# Patient Record
Sex: FEMALE | Race: WHITE | NOT HISPANIC OR LATINO | Employment: FULL TIME | URBAN - METROPOLITAN AREA
[De-identification: names, ages, dates, MRNs, and addresses within clinical notes are randomized per-mention and may not be internally consistent; named-entity substitution may affect disease eponyms.]

---

## 2017-10-20 ENCOUNTER — TRANSCRIBE ORDERS (OUTPATIENT)
Dept: ADMINISTRATIVE | Facility: HOSPITAL | Age: 37
End: 2017-10-20

## 2017-10-20 DIAGNOSIS — R20.2 NUMBNESS AND TINGLING: Primary | ICD-10-CM

## 2017-10-20 DIAGNOSIS — R20.0 NUMBNESS AND TINGLING: Primary | ICD-10-CM

## 2017-11-01 ENCOUNTER — OFFICE VISIT (OUTPATIENT)
Dept: RADIOLOGY | Facility: CLINIC | Age: 37
End: 2017-11-01
Payer: OTHER MISCELLANEOUS

## 2017-11-01 DIAGNOSIS — R20.0 NUMBNESS AND TINGLING: ICD-10-CM

## 2017-11-01 DIAGNOSIS — R20.2 NUMBNESS AND TINGLING: ICD-10-CM

## 2017-11-01 PROCEDURE — 95910 NRV CNDJ TEST 7-8 STUDIES: CPT

## 2017-11-01 PROCEDURE — 95886 MUSC TEST DONE W/N TEST COMP: CPT

## 2018-03-20 LAB — C-REACTIVE PROTEIN (HISTORICAL): 17.6 MG/L

## 2018-05-01 LAB
ALBUMIN SERPL BCP-MCNC: 3.6 G/DL (ref 3.5–5.7)
ALP SERPL-CCNC: 68 IU/L (ref 40–150)
ALT SERPL W P-5'-P-CCNC: 17 IU/L (ref 0–50)
ANION GAP SERPL CALCULATED.3IONS-SCNC: 10.1 MM/L
AST SERPL W P-5'-P-CCNC: 17 U/L (ref 7–26)
BASOPHILS # BLD AUTO: 0 X3/UL (ref 0–0.3)
BASOPHILS # BLD AUTO: 0.6 % (ref 0–2)
BILIRUB SERPL-MCNC: 0.5 MG/DL (ref 0.3–1)
BILIRUB UR QL STRIP: NEGATIVE
BUN SERPL-MCNC: 12 MG/DL (ref 7–25)
CALCIUM SERPL-MCNC: 8.3 MG/DL (ref 8.6–10.5)
CHLORIDE SERPL-SCNC: 103 MM/L (ref 98–107)
CLARITY UR: CLEAR
CO2 SERPL-SCNC: 29 MM/L (ref 21–31)
COLOR UR: YELLOW
CREAT SERPL-MCNC: 0.67 MG/DL (ref 0.6–1.2)
DEPRECATED RDW RBC AUTO: 13 %
EGFR (HISTORICAL): > 60 GFR
EGFR AFRICAN AMERICAN (HISTORICAL): > 60 GFR
EOSINOPHIL # BLD AUTO: 0.1 X3/UL (ref 0–0.5)
EOSINOPHIL NFR BLD AUTO: 2.3 % (ref 0–5)
GLUCOSE (HISTORICAL): 172 MG/DL (ref 65–99)
GLUCOSE UR STRIP-MCNC: ABNORMAL MG/DL
HCT VFR BLD AUTO: 38.1 % (ref 37–47)
HGB BLD-MCNC: 12.8 G/DL (ref 12–16)
HGB UR QL STRIP.AUTO: NEGATIVE
KETONES UR STRIP-MCNC: NEGATIVE MG/DL
LEUKOCYTE ESTERASE UR QL STRIP: NEGATIVE
LYMPHOCYTES # BLD AUTO: 0.9 X3/UL (ref 1.2–4.2)
LYMPHOCYTES NFR BLD AUTO: 25 % (ref 20.5–51.1)
MCH RBC QN AUTO: 28.3 PG (ref 26–34)
MCHC RBC AUTO-ENTMCNC: 33.6 G/DL (ref 31–37)
MCV RBC AUTO: 84.3 FL (ref 81–99)
MONOCYTES # BLD AUTO: 0.3 X3/UL (ref 0–1)
MONOCYTES NFR BLD AUTO: 7.4 % (ref 1.7–12)
NEUTROPHILS # BLD AUTO: 2.2 X3/UL (ref 1.4–6.5)
NEUTS SEG NFR BLD AUTO: 64.7 % (ref 42.2–75.2)
NITRITE UR QL STRIP: NEGATIVE
OSMOLALITY, SERUM (HISTORICAL): 280 MOSM (ref 262–291)
PH UR STRIP.AUTO: 5.5 [PH] (ref 4.5–8)
PLATELET # BLD AUTO: 175 X3/UL (ref 130–400)
PMV BLD AUTO: 8.5 FL
POTASSIUM SERPL-SCNC: 4.1 MM/L (ref 3.5–5.5)
PREGNANCY TEST URINE (HISTORICAL): NEGATIVE
PROT UR STRIP-MCNC: NEGATIVE MG/DL
RBC # BLD AUTO: 4.52 X6/UL (ref 3.9–5.2)
SODIUM SERPL-SCNC: 138 MM/L (ref 134–143)
SP GR UR STRIP.AUTO: 1.02 (ref 1–1.03)
SP GR UR STRIP.AUTO: 1.02 (ref 1–1.03)
TOTAL PROTEIN (HISTORICAL): 6 G/DL (ref 6.4–8.9)
UROBILINOGEN UR QL STRIP.AUTO: 0.2 EU/DL (ref 0.2–8)
WBC # BLD AUTO: 3.4 X3/UL (ref 4.8–10.8)

## 2018-05-07 ENCOUNTER — LAB REQUISITION (OUTPATIENT)
Dept: LAB | Facility: HOSPITAL | Age: 38
End: 2018-05-07
Payer: COMMERCIAL

## 2018-05-07 DIAGNOSIS — K56.3 GALLSTONE ILEUS (HCC): ICD-10-CM

## 2018-05-07 LAB
PREGNANCY TEST URINE (HISTORICAL): NEGATIVE
SP GR UR STRIP.AUTO: 1.02 (ref 1–1.03)
SURGICAL PATHOLOGY (HISTORICAL): NORMAL

## 2018-05-07 PROCEDURE — 88304 TISSUE EXAM BY PATHOLOGIST: CPT | Performed by: PATHOLOGY

## 2018-05-16 LAB
ALBUMIN SERPL BCP-MCNC: 4 G/DL (ref 3.5–5.7)
ALP SERPL-CCNC: 138 IU/L (ref 40–150)
ALT SERPL W P-5'-P-CCNC: 22 IU/L (ref 0–50)
ANION GAP SERPL CALCULATED.3IONS-SCNC: 11.3 MM/L
AST SERPL W P-5'-P-CCNC: 28 U/L (ref 7–26)
BASOPHILS # BLD AUTO: 0 X3/UL (ref 0–0.3)
BASOPHILS # BLD AUTO: 0.7 % (ref 0–2)
BILIRUB SERPL-MCNC: 0.4 MG/DL (ref 0.3–1)
BUN SERPL-MCNC: 6 MG/DL (ref 7–25)
CALCIUM SERPL-MCNC: 9.4 MG/DL (ref 8.6–10.5)
CHLORIDE SERPL-SCNC: 101 MM/L (ref 98–107)
CO2 SERPL-SCNC: 29 MM/L (ref 21–31)
CREAT SERPL-MCNC: 0.6 MG/DL (ref 0.6–1.2)
D-DIMER QUANTITATIVE (HISTORICAL): 338 NG/ML
DEPRECATED RDW RBC AUTO: 13 %
EGFR (HISTORICAL): > 60 GFR
EGFR AFRICAN AMERICAN (HISTORICAL): > 60 GFR
EOSINOPHIL # BLD AUTO: 0.2 X3/UL (ref 0–0.5)
EOSINOPHIL NFR BLD AUTO: 4.1 % (ref 0–5)
GLUCOSE (HISTORICAL): 275 MG/DL (ref 65–99)
HCT VFR BLD AUTO: 40.3 % (ref 37–47)
HGB BLD-MCNC: 13 G/DL (ref 12–16)
LIPASE SERPL-CCNC: 9 U/L (ref 11–82)
LYMPHOCYTES # BLD AUTO: 1 X3/UL (ref 1.2–4.2)
LYMPHOCYTES NFR BLD AUTO: 22.6 % (ref 20.5–51.1)
MCH RBC QN AUTO: 27.6 PG (ref 26–34)
MCHC RBC AUTO-ENTMCNC: 32.3 G/DL (ref 31–37)
MCV RBC AUTO: 85.3 FL (ref 81–99)
MONOCYTES # BLD AUTO: 0.4 X3/UL (ref 0–1)
MONOCYTES NFR BLD AUTO: 8.4 % (ref 1.7–12)
NEUTROPHILS # BLD AUTO: 2.8 X3/UL (ref 1.4–6.5)
NEUTS SEG NFR BLD AUTO: 64.2 % (ref 42.2–75.2)
OSMOLALITY, SERUM (HISTORICAL): 281 MOSM (ref 262–291)
PLATELET # BLD AUTO: 256 X3/UL (ref 130–400)
PMV BLD AUTO: 8.4 FL
POTASSIUM SERPL-SCNC: 4.3 MM/L (ref 3.5–5.5)
PREGNANCY TEST URINE (HISTORICAL): NEGATIVE
RBC # BLD AUTO: 4.72 X6/UL (ref 3.9–5.2)
SODIUM SERPL-SCNC: 137 MM/L (ref 134–143)
SP GR UR STRIP.AUTO: <= 1.005 (ref 1–1.03)
TOTAL PROTEIN (HISTORICAL): 6.8 G/DL (ref 6.4–8.9)
WBC # BLD AUTO: 4.4 X3/UL (ref 4.8–10.8)

## 2018-05-18 LAB
AMORPHOUS MATERIAL (HISTORICAL): ABNORMAL /HPF
BILIRUB UR QL STRIP: NEGATIVE
CLARITY UR: CLEAR
COLOR UR: YELLOW
GLUCOSE UR STRIP-MCNC: ABNORMAL MG/DL
HGB UR QL STRIP.AUTO: ABNORMAL
KETONES UR STRIP-MCNC: NEGATIVE MG/DL
LEUKOCYTE ESTERASE UR QL STRIP: NEGATIVE
NITRITE UR QL STRIP: NEGATIVE
NON-SQ EPI CELLS URNS QL MICRO: ABNORMAL /LPF
PH UR STRIP.AUTO: 6 [PH] (ref 4.5–8)
PROT UR STRIP-MCNC: NEGATIVE MG/DL
RBC #/AREA URNS AUTO: ABNORMAL /HPF
SP GR UR STRIP.AUTO: <= 1.005 (ref 1–1.03)
UROBILINOGEN UR QL STRIP.AUTO: 1 EU/DL (ref 0.2–8)
WBC #/AREA URNS AUTO: ABNORMAL /HPF

## 2018-05-25 LAB
BUN SERPL-MCNC: 12 MG/DL (ref 7–25)
CREAT SERPL-MCNC: 0.76 MG/DL (ref 0.6–1.2)

## 2018-12-27 ENCOUNTER — HOSPITAL ENCOUNTER (INPATIENT)
Facility: HOSPITAL | Age: 38
LOS: 2 days | Discharge: HOME | DRG: 639 | End: 2018-12-29
Attending: FAMILY MEDICINE | Admitting: INTERNAL MEDICINE
Payer: COMMERCIAL

## 2018-12-27 ENCOUNTER — APPOINTMENT (EMERGENCY)
Dept: RADIOLOGY | Facility: HOSPITAL | Age: 38
DRG: 639 | End: 2018-12-27
Payer: COMMERCIAL

## 2018-12-27 DIAGNOSIS — E10.10 DIABETIC KETOACIDOSIS WITHOUT COMA ASSOCIATED WITH TYPE 1 DIABETES MELLITUS (HCC): Primary | ICD-10-CM

## 2018-12-27 DIAGNOSIS — R11.0 NAUSEA: ICD-10-CM

## 2018-12-27 DIAGNOSIS — D50.9 IRON DEFICIENCY ANEMIA, UNSPECIFIED IRON DEFICIENCY ANEMIA TYPE: Chronic | ICD-10-CM

## 2018-12-27 DIAGNOSIS — E55.9 VITAMIN D DEFICIENCY: Chronic | ICD-10-CM

## 2018-12-27 PROBLEM — E87.8 ELECTROLYTE ABNORMALITY: Status: ACTIVE | Noted: 2018-12-27

## 2018-12-27 LAB
ANION GAP SERPL CALCULATED.3IONS-SCNC: 11 MMOL/L (ref 4–13)
ANION GAP SERPL CALCULATED.3IONS-SCNC: 14 MMOL/L (ref 4–13)
BETA-HYDROXYBUTYRATE: 2.69 MMOL/L (ref 0.02–0.27)
BETA-HYDROXYBUTYRATE: 3.81 MMOL/L (ref 0.02–0.27)
BILIRUB UR QL STRIP: ABNORMAL
BUN SERPL-MCNC: 7 MG/DL (ref 7–25)
BUN SERPL-MCNC: 8 MG/DL (ref 7–25)
CALCIUM SERPL-MCNC: 7.8 MG/DL (ref 8.6–10.5)
CALCIUM SERPL-MCNC: 8.7 MG/DL (ref 8.6–10.5)
CHLORIDE SERPL-SCNC: 102 MMOL/L (ref 98–107)
CHLORIDE SERPL-SCNC: 95 MMOL/L (ref 98–107)
CLARITY UR: CLEAR
CO2 SERPL-SCNC: 19 MMOL/L (ref 21–31)
CO2 SERPL-SCNC: 22 MMOL/L (ref 21–31)
COLOR UR: YELLOW
CREAT SERPL-MCNC: 0.6 MG/DL (ref 0.6–1.2)
CREAT SERPL-MCNC: 0.62 MG/DL (ref 0.6–1.2)
ERYTHROCYTE [DISTWIDTH] IN BLOOD BY AUTOMATED COUNT: 14.1 % (ref 11.5–14.5)
GFR SERPL CREATININE-BSD FRML MDRD: 116 ML/MIN/1.73SQ M
GFR SERPL CREATININE-BSD FRML MDRD: 118 ML/MIN/1.73SQ M
GLUCOSE SERPL-MCNC: 103 MG/DL (ref 65–140)
GLUCOSE SERPL-MCNC: 116 MG/DL (ref 65–99)
GLUCOSE SERPL-MCNC: 117 MG/DL (ref 65–140)
GLUCOSE SERPL-MCNC: 154 MG/DL (ref 65–140)
GLUCOSE SERPL-MCNC: 196 MG/DL (ref 65–140)
GLUCOSE SERPL-MCNC: 204 MG/DL (ref 65–99)
GLUCOSE UR STRIP-MCNC: ABNORMAL MG/DL
HCT VFR BLD AUTO: 39 % (ref 34.8–46.1)
HGB BLD-MCNC: 12.8 G/DL (ref 12–16)
HGB UR QL STRIP.AUTO: NEGATIVE
KETONES UR STRIP-MCNC: ABNORMAL MG/DL
LEUKOCYTE ESTERASE UR QL STRIP: NEGATIVE
LYMPHOCYTES # BLD AUTO: 1.41 THOUSAND/UL (ref 0.6–4.47)
LYMPHOCYTES # BLD AUTO: 44 % (ref 20–51)
MAGNESIUM SERPL-MCNC: 1.6 MG/DL (ref 1.9–2.7)
MAGNESIUM SERPL-MCNC: 1.7 MG/DL (ref 1.9–2.7)
MCH RBC QN AUTO: 28.5 PG (ref 26–34)
MCHC RBC AUTO-ENTMCNC: 32.8 G/DL (ref 31–37)
MCV RBC AUTO: 87 FL (ref 81–99)
MONOCYTES # BLD AUTO: 0.45 THOUSAND/UL (ref 0–1.22)
MONOCYTES NFR BLD AUTO: 14 % (ref 4–12)
NEUTS BAND NFR BLD MANUAL: 2 % (ref 0–8)
NEUTS SEG # BLD: 1.02 THOUSAND/UL (ref 1.81–6.82)
NEUTS SEG NFR BLD AUTO: 30 % (ref 42–75)
NITRITE UR QL STRIP: NEGATIVE
NRBC BLD AUTO-RTO: 1 /100 WBCS
PH UR STRIP.AUTO: 5.5 [PH] (ref 5–8)
PHOSPHATE SERPL-MCNC: 2 MG/DL (ref 3–5.5)
PHOSPHATE SERPL-MCNC: 2 MG/DL (ref 3–5.5)
PLATELET # BLD AUTO: 136 THOUSANDS/UL (ref 149–390)
PLATELET BLD QL SMEAR: ADEQUATE
PMV BLD AUTO: 8.1 FL (ref 8.6–11.7)
POTASSIUM SERPL-SCNC: 3.4 MMOL/L (ref 3.5–5.5)
POTASSIUM SERPL-SCNC: 3.7 MMOL/L (ref 3.5–5.5)
PROT UR STRIP-MCNC: NEGATIVE MG/DL
RBC # BLD AUTO: 4.5 MILLION/UL (ref 3.9–5.2)
RBC MORPH BLD: NORMAL
SODIUM SERPL-SCNC: 128 MMOL/L (ref 134–143)
SODIUM SERPL-SCNC: 135 MMOL/L (ref 134–143)
SP GR UR STRIP.AUTO: 1.02 (ref 1–1.03)
TOTAL CELLS COUNTED SPEC: 100
UROBILINOGEN UR QL STRIP.AUTO: 0.2 E.U./DL
VARIANT LYMPHS # BLD AUTO: 10 % (ref 0–0)
WBC # BLD AUTO: 3.2 THOUSAND/UL (ref 4.8–10.8)

## 2018-12-27 PROCEDURE — 82948 REAGENT STRIP/BLOOD GLUCOSE: CPT

## 2018-12-27 PROCEDURE — 80048 BASIC METABOLIC PNL TOTAL CA: CPT | Performed by: PHYSICIAN ASSISTANT

## 2018-12-27 PROCEDURE — 82010 KETONE BODYS QUAN: CPT | Performed by: FAMILY MEDICINE

## 2018-12-27 PROCEDURE — 84100 ASSAY OF PHOSPHORUS: CPT | Performed by: PHYSICIAN ASSISTANT

## 2018-12-27 PROCEDURE — 82010 KETONE BODYS QUAN: CPT | Performed by: PHYSICIAN ASSISTANT

## 2018-12-27 PROCEDURE — 96374 THER/PROPH/DIAG INJ IV PUSH: CPT

## 2018-12-27 PROCEDURE — 99285 EMERGENCY DEPT VISIT HI MDM: CPT

## 2018-12-27 PROCEDURE — 96361 HYDRATE IV INFUSION ADD-ON: CPT

## 2018-12-27 PROCEDURE — 80048 BASIC METABOLIC PNL TOTAL CA: CPT | Performed by: FAMILY MEDICINE

## 2018-12-27 PROCEDURE — 85007 BL SMEAR W/DIFF WBC COUNT: CPT | Performed by: FAMILY MEDICINE

## 2018-12-27 PROCEDURE — 83735 ASSAY OF MAGNESIUM: CPT | Performed by: FAMILY MEDICINE

## 2018-12-27 PROCEDURE — 99223 1ST HOSP IP/OBS HIGH 75: CPT | Performed by: PHYSICIAN ASSISTANT

## 2018-12-27 PROCEDURE — 83735 ASSAY OF MAGNESIUM: CPT | Performed by: PHYSICIAN ASSISTANT

## 2018-12-27 PROCEDURE — 84100 ASSAY OF PHOSPHORUS: CPT | Performed by: FAMILY MEDICINE

## 2018-12-27 PROCEDURE — 71045 X-RAY EXAM CHEST 1 VIEW: CPT

## 2018-12-27 PROCEDURE — 85027 COMPLETE CBC AUTOMATED: CPT | Performed by: FAMILY MEDICINE

## 2018-12-27 PROCEDURE — 81003 URINALYSIS AUTO W/O SCOPE: CPT | Performed by: FAMILY MEDICINE

## 2018-12-27 PROCEDURE — 36415 COLL VENOUS BLD VENIPUNCTURE: CPT | Performed by: FAMILY MEDICINE

## 2018-12-27 RX ORDER — LEVOTHYROXINE SODIUM 0.15 MG/1
TABLET ORAL
COMMUNITY
Start: 2018-04-09 | End: 2018-12-27

## 2018-12-27 RX ORDER — INSULIN GLARGINE 100 [IU]/ML
INJECTION, SOLUTION SUBCUTANEOUS
COMMUNITY
End: 2019-09-23

## 2018-12-27 RX ORDER — GABAPENTIN 300 MG/1
300 CAPSULE ORAL
COMMUNITY
Start: 2018-02-09 | End: 2019-09-23

## 2018-12-27 RX ORDER — SODIUM CHLORIDE 9 MG/ML
250 INJECTION, SOLUTION INTRAVENOUS CONTINUOUS
Status: DISCONTINUED | OUTPATIENT
Start: 2018-12-28 | End: 2018-12-27

## 2018-12-27 RX ORDER — DEXTROSE AND SODIUM CHLORIDE 5; .9 G/100ML; G/100ML
250 INJECTION, SOLUTION INTRAVENOUS CONTINUOUS
Status: DISCONTINUED | OUTPATIENT
Start: 2018-12-27 | End: 2018-12-28

## 2018-12-27 RX ORDER — LEVOTHYROXINE SODIUM 0.15 MG/1
150 TABLET ORAL DAILY
COMMUNITY
End: 2019-09-23

## 2018-12-27 RX ORDER — DULOXETIN HYDROCHLORIDE 60 MG/1
60 CAPSULE, DELAYED RELEASE ORAL DAILY
Status: DISCONTINUED | OUTPATIENT
Start: 2018-12-28 | End: 2018-12-29 | Stop reason: HOSPADM

## 2018-12-27 RX ORDER — SODIUM CHLORIDE 9 MG/ML
500 INJECTION, SOLUTION INTRAVENOUS CONTINUOUS
Status: DISCONTINUED | OUTPATIENT
Start: 2018-12-27 | End: 2018-12-27

## 2018-12-27 RX ORDER — CHLORHEXIDINE GLUCONATE 0.12 MG/ML
15 RINSE ORAL EVERY 12 HOURS SCHEDULED
Status: DISCONTINUED | OUTPATIENT
Start: 2018-12-27 | End: 2018-12-29

## 2018-12-27 RX ORDER — SODIUM CHLORIDE 9 MG/ML
1000 INJECTION, SOLUTION INTRAVENOUS CONTINUOUS
Status: DISCONTINUED | OUTPATIENT
Start: 2018-12-27 | End: 2018-12-27

## 2018-12-27 RX ORDER — DULOXETIN HYDROCHLORIDE 60 MG/1
60 CAPSULE, DELAYED RELEASE ORAL
COMMUNITY
Start: 2018-08-07 | End: 2019-09-23 | Stop reason: SDUPTHER

## 2018-12-27 RX ORDER — MAGNESIUM SULFATE HEPTAHYDRATE 40 MG/ML
4 INJECTION, SOLUTION INTRAVENOUS ONCE
Status: COMPLETED | OUTPATIENT
Start: 2018-12-27 | End: 2018-12-28

## 2018-12-27 RX ORDER — POTASSIUM CHLORIDE 14.9 MG/ML
20 INJECTION INTRAVENOUS
Status: COMPLETED | OUTPATIENT
Start: 2018-12-27 | End: 2018-12-28

## 2018-12-27 RX ORDER — LEVOTHYROXINE SODIUM 0.07 MG/1
150 TABLET ORAL
Status: DISCONTINUED | OUTPATIENT
Start: 2018-12-28 | End: 2018-12-29 | Stop reason: HOSPADM

## 2018-12-27 RX ORDER — GABAPENTIN 300 MG/1
300 CAPSULE ORAL
Status: DISCONTINUED | OUTPATIENT
Start: 2018-12-27 | End: 2018-12-29 | Stop reason: HOSPADM

## 2018-12-27 RX ORDER — ONDANSETRON 2 MG/ML
4 INJECTION INTRAMUSCULAR; INTRAVENOUS EVERY 6 HOURS PRN
Status: DISCONTINUED | OUTPATIENT
Start: 2018-12-27 | End: 2018-12-29 | Stop reason: HOSPADM

## 2018-12-27 RX ORDER — ERGOCALCIFEROL 1.25 MG/1
50000 CAPSULE ORAL WEEKLY
Status: DISCONTINUED | OUTPATIENT
Start: 2018-12-27 | End: 2018-12-27

## 2018-12-27 RX ORDER — POTASSIUM CHLORIDE 20 MEQ/1
40 TABLET, EXTENDED RELEASE ORAL ONCE
Status: COMPLETED | OUTPATIENT
Start: 2018-12-27 | End: 2018-12-27

## 2018-12-27 RX ORDER — ONDANSETRON 2 MG/ML
4 INJECTION INTRAMUSCULAR; INTRAVENOUS ONCE
Status: COMPLETED | OUTPATIENT
Start: 2018-12-27 | End: 2018-12-27

## 2018-12-27 RX ORDER — ERGOCALCIFEROL 1.25 MG/1
50000 CAPSULE ORAL WEEKLY
Status: DISCONTINUED | OUTPATIENT
Start: 2018-12-28 | End: 2018-12-29 | Stop reason: HOSPADM

## 2018-12-27 RX ADMIN — GABAPENTIN 300 MG: 300 CAPSULE ORAL at 22:28

## 2018-12-27 RX ADMIN — POTASSIUM CHLORIDE 40 MEQ: 1500 TABLET, EXTENDED RELEASE ORAL at 23:35

## 2018-12-27 RX ADMIN — INSULIN HUMAN 10 UNITS: 100 INJECTION, SOLUTION PARENTERAL at 20:39

## 2018-12-27 RX ADMIN — DEXTROSE AND SODIUM CHLORIDE 250 ML/HR: 5; 900 INJECTION, SOLUTION INTRAVENOUS at 22:15

## 2018-12-27 RX ADMIN — DEXTROSE AND SODIUM CHLORIDE 250 ML/HR: 5; 900 INJECTION, SOLUTION INTRAVENOUS at 23:09

## 2018-12-27 RX ADMIN — SODIUM CHLORIDE 1000 ML: 0.9 INJECTION, SOLUTION INTRAVENOUS at 21:32

## 2018-12-27 RX ADMIN — POTASSIUM CHLORIDE 20 MEQ: 200 INJECTION, SOLUTION INTRAVENOUS at 22:11

## 2018-12-27 RX ADMIN — CHLORHEXIDINE GLUCONATE 0.12% ORAL RINSE 15 ML: 1.2 LIQUID ORAL at 22:28

## 2018-12-27 RX ADMIN — SODIUM CHLORIDE 1000 ML: 0.9 INJECTION, SOLUTION INTRAVENOUS at 20:38

## 2018-12-27 RX ADMIN — POTASSIUM & SODIUM PHOSPHATES POWDER PACK 280-160-250 MG 2 PACKET: 280-160-250 PACK at 23:35

## 2018-12-27 RX ADMIN — ONDANSETRON 4 MG: 2 INJECTION INTRAMUSCULAR; INTRAVENOUS at 21:31

## 2018-12-28 LAB
ANION GAP SERPL CALCULATED.3IONS-SCNC: 5 MMOL/L (ref 4–13)
ANION GAP SERPL CALCULATED.3IONS-SCNC: 8 MMOL/L (ref 4–13)
BETA-HYDROXYBUTYRATE: 1.09 MMOL/L (ref 0.02–0.27)
BUN SERPL-MCNC: 4 MG/DL (ref 7–25)
BUN SERPL-MCNC: 5 MG/DL (ref 7–25)
CALCIUM SERPL-MCNC: 7.5 MG/DL (ref 8.6–10.5)
CALCIUM SERPL-MCNC: 7.8 MG/DL (ref 8.6–10.5)
CHLORIDE SERPL-SCNC: 105 MMOL/L (ref 98–107)
CHLORIDE SERPL-SCNC: 105 MMOL/L (ref 98–107)
CO2 SERPL-SCNC: 23 MMOL/L (ref 21–31)
CO2 SERPL-SCNC: 23 MMOL/L (ref 21–31)
CREAT SERPL-MCNC: 0.53 MG/DL (ref 0.6–1.2)
CREAT SERPL-MCNC: 0.56 MG/DL (ref 0.6–1.2)
EST. AVERAGE GLUCOSE BLD GHB EST-MCNC: 280 MG/DL
FERRITIN SERPL-MCNC: 140 NG/ML (ref 8–388)
GFR SERPL CREATININE-BSD FRML MDRD: 120 ML/MIN/1.73SQ M
GFR SERPL CREATININE-BSD FRML MDRD: 123 ML/MIN/1.73SQ M
GLUCOSE SERPL-MCNC: 108 MG/DL (ref 65–140)
GLUCOSE SERPL-MCNC: 116 MG/DL (ref 65–140)
GLUCOSE SERPL-MCNC: 134 MG/DL (ref 65–140)
GLUCOSE SERPL-MCNC: 135 MG/DL (ref 65–99)
GLUCOSE SERPL-MCNC: 145 MG/DL (ref 65–140)
GLUCOSE SERPL-MCNC: 165 MG/DL (ref 65–140)
GLUCOSE SERPL-MCNC: 184 MG/DL (ref 65–99)
GLUCOSE SERPL-MCNC: 190 MG/DL (ref 65–140)
GLUCOSE SERPL-MCNC: 222 MG/DL (ref 65–140)
GLUCOSE SERPL-MCNC: 226 MG/DL (ref 65–140)
HBA1C MFR BLD: 11.4 % (ref 4.2–6.3)
IRON SATN MFR SERPL: 19 %
IRON SERPL-MCNC: 55 UG/DL (ref 50–170)
MAGNESIUM SERPL-MCNC: 2.3 MG/DL (ref 1.9–2.7)
MAGNESIUM SERPL-MCNC: 2.4 MG/DL (ref 1.9–2.7)
PHOSPHATE SERPL-MCNC: 3 MG/DL (ref 3–5.5)
PHOSPHATE SERPL-MCNC: 3.2 MG/DL (ref 3–5.5)
POTASSIUM SERPL-SCNC: 4.2 MMOL/L (ref 3.5–5.5)
POTASSIUM SERPL-SCNC: 4.4 MMOL/L (ref 3.5–5.5)
SODIUM SERPL-SCNC: 133 MMOL/L (ref 134–143)
SODIUM SERPL-SCNC: 136 MMOL/L (ref 134–143)
TIBC SERPL-MCNC: 286 UG/DL (ref 250–450)

## 2018-12-28 PROCEDURE — 80048 BASIC METABOLIC PNL TOTAL CA: CPT | Performed by: PHYSICIAN ASSISTANT

## 2018-12-28 PROCEDURE — 82948 REAGENT STRIP/BLOOD GLUCOSE: CPT

## 2018-12-28 PROCEDURE — 99232 SBSQ HOSP IP/OBS MODERATE 35: CPT | Performed by: INTERNAL MEDICINE

## 2018-12-28 PROCEDURE — 83540 ASSAY OF IRON: CPT | Performed by: INTERNAL MEDICINE

## 2018-12-28 PROCEDURE — 83550 IRON BINDING TEST: CPT | Performed by: INTERNAL MEDICINE

## 2018-12-28 PROCEDURE — 82010 KETONE BODYS QUAN: CPT | Performed by: PHYSICIAN ASSISTANT

## 2018-12-28 PROCEDURE — 83036 HEMOGLOBIN GLYCOSYLATED A1C: CPT | Performed by: PHYSICIAN ASSISTANT

## 2018-12-28 PROCEDURE — 84100 ASSAY OF PHOSPHORUS: CPT | Performed by: PHYSICIAN ASSISTANT

## 2018-12-28 PROCEDURE — 82728 ASSAY OF FERRITIN: CPT | Performed by: INTERNAL MEDICINE

## 2018-12-28 PROCEDURE — 83735 ASSAY OF MAGNESIUM: CPT | Performed by: PHYSICIAN ASSISTANT

## 2018-12-28 RX ORDER — ACETAMINOPHEN 325 MG/1
650 TABLET ORAL EVERY 6 HOURS PRN
Status: DISCONTINUED | OUTPATIENT
Start: 2018-12-28 | End: 2018-12-29 | Stop reason: HOSPADM

## 2018-12-28 RX ORDER — IBUPROFEN 600 MG/1
600 TABLET ORAL EVERY 8 HOURS PRN
Status: DISCONTINUED | OUTPATIENT
Start: 2018-12-28 | End: 2018-12-29 | Stop reason: HOSPADM

## 2018-12-28 RX ORDER — SODIUM CHLORIDE 9 MG/ML
200 INJECTION, SOLUTION INTRAVENOUS ONCE
Status: DISCONTINUED | OUTPATIENT
Start: 2018-12-28 | End: 2018-12-29

## 2018-12-28 RX ORDER — SODIUM CHLORIDE 9 MG/ML
200 INJECTION, SOLUTION INTRAVENOUS CONTINUOUS
Status: DISCONTINUED | OUTPATIENT
Start: 2018-12-28 | End: 2018-12-28

## 2018-12-28 RX ORDER — INSULIN GLARGINE 100 [IU]/ML
25 INJECTION, SOLUTION SUBCUTANEOUS
Status: DISCONTINUED | OUTPATIENT
Start: 2018-12-28 | End: 2018-12-29 | Stop reason: HOSPADM

## 2018-12-28 RX ADMIN — INSULIN LISPRO 8 UNITS: 100 INJECTION, SOLUTION INTRAVENOUS; SUBCUTANEOUS at 17:46

## 2018-12-28 RX ADMIN — POTASSIUM CHLORIDE 20 MEQ: 200 INJECTION, SOLUTION INTRAVENOUS at 00:42

## 2018-12-28 RX ADMIN — GABAPENTIN 300 MG: 300 CAPSULE ORAL at 21:38

## 2018-12-28 RX ADMIN — SODIUM CHLORIDE 200 ML/HR: 9 INJECTION, SOLUTION INTRAVENOUS at 06:28

## 2018-12-28 RX ADMIN — POTASSIUM & SODIUM PHOSPHATES POWDER PACK 280-160-250 MG 2 PACKET: 280-160-250 PACK at 03:33

## 2018-12-28 RX ADMIN — ENOXAPARIN SODIUM 40 MG: 40 INJECTION SUBCUTANEOUS at 09:18

## 2018-12-28 RX ADMIN — DULOXETINE HYDROCHLORIDE 60 MG: 60 CAPSULE, DELAYED RELEASE ORAL at 09:18

## 2018-12-28 RX ADMIN — ERGOCALCIFEROL 50000 UNITS: 1.25 CAPSULE, LIQUID FILLED ORAL at 09:18

## 2018-12-28 RX ADMIN — IBUPROFEN 600 MG: 600 TABLET ORAL at 15:33

## 2018-12-28 RX ADMIN — CHLORHEXIDINE GLUCONATE 0.12% ORAL RINSE 15 ML: 1.2 LIQUID ORAL at 09:18

## 2018-12-28 RX ADMIN — IRON SUCROSE 100 MG: 20 INJECTION, SOLUTION INTRAVENOUS at 11:19

## 2018-12-28 RX ADMIN — MAGNESIUM SULFATE IN WATER 4 G: 40 INJECTION, SOLUTION INTRAVENOUS at 00:36

## 2018-12-28 RX ADMIN — ONDANSETRON 4 MG: 2 INJECTION INTRAMUSCULAR; INTRAVENOUS at 08:19

## 2018-12-28 RX ADMIN — INSULIN GLARGINE 25 UNITS: 100 INJECTION, SOLUTION SUBCUTANEOUS at 21:38

## 2018-12-28 RX ADMIN — LEVOTHYROXINE SODIUM 150 MCG: 100 TABLET ORAL at 06:08

## 2018-12-28 RX ADMIN — INSULIN LISPRO 2 UNITS: 100 INJECTION, SOLUTION INTRAVENOUS; SUBCUTANEOUS at 17:03

## 2018-12-28 RX ADMIN — ACETAMINOPHEN 650 MG: 325 TABLET ORAL at 13:54

## 2018-12-28 RX ADMIN — CHLORHEXIDINE GLUCONATE 0.12% ORAL RINSE 15 ML: 1.2 LIQUID ORAL at 21:37

## 2018-12-28 RX ADMIN — INSULIN GLARGINE 25 UNITS: 100 INJECTION, SOLUTION SUBCUTANEOUS at 00:36

## 2018-12-28 RX ADMIN — SODIUM CHLORIDE 200 ML/HR: 9 INJECTION, SOLUTION INTRAVENOUS at 00:49

## 2018-12-28 RX ADMIN — IBUPROFEN 600 MG: 600 TABLET ORAL at 23:43

## 2018-12-28 NOTE — ED PROVIDER NOTES
History  Chief Complaint   Patient presents with    Nausea     vomiting since yesterday     Lethargy     body aches , feels like "im in DKA"     HPI  This is a 53-year-old female type 1 diabetic on insulin presented to ED with complain of nausea vomiting and feeling lousy for past 2-3 days  Patient states that she has been using her insulin but her blood sugar was elevated for past few days  Patient states that she is going to of bed to worse and is under a lot of stress which has been causing a lot of distress in her life  Patient states her blood sugar this morning was 400 she took 25 unit of insulin which helped her sugar  She is complaining of nausea and a couple episodes of vomiting at home  She denies any abdominal pain at this time  She denies any fever chills at this time  Denies any chest pain palpitation  Patient states she does feel like that she is in DKA  Prior to Admission Medications   Prescriptions Last Dose Informant Patient Reported? Taking?    DULoxetine (CYMBALTA) 60 mg delayed release capsule   Yes Yes   Sig: Take 60 mg by mouth   gabapentin (NEURONTIN) 300 mg capsule   Yes Yes   Sig: Take 300 mg by mouth   insulin aspart (NovoLOG) 100 units/mL injection   Yes Yes   Sig: Inject under the skin 3 (three) times a day before meals   insulin glargine (LANTUS) 100 units/mL subcutaneous injection   Yes Yes   Sig: Inject under the skin daily at bedtime   levothyroxine 150 mcg tablet   Yes Yes   Sig: Take 150 mcg by mouth daily      Facility-Administered Medications: None       Past Medical History:   Diagnosis Date    Anemia     B12 deficiency     Diabetes mellitus ([address] )     Disease of thyroid gland     hypothyroid    MILLICENT (iron deficiency anemia)     Vitamin D deficiency        Past Surgical History:   Procedure Laterality Date    ABDOMINAL SURGERY      gastric bypass     SECTION      x2    CHOLECYSTECTOMY      GASTRIC BYPASS  2007    INTRAUTERINE DEVICE INSERTION 01/06/2015    OTHER SURGICAL HISTORY      surgery for imperforate hymen/endometriosis/hydrometrocolpos    TUBAL LIGATION      WISDOM TOOTH EXTRACTION         Family History   Problem Relation Age of Onset    Thyroid disease Mother     URIEL disease Mother     Diabetes Father     Schizophrenia Father     Thyroid disease Sister     Heart disease Maternal Grandmother     Hypertension Maternal Grandmother      I have reviewed and agree with the history as documented  Social History   Substance Use Topics    Smoking status: Never Smoker    Smokeless tobacco: Never Used    Alcohol use Yes      Comment: socially        Review of Systems   Constitutional: Positive for chills  HENT: Negative  Eyes: Negative  Respiratory: Negative  Cardiovascular: Negative  Gastrointestinal: Positive for nausea and vomiting  Negative for abdominal distention, constipation and rectal pain  Genitourinary: Negative  Musculoskeletal: Negative  Skin: Negative  Neurological: Negative  Psychiatric/Behavioral: Negative  Physical Exam  Physical Exam   Constitutional: She is oriented to person, place, and time  She appears well-developed  She has a sickly appearance  No distress  HENT:   Head: Normocephalic and atraumatic  Eyes: Pupils are equal, round, and reactive to light  EOM are normal    Neck: Normal range of motion  Neck supple  Cardiovascular: Normal rate, regular rhythm and normal heart sounds  Pulmonary/Chest: Effort normal and breath sounds normal    Abdominal: Soft  Bowel sounds are normal  She exhibits no distension  Neurological: She is alert and oriented to person, place, and time  Skin: Skin is warm  Psychiatric: She has a normal mood and affect  Nursing note and vitals reviewed        Vital Signs  ED Triage Vitals [12/27/18 1949]   Temperature Pulse Respirations Blood Pressure SpO2   98 9 °F (37 2 °C) 105 22 144/94 96 %      Temp Source Heart Rate Source Patient Position - Orthostatic VS BP Location FiO2 (%)   Temporal Monitor Sitting Left arm --      Pain Score       5           Vitals:    12/27/18 1949   BP: 144/94   Pulse: 105   Patient Position - Orthostatic VS: Sitting       Visual Acuity      ED Medications  Medications   sodium chloride 0 9 % bolus 1,000 mL (1,000 mL Intravenous New Bag 12/27/18 2038)   ondansetron (ZOFRAN) injection 4 mg (not administered)   sodium chloride 0 9 % bolus 1,000 mL (not administered)   potassium chloride 40 mEq IVPB (premix) (not administered)   insulin regular (HumuLIN R,NovoLIN R) injection 10 Units (10 Units Intravenous Given 12/27/18 2039)       Diagnostic Studies  Results Reviewed     Procedure Component Value Units Date/Time    Beta Hydroxybutyrate [[id number]]  (Abnormal) Collected:  12/27/18 2026    Lab Status:  Final result Specimen:  Blood from Arm, Left Updated:  12/27/18 2059     BETA-HYDROXYBUTYRATE 3 81 (H) mmol/L     Phosphorus [[id number]]  (Abnormal) Collected:  12/27/18 2026    Lab Status:  Final result Specimen:  Blood from Arm, Left Updated:  12/27/18 2059     Phosphorus 2 0 (L) mg/dL     Magnesium [[id number]]  (Abnormal) Collected:  12/27/18 2026    Lab Status:  Final result Specimen:  Blood from Arm, Left Updated:  12/27/18 2058     Magnesium 1 7 (L) mg/dL     Basic metabolic panel [[id number]]  (Abnormal) Collected:  12/27/18 2026    Lab Status:  Final result Specimen:  Blood from Arm, Left Updated:  12/27/18 2058     Sodium 128 (L) mmol/L      Potassium 3 7 mmol/L      Chloride 95 (L) mmol/L      CO2 19 (L) mmol/L      ANION GAP 14 (H) mmol/L      BUN 8 mg/dL      Creatinine 0 62 mg/dL      Glucose 204 (H) mg/dL      Calcium 8 7 mg/dL      eGFR 116 ml/min/1 73sq m     Narrative:         National Kidney Disease Education Program recommendations are as follows:  GFR calculation is accurate only with a steady state creatinine  Chronic Kidney disease less than 60 ml/min/1 73 sq  meters  Kidney failure less than 15 ml/min/1 73 sq  meters  UA w Reflex to Microscopic [[id number]]  (Abnormal) Collected:  12/27/18 2033    Lab Status:  Final result Specimen:  Urine from Urine, Clean Catch Updated:  12/27/18 2043     Color, UA Yellow     Clarity, UA Clear     Specific [place], UA 1 020     pH, UA 5 5     Leukocytes, UA Negative     Nitrite, UA Negative     Protein, UA Negative mg/dl      Glucose, UA 3+ (A) mg/dl      Ketones, UA 80 (3+) (A) mg/dl      Urobilinogen, UA 0 2 E U /dl      Bilirubin, UA 1+ (A)     Blood, UA Negative    CBC and differential [[id number]]  (Abnormal) Collected:  12/27/18 2026    Lab Status:  Final result Specimen:  Blood from Arm, Left Updated:  12/27/18 2042     WBC 3 20 (L) Thousand/uL      RBC 4 50 Million/uL      Hemoglobin 12 8 g/dL      Hematocrit 39 0 %      MCV 87 fL      MCH 28 5 pg      MCHC 32 8 g/dL      RDW 14 1 %      MPV 8 1 (L) fL      Platelets 452 (L) Thousands/uL      nRBC 1 /100 WBCs                  XR chest 1 view portable   Final Result by Margret Gregory (12/27 2115)      No findings of an acute cardiopulmonary process  Signed by Margret Gregory MD                 Procedures  Procedures       Phone Contacts  ED Phone Contact    ED Course      result are discussed with the patient will admitted for DKA  Patient states she has to feeling nauseous will give her Zofran and potassium                            [facility]  CritCare Time    Disposition  Final diagnoses:   Diabetic ketoacidosis without coma associated with type 1 diabetes mellitus ([facility] )   Nausea     Time reflects when diagnosis was documented in both MDM as applicable and the Disposition within this note     Time User Action Codes Description Comment    12/27/2018  9:23 PM Jenny Galan Add [E10 10] Diabetic ketoacidosis without coma associated with type 1 diabetes mellitus ([facility] )     12/27/2018  9:23 PM Jenny Galan Add [R11 0] Nausea       ED Disposition     ED Disposition Condition Comment    Admit  Case was discussed with Kael Peña and the patient's admission status was agreed to be Admission Status: inpatient status to the service of Dr Arleen Potter   Follow-up Information    None         Patient's Medications   Discharge Prescriptions    No medications on file     No discharge procedures on file      ED Provider  Electronically Signed by           Barbie Vizcaino MD  12/27/18 3735

## 2018-12-28 NOTE — ASSESSMENT & PLAN NOTE
· Patient received IV insulin in the ER with improvement in her electrolytes and glucose level  · Insulin drip was not needed  · Anion gap has closed and beta hydroxybutyrate improving  · Will advance to regular diabetic diet  · Will continue insulin sliding scale  · Fingersticks a c   And HS  · Continue Lantus

## 2018-12-28 NOTE — NURSING NOTE
Received into ICU as ICU pt from the ER via litter accompanied by 1 RN and pt's sister  Ambulated from the litter to the scale to ICU bed #6 - gait steady  Denies pain or sob  Does admit to mild nausea  Connected to the ICU monitors and made comf

## 2018-12-28 NOTE — ASSESSMENT & PLAN NOTE
No results found for: HGBA1C    · Last was 11 2 3/2018 per [facility] records    No results for input(s): POCGLU in the last 72 hours      Blood Sugar Average: Last 72 hrs:    · Admit to ICU DKA protocol  · Place on insulin drip, was only given 10 units in ER, drip will be started in ICU  · Serial labs

## 2018-12-28 NOTE — PROGRESS NOTES
Progress Note - Jared Kanner 1980, 41 y o  female MRN: [id number]    Unit/Bed#: ICU 06 Encounter: [id number]    Primary Care Provider: Simi Bain DO   Date and time admitted to hospital: 2018  7:48 PM        * Diabetic ketoacidosis without coma associated with type 1 diabetes mellitus ([address] )   Assessment & Plan    · Patient received IV insulin in the ER with improvement in her electrolytes and glucose level  · Insulin drip was not needed  · Anion gap has closed and beta hydroxybutyrate improving  · Will advance to regular diabetic diet  · Will continue insulin sliding scale  · Fingersticks a c  And HS  · Continue Lantus     Primary hypothyroidism   Assessment & Plan    · Continue levothyroxine  · Follow up as OP     Iron deficiency anemia   Assessment & Plan    · History of gastric bypass  · Will check iron panel  · IV iron       VTE Pharmacologic Prophylaxis: Pharmacologic: Enoxaparin (Lovenox)    Patient Centered Rounds: I have performed bedside rounds with nursing staff today  Discussions with Specialists or Other Care Team Provider: yes  Education and Discussions with Family / Patient: yes    Time Spent for Care: 45 minutes  More than 50% of total time spent on counseling and coordination of care as described above  Current Length of Stay: 1 day(s)    Current Patient Status: Inpatient   Certification Statement: The patient will continue to require additional inpatient hospital stay due to DKA    Discharge Plan:  Likely discharge home tomorrow    Code Status: Level 1 - Full Code    Subjective:   Patient states that she is feeling slightly better today  Still with intermittent nausea  Denies any fever or chills  No chest pain or palpitations  No abdominal pain      Objective:     Vitals:   Temp (24hrs), Av 2 °F (36 8 °C), Min:97 4 °F (36 3 °C), Max:98 9 °F (37 2 °C)    Temp:  [97 4 °F (36 3 °C)-98 9 °F (37 2 °C)] 98 4 °F (36 9 °C)  HR:  [] 83  Resp:  [12-22] 12  BP: ()/(51-94) 115/72  SpO2:  [93 %-96 %] 95 %  Body mass index is 25 97 kg/m²  Input and Output Summary (last 24 hours): Intake/Output Summary (Last 24 hours) at 12/28/18 1009  Last data filed at 12/28/18 0601   Gross per 24 hour   Intake           3107 5 ml   Output             2000 ml   Net           1107 5 ml       Physical Exam:     Physical Exam   Constitutional: She appears well-developed  No distress  HENT:   Head: Normocephalic and atraumatic  Eyes: Conjunctivae and EOM are normal    Neck: Normal range of motion  Neck supple  Cardiovascular: Normal rate and regular rhythm  Pulmonary/Chest: Effort normal  No respiratory distress  Abdominal: Soft  She exhibits no distension  There is no tenderness  Musculoskeletal: Normal range of motion  She exhibits no edema  Neurological: She is alert  No cranial nerve deficit  Skin: Skin is warm and dry  Psychiatric: She has a normal mood and affect  Additional Data:     Labs:      Results from last 7 days  Lab Units 12/27/18 2026   WBC Thousand/uL 3 20*   HEMOGLOBIN g/dL 12 8   HEMATOCRIT % 39 0   PLATELETS Thousands/uL 136*   LYMPHO PCT % 44   MONO PCT % 14*       Results from last 7 days  Lab Units 12/28/18  0633   POTASSIUM mmol/L 4 2   CHLORIDE mmol/L 105   CO2 mmol/L 23   BUN mg/dL 4*   CREATININE mg/dL 0 53*   CALCIUM mg/dL 7 5*           Results from last 7 days  Lab Units 12/28/18  0813 12/28/18  7887 12/28/18  0243 12/28/18  0153 12/28/18  0044 12/27/18  2348 12/27/18  2257 12/27/18  2151 12/27/18  2010   POC GLUCOSE mg/dl 108 134 165* 190* 222* 154* 103 117 196*           * I Have Reviewed All Lab Data Listed Above  * Additional Pertinent Lab Tests Reviewed:  [place] [address] Admission  Reviewed    Imaging:  Imaging Reports Reviewed Today Include:  No new imaging    Recent Cultures (last 7 days):           Last 24 Hours Medication List:     Current Facility-Administered Medications:  chlorhexidine 15 mL Swish & Spit Q12H [address] Lindberg Bishop, PA-C   DULoxetine 60 mg Oral Daily [place] Bishop, PA-C   enoxaparin 40 mg Subcutaneous Daily [facility] Bishop, PA-C   ergocalciferol 50,000 Units Oral Weekly [facility] Bishop, PA-C   gabapentin 300 mg Oral HS [facility] Bishop, PA-C   insulin glargine 25 Units Subcutaneous HS [facility] Bishop, PA-C   insulin lispro 1-5 Units Subcutaneous TID EMANI Begum, JANETH   iron sucrose 100 mg Intravenous Once Delia Orellana MD   levothyroxine 150 mcg Oral Early Morning [facility] Bishop, PA-ERNIE   ondansetron 4 mg Intravenous Q6H PRN [facility] Bisohp, PA-C   sodium chloride 200 mL/hr Intravenous Once Delia Orellana MD        Today, Patient Was Seen By: Delia Orellana MD    ** Please Note: Dictation voice to text software may have been used in the creation of this document   **

## 2018-12-28 NOTE — PLAN OF CARE
Pt with nausea relieved  Supplements given - for repeat chemistry  Verbalizes understanding regarding pain scale of 1 - 10    Verbalizes understanding of falls prevention - will call for assist to get oob to [place]

## 2018-12-28 NOTE — PLAN OF CARE
Problem: DISCHARGE PLANNING - CARE MANAGEMENT  Goal: Discharge to post-acute care or home with appropriate resources  INTERVENTIONS:  - Conduct assessment to determine patient/family and health care team treatment goals, and need for post-acute services based on payer coverage, community resources, and patient preferences, and barriers to discharge  - Address psychosocial, clinical, and financial barriers to discharge as identified in assessment in conjunction with the patient/family and health care team  - Arrange appropriate level of post-acute services according to patient's   needs and preference and payer coverage in collaboration with the physician and health care team  - Communicate with and update the patient/family, physician, and health care team regarding progress on the discharge plan  - Arrange appropriate transportation to post-acute venues  - Patients goal is to return home with family upon discharge   Outcome: Completed Date Met: 12/28/18

## 2018-12-28 NOTE — SOCIAL WORK
CM met with pt at bedside in ICU and explained role  Pt lives with family in 2 story house; 1 ROMEL, 12 steps inside  Pt reports she is completely independent with ADLs  Pt works full time and droves  She herrera snot use any DME  Pt PCP is Dr Wilson Bolds  She sues [employer] ' pharmacy for medications  Pt expressed interest in Diabetes education  Pt was given [employer] card to call to arrange for diabetes education classes  Pt voiced understanding and agreement with same  Pt denies the need for any discharge services at this time  Her family will transport her home upon discharge  CM to follow as needed CM reviewed d/c planning process including the following: identifying help at home, patient preference for d/c planning needs, availability of treatment team to discuss questions or concerns patient and/or family may have regarding understanding medications and recognizing signs and symptoms once discharged  CM also encouraged patient to follow up with all recommended appointments after discharge  Patient advised of importance for patient and family to participate in managing patients medical well being

## 2018-12-28 NOTE — NURSING NOTE
Kavon BEAVERS notified of accucheck of 117 and order for insulin drip  Will review chart and come to talk with pt

## 2018-12-28 NOTE — UTILIZATION REVIEW
Initial Clinical Review    Admission: Date/Time/Statement: 12/27/18 @ 2118 INPATIENT    Orders Placed This Encounter   Procedures    Inpatient Admission (expected length of stay for this patient is greater than two midnights)     Standing Status:   Standing     Number of Occurrences:   1     Order Specific Question:   Admitting Physician     Answer:   Teresa Schmitt     Order Specific Question:   Level of Care     Answer:   Critical Care [15]     Order Specific Question:   Estimated length of stay     Answer:   More than 2 Midnights     Order Specific Question:   Certification     Answer:   I certify that inpatient services are medically necessary for this patient for a duration of greater than two midnights  See H&P and MD Progress Notes for additional information about the patient's course of treatment  ED: Date/Time/Mode of Arrival:   ED Arrival Information     Expected Arrival Acuity Means of Arrival Escorted By Service Admission Type    - 12/27/2018 18:48 Urgent Walk-In Self General Medicine Urgent    Arrival Complaint    Feeling nausea, lethargy-history of DKA        Chief Complaint:   Chief Complaint   Patient presents with    Nausea     vomiting since yesterday     Lethargy     body aches , feels like "im in DKA"       History of Illness: This is a 82-year-old female type 1 diabetic on insulin presented to ED with complaint of nausea vomiting and feeling lousy for past 2-3 days  Patient states that she has been using her insulin but her blood sugar was elevated for past few days  Patient states her blood sugar this morning was 400 she took 25 units of insulin  She is complaining of nausea and a couple episodes of vomiting at home        ED Vital Signs:   ED Triage Vitals [12/27/18 1949]   Temperature Pulse Respirations Blood Pressure SpO2   98 9 °F (37 2 °C) 105 22 144/94 96 %   Pain Score       5        Wt Readings from Last 1 Encounters:   12/27/18 73 kg (160 lb 14 4 oz)     Vital Signs (abnormal): WNL    Abnormal Labs/Diagnostic Test Results:      12/27/18 2026    Sodium 134 - 143 mmol/L 128     Potassium 3 5 - 5 5 mmol/L 3 7    Chloride 98 - 107 mmol/L 95     CO2 21 - 31 mmol/L 19     ANION GAP 4 - 13 mmol/L 14     BUN 7 - 25 mg/dL 8    Creatinine 0 60 - 1 20 mg/dL 0 62    Glucose 65 - 99 mg/dL 204     Calcium 8 6 - 10 5 mg/dL 8 7    eGFR ml/min/1 73sq m 116        Urinalysis 12/27/18 2033    Glucose, UA  3+     Ketones, UA  80 (3+)     Bilirubin, UA  1+             12/27/18 2026    BETA-HYDROXYBUTYRATE 0 02 - 0 27 mmol/L 3 81         12/27/18 2026     WBC 4 80 - 10 80 Thousand/uL 3 20       12/27/18 2026    Neutrophils Absolute 1 81 - 6 82 Thousand/uL 1 02         ED Treatment:   Medication Administration from 12/27/2018 1848 to 12/27/2018 2146       Date/Time Order Dose Route Action     12/27/2018 2038 sodium chloride 0 9 % bolus 1,000 mL 1,000 mL Intravenous New Bag     12/27/2018 2039 insulin regular (HumuLIN R,NovoLIN R) injection 10 Units 10 Units Intravenous Given     12/27/2018 2131 ondansetron (ZOFRAN) injection 4 mg 4 mg Intravenous Given     12/27/2018 2132 sodium chloride 0 9 % bolus 1,000 mL 1,000 mL Intravenous New Bag        Past Medical/Surgical History:     Past Medical History:   Diagnosis Date    Anemia     Arthritis     B12 deficiency     Diabetes mellitus [address]     Disease of thyroid gland     MILLICENT (iron deficiency anemia)     Vitamin D deficiency        Admitting Diagnosis: Nausea [R11 0]  Lethargy [R53 83]  Diabetic ketoacidosis without coma associated with type 1 diabetes mellitus ([address] ) [E10 10]    Age/Sex: 41 y o  female    Assessment/Plan:     Pedrito Brito is a 41 y o  female who presented to the ED with vomiting  Patient has PMHx of DM type 1 with prior DKA  ER treatment included IVF x 2L,10 units regular insulin IV, Zofran 4 mg  Plan: Admit to ICU DKA protocol, place on insulin drip, serial labs, replete electrolytes, monitor labs      Admission Orders: Cardio-Pulmonary monitoring, NPO, initiate DKA Electrolyte Replacement for mag, phos, and K+, up with assistance, HgbA1c, Beta hydroxybutyrate, sequential compression device  Scheduled Meds:   Current Facility-Administered Medications:  chlorhexidine 15 mL Swish & Spit Q12H [address]   DULoxetine 60 mg Oral Daily   enoxaparin 40 mg Subcutaneous Daily   ergocalciferol 50,000 Units Oral Weekly   gabapentin 300 mg Oral HS   insulin glargine 25 Units Subcutaneous HS   insulin lispro 1-5 Units Subcutaneous TID AC   iron sucrose 100 mg Intravenous Once   levothyroxine 150 mcg Oral Early Morning   ondansetron 4 mg Intravenous Q6H PRN       Continuous infusion:     sodium chloride 0 9 % infusion  Rate: 200 mL/hr Dose: 200 mL/hr  Freq: Continuous Route: IV  Indications of Use: IV Hydration            [address] Utilization Review Department  Phone: [phone]; Fax [phone]  [email or web address]  ATTENTION: Please call with any questions or concerns to [phone]  and carefully listen to the prompts so that you are directed to the right person  Send all requests for admission clinical reviews, approved or denied determinations and any other requests to fax [phone]   All voicemails are confidential

## 2018-12-28 NOTE — H&P
H&P- Kyle Hester 1980, 41 y o  female MRN: [id number]    Unit/Bed#: ICU 06 Encounter: [id number]    Primary Care Provider: Nini Ogden DO   Date and time admitted to hospital: 12/27/2018  7:48 PM        * Diabetic ketoacidosis without coma associated with type 1 diabetes mellitus ([facility] [address] )   Assessment & Plan    No results found for: HGBA1C    · Last was 11 2 3/2018 per [facility] records    No results for input(s): POCGLU in the last 72 hours  Blood Sugar Average: Last 72 hrs:    · Admit to ICU DKA protocol  · Place on insulin drip, was only given 10 units in ER, drip will be started in ICU  · Serial labs     Electrolyte abnormality   Assessment & Plan    · Replete per protcol     Anxiety   Assessment & Plan    · Continue cymbalta     B12 deficiency   Assessment & Plan    · Hx of gastric bypass  · Monitor labs with pcp     Vitamin D deficiency   Assessment & Plan    · Start ergocalciferol  · Follow up with PCP for routine lab testing     Primary hypothyroidism   Assessment & Plan    · Continue levothyroxine  · Follow up as OP     Iron deficiency anemia   Assessment & Plan    · History of gastric bypass  · Monitor labs         VTE Prophylaxis: Enoxaparin (Lovenox)  Code Status: full code  POLST: POLST is not applicable to this patient    Anticipated Length of Stay:  Patient will be admitted on an Inpatient basis with an anticipated length of stay of  > 2 midnights  Justification for Hospital Stay: insulin drip, monitor labs    Chief Complaint:   vomiting    History of Present Illness:    Kyle Hester is a 41 y o  female who presents with vomiting  Patient has PMHx of DM type 1 with prior DKA, hypothyroidism, b12 and vitamin d deficiency, MILLICENT with hx of gastric bypass surgery reports that she has had nausea and vomiting and not feeling well last 2 - 3 days  Her blood sugars have been elevated despite using insulin  This morning BS was 400 and took 25 units    Woke up feeling achy, nausea, dizzy  Slept day away, no improvement  Not able to get food in her  No sick contacts  Last vomited yesterday  She has no abdominal pain, fever, chills, cp, sob  She feels similar to prior DKA episode  ER treatment included IVF x 2L, 10 units regular insulin IV, zofran 4mg    Review of Systems:  Review of Systems   Constitutional: Negative for chills and fever  HENT: Negative for sore throat  Eyes: Negative for visual disturbance  Respiratory: Positive for shortness of breath  Negative for cough  Cardiovascular: Negative for chest pain  Gastrointestinal: Positive for abdominal distention, diarrhea, nausea and vomiting  Genitourinary: Negative for dysuria  Musculoskeletal: Positive for myalgias  Skin: Negative for rash and wound  Neurological: Positive for dizziness and headaches  Psychiatric/Behavioral: Negative for confusion  Past Medical and Surgical History:   Past Medical History:   Diagnosis Date    Anemia     B12 deficiency     Diabetes mellitus ([facility] [address] )     Disease of thyroid gland     hypothyroid    MILLICENT (iron deficiency anemia)     Vitamin D deficiency        Past Surgical History:   Procedure Laterality Date    ABDOMINAL SURGERY      gastric bypass     SECTION      x2    CHOLECYSTECTOMY      GASTRIC BYPASS  2007    INTRAUTERINE DEVICE INSERTION  2015    OTHER SURGICAL HISTORY      surgery for imperforate hymen/endometriosis/hydrometrocolpos    TUBAL LIGATION      WISDOM TOOTH EXTRACTION         Meds/Allergies:  Prior to Admission medications    Medication Sig Start Date End Date Taking?  Authorizing Provider   DULoxetine (CYMBALTA) 60 mg delayed release capsule Take 60 mg by mouth 18  Yes Historical Provider, MD   gabapentin (NEURONTIN) 300 mg capsule Take 300 mg by mouth 18 Yes Historical Provider, MD   insulin aspart (NovoLOG) 100 units/mL injection Inject under the skin 3 (three) times a day before meals   Yes Historical Provider, MD   insulin glargine (LANTUS) 100 units/mL subcutaneous injection Inject under the skin daily at bedtime   Yes Historical Provider, MD   levothyroxine 150 mcg tablet Take 150 mcg by mouth daily   Yes Historical Provider, MD   levothyroxine 150 mcg tablet TAKE 1 TABLET (150 MCG TOTAL) BY MOUTH DAILY  4/9/18 12/27/18 Yes Historical Provider, MD     I have reviewed home medications with patient personally  Allergies: No Known Allergies    Social History:  Marital Status: Legally    Patient Pre-hospital Living Situation: home  Patient Pre-hospital Level of Mobility: mobile  Patient Pre-hospital Diet Restrictions: diabetic  Substance Use History:     History   Alcohol Use    Yes     Comment: socially     History   Smoking Status    Never Smoker   Smokeless Tobacco    Never Used     History   Drug Use No       Family History:  I have reviewed the patients family history    Physical Exam:   Vitals:   Blood Pressure: 144/94 (12/27/18 1949)  Pulse: 105 (12/27/18 1949)  Temperature: 98 9 °F (37 2 °C) (12/27/18 1949)  Temp Source: Temporal (12/27/18 1949)  Respirations: 22 (12/27/18 1949)  Height: 5' 6" (167 6 cm) (12/27/18 1949)  Weight - Scale: 72 6 kg (160 lb) (12/27/18 1949)  SpO2: 96 % (12/27/18 1949)    Physical Exam   Constitutional: She is oriented to person, place, and time  She appears well-developed and well-nourished  Fatigued and ill appearing   HENT:   Head: Normocephalic and atraumatic  Eyes: Conjunctivae and EOM are normal  Right eye exhibits no discharge  Left eye exhibits no discharge  Neck: Normal range of motion  No tracheal deviation present  Cardiovascular: Normal rate, regular rhythm and normal heart sounds  Exam reveals no gallop and no friction rub  No murmur heard  Pulmonary/Chest: Effort normal and breath sounds normal  No respiratory distress  She has no wheezes  She has no rales  Abdominal: Soft  Bowel sounds are normal  She exhibits no distension   There is no tenderness  There is no rebound  Musculoskeletal: Normal range of motion  She exhibits no edema, tenderness or deformity  Neurological: She is alert and oriented to person, place, and time  Skin: Skin is warm and dry  No rash noted  No erythema  No pallor  Psychiatric: She has a normal mood and affect  Her behavior is normal  Judgment and thought content normal    Nursing note and vitals reviewed  Additional Data:   Lab Results: I have personally reviewed pertinent reports  Results from last 7 days  Lab Units 12/27/18  2026   WBC Thousand/uL 3 20*   HEMOGLOBIN g/dL 12 8   HEMATOCRIT % 39 0   PLATELETS Thousands/uL 136*   LYMPHO PCT % 44   MONO PCT % 14*       Results from last 7 days  Lab Units 12/27/18  2225   POTASSIUM mmol/L 3 4*   CHLORIDE mmol/L 102   CO2 mmol/L 22   BUN mg/dL 7   CREATININE mg/dL 0 60   CALCIUM mg/dL 7 8*       Imaging: I have personally reviewed pertinent reports  XR chest 1 view portable   Final Result by Radha Bhakta (12/27 2115)      No findings of an acute cardiopulmonary process  Signed by Radha Bhakta MD          [facility] Reviewed: Yes     ** Please Note: This note has been constructed using a voice recognition system   **

## 2018-12-29 VITALS
SYSTOLIC BLOOD PRESSURE: 112 MMHG | BODY MASS INDEX: 25.73 KG/M2 | HEART RATE: 82 BPM | OXYGEN SATURATION: 95 % | DIASTOLIC BLOOD PRESSURE: 62 MMHG | TEMPERATURE: 96.8 F | HEIGHT: 66 IN | WEIGHT: 160.1 LBS | RESPIRATION RATE: 18 BRPM

## 2018-12-29 LAB
GLUCOSE SERPL-MCNC: 197 MG/DL (ref 65–140)
GLUCOSE SERPL-MCNC: 61 MG/DL (ref 65–140)
GLUCOSE SERPL-MCNC: 74 MG/DL (ref 65–140)

## 2018-12-29 PROCEDURE — 82948 REAGENT STRIP/BLOOD GLUCOSE: CPT

## 2018-12-29 PROCEDURE — 99239 HOSP IP/OBS DSCHRG MGMT >30: CPT | Performed by: INTERNAL MEDICINE

## 2018-12-29 RX ORDER — FERROUS SULFATE 325(65) MG
325 TABLET ORAL 2 TIMES DAILY WITH MEALS
Refills: 0
Start: 2018-12-29 | End: 2019-11-18

## 2018-12-29 RX ORDER — ERGOCALCIFEROL 1.25 MG/1
50000 CAPSULE ORAL WEEKLY
Qty: 4 CAPSULE | Refills: 2 | Status: SHIPPED | OUTPATIENT
Start: 2019-01-04 | End: 2020-04-03 | Stop reason: SDUPTHER

## 2018-12-29 RX ADMIN — ENOXAPARIN SODIUM 40 MG: 40 INJECTION SUBCUTANEOUS at 08:44

## 2018-12-29 RX ADMIN — LEVOTHYROXINE SODIUM 150 MCG: 100 TABLET ORAL at 06:35

## 2018-12-29 RX ADMIN — INSULIN LISPRO 5 UNITS: 100 INJECTION, SOLUTION INTRAVENOUS; SUBCUTANEOUS at 11:38

## 2018-12-29 RX ADMIN — DULOXETINE HYDROCHLORIDE 60 MG: 60 CAPSULE, DELAYED RELEASE ORAL at 08:44

## 2018-12-29 NOTE — DISCHARGE SUMMARY
Discharge- Gabi Almaguer 1980, 41 y o  female MRN: [id number]    Unit/Bed#: -01 Encounter: [id number]    Primary Care Provider: Nico Calixto DO   Date and time admitted to hospital: 12/27/2018  7:48 PM        * Diabetic ketoacidosis without coma associated with type 1 diabetes mellitus ([facility] [address] )   Assessment & Plan    · Improved  · Resume home insulin regimen  · Patient has an insulin pump that she recently received and does not know how to use  Case management gave patient numbers to outpatient services to help with her insulin pump  · Patient will follow up with her endocrinologist as an outpatient and PCP as well     Vitamin D deficiency   Assessment & Plan    · Continue home meds     Primary hypothyroidism   Assessment & Plan    · Continue levothyroxine  · Follow up as OP     Iron deficiency anemia   Assessment & Plan    · History of gastric bypass  · Continue iron supplementation       Discharging Physician / Practitioner: Anne-Marie Romeo MD  PCP: Nico Calixto DO  Admission Date:   Admission Orders     Ordered        12/27/18 2118  Inpatient Admission (expected length of stay for this patient is greater than two midnights)  Once             Discharge Date: 12/29/18    Resolved Problems  Date Reviewed: 12/27/2018    None          Consultations During Hospital Stay:  · none    Procedures Performed:   · none    Significant Findings / Test Results:   · DKA    Incidental Findings:   · None     Test Results Pending at Discharge (will require follow up): · None     Outpatient Tests Requested:  · None    Complications:  None    Reason for Admission:  DKA    Hospital Course:     Gabi Almaguer is a 41 y o  female patient who originally presented to the hospital on 12/27/2018 due to nausea vomiting and abdominal pain  Patient was admitted to the ICU due to DKA  Patient received an IV insulin bolus in the ER    Patient's anion gap and electrolytes did improve without the need for an insulin drip  Patient was resumed on sliding scale and Lantus  Patient's nausea and vomiting did improve  Patient was tolerating diet  Patient did have an insulin pump which was new and has not been set up yet  Patient has information for outpatient services to help her set her insulin pump up  Patient has an appointment with her PCP next week  Patient also advised to follow up with her endocrinologist   Patient was given iron supplementation due to her history of iron deficiency  Patient was stable for discharge home with outpatient follow-up  Please see above list of diagnoses and related plan for additional information  Condition at Discharge: stable     Discharge Day Visit / Exam:     Subjective:  No complaints today  Vitals: Blood Pressure: 112/62 (12/29/18 0724)  Pulse: 82 (12/29/18 0724)  Temperature: (!) 96 8 °F (36 °C) (12/29/18 0724)  Temp Source: Tympanic (12/29/18 0724)  Respirations: 18 (12/29/18 0724)  Height: 5' 6" (167 6 cm) (12/27/18 2145)  Weight - Scale: 72 6 kg (160 lb 1 6 oz) (12/29/18 0600)  SpO2: 95 % (12/29/18 0724)     Exam:   Physical Exam   Constitutional: She is oriented to person, place, and time  She appears well-developed  No distress  HENT:   Head: Normocephalic and atraumatic  Eyes: Conjunctivae and EOM are normal    Neck: Normal range of motion  Neck supple  Cardiovascular: Normal rate and regular rhythm  Pulmonary/Chest: Effort normal  No respiratory distress  Abdominal: Soft  She exhibits no distension  There is no tenderness  Musculoskeletal: Normal range of motion  She exhibits no edema  Neurological: She is alert and oriented to person, place, and time  No cranial nerve deficit  Skin: Skin is warm and dry  Psychiatric: She has a normal mood and affect  Discharge instructions/Information to patient and family:   See after visit summary for information provided to patient and family        Provisions for Follow-Up Care:  See after visit summary for information related to follow-up care and any pertinent home health orders  Disposition:     Home    For Discharges to [address] [facility]:   · Not Applicable to this Patient - Not Applicable to this Patient    Planned Readmission: no     Discharge Statement:  I spent 35 minutes discharging the patient  This time was spent on the day of discharge  I had direct contact with the patient on the day of discharge  Greater than 50% of the total time was spent examining patient, answering all patient questions, arranging and discussing plan of care with patient as well as directly providing post-discharge instructions  Additional time then spent on discharge activities  Discharge Medications:  See after visit summary for reconciled discharge medications provided to patient and family        ** Please Note: This note has been constructed using a voice recognition system **

## 2018-12-29 NOTE — PROGRESS NOTES
Patient AAOx4,up adlib,VSS,In no acute respiratory distress  Last HS FS was 145 mg/dl  Report given to Iman RN  Transferred to Room 115 Bed 2 via Wheelchair accompanied by receiving floor RN

## 2018-12-29 NOTE — PLAN OF CARE
DISCHARGE PLANNING     Discharge to home or other facility with appropriate resources Adequate for Discharge        Knowledge Deficit     Patient/family/caregiver demonstrates understanding of disease process, treatment plan, medications, and discharge instructions Adequate for Discharge        Nutrition/Hydration-ADULT     Nutrient/Hydration intake appropriate for improving, restoring or maintaining nutritional needs Adequate for Discharge        PAIN - ADULT     Verbalizes/displays adequate comfort level or baseline comfort level Adequate for Discharge        SAFETY ADULT     Patient will remain free of falls Adequate for Discharge     Maintain or return to baseline ADL function Adequate for Discharge     Maintain or return mobility status to optimal level Adequate for Discharge

## 2018-12-29 NOTE — ASSESSMENT & PLAN NOTE
· Improved  · Resume home insulin regimen  · Patient has an insulin pump that she recently received and does not know how to use    Case management gave patient numbers to outpatient services to help with her insulin pump  · Patient will follow up with her endocrinologist as an outpatient and PCP as well

## 2018-12-29 NOTE — NURSING NOTE
Patient DC home today, IV removed  DC instructions reviewed and given to patient  Patient instructed to continue to take all med's as prescribed and to follow up with all MD appointments, pt verbalized understanding

## 2018-12-29 NOTE — DISCHARGE INSTRUCTIONS
Diabetic Ketoacidosis   WHAT YOU SHOULD KNOW:   Diabetic ketoacidosis (DKA) is a life-threatening condition that happens when diabetes is not controlled  Your blood sugar level becomes dangerously high because your body does not have enough insulin  Insulin is a hormone that helps your body take sugar out of your blood and use it for energy  The lack of insulin forces your body to use fat instead of sugar for energy  As fats are broken down, they leave chemicals called ketones that build up in your blood  Ketones are dangerous at high levels  INSTRUCTIONS:   Medicines:   · Insulin:  Insulin decreases the amount of sugar in your blood  It helps your body move the sugar into your cells, where it is needed for energy  · Take your medicine as directed  Call your healthcare provider if you think your medicine is not helping or if you have side effects  Tell him if you are allergic to any medicine  Keep a list of the medicines, vitamins, and herbs you take  Include the amounts, and when and why you take them  Bring the list or the pill bottles to follow-up visits  Carry your medicine list with you in case of an emergency  Follow up with your diabetes specialist or healthcare provider as directed: You may need blood tests many times a year to check the function of your pancreas  Write down your questions so you remember to ask them during your visits  Prevent diabetic ketoacidosis: The best way to prevent DKA is to control your diabetes  Ask your healthcare provider or diabetes specialist for more information on managing your diabetes  The following may help decrease your risk for DKA:  · Check your blood sugar levels: You may need to check your blood sugar level at least 3 times each day  Follow instructions about when and how often to check during the day  If your blood sugar level is too high, give yourself insulin as directed by your healthcare provider   He can show you how to use a blood glucose monitor to check your levels  · Check for ketones: Follow instructions about when you should check your blood or urine for ketones  Your healthcare provider may give you a machine to check your blood ketones  Urine ketones can be checked with sticks you dip in your urine  Do not exercise if you have ketones in your urine or blood  · Know how to manage sick days:  When you are sick, you may not eat as much as you normally would  You may need to change the amount of insulin you give yourself  You will need to check your blood sugar level more often  You may also need to check for ketones  Make a plan with your healthcare provider about how to manage your diabetes when you are sick  · Know how to treat DKA: If you have signs of DKA, drink more liquids that do not contain sugar, such as water and diet soda  Take your insulin as directed by your healthcare provider  · Call your diabetes team when needed:  Ask your healthcare provider about a diabetes team that you can call for help  Call the team if your blood sugar level is high, or you have ketones in your blood or urine  The team is available for any questions or concerns you have about your diabetes  Contact your healthcare provider if:   · Your blood sugar level is lower or higher than your healthcare provider says it should be  · You have ketones in your blood or urine  · You have a fever or chills  · You are more thirsty than usual      · You are urinating more often than usual      · You have questions or concerns about your condition or care  Return to the emergency department if:   · You have fruity, sweet breath  · You have severe, new stomach pain and are vomiting  · You are more drowsy than usual      · You begin to breathe fast, or are short of breath  · You become weak and confused  · You have a seizure    © 2014 [address] is for End User's use only and may not be sold, redistributed or otherwise used for commercial purposes  All illustrations and images included in CareNotes® are the copyrighted property of [employer]  or [employer]  The above information is an  only  It is not intended as medical advice for individual conditions or treatments  Talk to your doctor, nurse or pharmacist before following any medical regimen to see if it is safe and effective for you

## 2018-12-31 NOTE — UTILIZATION REVIEW
Notification of Discharge  This is a Notification of Discharge from our facility [address]  Please be advised that this patient has been discharge from our facility  Below you will find the admission and discharge date and time including the patients disposition  PRESENTATION DATE: 12/27/2018  7:48 PM  IP ADMISSION DATE: 12/27/18 2118  DISCHARGE DATE: 12/29/2018 12:22 PM  DISPOSITION: [id number] Utilization Review Department  Phone: [phone]; Fax [phone]  ATTENTION: Please call with any questions or concerns to [phone]  and carefully listen to the prompts so that you are directed to the right person  Send all requests for admission clinical reviews, approved or denied determinations and any other requests to fax [phone]   All voicemails are confidential

## 2019-09-23 ENCOUNTER — OFFICE VISIT (OUTPATIENT)
Dept: FAMILY MEDICINE CLINIC | Facility: CLINIC | Age: 39
End: 2019-09-23
Payer: COMMERCIAL

## 2019-09-23 VITALS
OXYGEN SATURATION: 98 % | SYSTOLIC BLOOD PRESSURE: 124 MMHG | DIASTOLIC BLOOD PRESSURE: 76 MMHG | BODY MASS INDEX: 28.22 KG/M2 | WEIGHT: 169.4 LBS | TEMPERATURE: 97.9 F | HEIGHT: 65 IN | HEART RATE: 104 BPM | RESPIRATION RATE: 18 BRPM

## 2019-09-23 DIAGNOSIS — E03.9 HYPOTHYROIDISM, UNSPECIFIED TYPE: ICD-10-CM

## 2019-09-23 DIAGNOSIS — Z13.220 SCREENING CHOLESTEROL LEVEL: ICD-10-CM

## 2019-09-23 DIAGNOSIS — F41.9 ANXIETY: ICD-10-CM

## 2019-09-23 DIAGNOSIS — E55.9 VITAMIN D DEFICIENCY: ICD-10-CM

## 2019-09-23 DIAGNOSIS — E11.9 DIABETIC EYE EXAM (HCC): ICD-10-CM

## 2019-09-23 DIAGNOSIS — E10.65 TYPE 1 DIABETES MELLITUS WITH HYPERGLYCEMIA (HCC): ICD-10-CM

## 2019-09-23 DIAGNOSIS — E10.10 DIABETIC KETOACIDOSIS WITHOUT COMA ASSOCIATED WITH TYPE 1 DIABETES MELLITUS (HCC): Primary | ICD-10-CM

## 2019-09-23 DIAGNOSIS — E66.3 OVERWEIGHT (BMI 25.0-29.9): ICD-10-CM

## 2019-09-23 DIAGNOSIS — Z01.00 DIABETIC EYE EXAM (HCC): ICD-10-CM

## 2019-09-23 LAB — SL AMB POCT HEMOGLOBIN AIC: 10 (ref ?–6.5)

## 2019-09-23 PROCEDURE — 3008F BODY MASS INDEX DOCD: CPT | Performed by: NURSE PRACTITIONER

## 2019-09-23 PROCEDURE — 99203 OFFICE O/P NEW LOW 30 MIN: CPT | Performed by: NURSE PRACTITIONER

## 2019-09-23 PROCEDURE — 83036 HEMOGLOBIN GLYCOSYLATED A1C: CPT | Performed by: NURSE PRACTITIONER

## 2019-09-23 RX ORDER — DULOXETIN HYDROCHLORIDE 60 MG/1
60 CAPSULE, DELAYED RELEASE ORAL DAILY
Qty: 90 CAPSULE | Refills: 1 | Status: SHIPPED | OUTPATIENT
Start: 2019-09-23 | End: 2020-04-20

## 2019-09-23 RX ORDER — METFORMIN HYDROCHLORIDE 500 MG/1
500 TABLET, EXTENDED RELEASE ORAL
Qty: 90 TABLET | Refills: 1 | Status: SHIPPED | OUTPATIENT
Start: 2019-09-23 | End: 2019-11-18

## 2019-09-23 RX ORDER — ALPRAZOLAM 0.5 MG/1
0.5 TABLET ORAL 2 TIMES DAILY PRN
Qty: 30 TABLET | Refills: 1 | Status: SHIPPED | OUTPATIENT
Start: 2019-09-23 | End: 2019-11-18

## 2019-09-23 RX ORDER — LEVOTHYROXINE SODIUM 0.2 MG/1
200 TABLET ORAL DAILY
COMMUNITY
Start: 2019-03-20 | End: 2020-04-22

## 2019-09-23 RX ORDER — SYRINGE-NEEDLE,INSULIN,0.5 ML 27GX1/2"
SYRINGE, EMPTY DISPOSABLE MISCELLANEOUS
COMMUNITY
Start: 2019-04-03

## 2019-09-23 NOTE — PROGRESS NOTES
[facility]        NAME: Masha Castillo is a 42 y o  female  : 1980    MRN: [id number]  DATE: 2019  TIME: 9:17 AM    Assessment and Plan   Diabetic ketoacidosis without coma associated with type 1 diabetes mellitus ([facility] [address] ) [E10 10]  1  Diabetic ketoacidosis without coma associated with type 1 diabetes mellitus ([facility] [address] )  Microalbumin / creatinine urine ratio    metFORMIN (GLUCOPHAGE-XR) 500 mg 24 hr tablet   2  Diabetic eye exam [facility])  Ambulatory Referral to Ophthalmology   3  Type 1 diabetes mellitus with hyperglycemia (HCC)  Comprehensive metabolic panel    POCT hemoglobin A1c   4  Hypothyroidism, unspecified type  TSH, 3rd generation with Free T4 reflex    Thyroid Antibodies Panel    T3, free   5  Vitamin D deficiency  Vitamin D 25 hydroxy   6  Screening cholesterol level  Lipid panel   7  Anxiety  DULoxetine (CYMBALTA) 60 mg delayed release capsule    ALPRAZolam (XANAX) 0 5 mg tablet   8  Overweight (BMI 25 0-29  9)           Patient Instructions     Patient Instructions   1  Start Metformin XR 500mg daily, keep very strict control of blood sugars- recommend HgA1c anders to keep closer track  2  Get labs- continue Levothyroxine 200mcg daily until new results  3  Restart Cymbalta 60mg daily for anxiety, add Xanax PRN as discussed- try not to take more than 3-5times/week  4  Return in 3 months for medcheck/lab review/repeat HgA1c  5  Call sooner for problems/concerns          Chief Complaint     Chief Complaint   Patient presents with    Establish Care    Diabetes         History of Present Illness       Here to establish care- previously seen by Dr Neeta Joiner- last Endo appt 3/19 due to location and pt  Work schedule  HgA1c today is 10  Pt   Is going through a divorce and custody issues- ran out of her Cymbalta 4 days ago- "I feel off, I want to get back on it"  Last TSH 47, increased to 200mcg at that time- no recent result  Has tried to take Metformin in the past with severe diarrhea- has never tried extended release- is willing to retry      Review of Systems   Review of Systems   Constitutional: Negative for activity change, diaphoresis, fatigue and fever  HENT: Negative for congestion, facial swelling, hearing loss, rhinorrhea, sinus pressure, sinus pain, sneezing, sore throat and voice change  Eyes: Negative for discharge and visual disturbance  Respiratory: Negative for cough, choking, chest tightness, shortness of breath, wheezing and stridor  Cardiovascular: Negative for chest pain, palpitations and leg swelling  Gastrointestinal: Negative for abdominal distention, abdominal pain, constipation, diarrhea, nausea and vomiting  Endocrine: Negative for polydipsia, polyphagia and polyuria  Genitourinary: Negative for difficulty urinating, dysuria, frequency and urgency  Musculoskeletal: Negative for arthralgias, back pain, gait problem, joint swelling, myalgias, neck pain and neck stiffness  Skin: Negative for color change, rash and wound  Neurological: Negative for dizziness, syncope, speech difficulty, weakness, light-headedness and headaches  Hematological: Negative for adenopathy  Does not bruise/bleed easily  Psychiatric/Behavioral: Positive for sleep disturbance (trouble sleeping- not sleeping well)  Negative for agitation, behavioral problems, confusion, hallucinations and suicidal ideas  The patient is nervous/anxious  Current Medications       Current Outpatient Medications:     DULoxetine (CYMBALTA) 60 mg delayed release capsule, Take 1 capsule (60 mg total) by mouth daily, Disp: 90 capsule, Rfl: 1    glucagon (GLUCAGON EMERGENCY) 1 MG injection, Inject 1 mg under the skin, Disp: , Rfl:     glucose blood test strip, Testing up to 8 times a day  E10 65, Disp: , Rfl:     insulin aspart (NovoLOG) 100 units/mL injection, Use with carb coverage of 9 and ISF of 30 for total daily dose of 40 units   E10 65, Disp: , Rfl:     insulin glargine (LANTUS SOLOSTAR) 100 units/mL injection pen, Inject 36 Units under the skin daily, Disp: , Rfl:     Insulin Syringe-Needle U-100 (INSULIN SYRINGE 1CC/28G) 28G X 1/2" 1 ML MISC, 4 injections daily E10 65, Disp: , Rfl:     levothyroxine 200 mcg tablet, Take 200 mcg by mouth daily, Disp: , Rfl:     ALPRAZolam (XANAX) 0 5 mg tablet, Take 1 tablet (0 5 mg total) by mouth 2 (two) times a day as needed for anxiety, Disp: 30 tablet, Rfl: 1    ergocalciferol (VITAMIN D2) 50,000 units, Take 1 capsule (50,000 Units total) by mouth once a week (Patient not taking: Reported on 2019), Disp: 4 capsule, Rfl: 2    ferrous sulfate 325 (65 Fe) mg tablet, Take 1 tablet (325 mg total) by mouth 2 (two) times a day with meals Over the counter (Patient not taking: Reported on 2019), Disp: , Rfl: 0    metFORMIN (GLUCOPHAGE-XR) 500 mg 24 hr tablet, Take 1 tablet (500 mg total) by mouth daily with dinner, Disp: 90 tablet, Rfl: 1    Current Allergies     Allergies as of 2019    (No Known Allergies)            The following portions of the patient's history were reviewed and updated as appropriate: allergies, current medications, past family history, past medical history, past social history, past surgical history and problem list      Past Medical History:   Diagnosis Date    Anemia     Arthritis     B12 deficiency     Diabetes mellitus ([facility] [address] )     Disease of thyroid gland     hypothyroid    MILLICENT (iron deficiency anemia)     Vitamin D deficiency        Past Surgical History:   Procedure Laterality Date    ABDOMINAL SURGERY      gastric bypass     SECTION      x2    CHOLECYSTECTOMY      GASTRIC BYPASS  2007    INTRAUTERINE DEVICE INSERTION  2015    OTHER SURGICAL HISTORY      surgery for imperforate hymen/endometriosis/hydrometrocolpos    TUBAL LIGATION      WISDOM TOOTH EXTRACTION         Family History   Problem Relation Age of Onset    Thyroid disease Mother     URIEL disease Mother    Luz Maria Okeefe Diabetes Father     Schizophrenia Father     Alcohol abuse Father     Thyroid disease Sister     Heart disease Maternal Grandmother     Hypertension Maternal Grandmother     Arthritis Maternal Grandmother          Medications have been verified  Objective   /76   Pulse 104   Temp 97 9 °F (36 6 °C) (Tympanic)   Resp 18   Ht 5' 5" (1 651 m)   Wt 76 8 kg (169 lb 6 4 oz)   SpO2 98%   BMI 28 19 kg/m²        Physical Exam     Physical Exam   Constitutional: She is oriented to person, place, and time  Vital signs are normal  She appears well-developed and well-nourished  She is active and cooperative  No distress  Eyes: EOM are normal    Neck: Normal range of motion  Neck supple  No tracheal deviation present  No thyromegaly present  Cardiovascular: Normal rate, regular rhythm and normal heart sounds  No murmur heard  Pulmonary/Chest: Effort normal and breath sounds normal  No respiratory distress  She has no wheezes  Neurological: She is alert and oriented to person, place, and time  Skin: Skin is warm and dry  No rash noted  She is not diaphoretic  No erythema  Psychiatric: She has a normal mood and affect  Her behavior is normal  Judgment and thought content normal    Nursing note and vitals reviewed  PHQ-9 Depression Screening    PHQ-9:    Frequency of the following problems over the past two weeks:       Little interest or pleasure in doing things:  0 - not at all  Feeling down, depressed, or hopeless:  0 - not at all  PHQ-2 Score:  0         BMI Counseling: Body mass index is 28 19 kg/m²  The BMI is above normal  Nutrition recommendations include consuming healthier snacks, decreasing soda and/or juice intake, moderation in carbohydrate intake and increasing intake of lean protein

## 2019-09-23 NOTE — PATIENT INSTRUCTIONS
1  Start Metformin XR 500mg daily, keep very strict control of blood sugars- recommend HgA1c anders to keep closer track  2  Get labs- continue Levothyroxine 200mcg daily until new results  3  Restart Cymbalta 60mg daily for anxiety, add Xanax PRN as discussed- try not to take more than 3-5times/week  4  Return in 3 months for medcheck/lab review/repeat HgA1c  5   Call sooner for problems/concerns

## 2019-09-30 LAB
LEFT EYE DIABETIC RETINOPATHY: NORMAL
RIGHT EYE DIABETIC RETINOPATHY: NORMAL

## 2019-10-03 ENCOUNTER — APPOINTMENT (OUTPATIENT)
Dept: LAB | Facility: CLINIC | Age: 39
End: 2019-10-03
Payer: COMMERCIAL

## 2019-10-03 DIAGNOSIS — Z13.220 SCREENING CHOLESTEROL LEVEL: ICD-10-CM

## 2019-10-03 DIAGNOSIS — E10.65 TYPE 1 DIABETES MELLITUS WITH HYPERGLYCEMIA (HCC): ICD-10-CM

## 2019-10-03 DIAGNOSIS — E03.9 HYPOTHYROIDISM, UNSPECIFIED TYPE: ICD-10-CM

## 2019-10-03 DIAGNOSIS — E55.9 VITAMIN D DEFICIENCY: ICD-10-CM

## 2019-10-03 LAB
25(OH)D3 SERPL-MCNC: 24 NG/ML (ref 30–100)
ALBUMIN SERPL BCP-MCNC: 4.1 G/DL (ref 3.5–5)
ALP SERPL-CCNC: 158 U/L (ref 46–116)
ALT SERPL W P-5'-P-CCNC: 23 U/L (ref 12–78)
ANION GAP SERPL CALCULATED.3IONS-SCNC: 7 MMOL/L (ref 4–13)
AST SERPL W P-5'-P-CCNC: 19 U/L (ref 5–45)
BILIRUB SERPL-MCNC: 0.73 MG/DL (ref 0.2–1)
BUN SERPL-MCNC: 12 MG/DL (ref 5–25)
CALCIUM SERPL-MCNC: 9.3 MG/DL (ref 8.3–10.1)
CHLORIDE SERPL-SCNC: 103 MMOL/L (ref 100–108)
CHOLEST SERPL-MCNC: 182 MG/DL (ref 50–200)
CO2 SERPL-SCNC: 27 MMOL/L (ref 21–32)
CREAT SERPL-MCNC: 0.67 MG/DL (ref 0.6–1.3)
CREAT UR-MCNC: 206 MG/DL
GFR SERPL CREATININE-BSD FRML MDRD: 113 ML/MIN/1.73SQ M
GLUCOSE P FAST SERPL-MCNC: 259 MG/DL (ref 65–99)
HDLC SERPL-MCNC: 99 MG/DL (ref 40–60)
LDLC SERPL CALC-MCNC: 54 MG/DL (ref 0–100)
MICROALBUMIN UR-MCNC: 41 MG/L (ref 0–20)
MICROALBUMIN/CREAT 24H UR: 20 MG/G CREATININE (ref 0–30)
NONHDLC SERPL-MCNC: 83 MG/DL
POTASSIUM SERPL-SCNC: 4.2 MMOL/L (ref 3.5–5.3)
PROT SERPL-MCNC: 7.8 G/DL (ref 6.4–8.2)
SODIUM SERPL-SCNC: 137 MMOL/L (ref 136–145)
T3FREE SERPL-MCNC: 2.96 PG/ML (ref 2.3–4.2)
T4 FREE SERPL-MCNC: 1.7 NG/DL (ref 0.76–1.46)
TRIGL SERPL-MCNC: 144 MG/DL
TSH SERPL DL<=0.05 MIU/L-ACNC: 0.19 UIU/ML (ref 0.36–3.74)

## 2019-10-03 PROCEDURE — 82306 VITAMIN D 25 HYDROXY: CPT

## 2019-10-03 PROCEDURE — 84481 FREE ASSAY (FT-3): CPT

## 2019-10-03 PROCEDURE — 86800 THYROGLOBULIN ANTIBODY: CPT

## 2019-10-03 PROCEDURE — 82570 ASSAY OF URINE CREATININE: CPT | Performed by: NURSE PRACTITIONER

## 2019-10-03 PROCEDURE — 36415 COLL VENOUS BLD VENIPUNCTURE: CPT

## 2019-10-03 PROCEDURE — 82043 UR ALBUMIN QUANTITATIVE: CPT | Performed by: NURSE PRACTITIONER

## 2019-10-03 PROCEDURE — 80053 COMPREHEN METABOLIC PANEL: CPT

## 2019-10-03 PROCEDURE — 84443 ASSAY THYROID STIM HORMONE: CPT

## 2019-10-03 PROCEDURE — 84439 ASSAY OF FREE THYROXINE: CPT

## 2019-10-03 PROCEDURE — 80061 LIPID PANEL: CPT

## 2019-10-03 PROCEDURE — 86376 MICROSOMAL ANTIBODY EACH: CPT

## 2019-10-04 LAB
THYROGLOB AB SERPL-ACNC: 10.9 IU/ML (ref 0–0.9)
THYROPEROXIDASE AB SERPL-ACNC: 57 IU/ML (ref 0–34)

## 2019-10-11 DIAGNOSIS — Z11.1 SCREENING-PULMONARY TB: Primary | ICD-10-CM

## 2019-10-15 ENCOUNTER — OFFICE VISIT (OUTPATIENT)
Dept: URGENT CARE | Facility: HOSPITAL | Age: 39
End: 2019-10-15
Payer: COMMERCIAL

## 2019-10-15 DIAGNOSIS — Z11.1 ENCOUNTER FOR TUBERCULIN SKIN TEST: Primary | ICD-10-CM

## 2019-10-15 PROCEDURE — 86580 TB INTRADERMAL TEST: CPT

## 2019-10-17 ENCOUNTER — OFFICE VISIT (OUTPATIENT)
Dept: URGENT CARE | Facility: HOSPITAL | Age: 39
End: 2019-10-17
Payer: COMMERCIAL

## 2019-10-17 VITALS
BODY MASS INDEX: 26.53 KG/M2 | WEIGHT: 169 LBS | DIASTOLIC BLOOD PRESSURE: 75 MMHG | HEART RATE: 88 BPM | RESPIRATION RATE: 18 BRPM | SYSTOLIC BLOOD PRESSURE: 121 MMHG | HEIGHT: 67 IN | OXYGEN SATURATION: 98 % | TEMPERATURE: 97.9 F

## 2019-10-17 DIAGNOSIS — Z02.1 PRE-EMPLOYMENT EXAMINATION: Primary | ICD-10-CM

## 2019-10-17 NOTE — PROGRESS NOTES
[facility] [facility]        NAME: Haris Lindsay is a 42 y o  female  : 1980    MRN: [id number]  DATE: 2019  TIME: 4:39 PM    Assessment and Plan   Pre-employment examination [Z02 1]  1  Pre-employment examination           Patient Instructions     Follow up with PCP in 3-5 days  Proceed to  ER if symptoms worsen  Chief Complaint     Chief Complaint   Patient presents with    Annual Exam     Patient presents for pre employment physical         History of Present Illness       68-year-old female with past medical history of diabetes type 1 presents for pre-employment physical   Patient will be working as a  at [employer]  Patient has no complaints or concerns today  Review of Systems   Review of Systems   Constitutional: Negative for chills, fatigue and fever  HENT: Negative for congestion, ear pain, sinus pain, sore throat and trouble swallowing  Eyes: Negative for pain, discharge and redness  Respiratory: Negative for cough, chest tightness, shortness of breath and wheezing  Cardiovascular: Negative for chest pain, palpitations and leg swelling  Gastrointestinal: Negative for abdominal pain, diarrhea, nausea and vomiting  Genitourinary: Negative for dysuria and hematuria  Musculoskeletal: Negative for arthralgias, joint swelling and myalgias  Skin: Negative for rash  Neurological: Negative for dizziness, weakness, numbness and headaches           Current Medications       Current Outpatient Medications:     DULoxetine (CYMBALTA) 60 mg delayed release capsule, Take 1 capsule (60 mg total) by mouth daily, Disp: 90 capsule, Rfl: 1    ergocalciferol (VITAMIN D2) 50,000 units, Take 1 capsule (50,000 Units total) by mouth once a week, Disp: 4 capsule, Rfl: 2    glucose blood test strip, Testing up to 8 times a day  E10 65, Disp: , Rfl:     insulin aspart (NovoLOG) 100 units/mL injection, Use with carb coverage of 9 and ISF of 30 for total daily dose of 40 units   E10 65, Disp: , Rfl:     insulin glargine (LANTUS SOLOSTAR) 100 units/mL injection pen, Inject 36 Units under the skin daily at bedtime , Disp: , Rfl:     Insulin Syringe-Needle U-100 (INSULIN SYRINGE 1CC/28G) 28G X 1/2" 1 ML MISC, 4 injections daily E10 65, Disp: , Rfl:     levothyroxine 200 mcg tablet, Take 200 mcg by mouth daily, Disp: , Rfl:     ALPRAZolam (XANAX) 0 5 mg tablet, Take 1 tablet (0 5 mg total) by mouth 2 (two) times a day as needed for anxiety (Patient not taking: Reported on 10/17/2019), Disp: 30 tablet, Rfl: 1    ferrous sulfate 325 (65 Fe) mg tablet, Take 1 tablet (325 mg total) by mouth 2 (two) times a day with meals Over the counter (Patient not taking: Reported on 2019), Disp: , Rfl: 0    glucagon (GLUCAGON EMERGENCY) 1 MG injection, Inject 1 mg under the skin, Disp: , Rfl:     metFORMIN (GLUCOPHAGE-XR) 500 mg 24 hr tablet, Take 1 tablet (500 mg total) by mouth daily with dinner (Patient not taking: Reported on 10/17/2019), Disp: 90 tablet, Rfl: 1    Current Allergies     Allergies as of 10/17/2019    (No Known Allergies)            The following portions of the patient's history were reviewed and updated as appropriate: allergies, current medications, past family history, past medical history, past social history, past surgical history and problem list      Past Medical History:   Diagnosis Date    Anemia     Arthritis     B12 deficiency     Cervical disc disorder     Cervical disc disorder     On gabapentin     Diabetes mellitus ([address] )     Disease of thyroid gland     hypothyroid    DKA (diabetic ketoacidoses) ([address] ) 2018    MILLICENT (iron deficiency anemia)     Iron deficiency anemia     Type 1 diabetes ([address] )     Type 1 diabetes mellitus, uncontrolled ([address] )     Vitamin D deficiency        Past Surgical History:   Procedure Laterality Date    ABDOMINAL SURGERY      gastric bypass     SECTION      x2    CHOLECYSTECTOMY 2018    GASTRIC BYPASS  2007    INTRAUTERINE DEVICE INSERTION  01/06/2015    OTHER SURGICAL HISTORY      surgery for imperforate hymen/endometriosis/hydrometrocolpos    TUBAL LIGATION      WISDOM TOOTH EXTRACTION         Family History   Problem Relation Age of Onset    Thyroid disease Mother     URIEL disease Mother     Hyperlipidemia Mother     Hypertension Mother     Osteoarthritis Mother     Diabetes Father     Alcohol abuse Father     Diabetes type II Father     Thyroid disease Sister     Depression Sister     Heart disease Maternal Grandmother     Hypertension Maternal Grandmother     Arthritis Maternal Grandmother          Medications have been verified  Objective   /75   Pulse 88   Temp 97 9 °F (36 6 °C) (Oral)   Resp 18   Ht 5' 6 5" (1 689 m)   Wt 76 7 kg (169 lb)   LMP 10/13/2019 (Exact Date)   SpO2 98%   BMI 26 87 kg/m²        Physical Exam     Physical Exam   Constitutional: She is oriented to person, place, and time  She appears well-developed and well-nourished  No distress  HENT:   Head: Normocephalic  Right Ear: External ear normal    Left Ear: External ear normal    Mouth/Throat: Oropharynx is clear and moist    Eyes: Pupils are equal, round, and reactive to light  Conjunctivae and EOM are normal    Neck: Normal range of motion  Neck supple  Cardiovascular: Normal rate, regular rhythm and normal heart sounds  No murmur heard  Pulmonary/Chest: Effort normal and breath sounds normal  No respiratory distress  She has no wheezes  Abdominal: Soft  Bowel sounds are normal  There is no tenderness  Musculoskeletal: Normal range of motion  Lymphadenopathy:     She has no cervical adenopathy  Neurological: She is alert and oriented to person, place, and time  She has normal reflexes  Skin: Skin is warm and dry  Psychiatric: She has a normal mood and affect  Nursing note and vitals reviewed

## 2019-11-14 DIAGNOSIS — E10.65 TYPE 1 DIABETES MELLITUS WITH HYPERGLYCEMIA (HCC): Primary | ICD-10-CM

## 2019-11-18 ENCOUNTER — HOSPITAL ENCOUNTER (EMERGENCY)
Facility: HOSPITAL | Age: 39
Discharge: HOME | End: 2019-11-18
Attending: EMERGENCY MEDICINE | Admitting: EMERGENCY MEDICINE
Payer: COMMERCIAL

## 2019-11-18 VITALS
TEMPERATURE: 98.3 F | BODY MASS INDEX: 27.06 KG/M2 | HEART RATE: 86 BPM | OXYGEN SATURATION: 97 % | RESPIRATION RATE: 16 BRPM | SYSTOLIC BLOOD PRESSURE: 127 MMHG | DIASTOLIC BLOOD PRESSURE: 75 MMHG | WEIGHT: 170.19 LBS

## 2019-11-18 DIAGNOSIS — T19.2XXA FOREIGN BODY IN VAGINA, INITIAL ENCOUNTER: Primary | ICD-10-CM

## 2019-11-18 PROCEDURE — 99284 EMERGENCY DEPT VISIT MOD MDM: CPT | Performed by: PHYSICIAN ASSISTANT

## 2019-11-18 PROCEDURE — 99283 EMERGENCY DEPT VISIT LOW MDM: CPT

## 2019-11-18 RX ORDER — METRONIDAZOLE 500 MG/1
500 TABLET ORAL EVERY 8 HOURS SCHEDULED
Qty: 21 TABLET | Refills: 0 | Status: SHIPPED | OUTPATIENT
Start: 2019-11-18 | End: 2019-11-25

## 2019-11-18 NOTE — ED PROVIDER NOTES
History  Chief Complaint   Patient presents with    Foreign Body in Vagina     Patient reports she has a tampon stuck since Friday  Patient reports she tried to get it out multiple times and has been unsucessful  26-year-old female with history of diabetes, hypothyroid presents emergency department for evaluation of a vaginal foreign body  Patient reports buying generic tampons and using on Friday, but the string broke and she has been and able to remove it  Patient states she tried to remove it herself, without success  Patient reports foul smell from her vagina, but denies any fevers, abdominal pain, nausea, vomiting, diarrhea, dysuria, rash      History provided by:  Patient   used: No    Vaginal Discharge   Quality:  Malodorous  Duration:  3 days  Timing:  Constant  Chronicity:  New  Relieved by:  None tried  Worsened by:  Nothing  Associated symptoms: no abdominal pain, no fever, no nausea and no vomiting    Risk factors: foreign body        Prior to Admission Medications   Prescriptions Last Dose Informant Patient Reported? Taking? DULoxetine (CYMBALTA) 60 mg delayed release capsule   No Yes   Sig: Take 1 capsule (60 mg total) by mouth daily   Insulin Syringe-Needle U-100 (INSULIN SYRINGE 1CC/28G) 28G X 1/2" 1 ML MISC   Yes Yes   Si injections daily E10 65   ergocalciferol (VITAMIN D2) 50,000 units   No Yes   Sig: Take 1 capsule (50,000 Units total) by mouth once a week   glucagon (GLUCAGON EMERGENCY) 1 MG injection   Yes Yes   Sig: Inject 1 mg under the skin   glucose blood test strip   Yes Yes   Sig: Testing up to 8 times a day  E10 65   insulin aspart (NovoLOG) 100 units/mL injection   Yes Yes   Sig: Use with carb coverage of 9 and ISF of 30 for total daily dose of 40 units   E10 65   insulin glargine (LANTUS SOLOSTAR) 100 units/mL injection pen   No Yes   Sig: Inject 36 Units under the skin daily at bedtime   levothyroxine 200 mcg tablet   Yes Yes   Sig: Take 200 mcg by mouth daily      Facility-Administered Medications: None       Past Medical History:   Diagnosis Date    Anemia     Arthritis     B12 deficiency     Cervical disc disorder     Cervical disc disorder     On gabapentin     Diabetes mellitus ([place] [address] )     Disease of thyroid gland     hypothyroid    DKA (diabetic ketoacidoses) ([place] [address] ) 2018    MILLICENT (iron deficiency anemia)     Iron deficiency anemia     Type 1 diabetes ([place] [address] )     Type 1 diabetes mellitus, uncontrolled ([place] [address] )     Vitamin D deficiency        Past Surgical History:   Procedure Laterality Date    ABDOMINAL SURGERY      gastric bypass     SECTION      x2    CHOLECYSTECTOMY  2018    GASTRIC BYPASS  2007    INTRAUTERINE DEVICE INSERTION  2015    OTHER SURGICAL HISTORY      surgery for imperforate hymen/endometriosis/hydrometrocolpos    TUBAL LIGATION      WISDOM TOOTH EXTRACTION         Family History   Problem Relation Age of Onset    Thyroid disease Mother     URIEL disease Mother     Hyperlipidemia Mother     Hypertension Mother     Osteoarthritis Mother     Diabetes Father     Alcohol abuse Father     Diabetes type II Father     Thyroid disease Sister     Depression Sister     Heart disease Maternal Grandmother     Hypertension Maternal Grandmother     Arthritis Maternal Grandmother      I have reviewed and agree with the history as documented  Social History     Tobacco Use    Smoking status: Never Smoker    Smokeless tobacco: Never Used   Substance Use Topics    Alcohol use: No     Comment: socially    Drug use: No        Review of Systems   Constitutional: Negative for chills and fever  HENT: Negative for congestion and sore throat  Respiratory: Negative for cough and shortness of breath  Cardiovascular: Negative for chest pain  Gastrointestinal: Negative for abdominal pain, diarrhea, nausea and vomiting  Genitourinary: Positive for vaginal discharge   Negative for decreased urine volume, flank pain, frequency and urgency  Skin: Negative for rash  Neurological: Negative for headaches  All other systems reviewed and are negative  Physical Exam  Physical Exam   Constitutional: She is oriented to person, place, and time  Vital signs are normal  She appears well-developed and well-nourished  Non-toxic appearance  No distress  HENT:   Head: Normocephalic and atraumatic  Right Ear: Hearing and external ear normal    Left Ear: Hearing and external ear normal    Nose: Nose normal    Mouth/Throat: Mucous membranes are normal    Eyes: Conjunctivae are normal    Neck: Normal range of motion and full passive range of motion without pain  Cardiovascular: Normal rate, regular rhythm, S1 normal, S2 normal and normal heart sounds  Pulmonary/Chest: Effort normal and breath sounds normal  She has no wheezes  Abdominal: Soft  There is no tenderness  There is no CVA tenderness  Genitourinary: Pelvic exam was performed with patient supine  Cervix exhibits no motion tenderness  Right adnexum displays no tenderness  Left adnexum displays no tenderness  No erythema in the vagina  There is a foreign body in the vagina  No vaginal discharge found  Genitourinary Comments: Tampon visualized in vagina  RN chaperoned exam    Musculoskeletal:   Normal range of motion; no edema, tenderness, or deformities  Neurological: She is alert and oriented to person, place, and time  Skin: Skin is warm and dry  Capillary refill takes less than 2 seconds  Nursing note and vitals reviewed        Vital Signs  ED Triage Vitals [11/18/19 1053]   Temperature Pulse Respirations Blood Pressure SpO2   98 3 °F (36 8 °C) 86 16 127/75 97 %      Temp Source Heart Rate Source Patient Position - Orthostatic VS BP Location FiO2 (%)   Temporal Monitor Sitting Right arm --      Pain Score       No Pain           Vitals:    11/18/19 1053   BP: 127/75   Pulse: 86   Patient Position - Orthostatic VS: Sitting         Visual Acuity      ED Medications  Medications - No data to display    Diagnostic Studies  Results Reviewed     None                 No orders to display              Procedures  Foreign Body - Orifice  Date/Time: 11/19/2019 7:31 AM  Performed by: Emelia De La Cruz PA-C  Authorized by: Emelia De La Cruz PA-C     Patient location:  ED  Consent:     Consent obtained:  Verbal    Consent given by:  Patient    Risks discussed:  Need for surgical removal and infection  Universal protocol:     Patient identity confirmed:  Verbally with patient  Location:     Location:  Vagina  Pre-procedure details:     Imaging:  None  Procedure details:     Localization method:  Direct visualization    Removal mechanism:  Manual extraction    Procedure complexity:  Simple    Foreign bodies recovered:  1    Intact foreign body removal: yes    Post-procedure details:     Confirmation:  No additional foreign bodies on visualization    Patient tolerance of procedure: Tolerated well, no immediate complications           ED Course                               MDM  Number of Diagnoses or Management Options  Foreign body in vagina, initial encounter:   Diagnosis management comments: 42year old female presented with retained tampon in vagina  The FB was removed without difficulty intact  The management plan was discussed in detail with the patient at bedside and all questions were answered  The prior to discharge, we provided both verbal and written instructions  We discussed with the patient the signs and symptoms for which to return to the emergency department  All questions were answered and patient was comfortable with the plan of care and discharged to home  Instructed the patient to follow up with the primary care provider and/or special as provided and their written instructions    The patient verbalized understanding of our discussion and plan of care, and agrees to return to the Emergency Department for concerns and progression of illness  Disposition  Final diagnoses:   Foreign body in vagina, initial encounter     Time reflects when diagnosis was documented in both MDM as applicable and the Disposition within this note     Time User Action Codes Description Comment    11/18/2019 11:27 AM Steph Hernandez [address]  2XXA] Foreign body in vagina, initial encounter       ED Disposition     ED Disposition Condition Date/Time Comment    Discharge Stable Mon Nov 18, 2019 11:27 AM Dewayne Carey discharge to home/self care  Follow-up Information     Follow up With Specialties Details Why [address], [address] [facility], Nurse Practitioner, Emergency Medicine Schedule an appointment as soon as possible for a visit in 3 days  [address] [facility] 1  [address] [address] [phone]            Discharge Medication List as of 11/18/2019 11:30 AM      START taking these medications    Details   metroNIDAZOLE (FLAGYL) 500 mg tablet Take 1 tablet (500 mg total) by mouth every 8 (eight) hours for 7 days, Starting Mon 11/18/2019, Until Mon 11/25/2019, Print         CONTINUE these medications which have NOT CHANGED    Details   DULoxetine (CYMBALTA) 60 mg delayed release capsule Take 1 capsule (60 mg total) by mouth daily, Starting Mon 9/23/2019, Normal      ergocalciferol (VITAMIN D2) 50,000 units Take 1 capsule (50,000 Units total) by mouth once a week, Starting Fri 1/4/2019, Normal      glucagon (GLUCAGON EMERGENCY) 1 MG injection Inject 1 mg under the skin, Starting Wed 4/3/2019, Historical Med      glucose blood test strip Testing up to 8 times a day  E10 65, Historical Med      insulin aspart (NovoLOG) 100 units/mL injection Use with carb coverage of 9 and ISF of 30 for total daily dose of 40 units   E10 65, Historical Med      insulin glargine (LANTUS SOLOSTAR) 100 units/mL injection pen Inject 36 Units under the skin daily at bedtime, Starting Thu 11/14/2019, Normal      Insulin Syringe-Needle U-100 (INSULIN SYRINGE 1CC/28G) 28G X 1/2" 1 ML MISC 4 injections daily E10 65, Historical Med      levothyroxine 200 mcg tablet Take 200 mcg by mouth daily, Starting Wed 3/20/2019, Until Thu 3/19/2020, Historical Med           No discharge procedures on file      ED Provider  Electronically Signed by           Uziel Pedro PA-C  11/19/19 1340

## 2019-12-27 ENCOUNTER — OFFICE VISIT (OUTPATIENT)
Dept: FAMILY MEDICINE CLINIC | Facility: CLINIC | Age: 39
End: 2019-12-27
Payer: COMMERCIAL

## 2019-12-27 VITALS
OXYGEN SATURATION: 98 % | BODY MASS INDEX: 26.84 KG/M2 | SYSTOLIC BLOOD PRESSURE: 122 MMHG | HEART RATE: 85 BPM | RESPIRATION RATE: 18 BRPM | TEMPERATURE: 98.6 F | WEIGHT: 171 LBS | DIASTOLIC BLOOD PRESSURE: 68 MMHG | HEIGHT: 67 IN

## 2019-12-27 DIAGNOSIS — E10.65 TYPE 1 DIABETES MELLITUS WITH HYPERGLYCEMIA (HCC): Primary | ICD-10-CM

## 2019-12-27 DIAGNOSIS — E06.3 HYPOTHYROIDISM DUE TO HASHIMOTO'S THYROIDITIS: Chronic | ICD-10-CM

## 2019-12-27 DIAGNOSIS — E55.9 VITAMIN D DEFICIENCY: Chronic | ICD-10-CM

## 2019-12-27 DIAGNOSIS — E03.8 HYPOTHYROIDISM DUE TO HASHIMOTO'S THYROIDITIS: Chronic | ICD-10-CM

## 2019-12-27 DIAGNOSIS — D50.9 IRON DEFICIENCY ANEMIA, UNSPECIFIED IRON DEFICIENCY ANEMIA TYPE: Chronic | ICD-10-CM

## 2019-12-27 DIAGNOSIS — Z13.220 SCREENING CHOLESTEROL LEVEL: ICD-10-CM

## 2019-12-27 DIAGNOSIS — F41.9 ANXIETY: ICD-10-CM

## 2019-12-27 PROBLEM — E10.10 DIABETIC KETOACIDOSIS WITHOUT COMA ASSOCIATED WITH TYPE 1 DIABETES MELLITUS (HCC): Status: RESOLVED | Noted: 2018-03-20 | Resolved: 2019-12-27

## 2019-12-27 LAB — SL AMB POCT HEMOGLOBIN AIC: 11.1 (ref ?–6.5)

## 2019-12-27 PROCEDURE — 3008F BODY MASS INDEX DOCD: CPT | Performed by: NURSE PRACTITIONER

## 2019-12-27 PROCEDURE — 1036F TOBACCO NON-USER: CPT | Performed by: NURSE PRACTITIONER

## 2019-12-27 PROCEDURE — 83036 HEMOGLOBIN GLYCOSYLATED A1C: CPT | Performed by: NURSE PRACTITIONER

## 2019-12-27 PROCEDURE — 3046F HEMOGLOBIN A1C LEVEL >9.0%: CPT | Performed by: NURSE PRACTITIONER

## 2019-12-27 PROCEDURE — 99214 OFFICE O/P EST MOD 30 MIN: CPT | Performed by: NURSE PRACTITIONER

## 2019-12-27 NOTE — PATIENT INSTRUCTIONS
Start process to get insulin pump-consult endocrinology  Will get new glucometer (current one is 11years old)  Continue same medications  Check blood sugars more frequently and adjust Regular insulin on sliding scale  Get labs in 3 months  Return in 4 months for medcheck or sooner for problems/concerns

## 2019-12-27 NOTE — PROGRESS NOTES
[facility]        NAME: Chhaya Beltran is a 42 y o  female  : 1980    MRN: [id number]  DATE: 2019  TIME: 10:48 AM    Assessment and Plan   Type 1 diabetes mellitus with hyperglycemia ([facility] [address] ) [E10 65]  1  Type 1 diabetes mellitus with hyperglycemia (HCC)  HEMOGLOBIN A1C W/ EAG ESTIMATION    POCT hemoglobin A1c    Comprehensive metabolic panel   2  Hypothyroidism due to Hashimoto's thyroiditis  TSH, 3rd generation with Free T4 reflex   3  Vitamin D deficiency  Vitamin D 25 hydroxy   4  Iron deficiency anemia, unspecified iron deficiency anemia type  Iron Panel (Includes Ferritin, Iron Sat%, Iron, and TIBC)   5  Screening cholesterol level  Lipid panel   6  Anxiety           Patient Instructions     Patient Instructions   Start process to get insulin pump-consult endocrinology  Will get new glucometer (current one is 11years old)  Continue same medications  Check blood sugars more frequently and adjust Regular insulin on sliding scale  Get labs in 3 months  Return in 4 months for medcheck or sooner for problems/concerns          Chief Complaint     Chief Complaint   Patient presents with    Follow-up     diabetes         History of Present Illness       Here for diabetes medcheck- HgA1c is now worse from 10 0 to 11 1  She admits that she does not take care of herself the way she should- now has severe fatigue and fogginess  Had her DM eye exam, no retinopathy  Has not had an insulin pump for 2 years- she is now willing to go back on because she realizes she can not manage her diabetes without it  She faithfully uses her Lantus but not her Regular insulin  She tries to watch her diet but at times does not  She is taking Vitamin D weekly prescription, Cymbalta, and thyroid replacement  She would like her iron rechecked  Her endocrinologist is with [facility]- pt   Prefers to switch to [address] when [facility] in [address] Constitutional: Positive for activity change and fatigue  Negative for diaphoresis and fever  HENT: Negative for congestion, facial swelling, hearing loss, rhinorrhea, sinus pressure, sinus pain, sneezing, sore throat and voice change  Eyes: Negative for discharge and visual disturbance  Respiratory: Negative for cough, choking, chest tightness, shortness of breath, wheezing and stridor  Cardiovascular: Negative for chest pain, palpitations and leg swelling  Gastrointestinal: Negative for abdominal distention, abdominal pain, constipation, diarrhea, nausea and vomiting  Endocrine: Negative for polydipsia, polyphagia and polyuria  Genitourinary: Negative for difficulty urinating, dysuria, frequency and urgency  Musculoskeletal: Negative for arthralgias, back pain, gait problem, joint swelling, myalgias, neck pain and neck stiffness  Skin: Negative for color change, rash and wound  Neurological: Negative for dizziness, syncope, speech difficulty, weakness, light-headedness and headaches  Hematological: Negative for adenopathy  Does not bruise/bleed easily  Psychiatric/Behavioral: Positive for dysphoric mood  Negative for agitation, behavioral problems, confusion, hallucinations, sleep disturbance and suicidal ideas  The patient is nervous/anxious  Current Medications       Current Outpatient Medications:     DULoxetine (CYMBALTA) 60 mg delayed release capsule, Take 1 capsule (60 mg total) by mouth daily, Disp: 90 capsule, Rfl: 1    ergocalciferol (VITAMIN D2) 50,000 units, Take 1 capsule (50,000 Units total) by mouth once a week, Disp: 4 capsule, Rfl: 2    glucagon (GLUCAGON EMERGENCY) 1 MG injection, Inject 1 mg under the skin, Disp: , Rfl:     glucose blood test strip, Testing up to 8 times a day  E10 65, Disp: , Rfl:     insulin aspart (NovoLOG) 100 units/mL injection, Use with carb coverage of 9 and ISF of 30 for total daily dose of 40 units   E10 65, Disp: , Rfl:    insulin glargine (LANTUS SOLOSTAR) 100 units/mL injection pen, Inject 36 Units under the skin daily at bedtime, Disp: 5 pen, Rfl: 0    Insulin Syringe-Needle U-100 (INSULIN SYRINGE 1CC/28G) 28G X 1/2" 1 ML MISC, 4 injections daily E10 65, Disp: , Rfl:     levothyroxine 200 mcg tablet, Take 200 mcg by mouth daily, Disp: , Rfl:     Current Allergies     Allergies as of 2019    (No Known Allergies)            The following portions of the patient's history were reviewed and updated as appropriate: allergies, current medications, past family history, past medical history, past social history, past surgical history and problem list      Past Medical History:   Diagnosis Date    Anemia     Arthritis     B12 deficiency     Cervical disc disorder     Cervical disc disorder     On gabapentin     Diabetes mellitus ([facility] [address] )     Disease of thyroid gland     hypothyroid    DKA (diabetic ketoacidoses) ([facility] [address] ) 2018    MILLICENT (iron deficiency anemia)     Iron deficiency anemia     Type 1 diabetes ([facility] [address] )     Type 1 diabetes mellitus, uncontrolled ([facility] [address] )     Vitamin D deficiency        Past Surgical History:   Procedure Laterality Date    ABDOMINAL SURGERY      gastric bypass     SECTION      x2    CHOLECYSTECTOMY  2018    GASTRIC BYPASS  2007    INTRAUTERINE DEVICE INSERTION  2015    OTHER SURGICAL HISTORY      surgery for imperforate hymen/endometriosis/hydrometrocolpos    TUBAL LIGATION      WISDOM TOOTH EXTRACTION         Family History   Problem Relation Age of Onset    Thyroid disease Mother     URIEL disease Mother     Hyperlipidemia Mother     Hypertension Mother     Osteoarthritis Mother     Diabetes Father     Alcohol abuse Father     Diabetes type II Father     Thyroid disease Sister     Depression Sister     Heart disease Maternal Grandmother     Hypertension Maternal Grandmother     Arthritis Maternal Grandmother          Medications have been verified          Objective /68   Pulse 85   Temp 98 6 °F (37 °C)   Resp 18   Ht 5' 6 5" (1 689 m)   Wt 77 6 kg (171 lb)   SpO2 98%   BMI 27 19 kg/m²        Physical Exam     Physical Exam   Constitutional: She is oriented to person, place, and time  Vital signs are normal  She appears well-developed and well-nourished  She is active and cooperative  No distress  Eyes: EOM are normal    Cardiovascular: Normal rate, regular rhythm and normal heart sounds  Pulses are no weak pulses  No murmur heard  Pulses:       Dorsalis pedis pulses are 2+ on the right side, and 2+ on the left side  Pulmonary/Chest: Effort normal and breath sounds normal  No respiratory distress  She has no wheezes  Feet:   Right Foot:   Skin Integrity: Negative for ulcer, skin breakdown, erythema, warmth, callus or dry skin  Left Foot:   Skin Integrity: Negative for ulcer, skin breakdown, erythema, warmth, callus or dry skin  Neurological: She is alert and oriented to person, place, and time  Skin: Skin is warm and dry  No rash noted  She is not diaphoretic  No erythema  Psychiatric: Her behavior is normal  Judgment and thought content normal  Her mood appears anxious  She exhibits a depressed mood (mild)  Nursing note and vitals reviewed  Patient's shoes and socks removed  Right Foot/Ankle   Right Foot Inspection  Skin Exam: skin normal and skin intact no dry skin, no warmth, no callus, no erythema, no maceration, no abnormal color, no pre-ulcer, no ulcer and no callus                          Toe Exam: ROM and strength within normal limits  Sensory       Monofilament testing: intact  Vascular  Capillary refills: < 3 seconds  The right DP pulse is 2+       Left Foot/Ankle  Left Foot Inspection  Skin Exam: skin normal and skin intactno dry skin, no warmth, no erythema, no maceration, normal color, no pre-ulcer, no ulcer and no callus                         Toe Exam: ROM and strength within normal limits                   Sensory Monofilament: intact  Vascular  Capillary refills: < 3 seconds  The left DP pulse is 2+  Assign Risk Category:  No deformity present; No loss of protective sensation;  No weak pulses       Risk: 0      PHQ-9 Depression Screening    PHQ-9:    Frequency of the following problems over the past two weeks:       Little interest or pleasure in doing things:  0 - not at all  Feeling down, depressed, or hopeless:  0 - not at all  PHQ-2 Score:  0

## 2020-01-05 DIAGNOSIS — E10.65 TYPE 1 DIABETES MELLITUS WITH HYPERGLYCEMIA (HCC): ICD-10-CM

## 2020-01-07 DIAGNOSIS — E10.65 TYPE 1 DIABETES MELLITUS WITH HYPERGLYCEMIA (HCC): ICD-10-CM

## 2020-01-07 RX ORDER — INSULIN GLARGINE 100 [IU]/ML
INJECTION, SOLUTION SUBCUTANEOUS
Qty: 3 ML | Refills: 0 | OUTPATIENT
Start: 2020-01-07

## 2020-01-15 ENCOUNTER — TELEPHONE (OUTPATIENT)
Dept: FAMILY MEDICINE CLINIC | Facility: CLINIC | Age: 40
End: 2020-01-15

## 2020-01-15 NOTE — TELEPHONE ENCOUNTER
Patient called her insurance company phone number is [phone] they said that she would need a peer to peer for her pump and the   [address]  threw [address]

## 2020-01-16 DIAGNOSIS — E10.65 TYPE 1 DIABETES MELLITUS WITH HYPERGLYCEMIA (HCC): ICD-10-CM

## 2020-01-16 NOTE — TELEPHONE ENCOUNTER
Honestly an endocrinologist has to do this because I am not a diabetic specialist- if she wants to see her endocrinologist now that's fine- if she wants to continue insulin with needles until the [facility] is established in [place] that is fine also

## 2020-01-17 ENCOUNTER — OFFICE VISIT (OUTPATIENT)
Dept: URGENT CARE | Facility: HOSPITAL | Age: 40
End: 2020-01-17
Payer: COMMERCIAL

## 2020-01-17 VITALS
TEMPERATURE: 97.6 F | DIASTOLIC BLOOD PRESSURE: 74 MMHG | RESPIRATION RATE: 18 BRPM | SYSTOLIC BLOOD PRESSURE: 122 MMHG | OXYGEN SATURATION: 96 % | HEART RATE: 106 BPM | BODY MASS INDEX: 28.87 KG/M2 | HEIGHT: 66 IN | WEIGHT: 179.6 LBS

## 2020-01-17 DIAGNOSIS — J01.10 ACUTE NON-RECURRENT FRONTAL SINUSITIS: Primary | ICD-10-CM

## 2020-01-17 PROCEDURE — 99213 OFFICE O/P EST LOW 20 MIN: CPT | Performed by: NURSE PRACTITIONER

## 2020-01-17 RX ORDER — AMOXICILLIN AND CLAVULANATE POTASSIUM 875; 125 MG/1; MG/1
1 TABLET, FILM COATED ORAL EVERY 12 HOURS SCHEDULED
Qty: 14 TABLET | Refills: 0 | Status: SHIPPED | OUTPATIENT
Start: 2020-01-17 | End: 2020-01-24

## 2020-01-17 NOTE — PROGRESS NOTES
[employer]        NAME: Dirk Gotti is a 42 y o  female  : 1980    MRN: [id number]  DATE: 2020  TIME: 5:57 PM    Assessment and Plan   Acute non-recurrent frontal sinusitis [J01 10]  1  Acute non-recurrent frontal sinusitis  amoxicillin-clavulanate (AUGMENTIN) 875-125 mg per tablet         Patient Instructions     Patient Instructions     Take medication as directed  Recommend continuing over-the-counter Zyrtec and Flonase  Can take Tylenol as needed for pain  A cool mist humidifier can be helpful  If you develop any increased pain, dizziness, feel lightheaded, prolonged high fever, any new or concerning symptoms please return or proceed ER  Recommend following up with PCP in 3-5 days      Sinusitis   WHAT YOU NEED TO KNOW:   Sinusitis is inflammation or infection of your sinuses  It is most often caused by a virus  Acute sinusitis may last up to 12 weeks  Chronic sinusitis lasts longer than 12 weeks  Recurrent sinusitis means you have 4 or more times in 1 year  DISCHARGE INSTRUCTIONS:   Return to the emergency department if:   · Your eye and eyelid are red, swollen, and painful  · You cannot open your eye  · You have vision changes, such as double vision  · Your eyeball bulges out or you cannot move your eye  · You are more sleepy than normal, or you notice changes in your ability to think, move, or talk  · You have a stiff neck, a fever, or a bad headache  · You have swelling of your forehead or scalp  Contact your healthcare provider if:   · Your symptoms do not improve after 3 days  · Your symptoms do not go away after 10 days  · You have nausea and are vomiting  · Your nose is bleeding  · You have questions or concerns about your condition or care  Medicines: Your symptoms may go away on their own  Your healthcare provider may recommend watchful waiting for up to 10 days before starting antibiotics   You may  need any of the following:  · Acetaminophen  decreases pain and fever  It is available without a doctor's order  Ask how much to take and how often to take it  Follow directions  Read the labels of all other medicines you are using to see if they also contain acetaminophen, or ask your doctor or pharmacist  Acetaminophen can cause liver damage if not taken correctly  Do not use more than 4 grams (4,000 milligrams) total of acetaminophen in one day  · NSAIDs , such as ibuprofen, help decrease swelling, pain, and fever  This medicine is available with or without a doctor's order  NSAIDs can cause stomach bleeding or kidney problems in certain people  If you take blood thinner medicine, always ask your healthcare provider if NSAIDs are safe for you  Always read the medicine label and follow directions  · Nasal steroid sprays  may help decrease inflammation in your nose and sinuses  · Decongestants  help reduce swelling and drain mucus in the nose and sinuses  They may help you breathe easier  · Antihistamines  help dry mucus in the nose and relieve sneezing  · Antibiotics  help treat or prevent a bacterial infection  · Take your medicine as directed  Contact your healthcare provider if you think your medicine is not helping or if you have side effects  Tell him or her if you are allergic to any medicine  Keep a list of the medicines, vitamins, and herbs you take  Include the amounts, and when and why you take them  Bring the list or the pill bottles to follow-up visits  Carry your medicine list with you in case of an emergency  Self-care:   · Rinse your sinuses  Use a sinus rinse device to rinse your nasal passages with a saline (salt water) solution or distilled water  Do not use tap water  This will help thin the mucus in your nose and rinse away pollen and dirt  It will also help reduce swelling so you can breathe normally  Ask your healthcare provider how often to do this       · Breathe in steam   Heat a bowl of water until you see steam  Lean over the bowl and make a tent over your head with a large towel  Breathe deeply for about 20 minutes  Be careful not to get too close to the steam or burn yourself  Do this 3 times a day  You can also breathe deeply when you take a hot shower  · Sleep with your head elevated  Place an extra pillow under your head before you go to sleep to help your sinuses drain  · Drink liquids as directed  Ask your healthcare provider how much liquid to drink each day and which liquids are best for you  Liquids will thin the mucus in your nose and help it drain  Avoid drinks that contain alcohol or caffeine  · Do not smoke, and avoid secondhand smoke  Nicotine and other chemicals in cigarettes and cigars can make your symptoms worse  Ask your healthcare provider for information if you currently smoke and need help to quit  E-cigarettes or smokeless tobacco still contain nicotine  Talk to your healthcare provider before you use these products  Prevent the spread of germs that cause sinusitis:  Wash your hands often with soap and water  Wash your hands after you use the bathroom, change a child's diaper, or sneeze  Wash your hands before you prepare or eat food  Follow up with your healthcare provider as directed: You may be referred to an ear, nose, and throat specialist  Write down your questions so you remember to ask them during your visits  © 2017 [employer] Information is for End User's use only and may not be sold, redistributed or otherwise used for commercial purposes  All illustrations and images included in CareNotes® are the copyrighted property of [employer]  or [employer]  The above information is an  only  It is not intended as medical advice for individual conditions or treatments   Talk to your doctor, nurse or pharmacist before following any medical regimen to see if it is safe and effective for you         Follow up with PCP in 3-5 days  Proceed to  ER if symptoms worsen  Chief Complaint     Chief Complaint   Patient presents with    Cough     started 2 and a half weeks ago    Headache    Sore Throat         History of Present Illness       Patient is a 32-year-old female presents with a 2 week history of sinus pain, pressure, postnasal drip, congestion, and cough  Patient also notes subjective fever and body aches  Patient complaining of frontal headache  Denies any dizziness or feeling lightheaded  Denies any visual changes  Patient has taking over-the-counter Zyrtec, Flonase, Tylenol mild relief  Patient states the cough is worse at night  States the cough is nonproductive  Patient denies any earache, or sore throat  Denies any chest pain, shortness of breath hemoptysis, or palpitations  Review of Systems   Review of Systems   Constitutional: Positive for fatigue  Negative for chills, diaphoresis and fever  HENT: Positive for congestion, postnasal drip, rhinorrhea, sinus pressure and sinus pain  Negative for ear pain, facial swelling, sore throat, tinnitus and trouble swallowing  Eyes: Negative  Respiratory: Negative for cough, chest tightness, shortness of breath, wheezing and stridor  Cardiovascular: Negative for chest pain and palpitations  Gastrointestinal: Negative  Musculoskeletal: Positive for myalgias  Negative for arthralgias, back pain, joint swelling, neck pain and neck stiffness  Skin: Negative for rash  Neurological: Negative for dizziness, facial asymmetry, weakness, light-headedness, numbness and headaches           Current Medications       Current Outpatient Medications:     DULoxetine (CYMBALTA) 60 mg delayed release capsule, Take 1 capsule (60 mg total) by mouth daily, Disp: 90 capsule, Rfl: 1    ergocalciferol (VITAMIN D2) 50,000 units, Take 1 capsule (50,000 Units total) by mouth once a week, Disp: 4 capsule, Rfl: 2    glucagon (GLUCAGON EMERGENCY) 1 MG injection, Inject 1 mg under the skin, Disp: , Rfl:     glucose blood test strip, Testing up to 8 times a day  E10 65, Disp: , Rfl:     insulin aspart (NovoLOG) 100 units/mL injection, Inject 50 Units under the skin daily, Disp: 50 mL, Rfl: 3    insulin glargine (LANTUS SOLOSTAR) 100 units/mL injection pen, Inject 36 Units under the skin daily at bedtime, Disp: 5 pen, Rfl: 3    Insulin Syringe-Needle U-100 (INSULIN SYRINGE 1CC/28G) 28G X 1/2" 1 ML MISC, 4 injections daily E10 65, Disp: , Rfl:     levothyroxine 200 mcg tablet, Take 200 mcg by mouth daily, Disp: , Rfl:     amoxicillin-clavulanate (AUGMENTIN) 875-125 mg per tablet, Take 1 tablet by mouth every 12 (twelve) hours for 7 days, Disp: 14 tablet, Rfl: 0    Current Allergies     Allergies as of 2020    (No Known Allergies)            The following portions of the patient's history were reviewed and updated as appropriate: allergies, current medications, past family history, past medical history, past social history, past surgical history and problem list      Past Medical History:   Diagnosis Date    Anemia     Arthritis     B12 deficiency     Cervical disc disorder     Cervical disc disorder     On gabapentin     Diabetes mellitus ([address] )     Disease of thyroid gland     hypothyroid    DKA (diabetic ketoacidoses) ([address] ) 2018    MILLICENT (iron deficiency anemia)     Iron deficiency anemia     Type 1 diabetes ([address] )     Type 1 diabetes mellitus, uncontrolled ([address] )     Vitamin D deficiency        Past Surgical History:   Procedure Laterality Date    ABDOMINAL SURGERY      gastric bypass     SECTION      x2    CHOLECYSTECTOMY  2018    GASTRIC BYPASS  2007    INTRAUTERINE DEVICE INSERTION  2015    OTHER SURGICAL HISTORY      surgery for imperforate hymen/endometriosis/hydrometrocolpos    TUBAL LIGATION      WISDOM TOOTH EXTRACTION         Family History   Problem Relation Age of Onset    Thyroid disease Mother     URIEL disease Mother     Hyperlipidemia Mother     Hypertension Mother     Osteoarthritis Mother     Diabetes Father     Alcohol abuse Father     Diabetes type II Father     Thyroid disease Sister     Depression Sister     Heart disease Maternal Grandmother     Hypertension Maternal Grandmother     Arthritis Maternal Grandmother          Medications have been verified  Objective   /74   Pulse (!) 106   Temp 97 6 °F (36 4 °C) (Oral)   Resp 18   Ht 5' 6" (1 676 m)   Wt 81 5 kg (179 lb 9 6 oz)   LMP 01/03/2020 (Exact Date)   SpO2 96%   BMI 28 99 kg/m²        Physical Exam     Physical Exam   Constitutional: She is oriented to person, place, and time  She appears well-developed and well-nourished  No distress  HENT:   Head: Normocephalic and atraumatic  Right Ear: Hearing, tympanic membrane, external ear and ear canal normal    Left Ear: Hearing, tympanic membrane, external ear and ear canal normal    Nose: Mucosal edema present  Right sinus exhibits frontal sinus tenderness  Right sinus exhibits no maxillary sinus tenderness  Left sinus exhibits frontal sinus tenderness  Left sinus exhibits no maxillary sinus tenderness  Mouth/Throat: Uvula is midline, oropharynx is clear and moist and mucous membranes are normal  No tonsillar exudate  Cardiovascular: Normal rate, regular rhythm, S1 normal, S2 normal, normal heart sounds, intact distal pulses and normal pulses  Pulmonary/Chest: Effort normal and breath sounds normal    Lymphadenopathy:     She has no cervical adenopathy  Neurological: She is alert and oriented to person, place, and time  Skin: Skin is warm and dry  She is not diaphoretic

## 2020-01-17 NOTE — PATIENT INSTRUCTIONS
Take medication as directed  Recommend continuing over-the-counter Zyrtec and Flonase  Can take Tylenol as needed for pain  A cool mist humidifier can be helpful  If you develop any increased pain, dizziness, feel lightheaded, prolonged high fever, any new or concerning symptoms please return or proceed ER  Recommend following up with PCP in 3-5 days      Sinusitis   WHAT YOU NEED TO KNOW:   Sinusitis is inflammation or infection of your sinuses  It is most often caused by a virus  Acute sinusitis may last up to 12 weeks  Chronic sinusitis lasts longer than 12 weeks  Recurrent sinusitis means you have 4 or more times in 1 year  DISCHARGE INSTRUCTIONS:   Return to the emergency department if:   · Your eye and eyelid are red, swollen, and painful  · You cannot open your eye  · You have vision changes, such as double vision  · Your eyeball bulges out or you cannot move your eye  · You are more sleepy than normal, or you notice changes in your ability to think, move, or talk  · You have a stiff neck, a fever, or a bad headache  · You have swelling of your forehead or scalp  Contact your healthcare provider if:   · Your symptoms do not improve after 3 days  · Your symptoms do not go away after 10 days  · You have nausea and are vomiting  · Your nose is bleeding  · You have questions or concerns about your condition or care  Medicines: Your symptoms may go away on their own  Your healthcare provider may recommend watchful waiting for up to 10 days before starting antibiotics  You may  need any of the following:  · Acetaminophen  decreases pain and fever  It is available without a doctor's order  Ask how much to take and how often to take it  Follow directions  Read the labels of all other medicines you are using to see if they also contain acetaminophen, or ask your doctor or pharmacist  Acetaminophen can cause liver damage if not taken correctly   Do not use more than 4 grams (4,000 milligrams) total of acetaminophen in one day  · NSAIDs , such as ibuprofen, help decrease swelling, pain, and fever  This medicine is available with or without a doctor's order  NSAIDs can cause stomach bleeding or kidney problems in certain people  If you take blood thinner medicine, always ask your healthcare provider if NSAIDs are safe for you  Always read the medicine label and follow directions  · Nasal steroid sprays  may help decrease inflammation in your nose and sinuses  · Decongestants  help reduce swelling and drain mucus in the nose and sinuses  They may help you breathe easier  · Antihistamines  help dry mucus in the nose and relieve sneezing  · Antibiotics  help treat or prevent a bacterial infection  · Take your medicine as directed  Contact your healthcare provider if you think your medicine is not helping or if you have side effects  Tell him or her if you are allergic to any medicine  Keep a list of the medicines, vitamins, and herbs you take  Include the amounts, and when and why you take them  Bring the list or the pill bottles to follow-up visits  Carry your medicine list with you in case of an emergency  Self-care:   · Rinse your sinuses  Use a sinus rinse device to rinse your nasal passages with a saline (salt water) solution or distilled water  Do not use tap water  This will help thin the mucus in your nose and rinse away pollen and dirt  It will also help reduce swelling so you can breathe normally  Ask your healthcare provider how often to do this  · Breathe in steam   Heat a bowl of water until you see steam  Lean over the bowl and make a tent over your head with a large towel  Breathe deeply for about 20 minutes  Be careful not to get too close to the steam or burn yourself  Do this 3 times a day  You can also breathe deeply when you take a hot shower  · Sleep with your head elevated    Place an extra pillow under your head before you go to sleep to help your sinuses drain  · Drink liquids as directed  Ask your healthcare provider how much liquid to drink each day and which liquids are best for you  Liquids will thin the mucus in your nose and help it drain  Avoid drinks that contain alcohol or caffeine  · Do not smoke, and avoid secondhand smoke  Nicotine and other chemicals in cigarettes and cigars can make your symptoms worse  Ask your healthcare provider for information if you currently smoke and need help to quit  E-cigarettes or smokeless tobacco still contain nicotine  Talk to your healthcare provider before you use these products  Prevent the spread of germs that cause sinusitis:  Wash your hands often with soap and water  Wash your hands after you use the bathroom, change a child's diaper, or sneeze  Wash your hands before you prepare or eat food  Follow up with your healthcare provider as directed: You may be referred to an ear, nose, and throat specialist  Write down your questions so you remember to ask them during your visits  © 2017 [employer] [facility] Information is for End User's use only and may not be sold, redistributed or otherwise used for commercial purposes  All illustrations and images included in CareNotes® are the copyrighted property of [employer] , [employer]  or [employer] Suh  The above information is an  only  It is not intended as medical advice for individual conditions or treatments  Talk to your doctor, nurse or pharmacist before following any medical regimen to see if it is safe and effective for you

## 2020-03-06 ENCOUNTER — TELEPHONE (OUTPATIENT)
Dept: FAMILY MEDICINE CLINIC | Facility: CLINIC | Age: 40
End: 2020-03-06

## 2020-03-06 ENCOUNTER — OFFICE VISIT (OUTPATIENT)
Dept: FAMILY MEDICINE CLINIC | Facility: CLINIC | Age: 40
End: 2020-03-06
Payer: COMMERCIAL

## 2020-03-06 VITALS
DIASTOLIC BLOOD PRESSURE: 82 MMHG | OXYGEN SATURATION: 100 % | WEIGHT: 179 LBS | HEART RATE: 94 BPM | SYSTOLIC BLOOD PRESSURE: 118 MMHG | HEIGHT: 66 IN | TEMPERATURE: 98.1 F | BODY MASS INDEX: 28.77 KG/M2

## 2020-03-06 DIAGNOSIS — E10.65 TYPE 1 DIABETES MELLITUS WITH HYPERGLYCEMIA (HCC): ICD-10-CM

## 2020-03-06 DIAGNOSIS — F51.04 PSYCHOPHYSIOLOGICAL INSOMNIA: Primary | ICD-10-CM

## 2020-03-06 LAB — SL AMB POCT HEMOGLOBIN AIC: 10.9 (ref ?–6.5)

## 2020-03-06 PROCEDURE — 2022F DILAT RTA XM EVC RTNOPTHY: CPT | Performed by: NURSE PRACTITIONER

## 2020-03-06 PROCEDURE — 3046F HEMOGLOBIN A1C LEVEL >9.0%: CPT | Performed by: NURSE PRACTITIONER

## 2020-03-06 PROCEDURE — 83036 HEMOGLOBIN GLYCOSYLATED A1C: CPT | Performed by: NURSE PRACTITIONER

## 2020-03-06 PROCEDURE — 1036F TOBACCO NON-USER: CPT | Performed by: NURSE PRACTITIONER

## 2020-03-06 PROCEDURE — 99213 OFFICE O/P EST LOW 20 MIN: CPT | Performed by: NURSE PRACTITIONER

## 2020-03-06 PROCEDURE — 3008F BODY MASS INDEX DOCD: CPT | Performed by: NURSE PRACTITIONER

## 2020-03-06 RX ORDER — ZOLPIDEM TARTRATE 10 MG/1
10 TABLET ORAL
Qty: 30 TABLET | Refills: 0 | Status: SHIPPED | OUTPATIENT
Start: 2020-03-06 | End: 2020-04-02

## 2020-03-10 DIAGNOSIS — R11.2 NAUSEA AND VOMITING, INTRACTABILITY OF VOMITING NOT SPECIFIED, UNSPECIFIED VOMITING TYPE: Primary | ICD-10-CM

## 2020-03-10 RX ORDER — ONDANSETRON 4 MG/1
4 TABLET, FILM COATED ORAL EVERY 4 HOURS PRN
Qty: 20 TABLET | Refills: 0 | Status: SHIPPED | OUTPATIENT
Start: 2020-03-10 | End: 2020-08-04

## 2020-03-10 NOTE — TELEPHONE ENCOUNTER
Sure, send Zofran 4mg every 4 hours PRN Nausea/Vomiting #30, 0RF, ask her if the Ambien is helping her sleep

## 2020-03-10 NOTE — TELEPHONE ENCOUNTER
Called and left message with patient informing her of Rx and to call office back with Ambien update  Please approve medication

## 2020-03-10 NOTE — TELEPHONE ENCOUNTER
Patient called looking for something for her stomach her nerves are so bad right now ( said Katie Tristan knows whats going on with her) So nervious she has been throwing up, wondering if you could Zophren or  Something Sent to [employer]

## 2020-04-02 ENCOUNTER — APPOINTMENT (OUTPATIENT)
Dept: LAB | Facility: CLINIC | Age: 40
End: 2020-04-02
Payer: COMMERCIAL

## 2020-04-02 ENCOUNTER — TELEMEDICINE (OUTPATIENT)
Dept: FAMILY MEDICINE CLINIC | Facility: CLINIC | Age: 40
End: 2020-04-02
Payer: COMMERCIAL

## 2020-04-02 VITALS — RESPIRATION RATE: 16 BRPM

## 2020-04-02 DIAGNOSIS — E10.65 TYPE 1 DIABETES MELLITUS WITH HYPERGLYCEMIA (HCC): ICD-10-CM

## 2020-04-02 DIAGNOSIS — E10.65 TYPE 1 DIABETES MELLITUS WITH HYPERGLYCEMIA (HCC): Primary | ICD-10-CM

## 2020-04-02 DIAGNOSIS — R52 BODY ACHES: ICD-10-CM

## 2020-04-02 DIAGNOSIS — F51.04 PSYCHOPHYSIOLOGICAL INSOMNIA: ICD-10-CM

## 2020-04-02 DIAGNOSIS — E03.8 HYPOTHYROIDISM DUE TO HASHIMOTO'S THYROIDITIS: ICD-10-CM

## 2020-04-02 DIAGNOSIS — R25.2 LEG CRAMPS: ICD-10-CM

## 2020-04-02 DIAGNOSIS — E06.3 HYPOTHYROIDISM DUE TO HASHIMOTO'S THYROIDITIS: ICD-10-CM

## 2020-04-02 DIAGNOSIS — E55.9 VITAMIN D DEFICIENCY: Chronic | ICD-10-CM

## 2020-04-02 DIAGNOSIS — E53.8 B12 DEFICIENCY: ICD-10-CM

## 2020-04-02 DIAGNOSIS — Z13.220 SCREENING CHOLESTEROL LEVEL: ICD-10-CM

## 2020-04-02 DIAGNOSIS — D50.9 IRON DEFICIENCY ANEMIA, UNSPECIFIED IRON DEFICIENCY ANEMIA TYPE: ICD-10-CM

## 2020-04-02 LAB
25(OH)D3 SERPL-MCNC: 15.7 NG/ML (ref 30–100)
ALBUMIN SERPL BCP-MCNC: 3.9 G/DL (ref 3.5–5)
ALP SERPL-CCNC: 178 U/L (ref 46–116)
ALT SERPL W P-5'-P-CCNC: 45 U/L (ref 12–78)
ANION GAP SERPL CALCULATED.3IONS-SCNC: 11 MMOL/L (ref 4–13)
AST SERPL W P-5'-P-CCNC: 35 U/L (ref 5–45)
BACTERIA UR QL AUTO: ABNORMAL /HPF
BASOPHILS # BLD AUTO: 0.04 THOUSANDS/ΜL (ref 0–0.1)
BASOPHILS NFR BLD AUTO: 1 % (ref 0–1)
BILIRUB SERPL-MCNC: 0.85 MG/DL (ref 0.2–1)
BILIRUB UR QL STRIP: NEGATIVE
BUN SERPL-MCNC: 13 MG/DL (ref 5–25)
CALCIUM SERPL-MCNC: 9.2 MG/DL (ref 8.3–10.1)
CHLORIDE SERPL-SCNC: 93 MMOL/L (ref 100–108)
CHOLEST SERPL-MCNC: 272 MG/DL (ref 50–200)
CLARITY UR: CLEAR
CO2 SERPL-SCNC: 22 MMOL/L (ref 21–32)
COLOR UR: YELLOW
CREAT SERPL-MCNC: 0.97 MG/DL (ref 0.6–1.3)
EOSINOPHIL # BLD AUTO: 0.09 THOUSAND/ΜL (ref 0–0.61)
EOSINOPHIL NFR BLD AUTO: 2 % (ref 0–6)
ERYTHROCYTE [DISTWIDTH] IN BLOOD BY AUTOMATED COUNT: 13.2 % (ref 11.6–15.1)
EST. AVERAGE GLUCOSE BLD GHB EST-MCNC: 260 MG/DL
FERRITIN SERPL-MCNC: 161 NG/ML (ref 8–388)
GFR SERPL CREATININE-BSD FRML MDRD: 74 ML/MIN/1.73SQ M
GLUCOSE P FAST SERPL-MCNC: 449 MG/DL (ref 65–99)
GLUCOSE UR STRIP-MCNC: ABNORMAL MG/DL
HBA1C MFR BLD: 10.7 %
HCT VFR BLD AUTO: 45.9 % (ref 34.8–46.1)
HDLC SERPL-MCNC: 109 MG/DL
HGB BLD-MCNC: 14.6 G/DL (ref 11.5–15.4)
HGB UR QL STRIP.AUTO: NEGATIVE
HYALINE CASTS #/AREA URNS LPF: ABNORMAL /LPF
IMM GRANULOCYTES # BLD AUTO: 0.01 THOUSAND/UL (ref 0–0.2)
IMM GRANULOCYTES NFR BLD AUTO: 0 % (ref 0–2)
IRON SATN MFR SERPL: 30 %
IRON SERPL-MCNC: 101 UG/DL (ref 50–170)
KETONES UR STRIP-MCNC: ABNORMAL MG/DL
LDLC SERPL CALC-MCNC: 95 MG/DL (ref 0–100)
LEUKOCYTE ESTERASE UR QL STRIP: ABNORMAL
LYMPHOCYTES # BLD AUTO: 1.12 THOUSANDS/ΜL (ref 0.6–4.47)
LYMPHOCYTES NFR BLD AUTO: 25 % (ref 14–44)
MAGNESIUM SERPL-MCNC: 1.9 MG/DL (ref 1.6–2.6)
MCH RBC QN AUTO: 28.7 PG (ref 26.8–34.3)
MCHC RBC AUTO-ENTMCNC: 31.8 G/DL (ref 31.4–37.4)
MCV RBC AUTO: 90 FL (ref 82–98)
MONOCYTES # BLD AUTO: 0.31 THOUSAND/ΜL (ref 0.17–1.22)
MONOCYTES NFR BLD AUTO: 7 % (ref 4–12)
NEUTROPHILS # BLD AUTO: 2.97 THOUSANDS/ΜL (ref 1.85–7.62)
NEUTS SEG NFR BLD AUTO: 65 % (ref 43–75)
NITRITE UR QL STRIP: NEGATIVE
NON-SQ EPI CELLS URNS QL MICRO: ABNORMAL /HPF
NONHDLC SERPL-MCNC: 163 MG/DL
NRBC BLD AUTO-RTO: 0 /100 WBCS
PH UR STRIP.AUTO: 6 [PH]
PLATELET # BLD AUTO: 232 THOUSANDS/UL (ref 149–390)
PMV BLD AUTO: 10.7 FL (ref 8.9–12.7)
POTASSIUM SERPL-SCNC: 4.7 MMOL/L (ref 3.5–5.3)
PROT SERPL-MCNC: 8.1 G/DL (ref 6.4–8.2)
PROT UR STRIP-MCNC: NEGATIVE MG/DL
RBC # BLD AUTO: 5.09 MILLION/UL (ref 3.81–5.12)
RBC #/AREA URNS AUTO: ABNORMAL /HPF
SODIUM SERPL-SCNC: 126 MMOL/L (ref 136–145)
SP GR UR STRIP.AUTO: 1.04 (ref 1–1.03)
T4 FREE SERPL-MCNC: 1.43 NG/DL (ref 0.76–1.46)
TIBC SERPL-MCNC: 340 UG/DL (ref 250–450)
TRIGL SERPL-MCNC: 338 MG/DL
TSH SERPL DL<=0.05 MIU/L-ACNC: 41.7 UIU/ML (ref 0.36–3.74)
UROBILINOGEN UR QL STRIP.AUTO: 0.2 E.U./DL
WBC # BLD AUTO: 4.54 THOUSAND/UL (ref 4.31–10.16)
WBC #/AREA URNS AUTO: ABNORMAL /HPF

## 2020-04-02 PROCEDURE — 84439 ASSAY OF FREE THYROXINE: CPT

## 2020-04-02 PROCEDURE — 81001 URINALYSIS AUTO W/SCOPE: CPT | Performed by: NURSE PRACTITIONER

## 2020-04-02 PROCEDURE — 82306 VITAMIN D 25 HYDROXY: CPT

## 2020-04-02 PROCEDURE — 36415 COLL VENOUS BLD VENIPUNCTURE: CPT

## 2020-04-02 PROCEDURE — 80061 LIPID PANEL: CPT

## 2020-04-02 PROCEDURE — 82607 VITAMIN B-12: CPT

## 2020-04-02 PROCEDURE — 83550 IRON BINDING TEST: CPT

## 2020-04-02 PROCEDURE — 82728 ASSAY OF FERRITIN: CPT

## 2020-04-02 PROCEDURE — 85025 COMPLETE CBC W/AUTO DIFF WBC: CPT

## 2020-04-02 PROCEDURE — 83735 ASSAY OF MAGNESIUM: CPT

## 2020-04-02 PROCEDURE — 83540 ASSAY OF IRON: CPT

## 2020-04-02 PROCEDURE — 99213 OFFICE O/P EST LOW 20 MIN: CPT | Performed by: NURSE PRACTITIONER

## 2020-04-02 PROCEDURE — 83036 HEMOGLOBIN GLYCOSYLATED A1C: CPT

## 2020-04-02 PROCEDURE — 80053 COMPREHEN METABOLIC PANEL: CPT

## 2020-04-02 PROCEDURE — 84443 ASSAY THYROID STIM HORMONE: CPT

## 2020-04-02 RX ORDER — ZOLPIDEM TARTRATE 10 MG/1
TABLET ORAL
Qty: 30 TABLET | Refills: 0 | Status: SHIPPED | OUTPATIENT
Start: 2020-04-02 | End: 2020-08-04

## 2020-04-03 DIAGNOSIS — E55.9 VITAMIN D DEFICIENCY: Chronic | ICD-10-CM

## 2020-04-03 DIAGNOSIS — E06.3 HYPOTHYROIDISM DUE TO HASHIMOTO'S THYROIDITIS: Primary | ICD-10-CM

## 2020-04-03 DIAGNOSIS — E03.8 HYPOTHYROIDISM DUE TO HASHIMOTO'S THYROIDITIS: Primary | ICD-10-CM

## 2020-04-03 DIAGNOSIS — E53.8 B12 DEFICIENCY: ICD-10-CM

## 2020-04-03 DIAGNOSIS — E10.65 TYPE 1 DIABETES MELLITUS WITH HYPERGLYCEMIA (HCC): ICD-10-CM

## 2020-04-03 LAB — VIT B12 SERPL-MCNC: 476 PG/ML (ref 100–900)

## 2020-04-03 RX ORDER — LEVOTHYROXINE AND LIOTHYRONINE 76; 18 UG/1; UG/1
120 TABLET ORAL DAILY
Qty: 90 TABLET | Refills: 1 | Status: SHIPPED | OUTPATIENT
Start: 2020-04-03 | End: 2020-07-20 | Stop reason: SDUPTHER

## 2020-04-03 RX ORDER — ERGOCALCIFEROL 1.25 MG/1
50000 CAPSULE ORAL WEEKLY
Qty: 12 CAPSULE | Refills: 3 | Status: SHIPPED | OUTPATIENT
Start: 2020-04-03 | End: 2022-04-14 | Stop reason: SDUPTHER

## 2020-04-20 DIAGNOSIS — F41.9 ANXIETY: ICD-10-CM

## 2020-04-20 RX ORDER — DULOXETIN HYDROCHLORIDE 60 MG/1
CAPSULE, DELAYED RELEASE ORAL
Qty: 30 CAPSULE | Refills: 5 | Status: SHIPPED | OUTPATIENT
Start: 2020-04-20 | End: 2021-05-18 | Stop reason: SDUPTHER

## 2020-04-22 ENCOUNTER — TELEMEDICINE (OUTPATIENT)
Dept: ENDOCRINOLOGY | Facility: CLINIC | Age: 40
End: 2020-04-22
Payer: COMMERCIAL

## 2020-04-22 DIAGNOSIS — E10.649 TYPE 1 DIABETES MELLITUS WITH HYPOGLYCEMIA AND WITHOUT COMA (HCC): ICD-10-CM

## 2020-04-22 DIAGNOSIS — E06.3 HYPOTHYROIDISM DUE TO HASHIMOTO'S THYROIDITIS: ICD-10-CM

## 2020-04-22 DIAGNOSIS — E10.65 TYPE 1 DIABETES MELLITUS WITH HYPERGLYCEMIA (HCC): Primary | ICD-10-CM

## 2020-04-22 DIAGNOSIS — E55.9 VITAMIN D DEFICIENCY: Chronic | ICD-10-CM

## 2020-04-22 DIAGNOSIS — E03.8 HYPOTHYROIDISM DUE TO HASHIMOTO'S THYROIDITIS: ICD-10-CM

## 2020-04-22 PROCEDURE — 99214 OFFICE O/P EST MOD 30 MIN: CPT | Performed by: INTERNAL MEDICINE

## 2020-04-22 RX ORDER — INSULIN ASPART 100 [IU]/ML
INJECTION, SOLUTION INTRAVENOUS; SUBCUTANEOUS
Qty: 5 PEN | Refills: 1 | Status: SHIPPED | OUTPATIENT
Start: 2020-04-22 | End: 2021-05-18 | Stop reason: CLARIF

## 2020-05-16 DIAGNOSIS — F41.9 ANXIETY: Primary | ICD-10-CM

## 2020-05-18 RX ORDER — ALPRAZOLAM 0.5 MG/1
TABLET ORAL
Qty: 30 TABLET | Refills: 0 | Status: SHIPPED | OUTPATIENT
Start: 2020-05-18 | End: 2020-06-30

## 2020-05-20 ENCOUNTER — APPOINTMENT (OUTPATIENT)
Dept: LAB | Facility: CLINIC | Age: 40
End: 2020-05-20
Payer: COMMERCIAL

## 2020-05-20 DIAGNOSIS — E55.9 VITAMIN D DEFICIENCY: Chronic | ICD-10-CM

## 2020-05-20 DIAGNOSIS — E10.65 TYPE 1 DIABETES MELLITUS WITH HYPERGLYCEMIA (HCC): ICD-10-CM

## 2020-05-20 DIAGNOSIS — E03.8 HYPOTHYROIDISM DUE TO HASHIMOTO'S THYROIDITIS: ICD-10-CM

## 2020-05-20 DIAGNOSIS — E06.3 HYPOTHYROIDISM DUE TO HASHIMOTO'S THYROIDITIS: ICD-10-CM

## 2020-05-20 LAB
25(OH)D3 SERPL-MCNC: 71.3 NG/ML (ref 30–100)
ALBUMIN SERPL BCP-MCNC: 3.6 G/DL (ref 3.5–5)
ALP SERPL-CCNC: 176 U/L (ref 46–116)
ALT SERPL W P-5'-P-CCNC: 32 U/L (ref 12–78)
ANION GAP SERPL CALCULATED.3IONS-SCNC: 5 MMOL/L (ref 4–13)
AST SERPL W P-5'-P-CCNC: 24 U/L (ref 5–45)
BILIRUB SERPL-MCNC: 0.48 MG/DL (ref 0.2–1)
BUN SERPL-MCNC: 5 MG/DL (ref 5–25)
CALCIUM SERPL-MCNC: 9.4 MG/DL (ref 8.3–10.1)
CHLORIDE SERPL-SCNC: 100 MMOL/L (ref 100–108)
CO2 SERPL-SCNC: 29 MMOL/L (ref 21–32)
CREAT SERPL-MCNC: 0.71 MG/DL (ref 0.6–1.3)
EST. AVERAGE GLUCOSE BLD GHB EST-MCNC: 266 MG/DL
GFR SERPL CREATININE-BSD FRML MDRD: 108 ML/MIN/1.73SQ M
GLUCOSE P FAST SERPL-MCNC: 219 MG/DL (ref 65–99)
HBA1C MFR BLD: 10.9 %
POTASSIUM SERPL-SCNC: 4.3 MMOL/L (ref 3.5–5.3)
PROT SERPL-MCNC: 7.6 G/DL (ref 6.4–8.2)
SODIUM SERPL-SCNC: 134 MMOL/L (ref 136–145)
T3FREE SERPL-MCNC: 3.55 PG/ML (ref 2.3–4.2)
T4 FREE SERPL-MCNC: 0.82 NG/DL (ref 0.76–1.46)
TSH SERPL DL<=0.05 MIU/L-ACNC: 4.93 UIU/ML (ref 0.36–3.74)

## 2020-05-20 PROCEDURE — 84439 ASSAY OF FREE THYROXINE: CPT

## 2020-05-20 PROCEDURE — 36415 COLL VENOUS BLD VENIPUNCTURE: CPT

## 2020-05-20 PROCEDURE — 84481 FREE ASSAY (FT-3): CPT

## 2020-05-20 PROCEDURE — 82306 VITAMIN D 25 HYDROXY: CPT

## 2020-05-20 PROCEDURE — 80053 COMPREHEN METABOLIC PANEL: CPT

## 2020-05-20 PROCEDURE — 83036 HEMOGLOBIN GLYCOSYLATED A1C: CPT

## 2020-05-20 PROCEDURE — 84443 ASSAY THYROID STIM HORMONE: CPT

## 2020-05-22 ENCOUNTER — OFFICE VISIT (OUTPATIENT)
Dept: FAMILY MEDICINE CLINIC | Facility: CLINIC | Age: 40
End: 2020-05-22

## 2020-05-22 VITALS
SYSTOLIC BLOOD PRESSURE: 116 MMHG | OXYGEN SATURATION: 99 % | HEART RATE: 100 BPM | TEMPERATURE: 97.9 F | RESPIRATION RATE: 18 BRPM | WEIGHT: 165 LBS | HEIGHT: 66 IN | DIASTOLIC BLOOD PRESSURE: 68 MMHG | BODY MASS INDEX: 26.52 KG/M2

## 2020-05-22 DIAGNOSIS — E03.8 HYPOTHYROIDISM DUE TO HASHIMOTO'S THYROIDITIS: ICD-10-CM

## 2020-05-22 DIAGNOSIS — L23.7 POISON IVY: ICD-10-CM

## 2020-05-22 DIAGNOSIS — E10.65 TYPE 1 DIABETES MELLITUS WITH HYPERGLYCEMIA (HCC): ICD-10-CM

## 2020-05-22 DIAGNOSIS — E06.3 HYPOTHYROIDISM DUE TO HASHIMOTO'S THYROIDITIS: ICD-10-CM

## 2020-05-22 DIAGNOSIS — F41.9 ANXIETY: Primary | ICD-10-CM

## 2020-05-22 PROCEDURE — 1036F TOBACCO NON-USER: CPT | Performed by: NURSE PRACTITIONER

## 2020-05-22 PROCEDURE — 3008F BODY MASS INDEX DOCD: CPT | Performed by: NURSE PRACTITIONER

## 2020-05-22 PROCEDURE — 2022F DILAT RTA XM EVC RTNOPTHY: CPT | Performed by: NURSE PRACTITIONER

## 2020-05-22 PROCEDURE — 3046F HEMOGLOBIN A1C LEVEL >9.0%: CPT | Performed by: NURSE PRACTITIONER

## 2020-05-22 PROCEDURE — 99214 OFFICE O/P EST MOD 30 MIN: CPT | Performed by: NURSE PRACTITIONER

## 2020-05-22 RX ORDER — LIDOCAINE 40 MG/G
CREAM TOPICAL AS NEEDED
Qty: 30 G | Refills: 1 | Status: SHIPPED | OUTPATIENT
Start: 2020-05-22 | End: 2020-08-04

## 2020-05-22 RX ORDER — BUSPIRONE HYDROCHLORIDE 10 MG/1
10 TABLET ORAL 3 TIMES DAILY
Qty: 90 TABLET | Refills: 1 | Status: SHIPPED | OUTPATIENT
Start: 2020-05-22 | End: 2020-08-04

## 2020-06-23 ENCOUNTER — HOSPITAL ENCOUNTER (EMERGENCY)
Facility: HOSPITAL | Age: 40
End: 2020-06-24
Attending: EMERGENCY MEDICINE
Payer: COMMERCIAL

## 2020-06-23 ENCOUNTER — APPOINTMENT (EMERGENCY)
Dept: RADIOLOGY | Facility: HOSPITAL | Age: 40
End: 2020-06-23
Payer: COMMERCIAL

## 2020-06-23 DIAGNOSIS — E11.10 DKA (DIABETIC KETOACIDOSES): Primary | ICD-10-CM

## 2020-06-23 LAB
AMPHETAMINES SERPL QL SCN: NEGATIVE
ATRIAL RATE: 116 BPM
BARBITURATES UR QL: NEGATIVE
BASE EX.OXY STD BLDV CALC-SCNC: 84.3 %
BASE EX.OXY STD BLDV CALC-SCNC: 92.6 %
BASE EXCESS BLDV CALC-SCNC: -30.2 MMOL/L
BASE EXCESS BLDV CALC-SCNC: -31.4 MMOL/L
BENZODIAZ UR QL: NEGATIVE
BETA-HYDROXYBUTYRATE: 5.3 MMOL/L
BUN SERPL-MCNC: 14 MG/DL (ref 7–25)
BUN SERPL-MCNC: 14 MG/DL (ref 7–25)
CALCIUM SERPL-MCNC: 8.7 MG/DL (ref 8.6–10.5)
CALCIUM SERPL-MCNC: 8.9 MG/DL (ref 8.6–10.5)
CHLORIDE SERPL-SCNC: 93 MMOL/L (ref 98–107)
CHLORIDE SERPL-SCNC: 95 MMOL/L (ref 98–107)
CK SERPL-CCNC: 53 U/L (ref 30–192)
CO2 SERPL-SCNC: <5 MMOL/L (ref 21–31)
CO2 SERPL-SCNC: <5 MMOL/L (ref 21–31)
COCAINE UR QL: NEGATIVE
CREAT SERPL-MCNC: 1.09 MG/DL (ref 0.6–1.2)
CREAT SERPL-MCNC: 1.15 MG/DL (ref 0.6–1.2)
ERYTHROCYTE [DISTWIDTH] IN BLOOD BY AUTOMATED COUNT: 14.5 % (ref 11.5–14.5)
GFR SERPL CREATININE-BSD FRML MDRD: 61 ML/MIN/1.73SQ M
GFR SERPL CREATININE-BSD FRML MDRD: 65 ML/MIN/1.73SQ M
GLUCOSE SERPL-MCNC: 437 MG/DL (ref 65–140)
GLUCOSE SERPL-MCNC: 473 MG/DL (ref 65–140)
GLUCOSE SERPL-MCNC: 493 MG/DL (ref 65–99)
GLUCOSE SERPL-MCNC: 501 MG/DL (ref 65–99)
HCG SERPL QL: NEGATIVE
HCO3 BLDV-SCNC: 3.3 MMOL/L (ref 24–30)
HCO3 BLDV-SCNC: 4.4 MMOL/L (ref 24–30)
HCT VFR BLD AUTO: 44.6 % (ref 42–47)
HGB BLD-MCNC: 14 G/DL (ref 12–16)
LYMPHOCYTES # BLD AUTO: 0.83 THOUSAND/UL (ref 0.6–4.47)
LYMPHOCYTES # BLD AUTO: 13 % (ref 20–51)
MAGNESIUM SERPL-MCNC: 2 MG/DL (ref 1.9–2.7)
MCH RBC QN AUTO: 30 PG (ref 26–34)
MCHC RBC AUTO-ENTMCNC: 31.3 G/DL (ref 31–37)
MCV RBC AUTO: 96 FL (ref 81–99)
METHADONE UR QL: NEGATIVE
MONOCYTES # BLD AUTO: 0.77 THOUSAND/UL (ref 0–1.22)
MONOCYTES NFR BLD AUTO: 12 % (ref 4–12)
NEUTS BAND NFR BLD MANUAL: 6 % (ref 0–8)
NEUTS SEG # BLD: 4.8 THOUSAND/UL (ref 1.81–6.82)
NEUTS SEG NFR BLD AUTO: 69 % (ref 42–75)
O2 CT BLDV-SCNC: 16.7 ML/DL
O2 CT BLDV-SCNC: 16.9 ML/DL
OPIATES UR QL SCN: NEGATIVE
P AXIS: 74 DEGREES
PCO2 BLDV: 21.7 MM HG
PCO2 BLDV: 26.2 MM HG
PCP UR QL: NEGATIVE
PH BLDV: 6.81 [PH] (ref 7.3–7.4)
PH BLDV: 6.86 [PH] (ref 7.3–7.4)
PHOSPHATE SERPL-MCNC: 3.7 MG/DL (ref 3–5.5)
PLATELET # BLD AUTO: 316 THOUSANDS/UL (ref 149–390)
PLATELET BLD QL SMEAR: ADEQUATE
PMV BLD AUTO: 8.5 FL (ref 8.6–11.7)
PO2 BLDV: 122 MM HG (ref 35–45)
PO2 BLDV: 77 MM HG (ref 35–45)
POTASSIUM SERPL-SCNC: 5.2 MMOL/L (ref 3.5–5.5)
POTASSIUM SERPL-SCNC: 5.5 MMOL/L (ref 3.5–5.5)
PR INTERVAL: 148 MS
QRS AXIS: 91 DEGREES
QRSD INTERVAL: 88 MS
QT INTERVAL: 334 MS
QTC INTERVAL: 464 MS
RBC # BLD AUTO: 4.65 MILLION/UL (ref 3.9–5.2)
RBC MORPH BLD: NORMAL
SARS-COV-2 RNA RESP QL NAA+PROBE: NEGATIVE
SODIUM SERPL-SCNC: 124 MMOL/L (ref 134–143)
SODIUM SERPL-SCNC: 125 MMOL/L (ref 134–143)
T WAVE AXIS: -5 DEGREES
THC UR QL: NEGATIVE
TOTAL CELLS COUNTED SPEC: 100
TROPONIN I SERPL-MCNC: <0.03 NG/ML
VENTRICULAR RATE: 116 BPM
WBC # BLD AUTO: 6.4 THOUSAND/UL (ref 4.8–10.8)

## 2020-06-23 PROCEDURE — 82010 KETONE BODYS QUAN: CPT | Performed by: EMERGENCY MEDICINE

## 2020-06-23 PROCEDURE — 82948 REAGENT STRIP/BLOOD GLUCOSE: CPT

## 2020-06-23 PROCEDURE — 87635 SARS-COV-2 COVID-19 AMP PRB: CPT | Performed by: EMERGENCY MEDICINE

## 2020-06-23 PROCEDURE — 93010 ELECTROCARDIOGRAM REPORT: CPT | Performed by: INTERNAL MEDICINE

## 2020-06-23 PROCEDURE — 96365 THER/PROPH/DIAG IV INF INIT: CPT

## 2020-06-23 PROCEDURE — 96361 HYDRATE IV INFUSION ADD-ON: CPT

## 2020-06-23 PROCEDURE — 96366 THER/PROPH/DIAG IV INF ADDON: CPT

## 2020-06-23 PROCEDURE — 84484 ASSAY OF TROPONIN QUANT: CPT | Performed by: EMERGENCY MEDICINE

## 2020-06-23 PROCEDURE — 80307 DRUG TEST PRSMV CHEM ANLYZR: CPT | Performed by: EMERGENCY MEDICINE

## 2020-06-23 PROCEDURE — 99285 EMERGENCY DEPT VISIT HI MDM: CPT

## 2020-06-23 PROCEDURE — 84703 CHORIONIC GONADOTROPIN ASSAY: CPT | Performed by: EMERGENCY MEDICINE

## 2020-06-23 PROCEDURE — 82550 ASSAY OF CK (CPK): CPT | Performed by: EMERGENCY MEDICINE

## 2020-06-23 PROCEDURE — 84100 ASSAY OF PHOSPHORUS: CPT | Performed by: EMERGENCY MEDICINE

## 2020-06-23 PROCEDURE — 85027 COMPLETE CBC AUTOMATED: CPT | Performed by: EMERGENCY MEDICINE

## 2020-06-23 PROCEDURE — 93005 ELECTROCARDIOGRAM TRACING: CPT

## 2020-06-23 PROCEDURE — 80048 BASIC METABOLIC PNL TOTAL CA: CPT | Performed by: EMERGENCY MEDICINE

## 2020-06-23 PROCEDURE — 96375 TX/PRO/DX INJ NEW DRUG ADDON: CPT

## 2020-06-23 PROCEDURE — 36415 COLL VENOUS BLD VENIPUNCTURE: CPT | Performed by: EMERGENCY MEDICINE

## 2020-06-23 PROCEDURE — 99284 EMERGENCY DEPT VISIT MOD MDM: CPT | Performed by: EMERGENCY MEDICINE

## 2020-06-23 PROCEDURE — 82805 BLOOD GASES W/O2 SATURATION: CPT | Performed by: EMERGENCY MEDICINE

## 2020-06-23 PROCEDURE — 96360 HYDRATION IV INFUSION INIT: CPT

## 2020-06-23 PROCEDURE — 83735 ASSAY OF MAGNESIUM: CPT | Performed by: EMERGENCY MEDICINE

## 2020-06-23 PROCEDURE — 85007 BL SMEAR W/DIFF WBC COUNT: CPT | Performed by: EMERGENCY MEDICINE

## 2020-06-23 PROCEDURE — 71045 X-RAY EXAM CHEST 1 VIEW: CPT

## 2020-06-23 RX ORDER — DIAZEPAM 5 MG/1
5 TABLET ORAL ONCE
Status: COMPLETED | OUTPATIENT
Start: 2020-06-23 | End: 2020-06-23

## 2020-06-23 RX ORDER — SODIUM CHLORIDE 9 MG/ML
3 INJECTION INTRAVENOUS
Status: DISCONTINUED | OUTPATIENT
Start: 2020-06-23 | End: 2020-06-24 | Stop reason: HOSPADM

## 2020-06-23 RX ORDER — HYDROMORPHONE HCL/PF 1 MG/ML
0.5 SYRINGE (ML) INJECTION ONCE
Status: COMPLETED | OUTPATIENT
Start: 2020-06-23 | End: 2020-06-23

## 2020-06-23 RX ORDER — SODIUM CHLORIDE 9 MG/ML
125 INJECTION, SOLUTION INTRAVENOUS CONTINUOUS
Status: DISCONTINUED | OUTPATIENT
Start: 2020-06-23 | End: 2020-06-24 | Stop reason: HOSPADM

## 2020-06-23 RX ADMIN — SODIUM BICARBONATE 125 ML/HR: 84 INJECTION, SOLUTION INTRAVENOUS at 23:22

## 2020-06-23 RX ADMIN — SODIUM CHLORIDE 1000 ML: 0.9 INJECTION, SOLUTION INTRAVENOUS at 23:10

## 2020-06-23 RX ADMIN — SODIUM CHLORIDE 7.5 UNITS/HR: 9 INJECTION, SOLUTION INTRAVENOUS at 22:29

## 2020-06-23 RX ADMIN — HYDROMORPHONE HYDROCHLORIDE 0.5 MG: 1 INJECTION, SOLUTION INTRAMUSCULAR; INTRAVENOUS; SUBCUTANEOUS at 22:25

## 2020-06-23 RX ADMIN — DIAZEPAM 5 MG: 5 TABLET ORAL at 20:54

## 2020-06-23 RX ADMIN — SODIUM CHLORIDE 1000 ML: 0.9 INJECTION, SOLUTION INTRAVENOUS at 21:35

## 2020-06-24 ENCOUNTER — HOSPITAL ENCOUNTER (INPATIENT)
Facility: HOSPITAL | Age: 40
LOS: 3 days | Discharge: HOME | DRG: 639 | End: 2020-06-27
Attending: INTERNAL MEDICINE | Admitting: INTERNAL MEDICINE
Payer: COMMERCIAL

## 2020-06-24 VITALS
WEIGHT: 165.79 LBS | TEMPERATURE: 98.5 F | DIASTOLIC BLOOD PRESSURE: 74 MMHG | HEART RATE: 115 BPM | SYSTOLIC BLOOD PRESSURE: 129 MMHG | RESPIRATION RATE: 22 BRPM | BODY MASS INDEX: 26.76 KG/M2 | OXYGEN SATURATION: 100 %

## 2020-06-24 DIAGNOSIS — E11.10 DKA (DIABETIC KETOACIDOSES): Primary | ICD-10-CM

## 2020-06-24 DIAGNOSIS — F32.A DEPRESSION: ICD-10-CM

## 2020-06-24 LAB
ANION GAP SERPL CALCULATED.3IONS-SCNC: 11 MMOL/L (ref 4–13)
ANION GAP SERPL CALCULATED.3IONS-SCNC: 12 MMOL/L (ref 4–13)
ANION GAP SERPL CALCULATED.3IONS-SCNC: 16 MMOL/L (ref 4–13)
ANION GAP SERPL CALCULATED.3IONS-SCNC: 17 MMOL/L (ref 4–13)
ANION GAP SERPL CALCULATED.3IONS-SCNC: 19 MMOL/L (ref 4–13)
ANION GAP SERPL CALCULATED.3IONS-SCNC: 22 MMOL/L (ref 4–13)
ANION GAP SERPL CALCULATED.3IONS-SCNC: 27 MMOL/L (ref 4–13)
ANION GAP SERPL CALCULATED.3IONS-SCNC: 7 MMOL/L (ref 4–13)
APAP SERPL-MCNC: 12.8 UG/ML (ref 10–20)
BASE EX.OXY STD BLDV CALC-SCNC: 64.3 % (ref 60–80)
BASE EXCESS BLDV CALC-SCNC: -25.6 MMOL/L
BUN SERPL-MCNC: 10 MG/DL (ref 5–25)
BUN SERPL-MCNC: 12 MG/DL (ref 5–25)
BUN SERPL-MCNC: 12 MG/DL (ref 5–25)
BUN SERPL-MCNC: 13 MG/DL (ref 5–25)
BUN SERPL-MCNC: 14 MG/DL (ref 5–25)
BUN SERPL-MCNC: 15 MG/DL (ref 5–25)
BUN SERPL-MCNC: 16 MG/DL (ref 5–25)
BUN SERPL-MCNC: 9 MG/DL (ref 5–25)
CA-I BLD-SCNC: 1.15 MMOL/L (ref 1.12–1.32)
CALCIUM SERPL-MCNC: 6.9 MG/DL (ref 8.3–10.1)
CALCIUM SERPL-MCNC: 7 MG/DL (ref 8.3–10.1)
CALCIUM SERPL-MCNC: 7 MG/DL (ref 8.3–10.1)
CALCIUM SERPL-MCNC: 7.1 MG/DL (ref 8.3–10.1)
CALCIUM SERPL-MCNC: 7.3 MG/DL (ref 8.3–10.1)
CALCIUM SERPL-MCNC: 7.6 MG/DL (ref 8.3–10.1)
CALCIUM SERPL-MCNC: 7.9 MG/DL (ref 8.3–10.1)
CALCIUM SERPL-MCNC: 8.1 MG/DL (ref 8.3–10.1)
CHLORIDE SERPL-SCNC: 100 MMOL/L (ref 100–108)
CHLORIDE SERPL-SCNC: 100 MMOL/L (ref 100–108)
CHLORIDE SERPL-SCNC: 102 MMOL/L (ref 100–108)
CHLORIDE SERPL-SCNC: 103 MMOL/L (ref 100–108)
CHLORIDE SERPL-SCNC: 103 MMOL/L (ref 100–108)
CHLORIDE SERPL-SCNC: 104 MMOL/L (ref 100–108)
CHLORIDE SERPL-SCNC: 104 MMOL/L (ref 100–108)
CHLORIDE SERPL-SCNC: 107 MMOL/L (ref 100–108)
CO2 SERPL-SCNC: 11 MMOL/L (ref 21–32)
CO2 SERPL-SCNC: 15 MMOL/L (ref 21–32)
CO2 SERPL-SCNC: 15 MMOL/L (ref 21–32)
CO2 SERPL-SCNC: 18 MMOL/L (ref 21–32)
CO2 SERPL-SCNC: 21 MMOL/L (ref 21–32)
CO2 SERPL-SCNC: 23 MMOL/L (ref 21–32)
CO2 SERPL-SCNC: 26 MMOL/L (ref 21–32)
CO2 SERPL-SCNC: 7 MMOL/L (ref 21–32)
CREAT SERPL-MCNC: 1.09 MG/DL (ref 0.6–1.3)
CREAT SERPL-MCNC: 1.17 MG/DL (ref 0.6–1.3)
CREAT SERPL-MCNC: 1.18 MG/DL (ref 0.6–1.3)
CREAT SERPL-MCNC: 1.21 MG/DL (ref 0.6–1.3)
CREAT SERPL-MCNC: 1.23 MG/DL (ref 0.6–1.3)
CREAT SERPL-MCNC: 1.23 MG/DL (ref 0.6–1.3)
CREAT SERPL-MCNC: 1.26 MG/DL (ref 0.6–1.3)
CREAT SERPL-MCNC: 1.29 MG/DL (ref 0.6–1.3)
ETHANOL SERPL-MCNC: 4 MG/DL (ref 0–3)
GFR SERPL CREATININE-BSD FRML MDRD: 53 ML/MIN/1.73SQ M
GFR SERPL CREATININE-BSD FRML MDRD: 54 ML/MIN/1.73SQ M
GFR SERPL CREATININE-BSD FRML MDRD: 56 ML/MIN/1.73SQ M
GFR SERPL CREATININE-BSD FRML MDRD: 56 ML/MIN/1.73SQ M
GFR SERPL CREATININE-BSD FRML MDRD: 57 ML/MIN/1.73SQ M
GFR SERPL CREATININE-BSD FRML MDRD: 59 ML/MIN/1.73SQ M
GFR SERPL CREATININE-BSD FRML MDRD: 59 ML/MIN/1.73SQ M
GFR SERPL CREATININE-BSD FRML MDRD: 65 ML/MIN/1.73SQ M
GLUCOSE SERPL-MCNC: 113 MG/DL (ref 65–140)
GLUCOSE SERPL-MCNC: 123 MG/DL (ref 65–140)
GLUCOSE SERPL-MCNC: 146 MG/DL (ref 65–140)
GLUCOSE SERPL-MCNC: 153 MG/DL (ref 65–140)
GLUCOSE SERPL-MCNC: 158 MG/DL (ref 65–140)
GLUCOSE SERPL-MCNC: 161 MG/DL (ref 65–140)
GLUCOSE SERPL-MCNC: 162 MG/DL (ref 65–140)
GLUCOSE SERPL-MCNC: 169 MG/DL (ref 65–140)
GLUCOSE SERPL-MCNC: 177 MG/DL (ref 65–140)
GLUCOSE SERPL-MCNC: 185 MG/DL (ref 65–140)
GLUCOSE SERPL-MCNC: 187 MG/DL (ref 65–140)
GLUCOSE SERPL-MCNC: 193 MG/DL (ref 65–140)
GLUCOSE SERPL-MCNC: 208 MG/DL (ref 65–140)
GLUCOSE SERPL-MCNC: 239 MG/DL (ref 65–140)
GLUCOSE SERPL-MCNC: 241 MG/DL (ref 65–140)
GLUCOSE SERPL-MCNC: 241 MG/DL (ref 65–140)
GLUCOSE SERPL-MCNC: 267 MG/DL (ref 65–140)
GLUCOSE SERPL-MCNC: 272 MG/DL (ref 65–140)
GLUCOSE SERPL-MCNC: 289 MG/DL (ref 65–140)
GLUCOSE SERPL-MCNC: 312 MG/DL (ref 65–140)
GLUCOSE SERPL-MCNC: 329 MG/DL (ref 65–140)
GLUCOSE SERPL-MCNC: 332 MG/DL (ref 65–140)
GLUCOSE SERPL-MCNC: 332 MG/DL (ref 65–140)
GLUCOSE SERPL-MCNC: 381 MG/DL (ref 65–140)
GLUCOSE SERPL-MCNC: 59 MG/DL (ref 65–140)
GLUCOSE SERPL-MCNC: 59 MG/DL (ref 65–140)
GLUCOSE SERPL-MCNC: 71 MG/DL (ref 65–140)
GLUCOSE SERPL-MCNC: 78 MG/DL (ref 65–140)
GLUCOSE SERPL-MCNC: 87 MG/DL (ref 65–140)
GLUCOSE SERPL-MCNC: 90 MG/DL (ref 65–140)
GLUCOSE SERPL-MCNC: 94 MG/DL (ref 65–140)
GLUCOSE SERPL-MCNC: 97 MG/DL (ref 65–140)
HCO3 BLDV-SCNC: 5.7 MMOL/L (ref 24–30)
MAGNESIUM SERPL-MCNC: 1.7 MG/DL (ref 1.6–2.6)
MAGNESIUM SERPL-MCNC: 1.7 MG/DL (ref 1.6–2.6)
MAGNESIUM SERPL-MCNC: 1.9 MG/DL (ref 1.6–2.6)
MAGNESIUM SERPL-MCNC: 1.9 MG/DL (ref 1.6–2.6)
MAGNESIUM SERPL-MCNC: 2.1 MG/DL (ref 1.6–2.6)
MAGNESIUM SERPL-MCNC: 2.1 MG/DL (ref 1.6–2.6)
MAGNESIUM SERPL-MCNC: 2.2 MG/DL (ref 1.6–2.6)
MAGNESIUM SERPL-MCNC: 2.3 MG/DL (ref 1.6–2.6)
O2 CT BLDV-SCNC: 12.3 ML/DL
OSMOLALITY UR/SERPL-RTO: 297 MMOL/KG (ref 282–298)
OSMOLALITY UR: 532 MMOL/KG
PCO2 BLDV: 27.1 MM HG (ref 42–50)
PH BLDV: 6.94 [PH] (ref 7.3–7.4)
PHOSPHATE SERPL-MCNC: 1.2 MG/DL (ref 2.7–4.5)
PHOSPHATE SERPL-MCNC: 1.4 MG/DL (ref 2.7–4.5)
PHOSPHATE SERPL-MCNC: 1.5 MG/DL (ref 2.7–4.5)
PHOSPHATE SERPL-MCNC: 1.7 MG/DL (ref 2.7–4.5)
PHOSPHATE SERPL-MCNC: 1.9 MG/DL (ref 2.7–4.5)
PHOSPHATE SERPL-MCNC: 2.1 MG/DL (ref 2.7–4.5)
PHOSPHATE SERPL-MCNC: 2.4 MG/DL (ref 2.7–4.5)
PHOSPHATE SERPL-MCNC: 2.9 MG/DL (ref 2.7–4.5)
PO2 BLDV: 31.5 MM HG (ref 35–45)
POTASSIUM SERPL-SCNC: 2.9 MMOL/L (ref 3.5–5.3)
POTASSIUM SERPL-SCNC: 2.9 MMOL/L (ref 3.5–5.3)
POTASSIUM SERPL-SCNC: 3 MMOL/L (ref 3.5–5.3)
POTASSIUM SERPL-SCNC: 3.3 MMOL/L (ref 3.5–5.3)
POTASSIUM SERPL-SCNC: 3.4 MMOL/L (ref 3.5–5.3)
POTASSIUM SERPL-SCNC: 3.6 MMOL/L (ref 3.5–5.3)
POTASSIUM SERPL-SCNC: 3.7 MMOL/L (ref 3.5–5.3)
POTASSIUM SERPL-SCNC: 4.5 MMOL/L (ref 3.5–5.3)
SALICYLATES SERPL-MCNC: <3 MG/DL (ref 3–20)
SODIUM SERPL-SCNC: 134 MMOL/L (ref 136–145)
SODIUM SERPL-SCNC: 134 MMOL/L (ref 136–145)
SODIUM SERPL-SCNC: 136 MMOL/L (ref 136–145)
SODIUM SERPL-SCNC: 138 MMOL/L (ref 136–145)
SODIUM SERPL-SCNC: 140 MMOL/L (ref 136–145)

## 2020-06-24 PROCEDURE — 83735 ASSAY OF MAGNESIUM: CPT | Performed by: NURSE PRACTITIONER

## 2020-06-24 PROCEDURE — 82805 BLOOD GASES W/O2 SATURATION: CPT | Performed by: NURSE PRACTITIONER

## 2020-06-24 PROCEDURE — 83516 IMMUNOASSAY NONANTIBODY: CPT | Performed by: INTERNAL MEDICINE

## 2020-06-24 PROCEDURE — 84100 ASSAY OF PHOSPHORUS: CPT | Performed by: NURSE PRACTITIONER

## 2020-06-24 PROCEDURE — 82948 REAGENT STRIP/BLOOD GLUCOSE: CPT

## 2020-06-24 PROCEDURE — 83930 ASSAY OF BLOOD OSMOLALITY: CPT | Performed by: NURSE PRACTITIONER

## 2020-06-24 PROCEDURE — 99223 1ST HOSP IP/OBS HIGH 75: CPT | Performed by: INTERNAL MEDICINE

## 2020-06-24 PROCEDURE — 96361 HYDRATE IV INFUSION ADD-ON: CPT

## 2020-06-24 PROCEDURE — 83935 ASSAY OF URINE OSMOLALITY: CPT | Performed by: NURSE PRACTITIONER

## 2020-06-24 PROCEDURE — 96366 THER/PROPH/DIAG IV INF ADDON: CPT

## 2020-06-24 PROCEDURE — C9113 INJ PANTOPRAZOLE SODIUM, VIA: HCPCS | Performed by: NURSE PRACTITIONER

## 2020-06-24 PROCEDURE — 82330 ASSAY OF CALCIUM: CPT | Performed by: NURSE PRACTITIONER

## 2020-06-24 PROCEDURE — 80329 ANALGESICS NON-OPIOID 1 OR 2: CPT | Performed by: NURSE PRACTITIONER

## 2020-06-24 PROCEDURE — 80307 DRUG TEST PRSMV CHEM ANLYZR: CPT | Performed by: NURSE PRACTITIONER

## 2020-06-24 PROCEDURE — 80320 DRUG SCREEN QUANTALCOHOLS: CPT | Performed by: NURSE PRACTITIONER

## 2020-06-24 PROCEDURE — 80048 BASIC METABOLIC PNL TOTAL CA: CPT | Performed by: NURSE PRACTITIONER

## 2020-06-24 PROCEDURE — G0480 DRUG TEST DEF 1-7 CLASSES: HCPCS | Performed by: NURSE PRACTITIONER

## 2020-06-24 PROCEDURE — 86255 FLUORESCENT ANTIBODY SCREEN: CPT | Performed by: INTERNAL MEDICINE

## 2020-06-24 PROCEDURE — 82784 ASSAY IGA/IGD/IGG/IGM EACH: CPT | Performed by: INTERNAL MEDICINE

## 2020-06-24 RX ORDER — DEXTROSE MONOHYDRATE 25 G/50ML
25 INJECTION, SOLUTION INTRAVENOUS ONCE
Status: COMPLETED | OUTPATIENT
Start: 2020-06-24 | End: 2020-06-24

## 2020-06-24 RX ORDER — ZOLPIDEM TARTRATE 5 MG/1
10 TABLET ORAL
Status: DISCONTINUED | OUTPATIENT
Start: 2020-06-24 | End: 2020-06-25

## 2020-06-24 RX ORDER — BUSPIRONE HYDROCHLORIDE 10 MG/1
10 TABLET ORAL 3 TIMES DAILY
Status: DISCONTINUED | OUTPATIENT
Start: 2020-06-24 | End: 2020-06-27 | Stop reason: HOSPADM

## 2020-06-24 RX ORDER — LEVOTHYROXINE SODIUM 0.07 MG/1
150 TABLET ORAL
Status: DISCONTINUED | OUTPATIENT
Start: 2020-06-24 | End: 2020-06-27 | Stop reason: HOSPADM

## 2020-06-24 RX ORDER — SODIUM CHLORIDE 9 MG/ML
250 INJECTION, SOLUTION INTRAVENOUS CONTINUOUS
Status: DISPENSED | OUTPATIENT
Start: 2020-06-24 | End: 2020-06-24

## 2020-06-24 RX ORDER — ERGOCALCIFEROL 1.25 MG/1
50000 CAPSULE ORAL WEEKLY
Status: DISCONTINUED | OUTPATIENT
Start: 2020-06-24 | End: 2020-06-27 | Stop reason: HOSPADM

## 2020-06-24 RX ORDER — ONDANSETRON 2 MG/ML
4 INJECTION INTRAMUSCULAR; INTRAVENOUS EVERY 4 HOURS PRN
Status: DISCONTINUED | OUTPATIENT
Start: 2020-06-24 | End: 2020-06-27 | Stop reason: HOSPADM

## 2020-06-24 RX ORDER — POTASSIUM CHLORIDE 20 MEQ/1
40 TABLET, EXTENDED RELEASE ORAL ONCE
Status: COMPLETED | OUTPATIENT
Start: 2020-06-24 | End: 2020-06-24

## 2020-06-24 RX ORDER — SODIUM CHLORIDE 9 MG/ML
2000 INJECTION, SOLUTION INTRAVENOUS CONTINUOUS
Status: DISPENSED | OUTPATIENT
Start: 2020-06-24 | End: 2020-06-24

## 2020-06-24 RX ORDER — DEXTROSE AND SODIUM CHLORIDE 5; .9 G/100ML; G/100ML
250 INJECTION, SOLUTION INTRAVENOUS CONTINUOUS
Status: DISCONTINUED | OUTPATIENT
Start: 2020-06-24 | End: 2020-06-25

## 2020-06-24 RX ORDER — ACETAMINOPHEN 325 MG/1
650 TABLET ORAL EVERY 6 HOURS PRN
Status: DISCONTINUED | OUTPATIENT
Start: 2020-06-24 | End: 2020-06-27 | Stop reason: HOSPADM

## 2020-06-24 RX ORDER — PANTOPRAZOLE SODIUM 40 MG/1
40 INJECTION, POWDER, FOR SOLUTION INTRAVENOUS
Status: DISCONTINUED | OUTPATIENT
Start: 2020-06-24 | End: 2020-06-25

## 2020-06-24 RX ORDER — HEPARIN SODIUM 5000 [USP'U]/ML
5000 INJECTION, SOLUTION INTRAVENOUS; SUBCUTANEOUS EVERY 8 HOURS SCHEDULED
Status: DISCONTINUED | OUTPATIENT
Start: 2020-06-24 | End: 2020-06-27 | Stop reason: HOSPADM

## 2020-06-24 RX ORDER — POTASSIUM CHLORIDE 14.9 MG/ML
20 INJECTION INTRAVENOUS
Status: COMPLETED | OUTPATIENT
Start: 2020-06-24 | End: 2020-06-24

## 2020-06-24 RX ORDER — MAGNESIUM SULFATE HEPTAHYDRATE 40 MG/ML
2 INJECTION, SOLUTION INTRAVENOUS ONCE
Status: COMPLETED | OUTPATIENT
Start: 2020-06-24 | End: 2020-06-24

## 2020-06-24 RX ORDER — DEXTROSE MONOHYDRATE 25 G/50ML
INJECTION, SOLUTION INTRAVENOUS
Status: COMPLETED
Start: 2020-06-24 | End: 2020-06-24

## 2020-06-24 RX ORDER — SODIUM CHLORIDE 9 MG/ML
500 INJECTION, SOLUTION INTRAVENOUS CONTINUOUS
Status: DISPENSED | OUTPATIENT
Start: 2020-06-24 | End: 2020-06-24

## 2020-06-24 RX ORDER — DULOXETIN HYDROCHLORIDE 30 MG/1
60 CAPSULE, DELAYED RELEASE ORAL DAILY
Status: DISCONTINUED | OUTPATIENT
Start: 2020-06-24 | End: 2020-06-25

## 2020-06-24 RX ORDER — POTASSIUM CHLORIDE 14.9 MG/ML
20 INJECTION INTRAVENOUS
Status: DISCONTINUED | OUTPATIENT
Start: 2020-06-24 | End: 2020-06-24 | Stop reason: SDUPTHER

## 2020-06-24 RX ORDER — POTASSIUM CHLORIDE 20 MEQ/1
20 TABLET, EXTENDED RELEASE ORAL ONCE
Status: COMPLETED | OUTPATIENT
Start: 2020-06-24 | End: 2020-06-24

## 2020-06-24 RX ORDER — POTASSIUM CHLORIDE 14.9 MG/ML
20 INJECTION INTRAVENOUS ONCE
Status: COMPLETED | OUTPATIENT
Start: 2020-06-24 | End: 2020-06-24

## 2020-06-24 RX ADMIN — DEXTROSE AND SODIUM CHLORIDE 250 ML/HR: 5; .9 INJECTION, SOLUTION INTRAVENOUS at 05:00

## 2020-06-24 RX ADMIN — ONDANSETRON 4 MG: 2 INJECTION INTRAMUSCULAR; INTRAVENOUS at 11:46

## 2020-06-24 RX ADMIN — LEVOTHYROXINE SODIUM 150 MCG: 75 TABLET ORAL at 05:47

## 2020-06-24 RX ADMIN — SODIUM CHLORIDE 1000 ML: 0.9 INJECTION, SOLUTION INTRAVENOUS at 00:41

## 2020-06-24 RX ADMIN — ACETAMINOPHEN 650 MG: 325 TABLET ORAL at 14:45

## 2020-06-24 RX ADMIN — BUSPIRONE HYDROCHLORIDE 10 MG: 10 TABLET ORAL at 16:27

## 2020-06-24 RX ADMIN — POTASSIUM CHLORIDE 20 MEQ: 14.9 INJECTION, SOLUTION INTRAVENOUS at 20:00

## 2020-06-24 RX ADMIN — PANTOPRAZOLE SODIUM 40 MG: 40 INJECTION, POWDER, FOR SOLUTION INTRAVENOUS at 14:46

## 2020-06-24 RX ADMIN — POTASSIUM CHLORIDE 40 MEQ: 1500 TABLET, EXTENDED RELEASE ORAL at 21:35

## 2020-06-24 RX ADMIN — MAGNESIUM SULFATE HEPTAHYDRATE 2 G: 40 INJECTION, SOLUTION INTRAVENOUS at 16:26

## 2020-06-24 RX ADMIN — DEXTROSE AND SODIUM CHLORIDE 250 ML/HR: 5; .9 INJECTION, SOLUTION INTRAVENOUS at 12:12

## 2020-06-24 RX ADMIN — HEPARIN SODIUM 5000 UNITS: 5000 INJECTION INTRAVENOUS; SUBCUTANEOUS at 21:52

## 2020-06-24 RX ADMIN — POTASSIUM PHOSPHATE, MONOBASIC AND POTASSIUM PHOSPHATE, DIBASIC 30 MMOL: 224; 236 INJECTION, SOLUTION, CONCENTRATE INTRAVENOUS at 09:44

## 2020-06-24 RX ADMIN — DEXTROSE AND SODIUM CHLORIDE 250 ML/HR: 5; .9 INJECTION, SOLUTION INTRAVENOUS at 20:50

## 2020-06-24 RX ADMIN — POTASSIUM CHLORIDE 20 MEQ: 14.9 INJECTION, SOLUTION INTRAVENOUS at 16:43

## 2020-06-24 RX ADMIN — DEXTROSE MONOHYDRATE 25 ML: 25 INJECTION, SOLUTION INTRAVENOUS at 07:45

## 2020-06-24 RX ADMIN — ZOLPIDEM TARTRATE 10 MG: 5 TABLET, COATED ORAL at 21:52

## 2020-06-24 RX ADMIN — HEPARIN SODIUM 5000 UNITS: 5000 INJECTION INTRAVENOUS; SUBCUTANEOUS at 14:46

## 2020-06-24 RX ADMIN — BUSPIRONE HYDROCHLORIDE 10 MG: 10 TABLET ORAL at 21:51

## 2020-06-24 RX ADMIN — POTASSIUM CHLORIDE 20 MEQ: 14.9 INJECTION, SOLUTION INTRAVENOUS at 05:45

## 2020-06-24 RX ADMIN — SODIUM BICARBONATE 200 ML/HR: 84 INJECTION, SOLUTION INTRAVENOUS at 15:04

## 2020-06-24 RX ADMIN — POTASSIUM CHLORIDE 20 MEQ: 14.9 INJECTION, SOLUTION INTRAVENOUS at 18:18

## 2020-06-24 RX ADMIN — SODIUM BICARBONATE 200 ML/HR: 84 INJECTION, SOLUTION INTRAVENOUS at 08:30

## 2020-06-24 RX ADMIN — SODIUM CHLORIDE 30 UNITS/HR: 9 INJECTION, SOLUTION INTRAVENOUS at 03:25

## 2020-06-24 RX ADMIN — DEXTROSE AND SODIUM CHLORIDE 250 ML/HR: 5; .9 INJECTION, SOLUTION INTRAVENOUS at 16:24

## 2020-06-24 RX ADMIN — POTASSIUM PHOSPHATE, MONOBASIC AND POTASSIUM PHOSPHATE, DIBASIC 21 MMOL: 224; 236 INJECTION, SOLUTION, CONCENTRATE INTRAVENOUS at 19:06

## 2020-06-24 RX ADMIN — MAGNESIUM SULFATE HEPTAHYDRATE 2 G: 40 INJECTION, SOLUTION INTRAVENOUS at 03:15

## 2020-06-24 RX ADMIN — SODIUM CHLORIDE 15 UNITS/HR: 9 INJECTION, SOLUTION INTRAVENOUS at 16:24

## 2020-06-24 RX ADMIN — ONDANSETRON 4 MG: 2 INJECTION INTRAMUSCULAR; INTRAVENOUS at 20:27

## 2020-06-24 RX ADMIN — SODIUM CHLORIDE 30 UNITS/HR: 9 INJECTION, SOLUTION INTRAVENOUS at 06:15

## 2020-06-24 NOTE — H&P
H&P- Jun Diana 1980, 47 y o  female MRN: [id number]    Unit/Bed#: ICU 10 Encounter: [id number]    Primary Care Provider: JAROCHO Lambert   Date and time admitted to hospital: 6/24/2020  1:48 AM        * DKA (diabetic ketoacidoses) [facility])  Assessment & Plan  Lab Results   Component Value Date    HGBA1C 10 9 (H) 05/20/2020       Recent Labs     06/23/20  2139 06/23/20  2347 06/24/20  0049 06/24/20  0153   POCGLU 473* 437* 381* 332*       Blood Sugar Average: Last 72 hrs:    · Based on the patients acidosis, she will be admitted to the ICU for evaluation and monitoring  This will require greater then a 2 midnight stay, therefore she will be placed in inpatient status  · Blood sugar 500's on admission  · Metabolic acidosis with anion gap  · Elevated beta- hydroxybutyrate   · IV fluids given at outside hospital  · Currently on bicarb gtt and insulin gtt  · Check BMP Q2H  · Will continue to treat per the DKA protocol      Hypothyroidism  Assessment & Plan  · Hx of Hashimoto's thyroiditis   · Continue home levothyroxine dose    Anxiety  Assessment & Plan  · Continue home Buspar      Depression  Assessment & Plan  · Continue duloxetine       -------------------------------------------------------------------------------------------------------------  Chief Complaint: generalized fatigue, body aches, and anxiety    History of Present Illness   HX and PE limited by:   Jun Diana is a 47 y o  female who presents with generalized fatigue, body aches, anxiety, and depression for 2-3 days  Patient also reports poor appetite, headache, vomiting, and chest pain  Pt states of being stressed lately due to her ex- committing suicide and stressed with taking care of her children  States she has help from her parents  Denies suicidal ideation  Denies cough, recent sick contacts, diarrhea, constipation, dysuria        History obtained from chart review and the patient   -------------------------------------------------------------------------------------------------------------  Dispo: Admit to Stepdown Level 1    Code Status: Level 1 - Full Code  --------------------------------------------------------------------------------------------------------------  Review of Systems    A 12-point, complete review of systems was reviewed and negative except as stated above     Physical Exam   Constitutional: She is oriented to person, place, and time  She appears well-developed and well-nourished  HENT:   Head: Normocephalic and atraumatic  Eyes: Pupils are equal, round, and reactive to light  Neck: Normal range of motion  Neck supple  Cardiovascular: Normal rate, regular rhythm, normal heart sounds and intact distal pulses  Pulmonary/Chest: Effort normal and breath sounds normal    Abdominal: Soft  Bowel sounds are normal    Musculoskeletal: Normal range of motion  Neurological: She is alert and oriented to person, place, and time  Skin: Skin is warm and dry  Capillary refill takes less than 2 seconds  There is pallor  Psychiatric: She has a normal mood and affect      --------------------------------------------------------------------------------------------------------------  Vitals:   Vitals:    06/24/20 0150 06/24/20 0200   BP: 110/70 113/68   BP Location: Right arm    Pulse: 102 102   Resp: 20 19   Temp: (!) 97 4 °F (36 3 °C)    TempSrc: Temporal    SpO2: 100% 100%     Temp  Min: 97 4 °F (36 3 °C)  Max: 98 5 °F (36 9 °C)        There is no height or weight on file to calculate BMI      Laboratory and Diagnostics:  Results from last 7 days   Lab Units 06/23/20  2114   WBC Thousand/uL 6 40   HEMOGLOBIN g/dL 14 0   HEMATOCRIT % 44 6   PLATELETS Thousands/uL 316   BANDS PCT % 6   MONO PCT % 12     Results from last 7 days   Lab Units 06/24/20  0203 06/23/20  2221 06/23/20  2114   SODIUM mmol/L 134* 125* 124*   POTASSIUM mmol/L 4 5 5 2 5 5   CHLORIDE mmol/L 100 95* 93*   CO2 mmol/L 7* <5* <5*   ANION GAP mmol/L 27*  --   --    BUN mg/dL 14 14 14   CREATININE mg/dL 1 26 1 09 1 15   CALCIUM mg/dL 7 9* 8 7 8 9   GLUCOSE RANDOM mg/dL 332* 493* 501*     Results from last 7 days   Lab Units 20  0203 20  2114   MAGNESIUM mg/dL 1 9 2 0   PHOSPHORUS mg/dL 2 9 3 7           Results from last 7 days   Lab Units 20  2114   TROPONIN I ng/mL <0 03         ABG:    VBG:  Results from last 7 days   Lab Units 20  0205   PH LANNY  6 940*   PCO2 LANNY mm Hg 27 1*   PO2 LANNY mm Hg 31 5*   HCO3 LANNY mmol/L 5 7*   BASE EXC LANNY mmol/L -25 6           Micro:        EKG:   Imaging: I have personally reviewed pertinent reports     and I have personally reviewed pertinent films in PACS      Historical Information   Past Medical History:   Diagnosis Date    Anemia     Arthritis     B12 deficiency     Cervical disc disorder     Cervical disc disorder     On gabapentin     [facility]     Disease of thyroid gland     hypothyroid    [facility] 2018    MILLICENT (iron deficiency anemia)     Iron deficiency anemia     Type [facility] diabetes ([facility] )     Type 1 diabetes mellitus, uncontrolled ([facility] )     Vitamin D deficiency      Past Surgical History:   Procedure Laterality Date    ABDOMINAL SURGERY      gastric bypass     SECTION      x2    CHOLECYSTECTOMY  2018    GASTRIC BYPASS  2007    INTRAUTERINE DEVICE INSERTION  2015    OTHER SURGICAL HISTORY      surgery for imperforate hymen/endometriosis/hydrometrocolpos    TUBAL LIGATION      WISDOM TOOTH EXTRACTION       Social History   Social History     Substance and Sexual Activity   Alcohol Use No    Comment: socially     Social History     Substance and Sexual Activity   Drug Use No     Social History     Tobacco Use   Smoking Status Never Smoker   Smokeless Tobacco Never Used     Exercise History:   Family History:   Family History   Problem Relation Age of Onset    Thyroid disease Mother     URIEL disease Mother     Hyperlipidemia Mother     Hypertension Mother     Osteoarthritis Mother     Thyroid disease unspecified Mother     Diabetes Father     Alcohol abuse Father     Diabetes type I Father     Thyroid disease Sister     Depression Sister     Thyroid disease unspecified Sister     Heart disease Maternal Grandmother     Hypertension Maternal Grandmother     Arthritis Maternal Grandmother     Diabetes type I Maternal Uncle     Diabetes type I Maternal Grandfather      I have reviewed this patient's family history and commented on sigificant items within the HPI      Medications:  Current Facility-Administered Medications   Medication Dose Route Frequency    busPIRone (BUSPAR) tablet 10 mg  10 mg Oral TID    dextrose 5 % and sodium chloride 0 9 % infusion  250 mL/hr Intravenous Continuous    DULoxetine (CYMBALTA) delayed release capsule 60 mg  60 mg Oral Daily    ergocalciferol (VITAMIN D2) capsule 50,000 Units  50,000 Units Oral Weekly    insulin regular (HumuLIN R,NovoLIN R) 1 Units/mL in sodium chloride 0 9 % 100 mL infusion  0 1-30 Units/hr Intravenous Continuous    levothyroxine tablet 150 mcg  150 mcg Oral Early Morning    magnesium sulfate 2 g/50 mL IVPB (premix) 2 g  2 g Intravenous Once    potassium chloride (K-DUR,KLOR-CON) CR tablet 20 mEq  20 mEq Oral Once    Or    potassium chloride 20 mEq IVPB (premix)  20 mEq Intravenous Once    sodium chloride 0 9 % infusion  500 mL/hr Intravenous Continuous    Followed by   Amira Taylor sodium chloride 0 9 % infusion  250 mL/hr Intravenous Continuous    zolpidem (AMBIEN) tablet 10 mg  10 mg Oral HS     Home medications:  Prior to Admission Medications   Prescriptions Last Dose Informant Patient Reported? Taking?    ALPRAZolam (XANAX) 0 5 mg tablet   No No   Sig: TAKE ONE TABLET BY MOUTH TWICE DAILY AS NEEDED FOR ANXIETY   DULoxetine (CYMBALTA) 60 mg delayed release capsule   No No   Sig: TAKE ONE CAPSULE BY MOUTH ONCE DAILY   Insulin Aspart FlexPen (NovoLOG FlexPen) 100 UNIT/ML SOPN   No No   Si unit for 6 grams of carbs and 1 unit for 30 mg/dl above 120    upto 50 units a day   Insulin Pen Needle (BD Pen Needle Deanna U/F) 32G X 4 MM MISC   No No   Sig: Use 4/day   Insulin Syringe-Needle U-100 (INSULIN SYRINGE 1CC/28G) 28G X 1/2" 1 ML MISC   Yes No   Si injections daily E10 65   busPIRone (BUSPAR) 10 mg tablet   No No   Sig: Take 1 tablet (10 mg total) by mouth 3 (three) times a day   ergocalciferol (VITAMIN D2) 50,000 units   No No   Sig: Take 1 capsule (50,000 Units total) by mouth once a week   glucagon (GLUCAGON EMERGENCY) 1 MG injection   Yes No   Sig: Inject 1 mg under the skin   glucose blood test strip   Yes No   Sig: Testing up to 8 times a day  E10 65   insulin glargine (LANTUS SOLOSTAR) 100 units/mL injection pen   No No   Sig: Inject 36 Units under the skin daily at bedtime   lidocaine (LMX) 4 % cream   No No   Sig: Apply topically as needed for mild pain   ondansetron (ZOFRAN) 4 mg tablet   No No   Sig: Take 1 tablet (4 mg total) by mouth every 4 (four) hours as needed for nausea or vomiting   thyroid (ARMOUR) 120 MG tablet   No No   Sig: Take 1 tablet (120 mg total) by mouth daily   zolpidem (AMBIEN) 10 mg tablet   No No   Sig: Take 1/2 tablet 1 hour before bedtime and 1/2 tablet in the middle of the night if you wake up  Facility-Administered Medications: None     Allergies:  No Known Allergies    ------------------------------------------------------------------------------------------------------------  Advance Directive and Living Will:      Power of :    POLST:    ------------------------------------------------------------------------------------------------------------  Anticipated Length of Stay is > 2 midnights    Care Time Delivered:         JAROCHO Hooks        Portions of the record may have been created with voice recognition software    Occasional wrong word or "sound a like" substitutions may have occurred due to the inherent limitations of voice recognition software    Read the chart carefully and recognize, using context, where substitutions have occurred

## 2020-06-24 NOTE — ASSESSMENT & PLAN NOTE
Lab Results   Component Value Date    HGBA1C 10 9 (H) 05/20/2020       Recent Labs     06/23/20  2139 06/23/20  2347 06/24/20  0049 06/24/20  0153   POCGLU 473* 437* 381* 332*       Blood Sugar Average: Last 72 hrs:    · Based on the patients acidosis, she will be admitted to the ICU for evaluation and monitoring  This will require greater then a 2 midnight stay, therefore she will be placed in inpatient status  · Blood sugar 500's on admission      · Metabolic acidosis with anion gap  · Elevated beta- hydroxybutyrate   · IV fluids given at outside hospital  · Currently on bicarb gtt and insulin gtt  · Check BMP Q2H  · Will continue to treat per the DKA protocol

## 2020-06-24 NOTE — NURSING NOTE
5- patient admitted to ICU 10  Admission navigator completed, patient has no belongings  VSS  Will continue to monitor

## 2020-06-24 NOTE — PLAN OF CARE
Problem: Potential for Falls  Goal: Patient will remain free of falls  Description  INTERVENTIONS:  - Assess patient frequently for physical needs  -  Identify cognitive and physical deficits and behaviors that affect risk of falls    -  [place] fall precautions as indicated by assessment   - Educate patient/family on patient safety including physical limitations  - Instruct patient to call for assistance with activity based on assessment  - Modify environment to reduce risk of injury  - Consider OT/PT consult to assist with strengthening/mobility  Outcome: Progressing     Problem: Prexisting or High Potential for Compromised Skin Integrity  Goal: Skin integrity is maintained or improved  Description  INTERVENTIONS:  - Identify patients at risk for skin breakdown  - Assess and monitor skin integrity  - Assess and monitor nutrition and hydration status  - Monitor labs   - Assess for incontinence   - Turn and reposition patient  - Assist with mobility/ambulation  - Relieve pressure over bony prominences  - Avoid friction and shearing  - Provide appropriate hygiene as needed including keeping skin clean and dry  - Evaluate need for skin moisturizer/barrier cream  - Collaborate with interdisciplinary team   - Patient/family teaching  - Consider wound care consult   Outcome: Progressing     Problem: PAIN - ADULT  Goal: Verbalizes/displays adequate comfort level or baseline comfort level  Description  Interventions:  - Encourage patient to monitor pain and request assistance  - Assess pain using appropriate pain scale  - Administer analgesics based on type and severity of pain and evaluate response  - Implement non-pharmacological measures as appropriate and evaluate response  - Consider cultural and social influences on pain and pain management  - Notify physician/advanced practitioner if interventions unsuccessful or patient reports new pain  Outcome: Progressing     Problem: INFECTION - ADULT  Goal: Absence or prevention of progression during hospitalization  Description  INTERVENTIONS:  - Assess and monitor for signs and symptoms of infection  - Monitor lab/diagnostic results  - Monitor all insertion sites, i e  indwelling lines, tubes, and drains  - Monitor endotracheal if appropriate and nasal secretions for changes in amount and color  - [place] appropriate cooling/warming therapies per order  - Administer medications as ordered  - Instruct and encourage patient and family to use good hand hygiene technique  - Identify and instruct in appropriate isolation precautions for identified infection/condition  Outcome: Progressing     Problem: SAFETY ADULT  Goal: Maintain or return to baseline ADL function  Description  INTERVENTIONS:  -  Assess patient's ability to carry out ADLs; assess patient's baseline for ADL function and identify physical deficits which impact ability to perform ADLs (bathing, care of mouth/teeth, toileting, grooming, dressing, etc )  - Assess/evaluate cause of self-care deficits   - Assess range of motion  - Assess patient's mobility; develop plan if impaired  - Assess patient's need for assistive devices and provide as appropriate  - Encourage maximum independence but intervene and supervise when necessary  - Involve family in performance of ADLs  - Assess for home care needs following discharge   - Consider OT consult to assist with ADL evaluation and planning for discharge  - Provide patient education as appropriate  Outcome: Progressing  Goal: Maintain or return mobility status to optimal level  Description  INTERVENTIONS:  - Assess patient's baseline mobility status (ambulation, transfers, stairs, etc )    - Identify cognitive and physical deficits and behaviors that affect mobility  - Identify mobility aids required to assist with transfers and/or ambulation (gait belt, sit-to-stand, lift, walker, cane, etc )  - [place] fall precautions as indicated by assessment  - Record patient progress and toleration of activity level on Mobility SBAR; progress patient to next Phase/Stage  - Instruct patient to call for assistance with activity based on assessment  - Consider rehabilitation consult to assist with strengthening/weightbearing, etc   Outcome: Progressing     Problem: DISCHARGE PLANNING  Goal: Discharge to home or other facility with appropriate resources  Description  INTERVENTIONS:  - Identify barriers to discharge w/patient and caregiver  - Arrange for needed discharge resources and transportation as appropriate  - Identify discharge learning needs (meds, wound care, etc )  - Arrange for interpretive services to assist at discharge as needed  - Refer to Case Management Department for coordinating discharge planning if the patient needs post-hospital services based on physician/advanced practitioner order or complex needs related to functional status, cognitive ability, or social support system  Outcome: Progressing     Problem: Knowledge Deficit  Goal: Patient/family/caregiver demonstrates understanding of disease process, treatment plan, medications, and discharge instructions  Description  Complete learning assessment and assess knowledge base    Interventions:  - Provide teaching at level of understanding  - Provide teaching via preferred learning methods  Outcome: Progressing     Problem: METABOLIC, FLUID AND ELECTROLYTES - ADULT  Goal: Electrolytes maintained within normal limits  Description  INTERVENTIONS:  - Monitor labs and assess patient for signs and symptoms of electrolyte imbalances  - Administer electrolyte replacement as ordered  - Monitor response to electrolyte replacements, including repeat lab results as appropriate  - Instruct patient on fluid and nutrition as appropriate  Outcome: Progressing  Goal: Fluid balance maintained  Description  INTERVENTIONS:  - Monitor labs   - Monitor I/O and WT  - Instruct patient on fluid and nutrition as appropriate  - Assess for signs & symptoms of volume excess or deficit  Outcome: Progressing  Goal: Glucose maintained within target range  Description  INTERVENTIONS:  - Monitor Blood Glucose as ordered  - Assess for signs and symptoms of hyperglycemia and hypoglycemia  - Administer ordered medications to maintain glucose within target range  - Assess nutritional intake and initiate nutrition service referral as needed  Outcome: Progressing

## 2020-06-24 NOTE — UTILIZATION REVIEW
Initial Clinical Review    Transfer from [facility] ED    Admission: Date/Time/Statement: Admission Orders (From admission, onward)     Ordered        06/24/20 0204  Inpatient Admission  Once                   Orders Placed This Encounter   Procedures    Inpatient Admission     Standing Status:   Standing     Number of Occurrences:   1     Order Specific Question:   Admitting Physician     Answer:   Stefani Jc [[id number]]     Order Specific Question:   Level of Care     Answer:   Critical Care [15]     Order Specific Question:   Estimated length of stay     Answer:   More than 2 Midnights     Order Specific Question:   Certification     Answer:   I certify that inpatient services are medically necessary for this patient for a duration of greater than two midnights  See H&P and MD Progress Notes for additional information about the patient's course of treatment  Assessment/Plan:    47 Y O female  Initially presented to ED  At  [facility] with general fatigue, body aches, anxiety/depression, poor appetite, headache, vomiting and chest pain for past  2-3  Days  Has  Multiple life stressors currently  PMH  Is   DM, hypothyroidism and anxiety/depression  Labs revealed   Blood sugar > 500  [facility]  Higher level of care  Admit  IP   ICU   LOC  With  DKA and plan is  Monitor labs, insulin drip  IVF,  Bicarb drip  And continue home  Meds        Additional Vital Signs:   06/24/20 1116  98 8 °F (37 1 °C)               06/24/20 1000    92  16  110/63  81  99 %     06/24/20 0900    94  16  105/58  78  99 %     06/24/20 0800    98  17  110/62  85  99 %     06/24/20 0740  98 °F (36 7 °C)               06/24/20 0700    100  16  89/48Abnormal   61  99 %     06/24/20 0600    98  16  113/62  75  99 %     06/24/20 0500    100  20  115/70  81  99 %     06/24/20 0400  97 2 °F (36 2 °C)Abnormal   96  18  119/67  84  99 %     06/24/20 0300    92  18  117/63  74  99 %    06/24/20 0200    102  19  113/68  93  100 %     06/24/20 0150  97 4 °F (36 3 °C)Abnormal   102  20  110/70  87  100 %  None (Room air)         Pertinent Labs/Diagnostic Test Results:   CXR  ( 6/23)   NAD  Results from last 7 days   Lab Units 06/23/20  2114   SARS-COV-2  Negative     Results from last 7 days   Lab Units 06/23/20  2114   WBC Thousand/uL 6 40   HEMOGLOBIN g/dL 14 0   HEMATOCRIT % 44 6   PLATELETS Thousands/uL 316   TOTAL NEUT ABS Thousand/uL 4 80   BANDS PCT % 6         Results from last 7 days   Lab Units 06/24/20  0949 06/24/20  0808 06/24/20  0529 06/24/20  0203 06/23/20  2221 06/23/20  2114   SODIUM mmol/L 136 136 136 134* 125* 124*   POTASSIUM mmol/L 3 4* 3 3* 3 6 4 5 5 2 5 5   CHLORIDE mmol/L 102 104 103 100 95* 93*   CO2 mmol/L 18* 15* 11* 7* <5* <5*   ANION GAP mmol/L 16* 17* 22* 27*  --   --    BUN mg/dL 13 15 16 14 14 14   CREATININE mg/dL 1 23 1 21 1 23 1 26 1 09 1 15   EGFR ml/min/1 73sq m 56 57 56 54 65 61   CALCIUM mg/dL 7 3* 7 6* 8 1* 7 9* 8 7 8 9   CALCIUM, IONIZED mmol/L  --   --   --  1 15  --   --    MAGNESIUM mg/dL 2 1 2 2 2 1 1 9  --  2 0   PHOSPHORUS mg/dL 1 4* 1 2* 1 7* 2 9  --  3 7         Results from last 7 days   Lab Units 06/24/20  1103 06/24/20  1006 06/24/20  0855 06/24/20  0759 06/24/20  0720 06/24/20  0559 06/24/20  0501 06/24/20  0402 06/24/20  0306 06/24/20  0153 06/24/20  0049 06/23/20  2347   POC GLUCOSE mg/dl 241* 153* 78 87 59* 113 185* 267* 329* 332* 381* 437*     Results from last 7 days   Lab Units 06/24/20  0949 06/24/20  0808 06/24/20  0529 06/24/20  0203 06/23/20  2221 06/23/20  2114   GLUCOSE RANDOM mg/dL 146* 94 193* 332* 493* 501*             BETA-HYDROXYBUTYRATE   Date Value Ref Range Status   06/23/2020 5 3 (H) <0 6 mmol/L Final          Results from last 7 days   Lab Units 06/24/20  0205 06/23/20  2310 06/23/20  2221   PH LANNY  6 940* 6 810* 6 860*   PCO2 LANNY mm Hg 27 1* 21 7 26 2   PO2 LANNY mm Hg 31 5* 122 0* 77 0*   HCO3 LANNY mmol/L 5 7* 3 3* 4 4* BASE EXC LANNY mmol/L -25 6 -31 4 -30 2   O2 CONTENT LANNY ml/dL 12 3 16 9 16 7   O2 HGB, VENOUS % 64 3 92 6 84 3         Results from last 7 days   Lab Units 06/23/20 2114   CK TOTAL U/L 53     Results from last 7 days   Lab Units 06/23/20 2114   TROPONIN I ng/mL <0 03           Results from last 7 days   Lab Units 06/23/20  2141   AMPH/METH  Negative   BARBITURATE UR  Negative   BENZODIAZEPINE UR  Negative   COCAINE UR  Negative   METHADONE URINE  Negative   OPIATE UR  Negative   PCP UR  Negative   THC UR  Negative           Results from last 7 days   Lab Units 06/23/20 2114   TOTAL COUNTED  100           Present on Admission:   Type 1 diabetes mellitus with hyperglycemia (HCC)   Anxiety   Hypothyroidism   Depression      Admitting Diagnosis: DKA (diabetic ketoacidoses) ([facility] ) [E11 10]  Age/Sex: 47 y o  female  Admission Orders:  Scheduled Medications:    Medications:  busPIRone 10 mg Oral TID   DULoxetine 60 mg Oral Daily   ergocalciferol 50,000 Units Oral Weekly   levothyroxine 150 mcg Oral Early Morning   potassium phosphate 30 mmol Intravenous Once   zolpidem 10 mg Oral HS     Continuous IV Infusions:    dextrose 5 % and sodium chloride 0 9 % 250 mL/hr Intravenous Continuous   insulin regular (HumuLIN R,NovoLIN R) infusion 0 1-30 Units/hr Intravenous Continuous   sodium bicarbonate infusion 200 mL/hr Intravenous Continuous   sodium chloride 250 mL/hr Intravenous Continuous     PRN Meds:       IP CONSULT TO CASE MANAGEMENT  IP CONSULT TO ENDOCRINOLOGY    Network Utilization Review Department  [email or web address]  ATTENTION: Please call with any questions or concerns to [phone] and carefully listen to the prompts so that you are directed to the right person   All voicemails are confidential   Harlene Pair all requests for admission clinical reviews, approved or denied determinations and any other requests to dedicated fax number below belonging to the campus where the patient is receiving treatment   List of dedicated fax numbers for the Facilities:  [address] DENIALS (Administrative/Medical Necessity) [phone]   [address] (Maternity/NICU/Pediatrics) [phone]   Vandana Pyle [phone]   Gale Peraza [phone]   Cullen Opitz [phone]   [address] [facility]  [phone]   [address] [facility] [address] [facility] [phone]   [facility]  [phone]   [address] [facility]  [address] [facility] [address] [facility] [phone]

## 2020-06-25 PROBLEM — R45.851 SUICIDAL IDEATIONS: Status: ACTIVE | Noted: 2020-06-25

## 2020-06-25 LAB
AMPHETAMINES UR QL SCN: NEGATIVE NG/ML
ANION GAP SERPL CALCULATED.3IONS-SCNC: 11 MMOL/L (ref 4–13)
ANION GAP SERPL CALCULATED.3IONS-SCNC: 7 MMOL/L (ref 4–13)
BARBITURATES UR QL SCN: NEGATIVE NG/ML
BASOPHILS # BLD AUTO: 0.01 THOUSANDS/ΜL (ref 0–0.1)
BASOPHILS NFR BLD AUTO: 0 % (ref 0–1)
BENZODIAZ UR QL: NEGATIVE NG/ML
BUN SERPL-MCNC: 6 MG/DL (ref 5–25)
BUN SERPL-MCNC: 9 MG/DL (ref 5–25)
BZE UR QL: NEGATIVE NG/ML
CALCIUM SERPL-MCNC: 6.9 MG/DL (ref 8.3–10.1)
CALCIUM SERPL-MCNC: 7.8 MG/DL (ref 8.3–10.1)
CANNABINOIDS UR QL SCN: NEGATIVE NG/ML
CHLORIDE SERPL-SCNC: 105 MMOL/L (ref 100–108)
CHLORIDE SERPL-SCNC: 109 MMOL/L (ref 100–108)
CO2 SERPL-SCNC: 19 MMOL/L (ref 21–32)
CO2 SERPL-SCNC: 24 MMOL/L (ref 21–32)
CREAT SERPL-MCNC: 1.04 MG/DL (ref 0.6–1.3)
CREAT SERPL-MCNC: 1.1 MG/DL (ref 0.6–1.3)
EOSINOPHIL # BLD AUTO: 0.03 THOUSAND/ΜL (ref 0–0.61)
EOSINOPHIL NFR BLD AUTO: 1 % (ref 0–6)
ERYTHROCYTE [DISTWIDTH] IN BLOOD BY AUTOMATED COUNT: 13.3 % (ref 11.6–15.1)
GFR SERPL CREATININE-BSD FRML MDRD: 64 ML/MIN/1.73SQ M
GFR SERPL CREATININE-BSD FRML MDRD: 68 ML/MIN/1.73SQ M
GLUCOSE SERPL-MCNC: 127 MG/DL (ref 65–140)
GLUCOSE SERPL-MCNC: 144 MG/DL (ref 65–140)
GLUCOSE SERPL-MCNC: 152 MG/DL (ref 65–140)
GLUCOSE SERPL-MCNC: 154 MG/DL (ref 65–140)
GLUCOSE SERPL-MCNC: 184 MG/DL (ref 65–140)
GLUCOSE SERPL-MCNC: 209 MG/DL (ref 65–140)
GLUCOSE SERPL-MCNC: 299 MG/DL (ref 65–140)
GLUCOSE SERPL-MCNC: 318 MG/DL (ref 65–140)
HCT VFR BLD AUTO: 32.2 % (ref 34.8–46.1)
HGB BLD-MCNC: 10.8 G/DL (ref 11.5–15.4)
IMM GRANULOCYTES # BLD AUTO: 0.02 THOUSAND/UL (ref 0–0.2)
IMM GRANULOCYTES NFR BLD AUTO: 1 % (ref 0–2)
LYMPHOCYTES # BLD AUTO: 0.82 THOUSANDS/ΜL (ref 0.6–4.47)
LYMPHOCYTES NFR BLD AUTO: 32 % (ref 14–44)
MAGNESIUM SERPL-MCNC: 2.1 MG/DL (ref 1.6–2.6)
MCH RBC QN AUTO: 29.3 PG (ref 26.8–34.3)
MCHC RBC AUTO-ENTMCNC: 33.5 G/DL (ref 31.4–37.4)
MCV RBC AUTO: 88 FL (ref 82–98)
METHADONE UR QL SCN: NEGATIVE NG/ML
MONOCYTES # BLD AUTO: 0.25 THOUSAND/ΜL (ref 0.17–1.22)
MONOCYTES NFR BLD AUTO: 10 % (ref 4–12)
NEUTROPHILS # BLD AUTO: 1.46 THOUSANDS/ΜL (ref 1.85–7.62)
NEUTS SEG NFR BLD AUTO: 56 % (ref 43–75)
NRBC BLD AUTO-RTO: 0 /100 WBCS
OPIATES UR QL: NEGATIVE NG/ML
PCP UR QL: NEGATIVE NG/ML
PHOSPHATE SERPL-MCNC: 2.4 MG/DL (ref 2.7–4.5)
PLATELET # BLD AUTO: 181 THOUSANDS/UL (ref 149–390)
PMV BLD AUTO: 9.6 FL (ref 8.9–12.7)
POTASSIUM SERPL-SCNC: 3.5 MMOL/L (ref 3.5–5.3)
POTASSIUM SERPL-SCNC: 3.9 MMOL/L (ref 3.5–5.3)
PROPOXYPH UR QL SCN: NEGATIVE NG/ML
RBC # BLD AUTO: 3.68 MILLION/UL (ref 3.81–5.12)
SODIUM SERPL-SCNC: 135 MMOL/L (ref 136–145)
SODIUM SERPL-SCNC: 140 MMOL/L (ref 136–145)
TSH SERPL DL<=0.05 MIU/L-ACNC: 66.43 UIU/ML (ref 0.36–3.74)
WBC # BLD AUTO: 2.59 THOUSAND/UL (ref 4.31–10.16)

## 2020-06-25 PROCEDURE — 83735 ASSAY OF MAGNESIUM: CPT | Performed by: NURSE PRACTITIONER

## 2020-06-25 PROCEDURE — 84100 ASSAY OF PHOSPHORUS: CPT | Performed by: NURSE PRACTITIONER

## 2020-06-25 PROCEDURE — 80048 BASIC METABOLIC PNL TOTAL CA: CPT | Performed by: NURSE PRACTITIONER

## 2020-06-25 PROCEDURE — 84443 ASSAY THYROID STIM HORMONE: CPT | Performed by: NURSE PRACTITIONER

## 2020-06-25 PROCEDURE — 82948 REAGENT STRIP/BLOOD GLUCOSE: CPT

## 2020-06-25 PROCEDURE — 85025 COMPLETE CBC W/AUTO DIFF WBC: CPT | Performed by: NURSE PRACTITIONER

## 2020-06-25 PROCEDURE — 99233 SBSQ HOSP IP/OBS HIGH 50: CPT | Performed by: INTERNAL MEDICINE

## 2020-06-25 PROCEDURE — C9113 INJ PANTOPRAZOLE SODIUM, VIA: HCPCS | Performed by: NURSE PRACTITIONER

## 2020-06-25 PROCEDURE — 99254 IP/OBS CNSLTJ NEW/EST MOD 60: CPT | Performed by: INTERNAL MEDICINE

## 2020-06-25 PROCEDURE — 99221 1ST HOSP IP/OBS SF/LOW 40: CPT | Performed by: NURSE PRACTITIONER

## 2020-06-25 RX ORDER — INSULIN GLARGINE 100 [IU]/ML
18 INJECTION, SOLUTION SUBCUTANEOUS
Status: DISCONTINUED | OUTPATIENT
Start: 2020-06-25 | End: 2020-06-25

## 2020-06-25 RX ORDER — INSULIN GLARGINE 100 [IU]/ML
36 INJECTION, SOLUTION SUBCUTANEOUS
Status: DISCONTINUED | OUTPATIENT
Start: 2020-06-26 | End: 2020-06-25

## 2020-06-25 RX ORDER — INSULIN GLARGINE 100 [IU]/ML
36 INJECTION, SOLUTION SUBCUTANEOUS
Status: DISCONTINUED | OUTPATIENT
Start: 2020-06-25 | End: 2020-06-27 | Stop reason: HOSPADM

## 2020-06-25 RX ORDER — INSULIN GLARGINE 100 [IU]/ML
18 INJECTION, SOLUTION SUBCUTANEOUS ONCE
Status: COMPLETED | OUTPATIENT
Start: 2020-06-25 | End: 2020-06-25

## 2020-06-25 RX ORDER — POTASSIUM CHLORIDE 20 MEQ/1
20 TABLET, EXTENDED RELEASE ORAL ONCE
Status: COMPLETED | OUTPATIENT
Start: 2020-06-25 | End: 2020-06-25

## 2020-06-25 RX ORDER — POTASSIUM CHLORIDE 20 MEQ/1
40 TABLET, EXTENDED RELEASE ORAL ONCE
Status: COMPLETED | OUTPATIENT
Start: 2020-06-25 | End: 2020-06-25

## 2020-06-25 RX ORDER — PROPOFOL 10 MG/ML
INJECTION, EMULSION INTRAVENOUS
Status: DISPENSED
Start: 2020-06-25 | End: 2020-06-26

## 2020-06-25 RX ORDER — SODIUM CHLORIDE 9 MG/ML
100 INJECTION, SOLUTION INTRAVENOUS CONTINUOUS
Status: DISCONTINUED | OUTPATIENT
Start: 2020-06-25 | End: 2020-06-27 | Stop reason: HOSPADM

## 2020-06-25 RX ORDER — DULOXETIN HYDROCHLORIDE 30 MG/1
90 CAPSULE, DELAYED RELEASE ORAL DAILY
Status: DISCONTINUED | OUTPATIENT
Start: 2020-06-26 | End: 2020-06-27 | Stop reason: HOSPADM

## 2020-06-25 RX ORDER — ZOLPIDEM TARTRATE 5 MG/1
5 TABLET ORAL
Status: DISCONTINUED | OUTPATIENT
Start: 2020-06-25 | End: 2020-06-27 | Stop reason: HOSPADM

## 2020-06-25 RX ORDER — PANTOPRAZOLE SODIUM 40 MG/1
40 TABLET, DELAYED RELEASE ORAL
Status: DISCONTINUED | OUTPATIENT
Start: 2020-06-26 | End: 2020-06-27 | Stop reason: HOSPADM

## 2020-06-25 RX ADMIN — INSULIN LISPRO 3 UNITS: 100 INJECTION, SOLUTION INTRAVENOUS; SUBCUTANEOUS at 18:43

## 2020-06-25 RX ADMIN — HEPARIN SODIUM 5000 UNITS: 5000 INJECTION INTRAVENOUS; SUBCUTANEOUS at 22:07

## 2020-06-25 RX ADMIN — BUSPIRONE HYDROCHLORIDE 10 MG: 10 TABLET ORAL at 17:15

## 2020-06-25 RX ADMIN — ZOLPIDEM TARTRATE 5 MG: 5 TABLET, COATED ORAL at 22:06

## 2020-06-25 RX ADMIN — SODIUM CHLORIDE 100 ML/HR: 0.9 INJECTION, SOLUTION INTRAVENOUS at 21:56

## 2020-06-25 RX ADMIN — INSULIN LISPRO 3 UNITS: 100 INJECTION, SOLUTION INTRAVENOUS; SUBCUTANEOUS at 22:02

## 2020-06-25 RX ADMIN — HEPARIN SODIUM 5000 UNITS: 5000 INJECTION INTRAVENOUS; SUBCUTANEOUS at 14:20

## 2020-06-25 RX ADMIN — LEVOTHYROXINE SODIUM 150 MCG: 75 TABLET ORAL at 05:43

## 2020-06-25 RX ADMIN — INSULIN GLARGINE 18 UNITS: 100 INJECTION, SOLUTION SUBCUTANEOUS at 08:16

## 2020-06-25 RX ADMIN — DEXTROSE AND SODIUM CHLORIDE 250 ML/HR: 5; .9 INJECTION, SOLUTION INTRAVENOUS at 00:40

## 2020-06-25 RX ADMIN — DULOXETINE HYDROCHLORIDE 60 MG: 30 CAPSULE, DELAYED RELEASE ORAL at 08:52

## 2020-06-25 RX ADMIN — INSULIN GLARGINE 36 UNITS: 100 INJECTION, SOLUTION SUBCUTANEOUS at 22:02

## 2020-06-25 RX ADMIN — DEXTROSE AND SODIUM CHLORIDE 250 ML/HR: 5; .9 INJECTION, SOLUTION INTRAVENOUS at 04:28

## 2020-06-25 RX ADMIN — INSULIN LISPRO 1 UNITS: 100 INJECTION, SOLUTION INTRAVENOUS; SUBCUTANEOUS at 11:51

## 2020-06-25 RX ADMIN — PANTOPRAZOLE SODIUM 40 MG: 40 INJECTION, POWDER, FOR SOLUTION INTRAVENOUS at 08:52

## 2020-06-25 RX ADMIN — BUSPIRONE HYDROCHLORIDE 10 MG: 10 TABLET ORAL at 08:51

## 2020-06-25 RX ADMIN — BUSPIRONE HYDROCHLORIDE 10 MG: 10 TABLET ORAL at 22:03

## 2020-06-25 RX ADMIN — POTASSIUM CHLORIDE 40 MEQ: 1500 TABLET, EXTENDED RELEASE ORAL at 05:00

## 2020-06-25 RX ADMIN — HEPARIN SODIUM 5000 UNITS: 5000 INJECTION INTRAVENOUS; SUBCUTANEOUS at 05:43

## 2020-06-25 RX ADMIN — POTASSIUM CHLORIDE 20 MEQ: 1500 TABLET, EXTENDED RELEASE ORAL at 05:43

## 2020-06-25 RX ADMIN — ONDANSETRON 4 MG: 2 INJECTION INTRAMUSCULAR; INTRAVENOUS at 14:23

## 2020-06-25 RX ADMIN — POTASSIUM PHOSPHATE, MONOBASIC AND POTASSIUM PHOSPHATE, DIBASIC 12 MMOL: 224; 236 INJECTION, SOLUTION, CONCENTRATE INTRAVENOUS at 04:54

## 2020-06-25 NOTE — ASSESSMENT & PLAN NOTE
· Hx of Hashimoto's thyroiditis   · Continue synthroid, patient's home med, [place] is non formulary

## 2020-06-25 NOTE — ASSESSMENT & PLAN NOTE
Lab Results   Component Value Date    HGBA1C 10 9 (H) 05/20/2020       Recent Labs     06/24/20  2100 06/24/20  2206 06/24/20  2301 06/24/20  2358   POCGLU 71 123 177* 161*       Blood Sugar Average: Last 72 hrs:    · Metabolic acidosis with anion gap now resolved  · Will transition to home insulin dosing and off of insulin infusion  · Initiate diet  · Serial BMP Q6 hours  (P) 171 [id number]

## 2020-06-25 NOTE — CONSULTS
Consultation - Sameera Dawkins 47 y o  female MRN: [id number]    Unit/Bed#: E2 -01 Encounter: [id number]      Assessment/Plan     Assessment:  40yof with hx of T1DM and resolved DKA  Plan:    1  Type 1 diabetes s/p DKA: She was given Lantus this morning and will get 1/2 her Lantus this evening  However, she takes her entire Lantus dose at bedtime  For now, she will get the evening Lantnus 18units tonight and then this will be d/c  I recommend a dose of NPH 10once in the morning to bridge back to her bedtime Lantus 36units  She will eat and I will order Humalog 3units with meals  Her home correction factor is somewhat tighter then algorithm written, so I will increase to algorithm 3 with meals and continue 2 overnight  2  Hypothyroidism: resume levothyroxine 150mcg  Check Celiac abs,     History of Present Illness     HPI: Sameera Dawkins is a 47y o  year old female seen for T1DM s/p resolved DKA  She is a patient of Dr Mignon Colindres  She reports having life stressors and was felt that she had anxiety when she presented, but was found to have severe DKA with VBG pH 6 8, glucose 501, CO2 undetectable, and BOHB 5 3  She states that she was taking her Lantus and did not miss doses  She also uses a carb ratio of 1:10g with a correction opf 1:20mg/dL  She was given IVF and insulin gtt and transitioned from the drip this morning  She has started to eat, though her appetite is not strong  She denies having abdominal pain or nausea  Hx is also significant for hypothyroidism, on levothyroxine 150mcg daily  TSH is markedly elevated  She was on levothyroxine, but was recently changed to [place] thyroid through her PCP's office  She does feel mental fog and difficulties concentrating    Of note, her son has Celiac but she has not been checked (to her knowledge)    Inpatient consult to Endocrinology  Consult performed by: Nirav Orozco MD  Consult ordered by: JAROCHO Devlin          Review of Systems Constitutional: Negative for unexpected weight change  HENT: Negative for hearing loss and voice change  Respiratory: Negative for shortness of breath  Gastrointestinal: Negative for nausea  Psychiatric/Behavioral: The patient is nervous/anxious      see HPI    Historical Information   Past Medical History:   Diagnosis Date    Anemia     Arthritis     B12 deficiency     Cervical disc disorder     Cervical disc disorder     On gabapentin     Diabetes mellitus ([place] [address] )     Disease of thyroid gland     hypothyroid    DKA (diabetic ketoacidoses) ([place] [address] ) 2018    MILLICENT (iron deficiency anemia)     Iron deficiency anemia     Type 1 diabetes ([place] [address] )     Type 1 diabetes mellitus, uncontrolled ([place] [address] )     Vitamin D deficiency      Past Surgical History:   Procedure Laterality Date    ABDOMINAL SURGERY      gastric bypass     SECTION      x2    CHOLECYSTECTOMY  2018    GASTRIC BYPASS  2007    INTRAUTERINE DEVICE INSERTION  2015    OTHER SURGICAL HISTORY      surgery for imperforate hymen/endometriosis/hydrometrocolpos    TUBAL LIGATION      WISDOM TOOTH EXTRACTION       Social History   Social History     Substance and Sexual Activity   Alcohol Use No    Frequency: Never    Binge frequency: Never    Comment: socially     Social History     Substance and Sexual Activity   Drug Use No     Social History     Tobacco Use   Smoking Status Never Smoker   Smokeless Tobacco Never Used     E-Cigarette/Vaping    E-Cigarette Use Never User      E-Cigarette/Vaping Substances    Nicotine No     THC No     CBD No     Flavoring No     Other No     Unknown No       Family History:   Family History   Problem Relation Age of Onset    Thyroid disease Mother     URIEL disease Mother     Hyperlipidemia Mother     Hypertension Mother     Osteoarthritis Mother     Thyroid disease unspecified Mother     Diabetes Father     Alcohol abuse Father     Diabetes type I Father     Thyroid disease Sister     Depression Sister     Thyroid disease unspecified Sister     Heart disease Maternal Grandmother     Hypertension Maternal Grandmother     Arthritis Maternal Grandmother     Diabetes type I Maternal Uncle     Diabetes type I Maternal Grandfather        Meds/Allergies   all current active meds have been reviewed  No Known Allergies    Objective   Vitals: Blood pressure 125/77, pulse 104, temperature 98 3 °F (36 8 °C), temperature source Temporal, resp  rate (!) 26, height 5' 6" (1 676 m), weight 81 kg (178 lb 9 2 oz), SpO2 97 %, not currently breastfeeding  Intake/Output Summary (Last 24 hours) at 6/25/2020 1810  Last data filed at 6/25/2020 1000  Gross per 24 hour   Intake 3024 98 ml   Output 2301 ml   Net 723 98 ml     Invasive Devices     Peripheral Intravenous Line            Peripheral IV 06/23/20 Left Wrist 1 day    Peripheral IV 06/23/20 Right Antecubital 1 day                Physical Exam    Physical Exam   Gen: appears well-developed and well-nourished  No apparent distress  Head: Normocephalic and atraumatic  Eyes: no stare or proptosis, no periorbital edema  E/N/M nl facies, hearing grossly intact  Neck: range of motion nl  Pulmonary/Chest: breathing  comfortably, no accessory muscle use, effort normal    Musculoskeletal: moves upper extremities  Neurological: alert and oriented to person, place, and time  No upper ext tremor appreciated  Skin: does not appear diaphoretic, no facial plethora  Psychiatric: normal mood and affect; behavior is normal; no gross lapses in memory, answer questions appropriately      Lab Results: I have personally reviewed pertinent reports         Lab Results   Component Value Date    SODIUM 140 06/25/2020    K 3 5 06/25/2020     (H) 06/25/2020    CO2 24 06/25/2020    BUN 6 06/25/2020    CREATININE 1 04 06/25/2020    GLUC 184 (H) 06/25/2020    CALCIUM 6 9 (L) 06/25/2020     Lab Results   Component Value Date    HGBA1C 10 9 (H) 05/20/2020     Lab Results   Component Value Date    QLQ5IOSYLSDY 66 433 (H) 06/25/2020       Imaging Studies:   EKG, Pathology, and Other Studies:   VTE Prophylaxis:     Code Status: Level 1 - Full Code  Advance Directive and Living Will:      Power of :    POLST:      Counseling / Coordination of Care

## 2020-06-25 NOTE — NURSING NOTE
Patient's sister, Matilde Marie [phone], called and asked about patient, permission given by patient to speak with sister  Update given  Patient takes [place] Thyroid at home, not available at hospital pharmacy, NP verified with Pharmacy that patient can take her own medication in

## 2020-06-25 NOTE — PLAN OF CARE
Problem: Potential for Falls  Goal: Patient will remain free of falls  Description  INTERVENTIONS:  - Assess patient frequently for physical needs  -  Identify cognitive and physical deficits and behaviors that affect risk of falls    -  [place] fall precautions as indicated by assessment   - Educate patient/family on patient safety including physical limitations  - Instruct patient to call for assistance with activity based on assessment  - Modify environment to reduce risk of injury  - Consider OT/PT consult to assist with strengthening/mobility  Outcome: Progressing     Problem: Prexisting or High Potential for Compromised Skin Integrity  Goal: Skin integrity is maintained or improved  Description  INTERVENTIONS:  - Identify patients at risk for skin breakdown  - Assess and monitor skin integrity  - Assess and monitor nutrition and hydration status  - Monitor labs   - Assess for incontinence   - Turn and reposition patient  - Assist with mobility/ambulation  - Relieve pressure over bony prominences  - Avoid friction and shearing  - Provide appropriate hygiene as needed including keeping skin clean and dry  - Evaluate need for skin moisturizer/barrier cream  - Collaborate with interdisciplinary team   - Patient/family teaching  - Consider wound care consult   Outcome: Progressing     Problem: PAIN - ADULT  Goal: Verbalizes/displays adequate comfort level or baseline comfort level  Description  Interventions:  - Encourage patient to monitor pain and request assistance  - Assess pain using appropriate pain scale  - Administer analgesics based on type and severity of pain and evaluate response  - Implement non-pharmacological measures as appropriate and evaluate response  - Consider cultural and social influences on pain and pain management  - Notify physician/advanced practitioner if interventions unsuccessful or patient reports new pain  Outcome: Progressing     Problem: INFECTION - ADULT  Goal: Absence or prevention of progression during hospitalization  Description  INTERVENTIONS:  - Assess and monitor for signs and symptoms of infection  - Monitor lab/diagnostic results  - Monitor all insertion sites, i e  indwelling lines, tubes, and drains  - Monitor endotracheal if appropriate and nasal secretions for changes in amount and color  - [place] appropriate cooling/warming therapies per order  - Administer medications as ordered  - Instruct and encourage patient and family to use good hand hygiene technique  - Identify and instruct in appropriate isolation precautions for identified infection/condition  Outcome: Progressing     Problem: SAFETY ADULT  Goal: Maintain or return to baseline ADL function  Description  INTERVENTIONS:  -  Assess patient's ability to carry out ADLs; assess patient's baseline for ADL function and identify physical deficits which impact ability to perform ADLs (bathing, care of mouth/teeth, toileting, grooming, dressing, etc )  - Assess/evaluate cause of self-care deficits   - Assess range of motion  - Assess patient's mobility; develop plan if impaired  - Assess patient's need for assistive devices and provide as appropriate  - Encourage maximum independence but intervene and supervise when necessary  - Involve family in performance of ADLs  - Assess for home care needs following discharge   - Consider OT consult to assist with ADL evaluation and planning for discharge  - Provide patient education as appropriate  Outcome: Progressing  Goal: Maintain or return mobility status to optimal level  Description  INTERVENTIONS:  - Assess patient's baseline mobility status (ambulation, transfers, stairs, etc )    - Identify cognitive and physical deficits and behaviors that affect mobility  - Identify mobility aids required to assist with transfers and/or ambulation (gait belt, sit-to-stand, lift, walker, cane, etc )  - [place] fall precautions as indicated by assessment  - Record patient progress and toleration of activity level on Mobility SBAR; progress patient to next Phase/Stage  - Instruct patient to call for assistance with activity based on assessment  - Consider rehabilitation consult to assist with strengthening/weightbearing, etc   Outcome: Progressing     Problem: DISCHARGE PLANNING  Goal: Discharge to home or other facility with appropriate resources  Description  INTERVENTIONS:  - Identify barriers to discharge w/patient and caregiver  - Arrange for needed discharge resources and transportation as appropriate  - Identify discharge learning needs (meds, wound care, etc )  - Arrange for interpretive services to assist at discharge as needed  - Refer to Case Management Department for coordinating discharge planning if the patient needs post-hospital services based on physician/advanced practitioner order or complex needs related to functional status, cognitive ability, or social support system  Outcome: Progressing     Problem: Knowledge Deficit  Goal: Patient/family/caregiver demonstrates understanding of disease process, treatment plan, medications, and discharge instructions  Description  Complete learning assessment and assess knowledge base    Interventions:  - Provide teaching at level of understanding  - Provide teaching via preferred learning methods  Outcome: Progressing     Problem: METABOLIC, FLUID AND ELECTROLYTES - ADULT  Goal: Electrolytes maintained within normal limits  Description  INTERVENTIONS:  - Monitor labs and assess patient for signs and symptoms of electrolyte imbalances  - Administer electrolyte replacement as ordered  - Monitor response to electrolyte replacements, including repeat lab results as appropriate  - Instruct patient on fluid and nutrition as appropriate  Outcome: Progressing  Goal: Fluid balance maintained  Description  INTERVENTIONS:  - Monitor labs   - Monitor I/O and WT  - Instruct patient on fluid and nutrition as appropriate  - Assess for signs & symptoms of volume excess or deficit  Outcome: Progressing  Goal: Glucose maintained within target range  Description  INTERVENTIONS:  - Monitor Blood Glucose as ordered  - Assess for signs and symptoms of hyperglycemia and hypoglycemia  - Administer ordered medications to maintain glucose within target range  - Assess nutritional intake and initiate nutrition service referral as needed  Outcome: Progressing

## 2020-06-25 NOTE — ASSESSMENT & PLAN NOTE
Lab Results   Component Value Date    HGBA1C 10 9 (H) 05/20/2020       Recent Labs     06/25/20  0411 06/25/20  0559 06/25/20  0759 06/25/20  1017   POCGLU 152* 154* 144* 127       Blood Sugar Average: Last 72 hrs:  (P) 167 5

## 2020-06-25 NOTE — PLAN OF CARE
Problem: Potential for Falls  Goal: Patient will remain free of falls  Description  INTERVENTIONS:  - Assess patient frequently for physical needs  -  Identify cognitive and physical deficits and behaviors that affect risk of falls    -  [place] fall precautions as indicated by assessment   - Educate patient/family on patient safety including physical limitations  - Instruct patient to call for assistance with activity based on assessment  - Modify environment to reduce risk of injury  - Consider OT/PT consult to assist with strengthening/mobility  Outcome: Progressing     Problem: Prexisting or High Potential for Compromised Skin Integrity  Goal: Skin integrity is maintained or improved  Description  INTERVENTIONS:  - Identify patients at risk for skin breakdown  - Assess and monitor skin integrity  - Assess and monitor nutrition and hydration status  - Monitor labs   - Assess for incontinence   - Turn and reposition patient  - Assist with mobility/ambulation  - Relieve pressure over bony prominences  - Avoid friction and shearing  - Provide appropriate hygiene as needed including keeping skin clean and dry  - Evaluate need for skin moisturizer/barrier cream  - Collaborate with interdisciplinary team   - Patient/family teaching  - Consider wound care consult   Outcome: Progressing     Problem: PAIN - ADULT  Goal: Verbalizes/displays adequate comfort level or baseline comfort level  Description  Interventions:  - Encourage patient to monitor pain and request assistance  - Assess pain using appropriate pain scale  - Administer analgesics based on type and severity of pain and evaluate response  - Implement non-pharmacological measures as appropriate and evaluate response  - Consider cultural and social influences on pain and pain management  - Notify physician/advanced practitioner if interventions unsuccessful or patient reports new pain  Outcome: Progressing     Problem: INFECTION - ADULT  Goal: Absence or prevention of progression during hospitalization  Description  INTERVENTIONS:  - Assess and monitor for signs and symptoms of infection  - Monitor lab/diagnostic results  - Monitor all insertion sites, i e  indwelling lines, tubes, and drains  - Monitor endotracheal if appropriate and nasal secretions for changes in amount and color  - Mobile appropriate cooling/warming therapies per order  - Administer medications as ordered  - Instruct and encourage patient and family to use good hand hygiene technique  - Identify and instruct in appropriate isolation precautions for identified infection/condition  Outcome: Progressing     Problem: SAFETY ADULT  Goal: Maintain or return to baseline ADL function  Description  INTERVENTIONS:  -  Assess patient's ability to carry out ADLs; assess patient's baseline for ADL function and identify physical deficits which impact ability to perform ADLs (bathing, care of mouth/teeth, toileting, grooming, dressing, etc )  - Assess/evaluate cause of self-care deficits   - Assess range of motion  - Assess patient's mobility; develop plan if impaired  - Assess patient's need for assistive devices and provide as appropriate  - Encourage maximum independence but intervene and supervise when necessary  - Involve family in performance of ADLs  - Assess for home care needs following discharge   - Consider OT consult to assist with ADL evaluation and planning for discharge  - Provide patient education as appropriate  Outcome: Progressing  Goal: Maintain or return mobility status to optimal level  Description  INTERVENTIONS:  - Assess patient's baseline mobility status (ambulation, transfers, stairs, etc )    - Identify cognitive and physical deficits and behaviors that affect mobility  - Identify mobility aids required to assist with transfers and/or ambulation (gait belt, sit-to-stand, lift, walker, cane, etc )  - Mobile fall precautions as indicated by assessment  - Record patient progress and toleration of activity level on Mobility SBAR; progress patient to next Phase/Stage  - Instruct patient to call for assistance with activity based on assessment  - Consider rehabilitation consult to assist with strengthening/weightbearing, etc   Outcome: Progressing     Problem: SAFETY ADULT  Goal: Maintain or return to baseline ADL function  Description  INTERVENTIONS:  -  Assess patient's ability to carry out ADLs; assess patient's baseline for ADL function and identify physical deficits which impact ability to perform ADLs (bathing, care of mouth/teeth, toileting, grooming, dressing, etc )  - Assess/evaluate cause of self-care deficits   - Assess range of motion  - Assess patient's mobility; develop plan if impaired  - Assess patient's need for assistive devices and provide as appropriate  - Encourage maximum independence but intervene and supervise when necessary  - Involve family in performance of ADLs  - Assess for home care needs following discharge   - Consider OT consult to assist with ADL evaluation and planning for discharge  - Provide patient education as appropriate  Outcome: Progressing  Goal: Maintain or return mobility status to optimal level  Description  INTERVENTIONS:  - Assess patient's baseline mobility status (ambulation, transfers, stairs, etc )    - Identify cognitive and physical deficits and behaviors that affect mobility  - Identify mobility aids required to assist with transfers and/or ambulation (gait belt, sit-to-stand, lift, walker, cane, etc )  - [place] fall precautions as indicated by assessment  - Record patient progress and toleration of activity level on Mobility SBAR; progress patient to next Phase/Stage  - Instruct patient to call for assistance with activity based on assessment  - Consider rehabilitation consult to assist with strengthening/weightbearing, etc   Outcome: Progressing     Problem: DISCHARGE PLANNING  Goal: Discharge to home or other facility with appropriate resources  Description  INTERVENTIONS:  - Identify barriers to discharge w/patient and caregiver  - Arrange for needed discharge resources and transportation as appropriate  - Identify discharge learning needs (meds, wound care, etc )  - Arrange for interpretive services to assist at discharge as needed  - Refer to Case Management Department for coordinating discharge planning if the patient needs post-hospital services based on physician/advanced practitioner order or complex needs related to functional status, cognitive ability, or social support system  Outcome: Progressing     Problem: Knowledge Deficit  Goal: Patient/family/caregiver demonstrates understanding of disease process, treatment plan, medications, and discharge instructions  Description  Complete learning assessment and assess knowledge base    Interventions:  - Provide teaching at level of understanding  - Provide teaching via preferred learning methods  Outcome: Progressing     Problem: METABOLIC, FLUID AND ELECTROLYTES - ADULT  Goal: Electrolytes maintained within normal limits  Description  INTERVENTIONS:  - Monitor labs and assess patient for signs and symptoms of electrolyte imbalances  - Administer electrolyte replacement as ordered  - Monitor response to electrolyte replacements, including repeat lab results as appropriate  - Instruct patient on fluid and nutrition as appropriate  Outcome: Progressing  Goal: Fluid balance maintained  Description  INTERVENTIONS:  - Monitor labs   - Monitor I/O and WT  - Instruct patient on fluid and nutrition as appropriate  - Assess for signs & symptoms of volume excess or deficit  Outcome: Progressing  Goal: Glucose maintained within target range  Description  INTERVENTIONS:  - Monitor Blood Glucose as ordered  - Assess for signs and symptoms of hyperglycemia and hypoglycemia  - Administer ordered medications to maintain glucose within target range  - Assess nutritional intake and initiate nutrition service referral as needed  Outcome: Progressing

## 2020-06-25 NOTE — SOCIAL WORK
CM met with the patient to do a general SW assessment:     The patient lives in a two story home, br and ba are upstairs  She lives with her two children  She is independent with adls and ambulation  She drives  No hx of van  No hx of str  She is employed  Her healthcare agent is her mother, Erick Carson  Her PCP is Dr Cathryn Reddy  No D&A history  No MH history    CM explained role and will continue to follow for any discharge needs

## 2020-06-25 NOTE — ASSESSMENT & PLAN NOTE
· Hx of Hashimoto's thyroiditis   · Continue synthroid, patient's home med, [place] is non formulary  · Endocrine consult pending

## 2020-06-25 NOTE — PROGRESS NOTES
Progress Note - Agustina Bound 1980, 47 y o  female MRN: [id number]    Unit/Bed#: ICU 10 Encounter: [id number]    Primary Care Provider: JAROCHO Bird   Date and time admitted to hospital: 6/24/2020  1:48 AM        * DKA (diabetic ketoacidoses) [facility])  Assessment & Plan  Lab Results   Component Value Date    HGBA1C 10 9 (H) 05/20/2020       Recent Labs     06/24/20  2100 06/24/20  2206 06/24/20  2301 06/24/20  2358   POCGLU 71 123 177* 161*       Blood Sugar Average: Last 72 hrs:    · Metabolic acidosis with anion gap now resolved  · Will transition to home insulin dosing and off of insulin infusion  · Initiate diet  · Serial BMP Q6 hours  (P) 171 [id number]    Hypothyroidism  Assessment & Plan  · Hx of Hashimoto's thyroiditis   · Continue synthroid, patient's home med, [place] is non formulary    Anxiety  Assessment & Plan  · Continue home Buspar      Depression  Assessment & Plan  · Continue duloxetine     Suicidal ideations  Assessment & Plan  SI with depression and anxiety, noted in [facility] chart as reason for admission  Patient denies currently  Will consult psych for further evaluation given patient's social history and recent stressors    Type 1 diabetes mellitus with hyperglycemia [facility])  Assessment & Plan  Lab Results   Component Value Date    HGBA1C 10 9 (H) 05/20/2020       Recent Labs     06/24/20  2100 06/24/20 2206 06/24/20  2301 06/24/20  2358   POCGLU 71 123 177* 161*       Blood Sugar Average: Last 72 hrs:  (P) 171 [id number]    ----------------------------------------------------------------------------------------  HPI/24hr events: Anion gap closed with resolution of acidosis  Transitioned to lantus from insulin infusion  Started on diabetic full liquid diet  Disposition: Transfer to Stepdown Level 1   Code Status: Level 1 - Full Code  ---------------------------------------------------------------------------------------  SUBJECTIVE  Offers no complaints   Sister called for updates  Review of Systems  Review of systems was reviewed and negative unless stated above in HPI/24-hour events   ---------------------------------------------------------------------------------------  OBJECTIVE    Vitals   Vitals:    20 0100 20 0300 20 0405 20 0430   BP: 123/66 111/68  109/71   Pulse: 102 98  92   Resp: 18 18  17   Temp:   98 6 °F (37 °C)    TempSrc:   Temporal    SpO2: 97% 97%  97%   Weight:         Temp (24hrs), Av 5 °F (36 9 °C), Min:97 6 °F (36 4 °C), Max:99 3 °F (37 4 °C)  Current: Temperature: 98 6 °F (37 °C)        SpO2: SpO2: 97 %       Physical Exam   Constitutional: She is oriented to person, place, and time  She appears well-developed and well-nourished  Flat affect, keeps eyes closed for most of interactions   HENT:   Head: Normocephalic and atraumatic  Right Ear: External ear normal    Left Ear: External ear normal    Nose: Nose normal    Mouth/Throat: Oropharynx is clear and moist    Eyes: Pupils are equal, round, and reactive to light  Conjunctivae and EOM are normal    Neck: Normal range of motion  Neck supple  No JVD present  No tracheal deviation present  No thyromegaly present  Cardiovascular: Normal rate, regular rhythm, normal heart sounds and intact distal pulses  Pulmonary/Chest: Effort normal and breath sounds normal    Abdominal: Soft  Bowel sounds are normal    Musculoskeletal: Normal range of motion  Generalized edema   Lymphadenopathy:     She has no cervical adenopathy  Neurological: She is oriented to person, place, and time  lethargic   Skin: Skin is warm and dry  Capillary refill takes less than 2 seconds  Psychiatric:   Flat affect, unable to fully assess due to lack of interaction   Nursing note and vitals reviewed        Invasive/non-invasive ventilation settings   Respiratory    Lab Data (Last 4 hours)    None         O2/Vent Data (Last 4 hours)    None                Laboratory and Diagnostics:  Results from last 7 days   Lab Units 06/25/20  0148 06/23/20  2114   WBC Thousand/uL 2 59* 6 40   HEMOGLOBIN g/dL 10 8* 14 0   HEMATOCRIT % 32 2* 44 6   PLATELETS Thousands/uL 181 316   NEUTROS PCT % 56  --    BANDS PCT %  --  6   MONOS PCT % 10  --    MONO PCT %  --  12     Results from last 7 days   Lab Units 06/25/20  0148 06/24/20 2017 06/24/20  1616 06/24/20  1407 06/24/20  1207 06/24/20  0949 06/24/20  0808   SODIUM mmol/L 140 140 138 136 134* 136 136   POTASSIUM mmol/L 3 5 3 0* 2 9* 2 9* 3 7 3 4* 3 3*   CHLORIDE mmol/L 109* 107 104 103 100 102 104   CO2 mmol/L 24 26 23 21 15* 18* 15*   ANION GAP mmol/L 7 7 11 12 19* 16* 17*   BUN mg/dL 6 9 10 12 12 13 15   CREATININE mg/dL 1 04 1 09 1 18 1 29 1 17 1 23 1 21   CALCIUM mg/dL 6 9* 7 0* 6 9* 7 0* 7 1* 7 3* 7 6*   GLUCOSE RANDOM mg/dL 184* 59* 162* 239* 289* 146* 94     Results from last 7 days   Lab Units 06/25/20  0148 06/24/20 2017 06/24/20  1616 06/24/20  1407 06/24/20  1207 06/24/20  0949 06/24/20  0808 06/24/20  0529   MAGNESIUM mg/dL  --  2 3 1 7 1 7 1 9 2 1 2 2 2 1   PHOSPHORUS mg/dL 2 4* 1 5* 2 1* 1 9* 2 4* 1 4* 1 2* 1 7*           Results from last 7 days   Lab Units 06/23/20  2114   TROPONIN I ng/mL <0 03         ABG:    VBG:  Results from last 7 days   Lab Units 06/24/20  0205   PH LANNY  6 940*   PCO2 LANNY mm Hg 27 1*   PO2 LANNY mm Hg 31 5*   HCO3 LANNY mmol/L 5 7*   BASE EXC LANNY mmol/L -25 6           Micro        EKG: NSR  Imaging: I have personally reviewed pertinent reports  Intake and Output  I/O       06/23 0701 - 06/24 0700 06/24 0701 - 06/25 0700    I V  (mL/kg) 293 8 (3 7) 5130 (64)    IV Piggyback 50 300    Total Intake(mL/kg) 343 8 (4 3) 5430 (67 7)    Urine (mL/kg/hr) 900 1400 (0 7)    Total Output 900 1400    Net -556 3 +4030                Height and Weights      IBW: -92 5 kg  Body mass index is 28 54 kg/m²    Weight (last 2 days)     Date/Time   Weight    06/24/20 0600   80 2 (176 81)                Nutrition       Diet Orders   (From admission, onward)             Start     Ordered    06/25/20 0228  Diet [place]/CHO Controlled; Consistent Carbohydrate Diet Level 3 (6 carb servings/90 grams CHO/meal)  Diet effective now     Question Answer Comment   Diet Type Tarik/CHO Controlled    [place]/CHO Controlled Consistent Carbohydrate Diet Level 3 (6 carb servings/90 grams CHO/meal)    RD to adjust diet per protocol?  No        06/25/20 0228                  Active Medications  Scheduled Meds:    Current Facility-Administered Medications:  acetaminophen 650 mg Oral Q6H PRN AmJAROCHO Dugan    busPIRone 10 mg Oral TID JAROCHO Lovett    dextrose 5 % and sodium chloride 0 9 % 250 mL/hr Intravenous Continuous CARMEN LovettNP Last Rate: 250 mL/hr (06/25/20 0428)   DULoxetine 60 mg Oral Daily JAROCHO Lovett    ergocalciferol 50,000 Units Oral Weekly JAROCHO Lovett    heparin (porcine) 5,000 Units Subcutaneous [facility] [address] JAROCHO Queen    insulin glargine 18 Units Subcutaneous Once Mozell Cones, JAROCHO    insulin glargine 18 Units Subcutaneous HS Mozell Cones, CRNP    insulin lispro 1-5 Units Subcutaneous TID AC Mozell Cones, CRNP    insulin lispro 1-5 Units Subcutaneous HS Mozell Cones, CRNP    insulin regular (HumuLIN R,NovoLIN R) infusion 0 1-30 Units/hr Intravenous Continuous Mozell Cones, CRNP Last Rate: 1 9 Units/hr (06/24/20 2100)   levothyroxine 150 mcg Oral Early Morning JAROCHO Lovett    ondansetron 4 mg Intravenous Q4H PRN Marii JAROCHO Montaño    pantoprazole 40 mg Intravenous Q24H [address] JAROCHO Queen    potassium chloride 20 mEq Oral Once Mozell Cones, JAROCHO    potassium phosphate 12 mmol Intravenous Once Mozell Cones, CRNP Last Rate: 12 mmol (06/25/20 0454)   zolpidem 10 mg Oral HS JAROCHO Lovett      Continuous Infusions:    dextrose 5 % and sodium chloride 0 9 % 250 mL/hr Last Rate: 250 mL/hr (06/25/20 0428)   insulin regular (HumuLIN R,NovoLIN R) infusion 0 1-30 Units/hr Last Rate: 1 9 Units/hr (06/24/20 2100)     PRN Meds: acetaminophen 650 mg Q6H PRN   ondansetron 4 mg Q4H PRN       Invasive Devices Review  Invasive Devices     Peripheral Intravenous Line            Peripheral IV 06/23/20 Left Wrist 1 day    Peripheral IV 06/23/20 Right Antecubital 1 day                Rationale for remaining devices: medical necessity  ---------------------------------------------------------------------------------------  Advance Directive and Living Will:      Power of :    POLST:    ---------------------------------------------------------------------------------------  Care Time Delivered:   No Critical Care time spent       JAROCHO Hensley      Portions of the record may have been created with voice recognition software  Occasional wrong word or "sound a like" substitutions may have occurred due to the inherent limitations of voice recognition software    Read the chart carefully and recognize, using context, where substitutions have occurred

## 2020-06-25 NOTE — NURSING NOTE
Resting in bed, drowsy but easily arousable and intact  Complained of nausea, 4mg zofran given, denies any pain or discomfort  Insulin drip continues with hourly blood glucose and BMP checks  See full shift assessment

## 2020-06-25 NOTE — ASSESSMENT & PLAN NOTE
Lab Results   Component Value Date    HGBA1C 10 9 (H) 05/20/2020       Recent Labs     06/24/20  2100 06/24/20  2206 06/24/20  2301 06/24/20  2358   POCGLU 71 123 177* 161*       Blood Sugar Average: Last 72 hrs:  (P) 171 [id number]

## 2020-06-25 NOTE — PLAN OF CARE
Problem: Potential for Falls  Goal: Patient will remain free of falls  Description  INTERVENTIONS:  - Assess patient frequently for physical needs  -  Identify cognitive and physical deficits and behaviors that affect risk of falls    -  [place] fall precautions as indicated by assessment   - Educate patient/family on patient safety including physical limitations  - Instruct patient to call for assistance with activity based on assessment  - Modify environment to reduce risk of injury  - Consider OT/PT consult to assist with strengthening/mobility  Outcome: Progressing     Problem: Prexisting or High Potential for Compromised Skin Integrity  Goal: Skin integrity is maintained or improved  Description  INTERVENTIONS:  - Identify patients at risk for skin breakdown  - Assess and monitor skin integrity  - Assess and monitor nutrition and hydration status  - Monitor labs   - Assess for incontinence   - Turn and reposition patient  - Assist with mobility/ambulation  - Relieve pressure over bony prominences  - Avoid friction and shearing  - Provide appropriate hygiene as needed including keeping skin clean and dry  - Evaluate need for skin moisturizer/barrier cream  - Collaborate with interdisciplinary team   - Patient/family teaching  - Consider wound care consult   Outcome: Progressing     Problem: PAIN - ADULT  Goal: Verbalizes/displays adequate comfort level or baseline comfort level  Description  Interventions:  - Encourage patient to monitor pain and request assistance  - Assess pain using appropriate pain scale  - Administer analgesics based on type and severity of pain and evaluate response  - Implement non-pharmacological measures as appropriate and evaluate response  - Consider cultural and social influences on pain and pain management  - Notify physician/advanced practitioner if interventions unsuccessful or patient reports new pain  Outcome: Progressing     Problem: INFECTION - ADULT  Goal: Absence or prevention of progression during hospitalization  Description  INTERVENTIONS:  - Assess and monitor for signs and symptoms of infection  - Monitor lab/diagnostic results  - Monitor all insertion sites, i e  indwelling lines, tubes, and drains  - Monitor endotracheal if appropriate and nasal secretions for changes in amount and color  - [place] appropriate cooling/warming therapies per order  - Administer medications as ordered  - Instruct and encourage patient and family to use good hand hygiene technique  - Identify and instruct in appropriate isolation precautions for identified infection/condition  Outcome: Progressing     Problem: SAFETY ADULT  Goal: Maintain or return to baseline ADL function  Description  INTERVENTIONS:  -  Assess patient's ability to carry out ADLs; assess patient's baseline for ADL function and identify physical deficits which impact ability to perform ADLs (bathing, care of mouth/teeth, toileting, grooming, dressing, etc )  - Assess/evaluate cause of self-care deficits   - Assess range of motion  - Assess patient's mobility; develop plan if impaired  - Assess patient's need for assistive devices and provide as appropriate  - Encourage maximum independence but intervene and supervise when necessary  - Involve family in performance of ADLs  - Assess for home care needs following discharge   - Consider OT consult to assist with ADL evaluation and planning for discharge  - Provide patient education as appropriate  Outcome: Progressing  Goal: Maintain or return mobility status to optimal level  Description  INTERVENTIONS:  - Assess patient's baseline mobility status (ambulation, transfers, stairs, etc )    - Identify cognitive and physical deficits and behaviors that affect mobility  - Identify mobility aids required to assist with transfers and/or ambulation (gait belt, sit-to-stand, lift, walker, cane, etc )  - [place] fall precautions as indicated by assessment  - Record patient progress and toleration of activity level on Mobility SBAR; progress patient to next Phase/Stage  - Instruct patient to call for assistance with activity based on assessment  - Consider rehabilitation consult to assist with strengthening/weightbearing, etc   Outcome: Progressing     Problem: DISCHARGE PLANNING  Goal: Discharge to home or other facility with appropriate resources  Description  INTERVENTIONS:  - Identify barriers to discharge w/patient and caregiver  - Arrange for needed discharge resources and transportation as appropriate  - Identify discharge learning needs (meds, wound care, etc )  - Arrange for interpretive services to assist at discharge as needed  - Refer to Case Management Department for coordinating discharge planning if the patient needs post-hospital services based on physician/advanced practitioner order or complex needs related to functional status, cognitive ability, or social support system  Outcome: Progressing     Problem: Knowledge Deficit  Goal: Patient/family/caregiver demonstrates understanding of disease process, treatment plan, medications, and discharge instructions  Description  Complete learning assessment and assess knowledge base    Interventions:  - Provide teaching at level of understanding  - Provide teaching via preferred learning methods  Outcome: Progressing     Problem: METABOLIC, FLUID AND ELECTROLYTES - ADULT  Goal: Electrolytes maintained within normal limits  Description  INTERVENTIONS:  - Monitor labs and assess patient for signs and symptoms of electrolyte imbalances  - Administer electrolyte replacement as ordered  - Monitor response to electrolyte replacements, including repeat lab results as appropriate  - Instruct patient on fluid and nutrition as appropriate  Outcome: Progressing  Goal: Fluid balance maintained  Description  INTERVENTIONS:  - Monitor labs   - Monitor I/O and WT  - Instruct patient on fluid and nutrition as appropriate  - Assess for signs & symptoms of volume excess or deficit  Outcome: Progressing  Goal: Glucose maintained within target range  Description  INTERVENTIONS:  - Monitor Blood Glucose as ordered  - Assess for signs and symptoms of hyperglycemia and hypoglycemia  - Administer ordered medications to maintain glucose within target range  - Assess nutritional intake and initiate nutrition service referral as needed  Outcome: Progressing

## 2020-06-25 NOTE — TELEMEDICINE
[facility] [address] [facility] [place] 47 y o  female MRN: [id number]  Unit/Bed#: ICU 10 Encounter: [id number]      REQUIRED DOCUMENTATION:     1  This service was provided via Telemedicine  2  Provider located at [facility]  3  TeleMed provider: JAROCHO Knight  4  Identify all parties in room with patient during tele consult:  5  After connecting through televideo, patient was identified by name and date of birth and assistant checked wristband  Patient was then informed that this was a Telemedicine visit and that the exam was being conducted confidentially over secure lines  My office door was closed  No one else was in the room  Patient acknowledged consent and understanding of privacy and security of the Telemedicine visit, and gave us permission to have the assistant stay in the room in order to assist with the history and to conduct the exam   I informed the patient that I have reviewed their record in Epic and presented the opportunity for them to ask any questions regarding the visit today  The patient agreed to participate  Assessment/Plan     Plan:   1  Increase Cymbalta to 90 mg/daily for depression and anxiety  2  Continue with Buspar 10 mg/TID  3  Recommend patient follow up with outpatient psychiatrist Dr Latonya Lopez for symptoms and medication management  4  Psychiatry is signing off    Risks, benefits and possible side effects of Medications:   Risks, benefits, and possible side effects of medications explained to patient and patient verbalizes understanding  Diagnosis: Depression  F32 9    Chief Complaint: "I have a lot going on at home"    History of Present Illness     Reason for Consult / Principal Problem: Depression    Patient is a 47 y o  female presents to the emergency room with generalized fatigue, body aches, anxiety and depression for 2-3 days  It was noted that patient also reports poor appetite, headache, vomiting and chest pain    Patient was alert and oriented x4, states that she has a lot going on at home and has been experiencing generalized weakness, feeling sick to her stomach for a few weeks; symptoms worsened in the last 2 days - so she decided to come into the hospital   Patient listed her stressors as the death of her  in February 2020, 2 children ages 6 and 8; 8 year  Old child is autistic and family issues  Patient states she was diagnosed with depression and generalized anxiety and sees Dr Nedra thomas outpatient psychiatry   Her last visit was on Monday which her Cymbalta was increased to 90 mg/dialy (current Cymbalta dose is 60 mg/daily) and she currently takes BuSpar 10 mg/TID  Patient is requesting increase in her Cymbalta to 90 mg  Patient states these medications are effective for her depression and anxiety  Patient currently denies suicidal thoughts, rates her depression and anxiety at 5/10 with 10 being the worst   Patient denies hallucinations, denies delusions, denies paranoia, denies drug use and denies alcohol use; and denies access to guns at home  Patient states that she has psychiatrist and a therapist appointment set up in the next few weeks with Dr Mesfin Rodriguez office  Recommend that patient continues after discharged from the hospital with outpatient psychiatry for medication and symptoms management  Inpatient consult to Psychiatry  Consult performed by:  JAROCHO Prieto  Consult ordered by: JAROCHO Rasheed      Psychiatric Review Of Systems:  sleep: yes, difficulty staying asleep/restless  appetite changes: no  weight changes: no  energy/anergy: no  interest/pleasure/anhedonia: no  somatic symptoms: yes  anxiety/panic: yes  jessica: no  guilty/hopeless: no  self injurious behavior/risky behavior: no    Historical Information   Past Psychiatric History:  Patient denies  Currently in treatment with Dr Nedra thomas, psychiatrist  Past Suicide attempts:  Patient denies  Past Violent behavior:  Patient denies  Past Psychiatric medication trial: Buspar, Cymbalta, Ativan, Xanax, Ambien    Substance Abuse History:  Illicit Drugs:  Patient denies  Use of Alcohol: denied    Longest clean time: n/a  History of IP/OP rehabilitation program: no   Smoking history: none  Use of Caffeine: coffee 2 cups /day    Family Psychiatric History:   Sister= depression  Father= alcohol abuse    Social History  Education: college graduate  Learning Disabilities: none  Marital history:   Living arrangement, social support: The patient lives in home with children: how many 2, ages 6 and 8  Occupational History: Employed as a Teacher  Functioning Relationships: good support system    Other Pertinent History: Financial    Traumatic History:   Abuse: Patient denies  Other Traumatic Events: Patient denies    Past Medical History:   Diagnosis Date    Anemia     Arthritis     B12 deficiency     Cervical disc disorder     Cervical disc disorder     On gabapentin     Diabetes mellitus ([facility] )     Disease of thyroid gland     hypothyroid    DKA (diabetic ketoacidoses) ([facility] ) 03/2018    MILLICENT (iron deficiency anemia)     Iron deficiency anemia     Type 1 diabetes ([place] [address] ) 1994    Type 1 diabetes mellitus, uncontrolled (HCC)     Vitamin D deficiency        Medical Review Of Systems:  Review of Systems    Meds/Allergies   all current active meds have been reviewed and current meds:   Current Facility-Administered Medications   Medication Dose Route Frequency    acetaminophen (TYLENOL) tablet 650 mg  650 mg Oral Q6H PRN    busPIRone (BUSPAR) tablet 10 mg  10 mg Oral TID    [START ON 6/26/2020] DULoxetine (CYMBALTA) delayed release capsule 90 mg  90 mg Oral Daily    ergocalciferol (VITAMIN D2) capsule 50,000 Units  50,000 Units Oral Weekly    heparin (porcine) subcutaneous injection 5,000 Units  5,000 Units Subcutaneous Q8H [address]    insulin glargine (LANTUS) subcutaneous injection 18 Units 0 18 mL  18 Units Subcutaneous HS    insulin lispro (HumaLOG) 100 units/mL subcutaneous injection 1-5 Units  1-5 Units Subcutaneous TID AC    insulin lispro (HumaLOG) 100 units/mL subcutaneous injection 1-5 Units  1-5 Units Subcutaneous HS    levothyroxine tablet 150 mcg  150 mcg Oral Early Morning    ondansetron (ZOFRAN) injection 4 mg  4 mg Intravenous Q4H PRN    pantoprazole (PROTONIX) injection 40 mg  40 mg Intravenous Q24H MICHAELA    propofol (DIPRIVAN) 1000 mg in 100 mL infusion (premix) **ADS Override Pull**         No Known Allergies    Objective   Vital signs in last 24 hours:  Temp:  [97 6 °F (36 4 °C)-99 3 °F (37 4 °C)] 98 2 °F (36 8 °C)  HR:  [] 92  Resp:  [11-22] 18  BP: (104-159)/(49-79) 114/71      Intake/Output Summary (Last 24 hours) at 6/25/2020 1157  Last data filed at 6/25/2020 0252  Gross per 24 hour   Intake 7774 98 ml   Output 2000 ml   Net 5774 98 ml       Mental Status Evaluation:  Appearance:  age appropriate   Behavior:  normal   Speech:  normal pitch and normal volume   Mood:  depressed   Affect:  mood-congruent   Thought Process:  goal directed and logical   Thought Content:  normal   Perceptual Disturbances: None   Risk Potential: Suicidal Ideations none, Homicidal Ideations none and Potential for Aggression No   Sensorium:  person, place, time/date and situation   Cognition:  recent and remote memory grossly intact   Consciousness:  alert and awake    Attention: attention span and concentration were age appropriate   Intellect: within normal limits   Insight:  age appropriate   Judgment: age appropriate   Muscle Strength and Tone: Unable to assess   Gait/Station: Unable to assess   Motor Activity: Unable to assess     Lab Results: All pertinent labs reviewed      Code Status: Level 1 - Full Code  Advance Directive and Living Will:      Power of :    POLST:      Counseling / Coordination of Care  Total floor / unit time spent today 30 minutes   Greater than 50% of total time was spent with the patient and / or family counseling and / or coordination of care   A description of the counseling / coordination of care:

## 2020-06-25 NOTE — ASSESSMENT & PLAN NOTE
Lab Results   Component Value Date    HGBA1C 10 9 (H) 05/20/2020       Recent Labs     06/25/20  0411 06/25/20  0559 06/25/20  0759 06/25/20  1017   POCGLU 152* 154* 144* 127       Blood Sugar Average: Last 72 hrs:  (P) 194 6     · Metabolic acidosis with anion gap now resolved  · Will transition to home insulin dosing and off of insulin infusion  · Initiate diet  · Serial BMP Q12 hours

## 2020-06-25 NOTE — ASSESSMENT & PLAN NOTE
· SI with depression and anxiety, noted in [facility] chart as reason for admission  · Patient denies currently  · Will consult psych for further evaluation given patient's social history and recent stressors

## 2020-06-26 PROBLEM — D72.819 LEUKOPENIA: Status: ACTIVE | Noted: 2020-06-26

## 2020-06-26 LAB
ANION GAP SERPL CALCULATED.3IONS-SCNC: 6 MMOL/L (ref 4–13)
ANION GAP SERPL CALCULATED.3IONS-SCNC: 8 MMOL/L (ref 4–13)
ANION GAP SERPL CALCULATED.3IONS-SCNC: 9 MMOL/L (ref 4–13)
BUN SERPL-MCNC: 11 MG/DL (ref 5–25)
BUN SERPL-MCNC: 7 MG/DL (ref 5–25)
BUN SERPL-MCNC: 8 MG/DL (ref 5–25)
CA-I BLD-SCNC: 1.13 MMOL/L (ref 1.12–1.32)
CALCIUM SERPL-MCNC: 8 MG/DL (ref 8.3–10.1)
CALCIUM SERPL-MCNC: 8.2 MG/DL (ref 8.3–10.1)
CALCIUM SERPL-MCNC: 8.3 MG/DL (ref 8.3–10.1)
CHLORIDE SERPL-SCNC: 106 MMOL/L (ref 100–108)
CHLORIDE SERPL-SCNC: 107 MMOL/L (ref 100–108)
CHLORIDE SERPL-SCNC: 108 MMOL/L (ref 100–108)
CO2 SERPL-SCNC: 23 MMOL/L (ref 21–32)
CO2 SERPL-SCNC: 24 MMOL/L (ref 21–32)
CO2 SERPL-SCNC: 25 MMOL/L (ref 21–32)
CREAT SERPL-MCNC: 0.85 MG/DL (ref 0.6–1.3)
CREAT SERPL-MCNC: 0.9 MG/DL (ref 0.6–1.3)
CREAT SERPL-MCNC: 0.91 MG/DL (ref 0.6–1.3)
ERYTHROCYTE [DISTWIDTH] IN BLOOD BY AUTOMATED COUNT: 13.9 % (ref 11.6–15.1)
GFR SERPL CREATININE-BSD FRML MDRD: 80 ML/MIN/1.73SQ M
GFR SERPL CREATININE-BSD FRML MDRD: 81 ML/MIN/1.73SQ M
GFR SERPL CREATININE-BSD FRML MDRD: 87 ML/MIN/1.73SQ M
GLUCOSE SERPL-MCNC: 107 MG/DL (ref 65–140)
GLUCOSE SERPL-MCNC: 135 MG/DL (ref 65–140)
GLUCOSE SERPL-MCNC: 191 MG/DL (ref 65–140)
GLUCOSE SERPL-MCNC: 197 MG/DL (ref 65–140)
GLUCOSE SERPL-MCNC: 211 MG/DL (ref 65–140)
GLUCOSE SERPL-MCNC: 211 MG/DL (ref 65–140)
GLUCOSE SERPL-MCNC: 227 MG/DL (ref 65–140)
GLUCOSE SERPL-MCNC: 237 MG/DL (ref 65–140)
GLUCOSE SERPL-MCNC: 305 MG/DL (ref 65–140)
HCT VFR BLD AUTO: 34.7 % (ref 34.8–46.1)
HGB BLD-MCNC: 11.2 G/DL (ref 11.5–15.4)
MAGNESIUM SERPL-MCNC: 1.9 MG/DL (ref 1.6–2.6)
MCH RBC QN AUTO: 29.2 PG (ref 26.8–34.3)
MCHC RBC AUTO-ENTMCNC: 32.3 G/DL (ref 31.4–37.4)
MCV RBC AUTO: 90 FL (ref 82–98)
PLATELET # BLD AUTO: 174 THOUSANDS/UL (ref 149–390)
PMV BLD AUTO: 10.4 FL (ref 8.9–12.7)
POTASSIUM SERPL-SCNC: 3.5 MMOL/L (ref 3.5–5.3)
POTASSIUM SERPL-SCNC: 4 MMOL/L (ref 3.5–5.3)
POTASSIUM SERPL-SCNC: 4 MMOL/L (ref 3.5–5.3)
PROCALCITONIN SERPL-MCNC: <0.05 NG/ML
RBC # BLD AUTO: 3.84 MILLION/UL (ref 3.81–5.12)
SARS-COV-2 RNA RESP QL NAA+PROBE: NEGATIVE
SODIUM SERPL-SCNC: 137 MMOL/L (ref 136–145)
SODIUM SERPL-SCNC: 139 MMOL/L (ref 136–145)
SODIUM SERPL-SCNC: 140 MMOL/L (ref 136–145)
WBC # BLD AUTO: 1.94 THOUSAND/UL (ref 4.31–10.16)

## 2020-06-26 PROCEDURE — 83735 ASSAY OF MAGNESIUM: CPT | Performed by: NURSE PRACTITIONER

## 2020-06-26 PROCEDURE — 85027 COMPLETE CBC AUTOMATED: CPT | Performed by: NURSE PRACTITIONER

## 2020-06-26 PROCEDURE — 82948 REAGENT STRIP/BLOOD GLUCOSE: CPT

## 2020-06-26 PROCEDURE — 87635 SARS-COV-2 COVID-19 AMP PRB: CPT | Performed by: FAMILY MEDICINE

## 2020-06-26 PROCEDURE — 82330 ASSAY OF CALCIUM: CPT | Performed by: NURSE PRACTITIONER

## 2020-06-26 PROCEDURE — 99232 SBSQ HOSP IP/OBS MODERATE 35: CPT | Performed by: FAMILY MEDICINE

## 2020-06-26 PROCEDURE — 84145 PROCALCITONIN (PCT): CPT | Performed by: FAMILY MEDICINE

## 2020-06-26 PROCEDURE — 80048 BASIC METABOLIC PNL TOTAL CA: CPT | Performed by: NURSE PRACTITIONER

## 2020-06-26 RX ORDER — ALPRAZOLAM 0.5 MG/1
0.5 TABLET ORAL 3 TIMES DAILY PRN
Status: DISCONTINUED | OUTPATIENT
Start: 2020-06-26 | End: 2020-06-27 | Stop reason: HOSPADM

## 2020-06-26 RX ORDER — LOPERAMIDE HYDROCHLORIDE 2 MG/1
2 CAPSULE ORAL 3 TIMES DAILY PRN
Status: DISCONTINUED | OUTPATIENT
Start: 2020-06-26 | End: 2020-06-27 | Stop reason: HOSPADM

## 2020-06-26 RX ADMIN — INSULIN LISPRO 4 UNITS: 100 INJECTION, SOLUTION INTRAVENOUS; SUBCUTANEOUS at 21:05

## 2020-06-26 RX ADMIN — INSULIN LISPRO 3 UNITS: 100 INJECTION, SOLUTION INTRAVENOUS; SUBCUTANEOUS at 08:58

## 2020-06-26 RX ADMIN — HEPARIN SODIUM 5000 UNITS: 5000 INJECTION INTRAVENOUS; SUBCUTANEOUS at 13:53

## 2020-06-26 RX ADMIN — BUSPIRONE HYDROCHLORIDE 10 MG: 10 TABLET ORAL at 21:05

## 2020-06-26 RX ADMIN — LEVOTHYROXINE SODIUM 150 MCG: 75 TABLET ORAL at 05:10

## 2020-06-26 RX ADMIN — BUSPIRONE HYDROCHLORIDE 10 MG: 10 TABLET ORAL at 08:55

## 2020-06-26 RX ADMIN — ALPRAZOLAM 0.5 MG: 0.5 TABLET ORAL at 14:09

## 2020-06-26 RX ADMIN — INSULIN GLARGINE 36 UNITS: 100 INJECTION, SOLUTION SUBCUTANEOUS at 21:05

## 2020-06-26 RX ADMIN — INSULIN LISPRO 2 UNITS: 100 INJECTION, SOLUTION INTRAVENOUS; SUBCUTANEOUS at 16:47

## 2020-06-26 RX ADMIN — SODIUM CHLORIDE 100 ML/HR: 0.9 INJECTION, SOLUTION INTRAVENOUS at 07:54

## 2020-06-26 RX ADMIN — HEPARIN SODIUM 5000 UNITS: 5000 INJECTION INTRAVENOUS; SUBCUTANEOUS at 21:05

## 2020-06-26 RX ADMIN — BUSPIRONE HYDROCHLORIDE 10 MG: 10 TABLET ORAL at 16:47

## 2020-06-26 RX ADMIN — ZOLPIDEM TARTRATE 5 MG: 5 TABLET, COATED ORAL at 21:05

## 2020-06-26 RX ADMIN — HEPARIN SODIUM 5000 UNITS: 5000 INJECTION INTRAVENOUS; SUBCUTANEOUS at 05:10

## 2020-06-26 RX ADMIN — DULOXETINE HYDROCHLORIDE 90 MG: 30 CAPSULE, DELAYED RELEASE ORAL at 08:55

## 2020-06-26 RX ADMIN — ONDANSETRON 4 MG: 2 INJECTION INTRAMUSCULAR; INTRAVENOUS at 07:56

## 2020-06-26 RX ADMIN — LOPERAMIDE HYDROCHLORIDE 2 MG: 2 CAPSULE ORAL at 14:09

## 2020-06-26 RX ADMIN — PANTOPRAZOLE SODIUM 40 MG: 40 TABLET, DELAYED RELEASE ORAL at 05:10

## 2020-06-26 NOTE — ASSESSMENT & PLAN NOTE
Lab Results   Component Value Date    HGBA1C 10 9 (H) 05/20/2020       Recent Labs     06/26/20  0010 06/26/20  0146 06/26/20  0748 06/26/20  1105   POCGLU 227* 211* 135 107       Blood Sugar Average: Last 72 hrs:  (P) 647 09441   Cont Lantus and current regimen  Complaining of diahrrea  Discharge planning

## 2020-06-26 NOTE — PROGRESS NOTES
Progress Note - Wilmer Jordan 1980, 47 y o  female MRN: [id number]    Unit/Bed#: E2 -01 Encounter: [id number]    Primary Care Provider: JAROCHO Gotti   Date and time admitted to hospital: 2020  1:48 AM        * DKA (diabetic ketoacidoses) [facility])  Assessment & Plan  Lab Results   Component Value Date    HGBA1C 10 9 (H) 2020       Recent Labs     20  0010 20  0146 20  0748 20  1105   POCGLU 227* 211* 135 107       Blood Sugar Average: Last 72 hrs:  (P) 183 08913   Cont Lantus and current regimen  Complaining of diahrrea  Discharge planning    Leukopenia  Assessment & Plan  ??  uknown   Sars  procalcitonin     Depression  Assessment & Plan  Continue current medi      Anxiety  Assessment & Plan  Patient was found with acute episode  Start on Xanax    Hypothyroidism  Assessment & Plan  Re-start on Levothyroid      VTE Pharmacologic Prophylaxis:   Pharmacologic: Heparin  Mechanical VTE Prophylaxis in Place: Yes    Patient Centered Rounds: I have performed bedside rounds with nursing staff today  Discussions with Specialists or Other Care Team Provider:     Education and Discussions with Family / Patient: patient    Time Spent for Care: 20 minutes  More than 50% of total time spent on counseling and coordination of care as described above      Current Length of Stay: 2 day(s)    Current Patient Status: Inpatient   Certification Statement: The patient will continue to require additional inpatient hospital stay due to acute above conditions    Discharge Plan: depend on clinical course    Code Status: Level 1 - Full Code      Subjective:   Patient is anxous and complaining of diahrrea  Objective:     Vitals:   Temp (24hrs), Av 9 °F (36 6 °C), Min:97 6 °F (36 4 °C), Max:98 3 °F (36 8 °C)    Temp:  [97 6 °F (36 4 °C)-98 3 °F (36 8 °C)] 97 7 °F (36 5 °C)  HR:  [90] 90  Resp:  [18] 18  BP: (110-142)/(66-85) 142/85  SpO2:  [95 %-97 %] 97 %  Body mass index is 28 82 kg/m²  Input and Output Summary (last 24 hours): Intake/Output Summary (Last 24 hours) at 6/26/2020 1416  Last data filed at 6/26/2020 0601  Gross per 24 hour   Intake 808 33 ml   Output    Net 808 33 ml       Physical Exam:     Physical Exam   Constitutional: She is oriented to person, place, and time  No distress  Pulmonary/Chest: No stridor  No respiratory distress  She has no wheezes  She has no rales  Abdominal: She exhibits no distension  There is no tenderness  There is no guarding  Neurological: She is alert and oriented to person, place, and time  She displays normal reflexes  No cranial nerve deficit  Coordination normal    Skin: She is not diaphoretic  There is pallor  Psychiatric: She has a normal mood and affect  Additional Data:     Labs:    Results from last 7 days   Lab Units 06/26/20  0447 06/25/20  0148   WBC Thousand/uL 1 94* 2 59*   HEMOGLOBIN g/dL 11 2* 10 8*   HEMATOCRIT % 34 7* 32 2*   PLATELETS Thousands/uL 174 181   NEUTROS PCT %  --  56   LYMPHS PCT %  --  32   MONOS PCT %  --  10   EOS PCT %  --  1     Results from last 7 days   Lab Units 06/26/20  0829   POTASSIUM mmol/L 4 0   CHLORIDE mmol/L 108   CO2 mmol/L 24   BUN mg/dL 7   CREATININE mg/dL 0 90   CALCIUM mg/dL 8 0*           * I Have Reviewed All Lab Data Listed Above  * Additional Pertinent Lab Tests Reviewed:  All Labs Within Last 24 Hours Reviewed    Imaging:    Imaging Reports Reviewed Today Include:   Imaging Personally Reviewed by Myself Includes:      Recent Cultures (last 7 days):           Last 24 Hours Medication List:     Current Facility-Administered Medications:  acetaminophen 650 mg Oral Q6H PRN JAROCHO Ramirez    ALPRAZolam 0 5 mg Oral TID PRN Dior Fonseca MD    busPIRone 10 mg Oral TID JAROCHO Ramirez    DULoxetine 90 mg Oral Daily JAROCHO Conner    ergocalciferol 50,000 Units Oral Weekly JAROCHO Queen    heparin (porcine) 5,000 Units Subcutaneous [facility] [address] JAROCHO Moctezuma    insulin glargine 36 Units Subcutaneous HS JAROCHO Magana    insulin lispro 1-5 Units Subcutaneous HS Kailey Owens MD    insulin lispro 1-6 Units Subcutaneous TID AC Kailey Owens MD    insulin lispro 3 Units Subcutaneous Daily With Breakfast Nanci Porter MD    levothyroxine 150 mcg Oral Early Morning JAROCHO Queen    loperamide 2 mg Oral TID PRN Malinda Cdoy MD    ondansetron 4 mg Intravenous Q4H PRN JAROCHO Queen    pantoprazole 40 mg Oral Early Morning Joel Ayala MD    sodium chloride 100 mL/hr Intravenous Continuous JAROCHO Magana Last Rate: 100 mL/hr (06/26/20 0754)   zolpidem 5 mg Oral HS PRN JAROCHO Magana         Today, Patient Was Seen By: Malinda Cody MD    ** Please Note: Dragon 360 Dictation voice to text software may have been used in the creation of this document   **

## 2020-06-26 NOTE — PROGRESS NOTES
The pantoprazole has / have been converted to Oral per [facility] IV-to-PO Auto-Conversion Protocol for Adults as approved by the Pharmacy and Therapeutics Committee  The patient met all eligible criteria:  3 Age = 25years old   2) Received at least one dose of the IV form   3) Receiving at least one other scheduled oral/enteral medication   4) Tolerating an oral/enteral diet   and did not have any exclusions:   1) Critical care patient   2) Active GI bleed (IF assessing H2RAs or PPIs)   3) Continuous tube feeding (IF assessing cipro, doxycycline, levofloxacin, minocycline, rifampin, or voriconazole)   4) Receiving PO vancomycin (IF assessing metronidazole)   5) Persistent nausea and/or vomiting   6) Ileus or gastrointestinal obstruction   7) Clifford/nasogastric tube set for continuous suction   8) Specific order not to automatically convert to PO (in the order's comments or if discussed in the most recent Infectious Disease or primary team's progress notes)      Confirmed criteria with Yoly Roman

## 2020-06-26 NOTE — QUICK NOTE
Reviewed labs and FSBGs  Called overnight by Critical Care as there was concern for recurrent DKA  Her full Lantus dose was resumed overnight with improvement in her BMPs  She is now ordered for Lantus 36units daily at night and Humalog 3units with meals

## 2020-06-27 VITALS
RESPIRATION RATE: 18 BRPM | HEIGHT: 66 IN | TEMPERATURE: 98.1 F | HEART RATE: 93 BPM | SYSTOLIC BLOOD PRESSURE: 133 MMHG | OXYGEN SATURATION: 98 % | WEIGHT: 178.57 LBS | DIASTOLIC BLOOD PRESSURE: 86 MMHG | BODY MASS INDEX: 28.7 KG/M2

## 2020-06-27 LAB
ANION GAP SERPL CALCULATED.3IONS-SCNC: 7 MMOL/L (ref 4–13)
BASOPHILS # BLD AUTO: 0 THOUSANDS/ΜL (ref 0–0.1)
BASOPHILS NFR BLD AUTO: 0 % (ref 0–1)
BUN SERPL-MCNC: 7 MG/DL (ref 5–25)
CALCIUM SERPL-MCNC: 8.2 MG/DL (ref 8.3–10.1)
CHLORIDE SERPL-SCNC: 107 MMOL/L (ref 100–108)
CO2 SERPL-SCNC: 27 MMOL/L (ref 21–32)
CREAT SERPL-MCNC: 0.84 MG/DL (ref 0.6–1.3)
ENDOMYSIUM IGA SER QL: NEGATIVE
EOSINOPHIL # BLD AUTO: 0.02 THOUSAND/ΜL (ref 0–0.61)
EOSINOPHIL NFR BLD AUTO: 1 % (ref 0–6)
ERYTHROCYTE [DISTWIDTH] IN BLOOD BY AUTOMATED COUNT: 13.6 % (ref 11.6–15.1)
GFR SERPL CREATININE-BSD FRML MDRD: 88 ML/MIN/1.73SQ M
GLIADIN PEPTIDE IGA SER-ACNC: 2 UNITS (ref 0–19)
GLIADIN PEPTIDE IGG SER-ACNC: 3 UNITS (ref 0–19)
GLUCOSE SERPL-MCNC: 106 MG/DL (ref 65–140)
GLUCOSE SERPL-MCNC: 39 MG/DL (ref 65–140)
GLUCOSE SERPL-MCNC: 68 MG/DL (ref 65–140)
GLUCOSE SERPL-MCNC: 79 MG/DL (ref 65–140)
GLUCOSE SERPL-MCNC: 93 MG/DL (ref 65–140)
HCT VFR BLD AUTO: 32.7 % (ref 34.8–46.1)
HGB BLD-MCNC: 10.8 G/DL (ref 11.5–15.4)
IGA SERPL-MCNC: 161 MG/DL (ref 87–352)
IMM GRANULOCYTES # BLD AUTO: 0 THOUSAND/UL (ref 0–0.2)
IMM GRANULOCYTES NFR BLD AUTO: 0 % (ref 0–2)
LYMPHOCYTES # BLD AUTO: 0.55 THOUSANDS/ΜL (ref 0.6–4.47)
LYMPHOCYTES NFR BLD AUTO: 26 % (ref 14–44)
MCH RBC QN AUTO: 29.9 PG (ref 26.8–34.3)
MCHC RBC AUTO-ENTMCNC: 33 G/DL (ref 31.4–37.4)
MCV RBC AUTO: 91 FL (ref 82–98)
MONOCYTES # BLD AUTO: 0.18 THOUSAND/ΜL (ref 0.17–1.22)
MONOCYTES NFR BLD AUTO: 8 % (ref 4–12)
NEUTROPHILS # BLD AUTO: 1.4 THOUSANDS/ΜL (ref 1.85–7.62)
NEUTS SEG NFR BLD AUTO: 65 % (ref 43–75)
NRBC BLD AUTO-RTO: 0 /100 WBCS
PLATELET # BLD AUTO: 162 THOUSANDS/UL (ref 149–390)
PMV BLD AUTO: 9.6 FL (ref 8.9–12.7)
POTASSIUM SERPL-SCNC: 3.7 MMOL/L (ref 3.5–5.3)
RBC # BLD AUTO: 3.61 MILLION/UL (ref 3.81–5.12)
SODIUM SERPL-SCNC: 141 MMOL/L (ref 136–145)
TTG IGA SER-ACNC: <2 U/ML (ref 0–3)
TTG IGG SER-ACNC: 4 U/ML (ref 0–5)
WBC # BLD AUTO: 2.15 THOUSAND/UL (ref 4.31–10.16)

## 2020-06-27 PROCEDURE — 99239 HOSP IP/OBS DSCHRG MGMT >30: CPT | Performed by: FAMILY MEDICINE

## 2020-06-27 PROCEDURE — 85025 COMPLETE CBC W/AUTO DIFF WBC: CPT | Performed by: FAMILY MEDICINE

## 2020-06-27 PROCEDURE — 82948 REAGENT STRIP/BLOOD GLUCOSE: CPT

## 2020-06-27 PROCEDURE — 80048 BASIC METABOLIC PNL TOTAL CA: CPT | Performed by: NURSE PRACTITIONER

## 2020-06-27 RX ADMIN — HEPARIN SODIUM 5000 UNITS: 5000 INJECTION INTRAVENOUS; SUBCUTANEOUS at 05:40

## 2020-06-27 RX ADMIN — INSULIN LISPRO 3 UNITS: 100 INJECTION, SOLUTION INTRAVENOUS; SUBCUTANEOUS at 07:28

## 2020-06-27 RX ADMIN — DULOXETINE HYDROCHLORIDE 90 MG: 30 CAPSULE, DELAYED RELEASE ORAL at 08:27

## 2020-06-27 RX ADMIN — LEVOTHYROXINE SODIUM 150 MCG: 75 TABLET ORAL at 05:40

## 2020-06-27 RX ADMIN — PANTOPRAZOLE SODIUM 40 MG: 40 TABLET, DELAYED RELEASE ORAL at 05:40

## 2020-06-27 RX ADMIN — BUSPIRONE HYDROCHLORIDE 10 MG: 10 TABLET ORAL at 08:27

## 2020-06-27 NOTE — ASSESSMENT & PLAN NOTE
Lab Results   Component Value Date    HGBA1C 10 9 (H) 05/20/2020       Recent Labs     06/26/20 2053 06/27/20  0550 06/27/20 0623 06/27/20  0739   POCGLU 305* 39* 106 68       Blood Sugar Average: Last 72 hrs:  (P) 277 [id number]   Improved  Cont Lantus and current regimen  Discharge home  I talked to patient regard her WBC low she stated she has a long history of bone marrow dysfunction   She will follow up with her hematology

## 2020-06-27 NOTE — PLAN OF CARE
Problem: Potential for Falls  Goal: Patient will remain free of falls  Description  INTERVENTIONS:  - Assess patient frequently for physical needs  -  Identify cognitive and physical deficits and behaviors that affect risk of falls    -  [place] fall precautions as indicated by assessment   - Educate patient/family on patient safety including physical limitations  - Instruct patient to call for assistance with activity based on assessment  - Modify environment to reduce risk of injury  - Consider OT/PT consult to assist with strengthening/mobility  Outcome: Progressing     Problem: Prexisting or High Potential for Compromised Skin Integrity  Goal: Skin integrity is maintained or improved  Description  INTERVENTIONS:  - Identify patients at risk for skin breakdown  - Assess and monitor skin integrity  - Assess and monitor nutrition and hydration status  - Monitor labs   - Assess for incontinence   - Turn and reposition patient  - Assist with mobility/ambulation  - Relieve pressure over bony prominences  - Avoid friction and shearing  - Provide appropriate hygiene as needed including keeping skin clean and dry  - Evaluate need for skin moisturizer/barrier cream  - Collaborate with interdisciplinary team   - Patient/family teaching  - Consider wound care consult   Outcome: Progressing     Problem: PAIN - ADULT  Goal: Verbalizes/displays adequate comfort level or baseline comfort level  Description  Interventions:  - Encourage patient to monitor pain and request assistance  - Assess pain using appropriate pain scale  - Administer analgesics based on type and severity of pain and evaluate response  - Implement non-pharmacological measures as appropriate and evaluate response  - Consider cultural and social influences on pain and pain management  - Notify physician/advanced practitioner if interventions unsuccessful or patient reports new pain  Outcome: Progressing     Problem: INFECTION - ADULT  Goal: Absence or prevention of progression during hospitalization  Description  INTERVENTIONS:  - Assess and monitor for signs and symptoms of infection  - Monitor lab/diagnostic results  - Monitor all insertion sites, i e  indwelling lines, tubes, and drains  - Monitor endotracheal if appropriate and nasal secretions for changes in amount and color  - [place] appropriate cooling/warming therapies per order  - Administer medications as ordered  - Instruct and encourage patient and family to use good hand hygiene technique  - Identify and instruct in appropriate isolation precautions for identified infection/condition  Outcome: Progressing     Problem: SAFETY ADULT  Goal: Maintain or return to baseline ADL function  Description  INTERVENTIONS:  -  Assess patient's ability to carry out ADLs; assess patient's baseline for ADL function and identify physical deficits which impact ability to perform ADLs (bathing, care of mouth/teeth, toileting, grooming, dressing, etc )  - Assess/evaluate cause of self-care deficits   - Assess range of motion  - Assess patient's mobility; develop plan if impaired  - Assess patient's need for assistive devices and provide as appropriate  - Encourage maximum independence but intervene and supervise when necessary  - Involve family in performance of ADLs  - Assess for home care needs following discharge   - Consider OT consult to assist with ADL evaluation and planning for discharge  - Provide patient education as appropriate  Outcome: Progressing  Goal: Maintain or return mobility status to optimal level  Description  INTERVENTIONS:  - Assess patient's baseline mobility status (ambulation, transfers, stairs, etc )    - Identify cognitive and physical deficits and behaviors that affect mobility  - Identify mobility aids required to assist with transfers and/or ambulation (gait belt, sit-to-stand, lift, walker, cane, etc )  - [place] fall precautions as indicated by assessment  - Record patient progress and toleration of activity level on Mobility SBAR; progress patient to next Phase/Stage  - Instruct patient to call for assistance with activity based on assessment  - Consider rehabilitation consult to assist with strengthening/weightbearing, etc   Outcome: Progressing     Problem: DISCHARGE PLANNING  Goal: Discharge to home or other facility with appropriate resources  Description  INTERVENTIONS:  - Identify barriers to discharge w/patient and caregiver  - Arrange for needed discharge resources and transportation as appropriate  - Identify discharge learning needs (meds, wound care, etc )  - Arrange for interpretive services to assist at discharge as needed  - Refer to Case Management Department for coordinating discharge planning if the patient needs post-hospital services based on physician/advanced practitioner order or complex needs related to functional status, cognitive ability, or social support system  Outcome: Progressing     Problem: Knowledge Deficit  Goal: Patient/family/caregiver demonstrates understanding of disease process, treatment plan, medications, and discharge instructions  Description  Complete learning assessment and assess knowledge base    Interventions:  - Provide teaching at level of understanding  - Provide teaching via preferred learning methods  Outcome: Progressing     Problem: METABOLIC, FLUID AND ELECTROLYTES - ADULT  Goal: Electrolytes maintained within normal limits  Description  INTERVENTIONS:  - Monitor labs and assess patient for signs and symptoms of electrolyte imbalances  - Administer electrolyte replacement as ordered  - Monitor response to electrolyte replacements, including repeat lab results as appropriate  - Instruct patient on fluid and nutrition as appropriate  Outcome: Progressing  Goal: Fluid balance maintained  Description  INTERVENTIONS:  - Monitor labs   - Monitor I/O and WT  - Instruct patient on fluid and nutrition as appropriate  - Assess for signs & symptoms of volume excess or deficit  Outcome: Progressing  Goal: Glucose maintained within target range  Description  INTERVENTIONS:  - Monitor Blood Glucose as ordered  - Assess for signs and symptoms of hyperglycemia and hypoglycemia  - Administer ordered medications to maintain glucose within target range  - Assess nutritional intake and initiate nutrition service referral as needed  Outcome: Progressing

## 2020-06-27 NOTE — DISCHARGE SUMMARY
Discharge- Pedrito Brito 1980, 47 y o  female MRN: [id number]    Unit/Bed#: E2 -01 Encounter: [id number]    Primary Care Provider: JAROCHO Zapata   Date and time admitted to hospital: 6/24/2020  1:48 AM        * DKA (diabetic ketoacidoses) [facility])  Assessment & Plan  Lab Results   Component Value Date    HGBA1C 10 9 (H) 05/20/2020       Recent Labs     06/26/20 2053 06/27/20  0550 06/27/20  0623 06/27/20  0739   POCGLU 305* 39* 106 68       Blood Sugar Average: Last 72 hrs:  (P) 319 [id number]   Improved  Cont Lantus and current regimen  Discharge home  I talked to patient regard her WBC low she stated she has a long history of bone marrow dysfunction   She will follow up with her hematology       Leukopenia  Assessment & Plan  Chronic, likely went down due previous acute conditions    Depression  Assessment & Plan  Continue current medi      Type 1 diabetes mellitus with hyperglycemia [facility])  Assessment & Plan  Lab Results   Component Value Date    HGBA1C 10 9 (H) 05/20/2020       Recent Labs     06/26/20 2053 06/27/20  0550 06/27/20  0623 06/27/20  0739   POCGLU 305* 39* 106 68       Blood Sugar Average: Last 72 hrs:  (P) 595 [id number]    use home medication  Follow up PCP    [address] home medication    Hypothyroidism  Assessment & Plan  Levothyroid      Discharging Physician / Practitioner: Pipo Ruelas MD  PCP: Norma Juan [address] [place] [address]  Admission Date:   Admission Orders (From admission, onward)     Ordered        06/24/20 0204  Inpatient Admission  Once                   Discharge Date: 06/27/20    Resolved Problems  Date Reviewed: 6/27/2020    None          Consultations During Hospital Stay:  Endocrine   Procedures Performed:   ·     Significant Findings / Test Results:   ·     Incidental Findings:   ·     Test Results Pending at Discharge (will require follow up):   ·      Outpatient Tests Requested:  · CBC    Complications:  None  Reason for Admission: none    Hospital Course:     Lennox Cord is a 47 y o  female patient who originally presented to the hospital on 6/24/2020 due to DKA patient was admitted to the ICU for evaluation and monitor  Patient was found to have metabolic acidosis with increased anion gap  She was started on IV fluid, insulin drip  Electrolyte imbalance was monitor  Patient has an extensive past medical history hypothyroidism, found to have elevated TSH patient was recommend to restart home medication  During the hospital patient was getting anxious reason why she was given xanax  Patient has an extensive past medical history of anemia with white blood cell count dysfunction  She was leukopenic with a negative workup for viral or infection condition  Patient has been clinically stable she will be discharged home  Please see above list of diagnoses and related plan for additional information  Condition at Discharge: stable     Discharge Day Visit / Exam:     Subjective:  I am better, I need to go home to see my kids  Vitals: Blood Pressure: 133/86 (06/27/20 0836)  Pulse: 93 (06/27/20 0836)  Temperature: 98 1 °F (36 7 °C) (06/27/20 0836)  Temp Source: Temporal (06/27/20 0836)  Respirations: 18 (06/27/20 0836)  Height: 5' 6" (167 6 cm) (06/25/20 0900)  Weight - Scale: 81 kg (178 lb 9 2 oz) (06/25/20 0548)  SpO2: 98 % (06/27/20 0836)  Exam:   Physical Exam   Constitutional: She is oriented to person, place, and time  No distress  Cardiovascular: Normal rate, regular rhythm, normal heart sounds and intact distal pulses  Pulmonary/Chest: Effort normal and breath sounds normal  No stridor  No respiratory distress  She has no wheezes  She has no rales  Abdominal: Soft  Bowel sounds are normal  She exhibits no distension  There is no tenderness  Neurological: She is alert and oriented to person, place, and time  She displays normal reflexes  No cranial nerve deficit   Coordination normal    Skin: Capillary refill takes more than 3 seconds  She is not diaphoretic  There is pallor  Psychiatric: She has a normal mood and affect  Discussion with Family:     Discharge instructions/Information to patient and family:   See after visit summary for information provided to patient and family  Provisions for Follow-Up Care:  See after visit summary for information related to follow-up care and any pertinent home health orders  Disposition:     Home    For Discharges to [facility]:   · Not Applicable to this Patient - Not Applicable to this Patient    Planned Readmission:      Discharge Statement:  I spent 30 minutes discharging the patient  This time was spent on the day of discharge  I had direct contact with the patient on the day of discharge  Greater than 50% of the total time was spent examining patient, answering all patient questions, arranging and discussing plan of care with patient as well as directly providing post-discharge instructions  Additional time then spent on discharge activities  Discharge Medications:  See after visit summary for reconciled discharge medications provided to patient and family        ** Please Note: This note has been constructed using a voice recognition system **

## 2020-06-27 NOTE — ASSESSMENT & PLAN NOTE
Lab Results   Component Value Date    HGBA1C 10 9 (H) 05/20/2020       Recent Labs     06/26/20 2053 06/27/20  0550 06/27/20 0623 06/27/20  0739   POCGLU 305* 39* 106 68       Blood Sugar Average: Last 72 hrs:  (P) 169 [id number]    use home medication  Follow up PCP

## 2020-06-29 ENCOUNTER — TRANSITIONAL CARE MANAGEMENT (OUTPATIENT)
Dept: FAMILY MEDICINE CLINIC | Facility: CLINIC | Age: 40
End: 2020-06-29

## 2020-06-30 ENCOUNTER — OFFICE VISIT (OUTPATIENT)
Dept: FAMILY MEDICINE CLINIC | Facility: CLINIC | Age: 40
End: 2020-06-30
Payer: COMMERCIAL

## 2020-06-30 ENCOUNTER — TELEPHONE (OUTPATIENT)
Dept: HEMATOLOGY ONCOLOGY | Facility: CLINIC | Age: 40
End: 2020-06-30

## 2020-06-30 ENCOUNTER — TELEPHONE (OUTPATIENT)
Dept: SURGICAL ONCOLOGY | Facility: CLINIC | Age: 40
End: 2020-06-30

## 2020-06-30 VITALS
HEIGHT: 66 IN | BODY MASS INDEX: 30.28 KG/M2 | WEIGHT: 188.4 LBS | HEART RATE: 82 BPM | RESPIRATION RATE: 20 BRPM | SYSTOLIC BLOOD PRESSURE: 126 MMHG | OXYGEN SATURATION: 98 % | DIASTOLIC BLOOD PRESSURE: 82 MMHG | TEMPERATURE: 98.8 F

## 2020-06-30 DIAGNOSIS — D72.819 LEUKOPENIA, UNSPECIFIED TYPE: ICD-10-CM

## 2020-06-30 DIAGNOSIS — E06.3 HYPOTHYROIDISM DUE TO HASHIMOTO'S THYROIDITIS: Chronic | ICD-10-CM

## 2020-06-30 DIAGNOSIS — R60.9 PERIPHERAL EDEMA: ICD-10-CM

## 2020-06-30 DIAGNOSIS — E10.65 TYPE 1 DIABETES MELLITUS WITH HYPERGLYCEMIA (HCC): Primary | ICD-10-CM

## 2020-06-30 DIAGNOSIS — E03.8 HYPOTHYROIDISM DUE TO HASHIMOTO'S THYROIDITIS: Chronic | ICD-10-CM

## 2020-06-30 PROCEDURE — 1111F DSCHRG MED/CURRENT MED MERGE: CPT | Performed by: NURSE PRACTITIONER

## 2020-06-30 PROCEDURE — 99496 TRANSJ CARE MGMT HIGH F2F 7D: CPT | Performed by: NURSE PRACTITIONER

## 2020-06-30 RX ORDER — TRAZODONE HYDROCHLORIDE 100 MG/1
100 TABLET ORAL
COMMUNITY
Start: 2020-06-22 | End: 2021-05-23

## 2020-06-30 RX ORDER — FUROSEMIDE 20 MG/1
10 TABLET ORAL 2 TIMES DAILY
Qty: 30 TABLET | Refills: 0 | Status: SHIPPED | OUTPATIENT
Start: 2020-06-30 | End: 2020-08-04

## 2020-06-30 RX ORDER — LORAZEPAM 0.5 MG/1
0.5 TABLET ORAL
COMMUNITY
Start: 2020-06-22 | End: 2021-08-30

## 2020-06-30 RX ORDER — POTASSIUM CHLORIDE 750 MG/1
10 TABLET, EXTENDED RELEASE ORAL 2 TIMES DAILY
Qty: 60 TABLET | Refills: 0 | Status: SHIPPED | OUTPATIENT
Start: 2020-06-30 | End: 2020-08-04

## 2020-07-02 ENCOUNTER — CONSULT (OUTPATIENT)
Dept: HEMATOLOGY ONCOLOGY | Facility: CLINIC | Age: 40
End: 2020-07-02
Payer: COMMERCIAL

## 2020-07-02 VITALS
RESPIRATION RATE: 18 BRPM | SYSTOLIC BLOOD PRESSURE: 140 MMHG | HEIGHT: 66 IN | WEIGHT: 180.8 LBS | BODY MASS INDEX: 29.06 KG/M2 | TEMPERATURE: 98.7 F | DIASTOLIC BLOOD PRESSURE: 90 MMHG | OXYGEN SATURATION: 96 % | HEART RATE: 86 BPM

## 2020-07-02 DIAGNOSIS — D50.9 IRON DEFICIENCY ANEMIA, UNSPECIFIED IRON DEFICIENCY ANEMIA TYPE: Primary | Chronic | ICD-10-CM

## 2020-07-02 DIAGNOSIS — D72.819 LEUKOPENIA, UNSPECIFIED TYPE: ICD-10-CM

## 2020-07-02 PROCEDURE — 3046F HEMOGLOBIN A1C LEVEL >9.0%: CPT | Performed by: INTERNAL MEDICINE

## 2020-07-02 PROCEDURE — 99244 OFF/OP CNSLTJ NEW/EST MOD 40: CPT | Performed by: INTERNAL MEDICINE

## 2020-07-02 NOTE — PROGRESS NOTES
Hematology/Oncology Outpatient Follow-up  Mery Hand 47 y o  female 1980 [id number]    Date:  7/2/2020    Assessment and Plan:  1  Leukopenia, unspecified type  She seems to have low white cell count at least since 2018  This most likely related to her comorbidities including type 1 diabetes  However, other etiologies like bone marrow suppression cannot be ruled out  We did discuss pursuing initial workup and considering a bone marrow biopsy at 1 point in the future to rule out bone marrow disorder which is less likely scenario  - CBC and differential; Future  - Comprehensive metabolic panel; Future  - Iron Panel (Includes Ferritin, Iron Sat%, Iron, and TIBC); Future  - Ferritin; Future  - Vitamin B12; Future  - LD,Blood; Future  - Magnesium; Future  - C-reactive protein; Future  - Sedimentation rate, automated; Future  - TSH, 3rd generation with Free T4 reflex; Future  - Uric acid; Future  - Reticulocytes; Future  - Direct antiglobulin test; Future  - Haptoglobin; Future  - Hemolysis Smear; Future  - Occult Blood, Fecal Immunochemical; Future  - IgG, IgA, IgM; Future  - Beta 2 microglobulin, serum; Future  - IMMUNOFIXATION (PEACE) AND PROTEIN ELECTROPHORESIS, RANDOM URINE; Future  - Immunoglobulin free LT chains blood; Future  - Kappa/Lambda Light Chains Free With Ratio,Urine; Future  - Protein, Total and Protein Electrophoresis with Immunofixation; Future  - LD,Blood; Future    2  Iron deficiency anemia, unspecified iron deficiency anemia type  She has normocytic anemia which seems to be new and most likely related to the recent hospitalization due to DKA  The patient has already established diagnosis of intermittent iron deficiency due to her gastric bypass surgery  We will check her iron panel and continue to monitor her closely  I did advise her to take vitamin B12 supplements regularly  - CBC and differential; Future  - Comprehensive metabolic panel;  Future  - Iron Panel (Includes Ferritin, Iron Sat%, Iron, and TIBC); Future  - Ferritin; Future  - Vitamin B12; Future  - LD,Blood; Future  - Magnesium; Future  - C-reactive protein; Future  - Sedimentation rate, automated; Future  - TSH, 3rd generation with Free T4 reflex; Future  - Uric acid; Future  - Reticulocytes; Future  - Direct antiglobulin test; Future  - Haptoglobin; Future  - Hemolysis Smear; Future  - Occult Blood, Fecal Immunochemical; Future  - IgG, IgA, IgM; Future  - Beta 2 microglobulin, serum; Future  - IMMUNOFIXATION (PEACE) AND PROTEIN ELECTROPHORESIS, RANDOM URINE; Future  - Immunoglobulin free LT chains blood; Future  - Kappa/Lambda Light Chains Free With Ratio,Urine; Future  - Protein, Total and Protein Electrophoresis with Immunofixation; Future  - LD,Blood; Future      HPI:  This is the 63-year-old female with history of type 1 diabetes mellitus, disease of the thyroid gland, morbid obesity status post gastric bypass surgery in 2009  She lost about 100 lb at least   She developed iron deficiency and was treated with iron IV Venofer on multiple occasions under Dr Mohsen Sage from the [facility]  She also seems to have the long history of low white cell count  She was recently admitted to the hospital due to diabetes ketoacidosis  She was found to have low white cell count  Her most recent CBC on 06/27/2020 showed a white cell count of 2 1 with ANC of 1 4  Her hemoglobin was 10 8 with MCV of 91  The platelet count was in the low-normal range of 162  She denies bleeding from any sites  She did complain about significant fatigue and swelling of the lower extremities  Interval history:    ROS: Review of Systems   Constitutional: Positive for fatigue  Negative for activity change, appetite change, chills, diaphoresis, fever and unexpected weight change     HENT: Negative for congestion, dental problem, ear discharge, ear pain, facial swelling, hearing loss, mouth sores, nosebleeds, postnasal drip, sore throat, tinnitus and trouble swallowing  Eyes: Negative for discharge, redness, itching and visual disturbance  Respiratory: Positive for shortness of breath  Negative for cough, chest tightness and wheezing  Cardiovascular: Negative for chest pain, palpitations and leg swelling  Gastrointestinal: Negative for abdominal distention, abdominal pain, anal bleeding, blood in stool, constipation, diarrhea, nausea and vomiting  Genitourinary: Negative for difficulty urinating, dysuria, flank pain, frequency, hematuria and urgency  Musculoskeletal: Negative for arthralgias, back pain, gait problem, joint swelling, myalgias and neck pain  Skin: Negative for color change, pallor, rash and wound  Neurological: Positive for dizziness, numbness and headaches  Negative for syncope, speech difficulty, weakness and light-headedness  Hematological: Negative for adenopathy  Does not bruise/bleed easily  Psychiatric/Behavioral: Positive for sleep disturbance  Negative for agitation, behavioral problems and confusion         Past Medical History:   Diagnosis Date    Anemia     Arthritis     B12 deficiency     Cervical disc disorder     Cervical disc disorder     On gabapentin     [facility]     Disease of thyroid gland     hypothyroid    DKA (diabetic ketoacidoses) ([facility] ) 2018    MILLICENT (iron deficiency anemia)     Iron deficiency anemia     Type 1 diabetes ([facility] ) 1994    Type 1 diabetes mellitus, uncontrolled ([facility] )     Vitamin D deficiency        Past Surgical History:   Procedure Laterality Date    ABDOMINAL SURGERY      gastric bypass     SECTION      x2    CHOLECYSTECTOMY  2018    GASTRIC BYPASS  2007    INTRAUTERINE DEVICE INSERTION  2015    OTHER SURGICAL HISTORY      surgery for imperforate hymen/endometriosis/hydrometrocolpos    TUBAL LIGATION      WISDOM TOOTH EXTRACTION         Social History     Socioeconomic History    Marital status: Legally      Spouse name: None    Number of children: None    Years of education: None    Highest education level: None   Occupational History    Occupation: Teacher    Social Needs    Financial resource strain: None    Food insecurity:     Worry: None     Inability: None    Transportation needs:     Medical: None     Non-medical: None   Tobacco Use    Smoking status: Never Smoker    Smokeless tobacco: Never Used   Substance and Sexual Activity    Alcohol use: No     Frequency: Never     Binge frequency: Never     Comment: socially    Drug use: No    Sexual activity: Yes     Partners: Male   Lifestyle    Physical activity:     Days per week: None     Minutes per session: None    Stress: None   Relationships    Social connections:     Talks on phone: None     Gets together: None     Attends Worship service: None     Active member of club or organization: None     Attends meetings of clubs or organizations: None     Relationship status: None    Intimate partner violence:     Fear of current or ex partner: None     Emotionally abused: None     Physically abused: None     Forced sexual activity: None   Other Topics Concern    None   Social History Narrative     - As per [address]    Occasionally consumes alcohol - As per [address]    Consumes on average 2 cups of regular coffee per day        Family History   Problem Relation Age of Onset    Thyroid disease Mother     URIEL disease Mother     Hyperlipidemia Mother     Hypertension Mother     Osteoarthritis Mother     Thyroid disease unspecified Mother     Diabetes Father     Alcohol abuse Father     Diabetes type I Father     Thyroid disease Sister     Depression Sister     Thyroid disease unspecified Sister     Heart disease Maternal Grandmother     Hypertension Maternal Grandmother     Arthritis Maternal Grandmother     Diabetes type I Maternal Uncle     Diabetes type I Maternal Grandfather        No Known Allergies      Current Outpatient Medications:   busPIRone (BUSPAR) 10 mg tablet, Take 1 tablet (10 mg total) by mouth 3 (three) times a day, Disp: 90 tablet, Rfl: 1    DULoxetine (CYMBALTA) 60 mg delayed release capsule, TAKE ONE CAPSULE BY MOUTH ONCE DAILY, Disp: 30 capsule, Rfl: 5    ergocalciferol (VITAMIN D2) 50,000 units, Take 1 capsule (50,000 Units total) by mouth once a week, Disp: 12 capsule, Rfl: 3    furosemide (LASIX) 20 mg tablet, Take 0 5 tablets (10 mg total) by mouth 2 (two) times a day, Disp: 30 tablet, Rfl: 0    glucagon (GLUCAGON EMERGENCY) 1 MG injection, Inject 1 mg under the skin, Disp: , Rfl:     glucose blood test strip, Testing up to 8 times a day  E10 65, Disp: , Rfl:     Insulin Aspart FlexPen (NovoLOG FlexPen) 100 UNIT/ML SOPN, 1 unit for 6 grams of carbs and 1 unit for 30 mg/dl above 120   upto 50 units a day, Disp: 5 pen, Rfl: 1    insulin glargine (LANTUS SOLOSTAR) 100 units/mL injection pen, Inject 36 Units under the skin daily at bedtime, Disp: 5 pen, Rfl: 3    Insulin Pen Needle (BD Pen Needle Deanna U/F) 32G X 4 MM MISC, Use 4/day, Disp: 100 each, Rfl: 3    Insulin Syringe-Needle U-100 (INSULIN SYRINGE 1CC/28G) 28G X 1/2" 1 ML MISC, 4 injections daily E10 65, Disp: , Rfl:     lidocaine (LMX) 4 % cream, Apply topically as needed for mild pain, Disp: 30 g, Rfl: 1    LORazepam (ATIVAN) 0 5 mg tablet, , Disp: , Rfl:     ondansetron (ZOFRAN) 4 mg tablet, Take 1 tablet (4 mg total) by mouth every 4 (four) hours as needed for nausea or vomiting, Disp: 20 tablet, Rfl: 0    potassium chloride (K-DUR,KLOR-CON) 10 mEq tablet, Take 1 tablet (10 mEq total) by mouth 2 (two) times a day Take with Furosemide, Disp: 60 tablet, Rfl: 0    thyroid (ARMOUR) 120 MG tablet, Take 1 tablet (120 mg total) by mouth daily, Disp: 90 tablet, Rfl: 1    traZODone (DESYREL) 100 mg tablet, , Disp: , Rfl:     zolpidem (AMBIEN) 10 mg tablet, Take 1/2 tablet 1 hour before bedtime and 1/2 tablet in the middle of the night if you wake up , Disp: 30 tablet, Rfl: 0      Physical Exam:  /90 (BP Location: Left arm, Patient Position: Sitting, Cuff Size: Adult)   Pulse 86   Temp 98 7 °F (37 1 °C) (Tympanic)   Resp 18   Ht 5' 6" (1 676 m)   Wt 82 kg (180 lb 12 8 oz)   LMP 06/19/2020 (Exact Date)   SpO2 96%   BMI 29 18 kg/m²     Physical Exam   Constitutional: She is oriented to person, place, and time  She appears well-developed and well-nourished  No distress  HENT:   Head: Normocephalic and atraumatic  Nose: Nose normal    Mouth/Throat: Oropharynx is clear and moist    Eyes: Pupils are equal, round, and reactive to light  Conjunctivae and EOM are normal  Right eye exhibits no discharge  Left eye exhibits no discharge  No scleral icterus  Neck: Normal range of motion  Neck supple  No JVD present  No tracheal deviation present  No thyromegaly present  Cardiovascular: Normal rate, regular rhythm and normal heart sounds  Exam reveals no friction rub  No murmur heard  Pulmonary/Chest: Effort normal and breath sounds normal  No stridor  No respiratory distress  She has no wheezes  She has no rales  She exhibits no tenderness  Abdominal: Soft  Bowel sounds are normal  She exhibits no distension and no mass  There is no hepatosplenomegaly, splenomegaly or hepatomegaly  There is no tenderness  There is no rebound and no guarding  Obese abdomen   Musculoskeletal: Normal range of motion  She exhibits edema (2+ pitting edema of the lower extremities)  She exhibits no tenderness or deformity  Lymphadenopathy:     She has no cervical adenopathy  Neurological: She is alert and oriented to person, place, and time  She has normal reflexes  No cranial nerve deficit  Coordination normal    Skin: Skin is warm and dry  No rash noted  She is not diaphoretic  No erythema  No pallor  Psychiatric: She has a normal mood and affect   Her behavior is normal  Judgment and thought content normal          Labs:  Lab Results   Component Value Date    WBC 2 15 (L) 06/27/2020    HGB 10 8 (L) 06/27/2020    HCT 32 7 (L) 06/27/2020    MCV 91 06/27/2020     06/27/2020     Lab Results   Component Value Date     05/16/2018    K 3 7 06/27/2020     06/27/2020    CO2 27 06/27/2020    ANIONGAP 11 3 05/16/2018    BUN 7 06/27/2020    CREATININE 0 84 06/27/2020    GLUF 219 (H) 05/20/2020    CALCIUM 8 2 (L) 06/27/2020    AST 24 05/20/2020    ALT 32 05/20/2020    ALKPHOS 176 (H) 05/20/2020    PROT 6 8 05/16/2018    BILITOT 0 4 05/16/2018    EGFR 88 06/27/2020       Patient voiced understanding and agreement in the above discussion  Aware to contact our office with questions/symptoms in the interim

## 2020-07-20 ENCOUNTER — TELEPHONE (OUTPATIENT)
Dept: FAMILY MEDICINE CLINIC | Facility: CLINIC | Age: 40
End: 2020-07-20

## 2020-07-20 ENCOUNTER — APPOINTMENT (OUTPATIENT)
Dept: LAB | Facility: HOSPITAL | Age: 40
End: 2020-07-20
Payer: COMMERCIAL

## 2020-07-20 DIAGNOSIS — E10.69 TYPE I DIABETES MELLITUS WITH HYPEROSMOLAR COMA (HCC): ICD-10-CM

## 2020-07-20 DIAGNOSIS — E06.3 HYPOTHYROIDISM DUE TO HASHIMOTO'S THYROIDITIS: ICD-10-CM

## 2020-07-20 DIAGNOSIS — R60.9 EDEMA, UNSPECIFIED TYPE: Primary | ICD-10-CM

## 2020-07-20 DIAGNOSIS — E03.8 HYPOTHYROIDISM DUE TO HASHIMOTO'S THYROIDITIS: ICD-10-CM

## 2020-07-20 DIAGNOSIS — E10.65 TYPE 1 DIABETES MELLITUS WITH HYPERGLYCEMIA (HCC): Primary | ICD-10-CM

## 2020-07-20 DIAGNOSIS — E10.65 TYPE I DIABETES MELLITUS WITH HYPEROSMOLAR COMA (HCC): ICD-10-CM

## 2020-07-20 DIAGNOSIS — Z20.822 COVID-19 RULED OUT: Primary | ICD-10-CM

## 2020-07-20 LAB
BACTERIA UR QL AUTO: ABNORMAL /HPF
BILIRUB UR QL STRIP: NEGATIVE
BNP SERPL-MCNC: 37 PG/ML (ref 1–100)
CLARITY UR: CLEAR
COLOR UR: YELLOW
GLUCOSE UR STRIP-MCNC: ABNORMAL MG/DL
HGB UR QL STRIP.AUTO: ABNORMAL
KETONES UR STRIP-MCNC: ABNORMAL MG/DL
LEUKOCYTE ESTERASE UR QL STRIP: NEGATIVE
NITRITE UR QL STRIP: NEGATIVE
NON-SQ EPI CELLS URNS QL MICRO: ABNORMAL /HPF
PH UR STRIP.AUTO: 6 [PH]
PROT UR STRIP-MCNC: NEGATIVE MG/DL
RBC #/AREA URNS AUTO: ABNORMAL /HPF
SP GR UR STRIP.AUTO: 1.01 (ref 1–1.03)
UROBILINOGEN UR QL STRIP.AUTO: 0.2 E.U./DL
WBC #/AREA URNS AUTO: ABNORMAL /HPF

## 2020-07-20 PROCEDURE — 81001 URINALYSIS AUTO W/SCOPE: CPT | Performed by: INTERNAL MEDICINE

## 2020-07-20 PROCEDURE — U0003 INFECTIOUS AGENT DETECTION BY NUCLEIC ACID (DNA OR RNA); SEVERE ACUTE RESPIRATORY SYNDROME CORONAVIRUS 2 (SARS-COV-2) (CORONAVIRUS DISEASE [COVID-19]), AMPLIFIED PROBE TECHNIQUE, MAKING USE OF HIGH THROUGHPUT TECHNOLOGIES AS DESCRIBED BY CMS-2020-01-R: HCPCS

## 2020-07-20 PROCEDURE — 83880 ASSAY OF NATRIURETIC PEPTIDE: CPT

## 2020-07-20 NOTE — TELEPHONE ENCOUNTER
Patient does not have any nausea/vomitting, and her levels have been "pretty good"   I told her she will here from us as soon as we get her levels back

## 2020-07-20 NOTE — TELEPHONE ENCOUNTER
Please advise that PCP ordered labs in June  Dr Pratik Arndt also placed orders last month  I've added stat BMP, urinalysis and serum ketones  How have BG been recently? Any nausea/vomiting, abdominal pain?

## 2020-07-20 NOTE — PROGRESS NOTES
Called and spoke to pt regarding lab results which , no acidosis, no ketosis, normal anion gap  Pt states that BG was 113 this morning, ate a bowl of cereal before getting blood work, did bolus with 8 units of rapid-acting insulin with meal  Denies fevers, chills, nausea/vomiting or abdominal pain  Had been feeling well until yesterday morning, woke up with fatigue, myalgias but denies sore throat, cough or dyspnea  Reports that COVID test is pending  TSH remains elevated at 31, FT4 pending  Recommend increasing thyroid supplementation to 130 mcg  Repeat TSH/FT4 in 6 weeks  Pt understood and agrees with plan

## 2020-07-20 NOTE — TELEPHONE ENCOUNTER
Patient was recently in the hospital for DKA, patient is starting to feel sick again  Asking for labs to be done  Does not want to go to the er   Please advise

## 2020-07-23 LAB — SARS-COV-2 RNA SPEC QL NAA+PROBE: NOT DETECTED

## 2020-07-24 ENCOUNTER — APPOINTMENT (OUTPATIENT)
Dept: LAB | Facility: HOSPITAL | Age: 40
End: 2020-07-24
Attending: INTERNAL MEDICINE
Payer: COMMERCIAL

## 2020-07-24 DIAGNOSIS — D50.9 IRON DEFICIENCY ANEMIA, UNSPECIFIED IRON DEFICIENCY ANEMIA TYPE: ICD-10-CM

## 2020-07-24 DIAGNOSIS — E03.8 HYPOTHYROIDISM DUE TO HASHIMOTO'S THYROIDITIS: ICD-10-CM

## 2020-07-24 DIAGNOSIS — D72.819 LEUKOPENIA, UNSPECIFIED TYPE: ICD-10-CM

## 2020-07-24 DIAGNOSIS — E06.3 HYPOTHYROIDISM DUE TO HASHIMOTO'S THYROIDITIS: ICD-10-CM

## 2020-07-24 LAB
ALBUMIN SERPL BCP-MCNC: 4.4 G/DL (ref 3.5–5.7)
ALP SERPL-CCNC: 78 U/L (ref 40–150)
ALT SERPL W P-5'-P-CCNC: 27 U/L (ref 7–52)
ANION GAP SERPL CALCULATED.3IONS-SCNC: 8 MMOL/L (ref 4–13)
AST SERPL W P-5'-P-CCNC: 31 U/L (ref 13–39)
BILIRUB SERPL-MCNC: 0.8 MG/DL (ref 0.2–1)
BLD SMEAR INTERP: NORMAL
BUN SERPL-MCNC: 11 MG/DL (ref 7–25)
CALCIUM SERPL-MCNC: 9.9 MG/DL (ref 8.6–10.5)
CHLORIDE SERPL-SCNC: 97 MMOL/L (ref 98–107)
CO2 SERPL-SCNC: 28 MMOL/L (ref 21–31)
CREAT SERPL-MCNC: 0.84 MG/DL (ref 0.6–1.2)
CRP SERPL QL: <5 MG/L
DAT POLY-SP REAG RBC QL: NEGATIVE
EOSINOPHIL # BLD AUTO: 0.1 THOUSAND/UL (ref 0–0.61)
EOSINOPHIL NFR BLD MANUAL: 4 % (ref 0–6)
ERYTHROCYTE [DISTWIDTH] IN BLOOD BY AUTOMATED COUNT: 13.1 % (ref 11.5–14.5)
ERYTHROCYTE [SEDIMENTATION RATE] IN BLOOD: 12 MM/HOUR (ref 0–20)
FERRITIN SERPL-MCNC: 85 NG/ML (ref 8–388)
GFR SERPL CREATININE-BSD FRML MDRD: 88 ML/MIN/1.73SQ M
GLUCOSE P FAST SERPL-MCNC: 113 MG/DL (ref 65–99)
HCT VFR BLD AUTO: 36.7 % (ref 42–47)
HGB BLD-MCNC: 12.4 G/DL (ref 12–16)
IGA SERPL-MCNC: 177 MG/DL (ref 70–400)
IGG SERPL-MCNC: 987 MG/DL (ref 700–1600)
IGM SERPL-MCNC: 100 MG/DL (ref 40–230)
IRON SATN MFR SERPL: 24 %
IRON SERPL-MCNC: 80 UG/DL (ref 50–170)
LDH SERPL-CCNC: 134 U/L (ref 84–246)
LYMPHOCYTES # BLD AUTO: 0.93 THOUSAND/UL (ref 0.6–4.47)
LYMPHOCYTES # BLD AUTO: 37 % (ref 20–51)
MAGNESIUM SERPL-MCNC: 1.7 MG/DL (ref 1.9–2.7)
MCH RBC QN AUTO: 29.5 PG (ref 26–34)
MCHC RBC AUTO-ENTMCNC: 33.8 G/DL (ref 31–37)
MCV RBC AUTO: 87 FL (ref 81–99)
MONOCYTES # BLD AUTO: 0.15 THOUSAND/UL (ref 0–1.22)
MONOCYTES NFR BLD AUTO: 6 % (ref 4–12)
NEUTS SEG # BLD: 1.33 THOUSAND/UL (ref 1.81–6.82)
NEUTS SEG NFR BLD AUTO: 53 % (ref 42–75)
PLATELET # BLD AUTO: 168 THOUSANDS/UL (ref 149–390)
PMV BLD AUTO: 8.9 FL (ref 8.6–11.7)
POTASSIUM SERPL-SCNC: 3.9 MMOL/L (ref 3.5–5.5)
PROT SERPL-MCNC: 7.2 G/DL (ref 6.4–8.9)
RBC # BLD AUTO: 4.21 MILLION/UL (ref 3.9–5.2)
RETICS # CALC: 0.8 % (ref 0.37–1.87)
SODIUM SERPL-SCNC: 133 MMOL/L (ref 134–143)
T4 FREE SERPL-MCNC: 0.77 NG/DL (ref 0.76–1.46)
TIBC SERPL-MCNC: 331 UG/DL (ref 250–450)
TOTAL CELLS COUNTED SPEC: 100
TSH SERPL DL<=0.05 MIU/L-ACNC: >47.9 UIU/ML (ref 0.45–5.33)
URATE SERPL-MCNC: 3.8 MG/DL (ref 2.3–7.6)
VIT B12 SERPL-MCNC: 355 PG/ML (ref 100–900)
WBC # BLD AUTO: 2.5 THOUSAND/UL (ref 4.8–10.8)

## 2020-07-24 PROCEDURE — 82232 ASSAY OF BETA-2 PROTEIN: CPT

## 2020-07-24 PROCEDURE — 83615 LACTATE (LD) (LDH) ENZYME: CPT

## 2020-07-24 PROCEDURE — 85045 AUTOMATED RETICULOCYTE COUNT: CPT

## 2020-07-24 PROCEDURE — 82607 VITAMIN B-12: CPT

## 2020-07-24 PROCEDURE — 83735 ASSAY OF MAGNESIUM: CPT

## 2020-07-24 PROCEDURE — 83550 IRON BINDING TEST: CPT

## 2020-07-24 PROCEDURE — 84165 PROTEIN E-PHORESIS SERUM: CPT | Performed by: PATHOLOGY

## 2020-07-24 PROCEDURE — 86140 C-REACTIVE PROTEIN: CPT

## 2020-07-24 PROCEDURE — 86880 COOMBS TEST DIRECT: CPT

## 2020-07-24 PROCEDURE — 83010 ASSAY OF HAPTOGLOBIN QUANT: CPT

## 2020-07-24 PROCEDURE — 36415 COLL VENOUS BLD VENIPUNCTURE: CPT

## 2020-07-24 PROCEDURE — 84165 PROTEIN E-PHORESIS SERUM: CPT

## 2020-07-24 PROCEDURE — 85027 COMPLETE CBC AUTOMATED: CPT

## 2020-07-24 PROCEDURE — 80053 COMPREHEN METABOLIC PANEL: CPT

## 2020-07-24 PROCEDURE — 82784 ASSAY IGA/IGD/IGG/IGM EACH: CPT

## 2020-07-24 PROCEDURE — 84550 ASSAY OF BLOOD/URIC ACID: CPT

## 2020-07-24 PROCEDURE — 82728 ASSAY OF FERRITIN: CPT

## 2020-07-24 PROCEDURE — 83540 ASSAY OF IRON: CPT

## 2020-07-24 PROCEDURE — 84439 ASSAY OF FREE THYROXINE: CPT

## 2020-07-24 PROCEDURE — 82525 ASSAY OF COPPER: CPT

## 2020-07-24 PROCEDURE — 83883 ASSAY NEPHELOMETRY NOT SPEC: CPT

## 2020-07-24 PROCEDURE — 85652 RBC SED RATE AUTOMATED: CPT

## 2020-07-24 PROCEDURE — 84443 ASSAY THYROID STIM HORMONE: CPT

## 2020-07-24 PROCEDURE — 85007 BL SMEAR W/DIFF WBC COUNT: CPT

## 2020-07-25 LAB
HAPTOGLOB SERPL-MCNC: 101 MG/DL (ref 33–278)
KAPPA LC FREE SER-MCNC: 25.2 MG/L (ref 3.3–19.4)
KAPPA LC FREE/LAMBDA FREE SER: 1.19 {RATIO} (ref 0.26–1.65)
LAMBDA LC FREE SERPL-MCNC: 21.2 MG/L (ref 5.7–26.3)

## 2020-07-26 LAB — B2 MICROGLOB SERPL-MCNC: 1.4 MG/L (ref 0.6–2.4)

## 2020-07-27 ENCOUNTER — TELEPHONE (OUTPATIENT)
Dept: FAMILY MEDICINE CLINIC | Facility: CLINIC | Age: 40
End: 2020-07-27

## 2020-07-27 LAB
ALBUMIN SERPL ELPH-MCNC: 4.46 G/DL (ref 3.5–5)
ALBUMIN SERPL ELPH-MCNC: 62 % (ref 52–65)
ALPHA1 GLOB SERPL ELPH-MCNC: 0.24 G/DL (ref 0.1–0.4)
ALPHA1 GLOB SERPL ELPH-MCNC: 3.3 % (ref 2.5–5)
ALPHA2 GLOB SERPL ELPH-MCNC: 0.85 G/DL (ref 0.4–1.2)
ALPHA2 GLOB SERPL ELPH-MCNC: 11.8 % (ref 7–13)
BETA GLOB ABNORMAL SERPL ELPH-MCNC: 0.4 G/DL (ref 0.4–0.8)
BETA1 GLOB SERPL ELPH-MCNC: 5.6 % (ref 5–13)
BETA2 GLOB SERPL ELPH-MCNC: 4 % (ref 2–8)
BETA2+GAMMA GLOB SERPL ELPH-MCNC: 0.29 G/DL (ref 0.2–0.5)
GAMMA GLOB ABNORMAL SERPL ELPH-MCNC: 0.96 G/DL (ref 0.5–1.6)
GAMMA GLOB SERPL ELPH-MCNC: 13.3 % (ref 12–22)
IGG/ALB SER: 1.63 {RATIO} (ref 1.1–1.8)
KAPPA LC UR-MCNC: 43.55 MG/L (ref 0.63–113.79)
KAPPA LC/LAMBDA UR: 9.55 {RATIO} (ref 1.03–31.76)
LAMBDA LC UR-MCNC: 4.56 MG/L (ref 0.47–11.77)
PROT PATTERN SERPL ELPH-IMP: NORMAL
PROT SERPL-MCNC: 7.2 G/DL (ref 6.4–8.2)

## 2020-07-27 NOTE — TELEPHONE ENCOUNTER
Called pt at number provided and left message on her voicemail stating that we will schedule virtual visit w me for tomorrow 07/28  Will have office staff call in am to schedule

## 2020-07-27 NOTE — TELEPHONE ENCOUNTER
Pt was doing great from thurs through Sunday and woke up today 07/27 same symptoms  Can hardly keep her eyes open and could not move her muscles  Hardly can walk    She was wondering if she was ever tested for lymes  Or any other testing to get answers? Please advise    She seems at her wits end      [address] [place] [address]

## 2020-07-28 ENCOUNTER — TELEMEDICINE (OUTPATIENT)
Dept: FAMILY MEDICINE CLINIC | Facility: CLINIC | Age: 40
End: 2020-07-28
Payer: COMMERCIAL

## 2020-07-28 DIAGNOSIS — E03.8 HYPOTHYROIDISM DUE TO HASHIMOTO'S THYROIDITIS: Chronic | ICD-10-CM

## 2020-07-28 DIAGNOSIS — E06.3 HYPOTHYROIDISM DUE TO HASHIMOTO'S THYROIDITIS: Chronic | ICD-10-CM

## 2020-07-28 DIAGNOSIS — E53.8 B12 DEFICIENCY: Primary | ICD-10-CM

## 2020-07-28 DIAGNOSIS — E83.42 HYPOMAGNESEMIA: ICD-10-CM

## 2020-07-28 DIAGNOSIS — M79.10 MYALGIA: ICD-10-CM

## 2020-07-28 DIAGNOSIS — E83.39 HYPOPHOSPHATEMIA: ICD-10-CM

## 2020-07-28 DIAGNOSIS — D72.819 LEUKOPENIA, UNSPECIFIED TYPE: ICD-10-CM

## 2020-07-28 DIAGNOSIS — R53.83 FATIGUE, UNSPECIFIED TYPE: ICD-10-CM

## 2020-07-28 LAB — COPPER SERPL-MCNC: 142 UG/DL (ref 72–166)

## 2020-07-28 PROCEDURE — 99213 OFFICE O/P EST LOW 20 MIN: CPT | Performed by: INTERNAL MEDICINE

## 2020-07-28 PROCEDURE — 1111F DSCHRG MED/CURRENT MED MERGE: CPT | Performed by: INTERNAL MEDICINE

## 2020-07-28 PROCEDURE — 3046F HEMOGLOBIN A1C LEVEL >9.0%: CPT | Performed by: INTERNAL MEDICINE

## 2020-07-28 PROCEDURE — 2022F DILAT RTA XM EVC RTNOPTHY: CPT | Performed by: INTERNAL MEDICINE

## 2020-07-28 PROCEDURE — 1036F TOBACCO NON-USER: CPT | Performed by: INTERNAL MEDICINE

## 2020-07-28 RX ORDER — CYANOCOBALAMIN (VITAMIN B-12) 500 MCG
500 TABLET ORAL DAILY
Qty: 30 TABLET | Refills: 1 | Status: SHIPPED | OUTPATIENT
Start: 2020-07-28 | End: 2020-08-04

## 2020-07-28 NOTE — PROGRESS NOTES
Virtual Regular Visit      Assessment/Plan:    Problem List Items Addressed This Visit        Endocrine    Hypothyroidism (Chronic)  TSH significantly elevated with low FT4, thyroid supplement increased recently  Discussed waiting at least 3 weeks until repeating TFTs  Other    B12 deficiency - Primary    Borderline low at 355, discussed oral repletion to goal >400  Relevant Medications    vitamin B-12 (CYANOCOBALAMIN) 500 MCG TABS    Leukopenia    Etiology unclear, pt referred to heme/onc for evaluation  Relevant Medications    vitamin B-12 (CYANOCOBALAMIN) 500 MCG TABS    Other Relevant Orders    LAURENT Screen w/ Reflex to Titer/Pattern    Sjogren's Antibodies      Other Visit Diagnoses     Hypomagnesemia        Slightly low at 1 7, will replete with goal 2  Relevant Medications    magnesium oxide (MAG-OX) 400 mg    Other Relevant Orders    Magnesium    Hypophosphatemia        No recent labs but previously low at 1 2  Relevant Orders    Phosphorus    Fatigue, unspecified type        Non-specific but associated with subjective weakness  Discussed possible electrolyte shifts related to thyroid dysfunction or glycemic changes  Advised to check BG q4-6hrs during next episode, call if symptoms recur and will order stat BMP, Mg and Phos during episode  Relevant Orders    Basic metabolic panel    Myalgia        Reports muscle tenderness in thighs, shoulder and forearms  Will evaluate for inflammatory myopathy, autoimmune process    Relevant Orders    Basic metabolic panel    CK (with reflex to MB)    LAURENT Screen w/ Reflex to Titer/Pattern    Sjogren's Antibodies               Reason for visit is   Chief Complaint   Patient presents with    Virtual Regular Visit        Encounter provider Tati Munson MD    Provider located at [address] [place] [phone]      Recent Visits  Date Type Provider Dept   07/27/20 Telephone [facility]   Showing recent visits within past 7 days and meeting all other requirements     Today's Visits  Date Type Provider Dept   07/28/20 MD Yef[facility]   Showing today's visits and meeting all other requirements     Future Appointments  No visits were found meeting these conditions  Showing future appointments within next 150 days and meeting all other requirements        The patient was identified by name and date of birth  Elen Thakur was informed that this is a telemedicine visit and that the visit is being conducted through [address] and patient was informed that this is not a secure, HIPAA-complaint platform  She agrees to proceed     My office door was closed  No one else was in the room  She acknowledged consent and understanding of privacy and security of the video platform  The patient has agreed to participate and understands they can discontinue the visit at any time  Patient is aware this is a billable service  Subjective  Elen Thakur is a 47 y o  female who presents for  Virtual, same-day visit for fatigue and weakness  Pt reports another episode yesterday in which she woke up with extreme fatigue and myalgia  States that she woke up normally around 6am and had to go back to bed until 11am due to fatigue  Reports legs and arms felt extremely weak and achy but was able to ambulate and move extremities  No lightheadedness, shortness of breath, fevers/chills, cough, nausea/vomiting or diarrhea  Reports -200 recently but hasn't been checking during these episodes  Denies hypoglycemia symptoms, diaphoresis or tremors  Saw her psychiatrist recently due to concerns that his was medication side effects - stopped wellbutrin, started lexapro 10mg daily, cymbalta 60mg daily  Taking thyroid armor 130mcg for past 3 days   Reports that symptoms better this morning, but still having aching/tenderness in thighs and arms         Past Medical History:   Diagnosis Date    Anemia     Arthritis     B12 deficiency     Cervical disc disorder     Cervical disc disorder     On gabapentin     [facility] )     Disease of thyroid gland     hypothyroid    DKA (diabetic ketoacidoses) ([place] [address] ) 2018    MILLICENT (iron deficiency anemia)     Iron deficiency anemia     Type 1 diabetes ([place] [address] )     Type 1 diabetes mellitus, uncontrolled ([place] [address] )     Vitamin D deficiency        Past Surgical History:   Procedure Laterality Date    ABDOMINAL SURGERY      gastric bypass     SECTION      x2    CHOLECYSTECTOMY  2018    GASTRIC BYPASS  2007    INTRAUTERINE DEVICE INSERTION  2015    OTHER SURGICAL HISTORY      surgery for imperforate hymen/endometriosis/hydrometrocolpos    TUBAL LIGATION      WISDOM TOOTH EXTRACTION         Current Outpatient Medications   Medication Sig Dispense Refill    busPIRone (BUSPAR) 10 mg tablet Take 1 tablet (10 mg total) by mouth 3 (three) times a day 90 tablet 1    DULoxetine (CYMBALTA) 60 mg delayed release capsule TAKE ONE CAPSULE BY MOUTH ONCE DAILY 30 capsule 5    ergocalciferol (VITAMIN D2) 50,000 units Take 1 capsule (50,000 Units total) by mouth once a week 12 capsule 3    furosemide (LASIX) 20 mg tablet Take 0 5 tablets (10 mg total) by mouth 2 (two) times a day 30 tablet 0    glucagon (GLUCAGON EMERGENCY) 1 MG injection Inject 1 mg under the skin      glucose blood test strip Testing up to 8 times a day  E10 65      Insulin Aspart FlexPen (NovoLOG FlexPen) 100 UNIT/ML SOPN 1 unit for 6 grams of carbs and 1 unit for 30 mg/dl above 120    upto 50 units a day 5 pen 1    insulin glargine (LANTUS SOLOSTAR) 100 units/mL injection pen Inject 36 Units under the skin daily at bedtime 5 pen 3    Insulin Pen Needle (BD Pen Needle Deanna U/F) 32G X 4 MM MISC Use 4/day 100 each 3    Insulin Syringe-Needle U-100 (INSULIN SYRINGE 1CC/28G) 28G X 1/2" 1 ML MISC 4 injections daily E10 65  lidocaine (LMX) 4 % cream Apply topically as needed for mild pain 30 g 1    LORazepam (ATIVAN) 0 5 mg tablet       magnesium oxide (MAG-OX) 400 mg Take 1 tablet (400 mg total) by mouth daily for 7 days 7 tablet 0    ondansetron (ZOFRAN) 4 mg tablet Take 1 tablet (4 mg total) by mouth every 4 (four) hours as needed for nausea or vomiting 20 tablet 0    potassium chloride (K-DUR,KLOR-CON) 10 mEq tablet Take 1 tablet (10 mEq total) by mouth 2 (two) times a day Take with Furosemide 60 tablet 0    thyroid (ARMOUR) 130 MG tablet Take 1 tablet (130 mg total) by mouth daily 30 tablet 1    traZODone (DESYREL) 100 mg tablet       vitamin B-12 (CYANOCOBALAMIN) 500 MCG TABS Take 1 tablet (500 mcg total) by mouth daily 30 tablet 1    zolpidem (AMBIEN) 10 mg tablet Take 1/2 tablet 1 hour before bedtime and 1/2 tablet in the middle of the night if you wake up  30 tablet 0     No current facility-administered medications for this visit  No Known Allergies    Review of Systems   Constitutional: Positive for appetite change and fatigue  Negative for chills, diaphoresis and fever  Respiratory: Negative for cough, chest tightness and shortness of breath  Cardiovascular: Negative for chest pain, palpitations and leg swelling  Gastrointestinal: Negative for abdominal pain, constipation, diarrhea, nausea and vomiting  Musculoskeletal: Positive for myalgias  Negative for arthralgias  Skin: Negative for rash  Neurological: Positive for weakness  Negative for dizziness, tremors, syncope, light-headedness, numbness and headaches  Psychiatric/Behavioral: Positive for dysphoric mood and sleep disturbance  Negative for self-injury and suicidal ideas  The patient is not nervous/anxious  Video Exam    There were no vitals filed for this visit  Physical Exam   Constitutional: She appears well-developed and well-nourished  No distress  Pulmonary/Chest: Effort normal  No respiratory distress  Neurological: She is alert  She displays no tremor  Gait normal    Psychiatric: She has a normal mood and affect  Her behavior is normal  Judgment and thought content normal         I spent 18 minutes directly with the patient during this visit      Jud Tariq acknowledges that she has consented to an online visit or consultation  She understands that the online visit is based solely on information provided by her, and that, in the absence of a face-to-face physical evaluation by the physician, the diagnosis she receives is both limited and provisional in terms of accuracy and completeness  This is not intended to replace a full medical face-to-face evaluation by the physician  Clarissa Webb understands and accepts these terms

## 2020-08-04 ENCOUNTER — APPOINTMENT (OUTPATIENT)
Dept: LAB | Facility: CLINIC | Age: 40
End: 2020-08-04
Payer: COMMERCIAL

## 2020-08-04 ENCOUNTER — OFFICE VISIT (OUTPATIENT)
Dept: FAMILY MEDICINE CLINIC | Facility: CLINIC | Age: 40
End: 2020-08-04
Payer: COMMERCIAL

## 2020-08-04 VITALS
WEIGHT: 146.8 LBS | HEIGHT: 66 IN | TEMPERATURE: 97 F | SYSTOLIC BLOOD PRESSURE: 132 MMHG | HEART RATE: 88 BPM | RESPIRATION RATE: 18 BRPM | DIASTOLIC BLOOD PRESSURE: 80 MMHG | OXYGEN SATURATION: 100 % | BODY MASS INDEX: 23.59 KG/M2

## 2020-08-04 DIAGNOSIS — R53.83 FATIGUE, UNSPECIFIED TYPE: ICD-10-CM

## 2020-08-04 DIAGNOSIS — E10.649 TYPE 1 DIABETES MELLITUS WITH HYPOGLYCEMIA AND WITHOUT COMA (HCC): ICD-10-CM

## 2020-08-04 DIAGNOSIS — R47.9 DIFFICULTY WITH SPEECH: ICD-10-CM

## 2020-08-04 DIAGNOSIS — E83.42 HYPOMAGNESEMIA: ICD-10-CM

## 2020-08-04 DIAGNOSIS — D72.819 LEUKOPENIA, UNSPECIFIED TYPE: ICD-10-CM

## 2020-08-04 DIAGNOSIS — H54.7 VISION PROBLEMS: ICD-10-CM

## 2020-08-04 DIAGNOSIS — E06.3 HYPOTHYROIDISM DUE TO HASHIMOTO'S THYROIDITIS: Primary | ICD-10-CM

## 2020-08-04 DIAGNOSIS — F33.1 MODERATE EPISODE OF RECURRENT MAJOR DEPRESSIVE DISORDER (HCC): ICD-10-CM

## 2020-08-04 DIAGNOSIS — E83.39 HYPOPHOSPHATEMIA: ICD-10-CM

## 2020-08-04 DIAGNOSIS — R41.82 ALTERED MENTAL STATUS, UNSPECIFIED ALTERED MENTAL STATUS TYPE: ICD-10-CM

## 2020-08-04 DIAGNOSIS — M79.10 MYALGIA: ICD-10-CM

## 2020-08-04 DIAGNOSIS — R42 DIZZINESS: ICD-10-CM

## 2020-08-04 DIAGNOSIS — E03.8 HYPOTHYROIDISM DUE TO HASHIMOTO'S THYROIDITIS: Primary | ICD-10-CM

## 2020-08-04 PROCEDURE — 3008F BODY MASS INDEX DOCD: CPT | Performed by: NURSE PRACTITIONER

## 2020-08-04 PROCEDURE — 3725F SCREEN DEPRESSION PERFORMED: CPT | Performed by: NURSE PRACTITIONER

## 2020-08-04 PROCEDURE — 3046F HEMOGLOBIN A1C LEVEL >9.0%: CPT | Performed by: NURSE PRACTITIONER

## 2020-08-04 PROCEDURE — 99214 OFFICE O/P EST MOD 30 MIN: CPT | Performed by: NURSE PRACTITIONER

## 2020-08-04 PROCEDURE — 1111F DSCHRG MED/CURRENT MED MERGE: CPT | Performed by: NURSE PRACTITIONER

## 2020-08-04 PROCEDURE — 2022F DILAT RTA XM EVC RTNOPTHY: CPT | Performed by: NURSE PRACTITIONER

## 2020-08-04 PROCEDURE — 1036F TOBACCO NON-USER: CPT | Performed by: NURSE PRACTITIONER

## 2020-08-04 RX ORDER — LEVOTHYROXINE SODIUM 175 UG/1
175 TABLET ORAL
Qty: 90 TABLET | Refills: 3 | Status: SHIPPED | OUTPATIENT
Start: 2020-08-04 | End: 2020-09-25

## 2020-08-04 NOTE — PATIENT INSTRUCTIONS
Stop [place]  Restart Levothyroxine- increase dose to 175mcg daily- will recheck in 6-8 weeks  Get labs done as directed   MRI ordered to r/o MS as discussed with patient  Return in 2-4 weeks for follow up  Call sooner for problems/concerns

## 2020-08-04 NOTE — PROGRESS NOTES
[facility]        NAME: Radha Leos is a 47 y o  female  : 1980    MRN: [id number]  DATE: 2020  TIME: 1:50 PM    Assessment and Plan   Hypothyroidism due to Hashimoto's thyroiditis [E03 8, E06 3]  1  Hypothyroidism due to Hashimoto's thyroiditis  levothyroxine 175 mcg tablet   2  Altered mental status, unspecified altered mental status type  MRI brain w wo contrast    Lyme Antibody Profile with reflex to WB   3  Fatigue, unspecified type  MRI brain w wo contrast    Lyme Antibody Profile with reflex to WB   4  Dizziness  MRI brain w wo contrast    Lyme Antibody Profile with reflex to WB   5  Vision problems  MRI brain w wo contrast    Lyme Antibody Profile with reflex to WB   6  Difficulty with speech  MRI brain w wo contrast   7  Type 1 diabetes mellitus with hypoglycemia and without coma ([facility] [address] )     8  Moderate episode of recurrent major depressive disorder [facility])           Patient Instructions     Patient Instructions   Stop [place]  Restart Levothyroxine- increase dose to 175mcg daily- will recheck in 6-8 weeks  Get labs done as directed   MRI ordered to r/o MS as discussed with patient  Return in 2-4 weeks for follow up  Call sooner for problems/concerns          Chief Complaint     Chief Complaint   Patient presents with    Follow-up     Patient is being seen today for a 4 week f/u  Positive depression  Has a few problems she would like to discuss with the provider         History of Present Illness       Patient is here for follow up- is not feeling well- is willing to switch back to Levothyroxine- her previous result of 4 9 on 150mcg of Levothyroxine- is willing to start at 175mcg daily- will stop [place] for now  Patient is concerned her symptoms are consistent with MS- has been looking up her symptoms and would like to have an MRI- she reports Dizziness, altered mental status, visual disturbance, extreme fatigue, and difficulty with her speech at times   She reports some "good" days and some "bad" days  She reports her blood sugar levels are much better- is willing to return after 8/20 for a repeat HgA1c  Review of Systems   Review of Systems   Constitutional: Positive for activity change, fatigue and unexpected weight change (down about 15-20 pounds from her normal weight of 160s)  Negative for diaphoresis and fever  HENT: Negative for congestion, facial swelling, hearing loss, rhinorrhea, sinus pressure, sinus pain, sneezing, sore throat and voice change  Eyes: Positive for visual disturbance  Negative for discharge  Respiratory: Negative for cough, choking, chest tightness, shortness of breath, wheezing and stridor  Cardiovascular: Negative for chest pain, palpitations and leg swelling  Gastrointestinal: Negative for abdominal distention, abdominal pain, constipation, diarrhea, nausea and vomiting  Endocrine: Negative for polydipsia, polyphagia and polyuria  Genitourinary: Negative for difficulty urinating, dysuria, frequency and urgency  Musculoskeletal: Negative for arthralgias, back pain, gait problem, joint swelling, myalgias, neck pain and neck stiffness  Skin: Negative for color change, rash and wound  Neurological: Positive for dizziness, speech difficulty, weakness and light-headedness  Negative for syncope and headaches  Hematological: Negative for adenopathy  Does not bruise/bleed easily  Psychiatric/Behavioral: Positive for dysphoric mood and sleep disturbance  Negative for agitation, behavioral problems, confusion, hallucinations and suicidal ideas  The patient is nervous/anxious            Current Medications       Current Outpatient Medications:     DULoxetine (CYMBALTA) 60 mg delayed release capsule, TAKE ONE CAPSULE BY MOUTH ONCE DAILY, Disp: 30 capsule, Rfl: 5    ergocalciferol (VITAMIN D2) 50,000 units, Take 1 capsule (50,000 Units total) by mouth once a week, Disp: 12 capsule, Rfl: 3    glucose blood test strip, Testing up to 8 times a day  E10 65, Disp: , Rfl:     Insulin Aspart FlexPen (NovoLOG FlexPen) 100 UNIT/ML SOPN, 1 unit for 6 grams of carbs and 1 unit for 30 mg/dl above 120   upto 50 units a day, Disp: 5 pen, Rfl: 1    insulin glargine (LANTUS SOLOSTAR) 100 units/mL injection pen, Inject 36 Units under the skin daily at bedtime, Disp: 5 pen, Rfl: 3    Insulin Pen Needle (BD Pen Needle Deanna U/F) 32G X 4 MM MISC, Use 4/day, Disp: 100 each, Rfl: 3    Insulin Syringe-Needle U-100 (INSULIN SYRINGE 1CC/28G) 28G X 1/2" 1 ML MISC, 4 injections daily E10 65, Disp: , Rfl:     LORazepam (ATIVAN) 0 5 mg tablet, , Disp: , Rfl:     traZODone (DESYREL) 100 mg tablet, Take 100 mg by mouth daily at bedtime , Disp: , Rfl:     levothyroxine 175 mcg tablet, Take 1 tablet (175 mcg total) by mouth daily in the early morning, Disp: 90 tablet, Rfl: 3    Current Allergies     Allergies as of 2020    (No Known Allergies)            The following portions of the patient's history were reviewed and updated as appropriate: allergies, current medications, past family history, past medical history, past social history, past surgical history and problem list      Past Medical History:   Diagnosis Date    Anemia     Arthritis     B12 deficiency     Cervical disc disorder     Cervical disc disorder     On gabapentin     Diabetes mellitus ([facility] [address] )     Disease of thyroid gland     hypothyroid    DKA (diabetic ketoacidoses) ([facility] [address] ) 2018    MILLICENT (iron deficiency anemia)     Iron deficiency anemia     Type 1 diabetes ([facility] [address] )     Type 1 diabetes mellitus, uncontrolled ([facility] [address] )     Vitamin D deficiency        Past Surgical History:   Procedure Laterality Date    ABDOMINAL SURGERY      gastric bypass     SECTION      x2    CHOLECYSTECTOMY  2018    GASTRIC BYPASS  2007    INTRAUTERINE DEVICE INSERTION  2015    OTHER SURGICAL HISTORY      surgery for imperforate hymen/endometriosis/hydrometrocolpos    TUBAL LIGATION      WISDOM TOOTH EXTRACTION         Family History   Problem Relation Age of Onset    Thyroid disease Mother     URIEL disease Mother     Hyperlipidemia Mother     Hypertension Mother     Osteoarthritis Mother     Thyroid disease unspecified Mother     Diabetes Father     Alcohol abuse Father     Diabetes type I Father     Thyroid disease Sister     Depression Sister     Thyroid disease unspecified Sister     Heart disease Maternal Grandmother     Hypertension Maternal Grandmother     Arthritis Maternal Grandmother     Diabetes type I Maternal Uncle     Diabetes type I Maternal Grandfather          Medications have been verified  Objective   /80 (BP Location: Right arm, Patient Position: Sitting, Cuff Size: Adult)   Pulse 88   Temp (!) 97 °F (36 1 °C) (Temporal)   Resp 18   Ht 5' 6"   Wt 66 6 kg (146 lb 12 8 oz)   SpO2 100%   BMI 23 69 kg/m²        Physical Exam     Physical Exam   Constitutional: She is oriented to person, place, and time  She appears well-developed  She is cooperative  No distress  Neck: Trachea normal and normal range of motion  Neck supple  No tracheal deviation present  No thyroid mass and no thyromegaly present  Cardiovascular: Normal rate, regular rhythm and normal heart sounds  No murmur heard  Pulmonary/Chest: Effort normal and breath sounds normal  No respiratory distress  She has no wheezes  Musculoskeletal: Normal range of motion  General: No swelling, tenderness, deformity or signs of injury  Right lower leg: No edema  Left lower leg: No edema  Neurological: She is alert and oriented to person, place, and time  Skin: Skin is warm and dry  No rash noted  She is not diaphoretic  No erythema  Psychiatric: Her behavior is normal  Judgment and thought content normal    Nursing note and vitals reviewed        Depression Screening and Follow-up Plan: Patient's depression screening was positive with a PHQ-2 score of 2  Their PHQ-9 score was 15  Patient assessed for underlying major depression  Brief counseling provided and recommend additional follow-up/re-evaluation next office visit

## 2020-08-05 ENCOUNTER — TRANSCRIBE ORDERS (OUTPATIENT)
Dept: URGENT CARE | Facility: CLINIC | Age: 40
End: 2020-08-05

## 2020-08-05 ENCOUNTER — APPOINTMENT (OUTPATIENT)
Dept: LAB | Facility: CLINIC | Age: 40
End: 2020-08-05
Payer: COMMERCIAL

## 2020-08-05 DIAGNOSIS — R53.83 FATIGUE, UNSPECIFIED TYPE: ICD-10-CM

## 2020-08-05 DIAGNOSIS — R53.83 FATIGUE, UNSPECIFIED TYPE: Primary | ICD-10-CM

## 2020-08-05 LAB
ANION GAP SERPL CALCULATED.3IONS-SCNC: 13 MMOL/L (ref 4–13)
BUN SERPL-MCNC: 14 MG/DL (ref 5–25)
CALCIUM SERPL-MCNC: 9.1 MG/DL (ref 8.3–10.1)
CHLORIDE SERPL-SCNC: 102 MMOL/L (ref 100–108)
CK SERPL-CCNC: 36 U/L (ref 26–192)
CO2 SERPL-SCNC: 17 MMOL/L (ref 21–32)
CREAT SERPL-MCNC: 1.05 MG/DL (ref 0.6–1.3)
GFR SERPL CREATININE-BSD FRML MDRD: 68 ML/MIN/1.73SQ M
GLUCOSE P FAST SERPL-MCNC: 387 MG/DL (ref 65–99)
MAGNESIUM SERPL-MCNC: 1.9 MG/DL (ref 1.6–2.6)
PHOSPHATE SERPL-MCNC: 2.9 MG/DL (ref 2.7–4.5)
POTASSIUM SERPL-SCNC: 3.6 MMOL/L (ref 3.5–5.3)
SODIUM SERPL-SCNC: 132 MMOL/L (ref 136–145)

## 2020-08-05 PROCEDURE — 36415 COLL VENOUS BLD VENIPUNCTURE: CPT

## 2020-08-05 PROCEDURE — 83735 ASSAY OF MAGNESIUM: CPT

## 2020-08-05 PROCEDURE — 86618 LYME DISEASE ANTIBODY: CPT

## 2020-08-05 PROCEDURE — 86235 NUCLEAR ANTIGEN ANTIBODY: CPT

## 2020-08-05 PROCEDURE — 86038 ANTINUCLEAR ANTIBODIES: CPT

## 2020-08-05 PROCEDURE — 82550 ASSAY OF CK (CPK): CPT

## 2020-08-05 PROCEDURE — 80048 BASIC METABOLIC PNL TOTAL CA: CPT

## 2020-08-05 PROCEDURE — 84100 ASSAY OF PHOSPHORUS: CPT

## 2020-08-06 ENCOUNTER — HOSPITAL ENCOUNTER (INPATIENT)
Facility: HOSPITAL | Age: 40
LOS: 1 days | Discharge: HOME | DRG: 639 | End: 2020-08-07
Attending: EMERGENCY MEDICINE | Admitting: FAMILY MEDICINE
Payer: COMMERCIAL

## 2020-08-06 ENCOUNTER — TELEPHONE (OUTPATIENT)
Dept: FAMILY MEDICINE CLINIC | Facility: CLINIC | Age: 40
End: 2020-08-06

## 2020-08-06 DIAGNOSIS — E11.10 DKA (DIABETIC KETOACIDOSES): Primary | ICD-10-CM

## 2020-08-06 DIAGNOSIS — E10.65 TYPE 1 DIABETES MELLITUS WITH HYPERGLYCEMIA (HCC): ICD-10-CM

## 2020-08-06 PROBLEM — E03.8 HYPOTHYROIDISM DUE TO HASHIMOTO'S THYROIDITIS: Status: ACTIVE | Noted: 2020-08-06

## 2020-08-06 PROBLEM — N17.9 AKI (ACUTE KIDNEY INJURY) (HCC): Status: ACTIVE | Noted: 2020-08-06

## 2020-08-06 PROBLEM — E06.3 HYPOTHYROIDISM DUE TO HASHIMOTO'S THYROIDITIS: Status: ACTIVE | Noted: 2020-08-06

## 2020-08-06 LAB
ALBUMIN SERPL BCP-MCNC: 3.7 G/DL (ref 3.5–5)
ALP SERPL-CCNC: 135 U/L (ref 46–116)
ALT SERPL W P-5'-P-CCNC: 33 U/L (ref 12–78)
ANION GAP SERPL CALCULATED.3IONS-SCNC: 14 MMOL/L (ref 4–13)
ANION GAP SERPL CALCULATED.3IONS-SCNC: 19 MMOL/L (ref 4–13)
AST SERPL W P-5'-P-CCNC: 29 U/L (ref 5–45)
B BURGDOR IGG+IGM SER-ACNC: <0.91 ISR (ref 0–0.9)
BACTERIA UR QL AUTO: ABNORMAL /HPF
BASE EX.OXY STD BLDV CALC-SCNC: 35.3 % (ref 60–80)
BASE EXCESS BLDV CALC-SCNC: -7.5 MMOL/L
BASOPHILS # BLD AUTO: 0.01 THOUSANDS/ΜL (ref 0–0.1)
BASOPHILS NFR BLD AUTO: 0 % (ref 0–1)
BETA-HYDROXYBUTYRATE: 5.7 MMOL/L
BILIRUB SERPL-MCNC: 0.42 MG/DL (ref 0.2–1)
BILIRUB UR QL STRIP: ABNORMAL
BUN SERPL-MCNC: 10 MG/DL (ref 5–25)
BUN SERPL-MCNC: 14 MG/DL (ref 5–25)
CALCIUM SERPL-MCNC: 7.4 MG/DL (ref 8.3–10.1)
CALCIUM SERPL-MCNC: 8.7 MG/DL (ref 8.3–10.1)
CHLORIDE SERPL-SCNC: 110 MMOL/L (ref 100–108)
CHLORIDE SERPL-SCNC: 97 MMOL/L (ref 100–108)
CLARITY UR: CLEAR
CO2 SERPL-SCNC: 20 MMOL/L (ref 21–32)
CO2 SERPL-SCNC: 20 MMOL/L (ref 21–32)
COLOR UR: YELLOW
CREAT SERPL-MCNC: 0.8 MG/DL (ref 0.6–1.3)
CREAT SERPL-MCNC: 1.42 MG/DL (ref 0.6–1.3)
ENA SS-A AB SER-ACNC: <0.2 AI (ref 0–0.9)
ENA SS-B AB SER-ACNC: <0.2 AI (ref 0–0.9)
EOSINOPHIL # BLD AUTO: 0.07 THOUSAND/ΜL (ref 0–0.61)
EOSINOPHIL NFR BLD AUTO: 2 % (ref 0–6)
ERYTHROCYTE [DISTWIDTH] IN BLOOD BY AUTOMATED COUNT: 13.2 % (ref 11.6–15.1)
EXT PREG TEST URINE: NEGATIVE
EXT. CONTROL ED NAV: NORMAL
GFR SERPL CREATININE-BSD FRML MDRD: 47 ML/MIN/1.73SQ M
GFR SERPL CREATININE-BSD FRML MDRD: 94 ML/MIN/1.73SQ M
GLUCOSE SERPL-MCNC: 105 MG/DL (ref 65–140)
GLUCOSE SERPL-MCNC: 122 MG/DL (ref 65–140)
GLUCOSE SERPL-MCNC: 170 MG/DL (ref 65–140)
GLUCOSE SERPL-MCNC: 288 MG/DL (ref 65–140)
GLUCOSE SERPL-MCNC: 357 MG/DL (ref 65–140)
GLUCOSE SERPL-MCNC: 375 MG/DL (ref 65–140)
GLUCOSE SERPL-MCNC: 69 MG/DL (ref 65–140)
GLUCOSE UR STRIP-MCNC: ABNORMAL MG/DL
HCO3 BLDV-SCNC: 18.7 MMOL/L (ref 24–30)
HCT VFR BLD AUTO: 38.7 % (ref 34.8–46.1)
HGB BLD-MCNC: 12.7 G/DL (ref 11.5–15.4)
HGB UR QL STRIP.AUTO: ABNORMAL
IMM GRANULOCYTES # BLD AUTO: 0.01 THOUSAND/UL (ref 0–0.2)
IMM GRANULOCYTES NFR BLD AUTO: 0 % (ref 0–2)
KETONES UR STRIP-MCNC: ABNORMAL MG/DL
LEUKOCYTE ESTERASE UR QL STRIP: NEGATIVE
LYMPHOCYTES # BLD AUTO: 0.86 THOUSANDS/ΜL (ref 0.6–4.47)
LYMPHOCYTES NFR BLD AUTO: 27 % (ref 14–44)
MAGNESIUM SERPL-MCNC: 1.4 MG/DL (ref 1.6–2.6)
MCH RBC QN AUTO: 29.2 PG (ref 26.8–34.3)
MCHC RBC AUTO-ENTMCNC: 32.8 G/DL (ref 31.4–37.4)
MCV RBC AUTO: 89 FL (ref 82–98)
MONOCYTES # BLD AUTO: 0.23 THOUSAND/ΜL (ref 0.17–1.22)
MONOCYTES NFR BLD AUTO: 7 % (ref 4–12)
NEUTROPHILS # BLD AUTO: 2.01 THOUSANDS/ΜL (ref 1.85–7.62)
NEUTS SEG NFR BLD AUTO: 64 % (ref 43–75)
NITRITE UR QL STRIP: NEGATIVE
NON-SQ EPI CELLS URNS QL MICRO: ABNORMAL /HPF
NRBC BLD AUTO-RTO: 0 /100 WBCS
O2 CT BLDV-SCNC: 6.5 ML/DL
PCO2 BLDV: 40.8 MM HG (ref 42–50)
PH BLDV: 7.28 [PH] (ref 7.3–7.4)
PH UR STRIP.AUTO: 6 [PH] (ref 4.5–8)
PHOSPHATE SERPL-MCNC: 2.1 MG/DL (ref 2.7–4.5)
PLATELET # BLD AUTO: 224 THOUSANDS/UL (ref 149–390)
PMV BLD AUTO: 10 FL (ref 8.9–12.7)
PO2 BLDV: 21.4 MM HG (ref 35–45)
POTASSIUM SERPL-SCNC: 2.6 MMOL/L (ref 3.5–5.3)
POTASSIUM SERPL-SCNC: 4.1 MMOL/L (ref 3.5–5.3)
PROT SERPL-MCNC: 7.3 G/DL (ref 6.4–8.2)
PROT UR STRIP-MCNC: NEGATIVE MG/DL
RBC # BLD AUTO: 4.35 MILLION/UL (ref 3.81–5.12)
RBC #/AREA URNS AUTO: ABNORMAL /HPF
SODIUM SERPL-SCNC: 136 MMOL/L (ref 136–145)
SODIUM SERPL-SCNC: 144 MMOL/L (ref 136–145)
SP GR UR STRIP.AUTO: 1.02 (ref 1–1.03)
T3 SERPL-MCNC: 0.5 NG/ML (ref 0.6–1.8)
T4 FREE SERPL-MCNC: 0.62 NG/DL (ref 0.76–1.46)
TROPONIN I SERPL-MCNC: <0.02 NG/ML
TSH SERPL DL<=0.05 MIU/L-ACNC: 57.76 UIU/ML (ref 0.36–3.74)
UROBILINOGEN UR QL STRIP.AUTO: 0.2 E.U./DL
WBC # BLD AUTO: 3.19 THOUSAND/UL (ref 4.31–10.16)
WBC #/AREA URNS AUTO: ABNORMAL /HPF

## 2020-08-06 PROCEDURE — 84480 ASSAY TRIIODOTHYRONINE (T3): CPT | Performed by: PHYSICIAN ASSISTANT

## 2020-08-06 PROCEDURE — 80053 COMPREHEN METABOLIC PANEL: CPT | Performed by: PHYSICIAN ASSISTANT

## 2020-08-06 PROCEDURE — 82948 REAGENT STRIP/BLOOD GLUCOSE: CPT

## 2020-08-06 PROCEDURE — 85025 COMPLETE CBC W/AUTO DIFF WBC: CPT | Performed by: PHYSICIAN ASSISTANT

## 2020-08-06 PROCEDURE — 84484 ASSAY OF TROPONIN QUANT: CPT | Performed by: PHYSICIAN ASSISTANT

## 2020-08-06 PROCEDURE — 84443 ASSAY THYROID STIM HORMONE: CPT | Performed by: PHYSICIAN ASSISTANT

## 2020-08-06 PROCEDURE — 84100 ASSAY OF PHOSPHORUS: CPT | Performed by: PHYSICIAN ASSISTANT

## 2020-08-06 PROCEDURE — 83735 ASSAY OF MAGNESIUM: CPT | Performed by: PHYSICIAN ASSISTANT

## 2020-08-06 PROCEDURE — 99285 EMERGENCY DEPT VISIT HI MDM: CPT

## 2020-08-06 PROCEDURE — 82805 BLOOD GASES W/O2 SATURATION: CPT | Performed by: PHYSICIAN ASSISTANT

## 2020-08-06 PROCEDURE — 99223 1ST HOSP IP/OBS HIGH 75: CPT | Performed by: PHYSICIAN ASSISTANT

## 2020-08-06 PROCEDURE — 80048 BASIC METABOLIC PNL TOTAL CA: CPT | Performed by: PHYSICIAN ASSISTANT

## 2020-08-06 PROCEDURE — 99285 EMERGENCY DEPT VISIT HI MDM: CPT | Performed by: EMERGENCY MEDICINE

## 2020-08-06 PROCEDURE — 81025 URINE PREGNANCY TEST: CPT | Performed by: PHYSICIAN ASSISTANT

## 2020-08-06 PROCEDURE — 96360 HYDRATION IV INFUSION INIT: CPT

## 2020-08-06 PROCEDURE — 81001 URINALYSIS AUTO W/SCOPE: CPT

## 2020-08-06 PROCEDURE — 36415 COLL VENOUS BLD VENIPUNCTURE: CPT | Performed by: PHYSICIAN ASSISTANT

## 2020-08-06 PROCEDURE — 82010 KETONE BODYS QUAN: CPT | Performed by: PHYSICIAN ASSISTANT

## 2020-08-06 PROCEDURE — 84439 ASSAY OF FREE THYROXINE: CPT | Performed by: PHYSICIAN ASSISTANT

## 2020-08-06 PROCEDURE — 93005 ELECTROCARDIOGRAM TRACING: CPT

## 2020-08-06 RX ORDER — POTASSIUM CHLORIDE 20 MEQ/1
40 TABLET, EXTENDED RELEASE ORAL
Status: COMPLETED | OUTPATIENT
Start: 2020-08-06 | End: 2020-08-07

## 2020-08-06 RX ORDER — ESCITALOPRAM OXALATE 10 MG/1
10 TABLET ORAL DAILY
Status: DISCONTINUED | OUTPATIENT
Start: 2020-08-07 | End: 2020-08-07 | Stop reason: HOSPADM

## 2020-08-06 RX ORDER — DEXTROSE MONOHYDRATE 25 G/50ML
25 INJECTION, SOLUTION INTRAVENOUS ONCE
Status: COMPLETED | OUTPATIENT
Start: 2020-08-06 | End: 2020-08-06

## 2020-08-06 RX ORDER — DEXTROSE AND SODIUM CHLORIDE 5; .9 G/100ML; G/100ML
250 INJECTION, SOLUTION INTRAVENOUS CONTINUOUS
Status: DISCONTINUED | OUTPATIENT
Start: 2020-08-06 | End: 2020-08-06

## 2020-08-06 RX ORDER — HEPARIN SODIUM 5000 [USP'U]/ML
5000 INJECTION, SOLUTION INTRAVENOUS; SUBCUTANEOUS EVERY 8 HOURS SCHEDULED
Status: DISCONTINUED | OUTPATIENT
Start: 2020-08-06 | End: 2020-08-07 | Stop reason: HOSPADM

## 2020-08-06 RX ORDER — ACETAMINOPHEN 325 MG/1
650 TABLET ORAL EVERY 6 HOURS PRN
Status: DISCONTINUED | OUTPATIENT
Start: 2020-08-06 | End: 2020-08-07 | Stop reason: HOSPADM

## 2020-08-06 RX ORDER — ONDANSETRON 2 MG/ML
4 INJECTION INTRAMUSCULAR; INTRAVENOUS EVERY 6 HOURS PRN
Status: DISCONTINUED | OUTPATIENT
Start: 2020-08-06 | End: 2020-08-07 | Stop reason: HOSPADM

## 2020-08-06 RX ORDER — MAGNESIUM SULFATE HEPTAHYDRATE 40 MG/ML
2 INJECTION, SOLUTION INTRAVENOUS ONCE
Status: COMPLETED | OUTPATIENT
Start: 2020-08-06 | End: 2020-08-07

## 2020-08-06 RX ORDER — SODIUM CHLORIDE 9 MG/ML
2000 INJECTION, SOLUTION INTRAVENOUS CONTINUOUS
Status: DISCONTINUED | OUTPATIENT
Start: 2020-08-06 | End: 2020-08-06

## 2020-08-06 RX ORDER — DULOXETIN HYDROCHLORIDE 60 MG/1
60 CAPSULE, DELAYED RELEASE ORAL DAILY
Status: DISCONTINUED | OUTPATIENT
Start: 2020-08-07 | End: 2020-08-07 | Stop reason: HOSPADM

## 2020-08-06 RX ORDER — DEXTROSE AND SODIUM CHLORIDE 5; .9 G/100ML; G/100ML
250 INJECTION, SOLUTION INTRAVENOUS CONTINUOUS
Status: DISCONTINUED | OUTPATIENT
Start: 2020-08-06 | End: 2020-08-07

## 2020-08-06 RX ORDER — SODIUM CHLORIDE 9 MG/ML
500 INJECTION, SOLUTION INTRAVENOUS CONTINUOUS
Status: DISCONTINUED | OUTPATIENT
Start: 2020-08-06 | End: 2020-08-06

## 2020-08-06 RX ORDER — LORAZEPAM 0.5 MG/1
0.5 TABLET ORAL DAILY PRN
Status: DISCONTINUED | OUTPATIENT
Start: 2020-08-06 | End: 2020-08-07 | Stop reason: HOSPADM

## 2020-08-06 RX ORDER — ESCITALOPRAM OXALATE 10 MG/1
10 TABLET ORAL DAILY
COMMUNITY
End: 2020-09-25

## 2020-08-06 RX ORDER — POTASSIUM CHLORIDE 14.9 MG/ML
20 INJECTION INTRAVENOUS ONCE
Status: COMPLETED | OUTPATIENT
Start: 2020-08-06 | End: 2020-08-07

## 2020-08-06 RX ORDER — SODIUM CHLORIDE 9 MG/ML
250 INJECTION, SOLUTION INTRAVENOUS CONTINUOUS
Status: DISCONTINUED | OUTPATIENT
Start: 2020-08-07 | End: 2020-08-06

## 2020-08-06 RX ORDER — TRAZODONE HYDROCHLORIDE 100 MG/1
100 TABLET ORAL
Status: DISCONTINUED | OUTPATIENT
Start: 2020-08-06 | End: 2020-08-07 | Stop reason: HOSPADM

## 2020-08-06 RX ADMIN — SODIUM CHLORIDE 1000 ML: 0.9 INJECTION, SOLUTION INTRAVENOUS at 17:40

## 2020-08-06 RX ADMIN — DEXTROSE AND SODIUM CHLORIDE 250 ML/HR: 5; .9 INJECTION, SOLUTION INTRAVENOUS at 17:57

## 2020-08-06 RX ADMIN — SODIUM CHLORIDE 1000 ML: 0.9 INJECTION, SOLUTION INTRAVENOUS at 16:21

## 2020-08-06 RX ADMIN — SODIUM CHLORIDE 0.5 UNITS/HR: 9 INJECTION, SOLUTION INTRAVENOUS at 23:37

## 2020-08-06 RX ADMIN — DEXTROSE MONOHYDRATE 25 ML: 25 INJECTION, SOLUTION INTRAVENOUS at 22:28

## 2020-08-06 RX ADMIN — SODIUM CHLORIDE 6.8 UNITS/HR: 9 INJECTION, SOLUTION INTRAVENOUS at 17:54

## 2020-08-06 NOTE — ASSESSMENT & PLAN NOTE
Lab Results   Component Value Date    HGBA1C 10 1 (H) 07/20/2020   Poorly controlled type 1 diabetes presenting in DKA    See plan above

## 2020-08-06 NOTE — ASSESSMENT & PLAN NOTE
Lab Results   Component Value Date    HGBA1C 10 1 (H) 07/20/2020     Results from last 7 days   Lab Units 08/06/20  1856 08/06/20  1732   POC GLUCOSE mg/dl 288* 357*   Presenting after pt was found to have abnormal outpt labwork and told to go to ED  Has been having fatigue, weight loss, decreased appetite, generalized weakness  Found to be DKA upon arrival   Glucose 375, anion gap 19, beta hydroxybutyrate 5 7, acidotic with pH of 7 28  According to beta hydroxybutyrate range between (4 - 8 = moderate DKA)  Started on insulin drip in the ED  Will need to remain on this until anion gap closes  Blood sugar checks q 2 hours  Notify provider once blood glucose is less than 250 to start saline with dextrose  Keep NPO for now

## 2020-08-06 NOTE — TELEPHONE ENCOUNTER
Called and spoke with patient  Informed her of results and provider's recommendations of ED evaluation  Patient stated that she "needs to think about it because going to the ED was not on today's agenda"  I again stressed importance of patient going to ED ASAP; pt verbalized an understanding  Forwarding to provider as [place]

## 2020-08-06 NOTE — H&P
H&P- Lennox Cord 1980, 47 y o  female MRN: [id number]    Unit/Bed#: ED 29 Encounter: [id number]    Primary Care Provider: JAROCHO Ma   Date and time admitted to hospital: 8/6/2020  3:43 PM        * DKA (diabetic ketoacidoses) [facility])  Assessment & Plan  Lab Results   Component Value Date    HGBA1C 10 1 (H) 07/20/2020     Results from last 7 days   Lab Units 08/06/20 2003 08/06/20  1856 08/06/20  1732   POC GLUCOSE mg/dl 170* 288* 357*   Presenting after pt was found to have abnormal outpt labwork and told to go to ED  Has been having fatigue, weight loss, decreased appetite, generalized weakness  Found to be DKA upon arrival   Glucose 375, anion gap 19, beta hydroxybutyrate 5 7, acidotic with pH of 7 28  According to beta hydroxybutyrate range between (4 - 8 = moderate DKA)  Started on insulin drip in the ED  Will need to remain on this until anion gap closes  Blood sugar checks q 1 hour  BMP q 4 hours, check Mag and phos  Notify provider once blood glucose is less than 250 to start saline with dextrose  Keep NPO for now  THO (acute kidney injury) [facility])  Assessment & Plan  Presenting with creatinine of 1 42  Baseline around 0 8-0 9  Likely secondary to dehydration in the setting of DKA  Continue IV fluids and recheck in a m  Hypothyroidism due to Hashimoto's thyroiditis  Assessment & Plan  Presenting with TSH of 57 756  This is slightly increased from her prior TSH in July (31 and 52)  Recently saw PCP on 7/28/20 who stopped armor thyroid  And restarted the patient on levothyroxine at an increased dosage 175 mcg daily  Plan was to recheck TSH in 6-8 weeks  Continue with outpt followup    Type 1 diabetes mellitus with hyperglycemia [facility])  Assessment & Plan    Lab Results   Component Value Date    HGBA1C 10 1 (H) 07/20/2020   Poorly controlled type 1 diabetes presenting in DKA    See plan above      VTE Prophylaxis: Heparin  / sequential compression device   Code Status: full code  Anticipated Length of Stay:  Patient will be admitted on an Inpatient basis with an anticipated length of stay of  Greater than 2 midnights  Justification for Hospital Stay:  DKA with need for further insulin drip and monitoring    Chief Complaint:  Abnormal outpatient labs - sent in by PCP    History of Present Illness:    Jeremy Henry is a 47 y o  female with past medical history of poorly controlled type 1 diabetes, Hashimoto's, iron deficiency anemia, morbid obesity status post gastric bypass 2009  Patient lab work performed by PCP and was called today to come to the ER given abnormalities  She was being worked up by her PCP for generalized symptoms such as muscle weakness, fatigue, decreased sleep, dizziness, and decreased appetite  In addition to lab work she was also supposed to have a brain MRI performed on Monday 8/10/20  Coincidently patient was found to be in DKA in the ED  Patient had a glucose of 375, pH 7 28, anion gap of 19, beta hydroxybutyrate 5 7, creatinine 1 42 with baseline of 0 8-0 9  In the ED patient does not have any other specific complaints  She has noticed some increased urination and some thirst for reports this is somewhat normal for her  At home she has been taking Lantus 20 units before bed and using NovoLog on a sliding scale basis  Has been making some adjustments to her diet recently and trying to lower carbs  Denies drug use, alcohol consumption, or smoking  Review of Systems:    General:   No Fever or chills; + generalized weakness   EENT:   No ear pain, facial swelling; No sneezing, sore throat  Skin:   No rashes, color changes  Respiratory:     No shortness of breath, cough, wheezing, stridor  Cardiovascular:     No chest pain, palpitations  Gastrointestinal:    No nausea, vomiting, diarrhea; No abdominal pain  Musculoskeletal:     No arthralgias, myalgias, swelling  Neurologic:   No dizziness, numbness, weakness  No speech difficulties  Psych:   No agitation,    Otherwise, All other twelve-point review of systems normal      Past Medical and Surgical History:     Past Medical History:   Diagnosis Date    Anemia     Arthritis     B12 deficiency     Cervical disc disorder     On gabapentin     Disease of thyroid gland     hypothyroid    D[facility]diabetic ketoacidoses) ([facility] [address] ) 2018    Iron deficiency anemia     Type 1 diabetes ([facility] [address] )     Type 1 diabetes mellitus, uncontrolled ([facility] [address] )     Vitamin D deficiency        Past Surgical History:   Procedure Laterality Date    ABDOMINAL SURGERY      gastric bypass     SECTION      x2    CHOLECYSTECTOMY  2018    GASTRIC BYPASS  2007    INTRAUTERINE DEVICE INSERTION  2015    OTHER SURGICAL HISTORY      surgery for imperforate hymen/endometriosis/hydrometrocolpos    TUBAL LIGATION      WISDOM TOOTH EXTRACTION         Meds/Allergies:    Current Facility-Administered Medications   Medication Dose Route Frequency Provider Last Rate Last Dose    dextrose 5 % and sodium chloride 0 9 % infusion  250 mL/hr Intravenous Continuous Beau Simons PA-C 250 mL/hr at 20 250 mL/hr at 20    insulin regular (HumuLIN R,NovoLIN R) 1 Units/mL in sodium chloride 0 9 % 100 mL infusion  0 1-30 Units/hr Intravenous Continuous Beau Simons PA-C 3 4 mL/hr at 20 3 4 Units/hr at 20     Current Outpatient Medications   Medication Sig Dispense Refill    DULoxetine (CYMBALTA) 60 mg delayed release capsule TAKE ONE CAPSULE BY MOUTH ONCE DAILY 30 capsule 5    ergocalciferol (VITAMIN D2) 50,000 units Take 1 capsule (50,000 Units total) by mouth once a week 12 capsule 3    glucose blood test strip Testing up to 8 times a day  E10 65      Insulin Aspart FlexPen (NovoLOG FlexPen) 100 UNIT/ML SOPN 1 unit for 6 grams of carbs and 1 unit for 30 mg/dl above 120    upto 50 units a day 5 pen 1    insulin glargine (LANTUS SOLOSTAR) 100 units/mL injection pen Inject 36 Units under the skin daily at bedtime 5 pen 3    Insulin Pen Needle (BD Pen Needle Deanna U/F) 32G X 4 MM MISC Use 4/day 100 each 3    Insulin Syringe-Needle U-100 (INSULIN SYRINGE 1CC/28G) 28G X 1/2" 1 ML MISC 4 injections daily E10 65      levothyroxine 175 mcg tablet Take 1 tablet (175 mcg total) by mouth daily in the early morning 90 tablet 3    LORazepam (ATIVAN) 0 5 mg tablet       traZODone (DESYREL) 100 mg tablet Take 100 mg by mouth daily at bedtime          No Known Allergies    Allergies: No Known Allergies    Social History:     Marital Status:      Substance Use History:   Social History     Substance and Sexual Activity   Alcohol Use No    Frequency: Never    Binge frequency: Never    Comment: socially     Social History     Tobacco Use   Smoking Status Never Smoker   Smokeless Tobacco Never Used     Social History     Substance and Sexual Activity   Drug Use No       Family History:    non-contributory    Physical Exam:     Vitals:   Blood Pressure: 125/74 (08/06/20 1858)  Pulse: 85 (08/06/20 1858)  Temperature: 98 4 °F (36 9 °C) (08/06/20 1542)  Temp Source: Temporal (08/06/20 1542)  Respirations: 18 (08/06/20 1858)  Weight - Scale: 67 7 kg (149 lb 4 oz) (08/06/20 1540)  SpO2: 100 % (08/06/20 1858)    Physical Exam   Constitutional: She is oriented to person, place, and time  Non-toxic appearance  She does not appear ill  No distress  HENT:   Head: Normocephalic and atraumatic  Nose: No rhinorrhea or congestion  Mouth/Throat: Mucous membranes are dry  Cardiovascular: Normal rate and regular rhythm  Pulmonary/Chest: Effort normal and breath sounds normal  No stridor  No respiratory distress  She has no wheezes  She has no rhonchi  She has no rales  She exhibits no tenderness  Abdominal: Normal appearance and bowel sounds are normal    Musculoskeletal:         General: No swelling or deformity  Neurological: She is alert and oriented to person, place, and time     Skin: Skin is warm and dry  She is not diaphoretic  Psychiatric: Her behavior is normal  Mood and thought content normal    Nursing note and vitals reviewed  Additional Data:     Lab Results: I have personally reviewed pertinent reports  Results from last 7 days   Lab Units 20  1618   WBC Thousand/uL 3 19*   HEMOGLOBIN g/dL 12 7   HEMATOCRIT % 38 7   PLATELETS Thousands/uL 224   NEUTROS PCT % 64   LYMPHS PCT % 27   MONOS PCT % 7   EOS PCT % 2     Results from last 7 days   Lab Units 20  1618   POTASSIUM mmol/L 4 1   CHLORIDE mmol/L 97*   CO2 mmol/L 20*   BUN mg/dL 14   CREATININE mg/dL 1 42*   CALCIUM mg/dL 8 7   ALK PHOS U/L 135*   ALT U/L 33   AST U/L 29           Imaging: I have personally reviewed pertinent reports  No results found  EKG, Pathology, and Other Studies Reviewed on Admission:   · EK sinus    [employer] Records Reviewed: Yes     Total Time for Visit, including Counseling / Coordination of Care: 45 minutes  Greater than 50% of this total time spent on direct patient counseling and coordination of care  ** Please Note: This note has been constructed using a voice recognition system   **

## 2020-08-06 NOTE — ASSESSMENT & PLAN NOTE
Presenting with TSH of 57 756  This is slightly increased from her prior TSH in July (31 and 52)  Recently saw PCP on 7/28/20 who stopped armor thyroid  And restarted the patient on levothyroxine at an increased dosage 175 mcg daily  Plan was to recheck TSH in 6-8 weeks    Continue with outpt followup

## 2020-08-06 NOTE — ED NOTES
RN spoke to Lady Shah with [facility]  VO to decrease insulin drip to 6  8units/hr     Amanda Coburn RN  08/06/20 1922

## 2020-08-06 NOTE — TELEPHONE ENCOUNTER
Per provider received patient's lab results and she should be evaluated in the ED ASAP  Called and left message for patient requesting a call back ASAP in regards to labs and provider's recommendations

## 2020-08-06 NOTE — ASSESSMENT & PLAN NOTE
Presenting with creatinine of 1 42  Baseline around 0 8-0 9  Likely secondary to dehydration in the setting of DKA  Continue IV fluids and recheck in a m

## 2020-08-06 NOTE — ED PROVIDER NOTES
History  Chief Complaint   Patient presents with    Fatigue     Reporting increased fatigue, dizziness, nausea  Sent over by PCP for abnormal blood work  Patient is a 46 y/o female with hx of DM1, Hypothyroidism due to Hashimoto's thyroiditis, MILLICENT, arthritis, presents to the ED for abnormal outpatient lab results  Pt states she has been having gradually worsening symptoms of fatigue, generalized weakness, body aches, weight loss, decreased appetite,   Pt had appointment with PCP on  where she was told to stop [place] and restart levothyroxine  Pt also was given rx for lab work and MRI (scheduled for Monday  Patient's lab work returned today and was told to go the ED  Pt states she has not checked her sugars in a day and last insulin she took was 28U of lantus last night before bed  Pt denies fever, chills, chest pain, SOB, abdominal pain, V/D, focal weakness, sensory deficit, gait abnormality  Prior to Admission Medications   Prescriptions Last Dose Informant Patient Reported? Taking?    DULoxetine (CYMBALTA) 60 mg delayed release capsule  Self No Yes   Sig: TAKE ONE CAPSULE BY MOUTH ONCE DAILY   Insulin Aspart FlexPen (NovoLOG FlexPen) 100 UNIT/ML SOPN  Self No Yes   Si unit for 6 grams of carbs and 1 unit for 30 mg/dl above 120    upto 50 units a day   Insulin Pen Needle (BD Pen Needle Deanna U/F) 32G X 4 MM MISC  Self No Yes   Sig: Use 4/day   Insulin Syringe-Needle U-100 (INSULIN SYRINGE 1CC/28G) 28G X 1/2" 1 ML MISC  Self Yes Yes   Si injections daily E10 65   LORazepam (ATIVAN) 0 5 mg tablet  Self Yes Yes   ergocalciferol (VITAMIN D2) 50,000 units  Self No Yes   Sig: Take 1 capsule (50,000 Units total) by mouth once a week   glucose blood test strip  Self Yes Yes   Sig: Testing up to 8 times a day  E10 65   insulin glargine (LANTUS SOLOSTAR) 100 units/mL injection pen  Self No Yes   Sig: Inject 36 Units under the skin daily at bedtime   levothyroxine 175 mcg tablet   No Yes   Sig: Take 1 tablet (175 mcg total) by mouth daily in the early morning   traZODone (DESYREL) 100 mg tablet  Self Yes Yes   Sig: Take 100 mg by mouth daily at bedtime       Facility-Administered Medications: None       Past Medical History:   Diagnosis Date    Anemia     Arthritis     B12 deficiency     Cervical disc disorder     On gabapentin     Disease of thyroid gland     hypothyroid    DKA [facility]diabetic k[facility]acidoses) ([facility] [address] ) 2018    Iron deficiency anemia     Type 1 diabetes ([facility] )     Type 1 diabetes mellitus, uncontrolled ([facility] )     Vitamin D deficiency        Past Surgical History:   Procedure Laterality Date    ABDOMINAL SURGERY      gastric bypass     SECTION      x2    CHOLECYSTECTOMY  2018    GASTRIC BYPASS  2007    INTRAUTERINE DEVICE INSERTION  2015    OTHER SURGICAL HISTORY      surgery for imperforate hymen/endometriosis/hydrometrocolpos    TUBAL LIGATION      WISDOM TOOTH EXTRACTION         Family History   Problem Relation Age of Onset    Thyroid disease Mother     URIEL disease Mother     Hyperlipidemia Mother     Hypertension Mother     Osteoarthritis Mother     Thyroid disease unspecified Mother     Diabetes Father     Alcohol abuse Father     Diabetes type I Father     Thyroid disease Sister     Depression Sister     Thyroid disease unspecified Sister     Heart disease Maternal Grandmother     Hypertension Maternal Grandmother     Arthritis Maternal Grandmother     Diabetes type I Maternal Uncle     Diabetes type I Maternal Grandfather      I have reviewed and agree with the history as documented      E-Cigarette/Vaping    E-Cigarette Use Never User      E-Cigarette/Vaping Substances    Nicotine No     THC No     CBD No     Flavoring No     Other No     Unknown No      Social History     Tobacco Use    Smoking status: Never Smoker    Smokeless tobacco: Never Used   Substance Use Topics    Alcohol use: No     Frequency: Never     Binge frequency: Never Comment: socially    Drug use: No       Review of Systems   Constitutional: Positive for appetite change and fatigue  Negative for chills and fever  HENT: Negative for congestion, ear pain and sore throat  Eyes: Positive for visual disturbance  Negative for redness  Respiratory: Negative for cough, chest tightness and shortness of breath  Cardiovascular: Negative for chest pain, palpitations and leg swelling  Gastrointestinal: Positive for nausea  Negative for abdominal pain, diarrhea and vomiting  Genitourinary: Negative for dysuria and hematuria  Musculoskeletal: Positive for myalgias  Skin: Negative for rash  Neurological: Positive for dizziness and weakness (generalized)  Negative for syncope, speech difficulty, light-headedness, numbness and headaches  Psychiatric/Behavioral: Negative for confusion  Physical Exam  Physical Exam  Constitutional:       General: She is not in acute distress  Appearance: She is well-developed  She is not ill-appearing or toxic-appearing  HENT:      Head: Normocephalic and atraumatic  Nose: Nose normal    Eyes:      Conjunctiva/sclera: Conjunctivae normal    Neck:      Musculoskeletal: Normal range of motion  Cardiovascular:      Rate and Rhythm: Normal rate and regular rhythm  Pulmonary:      Effort: Pulmonary effort is normal       Breath sounds: Normal breath sounds  No decreased breath sounds, wheezing, rhonchi or rales  Musculoskeletal: Normal range of motion  Skin:     General: Skin is warm and dry  Capillary Refill: Capillary refill takes less than 2 seconds  Neurological:      Mental Status: She is alert and oriented to person, place, and time     Psychiatric:         Mood and Affect: Mood normal          Behavior: Behavior normal          Vital Signs  ED Triage Vitals [08/06/20 1542]   Temperature Pulse Respirations Blood Pressure SpO2   98 4 °F (36 9 °C) 84 16 131/82 99 %      Temp Source Heart Rate Source Patient Position - Orthostatic VS BP Location FiO2 (%)   Temporal Monitor Sitting Right arm --      Pain Score       --           Vitals:    08/06/20 1745 08/06/20 1800 08/06/20 1830 08/06/20 1858   BP: 115/78 114/77 98/67 125/74   Pulse: 74 78 78 85   Patient Position - Orthostatic VS:  Lying  Lying         Visual Acuity      ED Medications  Medications   insulin regular (HumuLIN R,NovoLIN R) 1 Units/mL in sodium chloride 0 9 % 100 mL infusion (13 6 Units/hr Intravenous Rate/Dose Change 8/6/20 1857)   dextrose 5 % and sodium chloride 0 9 % infusion (250 mL/hr Intravenous New Bag 8/6/20 1757)   sodium chloride 0 9 % bolus 1,000 mL (0 mL Intravenous Stopped 8/6/20 1859)   sodium chloride 0 9 % bolus 1,000 mL (1,000 mL Intravenous New Bag 8/6/20 1740)       Diagnostic Studies  Results Reviewed     Procedure Component Value Units Date/Time    Fingerstick Glucose (POCT) [[id number]]  (Abnormal) Collected:  08/06/20 1856    Lab Status:  Final result Updated:  08/06/20 1859     POC Glucose 288 mg/dl     Fingerstick Glucose (POCT) [[id number]]  (Abnormal) Collected:  08/06/20 1732    Lab Status:  Final result Updated:  08/06/20 1733     POC Glucose 357 mg/dl     T4, free [[id number]] Collected:  08/06/20 1706    Lab Status: In process Specimen:  Blood from Arm, Right Updated:  08/06/20 1710    T3 [[id number]] Collected:  08/06/20 1706    Lab Status: In process Specimen:  Blood from Arm, Right Updated:  08/06/20 1710    TSH [[id number]]  (Abnormal) Collected:  08/06/20 1618    Lab Status:  Final result Specimen:  Blood from Arm, Left Updated:  08/06/20 1655     TSH 3RD GENERATON 57 756 uIU/mL     Narrative:       Patients undergoing fluorescein dye angiography may retain small amounts of fluorescein in the body for 48-72 hours post procedure  Samples containing fluorescein can produce falsely depressed TSH values  If the patient had this procedure,a specimen should be resubmitted post fluorescein clearance        Urine Microscopic [[id number]]  (Abnormal) Collected:  08/06/20 1627    Lab Status:  Final result Specimen:  Urine, Clean Catch Updated:  08/06/20 1650     RBC, UA 0-1 /hpf      WBC, UA 0-1 /hpf      Epithelial Cells Occasional /hpf      Bacteria, UA Occasional /hpf     Troponin I [[id number]]  (Normal) Collected:  08/06/20 1618    Lab Status:  Final result Specimen:  Blood from Arm, Left Updated:  08/06/20 1648     Troponin I <0 02 ng/mL     Comprehensive metabolic panel [[id number]]  (Abnormal) Collected:  08/06/20 1618    Lab Status:  Final result Specimen:  Blood from Arm, Left Updated:  08/06/20 1646     Sodium 136 mmol/L      Potassium 4 1 mmol/L      Chloride 97 mmol/L      CO2 20 mmol/L      ANION GAP 19 mmol/L      BUN 14 mg/dL      Creatinine 1 42 mg/dL      Glucose 375 mg/dL      Calcium 8 7 mg/dL      AST 29 U/L      ALT 33 U/L      Alkaline Phosphatase 135 U/L      Total Protein 7 3 g/dL      Albumin 3 7 g/dL      Total Bilirubin 0 42 mg/dL      eGFR 47 ml/min/1 73sq m     Narrative:       [facility] guidelines for Chronic Kidney Disease (CKD):     Stage 1 with normal or high GFR (GFR > 90 mL/min/1 73 square meters)    Stage 2 Mild CKD (GFR = 60-89 mL/min/1 73 square meters)    Stage 3A Moderate CKD (GFR = 45-59 mL/min/1 73 square meters)    Stage 3B Moderate CKD (GFR = 30-44 mL/min/1 73 square meters)    Stage 4 Severe CKD (GFR = 15-29 mL/min/1 73 square meters)    Stage 5 End Stage CKD (GFR <15 mL/min/1 73 square meters)  Note: GFR calculation is accurate only with a steady state creatinine    POCT urinalysis dipstick [[id number]]  (Abnormal) Resulted:  08/06/20 1630    Lab Status:  Final result Specimen:  Urine Updated:  08/06/20 1630    POCT pregnancy, urine [[id number]]  (Normal) Resulted:  08/06/20 1630    Lab Status:  Final result Updated:  08/06/20 1630     EXT PREG TEST UR (Ref: Negative) negative     Control valid    Urine Macroscopic, POC [[id number]]  (Abnormal) Collected:  08/06/20 1627    Lab Status:  Final result Specimen:  Urine Updated:  08/06/20 1629     Color, UA Yellow     Clarity, UA Clear     pH, UA 6 0     Leukocytes, UA Negative     Nitrite, UA Negative     Protein, UA Negative mg/dl      Glucose,  (1/2%) mg/dl      Ketones, UA >=160 (4+) mg/dl      Urobilinogen, UA 0 2 E U /dl      Bilirubin, UA Interference- unable to analyze     Blood, UA Trace     Specific [place], UA 1 025    Narrative:       CLINITEK RESULT    Blood gas, venous [[id number]]  (Abnormal) Collected:  08/06/20 1618    Lab Status:  Final result Specimen:  Blood from Arm, Left Updated:  08/06/20 1625     pH, Tate 7 280     pCO2, Tate 40 8 mm Hg      pO2, Tate 21 4 mm Hg      HCO3, Tate 18 7 mmol/L      Base Excess, Tate -7 5 mmol/L      O2 Content, Tate 6 5 ml/dL      O2 HGB, VENOUS 35 3 %     CBC and differential [[id number]]  (Abnormal) Collected:  08/06/20 1618    Lab Status:  Final result Specimen:  Blood from Arm, Left Updated:  08/06/20 1625     WBC 3 19 Thousand/uL      RBC 4 35 Million/uL      Hemoglobin 12 7 g/dL      Hematocrit 38 7 %      MCV 89 fL      MCH 29 2 pg      MCHC 32 8 g/dL      RDW 13 2 %      MPV 10 0 fL      Platelets 057 Thousands/uL      nRBC 0 /100 WBCs      Neutrophils Relative 64 %      Immat GRANS % 0 %      Lymphocytes Relative 27 %      Monocytes Relative 7 %      Eosinophils Relative 2 %      Basophils Relative 0 %      Neutrophils Absolute 2 01 Thousands/µL      Immature Grans Absolute 0 01 Thousand/uL      Lymphocytes Absolute 0 86 Thousands/µL      Monocytes Absolute 0 23 Thousand/µL      Eosinophils Absolute 0 07 Thousand/µL      Basophils Absolute 0 01 Thousands/µL     Beta Hydroxybutyrate [[id number]]  (Abnormal) Collected:  08/06/20 1618    Lab Status:  Final result Specimen:  Blood from Arm, Left Updated:  08/06/20 1625     BETA-HYDROXYBUTYRATE 5 7 mmol/L                  No orders to display              Procedures  ECG 12 Lead Documentation Only    Date/Time: 8/6/2020 4:30 PM  Performed by: Hazel Doe PA-C  Authorized by: Hazel Doe PA-C     Indications / Diagnosis:  Dizziness  ECG reviewed by me, the ED Provider: yes    Patient location:  ED  Previous ECG:     Previous ECG:  Compared to current    Comparison ECG info:  23-june-2020    Similarity:  No change    Comparison to cardiac monitor: Yes    Interpretation:     Interpretation: abnormal    Quality:     Tracing quality:  Limited by artifact  Rate:     ECG rate:  76    ECG rate assessment: normal    Rhythm:     Rhythm: sinus rhythm    Ectopy:     Ectopy: none    QRS:     QRS axis:  Right    QRS intervals:  Normal  Conduction:     Conduction: normal    ST segments:     ST segments:  Non-specific  T waves:     T waves: normal    Comments:      QT/QTc: 388/436  No STEMI             ED Course  ED Course as of Aug 06 1924   Thu Aug 06, 2020   1637 BETA-HYDROXYBUTYRATE(!): 5 7   1640 pH, Tate(!): 7 280   1647 Anion Gap(!): 19   1728 Discussed with [facility]  We had a detailed discussion of the patient's condition and case, including need for admission   Accepts to Dr Jennyfer Gore service for step down level 2   Bed request/bridging orders placed  US AUDIT      Most Recent Value   Initial Alcohol Screen: US AUDIT-C    1  How often do you have a drink containing alcohol?  0 Filed at: 08/06/2020 1550   2  How many drinks containing alcohol do you have on a typical day you are drinking? 0 Filed at: 08/06/2020 1550   3b  FEMALE Any Age, or MALE 65+: How often do you have 4 or more drinks on one occassion? 0 Filed at: 08/06/2020 1550   Audit-C Score  0 Filed at: 08/06/2020 1550                  MICHELLE/DAST-10      Most Recent Value   How many times in the past year have you    Used an illegal drug or used a prescription medication for non-medical reasons?   Never Filed at: 08/06/2020 1550                                MDM  Number of Diagnoses or Management Options  DKA (diabetic ketoacidoses) ([facility] ): new and requires workup  Diagnosis management comments: Patient is a 46 y/o female with hx of DM1, Hypothyroidism due to Hashimoto's thyroiditis, MILLICENT, arthritis, presents to the ED for abnormal outpatient lab results  Pt states she has been having gradually worsening symptoms of fatigue, generalized weakness, body aches, weight loss, decreased appetite,   Pt had appointment with PCP on 8/4 where she was told to stop [place] and restart levothyroxine  Pt also was given rx for lab work and MRI (scheduled for Monday  Patient's lab work returned today and was told to go the ED  Pt states she has not checked her sugars in a day and last insulin she took was 28U of lantus last night before bed  Pt well appearing, non-toxic and in no respiratory distress  Glucose 375  PH 7 28  Gap 19  Beta hydroxybuterate 5 7  TSH 57 756, T3 and free T4 pending  Troponin negative  2L IVF given in ED  Discussed with [facility]  Insulin drip started  We had a detailed discussion of the patient's condition and case, including need for admission   Accepts to Dr Peter Nicole service for level 2 step-down  [facility] request/bridging orders placed  Disposition  Final diagnoses:   DKA (diabetic ketoacidoses) ([facility] [address] )     Time reflects when diagnosis was documented in both MDM as applicable and the Disposition within this note     Time User Action Codes Description Comment    8/6/2020  5:27 PM Yanira So Add [E11 10] DKA (diabetic ketoacidoses) [facility])       ED Disposition     ED Disposition Condition Date/Time Comment    Admit Stable Thu Aug 6, 2020  5:28 PM Case was discussed with MASSIEL and the patient's admission status was agreed to be Admission Status: inpatient status to the service of Dr Shamika Tang   Follow-up Information    None         Patient's Medications   Discharge Prescriptions    No medications on file     No discharge procedures on file      PDMP Review     None          ED Provider  Electronically Signed by           Harpreet Karimi JANETH  08/06/20 1929

## 2020-08-07 VITALS
HEIGHT: 66 IN | WEIGHT: 157.41 LBS | DIASTOLIC BLOOD PRESSURE: 59 MMHG | SYSTOLIC BLOOD PRESSURE: 100 MMHG | OXYGEN SATURATION: 97 % | BODY MASS INDEX: 25.3 KG/M2 | RESPIRATION RATE: 18 BRPM | TEMPERATURE: 97 F | HEART RATE: 77 BPM

## 2020-08-07 LAB
ANION GAP SERPL CALCULATED.3IONS-SCNC: 11 MMOL/L (ref 4–13)
ANION GAP SERPL CALCULATED.3IONS-SCNC: 12 MMOL/L (ref 4–13)
ATRIAL RATE: 76 BPM
BUN SERPL-MCNC: 10 MG/DL (ref 5–25)
BUN SERPL-MCNC: 8 MG/DL (ref 5–25)
CALCIUM SERPL-MCNC: 7.6 MG/DL (ref 8.3–10.1)
CALCIUM SERPL-MCNC: 7.8 MG/DL (ref 8.3–10.1)
CHLORIDE SERPL-SCNC: 109 MMOL/L (ref 100–108)
CHLORIDE SERPL-SCNC: 110 MMOL/L (ref 100–108)
CO2 SERPL-SCNC: 23 MMOL/L (ref 21–32)
CO2 SERPL-SCNC: 23 MMOL/L (ref 21–32)
CREAT SERPL-MCNC: 0.76 MG/DL (ref 0.6–1.3)
CREAT SERPL-MCNC: 0.77 MG/DL (ref 0.6–1.3)
ERYTHROCYTE [DISTWIDTH] IN BLOOD BY AUTOMATED COUNT: 13.4 % (ref 11.6–15.1)
GFR SERPL CREATININE-BSD FRML MDRD: 100 ML/MIN/1.73SQ M
GFR SERPL CREATININE-BSD FRML MDRD: 98 ML/MIN/1.73SQ M
GLUCOSE SERPL-MCNC: 114 MG/DL (ref 65–140)
GLUCOSE SERPL-MCNC: 134 MG/DL (ref 65–140)
GLUCOSE SERPL-MCNC: 136 MG/DL (ref 65–140)
GLUCOSE SERPL-MCNC: 139 MG/DL (ref 65–140)
GLUCOSE SERPL-MCNC: 148 MG/DL (ref 65–140)
GLUCOSE SERPL-MCNC: 162 MG/DL (ref 65–140)
GLUCOSE SERPL-MCNC: 180 MG/DL (ref 65–140)
GLUCOSE SERPL-MCNC: 193 MG/DL (ref 65–140)
HCT VFR BLD AUTO: 32.2 % (ref 34.8–46.1)
HGB BLD-MCNC: 10.6 G/DL (ref 11.5–15.4)
MAGNESIUM SERPL-MCNC: 2 MG/DL (ref 1.6–2.6)
MCH RBC QN AUTO: 29 PG (ref 26.8–34.3)
MCHC RBC AUTO-ENTMCNC: 32.9 G/DL (ref 31.4–37.4)
MCV RBC AUTO: 88 FL (ref 82–98)
P AXIS: 78 DEGREES
PHOSPHATE SERPL-MCNC: 3 MG/DL (ref 2.7–4.5)
PLATELET # BLD AUTO: 177 THOUSANDS/UL (ref 149–390)
PMV BLD AUTO: 9.9 FL (ref 8.9–12.7)
POTASSIUM SERPL-SCNC: 3 MMOL/L (ref 3.5–5.3)
POTASSIUM SERPL-SCNC: 3.6 MMOL/L (ref 3.5–5.3)
PR INTERVAL: 120 MS
QRS AXIS: 92 DEGREES
QRSD INTERVAL: 88 MS
QT INTERVAL: 388 MS
QTC INTERVAL: 436 MS
RBC # BLD AUTO: 3.65 MILLION/UL (ref 3.81–5.12)
RYE IGE QN: NEGATIVE
SODIUM SERPL-SCNC: 144 MMOL/L (ref 136–145)
SODIUM SERPL-SCNC: 144 MMOL/L (ref 136–145)
T WAVE AXIS: 62 DEGREES
VENTRICULAR RATE: 76 BPM
WBC # BLD AUTO: 2.36 THOUSAND/UL (ref 4.31–10.16)

## 2020-08-07 PROCEDURE — 80048 BASIC METABOLIC PNL TOTAL CA: CPT | Performed by: PHYSICIAN ASSISTANT

## 2020-08-07 PROCEDURE — 83735 ASSAY OF MAGNESIUM: CPT | Performed by: PHYSICIAN ASSISTANT

## 2020-08-07 PROCEDURE — 82948 REAGENT STRIP/BLOOD GLUCOSE: CPT

## 2020-08-07 PROCEDURE — 93010 ELECTROCARDIOGRAM REPORT: CPT | Performed by: INTERNAL MEDICINE

## 2020-08-07 PROCEDURE — 99239 HOSP IP/OBS DSCHRG MGMT >30: CPT | Performed by: FAMILY MEDICINE

## 2020-08-07 PROCEDURE — 85027 COMPLETE CBC AUTOMATED: CPT | Performed by: PHYSICIAN ASSISTANT

## 2020-08-07 PROCEDURE — 94762 N-INVAS EAR/PLS OXIMTRY CONT: CPT

## 2020-08-07 PROCEDURE — 84100 ASSAY OF PHOSPHORUS: CPT | Performed by: PHYSICIAN ASSISTANT

## 2020-08-07 RX ORDER — INSULIN GLARGINE 100 [IU]/ML
36 INJECTION, SOLUTION SUBCUTANEOUS
Status: DISCONTINUED | OUTPATIENT
Start: 2020-08-07 | End: 2020-08-07 | Stop reason: HOSPADM

## 2020-08-07 RX ORDER — SODIUM CHLORIDE 9 MG/ML
100 INJECTION, SOLUTION INTRAVENOUS CONTINUOUS
Status: DISCONTINUED | OUTPATIENT
Start: 2020-08-07 | End: 2020-08-07 | Stop reason: HOSPADM

## 2020-08-07 RX ADMIN — DEXTROSE AND SODIUM CHLORIDE 250 ML/HR: 5; .9 INJECTION, SOLUTION INTRAVENOUS at 01:46

## 2020-08-07 RX ADMIN — LEVOTHYROXINE SODIUM 175 MCG: 100 TABLET ORAL at 05:41

## 2020-08-07 RX ADMIN — POTASSIUM CHLORIDE 40 MEQ: 1500 TABLET, EXTENDED RELEASE ORAL at 02:47

## 2020-08-07 RX ADMIN — TRAZODONE HYDROCHLORIDE 100 MG: 100 TABLET ORAL at 00:01

## 2020-08-07 RX ADMIN — INSULIN GLARGINE 36 UNITS: 100 INJECTION, SOLUTION SUBCUTANEOUS at 02:47

## 2020-08-07 RX ADMIN — ESCITALOPRAM OXALATE 10 MG: 10 TABLET ORAL at 08:32

## 2020-08-07 RX ADMIN — POTASSIUM CHLORIDE 40 MEQ: 1500 TABLET, EXTENDED RELEASE ORAL at 00:01

## 2020-08-07 RX ADMIN — DULOXETINE HYDROCHLORIDE 60 MG: 60 CAPSULE, DELAYED RELEASE ORAL at 08:32

## 2020-08-07 RX ADMIN — HEPARIN SODIUM 5000 UNITS: 5000 INJECTION INTRAVENOUS; SUBCUTANEOUS at 05:41

## 2020-08-07 RX ADMIN — POTASSIUM CHLORIDE 20 MEQ: 14.9 INJECTION, SOLUTION INTRAVENOUS at 00:07

## 2020-08-07 RX ADMIN — MAGNESIUM SULFATE IN WATER 2 G: 40 INJECTION, SOLUTION INTRAVENOUS at 00:20

## 2020-08-07 RX ADMIN — INSULIN LISPRO 1 UNITS: 100 INJECTION, SOLUTION INTRAVENOUS; SUBCUTANEOUS at 08:32

## 2020-08-07 RX ADMIN — HEPARIN SODIUM 5000 UNITS: 5000 INJECTION INTRAVENOUS; SUBCUTANEOUS at 00:07

## 2020-08-07 RX ADMIN — SODIUM CHLORIDE 100 ML/HR: 0.9 INJECTION, SOLUTION INTRAVENOUS at 03:49

## 2020-08-07 NOTE — ED NOTES
Lab called about label for Magnesium, phosphorus, and BMP  Pts BMP is not due till midnight and other two were due at 2012  Lab stated provider needs to place a new order  Tiger texting [facility] admitting now        Wilfred Gonzalez RN  08/06/20 2120

## 2020-08-07 NOTE — ASSESSMENT & PLAN NOTE
Presenting with creatinine of 1 42  Baseline around 0 8-0 9  Likely secondary to dehydration in the setting of DKA    Resolved with IV fluids

## 2020-08-07 NOTE — ASSESSMENT & PLAN NOTE
Presenting with TSH of 57 756  This is slightly increased from her prior TSH in July (31 and 52)  Recently saw PCP on 7/28/20 who stopped armor thyroid  And restarted the patient on levothyroxine at an increased dosage 175 mcg daily  Plan was to recheck TSH in 6-8 weeks    Continue with outpt followup  Referral to Endocrinology placed

## 2020-08-07 NOTE — DISCHARGE SUMMARY
Discharge- Travis Mcconnell 1980, 47 y o  female MRN: [id number]    Unit/Bed#: E4 -01 Encounter: [id number]    Primary Care Provider: JAROCHO Avilez   Date and time admitted to hospital: 8/6/2020  3:43 PM        * DKA (diabetic ketoacidoses) [facility])  Assessment & Plan  Lab Results   Component Value Date    HGBA1C 10 1 (H) 07/20/2020     Results from last 7 days   Lab Units 08/06/20 2003 08/06/20  1856 08/06/20  1732   POC GLUCOSE mg/dl 170* 288* 357*   Presenting after pt was found to have abnormal outpt labwork and told to go to ED  Mild DKA with CO2 at 17, Anion gap of 13, VBG reviewed, ABG not obtained    Resolved  The patient states that she knows how to manage her diabetes but feels that her blood glucose levels have been poorly controlled due to stress and her not prioritizing her health  The patient does report feeling motivated to improve her healthy habits, she is interested referred to endocrinology which was done prior to her discharge  Return precautions discussed      Type 1 diabetes mellitus with hyperglycemia [facility])  Assessment & Plan    Lab Results   Component Value Date    HGBA1C 10 1 (H) 07/20/2020   Poorly controlled type 1 diabetes presenting in DKA  See plan above    Hypothyroidism due to Hashimoto's thyroiditis  Assessment & Plan  Presenting with TSH of 57 756  This is slightly increased from her prior TSH in July (31 and 52)  Recently saw PCP on 7/28/20 who stopped armor thyroid  And restarted the patient on levothyroxine at an increased dosage 175 mcg daily  Plan was to recheck TSH in 6-8 weeks  Continue with outpt followup  Referral to Endocrinology placed    THO (acute kidney injury) [facility])  Assessment & Plan  Presenting with creatinine of 1 42  Baseline around 0 8-0 9  Likely secondary to dehydration in the setting of DKA    Resolved with IV fluids    Leukopenia  Assessment & Plan  As noted previously  Follow-up with PCP in regards to this      Discharging Physician / Practitioner: Leonora Verma MD  PCP: Nitish Howard, [facility]  Admission Date:   Admission Orders (From admission, onward)     Ordered        08/06/20 1728  Inpatient Admission  Once                   Discharge Date: 08/07/20    Resolved Problems  Date Reviewed: 8/7/2020    None          Consultations During Hospital Stay:  · Case management    Procedures Performed:   None    Significant Findings / Test Results:   Results from last 7 days   Lab Units 08/07/20  0435   WBC Thousand/uL 2 36*   HEMOGLOBIN g/dL 10 6*   HEMATOCRIT % 32 2*   PLATELETS Thousands/uL 177     Results from last 7 days   Lab Units 08/07/20  0435   SODIUM mmol/L 144   POTASSIUM mmol/L 3 6   CHLORIDE mmol/L 110*   CO2 mmol/L 23   BUN mg/dL 8   CREATININE mg/dL 0 76   CALCIUM mg/dL 7 6*   '    Incidental Findings:   · None     Test Results Pending at Discharge (will require follow up): · None      Outpatient Tests Requested:  · None    Complications:  None    Reason for Admission:  Abnormal labs    Hospital Course:     Monse Sales is a 47 y o  female patient with history type 1 and Hashimoto thyroiditis diabetes who originally presented to the hospital on 8/6/2020 due to abnormal labs, sent by PCP  The patient was found to be in mild DKA requiring DKA protocol  With the protocol, the patient DKA resolved and her labs improved  The patient felt that her uncontrolled diabetes is due to stress in her life related to her family and notes that she has been prior ties in her family over her health, she reports motivation to improve her self-care and resume outpatient Endocrinology follow-up  The patient states that she has a good insulin regimen with ineffective sliding scale and would like to continue her regimen as previously    She states that she will follow-up with her primary care provider on as soon as possible basis and will follow through with her endocrinology referral   The patient is feeling well and states that she would like to go home  Return precautions discussed, all questions answered  Please see above list of diagnoses and related plan for additional information  Condition at Discharge: stable     Discharge Day Visit / Exam:     Subjective:  Patient seen and examined  She reports feeling well and states that she would like to go home  She denies any pain  She denies nausea, vomiting, diarrhea constipation and states that she is tolerating her oral intake  No overnight events  Vitals: Blood Pressure: 100/59 (08/07/20 0723)  Pulse: 77 (08/07/20 0723)  Temperature: (!) 97 °F (36 1 °C) (08/07/20 0723)  Temp Source: Temporal (08/07/20 0723)  Respirations: 18 (08/07/20 0723)  Height: 5' 6" (167 6 cm) (08/06/20 2320)  Weight - Scale: 71 4 kg (157 lb 6 5 oz) (08/07/20 0436)  SpO2: 97 % (08/07/20 0723)    Exam:     Physical Exam  Constitutional:       General: She is not in acute distress  Appearance: She is not ill-appearing  HENT:      Head: Normocephalic and atraumatic  Mouth/Throat:      Mouth: Mucous membranes are moist    Eyes:      Conjunctiva/sclera: Conjunctivae normal    Cardiovascular:      Rate and Rhythm: Normal rate and regular rhythm  Heart sounds: No murmur  Abdominal:      General: Abdomen is flat  Bowel sounds are normal  There is no distension  Tenderness: There is no abdominal tenderness  Musculoskeletal:      Left lower leg: No edema  Skin:     General: Skin is warm and dry  Neurological:      General: No focal deficit present  Mental Status: She is oriented to person, place, and time  Psychiatric:         Mood and Affect: Mood normal          Discussion with Family:  Patient stated she would update family    Discharge instructions/Information to patient and family:   See after visit summary for information provided to patient and family        Provisions for Follow-Up Care:  See after visit summary for information related to follow-up care and any pertinent home health orders  Disposition:     Home    For Discharges to [facility] SNF:   · Not Applicable to this Patient - Not Applicable to this Patient    Planned Readmission: No     Discharge Statement:  I spent 35 minutes discharging the patient  This time was spent on the day of discharge  I had direct contact with the patient on the day of discharge  Greater than 50% of the total time was spent examining patient, answering all patient questions, arranging and discussing plan of care with patient as well as directly providing post-discharge instructions  Additional time then spent on discharge activities  Discharge Medications:  See after visit summary for reconciled discharge medications provided to patient and family        ** Please Note: This note has been constructed using a voice recognition system **

## 2020-08-07 NOTE — PLAN OF CARE
Problem: Potential for Falls  Goal: Patient will remain free of falls  Description: INTERVENTIONS:  - Assess patient frequently for physical needs  -  Identify cognitive and physical deficits and behaviors that affect risk of falls    -  [place] fall precautions as indicated by assessment   - Educate patient/family on patient safety including physical limitations  - Instruct patient to call for assistance with activity based on assessment  - Modify environment to reduce risk of injury  - Consider OT/PT consult to assist with strengthening/mobility  Outcome: Adequate for Discharge

## 2020-08-07 NOTE — PLAN OF CARE
Problem: Potential for Falls  Goal: Patient will remain free of falls  Description: INTERVENTIONS:  - Assess patient frequently for physical needs  -  Identify cognitive and physical deficits and behaviors that affect risk of falls    -  [place] fall precautions as indicated by assessment   - Educate patient/family on patient safety including physical limitations  - Instruct patient to call for assistance with activity based on assessment  - Modify environment to reduce risk of injury  - Consider OT/PT consult to assist with strengthening/mobility  Outcome: Progressing

## 2020-08-07 NOTE — UTILIZATION REVIEW
Initial Clinical Review    Admission: Date/Time/Statement:   Admission Orders (From admission, onward)     Ordered        08/06/20 1728  Inpatient Admission  Once                   Orders Placed This Encounter   Procedures    Inpatient Admission     Standing Status:   Standing     Number of Occurrences:   1     Order Specific Question:   Admitting Physician     Answer:   Ze Loyola [[id number]]     Order Specific Question:   Level of Care     Answer:   Level 2 Stepdown / HOT [14]     Order Specific Question:   Estimated length of stay     Answer:   More than 2 Midnights     Order Specific Question:   Certification     Answer:   I certify that inpatient services are medically necessary for this patient for a duration of greater than two midnights  See H&P and MD Progress Notes for additional information about the patient's course of treatment  ED Arrival Information     Expected Arrival Acuity Means of Arrival Escorted By Service Admission Type    - 8/6/2020 15:18 Urgent Walk-In Self Hospitalist Urgent    Arrival Complaint    abnormal lab results        Chief Complaint   Patient presents with    Fatigue     Reporting increased fatigue, dizziness, nausea  Sent over by PCP for abnormal blood work  Assessment/Plan:  47 y o  female with PMH  poorly controlled type 1 diabetes, Hashimoto's, iron deficiency anemia, morbid obesity status post gastric bypass 2009 presents to ED for abnormal OP Lab results  She was seen by her PCP on 8/4 for  worsening symptoms of fatigue, generalized weakness, body aches, weight loss, decreased appetite and was instructed to stop [place] and restart levothyroxine  Pt also was given rx for lab work and MRI (scheduled for Monday)  In the ED she was found to be in DKA:  Glu 375,  Cr 1 42, a ap 19,  pH 7 28, pCO2  40 8, Bicarb 18 7, beta hydroxybutyrate 5 7  Urine with glu 500, 4+ ketones  Started on Insulin Drip in ED     Admitted to IP with  DKA  And THO (baseline Cr 0 8 - 0 9)  Plan: Tele, Insulin drip, BS's checks q1h, BMP q4h  NPO, IVF's  Notify provider once blood glucose is less than 250 to start saline with dextrose    8/7:  Off Insulin Drip  IV NSS continues 2 100 / hr  Advance to CCD2 diet  See Insulin orders below         ED Triage Vitals   Temperature Pulse Respirations Blood Pressure SpO2   08/06/20 1542 08/06/20 1542 08/06/20 1542 08/06/20 1542 08/06/20 1542   98 4 °F (36 9 °C) 84 16 131/82 99 %      Temp Source Heart Rate Source Patient Position - Orthostatic VS BP Location FiO2 (%)   08/06/20 1542 08/06/20 1542 08/06/20 1542 08/06/20 1542 --   Temporal Monitor Sitting Right arm       Pain Score       08/06/20 2319       No Pain          Wt Readings from Last 1 Encounters:   08/07/20 71 4 kg (157 lb 6 5 oz)     Additional Vital Signs:   08/07/20 0723   97 °F (36 1 °C)     77   18   100/59      97 %  None (Room air)  Lying    08/07/20 0300   97 5 °F (36 4 °C)   74   16   99/66      98 %  None (Room air)  Lying    08/06/20 2320   97 9 °F (36 6 °C)   77   16   119/61      99 %  None (Room air)  Lying    08/06/20 2103      80   16   110/66      100 %  None (Room air)  Lying    08/06/20 1858      85   18   125/74      100 %  None (Room air)  Lying    08/06/20 1830      78   18   98/67   80   100 %        08/06/20 1800      78   18   114/77   89   100 %  None (Room air)  Lying    08/06/20 1745      74   18   115/78   88   100 %  None (Room air)          Pertinent Labs/Diagnostic Test Results:   Results from last 7 days   Lab Units 08/07/20  0435 08/06/20  1618   WBC Thousand/uL 2 36* 3 19*   HEMOGLOBIN g/dL 10 6* 12 7   HEMATOCRIT % 32 2* 38 7   PLATELETS Thousands/uL 177 224   NEUTROS ABS Thousands/µL  --  2 01     Lab Units 08/07/20  0435 08/07/20  0144 08/06/20  2139 08/06/20  1618   SODIUM mmol/L 144 144 144 136   POTASSIUM mmol/L 3 6 3 0* 2 6* 4 1   CHLORIDE mmol/L 110* 109* 110* 97*   CO2 mmol/L 23 23 20* 20*   ANION GAP mmol/L 11 12 14* 19*   BUN mg/dL 8 10 10 14   CREATININE mg/dL 0 76 0 77 0 80 1 42*   EGFR ml/min/1 73sq m 100 98 94 47   CALCIUM mg/dL 7 6* 7 8* 7 4* 8 7   MAGNESIUM mg/dL 2 0  --  1 4*  --    PHOSPHORUS mg/dL 3 0  --  2 1*  --      Results from last 7 days   Lab Units 08/06/20  1618   AST U/L 29   ALT U/L 33   ALK PHOS U/L 135*   TOTAL PROTEIN g/dL 7 3   ALBUMIN g/dL 3 7   TOTAL BILIRUBIN mg/dL 0 42     Results from last 7 days   Lab Units 08/07/20  0722 08/07/20  0306 08/07/20  0205 08/07/20  0058 08/06/20  2359 08/06/20  2306 08/06/20  2206 08/06/20  2100 08/06/20  2003 08/06/20  1856 08/06/20  1732   POC GLUCOSE mg/dl 162* 180* 139 136 114 134 69 105 170* 288* 357*     Results from last 7 days   Lab Units 08/07/20  0435 08/07/20  0144 08/06/20  2139 08/06/20  1618   GLUCOSE RANDOM mg/dL 193* 148* 122 375*     BETA-HYDROXYBUTYRATE   Date Value Ref Range Status   08/06/2020 5 7 (H) <0 6 mmol/L Final      Results from last 7 days   Lab Units 08/06/20  1618   PH LANNY  7 280*   PCO2 LANNY mm Hg 40 8*   PO2 LANNY mm Hg 21 4*   HCO3 LANNY mmol/L 18 7*   BASE EXC LANNY mmol/L -7 5   O2 CONTENT LANNY ml/dL 6 5   O2 HGB, VENOUS % 35 3*     Results from last 7 days   Lab Units 08/05/20  1457   CK TOTAL U/L 36     Results from last 7 days   Lab Units 08/06/20  1618   TROPONIN I ng/mL <0 02     Results from last 7 days   Lab Units 08/06/20  1618   TSH 3RD GENERATON uIU/mL 57 756*     Results from last 7 days   Lab Units 08/06/20  1627   CLARITY UA  Clear   COLOR UA  Yellow   SPEC GRAV UA  1 025   PH UA  6 0   GLUCOSE UA mg/dl 500 (1/2%)*   KETONES UA mg/dl >=160 (4+)*   BLOOD UA  Trace*   PROTEIN UA mg/dl Negative   NITRITE UA  Negative   BILIRUBIN UA  Interference- unable to analyze*   UROBILINOGEN UA E U /dl 0 2   LEUKOCYTES UA  Negative   WBC UA /hpf 0-1*   RBC UA /hpf 0-1*   BACTERIA UA /hpf Occasional   EPITHELIAL CELLS WET PREP /hpf Occasional     ED Treatment:   Medication Administration from 08/06/2020 1517 to 08/06/2020 2300       Date/Time Order Dose Route Action     08/06/2020 1621 sodium chloride 0 9 % bolus 1,000 mL 1,000 mL Intravenous New Bag     08/06/2020 1740 sodium chloride 0 9 % bolus 1,000 mL 1,000 mL Intravenous New Bag     08/06/2020 2242 insulin regular (HumuLIN R,NovoLIN R) 1 Units/mL in sodium chloride 0 9 % 100 mL infusion 3 4 Units/hr Intravenous Restarted     08/06/2020 2008 insulin regular (HumuLIN R,NovoLIN R) 1 Units/mL in sodium chloride 0 9 % 100 mL infusion 3 4 Units/hr Intravenous Rate/Dose Change     08/06/2020 1920 insulin regular (HumuLIN R,NovoLIN R) 1 Units/mL in sodium chloride 0 9 % 100 mL infusion 6 8 Units/hr Intravenous Rate/Dose Change     08/06/2020 1857 insulin regular (HumuLIN R,NovoLIN R) 1 Units/mL in sodium chloride 0 9 % 100 mL infusion 13 6 Units/hr Intravenous Rate/Dose Change     08/06/2020 1754 insulin regular (HumuLIN R,NovoLIN R) 1 Units/mL in sodium chloride 0 9 % 100 mL infusion 6 8 Units/hr Intravenous New Bag     08/06/2020 2109 dextrose 5 % and sodium chloride 0 9 % infusion 250 mL/hr Intravenous Restarted     08/06/2020 2004 dextrose 5 % and sodium chloride 0 9 % infusion 250 mL/hr Intravenous Restarted     08/06/2020 1757 dextrose 5 % and sodium chloride 0 9 % infusion 250 mL/hr Intravenous New Bag     08/06/2020 2228 dextrose 50 % IV solution 25 mL 25 mL Intravenous Given        Past Medical History:   Diagnosis Date    Anemia     Anxiety     Arthritis     B12 deficiency     Cervical disc disorder     On gabapentin     Depression     Disease of thyroid gland     hypothyroid    [facility]    Iron deficiency anemia     Type 1 diabetes ([facility] ) 1994    Type 1 diabetes mellitus, uncontrolled ([facility] )     Vitamin D deficiency      Present on Admission:   DKA [facility]diabetic ketoacidoses) ([facility] )   Type 1 diabetes mellitus with hyperglycemia ([facility] )   THO (acute kidney injury) ([place] [id number] )   Hypothyroidism due to Hashimoto's thyroiditis      Admitting Diagnosis: DKA (diabetic ketoacidoses) ([facility] ) [E11 10]  Fatigue [R53 83]  Age/Sex: 47 y o  female     Admission Orders:  Scheduled Medications:  DULoxetine, 60 mg, Oral, Daily  escitalopram, 10 mg, Oral, Daily  heparin (porcine), 5,000 Units, Subcutaneous, Q8H [address]  insulin glargine, 36 Units, Subcutaneous, HS  insulin lispro, 1-5 Units, Subcutaneous, TID AC  X 1 8/6  insulin lispro, 1-5 Units, Subcutaneous, HS  levothyroxine, 175 mcg, Oral, Early Morning  traZODone, 100 mg, Oral, HS    Continuous IV Infusions:  sodium chloride, 100 mL/hr, Intravenous, Continuous  Started 8/7 @ 0349  dextrose 5 % and sodium chloride 0 9 % infusion     Ordered Dose: 250 mL/hr  Route: Intravenous  Frequency: Continuous @ 250 mL/hr    Administration Dose: 250 mL/hr         insulin regular (HumuLIN R,NovoLIN R) 1 Units/mL in sodium chloride 0 9 % 100 mL infusion     Ordered Dose: 0 3-21 Units/hr  Route: Intravenous  Frequency: Titrated @ 0 3-21 mL/hr        PRN Meds:  acetaminophen, 650 mg, Oral, Q6H PRN  LORazepam, 0 5 mg, Oral, Daily PRN  ondansetron, 4 mg, Intravenous, Q6H PRN    magnesium sulfate 2 g/50 mL IVPB (premix) 2 g    IV x 1  8/7 @ 0020  Ordered Dose: 2 g  Route: Intravenous  Frequency: Once over 2 Hours    Administration Dose: 2 g         potassium chloride (K-DUR,KLOR-CON) CR tablet 40 mEq    Ordered Dose: 40 mEq  Route: Oral  Frequency: Every 2 hours    Administration Dose: 40 mEq       Scheduled Start Date/Time: 08/06/20 2345  End Date/Time: 08/07/20 0247 after 2 doses        potassium chloride 20 mEq IVPB (premix)  IV x 1 8/7 @ [address] Utilization Review Department  [email or web address]  ATTENTION: Please call with any questions or concerns to [phone] and carefully listen to the prompts so that you are directed to the right person   All voicemails are confidential   Farrel Bodily all requests for admission clinical reviews, approved or denied determinations and any other requests to dedicated fax number below belonging to the campus where the patient is receiving treatment   List of dedicated fax numbers for the Facilities:  [address] DENIALS (Administrative/Medical Necessity) [phone]   [address]  (Maternity/NICU/Pediatrics) [phone]   Denis Anderson [phone]   Demetra Steele [phone]   Rodrick Montgomery [phone]   Miguel Salgado [phone]   [facility] [address] [phone]   [facility]  [phone]   [address] [facility]  [address] [place] [address] [address] [facility] [phone]

## 2020-08-10 ENCOUNTER — TRANSITIONAL CARE MANAGEMENT (OUTPATIENT)
Dept: FAMILY MEDICINE CLINIC | Facility: CLINIC | Age: 40
End: 2020-08-10

## 2020-08-10 ENCOUNTER — TELEPHONE (OUTPATIENT)
Dept: FAMILY MEDICINE CLINIC | Facility: CLINIC | Age: 40
End: 2020-08-10

## 2020-08-12 ENCOUNTER — TRANSITIONAL CARE MANAGEMENT (OUTPATIENT)
Dept: FAMILY MEDICINE CLINIC | Facility: CLINIC | Age: 40
End: 2020-08-12

## 2020-08-14 ENCOUNTER — HOSPITAL ENCOUNTER (OUTPATIENT)
Dept: RADIOLOGY | Facility: IMAGING CENTER | Age: 40
Discharge: HOME | End: 2020-08-14
Payer: COMMERCIAL

## 2020-08-14 DIAGNOSIS — R42 DIZZINESS: ICD-10-CM

## 2020-08-14 DIAGNOSIS — H54.7 VISION PROBLEMS: ICD-10-CM

## 2020-08-14 DIAGNOSIS — R41.82 ALTERED MENTAL STATUS, UNSPECIFIED ALTERED MENTAL STATUS TYPE: ICD-10-CM

## 2020-08-14 DIAGNOSIS — R53.83 FATIGUE, UNSPECIFIED TYPE: ICD-10-CM

## 2020-08-14 DIAGNOSIS — R47.9 DIFFICULTY WITH SPEECH: ICD-10-CM

## 2020-08-14 PROCEDURE — A9585 GADOBUTROL INJECTION: HCPCS | Performed by: NURSE PRACTITIONER

## 2020-08-14 PROCEDURE — 70553 MRI BRAIN STEM W/O & W/DYE: CPT

## 2020-08-14 PROCEDURE — G1004 CDSM NDSC: HCPCS

## 2020-08-14 RX ADMIN — GADOBUTROL 7 ML: 604.72 INJECTION INTRAVENOUS at 15:05

## 2020-08-19 ENCOUNTER — TELEPHONE (OUTPATIENT)
Dept: HEMATOLOGY ONCOLOGY | Facility: CLINIC | Age: 40
End: 2020-08-19

## 2020-08-19 NOTE — TELEPHONE ENCOUNTER
Patient has 10:00 a m  f/u apt tomorrow Thursday 8/20 w/[facility] pass  Called pt and left message asking pt to please call the office to do a covid pre screening questionnaire, [phone]

## 2020-08-20 ENCOUNTER — TELEPHONE (OUTPATIENT)
Dept: INTERVENTIONAL RADIOLOGY/VASCULAR | Facility: HOSPITAL | Age: 40
End: 2020-08-20

## 2020-08-20 ENCOUNTER — OFFICE VISIT (OUTPATIENT)
Dept: HEMATOLOGY ONCOLOGY | Facility: CLINIC | Age: 40
End: 2020-08-20
Payer: COMMERCIAL

## 2020-08-20 VITALS
SYSTOLIC BLOOD PRESSURE: 116 MMHG | DIASTOLIC BLOOD PRESSURE: 82 MMHG | HEIGHT: 66 IN | BODY MASS INDEX: 24.35 KG/M2 | WEIGHT: 151.5 LBS | TEMPERATURE: 96.4 F | RESPIRATION RATE: 16 BRPM | OXYGEN SATURATION: 96 % | HEART RATE: 81 BPM

## 2020-08-20 DIAGNOSIS — E53.8 B12 DEFICIENCY: ICD-10-CM

## 2020-08-20 DIAGNOSIS — D50.8 IRON DEFICIENCY ANEMIA SECONDARY TO INADEQUATE DIETARY IRON INTAKE: ICD-10-CM

## 2020-08-20 DIAGNOSIS — D72.818 OTHER DECREASED WHITE BLOOD CELL (WBC) COUNT: Primary | ICD-10-CM

## 2020-08-20 DIAGNOSIS — D70.8 OTHER NEUTROPENIA (HCC): ICD-10-CM

## 2020-08-20 PROCEDURE — 3046F HEMOGLOBIN A1C LEVEL >9.0%: CPT | Performed by: NURSE PRACTITIONER

## 2020-08-20 PROCEDURE — 3008F BODY MASS INDEX DOCD: CPT | Performed by: NURSE PRACTITIONER

## 2020-08-20 PROCEDURE — 3066F NEPHROPATHY DOC TX: CPT | Performed by: NURSE PRACTITIONER

## 2020-08-20 PROCEDURE — 1036F TOBACCO NON-USER: CPT | Performed by: NURSE PRACTITIONER

## 2020-08-20 PROCEDURE — 1111F DSCHRG MED/CURRENT MED MERGE: CPT | Performed by: NURSE PRACTITIONER

## 2020-08-20 PROCEDURE — 99214 OFFICE O/P EST MOD 30 MIN: CPT | Performed by: NURSE PRACTITIONER

## 2020-08-20 PROCEDURE — 2022F DILAT RTA XM EVC RTNOPTHY: CPT | Performed by: NURSE PRACTITIONER

## 2020-08-20 NOTE — PROGRESS NOTES
Hematology/Oncology Outpatient Follow-up  Shea Barber 47 y o  female 1980 [id number]    Date:  8/20/2020      Assessment and Plan:  1  Other decreased white blood cell (WBC) count  Patient continues to have leukopenia with occasional mild neutropenia which seems to be a chronic process  May be due to her autoimmune disorders/b12 def but other etiologies cannot be completely ruled out  The patient would like to proceed with bone marrow biopsy to know for sure if there is any issues with the bone marrow which is definitely appropriate  We did discuss the procedure and what it entails  She will be sent to the interventional radiology team to have the procedure done within the next week  Will be back for follow-up again in about 3 weeks to discuss the findings which will be acted on accordingly     - CBC and differential; Future  - Comprehensive metabolic panel; Future  - C-reactive protein; Future  - Sedimentation rate, automated; Future  - IR bone marrow biopsy/aspiration; Future  - Ambulatory referral to Interventional Radiology; Future  - LD,Blood; Future    2  Iron deficiency anemia secondary to inadequate dietary iron intake  Patient's most recent iron panel from about 1 month ago showed appropriate iron stores with iron saturation 24 and ferritin 85  We will continue to monitor her laboratory studies on a regular basis since she has a history of gastric bypass surgery  Has required IV iron in the past     3  B12 deficiency  May or may not be contributory to her leukopenia  Patient reports that she started over-the-counter vitamin B12 supplement few weeks ago  Advised her to change her supplement to sublingual minimum dose of 1000 mcg daily since she has history of gastric bypass surgery    We will continue to monitor     - Vitamin B12; Future      HPI:  This is the 79-year-old female with history of type 1 diabetes mellitus, Hashimoto's thyroiditis diagnosed 04/2020, morbid obesity status post gastric bypass surgery in 2009  She lost about 100 lb at least   She developed iron deficiency and was treated with iron IV Venofer on multiple occasions under Dr Victoria Drummond from the [facility] group  She also seems to have the long history of low white cell count  She was found to have low white cell count  Her CBC on 06/27/2020 showed a white cell count of 2 1 with ANC of 1 4  Her hemoglobin was 10 8 with MCV of 91  The platelet count was in the low-normal range of 162  She denies bleeding from any sites  Interval history:  Patient presents today for a follow-up visit  She was admitted again for observation for DKA 8/6-8/7  Reports that she continues to have multiple complaints that she feels is related to her Hashimoto's thyroiditis with elevated TSH including:  Generalized arthralgias myalgias, significant fatigue, mild headaches and dizziness, dyspnea, decreased appetite, insomnia and mood changes  Admits that she has good days and bad days; some days she is so exhausted she can barely get out of bed  She states that she is planning to see a endocrinologist in [place] who specializes in [address] [place] [address] in the near future  Has been talking to other individuals to have the disorder which has been helpful to her  She is seeing a psychiatrist who is managing her antidepressants and anxiety medications but is not enthusiastic about continuing medications as she feels her symptoms will improve if her thyroid issue is better controlled  She denies bleeding from any site other than her menstrual cycles; admits that her cycles have been irregular and heavy at times due to her thyroid disorder  She denies fever or recent infection  She states that she started an oral vitamin B12 supplement few weeks ago  Her most recent CBC during her hospitalization on 08/07/2020 showed low WBC 2 36 if not done, H&H 10 6/32 2, MCV 88 and platelet count 635, glucose 193 with normal kidney function   Her CBC 1 day prior 08/06/2020 showed WBC 3 19 with normal ANC 2 01/normal differential, she was not anemic that is a hemoglobin 12 7 platelet count 188  Most recent TSH from 08/06/2020 was significantly elevated 57 75; she states that she recently resumed her levothyroxine 2 weeks ago at 175 mcg daily  Her LAURENT came back negative  She had additional workup done for as regarding her chronic leukopenia/neutropenia on 07/24/2020 which did not reveal any hint of hemolysis or obvious hint of monoclonal gammopathy  Inflammatory markers were in the normal range  B12 is lower than average 355  Copper level normal 142  Iron panel showed iron saturation 24%, TIBC 331, serum iron 80 with ferritin of 85  ROS: Review of Systems   Constitutional: Positive for activity change and appetite change  Negative for chills, fatigue, fever and unexpected weight change  HENT: Positive for hearing loss  Negative for congestion, mouth sores, nosebleeds, sore throat and trouble swallowing  Eyes: Negative  Respiratory: Positive for shortness of breath  Negative for cough and chest tightness  Cardiovascular: Negative for chest pain, palpitations and leg swelling  Gastrointestinal: Negative for abdominal distention, abdominal pain, blood in stool, constipation, diarrhea, nausea and vomiting  Endocrine:        Hair thinning   Genitourinary: Positive for menstrual problem  Negative for difficulty urinating, dysuria, frequency, hematuria and urgency  Musculoskeletal: Positive for arthralgias and myalgias  Negative for back pain, gait problem and joint swelling  Generalized   Skin: Negative for color change, pallor and rash  Neurological: Positive for dizziness, numbness (And tingling severe) and headaches  Negative for weakness and light-headedness  Hematological: Negative for adenopathy  Does not bruise/bleed easily  Psychiatric/Behavioral: Positive for dysphoric mood and sleep disturbance  The patient is nervous/anxious  Past Medical History:   Diagnosis Date    Anemia     Anxiety     Arthritis     B12 deficiency     Cervical disc disorder     On gabapentin     Depression     Disease of thyroid gland     hypothyroid    DKA (diabetic ketoacidoses) ([facility] ) 2018    Iron deficiency anemia     Type 1 diabetes ([facility] ) 1994    Type 1 diabetes mellitus, uncontrolled ([facility] )     Vitamin D deficiency        Past Surgical History:   Procedure Laterality Date    ABDOMINAL SURGERY      gastric bypass     SECTION      x2    CHOLECYSTECTOMY  2018    GASTRIC BYPASS  2007    INTRAUTERINE DEVICE INSERTION  2015    OTHER SURGICAL HISTORY      surgery for imperforate hymen/endometriosis/hydrometrocolpos    TUBAL LIGATION      WISDOM TOOTH EXTRACTION         Social History     Socioeconomic History    Marital status:       Spouse name: None    Number of children: None    Years of education: None    Highest education level: None   Occupational History    Occupation: Teacher    Social Needs    Financial resource strain: None    Food insecurity     Worry: None     Inability: None    Transportation needs     Medical: None     Non-medical: None   Tobacco Use    Smoking status: Never Smoker    Smokeless tobacco: Never Used   Substance and Sexual Activity    Alcohol use: No     Frequency: Never     Binge frequency: Never     Comment: socially    Drug use: No    Sexual activity: Yes     Partners: Male   Lifestyle    Physical activity     Days per week: None     Minutes per session: None    Stress: None   Relationships    Social connections     Talks on phone: None     Gets together: None     Attends [employer]ical service: None     Active member of club or organization: None     Attends meetings of clubs or organizations: None     Relationship status: None    Intimate partner violence     Fear of current or ex partner: None     Emotionally abused: None     Physically abused: None     Forced sexual activity: None   Other Topics Concern    None   Social History Narrative     - As per [employer]    Occasionally consumes alcohol - As per [employer]    Consumes on average 2 cups of regular coffee per day        Family History   Problem Relation Age of Onset    Thyroid disease Mother     URIEL disease Mother     Hyperlipidemia Mother     Hypertension Mother     Osteoarthritis Mother     Thyroid disease unspecified Mother     Diabetes Father     Alcohol abuse Father     Diabetes type I Father     Thyroid disease Sister     Depression Sister     Thyroid disease unspecified Sister     Heart disease Maternal Grandmother     Hypertension Maternal Grandmother     Arthritis Maternal Grandmother     Diabetes type I Maternal Uncle     Diabetes type I Maternal Grandfather        No Known Allergies      Current Outpatient Medications:     DULoxetine (CYMBALTA) 60 mg delayed release capsule, TAKE ONE CAPSULE BY MOUTH ONCE DAILY, Disp: 30 capsule, Rfl: 5    ergocalciferol (VITAMIN D2) 50,000 units, Take 1 capsule (50,000 Units total) by mouth once a week, Disp: 12 capsule, Rfl: 3    escitalopram (LEXAPRO) 10 mg tablet, Take 10 mg by mouth daily, Disp: , Rfl:     glucose blood test strip, Testing up to 8 times a day  E10 65, Disp: , Rfl:     Insulin Aspart FlexPen (NovoLOG FlexPen) 100 UNIT/ML SOPN, 1 unit for 6 grams of carbs and 1 unit for 30 mg/dl above 120   upto 50 units a day, Disp: 5 pen, Rfl: 1    insulin glargine (LANTUS SOLOSTAR) 100 units/mL injection pen, Inject 36 Units under the skin daily at bedtime, Disp: 5 pen, Rfl: 3    Insulin Pen Needle (BD Pen Needle Deanna U/F) 32G X 4 MM MISC, Use 4/day, Disp: 100 each, Rfl: 3    Insulin Syringe-Needle U-100 (INSULIN SYRINGE 1CC/28G) 28G X 1/2" 1 ML MISC, 4 injections daily E10 65, Disp: , Rfl:     levothyroxine 175 mcg tablet, Take 1 tablet (175 mcg total) by mouth daily in the early morning, Disp: 90 tablet, Rfl: 3    LORazepam (ATIVAN) 0 5 mg tablet, , Disp: , Rfl:     traZODone (DESYREL) 100 mg tablet, Take 100 mg by mouth daily at bedtime , Disp: , Rfl:       Physical Exam:  /82 (BP Location: Left arm, Patient Position: Sitting, Cuff Size: Adult)   Pulse 81   Temp (!) 96 4 °F (35 8 °C) (Tympanic)   Resp 16   Ht 5' 6" (1 676 m)   Wt 68 7 kg (151 lb 8 oz)   LMP 07/29/2020 (Exact Date)   SpO2 96%   BMI 24 45 kg/m²     Physical Exam  Vitals signs reviewed  Constitutional:       General: She is not in acute distress  Appearance: She is well-developed  She is not diaphoretic  HENT:      Head: Normocephalic and atraumatic  Mouth/Throat:      Mouth: Mucous membranes are moist       Pharynx: Oropharynx is clear  No oropharyngeal exudate or posterior oropharyngeal erythema  Eyes:      General: No scleral icterus  Conjunctiva/sclera: Conjunctivae normal       Pupils: Pupils are equal, round, and reactive to light  Neck:      Musculoskeletal: Normal range of motion and neck supple  Thyroid: No thyromegaly  Cardiovascular:      Rate and Rhythm: Normal rate and regular rhythm  Heart sounds: Normal heart sounds  No murmur  Pulmonary:      Effort: Pulmonary effort is normal  No respiratory distress  Breath sounds: Normal breath sounds  Abdominal:      General: There is no distension  Palpations: Abdomen is soft  There is no hepatomegaly or splenomegaly  Tenderness: There is no abdominal tenderness  Musculoskeletal: Normal range of motion  General: No swelling  Lymphadenopathy:      Cervical: No cervical adenopathy  Upper Body:      Right upper body: No axillary adenopathy  Left upper body: No axillary adenopathy  Skin:     General: Skin is warm and dry  Findings: No erythema or rash  Neurological:      General: No focal deficit present  Mental Status: She is alert and oriented to person, place, and time  Psychiatric:         Mood and Affect: Affect normal  Mood is anxious  Behavior: Behavior normal  Behavior is cooperative  Thought Content: Thought content normal          Judgment: Judgment normal            Labs:  Lab Results   Component Value Date    WBC 2 36 (L) 08/07/2020    HGB 10 6 (L) 08/07/2020    HCT 32 2 (L) 08/07/2020    MCV 88 08/07/2020     08/07/2020     Lab Results   Component Value Date     05/16/2018    K 3 6 08/07/2020     (H) 08/07/2020    CO2 23 08/07/2020    ANIONGAP 11 3 05/16/2018    BUN 8 08/07/2020    CREATININE 0 76 08/07/2020    GLUF 387 (H) 08/05/2020    CALCIUM 7 6 (L) 08/07/2020    AST 29 08/06/2020    ALT 33 08/06/2020    ALKPHOS 135 (H) 08/06/2020    PROT 6 8 05/16/2018    BILITOT 0 4 05/16/2018    EGFR 100 08/07/2020       Patient voiced understanding and agreement in the above discussion  Aware to contact our office with questions/symptoms in the interim  This note has been generated by voice recognition software system  Therefore, there may be spelling, grammar, and or syntax errors  Please contact if questions arise

## 2020-08-20 NOTE — TELEPHONE ENCOUNTER
Spoke with Kerri Wilburn from Dr Aretha Agudelo office to schedule patient for a bone marrow biopsy  Patient was still in office  Appointment was made for 8/27/20 @ 11am at the [facility]  Plan to have a ride to and from, and NPO after midnight the night prior

## 2020-08-27 ENCOUNTER — HOSPITAL ENCOUNTER (OUTPATIENT)
Dept: CT IMAGING | Facility: HOSPITAL | Age: 40
Discharge: HOME | End: 2020-08-27
Payer: COMMERCIAL

## 2020-08-27 VITALS
SYSTOLIC BLOOD PRESSURE: 127 MMHG | OXYGEN SATURATION: 99 % | TEMPERATURE: 98.1 F | RESPIRATION RATE: 16 BRPM | BODY MASS INDEX: 24.27 KG/M2 | HEART RATE: 70 BPM | WEIGHT: 151 LBS | HEIGHT: 66 IN | DIASTOLIC BLOOD PRESSURE: 80 MMHG

## 2020-08-27 DIAGNOSIS — D72.818 OTHER DECREASED WHITE BLOOD CELL (WBC) COUNT: ICD-10-CM

## 2020-08-27 LAB
EXT PREGNANCY TEST URINE: NEGATIVE
EXT. CONTROL: NORMAL

## 2020-08-27 PROCEDURE — 85097 BONE MARROW INTERPRETATION: CPT | Performed by: PATHOLOGY

## 2020-08-27 PROCEDURE — 88342 IMHCHEM/IMCYTCHM 1ST ANTB: CPT | Performed by: PATHOLOGY

## 2020-08-27 PROCEDURE — 88291 CYTO/MOLECULAR REPORT: CPT

## 2020-08-27 PROCEDURE — 88184 FLOWCYTOMETRY/ TC 1 MARKER: CPT | Performed by: NURSE PRACTITIONER

## 2020-08-27 PROCEDURE — 88305 TISSUE EXAM BY PATHOLOGIST: CPT | Performed by: PATHOLOGY

## 2020-08-27 PROCEDURE — 88311 DECALCIFY TISSUE: CPT | Performed by: PATHOLOGY

## 2020-08-27 PROCEDURE — 99152 MOD SED SAME PHYS/QHP 5/>YRS: CPT | Performed by: RADIOLOGY

## 2020-08-27 PROCEDURE — 88313 SPECIAL STAINS GROUP 2: CPT | Performed by: PATHOLOGY

## 2020-08-27 PROCEDURE — 88262 CHROMOSOME ANALYSIS 15-20: CPT

## 2020-08-27 PROCEDURE — 88341 IMHCHEM/IMCYTCHM EA ADD ANTB: CPT | Performed by: PATHOLOGY

## 2020-08-27 PROCEDURE — 81025 URINE PREGNANCY TEST: CPT | Performed by: RADIOLOGY

## 2020-08-27 PROCEDURE — 38222 DX BONE MARROW BX & ASPIR: CPT

## 2020-08-27 PROCEDURE — 77012 CT SCAN FOR NEEDLE BIOPSY: CPT | Performed by: RADIOLOGY

## 2020-08-27 PROCEDURE — 88185 FLOWCYTOMETRY/TC ADD-ON: CPT

## 2020-08-27 PROCEDURE — 99152 MOD SED SAME PHYS/QHP 5/>YRS: CPT

## 2020-08-27 PROCEDURE — 38222 DX BONE MARROW BX & ASPIR: CPT | Performed by: RADIOLOGY

## 2020-08-27 PROCEDURE — 88237 TISSUE CULTURE BONE MARROW: CPT | Performed by: NURSE PRACTITIONER

## 2020-08-27 RX ORDER — FENTANYL CITRATE 50 UG/ML
INJECTION, SOLUTION INTRAMUSCULAR; INTRAVENOUS CODE/TRAUMA/SEDATION MEDICATION
Status: COMPLETED | OUTPATIENT
Start: 2020-08-27 | End: 2020-08-27

## 2020-08-27 RX ORDER — SODIUM CHLORIDE, SODIUM LACTATE, POTASSIUM CHLORIDE, CALCIUM CHLORIDE 600; 310; 30; 20 MG/100ML; MG/100ML; MG/100ML; MG/100ML
50 INJECTION, SOLUTION INTRAVENOUS CONTINUOUS
Status: DISCONTINUED | OUTPATIENT
Start: 2020-08-27 | End: 2020-08-31 | Stop reason: HOSPADM

## 2020-08-27 RX ORDER — LIDOCAINE HYDROCHLORIDE 10 MG/ML
INJECTION, SOLUTION EPIDURAL; INFILTRATION; INTRACAUDAL; PERINEURAL CODE/TRAUMA/SEDATION MEDICATION
Status: COMPLETED | OUTPATIENT
Start: 2020-08-27 | End: 2020-08-27

## 2020-08-27 RX ORDER — MIDAZOLAM HYDROCHLORIDE 2 MG/2ML
INJECTION, SOLUTION INTRAMUSCULAR; INTRAVENOUS CODE/TRAUMA/SEDATION MEDICATION
Status: COMPLETED | OUTPATIENT
Start: 2020-08-27 | End: 2020-08-27

## 2020-08-27 RX ADMIN — SODIUM CHLORIDE, SODIUM LACTATE, POTASSIUM CHLORIDE, AND CALCIUM CHLORIDE 50 ML/HR: .6; .31; .03; .02 INJECTION, SOLUTION INTRAVENOUS at 12:20

## 2020-08-27 RX ADMIN — FENTANYL CITRATE 100 MCG: 50 INJECTION INTRAMUSCULAR; INTRAVENOUS at 13:11

## 2020-08-27 RX ADMIN — FENTANYL CITRATE 50 MCG: 50 INJECTION INTRAMUSCULAR; INTRAVENOUS at 13:22

## 2020-08-27 RX ADMIN — MIDAZOLAM HYDROCHLORIDE 1 MG: 1 INJECTION, SOLUTION INTRAMUSCULAR; INTRAVENOUS at 13:17

## 2020-08-27 RX ADMIN — MIDAZOLAM HYDROCHLORIDE 2 MG: 1 INJECTION, SOLUTION INTRAMUSCULAR; INTRAVENOUS at 13:11

## 2020-08-27 RX ADMIN — FENTANYL CITRATE 50 MCG: 50 INJECTION INTRAMUSCULAR; INTRAVENOUS at 13:17

## 2020-08-27 RX ADMIN — LIDOCAINE HYDROCHLORIDE 10 ML: 10 INJECTION, SOLUTION EPIDURAL; INFILTRATION; INTRACAUDAL; PERINEURAL at 13:21

## 2020-08-27 RX ADMIN — MIDAZOLAM HYDROCHLORIDE 1 MG: 1 INJECTION, SOLUTION INTRAMUSCULAR; INTRAVENOUS at 13:22

## 2020-08-27 NOTE — PROGRESS NOTES
Progress Note -Interventional Radiology  Patience Cordero 47 y o  female MRN: [id number]  Unit/Bed#:  Encounter: [id number]    Assessment/Plan:    CT guidance and monitored moderate intravenous conscious sedation for bone marrow biopsy  Problem List     DKA (diabetic ketoacidoses) ([facility] )      Lab Results   Component Value Date    HGBA1C 10 1 (H) 07/20/2020         Iron deficiency anemia (Chronic)    Hypothyroidism (Chronic)    Overview Signed 12/27/2018  9:20 PM by Van Burnette PA-C     Last Assessment & Plan:   Stable on current levothyroxine dose  Continue same and repeat TSH as per Dr Kristopher Reyes         Vitamin D deficiency (Chronic)    Overview Signed 12/27/2018  9:20 PM by Van Burnette PA-C     Last Assessment & Plan:   Recommended restarting higher OTC dosage and check level with next labs  She might benefit from higher dosages         B12 deficiency    Anxiety    Electrolyte abnormality    Overweight (BMI 25 0-29  9)    Type 1 diabetes mellitus with hyperglycemia (HCC)      Lab Results   Component Value Date    HGBA1C 10 1 (H) 07/20/2020         Type 1 diabetes mellitus with hypoglycemia and without coma [facility])      Lab Results   Component Value Date    HGBA1C 10 1 (H) 07/20/2020         Depression    Overview Signed 6/24/2020  2:57 AM by JAROCHO Andrade     · Continue duloxetine          Suicidal ideations    Leukopenia    THO (acute kidney injury) ([facility] )    Hypothyroidism due to Hashimoto's thyroiditis             Subjective: Patience Cordero is a 47 y o  female who presented with persistent leukopenia  Objective:    Vitals:  /78   Pulse 80   Temp 98 1 °F (36 7 °C) (Tympanic)   Resp 16   Ht 5' 6" (1 676 m)   Wt 68 5 kg (151 lb)   LMP 07/29/2020 (Exact Date)   SpO2 99%   Breastfeeding No   BMI 24 37 kg/m²   Body mass index is 24 37 kg/m²    Weight (last 2 days)     Date/Time   Weight    08/27/20 1157   68 5 (151)              I/Os:  No intake or output data in the 24 hours ending 08/27/20 1350    Invasive Devices     Peripheral Intravenous Line            Peripheral IV 08/27/20 Left Antecubital less than 1 day                Physical Exam     Regular rate and rhythm  Normal respiratory excursion  The skin over the posterior superior iliac spine is intact  Lab Results and Cultures:   CBC with diff:   Lab Results   Component Value Date    WBC 2 36 (L) 08/07/2020    HGB 10 6 (L) 08/07/2020    HCT 32 2 (L) 08/07/2020    MCV 88 08/07/2020     08/07/2020    MCH 29 0 08/07/2020    MCHC 32 9 08/07/2020    RDW 13 4 08/07/2020    MPV 9 9 08/07/2020    NRBC 0 08/06/2020      BMP/CMP:  Lab Results   Component Value Date     05/16/2018    K 3 6 08/07/2020    K 4 3 05/16/2018     (H) 08/07/2020     05/16/2018    CO2 23 08/07/2020    CO2 29 05/16/2018    ANIONGAP 11 3 05/16/2018    BUN 8 08/07/2020    BUN 12 05/25/2018    CREATININE 0 76 08/07/2020    CREATININE 0 76 05/25/2018    CALCIUM 7 6 (L) 08/07/2020    CALCIUM 9 4 05/16/2018    AST 29 08/06/2020    AST 28 (H) 05/16/2018    ALT 33 08/06/2020    ALT 22 05/16/2018    ALKPHOS 135 (H) 08/06/2020    ALKPHOS 138 05/16/2018    PROT 6 8 05/16/2018    BILITOT 0 4 05/16/2018    EGFR 100 08/07/2020   ,     Coags: No results found for: PT, PTT, INR,            VTE Pharmacologic Prophylaxis: RX contraindicated due to: Invasive procedure      Thank you for allowing me to participate in the care of Jose Lima  Please don't hesitate to call, text, email, or TigerText with any questions  This text is generated with voice recognition software  There may be translation, syntax, or grammatical errors  If you have any questions, please contact the dictating provider

## 2020-08-27 NOTE — DISCHARGE INSTRUCTIONS
[facility]  Interventional Radiology    [address]: [phone] (M-F 7:30am - 4:00pm)  Off hours or no answer: [phone] (Ask for IR on call)        Bone Marrow Biopsy     WHAT YOU NEED TO KNOW:   A bone marrow biopsy is a procedure to remove a small amount of bone marrow from your bone  Bone marrow is the soft tissue inside your bone that helps to make blood cells  The sample is tested for disease or infection  DISCHARGE INSTRUCTIONS:     1  Limit your activities day of biopsy as directed by your doctor  2  Use medication as ordered  3  Return to your normal diet  Small sips of flat soda will help with nausea  4  Remove band-aid or dressing 24 hours after procedure  Contact Interventional Radiology at [phone] [place] PATIENTS: Contact Interventional Radiology at [phone]) [place] PATIENTS: Contact Interventional Radiology at [phone]) if:    1  Difficulty breathing, nausea or vomiting  2  Chills or fever above 101 F     3  Pain at biopsy site not relieved by medication  4  Develop any redness, swelling, heat, unusual drainage, heavy bruising or bleeding from biopsy site

## 2020-08-27 NOTE — PROCEDURES
Interventional Radiology Procedure Note    PATIENT NAME: Jeremy Henry  : 1980  MRN: [id number]     Pre-op Diagnosis:   1  Other decreased white blood cell (WBC) count      2  Persistent leukopenia    Post-op Diagnosis:   1  Other decreased white blood cell (WBC) count      2     Same    Procedure: CT Guided Biopsy of the bone marrow  Surgeon: Nat Carpenter MD  Assistants: No qualified resident was available, Resident is only observing  Estimated Blood Loss: 10 cc  Findings: Please see full report in PACS  Specimens: Please see full report in PACS  Complications: None  Anesthesia: Conscious sedation and Local  Prep: 2% Chlorhexidine and alcohol, allowed to dry prior to sterile draping  Timeout: Performed  Fluoro time: Please see full report in PACS  Radiation dose: Please see full report in PACS  Contrast dose: Please see full report in PACS  Contrast type: Please see full report in PACS  Contrast strength: Please see full report in PACS  Contrast route of administration: Please see full report in PACS  Antibiotics: None        Nat Carpenter MD     Date: 2020  Time: 1:52 PM

## 2020-08-28 LAB — SCAN RESULT: NORMAL

## 2020-09-03 LAB — MISCELLANEOUS LAB TEST RESULT: NORMAL

## 2020-09-08 ENCOUNTER — APPOINTMENT (OUTPATIENT)
Dept: LAB | Facility: CLINIC | Age: 40
End: 2020-09-08
Payer: COMMERCIAL

## 2020-09-08 ENCOUNTER — TELEPHONE (OUTPATIENT)
Dept: HEMATOLOGY ONCOLOGY | Facility: CLINIC | Age: 40
End: 2020-09-08

## 2020-09-08 ENCOUNTER — TELEPHONE (OUTPATIENT)
Dept: OTHER | Facility: OTHER | Age: 40
End: 2020-09-08

## 2020-09-08 DIAGNOSIS — E53.8 B12 DEFICIENCY: ICD-10-CM

## 2020-09-08 DIAGNOSIS — D72.818 OTHER DECREASED WHITE BLOOD CELL (WBC) COUNT: ICD-10-CM

## 2020-09-08 LAB
ALBUMIN SERPL BCP-MCNC: 4 G/DL (ref 3.5–5)
ALP SERPL-CCNC: 159 U/L (ref 46–116)
ALT SERPL W P-5'-P-CCNC: 54 U/L (ref 12–78)
ANION GAP SERPL CALCULATED.3IONS-SCNC: 11 MMOL/L (ref 4–13)
AST SERPL W P-5'-P-CCNC: 31 U/L (ref 5–45)
BASOPHILS # BLD AUTO: 0.04 THOUSANDS/ΜL (ref 0–0.1)
BASOPHILS NFR BLD AUTO: 1 % (ref 0–1)
BILIRUB SERPL-MCNC: 1.02 MG/DL (ref 0.2–1)
BUN SERPL-MCNC: 14 MG/DL (ref 5–25)
CALCIUM SERPL-MCNC: 9.3 MG/DL (ref 8.3–10.1)
CHLORIDE SERPL-SCNC: 96 MMOL/L (ref 100–108)
CO2 SERPL-SCNC: 22 MMOL/L (ref 21–32)
CREAT SERPL-MCNC: 1.15 MG/DL (ref 0.6–1.3)
CRP SERPL QL: <3 MG/L
EOSINOPHIL # BLD AUTO: 0.07 THOUSAND/ΜL (ref 0–0.61)
EOSINOPHIL NFR BLD AUTO: 1 % (ref 0–6)
ERYTHROCYTE [DISTWIDTH] IN BLOOD BY AUTOMATED COUNT: 12.6 % (ref 11.6–15.1)
ERYTHROCYTE [SEDIMENTATION RATE] IN BLOOD: 21 MM/HOUR (ref 0–19)
GFR SERPL CREATININE-BSD FRML MDRD: 61 ML/MIN/1.73SQ M
GLUCOSE SERPL-MCNC: 573 MG/DL (ref 65–140)
HCT VFR BLD AUTO: 38.5 % (ref 34.8–46.1)
HGB BLD-MCNC: 12.2 G/DL (ref 11.5–15.4)
IMM GRANULOCYTES # BLD AUTO: 0.01 THOUSAND/UL (ref 0–0.2)
IMM GRANULOCYTES NFR BLD AUTO: 0 % (ref 0–2)
LDH SERPL-CCNC: 180 U/L (ref 81–234)
LYMPHOCYTES # BLD AUTO: 1.01 THOUSANDS/ΜL (ref 0.6–4.47)
LYMPHOCYTES NFR BLD AUTO: 20 % (ref 14–44)
MCH RBC QN AUTO: 29.1 PG (ref 26.8–34.3)
MCHC RBC AUTO-ENTMCNC: 31.7 G/DL (ref 31.4–37.4)
MCV RBC AUTO: 92 FL (ref 82–98)
MONOCYTES # BLD AUTO: 0.34 THOUSAND/ΜL (ref 0.17–1.22)
MONOCYTES NFR BLD AUTO: 7 % (ref 4–12)
NEUTROPHILS # BLD AUTO: 3.71 THOUSANDS/ΜL (ref 1.85–7.62)
NEUTS SEG NFR BLD AUTO: 71 % (ref 43–75)
NRBC BLD AUTO-RTO: 0 /100 WBCS
PLATELET # BLD AUTO: 190 THOUSANDS/UL (ref 149–390)
PMV BLD AUTO: 11.5 FL (ref 8.9–12.7)
POTASSIUM SERPL-SCNC: 4.5 MMOL/L (ref 3.5–5.3)
PROT SERPL-MCNC: 7.7 G/DL (ref 6.4–8.2)
RBC # BLD AUTO: 4.19 MILLION/UL (ref 3.81–5.12)
SODIUM SERPL-SCNC: 129 MMOL/L (ref 136–145)
VIT B12 SERPL-MCNC: 1303 PG/ML (ref 100–900)
WBC # BLD AUTO: 5.18 THOUSAND/UL (ref 4.31–10.16)

## 2020-09-08 PROCEDURE — 36415 COLL VENOUS BLD VENIPUNCTURE: CPT

## 2020-09-08 PROCEDURE — 85652 RBC SED RATE AUTOMATED: CPT

## 2020-09-08 PROCEDURE — 80053 COMPREHEN METABOLIC PANEL: CPT

## 2020-09-08 PROCEDURE — 86140 C-REACTIVE PROTEIN: CPT

## 2020-09-08 PROCEDURE — 82607 VITAMIN B-12: CPT

## 2020-09-08 PROCEDURE — 85025 COMPLETE CBC W/AUTO DIFF WBC: CPT

## 2020-09-08 PROCEDURE — 83615 LACTATE (LD) (LDH) ENZYME: CPT

## 2020-09-08 NOTE — TELEPHONE ENCOUNTER
[phone]/Karrie/[facility]/SHANNON-Yanira Webb/ 1980/Karrie request to speak with the on call regarding patient's critical glucose results

## 2020-09-09 ENCOUNTER — TELEPHONE (OUTPATIENT)
Dept: HEMATOLOGY ONCOLOGY | Facility: CLINIC | Age: 40
End: 2020-09-09

## 2020-09-09 NOTE — TELEPHONE ENCOUNTER
Phoned pt to check on her and she is fine  She realizes that she forgot her night time dose of insulin and that is why her glucose was so high  Pt has rechecked her blood sugar today and it is 161  Pt had a bad experience last month when she ended up in ICU and does not want a repeat of that incident

## 2020-09-09 NOTE — TELEPHONE ENCOUNTER
Called and left patient a message at 7:56 PM due to glucose being elevated  I asked her to call back  She did not  I called her again at 9:20 and she still did not answer  I then immediately called her mother  I was able to speak with her mother  She states patient is probably sleeping and she was in hospital last month for this  Reviewed glucose is 573 -- worse than last time  Sodium is also 129 -- also bad  Patient needs to go to the hospital to be evaluated  Emphasized that this is very serious  This can result in seizures/death  She states she will try to get in touch with the patient  She stated she lives 15 min away from patient  I advised if she cannot get her on the phone, she needs to go to the patient's house  Again this is very serious and patient needs to be in the hospital tonight  I called patient again at 9:37 PM  Patient did not answer  I called mother again  She states her  drove over and is trying to get into the house  She is waiting to hear back from him  Reviewed that if he cannot get in, call 911  I also said I could call 911  She became very agitated/defensive and said she is not calling 911 and scaring the kids, as "they have been through enough " reviewed again this is very serious  Her blood work is worse than it was in June 2020 when she was admitted to the ICU  Again emphasized this is very serious and can result in death

## 2020-09-09 NOTE — TELEPHONE ENCOUNTER
Lefta voice message for pt on her home #[phone]  I have read both of the notes from Annette Anthony  Can not find any evidence of pt going to ED at [facility]

## 2020-09-09 NOTE — TELEPHONE ENCOUNTER
Called patient's mother again  Now with no answer  Left message  Left call back number  Stated I was calling to confirm she has reached her daughter  Advised that I can call ambulance non-emergency number to come to daughter's house without any sirens to not wake/scare the children -- as this was her concern when I spoke with her earlier  Advised to call back the answering service to get in touch with me if she would like me to do this

## 2020-09-10 ENCOUNTER — OFFICE VISIT (OUTPATIENT)
Dept: HEMATOLOGY ONCOLOGY | Facility: CLINIC | Age: 40
End: 2020-09-10
Payer: COMMERCIAL

## 2020-09-10 VITALS
HEIGHT: 66 IN | HEART RATE: 81 BPM | SYSTOLIC BLOOD PRESSURE: 94 MMHG | TEMPERATURE: 98.3 F | RESPIRATION RATE: 16 BRPM | WEIGHT: 153.6 LBS | OXYGEN SATURATION: 97 % | DIASTOLIC BLOOD PRESSURE: 54 MMHG | BODY MASS INDEX: 24.68 KG/M2

## 2020-09-10 DIAGNOSIS — E53.8 B12 DEFICIENCY: ICD-10-CM

## 2020-09-10 DIAGNOSIS — D72.819 LEUKOPENIA, UNSPECIFIED TYPE: Primary | ICD-10-CM

## 2020-09-10 PROCEDURE — 99214 OFFICE O/P EST MOD 30 MIN: CPT | Performed by: INTERNAL MEDICINE

## 2020-09-10 NOTE — PROGRESS NOTES
Hematology/Oncology Outpatient Follow-up  Mery Hand 47 y o  female 1980 [id number]    Date:  9/10/2020    Assessment and Plan:  1  Leukopenia, unspecified type  I discussed with the patient the bone marrow biopsy result which showed hypocellular bone marrow for her age at 35% with normal trilineage hematopoiesis  She does not seem to have any hint of leukemia or lymphoma or any hint of myelodysplastic syndrome at least at this point in time  The patient seems to have also normal white cell count with normal white cell differential according to the last measurement  The improvement of the leukocytes and red cells may be due to the fact that she started to take vitamin B12 supplements daily about a month ago  We will continue to monitor her closely and see her again about 3 4-5 months from now  - CBC and differential; Future  - Comprehensive metabolic panel; Future  - Iron Panel (Includes Ferritin, Iron Sat%, Iron, and TIBC); Future  - Ferritin; Future  - LD,Blood; Future  - C-reactive protein; Future  - Sedimentation rate, automated; Future  - Hemolysis Smear; Future    2  B12 deficiency  She was encouraged to continue vitamin B12 supplements on a daily basis  She does have a history of gastric bypass surgery  We will continue to monitor her vitamin B12 and iron level  I also advise her to follow up with her endocrinologist for titer blood sugar control   - CBC and differential; Future  - Comprehensive metabolic panel; Future  - Iron Panel (Includes Ferritin, Iron Sat%, Iron, and TIBC); Future  - Vitamin B12; Future  - LD,Blood; Future  - C-reactive protein; Future  - Sedimentation rate, automated; Future  - Hemolysis Smear; Future      HPI:  This is the 72-year-old female with history of type 1 diabetes mellitus, Hashimoto's thyroiditis diagnosed 04/2020, morbid obesity status post gastric bypass surgery in 2009   She lost about 100 lb at least   She developed iron deficiency and was treated with iron IV Venofer on multiple occasions under Dr Vivien Murray from the [facility]  Tomer Smith also seems to have the long history of low white cell count  She was found to have low white cell count   Her CBC on 06/27/2020 showed a white cell count of 2 1 with ANC of 1 4   Her hemoglobin was 10 8 with MCV of 91  The platelet count was in the low-normal range of 162  She denies bleeding from any sites  Interval history:  The patient came today for a follow-up visit  She had extensive workup for further evaluation of her low white cell count and normocytic anemia  The workup did not reveal any hint of monoclonal gammopathy  She was found to have borderline low vitamin B12 level of 355  She is currently on vitamin B12 supplements on a daily basis  She also had a bone marrow biopsy on 08/27/2020 which showed hypercellular bone marrow at the 35% with the normal trilineage hematopoiesis  There is no morphologic or immunophenotypic evidence of lymphoma or leukemia  Her iron stores showed ferritin of 85 with saturation of 24%  Her most recent CBC on 09/08/2020 showed hemoglobin of 12 2 with improvement of the white cell count up to 5 1 with normal white cell differential   Her platelet count was 264  Her blood sugar was 573 with normal potassium and calcium level the sodium was 129 with normal liver enzymes  The patient was called over the phone by 1 of the PA side from our service on multiple occasions to get the patient to the hospital   The patient refused to go to the hospital and managed her high blood sugar by herself  Today she feels exhausted, her blood pressure was low  She denies bleeding from any sites  ROS: Review of Systems   Constitutional: Positive for appetite change and fatigue  Negative for activity change, chills, diaphoresis, fever and unexpected weight change  HENT: Positive for mouth sores   Negative for congestion, dental problem, ear discharge, ear pain, facial swelling, hearing loss, nosebleeds, postnasal drip, sore throat, tinnitus and trouble swallowing  Eyes: Negative for discharge, redness, itching and visual disturbance  Respiratory: Positive for shortness of breath  Negative for cough, chest tightness and wheezing  Cardiovascular: Negative for chest pain, palpitations and leg swelling  Gastrointestinal: Positive for nausea  Negative for abdominal distention, abdominal pain, anal bleeding, blood in stool, constipation, diarrhea and vomiting  Genitourinary: Negative for difficulty urinating, dysuria, flank pain, frequency, hematuria and urgency  Musculoskeletal: Negative for arthralgias, back pain, gait problem, joint swelling, myalgias and neck pain  Skin: Negative for color change, pallor, rash and wound  Neurological: Positive for dizziness and numbness  Negative for syncope, speech difficulty, weakness, light-headedness and headaches  Hematological: Negative for adenopathy  Does not bruise/bleed easily  Psychiatric/Behavioral: Positive for sleep disturbance  Negative for agitation, behavioral problems and confusion         Past Medical History:   Diagnosis Date    Anemia     Anxiety     Arthritis     B12 deficiency     Cervical disc disorder     On gabapentin     Depression     Disease of thyroid gland     hypothyroid    [facility] k[facility]acidoses[facility] 2018    Iron deficiency anemia     Type 1 diabetes ([facility] [address] [address] ) 1994    Type 1 diabetes mellitus, uncontrolled ([facility] )     Vitamin D deficiency        Past Surgical History:   Procedure Laterality Date    ABDOMINAL SURGERY      gastric bypass     SECTION      x2    CHOLECYSTECTOMY  2018    GASTRIC BYPASS  2007    INTRAUTERINE DEVICE INSERTION  2015    IR BIOPSY BONE MARROW  2020    OTHER SURGICAL HISTORY      surgery for imperforate hymen/endometriosis/hydrometrocolpos    TUBAL LIGATION      WISDOM TOOTH EXTRACTION         Social History     Socioeconomic History    Marital status:      Spouse name: None    Number of children: None    Years of education: None    Highest education level: None   Occupational History    Occupation: Teacher    Social Needs    Financial resource strain: None    Food insecurity     Worry: None     Inability: None    Transportation needs     Medical: None     Non-medical: None   Tobacco Use    Smoking status: Never Smoker    Smokeless tobacco: Never Used   Substance and Sexual Activity    Alcohol use: No     Frequency: Never     Binge frequency: Never     Comment: socially    Drug use: No    Sexual activity: Yes     Partners: Male   Lifestyle    Physical activity     Days per week: None     Minutes per session: None    Stress: None   Relationships    Social connections     Talks on phone: None     Gets together: None     Attends Christian service: None     Active member of club or organization: None     Attends meetings of clubs or organizations: None     Relationship status: None    Intimate partner violence     Fear of current or ex partner: None     Emotionally abused: None     Physically abused: None     Forced sexual activity: None   Other Topics Concern    None   Social History Narrative     - As per [address]    Occasionally consumes alcohol - As per [address]    Consumes on average 2 cups of regular coffee per day        Family History   Problem Relation Age of Onset    Thyroid disease Mother     URIEL disease Mother     Hyperlipidemia Mother     Hypertension Mother     Osteoarthritis Mother     Thyroid disease unspecified Mother     Diabetes Father     Alcohol abuse Father     Diabetes type I Father     Thyroid disease Sister     Depression Sister     Thyroid disease unspecified Sister     Heart disease Maternal Grandmother     Hypertension Maternal Grandmother     Arthritis Maternal Grandmother     Diabetes type I Maternal Uncle     Diabetes type I Maternal Grandfather        No Known Allergies      Current Outpatient Medications:     Cyanocobalamin (B-12 PO), Take by mouth daily, Disp: , Rfl:     DULoxetine (CYMBALTA) 60 mg delayed release capsule, TAKE ONE CAPSULE BY MOUTH ONCE DAILY, Disp: 30 capsule, Rfl: 5    ergocalciferol (VITAMIN D2) 50,000 units, Take 1 capsule (50,000 Units total) by mouth once a week, Disp: 12 capsule, Rfl: 3    escitalopram (LEXAPRO) 10 mg tablet, Take 10 mg by mouth daily, Disp: , Rfl:     glucose blood test strip, Testing up to 8 times a day  E10 65, Disp: , Rfl:     Insulin Aspart FlexPen (NovoLOG FlexPen) 100 UNIT/ML SOPN, 1 unit for 6 grams of carbs and 1 unit for 30 mg/dl above 120   upto 50 units a day, Disp: 5 pen, Rfl: 1    insulin glargine (LANTUS SOLOSTAR) 100 units/mL injection pen, Inject 36 Units under the skin daily at bedtime, Disp: 5 pen, Rfl: 3    Insulin Pen Needle (BD Pen Needle Deanna U/F) 32G X 4 MM MISC, Use 4/day, Disp: 100 each, Rfl: 3    Insulin Syringe-Needle U-100 (INSULIN SYRINGE 1CC/28G) 28G X 1/2" 1 ML MISC, 4 injections daily E10 65, Disp: , Rfl:     levothyroxine 175 mcg tablet, Take 1 tablet (175 mcg total) by mouth daily in the early morning, Disp: 90 tablet, Rfl: 3    LORazepam (ATIVAN) 0 5 mg tablet, , Disp: , Rfl:     traZODone (DESYREL) 100 mg tablet, Take 100 mg by mouth daily at bedtime , Disp: , Rfl:       Physical Exam:  BP 94/54 (BP Location: Right arm, Patient Position: Sitting)   Pulse 81   Temp 98 3 °F (36 8 °C) (Tympanic)   Resp 16   Ht 5' 6" (1 676 m)   Wt 69 7 kg (153 lb 9 6 oz)   SpO2 97%   BMI 24 79 kg/m²     Physical Exam  Constitutional:       General: She is not in acute distress  Appearance: She is well-developed  She is not diaphoretic  HENT:      Head: Normocephalic and atraumatic  Eyes:      General: No scleral icterus  Right eye: No discharge  Left eye: No discharge  Conjunctiva/sclera: Conjunctivae normal       Pupils: Pupils are equal, round, and reactive to light     Neck: Musculoskeletal: Normal range of motion and neck supple  Thyroid: No thyromegaly  Vascular: No JVD  Trachea: No tracheal deviation  Cardiovascular:      Rate and Rhythm: Normal rate and regular rhythm  Heart sounds: Normal heart sounds  No murmur  No friction rub  Pulmonary:      Effort: Pulmonary effort is normal  No respiratory distress  Breath sounds: Normal breath sounds  No stridor  No wheezing or rales  Chest:      Chest wall: No tenderness  Abdominal:      General: Bowel sounds are normal  There is no distension  Palpations: Abdomen is soft  There is no hepatomegaly, splenomegaly or mass  Tenderness: There is no abdominal tenderness  There is no guarding or rebound  Musculoskeletal: Normal range of motion  General: No tenderness or deformity  Lymphadenopathy:      Cervical: No cervical adenopathy  Skin:     General: Skin is warm and dry  Coloration: Skin is not pale  Findings: No erythema or rash  Neurological:      Mental Status: She is alert and oriented to person, place, and time  Cranial Nerves: No cranial nerve deficit  Coordination: Coordination normal       Deep Tendon Reflexes: Reflexes are normal and symmetric  Psychiatric:         Behavior: Behavior normal          Thought Content:  Thought content normal          Judgment: Judgment normal            Labs:  Lab Results   Component Value Date    WBC 5 18 09/08/2020    HGB 12 2 09/08/2020    HCT 38 5 09/08/2020    MCV 92 09/08/2020     09/08/2020     Lab Results   Component Value Date     05/16/2018    K 4 5 09/08/2020    CL 96 (L) 09/08/2020    CO2 22 09/08/2020    ANIONGAP 11 3 05/16/2018    BUN 14 09/08/2020    CREATININE 1 15 09/08/2020    GLUF 387 (H) 08/05/2020    CALCIUM 9 3 09/08/2020    AST 31 09/08/2020    ALT 54 09/08/2020    ALKPHOS 159 (H) 09/08/2020    PROT 6 8 05/16/2018    BILITOT 0 4 05/16/2018    EGFR 61 09/08/2020       Patient voiced understanding and agreement in the above discussion  Aware to contact our office with questions/symptoms in the interim

## 2020-09-15 ENCOUNTER — TELEPHONE (OUTPATIENT)
Dept: FAMILY MEDICINE CLINIC | Facility: CLINIC | Age: 40
End: 2020-09-15

## 2020-09-15 DIAGNOSIS — E03.8 HYPOTHYROIDISM DUE TO HASHIMOTO'S THYROIDITIS: Primary | ICD-10-CM

## 2020-09-15 DIAGNOSIS — E06.3 HYPOTHYROIDISM DUE TO HASHIMOTO'S THYROIDITIS: Primary | ICD-10-CM

## 2020-09-15 DIAGNOSIS — E10.649 TYPE 1 DIABETES MELLITUS WITH HYPOGLYCEMIA AND WITHOUT COMA (HCC): ICD-10-CM

## 2020-09-23 ENCOUNTER — TELEPHONE (OUTPATIENT)
Dept: HEMATOLOGY ONCOLOGY | Facility: CLINIC | Age: 40
End: 2020-09-23

## 2020-09-23 LAB — HBA1C MFR BLD HPLC: 10.7 %

## 2020-09-23 PROCEDURE — 3046F HEMOGLOBIN A1C LEVEL >9.0%: CPT | Performed by: NURSE PRACTITIONER

## 2020-09-23 NOTE — TELEPHONE ENCOUNTER
[facility] called to confirm the expected blood work for Dr Chen Roach blood work is expected 12/28/2020 for her 1/21/2021 follow up with Dr Sonido Guy  Stable continue home fluoxetine

## 2020-09-25 ENCOUNTER — OFFICE VISIT (OUTPATIENT)
Dept: FAMILY MEDICINE CLINIC | Facility: CLINIC | Age: 40
End: 2020-09-25
Payer: COMMERCIAL

## 2020-09-25 VITALS
TEMPERATURE: 97.6 F | BODY MASS INDEX: 24.27 KG/M2 | HEART RATE: 88 BPM | WEIGHT: 151 LBS | OXYGEN SATURATION: 99 % | RESPIRATION RATE: 20 BRPM | DIASTOLIC BLOOD PRESSURE: 80 MMHG | HEIGHT: 66 IN | SYSTOLIC BLOOD PRESSURE: 120 MMHG

## 2020-09-25 DIAGNOSIS — E06.3 HYPOTHYROIDISM DUE TO HASHIMOTO'S THYROIDITIS: ICD-10-CM

## 2020-09-25 DIAGNOSIS — E06.3 HYPOTHYROIDISM DUE TO HASHIMOTO'S THYROIDITIS: Primary | ICD-10-CM

## 2020-09-25 DIAGNOSIS — E66.3 OVERWEIGHT WITH BODY MASS INDEX (BMI) OF 25 TO 25.9 IN ADULT: ICD-10-CM

## 2020-09-25 DIAGNOSIS — E10.649 TYPE 1 DIABETES MELLITUS WITH HYPOGLYCEMIA AND WITHOUT COMA (HCC): Primary | ICD-10-CM

## 2020-09-25 DIAGNOSIS — E10.65 TYPE 1 DIABETES MELLITUS WITH HYPERGLYCEMIA (HCC): ICD-10-CM

## 2020-09-25 DIAGNOSIS — E03.8 HYPOTHYROIDISM DUE TO HASHIMOTO'S THYROIDITIS: ICD-10-CM

## 2020-09-25 DIAGNOSIS — E03.8 HYPOTHYROIDISM DUE TO HASHIMOTO'S THYROIDITIS: Primary | ICD-10-CM

## 2020-09-25 PROCEDURE — 1036F TOBACCO NON-USER: CPT | Performed by: NURSE PRACTITIONER

## 2020-09-25 PROCEDURE — 3725F SCREEN DEPRESSION PERFORMED: CPT | Performed by: NURSE PRACTITIONER

## 2020-09-25 PROCEDURE — 99214 OFFICE O/P EST MOD 30 MIN: CPT | Performed by: NURSE PRACTITIONER

## 2020-09-25 RX ORDER — LEVOTHYROXINE SODIUM 0.2 MG/1
200 TABLET ORAL
Qty: 90 TABLET | Refills: 3 | Status: SHIPPED | OUTPATIENT
Start: 2020-09-25 | End: 2021-10-14

## 2020-09-25 RX ORDER — ESCITALOPRAM OXALATE 10 MG/1
20 TABLET ORAL DAILY
COMMUNITY
End: 2021-05-23

## 2020-09-25 NOTE — PATIENT INSTRUCTIONS
Referral given to Endocrinology  Increase Levothyroxine to 200mcg daily  Recheck labs in 4-6 weeks  Return in 4 months or sooner if needed for problems/concerns

## 2020-09-25 NOTE — PROGRESS NOTES
[facility]        NAME: Beatris Christiansno is a 47 y o  female  : 1980    MRN: [id number]  DATE: 2020  TIME: 3:40 PM    Assessment and Plan   Type 1 diabetes mellitus with hypoglycemia and without coma ([facility] ) [E10 649]  1  Type 1 diabetes mellitus with hypoglycemia and without coma [facility])  Ambulatory referral to Endocrinology   2  Hypothyroidism due to Hashimoto's thyroiditis  Ambulatory referral to Endocrinology   3  Overweight with body mass index (BMI) of 25 to 25 9 in adult           Patient Instructions     Patient Instructions   Referral given to Endocrinology  Increase Levothyroxine to 200mcg daily  Recheck labs in 4-6 weeks  Return in 4 months or sooner if needed for problems/concerns          Chief Complaint     Chief Complaint   Patient presents with    Follow-up    a1c     BMI Counseling: Body mass index is 24 37 kg/m²  The BMI is above normal  Nutrition recommendations include decreasing portion sizes, encouraging healthy choices of fruits and vegetables, decreasing fast food intake, consuming healthier snacks, limiting drinks that contain sugar, moderation in carbohydrate intake and increasing intake of lean protein  History of Present Illness       Here for follow up- HgA1c 10 7- TSH 9 2, is willing to increase Levothyroxine to 200mcg and recheck both HgA1c and TSH in 4-6 weeks  Will follow up with Endocrinology to get an insulin pump       Review of Systems   Review of Systems   Constitutional: Positive for fatigue  Negative for activity change, diaphoresis and fever  HENT: Negative for congestion, facial swelling, hearing loss, rhinorrhea, sinus pressure, sinus pain, sneezing, sore throat and voice change  Eyes: Negative for discharge and visual disturbance  Respiratory: Negative for cough, choking, chest tightness, shortness of breath, wheezing and stridor  Cardiovascular: Negative for chest pain, palpitations and leg swelling  Gastrointestinal: Negative for abdominal distention, abdominal pain, constipation, diarrhea, nausea and vomiting  Endocrine: Negative for polydipsia, polyphagia and polyuria  Genitourinary: Negative for difficulty urinating, dysuria, frequency and urgency  Musculoskeletal: Positive for arthralgias and myalgias  Negative for back pain, gait problem, joint swelling, neck pain and neck stiffness  Skin: Negative for color change, rash and wound  Neurological: Negative for dizziness, syncope, speech difficulty, weakness, light-headedness and headaches  Hematological: Negative for adenopathy  Does not bruise/bleed easily  Psychiatric/Behavioral: Negative for agitation, behavioral problems, confusion, hallucinations, sleep disturbance and suicidal ideas  The patient is not nervous/anxious  Current Medications     No current facility-administered medications for this visit       Current Outpatient Medications:     Cyanocobalamin (B-12 PO), Take by mouth daily, Disp: , Rfl:     DULoxetine (CYMBALTA) 60 mg delayed release capsule, TAKE ONE CAPSULE BY MOUTH ONCE DAILY, Disp: 30 capsule, Rfl: 5    ergocalciferol (VITAMIN D2) 50,000 units, Take 1 capsule (50,000 Units total) by mouth once a week, Disp: 12 capsule, Rfl: 3    escitalopram (LEXAPRO) 10 mg tablet, Take 10 mg by mouth daily, Disp: , Rfl:     glucose blood test strip, Testing up to 8 times a day  E10 65, Disp: , Rfl:     Insulin Aspart FlexPen (NovoLOG FlexPen) 100 UNIT/ML SOPN, 1 unit for 6 grams of carbs and 1 unit for 30 mg/dl above 120   upto 50 units a day, Disp: 5 pen, Rfl: 1    Insulin Pen Needle (BD Pen Needle Deanna U/F) 32G X 4 MM MISC, Use 4/day, Disp: 100 each, Rfl: 3    Insulin Syringe-Needle U-100 (INSULIN SYRINGE 1CC/28G) 28G X 1/2" 1 ML MISC, 4 injections daily E10 65, Disp: , Rfl:     LORazepam (ATIVAN) 0 5 mg tablet, , Disp: , Rfl:     traZODone (DESYREL) 100 mg tablet, Take 100 mg by mouth daily at bedtime , Disp: , Rfl:     insulin glargine (LANTUS SOLOSTAR) 100 units/mL injection pen, Inject 36 Units under the skin daily at bedtime, Disp: 5 pen, Rfl: 3    levothyroxine 200 mcg tablet, Take 1 tablet (200 mcg total) by mouth daily in the early morning, Disp: 90 tablet, Rfl: 3    Facility-Administered Medications Ordered in Other Visits:     dextrose 5 % and sodium chloride 0 45 % infusion, 250 mL/hr, Intravenous, Continuous, Adonna Murray, DO, Last Rate: 250 mL/hr at 20 1501, 250 mL/hr at 20 1501    insulin regular (HumuLIN R,NovoLIN R) 1 Units/mL in sodium chloride 0 9 % 100 mL infusion, 0 1-30 Units/hr, Intravenous, Continuous, Adonna Murray, DO, Last Rate: 7 3 mL/hr at 20 1502, 7 3 Units/hr at 20 1502    Current Allergies     Allergies as of 2020    (No Known Allergies)            The following portions of the patient's history were reviewed and updated as appropriate: allergies, current medications, past family history, past medical history, past social history, past surgical history and problem list      Past Medical History:   Diagnosis Date    Anemia     Anxiety     Arthritis     B12 deficiency     Cervical disc disorder     On gabapentin     Depression     Disease of thyroid gland     hypothyroid    DKA (diabetic ketoacidoses) ([facility] ) 2018    Iron deficiency anemia     Type 1 diabetes ([facility] [address] [facility] ) 1994    Type 1 diabetes mellitus, uncontrolled ([facility] )     Vitamin D deficiency        Past Surgical History:   Procedure Laterality Date    ABDOMINAL SURGERY      gastric bypass     SECTION      x2    CHOLECYSTECTOMY  2018    GASTRIC BYPASS  2007    INTRAUTERINE DEVICE INSERTION  2015    IR BIOPSY BONE MARROW  2020    OTHER SURGICAL HISTORY      surgery for imperforate hymen/endometriosis/hydrometrocolpos    TUBAL LIGATION      WISDOM TOOTH EXTRACTION         Family History   Problem Relation Age of Onset    Thyroid disease Mother     URIEL disease Mother     Hyperlipidemia Mother     Hypertension Mother     Osteoarthritis Mother     Thyroid disease unspecified Mother     Diabetes Father     Alcohol abuse Father     Diabetes type I Father     Thyroid disease Sister     Depression Sister     Thyroid disease unspecified Sister     Heart disease Maternal Grandmother     Hypertension Maternal Grandmother     Arthritis Maternal Grandmother     Diabetes type I Maternal Uncle     Diabetes type I Maternal Grandfather          Medications have been verified  Objective   /80   Pulse 88   Temp 97 6 °F (36 4 °C)   Resp 20   Ht 5' 6" (1 676 m)   Wt 68 5 kg (151 lb)   SpO2 99%   BMI 24 37 kg/m²        Physical Exam     Physical Exam  Vitals signs and nursing note reviewed  Constitutional:       General: She is in acute distress (fatigue, appears tired)  Appearance: She is well-developed and normal weight  She is ill-appearing  She is not diaphoretic  Neck:      Musculoskeletal: Normal range of motion and neck supple  Thyroid: No thyromegaly  Trachea: No tracheal deviation  Cardiovascular:      Rate and Rhythm: Normal rate and regular rhythm  Heart sounds: Normal heart sounds  No murmur  Pulmonary:      Effort: Pulmonary effort is normal  No respiratory distress  Breath sounds: Normal breath sounds  No wheezing  Musculoskeletal: Normal range of motion  General: No tenderness or deformity  Skin:     General: Skin is warm and dry  Findings: No erythema or rash  Neurological:      Mental Status: She is alert and oriented to person, place, and time  Psychiatric:         Attention and Perception: Attention normal          Mood and Affect: Affect is flat  Speech: Speech normal          Behavior: Behavior normal  Behavior is cooperative  Thought Content:  Thought content normal          Cognition and Memory: Cognition and memory normal          Judgment: Judgment normal  PHQ-9 Depression Screening    PHQ-9:   Frequency of the following problems over the past two weeks:      Little interest or pleasure in doing things: 0 - not at all  Feeling down, depressed, or hopeless: 0 - not at all  PHQ-2 Score: 0

## 2020-10-07 ENCOUNTER — TELEPHONE (OUTPATIENT)
Dept: ENDOCRINOLOGY | Facility: CLINIC | Age: 40
End: 2020-10-07

## 2020-10-19 DIAGNOSIS — E10.65 TYPE 1 DIABETES MELLITUS WITH HYPERGLYCEMIA (HCC): ICD-10-CM

## 2020-11-01 NOTE — ED NOTES
Provider made aware  New orders placed  RN going to draw blood now        Pia Barakat RN  08/06/20 [id number] .

## 2020-11-30 ENCOUNTER — HOSPITAL ENCOUNTER (INPATIENT)
Facility: HOSPITAL | Age: 40
LOS: 1 days | Discharge: HOME | DRG: 639 | End: 2020-12-01
Attending: EMERGENCY MEDICINE | Admitting: INTERNAL MEDICINE
Payer: COMMERCIAL

## 2020-11-30 DIAGNOSIS — E11.10 DKA (DIABETIC KETOACIDOSES): Primary | ICD-10-CM

## 2020-11-30 LAB
ALBUMIN SERPL BCP-MCNC: 5 G/DL (ref 3.5–5.7)
ALP SERPL-CCNC: 144 U/L (ref 40–150)
ALT SERPL W P-5'-P-CCNC: 18 U/L (ref 7–52)
ANION GAP SERPL CALCULATED.3IONS-SCNC: 11 MMOL/L (ref 4–13)
ANION GAP SERPL CALCULATED.3IONS-SCNC: 11 MMOL/L (ref 4–13)
ANION GAP SERPL CALCULATED.3IONS-SCNC: 17 MMOL/L (ref 4–13)
AST SERPL W P-5'-P-CCNC: 14 U/L (ref 13–39)
ATRIAL RATE: 87 BPM
ATRIAL RATE: 91 BPM
BACTERIA UR QL AUTO: ABNORMAL /HPF
BASE EX.OXY STD BLDV CALC-SCNC: 58.5 %
BASE EXCESS BLDV CALC-SCNC: -14.7 MMOL/L
BASOPHILS # BLD AUTO: 0 THOUSANDS/ΜL (ref 0–0.1)
BASOPHILS NFR BLD AUTO: 1 % (ref 0–2)
BETA-HYDROXYBUTYRATE: 4.6 MMOL/L
BILIRUB SERPL-MCNC: 0.8 MG/DL (ref 0.2–1)
BILIRUB UR QL STRIP: ABNORMAL
BUN SERPL-MCNC: 13 MG/DL (ref 7–25)
BUN SERPL-MCNC: 16 MG/DL (ref 7–25)
BUN SERPL-MCNC: 17 MG/DL (ref 7–25)
CALCIUM SERPL-MCNC: 8.5 MG/DL (ref 8.6–10.5)
CALCIUM SERPL-MCNC: 8.8 MG/DL (ref 8.6–10.5)
CALCIUM SERPL-MCNC: 9.9 MG/DL (ref 8.6–10.5)
CHLORIDE SERPL-SCNC: 100 MMOL/L (ref 98–107)
CHLORIDE SERPL-SCNC: 104 MMOL/L (ref 98–107)
CHLORIDE SERPL-SCNC: 106 MMOL/L (ref 98–107)
CLARITY UR: CLEAR
CO2 SERPL-SCNC: 14 MMOL/L (ref 21–31)
CO2 SERPL-SCNC: 17 MMOL/L (ref 21–31)
CO2 SERPL-SCNC: 17 MMOL/L (ref 21–31)
COLOR UR: YELLOW
CREAT SERPL-MCNC: 0.74 MG/DL (ref 0.6–1.2)
CREAT SERPL-MCNC: 0.75 MG/DL (ref 0.6–1.2)
CREAT SERPL-MCNC: 1.11 MG/DL (ref 0.6–1.2)
EOSINOPHIL # BLD AUTO: 0.1 THOUSAND/ΜL (ref 0–0.61)
EOSINOPHIL NFR BLD AUTO: 2 % (ref 0–5)
ERYTHROCYTE [DISTWIDTH] IN BLOOD BY AUTOMATED COUNT: 13.5 % (ref 11.5–14.5)
EXT PREG TEST URINE: NEGATIVE
EXT. CONTROL ED NAV: NORMAL
FINE GRAN CASTS URNS QL MICRO: ABNORMAL /LPF
FLUAV RNA RESP QL NAA+PROBE: NEGATIVE
FLUBV RNA RESP QL NAA+PROBE: NEGATIVE
GFR SERPL CREATININE-BSD FRML MDRD: 101 ML/MIN/1.73SQ M
GFR SERPL CREATININE-BSD FRML MDRD: 103 ML/MIN/1.73SQ M
GFR SERPL CREATININE-BSD FRML MDRD: 63 ML/MIN/1.73SQ M
GLUCOSE SERPL-MCNC: 121 MG/DL (ref 65–140)
GLUCOSE SERPL-MCNC: 151 MG/DL (ref 65–140)
GLUCOSE SERPL-MCNC: 151 MG/DL (ref 65–99)
GLUCOSE SERPL-MCNC: 167 MG/DL (ref 65–140)
GLUCOSE SERPL-MCNC: 211 MG/DL (ref 65–140)
GLUCOSE SERPL-MCNC: 215 MG/DL (ref 65–140)
GLUCOSE SERPL-MCNC: 218 MG/DL (ref 65–140)
GLUCOSE SERPL-MCNC: 219 MG/DL (ref 65–140)
GLUCOSE SERPL-MCNC: 226 MG/DL (ref 65–140)
GLUCOSE SERPL-MCNC: 231 MG/DL (ref 65–140)
GLUCOSE SERPL-MCNC: 238 MG/DL (ref 65–140)
GLUCOSE SERPL-MCNC: 259 MG/DL (ref 65–140)
GLUCOSE SERPL-MCNC: 279 MG/DL (ref 65–99)
GLUCOSE SERPL-MCNC: 29 MG/DL (ref 65–140)
GLUCOSE SERPL-MCNC: 293 MG/DL (ref 65–99)
GLUCOSE UR STRIP-MCNC: ABNORMAL MG/DL
HCO3 BLDV-SCNC: 14.9 MMOL/L (ref 24–30)
HCT VFR BLD AUTO: 50.3 % (ref 42–47)
HGB BLD-MCNC: 16.5 G/DL (ref 12–16)
HGB UR QL STRIP.AUTO: ABNORMAL
HYALINE CASTS #/AREA URNS LPF: ABNORMAL /LPF
KETONES UR STRIP-MCNC: ABNORMAL MG/DL
LEUKOCYTE ESTERASE UR QL STRIP: NEGATIVE
LIPASE SERPL-CCNC: 26 U/L (ref 11–82)
LYMPHOCYTES # BLD AUTO: 1.2 THOUSANDS/ΜL (ref 0.6–4.47)
LYMPHOCYTES NFR BLD AUTO: 27 % (ref 21–51)
MAGNESIUM SERPL-MCNC: 1.5 MG/DL (ref 1.9–2.7)
MAGNESIUM SERPL-MCNC: 1.8 MG/DL (ref 1.9–2.7)
MAGNESIUM SERPL-MCNC: 2.8 MG/DL (ref 1.9–2.7)
MCH RBC QN AUTO: 29 PG (ref 26–34)
MCHC RBC AUTO-ENTMCNC: 32.8 G/DL (ref 31–37)
MCV RBC AUTO: 89 FL (ref 81–99)
MONOCYTES # BLD AUTO: 0.4 THOUSAND/ΜL (ref 0.17–1.22)
MONOCYTES NFR BLD AUTO: 8 % (ref 2–12)
NEUTROPHILS # BLD AUTO: 2.7 THOUSANDS/ΜL (ref 1.4–6.5)
NEUTS SEG NFR BLD AUTO: 62 % (ref 42–75)
NITRITE UR QL STRIP: NEGATIVE
NON-SQ EPI CELLS URNS QL MICRO: ABNORMAL /HPF
O2 CT BLDV-SCNC: 13.4 ML/DL
P AXIS: 73 DEGREES
P AXIS: 78 DEGREES
PCO2 BLDV: 42.6 MM HG
PH BLDV: 7.17 [PH] (ref 7.3–7.4)
PH UR STRIP.AUTO: 6 [PH]
PHOSPHATE SERPL-MCNC: 1.9 MG/DL (ref 3–5.5)
PHOSPHATE SERPL-MCNC: 3.9 MG/DL (ref 3–5.5)
PLATELET # BLD AUTO: 291 THOUSANDS/UL (ref 149–390)
PMV BLD AUTO: 8.1 FL (ref 8.6–11.7)
PO2 BLDV: 46 MM HG (ref 35–45)
POTASSIUM SERPL-SCNC: 3.5 MMOL/L (ref 3.5–5.5)
POTASSIUM SERPL-SCNC: 4.3 MMOL/L (ref 3.5–5.5)
POTASSIUM SERPL-SCNC: 4.7 MMOL/L (ref 3.5–5.5)
PR INTERVAL: 104 MS
PR INTERVAL: 104 MS
PROT SERPL-MCNC: 8.4 G/DL (ref 6.4–8.9)
PROT UR STRIP-MCNC: ABNORMAL MG/DL
QRS AXIS: 100 DEGREES
QRS AXIS: 101 DEGREES
QRSD INTERVAL: 84 MS
QRSD INTERVAL: 88 MS
QT INTERVAL: 376 MS
QT INTERVAL: 380 MS
QTC INTERVAL: 457 MS
QTC INTERVAL: 462 MS
RBC # BLD AUTO: 5.69 MILLION/UL (ref 3.9–5.2)
RBC #/AREA URNS AUTO: ABNORMAL /HPF
RSV RNA RESP QL NAA+PROBE: NEGATIVE
SARS-COV-2 RNA RESP QL NAA+PROBE: NEGATIVE
SODIUM SERPL-SCNC: 131 MMOL/L (ref 134–143)
SODIUM SERPL-SCNC: 132 MMOL/L (ref 134–143)
SODIUM SERPL-SCNC: 134 MMOL/L (ref 134–143)
SP GR UR STRIP.AUTO: >=1.03 (ref 1–1.03)
T WAVE AXIS: 50 DEGREES
T WAVE AXIS: 64 DEGREES
TSH SERPL DL<=0.05 MIU/L-ACNC: 2.74 UIU/ML (ref 0.45–5.33)
UROBILINOGEN UR QL STRIP.AUTO: 0.2 E.U./DL
VENTRICULAR RATE: 87 BPM
VENTRICULAR RATE: 91 BPM
WBC # BLD AUTO: 4.4 THOUSAND/UL (ref 4.8–10.8)
WBC #/AREA URNS AUTO: ABNORMAL /HPF

## 2020-11-30 PROCEDURE — 82010 KETONE BODYS QUAN: CPT | Performed by: EMERGENCY MEDICINE

## 2020-11-30 PROCEDURE — 93010 ELECTROCARDIOGRAM REPORT: CPT | Performed by: INTERNAL MEDICINE

## 2020-11-30 PROCEDURE — 99291 CRITICAL CARE FIRST HOUR: CPT | Performed by: EMERGENCY MEDICINE

## 2020-11-30 PROCEDURE — 83735 ASSAY OF MAGNESIUM: CPT | Performed by: NURSE PRACTITIONER

## 2020-11-30 PROCEDURE — 93005 ELECTROCARDIOGRAM TRACING: CPT

## 2020-11-30 PROCEDURE — 0241U HB NFCT DS VIR RESP RNA 4 TRGT: CPT | Performed by: EMERGENCY MEDICINE

## 2020-11-30 PROCEDURE — 80048 BASIC METABOLIC PNL TOTAL CA: CPT | Performed by: NURSE PRACTITIONER

## 2020-11-30 PROCEDURE — 99285 EMERGENCY DEPT VISIT HI MDM: CPT

## 2020-11-30 PROCEDURE — 83735 ASSAY OF MAGNESIUM: CPT | Performed by: INTERNAL MEDICINE

## 2020-11-30 PROCEDURE — 82805 BLOOD GASES W/O2 SATURATION: CPT | Performed by: EMERGENCY MEDICINE

## 2020-11-30 PROCEDURE — 96374 THER/PROPH/DIAG INJ IV PUSH: CPT

## 2020-11-30 PROCEDURE — 82948 REAGENT STRIP/BLOOD GLUCOSE: CPT

## 2020-11-30 PROCEDURE — 81003 URINALYSIS AUTO W/O SCOPE: CPT | Performed by: EMERGENCY MEDICINE

## 2020-11-30 PROCEDURE — 96361 HYDRATE IV INFUSION ADD-ON: CPT

## 2020-11-30 PROCEDURE — 80053 COMPREHEN METABOLIC PANEL: CPT | Performed by: EMERGENCY MEDICINE

## 2020-11-30 PROCEDURE — 81025 URINE PREGNANCY TEST: CPT | Performed by: EMERGENCY MEDICINE

## 2020-11-30 PROCEDURE — 83735 ASSAY OF MAGNESIUM: CPT | Performed by: EMERGENCY MEDICINE

## 2020-11-30 PROCEDURE — 81001 URINALYSIS AUTO W/SCOPE: CPT | Performed by: EMERGENCY MEDICINE

## 2020-11-30 PROCEDURE — 84100 ASSAY OF PHOSPHORUS: CPT | Performed by: INTERNAL MEDICINE

## 2020-11-30 PROCEDURE — 85025 COMPLETE CBC W/AUTO DIFF WBC: CPT | Performed by: EMERGENCY MEDICINE

## 2020-11-30 PROCEDURE — 36415 COLL VENOUS BLD VENIPUNCTURE: CPT | Performed by: EMERGENCY MEDICINE

## 2020-11-30 PROCEDURE — 84100 ASSAY OF PHOSPHORUS: CPT | Performed by: NURSE PRACTITIONER

## 2020-11-30 PROCEDURE — 80048 BASIC METABOLIC PNL TOTAL CA: CPT | Performed by: INTERNAL MEDICINE

## 2020-11-30 PROCEDURE — 84443 ASSAY THYROID STIM HORMONE: CPT | Performed by: NURSE PRACTITIONER

## 2020-11-30 PROCEDURE — 83690 ASSAY OF LIPASE: CPT | Performed by: EMERGENCY MEDICINE

## 2020-11-30 PROCEDURE — 99223 1ST HOSP IP/OBS HIGH 75: CPT | Performed by: NURSE PRACTITIONER

## 2020-11-30 RX ORDER — SODIUM CHLORIDE 9 MG/ML
250 INJECTION, SOLUTION INTRAVENOUS CONTINUOUS
Status: DISCONTINUED | OUTPATIENT
Start: 2020-11-30 | End: 2020-11-30

## 2020-11-30 RX ORDER — TRAZODONE HYDROCHLORIDE 50 MG/1
100 TABLET ORAL
Status: DISCONTINUED | OUTPATIENT
Start: 2020-11-30 | End: 2020-12-01 | Stop reason: HOSPADM

## 2020-11-30 RX ORDER — MAGNESIUM SULFATE HEPTAHYDRATE 40 MG/ML
4 INJECTION, SOLUTION INTRAVENOUS ONCE
Status: COMPLETED | OUTPATIENT
Start: 2020-11-30 | End: 2020-11-30

## 2020-11-30 RX ORDER — SODIUM CHLORIDE 9 MG/ML
1000 INJECTION, SOLUTION INTRAVENOUS CONTINUOUS
Status: DISCONTINUED | OUTPATIENT
Start: 2020-11-30 | End: 2020-11-30

## 2020-11-30 RX ORDER — LORAZEPAM 0.5 MG/1
0.5 TABLET ORAL EVERY 8 HOURS PRN
Status: DISCONTINUED | OUTPATIENT
Start: 2020-11-30 | End: 2020-12-01 | Stop reason: HOSPADM

## 2020-11-30 RX ORDER — ONDANSETRON 2 MG/ML
4 INJECTION INTRAMUSCULAR; INTRAVENOUS ONCE
Status: COMPLETED | OUTPATIENT
Start: 2020-11-30 | End: 2020-11-30

## 2020-11-30 RX ORDER — DULOXETIN HYDROCHLORIDE 60 MG/1
60 CAPSULE, DELAYED RELEASE ORAL DAILY
Status: DISCONTINUED | OUTPATIENT
Start: 2020-12-01 | End: 2020-12-01 | Stop reason: HOSPADM

## 2020-11-30 RX ORDER — DEXTROSE AND SODIUM CHLORIDE 5; .45 G/100ML; G/100ML
250 INJECTION, SOLUTION INTRAVENOUS CONTINUOUS
Status: DISCONTINUED | OUTPATIENT
Start: 2020-11-30 | End: 2020-11-30

## 2020-11-30 RX ORDER — SODIUM CHLORIDE 9 MG/ML
500 INJECTION, SOLUTION INTRAVENOUS CONTINUOUS
Status: DISCONTINUED | OUTPATIENT
Start: 2020-11-30 | End: 2020-11-30

## 2020-11-30 RX ORDER — ACETAMINOPHEN 325 MG/1
650 TABLET ORAL 4 TIMES DAILY PRN
Status: DISCONTINUED | OUTPATIENT
Start: 2020-11-30 | End: 2020-12-01 | Stop reason: HOSPADM

## 2020-11-30 RX ORDER — LEVOTHYROXINE SODIUM 0.1 MG/1
200 TABLET ORAL
Status: DISCONTINUED | OUTPATIENT
Start: 2020-12-01 | End: 2020-12-01 | Stop reason: HOSPADM

## 2020-11-30 RX ORDER — ESCITALOPRAM OXALATE 10 MG/1
10 TABLET ORAL DAILY
Status: DISCONTINUED | OUTPATIENT
Start: 2020-12-01 | End: 2020-12-01 | Stop reason: HOSPADM

## 2020-11-30 RX ORDER — DEXTROSE AND SODIUM CHLORIDE 5; .45 G/100ML; G/100ML
250 INJECTION, SOLUTION INTRAVENOUS CONTINUOUS
Status: DISCONTINUED | OUTPATIENT
Start: 2020-11-30 | End: 2020-12-01

## 2020-11-30 RX ADMIN — ONDANSETRON 4 MG: 2 INJECTION INTRAMUSCULAR; INTRAVENOUS at 13:44

## 2020-11-30 RX ADMIN — SODIUM PHOSPHATE, MONOBASIC, MONOHYDRATE 21 MMOL: 276; 142 INJECTION, SOLUTION INTRAVENOUS at 19:44

## 2020-11-30 RX ADMIN — SODIUM CHLORIDE 1000 ML: 0.9 INJECTION, SOLUTION INTRAVENOUS at 13:20

## 2020-11-30 RX ADMIN — DEXTROSE AND SODIUM CHLORIDE 250 ML/HR: 5; .45 INJECTION, SOLUTION INTRAVENOUS at 19:29

## 2020-11-30 RX ADMIN — TRAZODONE HYDROCHLORIDE 100 MG: 50 TABLET ORAL at 21:47

## 2020-11-30 RX ADMIN — MAGNESIUM SULFATE IN WATER 4 G: 40 INJECTION, SOLUTION INTRAVENOUS at 19:33

## 2020-11-30 RX ADMIN — SODIUM CHLORIDE 7.3 UNITS/HR: 9 INJECTION, SOLUTION INTRAVENOUS at 15:02

## 2020-11-30 RX ADMIN — DEXTROSE AND SODIUM CHLORIDE 250 ML/HR: 5; .45 INJECTION, SOLUTION INTRAVENOUS at 15:01

## 2020-12-01 VITALS
SYSTOLIC BLOOD PRESSURE: 105 MMHG | DIASTOLIC BLOOD PRESSURE: 69 MMHG | BODY MASS INDEX: 25.71 KG/M2 | HEIGHT: 66 IN | WEIGHT: 160 LBS | TEMPERATURE: 97.6 F | HEART RATE: 85 BPM | OXYGEN SATURATION: 97 % | RESPIRATION RATE: 15 BRPM

## 2020-12-01 LAB
ANION GAP SERPL CALCULATED.3IONS-SCNC: 7 MMOL/L (ref 4–13)
ANION GAP SERPL CALCULATED.3IONS-SCNC: 8 MMOL/L (ref 4–13)
BETA-HYDROXYBUTYRATE: 0 MMOL/L
BUN SERPL-MCNC: 11 MG/DL (ref 7–25)
BUN SERPL-MCNC: 9 MG/DL (ref 7–25)
CALCIUM SERPL-MCNC: 8 MG/DL (ref 8.6–10.5)
CALCIUM SERPL-MCNC: 8.2 MG/DL (ref 8.6–10.5)
CHLORIDE SERPL-SCNC: 107 MMOL/L (ref 98–107)
CHLORIDE SERPL-SCNC: 107 MMOL/L (ref 98–107)
CO2 SERPL-SCNC: 19 MMOL/L (ref 21–31)
CO2 SERPL-SCNC: 19 MMOL/L (ref 21–31)
CREAT SERPL-MCNC: 0.66 MG/DL (ref 0.6–1.2)
CREAT SERPL-MCNC: 0.72 MG/DL (ref 0.6–1.2)
ERYTHROCYTE [DISTWIDTH] IN BLOOD BY AUTOMATED COUNT: 12.8 % (ref 11.5–14.5)
GFR SERPL CREATININE-BSD FRML MDRD: 107 ML/MIN/1.73SQ M
GFR SERPL CREATININE-BSD FRML MDRD: 112 ML/MIN/1.73SQ M
GLUCOSE SERPL-MCNC: 114 MG/DL (ref 65–99)
GLUCOSE SERPL-MCNC: 118 MG/DL (ref 65–140)
GLUCOSE SERPL-MCNC: 130 MG/DL (ref 65–140)
GLUCOSE SERPL-MCNC: 184 MG/DL (ref 65–140)
GLUCOSE SERPL-MCNC: 196 MG/DL (ref 65–140)
GLUCOSE SERPL-MCNC: 216 MG/DL (ref 65–140)
GLUCOSE SERPL-MCNC: 221 MG/DL (ref 65–140)
GLUCOSE SERPL-MCNC: 244 MG/DL (ref 65–99)
GLUCOSE SERPL-MCNC: 291 MG/DL (ref 65–140)
GLUCOSE SERPL-MCNC: 72 MG/DL (ref 65–140)
HCT VFR BLD AUTO: 39.5 % (ref 42–47)
HGB BLD-MCNC: 13.1 G/DL (ref 12–16)
MAGNESIUM SERPL-MCNC: 2.2 MG/DL (ref 1.9–2.7)
MCH RBC QN AUTO: 29.1 PG (ref 26–34)
MCHC RBC AUTO-ENTMCNC: 33.1 G/DL (ref 31–37)
MCV RBC AUTO: 88 FL (ref 81–99)
PHOSPHATE SERPL-MCNC: 3.5 MG/DL (ref 3–5.5)
PLATELET # BLD AUTO: 212 THOUSANDS/UL (ref 149–390)
PMV BLD AUTO: 7.8 FL (ref 8.6–11.7)
POTASSIUM SERPL-SCNC: 3.2 MMOL/L (ref 3.5–5.5)
POTASSIUM SERPL-SCNC: 3.6 MMOL/L (ref 3.5–5.5)
RBC # BLD AUTO: 4.49 MILLION/UL (ref 3.9–5.2)
SODIUM SERPL-SCNC: 133 MMOL/L (ref 134–143)
SODIUM SERPL-SCNC: 134 MMOL/L (ref 134–143)
WBC # BLD AUTO: 3.5 THOUSAND/UL (ref 4.8–10.8)

## 2020-12-01 PROCEDURE — 99239 HOSP IP/OBS DSCHRG MGMT >30: CPT | Performed by: INTERNAL MEDICINE

## 2020-12-01 PROCEDURE — 82948 REAGENT STRIP/BLOOD GLUCOSE: CPT

## 2020-12-01 PROCEDURE — 82010 KETONE BODYS QUAN: CPT | Performed by: NURSE PRACTITIONER

## 2020-12-01 PROCEDURE — 85027 COMPLETE CBC AUTOMATED: CPT | Performed by: NURSE PRACTITIONER

## 2020-12-01 PROCEDURE — 83735 ASSAY OF MAGNESIUM: CPT | Performed by: INTERNAL MEDICINE

## 2020-12-01 PROCEDURE — 80048 BASIC METABOLIC PNL TOTAL CA: CPT | Performed by: INTERNAL MEDICINE

## 2020-12-01 PROCEDURE — 84100 ASSAY OF PHOSPHORUS: CPT | Performed by: INTERNAL MEDICINE

## 2020-12-01 RX ORDER — POTASSIUM CHLORIDE 20 MEQ/1
40 TABLET, EXTENDED RELEASE ORAL ONCE
Status: COMPLETED | OUTPATIENT
Start: 2020-12-01 | End: 2020-12-01

## 2020-12-01 RX ORDER — INSULIN GLARGINE 100 [IU]/ML
36 INJECTION, SOLUTION SUBCUTANEOUS
Status: DISCONTINUED | OUTPATIENT
Start: 2020-12-01 | End: 2020-12-01 | Stop reason: HOSPADM

## 2020-12-01 RX ORDER — INSULIN GLARGINE 100 [IU]/ML
10 INJECTION, SOLUTION SUBCUTANEOUS
Status: DISCONTINUED | OUTPATIENT
Start: 2020-12-01 | End: 2020-12-01 | Stop reason: HOSPADM

## 2020-12-01 RX ADMIN — DEXTROSE AND SODIUM CHLORIDE 250 ML/HR: 5; .45 INJECTION, SOLUTION INTRAVENOUS at 00:30

## 2020-12-01 RX ADMIN — ENOXAPARIN SODIUM 40 MG: 40 INJECTION SUBCUTANEOUS at 08:18

## 2020-12-01 RX ADMIN — VITAM B12 50 MCG: 100 TAB at 08:17

## 2020-12-01 RX ADMIN — POTASSIUM CHLORIDE 40 MEQ: 1500 TABLET, EXTENDED RELEASE ORAL at 09:05

## 2020-12-01 RX ADMIN — DULOXETINE HYDROCHLORIDE 60 MG: 60 CAPSULE, DELAYED RELEASE ORAL at 08:17

## 2020-12-01 RX ADMIN — INSULIN LISPRO 3 UNITS: 100 INJECTION, SOLUTION INTRAVENOUS; SUBCUTANEOUS at 16:31

## 2020-12-01 RX ADMIN — DEXTROSE AND SODIUM CHLORIDE 250 ML/HR: 5; .45 INJECTION, SOLUTION INTRAVENOUS at 04:39

## 2020-12-01 RX ADMIN — LEVOTHYROXINE SODIUM 200 MCG: 112 TABLET ORAL at 06:42

## 2020-12-01 RX ADMIN — ESCITALOPRAM OXALATE 10 MG: 10 TABLET ORAL at 08:17

## 2020-12-01 RX ADMIN — INSULIN GLARGINE 10 UNITS: 100 INJECTION, SOLUTION SUBCUTANEOUS at 05:22

## 2020-12-03 ENCOUNTER — TELEPHONE (OUTPATIENT)
Dept: FAMILY MEDICINE CLINIC | Facility: CLINIC | Age: 40
End: 2020-12-03

## 2020-12-03 ENCOUNTER — TRANSITIONAL CARE MANAGEMENT (OUTPATIENT)
Dept: FAMILY MEDICINE CLINIC | Facility: CLINIC | Age: 40
End: 2020-12-03

## 2020-12-04 ENCOUNTER — TRANSITIONAL CARE MANAGEMENT (OUTPATIENT)
Dept: FAMILY MEDICINE CLINIC | Facility: CLINIC | Age: 40
End: 2020-12-04

## 2020-12-07 ENCOUNTER — TELEMEDICINE (OUTPATIENT)
Dept: FAMILY MEDICINE CLINIC | Facility: CLINIC | Age: 40
End: 2020-12-07
Payer: COMMERCIAL

## 2020-12-07 VITALS
OXYGEN SATURATION: 99 % | HEIGHT: 66 IN | TEMPERATURE: 97.3 F | HEART RATE: 89 BPM | WEIGHT: 164.6 LBS | SYSTOLIC BLOOD PRESSURE: 126 MMHG | DIASTOLIC BLOOD PRESSURE: 80 MMHG | BODY MASS INDEX: 26.45 KG/M2 | RESPIRATION RATE: 20 BRPM

## 2020-12-07 DIAGNOSIS — E10.649 TYPE 1 DIABETES MELLITUS WITH HYPOGLYCEMIA AND WITHOUT COMA (HCC): ICD-10-CM

## 2020-12-07 DIAGNOSIS — E03.8 HYPOTHYROIDISM DUE TO HASHIMOTO'S THYROIDITIS: ICD-10-CM

## 2020-12-07 DIAGNOSIS — E10.10 DIABETIC KETOACIDOSIS WITHOUT COMA ASSOCIATED WITH TYPE 1 DIABETES MELLITUS (HCC): ICD-10-CM

## 2020-12-07 DIAGNOSIS — E06.3 HYPOTHYROIDISM DUE TO HASHIMOTO'S THYROIDITIS: ICD-10-CM

## 2020-12-07 DIAGNOSIS — F41.9 ANXIETY AND DEPRESSION: Primary | ICD-10-CM

## 2020-12-07 DIAGNOSIS — F32.A ANXIETY AND DEPRESSION: Primary | ICD-10-CM

## 2020-12-07 DIAGNOSIS — E11.9 ENCOUNTER FOR DIABETIC FOOT EXAM (HCC): ICD-10-CM

## 2020-12-07 PROCEDURE — 1111F DSCHRG MED/CURRENT MED MERGE: CPT | Performed by: NURSE PRACTITIONER

## 2020-12-07 PROCEDURE — 99496 TRANSJ CARE MGMT HIGH F2F 7D: CPT | Performed by: NURSE PRACTITIONER

## 2020-12-08 PROBLEM — E11.9 ENCOUNTER FOR DIABETIC FOOT EXAM (HCC): Status: ACTIVE | Noted: 2020-12-08

## 2020-12-18 ENCOUNTER — TELEPHONE (OUTPATIENT)
Dept: FAMILY MEDICINE CLINIC | Facility: CLINIC | Age: 40
End: 2020-12-18

## 2020-12-18 DIAGNOSIS — E03.8 HYPOTHYROIDISM DUE TO HASHIMOTO'S THYROIDITIS: ICD-10-CM

## 2020-12-18 DIAGNOSIS — E10.65 TYPE 1 DIABETES MELLITUS WITH HYPERGLYCEMIA (HCC): Primary | ICD-10-CM

## 2020-12-18 DIAGNOSIS — E06.3 HYPOTHYROIDISM DUE TO HASHIMOTO'S THYROIDITIS: ICD-10-CM

## 2020-12-18 DIAGNOSIS — R35.0 URINARY FREQUENCY: ICD-10-CM

## 2020-12-18 DIAGNOSIS — R53.83 FATIGUE, UNSPECIFIED TYPE: ICD-10-CM

## 2020-12-28 ENCOUNTER — TELEPHONE (OUTPATIENT)
Dept: FAMILY MEDICINE CLINIC | Facility: CLINIC | Age: 40
End: 2020-12-28

## 2020-12-28 DIAGNOSIS — R50.9 FEVER, UNSPECIFIED FEVER CAUSE: Primary | ICD-10-CM

## 2020-12-28 DIAGNOSIS — R50.9 FEVER, UNSPECIFIED FEVER CAUSE: ICD-10-CM

## 2020-12-28 DIAGNOSIS — R11.0 NAUSEA: ICD-10-CM

## 2020-12-28 PROCEDURE — U0003 INFECTIOUS AGENT DETECTION BY NUCLEIC ACID (DNA OR RNA); SEVERE ACUTE RESPIRATORY SYNDROME CORONAVIRUS 2 (SARS-COV-2) (CORONAVIRUS DISEASE [COVID-19]), AMPLIFIED PROBE TECHNIQUE, MAKING USE OF HIGH THROUGHPUT TECHNOLOGIES AS DESCRIBED BY CMS-2020-01-R: HCPCS | Performed by: NURSE PRACTITIONER

## 2020-12-29 ENCOUNTER — TELEPHONE (OUTPATIENT)
Dept: URGENT CARE | Facility: CLINIC | Age: 40
End: 2020-12-29

## 2020-12-31 LAB — SARS-COV-2 RNA SPEC QL NAA+PROBE: DETECTED

## 2021-01-08 ENCOUNTER — TELEPHONE (OUTPATIENT)
Dept: HEMATOLOGY ONCOLOGY | Facility: CLINIC | Age: 41
End: 2021-01-08

## 2021-01-08 NOTE — TELEPHONE ENCOUNTER
Left message that we   cx her appt on 1/11 and  That after she has labs   Done to call the office back  And rs

## 2021-01-13 ENCOUNTER — LAB (OUTPATIENT)
Dept: LAB | Facility: CLINIC | Age: 41
End: 2021-01-13
Payer: COMMERCIAL

## 2021-01-13 DIAGNOSIS — E53.8 B12 DEFICIENCY: ICD-10-CM

## 2021-01-13 DIAGNOSIS — E06.3 HYPOTHYROIDISM DUE TO HASHIMOTO'S THYROIDITIS: ICD-10-CM

## 2021-01-13 DIAGNOSIS — R53.83 FATIGUE, UNSPECIFIED TYPE: ICD-10-CM

## 2021-01-13 DIAGNOSIS — E10.65 TYPE 1 DIABETES MELLITUS WITH HYPERGLYCEMIA (HCC): ICD-10-CM

## 2021-01-13 DIAGNOSIS — E03.8 HYPOTHYROIDISM DUE TO HASHIMOTO'S THYROIDITIS: ICD-10-CM

## 2021-01-13 DIAGNOSIS — D72.819 LEUKOPENIA, UNSPECIFIED TYPE: ICD-10-CM

## 2021-01-13 LAB
ALBUMIN SERPL BCP-MCNC: 3.5 G/DL (ref 3.5–5)
ALP SERPL-CCNC: 145 U/L (ref 46–116)
ALT SERPL W P-5'-P-CCNC: 31 U/L (ref 12–78)
ANION GAP SERPL CALCULATED.3IONS-SCNC: 3 MMOL/L (ref 4–13)
AST SERPL W P-5'-P-CCNC: 22 U/L (ref 5–45)
BACTERIA UR QL AUTO: ABNORMAL /HPF
BASOPHILS # BLD AUTO: 0.03 THOUSANDS/ΜL (ref 0–0.1)
BASOPHILS NFR BLD AUTO: 1 % (ref 0–1)
BILIRUB SERPL-MCNC: 0.51 MG/DL (ref 0.2–1)
BILIRUB UR QL STRIP: ABNORMAL
BLD SMEAR INTERP: NORMAL
BUN SERPL-MCNC: 12 MG/DL (ref 5–25)
CALCIUM SERPL-MCNC: 9.9 MG/DL (ref 8.3–10.1)
CHLORIDE SERPL-SCNC: 100 MMOL/L (ref 100–108)
CLARITY UR: ABNORMAL
CO2 SERPL-SCNC: 30 MMOL/L (ref 21–32)
COLOR UR: YELLOW
CREAT SERPL-MCNC: 0.86 MG/DL (ref 0.6–1.3)
CRP SERPL QL: <3 MG/L
EOSINOPHIL # BLD AUTO: 0.06 THOUSAND/ΜL (ref 0–0.61)
EOSINOPHIL NFR BLD AUTO: 1 % (ref 0–6)
ERYTHROCYTE [DISTWIDTH] IN BLOOD BY AUTOMATED COUNT: 13.2 % (ref 11.6–15.1)
ERYTHROCYTE [SEDIMENTATION RATE] IN BLOOD: 15 MM/HOUR (ref 0–19)
EST. AVERAGE GLUCOSE BLD GHB EST-MCNC: 295 MG/DL
FERRITIN SERPL-MCNC: 29 NG/ML (ref 8–388)
GFR SERPL CREATININE-BSD FRML MDRD: 86 ML/MIN/1.73SQ M
GLUCOSE P FAST SERPL-MCNC: 198 MG/DL (ref 65–99)
GLUCOSE UR STRIP-MCNC: ABNORMAL MG/DL
HBA1C MFR BLD: 11.9 %
HCT VFR BLD AUTO: 39 % (ref 34.8–46.1)
HGB BLD-MCNC: 12.2 G/DL (ref 11.5–15.4)
HGB UR QL STRIP.AUTO: NEGATIVE
IMM GRANULOCYTES # BLD AUTO: 0.01 THOUSAND/UL (ref 0–0.2)
IMM GRANULOCYTES NFR BLD AUTO: 0 % (ref 0–2)
IRON SATN MFR SERPL: 29 %
IRON SERPL-MCNC: 109 UG/DL (ref 50–170)
KETONES UR STRIP-MCNC: NEGATIVE MG/DL
LDH SERPL-CCNC: 174 U/L (ref 81–234)
LEUKOCYTE ESTERASE UR QL STRIP: NEGATIVE
LYMPHOCYTES # BLD AUTO: 1.03 THOUSANDS/ΜL (ref 0.6–4.47)
LYMPHOCYTES NFR BLD AUTO: 25 % (ref 14–44)
MCH RBC QN AUTO: 28.1 PG (ref 26.8–34.3)
MCHC RBC AUTO-ENTMCNC: 31.3 G/DL (ref 31.4–37.4)
MCV RBC AUTO: 90 FL (ref 82–98)
MONOCYTES # BLD AUTO: 0.38 THOUSAND/ΜL (ref 0.17–1.22)
MONOCYTES NFR BLD AUTO: 9 % (ref 4–12)
NEUTROPHILS # BLD AUTO: 2.69 THOUSANDS/ΜL (ref 1.85–7.62)
NEUTS SEG NFR BLD AUTO: 64 % (ref 43–75)
NITRITE UR QL STRIP: NEGATIVE
NON-SQ EPI CELLS URNS QL MICRO: ABNORMAL /HPF
NRBC BLD AUTO-RTO: 0 /100 WBCS
PH UR STRIP.AUTO: 6 [PH]
PLATELET # BLD AUTO: 244 THOUSANDS/UL (ref 149–390)
PMV BLD AUTO: 10.8 FL (ref 8.9–12.7)
POTASSIUM SERPL-SCNC: 4.8 MMOL/L (ref 3.5–5.3)
PROT SERPL-MCNC: 7.4 G/DL (ref 6.4–8.2)
PROT UR STRIP-MCNC: ABNORMAL MG/DL
RBC # BLD AUTO: 4.34 MILLION/UL (ref 3.81–5.12)
RBC #/AREA URNS AUTO: ABNORMAL /HPF
SODIUM SERPL-SCNC: 133 MMOL/L (ref 136–145)
SP GR UR STRIP.AUTO: 1.02 (ref 1–1.03)
T4 FREE SERPL-MCNC: 0.67 NG/DL (ref 0.76–1.46)
TIBC SERPL-MCNC: 380 UG/DL (ref 250–450)
TSH SERPL DL<=0.05 MIU/L-ACNC: 28.7 UIU/ML (ref 0.36–3.74)
UROBILINOGEN UR QL STRIP.AUTO: 0.2 E.U./DL
VIT B12 SERPL-MCNC: 535 PG/ML (ref 100–900)
WBC # BLD AUTO: 4.2 THOUSAND/UL (ref 4.31–10.16)
WBC #/AREA URNS AUTO: ABNORMAL /HPF

## 2021-01-13 PROCEDURE — 83615 LACTATE (LD) (LDH) ENZYME: CPT

## 2021-01-13 PROCEDURE — 82728 ASSAY OF FERRITIN: CPT

## 2021-01-13 PROCEDURE — 84439 ASSAY OF FREE THYROXINE: CPT

## 2021-01-13 PROCEDURE — 81001 URINALYSIS AUTO W/SCOPE: CPT | Performed by: NURSE PRACTITIONER

## 2021-01-13 PROCEDURE — 83550 IRON BINDING TEST: CPT

## 2021-01-13 PROCEDURE — 85025 COMPLETE CBC W/AUTO DIFF WBC: CPT

## 2021-01-13 PROCEDURE — 80053 COMPREHEN METABOLIC PANEL: CPT

## 2021-01-13 PROCEDURE — 36415 COLL VENOUS BLD VENIPUNCTURE: CPT

## 2021-01-13 PROCEDURE — 85652 RBC SED RATE AUTOMATED: CPT

## 2021-01-13 PROCEDURE — 82607 VITAMIN B-12: CPT

## 2021-01-13 PROCEDURE — 84443 ASSAY THYROID STIM HORMONE: CPT

## 2021-01-13 PROCEDURE — 3046F HEMOGLOBIN A1C LEVEL >9.0%: CPT | Performed by: NURSE PRACTITIONER

## 2021-01-13 PROCEDURE — 86140 C-REACTIVE PROTEIN: CPT

## 2021-01-13 PROCEDURE — 83036 HEMOGLOBIN GLYCOSYLATED A1C: CPT

## 2021-01-13 PROCEDURE — 83540 ASSAY OF IRON: CPT

## 2021-01-15 ENCOUNTER — APPOINTMENT (OUTPATIENT)
Dept: LAB | Facility: CLINIC | Age: 41
End: 2021-01-15
Payer: COMMERCIAL

## 2021-01-15 ENCOUNTER — TRANSCRIBE ORDERS (OUTPATIENT)
Dept: URGENT CARE | Facility: CLINIC | Age: 41
End: 2021-01-15

## 2021-01-15 ENCOUNTER — OFFICE VISIT (OUTPATIENT)
Dept: FAMILY MEDICINE CLINIC | Facility: CLINIC | Age: 41
End: 2021-01-15
Payer: COMMERCIAL

## 2021-01-15 VITALS
WEIGHT: 157.4 LBS | TEMPERATURE: 97.7 F | HEIGHT: 66 IN | OXYGEN SATURATION: 99 % | BODY MASS INDEX: 25.3 KG/M2 | RESPIRATION RATE: 20 BRPM | HEART RATE: 91 BPM | DIASTOLIC BLOOD PRESSURE: 74 MMHG | SYSTOLIC BLOOD PRESSURE: 110 MMHG

## 2021-01-15 DIAGNOSIS — E10.9 TYPE 1 DIABETES MELLITUS WITHOUT COMPLICATION (HCC): ICD-10-CM

## 2021-01-15 DIAGNOSIS — E66.3 OVERWEIGHT WITH BODY MASS INDEX (BMI) OF 25 TO 25.9 IN ADULT: ICD-10-CM

## 2021-01-15 DIAGNOSIS — E06.3 HYPOTHYROIDISM DUE TO HASHIMOTO'S THYROIDITIS: Chronic | ICD-10-CM

## 2021-01-15 DIAGNOSIS — E55.9 VITAMIN D DEFICIENCY: Chronic | ICD-10-CM

## 2021-01-15 DIAGNOSIS — Z00.00 ANNUAL PHYSICAL EXAM: Primary | ICD-10-CM

## 2021-01-15 DIAGNOSIS — E03.8 HYPOTHYROIDISM DUE TO HASHIMOTO'S THYROIDITIS: Chronic | ICD-10-CM

## 2021-01-15 DIAGNOSIS — F33.1 MODERATE EPISODE OF RECURRENT MAJOR DEPRESSIVE DISORDER (HCC): ICD-10-CM

## 2021-01-15 DIAGNOSIS — R35.0 URINARY FREQUENCY: Primary | ICD-10-CM

## 2021-01-15 DIAGNOSIS — E10.65 TYPE 1 DIABETES MELLITUS WITH HYPERGLYCEMIA (HCC): ICD-10-CM

## 2021-01-15 LAB
CREAT UR-MCNC: 55.6 MG/DL
MICROALBUMIN UR-MCNC: 6.3 MG/L (ref 0–20)
MICROALBUMIN/CREAT 24H UR: 11 MG/G CREATININE (ref 0–30)

## 2021-01-15 PROCEDURE — 82570 ASSAY OF URINE CREATININE: CPT | Performed by: NURSE PRACTITIONER

## 2021-01-15 PROCEDURE — 99395 PREV VISIT EST AGE 18-39: CPT | Performed by: NURSE PRACTITIONER

## 2021-01-15 PROCEDURE — 82043 UR ALBUMIN QUANTITATIVE: CPT | Performed by: NURSE PRACTITIONER

## 2021-01-15 PROCEDURE — 99214 OFFICE O/P EST MOD 30 MIN: CPT | Performed by: NURSE PRACTITIONER

## 2021-01-15 PROCEDURE — 1036F TOBACCO NON-USER: CPT | Performed by: NURSE PRACTITIONER

## 2021-01-15 PROCEDURE — 3061F NEG MICROALBUMINURIA REV: CPT | Performed by: NURSE PRACTITIONER

## 2021-01-15 PROCEDURE — 3008F BODY MASS INDEX DOCD: CPT | Performed by: NURSE PRACTITIONER

## 2021-01-15 RX ORDER — LEVOTHYROXINE SODIUM 0.05 MG/1
50 TABLET ORAL DAILY
Qty: 90 TABLET | Refills: 1 | Status: SHIPPED | OUTPATIENT
Start: 2021-01-15 | End: 2021-05-18 | Stop reason: SDUPTHER

## 2021-01-15 NOTE — Clinical Note
Can you see me on Monday? I'd like if you could reach out to Kangmarlene Rudy to help her get back on track  Thanks!

## 2021-01-15 NOTE — PROGRESS NOTES
[facility]        NAME: Haris Lindsay is a 47 y o  female  : 1980    MRN: [id number]  DATE: January 15, 2021  TIME: 12:40 PM    Assessment and Plan   Type 1 diabetes mellitus with hyperglycemia ([address] ) [E10 65]  1  Type 1 diabetes mellitus with hyperglycemia (HCC)  HEMOGLOBIN A1C W/ EAG ESTIMATION    Comprehensive metabolic panel   2  Hypothyroidism due to Hashimoto's thyroiditis  levothyroxine 50 mcg tablet    TSH, 3rd generation with Free T4 reflex   3  Vitamin D deficiency  Vitamin D 25 hydroxy   4  Moderate episode of recurrent major depressive disorder [facility])           Patient Instructions     Patient Instructions   Keep appointment with Endocrinology  I will have Ronnell Mercado reach out to you to possibly help with coverage issues  Increase Levothyroxine to 250mcg daily, repeat labs in 1 month  medcheck in 4 months or call sooner for problems/concerns            Chief Complaint     Chief Complaint   Patient presents with    Follow-up         History of Present Illness       Here to review labs-   TSH 28- is taking her Levothyroxine as ordered  HgA1c 11 9- has appointment with Endocrinologist in [place] to get Insulin pump ordered  Depression- lexapro has been doubled to 20mg daily    Had an insulin pump but it broke- was told she couldn't get a replacement until May  She was offered an old refurbished pump for now but she wants a new one to avoid problems like she had with the old pump  Is hoping to get a pump ASAP so she feels better sooner      Review of Systems   Review of Systems   Constitutional: Positive for activity change and fatigue (and brain fog)  Negative for diaphoresis and fever  HENT: Negative for congestion, facial swelling, hearing loss, rhinorrhea, sinus pressure, sinus pain, sneezing, sore throat and voice change  Eyes: Negative for discharge and visual disturbance     Respiratory: Negative for cough, choking, chest tightness, shortness of breath, wheezing and stridor  Cardiovascular: Negative for chest pain, palpitations and leg swelling  Gastrointestinal: Negative for abdominal distention, abdominal pain, constipation, diarrhea, nausea and vomiting  Endocrine: Negative for polydipsia, polyphagia and polyuria  Genitourinary: Negative for difficulty urinating, dysuria, frequency and urgency  Musculoskeletal: Negative for arthralgias, back pain, gait problem, joint swelling, myalgias, neck pain and neck stiffness  Skin: Negative for color change, rash and wound  Neurological: Negative for dizziness, syncope, speech difficulty, weakness, light-headedness and headaches  Hematological: Negative for adenopathy  Does not bruise/bleed easily  Psychiatric/Behavioral: Positive for dysphoric mood  Negative for agitation, behavioral problems, confusion, hallucinations, sleep disturbance and suicidal ideas  The patient is not nervous/anxious            Current Medications       Current Outpatient Medications:     Cyanocobalamin (B-12 PO), Take by mouth daily, Disp: , Rfl:     DULoxetine (CYMBALTA) 60 mg delayed release capsule, TAKE ONE CAPSULE BY MOUTH ONCE DAILY, Disp: 30 capsule, Rfl: 5    ergocalciferol (VITAMIN D2) 50,000 units, Take 1 capsule (50,000 Units total) by mouth once a week, Disp: 12 capsule, Rfl: 3    escitalopram (LEXAPRO) 10 mg tablet, Take 20 mg by mouth daily, Disp: , Rfl:     glucose blood test strip, Testing up to 8 times a day  E10 65, Disp: , Rfl:     Insulin Aspart FlexPen (NovoLOG FlexPen) 100 UNIT/ML SOPN, 1 unit for 6 grams of carbs and 1 unit for 30 mg/dl above 120   upto 50 units a day, Disp: 5 pen, Rfl: 1    insulin glargine (LANTUS SOLOSTAR) 100 units/mL injection pen, Inject 36 Units under the skin daily at bedtime, Disp: 5 pen, Rfl: 3    Insulin Pen Needle (BD Pen Needle Deanna U/F) 32G X 4 MM MISC, Use 4/day, Disp: 100 each, Rfl: 3    Insulin Syringe-Needle U-100 (INSULIN SYRINGE 1CC/28G) 28G X 1/2" 1 ML MISC, 4 injections daily E10 65, Disp: , Rfl:     levothyroxine 200 mcg tablet, Take 1 tablet (200 mcg total) by mouth daily in the early morning, Disp: 90 tablet, Rfl: 3    levothyroxine 50 mcg tablet, Take 1 tablet (50 mcg total) by mouth daily Add to 200mcg to equal 250mcg, Disp: 90 tablet, Rfl: 1    LORazepam (ATIVAN) 0 5 mg tablet, , Disp: , Rfl:     traZODone (DESYREL) 100 mg tablet, Take 100 mg by mouth daily at bedtime , Disp: , Rfl:     Current Allergies     Allergies as of 01/15/2021    (No Known Allergies)            The following portions of the patient's history were reviewed and updated as appropriate: allergies, current medications, past family history, past medical history, past social history, past surgical history and problem list      Past Medical History:   Diagnosis Date    Anemia     Anxiety     Arthritis     B12 deficiency     Cervical disc disorder     On gabapentin     Depression     Diabetes mellitus [facility] [address] )     Disease of thyroid gland     hypothyroid    DKA (diabetic ketoacidoses) ([facility] [address] ) 2018    Iron deficiency anemia     Type 1 diabetes ([facility] [address] ) 1994    Type 1 diabetes mellitus, uncontrolled ([facility] [address] )     Vitamin D deficiency        Past Surgical History:   Procedure Laterality Date    ABDOMINAL SURGERY      gastric bypass     SECTION      x2    CHOLECYSTECTOMY  2018    GASTRIC BYPASS  2007    INTRAUTERINE DEVICE INSERTION  2015    IR BIOPSY BONE MARROW  2020    OTHER SURGICAL HISTORY      surgery for imperforate hymen/endometriosis/hydrometrocolpos    TUBAL LIGATION      WISDOM TOOTH EXTRACTION         Family History   Problem Relation Age of Onset    Thyroid disease Mother     URIEL disease Mother     Hyperlipidemia Mother     Hypertension Mother     Osteoarthritis Mother     Thyroid disease unspecified Mother     Diabetes Father     Alcohol abuse Father     Diabetes type I Father     Thyroid disease Sister     Depression Sister     Thyroid disease unspecified Sister     Heart disease Maternal Grandmother     Hypertension Maternal Grandmother     Arthritis Maternal Grandmother     Diabetes type I Maternal Uncle     Diabetes type I Maternal Grandfather          Medications have been verified  Objective   /74   Pulse 91   Temp 97 7 °F (36 5 °C) (Tympanic)   Resp 20   Ht 5' 6" (1 676 m)   Wt 71 4 kg (157 lb 6 4 oz)   SpO2 99%   BMI 25 41 kg/m²        Physical Exam     Physical Exam  Vitals signs and nursing note reviewed  Constitutional:       General: She is not in acute distress  Appearance: She is well-developed  She is not diaphoretic  Neck:      Musculoskeletal: Normal range of motion and neck supple  Thyroid: No thyromegaly  Trachea: No tracheal deviation  Cardiovascular:      Rate and Rhythm: Normal rate and regular rhythm  Heart sounds: Normal heart sounds  No murmur  Pulmonary:      Effort: Pulmonary effort is normal  No respiratory distress  Breath sounds: Normal breath sounds  No wheezing  Musculoskeletal: Normal range of motion  General: No tenderness or deformity  Skin:     General: Skin is warm and dry  Findings: No erythema or rash  Neurological:      Mental Status: She is alert and oriented to person, place, and time  Psychiatric:         Mood and Affect: Mood normal          Behavior: Behavior normal  Behavior is cooperative  Thought Content:  Thought content normal          Judgment: Judgment normal

## 2021-01-15 NOTE — PROGRESS NOTES
HPI:  Evita Castellanos is a 47 y o  female here for her yearly health maintenance exam    Patient Active Problem List   Diagnosis    Diabetic ketoacidosis without coma associated with type 1 diabetes mellitus ([place] [address] )    Iron deficiency anemia    Hypothyroidism    Vitamin D deficiency    B12 deficiency    Anxiety and depression    Electrolyte abnormality    Overweight (BMI 25 0-29  9)    Type 1 diabetes mellitus with hyperglycemia (HCC)    Type 1 diabetes mellitus with hypoglycemia and without coma ([place] [address] )    Depression    Suicidal ideations    Leukopenia    THO (acute kidney injury) ([place] [address] )    Hypothyroidism due to Hashimoto's thyroiditis    Encounter for diabetic foot exam ([place] [address] )     Past Medical History:   Diagnosis Date    Anemia     Anxiety     Arthritis     B12 deficiency     Cervical disc disorder     On gabapentin     Depression     Diabetes mellitus ([place] [address] )     Disease of thyroid gland     hypothyroid    DKA (diabetic ketoacidoses) ([place] [address] ) 03/2018    Iron deficiency anemia     Type 1 diabetes ([place] [address] ) 1994    Type 1 diabetes mellitus, uncontrolled (HCC)     Vitamin D deficiency          PHQ-9 Depression Screening    PHQ-9:   Frequency of the following problems over the past two weeks:               Current Outpatient Medications   Medication Sig Dispense Refill    Cyanocobalamin (B-12 PO) Take by mouth daily      DULoxetine (CYMBALTA) 60 mg delayed release capsule TAKE ONE CAPSULE BY MOUTH ONCE DAILY 30 capsule 5    ergocalciferol (VITAMIN D2) 50,000 units Take 1 capsule (50,000 Units total) by mouth once a week 12 capsule 3    escitalopram (LEXAPRO) 10 mg tablet Take 20 mg by mouth daily      glucose blood test strip Testing up to 8 times a day  E10 65      Insulin Aspart FlexPen (NovoLOG FlexPen) 100 UNIT/ML SOPN 1 unit for 6 grams of carbs and 1 unit for 30 mg/dl above 120    upto 50 units a day 5 pen 1    insulin glargine (LANTUS SOLOSTAR) 100 units/mL injection pen Inject 36 Units under the skin daily at bedtime 5 pen 3    Insulin Pen Needle (BD Pen Needle Deanna U/F) 32G X 4 MM MISC Use 4/day 100 each 3    Insulin Syringe-Needle U-100 (INSULIN SYRINGE 1CC/28G) 28G X 1/2" 1 ML MISC 4 injections daily E10 65      levothyroxine 200 mcg tablet Take 1 tablet (200 mcg total) by mouth daily in the early morning 90 tablet 3    levothyroxine 50 mcg tablet Take 1 tablet (50 mcg total) by mouth daily Add to 200mcg to equal 250mcg 90 tablet 1    LORazepam (ATIVAN) 0 5 mg tablet       traZODone (DESYREL) 100 mg tablet Take 100 mg by mouth daily at bedtime        No current facility-administered medications for this visit  No Known Allergies  Immunization History   Administered Date(s) Administered    Tdap 08/01/2016    Tuberculin Skin Test-PPD Intradermal 10/15/2019       Patient Care Team:  Kb Mohamud as PCP - General (Family Medicine)    Review of Systems   Constitutional: Positive for fatigue  Negative for activity change, diaphoresis and fever  HENT: Negative for congestion, facial swelling, hearing loss, rhinorrhea, sinus pressure, sinus pain, sneezing, sore throat and voice change  Eyes: Negative for discharge and visual disturbance  Respiratory: Negative for cough, choking, chest tightness, shortness of breath, wheezing and stridor  Cardiovascular: Negative for chest pain, palpitations and leg swelling  Gastrointestinal: Negative for abdominal distention, abdominal pain, constipation, diarrhea, nausea and vomiting  Endocrine: Negative for polydipsia, polyphagia and polyuria  Genitourinary: Negative for difficulty urinating, dysuria, frequency and urgency  Musculoskeletal: Negative for arthralgias, back pain, gait problem, joint swelling, myalgias, neck pain and neck stiffness  Skin: Negative for color change, rash and wound  Neurological: Negative for dizziness, syncope, speech difficulty, weakness, light-headedness and headaches     Hematological: Negative for adenopathy  Does not bruise/bleed easily  Psychiatric/Behavioral: Positive for dysphoric mood  Negative for agitation, behavioral problems, confusion, hallucinations, sleep disturbance and suicidal ideas  The patient is not nervous/anxious  Physical Exam :  Physical Exam  Vitals signs and nursing note reviewed  Constitutional:       General: She is not in acute distress  Appearance: She is well-developed  She is not diaphoretic  Neck:      Musculoskeletal: Normal range of motion and neck supple  Thyroid: No thyromegaly  Trachea: No tracheal deviation  Cardiovascular:      Rate and Rhythm: Normal rate and regular rhythm  Heart sounds: Normal heart sounds  No murmur  Pulmonary:      Effort: Pulmonary effort is normal  No respiratory distress  Breath sounds: Normal breath sounds  No wheezing  Musculoskeletal: Normal range of motion  General: No tenderness or deformity  Skin:     General: Skin is warm and dry  Findings: No erythema or rash  Neurological:      Mental Status: She is alert and oriented to person, place, and time  Psychiatric:         Mood and Affect: Mood normal          Behavior: Behavior normal  Behavior is cooperative  Thought Content: Thought content normal          Judgment: Judgment normal        BMI Counseling: Body mass index is 25 41 kg/m²  The BMI is above normal  Nutrition recommendations include moderation in carbohydrate intake  Reviewed Updated [facility] Prior Wellness Visits:   Last Health Maintenance visit information was reviewed, patient interviewed , no change since last HM visit no  Last HM visit information was reviewed, patient interviewed and updates made to the record today no    Assessment and Plan:  1  Annual physical exam     2  Hypothyroidism due to Hashimoto's thyroiditis  levothyroxine 50 mcg tablet    TSH, 3rd generation with Free T4 reflex   3   Type 1 diabetes mellitus with hyperglycemia (HCC)  HEMOGLOBIN A1C W/ EAG ESTIMATION    Comprehensive metabolic panel   4  Vitamin D deficiency  Vitamin D 25 hydroxy   5  Moderate episode of recurrent major depressive disorder ([facility] [address] )     6   Overweight with body mass index (BMI) of 25 to 25 9 in adult         Health Maintenance Due   Topic Date Due    Pneumococcal Vaccine: Pediatrics (0 to 5 Years) and At-Risk Patients (6 to 59 Years) (1 of 1 - PPSV23) 04/02/1988    HIV Screening  04/02/1997    Annual Physical  04/02/2000    Cervical Cancer Screening  04/02/2003    URINE MICROALBUMIN  10/03/2020

## 2021-01-18 ENCOUNTER — PATIENT OUTREACH (OUTPATIENT)
Dept: FAMILY MEDICINE CLINIC | Facility: CLINIC | Age: 41
End: 2021-01-18

## 2021-01-18 DIAGNOSIS — Z71.89 ENCOUNTER FOR COUNSELING FOR CARE MANAGEMENT OF PATIENT WITH CHRONIC CONDITIONS AND COMPLEX HEALTH NEEDS USING NURSE-BASED MODEL: Primary | ICD-10-CM

## 2021-01-18 NOTE — PROGRESS NOTES
Outpatient Care Management Note:  Outreach call attempted to Quyen Valdes  Message left for patient to please return call  Contact information left on message

## 2021-01-18 NOTE — PROGRESS NOTES
Outpatient Care Management Note:  In basket referral from PCP  Chart review completed  Discussed case with Tulio Carmen

## 2021-01-21 ENCOUNTER — PATIENT OUTREACH (OUTPATIENT)
Dept: FAMILY MEDICINE CLINIC | Facility: CLINIC | Age: 41
End: 2021-01-21

## 2021-01-21 NOTE — PROGRESS NOTES
Outpatient Care Management Note:  Received return call from Lilliana Elias  She has been a type 1 diabetic since childhood and utilized an insulin pump for many years  She decided to "take a break" from her pump over a year ago  She most recently obtained a pump in May of 2016 and her insurance will not cover  A new one until May of this year  The pump she has failed according to Lilliana Elias  She is interested in a newer model but her insurance company has refused to authorize a new pump  She was offered a refurbished pump but did not have good control when she used that model in the past   She is looking for guidance on obtaining a pump  She is scheduled to see Endocrinology on 2/4/2021  Advised her to discuss the issue with them as they work with the representatives from the [address] [place] [address]  They may also be aware of ways for the pump to be authorized early  Lilliana Curt does not need any education on her disease or diet  Encouraged to reach out if I can be of further assistance

## 2021-02-04 ENCOUNTER — OFFICE VISIT (OUTPATIENT)
Dept: ENDOCRINOLOGY | Facility: CLINIC | Age: 41
End: 2021-02-04
Payer: COMMERCIAL

## 2021-02-04 VITALS
HEART RATE: 111 BPM | DIASTOLIC BLOOD PRESSURE: 70 MMHG | HEIGHT: 66 IN | BODY MASS INDEX: 26.45 KG/M2 | WEIGHT: 164.6 LBS | SYSTOLIC BLOOD PRESSURE: 108 MMHG | OXYGEN SATURATION: 99 % | RESPIRATION RATE: 20 BRPM

## 2021-02-04 DIAGNOSIS — F32.A ANXIETY AND DEPRESSION: ICD-10-CM

## 2021-02-04 DIAGNOSIS — E10.649 TYPE 1 DIABETES MELLITUS WITH HYPOGLYCEMIA AND WITHOUT COMA (HCC): Primary | ICD-10-CM

## 2021-02-04 DIAGNOSIS — E03.8 HYPOTHYROIDISM DUE TO HASHIMOTO'S THYROIDITIS: ICD-10-CM

## 2021-02-04 DIAGNOSIS — F41.9 ANXIETY AND DEPRESSION: ICD-10-CM

## 2021-02-04 DIAGNOSIS — E55.9 VITAMIN D DEFICIENCY: Chronic | ICD-10-CM

## 2021-02-04 DIAGNOSIS — E06.3 HYPOTHYROIDISM DUE TO HASHIMOTO'S THYROIDITIS: ICD-10-CM

## 2021-02-04 PROBLEM — D72.819 LEUKOPENIA: Status: ACTIVE | Noted: 2019-07-15

## 2021-02-04 PROBLEM — N70.11 HYDROSALPINX: Status: ACTIVE | Noted: 2019-03-12

## 2021-02-04 PROBLEM — Z98.84 H/O GASTRIC BYPASS: Status: ACTIVE | Noted: 2017-05-03

## 2021-02-04 PROBLEM — K56.1 INVAGINATION OF INTESTINE (HCC): Status: ACTIVE | Noted: 2019-03-11

## 2021-02-04 PROBLEM — Z01.419 ENCOUNTER FOR GYNECOLOGICAL EXAMINATION WITHOUT ABNORMAL FINDING: Status: ACTIVE | Noted: 2017-03-30

## 2021-02-04 PROBLEM — M50.20 PROTRUSION OF CERVICAL INTERVERTEBRAL DISC: Status: ACTIVE | Noted: 2019-10-03

## 2021-02-04 PROBLEM — G56.03 BILATERAL CARPAL TUNNEL SYNDROME: Status: ACTIVE | Noted: 2017-11-01

## 2021-02-04 PROCEDURE — 99214 OFFICE O/P EST MOD 30 MIN: CPT | Performed by: NURSE PRACTITIONER

## 2021-02-04 RX ORDER — FLASH GLUCOSE SENSOR
KIT MISCELLANEOUS
Qty: 6 EACH | Refills: 1 | Status: SHIPPED | OUTPATIENT
Start: 2021-02-04 | End: 2021-05-18

## 2021-02-04 RX ORDER — FLASH GLUCOSE SCANNING READER
EACH MISCELLANEOUS
Qty: 1 DEVICE | Refills: 1 | Status: SHIPPED | OUTPATIENT
Start: 2021-02-04 | End: 2021-05-18

## 2021-02-04 NOTE — PROGRESS NOTES
New Patient Progress Note      Chief Complaint   Patient presents with    Diabetes Type 1    Hypothyroidism        History of Present Illness:   Jose Antonio Lane is a 47 y o  female with hypothyroidism and  Type 1 diabetes presents to the office today to establish care  Past medical history significant for gastric bypass approximately 10 years ago  Complications of   Postsurgical malabsorption and a jejunal intussusception on 03/11/2019  She was initially knows to the age of 10  She had been on a  Medtronic insulin pump since she was in college  Approximately 3 years ago, she stopped using the insulin pump due to frustration with multiple episodes of DKA despite pump use  She has been on multiple daily injections since that time  At this juncture, she is very interested in resuming pump use if she has not been able to obtain proper control of her diabetes  She was last hospitalized for DKA on 11/20/2020  Her most recent hemoglobin A1c was drawn 1/13/2021  This was elevated at 11 9%  She reports persistent fatigue, polydipsia, polyuria, and blurred vision  Unfortunately, she has been combat adding life stressors for many years  As of 1 year ago, her  passed away  She is raising her 8year-old daughter and 6year-old son  Her 8year-old daughter is autistic  She states that she has gained approximately 20 lb within the last year due to poor eating habits  She is not checking her blood sugar with any regularity and therefore is not using any correction insulin  There are times when she also will skip her carbohydrate coverage insulin  She recognizes that she is not taking proper care of her health  She is investigating short-term inpatient treatment to help her learn proper coping mechanisms to deal with the loss of her       Current regimen:     Novolog 1:6 CHO  1 unit for 30 mg/dL above 120, up to 50 units daily    Lantus 36 units daily    Past medications: A previous provider suggested that the patient may be developing insulin resistance  She was started on Metformin but was unable to tolerate due to gi side effects of nausea and diarrhea  She believes that a GLP-1 may have also been recommended but she never started this  Blood Sugars- every other day when symptomatic- 300-400  Rare lows: 38 this morning- sweaty, fatigue, confused; Positive for some hypoglycemia unawareness  Patient does not become symptomatic until less than 50  Meter: One touch ? [de-identified] years old (about 5)    Needs a CGM    Diet-  "It really depends on what the kids want to eat "  BF: cereal, or eggs    L: sandwich, grilled cheese    D: pizza, chicken fingers or protein and vegetables      Exercise: limited,  Patient was going on regular walks in bike rides during the summer and fall  Influenza vaccine: -    Pneumovax: -    Ophthalmology: due    Podiatry/Foot care: self care; Last foot exam on 01/15/2021    Dentist: +    Family history of diabetes: +father, m grandfather    Diabetes education/nutrition:   Patient has attended multiple diabetes education and nutrition courses      For her hypothyroidism, patient was recently increased to 250 mcg of levothyroxine daily  She is not yet due for a repeat TSH and free T4  She denies   Symptoms of hypo or hyperthyroidism at this time  Patient Active Problem List   Diagnosis    Diabetic ketoacidosis without coma associated with type 1 diabetes mellitus (HCC)    Iron deficiency anemia    Primary hypothyroidism    Vitamin D deficiency    B12 deficiency    Anxiety and depression    Electrolyte abnormality    Overweight (BMI 25 0-29  9)    Type 1 diabetes mellitus with hyperglycemia (HCC)    Uncontrolled type [facility] mellitus [facility]    Depression    Suicidal ideations    Leukopenia    THO (acute kidney injury) ([facility]    Hypothyroidism due to Hashimoto's thyroiditis    Encounter for gynecological examination without abnormal finding    Bilateral carpal tunnel syndrome    Cervical spondylosis without myelopathy    H/O gastric bypass    Hydrosalpinx    Intestinal malabsorption, unspecified    Invagination of intestine (HCC)    Protrusion of cervical intervertebral disc      Past Medical History:   Diagnosis Date    Anemia     Anxiety     Arthritis     B12 deficiency     Cervical disc disorder     On gabapentin     Depression     Diabetes mellitus [facility]     Disease of thyroid gland     hypothyroid    DKA (diabetic ketoacidoses) ([facility] ) 2018    Iron deficiency anemia     Type [facility] diabetes [facility]     Type 1 diabetes mellitus, uncontrolled ([facility] )     Vitamin D deficiency       Past Surgical History:   Procedure Laterality Date    ABDOMINAL SURGERY      gastric bypass     SECTION      x2    CHOLECYSTECTOMY  2018    GASTRIC BYPASS  2007    INTRAUTERINE DEVICE INSERTION  2015    IR BIOPSY BONE MARROW  2020    OTHER SURGICAL HISTORY      surgery for imperforate hymen/endometriosis/hydrometrocolpos    TUBAL LIGATION      WISDOM TOOTH EXTRACTION        Family History   Problem Relation Age of Onset    Thyroid disease Mother     URIEL disease Mother     Hyperlipidemia Mother     Hypertension Mother     Osteoarthritis Mother     Thyroid disease unspecified Mother     Diabetes Father     Alcohol abuse Father     Diabetes type I Father     Thyroid disease Sister     Depression Sister     Thyroid disease unspecified Sister     Heart disease Maternal Grandmother     Hypertension Maternal Grandmother     Arthritis Maternal Grandmother     Diabetes type I Maternal Uncle     Diabetes type I Maternal Grandfather      Social History     Tobacco Use    Smoking status: Never Smoker    Smokeless tobacco: Never Used   Substance Use Topics    Alcohol use: Not Currently     Frequency: Never     Comment: occasional     No Known Allergies      Current Outpatient Medications:     Cyanocobalamin (B-12 PO), Take by mouth daily, Disp: , Rfl:     DULoxetine (CYMBALTA) 60 mg delayed release capsule, TAKE ONE CAPSULE BY MOUTH ONCE DAILY, Disp: 30 capsule, Rfl: 5    ergocalciferol (VITAMIN D2) 50,000 units, Take 1 capsule (50,000 Units total) by mouth once a week, Disp: 12 capsule, Rfl: 3    escitalopram (LEXAPRO) 10 mg tablet, Take 20 mg by mouth daily, Disp: , Rfl:     Insulin Aspart FlexPen (NovoLOG FlexPen) 100 UNIT/ML SOPN, 1 unit for 6 grams of carbs and 1 unit for 30 mg/dl above 120   upto 50 units a day, Disp: 5 pen, Rfl: 1    insulin glargine (LANTUS SOLOSTAR) 100 units/mL injection pen, Inject 36 Units under the skin daily at bedtime, Disp: 5 pen, Rfl: 3    Insulin Pen Needle (BD Pen Needle Deanna U/F) 32G X 4 MM MISC, Use 4/day, Disp: 100 each, Rfl: 3    Insulin Syringe-Needle U-100 (INSULIN SYRINGE 1CC/28G) 28G X 1/2" 1 ML MISC, 4 injections daily E10 65, Disp: , Rfl:     levothyroxine 200 mcg tablet, Take 1 tablet (200 mcg total) by mouth daily in the early morning, Disp: 90 tablet, Rfl: 3    levothyroxine 50 mcg tablet, Take 1 tablet (50 mcg total) by mouth daily Add to 200mcg to equal 250mcg, Disp: 90 tablet, Rfl: 1    Continuous Blood Gluc  (FreeStyle Samantha 14 Day Saline) KELVIN, Dispense 1 reader, Disp: 1 Device, Rfl: 1    Continuous Blood Gluc Sensor (FreeStyle Samantha 14 Day Sensor) MISC, Use one sensor every two weeks for continuous glucose monitoring , Disp: 6 each, Rfl: 1    glucose blood test strip, Testing up to 8 times a day  E10 65, Disp: , Rfl:     LORazepam (ATIVAN) 0 5 mg tablet, , Disp: , Rfl:     traZODone (DESYREL) 100 mg tablet, Take 100 mg by mouth daily at bedtime , Disp: , Rfl:     Review of Systems   Constitutional: Positive for fatigue  Negative for activity change, appetite change and unexpected weight change  HENT: Negative for dental problem, sore throat, trouble swallowing and voice change  Eyes: Positive for visual disturbance     Respiratory: Negative for cough, chest tightness and shortness of breath  Cardiovascular: Negative for chest pain, palpitations and leg swelling  Gastrointestinal: Negative for abdominal distention, abdominal pain, constipation, diarrhea, nausea and vomiting  Endocrine: Positive for polydipsia and polyuria  Negative for cold intolerance, heat intolerance and polyphagia  Genitourinary: Positive for frequency  Musculoskeletal: Negative for arthralgias, back pain, gait problem, joint swelling and myalgias  Skin: Negative for wound  Allergic/Immunologic: Negative for environmental allergies and food allergies  Neurological: Negative for dizziness, weakness, light-headedness, numbness and headaches  Hematological: Does not bruise/bleed easily  Psychiatric/Behavioral: Positive for dysphoric mood and sleep disturbance  Negative for confusion and decreased concentration  The patient is not nervous/anxious  Physical Exam:  Body mass index is 26 57 kg/m²  /70 (BP Location: Left arm, Patient Position: Sitting, Cuff Size: Adult)   Pulse (!) 111   Resp 20   Ht 5' 6" (1 676 m)   Wt 74 7 kg (164 lb 9 6 oz)   SpO2 99%   BMI 26 57 kg/m²    Wt Readings from Last 3 Encounters:   02/04/21 74 7 kg (164 lb 9 6 oz)   01/15/21 71 4 kg (157 lb 6 4 oz)   12/07/20 74 7 kg (164 lb 9 6 oz)       Physical Exam  Vitals signs reviewed  Constitutional:       General: She is not in acute distress  Appearance: She is well-developed  She is not ill-appearing  HENT:      Head: Normocephalic and atraumatic  Comments: Mask in place  Eyes:      Pupils: Pupils are equal, round, and reactive to light  Neck:      Musculoskeletal: Normal range of motion and neck supple  Thyroid: No thyromegaly  Cardiovascular:      Rate and Rhythm: Normal rate and regular rhythm  Pulses: Normal pulses  Heart sounds: Normal heart sounds  Pulmonary:      Effort: Pulmonary effort is normal       Breath sounds: Normal breath sounds  Abdominal:      General: Bowel sounds are normal  There is no distension  Palpations: Abdomen is soft  Tenderness: There is no abdominal tenderness  Musculoskeletal:      Right lower leg: No edema  Left lower leg: No edema  Lymphadenopathy:      Cervical: No cervical adenopathy  Skin:     General: Skin is warm and dry  Capillary Refill: Capillary refill takes less than 2 seconds  Neurological:      Mental Status: She is alert and oriented to person, place, and time  Gait: Gait normal    Psychiatric:         Mood and Affect: Mood normal          Behavior: Behavior normal          Thought Content: Thought content normal          Judgment: Judgment normal            Labs:   Lab Results   Component Value Date    HGBA1C 11 9 (H) 01/13/2021    HGBA1C 10 7 09/23/2020    HGBA1C 10 1 (H) 07/20/2020     Lab Results   Component Value Date    CREATININE 0 86 01/13/2021    CREATININE 0 66 12/01/2020    CREATININE 0 72 12/01/2020    BUN 12 01/13/2021     05/16/2018    K 4 8 01/13/2021     01/13/2021    CO2 30 01/13/2021     eGFR   Date Value Ref Range Status   01/13/2021 86 ml/min/1 73sq m Final     Lab Results   Component Value Date     04/02/2020    TRIG 338 (H) 04/02/2020     Lab Results   Component Value Date    ALT 31 01/13/2021    AST 22 01/13/2021    ALKPHOS 145 (H) 01/13/2021    BILITOT 0 4 05/16/2018     Lab Results   Component Value Date    LYE5HBKQAZOD 28 700 (H) 01/13/2021    VML6CCYSBWBG 2 740 11/30/2020    XJD5MLDGAEAE 57 756 (H) 08/06/2020     Lab Results   Component Value Date    FREET4 0 67 (L) 01/13/2021       Impression & Plan:    Problem List Items Addressed This Visit        Endocrine    Uncontrolled type 1 diabetes mellitus ([facility] ) - Primary      Patient is uncontrolled secondary to poor adherence to prescribed therapy  Patient would like to return to using an insulin pump  Will reach out to [employer] to contact her  In the meantime, start use of CGM  Patient requires multiple daily doses of insulin that will be self adjusted based on her sliding scale  He has been her hypoglycemia unawareness, this will also be essential for her safety  Prior to being set up with her CGM, patient was provided with a blood sugar log  She is to test her sugar 4x daily and provide log to office every week  She will require more insulin  Will likely recommend set meal time doses until the patient has a CGM  Counseled on the complications r/t uncontrolled diabetes  Counseled on the prevention, detection, and treatment of hypoglycemia  Lab Results   Component Value Date    HGBA1C 11 9 (H) 01/13/2021            Relevant Medications    Continuous Blood Gluc  (FreeStyle Samantha 14 Day [place]) KELVIN    Continuous Blood Gluc Sensor (FreeStyle Samantha 14 Day Sensor) MISC    Hypothyroidism due to Hashimoto's thyroiditis     Levothyroxine recently adjust by PCP  Will follow along  Other    Vitamin D deficiency (Chronic)     Continue Vit D supplements  Anxiety and depression     Therapeutic listening provided  Counseled patient on the relationship between mental health and diabetes control  Strongly encouraged the patient to establish care with a therapist in order to develop healthy coping skills  Patient Instructions   1  I will contact [employer] to reach out to you  2  Start testing your blood sugar 4x daily  3  When you start your CGM, let me know  At the latest by Tuesday, call us to let us know if you have a CGM  Discussed with the patient and all questioned fully answered  She will call me if any problems arise  Follow-up appointment in 3 months       Counseled patient on diagnostic results, prognosis, risk and benefit of treatment options, instruction for management, importance of treatment compliance, Risk  factor reduction and impressions    JAROCHO Andrade

## 2021-02-04 NOTE — ASSESSMENT & PLAN NOTE
Therapeutic listening provided  Counseled patient on the relationship between mental health and diabetes control  Strongly encouraged the patient to establish care with a therapist in order to develop healthy coping skills

## 2021-02-04 NOTE — ASSESSMENT & PLAN NOTE
Patient is uncontrolled secondary to poor adherence to prescribed therapy  Patient would like to return to using an insulin pump  Will reach out to [employer] to contact her  In the meantime, start use of CGM  Patient requires multiple daily doses of insulin that will be self adjusted based on her sliding scale  He has been her hypoglycemia unawareness, this will also be essential for her safety  Prior to being set up with her CGM, patient was provided with a blood sugar log  She is to test her sugar 4x daily and provide log to office every week  She will require more insulin  Will likely recommend set meal time doses until the patient has a CGM  Counseled on the complications r/t uncontrolled diabetes  Counseled on the prevention, detection, and treatment of hypoglycemia     Lab Results   Component Value Date    HGBA1C 11 9 (H) 01/13/2021

## 2021-02-04 NOTE — PATIENT INSTRUCTIONS
1  I will contact [employer] to reach out to you  2  Start testing your blood sugar 4x daily  3  When you start your CGM, let me know  At the latest by Tuesday, call us to let us know if you have a CGM

## 2021-02-11 ENCOUNTER — TELEPHONE (OUTPATIENT)
Dept: ENDOCRINOLOGY | Facility: CLINIC | Age: 41
End: 2021-02-11

## 2021-02-11 DIAGNOSIS — E10.65 TYPE 1 DIABETES MELLITUS WITH HYPERGLYCEMIA (HCC): Primary | ICD-10-CM

## 2021-02-11 NOTE — TELEPHONE ENCOUNTER
Pt left 2 previous messages at the old office phone VM  Called pt today and LM for her to call back   Given the new phone number to call as we need to know the meter she has

## 2021-02-12 RX ORDER — BLOOD-GLUCOSE METER
EACH MISCELLANEOUS 4 TIMES DAILY
Qty: 1 KIT | Refills: 0 | Status: SHIPPED | OUTPATIENT
Start: 2021-02-12 | End: 2021-05-18

## 2021-02-12 RX ORDER — LANCETS 33 GAUGE
EACH MISCELLANEOUS
Qty: 200 EACH | Refills: 1 | Status: SHIPPED | OUTPATIENT
Start: 2021-02-12 | End: 2021-05-14

## 2021-02-12 RX ORDER — BLOOD SUGAR DIAGNOSTIC
STRIP MISCELLANEOUS
Qty: 200 EACH | Refills: 1 | Status: SHIPPED | OUTPATIENT
Start: 2021-02-12 | End: 2021-05-14

## 2021-02-12 NOTE — TELEPHONE ENCOUNTER
Pt called back  Notes her Medtronic has been approved and should be shipping soon  Cost of PILAR [facility] is not reasable and recommended she discuss with [employer]tr[employer] as I do not believe [place] [employer] [place] and [employer] "talk"  Asking for test stripos for her meter which is at least 10years old   Advised we would order a new meter and supplies and send to her [facility] in [facility]

## 2021-02-25 ENCOUNTER — TELEPHONE (OUTPATIENT)
Dept: FAMILY MEDICINE CLINIC | Facility: CLINIC | Age: 41
End: 2021-02-25

## 2021-02-25 NOTE — TELEPHONE ENCOUNTER
She rescheduled to 3/22 your next opening   She asked if you could order labs for celiac   Also left the paper work for [employer]

## 2021-02-25 NOTE — TELEPHONE ENCOUNTER
Please give her appointment with GI for Celiac concerns  If she wants her last FMLA printed with new dates you can do that for her  We can discuss new paperwork at appointment- will call if cancellation

## 2021-02-25 NOTE — TELEPHONE ENCOUNTER
Called and left message with patient requesting a call back in regards to provider's recommendations  FMLA paperwork placed in provider's folder

## 2021-03-01 ENCOUNTER — OFFICE VISIT (OUTPATIENT)
Dept: FAMILY MEDICINE CLINIC | Facility: CLINIC | Age: 41
End: 2021-03-01
Payer: COMMERCIAL

## 2021-03-01 VITALS
TEMPERATURE: 98 F | RESPIRATION RATE: 20 BRPM | OXYGEN SATURATION: 100 % | HEART RATE: 89 BPM | HEIGHT: 66 IN | DIASTOLIC BLOOD PRESSURE: 76 MMHG | BODY MASS INDEX: 25.94 KG/M2 | SYSTOLIC BLOOD PRESSURE: 124 MMHG | WEIGHT: 161.4 LBS

## 2021-03-01 DIAGNOSIS — F41.9 ANXIETY AND DEPRESSION: ICD-10-CM

## 2021-03-01 DIAGNOSIS — F32.A ANXIETY AND DEPRESSION: ICD-10-CM

## 2021-03-01 DIAGNOSIS — E03.8 HYPOTHYROIDISM DUE TO HASHIMOTO'S THYROIDITIS: ICD-10-CM

## 2021-03-01 DIAGNOSIS — E06.3 HYPOTHYROIDISM DUE TO HASHIMOTO'S THYROIDITIS: ICD-10-CM

## 2021-03-01 DIAGNOSIS — E10.65 TYPE 1 DIABETES MELLITUS WITH HYPERGLYCEMIA (HCC): Primary | ICD-10-CM

## 2021-03-01 PROBLEM — E66.3 OVERWEIGHT WITH BODY MASS INDEX (BMI) OF 26 TO 26.9 IN ADULT: Status: ACTIVE | Noted: 2021-03-01

## 2021-03-01 PROCEDURE — 3725F SCREEN DEPRESSION PERFORMED: CPT | Performed by: NURSE PRACTITIONER

## 2021-03-01 PROCEDURE — 99214 OFFICE O/P EST MOD 30 MIN: CPT | Performed by: NURSE PRACTITIONER

## 2021-03-01 PROCEDURE — 3008F BODY MASS INDEX DOCD: CPT | Performed by: NURSE PRACTITIONER

## 2021-03-01 PROCEDURE — 1036F TOBACCO NON-USER: CPT | Performed by: NURSE PRACTITIONER

## 2021-03-01 NOTE — PATIENT INSTRUCTIONS
Highly recommend intensive anxiety/depression counseling ASAP- her sister Catarina Sullivan is going to help her get into a program  Info for [employer] given  Work note given for 12 more weeks off, return in 12 weeks or sooner if needed  Continue with getting Insulin pump and follow up appointments with Endocrinology

## 2021-03-01 NOTE — LETTER
March 1, 2021     Patient: Aura Patel   YOB: 1980   Date of Visit: 3/1/2021       To Whom it May Concern:    Aura Patel is under my professional care  She was seen in my office on 3/1/2021  She may return to work on To [employer] Determined based on follow up appointment scheduled in 12 weeks  If you have any questions or concerns, please don't hesitate to call           Sincerely,          JAROCHO Galicia        CC: No Recipients

## 2021-03-01 NOTE — PROGRESS NOTES
[facility]        NAME: Vlad Ashton is a 47 y o  female  : 1980    MRN: [id number]  DATE: 2021  TIME: 11:18 AM    Assessment and Plan   Type 1 diabetes mellitus with hyperglycemia ([facility] [address] ) [[address] 65]  1  Type 1 diabetes mellitus with hyperglycemia (HCC)     2  Hypothyroidism due to Hashimoto's thyroiditis     3  Anxiety and depression           Patient Instructions     Patient Instructions   Highly recommend intensive anxiety/depression counseling ASAP- her sister Estee Nair is going to help her get into a program  Info for [employer] given  Work note given for 12 more weeks off, return in 12 weeks or sooner if needed  Continue with getting Insulin pump and follow up appointments with Endocrinology            Chief Complaint     Chief Complaint   Patient presents with    Follow-up         History of Present Illness       1  Approved for insulin pump- seen by Endocrinology  2  Questioning if dual diagnosis depression/alcohol abuse intensive treatment- recently got a DUI  Does admit to self medicating her depression with alcohol but does not drink every day  Does not feel she is addicted  If she does a dual program the court will give her credit for this  She reports her depression got much worse when her estranged  committed suicide- leaving her and the kids with a lot of unanswered questions and severe depression/anxiety/insomnia  Both kids are struggling also  3  Needs work note to stay out 12 more weeks- possibly more  4  Questioning if she has Celiac- has had increased diarrhea- discussed IBS related to anxiety and alcohol with gluten/autoimmune disease being multiple causes for increased diarrhea      Review of Systems   Review of Systems   Constitutional: Positive for appetite change and fatigue  Negative for activity change, diaphoresis and fever     HENT: Negative for congestion, facial swelling, hearing loss, rhinorrhea, sinus pressure, sinus pain, sneezing, sore throat and voice change  Eyes: Negative for discharge and visual disturbance  Respiratory: Negative for cough, choking, chest tightness, shortness of breath, wheezing and stridor  Cardiovascular: Negative for chest pain, palpitations and leg swelling  Gastrointestinal: Negative for abdominal distention, abdominal pain, constipation, diarrhea, nausea and vomiting  Endocrine: Negative for polydipsia, polyphagia and polyuria  Genitourinary: Negative for difficulty urinating, dysuria, frequency and urgency  Skin: Negative for color change, rash and wound  Neurological: Negative for dizziness, syncope, speech difficulty, weakness, light-headedness and headaches  Hematological: Negative for adenopathy  Does not bruise/bleed easily  Psychiatric/Behavioral: Positive for dysphoric mood and sleep disturbance  Negative for agitation, behavioral problems, confusion, hallucinations and suicidal ideas  The patient is nervous/anxious            Current Medications       Current Outpatient Medications:     Blood Glucose Monitoring Suppl (OneTouch Verio) w/Device KIT, Use 4 (four) times a day, Disp: 1 kit, Rfl: 0    Continuous Blood Gluc  (FreeStyle Samantha 14 Day Emerson) KELVIN, Dispense 1 reader, Disp: 1 Device, Rfl: 1    Continuous Blood Gluc Sensor (FreeStyle Samantha 14 Day Sensor) MISC, Use one sensor every two weeks for continuous glucose monitoring , Disp: 6 each, Rfl: 1    Cyanocobalamin (B-12 PO), Take by mouth daily, Disp: , Rfl:     ergocalciferol (VITAMIN D2) 50,000 units, Take 1 capsule (50,000 Units total) by mouth once a week, Disp: 12 capsule, Rfl: 3    escitalopram (LEXAPRO) 10 mg tablet, Take 20 mg by mouth daily, Disp: , Rfl:     gabapentin (NEURONTIN) 300 mg capsule, Take 300-600 mg by mouth daily at bedtime, Disp: , Rfl:     glucose blood (OneTouch Verio) test strip, Use to test blood glucose 4 times a day, Disp: 200 each, Rfl: 1    Insulin Aspart FlexPen (NovoLOG FlexPen) 100 UNIT/ML SOPN, 1 unit for 6 grams of carbs and 1 unit for 30 mg/dl above 120   upto 50 units a day, Disp: 5 pen, Rfl: 1    insulin glargine (LANTUS SOLOSTAR) 100 units/mL injection pen, Inject 36 Units under the skin daily at bedtime, Disp: 5 pen, Rfl: 3    Insulin Pen Needle (BD Pen Needle Deanna U/F) 32G X 4 MM MISC, Use 4/day, Disp: 100 each, Rfl: 3    Insulin Syringe-Needle U-100 (INSULIN SYRINGE 1CC/28G) 28G X 1/2" 1 ML MISC, 4 injections daily E10 65, Disp: , Rfl:     levothyroxine 200 mcg tablet, Take 1 tablet (200 mcg total) by mouth daily in the early morning, Disp: 90 tablet, Rfl: 3    levothyroxine 50 mcg tablet, Take 1 tablet (50 mcg total) by mouth daily Add to 200mcg to equal 250mcg, Disp: 90 tablet, Rfl: 1    LORazepam (ATIVAN) 0 5 mg tablet, , Disp: , Rfl:     OneTouch Delica Lancets 85G MISC, Use to test blood glucose 4 times a day, Disp: 200 each, Rfl: 1    traZODone (DESYREL) 100 mg tablet, Take 100 mg by mouth daily at bedtime , Disp: , Rfl:     DULoxetine (CYMBALTA) 30 mg delayed release capsule, Take 30 mg by mouth daily, Disp: , Rfl:     DULoxetine (CYMBALTA) 60 mg delayed release capsule, TAKE ONE CAPSULE BY MOUTH ONCE DAILY (Patient not taking: Reported on 3/1/2021), Disp: 30 capsule, Rfl: 5    Current Allergies     Allergies as of 2021    (No Known Allergies)            The following portions of the patient's history were reviewed and updated as appropriate: allergies, current medications, past family history, past medical history, past social history, past surgical history and problem list      Past Medical History:   Diagnosis Date    Anemia     Anxiety     B12 deficiency     Cervical disc disorder     On gabapentin     Depression     Disease of thyroid gland     hypothyroid    Iron deficiency anemia     Vitamin D deficiency        Past Surgical History:   Procedure Laterality Date    ABDOMINAL SURGERY      gastric bypass     SECTION      x2  CHOLECYSTECTOMY  2018    GASTRIC BYPASS  2007    INTRAUTERINE DEVICE INSERTION  01/06/2015    IR BIOPSY BONE MARROW  8/27/2020    OTHER SURGICAL HISTORY      surgery for imperforate hymen/endometriosis/hydrometrocolpos    TUBAL LIGATION      WISDOM TOOTH EXTRACTION         Family History   Problem Relation Age of Onset    Thyroid disease Mother     URIEL disease Mother     Hyperlipidemia Mother     Hypertension Mother     Osteoarthritis Mother     Thyroid disease unspecified Mother     Diabetes Father     Alcohol abuse Father     Diabetes type I Father     Thyroid disease Sister     Depression Sister     Thyroid disease unspecified Sister     Heart disease Maternal Grandmother     Hypertension Maternal Grandmother     Arthritis Maternal Grandmother     Diabetes type I Maternal Uncle     Diabetes type I Maternal Grandfather          Medications have been verified  Objective   /76   Pulse 89   Temp 98 °F (36 7 °C)   Resp 20   Ht 5' 6" (1 676 m)   Wt 73 2 kg (161 lb 6 4 oz)   SpO2 100%   BMI 26 05 kg/m²        Physical Exam     Physical Exam  Vitals signs and nursing note reviewed  Constitutional:       General: She is in acute distress (due to anxiety/crying)  Appearance: She is well-developed  She is not ill-appearing or diaphoretic  Neck:      Musculoskeletal: Normal range of motion and neck supple  Thyroid: No thyromegaly  Trachea: No tracheal deviation  Cardiovascular:      Rate and Rhythm: Normal rate and regular rhythm  Heart sounds: Normal heart sounds  No murmur  Pulmonary:      Effort: Pulmonary effort is normal  No respiratory distress  Breath sounds: Normal breath sounds  No wheezing  Musculoskeletal: Normal range of motion  General: No tenderness or deformity  Skin:     General: Skin is warm and dry  Coloration: Skin is pale  Findings: No erythema or rash     Neurological:      Mental Status: She is alert and oriented to person, place, and time  Psychiatric:         Attention and Perception: Attention normal          Mood and Affect: Mood is anxious and depressed  Affect is flat and tearful  Speech: Speech normal          Behavior: Behavior normal  Behavior is cooperative  Thought Content:  Thought content normal          Cognition and Memory: Cognition and memory normal          Judgment: Judgment normal

## 2021-03-10 ENCOUNTER — TELEPHONE (OUTPATIENT)
Dept: ENDOCRINOLOGY | Facility: CLINIC | Age: 41
End: 2021-03-10

## 2021-05-07 ENCOUNTER — LAB (OUTPATIENT)
Dept: LAB | Facility: CLINIC | Age: 41
End: 2021-05-07
Payer: COMMERCIAL

## 2021-05-07 DIAGNOSIS — E55.9 VITAMIN D DEFICIENCY: Chronic | ICD-10-CM

## 2021-05-07 DIAGNOSIS — E03.8 HYPOTHYROIDISM DUE TO HASHIMOTO'S THYROIDITIS: ICD-10-CM

## 2021-05-07 DIAGNOSIS — E10.65 TYPE 1 DIABETES MELLITUS WITH HYPERGLYCEMIA (HCC): ICD-10-CM

## 2021-05-07 DIAGNOSIS — E06.3 HYPOTHYROIDISM DUE TO HASHIMOTO'S THYROIDITIS: ICD-10-CM

## 2021-05-07 LAB
25(OH)D3 SERPL-MCNC: 40.5 NG/ML (ref 30–100)
ALBUMIN SERPL BCP-MCNC: 3.7 G/DL (ref 3.5–5)
ALP SERPL-CCNC: 114 U/L (ref 46–116)
ALT SERPL W P-5'-P-CCNC: 65 U/L (ref 12–78)
ANION GAP SERPL CALCULATED.3IONS-SCNC: 4 MMOL/L (ref 4–13)
AST SERPL W P-5'-P-CCNC: 33 U/L (ref 5–45)
BILIRUB SERPL-MCNC: 0.56 MG/DL (ref 0.2–1)
BUN SERPL-MCNC: 13 MG/DL (ref 5–25)
CALCIUM SERPL-MCNC: 9.3 MG/DL (ref 8.3–10.1)
CHLORIDE SERPL-SCNC: 102 MMOL/L (ref 100–108)
CO2 SERPL-SCNC: 26 MMOL/L (ref 21–32)
CREAT SERPL-MCNC: 0.7 MG/DL (ref 0.6–1.3)
EST. AVERAGE GLUCOSE BLD GHB EST-MCNC: 263 MG/DL
GFR SERPL CREATININE-BSD FRML MDRD: 109 ML/MIN/1.73SQ M
GLUCOSE P FAST SERPL-MCNC: 266 MG/DL (ref 65–99)
HBA1C MFR BLD: 10.8 %
POTASSIUM SERPL-SCNC: 4.4 MMOL/L (ref 3.5–5.3)
PROT SERPL-MCNC: 7.6 G/DL (ref 6.4–8.2)
SODIUM SERPL-SCNC: 132 MMOL/L (ref 136–145)
T3FREE SERPL-MCNC: 2.58 PG/ML (ref 2.3–4.2)
T4 FREE SERPL-MCNC: 1.26 NG/DL (ref 0.76–1.46)
TSH SERPL DL<=0.05 MIU/L-ACNC: 0.28 UIU/ML (ref 0.36–3.74)

## 2021-05-07 PROCEDURE — 80053 COMPREHEN METABOLIC PANEL: CPT

## 2021-05-07 PROCEDURE — 82306 VITAMIN D 25 HYDROXY: CPT

## 2021-05-07 PROCEDURE — 84439 ASSAY OF FREE THYROXINE: CPT

## 2021-05-07 PROCEDURE — 84443 ASSAY THYROID STIM HORMONE: CPT

## 2021-05-07 PROCEDURE — 83036 HEMOGLOBIN GLYCOSYLATED A1C: CPT

## 2021-05-07 PROCEDURE — 84481 FREE ASSAY (FT-3): CPT

## 2021-05-07 PROCEDURE — 36415 COLL VENOUS BLD VENIPUNCTURE: CPT

## 2021-05-10 ENCOUNTER — RA CDI HCC (OUTPATIENT)
Dept: OTHER | Facility: HOSPITAL | Age: 41
End: 2021-05-10

## 2021-05-10 ENCOUNTER — TELEPHONE (OUTPATIENT)
Dept: ENDOCRINOLOGY | Facility: CLINIC | Age: 41
End: 2021-05-10

## 2021-05-10 NOTE — TELEPHONE ENCOUNTER
LM for pt to call back as we need current doses of insulin and would also like recent glucose logs for review prior to recommending doses

## 2021-05-10 NOTE — PROGRESS NOTES
[facility]  coding opportunities          Chart reviewed, no opportunity found: CHART REVIEWED, NO OPPORTUNITY FOUND              Patients insurance company: [facility] (Medicare Advantage and Commercial)

## 2021-05-10 NOTE — TELEPHONE ENCOUNTER
Pt called back, Has logs and will send in thru my chart and also include her current doses   Will send Tuesday gayathri

## 2021-05-11 NOTE — TELEPHONE ENCOUNTER
Sent  A my chart message earlier today regarding her glucose logs   Also Called pt now and reminded I need her glucose logs and current medications so Tabithajaquelinetyrell can place the order for her new pump on Friday

## 2021-05-13 RX ORDER — HYDROXYZINE PAMOATE 50 MG/1
50 CAPSULE ORAL
COMMUNITY
Start: 2021-04-26 | End: 2021-08-30

## 2021-05-13 RX ORDER — INSULIN GLARGINE 100 [IU]/ML
INJECTION, SOLUTION SUBCUTANEOUS
COMMUNITY
Start: 2021-04-21 | End: 2021-05-23

## 2021-05-13 RX ORDER — VENLAFAXINE HYDROCHLORIDE 75 MG/1
75 CAPSULE, EXTENDED RELEASE ORAL DAILY
COMMUNITY
Start: 2021-02-19 | End: 2021-08-30

## 2021-05-13 RX ORDER — CYANOCOBALAMIN (VITAMIN B-12) 500 MCG
TABLET ORAL
COMMUNITY
Start: 2021-04-22 | End: 2021-05-23

## 2021-05-13 RX ORDER — LANOLIN ALCOHOL/MO/W.PET/CERES
3 CREAM (GRAM) TOPICAL
COMMUNITY
Start: 2021-04-22 | End: 2021-08-30

## 2021-05-13 RX ORDER — AMOXICILLIN 500 MG/1
CAPSULE ORAL
COMMUNITY
Start: 2021-04-13 | End: 2021-05-23

## 2021-05-13 RX ORDER — TRAZODONE HYDROCHLORIDE 50 MG/1
TABLET ORAL
COMMUNITY
Start: 2021-04-22 | End: 2021-05-23

## 2021-05-13 RX ORDER — ARIPIPRAZOLE 5 MG/1
5 TABLET ORAL DAILY
COMMUNITY
Start: 2021-04-20 | End: 2021-08-30

## 2021-05-13 RX ORDER — METHOCARBAMOL 750 MG/1
750 TABLET, FILM COATED ORAL
COMMUNITY
Start: 2021-05-03 | End: 2021-08-30

## 2021-05-13 RX ORDER — ESCITALOPRAM OXALATE 20 MG/1
10 TABLET ORAL DAILY
COMMUNITY
Start: 2021-04-07

## 2021-05-13 RX ORDER — ATOMOXETINE 40 MG/1
CAPSULE ORAL
COMMUNITY
Start: 2021-04-27 | End: 2021-05-23

## 2021-05-13 RX ORDER — ARIPIPRAZOLE 2 MG/1
2 TABLET ORAL DAILY
COMMUNITY
Start: 2021-04-07 | End: 2021-05-23

## 2021-05-13 RX ORDER — NALTREXONE HYDROCHLORIDE 50 MG/1
50 TABLET, FILM COATED ORAL
COMMUNITY
Start: 2021-04-20 | End: 2021-08-30

## 2021-05-13 RX ORDER — CHOLECALCIFEROL (VITAMIN D3) 125 MCG
500 CAPSULE ORAL DAILY
COMMUNITY
Start: 2021-04-04

## 2021-05-13 RX ORDER — ATOMOXETINE 80 MG/1
80 CAPSULE ORAL DAILY
COMMUNITY
Start: 2021-05-02 | End: 2021-09-07 | Stop reason: ALTCHOICE

## 2021-05-13 RX ORDER — CLINDAMYCIN HYDROCHLORIDE 150 MG/1
CAPSULE ORAL
COMMUNITY
Start: 2021-04-29 | End: 2021-05-23

## 2021-05-13 RX ORDER — OXYCODONE HYDROCHLORIDE AND ACETAMINOPHEN 5; 325 MG/1; MG/1
TABLET ORAL
COMMUNITY
Start: 2021-03-25 | End: 2021-06-07

## 2021-05-13 RX ORDER — ACETAMINOPHEN 500 MG
TABLET ORAL
COMMUNITY
Start: 2021-05-02

## 2021-05-13 RX ORDER — LEVOTHYROXINE SODIUM 0.07 MG/1
TABLET ORAL
COMMUNITY
Start: 2021-04-20 | End: 2021-10-14

## 2021-05-14 DIAGNOSIS — E10.65 TYPE 1 DIABETES MELLITUS WITH HYPERGLYCEMIA (HCC): Primary | ICD-10-CM

## 2021-05-14 RX ORDER — LANCETS
EACH MISCELLANEOUS
Qty: 102 EACH | Refills: 2 | Status: CANCELLED | OUTPATIENT
Start: 2021-05-14

## 2021-05-14 RX ORDER — BLOOD SUGAR DIAGNOSTIC
STRIP MISCELLANEOUS
Qty: 300 STRIP | Refills: 3 | Status: CANCELLED | OUTPATIENT
Start: 2021-05-14

## 2021-05-14 NOTE — TELEPHONE ENCOUNTER
Reviewed with Queenie Wiley,  since pt has been off pump and there are no orders specifically from Genna Ortega pt will need to wait to initiate pump once Genna Ortega reviews logs and orders are place  Also pt may have to see education since she had been off the pump

## 2021-05-14 NOTE — TELEPHONE ENCOUNTER
Patient in office with [employer] initiating a new pump  They had asked me to put in orders for Humalog 100 unit insulin vials for the patient to get for her pump as well as lancets and test strips  Orders attached

## 2021-05-16 DIAGNOSIS — E10.65 TYPE 1 DIABETES MELLITUS WITH HYPERGLYCEMIA (HCC): ICD-10-CM

## 2021-05-18 ENCOUNTER — OFFICE VISIT (OUTPATIENT)
Dept: ENDOCRINOLOGY | Facility: CLINIC | Age: 41
End: 2021-05-18
Payer: COMMERCIAL

## 2021-05-18 VITALS
HEIGHT: 66 IN | SYSTOLIC BLOOD PRESSURE: 134 MMHG | OXYGEN SATURATION: 99 % | WEIGHT: 171.2 LBS | HEART RATE: 104 BPM | DIASTOLIC BLOOD PRESSURE: 68 MMHG | BODY MASS INDEX: 27.51 KG/M2

## 2021-05-18 DIAGNOSIS — E03.8 HYPOTHYROIDISM DUE TO HASHIMOTO'S THYROIDITIS: ICD-10-CM

## 2021-05-18 DIAGNOSIS — E06.3 HYPOTHYROIDISM DUE TO HASHIMOTO'S THYROIDITIS: ICD-10-CM

## 2021-05-18 DIAGNOSIS — E10.65 TYPE 1 DIABETES MELLITUS WITH HYPERGLYCEMIA (HCC): Primary | ICD-10-CM

## 2021-05-18 DIAGNOSIS — E55.9 VITAMIN D DEFICIENCY: Chronic | ICD-10-CM

## 2021-05-18 PROCEDURE — 99214 OFFICE O/P EST MOD 30 MIN: CPT | Performed by: NURSE PRACTITIONER

## 2021-05-18 PROCEDURE — 95251 CONT GLUC MNTR ANALYSIS I&R: CPT | Performed by: NURSE PRACTITIONER

## 2021-05-18 PROCEDURE — 1036F TOBACCO NON-USER: CPT | Performed by: NURSE PRACTITIONER

## 2021-05-18 RX ORDER — BLOOD SUGAR DIAGNOSTIC
STRIP MISCELLANEOUS
Qty: 200 EACH | Refills: 2 | Status: SHIPPED | OUTPATIENT
Start: 2021-05-18

## 2021-05-18 RX ORDER — LANCETS
EACH MISCELLANEOUS
Qty: 102 EACH | Refills: 1 | Status: SHIPPED | OUTPATIENT
Start: 2021-05-18 | End: 2021-06-09

## 2021-05-18 NOTE — ASSESSMENT & PLAN NOTE
At this time, we will keep current insulin pump settings  Will   Download pump report in approximately 1 month  Patient knows to call with any persistent episodes of hyperglycemia or episodes of hypoglycemia  Referral given for diabetes education to really learn carb counting and discuss medical nutrition therapy    Lab Results   Component Value Date    HGBA1C 10 8 (H) 05/07/2021

## 2021-05-18 NOTE — PROGRESS NOTES
Established Patient Progress Note      Chief Complaint   Patient presents with    Diabetes Type 1        History of Present Illness:   Jose E Silverio is a 46 y o  female with hypothyroidism and type 1 diabetes with long term use of insulin since 1992  Reports complications of post surgical malabsorption s/p gastric bypass approximately 10 years ago  + for hypoglycemia unawareness  Since her last appointment, patient completed in-patient rehab  Patient had an appointment on Friday to start new Medtronic 770G  She had used a previous model of Medtronic pump in the past  Since starting on Friday, she has had very few episodes of hypoglycemia  Patient is on a Medtronic 770G pump prescribed by endocrinology  She has been on a pump since 5/14/21  She denies any malfunctioning of the pump  Current Problems with Pump: None    She does report some alarm fatigue, especially over night as she is a light sleeper  Current Insulin pump settings:  Basal rate: 1 0 u/hr  Insulin to carb ratio: 1:6  Insulin sensitivity factor: 50  BG target:  Active Insulin time: 4    Type of insulin:  Novolg    Backup Plan: Patient is aware that in case of malfunctioning of the pump or unexplained hyperglycemia to use basal and bolus therapy as backup which is prescribed to the patient  Also notified patient to call clinic and/or pump company if any issues or go to emergency department if signs/symptoms of DKA  Clarissa Webb   Device used Medtronic 770G  Home use     Indication   Type 1 Diabetes      More than 72 hours of data was reviewed  Report to be scanned to chart  Date Range: 5/14/21-5/18/21    Analysis of data:   Average Glucose:   190 mg/dL   SD :  68 mg/dL   Time in Target Range:  48%   Time Above Range:  51%   Time Below Range:  1%     Interpretation of data:  Patient continues to have significant postprandial spikes into the high 200s low 300s  We discussed bolus timing    She is making an effort to remember to bolus 15 minutes prior to meals  She is also interested in discussing appropriate carbohydrate counting with Diabetes Education  It would appear that she may be under bolusing for certain meals  Last Eye Exam: Upcoming again for cataract evaluation; no retinopathy   Last Foot Exam: 1/15/21      For vitamin-D deficiency, she is taking 72471 units weekly  She is taking 600 mg of gabapentin nightly at bedtime to assist with neuropathy  For hypothyroidism, she is currently taking 275 mcg daily  She reports that she  Had been taking 250 mcg daily but this was increased while she was inpatient as her TSH was around 4  However,  Her most recent thyroid function tests show a suppressed TSH with a normal T4  Patient reports taking medication correctly and consistently  Component      Latest Ref Rng & Units 11/30/2020 1/13/2021 5/7/2021   TSH 3RD GENERATON      0 358 - 3 740 uIU/mL 2 740 28 700 (H) 0 276 (L)     Component      Latest Ref Rng & Units 8/6/2020 1/13/2021 5/7/2021   Free T4      0 76 - 1 46 ng/dL 0 62 (L) 0 67 (L) 1 26       She denies weight loss, hyper defecation, and palpitations    Patient Active Problem List   Diagnosis    Diabetic ketoacidosis without coma associated with type 1 diabetes mellitus (HCC)    Iron deficiency anemia    Vitamin D deficiency    B12 deficiency    Anxiety and depression    Electrolyte abnormality    Type 1 diabetes mellitus with hyperglycemia ([address] )    Depression    Suicidal ideations    Leukopenia    THO (acute kidney injury) ([address] )    Hypothyroidism due to Hashimoto's thyroiditis    Encounter for gynecological examination without abnormal finding    Bilateral carpal tunnel syndrome    Cervical spondylosis    H/O gastric bypass    Hydrosalpinx    Intestinal malabsorption, unspecified    Invagination of intestine (HCC)    Protrusion of cervical intervertebral disc    Overweight with body mass index (BMI) of 26 to 26 9 in adult    Insulin pump status    Primary hypothyroidism    Uncontrolled type 1 diabetes mellitus ([facility] [address] )      Past Medical History:   Diagnosis Date    Anemia     Anxiety     B12 deficiency     Cervical disc disorder     On gabapentin     Depression     Disease of thyroid gland     hypothyroid    Iron deficiency anemia     Vitamin D deficiency       Past Surgical History:   Procedure Laterality Date    ABDOMINAL SURGERY      gastric bypass     SECTION      x2    CHOLECYSTECTOMY  2018    GASTRIC BYPASS  2007    INTRAUTERINE DEVICE INSERTION  2015    IR BIOPSY BONE MARROW  2020    OTHER SURGICAL HISTORY      surgery for imperforate hymen/endometriosis/hydrometrocolpos    TUBAL LIGATION      WISDOM TOOTH EXTRACTION        Family History   Problem Relation Age of Onset    Thyroid disease Mother     URIEL disease Mother     Hyperlipidemia Mother     Hypertension Mother     Osteoarthritis Mother     Thyroid disease unspecified Mother     Diabetes Father     Alcohol abuse Father     Diabetes type I Father     Thyroid disease Sister     Depression Sister     Thyroid disease unspecified Sister     Heart disease Maternal Grandmother     Hypertension Maternal Grandmother     Arthritis Maternal Grandmother     Diabetes type I Maternal Uncle     Diabetes type I Maternal Grandfather      Social History     Tobacco Use    Smoking status: Never Smoker    Smokeless tobacco: Never Used   Substance Use Topics    Alcohol use: Not Currently     Frequency: Never     Comment: occasional     No Known Allergies      Current Outpatient Medications:     ARIPiprazole (ABILIFY) 5 mg tablet, , Disp: , Rfl:     atoMOXetine (STRATTERA) 40 mg capsule, , Disp: , Rfl:     atomoxetine (STRATTERA) 80 MG capsule, , Disp: , Rfl:     Cyanocobalamin (B-12 PO), Take by mouth daily, Disp: , Rfl:     ergocalciferol (VITAMIN D2) 50,000 units, Take 1 capsule (50,000 Units total) by mouth once a week, Disp: 12 capsule, Rfl: 3    escitalopram (LEXAPRO) 20 mg tablet, Take 20 mg by mouth daily, Disp: , Rfl:     gabapentin (NEURONTIN) 300 mg capsule, Take 300-600 mg by mouth daily at bedtime, Disp: , Rfl:     hydrOXYzine pamoate (VISTARIL) 50 mg capsule, , Disp: , Rfl:     Insulin Pen Needle (BD Pen Needle Deanna U/F) 32G X 4 MM MISC, Use 4/day, Disp: 100 each, Rfl: 3    Insulin Syringe-Needle U-100 (INSULIN SYRINGE 1CC/28G) 28G X 1/2" 1 ML MISC, 4 injections daily E10 65, Disp: , Rfl:     levothyroxine 200 mcg tablet, Take 1 tablet (200 mcg total) by mouth daily in the early morning, Disp: 90 tablet, Rfl: 3    levothyroxine 75 mcg tablet, , Disp: , Rfl:     melatonin 3 mg, , Disp: , Rfl:     methocarbamol (ROBAXIN) 750 mg tablet, , Disp: , Rfl:     naltrexone (REVIA) 50 mg tablet, , Disp: , Rfl:     selenium 200 mcg, , Disp: , Rfl:     Semglee 100 UNIT/ML subcutaneous injection, , Disp: , Rfl:     vitamin B-12 (CYANOCOBALAMIN) 500 MCG TABS, , Disp: , Rfl:     vitamin B-12 (VITAMIN B-12) 500 mcg tablet, Take 500 mcg by mouth daily, Disp: , Rfl:     Accu-Chek FastClix Lancets MISC, Use to test blood sugar up to 6 times daily  , Disp: 102 each, Rfl: 1    acetaminophen (TYLENOL) 500 mg tablet, , Disp: , Rfl:     amoxicillin (AMOXIL) 500 mg capsule, , Disp: , Rfl:     ARIPiprazole (ABILIFY) 2 mg tablet, Take 2 mg by mouth daily, Disp: , Rfl:     clindamycin (CLEOCIN) 150 mg capsule, , Disp: , Rfl:     DULoxetine (CYMBALTA) 30 mg delayed release capsule, Take 30 mg by mouth daily, Disp: , Rfl:     escitalopram (LEXAPRO) 10 mg tablet, Take 20 mg by mouth daily, Disp: , Rfl:     glucose blood (Accu-Chek Guide) test strip, Use to test blood sugar up to 6 times daily  , Disp: 200 each, Rfl: 2    insulin aspart (NovoLOG) 100 units/mL injection, Inject daily into pump  Max daily dose 50 units  , Disp: 40 mL, Rfl: 2    insulin glargine (LANTUS SOLOSTAR) 100 units/mL injection pen, Inject 36 Units under the skin daily at bedtime (Patient not taking: Reported on 5/18/2021), Disp: 5 pen, Rfl: 3    LORazepam (ATIVAN) 0 5 mg tablet, , Disp: , Rfl:     oxyCODONE-acetaminophen (PERCOCET) 5-325 mg per tablet, take 1 tablet by mouth every 6 hours if needed for pain, Disp: , Rfl:     traZODone (DESYREL) 100 mg tablet, Take 100 mg by mouth daily at bedtime , Disp: , Rfl:     traZODone (DESYREL) 50 mg tablet, , Disp: , Rfl:     venlafaxine (EFFEXOR-XR) 75 mg 24 hr capsule, Take 75 mg by mouth daily, Disp: , Rfl:     Review of Systems   Constitutional: Positive for fatigue  Negative for activity change, appetite change and unexpected weight change  HENT: Negative for sore throat, trouble swallowing and voice change  Eyes: Negative for visual disturbance  Respiratory: Negative for cough, chest tightness and shortness of breath  Cardiovascular: Negative for chest pain, palpitations and leg swelling  Gastrointestinal: Negative for constipation, diarrhea, nausea and vomiting  Endocrine: Negative for cold intolerance, heat intolerance, polydipsia, polyphagia and polyuria  Genitourinary: Negative for frequency  Musculoskeletal: Negative for arthralgias, gait problem and myalgias  Skin: Negative for wound  Allergic/Immunologic: Negative for environmental allergies and food allergies  Neurological: Positive for numbness  Negative for dizziness, weakness, light-headedness and headaches  Hematological: Does not bruise/bleed easily  Psychiatric/Behavioral: Positive for sleep disturbance  Negative for decreased concentration and dysphoric mood  The patient is nervous/anxious  Physical Exam:  Body mass index is 27 63 kg/m²    /68 (BP Location: Left arm, Cuff Size: Adult)   Pulse 104   Ht 5' 6" (1 676 m)   Wt 77 7 kg (171 lb 3 2 oz)   SpO2 99%   BMI 27 63 kg/m²    Wt Readings from Last 3 Encounters:   05/18/21 77 7 kg (171 lb 3 2 oz)   03/01/21 73 2 kg (161 lb 6 4 oz)   02/04/21 74 7 kg (164 lb 9 6 oz) Physical Exam  Vitals signs reviewed  Constitutional:       General: She is not in acute distress  Appearance: She is well-developed  She is not ill-appearing  HENT:      Head: Normocephalic and atraumatic  Comments:   Mask in place  Eyes:      Pupils: Pupils are equal, round, and reactive to light  Neck:      Musculoskeletal: Normal range of motion and neck supple  Thyroid: No thyromegaly  Cardiovascular:      Rate and Rhythm: Normal rate and regular rhythm  Pulses: Normal pulses  Heart sounds: Normal heart sounds  Pulmonary:      Effort: Pulmonary effort is normal       Breath sounds: Normal breath sounds  Abdominal:      General: Bowel sounds are normal  There is no distension  Palpations: Abdomen is soft  Tenderness: There is no abdominal tenderness  Musculoskeletal:      Right lower leg: No edema  Left lower leg: No edema  Lymphadenopathy:      Cervical: No cervical adenopathy  Skin:     General: Skin is warm and dry  Capillary Refill: Capillary refill takes less than 2 seconds  Neurological:      Mental Status: She is alert and oriented to person, place, and time  Gait: Gait normal    Psychiatric:         Mood and Affect: Mood normal          Behavior: Behavior normal          Thought Content:  Thought content normal          Judgment: Judgment normal            Labs:   Lab Results   Component Value Date    HGBA1C 10 8 (H) 05/07/2021    HGBA1C 11 9 (H) 01/13/2021    HGBA1C 10 7 09/23/2020     Lab Results   Component Value Date    CREATININE 0 70 05/07/2021    CREATININE 0 86 01/13/2021    CREATININE 0 66 12/01/2020    BUN 13 05/07/2021     05/16/2018    K 4 4 05/07/2021     05/07/2021    CO2 26 05/07/2021     eGFR   Date Value Ref Range Status   05/07/2021 109 ml/min/1 73sq m Final     Lab Results   Component Value Date     04/02/2020    TRIG 338 (H) 04/02/2020     Lab Results   Component Value Date    ALT 65 05/07/2021    AST 33 05/07/2021    ALKPHOS 114 05/07/2021    BILITOT 0 4 05/16/2018     Lab Results   Component Value Date    REV9GLLNUHBR 0 276 (L) 05/07/2021    KFM3LBVNWUWO 28 700 (H) 01/13/2021    [id number] 2 740 11/30/2020     Lab Results   Component Value Date    FREET4 1 26 05/07/2021       Impression & Plan:    Problem List Items Addressed This Visit        Endocrine    Type 1 diabetes mellitus with hyperglycemia ([facility] [address] ) - Primary       At this time, we will keep current insulin pump settings  Will   Download pump report in approximately 1 month  Patient knows to call with any persistent episodes of hyperglycemia or episodes of hypoglycemia  Referral given for diabetes education to really learn carb counting and discuss medical nutrition therapy  Lab Results   Component Value Date    HGBA1C 10 8 (H) 05/07/2021            Relevant Medications    insulin aspart (NovoLOG) 100 units/mL injection    glucose blood (Accu-Chek Guide) test strip    Accu-Chek FastClix Lancets MISC    Other Relevant Orders    HEMOGLOBIN A1C W/ EAG ESTIMATION Lab Collect    Microalbumin / creatinine urine ratio Lab Collect    Comprehensive metabolic panel Lab Collect    Lipid panel Lab Collect Lab Collect    Ambulatory referral to Diabetic Education    Hypothyroidism due to Hashimoto's thyroiditis       Continue with 275 mcg Monday through Saturday  No dose on Sunday  Check TSH and free T4 in 6 weeks  Relevant Orders    TSH, 3rd generation Lab Collect    T4, free Lab Collect       Other    Vitamin D deficiency       Continue high-dose vitamin-D  Orders Placed This Encounter   Procedures    TSH, 3rd generation Lab Collect     This is a patient instruction: This test is non-fasting  Please drink two glasses of water morning of bloodwork          Standing Status:   Future     Standing Expiration Date:   5/18/2022    T4, free Lab Collect     Standing Status:   Future     Standing Expiration Date:   5/18/2022  HEMOGLOBIN A1C W/ EAG ESTIMATION Lab Collect     Standing Status:   Future     Standing Expiration Date:   5/18/2022    Microalbumin / creatinine urine ratio Lab Collect     Standing Status:   Future     Standing Expiration Date:   5/18/2022    Comprehensive metabolic panel Lab Collect     This is a patient instruction: Patient fasting for 8 hours or longer recommended  Standing Status:   Future     Standing Expiration Date:   5/18/2022    Lipid panel Lab Collect Lab Collect     This is a patient instruction: This test requires patient fasting for 10-12 hours or longer  Drinking of black coffee or black tea is acceptable  Standing Status:   Future     Standing Expiration Date:   5/18/2022    Ambulatory referral to Diabetic Education     Standing Status:   Future     Standing Expiration Date:   5/18/2022     Referral Priority:   Routine     Referral Type:   Consult - AMB     Referral Reason:   Specialty Services Required     Requested Specialty:   Diabetes Services     Number of Visits Requested:   1     Expiration Date:   5/18/2022       Patient Instructions   1  Levothyroxine 175 mcg Monday-Saturday  No dose on Sunday  Repeat TSH and free T4 in 6 weeks  Discussed with the patient and all questioned fully answered  She will call me if any problems arise  Follow-up appointment in 3 months       Counseled patient on diagnostic results, prognosis, risk and benefit of treatment options, instruction for management, importance of treatment compliance, Risk  factor reduction and impressions    JAROCHO Martines

## 2021-05-23 ENCOUNTER — APPOINTMENT (EMERGENCY)
Dept: CT IMAGING | Facility: HOSPITAL | Age: 41
DRG: 639 | End: 2021-05-23
Payer: COMMERCIAL

## 2021-05-23 ENCOUNTER — HOSPITAL ENCOUNTER (INPATIENT)
Facility: HOSPITAL | Age: 41
LOS: 2 days | Discharge: HOME | DRG: 639 | End: 2021-05-25
Attending: EMERGENCY MEDICINE | Admitting: INTERNAL MEDICINE
Payer: COMMERCIAL

## 2021-05-23 DIAGNOSIS — E10.10 DIABETIC KETOACIDOSIS WITHOUT COMA ASSOCIATED WITH TYPE 1 DIABETES MELLITUS (HCC): ICD-10-CM

## 2021-05-23 DIAGNOSIS — R07.9 NONSPECIFIC CHEST PAIN: ICD-10-CM

## 2021-05-23 DIAGNOSIS — E11.10 DKA (DIABETIC KETOACIDOSES): Primary | ICD-10-CM

## 2021-05-23 PROBLEM — R74.01 TRANSAMINITIS: Status: ACTIVE | Noted: 2021-05-23

## 2021-05-23 PROBLEM — F10.10 ALCOHOL ABUSE: Status: ACTIVE | Noted: 2021-05-23

## 2021-05-23 LAB
ALBUMIN SERPL BCP-MCNC: 4 G/DL (ref 3.5–5)
ALP SERPL-CCNC: 158 U/L (ref 46–116)
ALT SERPL W P-5'-P-CCNC: 67 U/L (ref 12–78)
ANION GAP SERPL CALCULATED.3IONS-SCNC: 18 MMOL/L (ref 4–13)
ANION GAP SERPL CALCULATED.3IONS-SCNC: 20 MMOL/L (ref 4–13)
ANION GAP SERPL CALCULATED.3IONS-SCNC: 9 MMOL/L (ref 4–13)
APTT PPP: 27 SECONDS (ref 23–37)
AST SERPL W P-5'-P-CCNC: 72 U/L (ref 5–45)
ATRIAL RATE: 110 BPM
ATRIAL RATE: 114 BPM
BACTERIA UR QL AUTO: ABNORMAL /HPF
BASE EX.OXY STD BLDV CALC-SCNC: 81.4 % (ref 60–80)
BASE EXCESS BLDV CALC-SCNC: -15.3 MMOL/L
BASOPHILS # BLD AUTO: 0.03 THOUSANDS/ΜL (ref 0–0.1)
BASOPHILS NFR BLD AUTO: 1 % (ref 0–1)
BETA-HYDROXYBUTYRATE: 6.6 MMOL/L
BILIRUB SERPL-MCNC: 0.91 MG/DL (ref 0.2–1)
BILIRUB UR QL STRIP: ABNORMAL
BUN SERPL-MCNC: 15 MG/DL (ref 5–25)
BUN SERPL-MCNC: 20 MG/DL (ref 5–25)
BUN SERPL-MCNC: 20 MG/DL (ref 5–25)
CALCIUM SERPL-MCNC: 8.2 MG/DL (ref 8.3–10.1)
CALCIUM SERPL-MCNC: 8.7 MG/DL (ref 8.3–10.1)
CALCIUM SERPL-MCNC: 9.5 MG/DL (ref 8.3–10.1)
CHLORIDE SERPL-SCNC: 107 MMOL/L (ref 100–108)
CHLORIDE SERPL-SCNC: 108 MMOL/L (ref 100–108)
CHLORIDE SERPL-SCNC: 96 MMOL/L (ref 100–108)
CK SERPL-CCNC: 124 U/L (ref 26–192)
CLARITY UR: CLEAR
CO2 SERPL-SCNC: 16 MMOL/L (ref 21–32)
CO2 SERPL-SCNC: 17 MMOL/L (ref 21–32)
CO2 SERPL-SCNC: 25 MMOL/L (ref 21–32)
COLOR UR: YELLOW
CREAT SERPL-MCNC: 0.83 MG/DL (ref 0.6–1.3)
CREAT SERPL-MCNC: 0.93 MG/DL (ref 0.6–1.3)
CREAT SERPL-MCNC: 0.98 MG/DL (ref 0.6–1.3)
EOSINOPHIL # BLD AUTO: 0.04 THOUSAND/ΜL (ref 0–0.61)
EOSINOPHIL NFR BLD AUTO: 1 % (ref 0–6)
ERYTHROCYTE [DISTWIDTH] IN BLOOD BY AUTOMATED COUNT: 12.4 % (ref 11.6–15.1)
GFR SERPL CREATININE-BSD FRML MDRD: 73 ML/MIN/1.73SQ M
GFR SERPL CREATININE-BSD FRML MDRD: 78 ML/MIN/1.73SQ M
GFR SERPL CREATININE-BSD FRML MDRD: 89 ML/MIN/1.73SQ M
GLUCOSE SERPL-MCNC: 100 MG/DL (ref 65–140)
GLUCOSE SERPL-MCNC: 108 MG/DL (ref 65–140)
GLUCOSE SERPL-MCNC: 111 MG/DL (ref 65–140)
GLUCOSE SERPL-MCNC: 118 MG/DL (ref 65–140)
GLUCOSE SERPL-MCNC: 142 MG/DL (ref 65–140)
GLUCOSE SERPL-MCNC: 201 MG/DL (ref 65–140)
GLUCOSE SERPL-MCNC: 206 MG/DL (ref 65–140)
GLUCOSE SERPL-MCNC: 259 MG/DL (ref 65–140)
GLUCOSE SERPL-MCNC: 363 MG/DL (ref 65–140)
GLUCOSE SERPL-MCNC: 448 MG/DL (ref 65–140)
GLUCOSE SERPL-MCNC: 459 MG/DL (ref 65–140)
GLUCOSE SERPL-MCNC: 492 MG/DL (ref 65–140)
GLUCOSE SERPL-MCNC: 92 MG/DL (ref 65–140)
GLUCOSE SERPL-MCNC: 93 MG/DL (ref 65–140)
GLUCOSE SERPL-MCNC: 94 MG/DL (ref 65–140)
GLUCOSE SERPL-MCNC: 95 MG/DL (ref 65–140)
GLUCOSE UR STRIP-MCNC: ABNORMAL MG/DL
HCG SERPL QL: NEGATIVE
HCO3 BLDV-SCNC: 12.9 MMOL/L (ref 24–30)
HCT VFR BLD AUTO: 41.4 % (ref 34.8–46.1)
HGB BLD-MCNC: 12.7 G/DL (ref 11.5–15.4)
HGB UR QL STRIP.AUTO: NEGATIVE
IMM GRANULOCYTES # BLD AUTO: 0.01 THOUSAND/UL (ref 0–0.2)
IMM GRANULOCYTES NFR BLD AUTO: 0 % (ref 0–2)
INR PPP: 1.07 (ref 0.84–1.19)
KETONES UR STRIP-MCNC: ABNORMAL MG/DL
LEUKOCYTE ESTERASE UR QL STRIP: NEGATIVE
LYMPHOCYTES # BLD AUTO: 0.51 THOUSANDS/ΜL (ref 0.6–4.47)
LYMPHOCYTES NFR BLD AUTO: 9 % (ref 14–44)
MAGNESIUM SERPL-MCNC: 2 MG/DL (ref 1.6–2.6)
MAGNESIUM SERPL-MCNC: 2.4 MG/DL (ref 1.6–2.6)
MCH RBC QN AUTO: 27.1 PG (ref 26.8–34.3)
MCHC RBC AUTO-ENTMCNC: 30.7 G/DL (ref 31.4–37.4)
MCV RBC AUTO: 88 FL (ref 82–98)
MONOCYTES # BLD AUTO: 0.23 THOUSAND/ΜL (ref 0.17–1.22)
MONOCYTES NFR BLD AUTO: 4 % (ref 4–12)
NEUTROPHILS # BLD AUTO: 4.81 THOUSANDS/ΜL (ref 1.85–7.62)
NEUTS SEG NFR BLD AUTO: 85 % (ref 43–75)
NITRITE UR QL STRIP: NEGATIVE
NON-SQ EPI CELLS URNS QL MICRO: ABNORMAL /HPF
NRBC BLD AUTO-RTO: 0 /100 WBCS
O2 CT BLDV-SCNC: 13.9 ML/DL
P AXIS: 75 DEGREES
P AXIS: 78 DEGREES
PCO2 BLDV: 39 MM HG (ref 42–50)
PH BLDV: 7.14 [PH] (ref 7.3–7.4)
PH UR STRIP.AUTO: 6 [PH]
PHOSPHATE SERPL-MCNC: 3.9 MG/DL (ref 2.7–4.5)
PHOSPHATE SERPL-MCNC: 4.2 MG/DL (ref 2.7–4.5)
PLATELET # BLD AUTO: 192 THOUSANDS/UL (ref 149–390)
PMV BLD AUTO: 10.8 FL (ref 8.9–12.7)
PO2 BLDV: 59.1 MM HG (ref 35–45)
POTASSIUM SERPL-SCNC: 4.2 MMOL/L (ref 3.5–5.3)
POTASSIUM SERPL-SCNC: 4.4 MMOL/L (ref 3.5–5.3)
POTASSIUM SERPL-SCNC: 6.1 MMOL/L (ref 3.5–5.3)
PR INTERVAL: 133 MS
PR INTERVAL: 138 MS
PROT SERPL-MCNC: 8.2 G/DL (ref 6.4–8.2)
PROT UR STRIP-MCNC: NEGATIVE MG/DL
PROTHROMBIN TIME: 13.7 SECONDS (ref 11.6–14.5)
QRS AXIS: 94 DEGREES
QRS AXIS: 98 DEGREES
QRSD INTERVAL: 83 MS
QRSD INTERVAL: 88 MS
QT INTERVAL: 322 MS
QT INTERVAL: 375 MS
QTC INTERVAL: 435 MS
QTC INTERVAL: 517 MS
RBC # BLD AUTO: 4.69 MILLION/UL (ref 3.81–5.12)
RBC #/AREA URNS AUTO: ABNORMAL /HPF
SODIUM SERPL-SCNC: 132 MMOL/L (ref 136–145)
SODIUM SERPL-SCNC: 142 MMOL/L (ref 136–145)
SODIUM SERPL-SCNC: 142 MMOL/L (ref 136–145)
SP GR UR STRIP.AUTO: 1.02 (ref 1–1.03)
T WAVE AXIS: -57 DEGREES
T WAVE AXIS: 14 DEGREES
T4 FREE SERPL-MCNC: 2.2 NG/DL (ref 0.76–1.46)
TROPONIN I SERPL-MCNC: <0.02 NG/ML
TSH SERPL DL<=0.05 MIU/L-ACNC: 0.03 UIU/ML (ref 0.36–3.74)
UROBILINOGEN UR QL STRIP.AUTO: 0.2 E.U./DL
VENTRICULAR RATE: 110 BPM
VENTRICULAR RATE: 114 BPM
WBC # BLD AUTO: 5.63 THOUSAND/UL (ref 4.31–10.16)
WBC #/AREA URNS AUTO: ABNORMAL /HPF

## 2021-05-23 PROCEDURE — 36415 COLL VENOUS BLD VENIPUNCTURE: CPT | Performed by: EMERGENCY MEDICINE

## 2021-05-23 PROCEDURE — 93010 ELECTROCARDIOGRAM REPORT: CPT | Performed by: INTERNAL MEDICINE

## 2021-05-23 PROCEDURE — 84484 ASSAY OF TROPONIN QUANT: CPT | Performed by: EMERGENCY MEDICINE

## 2021-05-23 PROCEDURE — 84443 ASSAY THYROID STIM HORMONE: CPT | Performed by: NURSE PRACTITIONER

## 2021-05-23 PROCEDURE — 96361 HYDRATE IV INFUSION ADD-ON: CPT

## 2021-05-23 PROCEDURE — 84100 ASSAY OF PHOSPHORUS: CPT | Performed by: NURSE PRACTITIONER

## 2021-05-23 PROCEDURE — 82948 REAGENT STRIP/BLOOD GLUCOSE: CPT

## 2021-05-23 PROCEDURE — 82805 BLOOD GASES W/O2 SATURATION: CPT | Performed by: EMERGENCY MEDICINE

## 2021-05-23 PROCEDURE — 93005 ELECTROCARDIOGRAM TRACING: CPT

## 2021-05-23 PROCEDURE — 84703 CHORIONIC GONADOTROPIN ASSAY: CPT | Performed by: NURSE PRACTITIONER

## 2021-05-23 PROCEDURE — 85610 PROTHROMBIN TIME: CPT | Performed by: EMERGENCY MEDICINE

## 2021-05-23 PROCEDURE — 80048 BASIC METABOLIC PNL TOTAL CA: CPT | Performed by: NURSE PRACTITIONER

## 2021-05-23 PROCEDURE — 99285 EMERGENCY DEPT VISIT HI MDM: CPT

## 2021-05-23 PROCEDURE — 81001 URINALYSIS AUTO W/SCOPE: CPT | Performed by: NURSE PRACTITIONER

## 2021-05-23 PROCEDURE — 99223 1ST HOSP IP/OBS HIGH 75: CPT | Performed by: INTERNAL MEDICINE

## 2021-05-23 PROCEDURE — G1004 CDSM NDSC: HCPCS

## 2021-05-23 PROCEDURE — 82550 ASSAY OF CK (CPK): CPT | Performed by: EMERGENCY MEDICINE

## 2021-05-23 PROCEDURE — 84439 ASSAY OF FREE THYROXINE: CPT | Performed by: NURSE PRACTITIONER

## 2021-05-23 PROCEDURE — 83735 ASSAY OF MAGNESIUM: CPT | Performed by: NURSE PRACTITIONER

## 2021-05-23 PROCEDURE — 96374 THER/PROPH/DIAG INJ IV PUSH: CPT

## 2021-05-23 PROCEDURE — 82010 KETONE BODYS QUAN: CPT | Performed by: EMERGENCY MEDICINE

## 2021-05-23 PROCEDURE — 71275 CT ANGIOGRAPHY CHEST: CPT

## 2021-05-23 PROCEDURE — 80053 COMPREHEN METABOLIC PANEL: CPT | Performed by: EMERGENCY MEDICINE

## 2021-05-23 PROCEDURE — 85025 COMPLETE CBC W/AUTO DIFF WBC: CPT | Performed by: EMERGENCY MEDICINE

## 2021-05-23 PROCEDURE — 99291 CRITICAL CARE FIRST HOUR: CPT | Performed by: EMERGENCY MEDICINE

## 2021-05-23 PROCEDURE — 85730 THROMBOPLASTIN TIME PARTIAL: CPT | Performed by: EMERGENCY MEDICINE

## 2021-05-23 PROCEDURE — 74174 CTA ABD&PLVS W/CONTRAST: CPT

## 2021-05-23 RX ORDER — LANOLIN ALCOHOL/MO/W.PET/CERES
3 CREAM (GRAM) TOPICAL
Status: DISCONTINUED | OUTPATIENT
Start: 2021-05-23 | End: 2021-05-25 | Stop reason: HOSPADM

## 2021-05-23 RX ORDER — SODIUM CHLORIDE, SODIUM GLUCONATE, SODIUM ACETATE, POTASSIUM CHLORIDE, MAGNESIUM CHLORIDE, SODIUM PHOSPHATE, DIBASIC, AND POTASSIUM PHOSPHATE .53; .5; .37; .037; .03; .012; .00082 G/100ML; G/100ML; G/100ML; G/100ML; G/100ML; G/100ML; G/100ML
250 INJECTION, SOLUTION INTRAVENOUS CONTINUOUS
Status: DISCONTINUED | OUTPATIENT
Start: 2021-05-23 | End: 2021-05-23

## 2021-05-23 RX ORDER — GABAPENTIN 300 MG/1
300 CAPSULE ORAL 2 TIMES DAILY
Status: DISCONTINUED | OUTPATIENT
Start: 2021-05-23 | End: 2021-05-23

## 2021-05-23 RX ORDER — MAGNESIUM SULFATE HEPTAHYDRATE 40 MG/ML
2 INJECTION, SOLUTION INTRAVENOUS ONCE
Status: COMPLETED | OUTPATIENT
Start: 2021-05-23 | End: 2021-05-24

## 2021-05-23 RX ORDER — POTASSIUM CHLORIDE 14.9 MG/ML
20 INJECTION INTRAVENOUS ONCE
Status: COMPLETED | OUTPATIENT
Start: 2021-05-23 | End: 2021-05-24

## 2021-05-23 RX ORDER — ESCITALOPRAM OXALATE 20 MG/1
20 TABLET ORAL DAILY
Status: DISCONTINUED | OUTPATIENT
Start: 2021-05-23 | End: 2021-05-25 | Stop reason: HOSPADM

## 2021-05-23 RX ORDER — ONDANSETRON 2 MG/ML
1 INJECTION INTRAMUSCULAR; INTRAVENOUS ONCE
Status: COMPLETED | OUTPATIENT
Start: 2021-05-23 | End: 2021-05-23

## 2021-05-23 RX ORDER — NALTREXONE HYDROCHLORIDE 50 MG/1
50 TABLET, FILM COATED ORAL
Status: DISCONTINUED | OUTPATIENT
Start: 2021-05-23 | End: 2021-05-25 | Stop reason: HOSPADM

## 2021-05-23 RX ORDER — ERGOCALCIFEROL 1.25 MG/1
50000 CAPSULE ORAL WEEKLY
Status: DISCONTINUED | OUTPATIENT
Start: 2021-05-23 | End: 2021-05-25 | Stop reason: HOSPADM

## 2021-05-23 RX ORDER — DEXTROSE AND SODIUM CHLORIDE 5; .45 G/100ML; G/100ML
50 INJECTION, SOLUTION INTRAVENOUS CONTINUOUS
Status: DISCONTINUED | OUTPATIENT
Start: 2021-05-23 | End: 2021-05-24

## 2021-05-23 RX ORDER — FENTANYL CITRATE 50 UG/ML
1 INJECTION, SOLUTION INTRAMUSCULAR; INTRAVENOUS ONCE
Status: COMPLETED | OUTPATIENT
Start: 2021-05-23 | End: 2021-05-23

## 2021-05-23 RX ORDER — OXYCODONE HYDROCHLORIDE 5 MG/1
5 TABLET ORAL EVERY 6 HOURS PRN
Status: DISCONTINUED | OUTPATIENT
Start: 2021-05-23 | End: 2021-05-23

## 2021-05-23 RX ORDER — SODIUM CHLORIDE, SODIUM GLUCONATE, SODIUM ACETATE, POTASSIUM CHLORIDE, MAGNESIUM CHLORIDE, SODIUM PHOSPHATE, DIBASIC, AND POTASSIUM PHOSPHATE .53; .5; .37; .037; .03; .012; .00082 G/100ML; G/100ML; G/100ML; G/100ML; G/100ML; G/100ML; G/100ML
1000 INJECTION, SOLUTION INTRAVENOUS ONCE
Status: COMPLETED | OUTPATIENT
Start: 2021-05-23 | End: 2021-05-24

## 2021-05-23 RX ORDER — VENLAFAXINE HYDROCHLORIDE 75 MG/1
75 CAPSULE, EXTENDED RELEASE ORAL DAILY
Status: DISCONTINUED | OUTPATIENT
Start: 2021-05-24 | End: 2021-05-23

## 2021-05-23 RX ORDER — ATOMOXETINE 40 MG/1
80 CAPSULE ORAL DAILY
Status: DISCONTINUED | OUTPATIENT
Start: 2021-05-23 | End: 2021-05-25 | Stop reason: HOSPADM

## 2021-05-23 RX ORDER — ACETAMINOPHEN 325 MG/1
650 TABLET ORAL EVERY 6 HOURS PRN
Status: DISCONTINUED | OUTPATIENT
Start: 2021-05-23 | End: 2021-05-25 | Stop reason: HOSPADM

## 2021-05-23 RX ORDER — ARIPIPRAZOLE 5 MG/1
5 TABLET ORAL DAILY
Status: DISCONTINUED | OUTPATIENT
Start: 2021-05-23 | End: 2021-05-25 | Stop reason: HOSPADM

## 2021-05-23 RX ORDER — HYDROXYZINE HYDROCHLORIDE 25 MG/1
50 TABLET, FILM COATED ORAL
Status: DISCONTINUED | OUTPATIENT
Start: 2021-05-23 | End: 2021-05-23

## 2021-05-23 RX ORDER — GABAPENTIN 300 MG/1
300 CAPSULE ORAL
Status: DISCONTINUED | OUTPATIENT
Start: 2021-05-23 | End: 2021-05-25 | Stop reason: HOSPADM

## 2021-05-23 RX ORDER — POTASSIUM CHLORIDE 20MEQ/15ML
20 LIQUID (ML) ORAL ONCE
Status: COMPLETED | OUTPATIENT
Start: 2021-05-23 | End: 2021-05-23

## 2021-05-23 RX ORDER — DIAZEPAM 5 MG/ML
5 INJECTION, SOLUTION INTRAMUSCULAR; INTRAVENOUS ONCE
Status: COMPLETED | OUTPATIENT
Start: 2021-05-23 | End: 2021-05-23

## 2021-05-23 RX ORDER — LORAZEPAM 0.5 MG/1
0.5 TABLET ORAL EVERY 6 HOURS PRN
Status: DISCONTINUED | OUTPATIENT
Start: 2021-05-23 | End: 2021-05-23

## 2021-05-23 RX ORDER — GABAPENTIN 300 MG/1
300 CAPSULE ORAL
Status: DISCONTINUED | OUTPATIENT
Start: 2021-05-24 | End: 2021-05-23

## 2021-05-23 RX ORDER — METHOCARBAMOL 500 MG/1
750 TABLET, FILM COATED ORAL
Status: DISCONTINUED | OUTPATIENT
Start: 2021-05-23 | End: 2021-05-25 | Stop reason: HOSPADM

## 2021-05-23 RX ORDER — SODIUM CHLORIDE, SODIUM GLUCONATE, SODIUM ACETATE, POTASSIUM CHLORIDE, MAGNESIUM CHLORIDE, SODIUM PHOSPHATE, DIBASIC, AND POTASSIUM PHOSPHATE .53; .5; .37; .037; .03; .012; .00082 G/100ML; G/100ML; G/100ML; G/100ML; G/100ML; G/100ML; G/100ML
500 INJECTION, SOLUTION INTRAVENOUS CONTINUOUS
Status: DISCONTINUED | OUTPATIENT
Start: 2021-05-23 | End: 2021-05-23

## 2021-05-23 RX ADMIN — NALTREXONE HYDROCHLORIDE 50 MG: 50 TABLET, FILM COATED ORAL at 22:01

## 2021-05-23 RX ADMIN — MAGNESIUM SULFATE HEPTAHYDRATE 2 G: 40 INJECTION, SOLUTION INTRAVENOUS at 16:49

## 2021-05-23 RX ADMIN — SODIUM CHLORIDE 1000 ML: 0.9 INJECTION, SOLUTION INTRAVENOUS at 09:42

## 2021-05-23 RX ADMIN — SODIUM CHLORIDE 7.7 UNITS/HR: 9 INJECTION, SOLUTION INTRAVENOUS at 11:36

## 2021-05-23 RX ADMIN — MELATONIN 3 MG: at 22:01

## 2021-05-23 RX ADMIN — IOHEXOL 100 ML: 350 INJECTION, SOLUTION INTRAVENOUS at 10:22

## 2021-05-23 RX ADMIN — SODIUM CHLORIDE 7.7 UNITS/HR: 9 INJECTION, SOLUTION INTRAVENOUS at 14:03

## 2021-05-23 RX ADMIN — SODIUM CHLORIDE, SODIUM GLUCONATE, SODIUM ACETATE, POTASSIUM CHLORIDE, MAGNESIUM CHLORIDE, SODIUM PHOSPHATE, DIBASIC, AND POTASSIUM PHOSPHATE 1000 ML: .53; .5; .37; .037; .03; .012; .00082 INJECTION, SOLUTION INTRAVENOUS at 16:08

## 2021-05-23 RX ADMIN — POTASSIUM CHLORIDE 20 MEQ: 20 SOLUTION ORAL at 21:59

## 2021-05-23 RX ADMIN — POTASSIUM CHLORIDE 20 MEQ: 14.9 INJECTION, SOLUTION INTRAVENOUS at 16:49

## 2021-05-23 RX ADMIN — DIAZEPAM 5 MG: 10 INJECTION, SOLUTION INTRAMUSCULAR; INTRAVENOUS at 09:42

## 2021-05-23 RX ADMIN — GABAPENTIN 300 MG: 300 CAPSULE ORAL at 22:00

## 2021-05-23 RX ADMIN — SODIUM CHLORIDE 1000 ML: 0.9 INJECTION, SOLUTION INTRAVENOUS at 11:18

## 2021-05-23 RX ADMIN — ENOXAPARIN SODIUM 40 MG: 40 INJECTION SUBCUTANEOUS at 15:03

## 2021-05-23 RX ADMIN — DEXTROSE AND SODIUM CHLORIDE 250 ML/HR: 5; .45 INJECTION, SOLUTION INTRAVENOUS at 15:39

## 2021-05-23 RX ADMIN — METHOCARBAMOL 750 MG: 500 TABLET ORAL at 22:00

## 2021-05-23 RX ADMIN — SODIUM CHLORIDE, SODIUM GLUCONATE, SODIUM ACETATE, POTASSIUM CHLORIDE, MAGNESIUM CHLORIDE, SODIUM PHOSPHATE, DIBASIC, AND POTASSIUM PHOSPHATE 500 ML/HR: .53; .5; .37; .037; .03; .012; .00082 INJECTION, SOLUTION INTRAVENOUS at 13:12

## 2021-05-23 NOTE — ASSESSMENT & PLAN NOTE
Lab Results   Component Value Date    HGBA1C 10 8 (H) 05/07/2021       Recent Labs     05/23/21  0949 05/23/21  1134 05/23/21  1241   POCGLU 459* 448* 363*       Blood Sugar Average: Last 72 hrs:  (P) 489 [id number]     · Hx of DM type 1 on insulin pump and lantus 36u HS  · Presented to ED with nausea/ vomiting and generalized body aches  · No clear inciting event, denies infectious symptomology, recently seen by endo last week with increase in basal pump rate  · BS elevated in ED, AG 20, beta hydroxy 6 6, glucose 492, bicarb of 16    VBG 7 13/39/59/12  · Given 2L NSS IVF in ED and started on insulin infusion  · Continue insulin infusion, off home pump  · Continue IVF hydration with isolyte  · NPO   · Trend lab work q4 with nursing electrolyte repletion

## 2021-05-23 NOTE — PLAN OF CARE
Problem: Potential for Falls  Goal: Patient will remain free of falls  Description: INTERVENTIONS:  - Assess patient frequently for physical needs  -  Identify cognitive and physical deficits and behaviors that affect risk of falls    -  [place] fall precautions as indicated by assessment   - Educate patient/family on patient safety including physical limitations  - Instruct patient to call for assistance with activity based on assessment  - Modify environment to reduce risk of injury  - Consider OT/PT consult to assist with strengthening/mobility  Outcome: Progressing     Problem: Prexisting or High Potential for Compromised Skin Integrity  Goal: Skin integrity is maintained or improved  Description: INTERVENTIONS:  - Identify patients at risk for skin breakdown  - Assess and monitor skin integrity  - Assess and monitor nutrition and hydration status  - Monitor labs   - Assess for incontinence   - Turn and reposition patient  - Assist with mobility/ambulation  - Relieve pressure over bony prominences  - Avoid friction and shearing  - Provide appropriate hygiene as needed including keeping skin clean and dry  - Evaluate need for skin moisturizer/barrier cream  - Collaborate with interdisciplinary team   - Patient/family teaching  - Consider wound care consult   Outcome: Progressing     Problem: PAIN - ADULT  Goal: Verbalizes/displays adequate comfort level or baseline comfort level  Description: Interventions:  - Encourage patient to monitor pain and request assistance  - Assess pain using appropriate pain scale  - Administer analgesics based on type and severity of pain and evaluate response  - Implement non-pharmacological measures as appropriate and evaluate response  - Consider cultural and social influences on pain and pain management  - Notify physician/advanced practitioner if interventions unsuccessful or patient reports new pain  Outcome: Progressing     Problem: SAFETY ADULT  Goal: Patient will remain free of falls  Description: INTERVENTIONS:  - Assess patient frequently for physical needs  -  Identify cognitive and physical deficits and behaviors that affect risk of falls    -  [place] fall precautions as indicated by assessment   - Educate patient/family on patient safety including physical limitations  - Instruct patient to call for assistance with activity based on assessment  - Modify environment to reduce risk of injury  - Consider OT/PT consult to assist with strengthening/mobility  Outcome: Progressing  Goal: Maintain or return to baseline ADL function  Description: INTERVENTIONS:  -  Assess patient's ability to carry out ADLs; assess patient's baseline for ADL function and identify physical deficits which impact ability to perform ADLs (bathing, care of mouth/teeth, toileting, grooming, dressing, etc )  - Assess/evaluate cause of self-care deficits   - Assess range of motion  - Assess patient's mobility; develop plan if impaired  - Assess patient's need for assistive devices and provide as appropriate  - Encourage maximum independence but intervene and supervise when necessary  - Involve family in performance of ADLs  - Assess for home care needs following discharge   - Consider OT consult to assist with ADL evaluation and planning for discharge  - Provide patient education as appropriate  Outcome: Progressing  Goal: Maintain or return mobility status to optimal level  Description: INTERVENTIONS:  - Assess patient's baseline mobility status (ambulation, transfers, stairs, etc )    - Identify cognitive and physical deficits and behaviors that affect mobility  - Identify mobility aids required to assist with transfers and/or ambulation (gait belt, sit-to-stand, lift, walker, cane, etc )  - [place] fall precautions as indicated by assessment  - Record patient progress and toleration of activity level on Mobility SBAR; progress patient to next Phase/Stage  - Instruct patient to call for assistance with activity based on assessment  - Consider rehabilitation consult to assist with strengthening/weightbearing, etc   Outcome: Progressing     Problem: Knowledge Deficit  Goal: Patient/family/caregiver demonstrates understanding of disease process, treatment plan, medications, and discharge instructions  Description: Complete learning assessment and assess knowledge base    Interventions:  - Provide teaching at level of understanding  - Provide teaching via preferred learning methods  Outcome: Progressing

## 2021-05-23 NOTE — ED NOTES
Assumed care of patient at this time  Patient is resting on stretcher and continues to complain of 8/10 generalized body discomfort  Vital signs consistent with prior  Patient is drowsy but arouses on calling and is oriented x4  Awaiting insulin drip from pharmacy        Blanka Joya RN  05/23/21 7448

## 2021-05-23 NOTE — H&P
[address] 1980, 46 y o  female MRN: [id number]  Unit/Bed#: ED 17 Encounter: [id number]  Primary Care Provider: JAROCHO Byrd   Date and time admitted to hospital: 5/23/2021  9:12 AM    * Diabetic ketoacidosis without coma associated with type 1 diabetes mellitus [facility])  Assessment & Plan  Lab Results   Component Value Date    HGBA1C 10 8 (H) 05/07/2021       Recent Labs     05/23/21  0949 05/23/21  1134 05/23/21  1241   POCGLU 459* 448* 363*       Blood Sugar Average: Last 72 hrs:  (P) 787 [id number]     · Hx of DM type 1 on insulin pump and lantus 36u HS  · Presented to ED with nausea/ vomiting and generalized body aches  · No clear inciting event, denies infectious symptomology, recently seen by endo last week with increase in basal pump rate  · BS elevated in ED, AG 20, beta hydroxy 6 6, glucose 492, bicarb of 16    VBG 7 13/39/59/12  · Given 2L NSS IVF in ED and started on insulin infusion  · Continue insulin infusion, off home pump  · Continue IVF hydration with isolyte  · NPO   · Trend lab work q4 with nursing electrolyte repletion    Alcohol abuse  Assessment & Plan  · Continue naltrexone HS  · Denies current alcohol intake    Hypothyroidism due to Hashimoto's thyroiditis  Assessment & Plan  · On synthroid 275mcg daily  · Continue, send TSH    Cervical spondylosis  Assessment & Plan  · On prn oxycodone at home  · Continue prn in hospital    Anxiety and depression  Assessment & Plan  · Continue home lexapro, abilify, and vistaril with PRN ativan    Transaminitis  Assessment & Plan  · Mild elevation of alk phos 158, AST 72  · Trend    -------------------------------------------------------------------------------------------------------------  Chief Complaint: "My body aches"    History of Present Illness   HX and PE limited by: Coby  Roc Conner is a 46 y o  female with a PMH of DM1, anxiety/depression, back pain who presented to the ED with generalized body aches and nausea and vomiting  She denies any infectious symptoms prior to presentation, denies dysuria, fever/chills/cough/diarrhea  In ED was noted to be in DKA with AG of 20, bicarb 16, beta hydroxy 6 6, VBG ph 7 1  CTA chest and abd/pelvis was completed with showed no PE and no acute intraabdominal pathology  She was given 2L NSS and started on and insulin infusion and admitted to the ICU for further management  History obtained from chart review and the patient   -------------------------------------------------------------------------------------------------------------  Dispo: Admit to Stepdown Level 1    Code Status: Prior  --------------------------------------------------------------------------------------------------------------  Review of Systems   Constitutional: Positive for fatigue  Negative for chills, diaphoresis and fever  HENT: Negative  Eyes: Negative  Respiratory: Positive for shortness of breath  Cardiovascular: Positive for chest pain  Gastrointestinal: Positive for abdominal pain, nausea and vomiting  Endocrine: Negative  Genitourinary: Negative for difficulty urinating, dysuria and hematuria  Musculoskeletal: Negative  Neurological: Positive for light-headedness  Hematological: Negative  Psychiatric/Behavioral: Negative  A 12-point, complete review of systems was reviewed and negative except as stated above     Physical Exam  Vitals signs and nursing note reviewed  Constitutional:       General: She is not in acute distress  Appearance: Normal appearance  She is ill-appearing  She is not toxic-appearing  HENT:      Head: Normocephalic and atraumatic  Mouth/Throat:      Mouth: Mucous membranes are dry  Neck:      Musculoskeletal: Neck supple  Cardiovascular:      Rate and Rhythm: Regular rhythm  Tachycardia present  Pulses: Normal pulses  Pulmonary:      Effort: No respiratory distress        Breath sounds: No wheezing or rales  Abdominal:      General: Abdomen is flat  There is no distension  Palpations: Abdomen is soft  Tenderness: There is no abdominal tenderness  Musculoskeletal:      Right lower leg: No edema  Left lower leg: No edema  Skin:     General: Skin is warm and dry  Capillary Refill: Capillary refill takes less than 2 seconds  Neurological:      Mental Status: She is alert and oriented to person, place, and time  GCS: GCS eye subscore is 4  GCS verbal subscore is 5  GCS motor subscore is 6  Psychiatric:         Mood and Affect: Affect is flat        --------------------------------------------------------------------------------------------------------------  Vitals:   Vitals:    05/23/21 1119 05/23/21 1228 05/23/21 1230 05/23/21 1330   BP: (!) 99/48 104/51 104/51 104/51   BP Location: Right arm Right arm     Pulse: (!) 119 (!) 123 (!) 120 (!) 118   Resp: 20 20 13 17   Temp:       TempSrc:       SpO2: 100% 99% 99% 98%   Weight:         Temp  Min: 98 1 °F (36 7 °C)  Max: 98 1 °F (36 7 °C)        Body mass index is 27 33 kg/m²      Laboratory and Diagnostics:  Results from last 7 days   Lab Units 05/23/21  0928   WBC Thousand/uL 5 63   HEMOGLOBIN g/dL 12 7   HEMATOCRIT % 41 4   PLATELETS Thousands/uL 192   NEUTROS PCT % 85*   MONOS PCT % 4     Results from last 7 days   Lab Units 05/23/21  0928   SODIUM mmol/L 132*   POTASSIUM mmol/L 6 1*   CHLORIDE mmol/L 96*   CO2 mmol/L 16*   ANION GAP mmol/L 20*   BUN mg/dL 20   CREATININE mg/dL 0 93   CALCIUM mg/dL 9 5   GLUCOSE RANDOM mg/dL 492*   ALT U/L 67   AST U/L 72*   ALK PHOS U/L 158*   ALBUMIN g/dL 4 0   TOTAL BILIRUBIN mg/dL 0 91          Results from last 7 days   Lab Units 05/23/21  0928   INR  1 07   PTT seconds 27      Results from last 7 days   Lab Units 05/23/21  0928   TROPONIN I ng/mL <0 02         ABG:    VBG:  Results from last 7 days   Lab Units 05/23/21  1037   PH LANNY  7 138*   PCO2 LANNY mm Hg 39 0*   PO2 LANNY mm Hg 59 1*   HCO3 LANNY mmol/L 12 9*   BASE EXC LANNY mmol/L -15 3           Micro:        EKG: ST rate 120 on telemetry  Imaging: I have personally reviewed pertinent reports          Historical Information   Past Medical History:   Diagnosis Date    Anemia     Anxiety     B12 deficiency     Cervical disc disorder     On gabapentin     Depression     Disease of thyroid gland     hypothyroid    Iron deficiency anemia     Type 1 diabetes (HCC)     Vitamin D deficiency      Past Surgical History:   Procedure Laterality Date    ABDOMINAL SURGERY      gastric bypass     SECTION      x2    CHOLECYSTECTOMY  2018    GASTRIC BYPASS  2007    INTRAUTERINE DEVICE INSERTION  2015    IR BIOPSY BONE MARROW  2020    OTHER SURGICAL HISTORY      surgery for imperforate hymen/endometriosis/hydrometrocolpos    TUBAL LIGATION      WISDOM TOOTH EXTRACTION       Social History   Social History     Substance and Sexual Activity   Alcohol Use Not Currently    Frequency: Never    Comment: occasional     Social History     Substance and Sexual Activity   Drug Use No     Social History     Tobacco Use   Smoking Status Never Smoker   Smokeless Tobacco Never Used     Exercise History: Na  Family History:   Family History   Problem Relation Age of Onset    Thyroid disease Mother     URIEL disease Mother     Hyperlipidemia Mother     Hypertension Mother     Osteoarthritis Mother     Thyroid disease unspecified Mother     Diabetes Father     Alcohol abuse Father     Diabetes type I Father     Thyroid disease Sister     Depression Sister     Thyroid disease unspecified Sister     Heart disease Maternal Grandmother     Hypertension Maternal Grandmother     Arthritis Maternal Grandmother     Diabetes type I Maternal Uncle     Diabetes type I Maternal Grandfather      I have reviewed this patient's family history and commented on sigificant items within the HPI      Medications:  Current Facility-Administered Medications   Medication Dose Route Frequency    acetaminophen (TYLENOL) tablet 650 mg  650 mg Oral Q6H PRN    ARIPiprazole (ABILIFY) tablet 5 mg  5 mg Oral Daily    atoMOXetine (STRATTERA) capsule 80 mg  80 mg Oral Daily    cyanocobalamin (VITAMIN B-12) tablet 500 mcg  500 mcg Oral Daily    dextrose 5 % and sodium chloride 0 45 % infusion  250 mL/hr Intravenous Continuous    ergocalciferol (VITAMIN D2) capsule 50,000 Units  50,000 Units Oral Weekly    escitalopram (LEXAPRO) tablet 20 mg  20 mg Oral Daily    gabapentin (NEURONTIN) capsule 300 mg  300 mg Oral BID    hydrOXYzine HCL (ATARAX) tablet 50 mg  50 mg Oral HS    insulin regular (HumuLIN R,NovoLIN R) 1 Units/mL in sodium chloride 0 9 % 100 mL infusion  0 1-30 Units/hr Intravenous Continuous    levothyroxine tablet 275 mcg  275 mcg Oral Early Morning    LORazepam (ATIVAN) tablet 0 5 mg  0 5 mg Oral Q6H PRN    melatonin tablet 3 mg  3 mg Oral HS    methocarbamol (ROBAXIN) tablet 750 mg  750 mg Oral HS    multi-electrolyte (ISOLYTE-S PH 7 4 equivalent) IV solution  500 mL/hr Intravenous Continuous    Followed by   Jerrell Daubs multi-electrolyte (ISOLYTE-S PH 7 4 equivalent) IV solution  250 mL/hr Intravenous Continuous    naltrexone (REVIA) tablet 50 mg  50 mg Oral HS    oxyCODONE (ROXICODONE) IR tablet 5 mg  5 mg Oral Q6H PRN     Home medications:  Prior to Admission Medications   Prescriptions Last Dose Informant Patient Reported? Taking? ARIPiprazole (ABILIFY) 5 mg tablet   Yes Yes   Sig: Take 5 mg by mouth daily    Accu-Chek FastClix Lancets MISC   No Yes   Sig: Use to test blood sugar up to 6 times daily     Cyanocobalamin (B-12 PO)  Self Yes Yes   Sig: Take by mouth daily   Insulin Pen Needle (BD Pen Needle Deanna U/F) 32G X 4 MM MISC  Self No Yes   Sig: Use 4/day   Insulin Syringe-Needle U-100 (INSULIN SYRINGE 1CC/28G) 28G X 1/2" 1 ML MISC  Self Yes Yes   Si injections daily E10 65   LORazepam (ATIVAN) 0 5 mg tablet  Self Yes Yes   Sig: Take 0 5 mg by mouth    acetaminophen (TYLENOL) 500 mg tablet   Yes Yes   atomoxetine (STRATTERA) 80 MG capsule   Yes No   Sig: Take 80 mg by mouth daily    ergocalciferol (VITAMIN D2) 50,000 units  Self No Yes   Sig: Take 1 capsule (50,000 Units total) by mouth once a week   escitalopram (LEXAPRO) 20 mg tablet   Yes Yes   Sig: Take 20 mg by mouth daily   gabapentin (NEURONTIN) 300 mg capsule   Yes Yes   Sig: Take 300-600 mg by mouth daily at bedtime   glucose blood (Accu-Chek Guide) test strip   No Yes   Sig: Use to test blood sugar up to 6 times daily  hydrOXYzine pamoate (VISTARIL) 50 mg capsule   Yes Yes   Sig: Take 50 mg by mouth daily at bedtime    insulin aspart (NovoLOG) 100 units/mL injection   No Yes   Sig: Inject daily into pump  Max daily dose 50 units     insulin glargine (LANTUS SOLOSTAR) 100 units/mL injection pen Not Taking at Unknown time  No No   Sig: Inject 36 Units under the skin daily at bedtime   Patient not taking: Reported on 5/18/2021   levothyroxine 200 mcg tablet   No Yes   Sig: Take 1 tablet (200 mcg total) by mouth daily in the early morning   levothyroxine 75 mcg tablet   Yes Yes   melatonin 3 mg   Yes Yes   Sig: 3 mg daily at bedtime    methocarbamol (ROBAXIN) 750 mg tablet   Yes Yes   Sig: Take 750 mg by mouth daily at bedtime    naltrexone (REVIA) 50 mg tablet   Yes Yes   Sig: Take 50 mg by mouth daily at bedtime    oxyCODONE-acetaminophen (PERCOCET) 5-325 mg per tablet   Yes Yes   Sig: take 1 tablet by mouth every 6 hours if needed for pain   selenium 200 mcg   Yes Yes   Sig: Take 200 mcg by mouth daily    venlafaxine (EFFEXOR-XR) 75 mg 24 hr capsule   Yes No   Sig: Take 75 mg by mouth daily   vitamin B-12 (VITAMIN B-12) 500 mcg tablet   Yes No   Sig: Take 500 mcg by mouth daily      Facility-Administered Medications: None     Allergies:  No Known Allergies    ------------------------------------------------------------------------------------------------------------  Advance Directive and Living Will:      Power of :    POLST:    ------------------------------------------------------------------------------------------------------------  Anticipated Length of Stay is > 2 midnights    Care Time Delivered:   No Critical Care time spent       JAROCHO Le        Portions of the record may have been created with voice recognition software  Occasional wrong word or "sound a like" substitutions may have occurred due to the inherent limitations of voice recognition software    Read the chart carefully and recognize, using context, where substitutions have occurred

## 2021-05-23 NOTE — ED PROVIDER NOTES
History  Chief Complaint   Patient presents with    Chest Pain     Pt began with restless legs this AM, which developed into upper back, shoulder, chest, and neck pain  +nausea     Pt  Began with restless leg syndrome then she stated that her pain went up her legs, back, neck and now she is having chest pain  No sob  Prior to Admission Medications   Prescriptions Last Dose Informant Patient Reported? Taking? ARIPiprazole (ABILIFY) 5 mg tablet   Yes Yes   Sig: Take 5 mg by mouth daily    Accu-Chek FastClix Lancets MISC   No Yes   Sig: Use to test blood sugar up to 6 times daily  Cyanocobalamin (B-12 PO)  Self Yes Yes   Sig: Take by mouth daily   Insulin Pen Needle (BD Pen Needle Deanna U/F) 32G X 4 MM MISC  Self No Yes   Sig: Use 4/day   Insulin Syringe-Needle U-100 (INSULIN SYRINGE 1CC/28G) 28G X 1/2" 1 ML MISC  Self Yes Yes   Si injections daily E10 65   LORazepam (ATIVAN) 0 5 mg tablet  Self Yes Yes   Sig: Take 0 5 mg by mouth    acetaminophen (TYLENOL) 500 mg tablet   Yes Yes   atomoxetine (STRATTERA) 80 MG capsule   Yes No   Sig: Take 80 mg by mouth daily    ergocalciferol (VITAMIN D2) 50,000 units  Self No Yes   Sig: Take 1 capsule (50,000 Units total) by mouth once a week   escitalopram (LEXAPRO) 20 mg tablet   Yes Yes   Sig: Take 20 mg by mouth daily   gabapentin (NEURONTIN) 300 mg capsule   Yes Yes   Sig: Take 300-600 mg by mouth daily at bedtime   glucose blood (Accu-Chek Guide) test strip   No Yes   Sig: Use to test blood sugar up to 6 times daily  hydrOXYzine pamoate (VISTARIL) 50 mg capsule   Yes Yes   Sig: Take 50 mg by mouth daily at bedtime    insulin aspart (NovoLOG) 100 units/mL injection   No Yes   Sig: Inject daily into pump  Max daily dose 50 units     insulin glargine (LANTUS SOLOSTAR) 100 units/mL injection pen Not Taking at Unknown time  No No   Sig: Inject 36 Units under the skin daily at bedtime   Patient not taking: Reported on 2021   levothyroxine 200 mcg tablet No Yes   Sig: Take 1 tablet (200 mcg total) by mouth daily in the early morning   levothyroxine 75 mcg tablet   Yes Yes   melatonin 3 mg   Yes Yes   Sig: 3 mg daily at bedtime    methocarbamol (ROBAXIN) 750 mg tablet   Yes Yes   Sig: Take 750 mg by mouth daily at bedtime    naltrexone (REVIA) 50 mg tablet   Yes Yes   Sig: Take 50 mg by mouth daily at bedtime    oxyCODONE-acetaminophen (PERCOCET) 5-325 mg per tablet   Yes Yes   Sig: take 1 tablet by mouth every 6 hours if needed for pain   selenium 200 mcg   Yes Yes   Sig: Take 200 mcg by mouth daily    venlafaxine (EFFEXOR-XR) 75 mg 24 hr capsule   Yes No   Sig: Take 75 mg by mouth daily   vitamin B-12 (VITAMIN B-12) 500 mcg tablet   Yes No   Sig: Take 500 mcg by mouth daily      Facility-Administered Medications: None       Past Medical History:   Diagnosis Date    Anemia     Anxiety     B12 deficiency     Cervical disc disorder     On gabapentin     Depression     Disease of thyroid gland     hypothyroid    Iron deficiency anemia     Type 1 diabetes (HCC)     Vitamin D deficiency        Past Surgical History:   Procedure Laterality Date    ABDOMINAL SURGERY      gastric bypass     SECTION      x2    CHOLECYSTECTOMY  2018    GASTRIC BYPASS  2007    INTRAUTERINE DEVICE INSERTION  2015    IR BIOPSY BONE MARROW  2020    OTHER SURGICAL HISTORY      surgery for imperforate hymen/endometriosis/hydrometrocolpos    TUBAL LIGATION      WISDOM TOOTH EXTRACTION         Family History   Problem Relation Age of Onset    Thyroid disease Mother     URIEL disease Mother     Hyperlipidemia Mother     Hypertension Mother     Osteoarthritis Mother     Thyroid disease unspecified Mother     Diabetes Father     Alcohol abuse Father     Diabetes type I Father     Thyroid disease Sister     Depression Sister     Thyroid disease unspecified Sister     Heart disease Maternal Grandmother     Hypertension Maternal Grandmother     Arthritis Maternal Grandmother     Diabetes type I Maternal Uncle     Diabetes type I Maternal Grandfather      I have reviewed and agree with the history as documented  E-Cigarette/Vaping    E-Cigarette Use Never User      E-Cigarette/Vaping Substances    Nicotine No     THC No     CBD No     Flavoring No     Other No     Unknown No      Social History     Tobacco Use    Smoking status: Never Smoker    Smokeless tobacco: Never Used   Substance Use Topics    Alcohol use: Not Currently     Frequency: Never     Comment: occasional    Drug use: No       Review of Systems   Constitutional: Negative for appetite change, fatigue and fever  HENT: Negative for rhinorrhea and sore throat  Respiratory: Negative for cough, shortness of breath and wheezing  Cardiovascular: Positive for chest pain  Negative for leg swelling  Gastrointestinal: Positive for abdominal pain and nausea  Negative for diarrhea and vomiting  Genitourinary: Negative for dysuria and flank pain  Musculoskeletal: Positive for myalgias  Negative for back pain and neck pain  Skin: Negative for rash  Neurological: Negative for syncope and headaches  Psychiatric/Behavioral:        Mood normal       Physical Exam  Physical Exam  Vitals signs and nursing note reviewed  Constitutional:       Comments: uncomfortable   HENT:      Head: Normocephalic and atraumatic  Mouth/Throat:      Mouth: Mucous membranes are dry  Neck:      Musculoskeletal: Normal range of motion and neck supple  Cardiovascular:      Rate and Rhythm: Regular rhythm  Tachycardia present  Pulmonary:      Effort: Pulmonary effort is normal       Breath sounds: Normal breath sounds  Abdominal:      Palpations: Abdomen is soft  Tenderness: There is no abdominal tenderness  Musculoskeletal: Normal range of motion  Skin:     General: Skin is warm and dry  Neurological:      Mental Status: She is alert and oriented to person, place, and time  Vital Signs  ED Triage Vitals   Temperature Pulse Respirations Blood Pressure SpO2   05/23/21 1005 05/23/21 0918 05/23/21 0918 05/23/21 0918 05/23/21 0918   98 1 °F (36 7 °C) (!) 108 18 110/57 99 %      Temp Source Heart Rate Source Patient Position - Orthostatic VS BP Location FiO2 (%)   05/23/21 1005 05/23/21 0928 05/23/21 0918 05/23/21 0918 --   Oral Monitor Lying Right arm       Pain Score       05/23/21 0918       9           Vitals:    05/23/21 1400 05/23/21 1500 05/23/21 1600 05/23/21 1700   BP: (!) 102/49 (!) 106/46 100/52 (!) 113/49   Pulse: (!) 114 (!) 110 (!) 108 (!) 108   Patient Position - Orthostatic VS:             Visual Acuity  Visual Acuity      Most Recent Value   L Pupil Size (mm)  4   R Pupil Size (mm)  4   L Pupil Shape  Round   R Pupil Shape  Round          ED Medications  Medications   ARIPiprazole (ABILIFY) tablet 5 mg (5 mg Oral Not Given 5/23/21 1506)   atoMOXetine (STRATTERA) capsule 80 mg (80 mg Oral Not Given 5/23/21 1506)   ergocalciferol (VITAMIN D2) capsule 50,000 Units (50,000 Units Oral Not Given 5/23/21 1505)   escitalopram (LEXAPRO) tablet 20 mg (20 mg Oral Not Given 5/23/21 1505)   gabapentin (NEURONTIN) capsule 300 mg (300 mg Oral Not Given 5/23/21 1504)   levothyroxine tablet 275 mcg (275 mcg Oral Not Given 5/23/21 1404)   melatonin tablet 3 mg (has no administration in time range)   cyanocobalamin (VITAMIN B-12) tablet 500 mcg (500 mcg Oral Not Given 5/23/21 1505)   multi-electrolyte (ISOLYTE-S PH 7 4 equivalent) IV solution (500 mL/hr Intravenous New Bag 5/23/21 1312)     Followed by   multi-electrolyte (ISOLYTE-S PH 7 4 equivalent) IV solution (250 mL/hr Intravenous Not Given 5/23/21 1736)   dextrose 5 % and sodium chloride 0 45 % infusion (250 mL/hr Intravenous New Bag 5/23/21 1539)   insulin regular (HumuLIN R,NovoLIN R) 1 Units/mL in sodium chloride 0 9 % 100 mL infusion (1 6 Units/hr Intravenous Rate/Dose Change 5/23/21 1527)   naltrexone (REVIA) tablet 50 mg (has no administration in time range)   methocarbamol (ROBAXIN) tablet 750 mg (has no administration in time range)   acetaminophen (TYLENOL) tablet 650 mg (has no administration in time range)   enoxaparin (LOVENOX) subcutaneous injection 40 mg (40 mg Subcutaneous Given 5/23/21 1503)   potassium chloride 20 mEq IVPB (premix) (20 mEq Intravenous New Bag 5/23/21 1649)   magnesium sulfate 2 g/50 mL IVPB (premix) 2 g (2 g Intravenous New Bag 5/23/21 1649)   fentanyl citrate (PF) (FOR EMS ONLY) 100 mcg/2 mL injection 100 mcg (0 mcg Does not apply Given to EMS 5/23/21 0942)   ondansetron (FOR EMS ONLY) (ZOFRAN) 4 mg/2 mL injection 4 mg (0 mg Does not apply Given to EMS 5/23/21 0942)   sodium chloride 0 9 % bolus 1,000 mL (0 mL Intravenous Stopped 5/23/21 1114)   diazepam (VALIUM) injection 5 mg (5 mg Intravenous Given 5/23/21 0942)   iohexol (OMNIPAQUE) 350 MG/ML injection (SINGLE-DOSE) 100 mL (100 mL Intravenous Given 5/23/21 1022)   sodium chloride 0 9 % bolus 1,000 mL (0 mL Intravenous Stopped 5/23/21 1255)   multi-electrolyte (ISOLYTE-S PH 7 4) bolus 1,000 mL (1,000 mL Intravenous New Bag 5/23/21 1608)       Diagnostic Studies  Results Reviewed     Procedure Component Value Units Date/Time    T4, free [[id number]]  (Abnormal) Collected: 05/23/21 0928    Lab Status: Final result Specimen: Blood from Arm, Right Updated: 05/23/21 1644     Free T4 2 20 ng/dL     Urinalysis with microscopic [[id number]]  (Abnormal) Collected: 05/23/21 1557    Lab Status: Final result Specimen: Urine, Clean Catch Updated: 05/23/21 1636     Clarity, UA Clear     Color, UA Yellow     Specific Gravity, UA 1 020     pH, UA 6 0     Glucose,  (1/2%) mg/dl      Ketones, UA >=80 (3+) mg/dl      Blood, UA Negative     Protein, UA Negative mg/dl      Nitrite, UA Negative     Bilirubin, UA Interference- unable to analyze     Urobilinogen, UA 0 2 E U /dl      Leukocytes, UA Negative     WBC, UA None Seen /hpf      RBC, UA None Seen /hpf Bacteria, UA None Seen /hpf      Epithelial Cells Moderate /hpf     Basic metabolic panel [[id number]]     Lab Status: No result Specimen: Blood     Magnesium [[id number]]     Lab Status: No result Specimen: Blood     Phosphorus [[id number]]     Lab Status: No result Specimen: Blood     Basic metabolic panel [[id number]]  (Abnormal) Collected: 05/23/21 1432    Lab Status: Final result Specimen: Blood from Hand, Right Updated: 05/23/21 1510     Sodium 142 mmol/L      Potassium 4 4 mmol/L      Chloride 107 mmol/L      CO2 17 mmol/L      ANION GAP 18 mmol/L      BUN 20 mg/dL      Creatinine 0 98 mg/dL      Glucose 201 mg/dL      Calcium 8 7 mg/dL      eGFR 73 ml/min/1 73sq m     Narrative:      [place] guidelines for Chronic Kidney Disease (CKD):     Stage 1 with normal or high GFR (GFR > 90 mL/min/1 73 square meters)    Stage 2 Mild CKD (GFR = 60-89 mL/min/1 73 square meters)    Stage 3A Moderate CKD (GFR = 45-59 mL/min/1 73 square meters)    Stage 3B Moderate CKD (GFR = 30-44 mL/min/1 73 square meters)    Stage 4 Severe CKD (GFR = 15-29 mL/min/1 73 square meters)    Stage 5 End Stage CKD (GFR <15 mL/min/1 73 square meters)  Note: GFR calculation is accurate only with a steady state creatinine    Magnesium [[id number]]  (Normal) Collected: 05/23/21 1432    Lab Status: Final result Specimen: Blood from Hand, Right Updated: 05/23/21 1510     Magnesium 2 0 mg/dL     Phosphorus [[id number]]  (Normal) Collected: 05/23/21 1432    Lab Status: Final result Specimen: Blood from Hand, Right Updated: 05/23/21 1510     Phosphorus 4 2 mg/dL     TSH, 3rd generation with Free T4 reflex [[id number]]  (Abnormal) Collected: 05/23/21 0928    Lab Status: Final result Specimen: Blood from Arm, Right Updated: 05/23/21 1344     TSH 3RD GENERATON 0 033 uIU/mL     Narrative:      Patients undergoing fluorescein dye angiography may retain small amounts of fluorescein in the body for 48-72 hours post procedure   Samples containing fluorescein can produce falsely depressed TSH values  If the patient had this procedure,a specimen should be resubmitted post fluorescein clearance        Fingerstick Glucose (POCT) [[id number]]  (Abnormal) Collected: 05/23/21 1241    Lab Status: Final result Updated: 05/23/21 1242     POC Glucose 363 mg/dl     Fingerstick Glucose (POCT) [[id number]]  (Abnormal) Collected: 05/23/21 1134    Lab Status: Final result Updated: 05/23/21 1135     POC Glucose 448 mg/dl     Blood gas, venous [[id number]]  (Abnormal) Collected: 05/23/21 1037    Lab Status: Final result Specimen: Blood from Arm, Left Updated: 05/23/21 1101     pH, Tate 7 138     pCO2, Tate 39 0 mm Hg      pO2, Tate 59 1 mm Hg      HCO3, Tate 12 9 mmol/L      Base Excess, Tate -15 3 mmol/L      O2 Content, Tate 13 9 ml/dL      O2 HGB, VENOUS 81 4 %     Protime-INR [[id number]]  (Normal) Collected: 05/23/21 0928    Lab Status: Final result Specimen: Blood from Arm, Right Updated: 05/23/21 1021     Protime 13 7 seconds      INR 1 07    APTT [[id number]]  (Normal) Collected: 05/23/21 0928    Lab Status: Final result Specimen: Blood from Arm, Right Updated: 05/23/21 1021     PTT 27 seconds     Beta Hydroxybutyrate [[id number]]  (Abnormal) Collected: 05/23/21 1005    Lab Status: Final result Specimen: Blood from Arm, Right Updated: 05/23/21 1019     BETA-HYDROXYBUTYRATE 6 6 mmol/L     Troponin I [[id number]]  (Normal) Collected: 05/23/21 0928    Lab Status: Final result Specimen: Blood from Arm, Right Updated: 05/23/21 1010     Troponin I <0 02 ng/mL     Comprehensive metabolic panel [[id number]]  (Abnormal) Collected: 05/23/21 0928    Lab Status: Final result Specimen: Blood from Arm, Right Updated: 05/23/21 1007     Sodium 132 mmol/L      Potassium 6 1 mmol/L      Chloride 96 mmol/L      CO2 16 mmol/L      ANION GAP 20 mmol/L      BUN 20 mg/dL      Creatinine 0 93 mg/dL      Glucose 492 mg/dL      Calcium 9 5 mg/dL      AST 72 U/L      ALT 67 U/L      Alkaline Phosphatase 158 U/L      Total Protein 8 2 g/dL      Albumin 4 0 g/dL      Total Bilirubin 0 91 mg/dL      eGFR 78 ml/min/1 73sq m     Narrative:      [employer] guidelines for Chronic Kidney Disease (CKD):     Stage 1 with normal or high GFR (GFR > 90 mL/min/1 73 square meters)    Stage 2 Mild CKD (GFR = 60-89 mL/min/1 73 square meters)    Stage 3A Moderate CKD (GFR = 45-59 mL/min/1 73 square meters)    Stage 3B Moderate CKD (GFR = 30-44 mL/min/1 73 square meters)    Stage 4 Severe CKD (GFR = 15-29 mL/min/1 73 square meters)    Stage 5 End Stage CKD (GFR <15 mL/min/1 73 square meters)  Note: GFR calculation is accurate only with a steady state creatinine    CK Total with Reflex CKMB [[id number]]  (Normal) Collected: 05/23/21 0928    Lab Status: Final result Specimen: Blood from Arm, Right Updated: 05/23/21 1007     Total  U/L     Fingerstick Glucose (POCT) [[id number]]  (Abnormal) Collected: 05/23/21 0949    Lab Status: Final result Updated: 05/23/21 0950     POC Glucose 459 mg/dl     CBC and differential [[id number]]  (Abnormal) Collected: 05/23/21 0928    Lab Status: Final result Specimen: Blood from Arm, Right Updated: 05/23/21 0950     WBC 5 63 Thousand/uL      RBC 4 69 Million/uL      Hemoglobin 12 7 g/dL      Hematocrit 41 4 %      MCV 88 fL      MCH 27 1 pg      MCHC 30 7 g/dL      RDW 12 4 %      MPV 10 8 fL      Platelets 023 Thousands/uL      nRBC 0 /100 WBCs      Neutrophils Relative 85 %      Immat GRANS % 0 %      Lymphocytes Relative 9 %      Monocytes Relative 4 %      Eosinophils Relative 1 %      Basophils Relative 1 %      Neutrophils Absolute 4 81 Thousands/µL      Immature Grans Absolute 0 01 Thousand/uL      Lymphocytes Absolute 0 51 Thousands/µL      Monocytes Absolute 0 23 Thousand/µL      Eosinophils Absolute 0 04 Thousand/µL      Basophils Absolute 0 03 Thousands/µL                  CTA dissection protocol chest abdomen pelvis w wo contrast   Final Result by Magalis Blakely MD (05/23 1100)      1  There is no evidence of aortic aneurysm or dissection   2  No evidence of pulmonary embolism  Clear lungs  3  Postsurgical changes following gastric bypass  Small bowel small bowel intussusception without evidence of bowel obstruction or inflammatory change  This can be seen as a transient incidental finding  4   Increased stool within the colon   5  Mild hepatic steatosis                     Workstation performed: [id number]                    Procedures  CriticalCare Time  Performed by: Shawn Garg MD  Authorized by: Shawn Garg MD     Critical care provider statement:     Critical care time (minutes):  60    Critical care was necessary to treat or prevent imminent or life-threatening deterioration of the following conditions:  Dehydration, endocrine crisis, metabolic crisis and circulatory failure    Critical care was time spent personally by me on the following activities:  Obtaining history from patient or surrogate, development of treatment plan with patient or surrogate, discussions with consultants, evaluation of patient's response to treatment, examination of patient, interpretation of cardiac output measurements, ordering and performing treatments and interventions, ordering and review of laboratory studies, ordering and review of radiographic studies, re-evaluation of patient's condition and review of old charts             ED Course                             SBIRT 20yo+      Most Recent Value   SBIRT (25 yo +)   In order to provide better care to our patients, we are screening all of our patients for alcohol and drug use  Would it be okay to ask you these screening questions? Yes Filed at: 05/23/2021 4291   Initial Alcohol Screen: US AUDIT-C    1  How often do you have a drink containing alcohol? 3 Filed at: 05/23/2021 0928   2  How many drinks containing alcohol do you have on a typical day you are drinking?    2 Filed at: 05/23/2021 0928   3b  FEMALE Any Age, or MALE 65+: How often do you have 4 or more drinks on one occassion? 2 Filed at: 05/23/2021 4500   Audit-C Score  7 Filed at: 05/23/2021 7173   Full Alcohol Screen: US AUDIT   4  How often during the last year have you found that you were not able to stop drinking once you had started? 0 Filed at: 05/23/2021 0928   5  How often during past year have you failed to do what was normally expected of you because of drinking? 0 Filed at: 05/23/2021 0928   6  How often in past year have you needed a first drink in the morning to get yourself going after a heavy drinking session? 0 Filed at: 05/23/2021 0928   7  How often in past year have you had feeling of guilt or remorse after drinking? 0 Filed at: 05/23/2021 0928   8  How often in past year have you been unable to remember what happened night before because you had been drinking? 1 Filed at: 05/23/2021 0928   9  Have you or someone else been injured as a result of your drinking? 0 Filed at: 05/23/2021 0928   10  Has a relative, friend, doctor or other health worker been concerned about your drinking and suggested you cut down? 4 Filed at: 05/23/2021 9851   AUDIT Total Score  12 Filed at: 05/23/2021 4453   MICHELLE: How many times in the past year have you    Used an illegal drug or used a prescription medication for non-medical reasons? Never Filed at: 05/23/2021 5631                    MDM  Number of Diagnoses or Management Options  [facility]   Nonspecific chest pain:      Amount and/or Complexity of Data Reviewed  Clinical lab tests: ordered and reviewed  Tests in the radiology section of CPT®: ordered and reviewed    Risk of Complications, Morbidity, and/or Mortality  Presenting problems: high  General comments: Pt  Didn't tell us at first  When her blood sugar returned at >400  We asked her about her insulin    She said that her insulin pump battery stopped working at 2am so she took it off        Disposition  Final diagnoses:   DKA (diabetic ketoacidoses) ([facility] )   Nonspecific chest pain     Time reflects when diagnosis was documented in both MDM as applicable and the Disposition within this note     Time User Action Codes Description Comment    5/23/2021 11:36 AM Mandy Yañez Add [E11 10] DKA (diabetic ketoacidoses) ([facility] )     5/23/2021 11:37 AM Barron BRAUN Add [R07 9] Nonspecific chest pain       ED Disposition     ED Disposition Condition Date/Time Comment    Admit Stable Sun May 23, 2021 11:36 AM Case was discussed with Dr Bita Chiang, ICU,  and the patient's admission status was agreed to be ICU/tele        Follow-up Information    None         Current Discharge Medication List      CONTINUE these medications which have NOT CHANGED    Details   Accu-Chek FastClix Lancets MISC Use to test blood sugar up to 6 times daily  Qty: 102 each, Refills: 1    Associated Diagnoses: Type 1 diabetes mellitus with hyperglycemia (HCC)      acetaminophen (TYLENOL) 500 mg tablet       ARIPiprazole (ABILIFY) 5 mg tablet Take 5 mg by mouth daily       !! Cyanocobalamin (B-12 PO) Take by mouth daily      ergocalciferol (VITAMIN D2) 50,000 units Take 1 capsule (50,000 Units total) by mouth once a week  Qty: 12 capsule, Refills: 3    Associated Diagnoses: Vitamin D deficiency      escitalopram (LEXAPRO) 20 mg tablet Take 20 mg by mouth daily      gabapentin (NEURONTIN) 300 mg capsule Take 300-600 mg by mouth daily at bedtime      glucose blood (Accu-Chek Guide) test strip Use to test blood sugar up to 6 times daily  Qty: 200 each, Refills: 2    Associated Diagnoses: Type 1 diabetes mellitus with hyperglycemia (HCC)      hydrOXYzine pamoate (VISTARIL) 50 mg capsule Take 50 mg by mouth daily at bedtime       insulin aspart (NovoLOG) 100 units/mL injection Inject daily into pump  Max daily dose 50 units    Qty: 40 mL, Refills: 2    Associated Diagnoses: Type 1 diabetes mellitus with hyperglycemia ([facility] [address] )      Insulin Pen Needle (BD Pen Needle Deanna U/F) 32G X 4 MM MISC Use 4/day  Qty: 100 each, Refills: 3    Associated Diagnoses: Type 1 diabetes mellitus with hyperglycemia (HCC)      Insulin Syringe-Needle U-100 (INSULIN SYRINGE 1CC/28G) 28G X 1/2" 1 ML MISC 4 injections daily E10 65      !! levothyroxine 200 mcg tablet Take 1 tablet (200 mcg total) by mouth daily in the early morning  Qty: 90 tablet, Refills: 3    Associated Diagnoses: Hypothyroidism due to Hashimoto's thyroiditis      !! levothyroxine 75 mcg tablet       LORazepam (ATIVAN) 0 5 mg tablet Take 0 5 mg by mouth       melatonin 3 mg 3 mg daily at bedtime       methocarbamol (ROBAXIN) 750 mg tablet Take 750 mg by mouth daily at bedtime       naltrexone (REVIA) 50 mg tablet Take 50 mg by mouth daily at bedtime       oxyCODONE-acetaminophen (PERCOCET) 5-325 mg per tablet take 1 tablet by mouth every 6 hours if needed for pain      selenium 200 mcg Take 200 mcg by mouth daily       atomoxetine (STRATTERA) 80 MG capsule Take 80 mg by mouth daily       insulin glargine (LANTUS SOLOSTAR) 100 units/mL injection pen Inject 36 Units under the skin daily at bedtime  Qty: 5 pen, Refills: 3    Associated Diagnoses: Type 1 diabetes mellitus with hyperglycemia (HCC)      venlafaxine (EFFEXOR-XR) 75 mg 24 hr capsule Take 75 mg by mouth daily      !! vitamin B-12 (VITAMIN B-12) 500 mcg tablet Take 500 mcg by mouth daily       ! ! - Potential duplicate medications found  Please discuss with provider  No discharge procedures on file      PDMP Review     None          ED Provider  Electronically Signed by           Keyona Turner MD  05/23/21 6097

## 2021-05-24 LAB
ANION GAP SERPL CALCULATED.3IONS-SCNC: 11 MMOL/L (ref 4–13)
ANION GAP SERPL CALCULATED.3IONS-SCNC: 7 MMOL/L (ref 4–13)
BASOPHILS # BLD AUTO: 0.02 THOUSANDS/ΜL (ref 0–0.1)
BASOPHILS NFR BLD AUTO: 1 % (ref 0–1)
BUN SERPL-MCNC: 10 MG/DL (ref 5–25)
BUN SERPL-MCNC: 14 MG/DL (ref 5–25)
CALCIUM SERPL-MCNC: 8 MG/DL (ref 8.3–10.1)
CALCIUM SERPL-MCNC: 8.1 MG/DL (ref 8.3–10.1)
CHLORIDE SERPL-SCNC: 107 MMOL/L (ref 100–108)
CHLORIDE SERPL-SCNC: 108 MMOL/L (ref 100–108)
CO2 SERPL-SCNC: 22 MMOL/L (ref 21–32)
CO2 SERPL-SCNC: 26 MMOL/L (ref 21–32)
CREAT SERPL-MCNC: 0.72 MG/DL (ref 0.6–1.3)
CREAT SERPL-MCNC: 0.83 MG/DL (ref 0.6–1.3)
EOSINOPHIL # BLD AUTO: 0.05 THOUSAND/ΜL (ref 0–0.61)
EOSINOPHIL NFR BLD AUTO: 2 % (ref 0–6)
ERYTHROCYTE [DISTWIDTH] IN BLOOD BY AUTOMATED COUNT: 12.6 % (ref 11.6–15.1)
EST. AVERAGE GLUCOSE BLD GHB EST-MCNC: 243 MG/DL
GFR SERPL CREATININE-BSD FRML MDRD: 106 ML/MIN/1.73SQ M
GFR SERPL CREATININE-BSD FRML MDRD: 89 ML/MIN/1.73SQ M
GLUCOSE SERPL-MCNC: 100 MG/DL (ref 65–140)
GLUCOSE SERPL-MCNC: 110 MG/DL (ref 65–140)
GLUCOSE SERPL-MCNC: 123 MG/DL (ref 65–140)
GLUCOSE SERPL-MCNC: 135 MG/DL (ref 65–140)
GLUCOSE SERPL-MCNC: 137 MG/DL (ref 65–140)
GLUCOSE SERPL-MCNC: 157 MG/DL (ref 65–140)
GLUCOSE SERPL-MCNC: 163 MG/DL (ref 65–140)
GLUCOSE SERPL-MCNC: 169 MG/DL (ref 65–140)
GLUCOSE SERPL-MCNC: 170 MG/DL (ref 65–140)
GLUCOSE SERPL-MCNC: 172 MG/DL (ref 65–140)
GLUCOSE SERPL-MCNC: 180 MG/DL (ref 65–140)
GLUCOSE SERPL-MCNC: 198 MG/DL (ref 65–140)
GLUCOSE SERPL-MCNC: 203 MG/DL (ref 65–140)
GLUCOSE SERPL-MCNC: 214 MG/DL (ref 65–140)
GLUCOSE SERPL-MCNC: 221 MG/DL (ref 65–140)
GLUCOSE SERPL-MCNC: 234 MG/DL (ref 65–140)
GLUCOSE SERPL-MCNC: 283 MG/DL (ref 65–140)
GLUCOSE SERPL-MCNC: 326 MG/DL (ref 65–140)
GLUCOSE SERPL-MCNC: 79 MG/DL (ref 65–140)
HBA1C MFR BLD: 10.1 %
HCT VFR BLD AUTO: 30.3 % (ref 34.8–46.1)
HGB BLD-MCNC: 9.7 G/DL (ref 11.5–15.4)
IMM GRANULOCYTES # BLD AUTO: 0.01 THOUSAND/UL (ref 0–0.2)
IMM GRANULOCYTES NFR BLD AUTO: 0 % (ref 0–2)
LIPASE SERPL-CCNC: 56 U/L (ref 73–393)
LYMPHOCYTES # BLD AUTO: 0.99 THOUSANDS/ΜL (ref 0.6–4.47)
LYMPHOCYTES NFR BLD AUTO: 39 % (ref 14–44)
MAGNESIUM SERPL-MCNC: 1.9 MG/DL (ref 1.6–2.6)
MAGNESIUM SERPL-MCNC: 2.1 MG/DL (ref 1.6–2.6)
MCH RBC QN AUTO: 27.4 PG (ref 26.8–34.3)
MCHC RBC AUTO-ENTMCNC: 32 G/DL (ref 31.4–37.4)
MCV RBC AUTO: 86 FL (ref 82–98)
MONOCYTES # BLD AUTO: 0.34 THOUSAND/ΜL (ref 0.17–1.22)
MONOCYTES NFR BLD AUTO: 13 % (ref 4–12)
NEUTROPHILS # BLD AUTO: 1.14 THOUSANDS/ΜL (ref 1.85–7.62)
NEUTS SEG NFR BLD AUTO: 45 % (ref 43–75)
NRBC BLD AUTO-RTO: 0 /100 WBCS
PHOSPHATE SERPL-MCNC: 3.1 MG/DL (ref 2.7–4.5)
PHOSPHATE SERPL-MCNC: 3.3 MG/DL (ref 2.7–4.5)
PLATELET # BLD AUTO: 178 THOUSANDS/UL (ref 149–390)
PMV BLD AUTO: 10 FL (ref 8.9–12.7)
POTASSIUM SERPL-SCNC: 4.2 MMOL/L (ref 3.5–5.3)
POTASSIUM SERPL-SCNC: 4.7 MMOL/L (ref 3.5–5.3)
RBC # BLD AUTO: 3.54 MILLION/UL (ref 3.81–5.12)
SODIUM SERPL-SCNC: 140 MMOL/L (ref 136–145)
SODIUM SERPL-SCNC: 141 MMOL/L (ref 136–145)
WBC # BLD AUTO: 2.55 THOUSAND/UL (ref 4.31–10.16)

## 2021-05-24 PROCEDURE — 99232 SBSQ HOSP IP/OBS MODERATE 35: CPT | Performed by: INTERNAL MEDICINE

## 2021-05-24 PROCEDURE — 85025 COMPLETE CBC W/AUTO DIFF WBC: CPT | Performed by: NURSE PRACTITIONER

## 2021-05-24 PROCEDURE — 83690 ASSAY OF LIPASE: CPT | Performed by: NURSE PRACTITIONER

## 2021-05-24 PROCEDURE — 3046F HEMOGLOBIN A1C LEVEL >9.0%: CPT | Performed by: NURSE PRACTITIONER

## 2021-05-24 PROCEDURE — 83735 ASSAY OF MAGNESIUM: CPT | Performed by: NURSE PRACTITIONER

## 2021-05-24 PROCEDURE — 82948 REAGENT STRIP/BLOOD GLUCOSE: CPT

## 2021-05-24 PROCEDURE — 80048 BASIC METABOLIC PNL TOTAL CA: CPT | Performed by: NURSE PRACTITIONER

## 2021-05-24 PROCEDURE — 83036 HEMOGLOBIN GLYCOSYLATED A1C: CPT | Performed by: NURSE PRACTITIONER

## 2021-05-24 PROCEDURE — 84100 ASSAY OF PHOSPHORUS: CPT | Performed by: NURSE PRACTITIONER

## 2021-05-24 PROCEDURE — 99222 1ST HOSP IP/OBS MODERATE 55: CPT | Performed by: INTERNAL MEDICINE

## 2021-05-24 RX ORDER — POTASSIUM CHLORIDE 20MEQ/15ML
20 LIQUID (ML) ORAL ONCE
Status: COMPLETED | OUTPATIENT
Start: 2021-05-24 | End: 2021-05-24

## 2021-05-24 RX ORDER — ONDANSETRON 2 MG/ML
4 INJECTION INTRAMUSCULAR; INTRAVENOUS EVERY 6 HOURS PRN
Status: DISCONTINUED | OUTPATIENT
Start: 2021-05-24 | End: 2021-05-25 | Stop reason: HOSPADM

## 2021-05-24 RX ORDER — INSULIN GLARGINE 100 [IU]/ML
INJECTION, SOLUTION SUBCUTANEOUS
Qty: 15 ML | Refills: 5 | Status: SHIPPED | OUTPATIENT
Start: 2021-05-24 | End: 2021-08-30

## 2021-05-24 RX ADMIN — ESCITALOPRAM OXALATE 20 MG: 20 TABLET, FILM COATED ORAL at 09:32

## 2021-05-24 RX ADMIN — DEXTROSE AND SODIUM CHLORIDE 50 ML/HR: 5; .45 INJECTION, SOLUTION INTRAVENOUS at 15:18

## 2021-05-24 RX ADMIN — ONDANSETRON 4 MG: 2 INJECTION INTRAMUSCULAR; INTRAVENOUS at 09:30

## 2021-05-24 RX ADMIN — ATOMOXETINE 80 MG: 40 CAPSULE ORAL at 09:32

## 2021-05-24 RX ADMIN — ACETAMINOPHEN 650 MG: 325 TABLET, FILM COATED ORAL at 06:26

## 2021-05-24 RX ADMIN — ENOXAPARIN SODIUM 40 MG: 40 INJECTION SUBCUTANEOUS at 09:12

## 2021-05-24 RX ADMIN — ARIPIPRAZOLE 5 MG: 5 TABLET ORAL at 09:32

## 2021-05-24 RX ADMIN — MELATONIN 3 MG: at 21:51

## 2021-05-24 RX ADMIN — MAGNESIUM OXIDE TAB 400 MG (241.3 MG ELEMENTAL MG) 800 MG: 400 (241.3 MG) TAB at 05:58

## 2021-05-24 RX ADMIN — GABAPENTIN 300 MG: 300 CAPSULE ORAL at 21:51

## 2021-05-24 RX ADMIN — CYANOCOBALAMIN TAB 500 MCG 500 MCG: 500 TAB at 09:32

## 2021-05-24 RX ADMIN — POTASSIUM CHLORIDE 20 MEQ: 20 SOLUTION ORAL at 05:58

## 2021-05-24 RX ADMIN — LEVOTHYROXINE SODIUM 275 MCG: 25 TABLET ORAL at 05:01

## 2021-05-24 RX ADMIN — SODIUM CHLORIDE 7 UNITS/HR: 9 INJECTION, SOLUTION INTRAVENOUS at 15:17

## 2021-05-24 RX ADMIN — METHOCARBAMOL 750 MG: 500 TABLET ORAL at 21:51

## 2021-05-24 RX ADMIN — NALTREXONE HYDROCHLORIDE 50 MG: 50 TABLET, FILM COATED ORAL at 21:50

## 2021-05-24 NOTE — DISCHARGE SUMMARY
Critical Care Discharge Summary - Medical   Chesterradha Acevedo 46 y o  female MRN: [id number]    [facility] ICU Room / Bed: ICU 07/ICU 07 Encounter: [id number]    BRIEF OVERVIEW    Admitting Provider: Jenny Vieyra MD  Discharge Provider: Chaka Taylor MD    Discharge To: Home      Admission Date: 5/23/2021     Discharge Date: No discharge date for patient encounter  Primary Discharge Diagnosis  Principal Problem:    Diabetic ketoacidosis without coma associated with type 1 diabetes mellitus (HCC)  Active Problems:    Hypothyroidism due to Hashimoto's thyroiditis    Alcohol abuse    Anxiety and depression    Cervical spondylosis    Transaminitis      Other Problems Addressed: Na    Consulting Providers   Endocrinology- Dr Francisco Olsen       Therapeutic Operative Procedures Performed  None    Diagnostic Procedures Performed  radiology: CT scan: Negative aortic dissection/aneursym, small bowel intussusception    Discharge Disposition: Home/Self Care  Discharged With Lines: no    Test Results Pending at Discharge: None    Outpatient Follow-Up  yes      Follow up: PCP and endocrinologist   Date and time: Within 1 week    Follow up with consulting providers  none required   Active Issues Requiring Follow-up   yes  DMI, anxiety and depression, hypothyroidism    Code Status: Level 1 - Full Code  Advance Directive and Living Will: <no information>  Power of :    POLST:        Medications   See after visit summary for reconciled discharge medications provided to patient and family  Allergies  No Known Allergies  Discharge Diet: diabetic diet  Activity restrictions: none    [address] [facility]  Noelle Coburn is a 47 y/o female with a PMH of DM1 managed on lantus and insulin pump, anxiety/depression, alcohol abuse, cervical spondylosis, and hypothyroidism who presented to the [address] ED with generalized body aches and fatigue/malaise    She denies any infectious etiology prior to her presentation, reports he insulin pump was working normally, recently saw her endocrinologist who increased her basal rate  She was noted to be in DKA with AG of 20, beta hydroxy 6 6 and glucose >400, A1c 10 1  She was given IVF and started on and insulin infusion and admitted to the ICU for management  Her AG closed, she was transitioned off DKA insulin to non DKA insulin infusion, she tolerated her diet  Endocrinology was consulted to evaluate her insulin pump for for discharge recommendations and her recent TSH/T4  Her insulin pump was restarted 5/24 at 2300  Her glucose levels have remained between   She will be discharged to home in stable condition with close outpatient follow-up  Presenting Problem/History of Present Illness  Principal Problem:    Diabetic ketoacidosis without coma associated with type 1 diabetes mellitus ([facility] [address] )  Active Problems:    Hypothyroidism due to Hashimoto's thyroiditis    Alcohol abuse    Anxiety and depression    Cervical spondylosis    Transaminitis  Resolved Problems:    * No resolved hospital problems  *      Other Pertinent Test Results  Ha1c- 10 1  Hgb 9 7 MCV 86  WBC 2 55  TSH 0 03 T4 2 2    Discharge Condition: good      Discharge  Statement   I spent 20 minutes minutes discharging the patient  This time was spent on the day of discharge  I had direct contact with the patient on the day of discharge  Additional documentation is required if more than 30 minutes were spent on discharge

## 2021-05-24 NOTE — CONSULTS
Consultation - Jenny Holguin 46 y o  female MRN: [id number]    Unit/Bed#: ICU 07 Encounter: [id number]      Assessment/Plan     Assessment: This is a 46y o -year-old female with diabetes with hyperglycemia, resolved DKA, hypothyroidism and history of gastric bypass  Plan:  1  Type 1 diabetes with hyperglycemia-resume insulin pump  Discontinue IV insulin 1 hour after insulin pump has been started  Continue to monitor blood sugar over time and make adjustments to the regimen if necessary  If the blood sugars remain below 250 mg/dL, it would be reasonable to discharge her home  I have asked her to call [employer] today since her new sensor will not link to her pump  2  DKA has resolved  3  Hypothyroidism with low and elevated TSH over time-this may be due to malabsorption from the gastric bypass surgery  1 could consider sublingual levothyroxine to get more consistent absorption  This of course would be off-label  4  History of gastric bypass-continue to monitor vitamin and mineral levels  CC: Diabetes Consult    History of Present Illness     HPI: Jenny Holguin is a 46y o  year old female with type 1 diabetes for 29 years  She is on insulin at home  She denies any polyuria, polydipsia, nocturia and blurry vision  She denies neuropathy, nephropathy, retinopathy, heart attack, stroke and claudication but does admit to none  She denies any hypoglycemia  She is on a [employer] 670 G insulin pump  Her basal rate is midnight 1 unit/hour, insulin to carbohydrate ratio 1 unit per 6 g and insulin sensitivity factor 1 unit per 50 mg/dL  She also has hypothyroidism for which she is on levothyroxine  She denies any symptoms of hypo or hyperthyroidism  She has a history of gastric bypass surgery and malabsorption      Inpatient consult to Endocrinology  Consult performed by: Abiodun Gaytan MD  Consult ordered by: JAROCHO Freeman          Review of Systems   Constitutional: Negative for chills and fever  Respiratory: Negative for shortness of breath  Cardiovascular: Negative for chest pain  Gastrointestinal: Negative for constipation, diarrhea, nausea and vomiting  Endocrine: Negative for polydipsia and polyuria  All other systems reviewed and are negative        Historical Information   Past Medical History:   Diagnosis Date    Anemia     Anxiety     B12 deficiency     Cervical disc disorder     On gabapentin     Depression     Disease of thyroid gland     hypothyroid    Iron deficiency anemia     Type 1 diabetes (HCC)     Vitamin D deficiency      Past Surgical History:   Procedure Laterality Date    ABDOMINAL SURGERY      gastric bypass     SECTION      x2    CHOLECYSTECTOMY  2018    GASTRIC BYPASS  2007    INTRAUTERINE DEVICE INSERTION  2015    IR BIOPSY BONE MARROW  2020    OTHER SURGICAL HISTORY      surgery for imperforate hymen/endometriosis/hydrometrocolpos    TUBAL LIGATION      WISDOM TOOTH EXTRACTION       Social History   Social History     Substance and Sexual Activity   Alcohol Use Not Currently    Frequency: Never    Comment: occasional     Social History     Substance and Sexual Activity   Drug Use No     Social History     Tobacco Use   Smoking Status Never Smoker   Smokeless Tobacco Never Used     Family History:   Family History   Problem Relation Age of Onset    Thyroid disease Mother     URIEL disease Mother     Hyperlipidemia Mother     Hypertension Mother     Osteoarthritis Mother     Thyroid disease unspecified Mother     Diabetes Father     Alcohol abuse Father     Diabetes type I Father     Thyroid disease Sister     Depression Sister     Thyroid disease unspecified Sister     Heart disease Maternal Grandmother     Hypertension Maternal Grandmother     Arthritis Maternal Grandmother     Diabetes type I Maternal Uncle     Diabetes type I Maternal Grandfather        Meds/Allergies   Current Facility-Administered Medications   Medication Dose Route Frequency Provider Last Rate Last Admin    acetaminophen (TYLENOL) tablet 650 mg  650 mg Oral Q6H PRN Leonardo Player, CRNP   650 mg at 05/24/21 0626    ARIPiprazole (ABILIFY) tablet 5 mg  5 mg Oral Daily Leonardo Player, CRNP   5 mg at 05/24/21 0932    atoMOXetine (STRATTERA) capsule 80 mg  80 mg Oral Daily Leonardo Player, CRNP   80 mg at 05/24/21 0932    cyanocobalamin (VITAMIN B-12) tablet 500 mcg  500 mcg Oral Daily Leonardo Player, CRNP   500 mcg at 05/24/21 0932    enoxaparin (LOVENOX) subcutaneous injection 40 mg  40 mg Subcutaneous Daily Leonardo Player, CRNP   40 mg at 05/24/21 0912    ergocalciferol (VITAMIN D2) capsule 50,000 Units  50,000 Units Oral Weekly Leonardo Player, CRNP        escitalopram (LEXAPRO) tablet 20 mg  20 mg Oral Daily Leonardo Player, CRNP   20 mg at 05/24/21 0932    gabapentin (NEURONTIN) capsule 300 mg  300 mg Oral HS Marley Acron, CRNP   300 mg at 05/23/21 2200    insulin regular (HumuLIN R,NovoLIN R) 1 Units/mL in sodium chloride 0 9 % 100 mL infusion  0 3-21 Units/hr Intravenous Titrated Leonardo Player, CRNP 3 mL/hr at 05/24/21 1737 3 Units/hr at 05/24/21 1737    levothyroxine tablet 275 mcg  275 mcg Oral Early Morning Leonardo Player, CRNP   275 mcg at 05/24/21 0501    melatonin tablet 3 mg  3 mg Oral HS Leonardo Player, CRNP   3 mg at 05/23/21 2201    methocarbamol (ROBAXIN) tablet 750 mg  750 mg Oral HS Leonardo Player, CRNP   750 mg at 05/23/21 2200    naltrexone (REVIA) tablet 50 mg  50 mg Oral HS Leonardo Player, CRNP   50 mg at 05/23/21 2201    ondansetron (ZOFRAN) injection 4 mg  4 mg Intravenous Q6H PRN Leonardo Player, CRNP   4 mg at 05/24/21 0930     No Known Allergies    Objective   Vitals: Blood pressure 133/78, pulse (!) 106, temperature 98 2 °F (36 8 °C), temperature source Temporal, resp   rate 21, height 5' 6" (1 676 m), weight 79 8 kg (175 lb 14 8 oz), SpO2 98 %, not currently breastfeeding  Intake/Output Summary (Last 24 hours) at 5/24/2021 1831  Last data filed at 5/24/2021 1800  Gross per 24 hour   Intake 1837 54 ml   Output 1500 ml   Net 337 54 ml     Invasive Devices     Peripheral Intravenous Line            Peripheral IV 05/23/21 Left Antecubital 1 day    Peripheral IV 05/23/21 Right Antecubital 1 day                Physical Exam  Vitals signs reviewed  Constitutional:       General: She is not in acute distress  Appearance: She is well-developed  She is not diaphoretic  HENT:      Head: Normocephalic and atraumatic  Eyes:      General: Lids are normal  No scleral icterus  Right eye: No discharge  Left eye: No discharge  Conjunctiva/sclera: Conjunctivae normal    Neck:      Musculoskeletal: Neck supple  Thyroid: No thyromegaly  Cardiovascular:      Rate and Rhythm: Normal rate and regular rhythm  Heart sounds: Normal heart sounds  No murmur  No friction rub  No gallop  Pulmonary:      Effort: Pulmonary effort is normal  No respiratory distress  Breath sounds: Normal breath sounds  No wheezing  Abdominal:      General: Bowel sounds are normal  There is no distension  Palpations: Abdomen is soft  Tenderness: There is no abdominal tenderness  Musculoskeletal: Normal range of motion  General: No tenderness or deformity  Lymphadenopathy:      Head:      Right side of head: No occipital adenopathy  Left side of head: No occipital adenopathy  Upper Body:      Right upper body: No supraclavicular adenopathy  Left upper body: No supraclavicular adenopathy  Skin:     General: Skin is warm  Findings: No erythema or rash  Neurological:      Mental Status: She is alert and oriented to person, place, and time  Cranial Nerves: No cranial nerve deficit     Psychiatric:         Mood and Affect: Mood normal          Behavior: Behavior normal          The history was obtained from the review of the chart, patient  Lab Results:   Results from last 7 days   Lab Units 05/24/21  0456   HEMOGLOBIN A1C % 10 1*     Lab Results   Component Value Date    WBC 2 55 (L) 05/24/2021    HGB 9 7 (L) 05/24/2021    HCT 30 3 (L) 05/24/2021    MCV 86 05/24/2021     05/24/2021     Lab Results   Component Value Date/Time    BUN 10 05/24/2021 04:56 AM    BUN 12 05/25/2018 02:20 PM     05/16/2018 09:45 PM    K 4 2 05/24/2021 04:56 AM    K 4 3 05/16/2018 09:45 PM     05/24/2021 04:56 AM     05/16/2018 09:45 PM    CO2 26 05/24/2021 04:56 AM    CO2 29 05/16/2018 09:45 PM    CREATININE 0 72 05/24/2021 04:56 AM    CREATININE 0 76 05/25/2018 02:20 PM    AST 72 (H) 05/23/2021 09:28 AM    AST 28 (H) 05/16/2018 09:45 PM    ALT 67 05/23/2021 09:28 AM    ALT 22 05/16/2018 09:45 PM    ALB 4 0 05/23/2021 09:28 AM    ALB 4 0 05/16/2018 09:45 PM     No results for input(s): CHOL, HDL, LDL, TRIG, VLDL in the last 72 hours  No results found for: Bela Maurizio  POC Glucose (mg/dl)   Date Value   05/24/2021 163 (H)   05/24/2021 283 (H)   05/24/2021 326 (H)   05/24/2021 234 (H)   05/24/2021 203 (H)   05/24/2021 135   05/24/2021 79   05/24/2021 100   05/24/2021 110   05/24/2021 123       Imaging Studies: I have personally reviewed pertinent reports  Portions of the record may have been created with voice recognition software

## 2021-05-24 NOTE — PROGRESS NOTES
[address] [place] [address]  Progress Note Elmon Laughter 1980, 46 y o  female MRN: [id number]  Unit/Bed#: ICU 07 Encounter: [id number]  Primary Care Provider: JAROCHO Marie   Date and time admitted to hospital: 5/23/2021  9:12 AM    * Diabetic ketoacidosis without coma associated with type 1 diabetes mellitus [facility])  Assessment & Plan  Lab Results   Component Value Date    HGBA1C 10 8 (H) 05/07/2021       Recent Labs     05/24/21  0210 05/24/21  0304 05/24/21  0411 05/24/21  0601   POCGLU 169* 170* 180* 123       Blood Sugar Average: Last 72 hrs:  (P) 841 [id number]     · Hx of DM type 1 on insulin pump and lantus 36u HS  · Presented to ED with nausea/ vomiting and generalized body aches  · No clear inciting event, denies infectious symptomology, recently seen by endo last week with increase in basal pump rate  · BS elevated in ED, AG 20, beta hydroxy 6 6, glucose 492, bicarb of 16  VBG 7 13/39/59/12  · Acidosis resolved, Gap closed evening of admission on 5/23  · Tolerating low carb diet, remains on DKA protocol insulin  · Will transition to insulin pump today; family is bringing in supplies from home       Alcohol abuse  Assessment & Plan  · Continue naltrexone HS  · Denies current alcohol intake      Hypothyroidism due to Hashimoto's thyroiditis  Assessment & Plan  · On synthroid 275mcg daily, Continue  · TSH low at 0 033, T4 elevated at 2 20  · Will need outpatient follow up; her synthroid was increased to 275 from 250       Cervical spondylosis  Assessment & Plan  · On prn oxycodone at home  · Continue prn in hospital      Anxiety and depression  Assessment & Plan  · Continue home lexapro, abilify, and vistaril with PRN ativan      Transaminitis  Assessment & Plan  · Mild elevation of alk phos 158, AST 72  · Trend      ----------------------------------------------------------------------------------------  HPI/24hr events: Acidosis corrected, anion gap closed   Tolerating carb controlled diet  No events overnight  Disposition: Transfer to Med-Surg   Code Status: Level 1 - Full Code  ---------------------------------------------------------------------------------------  SUBJECTIVE  Makes needs known  Review of Systems  Review of systems was reviewed and negative unless stated above in HPI/24-hour events   ---------------------------------------------------------------------------------------  OBJECTIVE    Vitals   Vitals:    21 0200 21 0500 21 0515 21 0540   BP: 116/66  117/60    BP Location:       Pulse: 100  100    Resp: 16  (!) 7    Temp:       TempSrc:       SpO2: 98%  97%    Weight:  79 8 kg (175 lb 14 8 oz)  79 8 kg (175 lb 14 8 oz)   Height:         Temp (24hrs), Av 3 °F (36 8 °C), Min:97 9 °F (36 6 °C), Max:98 8 °F (37 1 °C)  Current: Temperature: 97 9 °F (36 6 °C)          Respiratory:  SpO2: SpO2: 97 %       Invasive/non-invasive ventilation settings   Respiratory    Lab Data (Last 4 hours)    None         O2/Vent Data (Last 4 hours)    None                Physical Exam  Vitals signs and nursing note reviewed  Constitutional:       General: She is not in acute distress  Appearance: Normal appearance  HENT:      Head: Normocephalic and atraumatic  Right Ear: External ear normal       Left Ear: External ear normal       Nose: Nose normal       Mouth/Throat:      Mouth: Mucous membranes are moist       Pharynx: Oropharynx is clear  Eyes:      Extraocular Movements: Extraocular movements intact  Conjunctiva/sclera: Conjunctivae normal       Pupils: Pupils are equal, round, and reactive to light  Neck:      Musculoskeletal: Normal range of motion and neck supple  Cardiovascular:      Rate and Rhythm: Regular rhythm  Tachycardia present  Pulses: Normal pulses  Heart sounds: Normal heart sounds  Pulmonary:      Effort: Pulmonary effort is normal       Breath sounds: Normal breath sounds     Abdominal:      General: Bowel sounds are normal       Palpations: Abdomen is soft  Musculoskeletal: Normal range of motion  Skin:     General: Skin is warm and dry  Capillary Refill: Capillary refill takes less than 2 seconds  Neurological:      General: No focal deficit present  Mental Status: She is alert and oriented to person, place, and time  Psychiatric:         Mood and Affect: Mood normal          Behavior: Behavior normal          Thought Content: Thought content normal          Judgment: Judgment normal          Laboratory and Diagnostics:  Results from last 7 days   Lab Units 05/23/21  0928   WBC Thousand/uL 5 63   HEMOGLOBIN g/dL 12 7   HEMATOCRIT % 41 4   PLATELETS Thousands/uL 192   NEUTROS PCT % 85*   MONOS PCT % 4     Results from last 7 days   Lab Units 05/24/21  0456 05/24/21  0026 05/23/21 2027 05/23/21  1432 05/23/21  0928   SODIUM mmol/L 141 140 142 142 132*   POTASSIUM mmol/L 4 2 4 7 4 2 4 4 6 1*   CHLORIDE mmol/L 108 107 108 107 96*   CO2 mmol/L 26 22 25 17* 16*   ANION GAP mmol/L 7 11 9 18* 20*   BUN mg/dL 10 14 15 20 20   CREATININE mg/dL 0 72 0 83 0 83 0 98 0 93   CALCIUM mg/dL 8 1* 8 0* 8 2* 8 7 9 5   GLUCOSE RANDOM mg/dL 137 214* 95 201* 492*   ALT U/L  --   --   --   --  67   AST U/L  --   --   --   --  72*   ALK PHOS U/L  --   --   --   --  158*   ALBUMIN g/dL  --   --   --   --  4 0   TOTAL BILIRUBIN mg/dL  --   --   --   --  0 91     Results from last 7 days   Lab Units 05/24/21  0456 05/24/21  0026 05/23/21 2027 05/23/21  1432   MAGNESIUM mg/dL 1 9 2 1 2 4 2 0   PHOSPHORUS mg/dL 3 1 3 3 3 9 4 2      Results from last 7 days   Lab Units 05/23/21  0928   INR  1 07   PTT seconds 27      Results from last 7 days   Lab Units 05/23/21  0928   TROPONIN I ng/mL <0 02         ABG:    VBG:  Results from last 7 days   Lab Units 05/23/21  1037   PH LANNY  7 138*   PCO2 LANNY mm Hg 39 0*   PO2 LANNY mm Hg 59 1*   HCO3 LANNY mmol/L 12 9*   BASE EXC LANNY mmol/L -15 3           Micro        EKG: ST  Imaging:  I have personally reviewed pertinent reports  Intake and Output  I/O       05/22 0701 - 05/23 0700 05/23 0701 - 05/24 0700    IV Piggyback  3000    Total Intake(mL/kg)  3000 (38 3)    Urine (mL/kg/hr)  900    Total Output  900    Net  +2100                Height and Weights   Height: 5' 6" (167 6 cm)  IBW (Ideal Body Weight): 59 3 kg  Body mass index is 28 4 kg/m²  Weight (last 2 days)     Date/Time   Weight    05/24/21 0540   79 8 (175 93)    05/24/21 0500   79 8 (175 93)    05/23/21 1400   78 4 (172 84)    05/23/21 0918   76 8 (169 31)                Nutrition       Diet Orders   (From admission, onward)             Start     Ordered    05/23/21 2126  Diet Tarik/CHO Controlled; Consistent Carbohydrate Diet Level 3 (6 carb servings/90 grams CHO/meal)  Diet effective now     Question Answer Comment   Diet Type Tarik/CHO Controlled    Tarik/CHO Controlled Consistent Carbohydrate Diet Level 3 (6 carb servings/90 grams CHO/meal)    RD to adjust diet per protocol?  Yes        05/23/21 2125                  Active Medications  Scheduled Meds:  Current Facility-Administered Medications   Medication Dose Route Frequency Provider Last Rate    acetaminophen  650 mg Oral Q6H PRN Tena Rings, CRNP      ARIPiprazole  5 mg Oral Daily Tena Rings, CRNP      atomoxetine  80 mg Oral Daily Tena Rings, CRNP      vitamin B-12  500 mcg Oral Daily Tena Rings, CRNP      dextrose 5 % and sodium chloride 0 45 %  50 mL/hr Intravenous Continuous JAROCHO Pitts 50 mL/hr (05/24/21 0017)    enoxaparin  40 mg Subcutaneous Daily Tena Rings, CRNP      ergocalciferol  50,000 Units Oral Weekly Tena Rings, CRNP      escitalopram  20 mg Oral Daily Tena Rings, CRNP      gabapentin  300 mg Oral HS JAROCHO Pitts      insulin regular (HumuLIN R,NovoLIN R) infusion  0 1-30 Units/hr Intravenous Continuous Tena Rings, CRNP 1 6 Units/hr (05/23/21 1527)    levothyroxine  275 mcg Oral Early Morning Chalo Tijerina, CARMENNP      melatonin  3 mg Oral HS Eather Deniser, CARMENNP      methocarbamol  750 mg Oral HS Eather Bucker, CRNP      naltrexone  50 mg Oral HS Eather Bucker, CRNP       Continuous Infusions:  dextrose 5 % and sodium chloride 0 45 %, 50 mL/hr, Last Rate: 50 mL/hr (05/24/21 0017)  insulin regular (HumuLIN R,NovoLIN R) infusion, 0 1-30 Units/hr, Last Rate: 1 6 Units/hr (05/23/21 1527)      PRN Meds:   acetaminophen, 650 mg, Q6H PRN        Invasive Devices Review  Invasive Devices     Peripheral Intravenous Line            Peripheral IV 05/23/21 Left Antecubital less than 1 day    Peripheral IV 05/23/21 Left;Ventral (anterior) Wrist less than 1 day    Peripheral IV 05/23/21 Right Antecubital less than 1 day                Rationale for remaining devices: medical necessity  ---------------------------------------------------------------------------------------  Advance Directive and Living Will:      Power of :    POLST:    ---------------------------------------------------------------------------------------  Care Time Delivered:   No Critical Care time spent       JAROCHO Kwon      Portions of the record may have been created with voice recognition software  Occasional wrong word or "sound a like" substitutions may have occurred due to the inherent limitations of voice recognition software    Read the chart carefully and recognize, using context, where substitutions have occurred

## 2021-05-24 NOTE — DISCHARGE INSTRUCTIONS
Diabetic Ketoacidosis   WHAT YOU SHOULD KNOW:   Diabetic ketoacidosis (DKA) is a life-threatening condition that happens when diabetes is not controlled  Your blood sugar level becomes dangerously high because your body does not have enough insulin  Insulin is a hormone that helps your body take sugar out of your blood and use it for energy  The lack of insulin forces your body to use fat instead of sugar for energy  As fats are broken down, they leave chemicals called ketones that build up in your blood  Ketones are dangerous at high levels  INSTRUCTIONS:   Medicines:   · Insulin:  Insulin decreases the amount of sugar in your blood  It helps your body move the sugar into your cells, where it is needed for energy  · Take your medicine as directed  Call your healthcare provider if you think your medicine is not helping or if you have side effects  Tell him if you are allergic to any medicine  Keep a list of the medicines, vitamins, and herbs you take  Include the amounts, and when and why you take them  Bring the list or the pill bottles to follow-up visits  Carry your medicine list with you in case of an emergency  Follow up with your diabetes specialist or healthcare provider as directed: You may need blood tests many times a year to check the function of your pancreas  Write down your questions so you remember to ask them during your visits  Prevent diabetic ketoacidosis: The best way to prevent DKA is to control your diabetes  Ask your healthcare provider or diabetes specialist for more information on managing your diabetes  The following may help decrease your risk for DKA:  · Check your blood sugar levels: You may need to check your blood sugar level at least 3 times each day  Follow instructions about when and how often to check during the day  If your blood sugar level is too high, give yourself insulin as directed by your healthcare provider   He can show you how to use a blood glucose monitor to check your levels  · Check for ketones: Follow instructions about when you should check your blood or urine for ketones  Your healthcare provider may give you a machine to check your blood ketones  Urine ketones can be checked with sticks you dip in your urine  Do not exercise if you have ketones in your urine or blood  · Know how to manage sick days:  When you are sick, you may not eat as much as you normally would  You may need to change the amount of insulin you give yourself  You will need to check your blood sugar level more often  You may also need to check for ketones  Make a plan with your healthcare provider about how to manage your diabetes when you are sick  · Know how to treat DKA: If you have signs of DKA, drink more liquids that do not contain sugar, such as water and diet soda  Take your insulin as directed by your healthcare provider  · Call your diabetes team when needed:  Ask your healthcare provider about a diabetes team that you can call for help  Call the team if your blood sugar level is high, or you have ketones in your blood or urine  The team is available for any questions or concerns you have about your diabetes  Contact your healthcare provider if:   · Your blood sugar level is lower or higher than your healthcare provider says it should be  · You have ketones in your blood or urine  · You have a fever or chills  · You are more thirsty than usual      · You are urinating more often than usual      · You have questions or concerns about your condition or care  Return to the emergency department if:   · You have fruity, sweet breath  · You have severe, new stomach pain and are vomiting  · You are more drowsy than usual      · You begin to breathe fast, or are short of breath  · You become weak and confused  · You have a seizure    © 2014 [address] is for End User's use only and may not be sold, redistributed or otherwise used for commercial purposes  All illustrations and images included in CareNotes® are the copyrighted property of A D A M , Inc  or Torito Suh  The above information is an  only  It is not intended as medical advice for individual conditions or treatments  Talk to your doctor, nurse or pharmacist before following any medical regimen to see if it is safe and effective for you

## 2021-05-24 NOTE — ASSESSMENT & PLAN NOTE
· On synthroid 275mcg daily, Continue  · TSH low at 0 033, T4 elevated at 2 20  · Will need outpatient follow up; her synthroid was increased to 275 from 250

## 2021-05-24 NOTE — PLAN OF CARE
Problem: Potential for Falls  Goal: Patient will remain free of falls  Description: INTERVENTIONS:  - Assess patient frequently for physical needs  -  Identify cognitive and physical deficits and behaviors that affect risk of falls    -  [place] fall precautions as indicated by assessment   - Educate patient/family on patient safety including physical limitations  - Instruct patient to call for assistance with activity based on assessment  - Modify environment to reduce risk of injury  - Consider OT/PT consult to assist with strengthening/mobility  Outcome: Progressing     Problem: Prexisting or High Potential for Compromised Skin Integrity  Goal: Skin integrity is maintained or improved  Description: INTERVENTIONS:  - Identify patients at risk for skin breakdown  - Assess and monitor skin integrity  - Assess and monitor nutrition and hydration status  - Monitor labs   - Assess for incontinence   - Turn and reposition patient  - Assist with mobility/ambulation  - Relieve pressure over bony prominences  - Avoid friction and shearing  - Provide appropriate hygiene as needed including keeping skin clean and dry  - Evaluate need for skin moisturizer/barrier cream  - Collaborate with interdisciplinary team   - Patient/family teaching  - Consider wound care consult   Outcome: Progressing     Problem: PAIN - ADULT  Goal: Verbalizes/displays adequate comfort level or baseline comfort level  Description: Interventions:  - Encourage patient to monitor pain and request assistance  - Assess pain using appropriate pain scale  - Administer analgesics based on type and severity of pain and evaluate response  - Implement non-pharmacological measures as appropriate and evaluate response  - Consider cultural and social influences on pain and pain management  - Notify physician/advanced practitioner if interventions unsuccessful or patient reports new pain  Outcome: Progressing     Problem: SAFETY ADULT  Goal: Patient will remain free of falls  Description: INTERVENTIONS:  - Assess patient frequently for physical needs  -  Identify cognitive and physical deficits and behaviors that affect risk of falls    -  [place] fall precautions as indicated by assessment   - Educate patient/family on patient safety including physical limitations  - Instruct patient to call for assistance with activity based on assessment  - Modify environment to reduce risk of injury  - Consider OT/PT consult to assist with strengthening/mobility  Outcome: Progressing  Goal: Maintain or return to baseline ADL function  Description: INTERVENTIONS:  -  Assess patient's ability to carry out ADLs; assess patient's baseline for ADL function and identify physical deficits which impact ability to perform ADLs (bathing, care of mouth/teeth, toileting, grooming, dressing, etc )  - Assess/evaluate cause of self-care deficits   - Assess range of motion  - Assess patient's mobility; develop plan if impaired  - Assess patient's need for assistive devices and provide as appropriate  - Encourage maximum independence but intervene and supervise when necessary  - Involve family in performance of ADLs  - Assess for home care needs following discharge   - Consider OT consult to assist with ADL evaluation and planning for discharge  - Provide patient education as appropriate  Outcome: Progressing  Goal: Maintain or return mobility status to optimal level  Description: INTERVENTIONS:  - Assess patient's baseline mobility status (ambulation, transfers, stairs, etc )    - Identify cognitive and physical deficits and behaviors that affect mobility  - Identify mobility aids required to assist with transfers and/or ambulation (gait belt, sit-to-stand, lift, walker, cane, etc )  - [place] fall precautions as indicated by assessment  - Record patient progress and toleration of activity level on Mobility SBAR; progress patient to next Phase/Stage  - Instruct patient to call for assistance with activity based on assessment  - Consider rehabilitation consult to assist with strengthening/weightbearing, etc   Outcome: Progressing     Problem: Knowledge Deficit  Goal: Patient/family/caregiver demonstrates understanding of disease process, treatment plan, medications, and discharge instructions  Description: Complete learning assessment and assess knowledge base    Interventions:  - Provide teaching at level of understanding  - Provide teaching via preferred learning methods  Outcome: Progressing

## 2021-05-24 NOTE — ASSESSMENT & PLAN NOTE
Lab Results   Component Value Date    HGBA1C 10 8 (H) 05/07/2021       Recent Labs     05/24/21  0210 05/24/21  0304 05/24/21  0411 05/24/21  0601   POCGLU 169* 170* 180* 123       Blood Sugar Average: Last 72 hrs:  (P) 891 [id number]     · Hx of DM type 1 on insulin pump and lantus 36u HS  · Presented to ED with nausea/ vomiting and generalized body aches  · No clear inciting event, denies infectious symptomology, recently seen by endo last week with increase in basal pump rate  · BS elevated in ED, AG 20, beta hydroxy 6 6, glucose 492, bicarb of 16    VBG 7 13/39/59/12  · Acidosis resolved, [place] closed evening of admission on 5/23  · Tolerating low carb diet, remains on DKA protocol insulin  · Will transition to insulin pump today; family is bringing in supplies from home

## 2021-05-24 NOTE — UTILIZATION REVIEW
Initial Clinical Review    Admission: Date/Time/Statement:   Admission Orders (From admission, onward)     Ordered        05/23/21 1138  Inpatient Admission  Once                   Orders Placed This Encounter   Procedures    Inpatient Admission     Standing Status:   Standing     Number of Occurrences:   1     Order Specific Question:   Level of Care     Answer:   Critical Care [15]     Order Specific Question:   Estimated length of stay     Answer:   More than 2 Midnights     Order Specific Question:   Certification     Answer:   I certify that inpatient services are medically necessary for this patient for a duration of greater than two midnights  See H&P and MD Progress Notes for additional information about the patient's course of treatment  ED Arrival Information     Expected Arrival Acuity Means of Arrival Escorted By Service Admission Type    - 5/23/2021 09:12 Emergent Ambulance [address]  [facility]    Arrival Complaint    Chest Pain        Chief Complaint   Patient presents with    Chest Pain     Pt began with restless legs this AM, which developed into upper back, shoulder, chest, and neck pain  +nausea       Initial Presentation:   46 Y O female presents to ED  Via  EMS   From  Home  With generalized body aches, nausea and vomiting  PMH  Is   DM1 on  Insulin  pump, anxiety/depression, alcohol abuse and cervical spondylosis  Found  In DKA in ED  With AG  20, bicarb  16, BH 6 6 and  VBG  7 1  CTA unremarkable  Admit  Ip Stepdown LOC  With   DKA/DM1  And transaminitis and plan is monitor labs,  IVF, NPO, insulin drip and monitor labs  Date:     5/24            Day 2:   Acidosis corrected, anion gap closed  Continue  IVF  Insulin drip d/c  Continue to monitor  Labs    Possible to med surg  Unit  This pm     ED Triage Vitals   Temperature Pulse Respirations Blood Pressure SpO2   05/23/21 1005 05/23/21 0918 05/23/21 0918 05/23/21 0918 05/23/21 0918   98 1 °F (36 7 °C) (!) 108 18 110/57 99 %      Temp Source Heart Rate Source Patient Position - Orthostatic VS BP Location FiO2 (%)   05/23/21 1005 05/23/21 0928 05/23/21 0918 05/23/21 0918 --   Oral Monitor Lying Right arm       Pain Score       05/23/21 0918       9          Wt Readings from Last 1 Encounters:   05/24/21 79 8 kg (175 lb 14 8 oz)     Additional Vital Signs:   /24/21 1000  --  90  6Abnormal   106/52  71  97 %  --  --   05/24/21 0900  --  94  15  --  --  --  --  --   05/24/21 0850  --  94  15  124/75  91  97 %  None (Room air)  Lying   05/24/21 0700  98 3 °F (36 8 °C)  --  --  --  --  --  --  --   05/24/21 0626  --  96  18  138/68  95  96 %  --  --   05/24/21 0515  --  100  7Abnormal   117/60  75  97 %  --  --   05/24/21 0200  --  100  16  116/66  62  98 %  --  --   05/24/21 0000  --  102  16  118/72  64  98 %  --  --   05/23/21 2200  --  110Abnormal   15  110/60  62  98 %  --  --   05/23/21 2015  --  100  15  --  --  98 %  --  --   05/23/21 2010  --  98  --  106/57  68  99 %  --  --   05/23/21 1930  --  100  15  100/51  65  100 %  --  --   05/23/21 1916  97 9 °F (36 6 °C)  --  --  --  --  --  --  --   05/23/21 1900  --  108Abnormal   15  105/53  69  99 %  --  --   05/23/21 1800  --  108Abnormal   16  107/53  68  98 %  --  --   05/23/21 1700  --  108Abnormal   16  113/49Abnormal   68  98 %  --  --   05/23/21 1600  --  108Abnormal   17  100/52  67  99 %  --  --   05/23/21 1500  98 3 °F (36 8 °C)  110Abnormal   --  106/46Abnormal   66  99 %  --  --   05/23/21 1400  98 8 °F (37 1 °C)  114Abnormal   18  102/49Abnormal   71  99 %  --  --   05/23/21 1330  --  118Abnormal   17  104/51  74  98 %  --  --   05/23/21 1230  --  120Abnormal   13  104/51  74  99 %  --  --   05/23/21 1228  --  123Abnormal   20  104/51  --  99 %  None (Room air)  Lying   05/23/21 1119  --  119Abnormal   20  99/48Abnormal   --  100 %  None (Room air)  Lying   05/23/21 1026  --  122Abnormal   18  98/47Abnormal   --  99 %  None (Room air)  Lying 05/23/21 1005  98 1 °F (36 7 °C)  --  --  --  --  --  --  --   05/23/21 0928  --  --  --  109/53  --  --  --  Lying   05/23/21 0918  --  108Abnormal   18  110/57  --  99 %  None (Room air)         Pertinent Labs/Diagnostic Test Results:  CTA  Chest/abd/pelvis  ( 5/23)        1  There is no evidence of aortic aneurysm or dissection   2  No evidence of pulmonary embolism   Clear lungs  3  Postsurgical changes following gastric bypass   Small bowel small bowel intussusception without evidence of bowel obstruction or inflammatory change   This can be seen as a transient incidental finding     4   Increased stool within the colon   5   Mild hepatic steatosis       Results from last 7 days   Lab Units 05/24/21  0456 05/23/21  0928   WBC Thousand/uL 2 55* 5 63   HEMOGLOBIN g/dL 9 7* 12 7   HEMATOCRIT % 30 3* 41 4   PLATELETS Thousands/uL 178 192   NEUTROS ABS Thousands/µL 1 14* 4 81         Results from last 7 days   Lab Units 05/24/21  0456 05/24/21  0026 05/23/21  2027 05/23/21  1432 05/23/21  0928   SODIUM mmol/L 141 140 142 142 132*   POTASSIUM mmol/L 4 2 4 7 4 2 4 4 6 1*   CHLORIDE mmol/L 108 107 108 107 96*   CO2 mmol/L 26 22 25 17* 16*   ANION GAP mmol/L 7 11 9 18* 20*   BUN mg/dL 10 14 15 20 20   CREATININE mg/dL 0 72 0 83 0 83 0 98 0 93   EGFR ml/min/1 73sq m 106 89 89 73 78   CALCIUM mg/dL 8 1* 8 0* 8 2* 8 7 9 5   MAGNESIUM mg/dL 1 9 2 1 2 4 2 0  --    PHOSPHORUS mg/dL 3 1 3 3 3 9 4 2  --      Results from last 7 days   Lab Units 05/23/21  0928   AST U/L 72*   ALT U/L 67   ALK PHOS U/L 158*   TOTAL PROTEIN g/dL 8 2   ALBUMIN g/dL 4 0   TOTAL BILIRUBIN mg/dL 0 91     Results from last 7 days   Lab Units 05/24/21  0954 05/24/21  0856 05/24/21  0745 05/24/21  0601 05/24/21  0411 05/24/21  0304 05/24/21  0210 05/24/21  0130 05/24/21  0012 05/23/21  2300 05/23/21  2200 05/23/21  2115   POC GLUCOSE mg/dl 79 100 110 123 180* 170* 169* 172* 198* 206* 142* 92     Results from last 7 days   Lab Units 05/24/21  6524 05/24/21  0026 05/23/21 2027 05/23/21  1432 05/23/21  0928   GLUCOSE RANDOM mg/dL 137 214* 95 201* 492*         Results from last 7 days   Lab Units 05/24/21  0456   HEMOGLOBIN A1C % 10 1*   EAG mg/dl 243     BETA-HYDROXYBUTYRATE   Date Value Ref Range Status   05/23/2021 6 6 (H) <0 6 mmol/L Final          Results from last 7 days   Lab Units 05/23/21  1037   PH LANNY  7 138*   PCO2 LANNY mm Hg 39 0*   PO2 LANNY mm Hg 59 1*   HCO3 LANNY mmol/L 12 9*   BASE EXC LANNY mmol/L -15 3   O2 CONTENT LANNY ml/dL 13 9   O2 HGB, VENOUS % 81 4*         Results from last 7 days   Lab Units 05/23/21  0928   CK TOTAL U/L 124     Results from last 7 days   Lab Units 05/23/21  0928   TROPONIN I ng/mL <0 02         Results from last 7 days   Lab Units 05/23/21  0928   PROTIME seconds 13 7   INR  1 07   PTT seconds 27     Results from last 7 days   Lab Units 05/23/21  0928   TSH 3RD GENERATON uIU/mL 0 033*           Results from last 7 days   Lab Units 05/24/21  0456   LIPASE u/L 56*             Results from last 7 days   Lab Units 05/23/21  1557   CLARITY UA  Clear   COLOR UA  Yellow   SPEC GRAV UA  1 020   PH UA  6 0   GLUCOSE UA mg/dl 500 (1/2%)*   KETONES UA mg/dl >=80 (3+)*   BLOOD UA  Negative   PROTEIN UA mg/dl Negative   NITRITE UA  Negative   BILIRUBIN UA  Interference- unable to analyze*   UROBILINOGEN UA E U /dl 0 2   LEUKOCYTES UA  Negative   WBC UA /hpf None Seen   RBC UA /hpf None Seen   BACTERIA UA /hpf None Seen   EPITHELIAL CELLS WET PREP /hpf Moderate*         ED Treatment:   Medication Administration from 05/23/2021 0912 to 05/23/2021 1343       Date/Time Order Dose Route Action Comments     05/23/2021 0942 fentanyl citrate (PF) (FOR EMS ONLY) 100 mcg/2 mL injection 100 mcg 0 mcg Does not apply Given to EMS      05/23/2021 0942 ondansetron (FOR EMS ONLY) (ZOFRAN) 4 mg/2 mL injection 4 mg 0 mg Does not apply Given to EMS      05/23/2021 1114 sodium chloride 0 9 % bolus 1,000 mL 0 mL Intravenous Stopped      05/23/2021 0904 sodium chloride 0 9 % bolus 1,000 mL 1,000 mL Intravenous New Bag      05/23/2021 0942 diazepam (VALIUM) injection 5 mg 5 mg Intravenous Given      05/23/2021 1022 iohexol (OMNIPAQUE) 350 MG/ML injection (SINGLE-DOSE) 100 mL 100 mL Intravenous Given      05/23/2021 1136 insulin regular (HumuLIN R,NovoLIN R) 1 Units/mL in sodium chloride 0 9 % 100 mL infusion 7 7 Units/hr Intravenous New Bag      05/23/2021 1255 sodium chloride 0 9 % bolus 1,000 mL 0 mL Intravenous Stopped      05/23/2021 1118 sodium chloride 0 9 % bolus 1,000 mL 1,000 mL Intravenous New Bag      05/23/2021 1312 multi-electrolyte (ISOLYTE-S PH 7 4 equivalent) IV solution 500 mL/hr Intravenous New Bag         Present on Admission:   Diabetic ketoacidosis without coma associated with type 1 diabetes mellitus ([facility] [address] )   Anxiety and depression   Cervical spondylosis   Hypothyroidism due to Hashimoto's thyroiditis   Transaminitis   Alcohol abuse      Admitting Diagnosis: DKA (diabetic ketoacidoses) ([facility]) [E11 10]  Chest pain [R07 9]  Nonspecific chest pain [R07 9]  Age/Sex: 46 y o  female  Admission Orders:  Scheduled Medications:  ARIPiprazole, 5 mg, Oral, Daily  atomoxetine, 80 mg, Oral, Daily  vitamin B-12, 500 mcg, Oral, Daily  enoxaparin, 40 mg, Subcutaneous, Daily  ergocalciferol, 50,000 Units, Oral, Weekly  escitalopram, 20 mg, Oral, Daily  gabapentin, 300 mg, Oral, HS  levothyroxine, 275 mcg, Oral, Early Morning  melatonin, 3 mg, Oral, HS  methocarbamol, 750 mg, Oral, HS  naltrexone, 50 mg, Oral, HS      Continuous IV Infusions:  dextrose 5 % and sodium chloride 0 45 %, 50 mL/hr, Intravenous, Continuous  insulin regular (HumuLIN R,NovoLIN R) infusion, 0 3-21 Units/hr, Intravenous, Titrated      PRN Meds:  acetaminophen, 650 mg, Oral, Q6H PRN  ondansetron, 4 mg, Intravenous, Q6H PRN        IP CONSULT TO CASE MANAGEMENT  IP CONSULT TO ENDOCRINOLOGY     Neuro checks  Q shift  Dysphagia eval  Fall precautions    Network Utilization Review Department  ATTENTION: Please call with any questions or concerns to [phone] and carefully listen to the prompts so that you are directed to the right person  All voicemails are confidential   Eron Macias all requests for admission clinical reviews, approved or denied determinations and any other requests to dedicated fax number below belonging to the campus where the patient is receiving treatment   List of dedicated fax numbers for the Facilities:  [address] DENIALS (Administrative/Medical Necessity) [phone]   [address] (Maternity/NICU/Pediatrics) [phone]   [address] [place] [phone] [address] [place] [facility] [address] [facility] [place] [address] [phone]

## 2021-05-24 NOTE — PROGRESS NOTES
05/24/21 1300   Clinical Encounter Type   Visited With Patient; Health care provider   Routine Visit Introduction   Continue Visiting Yes

## 2021-05-25 VITALS
DIASTOLIC BLOOD PRESSURE: 82 MMHG | BODY MASS INDEX: 28.56 KG/M2 | HEART RATE: 80 BPM | RESPIRATION RATE: 14 BRPM | TEMPERATURE: 98.9 F | WEIGHT: 177.69 LBS | OXYGEN SATURATION: 97 % | HEIGHT: 66 IN | SYSTOLIC BLOOD PRESSURE: 145 MMHG

## 2021-05-25 LAB
ALBUMIN SERPL BCP-MCNC: 2.6 G/DL (ref 3.5–5)
ALP SERPL-CCNC: 98 U/L (ref 46–116)
ALT SERPL W P-5'-P-CCNC: 42 U/L (ref 12–78)
ANION GAP SERPL CALCULATED.3IONS-SCNC: 10 MMOL/L (ref 4–13)
AST SERPL W P-5'-P-CCNC: 27 U/L (ref 5–45)
BILIRUB SERPL-MCNC: 0.24 MG/DL (ref 0.2–1)
BUN SERPL-MCNC: 11 MG/DL (ref 5–25)
CALCIUM ALBUM COR SERPL-MCNC: 9.7 MG/DL (ref 8.3–10.1)
CALCIUM SERPL-MCNC: 8.6 MG/DL (ref 8.3–10.1)
CHLORIDE SERPL-SCNC: 106 MMOL/L (ref 100–108)
CO2 SERPL-SCNC: 25 MMOL/L (ref 21–32)
CREAT SERPL-MCNC: 0.65 MG/DL (ref 0.6–1.3)
GFR SERPL CREATININE-BSD FRML MDRD: 112 ML/MIN/1.73SQ M
GLUCOSE SERPL-MCNC: 183 MG/DL (ref 65–140)
GLUCOSE SERPL-MCNC: 189 MG/DL (ref 65–140)
GLUCOSE SERPL-MCNC: 197 MG/DL (ref 65–140)
GLUCOSE SERPL-MCNC: 203 MG/DL (ref 65–140)
GLUCOSE SERPL-MCNC: 75 MG/DL (ref 65–140)
POTASSIUM SERPL-SCNC: 3.8 MMOL/L (ref 3.5–5.3)
PROT SERPL-MCNC: 5.7 G/DL (ref 6.4–8.2)
SODIUM SERPL-SCNC: 141 MMOL/L (ref 136–145)

## 2021-05-25 PROCEDURE — 99238 HOSP IP/OBS DSCHRG MGMT 30/<: CPT | Performed by: PHYSICIAN ASSISTANT

## 2021-05-25 PROCEDURE — 80053 COMPREHEN METABOLIC PANEL: CPT | Performed by: NURSE PRACTITIONER

## 2021-05-25 PROCEDURE — 82948 REAGENT STRIP/BLOOD GLUCOSE: CPT

## 2021-05-25 PROCEDURE — NC001 PR NO CHARGE: Performed by: INTERNAL MEDICINE

## 2021-05-25 RX ADMIN — ATOMOXETINE 80 MG: 40 CAPSULE ORAL at 09:55

## 2021-05-25 RX ADMIN — LEVOTHYROXINE SODIUM 275 MCG: 25 TABLET ORAL at 06:19

## 2021-05-25 RX ADMIN — ESCITALOPRAM OXALATE 20 MG: 20 TABLET, FILM COATED ORAL at 09:55

## 2021-05-25 RX ADMIN — ENOXAPARIN SODIUM 40 MG: 40 INJECTION SUBCUTANEOUS at 09:55

## 2021-05-25 RX ADMIN — ARIPIPRAZOLE 5 MG: 5 TABLET ORAL at 09:55

## 2021-05-25 RX ADMIN — CYANOCOBALAMIN TAB 500 MCG 500 MCG: 500 TAB at 09:55

## 2021-05-25 NOTE — CASE MANAGEMENT
LOS 2 Days  Pt is not a 30 day readmission  Pt is not a Bundle  Unplanned Readmission score is 23 (Green, Low Risk)  Pt admitted to [address] [place] [address] ICU due to DKA  CM called pt via room phone to complete assessment  Pt reported that she lives with family  She is completely independent  She works for [employer]  She ambulates on her own without an assistive device  No dme needed  Pt reported that she has an insulin pump and has had it for many years so she knows how to manage it on her own  She said that she just got a new glucometer  She [facility]  She has prescription coverage through [employer]  She reported no hx of VNA or SNF  She confirmed that her PCP is Dr Madyson Pickering  Her endocrinologist is Dr Louise Barakat with [facility]  Pt reported that she recently had an office visit with Dr Rebekah Hall on 5/18/21  CM encouraged pt to schedule a hospital follow appt with Dr Madyson Pickering  Pt reported that she has depression and anxiety  She said that she is stable on medications and sees her psychiatrist frequently  She denied drug use  She admitted to drinking alcohol occassionally  She does not smoke cigarettes  Pt drives  She said that her father will transport her home at discharge  No anticipated needs at this time  CM informed pt to discuss with case management any discharge needs if arises

## 2021-05-25 NOTE — NURSING NOTE
Patient's IV was removed  Patient verbalized understanding of discharge instructions  Patient verbalized understanding of insulin pump maintenance and follow up appointments  Patient was ambulatory at discharge and was accompanied down by an RN

## 2021-05-25 NOTE — ASSESSMENT & PLAN NOTE
Lab Results   Component Value Date    HGBA1C 10 1 (H) 05/24/2021       Recent Labs     05/24/21  1736 05/24/21  1954 05/24/21  2147 05/25/21  0006   POCGLU 163* 221* 157* 75       Blood Sugar Average: Last 72 hrs:  (P) 183 [id number]     · Hx of DM type 1 on insulin pump and lantus 36u HS  · Presented to ED with nausea/ vomiting and generalized body aches  · No clear inciting event, denies infectious symptomology, recently seen by endo last week with increase in basal pump rate  · Acidosis resolved, [place] closed evening of admission on 5/23  · Tolerating low carb diet  · Transitioned to insulin pump last evening at 2300, insulin infusion off at [facility]  · Endocrine is following

## 2021-05-25 NOTE — PROGRESS NOTES
[address] [place] [address]  Progress Note Kathievenessa Amaralzac 1980, 46 y o  female MRN: [id number]  Unit/Bed#: ICU 07 Encounter: [id number]  Primary Care Provider: JAROCHO Burger   Date and time admitted to hospital: 5/23/2021  9:12 AM    * Diabetic ketoacidosis without coma associated with type 1 diabetes mellitus [facility])  Assessment & Plan  Lab Results   Component Value Date    HGBA1C 10 1 (H) 05/24/2021       Recent Labs     05/24/21  1736 05/24/21 1954 05/24/21  2147 05/25/21  0006   POCGLU 163* 221* 157* 75       Blood Sugar Average: Last 72 hrs:  (P) 183 [id number]     · Hx of DM type 1 on insulin pump and lantus 36u HS  · Presented to ED with nausea/ vomiting and generalized body aches  · No clear inciting event, denies infectious symptomology, recently seen by endo last week with increase in basal pump rate  · Acidosis resolved, [place] closed evening of admission on 5/23  · Tolerating low carb diet  · Transitioned to insulin pump last evening at 2300, insulin infusion off at [facility]  · Endocrine is following    Alcohol abuse  Assessment & Plan  · Continue naltrexone HS  · Denies current alcohol intake       Hypothyroidism due to Hashimoto's thyroiditis  Assessment & Plan  · On synthroid 275mcg daily, Continue  · TSH low at 0 033, T4 elevated at 2 20  · Will need outpatient follow up; her synthroid was increased to 275 from 250        Cervical spondylosis  Assessment & Plan  · On prn oxycodone at home  · Continue prn in hospital       Anxiety and depression  Assessment & Plan  · Continue home lexapro, abilify, and vistaril with PRN ativan       Transaminitis  Assessment & Plan  · Mild elevation of alk phos 158, AST 72  · Recheck this morning     ----------------------------------------------------------------------------------------  HPI/24hr events: Transitioned to personal insulin pump overnight  Insulin infusion turned off at [facility]  Blood glucose range from        Disposition: Transfer to Med-Surg   Code Status: Level 1 - Full Code  ---------------------------------------------------------------------------------------  SUBJECTIVE  Slept most of night  No complaints  Review of Systems  Review of systems was reviewed and negative unless stated above in HPI/24-hour events   ---------------------------------------------------------------------------------------  OBJECTIVE    Vitals   Vitals:    21 0000 21 0100 21 0200 21 0300   BP: 127/78 111/65 107/57 123/72   Pulse: 90 84 94 82   Resp: 16 15 17 14   Temp:    (!) 97 4 °F (36 3 °C)   TempSrc:    Temporal   SpO2: 96% 99% 97% 97%   Weight:       Height:         Temp (24hrs), Av 5 °F (36 4 °C), Min:97 °F (36 1 °C), Max:98 3 °F (36 8 °C)  Current: Temperature: (!) 97 4 °F (36 3 °C)          Respiratory:  SpO2: SpO2: 97 %       Invasive/non-invasive ventilation settings   Respiratory    Lab Data (Last 4 hours)    None         O2/Vent Data (Last 4 hours)    None                Physical Exam  Vitals signs and nursing note reviewed  Constitutional:       Appearance: Normal appearance  HENT:      Head: Normocephalic and atraumatic  Right Ear: External ear normal       Left Ear: External ear normal       Nose: Nose normal       Mouth/Throat:      Mouth: Mucous membranes are moist       Pharynx: Oropharynx is clear  Eyes:      Extraocular Movements: Extraocular movements intact  Conjunctiva/sclera: Conjunctivae normal       Pupils: Pupils are equal, round, and reactive to light  Neck:      Musculoskeletal: Normal range of motion and neck supple  Cardiovascular:      Rate and Rhythm: Normal rate and regular rhythm  Pulses: Normal pulses  Heart sounds: Normal heart sounds  Pulmonary:      Effort: Pulmonary effort is normal       Breath sounds: Normal breath sounds  Abdominal:      General: Bowel sounds are normal       Palpations: Abdomen is soft  Musculoskeletal: Normal range of motion     Skin: General: Skin is warm and dry  Capillary Refill: Capillary refill takes less than 2 seconds  Neurological:      General: No focal deficit present  Mental Status: She is alert and oriented to person, place, and time  Psychiatric:         Mood and Affect: Mood normal          Behavior: Behavior normal          Thought Content: Thought content normal          Judgment: Judgment normal          Laboratory and Diagnostics:  Results from last 7 days   Lab Units 05/24/21  0456 05/23/21  0928   WBC Thousand/uL 2 55* 5 63   HEMOGLOBIN g/dL 9 7* 12 7   HEMATOCRIT % 30 3* 41 4   PLATELETS Thousands/uL 178 192   NEUTROS PCT % 45 85*   MONOS PCT % 13* 4     Results from last 7 days   Lab Units 05/25/21  0312 05/24/21  0456 05/24/21  0026 05/23/21 2027 05/23/21  1432 05/23/21  0928   SODIUM mmol/L 141 141 140 142 142 132*   POTASSIUM mmol/L 3 8 4 2 4 7 4 2 4 4 6 1*   CHLORIDE mmol/L 106 108 107 108 107 96*   CO2 mmol/L 25 26 22 25 17* 16*   ANION GAP mmol/L 10 7 11 9 18* 20*   BUN mg/dL 11 10 14 15 20 20   CREATININE mg/dL 0 65 0 72 0 83 0 83 0 98 0 93   CALCIUM mg/dL 8 6 8 1* 8 0* 8 2* 8 7 9 5   GLUCOSE RANDOM mg/dL 189* 137 214* 95 201* 492*   ALT U/L 42  --   --   --   --  67   AST U/L 27  --   --   --   --  72*   ALK PHOS U/L 98  --   --   --   --  158*   ALBUMIN g/dL 2 6*  --   --   --   --  4 0   TOTAL BILIRUBIN mg/dL 0 24  --   --   --   --  0 91     Results from last 7 days   Lab Units 05/24/21  0456 05/24/21  0026 05/23/21 2027 05/23/21  1432   MAGNESIUM mg/dL 1 9 2 1 2 4 2 0   PHOSPHORUS mg/dL 3 1 3 3 3 9 4 2      Results from last 7 days   Lab Units 05/23/21  0928   INR  1 07   PTT seconds 27      Results from last 7 days   Lab Units 05/23/21  0928   TROPONIN I ng/mL <0 02         ABG:    VBG:  Results from last 7 days   Lab Units 05/23/21  1037   PH LANNY  7 138*   PCO2 LANNY mm Hg 39 0*   PO2 LANNY mm Hg 59 1*   HCO3 LANNY mmol/L 12 9*   BASE EXC LANNY mmol/L -15 3           Micro        EKG: NSR  Imaging:  I have personally reviewed pertinent reports  Intake and Output  I/O       05/23 0701 - 05/24 0700 05/24 0701 - 05/25 0700    I V  (mL/kg) 1198 9 (15) 638 7 (8)    IV Piggyback 3000     Total Intake(mL/kg) 4198 9 (52 6) 638 7 (8)    Urine (mL/kg/hr) 1700     Stool 0     Total Output 1700     Net +2498 9 +638 7          Unmeasured Urine Occurrence 1 x 1 x    Unmeasured Stool Occurrence 1 x 1 x          Height and Weights   Height: 5' 6" (167 6 cm)  IBW (Ideal Body Weight): 59 3 kg  Body mass index is 28 4 kg/m²  Weight (last 2 days)     Date/Time   Weight    05/24/21 0540   79 8 (175 93)    05/24/21 0500   79 8 (175 93)    05/23/21 1400   78 4 (172 84)    05/23/21 0918   76 8 (169 31)                Nutrition       Diet Orders   (From admission, onward)             Start     Ordered    05/23/21 2126  Diet [place]/CHO Controlled; Consistent Carbohydrate Diet Level 3 (6 carb servings/90 grams CHO/meal)  Diet effective now     Question Answer Comment   Diet Type Tarik/CHO Controlled    [place]/CHO Controlled Consistent Carbohydrate Diet Level 3 (6 carb servings/90 grams CHO/meal)    RD to adjust diet per protocol?  Yes        05/23/21 2125                  Active Medications  Scheduled Meds:  Current Facility-Administered Medications   Medication Dose Route Frequency Provider Last Rate    acetaminophen  650 mg Oral Q6H PRN Neema Vazquez, JAROCHO      ARIPiprazole  5 mg Oral Daily Neema Beards, CRNP      atomoxetine  80 mg Oral Daily Neema Beards, CRNP      vitamin B-12  500 mcg Oral Daily Neema Beards, CRNP      enoxaparin  40 mg Subcutaneous Daily Neema Beards, CRNP      ergocalciferol  50,000 Units Oral Weekly Neema Beards, CRLINDA      escitalopram  20 mg Oral Daily Neema Beards, JAROCHO      gabapentin  300 mg Oral HS JAROCHO Kent      insulin aspart  300 Units Subcutaneous Insulin Pump Daily PRN Leilani Mccarthy MD      insulin regular (HumuLIN R,NovoLIN R) infusion  0 3-21 Units/hr Intravenous Titrated Tena Rings, CRNP Stopped (05/25/21 0007)    levothyroxine  275 mcg Oral Early Morning Tena Rings, CRNP      melatonin  3 mg Oral HS Tena Rings, CRNP      methocarbamol  750 mg Oral HS Tena Rings, CRNP      naltrexone  50 mg Oral HS Tena Rings, CRNP      ondansetron  4 mg Intravenous Q6H PRN Tena Rings, CRNP      patient maintained insulin pump  1 each Subcutaneous Q8H Ginny Baker MD       Continuous Infusions:  insulin regular (HumuLIN R,NovoLIN R) infusion, 0 3-21 Units/hr, Last Rate: Stopped (05/25/21 0007)      PRN Meds:   acetaminophen, 650 mg, Q6H PRN  insulin aspart, 300 Units, Daily PRN  ondansetron, 4 mg, Q6H PRN        Invasive Devices Review  Invasive Devices     Peripheral Intravenous Line            Peripheral IV 05/23/21 Left Antecubital 1 day                Rationale for remaining devices: medical necessity  ---------------------------------------------------------------------------------------  Advance Directive and Living Will:      Power of :    POLST:    ---------------------------------------------------------------------------------------  Care Time Delivered:   No Critical Care time spent       JAROCHO Pitts      Portions of the record may have been created with voice recognition software  Occasional wrong word or "sound a like" substitutions may have occurred due to the inherent limitations of voice recognition software    Read the chart carefully and recognize, using context, where substitutions have occurred

## 2021-05-25 NOTE — PLAN OF CARE
Problem: Potential for Falls  Goal: Patient will remain free of falls  Description: INTERVENTIONS:  - Assess patient frequently for physical needs  -  Identify cognitive and physical deficits and behaviors that affect risk of falls    -  [place] fall precautions as indicated by assessment   - Educate patient/family on patient safety including physical limitations  - Instruct patient to call for assistance with activity based on assessment  - Modify environment to reduce risk of injury  - Consider OT/PT consult to assist with strengthening/mobility  Outcome: Progressing     Problem: Prexisting or High Potential for Compromised Skin Integrity  Goal: Skin integrity is maintained or improved  Description: INTERVENTIONS:  - Identify patients at risk for skin breakdown  - Assess and monitor skin integrity  - Assess and monitor nutrition and hydration status  - Monitor labs   - Assess for incontinence   - Turn and reposition patient  - Assist with mobility/ambulation  - Relieve pressure over bony prominences  - Avoid friction and shearing  - Provide appropriate hygiene as needed including keeping skin clean and dry  - Evaluate need for skin moisturizer/barrier cream  - Collaborate with interdisciplinary team   - Patient/family teaching  - Consider wound care consult   Outcome: Progressing     Problem: PAIN - ADULT  Goal: Verbalizes/displays adequate comfort level or baseline comfort level  Description: Interventions:  - Encourage patient to monitor pain and request assistance  - Assess pain using appropriate pain scale  - Administer analgesics based on type and severity of pain and evaluate response  - Implement non-pharmacological measures as appropriate and evaluate response  - Consider cultural and social influences on pain and pain management  - Notify physician/advanced practitioner if interventions unsuccessful or patient reports new pain  Outcome: Progressing     Problem: SAFETY ADULT  Goal: Patient will remain free of falls  Description: INTERVENTIONS:  - Assess patient frequently for physical needs  -  Identify cognitive and physical deficits and behaviors that affect risk of falls    -  [place] fall precautions as indicated by assessment   - Educate patient/family on patient safety including physical limitations  - Instruct patient to call for assistance with activity based on assessment  - Modify environment to reduce risk of injury  - Consider OT/PT consult to assist with strengthening/mobility  Outcome: Progressing  Goal: Maintain or return to baseline ADL function  Description: INTERVENTIONS:  -  Assess patient's ability to carry out ADLs; assess patient's baseline for ADL function and identify physical deficits which impact ability to perform ADLs (bathing, care of mouth/teeth, toileting, grooming, dressing, etc )  - Assess/evaluate cause of self-care deficits   - Assess range of motion  - Assess patient's mobility; develop plan if impaired  - Assess patient's need for assistive devices and provide as appropriate  - Encourage maximum independence but intervene and supervise when necessary  - Involve family in performance of ADLs  - Assess for home care needs following discharge   - Consider OT consult to assist with ADL evaluation and planning for discharge  - Provide patient education as appropriate  Outcome: Progressing  Goal: Maintain or return mobility status to optimal level  Description: INTERVENTIONS:  - Assess patient's baseline mobility status (ambulation, transfers, stairs, etc )    - Identify cognitive and physical deficits and behaviors that affect mobility  - Identify mobility aids required to assist with transfers and/or ambulation (gait belt, sit-to-stand, lift, walker, cane, etc )  - [place] fall precautions as indicated by assessment  - Record patient progress and toleration of activity level on Mobility SBAR; progress patient to next Phase/Stage  - Instruct patient to call for assistance with activity based on assessment  - Consider rehabilitation consult to assist with strengthening/weightbearing, etc   Outcome: Progressing     Problem: Knowledge Deficit  Goal: Patient/family/caregiver demonstrates understanding of disease process, treatment plan, medications, and discharge instructions  Description: Complete learning assessment and assess knowledge base    Interventions:  - Provide teaching at level of understanding  - Provide teaching via preferred learning methods  Outcome: Progressing

## 2021-05-25 NOTE — PLAN OF CARE
Problem: Potential for Falls  Goal: Patient will remain free of falls  Description: INTERVENTIONS:  - Assess patient frequently for physical needs  -  Identify cognitive and physical deficits and behaviors that affect risk of falls    -  [place] fall precautions as indicated by assessment   - Educate patient/family on patient safety including physical limitations  - Instruct patient to call for assistance with activity based on assessment  - Modify environment to reduce risk of injury  - Consider OT/PT consult to assist with strengthening/mobility  Outcome: Progressing     Problem: Prexisting or High Potential for Compromised Skin Integrity  Goal: Skin integrity is maintained or improved  Description: INTERVENTIONS:  - Identify patients at risk for skin breakdown  - Assess and monitor skin integrity  - Assess and monitor nutrition and hydration status  - Monitor labs   - Assess for incontinence   - Turn and reposition patient  - Assist with mobility/ambulation  - Relieve pressure over bony prominences  - Avoid friction and shearing  - Provide appropriate hygiene as needed including keeping skin clean and dry  - Evaluate need for skin moisturizer/barrier cream  - Collaborate with interdisciplinary team   - Patient/family teaching  - Consider wound care consult   Outcome: Progressing     Problem: PAIN - ADULT  Goal: Verbalizes/displays adequate comfort level or baseline comfort level  Description: Interventions:  - Encourage patient to monitor pain and request assistance  - Assess pain using appropriate pain scale  - Administer analgesics based on type and severity of pain and evaluate response  - Implement non-pharmacological measures as appropriate and evaluate response  - Consider cultural and social influences on pain and pain management  - Notify physician/advanced practitioner if interventions unsuccessful or patient reports new pain  Outcome: Progressing     Problem: SAFETY ADULT  Goal: Patient will remain free of falls  Description: INTERVENTIONS:  - Assess patient frequently for physical needs  -  Identify cognitive and physical deficits and behaviors that affect risk of falls    -  [place] fall precautions as indicated by assessment   - Educate patient/family on patient safety including physical limitations  - Instruct patient to call for assistance with activity based on assessment  - Modify environment to reduce risk of injury  - Consider OT/PT consult to assist with strengthening/mobility  Outcome: Progressing  Goal: Maintain or return to baseline ADL function  Description: INTERVENTIONS:  -  Assess patient's ability to carry out ADLs; assess patient's baseline for ADL function and identify physical deficits which impact ability to perform ADLs (bathing, care of mouth/teeth, toileting, grooming, dressing, etc )  - Assess/evaluate cause of self-care deficits   - Assess range of motion  - Assess patient's mobility; develop plan if impaired  - Assess patient's need for assistive devices and provide as appropriate  - Encourage maximum independence but intervene and supervise when necessary  - Involve family in performance of ADLs  - Assess for home care needs following discharge   - Consider OT consult to assist with ADL evaluation and planning for discharge  - Provide patient education as appropriate  Outcome: Progressing  Goal: Maintain or return mobility status to optimal level  Description: INTERVENTIONS:  - Assess patient's baseline mobility status (ambulation, transfers, stairs, etc )    - Identify cognitive and physical deficits and behaviors that affect mobility  - Identify mobility aids required to assist with transfers and/or ambulation (gait belt, sit-to-stand, lift, walker, cane, etc )  - [place] fall precautions as indicated by assessment  - Record patient progress and toleration of activity level on Mobility SBAR; progress patient to next Phase/Stage  - Instruct patient to call for assistance with activity based on assessment  - Consider rehabilitation consult to assist with strengthening/weightbearing, etc   Outcome: Progressing     Problem: Knowledge Deficit  Goal: Patient/family/caregiver demonstrates understanding of disease process, treatment plan, medications, and discharge instructions  Description: Complete learning assessment and assess knowledge base    Interventions:  - Provide teaching at level of understanding  - Provide teaching via preferred learning methods  Outcome: Progressing

## 2021-05-27 ENCOUNTER — TRANSITIONAL CARE MANAGEMENT (OUTPATIENT)
Dept: FAMILY MEDICINE CLINIC | Facility: CLINIC | Age: 41
End: 2021-05-27

## 2021-06-03 ENCOUNTER — TRANSITIONAL CARE MANAGEMENT (OUTPATIENT)
Dept: FAMILY MEDICINE CLINIC | Facility: CLINIC | Age: 41
End: 2021-06-03

## 2021-06-03 ENCOUNTER — TELEPHONE (OUTPATIENT)
Dept: ENDOCRINOLOGY | Facility: CLINIC | Age: 41
End: 2021-06-03

## 2021-06-03 NOTE — TELEPHONE ENCOUNTER
Laine Mason from [employer] has been trying to reach [place] without success at least 3 times since pt was admitted    LM for pt to call back to see how she is doing, sugars, any problems with pump etc

## 2021-06-07 ENCOUNTER — OFFICE VISIT (OUTPATIENT)
Dept: FAMILY MEDICINE CLINIC | Facility: CLINIC | Age: 41
End: 2021-06-07
Payer: COMMERCIAL

## 2021-06-07 ENCOUNTER — TELEPHONE (OUTPATIENT)
Dept: FAMILY MEDICINE CLINIC | Facility: CLINIC | Age: 41
End: 2021-06-07

## 2021-06-07 VITALS
HEART RATE: 96 BPM | TEMPERATURE: 97.6 F | BODY MASS INDEX: 27 KG/M2 | WEIGHT: 168 LBS | SYSTOLIC BLOOD PRESSURE: 136 MMHG | HEIGHT: 66 IN | OXYGEN SATURATION: 100 % | DIASTOLIC BLOOD PRESSURE: 74 MMHG | RESPIRATION RATE: 20 BRPM

## 2021-06-07 DIAGNOSIS — F32.A ANXIETY AND DEPRESSION: ICD-10-CM

## 2021-06-07 DIAGNOSIS — E10.10 DIABETIC KETOACIDOSIS WITHOUT COMA ASSOCIATED WITH TYPE 1 DIABETES MELLITUS (HCC): Primary | ICD-10-CM

## 2021-06-07 DIAGNOSIS — E11.9 ENCOUNTER FOR DIABETIC FOOT EXAM (HCC): ICD-10-CM

## 2021-06-07 DIAGNOSIS — F10.11 HISTORY OF ALCOHOL ABUSE: ICD-10-CM

## 2021-06-07 DIAGNOSIS — E03.8 HYPOTHYROIDISM DUE TO HASHIMOTO'S THYROIDITIS: ICD-10-CM

## 2021-06-07 DIAGNOSIS — E06.3 HYPOTHYROIDISM DUE TO HASHIMOTO'S THYROIDITIS: ICD-10-CM

## 2021-06-07 DIAGNOSIS — F41.9 ANXIETY AND DEPRESSION: ICD-10-CM

## 2021-06-07 DIAGNOSIS — F51.04 PSYCHOPHYSIOLOGICAL INSOMNIA: ICD-10-CM

## 2021-06-07 DIAGNOSIS — M47.812 CERVICAL SPONDYLOSIS: ICD-10-CM

## 2021-06-07 PROCEDURE — 1111F DSCHRG MED/CURRENT MED MERGE: CPT | Performed by: NURSE PRACTITIONER

## 2021-06-07 PROCEDURE — 3008F BODY MASS INDEX DOCD: CPT | Performed by: NURSE PRACTITIONER

## 2021-06-07 PROCEDURE — 99495 TRANSJ CARE MGMT MOD F2F 14D: CPT | Performed by: NURSE PRACTITIONER

## 2021-06-07 RX ORDER — GABAPENTIN 600 MG/1
TABLET ORAL
COMMUNITY
Start: 2021-06-01 | End: 2022-04-05 | Stop reason: SDUPTHER

## 2021-06-07 RX ORDER — TRAZODONE HYDROCHLORIDE 50 MG/1
TABLET ORAL
Qty: 90 TABLET | Refills: 3 | Status: SHIPPED | OUTPATIENT
Start: 2021-06-07 | End: 2021-08-30

## 2021-06-07 NOTE — PROGRESS NOTES
[facility]        NAME: Aniceto Baird is a 46 y o  female  : 1980    MRN: [id number]  DATE: 2021  TIME: 12:32 PM    Assessment and Plan   Diabetic ketoacidosis without coma associated with type 1 diabetes mellitus ([facility] [address] ) [E10 10]  1  Diabetic ketoacidosis without coma associated with type 1 diabetes mellitus ([facility] [address] )     2  Hypothyroidism due to Hashimoto's thyroiditis     3  Anxiety and depression     4  Cervical spondylosis     5  Psychophysiological insomnia  traZODone (DESYREL) 50 mg tablet   6  Encounter for diabetic foot exam ([facility] )     7   History of alcohol abuse      recent rehab in [place] for 30 days         Patient Instructions     Patient Instructions   Diabetic Eye exam done about 1 month ago at [facility]- will call for report  4 month [facility]  Continue follow up with Endocrinology as scheduled  Call for any problems/concerns  Trazadone 50-150mg at bedtime 1 hour before you want to fall asleep          Chief Complaint     Chief Complaint   Patient presents with    Transition of Care Management         History of Present Illness          Primary Discharge Diagnosis  Principal Problem:    Diabetic ketoacidosis without coma associated with type 1 diabetes mellitus ([facility] [address] )  Active Problems:    Hypothyroidism due to Hashimoto's thyroiditis    Alcohol abuse    Anxiety and depression    Cervical spondylosis    Transaminitis        Other Problems Addressed: Na     Consulting Providers   Endocrinology- Dr Mehran Canales        Therapeutic Operative Procedures Performed  None     Diagnostic Procedures Performed  radiology: CT scan: Negative aortic dissection/aneursym, small bowel intussusception     Discharge Disposition: Home/Self Care  Discharged With Lines: no    Test Results Pending at Discharge: None     Outpatient Follow-Up  yes      Follow up: PCP and endocrinologist   Date and time: Within 1 week     Follow up with consulting providers  none required   Active Issues Requiring Follow-up   yes  DMI, anxiety and depression, hypothyroidism     Code Status: Level 1 - Full Code  Advance Directive and Living Will: <no information>  Power of :    POLST:          Medications   See after visit summary for reconciled discharge medications provided to patient and family        Allergies  No Known Allergies      Discharge Diet: diabetic diet  Activity restrictions: none     [address]  Chelsea Mas is a 45 y/o female with a PMH of DM1 managed on lantus and insulin pump, anxiety/depression, alcohol abuse, cervical spondylosis, and hypothyroidism who presented to the [facility] ED with generalized body aches and fatigue/malaise  She denies any infectious etiology prior to her presentation, reports he insulin pump was working normally, recently saw her endocrinologist who increased her basal rate  She was noted to be in DKA with AG of 20, beta hydroxy 6 6 and glucose >400, A1c 10 1  She was given IVF and started on and insulin infusion and admitted to the ICU for management  Her AG closed, she was transitioned off DKA insulin to non DKA insulin infusion, she tolerated her diet  Endocrinology was consulted to evaluate her insulin pump for for discharge recommendations and her recent TSH/T4  Her insulin pump was restarted 5/24 at 2300  Her glucose levels have remained between   She will be discharged to home in stable condition with close outpatient follow-up      Presenting Problem/History of Present Illness  Principal Problem:    Diabetic ketoacidosis without coma associated with type 1 diabetes mellitus ([address] )  Active Problems:    Hypothyroidism due to Hashimoto's thyroiditis    Alcohol abuse    Anxiety and depression    Cervical spondylosis    Transaminitis  Resolved Problems:    * No resolved hospital problems   *        Other Pertinent Test Results  Ha1c- 10 1  Hgb 9 7 MCV 86  WBC 2 55  TSH 0 03 T4 2 2     Discharge Condition: good      TCM Call (since 5/7/2021)     Date and time call was made  6/3/2021  3:23 PM    Patient was hospitialized at  [facility]        Date of Admission  05/23/21    Date of discharge  05/25/21    Diagnosis  Diabetic ketoacidosis without coma assc with type 1 diabetes   mellitus    Disposition  Home    Current Symptoms  None      TCM Call (since 5/7/2021)     Post hospital issues  None    Scheduled for follow up? Yes    Did you obtain your prescribed medications  No    Why were you unable to obtain your medications  no new medication    Do you need help managing your prescriptions or medications  No    Is transportation to your appointment needed  No    I have advised the patient to call PCP with any new or worsening symptoms    Amy BAEZ    Living Arrangements  Spouse or Significiant other    Support System  Friends    The type of support provided  Emotional; Physical; Other (comment)    Do you have social support  Yes, as much as I need            Review of Systems   Review of Systems   Constitutional: Positive for fatigue (at times) and unexpected weight change (gained 15 pounds recently while in rehab in [place])  Negative for activity change, diaphoresis and fever  HENT: Negative for congestion, facial swelling, hearing loss, rhinorrhea, sinus pressure, sinus pain, sneezing, sore throat and voice change  Eyes: Negative for discharge and visual disturbance  Respiratory: Negative for cough, choking, chest tightness, shortness of breath, wheezing and stridor  Cardiovascular: Negative for chest pain, palpitations and leg swelling  Gastrointestinal: Negative for abdominal distention, abdominal pain, constipation, diarrhea, nausea and vomiting  Endocrine: Negative for polydipsia, polyphagia and polyuria  Genitourinary: Negative for difficulty urinating, dysuria, frequency and urgency     Musculoskeletal: Negative for arthralgias, back pain, gait problem, joint swelling, myalgias, neck pain and neck stiffness  Skin: Negative for color change, rash and wound  Neurological: Negative for dizziness, syncope, speech difficulty, weakness, light-headedness and headaches  Hematological: Negative for adenopathy  Does not bruise/bleed easily  Psychiatric/Behavioral: Positive for sleep disturbance (this is the most upsetting symptom for patient- she thinks Trazodone works when she takes it- doesn't take until the middle of the night which gives her a few hours of sleep  most concerned she will have morning grogginess  )  Negative for agitation, behavioral problems, confusion, hallucinations and suicidal ideas  The patient is nervous/anxious  Current Medications       Current Outpatient Medications:     Accu-Chek FastClix Lancets MISC, Use to test blood sugar up to 6 times daily  , Disp: 102 each, Rfl: 1    acetaminophen (TYLENOL) 500 mg tablet, , Disp: , Rfl:     ARIPiprazole (ABILIFY) 5 mg tablet, Take 5 mg by mouth daily , Disp: , Rfl:     atomoxetine (STRATTERA) 80 MG capsule, Take 80 mg by mouth daily , Disp: , Rfl:     Cyanocobalamin (B-12 PO), Take by mouth daily, Disp: , Rfl:     ergocalciferol (VITAMIN D2) 50,000 units, Take 1 capsule (50,000 Units total) by mouth once a week, Disp: 12 capsule, Rfl: 3    escitalopram (LEXAPRO) 20 mg tablet, Take 20 mg by mouth daily, Disp: , Rfl:     gabapentin (NEURONTIN) 300 mg capsule, Take 300-600 mg by mouth daily at bedtime, Disp: , Rfl:     glucose blood (Accu-Chek Guide) test strip, Use to test blood sugar up to 6 times daily  , Disp: 200 each, Rfl: 2    insulin aspart (NovoLOG) 100 units/mL injection, Inject daily into pump  Max daily dose 50 units  , Disp: 40 mL, Rfl: 2    Insulin Pen Needle (BD Pen Needle Deanna U/F) 32G X 4 MM MISC, Use 4/day, Disp: 100 each, Rfl: 3    Insulin Syringe-Needle U-100 (INSULIN SYRINGE 1CC/28G) 28G X 1/2" 1 ML MISC, 4 injections daily E10 65, Disp: , Rfl:     Lantus SoloStar 100 units/mL injection pen, INJECT 36 UNITS UNDER THE SKIN DAILY AT BEDTIME, Disp: 15 mL, Rfl: 5    levothyroxine 200 mcg tablet, Take 1 tablet (200 mcg total) by mouth daily in the early morning, Disp: 90 tablet, Rfl: 3    levothyroxine 75 mcg tablet, , Disp: , Rfl:     LORazepam (ATIVAN) 0 5 mg tablet, Take 0 5 mg by mouth , Disp: , Rfl:     melatonin 3 mg, 3 mg daily at bedtime , Disp: , Rfl:     methocarbamol (ROBAXIN) 750 mg tablet, Take 750 mg by mouth daily at bedtime , Disp: , Rfl:     PATIENT MAINTAINED INSULIN PUMP, Inject 1 each under the skin every 8 (eight) hours, Disp: 1 each, Rfl: 0    selenium 200 mcg, Take 200 mcg by mouth daily , Disp: , Rfl:     vitamin B-12 (VITAMIN B-12) 500 mcg tablet, Take 500 mcg by mouth daily, Disp: , Rfl:     gabapentin (NEURONTIN) 600 MG tablet, , Disp: , Rfl:     hydrOXYzine pamoate (VISTARIL) 50 mg capsule, Take 50 mg by mouth daily at bedtime , Disp: , Rfl:     naltrexone (REVIA) 50 mg tablet, Take 50 mg by mouth daily at bedtime , Disp: , Rfl:     traZODone (DESYREL) 50 mg tablet, 1-3 tablets at bedtime 1 hour before you want to fall asleep, Disp: 90 tablet, Rfl: 3    venlafaxine (EFFEXOR-XR) 75 mg 24 hr capsule, Take 75 mg by mouth daily, Disp: , Rfl:     Current Allergies     Allergies as of 2021    (No Known Allergies)            The following portions of the patient's history were reviewed and updated as appropriate: allergies, current medications, past family history, past medical history, past social history, past surgical history and problem list      Past Medical History:   Diagnosis Date    Anemia     Anxiety     B12 deficiency     Cervical disc disorder     On gabapentin     Depression     Disease of thyroid gland     hypothyroid    Iron deficiency anemia     Type 1 diabetes ([address] )     Vitamin D deficiency        Past Surgical History:   Procedure Laterality Date    ABDOMINAL SURGERY      gastric bypass     SECTION      x2    CHOLECYSTECTOMY      GASTRIC BYPASS  2007    INTRAUTERINE DEVICE INSERTION  01/06/2015    IR BIOPSY BONE MARROW  8/27/2020    OTHER SURGICAL HISTORY      surgery for imperforate hymen/endometriosis/hydrometrocolpos    TUBAL LIGATION      WISDOM TOOTH EXTRACTION         Family History   Problem Relation Age of Onset    Thyroid disease Mother     URIEL disease Mother     Hyperlipidemia Mother     Hypertension Mother     Osteoarthritis Mother     Thyroid disease unspecified Mother     Diabetes Father     Alcohol abuse Father     Diabetes type I Father     Thyroid disease Sister     Depression Sister     Thyroid disease unspecified Sister     Heart disease Maternal Grandmother     Hypertension Maternal Grandmother     Arthritis Maternal Grandmother     Diabetes type I Maternal Uncle     Diabetes type I Maternal Grandfather          Medications have been verified  Objective   /74   Pulse 96   Temp 97 6 °F (36 4 °C) (Tympanic)   Resp 20   Ht 5' 6" (1 676 m)   Wt 76 2 kg (168 lb)   SpO2 100%   BMI 27 12 kg/m²        Physical Exam     Physical Exam  Vitals signs and nursing note reviewed  Constitutional:       General: She is not in acute distress  Appearance: Normal appearance  She is well-developed  She is not diaphoretic  Neck:      Musculoskeletal: Normal range of motion and neck supple  Thyroid: No thyromegaly  Trachea: No tracheal deviation  Cardiovascular:      Rate and Rhythm: Normal rate and regular rhythm  Pulses: no weak pulses          Dorsalis pedis pulses are 2+ on the right side and 2+ on the left side  Heart sounds: Normal heart sounds  No murmur  Pulmonary:      Effort: Pulmonary effort is normal  No respiratory distress  Breath sounds: Normal breath sounds  No wheezing  Musculoskeletal: Normal range of motion  General: No tenderness or deformity  Right lower leg: No edema  Left lower leg: No edema     Feet:      Right foot: Skin integrity: No ulcer, skin breakdown, erythema, warmth, callus or dry skin  Left foot:      Skin integrity: No ulcer, skin breakdown, erythema, warmth, callus or dry skin  Skin:     General: Skin is warm and dry  Findings: No erythema or rash  Neurological:      Mental Status: She is alert and oriented to person, place, and time  Psychiatric:         Attention and Perception: Attention and perception normal          Mood and Affect: Mood is anxious  Speech: Speech normal          Behavior: Behavior normal  Behavior is cooperative  Thought Content: Thought content normal          Cognition and Memory: Cognition and memory normal          Judgment: Judgment normal            Patient's shoes and socks removed  Right Foot/Ankle   Right Foot Inspection  Skin Exam: skin normal and skin intact no dry skin, no warmth, no callus, no erythema, no maceration, no abnormal color, no pre-ulcer, no ulcer and no callus                          Toe Exam: ROM and strength within normal limits  Sensory       Monofilament testing: intact  Vascular  Capillary refills: < 3 seconds  The right DP pulse is 2+  Left Foot/Ankle  Left Foot Inspection  Skin Exam: skin normal and skin intactno dry skin, no warmth, no erythema, no maceration, normal color, no pre-ulcer, no ulcer and no callus                         Toe Exam: ROM and strength within normal limits                   Sensory       Monofilament: intact  Vascular  Capillary refills: < 3 seconds  The left DP pulse is 2+  Assign Risk Category:  No deformity present; No loss of protective sensation;  No weak pulses       Risk: 0

## 2021-06-07 NOTE — PATIENT INSTRUCTIONS
Diabetic Eye exam done about 1 month ago at [facility]- will call for report  4 month [facility]  Continue follow up with Endocrinology as scheduled  Call for any problems/concerns  Trazadone 50-150mg at bedtime 1 hour before you want to fall asleep

## 2021-06-08 ENCOUNTER — VBI (OUTPATIENT)
Dept: ADMINISTRATIVE | Facility: OTHER | Age: 41
End: 2021-06-08

## 2021-06-08 NOTE — TELEPHONE ENCOUNTER
Pt did make contact with [employer] staff    ( She shared that she is having difficulty with her transmitter connecting to pump and was going to call to have it replaced  She declined in-person meeting at this time as she has 6 appointments this week  She was willing to connect with a follow up phone call at the end of the week  Will update you after our call  )  This information from Amberly Bullock

## 2021-06-09 DIAGNOSIS — E10.65 TYPE 1 DIABETES MELLITUS WITH HYPERGLYCEMIA (HCC): ICD-10-CM

## 2021-06-09 RX ORDER — LANCETS
EACH MISCELLANEOUS
Qty: 102 EACH | Refills: 0 | Status: SHIPPED | OUTPATIENT
Start: 2021-06-09

## 2021-06-17 NOTE — TELEPHONE ENCOUNTER
Per Eileen from [employer]:   Stephanie Cantu assured me things were back on track  I told her her phone anders was not uploading to [facility] [email or web address]    She was going to work on that as she was seeing reconnecting  on her phone screen

## 2021-08-03 ENCOUNTER — TELEPHONE (OUTPATIENT)
Dept: FAMILY MEDICINE CLINIC | Facility: CLINIC | Age: 41
End: 2021-08-03

## 2021-08-03 DIAGNOSIS — E03.8 HYPOTHYROIDISM DUE TO HASHIMOTO'S THYROIDITIS: ICD-10-CM

## 2021-08-03 DIAGNOSIS — E06.3 HYPOTHYROIDISM DUE TO HASHIMOTO'S THYROIDITIS: ICD-10-CM

## 2021-08-03 DIAGNOSIS — E10.65 TYPE 1 DIABETES MELLITUS WITH HYPERGLYCEMIA (HCC): Primary | ICD-10-CM

## 2021-08-03 DIAGNOSIS — E66.3 OVERWEIGHT WITH BODY MASS INDEX (BMI) OF 26 TO 26.9 IN ADULT: ICD-10-CM

## 2021-08-03 DIAGNOSIS — E55.9 VITAMIN D DEFICIENCY: ICD-10-CM

## 2021-08-03 DIAGNOSIS — R53.83 OTHER FATIGUE: ICD-10-CM

## 2021-08-03 NOTE — TELEPHONE ENCOUNTER
Patient called and is asking for lab work for Iron levels    She is feeling tired would like to get it check out  She stated she is anemic      Please advise    Phone [phone]

## 2021-08-04 NOTE — TELEPHONE ENCOUNTER
Orders placed  Attempted to contact pt to inform  No answer   Left message for her to call the office back

## 2021-08-04 NOTE — TELEPHONE ENCOUNTER
Sure, also add HgA1c, CMP, CBC, Iron Panel, and Vitamin D, and TSH  Please ask her to get her DM eye exam before the end of August/September- if she did get it please find out where  Thanks!

## 2021-08-05 ENCOUNTER — APPOINTMENT (OUTPATIENT)
Dept: LAB | Facility: CLINIC | Age: 41
End: 2021-08-05
Payer: COMMERCIAL

## 2021-08-05 DIAGNOSIS — E10.65 TYPE 1 DIABETES MELLITUS WITH HYPERGLYCEMIA (HCC): ICD-10-CM

## 2021-08-05 DIAGNOSIS — E06.3 HYPOTHYROIDISM DUE TO HASHIMOTO'S THYROIDITIS: ICD-10-CM

## 2021-08-05 DIAGNOSIS — E55.9 VITAMIN D DEFICIENCY: ICD-10-CM

## 2021-08-05 DIAGNOSIS — E03.8 HYPOTHYROIDISM DUE TO HASHIMOTO'S THYROIDITIS: ICD-10-CM

## 2021-08-05 DIAGNOSIS — E66.3 OVERWEIGHT WITH BODY MASS INDEX (BMI) OF 26 TO 26.9 IN ADULT: ICD-10-CM

## 2021-08-05 DIAGNOSIS — R53.83 OTHER FATIGUE: ICD-10-CM

## 2021-08-05 LAB
25(OH)D3 SERPL-MCNC: 39.9 NG/ML (ref 30–100)
ALBUMIN SERPL BCP-MCNC: 3.3 G/DL (ref 3.5–5)
ALP SERPL-CCNC: 124 U/L (ref 46–116)
ALT SERPL W P-5'-P-CCNC: 37 U/L (ref 12–78)
ANION GAP SERPL CALCULATED.3IONS-SCNC: 7 MMOL/L (ref 4–13)
AST SERPL W P-5'-P-CCNC: 32 U/L (ref 5–45)
BASOPHILS # BLD AUTO: 0.03 THOUSANDS/ΜL (ref 0–0.1)
BASOPHILS NFR BLD AUTO: 1 % (ref 0–1)
BILIRUB SERPL-MCNC: 0.52 MG/DL (ref 0.2–1)
BUN SERPL-MCNC: 9 MG/DL (ref 5–25)
CALCIUM ALBUM COR SERPL-MCNC: 9.2 MG/DL (ref 8.3–10.1)
CALCIUM SERPL-MCNC: 8.6 MG/DL (ref 8.3–10.1)
CHLORIDE SERPL-SCNC: 101 MMOL/L (ref 100–108)
CO2 SERPL-SCNC: 26 MMOL/L (ref 21–32)
CREAT SERPL-MCNC: 0.78 MG/DL (ref 0.6–1.3)
EOSINOPHIL # BLD AUTO: 0.07 THOUSAND/ΜL (ref 0–0.61)
EOSINOPHIL NFR BLD AUTO: 2 % (ref 0–6)
ERYTHROCYTE [DISTWIDTH] IN BLOOD BY AUTOMATED COUNT: 15.9 % (ref 11.6–15.1)
EST. AVERAGE GLUCOSE BLD GHB EST-MCNC: 240 MG/DL
FERRITIN SERPL-MCNC: 6 NG/ML (ref 8–388)
GFR SERPL CREATININE-BSD FRML MDRD: 96 ML/MIN/1.73SQ M
GLUCOSE P FAST SERPL-MCNC: 144 MG/DL (ref 65–99)
HBA1C MFR BLD: 10 %
HCT VFR BLD AUTO: 36.3 % (ref 34.8–46.1)
HGB BLD-MCNC: 11 G/DL (ref 11.5–15.4)
IMM GRANULOCYTES # BLD AUTO: 0.01 THOUSAND/UL (ref 0–0.2)
IMM GRANULOCYTES NFR BLD AUTO: 0 % (ref 0–2)
IRON SATN MFR SERPL: 9 %
IRON SERPL-MCNC: 41 UG/DL (ref 50–170)
LYMPHOCYTES # BLD AUTO: 1.41 THOUSANDS/ΜL (ref 0.6–4.47)
LYMPHOCYTES NFR BLD AUTO: 47 % (ref 14–44)
MCH RBC QN AUTO: 25.3 PG (ref 26.8–34.3)
MCHC RBC AUTO-ENTMCNC: 30.3 G/DL (ref 31.4–37.4)
MCV RBC AUTO: 84 FL (ref 82–98)
MONOCYTES # BLD AUTO: 0.37 THOUSAND/ΜL (ref 0.17–1.22)
MONOCYTES NFR BLD AUTO: 12 % (ref 4–12)
NEUTROPHILS # BLD AUTO: 1.14 THOUSANDS/ΜL (ref 1.85–7.62)
NEUTS SEG NFR BLD AUTO: 38 % (ref 43–75)
NRBC BLD AUTO-RTO: 0 /100 WBCS
PLATELET # BLD AUTO: 239 THOUSANDS/UL (ref 149–390)
PMV BLD AUTO: 11 FL (ref 8.9–12.7)
POTASSIUM SERPL-SCNC: 4.1 MMOL/L (ref 3.5–5.3)
PROT SERPL-MCNC: 7.2 G/DL (ref 6.4–8.2)
RBC # BLD AUTO: 4.34 MILLION/UL (ref 3.81–5.12)
SODIUM SERPL-SCNC: 134 MMOL/L (ref 136–145)
T4 FREE SERPL-MCNC: 1.01 NG/DL (ref 0.76–1.46)
TIBC SERPL-MCNC: 441 UG/DL (ref 250–450)
TSH SERPL DL<=0.05 MIU/L-ACNC: 15.2 UIU/ML (ref 0.36–3.74)
WBC # BLD AUTO: 3.03 THOUSAND/UL (ref 4.31–10.16)

## 2021-08-05 PROCEDURE — 84443 ASSAY THYROID STIM HORMONE: CPT

## 2021-08-05 PROCEDURE — 85025 COMPLETE CBC W/AUTO DIFF WBC: CPT

## 2021-08-05 PROCEDURE — 83540 ASSAY OF IRON: CPT

## 2021-08-05 PROCEDURE — 82306 VITAMIN D 25 HYDROXY: CPT

## 2021-08-05 PROCEDURE — 82728 ASSAY OF FERRITIN: CPT

## 2021-08-05 PROCEDURE — 83550 IRON BINDING TEST: CPT

## 2021-08-05 PROCEDURE — 84439 ASSAY OF FREE THYROXINE: CPT

## 2021-08-05 PROCEDURE — 80053 COMPREHEN METABOLIC PANEL: CPT

## 2021-08-05 PROCEDURE — 83036 HEMOGLOBIN GLYCOSYLATED A1C: CPT

## 2021-08-05 PROCEDURE — 36415 COLL VENOUS BLD VENIPUNCTURE: CPT

## 2021-08-06 ENCOUNTER — TELEPHONE (OUTPATIENT)
Dept: HEMATOLOGY ONCOLOGY | Facility: HOSPITAL | Age: 41
End: 2021-08-06

## 2021-08-06 ENCOUNTER — TELEPHONE (OUTPATIENT)
Dept: HEMATOLOGY ONCOLOGY | Facility: CLINIC | Age: 41
End: 2021-08-06

## 2021-08-06 DIAGNOSIS — D50.8 OTHER IRON DEFICIENCY ANEMIA: Primary | ICD-10-CM

## 2021-08-06 PROBLEM — D50.9 IRON DEFICIENCY ANEMIA, UNSPECIFIED: Status: ACTIVE | Noted: 2021-08-06

## 2021-08-06 RX ORDER — SODIUM CHLORIDE 9 MG/ML
20 INJECTION, SOLUTION INTRAVENOUS ONCE
Status: CANCELLED | OUTPATIENT
Start: 2021-08-12

## 2021-08-06 NOTE — TELEPHONE ENCOUNTER
LVM to patient in regards to scheduling an appointment with Dr Devin Vazquez  Informed patient that I spoke with the nurse Chris Merchant and she states that she is going to schedule the patient for IV Iron and we will give her a call to schedule that  Informed patient that she will need to have lab work a week prior to her appointment in October  Informed patient to give the office a call back at [phone] to schedule an appointment for October

## 2021-08-06 NOTE — TELEPHONE ENCOUNTER
Patient returned my call  Scheduled pt for IV Iron at the [address] [place] [address]    08/16/21 @ 2:30 pm  08/23/21 @ 9:00 am  08/31/21 @ 1:00 pm  09/07/21 @ 12:30 pm  09/14/21 @ 8:30 am    Scheduled 2-3 mo f/u apt for Tuesday 10/12/21 at 3:00 pm w/Dr Rand Coronel at the [facility]

## 2021-08-06 NOTE — TELEPHONE ENCOUNTER
Called pt and left message asking pt to call the office so we can schedule IV IRON  Need to know which infusion center pt would like to go to, and days/times that work best     Asked pt to call [phone]

## 2021-08-06 NOTE — TELEPHONE ENCOUNTER
Patient calling about needing IV iron infusions  Patient was told by her PCP to reach out to Hematology  Patient does see Dr Edd Luong were drawn yesterday  She is looking for an appointment ASAP to see Yvette Pagan or Dr Corinne Greenland Enola Muster has no managed IV iron for patient    She states that a "few years ago" she saw another MD who ordered IV iron  Will send to [place] to see when soonest appointment will be available with MD/NP    Call back number for patient [phone]

## 2021-08-09 ENCOUNTER — VBI (OUTPATIENT)
Dept: ADMINISTRATIVE | Facility: OTHER | Age: 41
End: 2021-08-09

## 2021-08-11 ENCOUNTER — TELEPHONE (OUTPATIENT)
Dept: PSYCHIATRY | Facility: CLINIC | Age: 41
End: 2021-08-11

## 2021-08-11 NOTE — TELEPHONE ENCOUNTER
From: [email or web address] <[email or web address]>  Sent: Friday, August 6, 2021 3:14 PM  To: [employer] <[email or web address]  Subject: [EXTERNAL] [facility] - Contact Us      WARNING! Based upon the critical issue with [employer] targeting Healthcare systems ALL attachments and links from this email should be heavily scrutinized.  First Name: Clarissa   Last Name: Jon  Email: [email or web address]  Phone: [phone]   Leave Message: Yes, it's ok to leave a message.   Do you have a family doctor? Yes   Zip: [phone]     What is your reason for contacting us? Depression and anxiety

## 2021-08-16 ENCOUNTER — HOSPITAL ENCOUNTER (OUTPATIENT)
Dept: INFUSION CENTER | Facility: HOSPITAL | Age: 41
Discharge: HOME | End: 2021-08-16
Attending: INTERNAL MEDICINE
Payer: COMMERCIAL

## 2021-08-16 VITALS
OXYGEN SATURATION: 100 % | DIASTOLIC BLOOD PRESSURE: 76 MMHG | TEMPERATURE: 99.1 F | RESPIRATION RATE: 18 BRPM | SYSTOLIC BLOOD PRESSURE: 140 MMHG | HEART RATE: 80 BPM

## 2021-08-16 DIAGNOSIS — D50.8 OTHER IRON DEFICIENCY ANEMIA: Primary | ICD-10-CM

## 2021-08-16 PROCEDURE — 96365 THER/PROPH/DIAG IV INF INIT: CPT

## 2021-08-16 RX ORDER — SODIUM CHLORIDE 9 MG/ML
20 INJECTION, SOLUTION INTRAVENOUS ONCE
Status: CANCELLED | OUTPATIENT
Start: 2021-08-23

## 2021-08-16 RX ORDER — SODIUM CHLORIDE 9 MG/ML
20 INJECTION, SOLUTION INTRAVENOUS ONCE
Status: COMPLETED | OUTPATIENT
Start: 2021-08-16 | End: 2021-08-16

## 2021-08-16 RX ADMIN — SODIUM CHLORIDE 20 ML/HR: 0.9 INJECTION, SOLUTION INTRAVENOUS at 10:39

## 2021-08-16 RX ADMIN — IRON SUCROSE 200 MG: 20 INJECTION, SOLUTION INTRAVENOUS at 10:41

## 2021-08-23 ENCOUNTER — HOSPITAL ENCOUNTER (OUTPATIENT)
Dept: INFUSION CENTER | Facility: HOSPITAL | Age: 41
Discharge: HOME | End: 2021-08-23
Attending: INTERNAL MEDICINE
Payer: COMMERCIAL

## 2021-08-23 ENCOUNTER — OFFICE VISIT (OUTPATIENT)
Dept: URGENT CARE | Facility: CLINIC | Age: 41
End: 2021-08-23
Payer: COMMERCIAL

## 2021-08-23 ENCOUNTER — APPOINTMENT (OUTPATIENT)
Dept: RADIOLOGY | Facility: CLINIC | Age: 41
End: 2021-08-23
Payer: COMMERCIAL

## 2021-08-23 VITALS
SYSTOLIC BLOOD PRESSURE: 125 MMHG | RESPIRATION RATE: 18 BRPM | DIASTOLIC BLOOD PRESSURE: 75 MMHG | TEMPERATURE: 97.9 F | OXYGEN SATURATION: 100 % | HEART RATE: 80 BPM

## 2021-08-23 VITALS
OXYGEN SATURATION: 100 % | DIASTOLIC BLOOD PRESSURE: 75 MMHG | HEIGHT: 66 IN | RESPIRATION RATE: 18 BRPM | WEIGHT: 170 LBS | BODY MASS INDEX: 27.32 KG/M2 | SYSTOLIC BLOOD PRESSURE: 129 MMHG | HEART RATE: 78 BPM | TEMPERATURE: 97.7 F

## 2021-08-23 DIAGNOSIS — S69.92XA INJURY OF FINGER OF LEFT HAND, INITIAL ENCOUNTER: ICD-10-CM

## 2021-08-23 DIAGNOSIS — S62.633A CLOSED DISPLACED FRACTURE OF DISTAL PHALANX OF LEFT MIDDLE FINGER, INITIAL ENCOUNTER: Primary | ICD-10-CM

## 2021-08-23 DIAGNOSIS — S61.309A COMPLETE AVULSION OF FINGERNAIL, INITIAL ENCOUNTER: ICD-10-CM

## 2021-08-23 DIAGNOSIS — D50.8 OTHER IRON DEFICIENCY ANEMIA: Primary | ICD-10-CM

## 2021-08-23 PROCEDURE — 99214 OFFICE O/P EST MOD 30 MIN: CPT | Performed by: PHYSICIAN ASSISTANT

## 2021-08-23 PROCEDURE — 29130 APPL FINGER SPLINT STATIC: CPT | Performed by: PHYSICIAN ASSISTANT

## 2021-08-23 PROCEDURE — 96365 THER/PROPH/DIAG IV INF INIT: CPT

## 2021-08-23 PROCEDURE — 73140 X-RAY EXAM OF FINGER(S): CPT

## 2021-08-23 RX ORDER — SODIUM CHLORIDE 9 MG/ML
20 INJECTION, SOLUTION INTRAVENOUS ONCE
Status: CANCELLED | OUTPATIENT
Start: 2021-08-30

## 2021-08-23 RX ORDER — SODIUM CHLORIDE 9 MG/ML
20 INJECTION, SOLUTION INTRAVENOUS ONCE
Status: COMPLETED | OUTPATIENT
Start: 2021-08-23 | End: 2021-08-23

## 2021-08-23 RX ORDER — CLONAZEPAM 1 MG/1
1 TABLET ORAL DAILY
COMMUNITY
Start: 2021-07-28 | End: 2022-04-05

## 2021-08-23 RX ADMIN — SODIUM CHLORIDE 200 MG: 9 INJECTION, SOLUTION INTRAVENOUS at 09:45

## 2021-08-23 RX ADMIN — SODIUM CHLORIDE 20 ML/HR: 0.9 INJECTION, SOLUTION INTRAVENOUS at 09:42

## 2021-08-23 NOTE — PROGRESS NOTES
[facility]        NAME: Tyra Sanders is a 46 y o  female  : 1980    MRN: [id number]  DATE: 2021  TIME: 12:17 PM    Assessment and Plan   Closed displaced fracture of distal phalanx of left middle finger, initial encounter [M60 814A]  1  Closed displaced fracture of distal phalanx of left middle finger, initial encounter  Ambulatory referral to Hand Surgery    Orthopedic injury treatment   2  Injury of finger of left hand, initial encounter  XR finger left third digit-middle   3  Complete avulsion of fingernail, initial encounter       XR: L 3rd displaced tuft fracture  Patient Instructions     Wash with soap and water, apply topical antibiotic ointment and change bandage 2 times per day   Finger splint as needed for support  Tylenol or Ibuprofen for pain as needed  Watch for signs of infection  Follow up with hand specialist  Follow up with PCP in 3-5 days  Go to ED if symptoms worsen    Discussed risk of immobilization with patient  Remove splint/brace and go straight to ER if you experience sudden increase in pain, swelling, change in color/temperature/sensation  Patient verbalized understanding  Chief Complaint     Chief Complaint   Patient presents with    Finger Injury     Patient c/o left middle finger pain due to crush injury a week ago  History of Present Illness       Patient slammed L middle finger in metal door x last week  States her fingernail came off  She was seen at an Urgent Care but states an XR was not performed  Last tetanus 2016  She has been washing it with soap/water, applying neosporin and changing the bandage daily  No fever, chills, discharge  Area is very painful  Review of Systems   Review of Systems   Constitutional: Negative for chills and fever  Musculoskeletal: Positive for joint swelling  Skin: Positive for wound  Neurological: Negative for numbness           Current Medications       Current Outpatient Medications:   Accu-Chek FastClix Lancets MISC, USE 1 LANCET TO TEST BLOOD SUGAR UP TO 6 TIMES DAILY, Disp: 102 each, Rfl: 0    acetaminophen (TYLENOL) 500 mg tablet, , Disp: , Rfl:     clonazePAM (KlonoPIN) 1 mg tablet, Take 1 tablet by mouth 2 (two) times a day, Disp: , Rfl:     Cyanocobalamin (B-12 PO), Take by mouth daily, Disp: , Rfl:     ergocalciferol (VITAMIN D2) 50,000 units, Take 1 capsule (50,000 Units total) by mouth once a week, Disp: 12 capsule, Rfl: 3    escitalopram (LEXAPRO) 20 mg tablet, Take 20 mg by mouth daily, Disp: , Rfl:     gabapentin (NEURONTIN) 300 mg capsule, Take 300-600 mg by mouth daily at bedtime, Disp: , Rfl:     gabapentin (NEURONTIN) 600 MG tablet, , Disp: , Rfl:     glucose blood (Accu-Chek Guide) test strip, Use to test blood sugar up to 6 times daily  , Disp: 200 each, Rfl: 2    insulin aspart (NovoLOG) 100 units/mL injection, Inject daily into pump  Max daily dose 50 units  , Disp: 40 mL, Rfl: 2    Insulin Pen Needle (BD Pen Needle Deanna U/F) 32G X 4 MM MISC, Use 4/day, Disp: 100 each, Rfl: 3    Insulin Syringe-Needle U-100 (INSULIN SYRINGE 1CC/28G) 28G X 1/2" 1 ML MISC, 4 injections daily E10 65, Disp: , Rfl:     Lantus SoloStar 100 units/mL injection pen, INJECT 36 UNITS UNDER THE SKIN DAILY AT BEDTIME, Disp: 15 mL, Rfl: 5    levothyroxine 200 mcg tablet, Take 1 tablet (200 mcg total) by mouth daily in the early morning, Disp: 90 tablet, Rfl: 3    levothyroxine 75 mcg tablet, , Disp: , Rfl:     LORazepam (ATIVAN) 0 5 mg tablet, Take 0 5 mg by mouth , Disp: , Rfl:     PATIENT MAINTAINED INSULIN PUMP, Inject 1 each under the skin every 8 (eight) hours, Disp: 1 each, Rfl: 0    sertraline (ZOLOFT) 50 mg tablet, Take 1 tablet by mouth daily, Disp: , Rfl:     traZODone (DESYREL) 50 mg tablet, 1-3 tablets at bedtime 1 hour before you want to fall asleep, Disp: 90 tablet, Rfl: 3    ARIPiprazole (ABILIFY) 5 mg tablet, Take 5 mg by mouth daily  (Patient not taking: Reported on 2021), Disp: , Rfl:     atomoxetine (STRATTERA) 80 MG capsule, Take 80 mg by mouth daily  (Patient not taking: Reported on 2021), Disp: , Rfl:     hydrOXYzine pamoate (VISTARIL) 50 mg capsule, Take 50 mg by mouth daily at bedtime  (Patient not taking: Reported on 2021), Disp: , Rfl:     melatonin 3 mg, 3 mg daily at bedtime  (Patient not taking: Reported on 2021), Disp: , Rfl:     methocarbamol (ROBAXIN) 750 mg tablet, Take 750 mg by mouth daily at bedtime  (Patient not taking: Reported on 2021), Disp: , Rfl:     naltrexone (REVIA) 50 mg tablet, Take 50 mg by mouth daily at bedtime  (Patient not taking: Reported on 2021), Disp: , Rfl:     selenium 200 mcg, Take 200 mcg by mouth daily  (Patient not taking: Reported on 2021), Disp: , Rfl:     venlafaxine (EFFEXOR-XR) 75 mg 24 hr capsule, Take 75 mg by mouth daily (Patient not taking: Reported on 2021), Disp: , Rfl:     vitamin B-12 (VITAMIN B-12) 500 mcg tablet, Take 500 mcg by mouth daily (Patient not taking: Reported on 2021), Disp: , Rfl:   No current facility-administered medications for this visit      Current Allergies     Allergies as of 2021    (No Known Allergies)            The following portions of the patient's history were reviewed and updated as appropriate: allergies, current medications, past family history, past medical history, past social history, past surgical history and problem list      Past Medical History:   Diagnosis Date    Anemia     Anxiety     B12 deficiency     Cervical disc disorder     On gabapentin     Depression     Disease of thyroid gland     hypothyroid    Iron deficiency anemia     Type 1 diabetes ([address] )     Vitamin D deficiency        Past Surgical History:   Procedure Laterality Date    ABDOMINAL SURGERY      gastric bypass     SECTION      x2    CHOLECYSTECTOMY  2018    GASTRIC BYPASS  2007    INTRAUTERINE DEVICE INSERTION  2015    IR BIOPSY BONE MARROW  8/27/2020    OTHER SURGICAL HISTORY      surgery for imperforate hymen/endometriosis/hydrometrocolpos    TUBAL LIGATION      WISDOM TOOTH EXTRACTION         Family History   Problem Relation Age of Onset    Thyroid disease Mother     URIEL disease Mother     Hyperlipidemia Mother     Hypertension Mother     Osteoarthritis Mother     Thyroid disease unspecified Mother     Diabetes Father     Alcohol abuse Father     Diabetes type I Father     Thyroid disease Sister     Depression Sister     Thyroid disease unspecified Sister     Heart disease Maternal Grandmother     Hypertension Maternal Grandmother     Arthritis Maternal Grandmother     Diabetes type I Maternal Uncle     Diabetes type I Maternal Grandfather          Medications have been verified  Objective   /75   Pulse 78   Temp 97 7 °F (36 5 °C) (Temporal)   Resp 18   Ht 5' 6" (1 676 m)   Wt 77 1 kg (170 lb)   SpO2 100%   BMI 27 44 kg/m²   No LMP recorded  Physical Exam     Physical Exam  Constitutional:       General: She is not in acute distress  Appearance: She is well-developed  Cardiovascular:      Rate and Rhythm: Normal rate and regular rhythm  Pulses: Normal pulses  Heart sounds: Normal heart sounds  No murmur heard  No friction rub  No gallop  Pulmonary:      Effort: Pulmonary effort is normal  No respiratory distress  Breath sounds: Normal breath sounds  No wheezing or rales  Chest:      Chest wall: No tenderness  Musculoskeletal:         General: Tenderness present  Comments: L 3rd distal fingertip with complete fingernail avulsion and TTP  Unable to flex DIP secondary to pain  Skin:     General: Skin is warm  Neurological:      Mental Status: She is alert  Sensory: No sensory deficit  Psychiatric:         Behavior: Behavior normal          Thought Content:  Thought content normal          Judgment: Judgment normal                  Area was cleaned with betadine saline solution  Petroleum gauze applied with non-adherent pad and coban  Orthopedic injury treatment    Date/Time: 8/23/2021 11:55 AM  Performed by: Alea Alexandre PA-C  Authorized by: Alea Alexandre PA-C     Verbal consent obtained?: Yes    Risks and benefits: Risks, benefits and alternatives were discussed    Consent given by:  Patient  Patient identity confirmed:  Verbally with patient  Injury location: L 3rd finger    Neurovascular status: Neurovascularly intact    Distal perfusion: normal    Neurological function: normal    Range of motion: reduced    Immobilization:  Splint  Splint type:  Finger splint, static  Supplies used:  Aluminum splint  Neurovascular status: Neurovascularly intact    Distal perfusion: normal    Neurological function: normal    Range of motion: unchanged    Patient tolerance:  Patient tolerated the procedure well with no immediate complications

## 2021-08-23 NOTE — PATIENT INSTRUCTIONS
Wash with soap and water, apply topical antibiotic ointment and change bandage 2 times per day  Finger splint as needed for support  Tylenol or Ibuprofen for pain as needed  Watch for signs of infection  Follow up with PCP in 3-5 days  Go to ED if symptoms worsen    Discussed risk of immobilization with patient  Remove splint/brace and go straight to ER if you experience sudden increase in pain, swelling, change in color/temperature/sensation  Patient verbalized understanding  Nail Avulsion   WHAT YOU NEED TO KNOW:   Nail avulsion is when part or all of a nail is torn away or removed from the nail bed  Avulsion may happen on your finger or toe  Common causes include ingrown nail, injury, or infection  The nail bed will form a hard layer and then a new nail may grow  The nail bed will be sensitive until the hard layer forms  You will need to keep it covered to prevent infection or more injury  You may need to care for your nail area for several months as the new nail grows  DISCHARGE INSTRUCTIONS:   Return to the emergency department if:   · Blood soaks through your bandage  · You have a red streak running up your leg or arm  Contact your healthcare provider if:   · You have a fever or chills  · Your injured area is red, swollen, or draining pus  · You have new or worsening pain, or pain that does not get better with medicine  · You have questions or concerns about your condition or care  Medicines:   · Antibiotics  may help treat or prevent a bacterial infection  · Acetaminophen  decreases pain and fever  It is available without a doctor's order  Ask how much to take and how often to take it  Follow directions  Acetaminophen can cause liver damage if not taken correctly  · NSAIDs , such as ibuprofen, help decrease swelling, pain, and fever  This medicine is available with or without a doctor's order  NSAIDs can cause stomach bleeding or kidney problems in certain people   If you take blood thinner medicine, always ask your healthcare provider if NSAIDs are safe for you  Always read the medicine label and follow directions  · Take your medicine as directed  Contact your healthcare provider if you think your medicine is not helping or if you have side effects  Tell him or her if you are allergic to any medicine  Keep a list of the medicines, vitamins, and herbs you take  Include the amounts, and when and why you take them  Bring the list or the pill bottles to follow-up visits  Carry your medicine list with you in case of an emergency  Self-care:   · Keep your nail area clean, dry, and covered  When you are allowed to bathe, carefully wash the area with soap and water  Put on a clean, new bandage  Do not use an adhesive bandage  It may stick to the wound and cause pain when you remove it  Ask your healthcare provider what kind of bandage to use  Change your bandage when it gets wet or dirty  Your healthcare provider may suggest that you change the bandage every 24 hours for the first few days  · Elevate  your hand or foot above the level of your heart as often as you can for 24 hours  This will help decrease swelling and pain  Prop your hand or foot on pillows or blankets to keep it elevated comfortably  · Apply ice  on your wound area for 15 to 20 minutes every hour or as directed  Use an ice pack, or put crushed ice in a plastic bag  Cover it with a towel  Ice helps prevent tissue damage and decreases swelling and pain  · Do not wear tight shoes  or shoes that do not fit well  Do not wear tights or pantyhose  Wear cotton socks  · Ask  when you can return to work, school, or your usual sports and activities  Follow up with your healthcare provider as directed: You may be referred to a hand or foot specialist  Write down your questions so you remember to ask them during your visits     © Copyright [employer] 2021 Information is for End User's use only and may not be sold, redistributed or otherwise used for commercial purposes  All illustrations and images included in CareNotes® are the copyrighted property of [employer] M [employer] Inc  or Sabrina Levine  The above information is an  only  It is not intended as medical advice for individual conditions or treatments  Talk to your doctor, nurse or pharmacist before following any medical regimen to see if it is safe and effective for you  Finger Fracture   WHAT YOU NEED TO KNOW:   A finger fracture is a break in one or more of the bones in your finger  DISCHARGE INSTRUCTIONS:   Return to the emergency department if:   · Your cast or splint gets wet, damaged, or comes off  · Your splint or cast feels too tight  · You have severe pain  · Your injured finger is numb, cold, or pale  Call your doctor or hand specialist if:   · Your pain or swelling gets worse, even after treatment  · You have questions or concerns about your condition or care  Medicines: You may need any of the following:  · NSAIDs , such as ibuprofen, help decrease swelling, pain, and fever  This medicine is available with or without a doctor's order  NSAIDs can cause stomach bleeding or kidney problems in certain people  If you take blood thinner medicine, always ask your healthcare provider if NSAIDs are safe for you  Always read the medicine label and follow directions  · Acetaminophen  decreases pain and fever  It is available without a doctor's order  Ask how much to take and how often to take it  Follow directions  Read the labels of all other medicines you are using to see if they also contain acetaminophen, or ask your doctor or pharmacist  Acetaminophen can cause liver damage if not taken correctly  Do not use more than 4 grams (4,000 milligrams) total of acetaminophen in one day  · Prescription pain medicine  may be given  Ask your healthcare provider how to take this medicine safely   Some prescription pain medicines contain acetaminophen  Do not take other medicines that contain acetaminophen without talking to your healthcare provider  Too much acetaminophen may cause liver damage  Prescription pain medicine may cause constipation  Ask your healthcare provider how to prevent or treat constipation  · Take your medicine as directed  Contact your healthcare provider if you think your medicine is not helping or if you have side effects  Tell him or her if you are allergic to any medicine  Keep a list of the medicines, vitamins, and herbs you take  Include the amounts, and when and why you take them  Bring the list or the pill bottles to follow-up visits  Carry your medicine list with you in case of an emergency  Self-care:   · Wear your splint as directed  Do not remove your splint until you follow up with your healthcare provider or hand specialist     · Apply ice  on your finger for 15 to 20 minutes every hour or as directed  Use an ice pack, or put crushed ice in a plastic bag  Cover it with a towel before you apply it to your skin  Ice helps prevent tissue damage and decreases swelling and pain  · Elevate  your finger above the level of your heart as often as you can  This will help decrease swelling and pain  Prop your hand on pillows or blankets to keep it elevated comfortably  · Go to physical therapy as directed  A physical therapist teaches you exercises to help improve movement and strength, and to decrease pain  Follow up with your doctor or hand specialist within 2 days:  Write down your questions so you remember to ask them during your visits  © Copyright [employer] 2021 Information is for End User's use only and may not be sold, redistributed or otherwise used for commercial purposes  All illustrations and images included in CareNotes® are the copyrighted property of [employer] Inc  or Sabrina Levine  The above information is an  only   It is not intended as medical advice for individual conditions or treatments  Talk to your doctor, nurse or pharmacist before following any medical regimen to see if it is safe and effective for you

## 2021-08-27 ENCOUNTER — RA CDI HCC (OUTPATIENT)
Dept: OTHER | Facility: HOSPITAL | Age: 41
End: 2021-08-27

## 2021-08-27 NOTE — PROGRESS NOTES
Based on clinical documentation indicated in your record, it appears that the patient may have the following conditions:    F33 9 Major depressive disorder, recurrent       If this is correct, please document and assess at your next visit Sept  3rd    [facility]  coding opportunities             Chart Reviewed * (Number of) Inbasket suggestions sent to Provider: 1                  Patients insurance company: [employer] (Medicare [employer] and [employer])             [facility]  coding opportunities             Chart Reviewed * (Number of) Inbasket suggestions sent to Provider: 1               Number of suggestions NOT actually used: 1     Patients insurance company: [employer] (Medicare Advantage and [employer])     Visit status: Patient arrived for their scheduled appointment     Provider never responded to [place] [address]  coding request

## 2021-08-30 ENCOUNTER — TELEMEDICINE (OUTPATIENT)
Dept: PSYCHIATRY | Facility: CLINIC | Age: 41
End: 2021-08-30
Payer: COMMERCIAL

## 2021-08-30 ENCOUNTER — OFFICE VISIT (OUTPATIENT)
Dept: PSYCHOLOGY | Facility: CLINIC | Age: 41
End: 2021-08-30

## 2021-08-30 DIAGNOSIS — F32.A ANXIETY AND DEPRESSION: Primary | ICD-10-CM

## 2021-08-30 DIAGNOSIS — F41.9 ANXIETY AND DEPRESSION: Primary | ICD-10-CM

## 2021-08-30 PROCEDURE — 90792 PSYCH DIAG EVAL W/MED SRVCS: CPT | Performed by: PSYCHIATRY & NEUROLOGY

## 2021-08-30 RX ORDER — BUSPIRONE HYDROCHLORIDE 10 MG/1
10 TABLET ORAL 2 TIMES DAILY
Qty: 60 TABLET | Refills: 1 | Status: SHIPPED | OUTPATIENT
Start: 2021-08-30 | End: 2022-04-05

## 2021-08-30 RX ORDER — TRAZODONE HYDROCHLORIDE 100 MG/1
100 TABLET ORAL
COMMUNITY
Start: 2021-08-12

## 2021-08-30 RX ORDER — ALPRAZOLAM 0.5 MG/1
0.5 TABLET ORAL DAILY PRN
COMMUNITY
Start: 2021-07-28
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
## 2021-08-30 NOTE — PSYCH
Subjective:     Patient ID: Blair Nelson is a 46 y o  female  [facility] Clinical Progress Notes      Specialized Services Documentation  Therapist must complete separate progress note for each specific clinical activity in which the individual participated during the day  Case Management Note       ([id number]) Met with Clarissa Webb via TEAMS  Reviewed program, initial paperwork reviewed: Consent for Treatment, PHP handbook, HIPPA, General Consent, Client Bill of Rights, and Smoking/Drug and Alcohol Policy  Release of Information obtained for emergency contact - Lindsay Banuelos (mother) and Niyah Peñaloza (boyfriend) and PCP and Health Care Coordination Form  Karen Webb has hard copies of all paperwork and verbally gave consent  Reviewed and given on call number  PCP notified of admission and health care coordination form sent  Completed initial psycho-social evaluation and initial treatment goals discussed  Pepe Knutson RN, BSN    Current suicide risk : Low     Medications changes/added/denied? Yes     Treatment session number: intake only    Individual Case Management Visit provided today?  Yes    [facility] follow up physician's orders: See Dr Naomie Cam evaluation

## 2021-08-30 NOTE — PSYCH
Virtual Regular Visit    Verification of patient location:    Patient is located in the following state in which I hold an active license [place]      Assessment/Plan:    Problem List Items Addressed This Visit        Other    Anxiety and depression - Primary          Goals addressed in session:           Reason for visit is PHP VIRTUAL GROUP DUE TO COVID-19      Encounter provider [address] PARTIAL [address]    Provider located at [address]  [address] [facility] [id number]      Recent Visits  No visits were found meeting these conditions  Showing recent visits within past 7 days and meeting all other requirements  Today's Visits  Date Type Provider Dept   08/30/21 Office Visit [address] PARTIAL PSYCH GROUP THERAPY [facility]   Showing today's visits and meeting all other requirements  Future Appointments  No visits were found meeting these conditions  Showing future appointments within next 150 days and meeting all other requirements       The patient was identified by name and date of birth  Eli Oneill was informed that this is a telemedicine visit and that the visit is being conducted throughMicrosoft Teams and patient was informed that this is a secure, HIPAA-compliant platform  She agrees to proceed     My office door was closed  No one else was in the room  She acknowledged consent and understanding of privacy and security of the video platform  The patient has agreed to participate and understands they can discontinue the visit at any time  Patient is aware this is a billable service  Subjective  Eli Oneill is a 46 y o  female          HPI     Past Medical History:   Diagnosis Date    Anemia     Anxiety     B12 deficiency     Cervical disc disorder     On gabapentin     Depression     Disease of thyroid gland     hypothyroid    Iron deficiency anemia     Panic attack     PTSD (post-traumatic stress disorder)     Sleep difficulties     [facility]     [facility]     Vitamin D deficiency        Past Surgical History:   Procedure Laterality Date    ABDOMINAL SURGERY      gastric bypass     SECTION      x2    CHOLECYSTECTOMY  2018    GASTRIC BYPASS  2007    INTRAUTERINE DEVICE INSERTION  2015    IR BIOPSY BONE MARROW  2020    OTHER SURGICAL HISTORY      surgery for imperforate hymen/endometriosis/hydrometrocolpos    TUBAL LIGATION      WISDOM TOOTH EXTRACTION         Current Outpatient Medications   Medication Sig Dispense Refill    Accu-Chek FastClix Lancets MISC USE 1 LANCET TO TEST BLOOD SUGAR UP TO 6 TIMES DAILY 102 each 0    acetaminophen (TYLENOL) 500 mg tablet       ALPRAZolam (XANAX) 0 5 mg tablet Take 0 5 mg by mouth daily as needed      atomoxetine (STRATTERA) 80 MG capsule Take 80 mg by mouth daily  (Patient not taking: Reported on 2021)      busPIRone (BUSPAR) 10 mg tablet Take 1 tablet (10 mg total) by mouth 2 (two) times a day 60 tablet 1    clonazePAM (KlonoPIN) 1 mg tablet Take 1 tablet by mouth 2 (two) times a day      Cyanocobalamin (B-12 PO) Take by mouth daily      ergocalciferol (VITAMIN D2) 50,000 units Take 1 capsule (50,000 Units total) by mouth once a week 12 capsule 3    escitalopram (LEXAPRO) 20 mg tablet Take 20 mg by mouth daily      gabapentin (NEURONTIN) 600 MG tablet       glucose blood (Accu-Chek Guide) test strip Use to test blood sugar up to 6 times daily  200 each 2    insulin aspart (NovoLOG) 100 units/mL injection Inject daily into pump  Max daily dose 50 units   40 mL 2    Insulin Pen Needle (BD Pen Needle Deanna U/F) 32G X 4 MM MISC Use 4/day 100 each 3    Insulin Syringe-Needle U-100 (INSULIN SYRINGE 1CC/28G) 28G X 1/2" 1 ML MISC 4 injections daily E10 65      levothyroxine 200 mcg tablet Take 1 tablet (200 mcg total) by mouth daily in the early morning 90 tablet 3    levothyroxine 75 mcg tablet       PATIENT MAINTAINED INSULIN PUMP Inject 1 each under the skin every 8 (eight) hours 1 each 0    selenium 200 mcg Take 200 mcg by mouth daily  (Patient not taking: Reported on 8/23/2021)      sertraline (ZOLOFT) 50 mg tablet Take 1 tablet by mouth daily      traZODone (DESYREL) 100 mg tablet Take 100 mg by mouth daily at bedtime      vitamin B-12 (VITAMIN B-12) 500 mcg tablet Take 500 mcg by mouth daily (Patient not taking: Reported on 8/23/2021)       No current facility-administered medications for this visit  No Known Allergies    Review of Systems    Video Exam    There were no vitals filed for this visit  Physical Exam     I spent FOUR GROUP HOURS PLUS CASE MANAGEMENT minutes with patient today in which greater than 50% of the time was spent in counseling/coordination of care regarding PHP - SEE NOTES  VIRTUAL VISIT DISCLAIMER    Clarissa Webb verbally agrees to participate in [employer]  Pt is aware that [employer] could be limited without vital signs or the ability to perform a full hands-on physical exam  Clarissa Webb understands she or the provider may request at any time to terminate the video visit and request the patient to seek care or treatment in person

## 2021-08-30 NOTE — PSYCH
Assessment/Plan:      Diagnoses and all orders for this visit:    Anxiety and depression          Subjective:     Patient ID: Ifeoma Ramirez is a 46 y o  female  [facility] Treatment Plan   AREAS OF NEED: Anxiety as evidenced by struggling with work, familial stressors, financials, nausea, panic attacks  Date Initiated: 08/30/21     Strengths: Desire to be well, internal motivation, supportive family and boyfriend     LONG TERM GOAL:   Date Initiated: 08/30/21   1 0 I will identify two ways my anxiety is decreased in intensity and frequency  Target Date: 10/12/2021  Completion Date:     SHORT TERM OBJECTIVES:     Date Initiated: 8/30/2021  1 1 I will identify and record three mindfulness and/or self-monitoring strategies that I can utilize on a daily basis to track my moods  Revision Date:   Target Date: 09/10/2021  Completion Date:     Date Initiated: 08/30/21   1 2 I will learn and engage in a daily calming/relaxation skill (e g , applied relaxation, progressive muscle relaxation, cue controlled relaxation; mindful breathing; biofeedback) to manage my anxiety symptoms  Revision Date:   Target Date: 09/10/2021  Completion Date:     Date Initiated: 08/30/21   1 3 I will take medications as prescribed and share questions and concerns if arise  Revision Date  Target Date: 09/10/2021  Completion Date:      Date Initiated: 08/30/21   1 4 I will identify 3 ways my supports can assist in my recovery and agree to staff/support contact as indicated  Revision Date:  Target Date: 09/10/2021  Completion Date:           7 DAY REVISION:    Date Initiated:  Revision Date:   Target Date:   Completion Date:    DUE TO COVID-19, CURRENTLY ALL INTERVENTIONS ARE BEING OFFERED VIRTUALLY       PSYCHIATRY:  Date Initiated: 08/30/21   Medication Management and Education       Revision Date:   The person(s) responsible for carrying out the plan is Jimbo Davis MD     NURSING/SYMPTOM EDUCATION:  Date Initiated: 08/30/21 1  1, 1 2  1 3, 1 4 This RN or Therapist will provide wellness/symptoms and skill education groups three to five days weekly to educate on signs and symptoms of diagnoses, skills to manage, and medication questions that will be addressed by the treatment team     Revision date: The person(s) responsible for carrying out the plan is IWLMER Soria     PSYCHOLOGY:   Date Initiated: 08/30/21        1 1, 1 2, 1 4 Provide psychotherapy group 5 times per week to allow opportunity for Clarissa Webb  to explore stressors and ways of coping  Revision Date:   The person(s) responsible for carrying out the plan is Tg Anderson [address] [place], [place]       ALLIED THERAPY:   Date Initiated: 08/30/21   1 1,1 2 Engage Clarissa Webb  in AT group 5 times daily to encourage development and use of wellness tools to decrease symptoms and promote recovery through meaningful activity  Revision Date:   The person(s) responsible for carrying out the plan is WALDEMAR Dumont         CASE MANAGEMENT:   Date Initiated: 08/30/21       1 0 This  will meet with Alice Webb   3-4 times weekly to assess treatment progress, discharge planning, connection to community supports and UR as indicated  Revision Date:   The person(s) responsible for carrying out the plan is WILMER Soria    TREATMENT REVIEW/COMMENTS:     DISCHARGE CRITERIA:Identify 3 signs of progress and complete relapse prevention plan  DISCHARGE PLAN: Outpatient care  Estimated Length of Stay:10 treatment days           Diagnosis and Treatment Plan explained to Nolan Boyd relates understanding diagnosis and is agreeable to Treatment Plan           CLIENT COMMENTS / Please share your thoughts, feelings, need and/or experiences regarding your treatment plan: _____________________________________________________________________________________________________________________________________________________________________________________________________________________________________________________________________________________________________________________ Date/Time: ______________     Patient Signature: _________________________________     Date/Time: ______________      Signature: _________________________________     Date/Time: ______________

## 2021-08-30 NOTE — PSYCH
Assessment/Plan:      Diagnoses and all orders for this visit:    Anxiety and depression          Subjective:     Patient ID: Stormy Zabala is a 46 y o  female  HPI:     Pre-morbid level of function and History of Present Illness:     Per Dr Boston Romberg is a 46 y o  female with past psychiatric history of anxiety and depression is admitted to [facility] referred by Dr Adrian Khan (Psychiatrist)     Today Clarissa Webb reports worsening anxiety, depression and new onset alcohol abuse that started after her  (whom she was  from at the time) killed himself  She states he had celebrated his birthday with his kids at their old home and shot himself the following day when he was alone  She states since then her and her two children have been in distress and dealing with this tragedy together  They also have family counseling services to help her children deal with trauma and loss  She states she also began to drink more and more since this happened resulting in a DUI in Jan 2021 and needing to go to a 1 month rehab in April 2021 (in [place])  She reports depressive symptoms of variable appetite, poor sleep (though now improved with meds), feelings of guilt, poor concentration/energy/ motivation/ interest, feelings hopelessness and helplessness sometimes but no SI, HI or passive death wishes  She reports some bad days of depression where her functioning was decreased such as having very little will to get out of bed but states her functioning somewhat better since adding zoloft last month  She reports her anxiety as excessive worrying throughout the day and panic attacks also  Her panic attacks result in feeling short of breath, heart racing, chest and throat tightness but rarely ever feels like she's "dying "   She reports some PTSD symptoms such a trauma nightmares once a week, hypervigilance and avoidance behaviors but no flashbacks     She denies any current or past jessica or psychotic symptoms  Psychosocial Stressors include 's suicide ~1 5 years ago, alcohol abuse, taking care of children, loss of 's licence for 6 months starting next week    Per this Kael Dennis is a 46year old  female who is currently employed as a  with [employer]  She has been on leave from her position since December 2020  Zuly Lou has been in the [facility] as an Inpatient during April of 2021  Zuly Lou identified she had been "drinking to much alcohol to cope" with her husbands suicide, seeking the treatment in April  She reports having a DUI prior to program  Since then she attempted another partial hospitalization program but was unable to manage due to her children being out of school for the summer  Clarissa shared she is diabetic, has thyroid issues, anxiety, depression, hashimoto's and is anemic  Clarissa reports her current stressors as her health problems, family, work, money, and life changes  uZly Lou identifies she is having depressed mood poor concentration, nausea, fluctuating appetite, and feelings of guilt  Zuly Lou denies thoughts of self harm and or SI/HI presently or in the past     Per Clarissa, "I do  not feel like getting out of bed, getting washed or dressed  I just want to sleep  Strengths: It seems overall she has protective factors and internal motivation  Supportive family and boyfriend  Reason for evaluation and partial hospitalization as an alternative to inpatient hospitalization PHP is medically necessary to prevent hospitalization as outpatient care has been unable to stabilize Katarina Hartley and a greater intensity of treatment is indicated  Milieu therapy to monitor for medication needs, provide wellness tools education and offer opportunity to share and connect to others   Group therapy, case management, psychiatric medication management, family contact and UR as indicated  ELOS 10 treatment days  Previous Psychiatric/psychological treatment/year: April 2021 In patient for [facility] at [facility]  in [place]  Current Psychiatrist/Therapist: Dr Emil Hyde MD, [facility]    Outpatient and/or Partial and Other Community Resources Used ([facility], [facility], [address]): Family based services for her children 3x per week  Problem Assessment:     SOCIAL/VOCATION:  Family Constellation (include parents, relationship with each and pertinent Psych/Medical History):     Family History   Problem Relation Age of Onset    Thyroid disease Mother     URIEL disease Mother     Hyperlipidemia Mother     Hypertension Mother     Osteoarthritis Mother     Thyroid disease unspecified Mother     Diabetes Father     Alcohol abuse Father     Diabetes type I Father     Thyroid disease Sister     Depression Sister     Thyroid disease unspecified Sister     Anxiety disorder Sister     Heart disease Maternal Grandmother     Hypertension Maternal Grandmother     Arthritis Maternal Grandmother     Diabetes type I Maternal Uncle     Diabetes type I Maternal Grandfather        Mother: Felecia Newberry Chirigoberto 60 yo reports "love/hate relationship, tries to be supportive but causes my anxiety to increase"  Boyfriend- Tate Paisley 45 yo reports "wonderful and supportive"   Father: Step father- Shelby Galeazzi was born in 1 and reports "good relationship"   Children: Yoana 7 yo, 110 Kontomari 4 yo   Sibling: Twin sister- Jovita Vanessa and reports "love/hate relationship, tries to be supportive but causes my anxiety to increase"   Other: Two dogs, Mazy and Smokey    Who is the person you relate to best Marycruz Walker- boyfriend  she lives with Marycruz Walker- boyfriend  she does not live alone       Domestic Violence: No past history of domestic violence, There is not suspected domestic violence and There is no history of child abuse    Additional Comments related to family/relationships/peer support: Shannon John reports her family and boyfriend are supportive but her mother/sister support can cause her anxiety  Per Ramu Martinez they "have no boundaries"  School or Work History (strengths/limitations/needs): Masters degree in special education     Her highest grade level achieved was Masters program     history includes denies    Financial status includes denies     LEISURE ASSESSMENT (Include past and present hobbies/interests and level of involvement (Ex: Group/Club Affiliations): taking the dogs for a walk, going to the lake, swimming, taking kids to the park  Her primary language is [place]  Preferred language is [place]  Ethnic considerations are none  Religions affiliations and level of involvement presbyterian    FUNCTIONAL STATUS: There has been a recent change in the patient's ability to do the following: bathing, dressing and meal preparation    Level of Assistance Needed/By Whom?: verbal encouragement from mother, sister, and boyfriend    Ramu Martinez learns best by  reading, listening, demonstration and picture    SUBSTANCE ABUSE ASSESSMENT: past substance abuse    Do you currently smoke? NoOffered smoking cessation?  No    Substance/Route/Age/Amount/Frequency/Last Use: alcohol, last used prior to 2021    DETOX HISTORY: not reported    Previous detox/rehab treatment: yes, 2021    HEALTH ASSESSMENT: PCP not notified     LEGAL: No Mental Health Advance Directive or Power of  on file    Risk Assessment:   The following ratings are based on my observation of this patient over the last intake today    Risk of Harm to Self:   Demographic risk factors include , never  or  status, Quaker and  status  Historical Risk Factors include a relative or close friend who  by suicide  Recent Specific Risk Factors include feelings of guilt or self blame    Risk of Harm to Others:   Demographic Risk Factors include denied  Historical Risk Factors include denied  Recent Specific Risk Factors include abusing substances and multiple stressors    Access to Weapons:   Marylene Lobe has access to the following weapons: denied any weapons in home  The following steps have been taken to ensure weapons are properly secured: n/a    Based on the above information, the client presents the following risk of harm to self or others:  low    The following interventions are recommended:   no intervention changes    Notes regarding this Risk Assessment: [facility] phone number provided    Review Of Systems: See Dr Cheryle Marble review       Mental status:  Appearance calm and cooperative , adequate hygiene and grooming and good eye contact    Mood mood appropriate   Affect affect was tearful   Speech a normal rate   Thought Processes normal thought processes   Hallucinations no hallucinations present    Thought Content no delusions   Abnormal Thoughts no suicidal thoughts  and no homicidal thoughts    Orientation  oriented to person, oriented to place and oriented to time   Remote Memory short term memory intact and long term memory intact   Attention Span concentration intact   Intellect Appears to be Above Average Intelligence   Fund of Knowledge displays adequate knowledge of current events and adequate fund of knowledge regarding past history   Insight Insight intact   Judgement judgment was intact   Muscle Strength not assessed   Language not assessed   Pain not assessed   Pain Scale not assessed       DSM:   1  Anxiety and depression         Plan: Admit to PHP  Group therapy, case management, medication management, UR and family contact as indicated    ELOS 10 treatment days  Refer to OP psychiatry and therapy       Anticipated aftercare plan: Out patient care

## 2021-08-31 ENCOUNTER — HOSPITAL ENCOUNTER (OUTPATIENT)
Dept: INFUSION CENTER | Facility: HOSPITAL | Age: 41
Discharge: HOME | End: 2021-08-31
Attending: INTERNAL MEDICINE
Payer: COMMERCIAL

## 2021-08-31 VITALS
SYSTOLIC BLOOD PRESSURE: 116 MMHG | HEART RATE: 74 BPM | DIASTOLIC BLOOD PRESSURE: 67 MMHG | OXYGEN SATURATION: 98 % | RESPIRATION RATE: 18 BRPM | TEMPERATURE: 97.9 F

## 2021-08-31 DIAGNOSIS — D50.8 OTHER IRON DEFICIENCY ANEMIA: Primary | ICD-10-CM

## 2021-08-31 PROCEDURE — 96365 THER/PROPH/DIAG IV INF INIT: CPT

## 2021-08-31 RX ORDER — SODIUM CHLORIDE 9 MG/ML
20 INJECTION, SOLUTION INTRAVENOUS ONCE
Status: COMPLETED | OUTPATIENT
Start: 2021-08-31 | End: 2021-08-31

## 2021-08-31 RX ORDER — SODIUM CHLORIDE 9 MG/ML
20 INJECTION, SOLUTION INTRAVENOUS ONCE
Status: CANCELLED | OUTPATIENT
Start: 2021-09-07

## 2021-08-31 RX ADMIN — IRON SUCROSE 200 MG: 20 INJECTION, SOLUTION INTRAVENOUS at 10:35

## 2021-08-31 RX ADMIN — SODIUM CHLORIDE 20 ML/HR: 0.9 INJECTION, SOLUTION INTRAVENOUS at 10:35

## 2021-08-31 NOTE — PROGRESS NOTES
Pt tolerated infusion well  No adverse reactions noted  Peripheral iv discontinued jelco intact   Discharged ambulatory with avs

## 2021-09-01 ENCOUNTER — OFFICE VISIT (OUTPATIENT)
Dept: PSYCHOLOGY | Facility: CLINIC | Age: 41
End: 2021-09-01
Payer: COMMERCIAL

## 2021-09-01 DIAGNOSIS — F41.9 ANXIETY AND DEPRESSION: Primary | ICD-10-CM

## 2021-09-01 DIAGNOSIS — F32.A ANXIETY AND DEPRESSION: Primary | ICD-10-CM

## 2021-09-01 PROCEDURE — G0176 OPPS/PHP;ACTIVITY THERAPY: HCPCS

## 2021-09-01 PROCEDURE — S0201 PARTIAL HOSPITALIZATION SERV: HCPCS

## 2021-09-01 PROCEDURE — G0177 OPPS/PHP; TRAIN & EDUC SERV: HCPCS

## 2021-09-01 PROCEDURE — G0410 GRP PSYCH PARTIAL HOSP 45-50: HCPCS

## 2021-09-01 NOTE — PSYCH
Virtual Regular Visit    Verification of patient location:    Patient is located in the following state in which I hold an active license [place]      Assessment/Plan:    Problem List Items Addressed This Visit        Other    Anxiety and depression - Primary          Goals addressed in session:           Reason for visit is PHP VIRTUAL GROUP DUE TO COVID-19      Encounter provider [address] PARTIAL [address]    Provider located at [address] [place] [address]  [address] [facility] [id number]      Recent Visits  Date Type Provider Dept   08/30/21 Office Visit [address] PARTIAL PSYCH GROUP THERAPY [facility]   Showing recent visits within past 7 days and meeting all other requirements  Today's Visits  Date Type Provider Dept   09/01/21 Office Visit [address] PARTIAL PSYCH GROUP THERAPY [facility]   Showing today's visits and meeting all other requirements  Future Appointments  No visits were found meeting these conditions  Showing future appointments within next 150 days and meeting all other requirements       The patient was identified by name and date of birth  Sheba Le was informed that this is a telemedicine visit and that the visit is being conducted throughMicrosoft Teams and patient was informed that this is a secure, HIPAA-compliant platform  She agrees to proceed     My office door was closed  No one else was in the room  She acknowledged consent and understanding of privacy and security of the video platform  The patient has agreed to participate and understands they can discontinue the visit at any time  Patient is aware this is a billable service  Subjective  Sheba Le is a 46 y o  female          HPI     Past Medical History:   Diagnosis Date    Anemia     Anxiety     B12 deficiency     Cervical disc disorder     On gabapentin     Depression     Disease of thyroid gland     hypothyroid    Iron deficiency anemia     Panic attack     PTSD (post-traumatic stress disorder)     Sleep difficulties     Substance abuse ([facility] [address]     Type 1 diabetes ([facility] )     Vitamin D deficiency        Past Surgical History:   Procedure Laterality Date    ABDOMINAL SURGERY      gastric bypass     SECTION      x2    CHOLECYSTECTOMY  2018    GASTRIC BYPASS  2007    INTRAUTERINE DEVICE INSERTION  2015    IR BIOPSY BONE MARROW  2020    OTHER SURGICAL HISTORY      surgery for imperforate hymen/endometriosis/hydrometrocolpos    TUBAL LIGATION      WISDOM TOOTH EXTRACTION         Current Outpatient Medications   Medication Sig Dispense Refill    Accu-Chek FastClix Lancets MISC USE 1 LANCET TO TEST BLOOD SUGAR UP TO 6 TIMES DAILY 102 each 0    acetaminophen (TYLENOL) 500 mg tablet       ALPRAZolam (XANAX) 0 5 mg tablet Take 0 5 mg by mouth daily as needed      atomoxetine (STRATTERA) 80 MG capsule Take 80 mg by mouth daily  (Patient not taking: Reported on 2021)      busPIRone (BUSPAR) 10 mg tablet Take 1 tablet (10 mg total) by mouth 2 (two) times a day 60 tablet 1    clonazePAM (KlonoPIN) 1 mg tablet Take 1 tablet by mouth 2 (two) times a day      Cyanocobalamin (B-12 PO) Take by mouth daily      ergocalciferol (VITAMIN D2) 50,000 units Take 1 capsule (50,000 Units total) by mouth once a week 12 capsule 3    escitalopram (LEXAPRO) 20 mg tablet Take 20 mg by mouth daily      gabapentin (NEURONTIN) 600 MG tablet       glucose blood (Accu-Chek Guide) test strip Use to test blood sugar up to 6 times daily  200 each 2    insulin aspart (NovoLOG) 100 units/mL injection Inject daily into pump  Max daily dose 50 units   40 mL 2    Insulin Pen Needle (BD Pen Needle Deanna U/F) 32G X 4 MM MISC Use 4/day 100 each 3    Insulin Syringe-Needle U-100 (INSULIN SYRINGE 1CC/28G) 28G X 1/2" 1 ML MISC 4 injections daily E10 65      levothyroxine 200 mcg tablet Take 1 tablet (200 mcg total) by mouth daily in the early morning 90 tablet 3    levothyroxine 75 mcg tablet       PATIENT MAINTAINED INSULIN PUMP Inject 1 each under the skin every 8 (eight) hours 1 each 0    selenium 200 mcg Take 200 mcg by mouth daily  (Patient not taking: Reported on 8/23/2021)      sertraline (ZOLOFT) 50 mg tablet Take 1 tablet by mouth daily      traZODone (DESYREL) 100 mg tablet Take 100 mg by mouth daily at bedtime      vitamin B-12 (VITAMIN B-12) 500 mcg tablet Take 500 mcg by mouth daily (Patient not taking: Reported on 8/23/2021)       No current facility-administered medications for this visit  No Known Allergies    Review of Systems    Video Exam    There were no vitals filed for this visit  Physical Exam     I spent FOUR GROUP HOURS PLUS CASE MANAGEMENT minutes with patient today in which greater than 50% of the time was spent in counseling/coordination of care regarding PHP - SEE NOTES  VIRTUAL VISIT DISCLAIMER    Clarissa Webb verbally agrees to participate in [employer]  Pt is aware that [employer] could be limited without vital signs or the ability to perform a full hands-on physical exam  Clarissa Webb understands she or the provider may request at any time to terminate the video visit and request the patient to seek care or treatment in person

## 2021-09-01 NOTE — PSYCH
Subjective:     Patient ID: Ifeoma Ramirez is a 46 y o  female  [facility] Clinical Progress Notes      Specialized Services Documentation  Therapist must complete separate progress note for each specific clinical activity in which the individual participated during the day  Group Psychotherapy Life Skills (12:30-1:30) Ifeoma Ramirez actively engaged in group focused on gratitude which was facilitated virtually in a private office using HIPAA Compliant and Approved Microsoft Teams  Clarissa consented to the use of tele-video modality of treatment and was virtually present for group psychotherapy today  Group members discussed why it is important to think about what we are grateful for  Group members created a list of things that they are grateful for and answered the following questions:  1  I am grateful for _____  2  It is good to have my _____  3  _____ is a blessing  4  I appreciate my ______  Clients also learned about the G L A D  technique for practice gratefulmary  Hellen Daily stated that she is grateful for her job being understanding  Group watched a short video about the 365 grateful project with speaker Tyron Saucedo  We discussed the benefits of thinking, reflecting and talking about what they are grateful for  Hellen Daily continues to make progress towards goals through verbal participation in group; to accomplish long term goals continue to utilize skills learned in programming  Continue with psychotherapy to educate and encourage use of wellness tools   Tx Plan Objective: 1 1,1 2 Therapist: Alea Rivera

## 2021-09-01 NOTE — PSYCH
Subjective:     Patient ID: Booker Christina is a 46 y o  female  [facility] Clinical Progress Notes      Specialized Services Documentation  Therapist must complete separate progress note for each specific clinical activity in which the individual participated during the day  Group Psychotherapy   This group was facilitated virtually in a private office using [employer] and Approved [employer] Teams  Clarissa Webb consented to the use of tele-video modality of treatment  [id number]  Booker Christina  actively shared in psychotherapy group exploring the benefits of self-care to support mental wellness  Group explored types of self-care, physical, emotional, social, spiritual, personal, space, financial, and work  [address] [place] [address] hierarchy of needs theory reviewed  The group then explored the benefits of self-care and self-care ideas for the mind, body, and soul  The group participated in "relaxation cloud," guided imagery while listening to relaxing music  Relaxation clouds involved listening to comforting music with scripted guided imagery and relaxation techniques  The group then shared how they felt post guided imagery  Some progress toward goal noted  Continue psychotherapy to encourage self-care and home practice of skills to support wellness  Tx Plan Objective: 1 1, 1 2, Therapist:  Rafael Flores, RN, BSN      Education Therapy   [id number] Booker Christina actively shared in morning assessment and goal review  Presented as Receptive related to readiness to learn  Clarissa Jon did not complete goal as this was her [facility]  did not present with any barriers to learning  [id number] Booker Christina engaged throughout the treatment day  Was engaged in learning related to [employer]  Staff utilized A/V teaching methods  Clarissa Webb shared area of learning and set a goal for outside of program to cook a full meal tonight        Tx Plan Objective: 1 1, 1 2, 1  3, 1 4, Therapist:  Harleen Santiago, RN, BSN    Other on 09/01/2021 0493, spoke with [facility] authorization for 15 treatment days obtained for [facility] 09/01/2021 through 09/30/2021  Case [id number]  [facility] was informed of authorization  Case Management Note    Harleen Santiago RN, BSN    Current suicide risk : Low       Medications changes/added/denied? No    Treatment session number: 1    Individual Case Management Visit provided today?  Yes     [facility] follow up physician's orders: no new orders

## 2021-09-01 NOTE — PSYCH
Subjective:     Patient ID: Rebecca Castaneda is a 46 y o  female  [facility] Clinical Progress Notes      Specialized Services Documentation  Therapist must complete separate progress note for each specific clinical activity in which the individual participated during the day  Group Psychotherapy 0930-1030    This group was facilitated virtually in a private office using [employer] and Approved [employer] Teams  Clarissa Webb consented to the use of tele-video modality of treatment  Psychotherapy group focused on stress management  The group explored what causes stress, how stress can be healthy, how too much stress and poor stress management skills can cause significant consequences on a person's mental health, physical health, relationships, and overall mood, and stress management tools  Rebecca Castaneda shared that her biggest stressor currently is her children's behavioral issues since her  passed away abruptly  She offered positive words of encouragement to her peers as well as insight into her different perspective on stress and her parenting  Some positive progress toward goals  Continue psychotherapy group to explore stressors and ways of coping          Tx Plan Objective: 1 1,1 2, Therapist:  SUZETTE Garcia

## 2021-09-02 ENCOUNTER — OFFICE VISIT (OUTPATIENT)
Dept: PSYCHOLOGY | Facility: CLINIC | Age: 41
End: 2021-09-02
Payer: COMMERCIAL

## 2021-09-02 DIAGNOSIS — F41.9 ANXIETY AND DEPRESSION: Primary | ICD-10-CM

## 2021-09-02 DIAGNOSIS — F32.A ANXIETY AND DEPRESSION: Primary | ICD-10-CM

## 2021-09-02 PROCEDURE — S0201 PARTIAL HOSPITALIZATION SERV: HCPCS

## 2021-09-02 PROCEDURE — G0177 OPPS/PHP; TRAIN & EDUC SERV: HCPCS

## 2021-09-02 PROCEDURE — G0176 OPPS/PHP;ACTIVITY THERAPY: HCPCS

## 2021-09-02 PROCEDURE — G0410 GRP PSYCH PARTIAL HOSP 45-50: HCPCS

## 2021-09-02 NOTE — PSYCH
Subjective:     Patient ID: Karlene Lowe is a 46 y o  female  [facility] Clinical Progress Notes      Specialized Services Documentation  Therapist must complete separate progress note for each specific clinical activity in which the individual participated during the day  Case Management Note  This case management session was facilitated virtually in a private office using HIPPA Compliant and Approved Microsoft Teams  Clarissa Webb consented to the use of tele-video modality of treatment  Kristi Penn RN, BSN    Current suicide risk : Low     [id number] met with Karlene Lowe for case management  [place] identified she is gaining insight from attending group specifically today the values group and making friends  Today for self care she noted she will shower and try to enjoy her son's baseball game  [place] noted she needs to work on setting boundaries  She received her treatment plan which had been reviewed  Denied questions  Medications changes/added/denied? No    Treatment session number: 2    Individual Case Management Visit provided today?  Yes     [facility] follow up physician's orders: no new orders

## 2021-09-02 NOTE — PSYCH
Subjective:     Patient ID: Miguel Angel Dubois is a 46 y o  female  [facility] Clinical Progress Notes      Specialized Services Documentation  Therapist must complete separate progress note for each specific clinical activity in which the individual participated during the day  Group Psychotherapy [id number]    This group was facilitated virtually in a private office using [employer] and Approved [employer] Teams  Clarissa Webb consented to the use of tele-video modality of treatment  Psychotherapy group focused on setting and enforcing healthy boundaries  Emotional and physical boundaries were reviewed, and the group explored why boundary violation is a common issue  Riki Webb explored ways to set personal boundaries  Personal bill of rights reviewed  Miguel Angel Dubois shared that she struggles with setting boundaries with her children after her  passed away  She opened up to the group about her struggles and was receptive of feedback and words of encouragement  Positive progress toward goals  Continue psychotherapy group to explore stressors and ways of coping  Tx Plan Objective: 1 1,1 2,1 4 Therapist:  SUZETTE Jones    Education Therapy   [id number] Miguel Angel Dubois actively shared in morning assessment and goal review  Presented as Receptive related to readiness to learn  Clarissa Webb did complete goal from last treatment day identifying gaining "taking care of dinner " did not present with any barriers to learning  [id number] Miguel Angel Dubois engaged throughout the treatment day  Was engaged in learning related to [employer]  Staff utilized Verbal and A/V teaching methods  Clarissa Webb shared area of learning and set a goal for outside of program to have a good night with son        Tx Plan Objective: 1 1,1 2, Therapist:  SUZETTE Jones

## 2021-09-02 NOTE — PSYCH
Virtual Regular Visit    Verification of patient location:    Patient is located in the following state in which I hold an active license [place]      Assessment/Plan:    Problem List Items Addressed This Visit        Other    Anxiety and depression - Primary          Goals addressed in session:           Reason for visit is PHP VIRTUAL GROUP DUE TO COVID-19      Encounter provider [address] PARTIAL [address]    Provider located at [address] [place] [address]  [facility] [id number]      Recent Visits  Date Type Provider Dept   09/01/21 Office Visit [address] PARTIAL PSYCH GROUP THERAPY [facility]   08/30/21 Office Visit [address] PARTIAL PSYCH GROUP THERAPY [facility]   Showing recent visits within past 7 days and meeting all other requirements  Today's Visits  Date Type Provider Dept   09/02/21 Office Visit [address] PARTIAL PSYCH GROUP THERAPY [facility]   Showing today's visits and meeting all other requirements  Future Appointments  No visits were found meeting these conditions  Showing future appointments within next 150 days and meeting all other requirements       The patient was identified by name and date of birth  Estella Juarez was informed that this is a telemedicine visit and that the visit is being conducted throughMicrosoft Teams and patient was informed that this is a secure, HIPAA-compliant platform  She agrees to proceed     My office door was closed  No one else was in the room  She acknowledged consent and understanding of privacy and security of the video platform  The patient has agreed to participate and understands they can discontinue the visit at any time  Patient is aware this is a billable service  Subjective  Estella Juarez is a 46 y o  female        HPI     Past Medical History:   Diagnosis Date    Anemia     Anxiety     B12 deficiency     Cervical disc disorder     On gabapentin  Depression     Disease of thyroid gland     hypothyroid    Iron deficiency anemia     Panic attack     PTSD (post-traumatic stress disorder)     Sleep difficulties     Substance abuse ([facility] [address] [facility]     [facility]     Vitamin D deficiency        Past Surgical History:   Procedure Laterality Date    ABDOMINAL SURGERY      gastric bypass     SECTION      x2    CHOLECYSTECTOMY  2018    GASTRIC BYPASS  2007    INTRAUTERINE DEVICE INSERTION  2015    IR BIOPSY BONE MARROW  2020    OTHER SURGICAL HISTORY      surgery for imperforate hymen/endometriosis/hydrometrocolpos    TUBAL LIGATION      WISDOM TOOTH EXTRACTION         Current Outpatient Medications   Medication Sig Dispense Refill    Accu-Chek FastClix Lancets MISC USE 1 LANCET TO TEST BLOOD SUGAR UP TO 6 TIMES DAILY 102 each 0    acetaminophen (TYLENOL) 500 mg tablet       ALPRAZolam (XANAX) 0 5 mg tablet Take 0 5 mg by mouth daily as needed      atomoxetine (STRATTERA) 80 MG capsule Take 80 mg by mouth daily  (Patient not taking: Reported on 2021)      busPIRone (BUSPAR) 10 mg tablet Take 1 tablet (10 mg total) by mouth 2 (two) times a day 60 tablet 1    clonazePAM (KlonoPIN) 1 mg tablet Take 1 tablet by mouth 2 (two) times a day      Cyanocobalamin (B-12 PO) Take by mouth daily      ergocalciferol (VITAMIN D2) 50,000 units Take 1 capsule (50,000 Units total) by mouth once a week 12 capsule 3    escitalopram (LEXAPRO) 20 mg tablet Take 20 mg by mouth daily      gabapentin (NEURONTIN) 600 MG tablet       glucose blood (Accu-Chek Guide) test strip Use to test blood sugar up to 6 times daily  200 each 2    insulin aspart (NovoLOG) 100 units/mL injection Inject daily into pump  Max daily dose 50 units   40 mL 2    Insulin Pen Needle (BD Pen Needle Deanna U/F) 32G X 4 MM MISC Use 4/day 100 each 3    Insulin Syringe-Needle U-100 (INSULIN SYRINGE 1CC/28G) 28G X 1/2" 1 ML MISC 4 injections daily E10 65      levothyroxine 200 mcg tablet Take 1 tablet (200 mcg total) by mouth daily in the early morning 90 tablet 3    levothyroxine 75 mcg tablet       PATIENT MAINTAINED INSULIN PUMP Inject 1 each under the skin every 8 (eight) hours 1 each 0    selenium 200 mcg Take 200 mcg by mouth daily  (Patient not taking: Reported on 8/23/2021)      sertraline (ZOLOFT) 50 mg tablet Take 1 tablet by mouth daily      traZODone (DESYREL) 100 mg tablet Take 100 mg by mouth daily at bedtime      vitamin B-12 (VITAMIN B-12) 500 mcg tablet Take 500 mcg by mouth daily (Patient not taking: Reported on 8/23/2021)       No current facility-administered medications for this visit  No Known Allergies    Review of Systems    Video Exam    There were no vitals filed for this visit  Physical Exam     I spent FOUR GROUP HOURS PLUS CASE MANAGEMENT minutes with patient today in which greater than 50% of the time was spent in counseling/coordination of care regarding PHP - SEE NOTES  VIRTUAL VISIT DISCLAIMER    Clarissa Webb verbally agrees to participate in [facility]  Pt is aware that [facility] could be limited without vital signs or the ability to perform a full hands-on physical exam  Clarissa Webb understands she or the provider may request at any time to terminate the video visit and request the patient to seek care or treatment in person

## 2021-09-02 NOTE — PSYCH
Subjective:     Patient ID: Masha Castillo is a 46 y o  female  [facility] Clinical Progress Notes      Specialized Services Documentation  Therapist must complete separate progress note for each specific clinical activity in which the individual participated during the day  Group Psychotherapy Life Skills (12:30-1:30) Masha Castillo actively engaged in group focused on values based behaviors which was facilitated virtually in a private office using HIPAA Compliant and Approved [employer] Teams  Clarissa consented to the use of tele-video modality of treatment and was virtually present for group psychotherapy today  During the activity clients thought about each of their top three values and created a goal and action steps to take towards achieving that value-based behavior  Kamornly Barber stated that one of their top values is being a good parent and stated their goal and action plan towards achieving that value-based behavior  We discussed personal situations where client's wanted to act and react with their values and not their emotions  Clients discussed one of their values and their intentions to respond to a situation  Clients discussed the steps they would take to turn their intentions into action  Alex Barber continues to make progress towards goals through verbal participation in group; to accomplish long term goals continue to utilize skills learned in programming  Continue with psychotherapy to educate and encourage use of wellness tools   Tx Plan Objective: 1 1,1 2 Therapist: Bernadine Elise

## 2021-09-02 NOTE — PSYCH
Subjective:     Patient ID: Sheba Le is a 46 y o  female  [facility] Clinical Progress Notes      Specialized Services Documentation  Therapist must complete separate progress note for each specific clinical activity in which the individual participated during the day  Allied Therapy   This group was facilitated virtually in a private office using [employer] and Approved [employer] Teams  Clarissa Webb consented to the use of tele-video modality of treatment  [id number]-- Sheba Le attendance difficult to assess due to camera being turned off and no verbal participation  MTH group focused on analyzing personal self-destructive behaviors within certain situations and creating their unique crisis coping skill plan  Group explored practice of bon analysis, active discussion, and utilizing DBT worksheets to analyze behaviors  Effort and progress toward treatment goal difficult to assess due to no evidence of participation through a verbal platform  Continue AT to encourage the development and practice of utilizing their unique crisis coping skill plan to alleviate symptoms and support wellness    Tx Plan Objective: 1 1, 1 2, 1 4      Therapist:  WALDEMAR Quan

## 2021-09-03 ENCOUNTER — OFFICE VISIT (OUTPATIENT)
Dept: PSYCHOLOGY | Facility: CLINIC | Age: 41
End: 2021-09-03
Payer: COMMERCIAL

## 2021-09-03 ENCOUNTER — CONSULT (OUTPATIENT)
Dept: FAMILY MEDICINE CLINIC | Facility: CLINIC | Age: 41
End: 2021-09-03
Payer: COMMERCIAL

## 2021-09-03 VITALS
BODY MASS INDEX: 27.97 KG/M2 | SYSTOLIC BLOOD PRESSURE: 108 MMHG | DIASTOLIC BLOOD PRESSURE: 68 MMHG | OXYGEN SATURATION: 98 % | TEMPERATURE: 99.2 F | WEIGHT: 174 LBS | HEIGHT: 66 IN | HEART RATE: 68 BPM | RESPIRATION RATE: 20 BRPM

## 2021-09-03 DIAGNOSIS — F41.9 ANXIETY AND DEPRESSION: Primary | ICD-10-CM

## 2021-09-03 DIAGNOSIS — F10.10 ALCOHOL ABUSE: ICD-10-CM

## 2021-09-03 DIAGNOSIS — H26.493 OTHER SECONDARY CATARACT OF BOTH EYES: Primary | ICD-10-CM

## 2021-09-03 DIAGNOSIS — F32.A ANXIETY AND DEPRESSION: Primary | ICD-10-CM

## 2021-09-03 DIAGNOSIS — Z01.818 PRE-OPERATIVE CLEARANCE: ICD-10-CM

## 2021-09-03 DIAGNOSIS — E10.65 TYPE 1 DIABETES MELLITUS WITH HYPERGLYCEMIA (HCC): ICD-10-CM

## 2021-09-03 PROCEDURE — S0201 PARTIAL HOSPITALIZATION SERV: HCPCS

## 2021-09-03 PROCEDURE — G0410 GRP PSYCH PARTIAL HOSP 45-50: HCPCS

## 2021-09-03 PROCEDURE — G0176 OPPS/PHP;ACTIVITY THERAPY: HCPCS

## 2021-09-03 PROCEDURE — 99214 OFFICE O/P EST MOD 30 MIN: CPT | Performed by: NURSE PRACTITIONER

## 2021-09-03 PROCEDURE — G0177 OPPS/PHP; TRAIN & EDUC SERV: HCPCS

## 2021-09-03 RX ORDER — OFLOXACIN 3 MG/ML
SOLUTION/ DROPS OPHTHALMIC
COMMUNITY
Start: 2021-08-31

## 2021-09-03 RX ORDER — PREDNISOLONE ACETATE 10 MG/ML
SUSPENSION/ DROPS OPHTHALMIC
COMMUNITY
Start: 2021-08-31

## 2021-09-03 NOTE — PSYCH
Subjective:     Patient ID: Ronen Ferrara is a 46 y o  female  [facility] Clinical Progress Notes      Specialized Services Documentation  Therapist must complete separate progress note for each specific clinical activity in which the individual participated during the day  Allied Therapy   This group was facilitated virtually in a private office using [employer] and Approved [employer] Teams  Clarissa Webb consented to the use of tele-video modality of treatment  [id number] Clarissa Webb moderately shared in [facility] group focused on work stressors  She was quiet but attentive as peers spoke of challenges in her work and home environment  She acknowledged through body language but was quiet  Group explored ways to set her day up for success and explore ways to set priorities  Relaxation technique reviewed  Some slow initial exploration of tx goals  Continue AT to encourage identification of stressors and wellness tools to manage stress consistently        Tx Plan Objective: 1 1,1 2, Therapist:  Scottie Bumpers MT-BC

## 2021-09-03 NOTE — PROGRESS NOTES
[facility]        NAME: Dennis Linder is a 46 y o  female  : 1980    MRN: [id number]  DATE: September 3, 2021  TIME: 3:34 PM    Assessment and Plan   Other secondary cataract of both eyes [H26 493]  1  Other secondary cataract of both eyes     2  Pre-operative clearance     3  Type 1 diabetes mellitus with hyperglycemia [facility])           Patient Instructions     Patient Instructions   HgA1c today in office 9 5  See form  Cleared for cataract surgeries          Chief Complaint     Chief Complaint   Patient presents with    Pre-op Exam         History of Present Illness       Presurgical Evaluation    Subjective:     Patient ID: Dennis Linder is a 46 y o  female  Patient presents with:  Pre-op Exam      @[facility]@    The following portions of the patient's history were reviewed and updated as appropriate: allergies, current medications, past family history, past medical history, past social history, past surgical history and problem list     Procedure date: 21 AND 21    Surgeon:  Dr Leoncio Castro  Planned procedure:  Bilateral cataract surgery  Diagnosis for procedure:  Cataracts secondary to uncontrolled type 1 diabetes    Prior anesthesia: Yes   General; Complications:  None / Tolerated well    CAD History: None   NOTE: Patient should see Cardiology if time available before surgery, and if appropriate      Pulmonary History: None    Renal history: None    Diabetes History:  Type 1  Uncontrolled     Neurological History: None     On Immunosuppressant meds/biologics: No      [unfilled]     Current Outpatient Medications:  Accu-Chek FastClix Lancets MISC, USE 1 LANCET TO TEST BLOOD SUGAR UP TO 6 TIMES DAILY, Disp: 102 each, Rfl: 0  acetaminophen (TYLENOL) 500 mg tablet, , Disp: , Rfl:   ALPRAZolam (XANAX) 0 5 mg tablet, Take 0 5 mg by mouth daily as needed, Disp: , Rfl:   clonazePAM (KlonoPIN) 1 mg tablet, Take 1 tablet by mouth 2 (two) times a day, Disp: , Rfl:   Cyanocobalamin (B-12 PO), Take by mouth daily, Disp: , Rfl:   ergocalciferol (VITAMIN D2) 50,000 units, Take 1 capsule (50,000 Units total) by mouth once a week, Disp: 12 capsule, Rfl: 3  escitalopram (LEXAPRO) 20 mg tablet, Take 20 mg by mouth daily, Disp: , Rfl:   gabapentin (NEURONTIN) 600 MG tablet, , Disp: , Rfl:   glucose blood (Accu-Chek Guide) test strip, Use to test blood sugar up to 6 times daily  , Disp: 200 each, Rfl: 2  insulin aspart (NovoLOG) 100 units/mL injection, Inject daily into pump  Max daily dose 50 units  , Disp: 40 mL, Rfl: 2  Insulin Pen Needle (BD Pen Needle Deanna U/F) 32G X 4 MM MISC, Use 4/day, Disp: 100 each, Rfl: 3  Insulin Syringe-Needle U-100 (INSULIN SYRINGE 1CC/28G) 28G X 1/2" 1 ML MISC, 4 injections daily E10 65, Disp: , Rfl:   levothyroxine 200 mcg tablet, Take 1 tablet (200 mcg total) by mouth daily in the early morning, Disp: 90 tablet, Rfl: 3  levothyroxine 75 mcg tablet, , Disp: , Rfl:   PATIENT MAINTAINED INSULIN PUMP, Inject 1 each under the skin every 8 (eight) hours, Disp: 1 each, Rfl: 0  sertraline (ZOLOFT) 50 mg tablet, Take 1 tablet by mouth daily, Disp: , Rfl:   traZODone (DESYREL) 100 mg tablet, Take 100 mg by mouth daily at bedtime, Disp: , Rfl:   atomoxetine (STRATTERA) 80 MG capsule, Take 80 mg by mouth daily  (Patient not taking: Reported on 9/3/2021), Disp: , Rfl:   busPIRone (BUSPAR) 10 mg tablet, Take 1 tablet (10 mg total) by mouth 2 (two) times a day (Patient not taking: Reported on 9/3/2021), Disp: 60 tablet, Rfl: 1  ofloxacin (OCUFLOX) 0 3 % ophthalmic solution, , Disp: , Rfl:   prednisoLONE acetate (PRED FORTE) 1 % ophthalmic suspension, , Disp: , Rfl:    selenium 200 mcg, Take 200 mcg by mouth daily  (Patient not taking: Reported on 9/3/2021), Disp: , Rfl:   vitamin B-12 (VITAMIN B-12) 500 mcg tablet, Take 500 mcg by mouth daily (Patient not taking: Reported on 8/23/2021), Disp: , Rfl:     No current facility-administered medications for this visit        Allergies on file:   Patient has no known allergies  Patient Active Problem List:     Diabetic ketoacidosis without coma associated with type 1 diabetes mellitus (HCC)     Iron deficiency anemia     Vitamin D deficiency     B12 deficiency     Anxiety and depression     Electrolyte abnormality     Type 1 diabetes mellitus with hyperglycemia (HCC)     Depression     Suicidal ideations     Leukopenia     THO (acute kidney injury) ([facility] )     Hypothyroidism due to Hashimoto's thyroiditis     Encounter for gynecological examination without abnormal finding     Bilateral carpal tunnel syndrome     Cervical spondylosis     H/O gastric bypass     Hydrosalpinx     Intestinal malabsorption, unspecified     Invagination of intestine (HCC)     Protrusion of cervical intervertebral disc     Overweight with body mass index (BMI) of 26 to 26 9 in adult     Insulin pump status     Primary hypothyroidism     Uncontrolled type 1 diabetes mellitus (HCC)     Transaminitis     Alcohol abuse     Iron deficiency anemia, unspecified       Past Medical History:  No date: Anemia  No date:  Anxiety  No date: B12 deficiency  No date: Cervical disc disorder      Comment:  On gabapentin   No date: Depression  No date: Disease of thyroid gland      Comment:  hypothyroid  No date: Iron deficiency anemia  No date: Panic attack  No date: PTSD (post-traumatic stress disorder)  No date: Sleep difficulties  No date: Substance abuse ([place] [address] )  No date: Type 1 diabetes ([place] [address] )  No date: Vitamin D deficiency    Past Surgical History:  No date: ABDOMINAL SURGERY      Comment:  gastric bypass  No date:  SECTION      Comment:  x2  2018: CHOLECYSTECTOMY  2007: GASTRIC BYPASS  2015: INTRAUTERINE DEVICE INSERTION  2020: IR BIOPSY BONE MARROW  No date: OTHER SURGICAL HISTORY      Comment:  surgery for imperforate                hymen/endometriosis/hydrometrocolpos  No date: TUBAL LIGATION  No date: WISDOM TOOTH EXTRACTION    Review of patient's family history indicates:  Problem: Thyroid disease      Relation: Mother          Age of Onset: (Not Specified)  Problem: URIEL disease      Relation: Mother          Age of Onset: (Not Specified)  Problem: Hyperlipidemia      Relation: Mother          Age of Onset: (Not Specified)  Problem: Hypertension      Relation: Mother          Age of Onset: (Not Specified)  Problem: Osteoarthritis      Relation: Mother          Age of Onset: (Not Specified)  Problem: Thyroid disease unspecified      Relation: Mother          Age of Onset: (Not Specified)  Problem: Diabetes      Relation: Father          Age of Onset: (Not Specified)  Problem: Alcohol abuse      Relation: Father          Age of Onset: (Not Specified)  Problem: Diabetes type I      Relation: Father          Age of Onset: (Not Specified)  Problem: Thyroid disease      Relation: Sister          Age of Onset: (Not Specified)  Problem: Depression      Relation: Sister          Age of Onset: (Not Specified)  Problem: Thyroid disease unspecified      Relation: Sister          Age of Onset: (Not Specified)  Problem: Anxiety disorder      Relation: Sister          Age of Onset: (Not Specified)  Problem: Heart disease      Relation: Maternal Grandmother          Age of Onset: (Not Specified)  Problem: Hypertension      Relation: Maternal Grandmother          Age of Onset: (Not Specified)  Problem: Arthritis      Relation: Maternal Grandmother          Age of Onset: (Not Specified)  Problem: Diabetes type I      Relation: Maternal Uncle          Age of Onset: (Not Specified)  Problem: Diabetes type I      Relation: Maternal Grandfather          Age of Onset: (Not Specified)      Social History    Tobacco Use      Smoking status: Never Smoker      Smokeless tobacco: Never Used    Vaping Use      Vaping Use: Never used    Alcohol use: Not Currently      Comment: sober since April 2021 - on probation with random screenings    Drug use: No      Objective:    ---------------------------               21                      1529         ---------------------------   BP:          108/68         Pulse:         68           Resp:          20           Temp:   99 2 °F (37 3 °C)   TempSrc:    Tympanic        SpO2:          98%          Weight: 78 9 kg (174 lb)    Height:  5' 6" (1 676 m)   ---------------------------    [unfilled]     Preop labs/testing available and reviewed: no    eGFR       Date                     Value               Ref Range           Status                2021               96                  ml/min/1 73sq m     Final            ----------  WBC       Date                     Value               Ref Range           Status                2021               3 03 (L)            4 31 - 10 16 T*     Final            ----------       EKG no    Echo no    Stress test/cath no    PFT/Chas no    Functional capacity: Walking , 4-5 MPH               4 Mets   Pick the highest level patient can comfortably perform   4 mets or greater for surgery    RCRI  High Risk surgery? 1 Point  CAD History:         1 Point   MI; Positive Stress Test; CP due to Mi;  Nitrate Usage to control Angina;  Pathologic Q wave on EKG  CHF Active:         1 Point   Pulm Edema; Paroxysmal Nocturnal Dyspnea;  Bibasilar Rales (crackles);S3; CHF on CXR  Cerebrovascular Disease (TIA or CVA):     1 Point  DM on Insulin:        1 Point  Serum Creat >2 0 mg/dl:       1 Point          Total Points: 1     Scorin: Class I, Very Low Risk (0 4%)     1: Class II, Low risk (0 9%)     2: Class III Moderate (6 6%)     3: Class IV High (>11%)      THO Risk:  GFR: eGFR       Date                     Value               Ref Range           Status                2021               96                  ml/min/1 73sq m     Final            ----------      For PCP:  If GFR>60, Hold ACE/ARB/Diuretic on the day of surgery, and NSAIDS 10 days before  If GFR<45, Consider PRE and POST op Nephrology Consult  If 46 <GFR> 59 : Has Patient had THO in last 6 Months? no   If YES: Preop Nephrology consult   If No:  Zaki Garcia Nephrology consult  Assessment/Plan:    Patient is medically optimized (cleared) for the planned procedure  Further testing/evaluation is not required      Postop concerns: no    Problem List Items Addressed This Visit     None        Diagnoses and associated orders for this visit:     Other secondary cataract of both eyes     Pre-operative clearance     Other orders  ofloxacin (OCUFLOX) 0 3 % ophthalmic solution  prednisoLONE acetate (PRED FORTE) 1 % ophthalmic suspension       Outpatient Medications Marked as Taking for the 9/3/21 encounter (Consult) with JAROCHO Blackwell:  Accu-Chek FastClix Lancets MISC, per anesthesia guidelines   acetaminophen (TYLENOL) 500 mg tablet, per anesthesia guidelines   ALPRAZolam (XANAX) 0 5 mg tablet, per anesthesia guidelines   clonazePAM (KlonoPIN) 1 mg tablet, per anesthesia guidelines   Cyanocobalamin (B-12 PO), per anesthesia guidelines   ergocalciferol (VITAMIN D2) 50,000 units, per anesthesia guidelines   escitalopram (LEXAPRO) 20 mg tablet, per anesthesia guidelines   gabapentin (NEURONTIN) 600 MG tablet, per anesthesia guidelines   glucose blood (Accu-Chek Guide) test strip, per anesthesia guidelines   insulin aspart (NovoLOG) 100 units/mL injection, per anesthesia guidelines   Insulin Pen Needle (BD Pen Needle Deanna U/F) 32G X 4 MM MISC, per anesthesia guidelines   Insulin Syringe-Needle U-100 (INSULIN SYRINGE 1CC/28G) 28G X 1/2" 1 ML MISC, per anesthesia guidelines   levothyroxine 200 mcg tablet, per anesthesia guidelines   levothyroxine 75 mcg tablet, per anesthesia guidelines   PATIENT MAINTAINED INSULIN PUMP, per anesthesia guidelines   sertraline (ZOLOFT) 50 mg tablet, per anesthesia guidelines   traZODone (DESYREL) 100 mg tablet, per anesthesia guidelines          NOTE: Please use the above to review important meds for your specialty, the remainder "per anesthesia Guidelines "    NOTE: Please place an Inbasket message for "[facility]" pool for complicated patients  Review of Systems   Review of Systems   Constitutional: Negative for activity change, diaphoresis, fatigue and fever  HENT: Negative for congestion, facial swelling, hearing loss, rhinorrhea, sinus pressure, sinus pain, sneezing, sore throat and voice change  Eyes: Negative for discharge and visual disturbance  Respiratory: Negative for cough, choking, chest tightness, shortness of breath, wheezing and stridor  Cardiovascular: Negative for chest pain, palpitations and leg swelling  Gastrointestinal: Negative for abdominal distention, abdominal pain, constipation, diarrhea, nausea and vomiting  Endocrine: Negative for polydipsia, polyphagia and polyuria  Genitourinary: Negative for difficulty urinating, dysuria, frequency and urgency  Musculoskeletal: Positive for arthralgias  Negative for back pain, gait problem, joint swelling (left third finger is broken), myalgias, neck pain and neck stiffness  Skin: Negative for color change  Neurological: Negative for dizziness, syncope, speech difficulty, weakness, light-headedness and headaches  Hematological: Negative for adenopathy  Does not bruise/bleed easily  Psychiatric/Behavioral: Positive for dysphoric mood  Negative for agitation, behavioral problems, confusion, hallucinations, sleep disturbance and suicidal ideas  The patient is nervous/anxious (partial program from 9-2 with [facility])            Current Medications       Current Outpatient Medications:     Accu-Chek FastClix Lancets MISC, USE 1 LANCET TO TEST BLOOD SUGAR UP TO 6 TIMES DAILY, Disp: 102 each, Rfl: 0    acetaminophen (TYLENOL) 500 mg tablet, , Disp: , Rfl:     ALPRAZolam (XANAX) 0 5 mg tablet, Take 0 5 mg by mouth daily as needed, Disp: , Rfl:    clonazePAM (KlonoPIN) 1 mg tablet, Take 1 tablet by mouth 2 (two) times a day, Disp: , Rfl:     Cyanocobalamin (B-12 PO), Take by mouth daily, Disp: , Rfl:     ergocalciferol (VITAMIN D2) 50,000 units, Take 1 capsule (50,000 Units total) by mouth once a week, Disp: 12 capsule, Rfl: 3    escitalopram (LEXAPRO) 20 mg tablet, Take 20 mg by mouth daily, Disp: , Rfl:     gabapentin (NEURONTIN) 600 MG tablet, , Disp: , Rfl:     glucose blood (Accu-Chek Guide) test strip, Use to test blood sugar up to 6 times daily  , Disp: 200 each, Rfl: 2    insulin aspart (NovoLOG) 100 units/mL injection, Inject daily into pump  Max daily dose 50 units  , Disp: 40 mL, Rfl: 2    Insulin Pen Needle (BD Pen Needle Deanna U/F) 32G X 4 MM MISC, Use 4/day, Disp: 100 each, Rfl: 3    Insulin Syringe-Needle U-100 (INSULIN SYRINGE 1CC/28G) 28G X 1/2" 1 ML MISC, 4 injections daily E10 65, Disp: , Rfl:     levothyroxine 200 mcg tablet, Take 1 tablet (200 mcg total) by mouth daily in the early morning, Disp: 90 tablet, Rfl: 3    levothyroxine 75 mcg tablet, , Disp: , Rfl:     PATIENT MAINTAINED INSULIN PUMP, Inject 1 each under the skin every 8 (eight) hours, Disp: 1 each, Rfl: 0    sertraline (ZOLOFT) 50 mg tablet, Take 1 tablet by mouth daily, Disp: , Rfl:     traZODone (DESYREL) 100 mg tablet, Take 100 mg by mouth daily at bedtime, Disp: , Rfl:     atomoxetine (STRATTERA) 80 MG capsule, Take 80 mg by mouth daily  (Patient not taking: Reported on 9/3/2021), Disp: , Rfl:     busPIRone (BUSPAR) 10 mg tablet, Take 1 tablet (10 mg total) by mouth 2 (two) times a day (Patient not taking: Reported on 9/3/2021), Disp: 60 tablet, Rfl: 1    ofloxacin (OCUFLOX) 0 3 % ophthalmic solution, , Disp: , Rfl:     prednisoLONE acetate (PRED FORTE) 1 % ophthalmic suspension, , Disp: , Rfl:     selenium 200 mcg, Take 200 mcg by mouth daily  (Patient not taking: Reported on 9/3/2021), Disp: , Rfl:     vitamin B-12 (VITAMIN B-12) 500 mcg tablet, Take 500 mcg by mouth daily (Patient not taking: Reported on 2021), Disp: , Rfl:     Current Allergies     Allergies as of 2021    (No Known Allergies)            The following portions of the patient's history were reviewed and updated as appropriate: allergies, current medications, past family history, past medical history, past social history, past surgical history and problem list      Past Medical History:   Diagnosis Date    Anemia     Anxiety     B12 deficiency     Cervical disc disorder     On gabapentin     Depression     Disease of thyroid gland     hypothyroid    Iron deficiency anemia     Panic attack     PTSD (post-traumatic stress disorder)     Sleep difficulties     Substance abuse ([facility] )     Type 1 diabetes ([facility] )     Vitamin D deficiency        Past Surgical History:   Procedure Laterality Date    ABDOMINAL SURGERY      gastric bypass     SECTION      x2    CHOLECYSTECTOMY  2018    GASTRIC BYPASS  2007    INTRAUTERINE DEVICE INSERTION  2015    IR BIOPSY BONE MARROW  2020    OTHER SURGICAL HISTORY      surgery for imperforate hymen/endometriosis/hydrometrocolpos    TUBAL LIGATION      WISDOM TOOTH EXTRACTION         Family History   Problem Relation Age of Onset    Thyroid disease Mother     URIEL disease Mother     Hyperlipidemia Mother     Hypertension Mother     Osteoarthritis Mother     Thyroid disease unspecified Mother     Diabetes Father     Alcohol abuse Father     Diabetes type I Father     Thyroid disease Sister     Depression Sister     Thyroid disease unspecified Sister     Anxiety disorder Sister     Heart disease Maternal Grandmother     Hypertension Maternal Grandmother     Arthritis Maternal Grandmother     Diabetes type I Maternal Uncle     Diabetes type I Maternal Grandfather          Medications have been verified          Objective   /68   Pulse 68   Temp 99 2 °F (37 3 °C) (Tympanic)   Resp 20   Ht 5' 6" (1 676 m)   Wt 78 9 kg (174 lb)   SpO2 98%   BMI 28 08 kg/m²        Physical Exam     Physical Exam  Vitals and nursing note reviewed  Constitutional:       General: She is not in acute distress  Appearance: Normal appearance  She is well-developed  She is not diaphoretic  HENT:      Right Ear: Tympanic membrane, ear canal and external ear normal  There is no impacted cerumen  Left Ear: Tympanic membrane, ear canal and external ear normal  There is no impacted cerumen  Mouth/Throat:      Mouth: Mucous membranes are moist       Pharynx: Oropharynx is clear  Neck:      Thyroid: No thyromegaly  Trachea: No tracheal deviation  Cardiovascular:      Rate and Rhythm: Normal rate and regular rhythm  Heart sounds: Normal heart sounds  No murmur heard  Pulmonary:      Effort: Pulmonary effort is normal  No respiratory distress  Breath sounds: Normal breath sounds  No wheezing  Musculoskeletal:         General: No tenderness or deformity  Normal range of motion  Cervical back: Normal range of motion and neck supple  Skin:     General: Skin is warm and dry  Findings: No erythema or rash  Neurological:      Mental Status: She is alert and oriented to person, place, and time  Psychiatric:         Attention and Perception: Attention normal          Mood and Affect: Mood is anxious and depressed  Affect is flat  Speech: Speech normal          Behavior: Behavior normal  Behavior is cooperative  Thought Content:  Thought content normal          Cognition and Memory: Cognition and memory normal          Judgment: Judgment normal

## 2021-09-03 NOTE — PSYCH
Subjective:     Patient ID: Karlene Lowe is a 46 y o  female  [facility] Clinical Progress Notes      Specialized Services Documentation  Therapist must complete separate progress note for each specific clinical activity in which the individual participated during the day  Group Psychotherapy   This group was facilitated virtually in a private office using [employer] and Approved [employer] Teams  Clarissa Webb consented to the use of tele-video modality of treatment  [id number] Karlene Lowe  actively shared in psychotherapy group exploring the weekly wellness assessment  Group explored successes and challenges in wellness areas throughout this past week - physical, social, vocational, spiritual, cognitive, and emotional   Garciagutierrez Mccollum identified social aspects as strengths and physical as needs wanting to work on all areas  Some positive progress toward goal noted  Continue psychotherapy to encourage self awareness and home practice of skills to support wellness  Tx Plan Objective: 1 1, 1 2, 1 4, Therapist:  Kristi Penn RN, BSN      Education Therapy   [id number] Karlene Lowe actively shared in morning assessment and goal review  Presented as Receptive related to readiness to learn  Clarissa Webb did complete goal from last treatment day identifying gaining sense of well being and accomplishment  did not present with any barriers to learning  [id number] Karlene Lowe engaged throughout the treatment day  Was engaged in learning related to [employer]  Staff utilized A/V teaching methods  Clarissa Webb shared area of learning and set a goal for outside of program to relax with family at [address] this weekend    Tx Plan Objective: 1 1, 1 2  1 4Therapist:  Kristi Penn RN, BSN      Case Management Note  This case management session was facilitated virtually in a private office using [employer] and Approved [employer] Jayden    Karlene  consented to the use of tele-video modality of treatment  Pushpa Natarajan RN, BSN    Current suicide risk : Low     At 3733 attempted case management however call went to voicemail  Voicemail left with Celsias  At [address]  met with [employer] for case management  [facility] identified she is gaining from group participation and learning coping tools  [facility] is hoping to have a relaxing weekend at [address] [facility] with family  She is meeting daily goals she sets for herself  Medications changes/added/denied? No    Treatment session number: 3    Individual Case Management Visit provided today?  No    [facility] follow up physician's orders: no new orders

## 2021-09-03 NOTE — PSYCH
Subjective:     Patient ID: Booker Christina is a 46 y o  female  [facility] Clinical Progress Notes      Specialized Services Documentation  Therapist must complete separate progress note for each specific clinical activity in which the individual participated during the day  Group Psychotherapy [id number]    This group was facilitated virtually in a private office using [employer] and Approved [employer] Teams  Clarissa Jon consented to the use of tele-video modality of treatment  Group education focused on myths and facts about mental illness  Statements discussed include:  1) Mental illness is always hereditary (myth)  2) Family members and friends can have important input about medications and symptoms management (fact)  3) Taking medications is a cure for mental illness (myth)  4) Im in depressed, beer will make me feel better (myth)  5) Crack cocaine can bring on psychotic symptoms (fact)  6)  People with Bipolar Disorder or Depression are dangerous (myth)  7) Mental illness means you cannot be productive (myth)   Clarissa Jon actively participated in the group activity where she and her peers shared whether they believed the statement was a myth or fact and rationale  Booker Christina is encouraged to make progress towards goals and objectives through group participation and will continue to attend wellness group        Tx Plan Objective: 1 1,1 4, Therapist:  SUZETTE Vance

## 2021-09-07 ENCOUNTER — HOSPITAL ENCOUNTER (OUTPATIENT)
Dept: INFUSION CENTER | Facility: HOSPITAL | Age: 41
End: 2021-09-07
Attending: INTERNAL MEDICINE

## 2021-09-07 ENCOUNTER — OFFICE VISIT (OUTPATIENT)
Dept: PSYCHOLOGY | Facility: CLINIC | Age: 41
End: 2021-09-07
Payer: COMMERCIAL

## 2021-09-07 ENCOUNTER — TELEMEDICINE (OUTPATIENT)
Dept: PSYCHIATRY | Facility: CLINIC | Age: 41
End: 2021-09-07
Payer: COMMERCIAL

## 2021-09-07 DIAGNOSIS — F10.10 ALCOHOL ABUSE: ICD-10-CM

## 2021-09-07 DIAGNOSIS — F41.9 ANXIETY AND DEPRESSION: Primary | ICD-10-CM

## 2021-09-07 DIAGNOSIS — F32.A ANXIETY AND DEPRESSION: Primary | ICD-10-CM

## 2021-09-07 PROCEDURE — G0177 OPPS/PHP; TRAIN & EDUC SERV: HCPCS

## 2021-09-07 PROCEDURE — S0201 PARTIAL HOSPITALIZATION SERV: HCPCS

## 2021-09-07 PROCEDURE — G0410 GRP PSYCH PARTIAL HOSP 45-50: HCPCS

## 2021-09-07 PROCEDURE — 99214 OFFICE O/P EST MOD 30 MIN: CPT | Performed by: PHYSICIAN ASSISTANT

## 2021-09-07 PROCEDURE — G0176 OPPS/PHP;ACTIVITY THERAPY: HCPCS

## 2021-09-07 NOTE — PSYCH
Subjective:     Patient ID: Blair Nelson is a 46 y o  female  [facility] Clinical Progress Notes      Specialized Services Documentation  Therapist must complete separate progress note for each specific clinical activity in which the individual participated during the day  Group Psychotherapy ([id number]) Grecia Anni was involved in a group that started with a video on a speaker from a britta talk presentation geared around how phones can steal our attention and get us pulled in longer than we attended  Transitioning into an open discussion portion where Grecia Hutton participated sharing how phones/social media can affect our mood and overall well-being  Boundaries such tracking your time on certain apps was one way to monitor and assess ones own current issues regarding their phone use  Grecia Anni will continue with life skills and psychotherapy groups  Good progress made towards treatment   Tx Plan Objective: 1 1,1 2 Therapist:  MATT Stoner

## 2021-09-07 NOTE — PSYCH
Virtual Brief Visit    Verification of patient location:    Patient is located in the following state in which I hold an active license [place]            Encounter provider Fabiana Ochoa PA-C    Provider located at [place] [address] [place] [address]  [phone]    Recent Visits  No visits were found meeting these conditions  Showing recent visits within past 7 days and meeting all other requirements  Today's Visits  Date Type Provider Dept   09/07/21 Telemedicine Fabiana Ochoa PA-C [facility]   Showing today's visits and meeting all other requirements  Future Appointments  No visits were found meeting these conditions  Showing future appointments within next 150 days and meeting all other requirements       After connecting through telephone, the patient was identified by name and date of birth  [facility] was informed that this is a telemedicine visit and that the visit is being conducted through telephone  My office door was closed  No one else was in the room  She acknowledged consent and understanding of privacy and security of the platform  The patient has agreed to participate and understands she can discontinue the visit at any time  Patient is aware this is a billable service  I spent 12 minutes directly with the patient during this visit    VIRTUAL VISIT [address] verbally agrees to participate in [employer]  Pt is aware that [employer] could be limited without vital signs or the ability to perform a full hands-on physical exam  Clarissa Webb understands she or the provider may request at any time to terminate the video visit and request the patient to seek care or treatment in person  Video visit was planned but Arielmon Nestor could not get her camera to work    This visit done by audio only    Progress Note - Monico Joseph Anamaria Calix 46 y o  female MRN: [id number]     Progress at partial program: some improvement  Bhargavi farrell seen by Dr Justa Ramos 8/30 at which time she ws instructed to start buspar in 2 weeks after she completed zoloft taper  Psychiatric meds reconciled today  [place] continues to receive [address] [place] by OP provider  [place] reports that her symptoms come and go and some days she is doing ok and other days not  She is having good day today and does not feel particularly anxious or depressed  Last felt very anxious 4-5 days ago in the context of family stressors  Has not need prn xanax in about 3 weeks  Continues to take klonopin 0 5 mg bid scheduled  Sleep has been good recently  Reports "slight urge" to drink alcohol- last drink one month ago  Is now on probation and will be getting screened regularly       ROS:   As above otherwise negative    Active Ambulatory Problems     Diagnosis Date Noted    Diabetic ketoacidosis without coma associated with type 1 diabetes mellitus ([facility] ) 03/20/2018    Iron deficiency anemia 06/05/2018    Vitamin D deficiency 11/10/2011    B12 deficiency 06/03/2016    Anxiety and depression 06/02/2016    Electrolyte abnormality 12/27/2018    Type 1 diabetes mellitus with hyperglycemia [facility] ) 09/23/2019    Depression 06/24/2020    Suicidal ideations 06/25/2020    Leukopenia 07/15/2019    THO (acute kidney injury) ([facility] ) 08/06/2020    Hypothyroidism due to Hashimoto's thyroiditis 08/06/2020    Encounter for gynecological examination without abnormal finding 03/30/2017    Bilateral carpal tunnel syndrome 11/01/2017    Cervical spondylosis 03/13/2015    H/O gastric bypass 05/03/2017    Hydrosalpinx 03/12/2019    Intestinal malabsorption, unspecified 06/03/2016    Invagination of intestine ([facility] ) 03/11/2019    Protrusion of cervical intervertebral disc 10/03/2019    Overweight with body mass index (BMI) of 26 to 26 9 in adult 03/01/2021    Insulin pump status 06/01/2015    Primary hypothyroidism 10/29/2009    Uncontrolled type 1 diabetes mellitus ([facility] ) 08/03/2011    Transaminitis 05/23/2021    Alcohol abuse 05/23/2021    Iron deficiency anemia, unspecified 08/06/2021     Resolved Ambulatory Problems     Diagnosis Date Noted    No Resolved Ambulatory Problems     Past Medical History:   Diagnosis Date    Anemia     Anxiety     Cervical disc disorder     Disease of thyroid gland     Panic attack     PTSD (post-traumatic stress disorder)     Sleep difficulties     Substance abuse ([facility] )     Type 1 diabetes ([facility] )      No Known Allergies       Mental Status Evaluation:     Hiram spoke to Tammie Mcmillan by phone,  Speech is clear and coherent, normal rate and volume  Thought processes and content intact  No SI/HI  Laboratory results: I have personally reviewed all pertinent laboratory/tests results  Assessment   Diagnoses and all orders for this visit:    Anxiety and depression       Current Outpatient Medications   Medication Sig Dispense Refill    Accu-Chek FastClix Lancets MISC USE 1 LANCET TO TEST BLOOD SUGAR UP TO 6 TIMES DAILY 102 each 0    acetaminophen (TYLENOL) 500 mg tablet       ALPRAZolam (XANAX) 0 5 mg tablet Take 0 5 mg by mouth daily as needed      busPIRone (BUSPAR) 10 mg tablet Take 1 tablet (10 mg total) by mouth 2 (two) times a day (Patient not taking: Reported on 9/3/2021) 60 tablet 1    clonazePAM (KlonoPIN) 1 mg tablet Take 1 tablet by mouth 2 (two) times a day      Cyanocobalamin (B-12 PO) Take by mouth daily      ergocalciferol (VITAMIN D2) 50,000 units Take 1 capsule (50,000 Units total) by mouth once a week 12 capsule 3    escitalopram (LEXAPRO) 20 mg tablet Take 20 mg by mouth daily      gabapentin (NEURONTIN) 600 MG tablet       glucose blood (Accu-Chek Guide) test strip Use to test blood sugar up to 6 times daily  200 each 2    insulin aspart (NovoLOG) 100 units/mL injection Inject daily into pump   Max daily dose 50 units  40 mL 2    Insulin Pen Needle (BD Pen Needle Deanna U/F) 32G X 4 MM MISC Use 4/day 100 each 3    Insulin Syringe-Needle U-100 (INSULIN SYRINGE 1CC/28G) 28G X 1/2" 1 ML MISC 4 injections daily E10 65      levothyroxine 200 mcg tablet Take 1 tablet (200 mcg total) by mouth daily in the early morning 90 tablet 3    levothyroxine 75 mcg tablet       ofloxacin (OCUFLOX) 0 3 % ophthalmic solution       PATIENT MAINTAINED INSULIN PUMP Inject 1 each under the skin every 8 (eight) hours 1 each 0    prednisoLONE acetate (PRED FORTE) 1 % ophthalmic suspension       selenium 200 mcg Take 200 mcg by mouth daily  (Patient not taking: Reported on 9/3/2021)      traZODone (DESYREL) 100 mg tablet Take 100 mg by mouth daily at bedtime      vitamin B-12 (VITAMIN B-12) 500 mcg tablet Take 500 mcg by mouth daily (Patient not taking: Reported on 8/23/2021)       No current facility-administered medications for this visit  Plan    Planned medication and treatment changes:  No med change  Cont plan per Dr Chandan Herrera note from 8/30  All current active medications have been reviewed  Continue treatment with group therapy and partial program      Risks / Benefits of Treatment:    Risks, benefits, and possible side effects of medications explained to patient and patient verbalizes understanding and agrees to medications  Counseling / Coordination of Care:    Patient's progress discussed with staff in treatment team meeting  Medications, treatment progress and treatment plan reviewed with patient      Keely Hogan PA-C 09/07/21

## 2021-09-07 NOTE — PSYCH
Subjective:     Patient ID: Karlene Lowe is a 46 y o  female  [facility] Clinical Progress Notes      Specialized Services Documentation  Therapist must complete separate progress note for each specific clinical activity in which the individual participated during the day  Group Psychotherapy Life Skills Group (10:45-11:45)  Karlene Lowe actively engaged in group focused on developing self-compassion which was facilitated virtually in a private office using HIPAA Compliant and Approved [employer] Teams  Clarissa consented to the use of tele-video modality of treatment and was virtually present for group psychotherapy today  Group members discussed what is self compassion  During the group we discussed the benefits of self-compassion and how it helps improve well-being, connection to others, increases selflessness, regulates emotions, reduces depression and anxiety  Group members practiced self-compassion by completing self-compassion exercises drawn from "The Self-Compassion Deck"  Clarissa discussed how they could bring self-compassion into their lives by turning something negative into something positive and seeing things in a positive light  Sachin Arechiga continues to make progress towards goals through verbal participation in group; to accomplish long term goals continue to utilize skills learned in programming  Continue with psychotherapy to educate and encourage use of wellness tools   Tx Plan Objective: 1 1,1 2 Therapist: Carmen Mendoza

## 2021-09-07 NOTE — PSYCH
Virtual Regular Visit    Verification of patient location:    Patient is located in the following state in which I hold an active license [place]      Assessment/Plan:    Problem List Items Addressed This Visit        Other    Anxiety and depression - Primary    Alcohol abuse          Goals addressed in session:           Reason for visit is PHP VIRTUAL GROUP DUE TO COVID-19        Encounter provider [address] PARTIAL [address]    Provider located at [address] [place] [address]  [address] [facility] [id number]      Recent Visits  Date Type Provider Dept   09/03/21 Office Visit [address] PARTIAL PSYCH GROUP THERAPY [facility]   09/02/21 Office Visit [address] PARTIAL PSYCH GROUP THERAPY [facility]   09/01/21 Office Visit [address] PARTIAL PSYCH GROUP THERAPY [facility]   Showing recent visits within past 7 days and meeting all other requirements  Today's Visits  Date Type Provider Dept   09/07/21 Office Visit [address] PARTIAL PSYCH GROUP THERAPY [facility]   Showing today's visits and meeting all other requirements  Future Appointments  No visits were found meeting these conditions  Showing future appointments within next 150 days and meeting all other requirements       The patient was identified by name and date of birth  Afia Wareemann was informed that this is a telemedicine visit and that the visit is being conducted throughMicrosoft Teams and patient was informed that this is a secure, HIPAA-compliant platform  She agrees to proceed     My office door was closed  No one else was in the room  She acknowledged consent and understanding of privacy and security of the video platform  The patient has agreed to participate and understands they can discontinue the visit at any time  Patient is aware this is a billable service  Subjective  Afia Thomas is a 46 y o  female          HPI     Past Medical History:   Diagnosis Date    Anemia     Anxiety     B12 deficiency     Cervical disc disorder     On gabapentin     Depression     Disease of thyroid gland     hypothyroid    Iron deficiency anemia     Panic attack     PTSD (post-traumatic stress disorder)     Sleep difficulties     [facility]     Type 1 diabetes ([facility] )     Vitamin D deficiency        Past Surgical History:   Procedure Laterality Date    ABDOMINAL SURGERY      gastric bypass     SECTION      x2    CHOLECYSTECTOMY  2018    GASTRIC BYPASS  2007    INTRAUTERINE DEVICE INSERTION  2015    IR BIOPSY BONE MARROW  2020    OTHER SURGICAL HISTORY      surgery for imperforate hymen/endometriosis/hydrometrocolpos    TUBAL LIGATION      WISDOM TOOTH EXTRACTION         Current Outpatient Medications   Medication Sig Dispense Refill    Accu-Chek FastClix Lancets MISC USE 1 LANCET TO TEST BLOOD SUGAR UP TO 6 TIMES DAILY 102 each 0    acetaminophen (TYLENOL) 500 mg tablet       ALPRAZolam (XANAX) 0 5 mg tablet Take 0 5 mg by mouth daily as needed      busPIRone (BUSPAR) 10 mg tablet Take 1 tablet (10 mg total) by mouth 2 (two) times a day (Patient not taking: Reported on 9/3/2021) 60 tablet 1    clonazePAM (KlonoPIN) 1 mg tablet Take 1 tablet by mouth 2 (two) times a day      Cyanocobalamin (B-12 PO) Take by mouth daily      ergocalciferol (VITAMIN D2) 50,000 units Take 1 capsule (50,000 Units total) by mouth once a week 12 capsule 3    escitalopram (LEXAPRO) 20 mg tablet Take 20 mg by mouth daily      gabapentin (NEURONTIN) 600 MG tablet       glucose blood (Accu-Chek Guide) test strip Use to test blood sugar up to 6 times daily  200 each 2    insulin aspart (NovoLOG) 100 units/mL injection Inject daily into pump  Max daily dose 50 units   40 mL 2    Insulin Pen Needle (BD Pen Needle Deanna U/F) 32G X 4 MM MISC Use 4/day 100 each 3    Insulin Syringe-Needle U-100 (INSULIN SYRINGE 1CC/28G) 28G X 1/2" 1 ML MISC 4 injections daily E10 65      levothyroxine 200 mcg tablet Take 1 tablet (200 mcg total) by mouth daily in the early morning 90 tablet 3    levothyroxine 75 mcg tablet       ofloxacin (OCUFLOX) 0 3 % ophthalmic solution       PATIENT MAINTAINED INSULIN PUMP Inject 1 each under the skin every 8 (eight) hours 1 each 0    prednisoLONE acetate (PRED FORTE) 1 % ophthalmic suspension       selenium 200 mcg Take 200 mcg by mouth daily  (Patient not taking: Reported on 9/3/2021)      traZODone (DESYREL) 100 mg tablet Take 100 mg by mouth daily at bedtime      vitamin B-12 (VITAMIN B-12) 500 mcg tablet Take 500 mcg by mouth daily (Patient not taking: Reported on 8/23/2021)       No current facility-administered medications for this visit  No Known Allergies    Review of Systems    Video Exam    There were no vitals filed for this visit  Physical Exam     I spent FOUR GROUP HOURS PLUS CASE MANAGEMENT minutes with patient today in which greater than 50% of the time was spent in counseling/coordination of care regarding PHP - SEE NOTES  VIRTUAL VISIT DISCLAIMER    Clarissa Webb verbally agrees to participate in [employer]  Pt is aware that [employer] could be limited without vital signs or the ability to perform a full hands-on physical exam  Clarissa Webb understands she or the provider may request at any time to terminate the video visit and request the patient to seek care or treatment in person

## 2021-09-07 NOTE — PSYCH
Subjective:      Patient ID: Messi Gray is a 46 y o  female  [facility] Clinical Progress Notes      Specialized Services Documentation  Therapist must complete separate progress note for each specific clinical activity in which the individual participated during the day  Group Psychotherapy   This group was facilitated virtually in a private office using [email or web address] and Approved [employer] Teams  Clarissa Webb consented to the use of tele-video modality of treatment  [id number] Messi Gray  attended group on the Wellness Recovery Action Plan  Group members were educated on the background of the WRAP  Members were informed WRAP was emailed to them this AM   Writer explained the benefit of utilizing the WRAP prior to members initiating it  Members then focused on the WRAP: the wellness toolbox, daily plan, identification of triggers and stressors  Next the group began developing action plans to respond to stressors and crisis situations  Group members shared pieces of information from these sections and identified their importance  Writer encouraged the members of group to continue utilizing the packet to develop plans inside and outside of program  Good progress displayed through participation and engagement in topic  Tx Plan Objective: 1 1, 1 2 Therapist:  Gladys James RN, BSN    Education Therapy   [id number] Messi Gray actively shared in morning assessment and goal review  Presented as Receptive related to readiness to learn  Clarissa Galinamoise did complete goal from last treatment day identifying gaining sense of goodness  did not present with any barriers to learning  [id number] Messi Gray engaged throughout the treatment day  Was engaged in learning related to [employer]  Staff utilized A/V teaching methods    Clarissa Webb shared area of learning and set a goal for outside of program to enjoy the rest of the day along with her sons baseball game       Tx Plan Objective: 1 1, 1 2, 1 4, Therapist:  Pushpa Natarajan RN, BSN      Case Management Note    This case management session was facilitated virtually in a private office using HIPPA Compliant and Approved [employer] Teams  Clarissa Webb consented to the use of tele-video modality of treatment  Pushpa Natarajan RN, BSN    Current suicide risk : Low     [id number] met with Dennis Kailash for case management  Saundra December identified her and her family went away for the weekend and she forgot her medications at home  She stated she did not take her medications now for 4 days and feels better off the medications- no nausea, vomiting or diarrhea  She denied sharing this information with Berhane BEAVERS at [facility]  Reinforced education regarding hazards of stopping psychiatric medications abruptly as well as it is not recommended to do so without the guidance from a practitioner  Shannon Lopez stated she comprehended informations shared  Shannon Lopez stated she is going in now for her [address] [place] therapy and will see if she could share this information with her psychiatrist who would be there in the building  She is aware I will be sharing this information with Berhane BEAVERS  Medications changes/added/denied? See above note    Treatment session number: 4    Individual Case Management Visit provided today?  Yes     [facility]ations follow up physician's orders: No new order

## 2021-09-08 ENCOUNTER — DOCUMENTATION (OUTPATIENT)
Dept: PSYCHOLOGY | Facility: CLINIC | Age: 41
End: 2021-09-08

## 2021-09-08 ENCOUNTER — APPOINTMENT (OUTPATIENT)
Dept: PSYCHOLOGY | Facility: CLINIC | Age: 41
End: 2021-09-08
Payer: COMMERCIAL

## 2021-09-08 NOTE — PROGRESS NOTES
Subjective:     Patient ID: Eriberto Novoa is a 46 y o  female  [facility] Clinical Progress Notes      Specialized Services Documentation  Therapist must complete separate progress note for each specific clinical activity in which the individual participated during the day  Case Management Note    Marcell Townsend RN, BSN    Current suicide risk : Low     Cydne Hemming Cudzil was not present in group today, excused absence  Medications changes/added/denied? No    Individual Case Management Visit provided today?  No    [facility]ations follow up physician's orders: no new orders

## 2021-09-09 ENCOUNTER — HOSPITAL ENCOUNTER (OUTPATIENT)
Dept: INFUSION CENTER | Facility: HOSPITAL | Age: 41
Discharge: HOME | End: 2021-09-09
Attending: INTERNAL MEDICINE
Payer: COMMERCIAL

## 2021-09-09 ENCOUNTER — OFFICE VISIT (OUTPATIENT)
Dept: PSYCHOLOGY | Facility: CLINIC | Age: 41
End: 2021-09-09
Payer: COMMERCIAL

## 2021-09-09 VITALS
TEMPERATURE: 97.4 F | HEART RATE: 90 BPM | RESPIRATION RATE: 18 BRPM | SYSTOLIC BLOOD PRESSURE: 114 MMHG | DIASTOLIC BLOOD PRESSURE: 59 MMHG | OXYGEN SATURATION: 99 %

## 2021-09-09 DIAGNOSIS — D50.8 OTHER IRON DEFICIENCY ANEMIA: Primary | ICD-10-CM

## 2021-09-09 DIAGNOSIS — F41.9 ANXIETY AND DEPRESSION: Primary | ICD-10-CM

## 2021-09-09 DIAGNOSIS — F32.A ANXIETY AND DEPRESSION: Primary | ICD-10-CM

## 2021-09-09 DIAGNOSIS — F10.10 ALCOHOL ABUSE: ICD-10-CM

## 2021-09-09 PROCEDURE — G0176 OPPS/PHP;ACTIVITY THERAPY: HCPCS

## 2021-09-09 PROCEDURE — S0201 PARTIAL HOSPITALIZATION SERV: HCPCS

## 2021-09-09 PROCEDURE — G0410 GRP PSYCH PARTIAL HOSP 45-50: HCPCS

## 2021-09-09 PROCEDURE — 96365 THER/PROPH/DIAG IV INF INIT: CPT

## 2021-09-09 RX ORDER — SODIUM CHLORIDE 9 MG/ML
20 INJECTION, SOLUTION INTRAVENOUS ONCE
Status: CANCELLED | OUTPATIENT
Start: 2021-09-14

## 2021-09-09 RX ORDER — SODIUM CHLORIDE 9 MG/ML
20 INJECTION, SOLUTION INTRAVENOUS ONCE
Status: COMPLETED | OUTPATIENT
Start: 2021-09-09 | End: 2021-09-09

## 2021-09-09 RX ADMIN — SODIUM CHLORIDE 200 MG: 9 INJECTION, SOLUTION INTRAVENOUS at 08:21

## 2021-09-09 RX ADMIN — SODIUM CHLORIDE 20 ML/HR: 0.9 INJECTION, SOLUTION INTRAVENOUS at 08:21

## 2021-09-09 NOTE — PSYCH
Subjective:     Patient ID: Estella Juarez is a 46 y o  female  [facility] Clinical Progress Notes      Specialized Services Documentation  Therapist must complete separate progress note for each specific clinical activity in which the individual participated during the day  Group Psychotherapy   This group was facilitated virtually in a private office using [place] [employer] and Approved [employer] Teams  Clarissa Galinamoise consented to the use of tele-video modality of treatment  ([id number]) Estella Juarez  actively shared in psychoeducational group which focused on Tapping skills- Emotional Freedom Technique (EFT) as a coping skill  How Tapping can help, where Tapping comes from, why Tapping works and how to [address] were discussed with the group  The group discussed benefits of Tapping  The group was then guided through a Tapping scenario  The group wrapped up by making a goal to be more positive  Some progress towards goal noted  Continue psychoeducation to educate on the use of Tapping as a healthy coping skill to improve mental wellbeing  Tx Plan Objective: 1 1, 1 2, Therapist:  Anuel Stanton RN, BSN    Education Therapy   [id number] Estella Juarez was not present in morning assessment and goal review due to a previously scheduled appointment  [id number] Estella Juarez engaged throughout the treatment day  Was engaged in learning related to [employer]  Staff utilized A/V teaching methods  Clarissa Cutyrelldaphne shared area of learning and set a goal for outside of program to shower and enjoy her sons baseball game, socialize more  Tx Plan Objective: 1 1, 1 2, 1 4, Therapist:  Anuel Stanton RN, BSN    Case Management Note    This case management session was facilitated virtually in a private office using HIPPA Compliant and Approved [employer] Teams  Clarissa Webb consented to the use of tele-video modality of treatment       Anuel Stanton RN, BSN    Current suicide risk : Low     [id number] [phone] Met with Brandon Feliz for case management  Clarissa reports she is gaining from group and currently feels well  She states she will be late to group tomorrow as she has a follow up appointment early AM but will be present by 0930  Denies any questions at this time  Medications changes/added/denied? No    Treatment session number: 6    Individual Case Management Visit provided today?  Yes     [facility]ations follow up physician's orders: no new orders

## 2021-09-09 NOTE — PROGRESS NOTES
Pt tolerated todays venofer dose well  No adverse reactions noted  Peripheral iv discontinued jelco intact   Discharged ambulatory deferring avs

## 2021-09-09 NOTE — PSYCH
Virtual Regular Visit    Verification of patient location:    Patient is located in the following state in which I hold an active license [place]      Assessment/Plan:    Problem List Items Addressed This Visit        Other    Anxiety and depression - Primary    Alcohol abuse          Goals addressed in session:           Reason for visit is PHP VIRTUAL GROUP DUE TO COVID-19      Encounter provider [address] PARTIAL [address]    Provider located at [address] [place] [address]  [address] [facility] [id number]      Recent Visits  Date Type Provider Dept   09/07/21 Office Visit [address] PARTIAL PSYCH GROUP THERAPY [facility]   09/03/21 Office Visit [address] PARTIAL PSYCH GROUP THERAPY [facility]   09/02/21 Office Visit [address] PARTIAL PSYCH GROUP THERAPY [facility]   Showing recent visits within past 7 days and meeting all other requirements  Today's Visits  Date Type Provider Dept   09/09/21 Office Visit [address] PARTIAL PSYCH GROUP THERAPY [facility]   Showing today's visits and meeting all other requirements  Future Appointments  No visits were found meeting these conditions  Showing future appointments within next 150 days and meeting all other requirements       The patient was identified by name and date of birth  Booker Christina was informed that this is a telemedicine visit and that the visit is being conducted throughMicrosoft Teams and patient was informed that this is a secure, HIPAA-compliant platform  She agrees to proceed     My office door was closed  No one else was in the room  She acknowledged consent and understanding of privacy and security of the video platform  The patient has agreed to participate and understands they can discontinue the visit at any time  Patient is aware this is a billable service  Subjective  Booker Christina is a 46 y o  female         HPI     Past Medical History:   Diagnosis Date  Anemia     Anxiety     B12 deficiency     Cervical disc disorder     On gabapentin     Depression     Disease of thyroid gland     hypothyroid    Iron deficiency anemia     Panic attack     PTSD (post-traumatic stress disorder)     Sleep difficulties     Substance abuse ([facility] [address] [facility]     Type 1 diabetes ([facility] [address] )     Vitamin D deficiency        Past Surgical History:   Procedure Laterality Date    ABDOMINAL SURGERY      gastric bypass     SECTION      x2    CHOLECYSTECTOMY  2018    GASTRIC BYPASS  2007    INTRAUTERINE DEVICE INSERTION  2015    IR BIOPSY BONE MARROW  2020    OTHER SURGICAL HISTORY      surgery for imperforate hymen/endometriosis/hydrometrocolpos    TUBAL LIGATION      WISDOM TOOTH EXTRACTION         Current Outpatient Medications   Medication Sig Dispense Refill    Accu-Chek FastClix Lancets MISC USE 1 LANCET TO TEST BLOOD SUGAR UP TO 6 TIMES DAILY 102 each 0    acetaminophen (TYLENOL) 500 mg tablet       ALPRAZolam (XANAX) 0 5 mg tablet Take 0 5 mg by mouth daily as needed      busPIRone (BUSPAR) 10 mg tablet Take 1 tablet (10 mg total) by mouth 2 (two) times a day (Patient not taking: Reported on 9/3/2021) 60 tablet 1    clonazePAM (KlonoPIN) 1 mg tablet Take 1 tablet by mouth 2 (two) times a day      Cyanocobalamin (B-12 PO) Take by mouth daily      ergocalciferol (VITAMIN D2) 50,000 units Take 1 capsule (50,000 Units total) by mouth once a week 12 capsule 3    escitalopram (LEXAPRO) 20 mg tablet Take 20 mg by mouth daily      gabapentin (NEURONTIN) 600 MG tablet       glucose blood (Accu-Chek Guide) test strip Use to test blood sugar up to 6 times daily  200 each 2    insulin aspart (NovoLOG) 100 units/mL injection Inject daily into pump  Max daily dose 50 units   40 mL 2    Insulin Pen Needle (BD Pen Needle Deanna U/F) 32G X 4 MM MISC Use 4/day 100 each 3    Insulin Syringe-Needle U-100 (INSULIN SYRINGE 1CC/28G) 28G X 1/2" 1 ML MISC 4 injections daily E10 65      levothyroxine 200 mcg tablet Take 1 tablet (200 mcg total) by mouth daily in the early morning 90 tablet 3    levothyroxine 75 mcg tablet       ofloxacin (OCUFLOX) 0 3 % ophthalmic solution       PATIENT MAINTAINED INSULIN PUMP Inject 1 each under the skin every 8 (eight) hours 1 each 0    prednisoLONE acetate (PRED FORTE) 1 % ophthalmic suspension       selenium 200 mcg Take 200 mcg by mouth daily  (Patient not taking: Reported on 9/3/2021)      traZODone (DESYREL) 100 mg tablet Take 100 mg by mouth daily at bedtime      vitamin B-12 (VITAMIN B-12) 500 mcg tablet Take 500 mcg by mouth daily (Patient not taking: Reported on 8/23/2021)       No current facility-administered medications for this visit  No Known Allergies    Review of Systems    Video Exam    There were no vitals filed for this visit  Physical Exam     I spent FOUR GROUP HOURS PLUS CASE MANAGEMENT minutes with patient today in which greater than 50% of the time was spent in counseling/coordination of care regarding PHP - SEE NOTES  VIRTUAL VISIT DISCLAIMER    Clarissa Webb verbally agrees to participate in [employer]  Pt is aware that [employer] could be limited without vital signs or the ability to perform a full hands-on physical exam  Clarissa Webb understands she or the provider may request at any time to terminate the video visit and request the patient to seek care or treatment in person

## 2021-09-09 NOTE — PSYCH
Subjective:     Patient ID: Eli Oneill is a 46 y o  female  [facility] Clinical Progress Notes      Specialized Services Documentation  Therapist must complete separate progress note for each specific clinical activity in which the individual participated during the day  Group Psychotherapy Life Skills (10:45-11:45) Eli Oneill actively engaged in group focused on strengths mapping which was facilitated virtually in a private office using HIPAA Compliant and Approved [employer] Teams  Clarissa Webb consented to the use of tele-video modality of treatment and was virtually present for group psychotherapy today  Clients were instructed to think of a positive experience that they had because they made it positive  Clients shared their experience and identified the things that they did to make it successful  Clarissa Webb shared the positive experience of going to [facility] for treatment and identified the six strengths that they used to make it successful   The group discussed why knowing their strengths was important  Group members discussed how they could apply their strengths to a current stressor  Eli Oneill continues to make progress towards goals through verbal participation in group; to accomplish long term goals continue to utilize skills learned in programming  Continue with psychotherapy to educate and encourage use of wellness tools  Tx Plan Objective: 1 1,1 2 Therapist: Jony Guallpa    GROUP PSYCHOTHERAPY  (12:30-1:30) Group was facilitated virtually in a private office using HIPAA Compliant and Approved [employer] Teams  Clarissa Webb consented to the use of tele-video modality of treatment and was virtually present for group psychotherapy today  she attentively listened to [address] share his life story as he co-led this session  Group encouraged power of learning about self, accepting illness and personal responsibility in recovery    Community resources reviewed in addition to personal resources like the affirmations  Progress toward goals noted  Continue psychotherapy to encourage self -awareness and healthy engagement of supports      TX Plan Objectives: 1 1, 1 2, 1 4   Therapist: SUZETTE Erwin

## 2021-09-10 ENCOUNTER — OFFICE VISIT (OUTPATIENT)
Dept: PSYCHOLOGY | Facility: CLINIC | Age: 41
End: 2021-09-10
Payer: COMMERCIAL

## 2021-09-10 DIAGNOSIS — F41.9 ANXIETY AND DEPRESSION: Primary | ICD-10-CM

## 2021-09-10 DIAGNOSIS — D50.8 OTHER IRON DEFICIENCY ANEMIA: Primary | ICD-10-CM

## 2021-09-10 DIAGNOSIS — F32.A ANXIETY AND DEPRESSION: Primary | ICD-10-CM

## 2021-09-10 PROCEDURE — S0201 PARTIAL HOSPITALIZATION SERV: HCPCS

## 2021-09-10 PROCEDURE — G0176 OPPS/PHP;ACTIVITY THERAPY: HCPCS

## 2021-09-10 PROCEDURE — G0410 GRP PSYCH PARTIAL HOSP 45-50: HCPCS

## 2021-09-10 RX ORDER — SODIUM CHLORIDE 9 MG/ML
20 INJECTION, SOLUTION INTRAVENOUS ONCE
Status: CANCELLED | OUTPATIENT
Start: 2021-09-14

## 2021-09-10 NOTE — PSYCH
Subjective:     Patient ID: Liya Pozo is a 46 y o  female  [facility] Clinical Progress Notes      Specialized Services Documentation  Therapist must complete separate progress note for each specific clinical activity in which the individual participated during the day  Group Psychotherapy   This group was facilitated virtually in a private office using [place] [employer] and Approved [employer] Teams  Clarissa Webb consented to the use of tele-video modality of treatment  [id number] Liya Pozo  actively shared in psychotherapy group exploring the weekly wellness assessment  Group explored successes and challenges in wellness areas throughout this past week - physical, social, vocational, spiritual, cognitive, and emotional   Real Sos could not identify a strength and social as needs wanting to work on making a neighborhood friend  Some positive progress toward goal noted  Continue psychotherapy to encourage self awareness and home practice of skills to support wellness  Treatment Plan Objective: 1 1, 1 2, 1 5, Therapist:  Burgess Keagan RN, BSN      Case Management Note    Burgess Keagan RN, BSN    Current suicide risk : Low     This case management session was facilitated virtually in a private office using HIPPA Compliant and Approved [employer] Teams  Clarissa Webb consented to the use of tele-video modality of treatment  [id number]- Met with Liya Pozo for case management  Real Marinelli reports she is currently assuring she is taking prescribed medications  Continues with [address] [place] [address] and following up with Dr Dontae Vasques today  Clarissa plans on discussing medications with Dr Dontae Vasques today  She is currently working on finding a virtual therapist, shared resource list      Medications changes/added/denied? No    Treatment session number: 6    Individual Case Management Visit provided today?  Yes     [facility] follow up physician's orders: No new orders

## 2021-09-10 NOTE — PSYCH
Subjective:     Patient ID: Lorenzo Corbin is a 46 y o  female  [facility] Clinical Progress Notes      Specialized Services Documentation  Therapist must complete separate progress note for each specific clinical activity in which the individual participated during the day  Allied Therapy   This group was facilitated virtually in a private office using [employer] and Approved [employer] Teams  Clarissa Webb consented to the use of tele-video modality of treatment  [id number] Clarissa Webb minimally shared in [facility] group focused on worry control  Group explored normalcy of worry and difference strategies to refocus and add perspective to concerns  She was note active in discussion and sheared her camera off because of her eye surgery  Relaxation technique reviewed  No significant progress toward tx goal observed  Continue AT to encourage identification of stressors and wellness tools to manage anxiety and stress       Tx Plan Objective: 1 1, Therapist:  Loreta MEDEIROS

## 2021-09-10 NOTE — PSYCH
Virtual Regular Visit    Verification of patient location:    Patient is located in the following state in which I hold an active license [place]      Assessment/Plan:    Problem List Items Addressed This Visit        Other    Anxiety and depression - Primary          Goals addressed in session:           Reason for visit is PHP VIRTUAL GROUP DUE TO COVID-19      Encounter provider [address] PARTIAL [address]    Provider located at [address] [place] [address]  [address] [facility] [id number]      Recent Visits  Date Type Provider Dept   09/09/21 Office Visit [address] PARTIAL PSYCH GROUP THERAPY [facility]   09/07/21 Office Visit [address] PARTIAL PSYCH GROUP THERAPY [facility]   09/03/21 Office Visit [address] PARTIAL PSYCH GROUP THERAPY [facility]   Showing recent visits within past 7 days and meeting all other requirements  Today's Visits  Date Type Provider Dept   09/10/21 Office Visit [address] PARTIAL PSYCH GROUP THERAPY [facility]   Showing today's visits and meeting all other requirements  Future Appointments  No visits were found meeting these conditions  Showing future appointments within next 150 days and meeting all other requirements       The patient was identified by name and date of birth  Azar Parent was informed that this is a telemedicine visit and that the visit is being conducted throughMicrosoft Teams and patient was informed that this is a secure, HIPAA-compliant platform  She agrees to proceed     My office door was closed  No one else was in the room  She acknowledged consent and understanding of privacy and security of the video platform  The patient has agreed to participate and understands they can discontinue the visit at any time  Patient is aware this is a billable service  Subjective  Azar Parent is a 46 y o  female         HPI     Past Medical History:   Diagnosis Date    Anemia     Anxiety     B12 deficiency     Cervical disc disorder     On gabapentin     Depression     Disease of thyroid gland     hypothyroid    Iron deficiency anemia     Panic attack     PTSD (post-traumatic stress disorder)     Sleep difficulties     [facility] [address] [facility]     Type 1 diabetes ([facility] [address] [facility]     Vitamin D deficiency        Past Surgical History:   Procedure Laterality Date    ABDOMINAL SURGERY      gastric bypass     SECTION      x2    CHOLECYSTECTOMY  2018    GASTRIC BYPASS  2007    INTRAUTERINE DEVICE INSERTION  2015    IR BIOPSY BONE MARROW  2020    OTHER SURGICAL HISTORY      surgery for imperforate hymen/endometriosis/hydrometrocolpos    TUBAL LIGATION      WISDOM TOOTH EXTRACTION         Current Outpatient Medications   Medication Sig Dispense Refill    Accu-Chek FastClix Lancets MISC USE 1 LANCET TO TEST BLOOD SUGAR UP TO 6 TIMES DAILY 102 each 0    acetaminophen (TYLENOL) 500 mg tablet       ALPRAZolam (XANAX) 0 5 mg tablet Take 0 5 mg by mouth daily as needed      busPIRone (BUSPAR) 10 mg tablet Take 1 tablet (10 mg total) by mouth 2 (two) times a day (Patient not taking: Reported on 9/3/2021) 60 tablet 1    clonazePAM (KlonoPIN) 1 mg tablet Take 1 tablet by mouth 2 (two) times a day      Cyanocobalamin (B-12 PO) Take by mouth daily      ergocalciferol (VITAMIN D2) 50,000 units Take 1 capsule (50,000 Units total) by mouth once a week 12 capsule 3    escitalopram (LEXAPRO) 20 mg tablet Take 20 mg by mouth daily      gabapentin (NEURONTIN) 600 MG tablet       glucose blood (Accu-Chek Guide) test strip Use to test blood sugar up to 6 times daily  200 each 2    insulin aspart (NovoLOG) 100 units/mL injection Inject daily into pump  Max daily dose 50 units   40 mL 2    Insulin Pen Needle (BD Pen Needle Deanna U/F) 32G X 4 MM MISC Use 4/day 100 each 3    Insulin Syringe-Needle U-100 (INSULIN SYRINGE 1CC/28G) 28G X 1/2" 1 ML MISC 4 injections daily E10 65      levothyroxine 200 mcg tablet Take 1 tablet (200 mcg total) by mouth daily in the early morning 90 tablet 3    levothyroxine 75 mcg tablet       ofloxacin (OCUFLOX) 0 3 % ophthalmic solution       PATIENT MAINTAINED INSULIN PUMP Inject 1 each under the skin every 8 (eight) hours 1 each 0    prednisoLONE acetate (PRED FORTE) 1 % ophthalmic suspension       selenium 200 mcg Take 200 mcg by mouth daily  (Patient not taking: Reported on 9/3/2021)      traZODone (DESYREL) 100 mg tablet Take 100 mg by mouth daily at bedtime      vitamin B-12 (VITAMIN B-12) 500 mcg tablet Take 500 mcg by mouth daily (Patient not taking: Reported on 8/23/2021)       No current facility-administered medications for this visit  No Known Allergies    Review of Systems    Video Exam    There were no vitals filed for this visit  Physical Exam     I spent FOUR GROUP HOURS PLUS CASE MANAGEMENT minutes with patient today in which greater than 50% of the time was spent in counseling/coordination of care regarding PHP - SEE NOTES  VIRTUAL VISIT DISCLAIMER    Clarissa Webb verbally agrees to participate in [facility]  Pt is aware that [facility] could be limited without vital signs or the ability to perform a full hands-on physical exam  Clarissa Webb understands she or the provider may request at any time to terminate the video visit and request the patient to seek care or treatment in person

## 2021-09-10 NOTE — PSYCH
Subjective:     Patient ID: Ronen Ferrara is a 46 y o  female  [facility] Clinical Progress Notes      Specialized Services Documentation  Therapist must complete separate progress note for each specific clinical activity in which the individual participated during the day  This was not shared due to this is a psychotherapy note  GROUP PSYCHOTHERAPY ([id number]) Group was facilitated virtually in a private office using HIPAA Compliant and Approved [employer] Teams  Naanya Rosanna consented to the use of tele-video modality of treatment and was virtually present for group psychotherapy today  The group engaged in education about the effects of perfectionist thinking  The handout explains the difference between perfectionism and a healthy level of hard work  Group members were then asked to answer the following question:    - In what area of your life do you engage perfectionist thinking the most?    Ananya Marte seemed fairly engaged throughout the group session and was willing to provide feedback to peers  Ananya Marte stated that an area of their life where they tend to engage perfectionist thinking the most in multiple areas; work, motherhood, home life  Ananyaciaran Marte is encouraged to make progress towards goals and objectives through group participation and will continue to attend psychotherapy group       Tx plan objective: 1 1, 1 2   Therapist: Jeremy Dalal MA

## 2021-09-10 NOTE — PSYCH
Subjective:     Patient ID: Afia Thomas is a 46 y o  female  [facility] Clinical Progress Notes      Specialized Services Documentation  Therapist must complete separate progress note for each specific clinical activity in which the individual participated during the day  Education Therapy   [id number] Afia Thomas  Was not present for morning assessment  [id number] Azar Webb engaged throughout the treatment day  Was engaged in learning related to Illness, Medication, Aftercare and Wellness Tools  Staff utilized Verbal, Written, A/V and Demonstration teaching methods  Clarissa Webb shared area of learning and set a goal for outside of program to enjoy her kids games and not to strain her eyes        Tx Plan Objective: 1 1,1 2 Therapist:  SUZETTE Bhatt

## 2021-09-10 NOTE — PSYCH
Assessment/Plan:       Diagnoses and all orders for this visit:     Anxiety and depression         Subjective:      Patient ID: Eli Oneill is a 46 y o  female      [facility] Treatment Plan   AREAS OF NEED: Anxiety as evidenced by struggling with work, familial stressors, financials, nausea, panic attacks  Date Initiated: 08/30/21      Strengths: Desire to be well, internal motivation, supportive family and boyfriend               LONG TERM GOAL:   Date Initiated: 08/30/21   1 0 I will identify two ways my anxiety is decreased in intensity and frequency  Target Date: 10/12/2021  Completion Date:      SHORT TERM OBJECTIVES:      Date Initiated: 8/30/2021  1 1 I will identify and record three mindfulness and/or self-monitoring strategies that I can utilize on a daily basis to track my moods  Revision Date: 09/10/21   Target Date: 09/10/2021  Completion Date:      Date Initiated: 08/30/21   1 2 I will learn and engage in a daily calming/relaxation skill (e g , applied relaxation, progressive muscle relaxation, cue controlled relaxation; mindful breathing; biofeedback) to manage my anxiety symptoms  Revision Date: 09/10/21   Target Date: 09/10/2021  Completion Date:      Date Initiated: 08/30/21   1 3 I will take medications as prescribed and share questions and concerns if arise  Revision Date: 09/10/21   Target Date: 09/10/2021  Completion Date:       Date Initiated: 08/30/21   1 4 I will identify 3 ways my supports can assist in my recovery and agree to staff/support contact as indicated  Revision Date: 09/10/21  Target Date: 09/10/2021  Completion Date:             7 DAY REVISION:     Date Initiated: 09/10/21   1 5 I will initiate 2 or more social contacts per day for the next week    Revision Date:   Target Date: 09/22/2021  Completion Date:     DUE TO COVID-19, CURRENTLY ALL INTERVENTIONS ARE BEING OFFERED VIRTUALLY      PSYCHIATRY:  Date Initiated: 08/30/21   Medication Management and Education       Revision Date: 09/10/21        09/10/21        1 3 Continue medication management    The person(s) responsible for carrying out the plan Duane Bart, MD     NURSING/SYMPTOM EDUCATION:  Date Initiated: 08/30/21   1 1, 1 2  1 3, 1 4 This RN or Therapist will provide wellness/symptoms and skill education groups three to five days weekly to educate on signs and symptoms of diagnoses, skills to manage, and medication questions that will be addressed by the treatment team     Revision date: 09/10/21   09/10/21  1 1,1 2,1 3,1 4,1 5 Continue to encourage Clarissa Webb to participate in wellness/symptoms and skills education groups daily to learn about symptoms, coping strategies and warning signs to promote relapse prevention  The person(s) responsible for carrying out the plan is KIMBERLEY Pisano-CATARINO     PSYCHOLOGY:   Date Initiated: 08/30/21        1 1, 1 2, 1 4 Provide psychotherapy group 5 times per week to allow opportunity for Harman Liang  to explore stressors and ways of coping  Revision Date: 09/10/21   09/10/21   1 1,1 2,1 4,1 5  Continue to provide psychotherapy group daily to Harman Liang and encourage sharing of stressors, skills and positive change  The person(s) responsible for carrying out the plan is Garfield Masters MSW, LSW        ALLIED THERAPY:   Date Initiated: 08/30/21   1 1,1 2 Engage Clarissa Webb  in AT group 5 times daily to encourage development and use of wellness tools to decrease symptoms and promote recovery through meaningful activity  Revision Date: 09/10/21   09/10/21   1 1,1 2,1 5 Continue to engage Clarissa Webb to participate in AT group to practice wellness tools within program and transfer to home sharing successes and barriers through healthy task involvement      The person(s) responsible for carrying out the plan is WALDEMAR Guthrie MANAGEMENT:   Date Initiated: 08/30/21       1 0 This  will meet with Clarissa Webb   3-4 times weekly to assess treatment progress, discharge planning, connection to community supports and UR as indicated  Revision Date: 09/10/21   09/10/21   1 0 Continue to meet with Ovi Webb 3-4 times weekly to assess growth, work toward goals, continued treatment needs, dc planning and use of supports  The person(s) responsible for carrying out the plan is WILMER Serrano     TREATMENT REVIEW/COMMENTS:      DISCHARGE CRITERIA:Identify 3 signs of progress and complete relapse prevention plan      DISCHARGE PLAN: Outpatient care  Estimated Length of Stay:10 treatment days               Diagnosis and Treatment Plan explained to Sheldon Lucas relates understanding diagnosis and is agreeable to Treatment Plan             CLIENT COMMENTS / Please share your thoughts, feelings, need and/or experiences regarding your treatment plan: _____________________________________________________________________________________________________________________________________________________________________________________________________________________________________________________________________________________________________________________ Date/Time: ______________      Patient Signature: _________________________________      Date/Time: ______________       Signature: _________________________________      Date/Time: ______________

## 2021-09-13 ENCOUNTER — APPOINTMENT (OUTPATIENT)
Dept: PSYCHOLOGY | Facility: CLINIC | Age: 41
End: 2021-09-13
Payer: COMMERCIAL

## 2021-09-13 ENCOUNTER — DOCUMENTATION (OUTPATIENT)
Dept: PSYCHOLOGY | Facility: CLINIC | Age: 41
End: 2021-09-13

## 2021-09-13 NOTE — PROGRESS NOTES
Subjective:     Patient ID: Booker Christina is a 46 y o  female  [facility]ations Clinical Progress Notes      Specialized Services Documentation  Therapist must complete separate progress note for each specific clinical activity in which the individual participated during the day  Case Management Note    Rafael Flores RN, BSN    Current suicide risk : Low     Clarissa Webb emailed this writer indicating she was not feeling well with stomachache and headache  [facility]cused from [facility]  Medications changes/added/denied? No    Treatment session number: notation only    Individual Case Management Visit provided today?  No    [facility]ations follow up physician's orders: no new orders

## 2021-09-13 NOTE — PSYCH
Subjective:     Patient ID: Jl Mosley is a 46 y o  female  [facility]ations Clinical Progress Notes      Specialized Services Documentation  Therapist must complete separate progress note for each specific clinical activity in which the individual participated during the day  GROUP PSYCHOTHERAPY (7110-1671) Group was facilitated virtually in a private office using HIPAA Compliant and Approved Microsoft Teams  Tammie Mcmillan consented to the use of tele-video modality of treatment and was virtually present for group psychotherapy today  The group engaged in a discussion about habits and ways to integrate new habits into their current lifestyles  The Habit Plan worksheet provides instructions, examples, and a template for clients to create their own plan  Members were asked to fill in the blank to the following:  After (Existing habit) I will (New habit)  Tammie Mcmillan seemed fairly engaged throughout the group session  Tammie Mcmillan stated that 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, they will walk around the block  Tammie Mcmillan is encouraged to make progress towards goals and objectives through group participation and will continue to attend psychotherapy group  Tx plan objective: 1 1, 1 2   Therapist: Dionne Burkitt, MA    Education Therapy       [id number] Jl Mosley engaged throughout the treatment day  Was engaged in learning related to Illness, Aftercare and Wellness Tools  Staff utilized Verbal and A/V teaching methods  Clarissa Webb shared area of learning and set a goal for outside of program to practice patience with her children        Tx Plan Objective: 1 4, Therapist:  Dionne Burkitt, MA

## 2021-09-14 ENCOUNTER — DOCUMENTATION (OUTPATIENT)
Dept: PSYCHOLOGY | Facility: CLINIC | Age: 41
End: 2021-09-14

## 2021-09-14 ENCOUNTER — OFFICE VISIT (OUTPATIENT)
Dept: PSYCHOLOGY | Facility: CLINIC | Age: 41
End: 2021-09-14
Payer: COMMERCIAL

## 2021-09-14 DIAGNOSIS — F41.9 ANXIETY AND DEPRESSION: Primary | ICD-10-CM

## 2021-09-14 DIAGNOSIS — F32.A ANXIETY AND DEPRESSION: Primary | ICD-10-CM

## 2021-09-14 PROCEDURE — S0201 PARTIAL HOSPITALIZATION SERV: HCPCS

## 2021-09-14 PROCEDURE — G0176 OPPS/PHP;ACTIVITY THERAPY: HCPCS

## 2021-09-14 PROCEDURE — G0177 OPPS/PHP; TRAIN & EDUC SERV: HCPCS

## 2021-09-14 PROCEDURE — G0410 GRP PSYCH PARTIAL HOSP 45-50: HCPCS

## 2021-09-14 NOTE — PSYCH
Subjective:     Patient ID: Serena Child is a 46 y o  female  [facility]ations Clinical Progress Notes      Specialized Services Documentation  Therapist must complete separate progress note for each specific clinical activity in which the individual participated during the day  Allied Therapy   This group was facilitated virtually in a private office using [employer] and Approved [employer] Teams  Clarissa Webb consented to the use of tele-video modality of treatment  [id number] Clarissa Jeimydaphne minimallyshared in [facility] group focused on boundary setting  She did not turn her camera on or verbally share in this session  Group engaged in improvisation and discussion exploring value of and ways to set healthy limits with self and others  DBT strategy NAMIA GIVE introduced as skill to voice boundaries  No noted participated in practicing boundaries with given scenarios  No noted work toward goal noted  Continue AT to encourage skill development and use         Tx Plan Objective: 1 1, Therapist:  Lainey MEDEIROS

## 2021-09-14 NOTE — PSYCH
Subjective:     Patient ID: Denver Puff is a 46 y o  female  [facility]ations Clinical Progress Notes      Specialized Services Documentation  Therapist must complete separate progress note for each specific clinical activity in which the individual participated during the day  Group Psychotherapy   This group was facilitated virtually in a private office using [employer] and Approved [employer] Teams  Clarissa Webb consented to the use of tele-video modality of treatment  (8543-9221) Denver Puff  actively participated in psychoeducational group that discussed the benefits of understanding personal strengths of humanity and kindness  The group then focused on an art activity that involved drawing a tree of strength with the trunk representing their strengths and the sun representing what they need to grow  The group then reflected on how these strengths and goal can improve quality of life  Some progress towards goal  Continue psychoeducational groups to teach positive affirmations, coping skills and strengths to help with mood and recognize ways to positively cope  Tx Plan Objective: 1 1, 1 2, Therapist:  Alyssa Ny RN, BSN    Education Therapy   [id number] Denver Puff actively shared in morning assessment and goal review  Presented as Receptive related to readiness to learn  Clarissa Webb did not complete goal from last treatment day identifying gaining sense of reprioritization  did present with any barriers to learning- she is not feeling well due to her cataract surgery and feeling off balance due to her vision  Tx Plan Objective: 1 1, 1 2, 1 4, Therapist:  Alyssa Ny RN, BSN      Case Management Note  This case management session was facilitated virtually in a private office using HIPPA Compliant and Approved [employer] Teams  Clarissa Webb consented to the use of tele-video modality of treatment     Alyssa Ny RN, BSN    Current suicide risk : Low     ([id number]) Met with Kari Castillo for case management  Clarissa was tearful, stating, "My uncle just  of COVID yesterday and my aunt is ill with COVID, my dad and I need to take care of the  arrangements  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 No    Treatment session number: 7    Individual Case Management Visit provided today?  Yes     Innovations follow up physician's orders: no new orders

## 2021-09-14 NOTE — PSYCH
Virtual Regular Visit    Verification of patient location:    Patient is located in the following state in which I hold an active license [place]      Assessment/Plan:    Problem List Items Addressed This Visit        Other    Anxiety and depression - Primary          Goals addressed in session:           Reason for visit is PHP VIRTUAL GROUP DUE TO COVID-19     Encounter provider [address] PARTIAL [address]    Provider located at [address] [place] [address]  [address] [facility] [id number]      Recent Visits  Date Type Provider Dept   09/10/21 Office Visit [address] PARTIAL PSYCH GROUP THERAPY [facility]   09/09/21 Office Visit [address] PARTIAL PSYCH GROUP THERAPY [facility]   09/07/21 Office Visit [address] PARTIAL PSYCH GROUP THERAPY [facility]   Showing recent visits within past 7 days and meeting all other requirements  Today's Visits  Date Type Provider Dept   09/14/21 Office Visit [address] PARTIAL PSYCH GROUP THERAPY [facility]   Showing today's visits and meeting all other requirements  Future Appointments  No visits were found meeting these conditions  Showing future appointments within next 150 days and meeting all other requirements       The patient was identified by name and date of birth  Ronen Ferrara was informed that this is a telemedicine visit and that the visit is being conducted throughMicrosoft Teams and patient was informed that this is a secure, HIPAA-compliant platform  She agrees to proceed     My office door was closed  No one else was in the room  She acknowledged consent and understanding of privacy and security of the video platform  The patient has agreed to participate and understands they can discontinue the visit at any time  Patient is aware this is a billable service  Subjective  Ronen Ferrara is a 46 y o  female        HPI     Past Medical History:   Diagnosis Date    Anemia     Anxiety     B12 deficiency     Cervical disc disorder     On gabapentin     Depression     Disease of thyroid gland     hypothyroid    Iron deficiency anemia     Panic attack     PTSD (post-traumatic stress disorder)     Sleep difficulties     [facility]     Type 1 diabetes ([facility]     Vitamin D deficiency        Past Surgical History:   Procedure Laterality Date    ABDOMINAL SURGERY      gastric bypass     SECTION      x2    CHOLECYSTECTOMY  2018    GASTRIC BYPASS  2007    INTRAUTERINE DEVICE INSERTION  2015    IR BIOPSY BONE MARROW  2020    OTHER SURGICAL HISTORY      surgery for imperforate hymen/endometriosis/hydrometrocolpos    TUBAL LIGATION      WISDOM TOOTH EXTRACTION         Current Outpatient Medications   Medication Sig Dispense Refill    Accu-Chek FastClix Lancets MISC USE 1 LANCET TO TEST BLOOD SUGAR UP TO 6 TIMES DAILY 102 each 0    acetaminophen (TYLENOL) 500 mg tablet       ALPRAZolam (XANAX) 0 5 mg tablet Take 0 5 mg by mouth daily as needed      busPIRone (BUSPAR) 10 mg tablet Take 1 tablet (10 mg total) by mouth 2 (two) times a day (Patient not taking: Reported on 9/3/2021) 60 tablet 1    clonazePAM (KlonoPIN) 1 mg tablet Take 1 tablet by mouth 2 (two) times a day      Cyanocobalamin (B-12 PO) Take by mouth daily      ergocalciferol (VITAMIN D2) 50,000 units Take 1 capsule (50,000 Units total) by mouth once a week 12 capsule 3    escitalopram (LEXAPRO) 20 mg tablet Take 20 mg by mouth daily      gabapentin (NEURONTIN) 600 MG tablet       glucose blood (Accu-Chek Guide) test strip Use to test blood sugar up to 6 times daily  200 each 2    insulin aspart (NovoLOG) 100 units/mL injection Inject daily into pump  Max daily dose 50 units   40 mL 2    Insulin Pen Needle (BD Pen Needle Deanna U/F) 32G X 4 MM MISC Use 4/day 100 each 3    Insulin Syringe-Needle U-100 (INSULIN SYRINGE 1CC/28G) 28G X 1/2" 1 ML MISC 4 injections daily E10 65     

## 2021-09-14 NOTE — PROGRESS NOTES
Aura Concepcion did not come to virtual med management appt with Hiram   Meds are being managed by OP provider

## 2021-09-15 ENCOUNTER — OFFICE VISIT (OUTPATIENT)
Dept: PSYCHOLOGY | Facility: CLINIC | Age: 41
End: 2021-09-15
Payer: COMMERCIAL

## 2021-09-15 DIAGNOSIS — F32.A ANXIETY AND DEPRESSION: Primary | ICD-10-CM

## 2021-09-15 DIAGNOSIS — F41.9 ANXIETY AND DEPRESSION: Primary | ICD-10-CM

## 2021-09-15 PROCEDURE — S0201 PARTIAL HOSPITALIZATION SERV: HCPCS

## 2021-09-15 PROCEDURE — G0176 OPPS/PHP;ACTIVITY THERAPY: HCPCS

## 2021-09-15 PROCEDURE — G0177 OPPS/PHP; TRAIN & EDUC SERV: HCPCS

## 2021-09-15 PROCEDURE — G0410 GRP PSYCH PARTIAL HOSP 45-50: HCPCS

## 2021-09-15 NOTE — PSYCH
Subjective:     Patient ID: Eli Oneill is a 46 y o  female  [facility]ations Clinical Progress Notes      Specialized Services Documentation  Therapist must complete separate progress note for each specific clinical activity in which the individual participated during the day  Group Psychotherapy Life Skills (10:45-11:45) Eli Oneill actively engaged in group focused on positive affirmations which was facilitated virtually in a private office using HIPAA Compliant and Approved [employer] Teams  Clarissa consented to the use of tele-video modality of treatment and was virtually present for group psychotherapy today  Group members discussed the difference between healthy self-esteem and being overly confident  The group focused on acknowledging and accepting positive qualities in front of peers  During the activity group members stated 3 positive affirmations of themselves  Group members processed the activity by discussing their experience when doing this exercise  We talked about the reasons why it is difficult to acknowledge our positive qualities  We discussed how they could use positive affirmations in their personal life  Nas Terry stated that a positive affirmation that they would use is, "I am a good listener"  Nas Terry continues to make progress towards goals through verbal participation in group; to accomplish long term goals continue to utilize skills learned in programming  Continue with psychotherapy to educate and encourage use of wellness tools   Tx Plan Objective: 1 1,1 2 Therapist: Jony Guallpa

## 2021-09-15 NOTE — PSYCH
Subjective:     Patient ID: Aurea Estrada is a 46 y o  female  [facility]ations Clinical Progress Notes      Specialized Services Documentation  Therapist must complete separate progress note for each specific clinical activity in which the individual participated during the day  This group was facilitated virtually in a private office using [employer] and Approved [employer] Teams  Clarissa Jeimydaphne consented to the use of tele-video modality of treatment  Education Therapy   [id number] Aurea Estrada actively shared in morning assessment and goal review  Presented as Receptive related to readiness to learn  Clarissa Webb partially met goal from last treatment day identifying gaining support from father  did not present with any barriers to learning  [phone] Aurea Estrada engaged throughout the treatment day  Was engaged in learning related to [employer]  Staff utilized Verbal and A/V teaching methods  Clarissa Webb shared area of learning and set a goal for outside of program to attempt to have a peaceful night with her daughter through use of coping skills  Tx Plan Objective: 1 1, 1 2, 1 4, Therapist:  Davie Connelly RN, BSN      Case Management Note    Davie Connelly RN, BSN    Current suicide risk : Low     Medications changes/added/denied? No    Treatment session number: 8    Individual Case Management Visit provided today?  No    Innovations follow up physician's orders: no new orders

## 2021-09-15 NOTE — PSYCH
Virtual Regular Visit    Verification of patient location:    Patient is located in the following state in which I hold an active license [place]      Assessment/Plan:    Problem List Items Addressed This Visit        Other    Anxiety and depression - Primary          Goals addressed in session:           Reason for visit is PHP VIRTUAL GROUP DUE TO COVID-19      Encounter provider [facility] PARTIAL [address]    Provider located at [address] [place] [address]  [address] [facility] [address]      Recent Visits  Date Type Provider Dept   09/14/21 Office Visit [facility] PARTIAL PSYCH GROUP THERAPY [facility]   09/10/21 Office Visit [facility] PARTIAL PSYCH GROUP THERAPY [facility]   09/09/21 Office Visit [facility] PARTIAL PSYCH GROUP THERAPY [facility]   Showing recent visits within past 7 days and meeting all other requirements  Today's Visits  Date Type Provider Dept   09/15/21 Office Visit [facility] PARTIAL PSYCH GROUP THERAPY [facility]   Showing today's visits and meeting all other requirements  Future Appointments  No visits were found meeting these conditions  Showing future appointments within next 150 days and meeting all other requirements       The patient was identified by name and date of birth  Dunia Fair was informed that this is a telemedicine visit and that the visit is being conducted throughMicrosoft Teams and patient was informed that this is a secure, HIPAA-compliant platform  She agrees to proceed     My office door was closed  No one else was in the room  She acknowledged consent and understanding of privacy and security of the video platform  The patient has agreed to participate and understands they can discontinue the visit at any time  Patient is aware this is a billable service  Subjective  Dunia Fair is a 46 y o  female        HPI     Past Medical History:   Diagnosis Date    Anemia     Anxiety     B12 deficiency     Cervical disc disorder     On gabapentin     Depression     Disease of thyroid gland     hypothyroid    Iron deficiency anemia     Panic attack     PTSD (post-traumatic stress disorder)     Sleep difficulties     [facility] [address] [facility]     Type 1 diabetes ([facility] [address] [facility]     Vitamin D deficiency        Past Surgical History:   Procedure Laterality Date    ABDOMINAL SURGERY      gastric bypass     SECTION      x2    CHOLECYSTECTOMY  2018    GASTRIC BYPASS  2007    INTRAUTERINE DEVICE INSERTION  2015    IR BIOPSY BONE MARROW  2020    OTHER SURGICAL HISTORY      surgery for imperforate hymen/endometriosis/hydrometrocolpos    TUBAL LIGATION      WISDOM TOOTH EXTRACTION         Current Outpatient Medications   Medication Sig Dispense Refill    Accu-Chek FastClix Lancets MISC USE 1 LANCET TO TEST BLOOD SUGAR UP TO 6 TIMES DAILY 102 each 0    acetaminophen (TYLENOL) 500 mg tablet       ALPRAZolam (XANAX) 0 5 mg tablet Take 0 5 mg by mouth daily as needed      busPIRone (BUSPAR) 10 mg tablet Take 1 tablet (10 mg total) by mouth 2 (two) times a day (Patient not taking: Reported on 9/3/2021) 60 tablet 1    clonazePAM (KlonoPIN) 1 mg tablet Take 1 tablet by mouth 2 (two) times a day      Cyanocobalamin (B-12 PO) Take by mouth daily      ergocalciferol (VITAMIN D2) 50,000 units Take 1 capsule (50,000 Units total) by mouth once a week 12 capsule 3    escitalopram (LEXAPRO) 20 mg tablet Take 20 mg by mouth daily      gabapentin (NEURONTIN) 600 MG tablet       glucose blood (Accu-Chek Guide) test strip Use to test blood sugar up to 6 times daily  200 each 2    insulin aspart (NovoLOG) 100 units/mL injection Inject daily into pump  Max daily dose 50 units   40 mL 2    Insulin Pen Needle (BD Pen Needle Deanna U/F) 32G X 4 MM MISC Use 4/day 100 each 3    Insulin Syringe-Needle U-100 (INSULIN SYRINGE 1CC/28G) 28G X 1/2" 1 ML MISC 4 injections daily E10 65      levothyroxine 200 mcg tablet Take 1 tablet (200 mcg total) by mouth daily in the early morning 90 tablet 3    levothyroxine 75 mcg tablet       ofloxacin (OCUFLOX) 0 3 % ophthalmic solution       PATIENT MAINTAINED INSULIN PUMP Inject 1 each under the skin every 8 (eight) hours 1 each 0    prednisoLONE acetate (PRED FORTE) 1 % ophthalmic suspension       selenium 200 mcg Take 200 mcg by mouth daily  (Patient not taking: Reported on 9/3/2021)      traZODone (DESYREL) 100 mg tablet Take 100 mg by mouth daily at bedtime      vitamin B-12 (VITAMIN B-12) 500 mcg tablet Take 500 mcg by mouth daily (Patient not taking: Reported on 8/23/2021)       No current facility-administered medications for this visit  No Known Allergies    Review of Systems    Video Exam    There were no vitals filed for this visit  Physical Exam     I spent FOUR GROUP HOURS PLUS CASE MANAGEMENT minutes with patient today in which greater than 50% of the time was spent in counseling/coordination of care regarding PHP - SEE NOTES  VIRTUAL VISIT DISCLAIMER    Clarissa Webb verbally agrees to participate in [employer]  Pt is aware that [employer] could be limited without vital signs or the ability to perform a full hands-on physical exam  Clarissa Webb understands she or the provider may request at any time to terminate the video visit and request the patient to seek care or treatment in person

## 2021-09-15 NOTE — PSYCH
Subjective:     Patient ID: Serena Child is a 46 y o  female  [facility]ations Clinical Progress Notes      Specialized Services Documentation  Therapist must complete separate progress note for each specific clinical activity in which the individual participated during the day  Allied Therapy   This group was facilitated virtually in a private office using [employer] and Approved [employer] Teams  Clarissa Galinamoise consented to the use of tele-video modality of treatment  [id number]--Clarissa Webb actively shared in [facility] group focused on social connection, rewiring unhealthy thoughts related to loneliness, and setting daily intentions related to how we are utilizing our coping skills  Tony Stone Jon  engaged in group by sharing personal reflections, music listening, related to bon analysis and journaling during reflection time  Group explored practice of bon analysis, utilizing self-reflection questions to guide thought processes related to loneliness, and shared with the group during bon analysis  Tony Stone Jon shared within group that she recognized within the past few months how she lost many friendships in her life due to isolating herself  Through group she recognized, she needed to reach out to her friends and begin the reconnection process  Some effort noted toward treatment goal   Continue AT to encourage the development and practice of monitering how we are utilizing our alone time and social connection time to  alleviate symptoms and support wellness       Treatment Plan Objectives Addressed: 1 1, 1 2, 1 4  Therapist:  WALDEMAR Aguila and JUNIOR Knowles

## 2021-09-15 NOTE — PSYCH
Subjective:     Patient ID: Marcy Win is a 46 y o  female  Innovations Clinical Progress Notes      Specialized Services Documentation  Therapist must complete separate progress note for each specific clinical activity in which the individual participated during the day  Allied Therapy   This group was facilitated virtually in a private office using [employer] and Approved [employer] Teams  Clarissa Webb consented to the use of tele-video modality of treatment  [id number] Marcy Win  actively shared in [facility] group exploring DBT distress tolerance crisis survival strategies  Group explored crisis survival strategies ACCEPTS, SELF-SOOTHING, TIP, STOP, IMPROVE and PROS & CONS) reinforcing actions one could take to learn to tolerate stressful experiences, thoughts and urges  Maddy Gonzales felt she could put effort into practicing TIP  Some progress toward goal noted  Continue AT to encourage learning, practice and home practice of skills to manage distress       Tx Plan Objective: 1 1,1 2, Therapist:  Maida MEDEIROS

## 2021-09-16 ENCOUNTER — OFFICE VISIT (OUTPATIENT)
Dept: PSYCHOLOGY | Facility: CLINIC | Age: 41
End: 2021-09-16
Payer: COMMERCIAL

## 2021-09-16 DIAGNOSIS — F41.9 ANXIETY AND DEPRESSION: Primary | ICD-10-CM

## 2021-09-16 DIAGNOSIS — F32.A ANXIETY AND DEPRESSION: Primary | ICD-10-CM

## 2021-09-16 PROCEDURE — G0177 OPPS/PHP; TRAIN & EDUC SERV: HCPCS

## 2021-09-16 PROCEDURE — S0201 PARTIAL HOSPITALIZATION SERV: HCPCS

## 2021-09-16 PROCEDURE — G0176 OPPS/PHP;ACTIVITY THERAPY: HCPCS

## 2021-09-16 PROCEDURE — G0410 GRP PSYCH PARTIAL HOSP 45-50: HCPCS

## 2021-09-16 NOTE — PSYCH
Subjective:     Patient ID: Katarina Hartley is a 46 y o  female  [facility]ations Clinical Progress Notes      Specialized Services Documentation  Therapist must complete separate progress note for each specific clinical activity in which the individual participated during the day  Education Therapy   [id number] Katarina Hartley actively shared in morning assessment and goal review  Presented as Receptive related to readiness to learn  Clarissa Webb did not complete goal from last treatment day identifying hoping to gain patience  did not present with any barriers to learning  [phone] Katarina Hartley engaged throughout the treatment day  Was engaged in learning related to Illness, Medication, Aftercare and Wellness Tools  Staff utilized Verbal, Written, A/V and Demonstration teaching methods  Clarissa Webb shared area of learning and set a goal for outside of program to practice self-care and have patience        Tx Plan Objective: 1 1,1 2,1 4 Therapist:  SUZETTE Wilson

## 2021-09-16 NOTE — PSYCH
Subjective:     Patient ID: Mikki Ho is a 46 y o  female  In[facility]ations Clinical Progress Notes      Specialized Services Documentation  Therapist must complete separate progress note for each specific clinical activity in which the individual participated during the day  Allied Therapy   This group was facilitated virtually in a private office using [address] and Approved [employer] Teams  Clarissa Webb consented to the use of tele-video modality of treatment  [id number] Mikki Ho   actively shared in [facility] group focused on self- care  Roger Chamberlain was encouraged to identify aspects of self-care and barriers to it  The concept of self -care versus selfishness explored  Group reinforced the necessity of self -care in wellness versus seeing this as a luxury and tips to increase self-care - a visualization exercise introduced and practiced  When asked at end of a specific thing she could commit to in order to increase self-care, she stated take ear buds on a car ride (passenger)  Some positive progress voiced toward goal   Continue AT to engage in the development and practice of wellness tools         Tx Plan Objective: 1 1,1 2, Therapist:  Ella MEDEIROS

## 2021-09-16 NOTE — PSYCH
Subjective:     Patient ID: Azar Varghese is a 46 y o  female  [facility] Clinical Progress Notes      Specialized Services Documentation  Therapist must complete separate progress note for each specific clinical activity in which the individual participated during the day  Group Psychotherapy   This group was facilitated virtually in a private office using [place] [employer] and Approved [employer] Teams  Clarissa Webb consented to the use of tele-video modality of treatment  [id number] Azar Varghese  attended group on the Wellness Recovery Action Plan  Group members were educated on the background of the WRAP  Members were informed WRAP was emailed to them this AM   Writer explained the benefit of utilizing the WRAP prior to members initiating it  Members then focused on the WRAP: the wellness toolbox, daily plan, identification of triggers and stressors  Next the group began developing action plans to respond to stressors and crisis situations  Group members shared pieces of information from these sections and identified their importance  Writer encouraged the members of group to continue utilizing the packet to develop plans inside and outside of program  Good progress displayed through participation and engagement in topic  Tx Plan Objective: 1 1, 1 2, 1 3, 1 4, Therapist:  Franny Crook RN, BSN        Case Management Note  This case management session was facilitated virtually in a private office using HIPPA Compliant and Approved [employer] Teams  Clarissa Webb consented to the use of tele-video modality of treatment  Franny Crook RN, BSN    Current suicide risk : Low     1200 Attempted case management  Left voicemail to return call  Medications changes/added/denied? No    Treatment session number: 9    Individual Case Management Visit provided today?  No     [facility] follow up physician's orders: no new orders

## 2021-09-16 NOTE — PSYCH
Subjective:     Patient ID: Gaye Medrano is a 46 y o  female  [facility] Clinical Progress Notes      Specialized Services Documentation  Therapist must complete separate progress note for each specific clinical activity in which the individual participated during the day  GROUP PSYCHOTHERAPY (0344-1846) Group was facilitated virtually in a private office using HIPAA Compliant and Approved [employer] Teams  Clarissa Jeimydaphne consented to the use of tele-video modality of treatment and was virtually present for group psychotherapy today  The group engaged in open discussions about current stressors, challenges and asked questions about how to utilize topics discussed  Members were able to gain a different perspectives and deeper understanding of past experiences and current events  Group discussions and themes involved personal disclosures, expression of skills that are being practiced and their benefits, challenges with maintaining wellness and skills outside of program and empowerment to celebrate victories and to acknowledge progress  Clarissa was quiet throughout the group until the last few minutes where she mentioned that another member has a good mantra book  Mago Pena continues to demonstrate insight and growth through verbal participation, offerings of support, encouragement and feedback to other group members during the open discussion time  Continue with psychotherapy     TX Plan Objectives: 1 1, 1 2, 1 4   Therapist: Marina Stack MA, [place]

## 2021-09-16 NOTE — PSYCH
Virtual Regular Visit    Verification of patient location:    Patient is located in the following state in which I hold an active license [place]      Assessment/Plan:    Problem List Items Addressed This Visit        Other    Anxiety and depression - Primary          Goals addressed in session:           Reason for visit is PHP VIRTUAL GROUP DUE TO COVID-19     Chief Complaint   Patient presents with    Virtual Regular Visit        Encounter provider Vinod Duarte    Provider located at [address] [place] [address]   [address] [facility] [address]      Recent Visits  Date Type Provider Dept   09/15/21 Office Visit [address] PARTIAL PSYCH GROUP THERAPY [facility]   09/14/21 Office Visit [address] PARTIAL PSYCH GROUP THERAPY [facility]   09/10/21 Office Visit [address] PARTIAL PSYCH GROUP THERAPY [facility]   09/09/21 Office Visit [address] PARTIAL PSYCH GROUP THERAPY [facility]   Showing recent visits within past 7 days and meeting all other requirements  Today's Visits  Date Type Provider Dept   09/16/21 Office Visit [address] PARTIAL PSYCH GROUP THERAPY [facility]   Showing today's visits and meeting all other requirements  Future Appointments  No visits were found meeting these conditions  Showing future appointments within next 150 days and meeting all other requirements       The patient was identified by name and date of birth  Juanaroldo Dubois was informed that this is a telemedicine visit and that the visit is being conducted throughMicrosoft Teams and patient was informed that this is a secure, HIPAA-compliant platform  She agrees to proceed     My office door was closed  No one else was in the room  She acknowledged consent and understanding of privacy and security of the video platform  The patient has agreed to participate and understands they can discontinue the visit at any time      Patient is aware this is a billable service  Subjective  Jl Mosley is a 46 y o  female  HPI     Past Medical History:   Diagnosis Date    Anemia     Anxiety     B12 deficiency     Cervical disc disorder     On gabapentin     Depression     Disease of thyroid gland     hypothyroid    Iron deficiency anemia     Panic attack     PTSD (post-traumatic stress disorder)     Sleep difficulties     [facility]     Type 1 diabetes ([facility]     Vitamin D deficiency        Past Surgical History:   Procedure Laterality Date    ABDOMINAL SURGERY      gastric bypass     SECTION      x2    CHOLECYSTECTOMY  2018    GASTRIC BYPASS  2007    INTRAUTERINE DEVICE INSERTION  2015    IR BIOPSY BONE MARROW  2020    OTHER SURGICAL HISTORY      surgery for imperforate hymen/endometriosis/hydrometrocolpos    TUBAL LIGATION      WISDOM TOOTH EXTRACTION         Current Outpatient Medications   Medication Sig Dispense Refill    Accu-Chek FastClix Lancets MISC USE 1 LANCET TO TEST BLOOD SUGAR UP TO 6 TIMES DAILY 102 each 0    acetaminophen (TYLENOL) 500 mg tablet       ALPRAZolam (XANAX) 0 5 mg tablet Take 0 5 mg by mouth daily as needed      busPIRone (BUSPAR) 10 mg tablet Take 1 tablet (10 mg total) by mouth 2 (two) times a day (Patient not taking: Reported on 9/3/2021) 60 tablet 1    clonazePAM (KlonoPIN) 1 mg tablet Take 1 tablet by mouth 2 (two) times a day      Cyanocobalamin (B-12 PO) Take by mouth daily      ergocalciferol (VITAMIN D2) 50,000 units Take 1 capsule (50,000 Units total) by mouth once a week 12 capsule 3    escitalopram (LEXAPRO) 20 mg tablet Take 20 mg by mouth daily      gabapentin (NEURONTIN) 600 MG tablet       glucose blood (Accu-Chek Guide) test strip Use to test blood sugar up to 6 times daily  200 each 2    insulin aspart (NovoLOG) 100 units/mL injection Inject daily into pump  Max daily dose 50 units   40 mL 2    Insulin Pen Needle (BD Pen Needle Deanna U/F) 32G X 4 MM MISC Use 4/day 100 each 3    Insulin Syringe-Needle U-100 (INSULIN SYRINGE 1CC/28G) 28G X 1/2" 1 ML MISC 4 injections daily E10 65      levothyroxine 200 mcg tablet Take 1 tablet (200 mcg total) by mouth daily in the early morning 90 tablet 3    levothyroxine 75 mcg tablet       ofloxacin (OCUFLOX) 0 3 % ophthalmic solution       PATIENT MAINTAINED INSULIN PUMP Inject 1 each under the skin every 8 (eight) hours 1 each 0    prednisoLONE acetate (PRED FORTE) 1 % ophthalmic suspension       selenium 200 mcg Take 200 mcg by mouth daily  (Patient not taking: Reported on 9/3/2021)      traZODone (DESYREL) 100 mg tablet Take 100 mg by mouth daily at bedtime      vitamin B-12 (VITAMIN B-12) 500 mcg tablet Take 500 mcg by mouth daily (Patient not taking: Reported on 8/23/2021)       No current facility-administered medications for this visit  No Known Allergies    Review of Systems    Video Exam    There were no vitals filed for this visit  Physical Exam     I spent FOUR GROUP HOURS PLUS CASE MANAGEMENT minutes with patient today in which greater than 50% of the time was spent in counseling/coordination of care regarding PHP - SEE NOTES  VIRTUAL VISIT DISCLAIMER    Clarissa Webb verbally agrees to participate in [employer]  Pt is aware that [employer] could be limited without vital signs or the ability to perform a full hands-on physical exam  Clarissa Webb understands she or the provider may request at any time to terminate the video visit and request the patient to seek care or treatment in person

## 2021-09-17 ENCOUNTER — OFFICE VISIT (OUTPATIENT)
Dept: PSYCHOLOGY | Facility: CLINIC | Age: 41
End: 2021-09-17
Payer: COMMERCIAL

## 2021-09-17 DIAGNOSIS — F32.A ANXIETY AND DEPRESSION: Primary | ICD-10-CM

## 2021-09-17 DIAGNOSIS — F41.9 ANXIETY AND DEPRESSION: Primary | ICD-10-CM

## 2021-09-17 PROCEDURE — S0201 PARTIAL HOSPITALIZATION SERV: HCPCS

## 2021-09-17 PROCEDURE — G0176 OPPS/PHP;ACTIVITY THERAPY: HCPCS

## 2021-09-17 PROCEDURE — G0410 GRP PSYCH PARTIAL HOSP 45-50: HCPCS

## 2021-09-17 PROCEDURE — G0177 OPPS/PHP; TRAIN & EDUC SERV: HCPCS

## 2021-09-17 NOTE — PSYCH
Virtual Regular Visit    Verification of patient location:    Patient is located in the following state in which I hold an active license [place]      Assessment/Plan:    Problem List Items Addressed This Visit        Other    Anxiety and depression - Primary          Goals addressed in session:           Reason for visit is PHP VIRTUAL GROUP DUE TO COVID-19      Encounter provider [address] PARTIAL [address]    Provider located at [address] [place] [address]  [address] [facility] [address]      Recent Visits  Date Type Provider Dept   09/16/21 Office Visit [address] PARTIAL PSYCH GROUP THERAPY [facility]   09/15/21 Office Visit [address] PARTIAL PSYCH GROUP THERAPY [facility]   09/14/21 Office Visit [address] PARTIAL PSYCH GROUP THERAPY [facility]   09/10/21 Office Visit [address] PARTIAL PSYCH GROUP THERAPY [facility]   Showing recent visits within past 7 days and meeting all other requirements  Today's Visits  Date Type Provider Dept   09/17/21 Office Visit [address] PARTIAL PSYCH GROUP THERAPY [facility]   Showing today's visits and meeting all other requirements  Future Appointments  No visits were found meeting these conditions  Showing future appointments within next 150 days and meeting all other requirements       The patient was identified by name and date of birth  Jl Mosley was informed that this is a telemedicine visit and that the visit is being conducted throughMicrosoft Teams and patient was informed that this is a secure, HIPAA-compliant platform  She agrees to proceed     My office door was closed  No one else was in the room  She acknowledged consent and understanding of privacy and security of the video platform  The patient has agreed to participate and understands they can discontinue the visit at any time  Patient is aware this is a billable service  Subjective  Jl Mosley is a 46 y o  female   HPI     Past Medical History:   Diagnosis Date    Anemia     Anxiety     B12 deficiency     Cervical disc disorder     On gabapentin     Depression     Disease of thyroid gland     hypothyroid    Iron deficiency anemia     Panic attack     PTSD (post-traumatic stress disorder)     Sleep difficulties     [facility] [address] [facility]     Type 1 diabetes ([facility] [address] [facility]     Vitamin D deficiency        Past Surgical History:   Procedure Laterality Date    ABDOMINAL SURGERY      gastric bypass     SECTION      x2    CHOLECYSTECTOMY  2018    GASTRIC BYPASS  2007    INTRAUTERINE DEVICE INSERTION  2015    IR BIOPSY BONE MARROW  2020    OTHER SURGICAL HISTORY      surgery for imperforate hymen/endometriosis/hydrometrocolpos    TUBAL LIGATION      WISDOM TOOTH EXTRACTION         Current Outpatient Medications   Medication Sig Dispense Refill    Accu-Chek FastClix Lancets MISC USE 1 LANCET TO TEST BLOOD SUGAR UP TO 6 TIMES DAILY 102 each 0    acetaminophen (TYLENOL) 500 mg tablet       ALPRAZolam (XANAX) 0 5 mg tablet Take 0 5 mg by mouth daily as needed      busPIRone (BUSPAR) 10 mg tablet Take 1 tablet (10 mg total) by mouth 2 (two) times a day (Patient not taking: Reported on 9/3/2021) 60 tablet 1    clonazePAM (KlonoPIN) 1 mg tablet Take 1 tablet by mouth 2 (two) times a day      Cyanocobalamin (B-12 PO) Take by mouth daily      ergocalciferol (VITAMIN D2) 50,000 units Take 1 capsule (50,000 Units total) by mouth once a week 12 capsule 3    escitalopram (LEXAPRO) 20 mg tablet Take 20 mg by mouth daily      gabapentin (NEURONTIN) 600 MG tablet       glucose blood (Accu-Chek Guide) test strip Use to test blood sugar up to 6 times daily  200 each 2    insulin aspart (NovoLOG) 100 units/mL injection Inject daily into pump  Max daily dose 50 units   40 mL 2    Insulin Pen Needle (BD Pen Needle Deanna U/F) 32G X 4 MM MISC Use 4/day 100 each 3    Insulin Syringe-Needle U-100 (INSULIN SYRINGE 1CC/28G) 28G X 1/2" 1 ML MISC 4 injections daily E10 65      levothyroxine 200 mcg tablet Take 1 tablet (200 mcg total) by mouth daily in the early morning 90 tablet 3    levothyroxine 75 mcg tablet       ofloxacin (OCUFLOX) 0 3 % ophthalmic solution       PATIENT MAINTAINED INSULIN PUMP Inject 1 each under the skin every 8 (eight) hours 1 each 0    prednisoLONE acetate (PRED FORTE) 1 % ophthalmic suspension       selenium 200 mcg Take 200 mcg by mouth daily  (Patient not taking: Reported on 9/3/2021)      traZODone (DESYREL) 100 mg tablet Take 100 mg by mouth daily at bedtime      vitamin B-12 (VITAMIN B-12) 500 mcg tablet Take 500 mcg by mouth daily (Patient not taking: Reported on 8/23/2021)       No current facility-administered medications for this visit  No Known Allergies    Review of Systems    Video Exam    There were no vitals filed for this visit  Physical Exam     I spent FOUR GROUP HOURS PLUS CASE MANAGEMENT minutes with patient today in which greater than 50% of the time was spent in counseling/coordination of care regarding PHP - SEE NOTES  VIRTUAL VISIT DISCLAIMER    Clarissa Webb verbally agrees to participate in [employer]  Pt is aware that [employer] could be limited without vital signs or the ability to perform a full hands-on physical exam  Clarissa Webb understands she or the provider may request at any time to terminate the video visit and request the patient to seek care or treatment in person

## 2021-09-17 NOTE — PSYCH
Subjective:     Patient ID: Rebecca Castaneda is a 46 y o  female  [facility] Clinical Progress Notes      Specialized Services Documentation  Therapist must complete separate progress note for each specific clinical activity in which the individual participated during the day  Group Psychotherapy   This group was facilitated virtually in a private office using HIPAA Compliant and Approved [employer] Teams  Clarissanoemi Webb consented to the use of tele-video modality of treatment  [id number] Rebecca Castaneda  actively shared in psychotherapy group exploring the weekly wellness assessment  Group explored successes and challenges in wellness areas throughout this past week - physical, social, vocational, spiritual, cognitive, and emotional   Marylene Lobe identified use of positive affirmations as strengths and physical as needs wanting to work on exercise  Some positive progress toward goal noted  Continue psychotherapy to encourage self-awareness and home practice of skills to support wellness  Tx Plan Objective: 1 1, 1 2   Therapist:  Pedro Gomez, RN, BSN    Case Management Note    Pedro Gomez RN, BSN    Current suicide risk : Low      AM sent email for CM check in for [phone]  This writer attempted CM at [employer] unavailable, voicemail left for available times for CM  Message received from Rebecca Castaneda she is unavailable today for CM and has no needs  Medications changes/added/denied? No    Treatment session number: 10    Individual Case Management Visit provided today?  No- CM was attempted    [facility] follow up physician's orders: no new orders

## 2021-09-17 NOTE — PSYCH
Subjective:     Patient ID: Gaye Medrano is a 46 y o  female  [facility] Clinical Progress Notes      Specialized Services Documentation  Therapist must complete separate progress note for each specific clinical activity in which the individual participated during the day  GROUP PSYCHOTHERAPY (8313-5655) Group was facilitated virtually in a private office using HIPAA Compliant and Approved [employer] Teams  Hiwot Mattson consented to the use of tele-video modality of treatment and was virtually present for group psychotherapy today  The group engaged in education about the benefits of Gratitude Journaling  The group practiced journaling by responding to one of the following prompts:  1  Something I was thankful for this week  2  Today or this week, I smiled when  3  Something about this week that I will always want to Beashaquille Gonzalez seemed very engaged throughout the group session and was willing to provide feedback to peers  Hiwot Mattson used the first prompt  Hiwot Mattson is encouraged to make progress towards goals and objectives through group participation and will continue to attend psychotherapy group      Tx plan objective: 1 1, 1 2   Therapist: Geri Puentes MA

## 2021-09-17 NOTE — PSYCH
Subjective:     Patient ID: Homer Thakur is a 46 y o  female  [facility] Clinical Progress Notes      Specialized Services Documentation  Therapist must complete separate progress note for each specific clinical activity in which the individual participated during the day  Group Psychotherapy Life Skills (12:30-1:30) Homer Thakur actively engaged in group focused on using the therapy tool Cards for Calm  Group was facilitated virtually in a private office using HIPAA Compliant and Approved [employer] Teams  Clarissa Webb consented to the use of tele-video modality of treatment and was virtually present for group psychotherapy today  Cards for calm encourages positive thinking through the use of visualization, mindfulness techniques and creative thinking  This tool helps clients learn to respond in more effective ways  Clarissa  selected a calming card about feeling blocked by our problems and how to solve themamanuel  Lilliana Masters continues to make progress towards goals through verbal participation in group; to accomplish long term goals continue to utilize skills learned in programming  Continue with psychotherapy to educate and encourage use of wellness tools  Tx Plan Objective: 1 1,1 2 Therapist: Timur Austin      Education Therapy   [id number] Homer Thakur actively shared in morning assessment and goal review  Presented as Receptive related to readiness to learn  Clarissa Webb did complete goal from last treatment day identifying gaining self-care  did not present with any barriers to learning  [id number] Homer Thakur engaged throughout the treatment day  Was engaged in learning related to Illness, Medication, Aftercare and Wellness Tools  Staff utilized Verbal, Written, A/V and Demonstration teaching methods  Clarissa Webb shared area of learning and set a goal for outside of program to enjoy spending time with the kids        Tx Plan Objective: 1 1,1 2,1 3 Therapist: SUZETTE Garcia

## 2021-09-20 ENCOUNTER — OFFICE VISIT (OUTPATIENT)
Dept: PSYCHOLOGY | Facility: CLINIC | Age: 41
End: 2021-09-20
Payer: COMMERCIAL

## 2021-09-20 DIAGNOSIS — F32.A ANXIETY AND DEPRESSION: Primary | ICD-10-CM

## 2021-09-20 DIAGNOSIS — F41.9 ANXIETY AND DEPRESSION: Primary | ICD-10-CM

## 2021-09-20 PROCEDURE — S0201 PARTIAL HOSPITALIZATION SERV: HCPCS

## 2021-09-20 PROCEDURE — G0410 GRP PSYCH PARTIAL HOSP 45-50: HCPCS

## 2021-09-20 PROCEDURE — G0176 OPPS/PHP;ACTIVITY THERAPY: HCPCS

## 2021-09-20 PROCEDURE — G0177 OPPS/PHP; TRAIN & EDUC SERV: HCPCS

## 2021-09-20 NOTE — PSYCH
Subjective:     Patient ID: Messi Gray is a 46 y o  female  [facility] Clinical Progress Notes      Specialized Services Documentation  Therapist must complete separate progress note for each specific clinical activity in which the individual participated during the day  GROUP PSYCHOTHERAPY ([id number]) Group was facilitated virtually in a private office using HIPAA Compliant and Approved [employer] Teams  Genaro Alexander consented to the use of tele-video modality of treatment and was virtually present for group psychotherapy today  The group engaged in education about codependency and what it entails  Members learned the difference between the enabler and the dependent in a codependent relationship  The group then answered the following questions: In the codependent cycle, do you view yourself as the enabler or the dependent? Why?  I know I am the enabler/dependent because   Genaro Alexander seemed fairly engaged throughout the group session and participated with some prompted  Vinignacio Alexander stated that they view themselves as dependent  Genaro Alexander is encouraged to make progress towards goals and objectives through group participation and will continue to attend psychotherapy group       Tx plan objective: 1 1, 1 2   Therapist: Sarita Chen MA

## 2021-09-20 NOTE — PSYCH
Subjective:     Patient ID: Dennis Linder is a 46 y o  female  [facility] Clinical Progress Notes      Specialized Services Documentation  Therapist must complete separate progress note for each specific clinical activity in which the individual participated during the day  Group Psychotherapy   This group was facilitated virtually in a private office using HIPAA Compliant and Approved [employer] Teams  Clarissa Webb consented to the use of tele-video modality of treatment  ([id number]) Clarissa Webb actively engaged in psychoeducational group about medication management, types of antidepressants/side effects, and medication tips  Group came up with strategies that helped them remember to take their medications and developed ideas to make it easier in the future  The group talked about understanding the purpose, dose, type, timing and side effects of their medications  Teaching on emergency situations and who to go to for help was brought up as well  Group was encouraged to ask questions in an open forum at the end of group  Some progress displayed through engagement in topic  Tx Plan Objective: 1 1, 1 2, 1 3, Therapist:  Pushpa Natarajan RN, BSN      Case Management Note  This case management session was facilitated virtually in a private office using HIPAA Compliant and Approved [employer] Teams  Clarissa Webb consented to the use of tele-video modality of treatment  Pushpa Natarajan RN, BSN    Current suicide risk : Low     [id number] met with Dennis Linder for   Saundra December reports feeling frustrated with her vision post cataract surgery  Discharge planning reviewed  Shannon Lopez will set up appointment with therapist  Has appointment with psychiatrist  Shannon Lopez is agreeable for discharge on Thursday September 23, 2021  Medications changes/added/denied? No    Treatment session number: 11    Individual Case Management Visit provided today?  Yes     [facility] follow up physician's orders: No new orders

## 2021-09-20 NOTE — PSYCH
Virtual Regular Visit    Verification of patient location:    Patient is located in the following state in which I hold an active license [place]      Assessment/Plan:    Problem List Items Addressed This Visit        Other    Anxiety and depression - Primary          Goals addressed in session:           Reason for visit is PHP VIRTUAL GROUP DUE TO COVID-19      Encounter provider [address] PARTIAL [address]    Provider located at [address] [place] [address]  [address] [facility] [id number]      Recent Visits  Date Type Provider Dept   09/17/21 Office Visit [address] PARTIAL PSYCH GROUP THERAPY [facility]   09/16/21 Office Visit [address] PARTIAL PSYCH GROUP THERAPY [facility]   09/15/21 Office Visit [address] PARTIAL PSYCH GROUP THERAPY [facility]   09/14/21 Office Visit [address] PARTIAL PSYCH GROUP THERAPY [facility]   Showing recent visits within past 7 days and meeting all other requirements  Today's Visits  Date Type Provider Dept   09/20/21 Office Visit [address] PARTIAL PSYCH GROUP THERAPY [facility]   Showing today's visits and meeting all other requirements  Future Appointments  No visits were found meeting these conditions  Showing future appointments within next 150 days and meeting all other requirements       The patient was identified by name and date of birth  Eli Oneill was informed that this is a telemedicine visit and that the visit is being conducted throughMicrosoft Teams and patient was informed that this is a secure, HIPAA-compliant platform  She agrees to proceed     My office door was closed  No one else was in the room  She acknowledged consent and understanding of privacy and security of the video platform  The patient has agreed to participate and understands they can discontinue the visit at any time  Patient is aware this is a billable service  Subjective  Eli Oneill is a 46 y o  female  HPI     Past Medical History:   Diagnosis Date    Anemia     Anxiety     B12 deficiency     Cervical disc disorder     On gabapentin     Depression     Disease of thyroid gland     hypothyroid    Iron deficiency anemia     Panic attack     PTSD (post-traumatic stress disorder)     Sleep difficulties     Substance abuse ([facility] [address] [facility]     Type 1 diabetes ([facility] [address] [facility]     Vitamin D deficiency        Past Surgical History:   Procedure Laterality Date    ABDOMINAL SURGERY      gastric bypass     SECTION      x2    CHOLECYSTECTOMY  2018    GASTRIC BYPASS  2007    INTRAUTERINE DEVICE INSERTION  2015    IR BIOPSY BONE MARROW  2020    OTHER SURGICAL HISTORY      surgery for imperforate hymen/endometriosis/hydrometrocolpos    TUBAL LIGATION      WISDOM TOOTH EXTRACTION         Current Outpatient Medications   Medication Sig Dispense Refill    Accu-Chek FastClix Lancets MISC USE 1 LANCET TO TEST BLOOD SUGAR UP TO 6 TIMES DAILY 102 each 0    acetaminophen (TYLENOL) 500 mg tablet       ALPRAZolam (XANAX) 0 5 mg tablet Take 0 5 mg by mouth daily as needed      busPIRone (BUSPAR) 10 mg tablet Take 1 tablet (10 mg total) by mouth 2 (two) times a day (Patient not taking: Reported on 9/3/2021) 60 tablet 1    clonazePAM (KlonoPIN) 1 mg tablet Take 1 tablet by mouth 2 (two) times a day      Cyanocobalamin (B-12 PO) Take by mouth daily      ergocalciferol (VITAMIN D2) 50,000 units Take 1 capsule (50,000 Units total) by mouth once a week 12 capsule 3    escitalopram (LEXAPRO) 20 mg tablet Take 20 mg by mouth daily      gabapentin (NEURONTIN) 600 MG tablet       glucose blood (Accu-Chek Guide) test strip Use to test blood sugar up to 6 times daily  200 each 2    insulin aspart (NovoLOG) 100 units/mL injection Inject daily into pump  Max daily dose 50 units   40 mL 2    Insulin Pen Needle (BD Pen Needle Deanna U/F) 32G X 4 MM MISC Use 4/day 100 each 3    Insulin Syringe-Needle U-100 (INSULIN SYRINGE 1CC/28G) 28G X 1/2" 1 ML MISC 4 injections daily E10 65      levothyroxine 200 mcg tablet Take 1 tablet (200 mcg total) by mouth daily in the early morning 90 tablet 3    levothyroxine 75 mcg tablet       ofloxacin (OCUFLOX) 0 3 % ophthalmic solution       PATIENT MAINTAINED INSULIN PUMP Inject 1 each under the skin every 8 (eight) hours 1 each 0    prednisoLONE acetate (PRED FORTE) 1 % ophthalmic suspension       selenium 200 mcg Take 200 mcg by mouth daily  (Patient not taking: Reported on 9/3/2021)      traZODone (DESYREL) 100 mg tablet Take 100 mg by mouth daily at bedtime      vitamin B-12 (VITAMIN B-12) 500 mcg tablet Take 500 mcg by mouth daily (Patient not taking: Reported on 8/23/2021)       No current facility-administered medications for this visit  No Known Allergies    Review of Systems    Video Exam    There were no vitals filed for this visit  Physical Exam     I spent FOUR GROUP HOURS PLUS CASE MANAGEMENT minutes with patient today in which greater than 50% of the time was spent in counseling/coordination of care regarding PHP - SEE NOTES  VIRTUAL VISIT DISCLAIMER    Clarissa Webb verbally agrees to participate in [employer]  Pt is aware that [employer] could be limited without vital signs or the ability to perform a full hands-on physical exam  Clarissa Webb understands she or the provider may request at any time to terminate the video visit and request the patient to seek care or treatment in person

## 2021-09-20 NOTE — PSYCH
Subjective:     Patient ID: Miguel Angel Dubois is a 46 y o  female  [facility] Clinical Progress Notes      Specialized Services Documentation  Therapist must complete separate progress note for each specific clinical activity in which the individual participated during the day  GROUP PSYCHOTHERAPY ([id number]) Group was facilitated virtually in a private office using HIPAA Compliant and Approved [employer] Teams  Tal Lay consented to the use of tele-video modality of treatment and was virtually present for group psychotherapy today  The group engaged in education about intrusive thoughts and ways to deal with thoughts from the past  Each group member was then asked to respond to the following:  What is one thought you struggle with? (Whether its an intrusive thought or a thought based on your past)  What can you do to help you get through this thought? Tal Lay was not present during this group and will continue to attend psychotherapy group       Tx plan objective: n/a  Therapist: Tariq Acosta MA

## 2021-09-20 NOTE — PSYCH
Subjective:     Patient ID: Balaji [place] is a 46 y o  female  [facility] Clinical Progress Notes      Specialized Services Documentation  Therapist must complete separate progress note for each specific clinical activity in which the individual participated during the day  Education Therapy   [id number] Balaji [place] actively shared in morning assessment and goal review  Presented as Receptive related to readiness to learn  Clarissa Webb did complete part of her goal from last treatment day identifying gaining time with her children  did not present with any barriers to learning  [id number] Balaji [place] engaged throughout the treatment day  Was engaged in learning related to Illness, Medication, Aftercare and Wellness Tools  Staff utilized Verbal, Written, A/V and Demonstration teaching methods  Clarissa Webb shared area of learning and set a goal for outside of program to use her instapot and figure out how to get her son to baseball        Tx Plan Objective: 1 1,1 2,1 4 Therapist:  SUZETTE Garcia

## 2021-09-20 NOTE — PSYCH
Subjective:     Patient ID: Lorenzo Corbin is a 46 y o  female  [facility] Clinical Progress Notes      Specialized Services Documentation  Therapist must complete separate progress note for each specific clinical activity in which the individual participated during the day  Allied Therapy   This group was facilitated virtually in a private office using HIPAA Compliant and Approved [employer] Teams  Clarissa Webb consented to the use of tele-video modality of treatment  [id number] Lorenzo Corbin  actively shared in [facility] group focused on relaxation techniques and concept of mindfulness  Jade Person was observed to be engaged in therapist led progressive muscle relaxation exercises  Goals of mindfulness reviewed and she identified a specific way to utilize mindfulness tonight through spending time outside  Group discussed specific ways to practice refocusing thoughts to the present and offered encouragement to use these things regularly to manage stressors consistently  Some effort noted toward tx goal   Continue AT to encourage development of wellness skills and consistent practice        Tx Plan Objective: 1 1,1 2, Therapist:  Loreta MEDEIROS

## 2021-09-21 ENCOUNTER — OFFICE VISIT (OUTPATIENT)
Dept: PSYCHOLOGY | Facility: CLINIC | Age: 41
End: 2021-09-21
Payer: COMMERCIAL

## 2021-09-21 DIAGNOSIS — F32.A ANXIETY AND DEPRESSION: Primary | ICD-10-CM

## 2021-09-21 DIAGNOSIS — F41.9 ANXIETY AND DEPRESSION: Primary | ICD-10-CM

## 2021-09-21 PROCEDURE — S0201 PARTIAL HOSPITALIZATION SERV: HCPCS

## 2021-09-21 PROCEDURE — G0410 GRP PSYCH PARTIAL HOSP 45-50: HCPCS

## 2021-09-21 NOTE — PSYCH
Subjective:     Patient ID: Sveta Sandoval is a 46 y o  female  [facility] Clinical Progress Notes      Specialized Services Documentation  Therapist must complete separate progress note for each specific clinical activity in which the individual participated during the day  GROUP PSYCHOTHERAPY ([id number]) Group was facilitated virtually in a private office using HIPAA Compliant and Approved Microsoft Teams  Clarissa Webb consented to the use of tele-video modality of treatment and was virtually present for group psychotherapy today  The group engaged in open discussions about current stressors, challenges and asked questions about how to utilize topics discussed  Members were able to gain a different perspectives and deeper understanding of past experiences and current events  Group discussions and themes involved personal disclosures, identifying how to get "back to normal", recognizing that even though they have different stories they have similarities, "going through the motions", feeling embarrassed or shame about their journey and how to move forward with hope  Jf Goodrich shared that she has been struggling with anxiety and depression for a couple years  She felt she had to take care of others and didn't talk about her struggles  She is hopeful that she will learn skills and start to see progress  Santa Given continues to demonstrate insight and growth through verbal participation, offerings of support, encouragement and feedback to other group members during the open discussion time  Continue with psychotherapy     TX Plan Objectives: 1 1, 1 2, 1 4   Therapist: Choco Wilkins MA, [place]

## 2021-09-21 NOTE — PSYCH
Virtual Regular Visit    Verification of patient location:    Patient is located in the following state in which I hold an active license [place]      Assessment/Plan:    Problem List Items Addressed This Visit        Other    Anxiety and depression - Primary          Goals addressed in session:        Reason for visit is PHP VIRTUAL GROUP DUE TO COVID-19       Encounter provider [address] PARTIAL [address]    Provider located at [address] [place] [address]  [address] [facility] [phone]      Recent Visits  Date Type Provider Dept   09/20/21 Office Visit [address] PARTIAL PSYCH GROUP THERAPY [facility]   09/17/21 Office Visit [address] PARTIAL PSYCH GROUP TH[facility]AP[facility]   09/16/21 Office Visit [address] PARTIAL PSYCH GROUP THERAPY [facility]   09/15/21 Office Visit [address] PARTIAL PSYCH GROUP TH[facility]AP[facility]   09/14/21 Office Visit [address] PARTIAL PSYCH GROUP THERAPY [facility]   Showing recent visits within past 7 days and meeting all other requirements  Today's Visits  Date Type Provider Dept   09/21/21 Office Visit [address] PARTIAL PSYCH GROUP THERAPY [facility]   Showing today's visits and meeting all other requirements  Future Appointments  No visits were found meeting these conditions  Showing future appointments within next 150 days and meeting all other requirements       The patient was identified by name and date of birth  Estella Juarez was informed that this is a telemedicine visit and that the visit is being conducted throughMicrosoft Teams and patient was informed that this is a secure, HIPAA-compliant platform  She agrees to proceed     My office door was closed  No one else was in the room  She acknowledged consent and understanding of privacy and security of the video platform  The patient has agreed to participate and understands they can discontinue the visit at any time      Patient is aware this is a billable service  Subjective  Dunia Fair is a 46 y o  female    HPI     Past Medical History:   Diagnosis Date    Anemia     Anxiety     B12 deficiency     Cervical disc disorder     On gabapentin     Depression     Disease of thyroid gland     hypothyroid    Iron deficiency anemia     Panic attack     PTSD (post-traumatic stress disorder)     Sleep difficulties     Substance abuse ([facility] [address] [facility]     Type 1 diabetes ([facility] [address] [facility]     Vitamin D deficiency        Past Surgical History:   Procedure Laterality Date    ABDOMINAL SURGERY      gastric bypass     SECTION      x2    CHOLECYSTECTOMY  2018    GASTRIC BYPASS  2007    INTRAUTERINE DEVICE INSERTION  2015    IR BIOPSY BONE MARROW  2020    OTHER SURGICAL HISTORY      surgery for imperforate hymen/endometriosis/hydrometrocolpos    TUBAL LIGATION      WISDOM TOOTH EXTRACTION         Current Outpatient Medications   Medication Sig Dispense Refill    Accu-Chek FastClix Lancets MISC USE 1 LANCET TO TEST BLOOD SUGAR UP TO 6 TIMES DAILY 102 each 0    acetaminophen (TYLENOL) 500 mg tablet       ALPRAZolam (XANAX) 0 5 mg tablet Take 0 5 mg by mouth daily as needed      busPIRone (BUSPAR) 10 mg tablet Take 1 tablet (10 mg total) by mouth 2 (two) times a day (Patient not taking: Reported on 9/3/2021) 60 tablet 1    clonazePAM (KlonoPIN) 1 mg tablet Take 1 tablet by mouth 2 (two) times a day      Cyanocobalamin (B-12 PO) Take by mouth daily      ergocalciferol (VITAMIN D2) 50,000 units Take 1 capsule (50,000 Units total) by mouth once a week 12 capsule 3    escitalopram (LEXAPRO) 20 mg tablet Take 20 mg by mouth daily      gabapentin (NEURONTIN) 600 MG tablet       glucose blood (Accu-Chek Guide) test strip Use to test blood sugar up to 6 times daily  200 each 2    insulin aspart (NovoLOG) 100 units/mL injection Inject daily into pump  Max daily dose 50 units   40 mL 2    Insulin Pen Needle (BD Pen Needle Deanna U/F) 32G X 4 MM MISC Use 4/day 100 each 3    Insulin Syringe-Needle U-100 (INSULIN SYRINGE 1CC/28G) 28G X 1/2" 1 ML MISC 4 injections daily E10 65      levothyroxine 200 mcg tablet Take 1 tablet (200 mcg total) by mouth daily in the early morning 90 tablet 3    levothyroxine 75 mcg tablet       ofloxacin (OCUFLOX) 0 3 % ophthalmic solution       PATIENT MAINTAINED INSULIN PUMP Inject 1 each under the skin every 8 (eight) hours 1 each 0    prednisoLONE acetate (PRED FORTE) 1 % ophthalmic suspension       selenium 200 mcg Take 200 mcg by mouth daily  (Patient not taking: Reported on 9/3/2021)      traZODone (DESYREL) 100 mg tablet Take 100 mg by mouth daily at bedtime      vitamin B-12 (VITAMIN B-12) 500 mcg tablet Take 500 mcg by mouth daily (Patient not taking: Reported on 8/23/2021)       No current facility-administered medications for this visit  No Known Allergies    Review of Systems    Video Exam    There were no vitals filed for this visit  Physical Exam     I spent FOUR GROUP HOURS PLUS CASE MANAGEMENT minutes with patient today in which greater than 50% of the time was spent in counseling/coordination of care regarding PHP - SEE NOTES  VIRTUAL VISIT DISCLAIMER    Clarissa Webb verbally agrees to participate in [employer]  Pt is aware that [employer] could be limited without vital signs or the ability to perform a full hands-on physical exam  Clarissa Webb understands she or the provider may request at any time to terminate the video visit and request the patient to seek care or treatment in person

## 2021-09-21 NOTE — PSYCH
Subjective:     Patient ID: Coco Novoa is a 46 y o  female  [facility] Clinical Progress Notes      Specialized Services Documentation  Therapist must complete separate progress note for each specific clinical activity in which the individual participated during the day  Group Psychotherapy Life Skills (12:30-1:30) Coco Novoa actively engaged in group focused on hope which was facilitated virtually in a private office using HIPAA Compliant and Approved [employer] Teams  Clarissa Webb consented to the use of tele-video modality of treatment and was virtually present for group psychotherapy today  Group watched a short video, How to overcome feelings of hopelessness, and discussed the 3 main points in the video  Group members discussed what hope is to them and what causes them to lose hope  Group members were asked to think about what symbolizes hope for them  Clients lana pictures and shared what their symbol of hope is and what it means to them  Clarissa's symbol of hope is a home  Clients discussed ways to cultivate hope in their life  Coco Novoa continues to make progress towards goals through verbal participation in group; to accomplish long term goals continue to utilize skills learned in programming  Continue with psychotherapy to educate and encourage use of wellness tools   Tx Plan Objective: 1 1,1 2 Therapist: Darrian Dugan

## 2021-09-21 NOTE — PSYCH
Subjective:     Patient ID: Sheba Le is a 46 y o  female  [facility] Clinical Progress Notes      Specialized Services Documentation  Therapist must complete separate progress note for each specific clinical activity in which the individual participated during the day  Education Therapy   [id number] Sheba Le was not in morning assessment due to MD appointment  [id number] Sheba Le engaged throughout the treatment day  Was engaged in learning related to [employer]  Staff utilized Verbal and A/V teaching methods  Clarissa Jon shared area of learning and set a goal for outside of program to socialize with others at her son's baseball game  Tx Plan Objective: 1 1,  1 2, 1 4, Therapist:  Prema Mccann RN, BSN        Case Management Note    Prema Mccann RN, BSN    Current suicide risk : Low     Medications changes/added/denied? No    Treatment session number: 12    Individual Case Management Visit provided today?  No    [facility] follow up physician's orders: no new orders

## 2021-09-22 ENCOUNTER — OFFICE VISIT (OUTPATIENT)
Dept: PSYCHOLOGY | Facility: CLINIC | Age: 41
End: 2021-09-22
Payer: COMMERCIAL

## 2021-09-22 DIAGNOSIS — F32.A ANXIETY AND DEPRESSION: Primary | ICD-10-CM

## 2021-09-22 DIAGNOSIS — F41.9 ANXIETY AND DEPRESSION: Primary | ICD-10-CM

## 2021-09-22 PROCEDURE — G0177 OPPS/PHP; TRAIN & EDUC SERV: HCPCS

## 2021-09-22 PROCEDURE — S0201 PARTIAL HOSPITALIZATION SERV: HCPCS

## 2021-09-22 PROCEDURE — G0410 GRP PSYCH PARTIAL HOSP 45-50: HCPCS

## 2021-09-22 PROCEDURE — G0176 OPPS/PHP;ACTIVITY THERAPY: HCPCS

## 2021-09-22 NOTE — PSYCH
Subjective:     Patient ID: Aleida Zamudio is a 46 y o  female  [facility] Clinical Progress Notes      Specialized Services Documentation  Therapist must complete separate progress note for each specific clinical activity in which the individual participated during the day  Group Psychotherapy Life Skills (10:45-11:45) Aleida Zamudio actively engaged in group focused on the labels which others put on us and the labels we put on ourselves which was facilitated virtually in a private office using HIPAA Compliant and Approved [employer] Teams  Clarissa consented to the use of tele-video modality of treatment and was virtually present for group psychotherapy today  During the group members did an activity about the labels that people put on us and that we put on ourselves  Group members discussed the impact these labels have on their lives  The group explored which labels they want to get rid of and which they would like to believe more  Clarissa stated the negative label they would like to give up and a positive label they would like to believe  We discussed how to get rid of the negative labels and how their lives would change if they stopped believing them  Aleida Zamudio continues to make progress towards goals through verbal participation in group; to accomplish long term goals continue to utilize skills learned in programming  Continue with psychotherapy to educate and encourage use of wellness tools   Tx Plan Objective: 1 1,1 2 Therapist: Jose Anguiano Co-led by Renée Chambers

## 2021-09-22 NOTE — PSYCH
Subjective:     Patient ID: Dunia Fair is a 46 y o  female  [facility] Clinical Progress Notes      Specialized Services Documentation  Therapist must complete separate progress note for each specific clinical activity in which the individual participated during the day  Group Psychotherapy   This group was facilitated virtually in a private office using HIPAA Compliant and Approved [employer] Teams  Clarissa Webb consented to the use of tele-video modality of treatment  ([id number]) Clarissa Webb actively participated in psychoeducation group this afternoon which focused on sleep hygiene  Group shared current barriers contributing to decreased quality of sleep  Group then identified sleep hygiene habits that are currently effective and habits they wish to incorporate  into their night time routine to promote sleep hygiene  This writer explained the importance of quality sleep in relation to wellness  Sleep diary and additional tips on sleep hygiene were discussed  The group then listened to [facility] relaxation guided imagery to demonstrate how guided imagery can be an effective tool to aid in sleep  Good progress toward goal observed  Continue psychoeducation group to increase awareness of good sleeping habits to promote wellness  Tx Plan Objective: 1 1, 1 2, 1 5, Therapist:  Radha Lawrence, RN, BSN  Co-led by Therapist:  SUZETTE Amador      Education Therapy   [id number] Dunia Fair actively shared in morning assessment and goal review  Presented as Receptive related to readiness to learn  Clarissa Webb did complete goal from last treatment day identifying gaining support  did not present with any barriers to learning  [phone] Dunia Fair engaged throughout the treatment day  Was engaged in learning related to Illness, Medication, Aftercare and Wellness Tools  Staff utilized Verbal, Written, A/V and Demonstration teaching methods    Colton Julio Jon shared area of learning and set a goal for outside of program to shopping for fall decorations with her children  Tx Plan Objective: 1 1, 1 2, 1 4, 1 5, Therapist:  Gricelda Ricks RN, BSN        Case Management Note  This case management session was facilitated virtually in a private office using HIPAA Compliant and Approved [employer] Teams  Clarissa Webb consented to the use of tele-video modality of treatment  Gricelda Ricks RN, BSN    Current suicide risk : Low     [id number] Met with Mikki Ho for CM  She reports the therapist at her psychiatrist practice is booked and unable to obtain appointment through him, also attempted other avenues however unable to get an appointment for 2 months  She is putting out another call today for an alternate choice  [facility] reports she is seeing her psychiatrist every Friday for the next 4 Fridays for an appointment  [facility] is participating in group and has good insight into her anxiety and depression  She is utilizing coping skills learned and finds them effective  [facility] is for discharge Thursday September 23, 2021  Medications changes/added/denied? No    Treatment session number: 13    Individual Case Management Visit provided today?  Yes     [facility] follow up physician's orders: no new orders

## 2021-09-22 NOTE — PSYCH
Subjective:     Patient ID: Kari Castillo is a 46 y o  female  [facility] Clinical Progress Notes      Specialized Services Documentation  Therapist must complete separate progress note for each specific clinical activity in which the individual participated during the day  Allied Therapy   This group was facilitated virtually in a private office using HIPAA Compliant and Approved Microsoft Teams  Clarissa Webb consented to the use of tele-video modality of treatment  [id number] Clarissa Webb moderately shared in [facility] group focused on assertiveness  Exercise explored communication styles and value of assertiveness as discussion focused on ways to increase assertiveness  Group discussed impact on treatment and ways to increase assertive behavior in getting needs met  She offered feedback at one point that was loosely related  She identified a challenge knowing the difference between aggression and assertiveness  Inconsistent effort noted toward goal   Continue AT to explore wellness strategies and encourage personal practice       Tx Plan Objective: 1 1,1 4, Therapist:  Silvina MEDEIROS

## 2021-09-22 NOTE — PSYCH
[facility] Discharge Instructions:     Disposition: home  Address: [address] [place] [address]     Diagnosis: Anxiety and depression  Allergies (Drug/Food): No Known Allergies     Activity: No recommendations     Diet:no recommendations     Smoking Cessation:not a smoker      Diagnostic/Laboratory Orders: None ordered    Vaccines: If you received a vaccine, please notify your family physician on your next visit  For more information, please call [phone]  Follow-up appointments/Referrals:     Medication Management/Psychiatrist  Dr Yasmin Zimmerman  [address] [place] [address]  Phone: [phone]  Appointment: Friday, September 24, 2021 at 1130  Will see every Friday at 1130 for 4 weeks starting this Friday  Therapist   Maricarmen Freeman is actively working on setting up appointment  PCP  Nathalia Ceja  [address]  [phone] Phone  [phone] Fax  Appointment: 10/11/2021 at 2:45pm    ICM/CTT:none  [facility]: [address] [phone] and [facility] [phone]  Intake/Referral/Evaluation (Non-Emergency) *NON INSURED FOR FUNDING: [facility]: [phone], [facility]: [phone], [facility]: [phone] and [place]: [phone]  Crisis Intervention (Emergency) [place] Service: [facility]: [phone], [place]: [phone], [address] [place] [address] Ave: [phone], [place] [facility]): [phone], Daya Luna: [phone] and [facility]: [phone]  _________________________________  National Crisis Intervention Hotline: 2-836.106.5961  National Suicide Crisis Hotline: 4-684.710.8024  I, the undersigned, have received and understand the above instructions          Patient/Rep Signature: __________________________________       Date/Time: ______________         Relationship: __________________________________________       Date/Time: ______________ Physician Signature: ____________________________________      Date/Time: ______________               Signature: ________________________________       Date/Time: ______________

## 2021-09-22 NOTE — PSYCH
Virtual Regular Visit    Verification of patient location:    Patient is located in the following state in which I hold an active license [place]      Assessment/Plan:    Problem List Items Addressed This Visit        Other    Anxiety and depression - Primary          Goals addressed in session:           Reason for visit is PHP VIRTUAL GROUP DUE TO COVID-19      Encounter provider [facility] PARTIAL [address]    Provider located at [address] [place] [address] Dr  [address] [facility] [address]      Recent Visits  Date Type Provider Dept   09/21/21 Office Visit [facility] PARTIAL PSYCH GROUP THERAPY [facility]   09/20/21 Office Visit [facility] PARTIAL PSYCH GROUP THERAPY [facility]   09/17/21 Office Visit [facility] PARTIAL PSYCH GROUP THERAPY [facility]   09/16/21 Office Visit [facility] PARTIAL PSYCH GROUP THERAPY [facility]   09/15/21 Office Visit [facility] PARTIAL PSYCH GROUP THERAPY [facility]   Showing recent visits within past 7 days and meeting all other requirements  Today's Visits  Date Type Provider Dept   09/22/21 Office Visit [facility] PARTIAL PSYCH GROUP THERAPY [facility]   Showing today's visits and meeting all other requirements  Future Appointments  No visits were found meeting these conditions  Showing future appointments within next 150 days and meeting all other requirements       The patient was identified by name and date of birth  Marcy Win was informed that this is a telemedicine visit and that the visit is being conducted throughMicrosoft Teams and patient was informed that this is a secure, HIPAA-compliant platform  She agrees to proceed     My office door was closed  No one else was in the room  She acknowledged consent and understanding of privacy and security of the video platform  The patient has agreed to participate and understands they can discontinue the visit at any time      Patient is aware this is a billable service  Subjective  Gaye Medrano is a 46 y o  female  HPI     Past Medical History:   Diagnosis Date    Anemia     Anxiety     B12 deficiency     Cervical disc disorder     On gabapentin     Depression     Disease of thyroid gland     hypothyroid    Iron deficiency anemia     Panic attack     PTSD (post-traumatic stress disorder)     Sleep difficulties     Substance abuse ([facility] [address] [facility]     Type 1 diabetes ([facility]     Vitamin D deficiency        Past Surgical History:   Procedure Laterality Date    ABDOMINAL SURGERY      gastric bypass     SECTION      x2    CHOLECYSTECTOMY  2018    GASTRIC BYPASS  2007    INTRAUTERINE DEVICE INSERTION  2015    IR BIOPSY BONE MARROW  2020    OTHER SURGICAL HISTORY      surgery for imperforate hymen/endometriosis/hydrometrocolpos    TUBAL LIGATION      WISDOM TOOTH EXTRACTION         Current Outpatient Medications   Medication Sig Dispense Refill    Accu-Chek FastClix Lancets MISC USE 1 LANCET TO TEST BLOOD SUGAR UP TO 6 TIMES DAILY 102 each 0    acetaminophen (TYLENOL) 500 mg tablet       ALPRAZolam (XANAX) 0 5 mg tablet Take 0 5 mg by mouth daily as needed      busPIRone (BUSPAR) 10 mg tablet Take 1 tablet (10 mg total) by mouth 2 (two) times a day (Patient not taking: Reported on 9/3/2021) 60 tablet 1    clonazePAM (KlonoPIN) 1 mg tablet Take 1 tablet by mouth 2 (two) times a day      Cyanocobalamin (B-12 PO) Take by mouth daily      ergocalciferol (VITAMIN D2) 50,000 units Take 1 capsule (50,000 Units total) by mouth once a week 12 capsule 3    escitalopram (LEXAPRO) 20 mg tablet Take 20 mg by mouth daily      gabapentin (NEURONTIN) 600 MG tablet       glucose blood (Accu-Chek Guide) test strip Use to test blood sugar up to 6 times daily  200 each 2    insulin aspart (NovoLOG) 100 units/mL injection Inject daily into pump  Max daily dose 50 units   40 mL 2    Insulin Pen Needle (BD Pen Needle Deanna U/F) 32G X 4 MM MISC Use 4/day 100 each 3    Insulin Syringe-Needle U-100 (INSULIN SYRINGE 1CC/28G) 28G X 1/2" 1 ML MISC 4 injections daily E10 65      levothyroxine 200 mcg tablet Take 1 tablet (200 mcg total) by mouth daily in the early morning 90 tablet 3    levothyroxine 75 mcg tablet       ofloxacin (OCUFLOX) 0 3 % ophthalmic solution       PATIENT MAINTAINED INSULIN PUMP Inject 1 each under the skin every 8 (eight) hours 1 each 0    prednisoLONE acetate (PRED FORTE) 1 % ophthalmic suspension       selenium 200 mcg Take 200 mcg by mouth daily  (Patient not taking: Reported on 9/3/2021)      traZODone (DESYREL) 100 mg tablet Take 100 mg by mouth daily at bedtime      vitamin B-12 (VITAMIN B-12) 500 mcg tablet Take 500 mcg by mouth daily (Patient not taking: Reported on 8/23/2021)       No current facility-administered medications for this visit  No Known Allergies    Review of Systems    Video Exam    There were no vitals filed for this visit  Physical Exam     I spent FOUR GROUP HOURS PLUS CASE MANAGEMENT minutes with patient today in which greater than 50% of the time was spent in counseling/coordination of care regarding PHP - SEE NOTES  VIRTUAL VISIT DISCLAIMER    Clarissa Webb verbally agrees to participate in [employer]  Pt is aware that [employer] could be limited without vital signs or the ability to perform a full hands-on physical exam  Clarissa Webb understands she or the provider may request at any time to terminate the video visit and request the patient to seek care or treatment in person

## 2021-09-22 NOTE — PSYCH
Assessment/Plan:       Diagnoses and all orders for this visit:     Anxiety and depression           Subjective:      Patient ID: Clarissa Webb is a 46 y o  female      [facility] Treatment Plan   AREAS OF NEED: Anxiety as evidenced by struggling with work, familial stressors, financials, nausea, panic attacks  Date Initiated: 08/30/21      Strengths: Desire to be well, internal motivation, supportive family and boyfriend               LONG TERM GOAL:   Date Initiated: 08/30/21   1 0 I will identify two ways my anxiety is decreased in intensity and frequency    Target Date: 10/12/2021  Completion Date: 09/23/2021- discharged     SHORT TERM OBJECTIVES:      Date Initiated: 8/30/2021  1 1 I will identify and record three mindfulness and/or self-monitoring strategies that I can utilize on a daily basis to track my moods  Revision Date: 09/10/21, 09/22/2021  Target Date: 09/10/2021  Completion Date: 09/23/2021- discharged     Date Initiated: 08/30/21   1 2 I will learn and engage in a daily calming/relaxation skill (e g , applied relaxation, progressive muscle relaxation, cue controlled relaxation; mindful breathing; biofeedback) to manage my anxiety symptoms  Revision Date: 09/10/21, 09/22/2021   Target Date: 09/10/2021  Completion Date: 09/23/2021- discharged     Date Initiated: 08/30/21   1 3 I will take medications as prescribed and share questions and concerns if arise  Revision Date: 09/10/21 09/22/2021  Target Date: 09/10/2021  Completion Date:  09/23/2021- discharged     Date Initiated: 08/30/21   1 4 I will identify 3 ways my supports can assist in my recovery and agree to staff/support contact as indicated     Revision Date: 09/10/21 09/22/2021  Target Date: 09/10/2021  Completion Date: 09/23/2021- discharged             7 DAY REVISION:     Date Initiated: 09/10/21   1 5 I will initiate 2 or more social contacts per day for the next week    Revision Date: 09/22/2021  Target Date: 09/22/2021  Completion Date: 09/23/2021- discharged     DUE TO COVID-19, CURRENTLY ALL INTERVENTIONS ARE BEING OFFERED VIRTUALLY      PSYCHIATRY:  Date Initiated: 08/30/21   Medication Management and Education       Revision Date: 09/10/21        09/10/21        1 3 Continue medication management    The person(s) responsible for carrying out the plan Diana Serna MD     NURSING/SYMPTOM EDUCATION:  Date Initiated: 08/30/21   1 1, 1 2  1 3, 1 4 This RN or Therapist will provide wellness/symptoms and skill education groups three to five days weekly to educate on signs and symptoms of diagnoses, skills to manage, and medication questions that will be addressed by the treatment team     Revision date: 09/10/21 09/22/2021 09/23/2021- discharged  1 1,1 2,1 3,1 4,1 5 Continue to encourage Clarissa Webb to participate in wellness/symptoms and skills education groups daily to learn about symptoms, coping strategies and warning signs to promote relapse prevention  The person(s) responsible for carrying out the plan is KIMBERLEY Pisano-CATARINO     PSYCHOLOGY:   Date Initiated: 08/30/21        1 1, 1 2, 1 4 Provide psychotherapy group 5 times per week to allow opportunity for Clarissa Jon  to explore stressors and ways of coping  Revision Date: 09/10/21 09/22/2021 09/23/2021- discharged  1 1,1 2,1 4,1 5  Continue to provide psychotherapy group daily to [facility] and encourage sharing of stressors, skills and positive change  The person(s) responsible for carrying out the plan is Pranav BRADFORD, TOMASW        ALLIED THERAPY:   Date Initiated: 08/30/21   1 1,1 2 Engage Clarissa Webb  in AT group 5 times daily to encourage development and use of wellness tools to decrease symptoms and promote recovery through meaningful activity    Revision Date: 09/10/21 09/22/2021 09/23/2021- discharged  1 1,1 2,1 5 Continue to engage Clarissa Webb to participate in AT group to practice wellness tools within program and transfer to home sharing successes and barriers through healthy task involvement  The person(s) responsible for carrying out the plan is WALDEMAR Abdi MANAGEMENT:   Date Initiated: 08/30/21       1 0 This  will meet with Clarissa Webb   3-4 times weekly to assess treatment progress, discharge planning, connection to community supports and UR as indicated  Revision Date: 09/10/21 09/22/2021 09/23/2021- discharged  1 0 Continue to meet with Clarissa Webb 3-4 times weekly to assess growth, work toward goals, continued treatment needs, dc planning and use of supports      The person(s) responsible for carrying out the plan is WILMER Abarca     TREATMENT REVIEW/COMMENTS:      DISCHARGE CRITERIA:Identify 3 signs of progress and complete relapse prevention plan     DISCHARGE PLAN: Outpatient care  Estimated Length of Stay:10 treatment days               Diagnosis and Treatment Plan explained to Clarissa Romo relates understanding diagnosis and is agreeable to Treatment Plan             CLIENT COMMENTS / Please share your thoughts, feelings, need and/or experiences regarding your treatment plan: _____________________________________________________________________________________________________________________________________________________________________________________________________________________________________________________________________________________________________________________ Date/Time: ______________      Patient Signature: _________________________________      Date/Time: ______________       Signature: _________________________________      Date/Time: ______________

## 2021-09-23 ENCOUNTER — OFFICE VISIT (OUTPATIENT)
Dept: PSYCHOLOGY | Facility: CLINIC | Age: 41
End: 2021-09-23
Payer: COMMERCIAL

## 2021-09-23 DIAGNOSIS — F41.9 ANXIETY AND DEPRESSION: Primary | ICD-10-CM

## 2021-09-23 DIAGNOSIS — F32.A ANXIETY AND DEPRESSION: Primary | ICD-10-CM

## 2021-09-23 PROCEDURE — G0410 GRP PSYCH PARTIAL HOSP 45-50: HCPCS

## 2021-09-23 PROCEDURE — S0201 PARTIAL HOSPITALIZATION SERV: HCPCS

## 2021-09-23 PROCEDURE — G0177 OPPS/PHP; TRAIN & EDUC SERV: HCPCS

## 2021-09-23 NOTE — PSYCH
Subjective:     Patient ID: Sveta Sandoval is a 46 y o  female  [facility] Clinical Progress Notes      Specialized Services Documentation  Therapist must complete separate progress note for each specific clinical activity in which the individual participated during the day  GROUP PSYCHOTHERAPY  ([id number]) Group was facilitated virtually in a private office using HIPAA Compliant and Approved Microsoft Teams  Clarissa Webb consented to the use of tele-video modality of treatment and was virtually present for group psychotherapy today  Clarissa Webb attentively listened to [facility] share his life story as he co-led this session  Group encouraged power of learning about self, accepting illness and personal responsibility in recovery  Community resources reviewed in addition to personal resources like the affirmations  Progress toward goals noted  Continue psychotherapy to encourage self -awareness and healthy engagement of supports      [place] Plan Objectives: 1 1, 1 2, 1 4   Therapist: hCoco Wilkins MA, [place]

## 2021-09-23 NOTE — PSYCH
Virtual Regular Visit    Verification of patient location:    Patient is located in the following state in which I hold an active license [place]      Assessment/Plan:    Problem List Items Addressed This Visit        Other    Anxiety and depression - Primary          Goals addressed in session:           Reason for visit is  PHP VIRTUAL GROUP DUE TO COVID-19   Chief Complaint   Patient presents with    Virtual Regular Visit        Encounter provider Vinod Duarte    Provider located at [address] [place] [address] Dr  [address] [facility] [address]      Recent Visits  Date Type Provider Dept   09/22/21 Office Visit [address] PARTIAL PSYCH GROUP THERAPY [facility]   09/21/21 Office Visit [address] PARTIAL PSYCH GROUP THERAPY [facility]   09/20/21 Office Visit [address] PARTIAL PSYCH GROUP THERAPY [facility]   09/17/21 Office Visit [address] PARTIAL PSYCH GROUP THERAPY [facility]   09/16/21 Office Visit [address] PARTIAL PSYCH GROUP THERAPY [facility]   Showing recent visits within past 7 days and meeting all other requirements  Today's Visits  Date Type Provider Dept   09/23/21 Office Visit [address] PARTIAL PSYCH GROUP THERAPY [facility]   Showing today's visits and meeting all other requirements  Future Appointments  No visits were found meeting these conditions  Showing future appointments within next 150 days and meeting all other requirements       The patient was identified by name and date of birth  Obedelda Parent was informed that this is a telemedicine visit and that [facility] and patient was informed this is a secure, HIPAA-complaint platform  She agrees to proceed     My office door was closed  No one else was in the room  She acknowledged consent and understanding of privacy and security of the video platform   The patient has agreed to participate and understands they can discontinue the visit at any time  Patient is aware this is a billable service  Flex [place] is a 46 y o  female  HPI     Past Medical History:   Diagnosis Date    Anemia     Anxiety     B12 deficiency     Cervical disc disorder     On gabapentin     Depression     Disease of thyroid gland     hypothyroid    Iron deficiency anemia     Panic attack     PTSD (post-traumatic stress disorder)     Sleep difficulties     [facility] ([facility] [address] [facility]     [facility]     Vitamin D deficiency        Past Surgical History:   Procedure Laterality Date    ABDOMINAL SURGERY      gastric bypass     SECTION      x2    CHOLECYSTECTOMY  2018    GASTRIC BYPASS  2007    INTRAUTERINE DEVICE INSERTION  2015    IR BIOPSY BONE MARROW  2020    OTHER SURGICAL HISTORY      surgery for imperforate hymen/endometriosis/hydrometrocolpos    TUBAL LIGATION      WISDOM TOOTH EXTRACTION         Current Outpatient Medications   Medication Sig Dispense Refill    Accu-Chek FastClix Lancets MISC USE 1 LANCET TO TEST BLOOD SUGAR UP TO 6 TIMES DAILY 102 each 0    acetaminophen (TYLENOL) 500 mg tablet       ALPRAZolam (XANAX) 0 5 mg tablet Take 0 5 mg by mouth daily as needed      busPIRone (BUSPAR) 10 mg tablet Take 1 tablet (10 mg total) by mouth 2 (two) times a day (Patient not taking: Reported on 9/3/2021) 60 tablet 1    clonazePAM (KlonoPIN) 1 mg tablet Take 1 tablet by mouth 2 (two) times a day      Cyanocobalamin (B-12 PO) Take by mouth daily      ergocalciferol (VITAMIN D2) 50,000 units Take 1 capsule (50,000 Units total) by mouth once a week 12 capsule 3    escitalopram (LEXAPRO) 20 mg tablet Take 20 mg by mouth daily      gabapentin (NEURONTIN) 600 MG tablet       glucose blood (Accu-Chek Guide) test strip Use to test blood sugar up to 6 times daily  200 each 2    insulin aspart (NovoLOG) 100 units/mL injection Inject daily into pump   Max daily dose 50 units  40 mL 2    Insulin Pen Needle (BD Pen Needle Deanna U/F) 32G X 4 MM MISC Use 4/day 100 each 3    Insulin Syringe-Needle U-100 (INSULIN SYRINGE 1CC/28G) 28G X 1/2" 1 ML MISC 4 injections daily E10 65      levothyroxine 200 mcg tablet Take 1 tablet (200 mcg total) by mouth daily in the early morning 90 tablet 3    levothyroxine 75 mcg tablet       ofloxacin (OCUFLOX) 0 3 % ophthalmic solution       PATIENT MAINTAINED INSULIN PUMP Inject 1 each under the skin every 8 (eight) hours 1 each 0    prednisoLONE acetate (PRED FORTE) 1 % ophthalmic suspension       selenium 200 mcg Take 200 mcg by mouth daily  (Patient not taking: Reported on 9/3/2021)      traZODone (DESYREL) 100 mg tablet Take 100 mg by mouth daily at bedtime      vitamin B-12 (VITAMIN B-12) 500 mcg tablet Take 500 mcg by mouth daily (Patient not taking: Reported on 8/23/2021)       No current facility-administered medications for this visit  No Known Allergies    Review of Systems    Video Exam    There were no vitals filed for this visit  Physical Exam     I spent FOUR GROUP HOURS PLUS CASE MANAGEMENT minutes with patient today in which greater than 50% of the time was spent in counseling/coordination of care regarding PHP - SEE NOTES  VIRTUAL VISIT DISCLAIMER    Clarissa Webb verbally agrees to participate in [employer]  Pt is aware that [employer] could be limited without vital signs or the ability to perform a full hands-on physical exam  Clarissa Webb understands she or the provider may request at any time to terminate the video visit and request the patient to seek care or treatment in person

## 2021-09-23 NOTE — PSYCH
Subjective:     Patient ID: Rebecca Castaneda is a 46 y o  female  [facility] Clinical Progress Notes      Specialized Services Documentation  Therapist must complete separate progress note for each specific clinical activity in which the individual participated during the day  Group Psychotherapy Life Skills (9:30-10:30) Rebecca Castaneda actively engaged in group focused on having a growth mindset which was facilitated virtually in a private office using HIPAA Compliant and Approved Microsoft Teams  Clarissa consented to the use of tele-video modality of treatment and was virtually present for group psychotherapy today  Clients learned about having a fixed mindset and a growth mindset  Group discussed the dangers of having a fixed mindset and the power of having a growth mindset  Some clients discussed a difficult experience that they have been through and talked about how this experience has helped them grow  Clarissa did share how they grew from their experience of going to treatment in [facility]   We discussed the importance of recognizing growth and steps to achieving a growth mindset  Marylene Lobe continues to make progress towards goals through verbal participation in group; will discharge at the end of treatment day Tx Plan Objective: 1 1,1 2 Therapist: Yennifer Reynaga    Education Therapy   [id number] Rebecca Castaneda actively shared in morning assessment and goal review  Presented as Receptive related to readiness to learn  Clarissa Webb did not complete goal from last treatment day identifying gaining responsibility   did not present with any barriers to learning  Will discharge at the end of treatment day  [id number] Rebecca Castaneda engaged throughout the treatment day  Was engaged in learning related to Illness, Medication, Aftercare and Wellness Tools  Staff utilized Verbal, Written, A/V and Demonstration teaching methods    Clarissa Webb shared area of learning and set a goal for outside of program to hang out with kids  Will discharge at the end of treatment day          Tx Plan Objective: 1 1,1 2,1 4 Therapist:  SUZETTE Garcia

## 2021-09-23 NOTE — PSYCH
Subjective:     Patient ID: Messi Gray is a 46 y o  female  [facility] Clinical Progress Notes      Specialized Services Documentation  Therapist must complete separate progress note for each specific clinical activity in which the individual participated during the day  Case Management Note  This case management session was facilitated virtually in a private office using HIPAA Compliant and Approved Microsoft Teams  Clarissa Webb consented to the use of tele-video modality of treatment  Gladys James RN, BSN    Current suicide risk : Low     [id number] Met with Messi Gray for CM  Reviewed Relapse Prevention Plan, Discharge Instructions, Medication list and crisis information with Clarissa Webb  See discharge summary for treatment details  Aftercare providers to receive discharge summary  Medications changes/added/denied? No    Treatment session number: 14    Individual Case Management Visit provided today? Yes     [facility] follow up physician's orders: See Dr Augusta Shaw discharge note

## 2021-09-23 NOTE — PSYCH
Patient ID: Artist William is a 46 y o  female  [facility] Clinical Progress Notes      Group was facilitated in person  The group discussed types of cognitive errors  Agustina Krishna was attentive but no verbal participation  Agustina Krishna will continue to make progress towards goals through verbal participation in group as well as accomplishing long term goals by utilizing skills learned in programming          Group Psychotherapy 9/23/21 10:45am-11:45am  Tx Plan Objective: 1 1, 1 2, Therapist:  Minyd Singleton LPC

## 2021-09-24 ENCOUNTER — DOCUMENTATION (OUTPATIENT)
Dept: PSYCHOLOGY | Facility: CLINIC | Age: 41
End: 2021-09-24

## 2021-09-24 ENCOUNTER — APPOINTMENT (OUTPATIENT)
Dept: PSYCHOLOGY | Facility: CLINIC | Age: 41
End: 2021-09-24
Payer: COMMERCIAL

## 2021-09-24 DIAGNOSIS — F32.A ANXIETY AND DEPRESSION: Primary | ICD-10-CM

## 2021-09-24 DIAGNOSIS — F41.9 ANXIETY AND DEPRESSION: Primary | ICD-10-CM

## 2021-09-24 NOTE — PROGRESS NOTES
Subjective:     Patient ID: Mikki Ho is a 46 y o  female  [facility] Discharge Summary:   Admission Date: 08/31/2021  Patient was referred by Dr Juan Carlos Ferrara  Discharge Date: 09/23/2021  Was this a routine discharge? yes   Diagnosis: Axis I:   1  Anxiety and depression        Treating Physician: Dr Tyson Mcrcacken  Treatment Complications: none  Presenting Need:   Per Dr Esteban Comment a 46 y o  female with past psychiatric history of anxiety and depression is [facility] referred by Dr Angela Diehl (Psychiatrist)     Today Clarissa Webb reports worsening anxiety, depression and new onset alcohol abuse that started after her  (whom she was  from at the time) killed himself  She states he had celebrated his birthday with his kids at their old home and shot himself the following day when he was alone  She states since then her and her two children have been in distress and dealing with this tragedy together  They also have family counseling services to help her children deal with trauma and loss  She states she also began to drink more and more since this happened resulting in a DUI in Jan 2021 and needing to go to a 1 month rehab in April 2021 (in [place])  She reports depressive symptoms of variable appetite, poor sleep (though now improved with meds), feelings of guilt, poor concentration/energy/ motivation/ interest, feelings hopelessness and helplessness sometimes but no SI, HI or passive death wishes  She reports some bad days of depression where her functioning was decreased such as having very little will to get out of bed but states her functioning somewhat better since adding zoloft last month  She reports her anxiety as excessive worrying throughout the day and panic attacks also   Her panic attacks result in feeling short of breath, heart racing, chest and throat tightness but rarely ever feels like she's "dying "   She reports some PTSD symptoms such a trauma nightmares once a week, hypervigilance and avoidance behaviors but no flashbacks  She denies any current or past jessica or psychotic symptoms  Psychosocial Stressors include 's suicide ~1 5 years ago, alcohol abuse, taking care of children, loss of 's licence for 6 months starting next week     Per this [place] Sarah is a 46year old  female who is currently employed as a  with [employer]  She has been on leave from her position since December 2020  Tony Stone has been in the [facility] as an Inpatient during April of 2021  Tony Stone identified she had been "drinking to much alcohol to cope" with her husbands suicide, seeking the treatment in April  She reports having a DUI prior to program  Since then she attempted another partial hospitalization program but was unable to manage due to her children being out of school for the summer  Clarissa shared she is diabetic, has thyroid issues, anxiety, depression, hashimoto's and is anemic  Clarissa reports her current stressors as her health problems, family, work, money, and life changes  Tony Stone identifies she is having depressed mood poor concentration, nausea, fluctuating appetite, and feelings of guilt  Tony Stone denies thoughts of self harm and or SI/HI presently or in the past      Per Clarissa, "I do  not feel like getting out of bed, getting washed or dressed  I just want to sleep  Course of treatment includes:    group counseling, medication management, individual case management, allied therapy, psychoeducation and psychiatric evaluation     Treatment Progress:     Clarissa Webb  attended 14 sessions of  PHP in which she challenged negative thoughts, engaging in healthy coping strategies and learned ways to manage anxiety,  depression, and stress  Tony Stone  was an active participant in program and in individual work   She actively worked on suggestions and practiced coping skills  Mary Cuba  was more aware of triggers and behaviors  Mary Cuba  was open to learning about her diagnosis distress tolerance skills, thought stopping techniques, breathing techniques, mindfulness, meditation, and tapping   Mary Cuba was able to identify personal issues and able to problem solve options  She shared improvement through feeling less depressed and anxious, and she stated this program has taught me skills I can use the rest of my life  She identified an increase in motivation and ADL's  Harman Liang enjoys using the coping skills of progressive relaxation, meditation/guided imagery, and deep breathing  Denied SI, HI, and psychosis  Aftercare providers to receive summary       Aftercare recommendations include:     Medication management:  Dr Payton Breaux, [address]  Phone: [phone]  Appointment: Friday, September 24, 2021 at 1130  Will see every Friday at 1130 for 4 weeks starting this Friday         Therapist   Harman Liang is actively working on setting up appointment      PCP  Nathalia Avila [address] [facility] [address]   [phone] Phone  [phone] Fax  Appointment: 10/11/2021 at 2:45pm     Discharge Medications include:  Current Outpatient Medications:     Accu-Chek FastClix Lancets MISC, USE 1 LANCET TO TEST BLOOD SUGAR UP TO 6 TIMES DAILY, Disp: 102 each, Rfl: 0    acetaminophen (TYLENOL) 500 mg tablet, , Disp: , Rfl:     ALPRAZolam (XANAX) 0 5 mg tablet, Take 0 5 mg by mouth daily as needed, Disp: , Rfl:     busPIRone (BUSPAR) 10 mg tablet, Take 1 tablet (10 mg total) by mouth 2 (two) times a day (Patient not taking: Reported on 9/3/2021), Disp: 60 tablet, Rfl: 1    clonazePAM (KlonoPIN) 1 mg tablet, Take 1 tablet by mouth 2 (two) times a day, Disp: , Rfl:     Cyanocobalamin (B-12 PO), Take by mouth daily, Disp: , Rfl:     ergocalciferol (VITAMIN D2) 50,000 units, Take 1 capsule (50,000 Units total) by mouth once a week, Disp: 12 capsule, Rfl: 3    escitalopram (LEXAPRO) 20 mg tablet, Take 20 mg by mouth daily, Disp: , Rfl:     gabapentin (NEURONTIN) 600 MG tablet, , Disp: , Rfl:     glucose blood (Accu-Chek Guide) test strip, Use to test blood sugar up to 6 times daily  , Disp: 200 each, Rfl: 2    insulin aspart (NovoLOG) 100 units/mL injection, Inject daily into pump  Max daily dose 50 units  , Disp: 40 mL, Rfl: 2    Insulin Pen Needle (BD Pen Needle Deanna U/F) 32G X 4 MM MISC, Use 4/day, Disp: 100 each, Rfl: 3    Insulin Syringe-Needle U-100 (INSULIN SYRINGE 1CC/28G) 28G X 1/2" 1 ML MISC, 4 injections daily E10 65, Disp: , Rfl:     levothyroxine 200 mcg tablet, Take 1 tablet (200 mcg total) by mouth daily in the early morning, Disp: 90 tablet, Rfl: 3    levothyroxine 75 mcg tablet, , Disp: , Rfl:     ofloxacin (OCUFLOX) 0 3 % ophthalmic solution, , Disp: , Rfl:     PATIENT MAINTAINED INSULIN PUMP, Inject 1 each under the skin every 8 (eight) hours, Disp: 1 each, Rfl: 0    prednisoLONE acetate (PRED FORTE) 1 % ophthalmic suspension, , Disp: , Rfl:     selenium 200 mcg, Take 200 mcg by mouth daily  (Patient not taking: Reported on 9/3/2021), Disp: , Rfl:     traZODone (DESYREL) 100 mg tablet, Take 100 mg by mouth daily at bedtime, Disp: , Rfl:     vitamin B-12 (VITAMIN B-12) 500 mcg tablet, Take 500 mcg by mouth daily (Patient not taking: Reported on 8/23/2021), Disp: , Rfl:

## 2021-10-06 DIAGNOSIS — D50.8 OTHER IRON DEFICIENCY ANEMIA: Primary | ICD-10-CM

## 2021-10-06 RX ORDER — SODIUM CHLORIDE 9 MG/ML
20 INJECTION, SOLUTION INTRAVENOUS ONCE
Status: CANCELLED | OUTPATIENT
Start: 2021-10-08

## 2021-10-08 ENCOUNTER — HOSPITAL ENCOUNTER (OUTPATIENT)
Dept: INFUSION CENTER | Facility: HOSPITAL | Age: 41
Discharge: HOME | End: 2021-10-08
Attending: INTERNAL MEDICINE
Payer: COMMERCIAL

## 2021-10-08 VITALS
RESPIRATION RATE: 18 BRPM | DIASTOLIC BLOOD PRESSURE: 56 MMHG | SYSTOLIC BLOOD PRESSURE: 115 MMHG | HEART RATE: 85 BPM | TEMPERATURE: 97.2 F

## 2021-10-08 DIAGNOSIS — D50.8 OTHER IRON DEFICIENCY ANEMIA: Primary | ICD-10-CM

## 2021-10-08 PROCEDURE — 96365 THER/PROPH/DIAG IV INF INIT: CPT

## 2021-10-08 RX ORDER — SODIUM CHLORIDE 9 MG/ML
20 INJECTION, SOLUTION INTRAVENOUS ONCE
Status: CANCELLED | OUTPATIENT
Start: 2021-10-15

## 2021-10-08 RX ORDER — SODIUM CHLORIDE 9 MG/ML
20 INJECTION, SOLUTION INTRAVENOUS ONCE
Status: COMPLETED | OUTPATIENT
Start: 2021-10-08 | End: 2021-10-08

## 2021-10-08 RX ADMIN — IRON SUCROSE 200 MG: 20 INJECTION, SOLUTION INTRAVENOUS at 10:06

## 2021-10-08 RX ADMIN — SODIUM CHLORIDE 20 ML/HR: 0.9 INJECTION, SOLUTION INTRAVENOUS at 10:05

## 2021-10-11 ENCOUNTER — TELEPHONE (OUTPATIENT)
Dept: FAMILY MEDICINE CLINIC | Facility: CLINIC | Age: 41
End: 2021-10-11

## 2021-10-11 ENCOUNTER — OFFICE VISIT (OUTPATIENT)
Dept: FAMILY MEDICINE CLINIC | Facility: CLINIC | Age: 41
End: 2021-10-11
Payer: COMMERCIAL

## 2021-10-11 VITALS
HEIGHT: 66 IN | DIASTOLIC BLOOD PRESSURE: 76 MMHG | TEMPERATURE: 98 F | SYSTOLIC BLOOD PRESSURE: 118 MMHG | HEART RATE: 89 BPM | RESPIRATION RATE: 20 BRPM | BODY MASS INDEX: 28.28 KG/M2 | WEIGHT: 176 LBS | OXYGEN SATURATION: 98 %

## 2021-10-11 DIAGNOSIS — E66.3 OVERWEIGHT WITH BODY MASS INDEX (BMI) OF 28 TO 28.9 IN ADULT: ICD-10-CM

## 2021-10-11 DIAGNOSIS — E10.65 TYPE 1 DIABETES MELLITUS WITH HYPERGLYCEMIA (HCC): Primary | ICD-10-CM

## 2021-10-11 DIAGNOSIS — R11.0 NAUSEA IN ADULT: ICD-10-CM

## 2021-10-11 DIAGNOSIS — F33.9 DEPRESSION, RECURRENT (HCC): ICD-10-CM

## 2021-10-11 DIAGNOSIS — E10.10 DIABETIC KETOACIDOSIS WITHOUT COMA ASSOCIATED WITH TYPE 1 DIABETES MELLITUS (HCC): ICD-10-CM

## 2021-10-11 DIAGNOSIS — E10.43 DIABETIC GASTROPARESIS ASSOCIATED WITH TYPE 1 DIABETES MELLITUS (HCC): ICD-10-CM

## 2021-10-11 DIAGNOSIS — E03.8 HYPOTHYROIDISM DUE TO HASHIMOTO'S THYROIDITIS: ICD-10-CM

## 2021-10-11 DIAGNOSIS — E06.3 HYPOTHYROIDISM DUE TO HASHIMOTO'S THYROIDITIS: ICD-10-CM

## 2021-10-11 DIAGNOSIS — K31.84 DIABETIC GASTROPARESIS ASSOCIATED WITH TYPE 1 DIABETES MELLITUS (HCC): ICD-10-CM

## 2021-10-11 DIAGNOSIS — Z13.220 SCREENING CHOLESTEROL LEVEL: ICD-10-CM

## 2021-10-11 PROCEDURE — 99214 OFFICE O/P EST MOD 30 MIN: CPT | Performed by: NURSE PRACTITIONER

## 2021-10-11 RX ORDER — ONDANSETRON 4 MG/1
TABLET, FILM COATED ORAL
COMMUNITY
Start: 2021-09-24 | End: 2021-10-11 | Stop reason: SDUPTHER

## 2021-10-11 RX ORDER — ONDANSETRON 4 MG/1
4 TABLET, FILM COATED ORAL EVERY 8 HOURS PRN
Qty: 30 TABLET | Refills: 1 | Status: SHIPPED | OUTPATIENT
Start: 2021-10-11

## 2021-10-12 ENCOUNTER — TELEPHONE (OUTPATIENT)
Dept: HEMATOLOGY ONCOLOGY | Facility: CLINIC | Age: 41
End: 2021-10-12

## 2021-10-12 ENCOUNTER — TELEPHONE (OUTPATIENT)
Dept: SURGICAL ONCOLOGY | Facility: CLINIC | Age: 41
End: 2021-10-12

## 2021-10-12 ENCOUNTER — TELEMEDICINE (OUTPATIENT)
Dept: HEMATOLOGY ONCOLOGY | Facility: CLINIC | Age: 41
End: 2021-10-12
Payer: COMMERCIAL

## 2021-10-12 ENCOUNTER — APPOINTMENT (OUTPATIENT)
Dept: LAB | Age: 41
End: 2021-10-12
Payer: COMMERCIAL

## 2021-10-12 DIAGNOSIS — E10.65 TYPE 1 DIABETES MELLITUS WITH HYPERGLYCEMIA (HCC): ICD-10-CM

## 2021-10-12 DIAGNOSIS — Z13.220 SCREENING CHOLESTEROL LEVEL: ICD-10-CM

## 2021-10-12 DIAGNOSIS — E53.8 B12 DEFICIENCY: ICD-10-CM

## 2021-10-12 DIAGNOSIS — E10.43 DIABETIC GASTROPARESIS ASSOCIATED WITH TYPE 1 DIABETES MELLITUS (HCC): ICD-10-CM

## 2021-10-12 DIAGNOSIS — D50.8 OTHER IRON DEFICIENCY ANEMIA: Primary | ICD-10-CM

## 2021-10-12 DIAGNOSIS — E06.3 HYPOTHYROIDISM DUE TO HASHIMOTO'S THYROIDITIS: ICD-10-CM

## 2021-10-12 DIAGNOSIS — Z98.84 HISTORY OF GASTRIC BYPASS: ICD-10-CM

## 2021-10-12 DIAGNOSIS — D50.8 OTHER IRON DEFICIENCY ANEMIA: ICD-10-CM

## 2021-10-12 DIAGNOSIS — K31.84 DIABETIC GASTROPARESIS ASSOCIATED WITH TYPE 1 DIABETES MELLITUS (HCC): ICD-10-CM

## 2021-10-12 DIAGNOSIS — E03.8 HYPOTHYROIDISM DUE TO HASHIMOTO'S THYROIDITIS: ICD-10-CM

## 2021-10-12 LAB
ALBUMIN SERPL BCP-MCNC: 3.6 G/DL (ref 3.5–5)
ALP SERPL-CCNC: 101 U/L (ref 46–116)
ALT SERPL W P-5'-P-CCNC: 46 U/L (ref 12–78)
ANION GAP SERPL CALCULATED.3IONS-SCNC: 7 MMOL/L (ref 4–13)
AST SERPL W P-5'-P-CCNC: 34 U/L (ref 5–45)
BASOPHILS # BLD AUTO: 0.04 THOUSANDS/ΜL (ref 0–0.1)
BASOPHILS NFR BLD AUTO: 1 % (ref 0–1)
BILIRUB SERPL-MCNC: 0.51 MG/DL (ref 0.2–1)
BUN SERPL-MCNC: 13 MG/DL (ref 5–25)
CALCIUM SERPL-MCNC: 9.6 MG/DL (ref 8.3–10.1)
CHLORIDE SERPL-SCNC: 106 MMOL/L (ref 100–108)
CO2 SERPL-SCNC: 24 MMOL/L (ref 21–32)
CREAT SERPL-MCNC: 0.85 MG/DL (ref 0.6–1.3)
EOSINOPHIL # BLD AUTO: 0.17 THOUSAND/ΜL (ref 0–0.61)
EOSINOPHIL NFR BLD AUTO: 3 % (ref 0–6)
ERYTHROCYTE [DISTWIDTH] IN BLOOD BY AUTOMATED COUNT: 13.2 % (ref 11.6–15.1)
EST. AVERAGE GLUCOSE BLD GHB EST-MCNC: 258 MG/DL
FERRITIN SERPL-MCNC: 149 NG/ML (ref 8–388)
GFR SERPL CREATININE-BSD FRML MDRD: 87 ML/MIN/1.73SQ M
GLUCOSE SERPL-MCNC: 79 MG/DL (ref 65–140)
HBA1C MFR BLD: 10.6 %
HCT VFR BLD AUTO: 42.1 % (ref 34.8–46.1)
HGB BLD-MCNC: 13 G/DL (ref 11.5–15.4)
IMM GRANULOCYTES # BLD AUTO: 0.01 THOUSAND/UL (ref 0–0.2)
IMM GRANULOCYTES NFR BLD AUTO: 0 % (ref 0–2)
IRON SATN MFR SERPL: 16 % (ref 15–50)
IRON SERPL-MCNC: 50 UG/DL (ref 50–170)
LYMPHOCYTES # BLD AUTO: 2.04 THOUSANDS/ΜL (ref 0.6–4.47)
LYMPHOCYTES NFR BLD AUTO: 41 % (ref 14–44)
MCH RBC QN AUTO: 26.9 PG (ref 26.8–34.3)
MCHC RBC AUTO-ENTMCNC: 30.9 G/DL (ref 31.4–37.4)
MCV RBC AUTO: 87 FL (ref 82–98)
MONOCYTES # BLD AUTO: 0.48 THOUSAND/ΜL (ref 0.17–1.22)
MONOCYTES NFR BLD AUTO: 10 % (ref 4–12)
NEUTROPHILS # BLD AUTO: 2.23 THOUSANDS/ΜL (ref 1.85–7.62)
NEUTS SEG NFR BLD AUTO: 45 % (ref 43–75)
NRBC BLD AUTO-RTO: 0 /100 WBCS
PLATELET # BLD AUTO: 199 THOUSANDS/UL (ref 149–390)
PMV BLD AUTO: 10.8 FL (ref 8.9–12.7)
POTASSIUM SERPL-SCNC: 3.4 MMOL/L (ref 3.5–5.3)
PROT SERPL-MCNC: 7.2 G/DL (ref 6.4–8.2)
RBC # BLD AUTO: 4.83 MILLION/UL (ref 3.81–5.12)
SODIUM SERPL-SCNC: 137 MMOL/L (ref 136–145)
T4 FREE SERPL-MCNC: 1.73 NG/DL (ref 0.76–1.46)
TIBC SERPL-MCNC: 317 UG/DL (ref 250–450)
TSH SERPL DL<=0.05 MIU/L-ACNC: 0.04 UIU/ML (ref 0.36–3.74)
WBC # BLD AUTO: 4.97 THOUSAND/UL (ref 4.31–10.16)

## 2021-10-12 PROCEDURE — 99213 OFFICE O/P EST LOW 20 MIN: CPT | Performed by: INTERNAL MEDICINE

## 2021-10-12 PROCEDURE — 82728 ASSAY OF FERRITIN: CPT

## 2021-10-12 PROCEDURE — 36415 COLL VENOUS BLD VENIPUNCTURE: CPT

## 2021-10-12 PROCEDURE — 3046F HEMOGLOBIN A1C LEVEL >9.0%: CPT | Performed by: INTERNAL MEDICINE

## 2021-10-12 PROCEDURE — 83036 HEMOGLOBIN GLYCOSYLATED A1C: CPT

## 2021-10-12 PROCEDURE — 80053 COMPREHEN METABOLIC PANEL: CPT

## 2021-10-12 PROCEDURE — 84443 ASSAY THYROID STIM HORMONE: CPT

## 2021-10-12 PROCEDURE — 83550 IRON BINDING TEST: CPT

## 2021-10-12 PROCEDURE — 83540 ASSAY OF IRON: CPT

## 2021-10-12 PROCEDURE — 85025 COMPLETE CBC W/AUTO DIFF WBC: CPT

## 2021-10-12 PROCEDURE — 84439 ASSAY OF FREE THYROXINE: CPT

## 2021-10-14 ENCOUNTER — TELEPHONE (OUTPATIENT)
Dept: PULMONOLOGY | Facility: CLINIC | Age: 41
End: 2021-10-14

## 2021-10-27 ENCOUNTER — APPOINTMENT (OUTPATIENT)
Dept: LAB | Facility: CLINIC | Age: 41
End: 2021-10-27
Payer: COMMERCIAL

## 2021-10-27 ENCOUNTER — TELEPHONE (OUTPATIENT)
Dept: ENDOCRINOLOGY | Facility: CLINIC | Age: 41
End: 2021-10-27

## 2021-10-27 ENCOUNTER — CONSULT (OUTPATIENT)
Dept: GASTROENTEROLOGY | Facility: CLINIC | Age: 41
End: 2021-10-27
Payer: COMMERCIAL

## 2021-10-27 VITALS
HEART RATE: 102 BPM | TEMPERATURE: 98.2 F | WEIGHT: 167 LBS | DIASTOLIC BLOOD PRESSURE: 62 MMHG | HEIGHT: 66 IN | SYSTOLIC BLOOD PRESSURE: 110 MMHG | RESPIRATION RATE: 16 BRPM | BODY MASS INDEX: 26.84 KG/M2

## 2021-10-27 DIAGNOSIS — K31.84 DIABETIC GASTROPARESIS ASSOCIATED WITH TYPE 1 DIABETES MELLITUS (HCC): Primary | ICD-10-CM

## 2021-10-27 DIAGNOSIS — R19.7 DIARRHEA, UNSPECIFIED TYPE: ICD-10-CM

## 2021-10-27 DIAGNOSIS — Z98.84 HISTORY OF ROUX-EN-Y GASTRIC BYPASS: ICD-10-CM

## 2021-10-27 DIAGNOSIS — E10.43 DIABETIC GASTROPARESIS ASSOCIATED WITH TYPE 1 DIABETES MELLITUS (HCC): Primary | ICD-10-CM

## 2021-10-27 DIAGNOSIS — R11.0 NAUSEA IN ADULT: ICD-10-CM

## 2021-10-27 PROCEDURE — 36415 COLL VENOUS BLD VENIPUNCTURE: CPT

## 2021-10-27 PROCEDURE — 99203 OFFICE O/P NEW LOW 30 MIN: CPT | Performed by: INTERNAL MEDICINE

## 2021-10-27 PROCEDURE — 3008F BODY MASS INDEX DOCD: CPT | Performed by: INTERNAL MEDICINE

## 2021-10-27 PROCEDURE — 83516 IMMUNOASSAY NONANTIBODY: CPT

## 2021-10-27 PROCEDURE — 1036F TOBACCO NON-USER: CPT | Performed by: INTERNAL MEDICINE

## 2021-10-27 PROCEDURE — 82784 ASSAY IGA/IGD/IGG/IGM EACH: CPT

## 2021-10-27 PROCEDURE — 86255 FLUORESCENT ANTIBODY SCREEN: CPT

## 2021-10-27 RX ORDER — DEXTROAMPHETAMINE SACCHARATE, AMPHETAMINE ASPARTATE, DEXTROAMPHETAMINE SULFATE AND AMPHETAMINE SULFATE 2.5; 2.5; 2.5; 2.5 MG/1; MG/1; MG/1; MG/1
TABLET ORAL
COMMUNITY
Start: 2021-10-22 | End: 2022-04-05

## 2021-10-28 LAB
ENDOMYSIUM IGA SER QL: NEGATIVE
GLIADIN PEPTIDE IGA SER-ACNC: 5 UNITS (ref 0–19)
GLIADIN PEPTIDE IGG SER-ACNC: 3 UNITS (ref 0–19)
IGA SERPL-MCNC: 147 MG/DL (ref 87–352)
TTG IGA SER-ACNC: <2 U/ML (ref 0–3)
TTG IGG SER-ACNC: <2 U/ML (ref 0–5)

## 2021-10-30 ENCOUNTER — APPOINTMENT (OUTPATIENT)
Dept: LAB | Facility: CLINIC | Age: 41
End: 2021-10-30
Payer: COMMERCIAL

## 2021-10-30 DIAGNOSIS — R19.7 DIARRHEA, UNSPECIFIED TYPE: ICD-10-CM

## 2021-10-30 PROCEDURE — 87329 GIARDIA AG IA: CPT

## 2021-10-30 PROCEDURE — 83993 ASSAY FOR CALPROTECTIN FECAL: CPT

## 2021-10-30 PROCEDURE — 82656 EL-1 FECAL QUAL/SEMIQ: CPT

## 2021-11-01 ENCOUNTER — OFFICE VISIT (OUTPATIENT)
Dept: URGENT CARE | Facility: CLINIC | Age: 41
End: 2021-11-01
Payer: COMMERCIAL

## 2021-11-01 VITALS — HEART RATE: 82 BPM | TEMPERATURE: 97.6 F | RESPIRATION RATE: 18 BRPM | OXYGEN SATURATION: 100 %

## 2021-11-01 DIAGNOSIS — J02.9 SORE THROAT: Primary | ICD-10-CM

## 2021-11-01 LAB
G LAMBLIA AG STL QL IA: NEGATIVE
S PYO AG THROAT QL: NEGATIVE

## 2021-11-01 PROCEDURE — 87880 STREP A ASSAY W/OPTIC: CPT | Performed by: NURSE PRACTITIONER

## 2021-11-01 PROCEDURE — U0005 INFEC AGEN DETEC AMPLI PROBE: HCPCS | Performed by: NURSE PRACTITIONER

## 2021-11-01 PROCEDURE — 87070 CULTURE OTHR SPECIMN AEROBIC: CPT | Performed by: NURSE PRACTITIONER

## 2021-11-01 PROCEDURE — U0003 INFECTIOUS AGENT DETECTION BY NUCLEIC ACID (DNA OR RNA); SEVERE ACUTE RESPIRATORY SYNDROME CORONAVIRUS 2 (SARS-COV-2) (CORONAVIRUS DISEASE [COVID-19]), AMPLIFIED PROBE TECHNIQUE, MAKING USE OF HIGH THROUGHPUT TECHNOLOGIES AS DESCRIBED BY CMS-2020-01-R: HCPCS | Performed by: NURSE PRACTITIONER

## 2021-11-02 LAB — SARS-COV-2 RNA RESP QL NAA+PROBE: NEGATIVE

## 2021-11-03 LAB — BACTERIA THROAT CULT: NORMAL

## 2021-11-04 ENCOUNTER — TELEPHONE (OUTPATIENT)
Dept: ENDOCRINOLOGY | Facility: CLINIC | Age: 41
End: 2021-11-04

## 2021-11-04 LAB
CALPROTECTIN STL-MCNT: 72 UG/G (ref 0–120)
CALPROTECTIN STL-MCNT: 87 UG/G (ref 0–120)

## 2021-11-12 LAB — ELASTASE PANC STL-MCNT: 167 UG ELAST./G

## 2021-12-06 DIAGNOSIS — E10.65 TYPE 1 DIABETES MELLITUS WITH HYPERGLYCEMIA (HCC): ICD-10-CM

## 2022-01-02 ENCOUNTER — OFFICE VISIT (OUTPATIENT)
Dept: URGENT CARE | Age: 42
End: 2022-01-02
Payer: COMMERCIAL

## 2022-01-02 VITALS — WEIGHT: 165 LBS | BODY MASS INDEX: 26.52 KG/M2 | HEIGHT: 66 IN

## 2022-01-02 DIAGNOSIS — N39.0 URINARY TRACT INFECTION WITH HEMATURIA, SITE UNSPECIFIED: Primary | ICD-10-CM

## 2022-01-02 DIAGNOSIS — R31.9 URINARY TRACT INFECTION WITH HEMATURIA, SITE UNSPECIFIED: Primary | ICD-10-CM

## 2022-01-02 LAB — SL AMB POCT URINE HCG: NEGATIVE

## 2022-01-02 PROCEDURE — 87077 CULTURE AEROBIC IDENTIFY: CPT

## 2022-01-02 PROCEDURE — 99213 OFFICE O/P EST LOW 20 MIN: CPT

## 2022-01-02 PROCEDURE — 81002 URINALYSIS NONAUTO W/O SCOPE: CPT

## 2022-01-02 PROCEDURE — 87186 SC STD MICRODIL/AGAR DIL: CPT

## 2022-01-02 PROCEDURE — 87147 CULTURE TYPE IMMUNOLOGIC: CPT

## 2022-01-02 PROCEDURE — 87086 URINE CULTURE/COLONY COUNT: CPT

## 2022-01-02 PROCEDURE — 81025 URINE PREGNANCY TEST: CPT

## 2022-01-02 RX ORDER — SULFAMETHOXAZOLE AND TRIMETHOPRIM 800; 160 MG/1; MG/1
1 TABLET ORAL EVERY 12 HOURS SCHEDULED
Qty: 10 TABLET | Refills: 0 | Status: SHIPPED | OUTPATIENT
Start: 2022-01-02 | End: 2022-01-07

## 2022-01-02 NOTE — PATIENT INSTRUCTIONS
You have been given an antibiotic, which a common side effect is diarrhea  Eat yogurt, and increase daily fiber intake  Drink plenty of fluids

## 2022-01-02 NOTE — PROGRESS NOTES
[facility]        NAME: Tena Ware is a 46 y o  female  : 1980    MRN: [id number]  DATE: 2022  TIME: 5:37 PM    Assessment and Plan   Urinary tract infection with hematuria, site unspecified [N39 0, R31 9]  1  Urinary tract infection with hematuria, site unspecified  Urine culture    POCT urine dip    POCT urine HCG    sulfamethoxazole-trimethoprim (BACTRIM DS) 800-160 mg per tablet         Patient Instructions     Urine Dip completed today showing blood and glucose, other results impeded by patients taking of Azo  Will be sent for culture  Antibiotics given today  You have been given an antibiotic, which a common side effect is diarrhea  Eat yogurt, and increase daily fiber intake  Follow-up with PCP in the next 1-2 days for re-evaluation if symptoms continue or worsen  Go to the ED if any fevers, malaise, flank pain, new or worsening symptoms or other concerning symptoms  Chief Complaint     Chief Complaint   Patient presents with    Possible UTI     pain with urination, urinary frequency x 3 days         History of Present Illness     Tena Ware is a 46 y o  female presenting to the office today for dysuria  Symptoms have been present for 3 days, and include dysuria, frequency  She has tried azo and drinking water for her symptoms, some relief  Review of Systems     Review of Systems   Constitutional: Negative for chills and fever  Respiratory: Negative for shortness of breath  Cardiovascular: Negative for chest pain  Gastrointestinal: Negative for abdominal distention, abdominal pain, constipation, diarrhea, nausea, rectal pain and vomiting  Genitourinary: Positive for dysuria and frequency  Negative for decreased urine volume, dyspareunia, flank pain, hematuria, pelvic pain, urgency and vaginal pain         Current Medications       Current Outpatient Medications:     acetaminophen (TYLENOL) 500 mg tablet,  , Disp: , Rfl:     ALPRAZolam Forestine Guitar) 0 5 mg tablet, Take 0 5 mg by mouth daily as needed  , Disp: , Rfl:     amphetamine-dextroamphetamine (ADDERALL) 10 mg tablet, , Disp: , Rfl:     clonazePAM (KlonoPIN) 1 mg tablet, Take 1 tablet by mouth daily Half a tab daily,, Disp: , Rfl:     Cyanocobalamin (B-12 PO), Take by mouth daily, Disp: , Rfl:     ergocalciferol (VITAMIN D2) 50,000 units, Take 1 capsule (50,000 Units total) by mouth once a week, Disp: 12 capsule, Rfl: 3    escitalopram (LEXAPRO) 20 mg tablet, Take 20 mg by mouth daily Half tab daily, Disp: , Rfl:     gabapentin (NEURONTIN) 600 MG tablet, Half Tab 300mg at bedtime, Disp: , Rfl:     insulin aspart (NovoLOG) 100 units/mL injection, INJECT INTO PUMP DAILY  MAX DOSE 50 UNITS  , Disp: 40 mL, Rfl: 1    levothyroxine 200 mcg tablet, TAKE ONE TABLET BY MOUTH EVERY DAY IN THE MORNING, Disp: 90 tablet, Rfl: 3    traZODone (DESYREL) 100 mg tablet, Take 100 mg by mouth daily at bedtime, Disp: , Rfl:     Accu-Chek FastClix Lancets MISC, USE 1 LANCET TO TEST BLOOD SUGAR UP TO 6 TIMES DAILY (Patient not taking: Reported on 11/1/2021), Disp: 102 each, Rfl: 0    busPIRone (BUSPAR) 10 mg tablet, Take 1 tablet (10 mg total) by mouth 2 (two) times a day (Patient not taking: Reported on 10/27/2021), Disp: 60 tablet, Rfl: 1    glucose blood (Accu-Chek Guide) test strip, Use to test blood sugar up to 6 times daily  , Disp: 200 each, Rfl: 2    Insulin Pen Needle (BD Pen Needle Deanna U/F) 32G X 4 MM MISC, Use 4/day, Disp: 100 each, Rfl: 3    Insulin Syringe-Needle U-100 (INSULIN SYRINGE 1CC/28G) 28G X 1/2" 1 ML MISC, 4 injections daily E10 65, Disp: , Rfl:     ofloxacin (OCUFLOX) 0 3 % ophthalmic solution, , Disp: , Rfl:     ondansetron (ZOFRAN) 4 mg tablet, Take 1 tablet (4 mg total) by mouth every 8 (eight) hours as needed for nausea or vomiting (Patient not taking: Reported on 11/1/2021), Disp: 30 tablet, Rfl: 1    PATIENT MAINTAINED INSULIN PUMP, Inject 1 each under the skin every 8 (eight) hours, Disp: 1 each, Rfl: 0    prednisoLONE acetate (PRED FORTE) 1 % ophthalmic suspension, , Disp: , Rfl:     selenium 200 mcg, Take 200 mcg by mouth daily  (Patient not taking: Reported on 9/3/2021), Disp: , Rfl:     sulfamethoxazole-trimethoprim (BACTRIM DS) 800-160 mg per tablet, Take 1 tablet by mouth every 12 (twelve) hours for 5 days, Disp: 10 tablet, Rfl: 0    vitamin B-12 (VITAMIN B-12) 500 mcg tablet, Take 500 mcg by mouth daily (Patient not taking: Reported on 2021), Disp: , Rfl:     Current Allergies     Allergies as of 2022    (No Known Allergies)            The following portions of the patient's history were reviewed and updated as appropriate: allergies, current medications, past family history, past medical history, past social history, past surgical history and problem list      Past Medical History:   Diagnosis Date    Anemia     Anxiety     B12 deficiency     Cervical disc disorder     On gabapentin     Depression     Disease of thyroid gland     hypothyroid    Iron deficiency anemia     Panic attack     PTSD (post-traumatic stress disorder)     Sleep difficulties     Substance abuse ([facility] )     Type 1 diabetes ([facility] )     Vitamin D deficiency        Past Surgical History:   Procedure Laterality Date    ABDOMINAL SURGERY      gastric bypass     SECTION      x2    CHOLECYSTECTOMY  2018    GASTRIC BYPASS  2007    INTRAUTERINE DEVICE INSERTION  2015    IR BIOPSY BONE MARROW  2020    OTHER SURGICAL HISTORY      surgery for imperforate hymen/endometriosis/hydrometrocolpos    TUBAL LIGATION      WISDOM TOOTH EXTRACTION         Family History   Problem Relation Age of Onset    Thyroid disease Mother     URIEL disease Mother     Hyperlipidemia Mother     Hypertension Mother     Osteoarthritis Mother     Thyroid disease unspecified Mother     Diabetes Father     Alcohol abuse Father     Diabetes type I Father     Thyroid disease Sister  Depression Sister     Thyroid disease unspecified Sister     Anxiety disorder Sister     Heart disease Maternal Grandmother     Hypertension Maternal Grandmother     Arthritis Maternal Grandmother     Diabetes type I Maternal Uncle     Diabetes type I Maternal Grandfather        Medications have been verified  Objective     Ht 5' 6" (1 676 m)   Wt 74 8 kg (165 lb)   BMI 26 63 kg/m²   No LMP recorded  Physical Exam     Physical Exam  Vitals reviewed  Constitutional:       General: She is awake  Appearance: Normal appearance  She is well-developed and well-groomed  Cardiovascular:      Rate and Rhythm: Normal rate and regular rhythm  Heart sounds: Normal heart sounds  Pulmonary:      Effort: Pulmonary effort is normal       Breath sounds: Normal breath sounds  No wheezing, rhonchi or rales  Abdominal:      General: Abdomen is flat  Bowel sounds are normal       Palpations: Abdomen is soft  Tenderness: There is abdominal tenderness (mild) in the suprapubic area  There is no right CVA tenderness, left CVA tenderness or guarding  Neurological:      Mental Status: She is alert  Psychiatric:         Behavior: Behavior is cooperative

## 2022-01-05 LAB
BACTERIA UR CULT: ABNORMAL

## 2022-01-14 ENCOUNTER — OFFICE VISIT (OUTPATIENT)
Dept: URGENT CARE | Facility: CLINIC | Age: 42
End: 2022-01-14
Payer: COMMERCIAL

## 2022-01-14 VITALS
BODY MASS INDEX: 26.52 KG/M2 | OXYGEN SATURATION: 98 % | WEIGHT: 165 LBS | HEART RATE: 85 BPM | RESPIRATION RATE: 16 BRPM | TEMPERATURE: 97.3 F | HEIGHT: 66 IN

## 2022-01-14 DIAGNOSIS — Z11.59 SPECIAL SCREENING EXAMINATION FOR VIRAL DISEASE: Primary | ICD-10-CM

## 2022-01-14 PROCEDURE — U0003 INFECTIOUS AGENT DETECTION BY NUCLEIC ACID (DNA OR RNA); SEVERE ACUTE RESPIRATORY SYNDROME CORONAVIRUS 2 (SARS-COV-2) (CORONAVIRUS DISEASE [COVID-19]), AMPLIFIED PROBE TECHNIQUE, MAKING USE OF HIGH THROUGHPUT TECHNOLOGIES AS DESCRIBED BY CMS-2020-01-R: HCPCS | Performed by: NURSE PRACTITIONER

## 2022-01-14 PROCEDURE — U0005 INFEC AGEN DETEC AMPLI PROBE: HCPCS | Performed by: NURSE PRACTITIONER

## 2022-01-14 PROCEDURE — 99213 OFFICE O/P EST LOW 20 MIN: CPT | Performed by: NURSE PRACTITIONER

## 2022-01-14 RX ORDER — CLONAZEPAM 0.5 MG/1
TABLET ORAL
COMMUNITY
Start: 2021-12-16 | End: 2022-04-05

## 2022-01-14 NOTE — LETTER
[address] [facility] [facility] [address] [address]  Dept: [phone]    January 14, 2022    Patient: Tena Ware  YOB: 1980    Tena Ware was seen and evaluated at our [facility]  Please note if Covid and Flu tests are negative, they may return to work when fever free for 24 hours without the use of a fever reducing agent  If Covid or Flu test is positive, they may return to work on 1/18/2022, as this is 5 days from the onset of symptoms  Upon return, they must then adhere to strict masking for an additional 5 days      Sincerely,    JAROCHO Garcia

## 2022-01-14 NOTE — PATIENT INSTRUCTIONS
Patient instructed to self quarantine  Advised to avoid nsaids  If you develop prolonged high fever, worsening or productive cough, shortness of breath, difficulty breathing, chest pain, or any new or concerning symptoms please proceed ER  Instructed patient to call ER prior to arrival make them aware she is being test for covid  Patient verbalizes understanding  Start vitamin c 1g twice daily,vitamin d3 2000IU daily, and multivitamin  monitor pulse ox  Call PCP in 24 hours for f/u  [address]    Your healthcare provider and/or public health staff have evaluated you and have determined that you do not need to remain in the hospital at this time  At this time you can be isolated at home where you will be monitored by staff from your local or state health department  You should carefully follow the prevention and isolation steps below until a healthcare provider or local or state health department says that you can return to your normal activities  Stay home except to get medical care    People who are mildly ill with COVID-19 are able to isolate at home during their illness  You should restrict activities outside your home, except for getting medical care  Do not go to work, school, or public areas  Avoid using public transportation, ride-sharing, or taxis  Separate yourself from other people and animals in your home    People: As much as possible, you should stay in a specific room and away from other people in your home  Also, you should use a separate bathroom, if available  Animals: You should restrict contact with pets and other animals while you are sick with COVID-19, just like you would around other people  Although there have not been reports of pets or other animals becoming sick with COVID-19, it is still recommended that people sick with COVID-19 limit contact with animals until more information is known about the virus   When possible, have another member of your household care for your animals while you are sick  If you are sick with COVID-19, avoid contact with your pet, including petting, snuggling, being kissed or licked, and sharing food  If you must care for your pet or be around animals while you are sick, wash your hands before and after you interact with pets and wear a facemask  See COVID-19 and Animals for more information  Call ahead before visiting your doctor    If you have a medical appointment, call the healthcare provider and tell them that you have or may have COVID-19  This will help the healthcare providers office take steps to keep other people from getting infected or exposed  Wear a facemask    You should wear a facemask when you are around other people (e g , sharing a room or vehicle) or pets and before you enter a healthcare providers office  If you are not able to wear a facemask (for example, because it causes trouble breathing), then people who live with you should not stay in the same room with you, or they should wear a facemask if they enter your room  Cover your coughs and sneezes    Cover your mouth and nose with a tissue when you cough or sneeze  Throw used tissues in a lined trash can  Immediately wash your hands with soap and water for at least 20 seconds or, if soap and water are not available, clean your hands with an alcohol-based hand  that contains at least 60% alcohol  Clean your hands often    Wash your hands often with soap and water for at least 20 seconds, especially after blowing your nose, coughing, or sneezing; going to the bathroom; and before eating or preparing food  If soap and water are not readily available, use an alcohol-based hand  with at least 60% alcohol, covering all surfaces of your hands and rubbing them together until they feel dry  Soap and water are the best option if hands are visibly dirty  Avoid touching your eyes, nose, and mouth with unwashed hands      Avoid sharing personal household items    You should not share dishes, drinking glasses, cups, eating utensils, towels, or bedding with other people or pets in your home  After using these items, they should be washed thoroughly with soap and water  Clean all high-touch surfaces everyday    High touch surfaces include counters, tabletops, doorknobs, bathroom fixtures, toilets, phones, keyboards, tablets, and bedside tables  Also, clean any surfaces that may have blood, stool, or body fluids on them  Use a household cleaning spray or wipe, according to the label instructions  Labels contain instructions for safe and effective use of the cleaning product including precautions you should take when applying the product, such as wearing gloves and making sure you have good ventilation during use of the product  Monitor your symptoms    Seek prompt medical attention if your illness is worsening (e g , difficulty breathing)  Before seeking care, call your healthcare provider and tell them that you have, or are being evaluated for, COVID-19  Put on a facemask before you enter the facility  These steps will help the healthcare providers office to keep other people in the office or waiting room from getting infected or exposed  Ask your healthcare provider to call the local or [facility] health department  Persons who are placed under active monitoring or facilitated self-monitoring should follow instructions provided by their local health department or occupational health professionals, as appropriate  If you have a medical emergency and need to call 911, notify the dispatch personnel that you have, or are being evaluated for COVID-19  If possible, put on a facemask before emergency medical services arrive      Discontinuing home isolation    Patients with confirmed COVID-19 should remain under home isolation precautions until the following conditions are met:   - They have had no fever for at least 24 hours (that is one full day of no fever without the use medicine that reduces fevers)  AND  - other symptoms have improved (for example, when their cough or shortness of breath have improved)  AND  - If had mild or moderate illness, at least 10 days have passed since their symptoms first appeared or if severe illness (needed oxygen) or immunosuppressed, at least 20 days have passed since symptoms first appeared  Patients with confirmed COVID-19 should also notify close contacts (including their workplace) and ask that they self-quarantine  Currently, close contact is defined as being within 6 feet for 15 minutes or more from the period 24 hours starting 48 hours before symptom onset to the time at which the patient went into isolation  Close contacts of patients diagnosed with COVID-19 should be instructed by the patient to self-quarantine for 14 days from the last time of their last contact with the patient       Source: [employer]

## 2022-01-14 NOTE — PROGRESS NOTES
[facility]        NAME: Mariangel Gottlieb is a 46 y o  female  : 1980    MRN: [id number]  DATE: 2022  TIME: 5:19 PM    Assessment and Plan   Special screening examination for viral disease [Z11 59]  1  Special screening examination for viral disease  COVID Only -Office Collect         Patient Instructions     Patient Instructions   Patient instructed to self quarantine  Advised to avoid nsaids  If you develop prolonged high fever, worsening or productive cough, shortness of breath, difficulty breathing, chest pain, or any new or concerning symptoms please proceed ER  Instructed patient to call ER prior to arrival make them aware she is being test for covid  Patient verbalizes understanding  Start vitamin c 1g twice daily,vitamin d3 2000IU daily, and multivitamin  monitor pulse ox  Call PCP in 24 hours for f/u  [address]    Your healthcare provider and/or public health staff have evaluated you and have determined that you do not need to remain in the hospital at this time  At this time you can be isolated at home where you will be monitored by staff from your local or state health department  You should carefully follow the prevention and isolation steps below until a healthcare provider or local or state health department says that you can return to your normal activities  Stay home except to get medical care    People who are mildly ill with COVID-19 are able to isolate at home during their illness  You should restrict activities outside your home, except for getting medical care  Do not go to work, school, or public areas  Avoid using public transportation, ride-sharing, or taxis  Separate yourself from other people and animals in your home    People: As much as possible, you should stay in a specific room and away from other people in your home  Also, you should use a separate bathroom, if available  Animals:  You should restrict contact with pets and other animals while you are sick with COVID-19, just like you would around other people  Although there have not been reports of pets or other animals becoming sick with COVID-19, it is still recommended that people sick with COVID-19 limit contact with animals until more information is known about the virus  When possible, have another member of your household care for your animals while you are sick  If you are sick with COVID-19, avoid contact with your pet, including petting, snuggling, being kissed or licked, and sharing food  If you must care for your pet or be around animals while you are sick, wash your hands before and after you interact with pets and wear a facemask  See COVID-19 and Animals for more information  Call ahead before visiting your doctor    If you have a medical appointment, call the healthcare provider and tell them that you have or may have COVID-19  This will help the healthcare providers office take steps to keep other people from getting infected or exposed  Wear a facemask    You should wear a facemask when you are around other people (e g , sharing a room or vehicle) or pets and before you enter a healthcare providers office  If you are not able to wear a facemask (for example, because it causes trouble breathing), then people who live with you should not stay in the same room with you, or they should wear a facemask if they enter your room  Cover your coughs and sneezes    Cover your mouth and nose with a tissue when you cough or sneeze  Throw used tissues in a lined trash can  Immediately wash your hands with soap and water for at least 20 seconds or, if soap and water are not available, clean your hands with an alcohol-based hand  that contains at least 60% alcohol      Clean your hands often    Wash your hands often with soap and water for at least 20 seconds, especially after blowing your nose, coughing, or sneezing; going to the bathroom; and before eating or preparing food  If soap and water are not readily available, use an alcohol-based hand  with at least 60% alcohol, covering all surfaces of your hands and rubbing them together until they feel dry  Soap and water are the best option if hands are visibly dirty  Avoid touching your eyes, nose, and mouth with unwashed hands  Avoid sharing personal household items    You should not share dishes, drinking glasses, cups, eating utensils, towels, or bedding with other people or pets in your home  After using these items, they should be washed thoroughly with soap and water  Clean all high-touch surfaces everyday    High touch surfaces include counters, tabletops, doorknobs, bathroom fixtures, toilets, phones, keyboards, tablets, and bedside tables  Also, clean any surfaces that may have blood, stool, or body fluids on them  Use a household cleaning spray or wipe, according to the label instructions  Labels contain instructions for safe and effective use of the cleaning product including precautions you should take when applying the product, such as wearing gloves and making sure you have good ventilation during use of the product  Monitor your symptoms    Seek prompt medical attention if your illness is worsening (e g , difficulty breathing)  Before seeking care, call your healthcare provider and tell them that you have, or are being evaluated for, COVID-19  Put on a facemask before you enter the facility  These steps will help the healthcare providers office to keep other people in the office or waiting room from getting infected or exposed  Ask your healthcare provider to call the local or [facility] health department  Persons who are placed under active monitoring or facilitated self-monitoring should follow instructions provided by their local health department or occupational health professionals, as appropriate    If you have a medical emergency and need to call 911, notify the dispatch personnel that you have, or are being evaluated for COVID-19  If possible, put on a facemask before emergency medical services arrive  Discontinuing home isolation    Patients with confirmed COVID-19 should remain under home isolation precautions until the following conditions are met:   - They have had no fever for at least 24 hours (that is one full day of no fever without the use medicine that reduces fevers)  AND  - other symptoms have improved (for example, when their cough or shortness of breath have improved)  AND  - If had mild or moderate illness, at least 10 days have passed since their symptoms first appeared or if severe illness (needed oxygen) or immunosuppressed, at least 20 days have passed since symptoms first appeared  Patients with confirmed COVID-19 should also notify close contacts (including their workplace) and ask that they self-quarantine  Currently, close contact is defined as being within 6 feet for 15 minutes or more from the period 24 hours starting 48 hours before symptom onset to the time at which the patient went into isolation  Close contacts of patients diagnosed with COVID-19 should be instructed by the patient to self-quarantine for 14 days from the last time of their last contact with the patient  Source: [employer]         Follow up with PCP in 3-5 days  Proceed to  ER if symptoms worsen  Chief Complaint     Chief Complaint   Patient presents with    Cough     Cough, headache, fatigue, Muscle aches, SOB, Sore throat, Runny nose, diarrhea, started 2 days ago  Not vaccinated         History of Present Illness       Patient is a 82-year-old female who presents with a 2 day history of cough, congestion, rhinorrhea, headache, fatigue, and body aches  Denies any fever or chills  Denies any sinus pain or pressure, earache, or sore throat  Denies any chest pain or shortness of breath    Denies any loss of taste or smell  Denies any vomiting or diarrhea  Patient is not vaccinated against COVID-19  Patient denies any recent sick contacts or known exposure to [place], however 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  Has not tried any over-the-counter medication  Review of Systems   Review of Systems   Constitutional: Positive for fatigue  Negative for chills, diaphoresis and fever  HENT: Positive for congestion and rhinorrhea  Negative for ear pain, facial swelling, postnasal drip, sinus pressure, sinus pain, sore throat, tinnitus and trouble swallowing  Eyes: Negative  Respiratory: Positive for cough  Negative for chest tightness, shortness of breath, wheezing and stridor  Cardiovascular: Negative for chest pain and palpitations  Gastrointestinal: Negative  Musculoskeletal: Positive for myalgias  Negative for arthralgias, back pain, joint swelling, neck pain and neck stiffness  Neurological: Positive for headaches  Negative for dizziness, facial asymmetry, weakness, light-headedness and numbness  Current Medications       Current Outpatient Medications:     acetaminophen (TYLENOL) 500 mg tablet,  , Disp: , Rfl:     ALPRAZolam (XANAX) 0 5 mg tablet, Take 0 5 mg by mouth daily as needed  , Disp: , Rfl:     amphetamine-dextroamphetamine (ADDERALL) 10 mg tablet, , Disp: , Rfl:     clonazePAM (KlonoPIN) 1 mg tablet, Take 1 tablet by mouth daily Half a tab daily,, Disp: , Rfl:     Cyanocobalamin (B-12 PO), Take by mouth daily, Disp: , Rfl:     ergocalciferol (VITAMIN D2) 50,000 units, Take 1 capsule (50,000 Units total) by mouth once a week, Disp: 12 capsule, Rfl: 3    escitalopram (LEXAPRO) 20 mg tablet, Take 20 mg by mouth daily Half tab daily, Disp: , Rfl:     gabapentin (NEURONTIN) 600 MG tablet, Half Tab 300mg at bedtime, Disp: , Rfl:     glucose blood (Accu-Chek Guide) test strip, Use to test blood sugar up to 6 times daily  , Disp: 200 each, Rfl: 2    insulin aspart (NovoLOG) 100 units/mL injection, INJECT INTO PUMP DAILY  MAX DOSE 50 UNITS  , Disp: 40 mL, Rfl: 1    Insulin Pen Needle (BD Pen Needle Deanna U/F) 32G X 4 MM MISC, Use 4/day, Disp: 100 each, Rfl: 3    Insulin Syringe-Needle U-100 (INSULIN SYRINGE 1CC/28G) 28G X 1/2" 1 ML MISC, 4 injections daily E10 65, Disp: , Rfl:     levothyroxine 200 mcg tablet, TAKE ONE TABLET BY MOUTH EVERY DAY IN THE MORNING, Disp: 90 tablet, Rfl: 3    PATIENT MAINTAINED INSULIN PUMP, Inject 1 each under the skin every 8 (eight) hours, Disp: 1 each, Rfl: 0    traZODone (DESYREL) 100 mg tablet, Take 100 mg by mouth daily at bedtime, Disp: , Rfl:     Accu-Chek [employer] Lancets MISC, USE 1 LANCET TO TEST BLOOD SUGAR UP TO 6 TIMES DAILY (Patient not taking: Reported on 11/1/2021), Disp: 102 each, Rfl: 0    busPIRone (BUSPAR) 10 mg tablet, Take 1 tablet (10 mg total) by mouth 2 (two) times a day (Patient not taking: Reported on 10/27/2021), Disp: 60 tablet, Rfl: 1    clonazePAM (KlonoPIN) 0 5 mg tablet, , Disp: , Rfl:     ofloxacin (OCUFLOX) 0 3 % ophthalmic solution, , Disp: , Rfl:     ondansetron (ZOFRAN) 4 mg tablet, Take 1 tablet (4 mg total) by mouth every 8 (eight) hours as needed for nausea or vomiting (Patient not taking: Reported on 11/1/2021), Disp: 30 tablet, Rfl: 1    prednisoLONE acetate (PRED FORTE) 1 % ophthalmic suspension, , Disp: , Rfl:     selenium 200 mcg, Take 200 mcg by mouth daily  (Patient not taking: Reported on 9/3/2021), Disp: , Rfl:     vitamin B-12 (VITAMIN B-12) 500 mcg tablet, Take 500 mcg by mouth daily (Patient not taking: Reported on 8/23/2021), Disp: , Rfl:     Current Allergies     Allergies as of 01/14/2022    (No Known Allergies)            The following portions of the patient's history were reviewed and updated as appropriate: allergies, current medications, past family history, past medical history, past social history, past surgical history and problem list      Past Medical History:   Diagnosis Date    Anemia     Anxiety     B12 deficiency     Cervical disc disorder     On gabapentin     Depression     Disease of thyroid gland     hypothyroid    Iron deficiency anemia     Panic attack     PTSD (post-traumatic stress disorder)     Sleep difficulties     Substance abuse ([facility] [address] [facility]     Type 1 diabetes ([facility] [address] )     Vitamin D deficiency        Past Surgical History:   Procedure Laterality Date    ABDOMINAL SURGERY      gastric bypass     SECTION      x2    CHOLECYSTECTOMY  2018    GASTRIC BYPASS  2007    INTRAUTERINE DEVICE INSERTION  2015    IR BIOPSY BONE MARROW  2020    OTHER SURGICAL HISTORY      surgery for imperforate hymen/endometriosis/hydrometrocolpos    TUBAL LIGATION      WISDOM TOOTH EXTRACTION         Family History   Problem Relation Age of Onset    Thyroid disease Mother     URIEL disease Mother     Hyperlipidemia Mother     Hypertension Mother     Osteoarthritis Mother     Thyroid disease unspecified Mother     Diabetes Father     Alcohol abuse Father     Diabetes type I Father     Thyroid disease Sister     Depression Sister     Thyroid disease unspecified Sister     Anxiety disorder Sister     Heart disease Maternal Grandmother     Hypertension Maternal Grandmother     Arthritis Maternal Grandmother     Diabetes type I Maternal Uncle     Diabetes type I Maternal Grandfather          Medications have been verified  Objective   Pulse 85   Temp (!) 97 3 °F (36 3 °C)   Resp 16   Ht 5' 6" (1 676 m)   Wt 74 8 kg (165 lb)   SpO2 98%   BMI 26 63 kg/m²   No LMP recorded  Physical Exam     Physical Exam  Constitutional:       General: She is not in acute distress  Appearance: She is well-developed  She is not diaphoretic  HENT:      Head: Normocephalic and atraumatic        Right Ear: Hearing, tympanic membrane, ear canal and external ear normal       Left Ear: Hearing, tympanic membrane, ear canal and external ear normal       Nose: Congestion and rhinorrhea present  No mucosal edema  Right Sinus: No maxillary sinus tenderness or frontal sinus tenderness  Left Sinus: No maxillary sinus tenderness or frontal sinus tenderness  Mouth/Throat:      Pharynx: Oropharynx is clear  Uvula midline  Cardiovascular:      Rate and Rhythm: Normal rate and regular rhythm  Heart sounds: Normal heart sounds, S1 normal and S2 normal    Pulmonary:      Effort: Pulmonary effort is normal       Breath sounds: Normal breath sounds and air entry  Lymphadenopathy:      Cervical: No cervical adenopathy  Skin:     General: Skin is warm and dry  Neurological:      Mental Status: She is alert and oriented to person, place, and time

## 2022-01-16 LAB — SARS-COV-2 RNA RESP QL NAA+PROBE: NEGATIVE

## 2022-02-21 ENCOUNTER — TELEPHONE (OUTPATIENT)
Dept: FAMILY MEDICINE CLINIC | Facility: CLINIC | Age: 42
End: 2022-02-21

## 2022-02-21 NOTE — TELEPHONE ENCOUNTER
He has an appt 3/28 she wants to know if she can have blood work prior to her appt , wants everything checked

## 2022-03-23 ENCOUNTER — TELEPHONE (OUTPATIENT)
Dept: HEMATOLOGY ONCOLOGY | Facility: CLINIC | Age: 42
End: 2022-03-23

## 2022-03-23 NOTE — TELEPHONE ENCOUNTER
Left message for patient stating that we had to r/s her appointment from 4/14/22 to 5/3/22 at 2:40 PM  Instructed patient to call our office if this does not work for her

## 2022-03-28 ENCOUNTER — TELEPHONE (OUTPATIENT)
Dept: FAMILY MEDICINE CLINIC | Facility: CLINIC | Age: 42
End: 2022-03-28

## 2022-03-28 NOTE — TELEPHONE ENCOUNTER
She should have her visit with Dr Valeria Cortes- I discussed with Dr Valeria Cortes and she is comfortable and has experience ordering Vivitrol  Can her visit be changed?

## 2022-03-28 NOTE — TELEPHONE ENCOUNTER
Called and spoke with patient  Informed  States she was at [facility] treatment facility where she was supposed to get her first dose of Vivitrol, but left AMA, so did not receive it and it is not ordered  Unsure if you are able to do this  Please advise

## 2022-03-28 NOTE — TELEPHONE ENCOUNTER
She wants to know if you can put blood work in for her next visit and also wants to know if you would give her the vivitrol shot

## 2022-03-28 NOTE — TELEPHONE ENCOUNTER
She just had a lot of bloodwork done in the hospital  So no bloodwork before the visit  Ask her to give a urine sample when she get here for her visit for a Microalbumin  We can give the Vivitrol here  Who is ordering it and does she get it from a specialty pharmacy? Will it have to be shipped here?

## 2022-04-05 ENCOUNTER — OFFICE VISIT (OUTPATIENT)
Dept: FAMILY MEDICINE CLINIC | Facility: CLINIC | Age: 42
End: 2022-04-05
Payer: COMMERCIAL

## 2022-04-05 VITALS
WEIGHT: 169.4 LBS | HEIGHT: 66 IN | SYSTOLIC BLOOD PRESSURE: 116 MMHG | HEART RATE: 95 BPM | OXYGEN SATURATION: 98 % | DIASTOLIC BLOOD PRESSURE: 68 MMHG | RESPIRATION RATE: 18 BRPM | TEMPERATURE: 96.1 F | BODY MASS INDEX: 27.23 KG/M2

## 2022-04-05 DIAGNOSIS — E06.3 HYPOTHYROIDISM DUE TO HASHIMOTO'S THYROIDITIS: ICD-10-CM

## 2022-04-05 DIAGNOSIS — Z12.4 SCREENING FOR CERVICAL CANCER: ICD-10-CM

## 2022-04-05 DIAGNOSIS — E10.65 TYPE 1 DIABETES MELLITUS WITH HYPERGLYCEMIA (HCC): ICD-10-CM

## 2022-04-05 DIAGNOSIS — E10.10 DIABETIC KETOACIDOSIS WITHOUT COMA ASSOCIATED WITH TYPE 1 DIABETES MELLITUS (HCC): ICD-10-CM

## 2022-04-05 DIAGNOSIS — B34.9 VIRAL INFECTION, UNSPECIFIED: ICD-10-CM

## 2022-04-05 DIAGNOSIS — F33.9 DEPRESSION, RECURRENT (HCC): ICD-10-CM

## 2022-04-05 DIAGNOSIS — F41.9 ANXIETY AND DEPRESSION: ICD-10-CM

## 2022-04-05 DIAGNOSIS — E55.9 VITAMIN D DEFICIENCY: ICD-10-CM

## 2022-04-05 DIAGNOSIS — E53.8 B12 DEFICIENCY: ICD-10-CM

## 2022-04-05 DIAGNOSIS — F10.21 ALCOHOL USE DISORDER, MODERATE, IN EARLY REMISSION (HCC): ICD-10-CM

## 2022-04-05 DIAGNOSIS — E03.8 HYPOTHYROIDISM DUE TO HASHIMOTO'S THYROIDITIS: ICD-10-CM

## 2022-04-05 DIAGNOSIS — Z00.00 ANNUAL PHYSICAL EXAM: Primary | ICD-10-CM

## 2022-04-05 DIAGNOSIS — F32.A ANXIETY AND DEPRESSION: ICD-10-CM

## 2022-04-05 DIAGNOSIS — D50.9 IRON DEFICIENCY ANEMIA, UNSPECIFIED IRON DEFICIENCY ANEMIA TYPE: Chronic | ICD-10-CM

## 2022-04-05 LAB
SL AMB POCT AMPHETAMINES URINE: NEGATIVE
SL AMB POCT COCAINE URINE: NEGATIVE
SL AMB POCT METHAMPETAMINES URINE: NEGATIVE
SL AMB POCT OPIATES URINE: NEGATIVE
SL AMB POCT PHENCYCLIDINE URINE: NEGATIVE
SL AMB POCT URINE HCG: NEGATIVE
SL AMB THC URINE: NEGATIVE

## 2022-04-05 PROCEDURE — 3725F SCREEN DEPRESSION PERFORMED: CPT | Performed by: INTERNAL MEDICINE

## 2022-04-05 PROCEDURE — 99215 OFFICE O/P EST HI 40 MIN: CPT | Performed by: INTERNAL MEDICINE

## 2022-04-05 PROCEDURE — 87636 SARSCOV2 & INF A&B AMP PRB: CPT | Performed by: INTERNAL MEDICINE

## 2022-04-05 PROCEDURE — 80305 DRUG TEST PRSMV DIR OPT OBS: CPT | Performed by: INTERNAL MEDICINE

## 2022-04-05 PROCEDURE — 99396 PREV VISIT EST AGE 40-64: CPT | Performed by: INTERNAL MEDICINE

## 2022-04-05 PROCEDURE — 3008F BODY MASS INDEX DOCD: CPT | Performed by: INTERNAL MEDICINE

## 2022-04-05 PROCEDURE — 81025 URINE PREGNANCY TEST: CPT | Performed by: INTERNAL MEDICINE

## 2022-04-05 PROCEDURE — 1036F TOBACCO NON-USER: CPT | Performed by: INTERNAL MEDICINE

## 2022-04-05 RX ORDER — GABAPENTIN 600 MG/1
300 TABLET ORAL 2 TIMES DAILY
Qty: 30 TABLET | Refills: 1 | Status: SHIPPED | OUTPATIENT
Start: 2022-04-05

## 2022-04-05 RX ORDER — CLONIDINE HYDROCHLORIDE 0.1 MG/1
0.1 TABLET ORAL
Qty: 30 TABLET | Refills: 1 | Status: SHIPPED | OUTPATIENT
Start: 2022-04-05

## 2022-04-05 RX ORDER — NALTREXONE HYDROCHLORIDE 50 MG/1
50 TABLET, FILM COATED ORAL DAILY
Qty: 30 TABLET | Refills: 0 | Status: SHIPPED | OUTPATIENT
Start: 2022-04-05

## 2022-04-05 RX ORDER — VENLAFAXINE HYDROCHLORIDE 150 MG/1
CAPSULE, EXTENDED RELEASE ORAL
COMMUNITY
Start: 2022-03-28

## 2022-04-05 RX ORDER — INSULIN GLARGINE 100 [IU]/ML
36 INJECTION, SOLUTION SUBCUTANEOUS
Qty: 10 ML | Refills: 1 | Status: SHIPPED | OUTPATIENT
Start: 2022-04-05
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
## 2022-04-05 NOTE — PATIENT INSTRUCTIONS

## 2022-04-05 NOTE — PROGRESS NOTES
ADULT ANNUAL [facility]    NAME: Yanira Ignacio  AGE: 38 y o  SEX: female  : 1980     DATE: 2022     Assessment and Plan:     Problem List Items Addressed This Visit        Endocrine    Diabetic ketoacidosis without coma associated with type 1 diabetes mellitus ([facility] [address] )    Relevant Medications    insulin glargine (Lantus) 100 units/mL subcutaneous injection    Type 1 diabetes mellitus with hyperglycemia (HCC)    Relevant Medications    insulin glargine (Lantus) 100 units/mL subcutaneous injection    Other Relevant Orders    CBC and differential    Comprehensive metabolic panel    Hypothyroidism due to Hashimoto's thyroiditis    Relevant Orders    TSH, 3rd generation with Free T4 reflex       Other    Iron deficiency anemia (Chronic)    Vitamin D deficiency    Relevant Orders    Vitamin D 25 hydroxy    B12 deficiency    Relevant Orders    Vitamin B12    Anxiety and depression    Relevant Medications    venlafaxine (EFFEXOR-XR) 150 mg 24 hr capsule    cloNIDine (Catapres) 0 1 mg tablet    gabapentin (NEURONTIN) 600 MG tablet    Depression, recurrent (HCC)    Relevant Medications    venlafaxine (EFFEXOR-XR) 150 mg 24 hr capsule    cloNIDine (Catapres) 0 1 mg tablet      Other Visit Diagnoses     Annual physical exam    -  Primary    Screening for cervical cancer        Alcohol use disorder, moderate, in early remission (HCC)        Relevant Medications    cloNIDine (Catapres) 0 1 mg tablet    gabapentin (NEURONTIN) 600 MG tablet    naltrexone (REVIA) 50 mg tablet    Other Relevant Orders    CBC and differential    Comprehensive metabolic panel    POCT urine drug screen    Viral infection, unspecified        Relevant Orders    Covid/Flu- Office Collect          Immunizations and preventive care screenings were discussed with patient today  Appropriate education was printed on patient's after visit summary      Counseling:  Alcohol/drug use: discussed moderation in alcohol intake, the recommendations for healthy alcohol use, and avoidance of illicit drug use  Dental Health: discussed importance of regular tooth brushing, flossing, and dental visits  Injury prevention: discussed safety/seat belts, safety helmets, smoke detectors, carbon dioxide detectors, and smoking near bedding or upholstery  Sexual health: discussed sexually transmitted diseases, partner selection, use of condoms, avoidance of unintended pregnancy, and contraceptive alternatives  · Exercise: the importance of regular exercise/physical activity was discussed  Recommend exercise 3-5 times per week for at least 30 minutes  BMI Counseling: Body mass index is 27 34 kg/m²  The BMI is above normal  Nutrition recommendations include decreasing portion sizes, encouraging healthy choices of fruits and vegetables and moderation in carbohydrate intake  Exercise recommendations include moderate physical activity 150 minutes/week  Rationale for BMI follow-up plan is due to patient being overweight or obese  Return in about 3 weeks (around 2022) for Recheck  Chief Complaint:     Chief Complaint   Patient presents with    discuss vivitrol    Cold Like Symptoms     wants ears looked at as she woke up feeling congested  taking flonase and mucinex      History of Present Illness:     Adult Annual Physical   Patient here for a comprehensive physical exam  The patient reports problems - refer to separate note  Diet and Physical Activity  · Diet/Nutrition: diabetic diet  · Exercise: no formal exercise        Depression Screening  PHQ-2/9 Depression Screening    Little interest or pleasure in doing things: 0 - not at all  Feeling down, depressed, or hopeless: 0 - not at all  Trouble falling or staying asleep, or sleeping too much: 0 - not at all  Feeling tired or having little energy: 0 - not at all  Poor appetite or overeatin - not at all  Feeling bad about yourself - or that you are a failure or have let yourself or your family down: 0 - not at all  Trouble concentrating on things, such as reading the newspaper or watching television: 0 - not at all  Moving or speaking so slowly that other people could have noticed  Or the opposite - being so fidgety or restless that you have been moving around a lot more than usual: 0 - not at all  Thoughts that you would be better off dead, or of hurting yourself in some way: 0 - not at all  PHQ-9 Score: 0   PHQ-9 Interpretation: No or Minimal depression        General Health  · Sleep: sleeps poorly  · Hearing: normal - bilateral   · Vision: vision problems: cataracts and goes for regular eye exams  · Dental: regular dental visits  /GYN Health  · Patient is: premenopausal  · Last menstrual period:   · Contraceptive method: NA      Review of Systems:     Review of Systems   Past Medical History:     Past Medical History:   Diagnosis Date    Anemia     Anxiety     B12 deficiency     Cervical disc disorder     On gabapentin     Depression     Disease of thyroid gland     hypothyroid    Iron deficiency anemia     Panic attack     PTSD (post-traumatic stress disorder)     Sleep difficulties     [facility]     Type [facility] diabetes ([facility]     Vitamin D deficiency       Past Surgical History:     Past Surgical History:   Procedure Laterality Date    ABDOMINAL SURGERY      gastric bypass     SECTION      x2    CHOLECYSTECTOMY  2018    GASTRIC BYPASS  2007    INTRAUTERINE DEVICE INSERTION  2015    IR BIOPSY BONE MARROW  2020    OTHER SURGICAL HISTORY      surgery for imperforate hymen/endometriosis/hydrometrocolpos    TUBAL LIGATION      WISDOM TOOTH EXTRACTION        Social History:     Social History     Socioeconomic History    Marital status:       Spouse name: None    Number of children: 2    Years of education: None    Highest education level: Master's degree (india [employer] MA, MS, [employer], [employer])   Occupational History    Occupation: Teacher    Tobacco Use    Smoking status: Never Smoker    Smokeless tobacco: Never Used   Vaping Use    Vaping Use: Never used   Substance and Sexual Activity    Alcohol use: Not Currently     Comment: sober since April 2021 - on probation with random screenings    Drug use: No    Sexual activity: Yes     Partners: Male   Other Topics Concern    None   Social History Narrative     - As per [address]    Occasionally consumes alcohol - [employer] per [address]    Consumes on average 2 cups of regular coffee per day      Social Determinants of Health     Financial Resource Strain: Not on file   Food Insecurity: Not on file   Transportation Needs: Not on file   Physical Activity: Not on file   Stress: Not on file   Social Connections: Not on file   Intimate Partner Violence: Not on file   Housing Stability: Not on file      Family History:     Family History   Problem Relation Age of Onset    Thyroid disease Mother     URIEL disease Mother     Hyperlipidemia Mother     Hypertension Mother     Osteoarthritis Mother     Thyroid disease unspecified Mother     Diabetes Father     Alcohol abuse Father     Diabetes type I Father     Thyroid disease Sister     Depression Sister     Thyroid disease unspecified Sister     Anxiety disorder Sister     Heart disease Maternal Grandmother     Hypertension Maternal Grandmother     Arthritis Maternal Grandmother     Diabetes type I Maternal Uncle     Diabetes type I Maternal Grandfather       Current Medications:     Current Outpatient Medications   Medication Sig Dispense Refill    acetaminophen (TYLENOL) 500 mg tablet        ALPRAZolam (XANAX) 0 5 mg tablet Take 0 5 mg by mouth daily as needed        Cyanocobalamin (B-12 PO) Take by mouth daily      ergocalciferol (VITAMIN D2) 50,000 units Take 1 capsule (50,000 Units total) by mouth once a week 12 capsule 3    escitalopram (LEXAPRO) 20 mg tablet Take 10 mg by mouth daily Half tab daily       gabapentin (NEURONTIN) 600 MG tablet Take 0 5 tablets (300 mg total) by mouth 2 (two) times a day Half Tab 300mg at bedtime 30 tablet 1    glucose blood (Accu-Chek Guide) test strip Use to test blood sugar up to 6 times daily  200 each 2    insulin aspart (NovoLOG) 100 units/mL injection INJECT INTO PUMP DAILY  MAX DOSE 50 UNITS   40 mL 1    Insulin Pen Needle (BD Pen Needle Deanna U/F) 32G X 4 MM MISC Use 4/day 100 each 3    Insulin Syringe-Needle U-100 (INSULIN SYRINGE 1CC/28G) 28G X 1/2" 1 ML MISC 4 injections daily E10 65      levothyroxine 200 mcg tablet TAKE ONE TABLET BY MOUTH EVERY DAY IN THE MORNING 90 tablet 3    PATIENT MAINTAINED INSULIN PUMP Inject 1 each under the skin every 8 (eight) hours 1 each 0    traZODone (DESYREL) 100 mg tablet Take 100 mg by mouth daily at bedtime      venlafaxine (EFFEXOR-XR) 150 mg 24 hr capsule       Accu-Chek FastClix Lancets MISC USE 1 LANCET TO TEST BLOOD SUGAR UP TO 6 TIMES DAILY (Patient not taking: Reported on 11/1/2021) 102 each 0    cloNIDine (Catapres) 0 1 mg tablet Take 1 tablet (0 1 mg total) by mouth daily at bedtime 30 tablet 1    insulin glargine (Lantus) 100 units/mL subcutaneous injection Inject 36 Units under the skin daily at bedtime 10 mL 1    naltrexone (REVIA) 50 mg tablet Take 1 tablet (50 mg total) by mouth daily 30 tablet 0    ofloxacin (OCUFLOX) 0 3 % ophthalmic solution  (Patient not taking: Reported on 1/2/2022 )      ondansetron (ZOFRAN) 4 mg tablet Take 1 tablet (4 mg total) by mouth every 8 (eight) hours as needed for nausea or vomiting (Patient not taking: Reported on 11/1/2021) 30 tablet 1    prednisoLONE acetate (PRED FORTE) 1 % ophthalmic suspension  (Patient not taking: Reported on 1/2/2022 )      selenium 200 mcg Take 200 mcg by mouth daily  (Patient not taking: Reported on 9/3/2021)      vitamin B-12 (VITAMIN B-12) 500 mcg tablet Take 500 mcg by mouth daily (Patient not taking: Reported on 8/23/2021)       No current facility-administered medications for this visit        Allergies:     No Known Allergies   Physical Exam:     /68   Pulse 95   Temp (!) 96 1 °F (35 6 °C)   Resp 18   Ht 5' 6" (1 676 m)   Wt 76 8 kg (169 lb 6 4 oz)   SpO2 98%   BMI 27 34 kg/m²     Physical Exam     Annette Ross MD  [facility]

## 2022-04-06 ENCOUNTER — TELEPHONE (OUTPATIENT)
Dept: FAMILY MEDICINE CLINIC | Facility: CLINIC | Age: 42
End: 2022-04-06

## 2022-04-06 LAB
FLUAV RNA RESP QL NAA+PROBE: NEGATIVE
FLUBV RNA RESP QL NAA+PROBE: NEGATIVE
SARS-COV-2 RNA RESP QL NAA+PROBE: NEGATIVE

## 2022-04-06 RX ORDER — NALTREXONE 380 MG
380 KIT INTRAMUSCULAR ONCE
Qty: 1 EACH | Refills: 5 | Status: SHIPPED | OUTPATIENT
Start: 2022-04-06 | End: 2022-04-06

## 2022-04-06 NOTE — TELEPHONE ENCOUNTER
Called patients Insurance twice and was unable to get through  I even scheduled a call back but did not receive one  Called and spoke with patient  She said she would like this sent to [facility]  Will fax to [facility]

## 2022-04-07 ENCOUNTER — TELEPHONE (OUTPATIENT)
Dept: FAMILY MEDICINE CLINIC | Facility: CLINIC | Age: 42
End: 2022-04-07

## 2022-04-07 NOTE — TELEPHONE ENCOUNTER
Received notification that Vivitrol needs PA  Attempted to initiate on CoverMyMeds, but was unsuccessful, would not let me submit anything other than demographic info  Made call to patient's insurance, but sat on hold for over 40 minutes without reaching anyone  Will try again later       Number to call is [phone]

## 2022-04-12 ENCOUNTER — PATIENT MESSAGE (OUTPATIENT)
Dept: FAMILY MEDICINE CLINIC | Facility: CLINIC | Age: 42
End: 2022-04-12

## 2022-04-12 ENCOUNTER — PATIENT OUTREACH (OUTPATIENT)
Dept: FAMILY MEDICINE CLINIC | Facility: CLINIC | Age: 42
End: 2022-04-12

## 2022-04-12 DIAGNOSIS — E06.3 HYPOTHYROIDISM DUE TO HASHIMOTO'S THYROIDITIS: ICD-10-CM

## 2022-04-12 DIAGNOSIS — E03.8 HYPOTHYROIDISM DUE TO HASHIMOTO'S THYROIDITIS: ICD-10-CM

## 2022-04-12 RX ORDER — LEVOTHYROXINE SODIUM 0.2 MG/1
200 TABLET ORAL EVERY MORNING
Qty: 90 TABLET | Refills: 3 | Status: SHIPPED | OUTPATIENT
Start: 2022-04-12

## 2022-04-12 RX ORDER — LEVOTHYROXINE SODIUM 0.03 MG/1
25 TABLET ORAL
Qty: 90 TABLET | Refills: 3 | Status: SHIPPED | OUTPATIENT
Start: 2022-04-12

## 2022-04-12 NOTE — PROGRESS NOTES
ADA HUITRON called patient's pharmacy to find out cost of Vivitrol  Pharmacy staff informed me that Vivitrol is $1,500 OOP and patient's insurance does not cover the medication  ADA HUITRON outreached patient ([phone])  Patient answered and was agreeable to outreach from [facility]  ADA HUITRON informed patient that pharmacy said OOP cost for Vivitrol is $1,500  Patient stated she cannot afford it with coupon card only covering $500  Patient was tearful, as she "reall wants this"  ADA HUITRON encouraged patient to call [email or web address] program ([phone]) and find out if she is eligible for assistance  Patient stated she is shocked that her insurance does not cover the medication, as she had never had this problem before  ADA HUITRON encouraged patient to call her insurance company to ask about coverage  Patient stated that she will work on calling [email or web address] and her insurance tonight and call ADA HUITRON tomorrow  ADA HUITRON will outreach patient if she does not hear from her  Note routed to PCP

## 2022-04-13 ENCOUNTER — PATIENT OUTREACH (OUTPATIENT)
Dept: FAMILY MEDICINE CLINIC | Facility: CLINIC | Age: 42
End: 2022-04-13

## 2022-04-13 NOTE — PROGRESS NOTES
Patient stated that Lucius asked that ADA HUITRON  Had a friend that was helping her get places  Does not have an income right now - is not working  Patient is uncertain that she is going to return to work  Patient has been out of work since the end of February  She was off of work during   Her   by suicide and life was tough for her in many ways  Patient got charged with a DUI  Reported dealing with anxiety and depression  Work became quite stressful when she returned after being out for one year  Hx of rehab for AUD and MH Tx  Was back at work for two months  Currently working with [employer] with [facility]  Stated that she will need to go back to work next week due to needing an income  Starts seeing her therapist at the end of April and will work with [employer] for medication management and [employer]  Dad drives her places, but his health is declining  Patient has Patient's household includes herself, her boyfriend, his son and her two children: boyfriend's income approx 80k  Patient stated her boyfriend does not support her, but helps if necessary  ADA HUITRON called Lucius ([phone])  Patient is not eligible for PAP because she has insurance  Representative encouraged that patient should find discount card for up to $500 off of medication  [facility] D[facility]A - they are unable to offer assistance and suggested [employer]  Visited [employer] and entered patient's location  [place] [address]  - no one was available to take call  Called [facility] and spoke with staff who forwarded call to  - left message for Peggy Gonzalez asking for return call  No patient information provided to these agencies  ADA HUITRON sent IB to patient's PCP and MA inquiring about response to prior authorization  Patient called ADA HUITRON and ADA HUITRON shared update from phone calls made and IB sent to PCP's office   ADA HUITRON will call patient with any updates and remain available for any needs

## 2022-04-14 ENCOUNTER — TELEPHONE (OUTPATIENT)
Dept: PULMONOLOGY | Facility: CLINIC | Age: 42
End: 2022-04-14

## 2022-04-14 ENCOUNTER — PATIENT OUTREACH (OUTPATIENT)
Dept: FAMILY MEDICINE CLINIC | Facility: CLINIC | Age: 42
End: 2022-04-14

## 2022-04-14 DIAGNOSIS — E55.9 VITAMIN D DEFICIENCY: Chronic | ICD-10-CM

## 2022-04-14 RX ORDER — ERGOCALCIFEROL 1.25 MG/1
50000 CAPSULE ORAL
Qty: 12 CAPSULE | Refills: 3 | Status: SHIPPED | OUTPATIENT
Start: 2022-04-14

## 2022-04-14 NOTE — PROGRESS NOTES
Spoke with Vinnie Dey from [facility] she said patient will need to complete PAP paper work since her insurance does not cover  Vinnie Dey started the PAP and will fax to the office  They will need the forms filled out make sure the check boxes are completed in section 4  They will also need the past 3 pay stubs, copy of insurance card, and jami of the denial paper work from insurance  The case number for this call and paper work is [id number]  Also Vinnie Dey will have the rep Rick Hernandez come by with samples

## 2022-04-14 NOTE — PROGRESS NOTES
ADA HUITRON received IB that patient's prior authorization was denied  ADA HUITRON outreached Viv[facility]rol ([phone])  Spoke to representative who encouraged ADA HUITRON to have provider and patient complete Patient Enrollment Form and Vivitrol will verify patient's benefits and determine if medication will be covered under her medical benefits, as it has been denied under prescription drug benefits  If patient is denied under medical as well, she will be eligible for PAP  If medication is covered under medical, she will then be eligible for copay assistance for any OOP cost      ADA HUITRON forwarded information to patient's PCP  ADA HUITRON will outreach patient when she knows she is available (between 12:00 and 12:45)    ADA HUITRON outreached patient ([phone]) to discuss Patient Enrollment form  Patient did not answer  ADA HUITRON left message asking patient to return call

## 2022-04-14 NOTE — TELEPHONE ENCOUNTER
Leida Chirinos from [facility] calling  to see if Felicity Band will sign off for her [employer] to her Family Physician or do we need to schedule in for an appointment  Mai's number [phone] EXT: [phone]  Please Advise

## 2022-04-14 NOTE — PROGRESS NOTES
Thanks for working on this  I think she will have the same issue with provider at [employer] because access isn't the issue, it's cost and insurance  I will see if we can reach out to rep and get samples but even with $500 co-pay card, she might have to pay ~$1000

## 2022-04-15 NOTE — TELEPHONE ENCOUNTER
Faxed over to [employer] her completed form for pump reorder/replacement due to DKA admission due to pump failure

## 2022-04-15 NOTE — TELEPHONE ENCOUNTER
Pilar Mcginnis spoke to Jim Coxd had been admitted into the hospital for DKA due to pump failure she was released and saw her pcp within the two days after visit, [employer] is trying to get her replacement but need the forms signed and faxed back  I know we have not seen her in the appropriate time however I was wondering if you and her pcp Dr Scales could possible review form and complete to get the patient the parts/supplies she needs?

## 2022-04-18 NOTE — TELEPHONE ENCOUNTER
PA denied  [employer] [facility] patient assistance  Forms filled out and signed by Dr Dominick Diaz  Pt coming to sign her portion tonight or tomorrow  Form in Dr Sharlene Badillo folder at [facility]

## 2022-04-21 ENCOUNTER — PATIENT OUTREACH (OUTPATIENT)
Dept: FAMILY MEDICINE CLINIC | Facility: CLINIC | Age: 42
End: 2022-04-21

## 2022-04-21 ENCOUNTER — TELEPHONE (OUTPATIENT)
Dept: FAMILY MEDICINE CLINIC | Facility: CLINIC | Age: 42
End: 2022-04-21

## 2022-04-21 NOTE — PROGRESS NOTES
ADA HUITRON received voicemail from patient  ADA HUITRON returned patient's call and spoke with her  Patient stated her dad is going to  PAP application from PCP's office  ADA HUITRON asked if she could call patient back as she has another form patient should also complete and patient was agreeable  ADA HUITRON called patient's PCP office and emailed Vivitrol Patient Enrollment Form to [place] [address]  ADA HUITRON also emailed form to Ella Quevedo CM called patient back and informed her of sections to complete on Patient Enrollment form and that this is to verify her benefits in regard to insurance covering Vivitrol  Patient was agreeable to complete form and ADA HUITRON will wait for the form to be completed by patient  PCP will need to complete provider information  Note routed to PCP

## 2022-04-21 NOTE — TELEPHONE ENCOUNTER
Pt called stating she is unable to make it in office before 5 today to sign PAP  Would like her father to come  PAP so she may sign and he will bring back  Informed pt forms would be at  for her father to   Pt feels visit on Tuesday is not necessary if she cannot get first vivitrol shot  Please advise

## 2022-04-26 ENCOUNTER — TELEPHONE (OUTPATIENT)
Dept: FAMILY MEDICINE CLINIC | Facility: CLINIC | Age: 42
End: 2022-04-26

## 2022-04-26 ENCOUNTER — TELEPHONE (OUTPATIENT)
Dept: HEMATOLOGY ONCOLOGY | Facility: CLINIC | Age: 42
End: 2022-04-26

## 2022-04-26 ENCOUNTER — PATIENT OUTREACH (OUTPATIENT)
Dept: FAMILY MEDICINE CLINIC | Facility: CLINIC | Age: 42
End: 2022-04-26

## 2022-04-26 NOTE — PROGRESS NOTES
Received message from Jose Lo , inquiring about patient's Vivitrol PAP  Contacted Lucius to inquire about patient's program application  Patient's application was invalid as it was not requested through the PAP itself  A new form with a specific case ID was faxed to PCP's office Case Number: [id number] and may only be used for this patient  ADA HUITRON called PCP's office to inform of personalized application being faxed from [address] [place], [place] [address]  Patient Enrollment Form for benefits verification was not completed by provider and only by patient  Vivitrol will complete benefits verification  Include copy of insurance card, include denial letter from [facility] for medical benefits and denial letter from pharmacy benefits  Vivitrol will complete their own benefits verification  ADA HUITRON will inform MA  Called PCP's office to inform them of new PAP application  Patient called the office and discussed plan with HÉCTOR CABALLERO CM will remain available for any needs

## 2022-04-26 NOTE — TELEPHONE ENCOUNTER
Received forms with case number on them  Zuly aware  Filled out forms and had signed by PCP  Pt aware she needs to come back and sign forms

## 2022-04-26 NOTE — TELEPHONE ENCOUNTER
Lvm to patient in regards to her lab work that needs to be completed prior to her appointment on 5/3/22 at 2:40pm

## 2022-04-26 NOTE — TELEPHONE ENCOUNTER
Left message for pt requesting call back in regards to appointment at 3 pm today with Dr Heather Oliver  We do not have determination on Vivitrol as of yet and reaching out to see if she would like to keep visit even though we are unable to administer first dose of Vivitrol now  Will await call back

## 2022-04-26 NOTE — TELEPHONE ENCOUNTER
Amrita Phillips () [phone] called we should be getting a fax from Yoselyn Powell (case # [id number]) please let her know when we receive it

## 2022-04-29 ENCOUNTER — VBI (OUTPATIENT)
Dept: ADMINISTRATIVE | Facility: OTHER | Age: 42
End: 2022-04-29

## 2022-05-05 ENCOUNTER — PATIENT OUTREACH (OUTPATIENT)
Dept: FAMILY MEDICINE CLINIC | Facility: CLINIC | Age: 42
End: 2022-05-05

## 2022-05-05 NOTE — PROGRESS NOTES
ADA HUITRON received message from PCP's Aye ANAYA  Stating fax was received that patient's enrollment for [id number] was suspended due to an unrequested application  ADA HUITRON called [id number] and spoke with a representative  Representative stated that most recent letter was for previous enrollment  ADA HUITRON was informed that there is missing information: section 4 on consent portion needs to be checked  ADA HUITRON completed form and faxed to PAP

## 2022-05-06 DIAGNOSIS — E10.65 TYPE 1 DIABETES MELLITUS WITH HYPERGLYCEMIA (HCC): ICD-10-CM

## 2022-05-06 RX ORDER — INSULIN ASPART 100 [IU]/ML
INJECTION, SOLUTION INTRAVENOUS; SUBCUTANEOUS
Qty: 40 ML | Refills: 1 | OUTPATIENT
Start: 2022-05-06

## 2022-05-06 NOTE — TELEPHONE ENCOUNTER
I left a message for patient to call our office to schedule her appointment as it has almost been a year since she has been seen and unfortunately we can not fill rx till seen per provider  If patient is willing to make the appointment we can refill medication with appropriate amount to last till seen

## 2022-05-06 NOTE — TELEPHONE ENCOUNTER
Patient needs appt for more refills  *I am not in today, if she needs insulin today please send to provider in office

## 2022-05-15 ENCOUNTER — OFFICE VISIT (OUTPATIENT)
Dept: URGENT CARE | Facility: CLINIC | Age: 42
End: 2022-05-15
Payer: COMMERCIAL

## 2022-05-15 VITALS
BODY MASS INDEX: 26.48 KG/M2 | HEART RATE: 100 BPM | OXYGEN SATURATION: 98 % | TEMPERATURE: 97.9 F | HEIGHT: 66 IN | WEIGHT: 164.8 LBS | SYSTOLIC BLOOD PRESSURE: 114 MMHG | RESPIRATION RATE: 16 BRPM | DIASTOLIC BLOOD PRESSURE: 67 MMHG

## 2022-05-15 DIAGNOSIS — B34.9 VIRAL ILLNESS: Primary | ICD-10-CM

## 2022-05-15 DIAGNOSIS — J02.9 SORE THROAT: ICD-10-CM

## 2022-05-15 LAB — S PYO AG THROAT QL: NEGATIVE

## 2022-05-15 PROCEDURE — 99213 OFFICE O/P EST LOW 20 MIN: CPT | Performed by: PHYSICIAN ASSISTANT

## 2022-05-15 PROCEDURE — 87636 SARSCOV2 & INF A&B AMP PRB: CPT | Performed by: PHYSICIAN ASSISTANT

## 2022-05-15 PROCEDURE — 87070 CULTURE OTHR SPECIMN AEROBIC: CPT | Performed by: PHYSICIAN ASSISTANT

## 2022-05-15 PROCEDURE — 87880 STREP A ASSAY W/OPTIC: CPT | Performed by: PHYSICIAN ASSISTANT

## 2022-05-15 NOTE — LETTER
[address] [facility],[address] NOW [address] [address]  Dept: [phone]    May 15, 2022    Patient: Kristin Llanos  YOB: 1980    Kristin Llanos was seen and evaluated at our [facility]  Please note if Covid and Flu tests are negative, they may return to work when fever free for 24 hours without the use of a fever reducing agent  If Covid or Flu test is positive, they may return to work on 5/18/22, as this is 5 days from the onset of symptoms  Upon return, they must then adhere to strict masking for an additional 5 days      Sincerely,    Homer Zaidi PA-C

## 2022-05-15 NOTE — PROGRESS NOTES
[facility]      NAME: Yenny Chiang is a 38 y o  female  : 1980    MRN: [id number]  DATE: May 15, 2022  TIME: 11:23 AM    Assessment and Plan   Viral illness [B34 9]  1  Viral illness     2  Sore throat  POCT rapid strepA    Covid/Flu-Office Collect    Throat culture       Patient Instructions   Rapid strep test completed and negative  Will send for culture and call patient if positive  Influenza   AMBULATORY CARE:   Influenza  (the flu) is an infection caused by the influenza virus  The flu is easily spread when an infected person coughs, sneezes, or has close contact with others  You may be able to spread the flu to others for 1 week or longer after signs or symptoms appear  Common signs and symptoms include the following:   · Fever and chills     · Headaches, body aches, and muscle or joint pain     · Cough, runny nose, and sore throat     · Loss of appetite, nausea, vomiting, or diarrhea     · Tiredness     · Trouble breathing     Call your local emergency number (911 in the [address] [place] [address],[address]) if:   · You have trouble breathing, and your lips look purple or blue      · You have a seizure      Seek care immediately if:   · You are dizzy, or you are urinating less or not at all       · You have a headache with a stiff neck, and you feel tired or confused      · You have new pain or pressure in your chest      · Your symptoms, such as shortness of breath, vomiting, or diarrhea, get worse       · Your symptoms, such as fever and coughing, seem to get better, but then get worse      Call your doctor if:   · You have new muscle pain or weakness      · You have questions or concerns about your condition or care      Treatment for influenza  may include any of the following:  · Acetaminophen  decreases pain and fever  It is available without a doctor's order  Ask how much to take and how often to take it  Follow directions   Read the labels of all other medicines you are using to see if they also contain acetaminophen, or ask your doctor or pharmacist  Acetaminophen can cause liver damage if not taken correctly  Do not use more than 4 grams (4,000 milligrams) total of acetaminophen in one day       · NSAIDs , such as ibuprofen, help decrease swelling, pain, and fever  This medicine is available with or without a doctor's order  NSAIDs can cause stomach bleeding or kidney problems in certain people  If you take blood thinner medicine, always ask your healthcare provider if NSAIDs are safe for you  Always read the medicine label and follow directions      · Antivirals  help fight a viral infection      Manage your symptoms:   · Rest  as much as you can to help you recover      · Drink liquids as directed  to help prevent dehydration  Ask how much liquid to drink each day and which liquids are best for you      Prevent the spread of germs:       · Wash your hands often  Wash your hands several times each day  Wash after you use the bathroom, change a child's diaper, and before you prepare or eat food  Use soap and water every time  Rub your soapy hands together, lacing your fingers  Wash the front and back of your hands, and in between your fingers  Use the fingers of one hand to scrub under the fingernails of the other hand  Wash for at least 20 seconds  Rinse with warm, running water for several seconds  Then dry your hands with a clean towel or paper towel  Use hand  that contains alcohol if soap and water are not available  Do not touch your eyes, nose, or mouth without washing your hands first           · Cover a sneeze or cough  Use a tissue that covers your mouth and nose  Throw the tissue away in a trash can right away  Use the bend of your arm if a tissue is not available  Wash your hands well with soap and water or use a hand       · Stay away from others while you are sick  Avoid crowds as much as possible      · Ask about vaccines you may need    Talk to your healthcare provider about your vaccine history  He or she will tell you which vaccines you need, and when to get them      ? Get the influenza (flu) vaccine as soon as it is available  Flu viruses change, so it is important to get a flu vaccine every year      ? Get the pneumonia vaccine if recommended  This vaccine is usually recommended every 5 years  Your provider will tell you when to get this vaccine, if needed      Follow up with your doctor as directed:  Write down your questions so you remember to ask them during your visits  © Copyright [employer] 2022 Information is for End User's use only and may not be sold, redistributed or otherwise used for commercial purposes  All illustrations and images included in CareNotes® are the copyrighted property of A D A M , Inc  or [facility] [employer]   The above information is an  only  It is not intended as medical advice for individual conditions or treatments  Talk to your doctor, nurse or pharmacist before following any medical regimen to see if it is safe and effective for you  To present to the ER if symptoms worsen  Chief Complaint     Chief Complaint   Patient presents with    Fever     Fever, sore throat started  Friday         History of Present Illness   Soraya Marquez presents to the clinic c/o    Sore Throat   This is a new problem  Episode onset: 5/13  The problem has been unchanged  Neither side of throat is experiencing more pain than the other  Maximum temperature: unmeasured  The pain is moderate  Associated symptoms include congestion, coughing and ear pain  Pertinent negatives include no abdominal pain, ear discharge, headaches or shortness of breath  Treatments tried: antihistamines, flonase  The treatment provided mild relief  Review of Systems   Review of Systems   Constitutional: Positive for fatigue and fever  Negative for chills and diaphoresis  HENT: Positive for congestion, ear pain and sore throat   Negative for ear discharge and facial swelling  Eyes: Negative for photophobia, pain, discharge, redness, itching and visual disturbance  Respiratory: Positive for cough  Negative for apnea, chest tightness, shortness of breath and wheezing  Cardiovascular: Negative for chest pain and palpitations  Gastrointestinal: Negative for abdominal pain  Musculoskeletal: Positive for myalgias  Skin: Negative for color change, rash and wound  Neurological: Negative for dizziness and headaches  Hematological: Negative for adenopathy  Current Medications     Long-Term Medications   Medication Sig Dispense Refill    cloNIDine (Catapres) 0 1 mg tablet Take 1 tablet (0 1 mg total) by mouth daily at bedtime 30 tablet 1    ergocalciferol (VITAMIN D2) 50,000 units Take 1 capsule (50,000 Units total) by mouth every 14 (fourteen) days 12 capsule 3    escitalopram (LEXAPRO) 20 mg tablet Take 10 mg by mouth daily Half tab daily       gabapentin (NEURONTIN) 600 MG tablet Take 0 5 tablets (300 mg total) by mouth 2 (two) times a day Half Tab 300mg at bedtime 30 tablet 1    insulin aspart (NovoLOG) 100 units/mL injection INJECT INTO PUMP DAILY  MAX DOSE 50 UNITS  40 mL 1    insulin glargine (Lantus) 100 units/mL subcutaneous injection Inject 36 Units under the skin daily at bedtime 10 mL 1    Insulin Pen Needle (BD Pen Needle Deanna U/F) 32G X 4 MM MISC Use 4/day 100 each 3    Insulin Syringe-Needle U-100 (INSULIN SYRINGE 1CC/28G) 28G X 1/2" 1 ML MISC 4 injections daily E10 65      levothyroxine (Synthroid) 25 mcg tablet Take 1 tablet (25 mcg total) by mouth daily in the early morning Combine with 200mcg, for total 225mcg daily   90 tablet 3    levothyroxine 200 mcg tablet Take 1 tablet (200 mcg total) by mouth every morning 90 tablet 3    naltrexone (REVIA) 50 mg tablet Take 1 tablet (50 mg total) by mouth daily 30 tablet 0    traZODone (DESYREL) 100 mg tablet Take 100 mg by mouth daily at bedtime      Accu-Chek FastClix Lancets MISC USE 1 LANCET TO TEST BLOOD SUGAR UP TO 6 TIMES DAILY (Patient not taking: No sig reported) 102 each 0    Naltrexone (Vivitrol) 380 MG SUSR Inject 4 mL (380 mg total) into a muscle once for 1 dose 1 each 5    ondansetron (ZOFRAN) 4 mg tablet Take 1 tablet (4 mg total) by mouth every 8 (eight) hours as needed for nausea or vomiting (Patient not taking: No sig reported) 30 tablet 1    prednisoLONE acetate (PRED FORTE) 1 % ophthalmic suspension  (Patient not taking: No sig reported)      selenium 200 mcg Take 200 mcg by mouth daily  (Patient not taking: No sig reported)         Current Allergies     Allergies as of 05/15/2022    (No Known Allergies)            The following portions of the patient's history were reviewed and updated as appropriate: allergies, current medications, past family history, past medical history, past social history, past surgical history and problem list   Past Medical History:   Diagnosis Date    Anemia     Anxiety     B12 deficiency     Cervical disc disorder     On gabapentin     Depression     Disease of thyroid gland     hypothyroid    Iron deficiency anemia     Panic attack     PTSD (post-traumatic stress disorder)     Sleep difficulties     Substance abuse ([facility] [address] )     Type 1 diabetes ([facility] [address] )     Vitamin D deficiency      Past Surgical History:   Procedure Laterality Date    ABDOMINAL SURGERY      gastric bypass     SECTION      x2    CHOLECYSTECTOMY  2018    GASTRIC BYPASS  2007    INTRAUTERINE DEVICE INSERTION  2015    IR BIOPSY BONE MARROW  2020    OTHER SURGICAL HISTORY      surgery for imperforate hymen/endometriosis/hydrometrocolpos    TUBAL LIGATION      WISDOM TOOTH EXTRACTION       Social History     Socioeconomic History    Marital status:       Spouse name: Not on file    Number of children: 2    Years of education: Not on file    Highest education level: Master's degree (e g , MA, MS, [employer], MEd, MSW, [employer])   Occupational History    Occupation: Teacher    Tobacco Use    Smoking status: Never Smoker    Smokeless tobacco: Never Used   Vaping Use    Vaping Use: Never used   Substance and Sexual Activity    Alcohol use: Not Currently     Comment: sober since April 2021 - on probation with random screenings    Drug use: No    Sexual activity: Yes     Partners: Male   Other Topics Concern    Not on file   Social History Narrative     - As per [address]    Occasionally consumes alcohol - As per [address]    Consumes on average 2 cups of regular coffee per day      Social Determinants of Health     Financial Resource Strain: Not on file   Food Insecurity: Not on file   Transportation Needs: Not on file   Physical Activity: Not on file   Stress: Not on file   Social Connections: Not on file   Intimate Partner Violence: Not on file   Housing Stability: Not on file       Objective   /67   Pulse 100   Temp 97 9 °F (36 6 °C)   Resp 16   Ht 5' 6" (1 676 m)   Wt 74 8 kg (164 lb 12 8 oz)   SpO2 98%   BMI 26 60 kg/m²      Physical Exam     Physical Exam  Vitals and nursing note reviewed  Constitutional:       General: She is not in acute distress  Appearance: She is well-developed  She is not diaphoretic  HENT:      Head: Normocephalic and atraumatic  Right Ear: Tympanic membrane and external ear normal       Left Ear: Tympanic membrane and external ear normal       Nose: Nose normal       Mouth/Throat:      Mouth: Mucous membranes are moist       Pharynx: Oropharynx is clear  Posterior oropharyngeal erythema present  No oropharyngeal exudate  Eyes:      General: No scleral icterus  Right eye: No discharge  Left eye: No discharge  Conjunctiva/sclera: Conjunctivae normal    Cardiovascular:      Rate and Rhythm: Normal rate and regular rhythm  Heart sounds: Normal heart sounds  No murmur heard  No friction rub  No gallop     Pulmonary:      Effort: Pulmonary effort is normal  No respiratory distress  Breath sounds: Normal breath sounds  No decreased breath sounds, wheezing, rhonchi or rales  Abdominal:      Palpations: Abdomen is soft  Tenderness: There is no abdominal tenderness  Skin:     General: Skin is warm and dry  Coloration: Skin is not pale  Findings: No erythema or rash  Neurological:      Mental Status: She is alert and oriented to person, place, and time  Psychiatric:         Behavior: Behavior normal          Thought Content:  Thought content normal          Judgment: Judgment normal          Rain Alston PA-C

## 2022-05-16 LAB
FLUAV RNA RESP QL NAA+PROBE: NEGATIVE
FLUBV RNA RESP QL NAA+PROBE: NEGATIVE
SARS-COV-2 RNA RESP QL NAA+PROBE: POSITIVE

## 2022-05-17 ENCOUNTER — PATIENT OUTREACH (OUTPATIENT)
Dept: FAMILY MEDICINE CLINIC | Facility: CLINIC | Age: 42
End: 2022-05-17

## 2022-05-17 LAB — BACTERIA THROAT CULT: NORMAL

## 2022-05-17 NOTE — PROGRESS NOTES
ADA HUITRON called [address] to inquire about patient's application  Call was transferred to a , Cheyenne Bosch  Eligibility check was completed  Insurance was verified and there is a pharmacy management plan through [employer]  Cheyenne Bosch asked for patient's [employer] ID and ADA HUITRON provided so full benefits check could be completed  ADA HUITRON informed Cheyenne Bosch that there was a denial letter received from patient's pharmacy benefits  Cheyenne Bosch requested letter  ADA HUITRON faxed denial letter to [phone]  ADA HUITRON will follow up on this within one week

## 2022-05-24 ENCOUNTER — PATIENT OUTREACH (OUTPATIENT)
Dept: FAMILY MEDICINE CLINIC | Facility: CLINIC | Age: 42
End: 2022-05-24

## 2022-05-24 NOTE — PROGRESS NOTES
ADA HUITRON outreached patient ([phone])  Patient was denied for [id number] due to having coverage under medical benefits  Patient did not answer  ADA HUITRON left message asking patient to return call

## 2022-06-21 ENCOUNTER — PATIENT MESSAGE (OUTPATIENT)
Dept: FAMILY MEDICINE CLINIC | Facility: CLINIC | Age: 42
End: 2022-06-21

## 2022-06-21 DIAGNOSIS — E10.65 TYPE 1 DIABETES MELLITUS WITH HYPERGLYCEMIA (HCC): ICD-10-CM

## 2022-06-24 ENCOUNTER — PATIENT OUTREACH (OUTPATIENT)
Dept: FAMILY MEDICINE CLINIC | Facility: CLINIC | Age: 42
End: 2022-06-24

## 2022-06-24 NOTE — PROGRESS NOTES
Patient has not returned call from one month ago  ADA HUITRON closing case at this time  ADA HUITRON will remain available for any future needs

## 2022-08-10 ENCOUNTER — APPOINTMENT (OUTPATIENT)
Dept: LAB | Facility: CLINIC | Age: 42
End: 2022-08-10
Payer: COMMERCIAL

## 2022-08-10 DIAGNOSIS — F10.21 ALCOHOL USE DISORDER, MODERATE, IN EARLY REMISSION (HCC): ICD-10-CM

## 2022-08-10 DIAGNOSIS — E06.3 HYPOTHYROIDISM DUE TO HASHIMOTO'S THYROIDITIS: ICD-10-CM

## 2022-08-10 DIAGNOSIS — E03.8 HYPOTHYROIDISM DUE TO HASHIMOTO'S THYROIDITIS: ICD-10-CM

## 2022-08-10 DIAGNOSIS — E10.65 TYPE 1 DIABETES MELLITUS WITH HYPERGLYCEMIA (HCC): ICD-10-CM

## 2022-08-10 DIAGNOSIS — D50.9 IRON DEFICIENCY ANEMIA, UNSPECIFIED IRON DEFICIENCY ANEMIA TYPE: ICD-10-CM

## 2022-08-10 DIAGNOSIS — D50.8 OTHER IRON DEFICIENCY ANEMIA: ICD-10-CM

## 2022-08-10 DIAGNOSIS — E53.8 B12 DEFICIENCY: ICD-10-CM

## 2022-08-10 DIAGNOSIS — E55.9 VITAMIN D DEFICIENCY: ICD-10-CM

## 2022-08-10 DIAGNOSIS — R53.83 FATIGUE, UNSPECIFIED TYPE: ICD-10-CM

## 2022-08-10 LAB
25(OH)D3 SERPL-MCNC: 32.2 NG/ML (ref 30–100)
ALBUMIN SERPL BCP-MCNC: 3.6 G/DL (ref 3.5–5)
ALP SERPL-CCNC: 175 U/L (ref 46–116)
ALT SERPL W P-5'-P-CCNC: 57 U/L (ref 12–78)
ANION GAP SERPL CALCULATED.3IONS-SCNC: 3 MMOL/L (ref 4–13)
AST SERPL W P-5'-P-CCNC: 63 U/L (ref 5–45)
BASOPHILS # BLD AUTO: 0.04 THOUSANDS/ΜL (ref 0–0.1)
BASOPHILS NFR BLD AUTO: 1 % (ref 0–1)
BILIRUB SERPL-MCNC: 0.47 MG/DL (ref 0.2–1)
BUN SERPL-MCNC: 7 MG/DL (ref 5–25)
CALCIUM SERPL-MCNC: 9.3 MG/DL (ref 8.3–10.1)
CHLORIDE SERPL-SCNC: 97 MMOL/L (ref 96–108)
CHOLEST SERPL-MCNC: 246 MG/DL
CO2 SERPL-SCNC: 33 MMOL/L (ref 21–32)
CREAT SERPL-MCNC: 0.69 MG/DL (ref 0.6–1.3)
EOSINOPHIL # BLD AUTO: 0.11 THOUSAND/ΜL (ref 0–0.61)
EOSINOPHIL NFR BLD AUTO: 3 % (ref 0–6)
ERYTHROCYTE [DISTWIDTH] IN BLOOD BY AUTOMATED COUNT: 13.9 % (ref 11.6–15.1)
EST. AVERAGE GLUCOSE BLD GHB EST-MCNC: 252 MG/DL
FERRITIN SERPL-MCNC: 23 NG/ML (ref 8–388)
GFR SERPL CREATININE-BSD FRML MDRD: 109 ML/MIN/1.73SQ M
GLUCOSE P FAST SERPL-MCNC: 91 MG/DL (ref 65–99)
HBA1C MFR BLD: 10.4 %
HCT VFR BLD AUTO: 42.9 % (ref 34.8–46.1)
HDLC SERPL-MCNC: 177 MG/DL
HGB BLD-MCNC: 13.7 G/DL (ref 11.5–15.4)
IMM GRANULOCYTES # BLD AUTO: 0.01 THOUSAND/UL (ref 0–0.2)
IMM GRANULOCYTES NFR BLD AUTO: 0 % (ref 0–2)
IRON SATN MFR SERPL: 34 % (ref 15–50)
IRON SERPL-MCNC: 126 UG/DL (ref 50–170)
LDLC SERPL CALC-MCNC: 56 MG/DL (ref 0–100)
LYMPHOCYTES # BLD AUTO: 1.04 THOUSANDS/ΜL (ref 0.6–4.47)
LYMPHOCYTES NFR BLD AUTO: 25 % (ref 14–44)
MAGNESIUM SERPL-MCNC: 1.9 MG/DL (ref 1.6–2.6)
MCH RBC QN AUTO: 29.8 PG (ref 26.8–34.3)
MCHC RBC AUTO-ENTMCNC: 31.9 G/DL (ref 31.4–37.4)
MCV RBC AUTO: 93 FL (ref 82–98)
MONOCYTES # BLD AUTO: 0.44 THOUSAND/ΜL (ref 0.17–1.22)
MONOCYTES NFR BLD AUTO: 11 % (ref 4–12)
NEUTROPHILS # BLD AUTO: 2.55 THOUSANDS/ΜL (ref 1.85–7.62)
NEUTS SEG NFR BLD AUTO: 60 % (ref 43–75)
NONHDLC SERPL-MCNC: 69 MG/DL
NRBC BLD AUTO-RTO: 0 /100 WBCS
PLATELET # BLD AUTO: 232 THOUSANDS/UL (ref 149–390)
PMV BLD AUTO: 10.6 FL (ref 8.9–12.7)
POTASSIUM SERPL-SCNC: 4.6 MMOL/L (ref 3.5–5.3)
PROT SERPL-MCNC: 7.4 G/DL (ref 6.4–8.4)
RBC # BLD AUTO: 4.6 MILLION/UL (ref 3.81–5.12)
SODIUM SERPL-SCNC: 133 MMOL/L (ref 135–147)
TIBC SERPL-MCNC: 375 UG/DL (ref 250–450)
TRIGL SERPL-MCNC: 63 MG/DL
TSH SERPL DL<=0.05 MIU/L-ACNC: 1.89 UIU/ML (ref 0.45–4.5)
VIT B12 SERPL-MCNC: 484 PG/ML (ref 100–900)
WBC # BLD AUTO: 4.19 THOUSAND/UL (ref 4.31–10.16)

## 2022-08-10 PROCEDURE — 82607 VITAMIN B-12: CPT

## 2022-08-10 PROCEDURE — 83036 HEMOGLOBIN GLYCOSYLATED A1C: CPT

## 2022-08-10 PROCEDURE — 83540 ASSAY OF IRON: CPT

## 2022-08-10 PROCEDURE — 83550 IRON BINDING TEST: CPT

## 2022-08-10 PROCEDURE — 80061 LIPID PANEL: CPT

## 2022-08-10 PROCEDURE — 86708 HEPATITIS A ANTIBODY: CPT

## 2022-08-10 PROCEDURE — 85025 COMPLETE CBC W/AUTO DIFF WBC: CPT

## 2022-08-10 PROCEDURE — 83735 ASSAY OF MAGNESIUM: CPT

## 2022-08-10 PROCEDURE — 86706 HEP B SURFACE ANTIBODY: CPT

## 2022-08-10 PROCEDURE — 86803 HEPATITIS C AB TEST: CPT

## 2022-08-10 PROCEDURE — 86704 HEP B CORE ANTIBODY TOTAL: CPT

## 2022-08-10 PROCEDURE — 80053 COMPREHEN METABOLIC PANEL: CPT

## 2022-08-10 PROCEDURE — 87340 HEPATITIS B SURFACE AG IA: CPT

## 2022-08-10 PROCEDURE — 82306 VITAMIN D 25 HYDROXY: CPT

## 2022-08-10 PROCEDURE — 36415 COLL VENOUS BLD VENIPUNCTURE: CPT

## 2022-08-10 PROCEDURE — 84443 ASSAY THYROID STIM HORMONE: CPT

## 2022-08-10 PROCEDURE — 82728 ASSAY OF FERRITIN: CPT

## 2022-08-11 LAB
HAV AB SER QL IA: NORMAL
HBV CORE AB SER QL: NORMAL
HBV SURFACE AB SER-ACNC: 10.35 MIU/ML
HBV SURFACE AG SER QL: NORMAL
HCV AB SER QL: NORMAL

## 2022-09-22 ENCOUNTER — OFFICE VISIT (OUTPATIENT)
Dept: GASTROENTEROLOGY | Facility: CLINIC | Age: 42
End: 2022-09-22
Payer: COMMERCIAL

## 2022-09-22 VITALS
DIASTOLIC BLOOD PRESSURE: 78 MMHG | BODY MASS INDEX: 25.12 KG/M2 | HEIGHT: 66 IN | WEIGHT: 156.3 LBS | TEMPERATURE: 97.9 F | SYSTOLIC BLOOD PRESSURE: 120 MMHG | OXYGEN SATURATION: 98 % | HEART RATE: 112 BPM | RESPIRATION RATE: 17 BRPM

## 2022-09-22 DIAGNOSIS — E10.65 TYPE 1 DIABETES MELLITUS WITH HYPERGLYCEMIA (HCC): ICD-10-CM

## 2022-09-22 DIAGNOSIS — K90.9 INTESTINAL MALABSORPTION, UNSPECIFIED TYPE: ICD-10-CM

## 2022-09-22 DIAGNOSIS — R11.0 NAUSEA: Primary | ICD-10-CM

## 2022-09-22 DIAGNOSIS — R68.81 EARLY SATIETY: ICD-10-CM

## 2022-09-22 PROCEDURE — 99214 OFFICE O/P EST MOD 30 MIN: CPT | Performed by: INTERNAL MEDICINE

## 2022-09-22 RX ORDER — LISDEXAMFETAMINE DIMESYLATE 40 MG/1
CAPSULE ORAL
COMMUNITY
Start: 2022-06-23

## 2022-09-22 RX ORDER — BUSPIRONE HYDROCHLORIDE 30 MG/1
TABLET ORAL
COMMUNITY
Start: 2022-08-28

## 2022-09-22 NOTE — PROGRESS NOTES
[facility] - Outpatient Consultation  Christin Alatorre 38 y o  female MRN: [id number]  Encounter: [id number]          ASSESSMENT AND PLAN:      1  Nausea  - NM gastric emptying; Future  - EGD; Future  2  Early satiety  - EGD; Future  3  Intestinal malabsorption, unspecified type  4  Type 1 diabetes mellitus with hyperglycemia [facility])    5year-old female with poorly controlled type 1 diabetes, status post Jg-en-Y gastric bypass, anemia, depression, anxiety presenting for follow-up regarding ongoing GI complaints with primarily nausea and GI distress after eating  Overall we again discussed that the root cause of her symptoms is very likely autonomic dysfunction and poor motility in the setting of uncontrolled diabetes  She will be working on getting this under better control I will be seeing Endocrinology in the near future  In the meantime would like to rule out contributing causes to her symptoms  Given her history of gastric bypass and nausea, will plan for upper endoscopy to rule out any luminal abnormalities including marginal ulcerations or stricturing  Will also plan for gastric emptying study but did discuss with the patient that this is sometimes difficult to interpret after bypass  More suspicious that she likely has overall poor GI motility  Given to risk factors for small intestinal bacterial overgrowth, will plan for breath test to rule this out      Follow-up after procedure  ______________________________________________________________________    HPI:  Ms Hannah Long is a 46-year-old female with type 1 diabetes uncontrolled most recent hemoglobin A1c of 10, status post Jg-en-Y gastric bypass, anemia, depression and anxiety, presenting for follow-up regarding ongoing GI complaints including nausea, generalized GI distress after meals, and fluctuating bowel habits        Since last office visit she continues to generally feel unwell and is nervous to eat as she notes this will cause nausea and GI upset  Her bowel habits fluctuate with symptoms loose stools  She is curious if she has gastroparesis  Summary of HPI:  She was 1st seen in October 2021  Her symptoms had been slow in onset but worsened over the past year  This correlated to increased anxiety as well as worsening in her control of diabetes  She notes that no matter what she eats she can have symptoms including nausea, mild abdominal pain, and diarrhea  Diarrhea is the most distressing complaint to her as she has frequent bowel movements most commonly during the day but does occasionally have nocturnal stools  She can eat something and after went to the bathroom very quickly which causes distress  She denies seeing blood in her stool  She denies having days of constipation or hard stools  She has not had any changes to her medications since the time of onset of her symptoms  She had her gallbladder removed several years ago and did not notice any diarrhea that started after    CT chest/abdomen/pelvis  from May 2021 personally reviewed by me which demonstrates increased stool burden and postsurgical changes following gastric bypass  There was a question of small bowel intussusception but likely transient  REVIEW OF SYSTEMS:    CONSTITUTIONAL: Denies any fever, chills, rigors, and weight loss  HEENT: Denies odynophagia, tinnitus  CARDIOVASCULAR: No chest pain or palpitations  RESPIRATORY: Denies any cough, hemoptysis, shortness of breath or dyspnea on exertion  GASTROINTESTINAL: As noted in the History of Present Illness  GENITOURINARY: No problems with urination  Denies any hematuria or dysuria  NEUROLOGIC: No dizziness or vertigo, denies headaches  MUSCULOSKELETAL: Denies any muscle or joint pain  SKIN: Denies skin rashes or itching  ENDOCRINE:  Denies intolerance to heat or cold  PSYCHOSOCIAL: Denies depression or anxiety  Denies any recent memory loss         Historical Information Past Medical History:   Diagnosis Date    Anemia     Anxiety     B12 deficiency     Cervical disc disorder     On gabapentin     Depression     Disease of thyroid gland     hypothyroid    Iron deficiency anemia     Panic attack     PTSD (post-traumatic stress disorder)     Sleep difficulties     Substance abuse ([facility] )     Type 1 diabetes ([facility] )     Vitamin D deficiency      Past Surgical History:   Procedure Laterality Date    ABDOMINAL SURGERY      gastric bypass     SECTION      x2    CHOLECYSTECTOMY  2018    GASTRIC BYPASS  2007    INTRAUTERINE DEVICE INSERTION  2015    IR BIOPSY BONE MARROW  2020    OTHER SURGICAL HISTORY      surgery for imperforate hymen/endometriosis/hydrometrocolpos    TUBAL LIGATION      WISDOM TOOTH EXTRACTION       Social History   Social History     Substance and Sexual Activity   Alcohol Use Not Currently    Comment: sober since 2021 - on probation with random screenings     Social History     Substance and Sexual Activity   Drug Use No     Social History     Tobacco Use   Smoking Status Never Smoker   Smokeless Tobacco Never Used     Family History   Problem Relation Age of Onset    Thyroid disease Mother     URIEL disease Mother     Hyperlipidemia Mother     Hypertension Mother     Osteoarthritis Mother     Thyroid disease unspecified Mother     Diabetes Father     Alcohol abuse Father     Diabetes type I Father     Thyroid disease Sister     Depression Sister     Thyroid disease unspecified Sister     Anxiety disorder Sister     Heart disease Maternal Grandmother     Hypertension Maternal Grandmother     Arthritis Maternal Grandmother     Diabetes type I Maternal Uncle     Diabetes type I Maternal Grandfather        Meds/Allergies       Current Outpatient Medications:     acetaminophen (TYLENOL) 500 mg tablet    ALPRAZolam (XANAX) 0 5 mg tablet    gabapentin (NEURONTIN) 600 MG tablet    glucose blood (Accu-Chek Guide) test strip    insulin aspart (NovoLOG) 100 units/mL injection    levothyroxine (Synthroid) 25 mcg tablet    levothyroxine 200 mcg tablet    PATIENT MAINTAINED INSULIN PUMP    traZODone (DESYREL) 100 mg tablet    venlafaxine (EFFEXOR-XR) 150 mg 24 hr capsule    Accu-Chek FastClix Lancets MISC    busPIRone (BUSPAR) 30 MG tablet    cloNIDine (Catapres) 0 1 mg tablet    Cyanocobalamin (B-12 PO)    ergocalciferol (VITAMIN D2) 50,000 units    escitalopram (LEXAPRO) 20 mg tablet    insulin glargine (Lantus) 100 units/mL subcutaneous injection    Insulin Pen Needle (BD Pen Needle Deanna U/F) 32G X 4 MM MISC    Insulin Syringe-Needle U-100 (INSULIN SYRINGE 1CC/28G) 28G X 1/2" 1 ML MISC    naltrexone (REVIA) 50 mg tablet    Naltrexone (Vivitrol) 380 MG SUSR    ofloxacin (OCUFLOX) 0 3 % ophthalmic solution    ondansetron (ZOFRAN) 4 mg tablet    prednisoLONE acetate (PRED FORTE) 1 % ophthalmic suspension    selenium 200 mcg    vitamin B-12 (VITAMIN B-12) 500 mcg tablet    Vyvanse 40 MG capsule    No Known Allergies        Objective     Blood pressure 120/78, pulse (!) 112, temperature 97 9 °F (36 6 °C), resp  rate 17, height 5' 6" (1 676 m), weight 70 9 kg (156 lb 4 8 oz), SpO2 98 %, not currently breastfeeding  Body mass index is 25 23 kg/m²  PHYSICAL EXAM:      General Appearance:   Alert, cooperative, no distress   HEENT:   Normocephalic, atraumatic, anicteric  Neck:  Supple, symmetrical, trachea midline   Lungs:   Clear to auscultation bilaterally; no rales, rhonchi or wheezing; respirations unlabored    Heart[de-identified]   Regular rate and rhythm; no murmur, rub, or gallop     Abdomen:   Soft, non-tender, non-distended; normal bowel sounds; no masses, no organomegaly    Genitalia:   Deferred    Rectal:   Deferred    Extremities:  No cyanosis, clubbing or edema    Pulses:  2+ and symmetric    Skin:  No jaundice, rashes, or lesions          Lab Results:   No visits with results within 1 Day(s) from this visit  Latest known visit with results is:   Appointment on 08/10/2022   Component Date Value    WBC 08/10/2022 4 19 (A)    RBC 08/10/2022 4 60     Hemoglobin 08/10/2022 13 7     Hematocrit 08/10/2022 42 9     MCV 08/10/2022 93     MCH 08/10/2022 29 8     MCHC 08/10/2022 31 9     RDW 08/10/2022 13 9     MPV 08/10/2022 10 6     Platelets 47/47/2913 232     nRBC 08/10/2022 0     Neutrophils Relative 08/10/2022 60     Immat GRANS % 08/10/2022 0     Lymphocytes Relative 08/10/2022 25     Monocytes Relative 08/10/2022 11     Eosinophils Relative 08/10/2022 3     Basophils Relative 08/10/2022 1     Neutrophils Absolute 08/10/2022 2 55     Immature Grans Absolute 08/10/2022 0 01     Lymphocytes Absolute 08/10/2022 1 04     Monocytes Absolute 08/10/2022 0 44     Eosinophils Absolute 08/10/2022 0 11     Basophils Absolute 08/10/2022 0 04     Sodium 08/10/2022 133 (A)    Potassium 08/10/2022 4 6     Chloride 08/10/2022 97     CO2 08/10/2022 33 (A)    ANION GAP 08/10/2022 3 (A)    BUN 08/10/2022 7     Creatinine 08/10/2022 0 69     Glucose, Fasting 08/10/2022 91     Calcium 08/10/2022 9 3     AST 08/10/2022 63 (A)    ALT 08/10/2022 57     Alkaline Phosphatase 08/10/2022 175 (A)    Total Protein 08/10/2022 7 4     Albumin 08/10/2022 3 6     Total Bilirubin 08/10/2022 0 47     eGFR 08/10/2022 109     Magnesium 08/10/2022 1 9     Vitamin B-12 08/10/2022 484     TSH 3RD GENERATON 08/10/2022 1 890     Vit D, 25-Hydroxy 08/10/2022 32 2     Hepatitis C Ab 08/10/2022 Non-reactive     Hep B S Ab 08/10/2022 10 35     Hepatitis B Surface Ag 08/10/2022 Non-reactive     Hep B Core Total Ab 08/10/2022 Non-reactive     Hep A Total Ab 08/10/2022 Non-reactive     Hemoglobin A1C 08/10/2022 10 4 (A)    EAG 08/10/2022 252     Iron Saturation 08/10/2022 34     TIBC 08/10/2022 375     Iron 08/10/2022 126     Ferritin 08/10/2022 23          Radiology Results:   No results found

## 2022-10-12 PROBLEM — E10.10 DIABETIC KETOACIDOSIS WITHOUT COMA ASSOCIATED WITH TYPE 1 DIABETES MELLITUS (HCC): Status: RESOLVED | Noted: 2018-03-20 | Resolved: 2022-10-12

## 2022-10-19 ENCOUNTER — TELEPHONE (OUTPATIENT)
Dept: SURGERY | Facility: CLINIC | Age: 42
End: 2022-10-19

## 2022-10-19 NOTE — TELEPHONE ENCOUNTER
Patient called to cancel her appointment tomorrow with you, she is currently in the hospital at [facility] for DKA  Sending as an FYI   She is going to call to reschedule when she is out of the hospital

## 2022-11-02 ENCOUNTER — APPOINTMENT (OUTPATIENT)
Dept: LAB | Facility: CLINIC | Age: 42
End: 2022-11-02

## 2022-11-02 ENCOUNTER — OFFICE VISIT (OUTPATIENT)
Dept: FAMILY MEDICINE CLINIC | Facility: CLINIC | Age: 42
End: 2022-11-02

## 2022-11-02 ENCOUNTER — APPOINTMENT (OUTPATIENT)
Dept: RADIOLOGY | Facility: CLINIC | Age: 42
End: 2022-11-02

## 2022-11-02 VITALS
DIASTOLIC BLOOD PRESSURE: 86 MMHG | HEIGHT: 66 IN | WEIGHT: 152 LBS | BODY MASS INDEX: 24.43 KG/M2 | TEMPERATURE: 98.1 F | HEART RATE: 99 BPM | OXYGEN SATURATION: 99 % | SYSTOLIC BLOOD PRESSURE: 136 MMHG

## 2022-11-02 DIAGNOSIS — E06.3 HYPOTHYROIDISM DUE TO HASHIMOTO'S THYROIDITIS: ICD-10-CM

## 2022-11-02 DIAGNOSIS — F10.20 ALCOHOL USE DISORDER, MODERATE, DEPENDENCE (HCC): ICD-10-CM

## 2022-11-02 DIAGNOSIS — R78.81 E COLI BACTEREMIA: ICD-10-CM

## 2022-11-02 DIAGNOSIS — E10.65 TYPE 1 DIABETES MELLITUS WITH HYPERGLYCEMIA (HCC): ICD-10-CM

## 2022-11-02 DIAGNOSIS — B96.20 E COLI BACTEREMIA: ICD-10-CM

## 2022-11-02 DIAGNOSIS — A41.51 SEPSIS DUE TO ESCHERICHIA COLI, UNSPECIFIED WHETHER ACUTE ORGAN DYSFUNCTION PRESENT (HCC): Primary | ICD-10-CM

## 2022-11-02 DIAGNOSIS — J18.9 PNEUMONIA OF LEFT LOWER LOBE DUE TO INFECTIOUS ORGANISM: ICD-10-CM

## 2022-11-02 DIAGNOSIS — E03.8 HYPOTHYROIDISM DUE TO HASHIMOTO'S THYROIDITIS: ICD-10-CM

## 2022-11-02 DIAGNOSIS — D50.9 IRON DEFICIENCY ANEMIA, UNSPECIFIED IRON DEFICIENCY ANEMIA TYPE: ICD-10-CM

## 2022-11-02 DIAGNOSIS — D50.9 IRON DEFICIENCY ANEMIA, UNSPECIFIED IRON DEFICIENCY ANEMIA TYPE: Chronic | ICD-10-CM

## 2022-11-02 DIAGNOSIS — A41.51 SEPSIS DUE TO ESCHERICHIA COLI, UNSPECIFIED WHETHER ACUTE ORGAN DYSFUNCTION PRESENT (HCC): ICD-10-CM

## 2022-11-02 LAB
ALBUMIN SERPL BCP-MCNC: 3.2 G/DL (ref 3.5–5)
ALP SERPL-CCNC: 175 U/L (ref 46–116)
ALT SERPL W P-5'-P-CCNC: 24 U/L (ref 12–78)
ANION GAP SERPL CALCULATED.3IONS-SCNC: 3 MMOL/L (ref 4–13)
AST SERPL W P-5'-P-CCNC: 24 U/L (ref 5–45)
BASOPHILS # BLD AUTO: 0.09 THOUSANDS/ÂΜL (ref 0–0.1)
BASOPHILS NFR BLD AUTO: 2 % (ref 0–1)
BILIRUB SERPL-MCNC: 0.45 MG/DL (ref 0.2–1)
BUN SERPL-MCNC: 10 MG/DL (ref 5–25)
CALCIUM ALBUM COR SERPL-MCNC: 10.4 MG/DL (ref 8.3–10.1)
CALCIUM SERPL-MCNC: 9.8 MG/DL (ref 8.3–10.1)
CHLORIDE SERPL-SCNC: 103 MMOL/L (ref 96–108)
CO2 SERPL-SCNC: 30 MMOL/L (ref 21–32)
CREAT SERPL-MCNC: 0.93 MG/DL (ref 0.6–1.3)
EOSINOPHIL # BLD AUTO: 0.08 THOUSAND/ÂΜL (ref 0–0.61)
EOSINOPHIL NFR BLD AUTO: 2 % (ref 0–6)
ERYTHROCYTE [DISTWIDTH] IN BLOOD BY AUTOMATED COUNT: 12.6 % (ref 11.6–15.1)
FERRITIN SERPL-MCNC: 31 NG/ML (ref 8–388)
GFR SERPL CREATININE-BSD FRML MDRD: 77 ML/MIN/1.73SQ M
GLUCOSE P FAST SERPL-MCNC: 101 MG/DL (ref 65–99)
HCT VFR BLD AUTO: 38.9 % (ref 34.8–46.1)
HGB BLD-MCNC: 12 G/DL (ref 11.5–15.4)
IMM GRANULOCYTES # BLD AUTO: 0.01 THOUSAND/UL (ref 0–0.2)
IMM GRANULOCYTES NFR BLD AUTO: 0 % (ref 0–2)
IRON SATN MFR SERPL: 10 % (ref 15–50)
IRON SERPL-MCNC: 49 UG/DL (ref 50–170)
LYMPHOCYTES # BLD AUTO: 1.36 THOUSANDS/ÂΜL (ref 0.6–4.47)
LYMPHOCYTES NFR BLD AUTO: 31 % (ref 14–44)
MCH RBC QN AUTO: 28.8 PG (ref 26.8–34.3)
MCHC RBC AUTO-ENTMCNC: 30.8 G/DL (ref 31.4–37.4)
MCV RBC AUTO: 94 FL (ref 82–98)
MONOCYTES # BLD AUTO: 0.47 THOUSAND/ÂΜL (ref 0.17–1.22)
MONOCYTES NFR BLD AUTO: 11 % (ref 4–12)
NEUTROPHILS # BLD AUTO: 2.32 THOUSANDS/ÂΜL (ref 1.85–7.62)
NEUTS SEG NFR BLD AUTO: 54 % (ref 43–75)
NRBC BLD AUTO-RTO: 0 /100 WBCS
PLATELET # BLD AUTO: 633 THOUSANDS/UL (ref 149–390)
PMV BLD AUTO: 9.4 FL (ref 8.9–12.7)
POTASSIUM SERPL-SCNC: 4.4 MMOL/L (ref 3.5–5.3)
PROT SERPL-MCNC: 8.9 G/DL (ref 6.4–8.4)
RBC # BLD AUTO: 4.16 MILLION/UL (ref 3.81–5.12)
SODIUM SERPL-SCNC: 136 MMOL/L (ref 135–147)
TIBC SERPL-MCNC: 482 UG/DL (ref 250–450)
TSH SERPL DL<=0.05 MIU/L-ACNC: 3.77 UIU/ML (ref 0.45–4.5)
WBC # BLD AUTO: 4.33 THOUSAND/UL (ref 4.31–10.16)

## 2022-11-02 RX ORDER — NALTREXONE HYDROCHLORIDE 50 MG/1
100 TABLET, FILM COATED ORAL DAILY
Qty: 60 TABLET | Refills: 3 | Status: SHIPPED | OUTPATIENT
Start: 2022-11-02

## 2022-11-02 RX ORDER — FLASH GLUCOSE SCANNING READER
1 EACH MISCELLANEOUS DAILY
Qty: 1 EACH | Refills: 0 | Status: SHIPPED | OUTPATIENT
Start: 2022-11-02

## 2022-11-02 RX ORDER — FLASH GLUCOSE SENSOR
1 KIT MISCELLANEOUS
Qty: 2 EACH | Refills: 3 | Status: SHIPPED | OUTPATIENT
Start: 2022-11-02

## 2022-11-02 NOTE — PROGRESS NOTES
Assessment & Plan     1  Sepsis due to Escherichia coli, unspecified whether acute organ dysfunction present ([facility] )  -pt initially presented with fevers, headache, photophobia and myalgias  She was evaluated for possible meningitis, which was negative  Blood cultures grew E coli according to notes, with possible urinary source, despite no symptoms of UTI  CXR also showed possible LLL pneumonia, she did have dyspnea, cough and pleuritic chest pain  She was treated with zosyn and augmentin on discharge  She is feeling better but not back to baseline  Recommend labs today, repeat CXR in 1 month  Follow up in 2 weeks to assess whether fit to return to work  - CBC and differential; Future; Expected date: 05/02/2023  -     Comprehensive metabolic panel; Future    2  Type 1 diabetes mellitus with hyperglycemia ([facility] )  -pt states that she's been off her pump since 10/12 and initial admission  She is using basal-bolus with good control  She will reschedule with endocrine, also interested in CGM  -   CBC and differential; Future; Expected date: 05/02/2023  -     Comprehensive metabolic panel; Future  -     Ambulatory Referral to Social Work Care Management Program; Future  -     Continuous Blood Gluc  (FreeStyle Samantha 14 Day [place]) KELVIN; Use 1 Units in the morning  -     Continuous Blood Gluc Sensor (FreeStyle Samantha 14 Day Sensor) MISC; Use 1 Units every 14 (fourteen) days    3  Hypothyroidism due to Hashimoto's thyroiditis  -recent TSH 14 but likely due to acute illness, will repeat at this time       -  TSH, 3rd generation with Free T4 reflex; Future    4  E coli bacteremia  -see above, suspected urinary source  -   CBC and differential; Future; Expected date: 05/02/2023  -     Comprehensive metabolic panel; Future    5   Iron deficiency anemia, unspecified iron deficiency anemia type  -Hgb 9 1 on discharge, she has history of gastric bypass surgery, report fatigue lately, will check CBC and iron panel -    CBC and differential; Future; Expected date: 05/02/2023  -     Comprehensive metabolic panel; Future  -     Iron Panel (Includes Ferritin, Iron Sat%, Iron, and TIBC); Future    6  Pneumonia of left lower lobe due to infectious organism  -  Portable CXR on 10/20 showed possible LLL opacity, possible aletectasis or effusion  She did have cough, dyspnea which has resolved  Will repeat CXR for monitoring  CT chest on 10/19 showed no evidence of pneumonia  -  CBC and differential; Future; Expected date: 05/02/2023  -     Comprehensive metabolic panel; Future  -     XR chest pa & lateral; Future; Expected date: 11/02/2022    7  Alcohol use disorder, moderate, dependence ([facility] )  -she is interested in resuming naltrexone, advised she can take 100mg prn weekends or when she has cravings  -  CBC and differential; Future; Expected date: 05/02/2023  -     Comprehensive metabolic panel; Future  -     naltrexone (REVIA) 50 mg tablet; Take 2 tablets (100 mg total) by mouth daily  -     Ambulatory Referral to Social Work Care Management Program; Future         Subjective     Transitional Care Management Review:   Ladi Ricketts is a 38 y o  female here for TCM follow up  During the TCM phone call patient stated:  TCM Call     Date and time call was made  6/3/2021  3:23 PM    Patient was hospitialized at  [facility] - [facility]    Date of Admission  05/23/21    Date of discharge  05/25/21    Diagnosis  Diabetic ketoacidosis without coma assc with type 1 diabetes mellitus    Disposition  Home    Current Symptoms  None      TCM Call     Post hospital issues  None    Should patient be enrolled in anticoag monitoring? No    Scheduled for follow up?   Yes    Did you obtain your prescribed medications  No    Why were you unable to obtain your medications  no new medication    Do you need help managing your prescriptions or medications  No    Is transportation to your appointment needed  No    I have advised the patient to call PCP with any new or worsening symptoms  Amy Garcialeeanna MR    Living Arrangements  Spouse or Significiant other    Support System  Friends    The type of support provided  Emotional; Physical; Other (comment)    Do you have social support  Yes, as much as I need        44yo female with type 1 DM, hypothyroidism, history of gastric bypass, anxiety/depression, alcohol use disorder here for hospital follow up  Pt was admitted initially on 10/12 because she wasn't feeling well and thought she was in DKA  She states that her pump was removed in ER but wasn't started on insulin gtt for over 2 hours, and ultimately developed DKA  She was discharged 10/14 on basal-bolus insulin  She reports that she still felt weak, nauseated and headaches over the weekend  Returned to ER 10/17 and admitted with sepsis  There was concerned for meningitis due to fever, headache and photophobia  She had head CT, MRI and LP which were normal  Blood cultures grew E coli, suspected urinary source  Pt denies any UTI symptoms but did report poor fluid intake at work and infrequent bathroom breaks  CXR also showed LLL pneumonia, viral testing was negative  She was treated with IV antibiotics and discharged on Augmentin  At this time she is feeling better but very tired and falls asleep easily  Cough improved, still struggles to take a deep breath  Headache and myalgias also improved  Appetite remains poor, has a lot of nausea and had to cancel EGD due to being hospitalized  She does feel that she may have gastroparesis, currently supplementing with protein shakes  Alcohol abuse: hasn't had a drink in a month, reports that she mainly drinks to relax when feeling stressed or overwhelmed  Wants to try Revia again, at higher dose  Couldn't afford $500 co-pay for vivitrol  Review of Systems   Constitutional: Positive for appetite change, fatigue and unexpected weight change  Negative for chills and fever     Eyes: Negative for photophobia and visual disturbance  Respiratory: Positive for cough and shortness of breath  Gastrointestinal: Positive for nausea  Negative for abdominal pain, diarrhea and vomiting  Genitourinary: Positive for decreased urine volume  Negative for difficulty urinating, dysuria and frequency  Musculoskeletal: Positive for back pain and myalgias  Neurological: Positive for headaches  Psychiatric/Behavioral: The patient is nervous/anxious  Objective     /86   Pulse 99   Temp 98 1 °F (36 7 °C)   Ht 5' 6" (1 676 m)   Wt 68 9 kg (152 lb)   SpO2 99%   BMI 24 53 kg/m²      Physical Exam  Vitals reviewed  Constitutional:       General: She is not in acute distress  Appearance: She is normal weight  Cardiovascular:      Rate and Rhythm: Normal rate and regular rhythm  Heart sounds: No murmur heard  No friction rub  No gallop  Pulmonary:      Effort: Pulmonary effort is normal  No accessory muscle usage  Breath sounds: No wheezing, rhonchi or rales  Abdominal:      General: Abdomen is flat  Bowel sounds are normal       Palpations: Abdomen is soft  Tenderness: There is no abdominal tenderness  There is no guarding or rebound  Neurological:      General: No focal deficit present  Mental Status: She is alert  Motor: Motor function is intact  Gait: Gait is intact  Psychiatric:         Mood and Affect: Mood and affect normal          Speech: Speech normal          Behavior: Behavior normal  Behavior is cooperative                Ellen Espino MD

## 2022-11-02 NOTE — PATIENT INSTRUCTIONS
Please get blood work done at this time, get chest xray in 1 month to follow up pneumonia  Ok to take Revia 2 tabs on weekends or as needed for cravings

## 2022-11-02 NOTE — LETTER
November 2, 2022     Patient: Ysabel Xie  YOB: 1980  Date of Visit: 11/2/2022      To Whom it May Concern:    Ramiro Kee is under my professional care  Yana Combs was seen in my office on 11/2/2022  Yana Combs may return to work on 11/17/22  If you have any questions or concerns, please don't hesitate to call           Sincerely,          Isaiah Martinez MD        CC: No Recipients

## 2022-11-03 ENCOUNTER — PATIENT OUTREACH (OUTPATIENT)
Dept: FAMILY MEDICINE CLINIC | Facility: CLINIC | Age: 42
End: 2022-11-03

## 2022-11-03 NOTE — PROGRESS NOTES
ADA HUITRON reviewed chart  Patient referred by PCP d/t need for assistance with reducing the cost of insulin  ADA HUITRON outreached patient to discuss needs  Patient did not answer  ADA HUITRON left message asking patient to return call

## 2022-11-04 DIAGNOSIS — E10.65 TYPE 1 DIABETES MELLITUS WITH HYPERGLYCEMIA (HCC): Primary | ICD-10-CM

## 2022-11-04 RX ORDER — FLASH GLUCOSE SCANNING READER
EACH MISCELLANEOUS
Qty: 1 EACH | Refills: 3 | Status: SHIPPED | OUTPATIENT
Start: 2022-11-04

## 2022-11-04 RX ORDER — FLASH GLUCOSE SENSOR
KIT MISCELLANEOUS
Qty: 2 EACH | Refills: 3 | Status: SHIPPED | OUTPATIENT
Start: 2022-11-04

## 2022-11-04 NOTE — TELEPHONE ENCOUNTER
[facility] called today  They do not have the freestyle [place] in stock  Wondering if this script can be changed to [employer], for the reader and the sensor  Please advise  Monday is okay, pharmacy aware you are out of the office

## 2022-11-07 ENCOUNTER — TELEPHONE (OUTPATIENT)
Dept: FAMILY MEDICINE CLINIC | Facility: CLINIC | Age: 42
End: 2022-11-07

## 2022-11-07 DIAGNOSIS — E61.1 IRON DEFICIENCY: ICD-10-CM

## 2022-11-07 DIAGNOSIS — E10.43 DIABETIC GASTROPARESIS ASSOCIATED WITH TYPE 1 DIABETES MELLITUS (HCC): ICD-10-CM

## 2022-11-07 DIAGNOSIS — R11.0 NAUSEA IN ADULT: ICD-10-CM

## 2022-11-07 DIAGNOSIS — K31.84 DIABETIC GASTROPARESIS ASSOCIATED WITH TYPE 1 DIABETES MELLITUS (HCC): ICD-10-CM

## 2022-11-07 DIAGNOSIS — Z98.84 H/O GASTRIC BYPASS: Primary | ICD-10-CM

## 2022-11-07 RX ORDER — ONDANSETRON 4 MG/1
4 TABLET, FILM COATED ORAL EVERY 8 HOURS PRN
Qty: 30 TABLET | Refills: 1 | Status: SHIPPED | OUTPATIENT
Start: 2022-11-07

## 2022-11-07 NOTE — TELEPHONE ENCOUNTER
How are her blood sugars? Any signs/symptoms of DKA? If not, we can send zofran and get her in with hematology to discuss IV iron

## 2022-11-07 NOTE — TELEPHONE ENCOUNTER
Patient called with complaints of dizziness, nausea, fatigue, exhaustion  Attributes this to her Iron, has had issues in the past and went for iron infusions  Is asking if this is something you could help her with and if she could get help getting set up with infusions again  Also is asking for zofran to help her with the nausea  Please advise

## 2022-11-07 NOTE — TELEPHONE ENCOUNTER
Matilda stated that her blood sugars are 100-150s which is perfect and she denies any signs of DKA and feels like she needs iron infusions as she has felt this way before and the iron infusions did help   She is agreeable to the [facility] for now,     [facility],   No Allergies confirmed,

## 2022-11-08 ENCOUNTER — VBI (OUTPATIENT)
Dept: ADMINISTRATIVE | Facility: OTHER | Age: 42
End: 2022-11-08

## 2022-11-15 ENCOUNTER — PATIENT OUTREACH (OUTPATIENT)
Dept: FAMILY MEDICINE CLINIC | Facility: CLINIC | Age: 42
End: 2022-11-15

## 2022-11-15 DIAGNOSIS — E10.65 TYPE 1 DIABETES MELLITUS WITH HYPERGLYCEMIA (HCC): ICD-10-CM

## 2022-11-15 RX ORDER — PEN NEEDLE, DIABETIC 32GX 5/32"
NEEDLE, DISPOSABLE MISCELLANEOUS
Qty: 100 EACH | Refills: 11 | Status: SHIPPED | OUTPATIENT
Start: 2022-11-15

## 2022-11-15 NOTE — LETTER
[id number] [facility] [id number]  [phone]    Re: Js Pavon to Reach   11/15/2022       Dear Eli Lucero am a [address] [facility] [address] [facility] Work  and wanted to be certain you had information to contact me should you desire assistance with or have questions about non-medical aspects of your care such as [but not limited to] medical insurance, housing, transportation, material needs, or emergency needs  If I do not have an answer I will assist you in finding the appropriate agency or individual who can help  Please feel free to contact me at [phone]  Thank You      Sincerely,         Nupur Issa LCSW

## 2022-11-15 NOTE — PROGRESS NOTES
ADA HUITRON made second outreach attempt to patient via telephone  Patient did not answer  ADA HUITRON left message asking patient to return call  Unable to reach letter will be sent  ADA HUITRON will close referral and remain available for any future needs

## 2022-11-15 NOTE — TELEPHONE ENCOUNTER
Patient requesting refill(s) of:  Insulin Pen Needle 32G x 4MM    Last filled: Historical Provider  Last appt: 11/2/2022  Next appt: 11/16/2022  Pharm[facility]: [facility]

## 2022-11-15 NOTE — TELEPHONE ENCOUNTER
PA needed for [facility] 2 [place] Device  Submitted on CoverMyMeds       Key [id number]

## 2022-11-16 ENCOUNTER — OFFICE VISIT (OUTPATIENT)
Dept: FAMILY MEDICINE CLINIC | Facility: CLINIC | Age: 42
End: 2022-11-16

## 2022-11-16 VITALS
BODY MASS INDEX: 24.94 KG/M2 | HEIGHT: 66 IN | WEIGHT: 155.2 LBS | HEART RATE: 119 BPM | DIASTOLIC BLOOD PRESSURE: 98 MMHG | RESPIRATION RATE: 18 BRPM | SYSTOLIC BLOOD PRESSURE: 158 MMHG | TEMPERATURE: 97.8 F | OXYGEN SATURATION: 99 %

## 2022-11-16 DIAGNOSIS — E55.9 VITAMIN D DEFICIENCY: ICD-10-CM

## 2022-11-16 DIAGNOSIS — E06.3 HYPOTHYROIDISM DUE TO HASHIMOTO'S THYROIDITIS: ICD-10-CM

## 2022-11-16 DIAGNOSIS — B96.20 E COLI BACTEREMIA: ICD-10-CM

## 2022-11-16 DIAGNOSIS — E03.8 HYPOTHYROIDISM DUE TO HASHIMOTO'S THYROIDITIS: ICD-10-CM

## 2022-11-16 DIAGNOSIS — M79.10 MYALGIA: ICD-10-CM

## 2022-11-16 DIAGNOSIS — E10.65 TYPE 1 DIABETES MELLITUS WITH HYPERGLYCEMIA (HCC): Primary | ICD-10-CM

## 2022-11-16 DIAGNOSIS — F10.21 ALCOHOL USE DISORDER, MODERATE, IN EARLY REMISSION (HCC): ICD-10-CM

## 2022-11-16 DIAGNOSIS — R42 LIGHTHEADEDNESS: ICD-10-CM

## 2022-11-16 DIAGNOSIS — R78.81 E COLI BACTEREMIA: ICD-10-CM

## 2022-11-16 DIAGNOSIS — D50.9 IRON DEFICIENCY ANEMIA, UNSPECIFIED IRON DEFICIENCY ANEMIA TYPE: Chronic | ICD-10-CM

## 2022-11-16 DIAGNOSIS — R74.8 ELEVATED LIVER ENZYMES: ICD-10-CM

## 2022-11-16 DIAGNOSIS — R53.82 CHRONIC FATIGUE: ICD-10-CM

## 2022-11-16 DIAGNOSIS — B35.3 TINEA PEDIS OF BOTH FEET: ICD-10-CM

## 2022-11-16 PROBLEM — E87.8 ELECTROLYTE ABNORMALITY: Status: RESOLVED | Noted: 2018-12-27 | Resolved: 2022-11-16

## 2022-11-16 PROBLEM — Z01.419 ENCOUNTER FOR GYNECOLOGICAL EXAMINATION WITHOUT ABNORMAL FINDING: Status: RESOLVED | Noted: 2017-03-30 | Resolved: 2022-11-16

## 2022-11-16 LAB — SL AMB POCT HEMOGLOBIN AIC: 9.2 (ref ?–6.5)

## 2022-11-16 RX ORDER — KETOCONAZOLE 20 MG/G
CREAM TOPICAL DAILY
Qty: 60 G | Refills: 1 | Status: SHIPPED | OUTPATIENT
Start: 2022-11-16

## 2022-11-16 NOTE — LETTER
November 16, 2022     Patient: Vanesa Webster  YOB: 1980  Date of Visit: 11/16/2022      To Whom it May Concern:    Asif Munson is under my professional care  Rick Alvarado was seen in my office on 11/16/2022  Clarissa's return to work is dependent on further testing and specialist evaluations  If you have any questions or concerns, please don't hesitate to call           Sincerely,          Sarah Barron MD        CC: No Recipients

## 2022-11-16 NOTE — PROGRESS NOTES
[facility]        NAME: Berhane Joseph is a 38 y o  female  : 1980    MRN: [id number]  DATE: 2022  TIME: 10:52 AM    Assessment and Plan   1  Type 1 diabetes mellitus with hyperglycemia (HCC)  -her A1c today is elevated at 9 2  This could be playing a part in her fatigue, body aches  Advised her to schedule follow up with Endocrine, needs urine microalbumin  Diabetic foot done today  -    Microalbumin / creatinine urine ratio; Future  -     POCT hemoglobin A1c  -     Cortisol Level, AM Specimen; Future  -     Hepatic function panel; Future    2  Alcohol use disorder, moderate, in early remission ([facility] [address] )  -she reports this is stable, no recent binge drinking  Using naltrexone for cravings    -   Hepatic function panel; Future    3  E coli bacteremia  -suspected urinary source  She is still experiencing fatigue, nausea, body aches  -  CBC and differential; Future    4  Vitamin D deficiency  -     Vitamin D 25 hydroxy; Future  -     ergocalciferol (VITAMIN D2) 50,000 units; Take 1 capsule (50,000 Units total) by mouth once a week    5  Iron deficiency anemia, unspecified iron deficiency anemia type  -     CBC and differential; Future    6  Lightheadedness  -     Cortisol Level, AM Specimen; Future  -     CBC and differential; Future    7  Chronic fatigue  -     Cortisol Level, AM Specimen; Future  -     CBC and differential; Future    8  Myalgia  -     CK (with reflex to MB); Future  -     CBC and differential; Future    9  Hypothyroidism due to Hashimoto's thyroiditis  -     T4, free; Future  -     T3; Future    10  Elevated liver enzymes  -     Hepatic function panel; Future    11   Tinea pedis of both feet  -     ketoconazole (NIZORAL) 2 % cream; Apply topically daily             Chief Complaint     Chief Complaint   Patient presents with   • Follow-up         History of Present Illness       44yo female with type 1 diabetes, hypothyroidism, history of gastric bypass here for 2 week follow up, possible return to work  Still feeling extremely tired, fatigues easily, body aches, palpitations, chest tightness and nausea  She is using zofran with some relief, as well as following gastroparesis diet  BG have been elevated (300 recently)  Does not have endocrine appointment scheduled but plans to do this after today's visit  Denies symptoms of DKA  Tries to stay hydrated, drinking 4 -6 glasses of water daily  Hasn't heard from hematology regarding referral for iron deficiency and IV iron  States these have helped in the past  Reports increased pica - craving ice lately  Feels very stressed and worried about finances, not working and no income  Has no PTO left and no STD coverage  Hasn't met with her counselor since before she was hospitalized due to cost  Does see psychiatrist for med management  Admits to cravings/thoughts about drinking when feeling stressed but denies alcohol intake  Review of Systems   Review of Systems   Constitutional: Positive for appetite change, fatigue and unexpected weight change  Negative for chills and fever  Respiratory: Positive for chest tightness  Negative for shortness of breath  Cardiovascular: Positive for palpitations  Negative for chest pain and leg swelling  Gastrointestinal: Positive for nausea and vomiting  Negative for abdominal pain  Genitourinary: Negative for difficulty urinating  Musculoskeletal: Positive for myalgias  Neurological: Positive for light-headedness and headaches  Psychiatric/Behavioral: Positive for dysphoric mood and sleep disturbance  The patient is nervous/anxious            Current Medications       Current Outpatient Medications:   •  acetaminophen (TYLENOL) 500 mg tablet,  , Disp: , Rfl:   •  ALPRAZolam (XANAX) 0 5 mg tablet, Take 0 5 mg by mouth daily as needed  , Disp: , Rfl:   •  busPIRone (BUSPAR) 30 MG tablet, , Disp: , Rfl:   •  Continuous Blood Gluc  (FreeStyle Samantha 14 Day [place]) KELVIN, Use 1 Units in the morning, Disp: 1 each, Rfl: 0  •  Continuous Blood Gluc  (FreeStyle Samantha 2 [place]) KELVIN, Use 1 units in the morning, Disp: 1 each, Rfl: 3  •  Continuous Blood Gluc Sensor (FreeStyle Samantha 14 Day Sensor) MISC, Use 1 Units every 14 (fourteen) days, Disp: 2 each, Rfl: 3  •  Continuous Blood Gluc Sensor (FreeStyle Samantha 2 Sensor) MISC, Use 1 unit every 14 days, Disp: 2 each, Rfl: 3  •  ergocalciferol (VITAMIN D2) 50,000 units, Take 1 capsule (50,000 Units total) by mouth once a week, Disp: 12 capsule, Rfl: 3  •  gabapentin (NEURONTIN) 600 MG tablet, Take 0 5 tablets (300 mg total) by mouth 2 (two) times a day Half Tab 300mg at bedtime, Disp: 30 tablet, Rfl: 1  •  glucose blood (Accu-Chek Guide) test strip, Use to test blood sugar up to 6 times daily  , Disp: 200 each, Rfl: 2  •  insulin aspart (NovoLOG) 100 units/mL injection, 60 Units by Subcutaneous Insulin Pump route in the morning INJECT INTO PUMP DAILY  , Disp: 30 mL, Rfl: 3  •  insulin glargine (Lantus) 100 units/mL subcutaneous injection, Inject 36 Units under the skin daily at bedtime, Disp: 10 mL, Rfl: 1  •  Insulin Pen Needle (BD Pen Needle Deanna U/F) 32G X 4 MM MISC, Use 4/day, Disp: 100 each, Rfl: 11  •  ketoconazole (NIZORAL) 2 % cream, Apply topically daily, Disp: 60 g, Rfl: 1  •  levothyroxine (Synthroid) 25 mcg tablet, Take 1 tablet (25 mcg total) by mouth daily in the early morning Combine with 200mcg, for total 225mcg daily  , Disp: 90 tablet, Rfl: 3  •  levothyroxine 200 mcg tablet, Take 1 tablet (200 mcg total) by mouth every morning, Disp: 90 tablet, Rfl: 3  •  naltrexone (REVIA) 50 mg tablet, Take 2 tablets (100 mg total) by mouth daily, Disp: 60 tablet, Rfl: 3  •  ondansetron (ZOFRAN) 4 mg tablet, Take 1 tablet (4 mg total) by mouth every 8 (eight) hours as needed for nausea or vomiting, Disp: 30 tablet, Rfl: 1  •  traZODone (DESYREL) 100 mg tablet, Take 100 mg by mouth daily at bedtime, Disp: , Rfl:   • venlafaxine (EFFEXOR-XR) 150 mg 24 hr capsule, , Disp: , Rfl:   •  Vyvanse 40 MG capsule, , Disp: , Rfl:   •  Accu-Chek FastClix Lancets MISC, USE 1 LANCET TO TEST BLOOD SUGAR UP TO 6 TIMES DAILY (Patient not taking: Reported on 11/1/2021), Disp: 102 each, Rfl: 0  •  cloNIDine (Catapres) 0 1 mg tablet, Take 1 tablet (0 1 mg total) by mouth daily at bedtime (Patient not taking: Reported on 9/22/2022), Disp: 30 tablet, Rfl: 1  •  Cyanocobalamin (B-12 PO), Take by mouth daily (Patient not taking: Reported on 9/22/2022), Disp: , Rfl:   •  escitalopram (LEXAPRO) 20 mg tablet, Take 10 mg by mouth daily Half tab daily  (Patient not taking: Reported on 9/22/2022), Disp: , Rfl:   •  Insulin Syringe-Needle U-100 (INSULIN SYRINGE 1CC/28G) 28G X 1/2" 1 ML MISC, 4 injections daily E10 65 (Patient not taking: Reported on 9/22/2022), Disp: , Rfl:   •  ofloxacin (OCUFLOX) 0 3 % ophthalmic solution, , Disp: , Rfl:   •  PATIENT MAINTAINED INSULIN PUMP, Inject 1 each under the skin every 8 (eight) hours (Patient not taking: Reported on 11/16/2022), Disp: 1 each, Rfl: 0  •  prednisoLONE acetate (PRED FORTE) 1 % ophthalmic suspension, , Disp: , Rfl:   •  selenium 200 mcg, Take 200 mcg by mouth daily  (Patient not taking: Reported on 9/3/2021), Disp: , Rfl:   •  vitamin B-12 (VITAMIN B-12) 500 mcg tablet, Take 500 mcg by mouth daily (Patient not taking: Reported on 8/23/2021), Disp: , Rfl:     Current Allergies     Allergies as of 11/16/2022   • (No Known Allergies)            The following portions of the patient's history were reviewed and updated as appropriate: allergies, current medications, past family history, past medical history, past social history, past surgical history and problem list      Past Medical History:   Diagnosis Date   • Anemia    • Anxiety    • B12 deficiency    • Cervical disc disorder     On gabapentin    • Depression    • Disease of thyroid gland     hypothyroid   • Iron deficiency anemia    • Panic attack • PTSD (post-traumatic stress disorder)    • Sleep difficulties    • Substance abuse ([facility] [address] )    • Type 1 diabetes ([facility] )    • Vitamin D deficiency        Past Surgical History:   Procedure Laterality Date   • ABDOMINAL SURGERY      gastric bypass   •  SECTION      x2   • CHOLECYSTECTOMY  2018   • GASTRIC BYPASS  2007   • INTRAUTERINE DEVICE INSERTION  2015   • IR BIOPSY BONE MARROW  2020   • OTHER SURGICAL HISTORY      surgery for imperforate hymen/endometriosis/hydrometrocolpos   • TUBAL LIGATION     • WISDOM TOOTH EXTRACTION         Family History   Problem Relation Age of Onset   • Thyroid disease Mother    • URIEL disease Mother    • Hyperlipidemia Mother    • Hypertension Mother    • Osteoarthritis Mother    • Thyroid disease unspecified Mother    • Diabetes Father    • Alcohol abuse Father    • Diabetes type I Father    • Thyroid disease Sister    • Depression Sister    • Thyroid disease unspecified Sister    • Anxiety disorder Sister    • Heart disease Maternal Grandmother    • Hypertension Maternal Grandmother    • Arthritis Maternal Grandmother    • Diabetes type I Maternal Uncle    • Diabetes type I Maternal Grandfather          Medications have been verified  Objective   /98   Pulse (!) 119   Temp 97 8 °F (36 6 °C)   Resp 18   Ht 5' 6" (1 676 m)   Wt 70 4 kg (155 lb 3 2 oz)   SpO2 99%   BMI 25 05 kg/m²        Physical Exam     Physical Exam  Vitals reviewed  Constitutional:       General: She is not in acute distress  Appearance: She is normal weight  Cardiovascular:      Rate and Rhythm: Regular rhythm  Tachycardia present  Pulses: no weak pulses          Dorsalis pedis pulses are 2+ on the right side and 2+ on the left side  Posterior tibial pulses are 2+ on the right side and 2+ on the left side  Heart sounds: No murmur heard  No friction rub  No gallop  Pulmonary:      Effort: Pulmonary effort is normal  No respiratory distress  Breath sounds: Normal breath sounds  No wheezing, rhonchi or rales  Abdominal:      General: Abdomen is flat  Bowel sounds are normal  There is no distension  Palpations: Abdomen is soft  There is no mass  Tenderness: There is no abdominal tenderness  There is no guarding or rebound  Hernia: No hernia is present  Feet:      Right foot:      Skin integrity: Callus and dry skin present  No ulcer, skin breakdown, erythema or warmth  Left foot:      Skin integrity: Callus and dry skin present  No ulcer, skin breakdown, erythema or warmth  Neurological:      General: No focal deficit present  Mental Status: She is alert  Psychiatric:         Mood and Affect: Mood normal          Behavior: Behavior normal          Thought Content: Thought content normal          Judgment: Judgment normal          Diabetic Foot Exam    Patient's shoes and socks removed  Right Foot/Ankle   Right Foot Inspection  Skin Exam: skin normal, skin intact, dry skin, callus and callus  No warmth, no erythema, no maceration, no abnormal color, no pre-ulcer and no ulcer  Toe Exam: ROM and strength within normal limits  Sensory   Proprioception: intact  Monofilament testing: intact    Vascular  Capillary refills: < 3 seconds  The right DP pulse is 2+  The right PT pulse is 2+  Left Foot/Ankle  Left Foot Inspection  Skin Exam: skin normal, skin intact, dry skin and callus  No warmth, no erythema, no maceration, normal color, no pre-ulcer and no ulcer  Toe Exam: ROM and strength within normal limits  Sensory   Proprioception: intact  Monofilament testing: intact    Vascular  Capillary refills: < 3 seconds  The left DP pulse is 2+  The left PT pulse is 2+       Assign Risk Category  No deformity present  No loss of protective sensation  No weak pulses  Risk: 0      Results:  Lab Results   Component Value Date    SODIUM 136 11/02/2022    K 4 4 11/02/2022     11/02/2022    CO2 30 11/02/2022    BUN 10 11/02/2022    CREATININE 0 93 11/02/2022    GLUC 79 10/12/2021    CALCIUM 9 8 11/02/2022       Lab Results   Component Value Date    HGBA1C 9 2 (A) 11/16/2022       Lab Results   Component Value Date    WBC 3 49 (L) 11/17/2022    HGB 11 4 (L) 11/17/2022    HCT 37 5 11/17/2022    MCV 93 11/17/2022     11/17/2022

## 2022-11-16 NOTE — PATIENT INSTRUCTIONS
Please contact [phone] [facility] or Sofy Herring for follow up iron deficiency  Recommend repeat blood work (early morning) to check liver, thyroid, vitamin D

## 2022-11-17 ENCOUNTER — APPOINTMENT (OUTPATIENT)
Dept: LAB | Facility: CLINIC | Age: 42
End: 2022-11-17

## 2022-11-17 DIAGNOSIS — E03.8 HYPOTHYROIDISM DUE TO HASHIMOTO'S THYROIDITIS: ICD-10-CM

## 2022-11-17 DIAGNOSIS — R53.82 CHRONIC FATIGUE: ICD-10-CM

## 2022-11-17 DIAGNOSIS — E10.65 TYPE 1 DIABETES MELLITUS WITH HYPERGLYCEMIA (HCC): ICD-10-CM

## 2022-11-17 DIAGNOSIS — M79.10 MYALGIA: ICD-10-CM

## 2022-11-17 DIAGNOSIS — B96.20 E COLI BACTEREMIA: ICD-10-CM

## 2022-11-17 DIAGNOSIS — D50.9 IRON DEFICIENCY ANEMIA, UNSPECIFIED IRON DEFICIENCY ANEMIA TYPE: Chronic | ICD-10-CM

## 2022-11-17 DIAGNOSIS — E06.3 HYPOTHYROIDISM DUE TO HASHIMOTO'S THYROIDITIS: ICD-10-CM

## 2022-11-17 DIAGNOSIS — E55.9 VITAMIN D DEFICIENCY: ICD-10-CM

## 2022-11-17 DIAGNOSIS — F10.21 ALCOHOL USE DISORDER, MODERATE, IN EARLY REMISSION (HCC): ICD-10-CM

## 2022-11-17 DIAGNOSIS — R74.8 ELEVATED LIVER ENZYMES: ICD-10-CM

## 2022-11-17 DIAGNOSIS — R42 LIGHTHEADEDNESS: ICD-10-CM

## 2022-11-17 DIAGNOSIS — R78.81 E COLI BACTEREMIA: ICD-10-CM

## 2022-11-17 LAB
25(OH)D3 SERPL-MCNC: 21.6 NG/ML (ref 30–100)
ALBUMIN SERPL BCP-MCNC: 3.1 G/DL (ref 3.5–5)
ALP SERPL-CCNC: 146 U/L (ref 46–116)
ALT SERPL W P-5'-P-CCNC: 26 U/L (ref 12–78)
AST SERPL W P-5'-P-CCNC: 22 U/L (ref 5–45)
BASOPHILS # BLD AUTO: 0.02 THOUSANDS/ÂΜL (ref 0–0.1)
BASOPHILS NFR BLD AUTO: 1 % (ref 0–1)
BILIRUB DIRECT SERPL-MCNC: 0.14 MG/DL (ref 0–0.2)
BILIRUB SERPL-MCNC: 0.53 MG/DL (ref 0.2–1)
CK SERPL-CCNC: 38 U/L (ref 26–192)
CREAT UR-MCNC: 41.8 MG/DL
EOSINOPHIL # BLD AUTO: 0.15 THOUSAND/ÂΜL (ref 0–0.61)
EOSINOPHIL NFR BLD AUTO: 4 % (ref 0–6)
ERYTHROCYTE [DISTWIDTH] IN BLOOD BY AUTOMATED COUNT: 13 % (ref 11.6–15.1)
HCT VFR BLD AUTO: 37.5 % (ref 34.8–46.1)
HGB BLD-MCNC: 11.4 G/DL (ref 11.5–15.4)
IMM GRANULOCYTES # BLD AUTO: 0.01 THOUSAND/UL (ref 0–0.2)
IMM GRANULOCYTES NFR BLD AUTO: 0 % (ref 0–2)
LYMPHOCYTES # BLD AUTO: 1.03 THOUSANDS/ÂΜL (ref 0.6–4.47)
LYMPHOCYTES NFR BLD AUTO: 30 % (ref 14–44)
MCH RBC QN AUTO: 28.2 PG (ref 26.8–34.3)
MCHC RBC AUTO-ENTMCNC: 30.4 G/DL (ref 31.4–37.4)
MCV RBC AUTO: 93 FL (ref 82–98)
MICROALBUMIN UR-MCNC: 6.8 MG/L (ref 0–20)
MICROALBUMIN/CREAT 24H UR: 16 MG/G CREATININE (ref 0–30)
MONOCYTES # BLD AUTO: 0.28 THOUSAND/ÂΜL (ref 0.17–1.22)
MONOCYTES NFR BLD AUTO: 8 % (ref 4–12)
NEUTROPHILS # BLD AUTO: 2 THOUSANDS/ÂΜL (ref 1.85–7.62)
NEUTS SEG NFR BLD AUTO: 57 % (ref 43–75)
NRBC BLD AUTO-RTO: 0 /100 WBCS
PLATELET # BLD AUTO: 215 THOUSANDS/UL (ref 149–390)
PMV BLD AUTO: 10.3 FL (ref 8.9–12.7)
PROT SERPL-MCNC: 7.9 G/DL (ref 6.4–8.4)
RBC # BLD AUTO: 4.04 MILLION/UL (ref 3.81–5.12)
T3 SERPL-MCNC: 0.8 NG/ML (ref 0.6–1.8)
T4 FREE SERPL-MCNC: 0.97 NG/DL (ref 0.76–1.46)
WBC # BLD AUTO: 3.49 THOUSAND/UL (ref 4.31–10.16)

## 2022-11-18 ENCOUNTER — TELEPHONE (OUTPATIENT)
Dept: HEMATOLOGY ONCOLOGY | Facility: CLINIC | Age: 42
End: 2022-11-18

## 2022-11-18 RX ORDER — ERGOCALCIFEROL 1.25 MG/1
50000 CAPSULE ORAL WEEKLY
Qty: 12 CAPSULE | Refills: 3 | Status: SHIPPED | OUTPATIENT
Start: 2022-11-18

## 2022-11-18 NOTE — TELEPHONE ENCOUNTER
Returned call to patient  Patient looking to schedule with next available appointment  Informed patient that office staff will reach out to her early next week with next available appointment  Patient appreciative of the call

## 2022-11-18 NOTE — TELEPHONE ENCOUNTER
CALL RETURN FORM   Reason for patient call? Patient is calling in requesting to schedule an appointment since she is not feeling well   Patient's primary oncologist?  Dr Maya    Name of person the patient was calling for? Julio Varma     Any additional information to add, if applicable?  Patient is indicating that she is not feeling well and wants to be seen as soon as possible    Informed patient that the message will be forwarded to the team and someone will get back to them as soon as possible    Did you relay this information to the patient?  yes, patient can be reached back at [phone]

## 2022-11-21 ENCOUNTER — TELEPHONE (OUTPATIENT)
Dept: HEMATOLOGY ONCOLOGY | Facility: CLINIC | Age: 42
End: 2022-11-21

## 2022-11-21 DIAGNOSIS — D50.8 OTHER IRON DEFICIENCY ANEMIA: Primary | ICD-10-CM

## 2022-11-21 RX ORDER — SODIUM CHLORIDE 9 MG/ML
20 INJECTION, SOLUTION INTRAVENOUS ONCE
Status: CANCELLED | OUTPATIENT
Start: 2022-11-28

## 2022-11-21 NOTE — TELEPHONE ENCOUNTER
I spoke with the patient in regards to scheduling her for an appointment as well as ordering labs for her to have completed to see what her levels are and if needed we can go ahead and give her some iron infusions in the mean time  Patient understood  Informed patient that I can schedule her for 1/10/23 at 8:40am with Dr Lane Caballero and once Kiran Anderson takes a look at her labs we will call her back  Patient accepted appt and understood all instructions

## 2022-11-21 NOTE — TELEPHONE ENCOUNTER
LVM to the patient stating Margarita Esquivel looked over her labs and put in 5 treatments for infusion  Informed the patient she can give infusion a call to set up her appointments  Informed patient if she has any questions or concerns to call the office at [phone]

## 2022-11-22 ENCOUNTER — OFFICE VISIT (OUTPATIENT)
Dept: ENDOCRINOLOGY | Facility: CLINIC | Age: 42
End: 2022-11-22

## 2022-11-22 ENCOUNTER — PATIENT MESSAGE (OUTPATIENT)
Dept: FAMILY MEDICINE CLINIC | Facility: CLINIC | Age: 42
End: 2022-11-22

## 2022-11-22 VITALS
SYSTOLIC BLOOD PRESSURE: 130 MMHG | HEIGHT: 66 IN | OXYGEN SATURATION: 99 % | HEART RATE: 119 BPM | DIASTOLIC BLOOD PRESSURE: 80 MMHG | WEIGHT: 152 LBS | BODY MASS INDEX: 24.43 KG/M2

## 2022-11-22 DIAGNOSIS — E55.9 VITAMIN D DEFICIENCY: ICD-10-CM

## 2022-11-22 DIAGNOSIS — E06.3 HYPOTHYROIDISM DUE TO HASHIMOTO'S THYROIDITIS: ICD-10-CM

## 2022-11-22 DIAGNOSIS — E03.8 HYPOTHYROIDISM DUE TO HASHIMOTO'S THYROIDITIS: ICD-10-CM

## 2022-11-22 DIAGNOSIS — E10.65 TYPE 1 DIABETES MELLITUS WITH HYPERGLYCEMIA (HCC): Primary | ICD-10-CM

## 2022-11-22 RX ORDER — INSULIN ASPART 100 [IU]/ML
INJECTION, SOLUTION INTRAVENOUS; SUBCUTANEOUS
COMMUNITY
Start: 2022-11-04

## 2022-11-22 RX ORDER — LEVOTHYROXINE SODIUM 0.03 MG/1
25 TABLET ORAL
Qty: 90 TABLET | Refills: 3 | Status: SHIPPED | OUTPATIENT
Start: 2022-11-22

## 2022-11-22 RX ORDER — INSULIN GLARGINE 100 [IU]/ML
INJECTION, SOLUTION SUBCUTANEOUS
COMMUNITY
Start: 2022-11-10

## 2022-11-22 NOTE — PROGRESS NOTES
Established patient Progress Note      Cc: diabetes    Referring Provider  Shahrzad Orona Md  [facility],  [address]     History of Present Illness:   Lula Hermosillo is a 38 y o  female with a history of type 1 diabetes since 14 who presented for follow up  Last seen in the office in May 2021  She used to be on [employer] pump however she is back to MDI  Reports complications of none  She had recent hospitalization for SIRS due to SIRS  Denies recent severe hypoglycemic or severe hyperglycemic episodes  Denies any issues with her current regimen  home glucose monitoring: are performed regularly      Current regimen:   lantus 30 units at lunch time  Novolog :  ICR: 8  ISF : 50 above 100,   On average she takes 12-14 units     Twice day checks her blood sugars, however she did not bring log or her glucometer, she is not sure regarding the readings  Most recent A1C is 9 2%      Hypoglycemic episodes:   H/o of hypoglycemia causing hospitalization or Intervention such as glucagon injection  or ambulance call :   Hypoglycemia symptoms:sweaty, shaky, dizzy  Treatment of hypoglycemia: orange juice,      Medic alert tag: recommended:           Opthamology: UTD; no retinopathy, no macular edema  Podiatry: no      Has hypertension:no  Has hyperlipidemia: No    Thyroid disorders: yes, taking levothroixne 25 mcg once daily  History of pancreatitis: no    Patient Active Problem List   Diagnosis   • Iron deficiency anemia   • Vitamin D deficiency   • B12 deficiency   • Anxiety and depression   • Depression, recurrent ([facility] [address] )   • Suicidal ideations   • Leukopenia   • THO (acute kidney injury) ([facility] [address] )   • Hypothyroidism due to Hashimoto's thyroiditis   • Bilateral carpal tunnel syndrome   • Cervical spondylosis   • H/O gastric bypass   • Hydrosalpinx   • Intestinal malabsorption, unspecified   • Invagination of intestine (HCC)   • Protrusion of cervical intervertebral disc   • Overweight with body mass index (BMI) of 26 to 26 9 in adult   • Primary hypothyroidism   • Transaminitis   • Alcohol abuse   • Iron deficiency anemia, unspecified   • E coli bacteremia   • Pneumonia of left lower lobe due to infectious organism   • Type 1 diabetes mellitus with hyperglycemia (HCC)      Past Medical History:   Diagnosis Date   • Anemia    • Anxiety    • B12 deficiency    • Cervical disc disorder     On gabapentin    • Depression    • Disease of thyroid gland     hypothyroid   • Iron deficiency anemia    • Panic attack    • PTSD (post-traumatic stress disorder)    • Sleep difficulties    • Substance abuse ([place] [address] )    • Type 1 diabetes ([facility] )    • Vitamin D deficiency       Past Surgical History:   Procedure Laterality Date   • ABDOMINAL SURGERY      gastric bypass   •  SECTION      x2   • CHOLECYSTECTOMY     • GASTRIC BYPASS     • INTRAUTERINE DEVICE INSERTION  2015   • IR BIOPSY BONE MARROW  2020   • OTHER SURGICAL HISTORY      surgery for imperforate hymen/endometriosis/hydrometrocolpos   • TUBAL LIGATION     • WISDOM TOOTH EXTRACTION        Family History   Problem Relation Age of Onset   • Thyroid disease Mother    • URIEL disease Mother    • Hyperlipidemia Mother    • Hypertension Mother    • Osteoarthritis Mother    • Thyroid disease unspecified Mother    • Diabetes Father    • Alcohol abuse Father    • Diabetes type I Father    • Thyroid disease Sister    • Depression Sister    • Thyroid disease unspecified Sister    • Anxiety disorder Sister    • Heart disease Maternal Grandmother    • Hypertension Maternal Grandmother    • Arthritis Maternal Grandmother    • Diabetes type I Maternal Uncle    • Diabetes type I Maternal Grandfather      Social History     Tobacco Use   • Smoking status: Never   • Smokeless tobacco: Never   Substance Use Topics   • Alcohol use: Not Currently     Comment: sober since 2021 - on probation with random screenings     No Known Allergies      Current Outpatient Medications:   •  Accu-Chek FastClix Lancets MISC, USE 1 LANCET TO TEST BLOOD SUGAR UP TO 6 TIMES DAILY (Patient not taking: Reported on 11/1/2021), Disp: 102 each, Rfl: 0  •  acetaminophen (TYLENOL) 500 mg tablet,  , Disp: , Rfl:   •  ALPRAZolam (XANAX) 0 5 mg tablet, Take 0 5 mg by mouth daily as needed  , Disp: , Rfl:   •  busPIRone (BUSPAR) 30 MG tablet, , Disp: , Rfl:   •  cloNIDine (Catapres) 0 1 mg tablet, Take 1 tablet (0 1 mg total) by mouth daily at bedtime (Patient not taking: Reported on 9/22/2022), Disp: 30 tablet, Rfl: 1  •  Continuous Blood Gluc  (FreeStyle Samantha 14 Day Sanford) KELVIN, Use 1 Units in the morning, Disp: 1 each, Rfl: 0  •  Continuous Blood Gluc  (FreeStyle Samantha 2 Sanford) KELVIN, Use 1 units in the morning, Disp: 1 each, Rfl: 3  •  Continuous Blood Gluc Sensor (FreeStyle Samantha 14 Day Sensor) MISC, Use 1 Units every 14 (fourteen) days, Disp: 2 each, Rfl: 3  •  Continuous Blood Gluc Sensor (FreeStyle Samantha 2 Sensor) MISC, Use 1 unit every 14 days, Disp: 2 each, Rfl: 3  •  Cyanocobalamin (B-12 PO), Take by mouth daily (Patient not taking: Reported on 9/22/2022), Disp: , Rfl:   •  ergocalciferol (VITAMIN D2) 50,000 units, Take 1 capsule (50,000 Units total) by mouth once a week, Disp: 12 capsule, Rfl: 3  •  escitalopram (LEXAPRO) 20 mg tablet, Take 10 mg by mouth daily Half tab daily  (Patient not taking: Reported on 9/22/2022), Disp: , Rfl:   •  gabapentin (NEURONTIN) 600 MG tablet, Take 0 5 tablets (300 mg total) by mouth 2 (two) times a day Half Tab 300mg at bedtime, Disp: 30 tablet, Rfl: 1  •  glucose blood (Accu-Chek Guide) test strip, Use to test blood sugar up to 6 times daily  , Disp: 200 each, Rfl: 2  •  insulin aspart (NovoLOG) 100 units/mL injection, 60 Units by Subcutaneous Insulin Pump route in the morning INJECT INTO PUMP DAILY  , Disp: 30 mL, Rfl: 3  •  insulin glargine (Lantus) 100 units/mL subcutaneous injection, Inject 36 Units under the skin daily at bedtime, Disp: 10 mL, Rfl: 1  •  Insulin Pen Needle (BD Pen Needle Deanna U/F) 32G X 4 MM MISC, Use 4/day, Disp: 100 each, Rfl: 11  •  Insulin Syringe-Needle U-100 (INSULIN SYRINGE 1CC/28G) 28G X 1/2" 1 ML MISC, 4 injections daily E10 65 (Patient not taking: Reported on 9/22/2022), Disp: , Rfl:   •  ketoconazole (NIZORAL) 2 % cream, Apply topically daily, Disp: 60 g, Rfl: 1  •  levothyroxine (Synthroid) 25 mcg tablet, Take 1 tablet (25 mcg total) by mouth daily in the early morning Combine with 200mcg, for total 225mcg daily  , Disp: 90 tablet, Rfl: 3  •  levothyroxine 200 mcg tablet, Take 1 tablet (200 mcg total) by mouth every morning, Disp: 90 tablet, Rfl: 3  •  naltrexone (REVIA) 50 mg tablet, Take 2 tablets (100 mg total) by mouth daily, Disp: 60 tablet, Rfl: 3  •  ofloxacin (OCUFLOX) 0 3 % ophthalmic solution, , Disp: , Rfl:   •  ondansetron (ZOFRAN) 4 mg tablet, Take 1 tablet (4 mg total) by mouth every 8 (eight) hours as needed for nausea or vomiting, Disp: 30 tablet, Rfl: 1  •  PATIENT MAINTAINED INSULIN PUMP, Inject 1 each under the skin every 8 (eight) hours (Patient not taking: Reported on 11/16/2022), Disp: 1 each, Rfl: 0  •  prednisoLONE acetate (PRED FORTE) 1 % ophthalmic suspension, , Disp: , Rfl:   •  selenium 200 mcg, Take 200 mcg by mouth daily  (Patient not taking: Reported on 9/3/2021), Disp: , Rfl:   •  traZODone (DESYREL) 100 mg tablet, Take 100 mg by mouth daily at bedtime, Disp: , Rfl:   •  venlafaxine (EFFEXOR-XR) 150 mg 24 hr capsule, , Disp: , Rfl:   •  vitamin B-12 (VITAMIN B-12) 500 mcg tablet, Take 500 mcg by mouth daily (Patient not taking: Reported on 8/23/2021), Disp: , Rfl:   •  Vyvanse 40 MG capsule, , Disp: , Rfl:   Review of Systems   Constitutional: Positive for fatigue  Negative for activity change, appetite change, chills, diaphoresis, fever and unexpected weight change  HENT: Negative for congestion, drooling, ear discharge, ear pain, trouble swallowing and voice change  Eyes: Negative for photophobia, pain, discharge, redness, itching and visual disturbance  Respiratory: Negative for cough, chest tightness, shortness of breath and wheezing  Cardiovascular: Negative for chest pain, palpitations and leg swelling  Gastrointestinal: Negative for abdominal distention, abdominal pain, blood in stool, diarrhea, nausea and vomiting  Endocrine: Positive for polydipsia and polyuria  Negative for cold intolerance, heat intolerance and polyphagia  Genitourinary: Negative for dysuria, flank pain, frequency and hematuria  Musculoskeletal: Negative for back pain, gait problem, joint swelling, myalgias, neck pain and neck stiffness  Skin: Negative for color change, pallor, rash and wound  Neurological: Negative for dizziness, tremors, seizures, syncope, speech difficulty, weakness, numbness and headaches  Psychiatric/Behavioral: Negative for agitation  All other systems reviewed and are negative  Physical Exam:  There is no height or weight on file to calculate BMI  There were no vitals taken for this visit     Wt Readings from Last 3 Encounters:   11/16/22 70 4 kg (155 lb 3 2 oz)   11/02/22 68 9 kg (152 lb)   09/22/22 70 9 kg (156 lb 4 8 oz)       GEN: NAD  E/n/m nl facies, hearing intact bilat, tongue midline, lips nl  Eyes: no stare or proptosis, nl lids and conjunctiva, EOMI  Neck: trachea midline, thyroid NT to palpation, nl in size, no nodules or neck masses noted, no cervical LAD  CV; heart reg rate s1s2 nl, no m/r/g appreciated, no SURY  Resp: CTAB, good effort  Ab+BS  Neuro: no tremor, 2+ DTRs in BUE  MS: no c/c in digits, moves all 4 ext, nl muscle bulk, gait nl  Skin: warm and dry, no palmar erythema  Ext: no c/c in digits, no edema bilaterally, 2+ DP and PT pulses bilat, no breaks in skin/ulcers on feet, sensation intact to monofilament testing on plantar surfaces bilat  Psych: nl mood and affect, no gross lapses in memory  Patient's shoes and socks removed  Right Foot/Ankle   Right Foot Inspection  Skin Exam: skin normal  Skin not intact, no dry skin, no warmth, no callus, no erythema, no maceration, no abnormal color, no pre-ulcer, no ulcer and no callus  Toe Exam: No swelling, no tenderness, erythema and  no right toe deformity    Sensory   Vibration: intact  Proprioception: intact  Monofilament testing: intact    Vascular  Capillary refills: < 3 seconds  The right DP pulse is 2+  The right PT pulse is 2+  Left Foot/Ankle  Left Foot Inspection  Skin Exam: skin normal  Skin not intact, no dry skin, no warmth, no erythema, no maceration, normal color, no pre-ulcer, no ulcer and no callus  Toe Exam: No swelling, no tenderness, no erythema and no left toe deformity  Sensory   Vibration: intact  Proprioception: intact  Monofilament testing: intact    Vascular  Capillary refills: < 3 seconds  The left DP pulse is 2+  The left PT pulse is 2+       Assign Risk Category  No deformity present  No loss of protective sensation  No weak pulses  Risk: 0         Labs:   No components found for: HA1C  No components found for: GLU    Lab Results   Component Value Date    CREATININE 0 93 11/02/2022    CREATININE 0 69 08/10/2022    CREATININE 0 85 10/12/2021    BUN 10 11/02/2022     05/16/2018    K 4 4 11/02/2022     11/02/2022    CO2 30 11/02/2022     eGFR   Date Value Ref Range Status   11/02/2022 77 ml/min/1 73sq m Final     No components found for: [facility]    Lab Results   Component Value Date     08/10/2022    TRIG 63 08/10/2022       Lab Results   Component Value Date    ALT 26 11/17/2022    AST 22 11/17/2022    ALKPHOS 146 (H) 11/17/2022    BILITOT 0 4 05/16/2018       Lab Results   Component Value Date    FREET4 0 97 11/17/2022     Component      Latest Ref Rng & Units 11/2/2022 11/16/2022 11/17/2022   WBC      4 31 - 10 16 Thousand/uL   3 49 (L)   Red Blood Cell Count      3 81 - 5 12 Million/uL   4 04   Hemoglobin      11 5 - 15 4 g/dL   11 4 (L)   HCT      34 8 - 46 1 %   37 5   MCV      82 - 98 fL   93   MCH      26 8 - 34 3 pg   28 2   MCHC      31 4 - 37 4 g/dL   30 4 (L)   RDW      11 6 - 15 1 %   13 0   MPV      8 9 - 12 7 fL   10 3   Platelet Count      133 - 390 Thousands/uL   215   nRBC      /100 WBCs   0   Neutrophils %      43 - 75 %   57   Immat GRANS %      0 - 2 %   0   Lymphocytes Relative      14 - 44 %   30   Monocytes Relative      4 - 12 %   8   Eosinophils      0 - 6 %   4   Basophils Relative      0 - 1 %   1   Absolute Neutrophils      1 85 - 7 62 Thousands/µL   2 00   Immature Grans Absolute      0 00 - 0 20 Thousand/uL   0 01   Lymphocytes Absolute      0 60 - 4 47 Thousands/µL   1 03   Absolute Monocytes      0 17 - 1 22 Thousand/µL   0 28   Absolute Eosinophils      0 00 - 0 61 Thousand/µL   0 15   Basophils Absolute      0 00 - 0 10 Thousands/µL   0 02   Sodium      135 - 147 mmol/L 136     Potassium      3 5 - 5 3 mmol/L 4 4     Chloride      96 - 108 mmol/L 103     CO2      21 - 32 mmol/L 30     Anion Gap      4 - 13 mmol/L 3 (L)     BUN      5 - 25 mg/dL 10     Creatinine      0 60 - 1 30 mg/dL 0 93     GLUCOSE FASTING      65 - 99 mg/dL 101 (H)     Calcium      8 3 - 10 1 mg/dL 9 8     CORRECTED CALCIUM      8 3 - 10 1 mg/dL 10 4 (H)     AST      5 - 45 U/L 24     ALT      12 - 78 U/L 24     Alkaline Phosphatase      46 - 116 U/L 175 (H)     Total Protein      6 4 - 8 4 g/dL 8 9 (H)     Albumin      3 5 - 5 0 g/dL 3 2 (L)     TOTAL BILIRUBIN      0 20 - 1 00 mg/dL 0 45     eGFR      ml/min/1 73sq m 77     EXT Creatinine Urine      mg/dL   41 8   MICROALBUM ,U,RANDOM      0 0 - 20 0 mg/L   6 8   MICROALBUMIN/CREATININE RATIO      0 - 30 mg/g creatinine   16   Hemoglobin A1C      6 5  9 2 (A)    Free T4      0 76 - 1 46 ng/dL   0 97   T3, Total      0 60 - 1 80 ng/mL   0 80   Vit D, 25-Hydroxy      30 0 - 100 0 ng/mL   21 6 (L)     Impression:  1  Type 1 diabetes mellitus with hyperglycemia (HCC)    2  Vitamin D deficiency    3  Hypothyroidism due to Hashimoto's thyroiditis           Plan:    Renea Billy was seen today for diabetes type 1  Diagnoses and all orders for this visit:    Type 1 diabetes mellitus with hyperglycemia ([facility] [address] ):  Uncontrolled with the most recent A1c greater than 9%   The goal is less than 7%  However given her anemia A1c may not be reliable(iron-deficiency anemia falsely elevates A1c)  She is currently on Lantus 30 units in the afternoon and NovoLog with ICR and ISF which outlined in HPI   She has been checking blood sugar few times a day however she did not bring the logs,  I did not change anything today however I asked her to check her blood sugars 2 to 3 times a day and send me his logs in 2 weeks for further adjustment  She was referred to podiatrist    We provided information on basic nutrition for diabetics  Extended counseling on disease management  Emphasized importance of daily exercise, weight loss, caloric reduction, small meals, consumption of multiple servings of fruits and vegetables and avoidance of concentrated sweets such as juice and soda  Reviewed concepts of diabetes self-management stressing the primary role of the patient in monitoring and maintaining control of diabetes  Will send new glucometer to her pharmacy  Return back in 3 months  Get labs prior to next visit    Vitamin D deficiency  - vitamin-D-future  Take vitamin-D supplement at current dose  Hypothyroidism due to Hashimoto's thyroiditis  Currently on levothyroxine 225 mcg once daily, she is clinically euthyroid  Biochemically as well  Continue levothyroxine at current dose  Given her anemia, high dose garment levothyroxine and type diabetes old check her for celiac disease  Discussed with the patient and all questioned fully answered  She will call me if any problems arise      Counseled patient on diagnostic results, prognosis, risk and benefit of treatment options, instruction for management, importance of treatment compliance, Risk  factor reduction and impressions      Nikia Perkins MD    I have spent 50 minutes with Patient  today in which greater than 50% of this time was spent in counseling/coordination of care regarding Diagnostic results, Prognosis, Risks and benefits of tx options, Intructions for management, Patient and family education, Importance of tx compliance and Risk factor reductions

## 2022-11-22 NOTE — RESULT ENCOUNTER NOTE
Attempt 3; Left voicemail for patient to call the office back  Will done until we hear back  Will send mychart message

## 2022-11-28 ENCOUNTER — HOSPITAL ENCOUNTER (OUTPATIENT)
Dept: INFUSION CENTER | Facility: HOSPITAL | Age: 42
Discharge: HOME | End: 2022-11-28
Attending: INTERNAL MEDICINE

## 2022-11-28 ENCOUNTER — OFFICE VISIT (OUTPATIENT)
Dept: PODIATRY | Facility: CLINIC | Age: 42
End: 2022-11-28

## 2022-11-28 VITALS
HEIGHT: 66 IN | SYSTOLIC BLOOD PRESSURE: 111 MMHG | WEIGHT: 152 LBS | BODY MASS INDEX: 24.43 KG/M2 | DIASTOLIC BLOOD PRESSURE: 76 MMHG | HEART RATE: 79 BPM

## 2022-11-28 VITALS
RESPIRATION RATE: 18 BRPM | HEART RATE: 84 BPM | SYSTOLIC BLOOD PRESSURE: 115 MMHG | DIASTOLIC BLOOD PRESSURE: 73 MMHG | TEMPERATURE: 97 F

## 2022-11-28 DIAGNOSIS — D50.8 OTHER IRON DEFICIENCY ANEMIA: Primary | ICD-10-CM

## 2022-11-28 DIAGNOSIS — S97.101A CRUSHING INJURY OF TOE OF RIGHT FOOT, INITIAL ENCOUNTER: Primary | ICD-10-CM

## 2022-11-28 DIAGNOSIS — E10.49 OTHER DIABETIC NEUROLOGICAL COMPLICATION ASSOCIATED WITH TYPE 1 DIABETES MELLITUS (HCC): ICD-10-CM

## 2022-11-28 RX ORDER — SODIUM CHLORIDE 9 MG/ML
20 INJECTION, SOLUTION INTRAVENOUS ONCE
Status: CANCELLED | OUTPATIENT
Start: 2022-12-05

## 2022-11-28 RX ORDER — SODIUM CHLORIDE 9 MG/ML
20 INJECTION, SOLUTION INTRAVENOUS ONCE
Status: COMPLETED | OUTPATIENT
Start: 2022-11-28 | End: 2022-11-28

## 2022-11-28 RX ADMIN — IRON SUCROSE 200 MG: 20 INJECTION, SOLUTION INTRAVENOUS at 12:45

## 2022-11-28 RX ADMIN — SODIUM CHLORIDE 20 ML/HR: 0.9 INJECTION, SOLUTION INTRAVENOUS at 12:42

## 2022-11-28 NOTE — PROGRESS NOTES
Patient tolerated venofer infusion without issue  Discharged in stable condition, declined AVS but aware of next appointment

## 2022-11-28 NOTE — PROGRESS NOTES
Assessment/Plan:    No problem-specific Assessment & Plan notes found for this encounter  Diagnoses and all orders for this visit:    Crushing injury of toe of right foot, initial encounter    Other diabetic neurological complication associated with type 1 diabetes mellitus ([facility] [address] )  -     Ambulatory referral to Podiatry      Plan:     1  Patient was counseled and educated on the condition and the diagnosis  The diagnosis, treatment options and prognosis were discussed in detail  2  Right hallux toenail injury is stable without any concerns at this time  Discussed with the patient to continue monitoring  3  Patient was educated on importance of glycemic control, daily foot assessment and proper shoe gear and nail trimming technique  Educational materials were provided  Patient will follow up annually for diabetic foot risk assessment  4  Call if any questions or occurrence of any foot and ankle related complications     5  Recent PCP and endocrinology notes reviewed    Lab Results   Component Value Date    HGBA1C 9 2 (A) 11/16/2022         Subjective:      Patient ID: Miky Freeman is a 38 y o  female  57-year-old female with past medical history of type 1 diabetes and transaminitis, and iron deficiency presents for an evaluation of right hallux toenail injury that she sustained in September  Patient reports her PCP and endocrinologist wanted to have the toenail evaluated  Patient does not complain of any pain any drainage to the area or open ulcers to that location  She does report intermittent tingling sensation in her feet  Otherwise no obvious numbness  No other complaints at this time  Denies nausea vomiting fever chills shortness of breath        The following portions of the patient's history were reviewed and updated as appropriate:   She  has a past medical history of Anemia, Anxiety, B12 deficiency, Cervical disc disorder, Depression, Disease of thyroid gland, Iron deficiency anemia, Panic attack, PTSD (post-traumatic stress disorder), Sleep difficulties, Substance abuse ([place] [address] ), Type 1 diabetes ([place] [address] ), and Vitamin D deficiency  She   Patient Active Problem List    Diagnosis Date Noted   • Pneumonia of left lower lobe due to infectious organism 10/21/2022   • E coli bacteremia 10/20/2022   • Iron deficiency anemia, unspecified 2021   • Transaminitis 2021   • Alcohol abuse 2021   • Overweight with body mass index (BMI) of 26 to 26 9 in adult 2021   • THO (acute kidney injury) ([place] [address] ) 2020   • Hypothyroidism due to Hashimoto's thyroiditis 2020   • Suicidal ideations 2020   • Depression, recurrent ([place] [address] ) 2020   • Protrusion of cervical intervertebral disc 10/03/2019   • Leukopenia 07/15/2019   • Hydrosalpinx 2019   • Invagination of intestine ([place] [address] ) 2019   • Iron deficiency anemia 2018   • Bilateral carpal tunnel syndrome 2017   • H/O gastric bypass 2017   • B12 deficiency 2016   • Intestinal malabsorption, unspecified 2016   • Anxiety and depression 2016   • Cervical spondylosis 2015   • Vitamin D deficiency 11/10/2011   • Type 1 diabetes mellitus with hyperglycemia ([place] [address] ) 2011   • Primary hypothyroidism 10/29/2009     She  has a past surgical history that includes Abdominal surgery; Cholecystectomy (); Gastric bypass ();  section; Tubal ligation; Kenna tooth extraction; Intrauterine device insertion (2015); Other surgical history; and IR biopsy bone marrow (2020)       Review of Systems   Constitutional: Negative for chills  Respiratory: Negative for shortness of breath  Gastrointestinal: Negative for vomiting  Musculoskeletal: Negative for arthralgias  Skin: Negative for color change  Neurological: Negative for dizziness  Psychiatric/Behavioral: Negative for agitation  All other systems reviewed and are negative  Objective:      /76 (BP Location: Left arm, Patient Position: Sitting, Cuff Size: Standard)   Pulse 79   Ht 5' 6" (1 676 m)   Wt 68 9 kg (152 lb)   BMI 24 53 kg/m²          Physical Exam  Vitals reviewed  Cardiovascular:      Rate and Rhythm: Normal rate  Pulses: Pulses are weak  Dorsalis pedis pulses are 2+ on the right side and 1+ on the left side  Posterior tibial pulses are 2+ on the right side and 2+ on the left side  Pulmonary:      Effort: Pulmonary effort is normal    Musculoskeletal:         General: No swelling or tenderness  Feet:      Right foot:      Protective Sensation: 10 sites tested  9 sites sensed  Skin integrity: Dry skin present  Toenail Condition: Right toenails are normal       Left foot:      Protective Sensation: 10 sites tested  9 sites sensed  Skin integrity: Dry skin present  Toenail Condition: Left toenails are normal       Comments: Right hallux toenail growing out, no clinical signs of infection or open ulcers noted  No pain with palpation  Skin:     General: Skin is warm  Neurological:      General: No focal deficit present  Mental Status: She is alert  Psychiatric:         Mood and Affect: Mood normal        Diabetic Foot Exam    Patient's shoes and socks removed  Right Foot/Ankle   Right Foot Inspection  Skin Exam: dry skin  Toe Exam: ROM and strength within normal limits  Sensory   Vibration: intact  Proprioception: intact  Monofilament testing: diminished    Vascular  The right DP pulse is 2+  The right PT pulse is 2+  Left Foot/Ankle  Left Foot Inspection  Skin Exam: dry skin  Toe Exam: ROM and strength within normal limits  Sensory   Vibration: intact  Proprioception: intact  Monofilament testing: diminished    Vascular  The left DP pulse is 1+  The left PT pulse is 2+       Assign Risk Category  Deformity present  Loss of protective sensation  Weak pulses  Risk: 1

## 2022-12-05 ENCOUNTER — HOSPITAL ENCOUNTER (OUTPATIENT)
Dept: INFUSION CENTER | Facility: HOSPITAL | Age: 42
Discharge: HOME | End: 2022-12-05
Attending: INTERNAL MEDICINE

## 2022-12-05 VITALS
TEMPERATURE: 98.2 F | RESPIRATION RATE: 18 BRPM | SYSTOLIC BLOOD PRESSURE: 112 MMHG | HEART RATE: 95 BPM | DIASTOLIC BLOOD PRESSURE: 80 MMHG

## 2022-12-05 DIAGNOSIS — D50.8 OTHER IRON DEFICIENCY ANEMIA: Primary | ICD-10-CM

## 2022-12-05 RX ORDER — SODIUM CHLORIDE 9 MG/ML
20 INJECTION, SOLUTION INTRAVENOUS ONCE
Status: COMPLETED | OUTPATIENT
Start: 2022-12-05 | End: 2022-12-05

## 2022-12-05 RX ORDER — SODIUM CHLORIDE 9 MG/ML
20 INJECTION, SOLUTION INTRAVENOUS ONCE
Status: CANCELLED | OUTPATIENT
Start: 2022-12-12

## 2022-12-05 RX ADMIN — SODIUM CHLORIDE 200 MG: 9 INJECTION, SOLUTION INTRAVENOUS at 14:30

## 2022-12-05 RX ADMIN — SODIUM CHLORIDE 20 ML/HR: 0.9 INJECTION, SOLUTION INTRAVENOUS at 14:28

## 2022-12-05 NOTE — PROGRESS NOTES
Pt offers no complaints  Tolerated venofer infusion well without incident  Contacted Rhiannon Warren at [facility]  Pt aware and verbalized understanding (pt's preference d/t work location)  AVS declined and pt will check mychart for next infusion appointments

## 2022-12-06 ENCOUNTER — TELEPHONE (OUTPATIENT)
Dept: FAMILY MEDICINE CLINIC | Facility: CLINIC | Age: 42
End: 2022-12-06

## 2022-12-06 NOTE — TELEPHONE ENCOUNTER
Ena Knowlesdelmi had used all her time up and needs a note the she can go back to work   She is not 100 % ,but really doesn't have choice  She is asking if Dr Janes Munoz will provide her a note    Also she had mentioned something that she will still need the infusions and continuous medical care, jplaced on the letter  That the school understands      If you have any question call [phone]    She would like to return to work on Monday 12/12/2022    Please advise

## 2022-12-12 ENCOUNTER — HOSPITAL ENCOUNTER (OUTPATIENT)
Dept: INFUSION CENTER | Facility: CLINIC | Age: 42
Discharge: HOME | End: 2022-12-12

## 2022-12-12 VITALS
SYSTOLIC BLOOD PRESSURE: 158 MMHG | HEART RATE: 108 BPM | RESPIRATION RATE: 18 BRPM | DIASTOLIC BLOOD PRESSURE: 99 MMHG | TEMPERATURE: 97.4 F

## 2022-12-12 DIAGNOSIS — D50.8 OTHER IRON DEFICIENCY ANEMIA: Primary | ICD-10-CM

## 2022-12-12 RX ORDER — SODIUM CHLORIDE 9 MG/ML
20 INJECTION, SOLUTION INTRAVENOUS ONCE
Status: CANCELLED | OUTPATIENT
Start: 2022-12-19

## 2022-12-12 RX ORDER — SODIUM CHLORIDE 9 MG/ML
20 INJECTION, SOLUTION INTRAVENOUS ONCE
Status: COMPLETED | OUTPATIENT
Start: 2022-12-12 | End: 2022-12-12

## 2022-12-12 RX ADMIN — SODIUM CHLORIDE 200 MG: 9 INJECTION, SOLUTION INTRAVENOUS at 15:31

## 2022-12-12 RX ADMIN — SODIUM CHLORIDE 20 ML/HR: 0.9 INJECTION, SOLUTION INTRAVENOUS at 15:31

## 2022-12-19 ENCOUNTER — HOSPITAL ENCOUNTER (OUTPATIENT)
Dept: INFUSION CENTER | Facility: CLINIC | Age: 42
Discharge: HOME | End: 2022-12-19

## 2022-12-19 VITALS
DIASTOLIC BLOOD PRESSURE: 95 MMHG | HEART RATE: 102 BPM | RESPIRATION RATE: 16 BRPM | SYSTOLIC BLOOD PRESSURE: 146 MMHG | TEMPERATURE: 97.5 F

## 2022-12-19 DIAGNOSIS — D50.8 OTHER IRON DEFICIENCY ANEMIA: Primary | ICD-10-CM

## 2022-12-19 RX ORDER — SODIUM CHLORIDE 9 MG/ML
20 INJECTION, SOLUTION INTRAVENOUS ONCE
OUTPATIENT
Start: 2022-12-26

## 2022-12-19 RX ORDER — SODIUM CHLORIDE 9 MG/ML
20 INJECTION, SOLUTION INTRAVENOUS ONCE
Status: COMPLETED | OUTPATIENT
Start: 2022-12-19 | End: 2022-12-19

## 2022-12-19 RX ADMIN — IRON SUCROSE 200 MG: 20 INJECTION, SOLUTION INTRAVENOUS at 15:55

## 2022-12-19 RX ADMIN — SODIUM CHLORIDE 20 ML/HR: 0.9 INJECTION, SOLUTION INTRAVENOUS at 15:45

## 2022-12-19 NOTE — PROGRESS NOTES
Pt  Denied new symptoms or concerns today  Venofer ordered for infusion today  Venofer tolerated well  Future appointments reviewed  AVS declined

## 2023-01-01 PROBLEM — J18.9 PNEUMONIA OF LEFT LOWER LOBE DUE TO INFECTIOUS ORGANISM: Status: RESOLVED | Noted: 2022-10-21 | Resolved: 2023-01-01

## 2023-01-18 ENCOUNTER — TELEPHONE (OUTPATIENT)
Dept: HEMATOLOGY ONCOLOGY | Facility: MEDICAL CENTER | Age: 43
End: 2023-01-18

## 2023-01-18 DIAGNOSIS — D50.8 OTHER IRON DEFICIENCY ANEMIA: Primary | ICD-10-CM

## 2023-01-18 NOTE — TELEPHONE ENCOUNTER
LVM to the patient in regards to her lab work that needs to be completed prior to her appt on 1/24/23 at 10:40am

## 2023-04-28 ENCOUNTER — TELEPHONE (OUTPATIENT)
Dept: ENDOCRINOLOGY | Facility: CLINIC | Age: 43
End: 2023-04-28

## 2023-05-14 ENCOUNTER — OFFICE VISIT (OUTPATIENT)
Dept: URGENT CARE | Age: 43
End: 2023-05-14

## 2023-05-14 VITALS
OXYGEN SATURATION: 99 % | WEIGHT: 157 LBS | TEMPERATURE: 97.4 F | DIASTOLIC BLOOD PRESSURE: 85 MMHG | SYSTOLIC BLOOD PRESSURE: 131 MMHG | BODY MASS INDEX: 25.23 KG/M2 | HEART RATE: 88 BPM | RESPIRATION RATE: 18 BRPM | HEIGHT: 66 IN

## 2023-05-14 DIAGNOSIS — J03.90 ACUTE TONSILLITIS, UNSPECIFIED ETIOLOGY: Primary | ICD-10-CM

## 2023-05-14 DIAGNOSIS — R05.1 ACUTE COUGH: ICD-10-CM

## 2023-05-14 LAB
S PYO AG THROAT QL: NEGATIVE
SARS-COV-2 AG UPPER RESP QL IA: NEGATIVE
VALID CONTROL: NORMAL

## 2023-05-14 RX ORDER — BENZONATATE 100 MG/1
100 CAPSULE ORAL 3 TIMES DAILY PRN
Qty: 20 CAPSULE | Refills: 0 | Status: SHIPPED | OUTPATIENT
Start: 2023-05-14

## 2023-05-14 RX ORDER — BENZONATATE 100 MG/1
100 CAPSULE ORAL 3 TIMES DAILY PRN
Qty: 20 CAPSULE | Refills: 0 | Status: SHIPPED | OUTPATIENT
Start: 2023-05-14 | End: 2023-05-14

## 2023-05-14 RX ORDER — AMOXICILLIN AND CLAVULANATE POTASSIUM 875; 125 MG/1; MG/1
1 TABLET, FILM COATED ORAL EVERY 12 HOURS SCHEDULED
Qty: 20 TABLET | Refills: 0 | Status: SHIPPED | OUTPATIENT
Start: 2023-05-14 | End: 2023-05-14

## 2023-05-14 RX ORDER — AMOXICILLIN AND CLAVULANATE POTASSIUM 875; 125 MG/1; MG/1
1 TABLET, FILM COATED ORAL EVERY 12 HOURS SCHEDULED
Qty: 20 TABLET | Refills: 0 | Status: SHIPPED | OUTPATIENT
Start: 2023-05-14 | End: 2023-05-24

## 2023-05-14 NOTE — PROGRESS NOTES
[facility]        NAME: Izabella Turner is a 41 y o  female  : 1980    MRN: [id number]  DATE: May 14, 2023  TIME: 5:13 PM      Assessment and Plan     Acute tonsillitis, unspecified etiology [J03 90]  1  Acute tonsillitis, unspecified etiology  POCT rapid strepA    Poct Covid 19 Rapid Antigen Test    Throat culture    amoxicillin-clavulanate (AUGMENTIN) 875-125 mg per tablet      2  Acute cough  benzonatate (TESSALON PERLES) 100 mg capsule          Rapid covid negative  Rapid strep negative  Throat culture sent  We will treat for acute tonsillitis given clinical presentation of throat  Discussed with patient holding on oral steroids at this time with type I diabetic history  Patient educated and verbalizes understanding to monitor blood sugar aware that if blood sugar increases or becomes symptomatic to proceed to the ER for further evaluation      Patient Instructions       Take antibiotic as prescribed  Recommend probiotic use while taking antibiotic  Use benzonatate as directed as needed for cough  Hydration and rest   Throat culture sent; results will appear in myc ralph  Use the [facility] MyChart to obtain lab results  Continue to monitor your blood sugars  If you become hyperglycemic or symptomatic proceed to the ER for further evaluation  PCP follow up in 1-2 days (as soon as possible)  Go to an emergency department if difficulty breathing occurs or if symptoms worsen  Chief Complaint     Chief Complaint   Patient presents with   • Cough     Cough, nasal congestion, sore throat x 3 days         History of Present Illness     Patient is a 28-year-old female who presents with sore throat for 3 days  Reports trouble swallowing secondary to pain  Reports cough with intermittent chest tightness  Denies shortness of breath or wheezing  Patient also reports runny nose, congestion, sinus pain and pressure, ear pressure, fatigue and postnasal drip    Denies vomiting or diarrhea  Denies asthma history  Denies smoking history  Patient is a type I diabetic  States her blood sugars were in the 200s today  Denies increased thirst, hunger, or urination  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  Review of Systems     Review of Systems   Constitutional: Positive for fatigue  Negative for chills and fever  HENT: Positive for congestion, postnasal drip, sinus pressure, sinus pain, sore throat and trouble swallowing (secondary to pain)  Respiratory: Positive for cough and chest tightness  Negative for shortness of breath and wheezing  Gastrointestinal: Negative for diarrhea, nausea and vomiting  Endocrine: Negative for polydipsia, polyphagia and polyuria  All other systems reviewed and are negative  Current Medications       Current Outpatient Medications:   •  ALPRAZolam (XANAX) 0 5 mg tablet, Take 0 5 mg by mouth daily as needed  , Disp: , Rfl:   •  amoxicillin-clavulanate (AUGMENTIN) 875-125 mg per tablet, Take 1 tablet by mouth every 12 (twelve) hours for 10 days, Disp: 20 tablet, Rfl: 0  •  benzonatate (TESSALON PERLES) 100 mg capsule, Take 1 capsule (100 mg total) by mouth 3 (three) times a day as needed for cough, Disp: 20 capsule, Rfl: 0  •  busPIRone (BUSPAR) 30 MG tablet, , Disp: , Rfl:   •  ergocalciferol (VITAMIN D2) 50,000 units, Take 1 capsule (50,000 Units total) by mouth once a week, Disp: 12 capsule, Rfl: 3  •  gabapentin (NEURONTIN) 600 MG tablet, Take 0 5 tablets (300 mg total) by mouth 2 (two) times a day Half Tab 300mg at bedtime, Disp: 30 tablet, Rfl: 1  •  insulin aspart (NovoLOG) 100 units/mL injection, 60 Units by Subcutaneous Insulin Pump route in the morning INJECT INTO PUMP DAILY  , Disp: 30 mL, Rfl: 3  •  insulin glargine (Lantus) 100 units/mL subcutaneous injection, Inject 36 Units under the skin daily at bedtime, Disp: 10 mL, Rfl: 1  •  ketoconazole (NIZORAL) 2 % cream, Apply topically daily, Disp: 60 g, Rfl: 1  •  levothyroxine (Synthroid) 25 mcg tablet, Take 1 tablet (25 mcg total) by mouth daily in the early morning Combine with 200mcg, for total 225mcg daily  , Disp: 90 tablet, Rfl: 3  •  levothyroxine 200 mcg tablet, Take 1 tablet (200 mcg total) by mouth every morning, Disp: 90 tablet, Rfl: 3  •  traZODone (DESYREL) 100 mg tablet, Take 100 mg by mouth daily at bedtime, Disp: , Rfl:   •  venlafaxine (EFFEXOR-XR) 150 mg 24 hr capsule, , Disp: , Rfl:   •  Vyvanse 40 MG capsule, , Disp: , Rfl:   •  Accu-Chek FastClix Lancets MISC, USE 1 LANCET TO TEST BLOOD SUGAR UP TO 6 TIMES DAILY, Disp: 102 each, Rfl: 0  •  acetaminophen (TYLENOL) 500 mg tablet,  , Disp: , Rfl:   •  cloNIDine (Catapres) 0 1 mg tablet, Take 1 tablet (0 1 mg total) by mouth daily at bedtime (Patient not taking: Reported on 9/22/2022), Disp: 30 tablet, Rfl: 1  •  Continuous Blood Gluc  (FreeStyle Samantha 14 Day [place]) KELVIN, Use 1 Units in the morning, Disp: 1 each, Rfl: 0  •  Continuous Blood Gluc  (FreeStyle Samantha 2 [place]) KELVIN, Use 1 units in the morning, Disp: 1 each, Rfl: 3  •  Continuous Blood Gluc Sensor (FreeStyle Samantha 14 Day Sensor) MISC, Use 1 Units every 14 (fourteen) days, Disp: 2 each, Rfl: 3  •  Continuous Blood Gluc Sensor (FreeStyle Samantha 2 Sensor) MISC, Use 1 unit every 14 days, Disp: 2 each, Rfl: 3  •  Cyanocobalamin (B-12 PO), Take by mouth daily (Patient not taking: Reported on 9/22/2022), Disp: , Rfl:   •  escitalopram (LEXAPRO) 20 mg tablet, Take 10 mg by mouth daily Half tab daily  (Patient not taking: Reported on 5/14/2023), Disp: , Rfl:   •  glucose blood (Accu-Chek Guide) test strip, Use to test blood sugar up to 6 times daily  , Disp: 200 each, Rfl: 2  •  Insulin Aspart (NovoLOG) 100 units/mL injection, 60 UNITS BY SUBCUTANEOUS INSULIN PUMP ROUTE IN THE MORNING INJECT INTO PUMP DAILY   (Patient not taking: Reported on 11/22/2022), Disp: , Rfl:   •  Insulin Pen Needle (BD Pen Needle Deanna U/F) 32G X 4 MM MISC, "Use 4/day, Disp: 100 each, Rfl: 11  •  Insulin Syringe-Needle U-100 (INSULIN SYRINGE 1CC/28G) 28G X 1/2\" 1 ML MISC, , Disp: , Rfl:   •  Lantus SoloStar 100 units/mL SOPN, INJECT 35 UNITS UNDER THE SKIN DAILY  E10 65 (TYPE 1 DIABETES), Disp: , Rfl:   •  naltrexone (REVIA) 50 mg tablet, Take 2 tablets (100 mg total) by mouth daily (Patient not taking: Reported on 2023), Disp: 60 tablet, Rfl: 3  •  ofloxacin (OCUFLOX) 0 3 % ophthalmic solution, , Disp: , Rfl:   •  ondansetron (ZOFRAN) 4 mg tablet, Take 1 tablet (4 mg total) by mouth every 8 (eight) hours as needed for nausea or vomiting, Disp: 30 tablet, Rfl: 1  •  PATIENT MAINTAINED INSULIN PUMP, Inject 1 each under the skin every 8 (eight) hours (Patient not taking: Reported on 2022), Disp: 1 each, Rfl: 0  •  prednisoLONE acetate (PRED FORTE) 1 % ophthalmic suspension, , Disp: , Rfl:   •  selenium 200 mcg, Take 200 mcg by mouth daily  (Patient not taking: Reported on 9/3/2021), Disp: , Rfl:   •  vitamin B-12 (VITAMIN B-12) 500 mcg tablet, Take 500 mcg by mouth daily (Patient not taking: Reported on 2021), Disp: , Rfl:     Current Allergies     Allergies as of 2023   • (No Known Allergies)              The following portions of the patient's history were reviewed and updated as appropriate: allergies, current medications, past family history, past medical history, past social history, past surgical history and problem list      Past Medical History:   Diagnosis Date   • Anemia    • Anxiety    • B12 deficiency    • Cervical disc disorder     On gabapentin    • Depression    • Disease of thyroid gland     hypothyroid   • Iron deficiency anemia    • Panic attack    • PTSD (post-traumatic stress disorder)    • Sleep difficulties    • Substance abuse ([facility] [address] )    • Type 1 diabetes ([facility] [address] )    • Vitamin D deficiency        Past Surgical History:   Procedure Laterality Date   • ABDOMINAL SURGERY      gastric bypass   •  SECTION      x2   • " "CHOLECYSTECTOMY  2018   • GASTRIC BYPASS  2007   • INTRAUTERINE DEVICE INSERTION  01/06/2015   • IR BIOPSY BONE MARROW  8/27/2020   • OTHER SURGICAL HISTORY      surgery for imperforate hymen/endometriosis/hydrometrocolpos   • TUBAL LIGATION     • WISDOM TOOTH EXTRACTION         Family History   Problem Relation Age of Onset   • Thyroid disease Mother    • URIEL disease Mother    • Hyperlipidemia Mother    • Hypertension Mother    • Osteoarthritis Mother    • Thyroid disease unspecified Mother    • Diabetes Father    • Alcohol abuse Father    • Diabetes type I Father    • Thyroid disease Sister    • Depression Sister    • Thyroid disease unspecified Sister    • Anxiety disorder Sister    • Heart disease Maternal Grandmother    • Hypertension Maternal Grandmother    • Arthritis Maternal Grandmother    • Diabetes type I Maternal Uncle    • Diabetes type I Maternal Grandfather          Medications have been verified  Objective     /85   Pulse 88   Temp (!) 97 4 °F (36 3 °C)   Resp 18   Ht 5' 6\" (1 676 m)   Wt 71 2 kg (157 lb)   SpO2 99%   BMI 25 34 kg/m²   No LMP recorded  Physical Exam     Physical Exam  Vitals and nursing note reviewed  Constitutional:       General: She is awake  She is not in acute distress  Appearance: Normal appearance  She is not ill-appearing, toxic-appearing or diaphoretic  HENT:      Right Ear: Tympanic membrane is erythematous  Tympanic membrane is not injected or bulging  Left Ear: Tympanic membrane is erythematous  Tympanic membrane is not injected or bulging  Nose: Mucosal edema and congestion present  Mouth/Throat:      Lips: Pink  Mouth: Mucous membranes are moist       Pharynx: Oropharynx is clear  Uvula midline  Posterior oropharyngeal erythema present  No pharyngeal swelling, oropharyngeal exudate or uvula swelling  Tonsils: Tonsillar exudate present  No tonsillar abscesses  2+ on the right  1+ on the left        Comments: " Airway patent  No drooling  Cardiovascular:      Rate and Rhythm: Normal rate  Pulses: Normal pulses  Heart sounds: Normal heart sounds, S1 normal and S2 normal    Pulmonary:      Effort: Pulmonary effort is normal       Breath sounds: Normal breath sounds and air entry  Skin:     General: Skin is warm  Capillary Refill: Capillary refill takes less than 2 seconds  Neurological:      Mental Status: She is alert  Psychiatric:         Mood and Affect: Mood normal          Behavior: Behavior normal          Thought Content:  Thought content normal          Judgment: Judgment normal

## 2023-05-14 NOTE — PATIENT INSTRUCTIONS
Take antibiotic as prescribed  Recommend probiotic use while taking antibiotic  Use benzonatate as directed as needed for cough  Hydration and rest   Throat culture sent; results will appear in myc ralph  Use the [facility] MyChart to obtain lab results  Continue to monitor your blood sugars  If you become hyperglycemic or symptomatic proceed to the ER for further evaluation  PCP follow up in 1-2 days (as soon as possible)  Go to an emergency department if difficulty breathing occurs or if symptoms worsen

## 2023-05-17 LAB — BACTERIA THROAT CULT: ABNORMAL

## 2023-05-18 ENCOUNTER — APPOINTMENT (EMERGENCY)
Dept: CT IMAGING | Facility: HOSPITAL | Age: 43
End: 2023-05-18

## 2023-05-18 ENCOUNTER — HOSPITAL ENCOUNTER (EMERGENCY)
Facility: HOSPITAL | Age: 43
Discharge: HOME | End: 2023-05-18
Attending: EMERGENCY MEDICINE

## 2023-05-18 VITALS
HEART RATE: 81 BPM | OXYGEN SATURATION: 100 % | SYSTOLIC BLOOD PRESSURE: 152 MMHG | WEIGHT: 161.38 LBS | BODY MASS INDEX: 26.05 KG/M2 | RESPIRATION RATE: 18 BRPM | DIASTOLIC BLOOD PRESSURE: 95 MMHG

## 2023-05-18 DIAGNOSIS — S00.83XA CONTUSION OF FACE, INITIAL ENCOUNTER: ICD-10-CM

## 2023-05-18 DIAGNOSIS — S06.0XAA CONCUSSION: Primary | ICD-10-CM

## 2023-05-18 RX ORDER — ONDANSETRON 4 MG/1
4 TABLET, ORALLY DISINTEGRATING ORAL ONCE
Status: COMPLETED | OUTPATIENT
Start: 2023-05-18 | End: 2023-05-18

## 2023-05-18 RX ORDER — ONDANSETRON 4 MG/1
4 TABLET, ORALLY DISINTEGRATING ORAL EVERY 6 HOURS PRN
Qty: 10 TABLET | Refills: 0 | Status: SHIPPED | OUTPATIENT
Start: 2023-05-18

## 2023-05-18 RX ORDER — ACETAMINOPHEN 325 MG/1
650 TABLET ORAL ONCE
Status: COMPLETED | OUTPATIENT
Start: 2023-05-18 | End: 2023-05-18

## 2023-05-18 RX ORDER — MECLIZINE HYDROCHLORIDE 25 MG/1
25 TABLET ORAL ONCE
Status: COMPLETED | OUTPATIENT
Start: 2023-05-18 | End: 2023-05-18

## 2023-05-18 RX ORDER — MECLIZINE HYDROCHLORIDE 25 MG/1
25 TABLET ORAL EVERY 8 HOURS PRN
Qty: 20 TABLET | Refills: 0 | Status: SHIPPED | OUTPATIENT
Start: 2023-05-18

## 2023-05-18 RX ADMIN — ONDANSETRON 4 MG: 4 TABLET, ORALLY DISINTEGRATING ORAL at 16:30

## 2023-05-18 RX ADMIN — ACETAMINOPHEN 325MG 650 MG: 325 TABLET ORAL at 16:47

## 2023-05-18 RX ADMIN — MECLIZINE HYDROCHLORIDE 25 MG: 25 TABLET ORAL at 16:46

## 2023-05-18 NOTE — ED PROVIDER NOTES
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  Pt states she was knocked down but denies striking her head again  She denies LOC  Pt states her symptoms have progressed since the injury  Pt otherwise denies chest pain, SOB, abd pain, N/V/D, peripheral edema, rashes, paresthesias and focal weakness  A/P: Head injury, now with dizziness, generalized malaise, nausea and headache  Pt appears uncomfortable, although remains neurologically intact  Suspect underlying concussion  Will obtain imaging to rule out traumatic injury, will treat symptomatically  History provided by:  Patient and medical records      Prior to Admission Medications   Prescriptions Last Dose Informant Patient Reported? Taking?    ALPRAZolam (XANAX) 0 5 mg tablet   Yes No   Sig: Take 0 5 mg by mouth daily as needed     Accu-Chek FastClix Lancets MISC   No No   Sig: USE 1 LANCET TO TEST BLOOD SUGAR UP TO 6 TIMES DAILY   Continuous Blood Gluc  (FreeStyle Samantha 14 Day [place]) KELVIN   No No   Sig: Use 1 Units in the morning   Continuous Blood Gluc  (FreeStyle Samantha 2 [place]) KELVIN   No No   Sig: Use 1 units in the morning   Continuous Blood Gluc Sensor (FreeStyle Samantha 14 Day Sensor) MISC   No No   Sig: Use 1 Units every 14 (fourteen) days   Continuous Blood Gluc Sensor (FreeStyle Samantha 2 Sensor) [facility]   No No   Sig: Use 1 unit every 14 days   Cyanocobalamin (B-12 PO)   Yes No "  Sig: Take by mouth daily   Patient not taking: Reported on 2022   Insulin Aspart (NovoLOG) 100 units/mL injection   Yes No   Si UNITS BY SUBCUTANEOUS INSULIN PUMP ROUTE IN THE MORNING INJECT INTO PUMP DAILY  Patient not taking: Reported on 2022   Insulin Pen Needle (BD Pen Needle Deanna U/F) 32G X 4 MM MISC   No No   Sig: Use 4/day   Insulin Syringe-Needle U-100 (INSULIN SYRINGE 1CC/28G) 28G X 1/2\" 1 ML MISC   Yes No   Lantus SoloStar 100 units/mL SOPN   Yes No   Sig: INJECT 35 UNITS UNDER THE SKIN DAILY  E10 65 (TYPE 1 DIABETES)   PATIENT MAINTAINED INSULIN PUMP   No No   Sig: Inject 1 each under the skin every 8 (eight) hours   Patient not taking: Reported on 2022   Vyvanse 40 MG capsule   Yes No   acetaminophen (TYLENOL) 500 mg tablet   Yes No   Sig:     amoxicillin-clavulanate (AUGMENTIN) 875-125 mg per tablet   No No   Sig: Take 1 tablet by mouth every 12 (twelve) hours for 10 days   benzonatate (TESSALON PERLES) 100 mg capsule   No No   Sig: Take 1 capsule (100 mg total) by mouth 3 (three) times a day as needed for cough   busPIRone (BUSPAR) 30 MG tablet   Yes No   cloNIDine (Catapres) 0 1 mg tablet   No No   Sig: Take 1 tablet (0 1 mg total) by mouth daily at bedtime   Patient not taking: Reported on 2022   ergocalciferol (VITAMIN D2) 50,000 units   No No   Sig: Take 1 capsule (50,000 Units total) by mouth once a week   escitalopram (LEXAPRO) 20 mg tablet   Yes No   Sig: Take 10 mg by mouth daily Half tab daily    Patient not taking: Reported on 2023   gabapentin (NEURONTIN) 600 MG tablet   No No   Sig: Take 0 5 tablets (300 mg total) by mouth 2 (two) times a day Half Tab 300mg at bedtime   glucose blood (Accu-Chek Guide) test strip   No No   Sig: Use to test blood sugar up to 6 times daily  insulin aspart (NovoLOG) 100 units/mL injection   No No   Si Units by Subcutaneous Insulin Pump route in the morning INJECT INTO PUMP DAILY     insulin glargine (Lantus) 100 " units/mL subcutaneous injection   No No   Sig: Inject 36 Units under the skin daily at bedtime   ketoconazole (NIZORAL) 2 % cream   No No   Sig: Apply topically daily   levothyroxine (Synthroid) 25 mcg tablet   No No   Sig: Take 1 tablet (25 mcg total) by mouth daily in the early morning Combine with 200mcg, for total 225mcg daily     levothyroxine 200 mcg tablet   No No   Sig: Take 1 tablet (200 mcg total) by mouth every morning   naltrexone (REVIA) 50 mg tablet   No No   Sig: Take 2 tablets (100 mg total) by mouth daily   Patient not taking: Reported on 2023   ofloxacin (OCUFLOX) 0 3 % ophthalmic solution   Yes No   Patient not taking: Reported on 2022   ondansetron (ZOFRAN) 4 mg tablet   No No   Sig: Take 1 tablet (4 mg total) by mouth every 8 (eight) hours as needed for nausea or vomiting   prednisoLONE acetate (PRED FORTE) 1 % ophthalmic suspension   Yes No   Patient not taking: Reported on 2022   selenium 200 mcg   Yes No   Sig: Take 200 mcg by mouth daily    Patient not taking: Reported on 9/3/2021   traZODone (DESYREL) 100 mg tablet   Yes No   Sig: Take 100 mg by mouth daily at bedtime   venlafaxine (EFFEXOR-XR) 150 mg 24 hr capsule   Yes No   vitamin B-12 (VITAMIN B-12) 500 mcg tablet   Yes No   Sig: Take 500 mcg by mouth daily   Patient not taking: Reported on 2021      Facility-Administered Medications: None       Past Medical History:   Diagnosis Date   • Anemia    • Anxiety    • B12 deficiency    • Cervical disc disorder     On gabapentin    • Depression    • Disease of thyroid gland     hypothyroid   • Iron deficiency anemia    • Panic attack    • PTSD (post-traumatic stress disorder)    • Sleep difficulties    • Substance abuse ([facility] [address] )    • [facility]    • Vitamin D deficiency        Past Surgical History:   Procedure Laterality Date   • ABDOMINAL SURGERY      gastric bypass   •  SECTION      x2   • CHOLECYSTECTOMY  2018   • GASTRIC BYPASS  2007   • INTRAUTERINE DEVICE INSERTION  01/06/2015   • IR BIOPSY BONE MARROW  8/27/2020   • OTHER SURGICAL HISTORY      surgery for imperforate hymen/endometriosis/hydrometrocolpos   • TUBAL LIGATION     • WISDOM TOOTH EXTRACTION         Family History   Problem Relation Age of Onset   • Thyroid disease Mother    • URIEL disease Mother    • Hyperlipidemia Mother    • Hypertension Mother    • Osteoarthritis Mother    • Thyroid disease unspecified Mother    • Diabetes Father    • Alcohol abuse Father    • Diabetes type I Father    • Thyroid disease Sister    • Depression Sister    • Thyroid disease unspecified Sister    • Anxiety disorder Sister    • Heart disease Maternal Grandmother    • Hypertension Maternal Grandmother    • Arthritis Maternal Grandmother    • Diabetes type I Maternal Uncle    • Diabetes type I Maternal Grandfather      I have reviewed and agree with the history as documented  E-Cigarette/Vaping   • E-Cigarette Use Never User      E-Cigarette/Vaping Substances   • Nicotine No    • THC No    • CBD No    • Flavoring No    • Other No    • Unknown No      Social History     Tobacco Use   • Smoking status: Never   • Smokeless tobacco: Never   Vaping Use   • Vaping Use: Never used   Substance Use Topics   • Alcohol use: Not Currently     Comment: occasional   • Drug use: No       Review of Systems   Gastrointestinal: Positive for nausea  Neurological: Positive for dizziness, weakness (generalized) and headaches  All other systems reviewed and are negative  Physical Exam  Physical Exam  General Appearance: alert and oriented, nad, non toxic appearing  Skin:  Warm, dry, intact  No cyanosis  HEENT: Atraumatic, normocephalic  No eye drainage  Normal hearing  Moist mucous membranes  No epistaxis noted  No septal hematoma  Tenderness over bilateral maxilla  PERRLA, EOMI  Neck: Supple, trachea midline  Cardiac: RRR; no murmurs, rub, gallops    No pedal edema, 2+ pulses  Pulmonary: lungs CTAB; no wheezes, rales, rhonchi  Gastrointestinal: abdomen soft, nontender, nondistended; no guarding or rebound tenderness; good bowel sounds, no mass or bruits  Extremities:  No deformities  No calf tenderness, no clubbing  Neuro:  CN 2-12 grossly intact  5/5 motor strength to the bilateral upper and lower extremities  Sensation intact throughout  No pronator drift  Normal finger-to-nose to the bilateral upper extremities, normal heel-to-shin to the bilateral lower extremities  Psych:  Normal mood and affect, normal judgement and insight      Vital Signs  ED Triage Vitals   Temp Pulse Respirations Blood Pressure SpO2   -- 05/18/23 1513 05/18/23 1513 05/18/23 1513 05/18/23 1513    81 18 152/95 100 %      Temp src Heart Rate Source Patient Position - Orthostatic VS BP Location FiO2 (%)   -- 05/18/23 1513 05/18/23 1513 05/18/23 1513 --    Monitor Sitting Right arm       Pain Score       05/18/23 1631       7           Vitals:    05/18/23 1513   BP: 152/95   Pulse: 81   Patient Position - Orthostatic VS: Sitting         Visual Acuity  Visual Acuity    Flowsheet Row Most Recent Value   L Pupil Size (mm) 5   R Pupil Size (mm) 5          ED Medications  Medications   ondansetron (ZOFRAN-ODT) dispersible tablet 4 mg (4 mg Oral Given 5/18/23 1630)   acetaminophen (TYLENOL) tablet 650 mg (650 mg Oral Given 5/18/23 1647)   meclizine (ANTIVERT) tablet 25 mg (25 mg Oral Given 5/18/23 1646)       Diagnostic Studies  Results Reviewed     None                 CT head without contrast   Final Result by Cherise George MD (05/18 1623)      No acute intracranial abnormality  Workstation performed: [id number]         CT facial bones without contrast   Final Result by Cherise George MD (05/18 1628)      No acute traumatic injury                 Workstation performed: [id number]                    Procedures  Procedures         ED Course  ED Course as of 05/20/23 0918   Thu May 18, 2023   1626 CT head without contrast  No acute intracranial abnormality  1634 CT facial bones without contrast  No acute traumatic injury  1647 Pt updated on CT results  She has only received the zofran at this time, but feels nausea has improved enough to take tylenol/meclizine  Suspect pt's symptoms are secondary to concussion  Recommend continued symptomatic treatment, will refer to the concussion comprehensive program                                              Medical Decision Making  Amount and/or Complexity of Data Reviewed  Radiology: ordered  Decision-making details documented in ED Course  Risk  OTC drugs  Prescription drug management  Disposition  Final diagnoses:   Concussion   Contusion of face, initial encounter     Time reflects when diagnosis was documented in both MDM as applicable and the Disposition within this note     Time User Action Codes Description Comment    5/18/2023  4:48 PM [address] [S06  0XAA] Concussion     5/18/2023  4:48 PM Bela Villeda Add [S00 83XA] Contusion of face, initial encounter       ED Disposition     ED Disposition   Discharge    Condition   Stable    Date/Time   Thu May 18, 2023  4:48 PM    Comment   Sylvia Serna discharge to home/self care                 Follow-up Information     Follow up With Specialties Details Why Contact Info Additional Information    Concussion Program  Schedule an appointment as soon as possible for a visit  For re-evaluation      [id number]  Emergency Department Emergency Medicine Go to  If symptoms worsen [facility] [id number] [address] [facility] Emergency Department, [address] [facility] , [address], [place], [address]          Discharge Medication List as of 5/18/2023  4:50 PM      START taking these medications    Details   meclizine (ANTIVERT) 25 mg tablet Take 1 tablet (25 mg total) by mouth every 8 (eight) hours as needed for dizziness, Starting Thu 5/18/2023, Normal      ondansetron (ZOFRAN-ODT) 4 mg disintegrating tablet Take 1 tablet (4 mg total) by mouth every 6 (six) hours as needed for nausea or vomiting, Starting Thu 5/18/2023, Normal         CONTINUE these medications which have NOT CHANGED    Details   Accu-Chek FastClix Lancets MISC USE 1 LANCET TO TEST BLOOD SUGAR UP TO 6 TIMES DAILY, Normal      acetaminophen (TYLENOL) 500 mg tablet  , Starting Sun 5/2/2021, Historical Med      ALPRAZolam (XANAX) 0 5 mg tablet Take 0 5 mg by mouth daily as needed  , Starting Wed 7/28/2021, Historical Med      amoxicillin-clavulanate (AUGMENTIN) 875-125 mg per tablet Take 1 tablet by mouth every 12 (twelve) hours for 10 days, Starting Sun 5/14/2023, Until Wed 5/24/2023, Normal      benzonatate (TESSALON PERLES) 100 mg capsule Take 1 capsule (100 mg total) by mouth 3 (three) times a day as needed for cough, Starting Sun 5/14/2023, Normal      busPIRone (BUSPAR) 30 MG tablet Starting Sun 8/28/2022, Historical Med      cloNIDine (Catapres) 0 1 mg tablet Take 1 tablet (0 1 mg total) by mouth daily at bedtime, Starting Tue 4/5/2022, Normal      !! Continuous Blood Gluc  (FreeStyle Samantha 14 Day [place]) KELVIN Use 1 Units in the morning, Starting Wed 11/2/2022, Normal      !! Continuous Blood Gluc  (FreeStyle Samantha 2 [place]) KELVIN Use 1 units in the morning, Normal      !! Continuous Blood Gluc Sensor (FreeStyle Samantha 14 Day Sensor) MISC Use 1 Units every 14 (fourteen) days, Starting Wed 11/2/2022, Normal      !!  Continuous Blood Gluc Sensor (FreeStyle Samantha 2 Sensor) MISC Use 1 unit every 14 days, Normal      !! Cyanocobalamin (B-12 PO) Take by mouth daily, Historical Med      ergocalciferol (VITAMIN D2) 50,000 units Take 1 capsule (50,000 Units total) by mouth once a week, Starting Fri 11/18/2022, Normal      escitalopram (LEXAPRO) 20 mg tablet Take 10 mg by mouth daily Half tab daily , Starting Wed 4/7/2021, Historical Med      gabapentin (NEURONTIN) 600 MG tablet Take 0 5 tablets (300 mg "total) by mouth 2 (two) times a day Half Tab 300mg at bedtime, Starting Tue 4/5/2022, Normal      glucose blood (Accu-Chek Guide) test strip Use to test blood sugar up to 6 times daily  , Normal      insulin aspart (NovoLOG) 100 units/mL injection 60 Units by Subcutaneous Insulin Pump route in the morning INJECT INTO PUMP DAILY  , Starting Tue 6/21/2022, Normal      Insulin Aspart (NovoLOG) 100 units/mL injection 60 UNITS BY SUBCUTANEOUS INSULIN PUMP ROUTE IN THE MORNING INJECT INTO PUMP DAILY  , Historical Med      insulin glargine (Lantus) 100 units/mL subcutaneous injection Inject 36 Units under the skin daily at bedtime, Starting Tue 4/5/2022, Normal      Insulin Pen Needle (BD Pen Needle Deanna U/F) 32G X 4 MM MISC Use 4/day, Normal      Insulin Syringe-Needle U-100 (INSULIN SYRINGE 1CC/28G) 28G X 1/2\" 1 ML MISC Historical Med      ketoconazole (NIZORAL) 2 % cream Apply topically daily, Starting Wed 11/16/2022, Normal      Lantus SoloStar 100 units/mL SOPN INJECT 35 UNITS UNDER THE SKIN DAILY  E10 65 (TYPE 1 DIABETES), Historical Med      !! levothyroxine (Synthroid) 25 mcg tablet Take 1 tablet (25 mcg total) by mouth daily in the early morning Combine with 200mcg, for total 225mcg daily  , Starting Tue 11/22/2022, Normal      !! levothyroxine 200 mcg tablet Take 1 tablet (200 mcg total) by mouth every morning, Starting Tue 4/12/2022, Normal      naltrexone (REVIA) 50 mg tablet Take 2 tablets (100 mg total) by mouth daily, Starting Wed 11/2/2022, Normal      ofloxacin (OCUFLOX) 0 3 % ophthalmic solution Starting Tue 8/31/2021, Historical Med      ondansetron (ZOFRAN) 4 mg tablet Take 1 tablet (4 mg total) by mouth every 8 (eight) hours as needed for nausea or vomiting, Starting Mon 11/7/2022, Normal      PATIENT MAINTAINED INSULIN PUMP Inject 1 each under the skin every 8 (eight) hours, Starting Tue 5/25/2021, Normal      prednisoLONE acetate (PRED FORTE) 1 % ophthalmic suspension Starting Tue 8/31/2021, Historical " Med      selenium 200 mcg Take 200 mcg by mouth daily , Starting Sat 4/17/2021, Historical Med      traZODone (DESYREL) 100 mg tablet Take 100 mg by mouth daily at bedtime, Starting Thu 8/12/2021, Historical Med      venlafaxine (EFFEXOR-XR) 150 mg 24 hr capsule Starting Mon 3/28/2022, Historical Med      !! vitamin B-12 (VITAMIN B-12) 500 mcg tablet Take 500 mcg by mouth daily, Starting Sun 4/4/2021, Historical Med      Vyvanse 40 MG capsule Starting Thu 6/23/2022, Historical Med       !! - Potential duplicate medications found  Please discuss with provider                PDMP Review     None          ED Provider  Electronically Signed by           Sveta Basilio DO  05/20/23 7977

## 2023-05-18 NOTE — Clinical Note
Rose Mary Henson was seen and treated in our emergency department on 5/18/2023  Diagnosis:     Cassandra Nieto  may return to work on return date  She may return on this date: 05/22/2023         If you have any questions or concerns, please don't hesitate to call        Xavier    ______________________________           _______________          _______________  Hospital Representative                              Date                                Time

## 2023-06-13 ENCOUNTER — HOSPITAL ENCOUNTER (INPATIENT)
Facility: HOSPITAL | Age: 43
LOS: 3 days | Discharge: HOME | DRG: 638 | End: 2023-06-16
Attending: EMERGENCY MEDICINE | Admitting: INTERNAL MEDICINE
Payer: COMMERCIAL

## 2023-06-13 DIAGNOSIS — E87.1 HYPONATREMIA: ICD-10-CM

## 2023-06-13 DIAGNOSIS — N17.9 AKI (ACUTE KIDNEY INJURY) (HCC): ICD-10-CM

## 2023-06-13 DIAGNOSIS — E10.10 DIABETIC KETOACIDOSIS WITHOUT COMA ASSOCIATED WITH TYPE 1 DIABETES MELLITUS (HCC): Primary | ICD-10-CM

## 2023-06-13 DIAGNOSIS — N76.6 GENITAL LABIAL ULCER: ICD-10-CM

## 2023-06-13 PROBLEM — R74.8 ALKALINE PHOSPHATASE ELEVATION: Status: ACTIVE | Noted: 2023-06-13

## 2023-06-13 PROBLEM — R65.10 SIRS (SYSTEMIC INFLAMMATORY RESPONSE SYNDROME) (HCC): Status: ACTIVE | Noted: 2023-06-13

## 2023-06-13 LAB
ALBUMIN SERPL BCP-MCNC: 4.3 G/DL (ref 3.5–5)
ALP SERPL-CCNC: 217 U/L (ref 34–104)
ALT SERPL W P-5'-P-CCNC: 42 U/L (ref 7–52)
ANION GAP SERPL CALCULATED.3IONS-SCNC: 22 MMOL/L (ref 4–13)
ANION GAP SERPL CALCULATED.3IONS-SCNC: 7 MMOL/L (ref 4–13)
AST SERPL W P-5'-P-CCNC: 31 U/L (ref 13–39)
ATRIAL RATE: 112 BPM
BASE EX.OXY STD BLDV CALC-SCNC: 80.1 % (ref 60–80)
BASE EXCESS BLDV CALC-SCNC: -16.8 MMOL/L
BASOPHILS # BLD AUTO: 0.02 THOUSANDS/ÂΜL (ref 0–0.1)
BASOPHILS NFR BLD AUTO: 0 % (ref 0–1)
BETA-HYDROXYBUTYRATE: 7.3 MMOL/L
BILIRUB SERPL-MCNC: 0.31 MG/DL (ref 0.2–1)
BUN SERPL-MCNC: 14 MG/DL (ref 5–25)
BUN SERPL-MCNC: 22 MG/DL (ref 5–25)
CALCIUM SERPL-MCNC: 5.4 MG/DL (ref 8.4–10.2)
CALCIUM SERPL-MCNC: 8.7 MG/DL (ref 8.4–10.2)
CHLORIDE SERPL-SCNC: 116 MMOL/L (ref 96–108)
CHLORIDE SERPL-SCNC: 90 MMOL/L (ref 96–108)
CO2 SERPL-SCNC: 12 MMOL/L (ref 21–32)
CO2 SERPL-SCNC: 13 MMOL/L (ref 21–32)
CREAT SERPL-MCNC: 0.73 MG/DL (ref 0.6–1.3)
CREAT SERPL-MCNC: 1.48 MG/DL (ref 0.6–1.3)
EOSINOPHIL # BLD AUTO: 0.02 THOUSAND/ÂΜL (ref 0–0.61)
EOSINOPHIL NFR BLD AUTO: 0 % (ref 0–6)
ERYTHROCYTE [DISTWIDTH] IN BLOOD BY AUTOMATED COUNT: 12.5 % (ref 11.6–15.1)
GFR SERPL CREATININE-BSD FRML MDRD: 102 ML/MIN/1.73SQ M
GFR SERPL CREATININE-BSD FRML MDRD: 43 ML/MIN/1.73SQ M
GLUCOSE SERPL-MCNC: 110 MG/DL (ref 65–140)
GLUCOSE SERPL-MCNC: 127 MG/DL (ref 65–140)
GLUCOSE SERPL-MCNC: 128 MG/DL (ref 65–140)
GLUCOSE SERPL-MCNC: 172 MG/DL (ref 65–140)
GLUCOSE SERPL-MCNC: 244 MG/DL (ref 65–140)
GLUCOSE SERPL-MCNC: 397 MG/DL (ref 65–140)
GLUCOSE SERPL-MCNC: 423 MG/DL (ref 65–140)
GLUCOSE SERPL-MCNC: 442 MG/DL (ref 65–140)
GLUCOSE SERPL-MCNC: 91 MG/DL (ref 65–140)
GLUCOSE SERPL-MCNC: 97 MG/DL (ref 65–140)
HCO3 BLDV-SCNC: 10.3 MMOL/L (ref 24–30)
HCT VFR BLD AUTO: 46 % (ref 34.8–46.1)
HGB BLD-MCNC: 14.2 G/DL (ref 11.5–15.4)
IMM GRANULOCYTES # BLD AUTO: 0.03 THOUSAND/UL (ref 0–0.2)
IMM GRANULOCYTES NFR BLD AUTO: 1 % (ref 0–2)
LYMPHOCYTES # BLD AUTO: 0.61 THOUSANDS/ÂΜL (ref 0.6–4.47)
LYMPHOCYTES NFR BLD AUTO: 11 % (ref 14–44)
MAGNESIUM SERPL-MCNC: 1.1 MG/DL (ref 1.9–2.7)
MCH RBC QN AUTO: 29.6 PG (ref 26.8–34.3)
MCHC RBC AUTO-ENTMCNC: 30.9 G/DL (ref 31.4–37.4)
MCV RBC AUTO: 96 FL (ref 82–98)
MONOCYTES # BLD AUTO: 0.52 THOUSAND/ÂΜL (ref 0.17–1.22)
MONOCYTES NFR BLD AUTO: 9 % (ref 4–12)
NEUTROPHILS # BLD AUTO: 4.61 THOUSANDS/ÂΜL (ref 1.85–7.62)
NEUTS SEG NFR BLD AUTO: 79 % (ref 43–75)
NRBC BLD AUTO-RTO: 0 /100 WBCS
O2 CT BLDV-SCNC: 15.8 ML/DL
OSMOLALITY UR/SERPL-RTO: 301 MMOL/KG (ref 282–298)
P AXIS: 83 DEGREES
PCO2 BLDV: 29.2 MM HG (ref 42–50)
PH BLDV: 7.17 [PH] (ref 7.3–7.4)
PHOSPHATE SERPL-MCNC: 1 MG/DL (ref 2.7–4.5)
PLATELET # BLD AUTO: 211 THOUSANDS/UL (ref 149–390)
PMV BLD AUTO: 9.9 FL (ref 8.9–12.7)
PO2 BLDV: 53.5 MM HG (ref 35–45)
POTASSIUM SERPL-SCNC: 2.7 MMOL/L (ref 3.5–5.3)
POTASSIUM SERPL-SCNC: 5.2 MMOL/L (ref 3.5–5.3)
PR INTERVAL: 134 MS
PROT SERPL-MCNC: 8.1 G/DL (ref 6.4–8.4)
QRS AXIS: 87 DEGREES
QRSD INTERVAL: 80 MS
QT INTERVAL: 324 MS
QTC INTERVAL: 442 MS
RBC # BLD AUTO: 4.8 MILLION/UL (ref 3.81–5.12)
SODIUM SERPL-SCNC: 124 MMOL/L (ref 135–147)
SODIUM SERPL-SCNC: 136 MMOL/L (ref 135–147)
T WAVE AXIS: 75 DEGREES
VENTRICULAR RATE: 112 BPM
WBC # BLD AUTO: 5.81 THOUSAND/UL (ref 4.31–10.16)

## 2023-06-13 PROCEDURE — 83036 HEMOGLOBIN GLYCOSYLATED A1C: CPT

## 2023-06-13 PROCEDURE — 82010 KETONE BODYS QUAN: CPT | Performed by: EMERGENCY MEDICINE

## 2023-06-13 PROCEDURE — 96361 HYDRATE IV INFUSION ADD-ON: CPT

## 2023-06-13 PROCEDURE — 83930 ASSAY OF BLOOD OSMOLALITY: CPT

## 2023-06-13 PROCEDURE — NC001 PR NO CHARGE: Performed by: INTERNAL MEDICINE

## 2023-06-13 PROCEDURE — 84100 ASSAY OF PHOSPHORUS: CPT | Performed by: NURSE PRACTITIONER

## 2023-06-13 PROCEDURE — 85025 COMPLETE CBC W/AUTO DIFF WBC: CPT | Performed by: EMERGENCY MEDICINE

## 2023-06-13 PROCEDURE — 87040 BLOOD CULTURE FOR BACTERIA: CPT

## 2023-06-13 PROCEDURE — 80048 BASIC METABOLIC PNL TOTAL CA: CPT | Performed by: NURSE PRACTITIONER

## 2023-06-13 PROCEDURE — 80053 COMPREHEN METABOLIC PANEL: CPT | Performed by: EMERGENCY MEDICINE

## 2023-06-13 PROCEDURE — 83690 ASSAY OF LIPASE: CPT

## 2023-06-13 PROCEDURE — 82948 REAGENT STRIP/BLOOD GLUCOSE: CPT

## 2023-06-13 PROCEDURE — 83735 ASSAY OF MAGNESIUM: CPT | Performed by: NURSE PRACTITIONER

## 2023-06-13 PROCEDURE — 82805 BLOOD GASES W/O2 SATURATION: CPT | Performed by: EMERGENCY MEDICINE

## 2023-06-13 PROCEDURE — 93010 ELECTROCARDIOGRAM REPORT: CPT | Performed by: INTERNAL MEDICINE

## 2023-06-13 PROCEDURE — 93005 ELECTROCARDIOGRAM TRACING: CPT

## 2023-06-13 PROCEDURE — 36415 COLL VENOUS BLD VENIPUNCTURE: CPT | Performed by: EMERGENCY MEDICINE

## 2023-06-13 PROCEDURE — 96374 THER/PROPH/DIAG INJ IV PUSH: CPT

## 2023-06-13 PROCEDURE — 99285 EMERGENCY DEPT VISIT HI MDM: CPT

## 2023-06-13 RX ORDER — MAGNESIUM SULFATE HEPTAHYDRATE 40 MG/ML
2 INJECTION, SOLUTION INTRAVENOUS ONCE
Status: COMPLETED | OUTPATIENT
Start: 2023-06-13 | End: 2023-06-14

## 2023-06-13 RX ORDER — DEXTROSE, SODIUM CHLORIDE, SODIUM LACTATE, POTASSIUM CHLORIDE, AND CALCIUM CHLORIDE 5; .6; .31; .03; .02 G/100ML; G/100ML; G/100ML; G/100ML; G/100ML
250 INJECTION, SOLUTION INTRAVENOUS CONTINUOUS
Status: DISCONTINUED | OUTPATIENT
Start: 2023-06-13 | End: 2023-06-14

## 2023-06-13 RX ORDER — SODIUM CHLORIDE 9 MG/ML
500 INJECTION, SOLUTION INTRAVENOUS CONTINUOUS
Status: DISCONTINUED | OUTPATIENT
Start: 2023-06-13 | End: 2023-06-13

## 2023-06-13 RX ORDER — SODIUM CHLORIDE 9 MG/ML
250 INJECTION, SOLUTION INTRAVENOUS CONTINUOUS
Status: DISCONTINUED | OUTPATIENT
Start: 2023-06-13 | End: 2023-06-13

## 2023-06-13 RX ORDER — ACETAMINOPHEN 325 MG/1
975 TABLET ORAL ONCE
Status: COMPLETED | OUTPATIENT
Start: 2023-06-13 | End: 2023-06-13

## 2023-06-13 RX ORDER — ENOXAPARIN SODIUM 100 MG/ML
40 INJECTION SUBCUTANEOUS DAILY
Status: DISCONTINUED | OUTPATIENT
Start: 2023-06-14 | End: 2023-06-16 | Stop reason: HOSPADM

## 2023-06-13 RX ORDER — MAGNESIUM SULFATE HEPTAHYDRATE 40 MG/ML
4 INJECTION, SOLUTION INTRAVENOUS ONCE
Status: COMPLETED | OUTPATIENT
Start: 2023-06-13 | End: 2023-06-14

## 2023-06-13 RX ORDER — POTASSIUM CHLORIDE 14.9 MG/ML
20 INJECTION INTRAVENOUS
Status: COMPLETED | OUTPATIENT
Start: 2023-06-13 | End: 2023-06-14

## 2023-06-13 RX ORDER — DEXTROSE AND SODIUM CHLORIDE 5; .9 G/100ML; G/100ML
250 INJECTION, SOLUTION INTRAVENOUS CONTINUOUS
Status: DISCONTINUED | OUTPATIENT
Start: 2023-06-13 | End: 2023-06-13

## 2023-06-13 RX ORDER — ONDANSETRON 2 MG/ML
4 INJECTION INTRAMUSCULAR; INTRAVENOUS ONCE
Status: COMPLETED | OUTPATIENT
Start: 2023-06-13 | End: 2023-06-13

## 2023-06-13 RX ORDER — CHLORHEXIDINE GLUCONATE 0.12 MG/ML
15 RINSE ORAL EVERY 12 HOURS SCHEDULED
Status: DISCONTINUED | OUTPATIENT
Start: 2023-06-13 | End: 2023-06-16

## 2023-06-13 RX ADMIN — SODIUM CHLORIDE 7.3 UNITS/HR: 9 INJECTION, SOLUTION INTRAVENOUS at 16:47

## 2023-06-13 RX ADMIN — MAGNESIUM SULFATE HEPTAHYDRATE 4 G: 40 INJECTION, SOLUTION INTRAVENOUS at 22:09

## 2023-06-13 RX ADMIN — ONDANSETRON 4 MG: 2 INJECTION INTRAMUSCULAR; INTRAVENOUS at 16:00

## 2023-06-13 RX ADMIN — ONDANSETRON 4 MG: 2 INJECTION INTRAMUSCULAR; INTRAVENOUS at 15:55

## 2023-06-13 RX ADMIN — ACETAMINOPHEN 975 MG: 325 TABLET ORAL at 18:16

## 2023-06-13 RX ADMIN — SODIUM CHLORIDE 2000 ML: 0.9 INJECTION, SOLUTION INTRAVENOUS at 15:35

## 2023-06-13 RX ADMIN — CHLORHEXIDINE GLUCONATE 0.12% ORAL RINSE 15 ML: 1.2 LIQUID ORAL at 21:02

## 2023-06-13 RX ADMIN — SODIUM PHOSPHATE, MONOBASIC, MONOHYDRATE AND SODIUM PHOSPHATE, DIBASIC, ANHYDROUS 30 MMOL: 142; 276 INJECTION, SOLUTION INTRAVENOUS at 22:16

## 2023-06-13 RX ADMIN — DIBASIC SODIUM PHOSPHATE, MONOBASIC POTASSIUM PHOSPHATE AND MONOBASIC SODIUM PHOSPHATE 2 TABLET: 852; 155; 130 TABLET ORAL at 22:38

## 2023-06-13 RX ADMIN — SODIUM CHLORIDE 1000 ML: 0.9 INJECTION, SOLUTION INTRAVENOUS at 18:08

## 2023-06-13 RX ADMIN — POTASSIUM CHLORIDE 20 MEQ: 14.9 INJECTION, SOLUTION INTRAVENOUS at 22:04

## 2023-06-13 RX ADMIN — POTASSIUM CHLORIDE 20 MEQ: 14.9 INJECTION, SOLUTION INTRAVENOUS at 23:05

## 2023-06-13 RX ADMIN — DEXTROSE, SODIUM CHLORIDE, SODIUM LACTATE, POTASSIUM CHLORIDE, AND CALCIUM CHLORIDE 250 ML/HR: 5; .6; .31; .03; .02 INJECTION, SOLUTION INTRAVENOUS at 21:46

## 2023-06-13 RX ADMIN — DEXTROSE AND SODIUM CHLORIDE 250 ML/HR: 5; .9 INJECTION, SOLUTION INTRAVENOUS at 20:18

## 2023-06-13 NOTE — PLAN OF CARE
Problem: MOBILITY - ADULT  Goal: Maintain or return to baseline ADL function  Description: INTERVENTIONS:  -  Assess patient's ability to carry out ADLs; assess patient's baseline for ADL function and identify physical deficits which impact ability to perform ADLs (bathing, care of mouth/teeth, toileting, grooming, dressing, etc )  - Assess/evaluate cause of self-care deficits   - Assess range of motion  - Assess patient's mobility; develop plan if impaired  - Assess patient's need for assistive devices and provide as appropriate  - Encourage maximum independence but intervene and supervise when necessary  - Involve family in performance of ADLs  - Assess for home care needs following discharge   - Consider OT consult to assist with ADL evaluation and planning for discharge  - Provide patient education as appropriate  Outcome: Progressing  Goal: Maintains/Returns to pre admission functional level  Description: INTERVENTIONS:  - Perform BMAT or MOVE assessment daily    - Set and communicate daily mobility goal to care team and patient/family/caregiver  - Collaborate with rehabilitation services on mobility goals if consulted  - Perform Range of Motion 3 times a day  - Reposition patient every 2 hours    - Dangle patient 3 times a day  - Stand patient 3 times a day  - Ambulate patient 3 times a day  - Out of bed to chair 3 times a day   - Out of bed for meals 3 times a day  - Out of bed for toileting  - Record patient progress and toleration of activity level   Outcome: Progressing     Problem: PAIN - ADULT  Goal: Verbalizes/displays adequate comfort level or baseline comfort level  Description: Interventions:  - Encourage patient to monitor pain and request assistance  - Assess pain using appropriate pain scale  - Administer analgesics based on type and severity of pain and evaluate response  - Implement non-pharmacological measures as appropriate and evaluate response  - Consider cultural and social influences on pain and pain management  - Notify physician/advanced practitioner if interventions unsuccessful or patient reports new pain  Outcome: Progressing     Problem: INFECTION - ADULT  Goal: Absence or prevention of progression during hospitalization  Description: INTERVENTIONS:  - Assess and monitor for signs and symptoms of infection  - Monitor lab/diagnostic results  - Monitor all insertion sites, i e  indwelling lines, tubes, and drains  - Monitor endotracheal if appropriate and nasal secretions for changes in amount and color  - [place] appropriate cooling/warming therapies per order  - Administer medications as ordered  - Instruct and encourage patient and family to use good hand hygiene technique  - Identify and instruct in appropriate isolation precautions for identified infection/condition  Outcome: Progressing  Goal: Absence of fever/infection during neutropenic period  Description: INTERVENTIONS:  - Monitor WBC    Outcome: Progressing     Problem: SAFETY ADULT  Goal: Maintain or return to baseline ADL function  Description: INTERVENTIONS:  -  Assess patient's ability to carry out ADLs; assess patient's baseline for ADL function and identify physical deficits which impact ability to perform ADLs (bathing, care of mouth/teeth, toileting, grooming, dressing, etc )  - Assess/evaluate cause of self-care deficits   - Assess range of motion  - Assess patient's mobility; develop plan if impaired  - Assess patient's need for assistive devices and provide as appropriate  - Encourage maximum independence but intervene and supervise when necessary  - Involve family in performance of ADLs  - Assess for home care needs following discharge   - Consider OT consult to assist with ADL evaluation and planning for discharge  - Provide patient education as appropriate  Outcome: Progressing  Goal: Maintains/Returns to pre admission functional level  Description: INTERVENTIONS:  - Perform BMAT or MOVE assessment daily    - Set and communicate daily mobility goal to care team and patient/family/caregiver  - Collaborate with rehabilitation services on mobility goals if consulted  - Perform Range of Motion 3 times a day  - Reposition patient every 2 hours    - Dangle patient 3 times a day  - Stand patient 3 times a day  - Ambulate patient 3 times a day  - Out of bed to chair 3 times a day   - Out of bed for meals 3 times a day  - Out of bed for toileting  - Record patient progress and toleration of activity level   Outcome: Progressing  Goal: Patient will remain free of falls  Description: INTERVENTIONS:  - Educate patient/family on patient safety including physical limitations  - Instruct patient to call for assistance with activity   - Consult OT/PT to assist with strengthening/mobility   - Keep Call bell within reach  - Keep bed low and locked with side rails adjusted as appropriate  - Keep care items and personal belongings within reach  - Initiate and maintain comfort rounds  - Make Fall Risk Sign visible to staff  - Offer Toileting every 2 Hours, in advance of need  - Initiate/Maintain Bed alarm  - Obtain necessary fall risk management equipment  - Apply yellow socks and bracelet for high fall risk patients  - Consider moving patient to room near nurses station  Outcome: Progressing     Problem: DISCHARGE PLANNING  Goal: Discharge to home or other facility with appropriate resources  Description: INTERVENTIONS:  - Identify barriers to discharge w/patient and caregiver  - Arrange for needed discharge resources and transportation as appropriate  - Identify discharge learning needs (meds, wound care, etc )  - Arrange for interpretive services to assist at discharge as needed  - Refer to Case Management Department for coordinating discharge planning if the patient needs post-hospital services based on physician/advanced practitioner order or complex needs related to functional status, cognitive ability, or social support system  Outcome: Progressing     Problem: Knowledge Deficit  Goal: Patient/family/caregiver demonstrates understanding of disease process, treatment plan, medications, and discharge instructions  Description: Complete learning assessment and assess knowledge base    Interventions:  - Provide teaching at level of understanding  - Provide teaching via preferred learning methods  Outcome: Progressing

## 2023-06-13 NOTE — ASSESSMENT & PLAN NOTE
Lab Results   Component Value Date    HGBA1C 9 2 (A) 11/16/2022       Recent Labs     06/13/23  1515 06/13/23  1740   POCGLU 442* 397*       Blood Sugar Average: Last 72 hrs:  (P) 419 5     · POA-worsening hyperglycemia, fatigue, nausea vomiting  · Beta hydroxybutyrate-elevated, 7 3 mmol/L  · VBG-7 1/29 2/53  · EKG with sinus tach on serial evaluation,   · UA- Pending  · Electrolyte ,  5 3 creat 1 48  Precipitating factor-no obvious infectious source detected, patient reports being compliant with medication and diet    A1c pending  S/ p 3l normal saline    PLAN   -Insulin drip-   - NPO until gap closed x2  -Fluid- hydration normal saline 500 cc/h 8 hours then to 250 cc/h  -Start D5%  For glucose <250  -Replete electrolytes  - follow-up infectious work-up

## 2023-06-13 NOTE — ED PROVIDER NOTES
"History  Chief Complaint   Patient presents with   • Hyperglycemia - Symptomatic     High sugar since Saturday, nausea, vomiting, not feeling wel     42 yo F with Type 1 IDDM, c/o onset of hyperglycemia since 6/10, despite using insulin, glucose increasing up to 600s with increasing nausea, frequent vomiting, and increasing generalized malaise  She has had DKA in the past, but it has been years at this point  She denies preceding illness, fever, or clear exacerbating factor, only recalling she has been under stress  She appears ill, nauseated, and smells ketotic  History provided by:  Patient      Prior to Admission Medications   Prescriptions Last Dose Informant Patient Reported? Taking? ALPRAZolam (XANAX) 0 5 mg tablet  Self Yes Yes   Sig: Take 0 5 mg by mouth daily as needed     Accu-Chek FastClix Lancets MISC   No No   Sig: USE 1 LANCET TO TEST BLOOD SUGAR UP TO 6 TIMES DAILY   Continuous Blood Gluc  (FreeStyle Samantha 14 Day [place]) KELVIN   No No   Sig: Use 1 Units in the morning   Continuous Blood Gluc  (FreeStyle Samantha 2 [place]) KELVIN   No No   Sig: Use 1 units in the morning   Continuous Blood Gluc Sensor (FreeStyle Samantha 14 Day Sensor) MISC   No No   Sig: Use 1 Units every 14 (fourteen) days   Continuous Blood Gluc Sensor (FreeStyle Samantha 2 Sensor) MISC   No No   Sig: Use 1 unit every 14 days   Cyanocobalamin (B-12 PO) Not Taking  Yes No   Sig: Take by mouth daily   Patient not taking: Reported on 2022   Insulin Aspart (NovoLOG) 100 units/mL injection   Yes No   Si UNITS BY SUBCUTANEOUS INSULIN PUMP ROUTE IN THE MORNING INJECT INTO PUMP DAILY     Patient not taking: Reported on 2022   Insulin Pen Needle (BD Pen Needle Deanna U/F) 32G X 4 MM MISC   No No   Sig: Use 4/day   Insulin Syringe-Needle U-100 (INSULIN SYRINGE 1CC/28G) 28G X 1/2\" 1 ML MISC   Yes No   Lantus SoloStar 100 units/mL SOPN   Yes No   Sig: INJECT 35 UNITS UNDER THE SKIN DAILY  E10 65 (TYPE 1 DIABETES) " PATIENT MAINTAINED INSULIN PUMP Not Taking Self No No   Sig: Inject 1 each under the skin every 8 (eight) hours   Patient not taking: Reported on 2022   Vyvanse 40 MG capsule  Self Yes Yes   acetaminophen (TYLENOL) 500 mg tablet  Self Yes Yes   Sig:     benzonatate (TESSALON PERLES) 100 mg capsule Not Taking  No No   Sig: Take 1 capsule (100 mg total) by mouth 3 (three) times a day as needed for cough   Patient not taking: Reported on 2023   busPIRone (BUSPAR) 30 MG tablet  Self Yes Yes   cloNIDine (Catapres) 0 1 mg tablet Not Taking Self No No   Sig: Take 1 tablet (0 1 mg total) by mouth daily at bedtime   Patient not taking: Reported on 2022   ergocalciferol (VITAMIN D2) 50,000 units Not Taking  No No   Sig: Take 1 capsule (50,000 Units total) by mouth once a week   Patient not taking: Reported on 2023   escitalopram (LEXAPRO) 20 mg tablet  Self Yes Yes   Sig: Take 10 mg by mouth daily Half tab daily   gabapentin (NEURONTIN) 600 MG tablet  Self No Yes   Sig: Take 0 5 tablets (300 mg total) by mouth 2 (two) times a day Half Tab 300mg at bedtime   glucose blood (Accu-Chek Guide) test strip  Self No No   Sig: Use to test blood sugar up to 6 times daily  insulin aspart (NovoLOG) 100 units/mL injection  Self No Yes   Si Units by Subcutaneous Insulin Pump route in the morning INJECT INTO PUMP DAILY  insulin glargine (Lantus) 100 units/mL subcutaneous injection  Self No Yes   Sig: Inject 36 Units under the skin daily at bedtime   ketoconazole (NIZORAL) 2 % cream   No No   Sig: Apply topically daily   levothyroxine (Synthroid) 25 mcg tablet   No Yes   Sig: Take 1 tablet (25 mcg total) by mouth daily in the early morning Combine with 200mcg, for total 225mcg daily     levothyroxine 200 mcg tablet  Self No No   Sig: Take 1 tablet (200 mcg total) by mouth every morning   meclizine (ANTIVERT) 25 mg tablet   No No   Sig: Take 1 tablet (25 mg total) by mouth every 8 (eight) hours as needed for dizziness   naltrexone (REVIA) 50 mg tablet   No No   Sig: Take 2 tablets (100 mg total) by mouth daily   Patient not taking: Reported on 2023   ofloxacin (OCUFLOX) 0 3 % ophthalmic solution   Yes No   Patient not taking: Reported on 2022   ondansetron (ZOFRAN) 4 mg tablet   No Yes   Sig: Take 1 tablet (4 mg total) by mouth every 8 (eight) hours as needed for nausea or vomiting   ondansetron (ZOFRAN-ODT) 4 mg disintegrating tablet Not Taking  No No   Sig: Take 1 tablet (4 mg total) by mouth every 6 (six) hours as needed for nausea or vomiting   Patient not taking: Reported on 2023   prednisoLONE acetate (PRED FORTE) 1 % ophthalmic suspension Not Taking  Yes No   Patient not taking: Reported on 2022   selenium 200 mcg   Yes Yes   Sig: Take 200 mcg by mouth daily   traZODone (DESYREL) 100 mg tablet  Self Yes Yes   Sig: Take 100 mg by mouth daily at bedtime   venlafaxine (EFFEXOR-XR) 150 mg 24 hr capsule  Self Yes Yes   vitamin B-12 (VITAMIN B-12) 500 mcg tablet Not Taking  Yes No   Sig: Take 500 mcg by mouth daily   Patient not taking: Reported on 2021      Facility-Administered Medications: None       Past Medical History:   Diagnosis Date   • Anemia    • Anxiety    • B12 deficiency    • Cervical disc disorder     On gabapentin    • Depression    • Diabetes mellitus ([facility] )    • Disease of thyroid gland     hypothyroid   • Iron deficiency anemia    • Panic attack    • PTSD (post-traumatic stress disorder)    • Sleep difficulties    • Substance abuse ([facility] )    • Type 1 diabetes ([facility] )    • Vitamin D deficiency        Past Surgical History:   Procedure Laterality Date   • ABDOMINAL SURGERY      gastric bypass   •  SECTION      x2   • CHOLECYSTECTOMY  2018   • GASTRIC BYPASS  2007   • INTRAUTERINE DEVICE INSERTION  2015   • IR BIOPSY BONE MARROW  2020   • OTHER SURGICAL HISTORY      surgery for imperforate hymen/endometriosis/hydrometrocolpos   • TUBAL LIGATION     • WISDOM TOOTH EXTRACTION         Family History   Problem Relation Age of Onset   • Thyroid disease Mother    • URIEL disease Mother    • Hyperlipidemia Mother    • Hypertension Mother    • Osteoarthritis Mother    • Thyroid disease unspecified Mother    • Diabetes Father    • Alcohol abuse Father    • Diabetes type I Father    • Thyroid disease Sister    • Depression Sister    • Thyroid disease unspecified Sister    • Anxiety disorder Sister    • Heart disease Maternal Grandmother    • Hypertension Maternal Grandmother    • Arthritis Maternal Grandmother    • Diabetes type I Maternal Uncle    • Diabetes type I Maternal Grandfather      I have reviewed and agree with the history as documented  E-Cigarette/Vaping   • E-Cigarette Use Never User      E-Cigarette/Vaping Substances   • Nicotine No    • THC No    • CBD No    • Flavoring No    • Other No    • Unknown No      Social History     Tobacco Use   • Smoking status: Never   • Smokeless tobacco: Never   Vaping Use   • Vaping Use: Never used   Substance Use Topics   • Alcohol use: Never     Comment: occasional   • Drug use: No       Review of Systems    Physical Exam  Physical Exam  Vitals and nursing note reviewed  Constitutional:       Appearance: She is well-developed and well-groomed  She is ill-appearing  She is not diaphoretic  HENT:      Head: Normocephalic and atraumatic  Right Ear: External ear normal       Left Ear: External ear normal       Nose: Nose normal    Eyes:      Conjunctiva/sclera: Conjunctivae normal       Pupils: Pupils are equal, round, and reactive to light  Cardiovascular:      Rate and Rhythm: Regular rhythm  Tachycardia present  Pulmonary:      Effort: Pulmonary effort is normal    Abdominal:      Palpations: Abdomen is soft  Tenderness: There is generalized abdominal tenderness  Musculoskeletal:         General: Normal range of motion  Cervical back: Normal range of motion and neck supple     Skin:     General: Skin is warm and dry  Capillary Refill: Capillary refill takes 2 to 3 seconds  Findings: No rash  Neurological:      Mental Status: She is alert and oriented to person, place, and time  Gait: Gait normal    Psychiatric:         Behavior: Behavior normal          Thought Content:  Thought content normal          Judgment: Judgment normal          Vital Signs  ED Triage Vitals   Temperature Pulse Respirations Blood Pressure SpO2   06/13/23 1516 06/13/23 1516 06/13/23 1516 06/13/23 1516 06/13/23 1516   98 6 °F (37 °C) (!) 118 18 109/69 99 %      Temp Source Heart Rate Source Patient Position - Orthostatic VS BP Location FiO2 (%)   06/13/23 1614 06/13/23 1600 06/13/23 1614 06/13/23 1614 --   Oral Monitor Lying Right arm       Pain Score       06/13/23 1516       No Pain           Vitals:    06/14/23 1610 06/14/23 2103 06/15/23 0324 06/15/23 0736   BP: 137/80 137/80 125/75 121/73   Pulse: 93 98 102 94   Patient Position - Orthostatic VS:             Visual Acuity  Visual Acuity    Flowsheet Row Most Recent Value   L Pupil Size (mm) 3   R Pupil Size (mm) 3   L Pupil Shape Round   R Pupil Shape Round          ED Medications  Medications   chlorhexidine (PERIDEX) 0 12 % oral rinse 15 mL (15 mL Mouth/Throat Not Given 6/15/23 0906)   enoxaparin (LOVENOX) subcutaneous injection 40 mg (40 mg Subcutaneous Given 6/15/23 0903)   acetaminophen (TYLENOL) tablet 650 mg (650 mg Oral Given 6/15/23 0903)   insulin lispro (HumaLOG) 100 units/mL subcutaneous injection 10 Units (10 Units Subcutaneous Given 6/15/23 1304)   insulin glargine (LANTUS) subcutaneous injection 25 Units 0 25 mL (25 Units Subcutaneous Given 6/14/23 2121)   insulin lispro (HumaLOG) 100 units/mL subcutaneous injection 1-6 Units (1 Units Subcutaneous Given 6/15/23 1304)   loperamide (IMODIUM) capsule 2 mg (2 mg Oral Given 6/14/23 2242)   ondansetron (ZOFRAN-ODT) dispersible tablet 4 mg (4 mg Oral Given 6/14/23 0835)   ALPRAZolam (XANAX) tablet 0 5 mg (0 5 mg Oral Given 6/14/23 2120)   escitalopram (LEXAPRO) tablet 10 mg (10 mg Oral Given 6/15/23 0903)   busPIRone (BUSPAR) tablet 10 mg (10 mg Oral Given 6/15/23 0903)   traZODone (DESYREL) tablet 100 mg (100 mg Oral Given 6/14/23 2310)   sodium chloride 0 9 % bolus 2,000 mL (0 mL Intravenous Stopped 6/13/23 1808)   ondansetron (ZOFRAN) injection 4 mg (4 mg Intravenous Given 6/13/23 1600)   ondansetron (ZOFRAN) injection 4 mg (4 mg Intravenous Given 6/13/23 1555)   sodium chloride 0 9 % bolus 1,000 mL (0 mL Intravenous Stopped 6/13/23 1915)   acetaminophen (TYLENOL) tablet 975 mg (975 mg Oral Given 6/13/23 1816)   potassium chloride 20 mEq IVPB (premix) (0 mEq Intravenous Stopped 6/14/23 0125)   magnesium sulfate 4 g/100 mL IVPB (premix) 4 g (0 g Intravenous Stopped 6/14/23 0123)     Followed by   magnesium sulfate 2 g/50 mL IVPB (premix) 2 g (0 g Intravenous Stopped 6/14/23 0400)   sodium phosphate 30 mmol in dextrose 5 % 250 mL Infusion (0 mmol Intravenous Stopped 6/14/23 0602)     Followed by   potassium-sodium phosphateS (K-PHOS,PHOSPHA 250) -250 mg tablet 2 tablet (2 tablets Oral Given 6/13/23 2238)   cefTRIAXone (ROCEPHIN) IVPB (premix in dextrose) 1,000 mg 50 mL (0 mg Intravenous Stopped 6/14/23 0610)   insulin NPH (HumuLIN N,NovoLIN N) 100 Units/mL subcutaneous injection 12 Units (12 Units Subcutaneous Given 6/14/23 1006)       Diagnostic Studies  Results Reviewed     Procedure Component Value Units Date/Time    Blood culture [[id number]] Collected: 06/13/23 2000    Lab Status: Preliminary result Specimen: Blood from Arm, Left Updated: 06/15/23 0001     Blood Culture No Growth at 24 hrs  Blood culture [[id number]] Collected: 06/13/23 2000    Lab Status: Preliminary result Specimen: Blood from Arm, Left Updated: 06/15/23 0001     Blood Culture No Growth at 24 hrs      Hemoglobin A1c w/EAG Estimation [[id number]]  (Abnormal) Collected: 06/13/23 2000    Lab Status: Final result Specimen: Blood from Arm, Left Updated: 06/14/23 1022     Hemoglobin A1C 10 2 %       mg/dl     UA (URINE) with reflex to Scope [[id number]]  (Abnormal) Collected: 06/14/23 0029    Lab Status: Final result Specimen: Urine, Clean Catch Updated: 06/14/23 0140     Color, UA Light Yellow     Clarity, UA Clear     Specific Gravity, UA >=1 030     pH, UA 5 5     Leukocytes, UA Negative     Nitrite, UA Negative     Protein, UA 30 (1+) mg/dl      Glucose,  (1/2%) mg/dl      Ketones, UA >=80 (3+) mg/dl      Bilirubin, UA Small     Occult Blood, UA Trace-Intact     Urobilinogen, UA 0 2 E U /dl     Rapid drug screen, urine [[id number]]  (Abnormal) Collected: 06/14/23 0029    Lab Status: Final result Specimen: Urine, Clean Catch Updated: 06/14/23 0057     Amph/Meth UR Negative     Barbiturate Ur Negative     Benzodiazepine Urine Positive     Cocaine Urine Negative     Methadone Urine Negative     Opiate Urine Negative     PCP Ur Negative     THC Urine Negative     Oxycodone Urine Negative    Narrative:      Presumptive report  If requested, specimen will be sent to reference lab for confirmation  FOR MEDICAL PURPOSES ONLY  IF CONFIRMATION NEEDED PLEASE CONTACT THE LAB WITHIN 5 DAYS      Drug Screen Cutoff Levels:  AMPHETAMINE/METHAMPHETAMINES  1000 ng/mL  BARBITURATES     200 ng/mL  BENZODIAZEPINES     200 ng/mL  COCAINE      300 ng/mL  METHADONE      300 ng/mL  OPIATES      300 ng/mL  PHENCYCLIDINE     25 ng/mL  THC       50 ng/mL  OXYCODONE      100 ng/mL    Osmolality [[id number]]  (Abnormal) Collected: 06/13/23 2000    Lab Status: Final result Specimen: Blood from Arm, Left Updated: 06/13/23 2234     Osmolality Serum 301 mmol/KG     Fingerstick Glucose (POCT) [[id number]]  (Abnormal) Collected: 06/13/23 1740    Lab Status: Final result Updated: 06/13/23 1758     POC Glucose 397 mg/dl     Comprehensive metabolic panel [[id number]]  (Abnormal) Collected: 06/13/23 1542    Lab Status: Final result Specimen: Blood from Arm, Right Updated: 06/13/23 1621 Sodium 124 mmol/L      Potassium 5 2 mmol/L      Chloride 90 mmol/L      CO2 12 mmol/L      ANION GAP 22 mmol/L      BUN 22 mg/dL      Creatinine 1 48 mg/dL      Glucose 423 mg/dL      Calcium 8 7 mg/dL      AST 31 U/L      ALT 42 U/L      Alkaline Phosphatase 217 U/L      Total Protein 8 1 g/dL      Albumin 4 3 g/dL      Total Bilirubin 0 31 mg/dL      eGFR 43 ml/min/1 73sq m     Narrative:      National Kidney Disease Foundation guidelines for Chronic Kidney Disease (CKD):   •  Stage 1 with normal or high GFR (GFR > 90 mL/min/1 73 square meters)  •  Stage 2 Mild CKD (GFR = 60-89 mL/min/1 73 square meters)  •  Stage 3A Moderate CKD (GFR = 45-59 mL/min/1 73 square meters)  •  Stage 3B Moderate CKD (GFR = 30-44 mL/min/1 73 square meters)  •  Stage 4 Severe CKD (GFR = 15-29 mL/min/1 73 square meters)  •  Stage 5 End Stage CKD (GFR <15 mL/min/1 73 square meters)  Note: GFR calculation is accurate only with a steady state creatinine    Beta Hydroxybutyrate [[id number]]  (Abnormal) Collected: 06/13/23 1542    Lab Status: Final result Specimen: Blood from Arm, Right Updated: 06/13/23 1608     BETA-HYDROXYBUTYRATE 7 3 mmol/L     Blood gas, venous [[id number]]  (Abnormal) Collected: 06/13/23 1542    Lab Status: Final result Specimen: Blood from Arm, Right Updated: 06/13/23 1557     pH, Tate 7 167     pCO2, Tate 29 2 mm Hg      pO2, Tate 53 5 mm Hg      HCO3, Tate 10 3 mmol/L      Base Excess, Tate -16 8 mmol/L      O2 Content, Tate 15 8 ml/dL      O2 HGB, VENOUS 80 1 %     CBC and differential [[id number]]  (Abnormal) Collected: 06/13/23 1542    Lab Status: Final result Specimen: Blood from Arm, Right Updated: 06/13/23 1557     WBC 5 81 Thousand/uL      RBC 4 80 Million/uL      Hemoglobin 14 2 g/dL      Hematocrit 46 0 %      MCV 96 fL      MCH 29 6 pg      MCHC 30 9 g/dL      RDW 12 5 %      MPV 9 9 fL      Platelets 944 Thousands/uL      nRBC 0 /100 WBCs      Neutrophils Relative 79 %      Immat GRANS % 1 %      Lymphocytes Relative 11 %      Monocytes Relative 9 %      Eosinophils Relative 0 %      Basophils Relative 0 %      Neutrophils Absolute 4 61 Thousands/µL      Immature Grans Absolute 0 03 Thousand/uL      Lymphocytes Absolute 0 61 Thousands/µL      Monocytes Absolute 0 52 Thousand/µL      Eosinophils Absolute 0 02 Thousand/µL      Basophils Absolute 0 02 Thousands/µL     Fingerstick Glucose (POCT) [[id number]]  (Abnormal) Collected: 06/13/23 1515    Lab Status: Final result Updated: 06/13/23 1524     POC Glucose 442 mg/dl                  No orders to display              Procedures  ECG 12 Lead Documentation Only    Date/Time: 6/13/2023 3:51 PM    Performed by: Ivania Thompson MD  Authorized by: Ivania Thompson MD    Rate:     ECG rate:  112  Rhythm:     Rhythm: sinus tachycardia    Ectopy:     Ectopy: none    QRS:     QRS axis:  Normal    QRS intervals:  Normal  Conduction:     Conduction: normal    ST segments:     ST segments:  Normal    CriticalCare Time    Date/Time: 6/15/2023 2:11 PM    Performed by: Ivania Thompson MD  Authorized by: Ivania Thompson MD    Critical care provider statement:     Critical care time (minutes):  30    Critical care time was exclusive of:  Separately billable procedures and treating other patients and teaching time    Critical care was necessary to treat or prevent imminent or life-threatening deterioration of the following conditions:  Metabolic crisis    Critical care was time spent personally by me on the following activities:  Blood draw for specimens, obtaining history from patient or surrogate, development of treatment plan with patient or surrogate, discussions with consultants, evaluation of patient's response to treatment, examination of patient, interpretation of cardiac output measurements, ordering and performing treatments and interventions, ordering and review of laboratory studies, re-evaluation of patient's condition and review of old charts    I assumed direction of critical care for this patient from another provider in my specialty: no               ED Course  ED Course as of 06/15/23 1411   Tue Jun 13, 2023   1614 BETA-HYDROXYBUTYRATE(!): 7 3  Elevated c/w DKA   1614 pH, Tate(!): 7 167  Significant acidosis   1622 Anion Gap(!): 22  C/w metabolic acidosis   0520 Sodium(!): 124  Corrects to 129 with hyperglycemia, still mild hyponatremia   1649 Reviewed results with patient at bedside and updated on the plan  She is resting, maintaining airway  Labs c/w significant DKA, d/w ICU for admission                            Initial Sepsis Screening     Row Name 06/14/23 0432                Is the patient's history suggestive of a new or worsening infection? Yes (Proceed)  -NG        Suspected source of infection urinary tract infection  -NG        Indicate SIRS criteria Tachycardia > 90 bpm;Tachypnea > 20 resp per min  -NG        Are two or more of the above signs & symptoms of infection both present and new to the patient? Yes (Proceed)  -NG        Assess for evidence of organ dysfunction: Are any of the below criteria present within 6 hours of suspected infection and SIRS criteria that are NOT considered to be chronic conditions? --  none  -NG              User Key  (r) = Recorded By, (t) = Taken By, (c) = Cosigned By    [address] Name Provider Type    NG JAROCHO Peralta Nurse Practitioner                SBIRT 22yo+    Flowsheet Row Most Recent Value   Initial Alcohol Screen: US AUDIT-C     1  How often do you have a drink containing alcohol? 1 Filed at: 06/13/2023 1623   2  How many drinks containing alcohol do you have on a typical day you are drinking? 1 Filed at: 06/13/2023 1623   3a  Male UNDER 65: How often do you have five or more drinks on one occasion? 0 Filed at: 06/13/2023 1623   3b  FEMALE Any Age, or MALE 65+: How often do you have 4 or more drinks on one occassion?  0 Filed at: 06/13/2023 1623   Audit-C Score 2 Filed at: 06/13/2023 1623 MICHELLE: How many times in the past year have you    Used an illegal drug or used a prescription medication for non-medical reasons? Never Filed at: 06/13/2023 1623                    MDM    Disposition  Final diagnoses:   Diabetic ketoacidosis without coma associated with type 1 diabetes mellitus ([facility] )   THO (acute kidney injury) ([place] [address] )   Hyponatremia     Time reflects when diagnosis was documented in both MDM as applicable and the Disposition within this note     Time User Action Codes Description Comment    6/13/2023  5:01 PM Suly Barefoot L Add [E10 10] Diabetic ketoacidosis without coma associated with type 1 diabetes mellitus ([place] [address] )     6/13/2023  5:01 PM Suly Barefoot L Add [N17 9] THO (acute kidney injury) ([place] [address] )     6/13/2023  5:01 PM Cullen Coleys Add [E87 1] Hyponatremia       ED Disposition     ED Disposition   Admit    Condition   Stable    Date/Time   Tue Jun 13, 2023  5:02 PM    Comment   Case was discussed with Dr Aspen George and the patient's admission status was agreed to be Admission Status: inpatient status to the service of Dr Aspen George   Follow-up Information    None         Current Discharge Medication List      CONTINUE these medications which have NOT CHANGED    Details   acetaminophen (TYLENOL) 500 mg tablet        ALPRAZolam (XANAX) 0 5 mg tablet Take 0 5 mg by mouth daily as needed        busPIRone (BUSPAR) 30 MG tablet       escitalopram (LEXAPRO) 20 mg tablet Take 10 mg by mouth daily Half tab daily      gabapentin (NEURONTIN) 600 MG tablet Take 0 5 tablets (300 mg total) by mouth 2 (two) times a day Half Tab 300mg at bedtime  Qty: 30 tablet, Refills: 1    Associated Diagnoses: Alcohol use disorder, moderate, in early remission ([place] [address] ); Anxiety and depression      insulin aspart (NovoLOG) 100 units/mL injection 60 Units by Subcutaneous Insulin Pump route in the morning INJECT INTO PUMP DAILY    Qty: 30 mL, Refills: 3    Associated Diagnoses: Type 1 diabetes mellitus with hyperglycemia (HCC)      insulin glargine (Lantus) 100 units/mL subcutaneous injection Inject 36 Units under the skin daily at bedtime  Qty: 10 mL, Refills: 1    Associated Diagnoses: Type 1 diabetes mellitus with hyperglycemia ([facility] 75 )      ! ! levothyroxine (Synthroid) 25 mcg tablet Take 1 tablet (25 mcg total) by mouth daily in the early morning Combine with 200mcg, for total 225mcg daily  Qty: 90 tablet, Refills: 3    Associated Diagnoses: Hypothyroidism due to Hashimoto's thyroiditis      ondansetron (ZOFRAN) 4 mg tablet Take 1 tablet (4 mg total) by mouth every 8 (eight) hours as needed for nausea or vomiting  Qty: 30 tablet, Refills: 1    Associated Diagnoses: Diabetic gastroparesis associated with type 1 diabetes mellitus ([facility] 75 ); Nausea in adult      selenium 200 mcg Take 200 mcg by mouth daily      traZODone (DESYREL) 100 mg tablet Take 100 mg by mouth daily at bedtime      venlafaxine (EFFEXOR-XR) 150 mg 24 hr capsule       Vyvanse 40 MG capsule       Accu-Chek FastClix Lancets MISC USE 1 LANCET TO TEST BLOOD SUGAR UP TO 6 TIMES DAILY  Qty: 102 each, Refills: 0    Associated Diagnoses: Type 1 diabetes mellitus with hyperglycemia (HCC)      benzonatate (TESSALON PERLES) 100 mg capsule Take 1 capsule (100 mg total) by mouth 3 (three) times a day as needed for cough  Qty: 20 capsule, Refills: 0    Associated Diagnoses: Acute cough      cloNIDine (Catapres) 0 1 mg tablet Take 1 tablet (0 1 mg total) by mouth daily at bedtime  Qty: 30 tablet, Refills: 1    Associated Diagnoses: Alcohol use disorder, moderate, in early remission ([facility] ); Depression, recurrent ([facility] ); Anxiety and depression      !! Continuous Blood Gluc  (FreeStyle Samantha 14 Day [place]) KELVIN Use 1 Units in the morning  Qty: 1 each, Refills: 0    Associated Diagnoses: Type 1 diabetes mellitus with hyperglycemia ([facility] )      ! !  Continuous Blood Gluc  (FreeStyle Samantha 2 [place]) KELVIN Use 1 units in the morning  Qty: 1 each, Refills: 3 "Associated Diagnoses: Type 1 diabetes mellitus with hyperglycemia ([facility] )      ! ! Continuous Blood Gluc Sensor (FreeStyle Samantha 14 Day Sensor) MISC Use 1 Units every 14 (fourteen) days  Qty: 2 each, Refills: 3    Associated Diagnoses: Type 1 diabetes mellitus with hyperglycemia ([facility] )      ! ! Continuous Blood Gluc Sensor (FreeStyle Samantha 2 Sensor) MISC Use 1 unit every 14 days  Qty: 2 each, Refills: 3    Associated Diagnoses: Type 1 diabetes mellitus with hyperglycemia ([facility] [address] )      ! ! Cyanocobalamin (B-12 PO) Take by mouth daily      ergocalciferol (VITAMIN D2) 50,000 units Take 1 capsule (50,000 Units total) by mouth once a week  Qty: 12 capsule, Refills: 3    Associated Diagnoses: Vitamin D deficiency      glucose blood (Accu-Chek Guide) test strip Use to test blood sugar up to 6 times daily  Qty: 200 each, Refills: 2    Associated Diagnoses: Type 1 diabetes mellitus with hyperglycemia (HCC)      Insulin Aspart (NovoLOG) 100 units/mL injection 60 UNITS BY SUBCUTANEOUS INSULIN PUMP ROUTE IN THE MORNING INJECT INTO PUMP DAILY  Insulin Pen Needle (BD Pen Needle Deanna U/F) 32G X 4 MM MISC Use 4/day  Qty: 100 each, Refills: 11    Associated Diagnoses: Type 1 diabetes mellitus with hyperglycemia (HCC)      Insulin Syringe-Needle U-100 (INSULIN SYRINGE 1CC/28G) 28G X 1/2\" 1 ML MISC       ketoconazole (NIZORAL) 2 % cream Apply topically daily  Qty: 60 g, Refills: 1    Associated Diagnoses: Tinea pedis of both feet      Lantus SoloStar 100 units/mL SOPN INJECT 35 UNITS UNDER THE SKIN DAILY  E10 65 (TYPE 1 DIABETES)      ! ! levothyroxine 200 mcg tablet Take 1 tablet (200 mcg total) by mouth every morning  Qty: 90 tablet, Refills: 3    Associated Diagnoses: Hypothyroidism due to Hashimoto's thyroiditis      meclizine (ANTIVERT) 25 mg tablet Take 1 tablet (25 mg total) by mouth every 8 (eight) hours as needed for dizziness  Qty: 20 tablet, Refills: 0    Associated Diagnoses: Concussion      naltrexone (REVIA) 50 mg tablet " Take 2 tablets (100 mg total) by mouth daily  Qty: 60 tablet, Refills: 3    Associated Diagnoses: Alcohol use disorder, moderate, dependence (HCC)      ofloxacin (OCUFLOX) 0 3 % ophthalmic solution       ondansetron (ZOFRAN-ODT) 4 mg disintegrating tablet Take 1 tablet (4 mg total) by mouth every 6 (six) hours as needed for nausea or vomiting  Qty: 10 tablet, Refills: 0    Associated Diagnoses: Concussion      PATIENT MAINTAINED INSULIN PUMP Inject 1 each under the skin every 8 (eight) hours  Qty: 1 each, Refills: 0    Associated Diagnoses: Diabetic ketoacidosis without coma associated with type 1 diabetes mellitus (HCC)      prednisoLONE acetate (PRED FORTE) 1 % ophthalmic suspension       !! vitamin B-12 (VITAMIN B-12) 500 mcg tablet Take 500 mcg by mouth daily       ! ! - Potential duplicate medications found  Please discuss with provider  No discharge procedures on file      PDMP Review     None          ED Provider  Electronically Signed by           Prabhjot Gonzales MD  06/15/23 [id number] [address] MIAN Torres MD  06/15/23 3326

## 2023-06-14 PROBLEM — R19.7 DIARRHEA: Status: ACTIVE | Noted: 2023-06-14

## 2023-06-14 PROBLEM — R65.10 SIRS (SYSTEMIC INFLAMMATORY RESPONSE SYNDROME) (HCC): Status: RESOLVED | Noted: 2023-06-13 | Resolved: 2023-06-14

## 2023-06-14 LAB
AMPHETAMINES SERPL QL SCN: NEGATIVE
ANION GAP SERPL CALCULATED.3IONS-SCNC: 9 MMOL/L (ref 4–13)
BACTERIA UR QL AUTO: ABNORMAL /HPF
BARBITURATES UR QL: NEGATIVE
BASOPHILS # BLD AUTO: 0.01 THOUSANDS/ÂΜL (ref 0–0.1)
BASOPHILS NFR BLD AUTO: 0 % (ref 0–1)
BENZODIAZ UR QL: POSITIVE
BILIRUB UR QL STRIP: ABNORMAL
BUN SERPL-MCNC: 12 MG/DL (ref 5–25)
CALCIUM SERPL-MCNC: 7.1 MG/DL (ref 8.4–10.2)
CHLORIDE SERPL-SCNC: 105 MMOL/L (ref 96–108)
CLARITY UR: CLEAR
CO2 SERPL-SCNC: 17 MMOL/L (ref 21–32)
COCAINE UR QL: NEGATIVE
COLOR UR: ABNORMAL
CREAT SERPL-MCNC: 0.91 MG/DL (ref 0.6–1.3)
EOSINOPHIL # BLD AUTO: 0.05 THOUSAND/ÂΜL (ref 0–0.61)
EOSINOPHIL NFR BLD AUTO: 2 % (ref 0–6)
ERYTHROCYTE [DISTWIDTH] IN BLOOD BY AUTOMATED COUNT: 12.6 % (ref 11.6–15.1)
EST. AVERAGE GLUCOSE BLD GHB EST-MCNC: 246 MG/DL
GFR SERPL CREATININE-BSD FRML MDRD: 78 ML/MIN/1.73SQ M
GLUCOSE SERPL-MCNC: 118 MG/DL (ref 65–140)
GLUCOSE SERPL-MCNC: 122 MG/DL (ref 65–140)
GLUCOSE SERPL-MCNC: 130 MG/DL (ref 65–140)
GLUCOSE SERPL-MCNC: 131 MG/DL (ref 65–140)
GLUCOSE SERPL-MCNC: 133 MG/DL (ref 65–140)
GLUCOSE SERPL-MCNC: 133 MG/DL (ref 65–140)
GLUCOSE SERPL-MCNC: 147 MG/DL (ref 65–140)
GLUCOSE SERPL-MCNC: 157 MG/DL (ref 65–140)
GLUCOSE SERPL-MCNC: 168 MG/DL (ref 65–140)
GLUCOSE SERPL-MCNC: 170 MG/DL (ref 65–140)
GLUCOSE SERPL-MCNC: 204 MG/DL (ref 65–140)
GLUCOSE SERPL-MCNC: 58 MG/DL (ref 65–140)
GLUCOSE UR STRIP-MCNC: ABNORMAL MG/DL
HBA1C MFR BLD: 10.2 %
HCT VFR BLD AUTO: 34.6 % (ref 34.8–46.1)
HGB BLD-MCNC: 11.1 G/DL (ref 11.5–15.4)
HGB UR QL STRIP.AUTO: ABNORMAL
IMM GRANULOCYTES # BLD AUTO: 0.02 THOUSAND/UL (ref 0–0.2)
IMM GRANULOCYTES NFR BLD AUTO: 1 % (ref 0–2)
KETONES UR STRIP-MCNC: ABNORMAL MG/DL
LACTATE SERPL-SCNC: 0.6 MMOL/L (ref 0.5–2)
LEUKOCYTE ESTERASE UR QL STRIP: NEGATIVE
LIPASE SERPL-CCNC: 14 U/L (ref 11–82)
LYMPHOCYTES # BLD AUTO: 0.34 THOUSANDS/ÂΜL (ref 0.6–4.47)
LYMPHOCYTES NFR BLD AUTO: 10 % (ref 14–44)
MAGNESIUM SERPL-MCNC: 2.5 MG/DL (ref 1.9–2.7)
MCH RBC QN AUTO: 29.5 PG (ref 26.8–34.3)
MCHC RBC AUTO-ENTMCNC: 32.1 G/DL (ref 31.4–37.4)
MCV RBC AUTO: 92 FL (ref 82–98)
METHADONE UR QL: NEGATIVE
MONOCYTES # BLD AUTO: 0.26 THOUSAND/ÂΜL (ref 0.17–1.22)
MONOCYTES NFR BLD AUTO: 8 % (ref 4–12)
NEUTROPHILS # BLD AUTO: 2.73 THOUSANDS/ÂΜL (ref 1.85–7.62)
NEUTS SEG NFR BLD AUTO: 79 % (ref 43–75)
NITRITE UR QL STRIP: NEGATIVE
NON-SQ EPI CELLS URNS QL MICRO: ABNORMAL /HPF
NRBC BLD AUTO-RTO: 0 /100 WBCS
OPIATES UR QL SCN: NEGATIVE
OXYCODONE+OXYMORPHONE UR QL SCN: NEGATIVE
PCP UR QL: NEGATIVE
PH UR STRIP.AUTO: 5.5 [PH]
PHOSPHATE SERPL-MCNC: 3.4 MG/DL (ref 2.7–4.5)
PLATELET # BLD AUTO: 162 THOUSANDS/UL (ref 149–390)
PMV BLD AUTO: 9.6 FL (ref 8.9–12.7)
POTASSIUM SERPL-SCNC: 4.4 MMOL/L (ref 3.5–5.3)
PROT UR STRIP-MCNC: ABNORMAL MG/DL
RBC # BLD AUTO: 3.76 MILLION/UL (ref 3.81–5.12)
RBC #/AREA URNS AUTO: ABNORMAL /HPF
SODIUM SERPL-SCNC: 131 MMOL/L (ref 135–147)
SP GR UR STRIP.AUTO: >=1.03 (ref 1–1.03)
THC UR QL: NEGATIVE
UROBILINOGEN UR QL STRIP.AUTO: 0.2 E.U./DL
WBC # BLD AUTO: 3.41 THOUSAND/UL (ref 4.31–10.16)
WBC #/AREA URNS AUTO: ABNORMAL /HPF

## 2023-06-14 PROCEDURE — 82948 REAGENT STRIP/BLOOD GLUCOSE: CPT

## 2023-06-14 PROCEDURE — 85025 COMPLETE CBC W/AUTO DIFF WBC: CPT

## 2023-06-14 PROCEDURE — 80307 DRUG TEST PRSMV CHEM ANLYZR: CPT

## 2023-06-14 PROCEDURE — 83735 ASSAY OF MAGNESIUM: CPT | Performed by: NURSE PRACTITIONER

## 2023-06-14 PROCEDURE — 84100 ASSAY OF PHOSPHORUS: CPT | Performed by: NURSE PRACTITIONER

## 2023-06-14 PROCEDURE — 83605 ASSAY OF LACTIC ACID: CPT | Performed by: NURSE PRACTITIONER

## 2023-06-14 PROCEDURE — 80048 BASIC METABOLIC PNL TOTAL CA: CPT | Performed by: NURSE PRACTITIONER

## 2023-06-14 PROCEDURE — 99232 SBSQ HOSP IP/OBS MODERATE 35: CPT | Performed by: INTERNAL MEDICINE

## 2023-06-14 PROCEDURE — 81001 URINALYSIS AUTO W/SCOPE: CPT

## 2023-06-14 RX ORDER — INSULIN GLARGINE 100 [IU]/ML
25 INJECTION, SOLUTION SUBCUTANEOUS
Status: DISCONTINUED | OUTPATIENT
Start: 2023-06-14 | End: 2023-06-16 | Stop reason: HOSPADM

## 2023-06-14 RX ORDER — ESCITALOPRAM OXALATE 10 MG/1
10 TABLET ORAL DAILY
Status: DISCONTINUED | OUTPATIENT
Start: 2023-06-14 | End: 2023-06-16 | Stop reason: HOSPADM

## 2023-06-14 RX ORDER — CEFTRIAXONE 1 G/50ML
1000 INJECTION, SOLUTION INTRAVENOUS ONCE
Status: COMPLETED | OUTPATIENT
Start: 2023-06-14 | End: 2023-06-14

## 2023-06-14 RX ORDER — ESCITALOPRAM OXALATE 20 MG/1
20 TABLET ORAL DAILY
Status: DISCONTINUED | OUTPATIENT
Start: 2023-06-14 | End: 2023-06-14

## 2023-06-14 RX ORDER — ALPRAZOLAM 0.5 MG/1
0.5 TABLET ORAL
Status: DISCONTINUED | OUTPATIENT
Start: 2023-06-14 | End: 2023-06-16 | Stop reason: HOSPADM

## 2023-06-14 RX ORDER — ACETAMINOPHEN 325 MG/1
650 TABLET ORAL EVERY 6 HOURS PRN
Status: DISCONTINUED | OUTPATIENT
Start: 2023-06-14 | End: 2023-06-16 | Stop reason: HOSPADM

## 2023-06-14 RX ORDER — LOPERAMIDE HYDROCHLORIDE 2 MG/1
2 CAPSULE ORAL 3 TIMES DAILY PRN
Status: DISCONTINUED | OUTPATIENT
Start: 2023-06-14 | End: 2023-06-16 | Stop reason: HOSPADM

## 2023-06-14 RX ORDER — SODIUM CHLORIDE, SODIUM GLUCONATE, SODIUM ACETATE, POTASSIUM CHLORIDE, MAGNESIUM CHLORIDE, SODIUM PHOSPHATE, DIBASIC, AND POTASSIUM PHOSPHATE .53; .5; .37; .037; .03; .012; .00082 G/100ML; G/100ML; G/100ML; G/100ML; G/100ML; G/100ML; G/100ML
100 INJECTION, SOLUTION INTRAVENOUS CONTINUOUS
Status: DISCONTINUED | OUTPATIENT
Start: 2023-06-14 | End: 2023-06-14

## 2023-06-14 RX ORDER — INSULIN LISPRO 100 [IU]/ML
1-6 INJECTION, SOLUTION INTRAVENOUS; SUBCUTANEOUS
Status: DISCONTINUED | OUTPATIENT
Start: 2023-06-14 | End: 2023-06-16 | Stop reason: HOSPADM

## 2023-06-14 RX ORDER — ONDANSETRON 4 MG/1
4 TABLET, ORALLY DISINTEGRATING ORAL EVERY 6 HOURS PRN
Status: DISCONTINUED | OUTPATIENT
Start: 2023-06-14 | End: 2023-06-16 | Stop reason: HOSPADM

## 2023-06-14 RX ORDER — BUSPIRONE HYDROCHLORIDE 10 MG/1
10 TABLET ORAL 2 TIMES DAILY
Status: DISCONTINUED | OUTPATIENT
Start: 2023-06-14 | End: 2023-06-14

## 2023-06-14 RX ORDER — INSULIN LISPRO 100 [IU]/ML
10 INJECTION, SOLUTION INTRAVENOUS; SUBCUTANEOUS
Status: DISCONTINUED | OUTPATIENT
Start: 2023-06-14 | End: 2023-06-16 | Stop reason: HOSPADM

## 2023-06-14 RX ORDER — BUSPIRONE HYDROCHLORIDE 10 MG/1
10 TABLET ORAL 2 TIMES DAILY
Status: DISCONTINUED | OUTPATIENT
Start: 2023-06-14 | End: 2023-06-16 | Stop reason: HOSPADM

## 2023-06-14 RX ORDER — TRAZODONE HYDROCHLORIDE 100 MG/1
100 TABLET ORAL
Status: DISCONTINUED | OUTPATIENT
Start: 2023-06-14 | End: 2023-06-16 | Stop reason: HOSPADM

## 2023-06-14 RX ADMIN — ACETAMINOPHEN 325MG 650 MG: 325 TABLET ORAL at 00:56

## 2023-06-14 RX ADMIN — INSULIN HUMAN 12 UNITS: 100 INJECTION, SUSPENSION SUBCUTANEOUS at 10:06

## 2023-06-14 RX ADMIN — ACETAMINOPHEN 325MG 650 MG: 325 TABLET ORAL at 20:33

## 2023-06-14 RX ADMIN — LOPERAMIDE HYDROCHLORIDE 2 MG: 2 CAPSULE ORAL at 14:47

## 2023-06-14 RX ADMIN — MAGNESIUM SULFATE HEPTAHYDRATE 2 G: 40 INJECTION, SOLUTION INTRAVENOUS at 01:25

## 2023-06-14 RX ADMIN — SODIUM CHLORIDE, SODIUM GLUCONATE, SODIUM ACETATE, POTASSIUM CHLORIDE, MAGNESIUM CHLORIDE, SODIUM PHOSPHATE, DIBASIC, AND POTASSIUM PHOSPHATE 100 ML/HR: .53; .5; .37; .037; .03; .012; .00082 INJECTION, SOLUTION INTRAVENOUS at 03:19

## 2023-06-14 RX ADMIN — INSULIN LISPRO 1 UNITS: 100 INJECTION, SOLUTION INTRAVENOUS; SUBCUTANEOUS at 12:16

## 2023-06-14 RX ADMIN — POTASSIUM CHLORIDE 20 MEQ: 14.9 INJECTION, SOLUTION INTRAVENOUS at 00:15

## 2023-06-14 RX ADMIN — ALPRAZOLAM 0.5 MG: 0.5 TABLET ORAL at 21:20

## 2023-06-14 RX ADMIN — ONDANSETRON 4 MG: 4 TABLET, ORALLY DISINTEGRATING ORAL at 08:35

## 2023-06-14 RX ADMIN — LOPERAMIDE HYDROCHLORIDE 2 MG: 2 CAPSULE ORAL at 22:42

## 2023-06-14 RX ADMIN — SODIUM CHLORIDE 1 UNITS/HR: 9 INJECTION, SOLUTION INTRAVENOUS at 07:47

## 2023-06-14 RX ADMIN — INSULIN GLARGINE 25 UNITS: 100 INJECTION, SOLUTION SUBCUTANEOUS at 21:21

## 2023-06-14 RX ADMIN — ENOXAPARIN SODIUM 40 MG: 40 INJECTION SUBCUTANEOUS at 08:35

## 2023-06-14 RX ADMIN — ACETAMINOPHEN 325MG 650 MG: 325 TABLET ORAL at 12:15

## 2023-06-14 RX ADMIN — DEXTROSE, SODIUM CHLORIDE, SODIUM LACTATE, POTASSIUM CHLORIDE, AND CALCIUM CHLORIDE 250 ML/HR: 5; .6; .31; .03; .02 INJECTION, SOLUTION INTRAVENOUS at 02:13

## 2023-06-14 RX ADMIN — INSULIN LISPRO 10 UNITS: 100 INJECTION, SOLUTION INTRAVENOUS; SUBCUTANEOUS at 08:35

## 2023-06-14 RX ADMIN — CEFTRIAXONE 1000 MG: 1 INJECTION, SOLUTION INTRAVENOUS at 05:31

## 2023-06-14 RX ADMIN — SODIUM CHLORIDE 1 UNITS/HR: 9 INJECTION, SOLUTION INTRAVENOUS at 03:22

## 2023-06-14 RX ADMIN — CHLORHEXIDINE GLUCONATE 0.12% ORAL RINSE 15 ML: 1.2 LIQUID ORAL at 21:20

## 2023-06-14 RX ADMIN — TRAZODONE HYDROCHLORIDE 100 MG: 100 TABLET ORAL at 23:10

## 2023-06-14 RX ADMIN — ALPRAZOLAM 0.5 MG: 0.5 TABLET ORAL at 13:55

## 2023-06-14 RX ADMIN — INSULIN LISPRO 1 UNITS: 100 INJECTION, SOLUTION INTRAVENOUS; SUBCUTANEOUS at 08:36

## 2023-06-14 RX ADMIN — LOPERAMIDE HYDROCHLORIDE 2 MG: 2 CAPSULE ORAL at 07:46

## 2023-06-14 RX ADMIN — CHLORHEXIDINE GLUCONATE 0.12% ORAL RINSE 15 ML: 1.2 LIQUID ORAL at 08:35

## 2023-06-14 RX ADMIN — ESCITALOPRAM OXALATE 10 MG: 10 TABLET ORAL at 13:55

## 2023-06-14 RX ADMIN — BUSPIRONE HYDROCHLORIDE 10 MG: 10 TABLET ORAL at 13:58

## 2023-06-14 RX ADMIN — INSULIN LISPRO 10 UNITS: 100 INJECTION, SOLUTION INTRAVENOUS; SUBCUTANEOUS at 12:16

## 2023-06-14 NOTE — ASSESSMENT & PLAN NOTE
· Resolved  · Tachycardia tachypnea,Fever 100 8  · In the setting of DKA  · No source at this times, infectious work-up pending

## 2023-06-14 NOTE — PLAN OF CARE
Problem: MOBILITY - ADULT  Goal: Maintain or return to baseline ADL function  Description: INTERVENTIONS:  -  Assess patient's ability to carry out ADLs; assess patient's baseline for ADL function and identify physical deficits which impact ability to perform ADLs (bathing, care of mouth/teeth, toileting, grooming, dressing, etc )  - Assess/evaluate cause of self-care deficits   - Assess range of motion  - Assess patient's mobility; develop plan if impaired  - Assess patient's need for assistive devices and provide as appropriate  - Encourage maximum independence but intervene and supervise when necessary  - Involve family in performance of ADLs  - Assess for home care needs following discharge   - Consider OT consult to assist with ADL evaluation and planning for discharge  - Provide patient education as appropriate  Outcome: Progressing  Goal: Maintains/Returns to pre admission functional level  Description: INTERVENTIONS:  - Perform BMAT or MOVE assessment daily    - Set and communicate daily mobility goal to care team and patient/family/caregiver     - Collaborate with rehabilitation services on mobility goals if consulted  - Out of bed for toileting  - Record patient progress and toleration of activity level   Outcome: Progressing     Problem: PAIN - ADULT  Goal: Verbalizes/displays adequate comfort level or baseline comfort level  Description: Interventions:  - Encourage patient to monitor pain and request assistance  - Assess pain using appropriate pain scale  - Administer analgesics based on type and severity of pain and evaluate response  - Implement non-pharmacological measures as appropriate and evaluate response  - Consider cultural and social influences on pain and pain management  - Notify physician/advanced practitioner if interventions unsuccessful or patient reports new pain  Outcome: Progressing     Problem: INFECTION - ADULT  Goal: Absence or prevention of progression during hospitalization  Description: INTERVENTIONS:  - Assess and monitor for signs and symptoms of infection  - Monitor lab/diagnostic results  - Monitor all insertion sites, i e  indwelling lines, tubes, and drains  - Monitor endotracheal if appropriate and nasal secretions for changes in amount and color  - [place] appropriate cooling/warming therapies per order  - Administer medications as ordered  - Instruct and encourage patient and family to use good hand hygiene technique  - Identify and instruct in appropriate isolation precautions for identified infection/condition  Outcome: Progressing  Goal: Absence of fever/infection during neutropenic period  Description: INTERVENTIONS:  - Monitor WBC    Outcome: Progressing     Problem: SAFETY ADULT  Goal: Maintain or return to baseline ADL function  Description: INTERVENTIONS:  -  Assess patient's ability to carry out ADLs; assess patient's baseline for ADL function and identify physical deficits which impact ability to perform ADLs (bathing, care of mouth/teeth, toileting, grooming, dressing, etc )  - Assess/evaluate cause of self-care deficits   - Assess range of motion  - Assess patient's mobility; develop plan if impaired  - Assess patient's need for assistive devices and provide as appropriate  - Encourage maximum independence but intervene and supervise when necessary  - Involve family in performance of ADLs  - Assess for home care needs following discharge   - Consider OT consult to assist with ADL evaluation and planning for discharge  - Provide patient education as appropriate  Outcome: Progressing  Goal: Maintains/Returns to pre admission functional level  Description: INTERVENTIONS:  - Perform BMAT or MOVE assessment daily    - Set and communicate daily mobility goal to care team and patient/family/caregiver     - Out of bed for toileting  - Record patient progress and toleration of activity level   Outcome: Progressing  Goal: Patient will remain free of falls  Description: INTERVENTIONS:  - Educate patient/family on patient safety including physical limitations  - Instruct patient to call for assistance with activity   - Consult OT/PT to assist with strengthening/mobility   - Keep Call bell within reach  - Keep bed low and locked with side rails adjusted as appropriate  - Keep care items and personal belongings within reach  - Initiate and maintain comfort rounds  - Make Fall Risk Sign visible to staff  - Apply yellow socks and bracelet for high fall risk patients  - Consider moving patient to room near nurses station  Outcome: Progressing     Problem: DISCHARGE PLANNING  Goal: Discharge to home or other facility with appropriate resources  Description: INTERVENTIONS:  - Identify barriers to discharge w/patient and caregiver  - Arrange for needed discharge resources and transportation as appropriate  - Identify discharge learning needs (meds, wound care, etc )  - Arrange for interpretive services to assist at discharge as needed  - Refer to Case Management Department for coordinating discharge planning if the patient needs post-hospital services based on physician/advanced practitioner order or complex needs related to functional status, cognitive ability, or social support system  Outcome: Progressing     Problem: Knowledge Deficit  Goal: Patient/family/caregiver demonstrates understanding of disease process, treatment plan, medications, and discharge instructions  Description: Complete learning assessment and assess knowledge base    Interventions:  - Provide teaching at level of understanding  - Provide teaching via preferred learning methods  Outcome: Progressing

## 2023-06-14 NOTE — ASSESSMENT & PLAN NOTE
· Watery diarrhea for the past 4 days  Reports about 5 episodes per day  No mucus or blood  · Diarrhea associated with nausea and vomiting   · Denies sick contacts  · 1 watery diarrhea since admission  · No fever, no abdominal pain    · S/p 1 dose of ceftriaxone     Plan:  · Continue IV hydration  · Imodium 2 mg TID PRN  · Zofran 4 mg q8h PRN

## 2023-06-14 NOTE — PROGRESS NOTES
[address] [place] [address]  Progress Note  Name: Kimberly Chacon  MRN: [id number]  Unit/Bed#: ICU 11 I Date of Admission: 6/13/2023   Date of Service: 6/14/2023 I Hospital Day: 1    Assessment/Plan   * Diabetic ketoacidosis associated with type 1 diabetes mellitus [facility])  Assessment & Plan  Lab Results   Component Value Date    HGBA1C 9 2 (A) 11/16/2022       Recent Labs     06/14/23  0304 06/14/23  0409 06/14/23  0607 06/14/23  0742   POCGLU 147* 131 133 168*       Blood Sugar Average: Last 72 hrs:  (P) 179 [id number]     · On admission:  hyperglycemia, fatigue, nausea vomiting  · Beta hydroxybutyrate-elevated, 7 3 mmol/L  · VBG-7 1/29 2/53  · EKG with sinus tach on serial evaluation,   · UA:  Innumerable bacteria; reflex with proteinuria and glucosuria  No nitrate or leukocytes  · Electrolyte , creat 1 48; now sodium of 131, creatinine 0 91  · Precipitating factor-no obvious infectious source detected, patient reports being compliant with medication and diet  A1c pending  · S/ p 3l normal saline  · Anion gap closed 2x, bicarb at 17  · Patient along #nausea and is tolerating well diet    Plan  · Transition to subcutaneous insulin  Lantus 25 units at bedtime, lispro 10 units 3 times daily and  NPH 12 U once for overlap with insulin drip  · Continue IV fluids  · Replete electrolytes as needed      THO (acute kidney injury) [facility])  Assessment & Plan  Recent Labs     06/13/23  1542 06/13/23 2000 06/14/23  0211   CREATININE 1 48* 0 73 0 91   EGFR 43 102 78     Estimated Creatinine Clearance: 83 5 mL/min (by C-G formula based on SCr of 0 91 mg/dL)  · Resolved   · baseline 0 6-0 9  · Most likely prerenal in the setting of DKA  · Avoid hypotension and nephrotoxic medications             ICU Core Measures     A: Assess, Prevent, and Manage Pain · Has pain been assessed? Yes  · Need for changes to pain regimen?  No   B: Both SAT/SAT  · N/A   C: Choice of Sedation · RASS Goal: 0 Alert and Calm  · Need for changes to sedation or analgesia regimen? No   D: Delirium · CAM-ICU: Negative   E: Early Mobility  · Plan for early mobility? Yes   F: Family Engagement · Plan for family engagement today? No         Prophylaxis:  VTE VTE covered by:  enoxaparin, Subcutaneous, 40 mg at 06/14/23 3960       Stress Ulcer  not ordered       Subjective   HPI/24hr events:   Patient was seen and examined at bedside  She was resting comfortably in bed  Reports feeling better, but just tired and did not want to talk at the time  She reports one episode of diarrhea since admission associated with nausea, but no vomiting  She denies SOB, chest pain, dysuria or hematuria  ROS negative unless stated differently above        Objective                            Vitals I/O      Most Recent Min/Max in 24hrs   Temp 98 9 °F (37 2 °C) Temp  Min: 98 1 °F (36 7 °C)  Max: 100 8 °F (38 2 °C)   Pulse 98 Pulse  Min: 94  Max: 118   Resp (!) 24 Resp  Min: 14  Max: 25   /72 BP  Min: 98/52  Max: 143/70   O2 Sat 96 % SpO2  Min: 95 %  Max: 100 %      Intake/Output Summary (Last 24 hours) at 6/14/2023 2695  Last data filed at 6/14/2023 0801  Gross per 24 hour   Intake 6397 22 ml   Output 750 ml   Net 5647 22 ml         Diet Tarik/CHO Controlled; Consistent Carbohydrate Diet Level 2 (5 carb servings/75 grams CHO/meal)     Invasive Monitoring Physical exam   Arterial Line  Thomson BP    No data recorded   MAP    No data recorded    Physical Exam  Vitals and nursing note reviewed  Constitutional:       General: She is not in acute distress  Appearance: She is well-developed  HENT:      Head: Normocephalic and atraumatic  Right Ear: External ear normal       Left Ear: External ear normal       Nose: Nose normal    Eyes:      General: No scleral icterus  Conjunctiva/sclera: Conjunctivae normal    Cardiovascular:      Rate and Rhythm: Normal rate and regular rhythm  Heart sounds: No murmur heard    Pulmonary:      Effort: Pulmonary effort is normal  No respiratory distress  Breath sounds: Normal breath sounds  No wheezing, rhonchi or rales  Abdominal:      General: Bowel sounds are normal  There is no distension  Palpations: Abdomen is soft  Tenderness: There is no abdominal tenderness  There is no guarding  Musculoskeletal:         General: No swelling  Cervical back: Neck supple  Right lower leg: No edema  Left lower leg: No edema  Skin:     General: Skin is warm and dry  Capillary Refill: Capillary refill takes less than 2 seconds  Neurological:      General: No focal deficit present  Mental Status: She is alert and oriented to person, place, and time     Psychiatric:         Mood and Affect: Mood normal             Diagnostic Studies      EK23 Sinus tachycardia      Medications:  Scheduled PRN   chlorhexidine, 15 mL, Q12H MICHAELA  enoxaparin, 40 mg, Daily  insulin glargine, 25 Units, HS  insulin lispro, 1-6 Units, TID AC  insulin lispro, 10 Units, TID With Meals  insulin NPH, 12 Units, Once      acetaminophen, 650 mg, Q6H PRN  loperamide, 2 mg, TID PRN  ondansetron, 4 mg, Q6H PRN       Continuous    insulin regular (HumuLIN R,NovoLIN R) 1 Units/mL in sodium chloride 0 9 % 100 mL infusion, 0 3-21 Units/hr, Last Rate: 1 Units/hr (23 0747)  multi-electrolyte, 100 mL/hr, Last Rate: 100 mL/hr (23 0319)         Labs:    CBC    Recent Labs     23  1542 23  0525   HCT 46 0 34 6*   HGB 14 2 11 1*    162   WBC 5 81 3 41*     BMP    Recent Labs     23  0211   AGAP 7 9   BUN 14 12   CALCIUM 5 4* 7 1*   * 105   CO2 13* 17*   CREATININE 0 73 0 91   K 2 7* 4 4   SODIUM 136 131*       Coags    No recent results     Additional Electrolytes  Recent Labs     23  0211   MG 1 1* 2 5   PHOS 1 0* 3 4          Blood Gas    No recent results  Recent Labs     23  1542   BEVEN -16 8   KSR0IMJ 10 3*   GPQ2BVG 29 2*   PHVEN 7 167*   PO2VEN 53 5*    LFTs  Recent Labs     06/13/23  1542   ALB 4 3   ALKPHOS 217*   ALT 42   AST 31   TBILI 0 31       Infectious  No recent results  Glucose  Recent Labs     06/13/23  1542 06/13/23 2000 06/14/23  0211   GLUC 423* 1011 [address] [place] [address] MD Irene

## 2023-06-14 NOTE — ASSESSMENT & PLAN NOTE
· Chronic, bilirubin and liver enzymes normal  · Right upper quadrant pain, cholecystectomy  · Continue to monitor, possible work-up for bone disease

## 2023-06-14 NOTE — ASSESSMENT & PLAN NOTE
Lab Results   Component Value Date    HGBA1C 9 2 (A) 11/16/2022       Recent Labs     06/13/23  1515 06/13/23  1740 06/13/23  1838 06/13/23 1959   POCGLU 442* 397* 244* 172*       Blood Sugar Average: Last 72 hrs:  (P) 313 75   · Poorly controlled, continuous glucose monitoring  · Patient is on Lantus 35 units daily with and sliding scale  Last endocrinology visit 11/22- Current regimen:   lantus 30 units at lunch time  Novolog :  ICR: 8  ISF : 50 above 100,   On average she takes 12-14 units      · Currently admitted for DKA

## 2023-06-14 NOTE — ASSESSMENT & PLAN NOTE
Lab Results   Component Value Date    HGBA1C 9 2 (A) 11/16/2022       Recent Labs     06/14/23  0208 06/14/23  0304 06/14/23  0409 06/14/23  0607   POCGLU 122 147* 131 133       Blood Sugar Average: Last 72 hrs:  (P) 180 [id number]   · Poorly controlled, continuous glucose monitoring  · Patient reports taking Lantus 25 units daily with and sliding scale  Last endocrinology visit 11/22- Current regimen:   lantus 30 units at lunch time  Novolog :  ICR: 8  ISF : 50 above 100,   On average she takes 12-14 units      · Currently admitted for DKA

## 2023-06-14 NOTE — ASSESSMENT & PLAN NOTE
· Elevated at 217  · Chronic, bilirubin and liver enzymes normal  · History of right upper quadrant pain, cholecystectomy    No pain today  · Continue to monitor, possible work-up for bone disease

## 2023-06-14 NOTE — ASSESSMENT & PLAN NOTE
Lab Results   Component Value Date    HGBA1C 9 2 (A) 11/16/2022       Recent Labs     06/14/23  0304 06/14/23  0409 06/14/23  0607 06/14/23  0742   POCGLU 147* 131 133 168*       Blood Sugar Average: Last 72 hrs:  (P) 179 [id number]     · On admission:  hyperglycemia, fatigue, nausea vomiting  · Beta hydroxybutyrate-elevated, 7 3 mmol/L  · VBG-7 1/29 2/53  · EKG with sinus tach on serial evaluation,   · UA:  Innumerable bacteria; reflex with proteinuria and glucosuria  No nitrate or leukocytes  · Electrolyte , creat 1 48; now sodium of 131, creatinine 0 91  · Precipitating factor-no obvious infectious source detected, patient reports being compliant with medication and diet  A1c pending  · S/ p 3l normal saline  · Anion gap closed 2x, bicarb at 17  · Patient along #nausea and is tolerating well diet    Plan  · Transition to subcutaneous insulin    Lantus 25 units at bedtime, lispro 10 units 3 times daily and  NPH 12 U once for overlap with insulin drip  · Continue IV fluids  · Replete electrolytes as needed

## 2023-06-14 NOTE — ASSESSMENT & PLAN NOTE
Recent Labs     06/13/23  1542 06/13/23 2000 06/14/23  0211   CREATININE 1 48* 0 73 0 91   EGFR 43 102 78     Estimated Creatinine Clearance: 83 5 mL/min (by C-G formula based on SCr of 0 91 mg/dL)    · Resolved   · baseline 0 6-0 9  · Most likely prerenal in the setting of DKA  · Avoid hypotension and nephrotoxic medications

## 2023-06-14 NOTE — ASSESSMENT & PLAN NOTE
Lab Results   Component Value Date    HGBA1C 9 2 (A) 11/16/2022       Recent Labs     06/14/23  0208 06/14/23  0304 06/14/23  0409 06/14/23  0607   POCGLU 122 147* 131 133       Blood Sugar Average: Last 72 hrs:  (P) 180 [id number]     · On admission:  hyperglycemia, fatigue, nausea vomiting  · Beta hydroxybutyrate-elevated, 7 3 mmol/L  · VBG-7 1/29 2/53  · EKG with sinus tach on serial evaluation,   · UA:  Innumerable bacteria; reflex with proteinuria and glucosuria  No nitrate or leukocytes  · Electrolyte , creat 1 48; now sodium of 131, creatinine 0 91  · Precipitating factor-no obvious infectious source detected, patient reports being compliant with medication and diet  A1c pending  · S/ p 3l normal saline  · Anion gap closed 2x, bicarb at 17  · Patient along #nausea and is tolerating well diet    Plan  · Transition to subcutaneous insulin  Lantus 25 units at bedtime, lispro 10 units 3 times daily    Will consider adding NPH for overlap from insulin drip  · Continue IV fluids  · Replete electrolytes as needed

## 2023-06-14 NOTE — PLAN OF CARE
Problem: MOBILITY - ADULT  Goal: Maintain or return to baseline ADL function  Description: INTERVENTIONS:  -  Assess patient's ability to carry out ADLs; assess patient's baseline for ADL function and identify physical deficits which impact ability to perform ADLs (bathing, care of mouth/teeth, toileting, grooming, dressing, etc )  - Assess/evaluate cause of self-care deficits   - Assess range of motion  - Assess patient's mobility; develop plan if impaired  - Assess patient's need for assistive devices and provide as appropriate  - Encourage maximum independence but intervene and supervise when necessary  - Involve family in performance of ADLs  - Assess for home care needs following discharge   - Consider OT consult to assist with ADL evaluation and planning for discharge  - Provide patient education as appropriate  Outcome: Progressing  Goal: Maintains/Returns to pre admission functional level  Description: INTERVENTIONS:  - Perform BMAT or MOVE assessment daily    - Set and communicate daily mobility goal to care team and patient/family/caregiver     - Collaborate with rehabilitation services on mobility goals if consulted  - Out of bed for toileting  - Record patient progress and toleration of activity level   Outcome: Progressing     Problem: PAIN - ADULT  Goal: Verbalizes/displays adequate comfort level or baseline comfort level  Description: Interventions:  - Encourage patient to monitor pain and request assistance  - Assess pain using appropriate pain scale  - Administer analgesics based on type and severity of pain and evaluate response  - Implement non-pharmacological measures as appropriate and evaluate response  - Consider cultural and social influences on pain and pain management  - Notify physician/advanced practitioner if interventions unsuccessful or patient reports new pain  Outcome: Progressing     Problem: INFECTION - ADULT  Goal: Absence or prevention of progression during hospitalization  Description: INTERVENTIONS:  - Assess and monitor for signs and symptoms of infection  - Monitor lab/diagnostic results  - Monitor all insertion sites, i e  indwelling lines, tubes, and drains  - Monitor endotracheal if appropriate and nasal secretions for changes in amount and color  - [place] appropriate cooling/warming therapies per order  - Administer medications as ordered  - Instruct and encourage patient and family to use good hand hygiene technique  - Identify and instruct in appropriate isolation precautions for identified infection/condition  Outcome: Progressing  Goal: Absence of fever/infection during neutropenic period  Description: INTERVENTIONS:  - Monitor WBC    Outcome: Progressing     Problem: SAFETY ADULT  Goal: Maintain or return to baseline ADL function  Description: INTERVENTIONS:  -  Assess patient's ability to carry out ADLs; assess patient's baseline for ADL function and identify physical deficits which impact ability to perform ADLs (bathing, care of mouth/teeth, toileting, grooming, dressing, etc )  - Assess/evaluate cause of self-care deficits   - Assess range of motion  - Assess patient's mobility; develop plan if impaired  - Assess patient's need for assistive devices and provide as appropriate  - Encourage maximum independence but intervene and supervise when necessary  - Involve family in performance of ADLs  - Assess for home care needs following discharge   - Consider OT consult to assist with ADL evaluation and planning for discharge  - Provide patient education as appropriate  Outcome: Progressing  Goal: Maintains/Returns to pre admission functional level  Description: INTERVENTIONS:  - Perform BMAT or MOVE assessment daily    - Set and communicate daily mobility goal to care team and patient/family/caregiver     - Collaborate with rehabilitation services on mobility goals if consulted  - Out of bed for toileting  - Record patient progress and toleration of activity level   Outcome: Progressing  Goal: Patient will remain free of falls  Description: INTERVENTIONS:  - Educate patient/family on patient safety including physical limitations  - Instruct patient to call for assistance with activity   - Consult OT/PT to assist with strengthening/mobility   - Keep Call bell within reach  - Keep bed low and locked with side rails adjusted as appropriate  - Keep care items and personal belongings within reach  - Initiate and maintain comfort rounds  - Make Fall Risk Sign visible to staff  - Apply yellow socks and bracelet for high fall risk patients  - Consider moving patient to room near nurses station  Outcome: Progressing     Problem: DISCHARGE PLANNING  Goal: Discharge to home or other facility with appropriate resources  Description: INTERVENTIONS:  - Identify barriers to discharge w/patient and caregiver  - Arrange for needed discharge resources and transportation as appropriate  - Identify discharge learning needs (meds, wound care, etc )  - Arrange for interpretive services to assist at discharge as needed  - Refer to Case Management Department for coordinating discharge planning if the patient needs post-hospital services based on physician/advanced practitioner order or complex needs related to functional status, cognitive ability, or social support system  Outcome: Progressing     Problem: Knowledge Deficit  Goal: Patient/family/caregiver demonstrates understanding of disease process, treatment plan, medications, and discharge instructions  Description: Complete learning assessment and assess knowledge base    Interventions:  - Provide teaching at level of understanding  - Provide teaching via preferred learning methods  Outcome: Progressing

## 2023-06-14 NOTE — H&P
[facility]  H&P  Name: Jordan Szymanski 41 y o  female I MRN: [id number]  Unit/Bed#: ICU 06 I Date of Admission: 6/13/2023   Date of Service: 6/13/2023 I Hospital Day: 0      Assessment/Plan   * Diabetic ketoacidosis associated with type 1 diabetes mellitus [facility])  Assessment & Plan  Lab Results   Component Value Date    HGBA1C 9 2 (A) 11/16/2022       Recent Labs     06/13/23  1515 06/13/23  1740   POCGLU 442* 397*       Blood Sugar Average: Last 72 hrs:  (P) 419 5     · POA-worsening hyperglycemia, fatigue, nausea vomiting  · Beta hydroxybutyrate-elevated, 7 3 mmol/L  · VBG-7 1/29 2/53  · EKG with sinus tach on serial evaluation,   · UA- Pending  · Electrolyte ,  5 3 creat 1 48  Precipitating factor-no obvious infectious source detected, patient reports being compliant with medication and diet    A1c pending  S/ p 3l normal saline    PLAN   -Insulin drip-   - NPO until gap closed x2  -Fluid- hydration normal saline 500 cc/h 8 hours then to 250 cc/h  -Start D5%  For glucose <250  -Replete electrolytes  - follow-up infectious work-up    Alkaline phosphatase elevation  Assessment & Plan  · Chronic, bilirubin and liver enzymes normal  · Right upper quadrant pain, cholecystectomy  · Continue to monitor, possible work-up for bone disease    SIRS (systemic inflammatory response syndrome) (HCC)  Assessment & Plan  · Tachycardia tachypnea,Fever 100 8  · In the setting of DKA  · No source at this times, infectious work-up pending    Type 1 diabetes mellitus with hyperglycemia [facility])  Assessment & Plan  Lab Results   Component Value Date    HGBA1C 9 2 (A) 11/16/2022       Recent Labs     06/13/23  1515 06/13/23  1740 06/13/23  1838 06/13/23  1959   POCGLU 442* 397* 244* 172*       Blood Sugar Average: Last 72 hrs:  (P) 313 75   · Poorly controlled, continuous glucose monitoring  · Patient is on Lantus 35 units daily with and sliding scale  Last endocrinology visit 11/22- Current regimen: lantus 30 units at lunch time  Novolog :  ICR: 8  ISF : 50 above 100,   On average she takes 12-14 units      · Currently admitted for DKA      Hypothyroidism due to Hashimoto's thyroiditis  Assessment & Plan  Continue levothyroxine 5 mcg    THO (acute kidney injury) [facility])  Assessment & Plan  Recent Labs     06/13/23  1542   CREATININE 1 48*   EGFR 43     Estimated Creatinine Clearance: 51 3 mL/min (A) (by C-G formula based on SCr of 1 48 mg/dL (H))  · baseline 0 6-0 9  · Most likely prerenal in the setting of DKA  · Continue fluid hydration   · Avoid hypotension and nephrotoxic medications      Depression, recurrent (HCC)  Assessment & Plan  · Depressed mood, no suicidal ideation  · On lexapro , xanaX, Buspirone, Continue  home med       -------------------------------------------------------------------------------------------------------------  Chief Complaint:  hyperglycemia    History of Present Illness   HX and PE limited by:  fatigue  Jatin Bullock is a 41 y o  female past medical history of type 1 diabetes, Hashimoto's hypothyroidism, depression who presents with hyperglycemia  She reports that for the past 3 days, sugars have been above 600  She endorses fatigue, polyuria, polydipsia, nausea and vomiting  Also has diarrhea about 3-5 episodes denies abdominal pain or fever  She denies any flulike symptoms, no sick contacts  Patient reports no dietary indiscretion, and has not skipped or missed her insulin doses  She denies cough, shortness of breath, palpitations, dysuria or hematuria, headaches, blurred vision  No new mediations, depressed mood with no suicidal ideation  No alcohol or tobacco, no IVDU  Work up showed hyperglycemia with metabolic acidosis, gap 22, BHP elevated, VBG 7 1/29 2/53/3/10 3  Patient admitted to the ICU for management of DKA currently on insulin drip and fluids       History obtained from chart review and the "patient   -------------------------------------------------------------------------------------------------------------  Dispo: Admit to Critical Care     Code Status: Level 1 - Full Code  --------------------------------------------------------------------------------------------------------------  Review of Systems    A 12-point, complete review of systems was reviewed and negative except as stated above     Physical Exam  Constitutional:       Appearance: She is ill-appearing  HENT:      Head: Normocephalic and atraumatic  Mouth/Throat:      Mouth: Mucous membranes are dry  Cardiovascular:      Rate and Rhythm: Tachycardia present  Heart sounds: No murmur heard  Pulmonary:      Breath sounds: Normal breath sounds  Abdominal:      Palpations: There is no mass  Tenderness: There is abdominal tenderness (right and left upper quandrantsv)  There is no right CVA tenderness or guarding  Skin:     General: Skin is warm  Capillary Refill: Capillary refill takes 2 to 3 seconds  Neurological:      General: No focal deficit present  Mental Status: She is alert and oriented to person, place, and time  --------------------------------------------------------------------------------------------------------------  Vitals:   Vitals:    06/13/23 1800 06/13/23 1816 06/13/23 1900 06/13/23 2000   BP: 118/58  98/52 113/57   BP Location:       Pulse: (!) 109  (!) 106 102   Resp: (!) 24  19 18   Temp:  (!) 100 8 °F (38 2 °C)  98 1 °F (36 7 °C)   TempSrc:  Oral  Oral   SpO2: 98%  97% 98%   Weight:  73 5 kg (162 lb 0 6 oz)     Height:  5' 6\" (1 676 m)       Temp  Min: 98 1 °F (36 7 °C)  Max: 100 8 °F (38 2 °C)  IBW (Ideal Body Weight): 59 3 kg  Height: 5' 6\" (167 6 cm)  Body mass index is 26 15 kg/m²      Laboratory and Diagnostics:  Results from last 7 days   Lab Units 06/13/23  1542   EOS PCT % 0   HEMATOCRIT % 46 0   HEMOGLOBIN g/dL 14 2   MONOS PCT % 9   NEUTROS PCT % 79*   PLATELETS " Thousands/uL 211   WBC Thousand/uL 5 81     Results from last 7 days   Lab Units 23  1542   ANION GAP mmol/L 22*   ALBUMIN g/dL 4 3   ALK PHOS U/L 217*   ALT U/L 42   AST U/L 31   BUN mg/dL 22   CALCIUM mg/dL 8 7   CHLORIDE mmol/L 90*   CO2 mmol/L 12*   CREATININE mg/dL 1 48*   GLUCOSE RANDOM mg/dL 423*   POTASSIUM mmol/L 5 2   SODIUM mmol/L 124*   TOTAL BILIRUBIN mg/dL 0 31                       ABG:    VBG:  Results from last 7 days   Lab Units 23  1542   BASE EXC LANNY mmol/L -16 8   HCO3 LANNY mmol/L 10 3*   PCO2 LANNY mm Hg 29 2*   PH LANNY  7 167*   PO2 LANNY mm Hg 53 5*           Micro:        EKG: sinus tachycardia    Imaging:nonee      Historical Information   Past Medical History:   Diagnosis Date   • Anemia    • Anxiety    • B12 deficiency    • Cervical disc disorder     On gabapentin    • Depression    • Diabetes mellitus ([place] [address] )    • Disease of thyroid gland     hypothyroid   • Iron deficiency anemia    • Panic attack    • PTSD (post-traumatic stress disorder)    • Sleep difficulties    • Substance abuse ([place] [address] )    • Type 1 diabetes ([place] [address] )    • Vitamin D deficiency      Past Surgical History:   Procedure Laterality Date   • ABDOMINAL SURGERY      gastric bypass   •  SECTION      x2   • CHOLECYSTECTOMY  2018   • GASTRIC BYPASS     • INTRAUTERINE DEVICE INSERTION  2015   • IR BIOPSY BONE MARROW  2020   • OTHER SURGICAL HISTORY      surgery for imperforate hymen/endometriosis/hydrometrocolpos   • TUBAL LIGATION     • WISDOM TOOTH EXTRACTION       Social History   Social History     Substance and Sexual Activity   Alcohol Use Not Currently    Comment: occasional     Social History     Substance and Sexual Activity   Drug Use No     Social History     Tobacco Use   Smoking Status Never   Smokeless Tobacco Never     Exercise History:   Family History:   Family History   Problem Relation Age of Onset   • Thyroid disease Mother    • URIEL disease Mother    • Hyperlipidemia Mother    • Hypertension Mother    • Osteoarthritis Mother    • Thyroid disease unspecified Mother    • Diabetes Father    • Alcohol abuse Father    • Diabetes type I Father    • Thyroid disease Sister    • Depression Sister    • Thyroid disease unspecified Sister    • Anxiety disorder Sister    • Heart disease Maternal Grandmother    • Hypertension Maternal Grandmother    • Arthritis Maternal Grandmother    • Diabetes type I Maternal Uncle    • Diabetes type I Maternal Grandfather      I have reviewed this patient's family history and commented on sigificant items within the HPI      Medications:  Current Facility-Administered Medications   Medication Dose Route Frequency   • chlorhexidine (PERIDEX) 0 12 % oral rinse 15 mL  15 mL Mouth/Throat Q12H [address]   • dextrose 5 % and sodium chloride 0 9 % infusion  250 mL/hr Intravenous Continuous   • [START ON 6/14/2023] enoxaparin (LOVENOX) subcutaneous injection 40 mg  40 mg Subcutaneous Daily   • insulin regular (HumuLIN R,NovoLIN R) 1 Units/mL in sodium chloride 0 9 % 100 mL infusion  0 1-30 Units/hr Intravenous Continuous   • sodium chloride 0 9 % infusion  500 mL/hr Intravenous Continuous    Followed by   • sodium chloride 0 9 % infusion  250 mL/hr Intravenous Continuous     Home medications:  Prior to Admission Medications   Prescriptions Last Dose Informant Patient Reported? Taking?    ALPRAZolam (XANAX) 0 5 mg tablet  Self Yes Yes   Sig: Take 0 5 mg by mouth daily as needed     Accu-Chek FastClix Lancets MISC   No No   Sig: USE 1 LANCET TO TEST BLOOD SUGAR UP TO 6 TIMES DAILY   Continuous Blood Gluc  (FreeStyle Samantha 14 Day [place]) KELVIN   No No   Sig: Use 1 Units in the morning   Continuous Blood Gluc  (FreeStyle Samantha 2 [place]) KELVIN   No No   Sig: Use 1 units in the morning   Continuous Blood Gluc Sensor (FreeStyle Samantha 14 Day Sensor) [facility]   No No   Sig: Use 1 Units every 14 (fourteen) days   Continuous Blood Gluc Sensor (FreeStyle Samantha 2 Sensor) MISC   No No "  Sig: Use 1 unit every 14 days   Cyanocobalamin (B-12 PO) Not Taking  Yes No   Sig: Take by mouth daily   Patient not taking: Reported on 2022   Insulin Aspart (NovoLOG) 100 units/mL injection   Yes No   Si UNITS BY SUBCUTANEOUS INSULIN PUMP ROUTE IN THE MORNING INJECT INTO PUMP DAILY  Patient not taking: Reported on 2022   Insulin Pen Needle (BD Pen Needle Deanna U/F) 32G X 4 MM MISC   No No   Sig: Use 4/day   Insulin Syringe-Needle U-100 (INSULIN SYRINGE 1CC/28G) 28G X 1/2\" 1 ML MISC   Yes No   Lantus SoloStar 100 units/mL SOPN   Yes No   Sig: INJECT 35 UNITS UNDER THE SKIN DAILY  E10 65 (TYPE 1 DIABETES)   PATIENT MAINTAINED INSULIN PUMP Not Taking Self No No   Sig: Inject 1 each under the skin every 8 (eight) hours   Patient not taking: Reported on 2022   Vyvanse 40 MG capsule  Self Yes Yes   acetaminophen (TYLENOL) 500 mg tablet  Self Yes Yes   Sig:     benzonatate (TESSALON PERLES) 100 mg capsule Not Taking  No No   Sig: Take 1 capsule (100 mg total) by mouth 3 (three) times a day as needed for cough   Patient not taking: Reported on 2023   busPIRone (BUSPAR) 30 MG tablet  Self Yes Yes   cloNIDine (Catapres) 0 1 mg tablet Not Taking Self No No   Sig: Take 1 tablet (0 1 mg total) by mouth daily at bedtime   Patient not taking: Reported on 2022   ergocalciferol (VITAMIN D2) 50,000 units Not Taking  No No   Sig: Take 1 capsule (50,000 Units total) by mouth once a week   Patient not taking: Reported on 2023   escitalopram (LEXAPRO) 20 mg tablet  Self Yes Yes   Sig: Take 10 mg by mouth daily Half tab daily   gabapentin (NEURONTIN) 600 MG tablet  Self No Yes   Sig: Take 0 5 tablets (300 mg total) by mouth 2 (two) times a day Half Tab 300mg at bedtime   glucose blood (Accu-Chek Guide) test strip  Self No No   Sig: Use to test blood sugar up to 6 times daily     insulin aspart (NovoLOG) 100 units/mL injection  Self No Yes   Si Units by Subcutaneous Insulin Pump route in the " morning INJECT INTO PUMP DAILY  insulin glargine (Lantus) 100 units/mL subcutaneous injection  Self No Yes   Sig: Inject 36 Units under the skin daily at bedtime   ketoconazole (NIZORAL) 2 % cream   No No   Sig: Apply topically daily   levothyroxine (Synthroid) 25 mcg tablet   No Yes   Sig: Take 1 tablet (25 mcg total) by mouth daily in the early morning Combine with 200mcg, for total 225mcg daily     levothyroxine 200 mcg tablet  Self No No   Sig: Take 1 tablet (200 mcg total) by mouth every morning   meclizine (ANTIVERT) 25 mg tablet   No No   Sig: Take 1 tablet (25 mg total) by mouth every 8 (eight) hours as needed for dizziness   naltrexone (REVIA) 50 mg tablet   No No   Sig: Take 2 tablets (100 mg total) by mouth daily   Patient not taking: Reported on 5/14/2023   ofloxacin (OCUFLOX) 0 3 % ophthalmic solution   Yes No   Patient not taking: Reported on 1/2/2022   ondansetron (ZOFRAN) 4 mg tablet   No Yes   Sig: Take 1 tablet (4 mg total) by mouth every 8 (eight) hours as needed for nausea or vomiting   ondansetron (ZOFRAN-ODT) 4 mg disintegrating tablet Not Taking  No No   Sig: Take 1 tablet (4 mg total) by mouth every 6 (six) hours as needed for nausea or vomiting   Patient not taking: Reported on 6/13/2023   prednisoLONE acetate (PRED FORTE) 1 % ophthalmic suspension Not Taking  Yes No   Patient not taking: Reported on 1/2/2022   selenium 200 mcg   Yes Yes   Sig: Take 200 mcg by mouth daily   traZODone (DESYREL) 100 mg tablet  Self Yes Yes   Sig: Take 100 mg by mouth daily at bedtime   venlafaxine (EFFEXOR-XR) 150 mg 24 hr capsule  Self Yes Yes   vitamin B-12 (VITAMIN B-12) 500 mcg tablet Not Taking  Yes No   Sig: Take 500 mcg by mouth daily   Patient not taking: Reported on 8/23/2021      Facility-Administered Medications: None     Allergies:  No Known Allergies    ------------------------------------------------------------------------------------------------------------  Advance Directive and Living Will: "    Power of :    POLST:    ------------------------------------------------------------------------------------------------------------  Anticipated Length of Stay is > 2 midnights    Care Time Delivered:         Charles Jacobson MD        Portions of the record may have been created with voice recognition software  Occasional wrong word or \"sound a like\" substitutions may have occurred due to the inherent limitations of voice recognition software    Read the chart carefully and recognize, using context, where substitutions have occurred  "

## 2023-06-14 NOTE — ASSESSMENT & PLAN NOTE
Recent Labs     06/13/23  1542   CREATININE 1 48*   EGFR 43     Estimated Creatinine Clearance: 51 3 mL/min (A) (by C-G formula based on SCr of 1 48 mg/dL (H))    · baseline 0 6-0 9  · Most likely prerenal in the setting of DKA  · Continue fluid hydration   · Avoid hypotension and nephrotoxic medications

## 2023-06-14 NOTE — ASSESSMENT & PLAN NOTE
· Tachycardia tachypnea,Fever 100 8  · In the setting of DKA  · No source at this times, infectious work-up pending

## 2023-06-14 NOTE — SEPSIS NOTE
Sepsis Note   Georgette Blackmon 41 y o  female MRN: [id number]  Unit/Bed#: ICU 11 Encounter: [id number]       Initial Sepsis Screening     [address] Name 06/14/23 0432                Is the patient's history suggestive of a new or worsening infection? Yes (Proceed)  -NG        Suspected source of infection urinary tract infection  -NG        Indicate SIRS criteria Tachycardia > 90 bpm;Tachypnea > 20 resp per min  -NG        Are two or more of the above signs & symptoms of infection both present and new to the patient? Yes (Proceed)  -NG        Assess for evidence of organ dysfunction: Are any of the below criteria present within 6 hours of suspected infection and SIRS criteria that are NOT considered to be chronic conditions? --  none  -NG              User Key  (r) = Recorded By, (t) = Taken By, (c) = Cosigned By    [address] Name Provider Type    NG JAROCHO Leong Nurse Practitioner                    Body mass index is 26 15 kg/m²    Wt Readings from Last 1 Encounters:   06/13/23 73 5 kg (162 lb 0 6 oz)     IBW (Ideal Body Weight): 59 3 kg    Ideal body weight: 59 3 kg (130 lb 11 7 oz)  Adjusted ideal body weight: 65 kg (143 lb 4 1 oz)

## 2023-06-15 LAB
GLUCOSE SERPL-MCNC: 122 MG/DL (ref 65–140)
GLUCOSE SERPL-MCNC: 183 MG/DL (ref 65–140)
GLUCOSE SERPL-MCNC: 227 MG/DL (ref 65–140)
GLUCOSE SERPL-MCNC: 232 MG/DL (ref 65–140)

## 2023-06-15 PROCEDURE — 99232 SBSQ HOSP IP/OBS MODERATE 35: CPT | Performed by: HOSPITALIST

## 2023-06-15 PROCEDURE — 82948 REAGENT STRIP/BLOOD GLUCOSE: CPT

## 2023-06-15 RX ORDER — ALPRAZOLAM 0.5 MG/1
0.5 TABLET ORAL 2 TIMES DAILY PRN
Status: DISCONTINUED | OUTPATIENT
Start: 2023-06-15 | End: 2023-06-16 | Stop reason: HOSPADM

## 2023-06-15 RX ORDER — LOPERAMIDE HYDROCHLORIDE 2 MG/1
2 CAPSULE ORAL 3 TIMES DAILY PRN
Status: DISCONTINUED | OUTPATIENT
Start: 2023-06-15 | End: 2023-06-16 | Stop reason: HOSPADM

## 2023-06-15 RX ORDER — SODIUM CHLORIDE 9 MG/ML
100 INJECTION, SOLUTION INTRAVENOUS CONTINUOUS
Status: DISCONTINUED | OUTPATIENT
Start: 2023-06-15 | End: 2023-06-16 | Stop reason: HOSPADM

## 2023-06-15 RX ADMIN — ALPRAZOLAM 0.5 MG: 0.5 TABLET ORAL at 17:10

## 2023-06-15 RX ADMIN — ESCITALOPRAM OXALATE 10 MG: 10 TABLET ORAL at 09:03

## 2023-06-15 RX ADMIN — ENOXAPARIN SODIUM 40 MG: 40 INJECTION SUBCUTANEOUS at 09:03

## 2023-06-15 RX ADMIN — TRAZODONE HYDROCHLORIDE 100 MG: 100 TABLET ORAL at 21:12

## 2023-06-15 RX ADMIN — INSULIN LISPRO 10 UNITS: 100 INJECTION, SOLUTION INTRAVENOUS; SUBCUTANEOUS at 09:05

## 2023-06-15 RX ADMIN — INSULIN LISPRO 3 UNITS: 100 INJECTION, SOLUTION INTRAVENOUS; SUBCUTANEOUS at 09:05

## 2023-06-15 RX ADMIN — INSULIN GLARGINE 25 UNITS: 100 INJECTION, SOLUTION SUBCUTANEOUS at 21:12

## 2023-06-15 RX ADMIN — ACETAMINOPHEN 325MG 650 MG: 325 TABLET ORAL at 17:59

## 2023-06-15 RX ADMIN — BUSPIRONE HYDROCHLORIDE 10 MG: 10 TABLET ORAL at 17:11

## 2023-06-15 RX ADMIN — INSULIN LISPRO 7 UNITS: 100 INJECTION, SOLUTION INTRAVENOUS; SUBCUTANEOUS at 17:52

## 2023-06-15 RX ADMIN — INSULIN LISPRO 10 UNITS: 100 INJECTION, SOLUTION INTRAVENOUS; SUBCUTANEOUS at 13:04

## 2023-06-15 RX ADMIN — INSULIN LISPRO 1 UNITS: 100 INJECTION, SOLUTION INTRAVENOUS; SUBCUTANEOUS at 13:04

## 2023-06-15 RX ADMIN — SODIUM CHLORIDE 100 ML/HR: 0.9 INJECTION, SOLUTION INTRAVENOUS at 16:07

## 2023-06-15 RX ADMIN — ACETAMINOPHEN 325MG 650 MG: 325 TABLET ORAL at 09:03

## 2023-06-15 RX ADMIN — BUSPIRONE HYDROCHLORIDE 10 MG: 10 TABLET ORAL at 09:03

## 2023-06-15 NOTE — UTILIZATION REVIEW
Initial Clinical Review    Admission: Date/Time/Statement:   Admission Orders (From admission, onward)     Ordered        06/13/23 1702  INPATIENT ADMISSION  Once                      Orders Placed This Encounter   Procedures   • INPATIENT ADMISSION     Standing Status:   Standing     Number of Occurrences:   1     Order Specific Question:   Level of Care     Answer:   Critical Care [15]     Order Specific Question:   Estimated length of stay     Answer:   More than 2 Midnights     Order Specific Question:   Certification     Answer:   I certify that inpatient services are medically necessary for this patient for a duration of greater than two midnights  See H&P and MD Progress Notes for additional information about the patient's course of treatment  ED Arrival Information     Expected   -    Arrival   6/13/2023 15:10    Acuity   Emergent            Means of arrival   Ambulance    Escorted by   [facility]    Admission type   Emergency            Arrival complaint   high blood sugar           Chief Complaint   Patient presents with   • Hyperglycemia - Symptomatic     High sugar since Saturday, nausea, vomiting, not feeling wel       Initial Presentation: 41 y o  female presents to the ED via EMS from home with c/o elevated glucose > 600 x 3 days, fatigue, polyuria, polydipsia, N/V, diarrhea, med and diet compliance  PMH: type 1 DM, Hashimoto's hypothyroidism, depression  In the ED she was tachycardic  Labs - glucose 423, elevated anion gap, acidotic gasses, low , elevated BUN/Cr, alk phos, serum osmo, A1C - 10 2, abnormal UTI  ECG - ST  Treated with IV fluids, IV Zofran x 2, insulin drip, Tylenol for fever  On exam ill-appearing, tachycardic, abd tenderness bilat upper quads, A&O  Pt is admitted to INPATIENT status to Critical Care with DKA - insulin drip, NPO, electrolyte mgmt  Alk phos elevation - trend, poss bone disease work up  SIRS - trend labs    THO - IV fluids  Date: 6/14   Day 2:   Anion gap closed  Pt is off IV Insulin drip and IV fluids today  No abd tenderness on exam today  Insulin order - lantus 25 units daily, 12 nph, + lispro 10 units tid, restarting home psych meds, CIWA  - not recorded  On exam fatigued, diarrhea x 1, no N/V, feels improved        ED Triage Vitals   Temperature Pulse Respirations Blood Pressure SpO2   06/13/23 1516 06/13/23 1516 06/13/23 1516 06/13/23 1516 06/13/23 1516   98 6 °F (37 °C) (!) 118 18 109/69 99 %      Temp Source Heart Rate Source Patient Position - Orthostatic VS BP Location FiO2 (%)   06/13/23 1614 06/13/23 1600 06/13/23 1614 06/13/23 1614 --   Oral Monitor Lying Right arm       Pain Score       06/13/23 1516       No Pain          Wt Readings from Last 1 Encounters:   06/13/23 73 5 kg (162 lb 0 6 oz)     Additional Vital Signs:   06/15/23 07:36:54 100 5 °F (38 1 °C) 94 -- 121/73 89 95 % -- --   06/15/23 03:24:38 99 6 °F (37 6 °C) 102 16 125/75 92 98 % -- --   06/14/23 2259 -- -- -- -- -- -- None (Room air) --   06/14/23 21:03:27 101 7 °F (38 7 °C) Abnormal   98 -- 137/80 99 96 % -- --   Temp: 650mg of Tylenol given at 06/14/23 2103 06/14/23 16:10:55 98 4 °F (36 9 °C) 93 20 137/80 99 93 % -- --   06/14/23 1400 -- 92 14 120/76 91 98 % -- --   06/14/23 1300 -- 96 22 118/66 83 96 % -- --   06/14/23 1200 -- 100 16 128/77 92 98 % -- --   06/14/23 1101 100 1 °F (37 8 °C) -- -- -- -- -- -- --   06/14/23 1100 -- 94 24 Abnormal  121/64 83 97 % -- --   06/14/23 1000 -- 98 24 Abnormal  132/88 110 97 % -- --   06/14/23 0900 -- 94 0 Abnormal  132/75 94 97 % -- --   06/14/23 0800 -- 98 24 Abnormal  139/72 88 96 % None (Room air) Lying   06/14/23 0744 98 9 °F (37 2 °C) -- -- -- -- -- -- --   06/14/23 0700 -- 100 24 Abnormal  139/69 90 97 % -- --   06/14/23 0600 -- 98 22 124/59 75 97 % None (Room air) Lying   06/14/23 0500 -- 96 20 124/72 87 97 % None (Room air) Lying   06/14/23 0400 99 4 °F (37 4 °C) 94 17 119/63 81 97 % None (Room air) Lying   06/14/23 0342 -- 94 21 126/59 76 97 % None (Room air) Lying   06/14/23 0200 -- 106 Abnormal  21 116/61 76 96 % -- Lying   06/14/23 0100 -- 108 Abnormal  14 140/77 102 98 % -- --   06/14/23 0000 -- 104 -- 128/57 83 98 % -- Lying   06/13/23 2300 99 6 °F (37 6 °C) 106 Abnormal  25 Abnormal  132/68 88 97 % -- --   06/13/23 2200 -- 102 18 118/62 74 95 % -- --   06/13/23 2100 -- 98 17 100/51 69 98 % -- --   06/13/23 2000 98 1 °F (36 7 °C) 102 18 113/57 75 98 % -- --   06/13/23 1900 -- 106 Abnormal  19 98/52 65 97 % -- --   06/13/23 1816 100 8 °F (38 2 °C) Abnormal  -- -- -- -- -- -- --   06/13/23 1800 -- 109 Abnormal  24 Abnormal  118/58 83 98 % -- --   06/13/23 1730 -- 105 20 129/60 87 99 % -- --   06/13/23 1630 -- 112 Abnormal  21 143/70 98 100 % -- --   06/13/23 1624 -- -- -- -- -- -- None (Room air) --   06/13/23 1614 98 8 °F (37 1 °C) 109 Abnormal  -- 132/78 -- 100 % None (Room air) Lying   06/13/23 1600 -- 110 Abnormal  21 132/78 98 97 % -- --     Pertinent Labs/Diagnostic Test Results:     6/13 ECG - Sinus tachycardia      Results from last 7 days   Lab Units 06/14/23  0525 06/13/23  1542   HEMATOCRIT % 34 6* 46 0   HEMOGLOBIN g/dL 11 1* 14 2   NEUTROS ABS Thousands/µL 2 73 4 61   PLATELETS Thousands/uL 162 211   WBC Thousand/uL 3 41* 5 81         Results from last 7 days   Lab Units 06/14/23  0211 06/13/23 2000 06/13/23  1542   ANION GAP mmol/L 9 7 22*   BUN mg/dL 12 14 22   CALCIUM mg/dL 7 1* 5 4* 8 7   CHLORIDE mmol/L 105 116* 90*   CO2 mmol/L 17* 13* 12*   CREATININE mg/dL 0 91 0 73 1 48*   EGFR ml/min/1 73sq m 78 102 43   POTASSIUM mmol/L 4 4 2 7* 5 2   MAGNESIUM mg/dL 2 5 1 1*  --    PHOSPHORUS mg/dL 3 4 1 0*  --    SODIUM mmol/L 131* 136 124*     Results from last 7 days   Lab Units 06/13/23  1542   ALBUMIN g/dL 4 3   ALK PHOS U/L 217*   ALT U/L 42   AST U/L 31   TOTAL BILIRUBIN mg/dL 0 31   TOTAL PROTEIN g/dL 8 1     Results from last 7 days   Lab Units 06/15/23  0737 06/14/23  2102 06/14/23  1727 06/14/23  1607 06/14/23  1115 06/14/23  1002 06/14/23  0742 06/14/23  0607 06/14/23  0409 06/14/23  0304 06/14/23  0208 06/14/23  0116   POC GLUCOSE mg/dl 232* 204* 118 58* 157* 170* 168* 133 131 147* 122 130     Results from last 7 days   Lab Units 06/14/23  0211 06/13/23 2000 06/13/23  1542   GLUCOSE RANDOM mg/dL 133 127 423*     Results from last 7 days   Lab Units 06/13/23 2000   OSMOLALITY, SERUM mmol/*     Results from last 7 days   Lab Units 06/13/23 2000   EAG mg/dl 246   HEMOGLOBIN A1C % 10 2*     BETA-HYDROXYBUTYRATE   Date Value Ref Range Status   06/13/2023 7 3 (H) <0 6 mmol/L Final   05/23/2021 6 6 (H) <0 6 mmol/L Final   12/01/2020 0 0 <0 6 mmol/L Final   11/30/2020 4 6 (H) <0 6 mmol/L Final   08/06/2020 5 7 (H) <0 6 mmol/L Final   07/20/2020 0 4 <0 6 mmol/L Final   06/23/2020 5 3 (H) <0 6 mmol/L Final   12/28/2018 1 09 (H) 0 02 - 0 27 mmol/L Final   12/27/2018 2 69 (H) 0 02 - 0 27 mmol/L Final   12/27/2018 3 81 (H) 0 02 - 0 27 mmol/L Final          Results from last 7 days   Lab Units 06/13/23  1542   BASE EXC LANNY mmol/L -16 8   HCO3 LANNY mmol/L 10 3*   O2 CONTENT LANNY ml/dL 15 8   O2 HGB, VENOUS % 80 1*   PCO2 LANNY mm Hg 29 2*   PH LANNY  7 167*   PO2 LANNY mm Hg 53 5*         Results from last 7 days   Lab Units 06/14/23 0523   LACTIC ACID mmol/L 0 6     Results from last 7 days   Lab Units 06/13/23 2000   LIPASE u/L 14             Results from last 7 days   Lab Units 06/13/23 2000   OSMOLALITY, SERUM mmol/*     Results from last 7 days   Lab Units 06/14/23  0029   BACTERIA UA /hpf Innumerable*   BILIRUBIN UA  Small*   BLOOD UA  Trace-Intact*   CLARITY UA  Clear   COLOR UA  Light Yellow   EPITHELIAL CELLS WET PREP /hpf Occasional   GLUCOSE UA mg/dl 500 (1/2%)*   KETONES UA mg/dl >=80 (3+)*   LEUKOCYTES UA  Negative   NITRITE UA  Negative   PH UA  5 5   PROTEIN UA mg/dl 30 (1+)*   RBC UA /hpf 0-1   SPEC GRAV UA  >=1 030   UROBILINOGEN UA E U /dl 0 2   WBC UA /hpf 1-2 Results from last 7 days   Lab Units 06/14/23  0029   AMPH/METH  Negative   BARBITURATE UR  Negative   BENZODIAZEPINE UR  Positive*   COCAINE UR  Negative   METHADONE URINE  Negative   OPIATE UR  Negative   PCP UR  Negative   THC UR  Negative     Results from last 7 days   Lab Units 06/13/23 2000   BLOOD CULTURE  No Growth at 24 hrs  No Growth at 24 hrs       ED Treatment:   Medication Administration from 06/13/2023 1510 to 06/13/2023 1829       Date/Time Order Dose Route Action     06/13/2023 1535 EDT sodium chloride 0 9 % bolus 2,000 mL 2,000 mL Intravenous New Bag     06/13/2023 1600 EDT ondansetron (ZOFRAN) injection 4 mg 4 mg Intravenous Given     06/13/2023 1555 EDT ondansetron (ZOFRAN) injection 4 mg 4 mg Intravenous Given     06/13/2023 1804 EDT insulin regular (HumuLIN R,NovoLIN R) 1 Units/mL in sodium chloride 0 9 % 100 mL infusion 14 6 Units/hr Intravenous Rate/Dose Change     06/13/2023 1647 EDT insulin regular (HumuLIN R,NovoLIN R) 1 Units/mL in sodium chloride 0 9 % 100 mL infusion 7 3 Units/hr Intravenous New Bag     06/13/2023 1808 EDT sodium chloride 0 9 % bolus 1,000 mL 1,000 mL Intravenous New Bag     06/13/2023 1816 EDT acetaminophen (TYLENOL) tablet 975 mg 975 mg Oral Given        Past Medical History:   Diagnosis Date   • Anemia    • Anxiety    • B12 deficiency    • Cervical disc disorder     On gabapentin    • Depression    • Diabetes mellitus ([place] [address] )    • Disease of thyroid gland     hypothyroid   • Iron deficiency anemia    • Panic attack    • PTSD (post-traumatic stress disorder)    • Sleep difficulties    • Substance abuse ([place] [address] )    • Type 1 diabetes ([place] [address] )    • Vitamin D deficiency      Present on Admission:  • Depression, recurrent ([facility] )  • THO (acute kidney injury) ([facility] )  • Hypothyroidism due to Hashimoto's thyroiditis  • Type 1 diabetes mellitus with hyperglycemia ([facility] )      Admitting Diagnosis: Hyponatremia [E87 1]  High blood sugar [R73 9]  THO (acute kidney injury) ([place] [address] ) [N17 9]  Diabetic ketoacidosis without coma associated with type 1 diabetes mellitus ([facility] ) [E10 10]  Age/Sex: 41 y o  female  Admission Orders:  Scheduled Medications:  busPIRone, 10 mg, Oral, BID  chlorhexidine, 15 mL, Mouth/Throat, Q12H MICHAELA  enoxaparin, 40 mg, Subcutaneous, Daily  escitalopram, 10 mg, Oral, Daily  insulin glargine, 25 Units, Subcutaneous, HS  insulin lispro, 1-6 Units, Subcutaneous, TID AC  insulin lispro, 10 Units, Subcutaneous, TID With Meals  traZODone, 100 mg, Oral, HS      Continuous IV Infusions:  Insulin drip - d/c 6/14 AM   IV fluids d/c 6/14     PRN Meds:  acetaminophen, 650 mg, Oral, Q6H PRN - x 3 6/14, x 1 6/15  ALPRAZolam, 0 5 mg, Oral, HS PRN -x 2 6/14  loperamide, 2 mg, Oral, TID PRN - x 2 6/14  ondansetron, 4 mg, Oral, Q6H PRN - x 1 6/14    Initially in critical care, transfer to [place] [address] level 6/14  Insulin drip now d/c   POC GLUCOSE AC/HS WITH [employer] COVERAGE   ADA diet   IP CONSULT TO CASE MANAGEMENT    Network Utilization Review Department  ATTENTION: Please call with any questions or concerns to [phone] and carefully listen to the prompts so that you are directed to the right person  All voicemails are confidential   Ronna Conley all requests for admission clinical reviews, approved or denied determinations and any other requests to dedicated fax number below belonging to the campus where the patient is receiving treatment   List of dedicated fax numbers for the Facilities:  [address] DENIALS (Administrative/Medical Necessity) [phone]   [address] [place] [address] (Maternity/NICU/Pediatrics) [phone]   [address]  [phone]   [address] [phone] [address] [place] [address] [phone]   [address] [phone]

## 2023-06-15 NOTE — PLAN OF CARE
Problem: MOBILITY - ADULT  Goal: Maintain or return to baseline ADL function  Description: INTERVENTIONS:  -  Assess patient's ability to carry out ADLs; assess patient's baseline for ADL function and identify physical deficits which impact ability to perform ADLs (bathing, care of mouth/teeth, toileting, grooming, dressing, etc )  - Assess/evaluate cause of self-care deficits   - Assess range of motion  - Assess patient's mobility; develop plan if impaired  - Assess patient's need for assistive devices and provide as appropriate  - Encourage maximum independence but intervene and supervise when necessary  - Involve family in performance of ADLs  - Assess for home care needs following discharge   - Consider OT consult to assist with ADL evaluation and planning for discharge  - Provide patient education as appropriate  Outcome: Progressing  Goal: Maintains/Returns to pre admission functional level  Description: INTERVENTIONS:  - Perform BMAT or MOVE assessment daily    - Set and communicate daily mobility goal to care team and patient/family/caregiver  - Collaborate with rehabilitation services on mobility goals if consulted  - Perform Range of Motion  times a day  - Reposition patient every  hours    - Dangle patient  times a day  - Stand patient  times a day  - Ambulate patient  times a day  - Out of bed to chair  times a day   - Out of bed for meals  times a day  - Out of bed for toileting  - Record patient progress and toleration of activity level   Outcome: Progressing     Problem: PAIN - ADULT  Goal: Verbalizes/displays adequate comfort level or baseline comfort level  Description: Interventions:  - Encourage patient to monitor pain and request assistance  - Assess pain using appropriate pain scale  - Administer analgesics based on type and severity of pain and evaluate response  - Implement non-pharmacological measures as appropriate and evaluate response  - Consider cultural and social influences on pain and pain management  - Notify physician/advanced practitioner if interventions unsuccessful or patient reports new pain  Outcome: Progressing     Problem: INFECTION - ADULT  Goal: Absence or prevention of progression during hospitalization  Description: INTERVENTIONS:  - Assess and monitor for signs and symptoms of infection  - Monitor lab/diagnostic results  - Monitor all insertion sites, i e  indwelling lines, tubes, and drains  - Monitor endotracheal if appropriate and nasal secretions for changes in amount and color  - Anita appropriate cooling/warming therapies per order  - Administer medications as ordered  - Instruct and encourage patient and family to use good hand hygiene technique  - Identify and instruct in appropriate isolation precautions for identified infection/condition  Outcome: Progressing  Goal: Absence of fever/infection during neutropenic period  Description: INTERVENTIONS:  - Monitor WBC    Outcome: Progressing     Problem: SAFETY ADULT  Goal: Maintain or return to baseline ADL function  Description: INTERVENTIONS:  -  Assess patient's ability to carry out ADLs; assess patient's baseline for ADL function and identify physical deficits which impact ability to perform ADLs (bathing, care of mouth/teeth, toileting, grooming, dressing, etc )  - Assess/evaluate cause of self-care deficits   - Assess range of motion  - Assess patient's mobility; develop plan if impaired  - Assess patient's need for assistive devices and provide as appropriate  - Encourage maximum independence but intervene and supervise when necessary  - Involve family in performance of ADLs  - Assess for home care needs following discharge   - Consider OT consult to assist with ADL evaluation and planning for discharge  - Provide patient education as appropriate  Outcome: Progressing  Goal: Maintains/Returns to pre admission functional level  Description: INTERVENTIONS:  - Perform BMAT or MOVE assessment daily    - Set and communicate daily mobility goal to care team and patient/family/caregiver  - Collaborate with rehabilitation services on mobility goals if consulted  - Perform Range of Motion  times a day  - Reposition patient every  hours    - Dangle patient  times a day  - Stand patient  times a day  - Ambulate patient  times a day  - Out of bed to chair  times a day   - Out of bed for meals  times a day  - Out of bed for toileting  - Record patient progress and toleration of activity level   Outcome: Progressing  Goal: Patient will remain free of falls  Description: INTERVENTIONS:  - Educate patient/family on patient safety including physical limitations  - Instruct patient to call for assistance with activity   - Consult OT/PT to assist with strengthening/mobility   - Keep Call bell within reach  - Keep bed low and locked with side rails adjusted as appropriate  - Keep care items and personal belongings within reach  - Initiate and maintain comfort rounds  - Make Fall Risk Sign visible to staff  - Offer Toileting every  Hours, in advance of need  - Initiate/Maintain alarm  - Obtain necessary fall risk management equipment  - Apply yellow socks and bracelet for high fall risk patients  - Consider moving patient to room near nurses station  Outcome: Progressing     Problem: DISCHARGE PLANNING  Goal: Discharge to home or other facility with appropriate resources  Description: INTERVENTIONS:  - Identify barriers to discharge w/patient and caregiver  - Arrange for needed discharge resources and transportation as appropriate  - Identify discharge learning needs (meds, wound care, etc )  - Arrange for interpretive services to assist at discharge as needed  - Refer to Case Management Department for coordinating discharge planning if the patient needs post-hospital services based on physician/advanced practitioner order or complex needs related to functional status, cognitive ability, or social support system  Outcome: Progressing Problem: Knowledge Deficit  Goal: Patient/family/caregiver demonstrates understanding of disease process, treatment plan, medications, and discharge instructions  Description: Complete learning assessment and assess knowledge base    Interventions:  - Provide teaching at level of understanding  - Provide teaching via preferred learning methods  Outcome: Progressing

## 2023-06-15 NOTE — CASE MANAGEMENT
Case Management Assessment & Discharge Planning Note    Patient name Lynne Habermann  Location [place] [facility] /[place] [address]aba* MRN [id number]  : 1980 Date 6/15/2023       Current Admission Date: 2023  Current Admission Diagnosis:Diabetic ketoacidosis associated with type 1 diabetes mellitus [facility])   Patient Active Problem List    Diagnosis Date Noted   • Diarrhea 2023   • Alkaline phosphatase elevation 2023   • E coli bacteremia 10/20/2022   • Iron deficiency anemia, unspecified 2021   • Transaminitis 2021   • Alcohol abuse 2021   • Overweight with body mass index (BMI) of 26 to 26 9 in adult 2021   • THO (acute kidney injury) ([facility] ) 2020   • Hypothyroidism due to Hashimoto's thyroiditis 2020   • Suicidal ideations 2020   • Depression, recurrent ([facility] ) 2020   • Protrusion of cervical intervertebral disc 10/03/2019   • Leukopenia 07/15/2019   • Hydrosalpinx 2019   • Invagination of intestine ([facility] ) 2019   • Iron deficiency anemia 2018   • Diabetic ketoacidosis associated with type 1 diabetes mellitus ([address] ) 2018   • Bilateral carpal tunnel syndrome 2017   • H/O gastric bypass 2017   • B12 deficiency 2016   • Intestinal malabsorption, unspecified 2016   • Anxiety and depression 2016   • Cervical spondylosis 2015   • Vitamin D deficiency 11/10/2011   • Type 1 diabetes mellitus with hyperglycemia ([facility] ) 2011   • Primary hypothyroidism 10/29/2009      LOS (days): 2  Geometric Mean LOS (GMLOS) (days): 2 90  Days to GMLOS:1 2     OBJECTIVE:    Risk of Unplanned Readmission Score: 17 5         Current admission status: Inpatient       Preferred Pharmacy:   [address], [place] - [address]  [place]   [address]  [place]   [address] [address]  Phone: [phone] Fax: [phone]  - Sathya Isidro, Tiffan[address] Manjinder [address]  [address]   [facility] [address]  Phone: [phone] Fax: [address] [facility] [address]  Phone: [phone] Fax: [phone]    [address]   [address] [address]  Phone: [phone] Fax: [phone]    Primary Care Provider: Nathalia Barrett MD    Primary Insurance: [employer]  Secondary Insurance:     ASSESSMENT:  Karin 26 Proxies    There are no active Health Care Proxies on file  Readmission Root Cause  30 Day Readmission: No    Patient Information  Admitted from[de-identified] Home  Mental Status: Alert  During Assessment patient was accompanied by: Not accompanied during assessment  Assessment information provided by[de-identified] Patient  Primary Caregiver: Self  Support Systems: Parent, Family members  [place] of Residence: [address] [place] [address] do you live in?: [address] [place] entry access options   Select all that apply : Stairs  Number of steps to enter home : 6  Do the steps have railings?: Yes  Type of Current Residence: [address] [place] [address]  Homeless/housing insecurity resource given?: N/A  Living Arrangements: Other (Comment) (Lives with children)    Activities of Daily Living Prior to Admission  Functional Status: Independent  Completes ADLs independently?: Yes  Ambulates independently?: Yes  Does patient use assisted devices?: No  Does patient currently own DME?: No  Does patient have a history of Outpatient Therapy (PT/OT)?: No  Does the patient have a history of Short-Term Rehab?: No  Does patient have a history of [facility]?: No  Does patient currently have [place] [address]?: No    Patient Information Continued  Income Source: Employed  Does patient have prescription coverage?: Yes  Within the past 12 months, you worried that your food would run out before you got the money to buy more : Never true  Within the past 12 months, the food you bought just didn't last and you didn't have money to get more : Never true  Food insecurity resource given?: N/A  Does patient receive dialysis treatments?: No  Does patient have a history of substance abuse?: Yes  Historical substance use preference: Alcohol/ETOH  Is patient currently in treatment for substance abuse?: No  Patient declined treatment information , N/A - sober  Does patient have a history of Mental Health Diagnosis?: Yes (Anxiety, Depression)  Is patient receiving treatment for mental health?: Yes (Med management by psychiatrist)  Has patient received inpatient treatment related to mental health in the last 2 years?: No    Means of Transportation  Means of Transport to Appts[de-identified] Drives Self  In the past 12 months, has lack of transportation kept you from medical appointments or from getting medications?: No  In the past 12 months, has lack of transportation kept you from meetings, work, or from getting things needed for daily living?: No  Was application for public transport provided?: N/A    DISCHARGE DETAILS:    Discharge planning discussed with[de-identified] Patient  Freedom of Choice: Yes  Comments - Freedom of Choice: Pt reports no discharge needs at this time  CM contacted family/caregiver?: No- see comments  Were Treatment Team discharge recommendations reviewed with patient/caregiver?: Yes  Did patient/caregiver verbalize understanding of patient care needs?: Yes  Were patient/caregiver advised of the risks associated with not following Treatment Team discharge recommendations?: Yes    Contacts  Patient Contacts: Patient  Contact Method:  In Person  Reason/Outcome: Continuity of Care, Emergency Contact, Discharge [address]         Is the patient interested in [facility] at discharge?: No    DME Referral Provided  Referral made for DME?: No    Other Referral/Resources/Interventions Provided:  Interventions: None Indicated    Treatment Team Recommendation: Home  Discharge Destination Plan[de-identified] Home  Transport at Discharge : Family

## 2023-06-15 NOTE — ASSESSMENT & PLAN NOTE
Lab Results   Component Value Date    HGBA1C 10 2 (H) 06/13/2023       Recent Labs     06/14/23  1727 06/14/23  2102 06/15/23  0737 06/15/23  1126   POCGLU 118 204* 232* 183*       Blood Sugar Average: Last 72 hrs:  (P) 579 [id number]       May have had a viral gastroenteritis set this off  But also, her blood sugars are not that well controlled as outpatient      She now transitioned to lantus at a lightly lower dose than home  Will give her aggressive IV fluids

## 2023-06-15 NOTE — PROGRESS NOTES
[address] [place] [address]  Progress Note  Name: Mic Boland  MRN: [id number]  Unit/Bed#: [place] 2 -01 I Date of Admission: 2023   Date of Service: 6/15/2023 I Hospital Day: 2    Assessment/Plan   * Diabetic ketoacidosis associated with type 1 diabetes mellitus [facility])  Assessment & Plan  Lab Results   Component Value Date    HGBA1C 10 2 (H) 2023       Recent Labs     23  1727 23  2102 06/15/23  0737 06/15/23  1126   POCGLU 118 204* 232* 183*       Blood Sugar Average: Last 72 hrs:  (P) 665 [id number]       May have had a viral gastroenteritis set this off  But also, her blood sugars are not that well controlled as outpatient  She now transitioned to lantus at a lightly lower dose than home  Will give her aggressive IV fluids    Diarrhea  Assessment & Plan  Likely viral gastroenteritis    Alkaline phosphatase elevation  Assessment & Plan  Will follow    [facility]  Assessment & Plan  Resolving with Iv fluids  Due to dehydration             Subjective:   Doing a little better  Feels very tired  Did have 3 loose watery stools today  She is starting to eat  Objective:     Vitals:   Temp (24hrs), Av 1 °F (37 8 °C), Min:98 4 °F (36 9 °C), Max:101 7 °F (38 7 °C)    Temp:  [98 4 °F (36 9 °C)-101 7 °F (38 7 °C)] 100 5 °F (38 1 °C)  HR:  [] 94  Resp:  [16-20] 16  BP: (121-137)/(73-80) 121/73  SpO2:  [93 %-98 %] 95 %  Body mass index is 26 15 kg/m²  Input and Output Summary (last 24 hours):     No intake or output data in the 24 hours ending 06/15/23 1550    Physical Exam:     Physical Exam  Vitals and nursing note reviewed  HENT:      Head: Normocephalic and atraumatic  Eyes:      Pupils: Pupils are equal, round, and reactive to light  Cardiovascular:      Rate and Rhythm: Normal rate and regular rhythm  Heart sounds: No murmur heard  No friction rub  No gallop     Pulmonary:      Effort: Pulmonary effort is normal       Breath sounds: Normal breath sounds  No wheezing or rales  Abdominal:      General: Bowel sounds are normal       Palpations: Abdomen is soft  Tenderness: There is no abdominal tenderness  Musculoskeletal:      Right lower leg: No edema  Left lower leg: No edema          Additional Data:     Labs:    Results from last 7 days   Lab Units 06/14/23  0525   EOS PCT % 2   HEMATOCRIT % 34 6*   HEMOGLOBIN g/dL 11 1*   LYMPHS PCT % 10*   MONOS PCT % 8   NEUTROS PCT % 79*   PLATELETS Thousands/uL 162   WBC Thousand/uL 3 41*     Results from last 7 days   Lab Units 06/14/23  0211 06/13/23 2000 06/13/23  1542   ALK PHOS U/L  --   --  217*   ALT U/L  --   --  42   AST U/L  --   --  31   BUN mg/dL 12   < > 22   CALCIUM mg/dL 7 1*   < > 8 7   CHLORIDE mmol/L 105   < > 90*   CO2 mmol/L 17*   < > 12*   CREATININE mg/dL 0 91   < > 1 48*   POTASSIUM mmol/L 4 4   < > 5 2    < > = values in this interval not displayed  Results from last 7 days   Lab Units 06/15/23  1126 06/15/23  0737 06/14/23  2102 06/14/23  1727 06/14/23  1607 06/14/23  1115 06/14/23  1002 06/14/23  0742 06/14/23  0607 06/14/23  0409 06/14/23  0304 06/14/23  0208   POC GLUCOSE mg/dl 183* 232* 204* 118 58* 157* 170* 168* 133 131 147* 122     Results from last 7 days   Lab Units 06/13/23 2000   HEMOGLOBIN A1C % 10 2*           * I Have Reviewed All Lab Data     Recent Cultures (last 7 days):     Results from last 7 days   Lab Units 06/13/23 2000   BLOOD CULTURE  No Growth at 24 hrs  No Growth at 24 hrs           Last 24 Hours Medication List:   Current Facility-Administered Medications   Medication Dose Route Frequency Provider Last Rate   • acetaminophen  650 mg Oral Q6H PRN Lonny Colvin MD     • ALPRAZolam  0 5 mg Oral HS PRN Lonny Colvin MD     • busPIRone  10 mg Oral BID Salomon Howe PA-C     • chlorhexidine  15 mL Mouth/Throat Q12H William Newsome MD     • enoxaparin  40 mg Subcutaneous Daily Edla Destini Christie MD     • escitalopram  10 mg Oral Daily [address], MD     • insulin glargine  25 Units Subcutaneous HS [address], MD     • insulin lispro  1-6 Units Subcutaneous TID [address] [facility], MD     • insulin lispro  10 Units Subcutaneous TID With Meals Lonny Christie MD     • loperamide  2 mg Oral TID PRN Lonny Christie MD     • ondansetron  4 mg Oral Q6H PRN Lonny Christie MD     • traZODone  100 mg Oral HS Ginger Guzman PA-C           VTE Pharmacologic Prophylaxis:   Pharmacologic: Enoxaparin (Lovenox)      Current Length of Stay: 2 day(s)    Current Patient Status: Inpatient       Discharge Plan: home 1 to 2 days    Code Status: Level 1 - Full Code           Today, Patient Was Seen By: Aron Gentile DO    ** Please Note: Dictation voice to text software may have been used in the creation of this document   **

## 2023-06-15 NOTE — PLAN OF CARE
Problem: MOBILITY - ADULT  Goal: Maintain or return to baseline ADL function  Description: INTERVENTIONS:  -  Assess patient's ability to carry out ADLs; assess patient's baseline for ADL function and identify physical deficits which impact ability to perform ADLs (bathing, care of mouth/teeth, toileting, grooming, dressing, etc )  - Assess/evaluate cause of self-care deficits   - Assess range of motion  - Assess patient's mobility; develop plan if impaired  - Assess patient's need for assistive devices and provide as appropriate  - Encourage maximum independence but intervene and supervise when necessary  - Involve family in performance of ADLs  - Assess for home care needs following discharge   - Consider OT consult to assist with ADL evaluation and planning for discharge  - Provide patient education as appropriate  Outcome: Progressing  Goal: Maintains/Returns to pre admission functional level  Description: INTERVENTIONS:  - Perform BMAT or MOVE assessment daily    - Set and communicate daily mobility goal to care team and patient/family/caregiver  - Collaborate with rehabilitation services on mobility goals if consulted  - Perform Range of Motion  times a day  - Reposition patient every  hours    - Dangle patient  times a day  - Stand patient  times a day  - Ambulate patient times a day  - Out of bed to mariano times a day   - Out of bed for meals  times a day  - Out of bed for toileting  - Record patient progress and toleration of activity level   Outcome: Progressing

## 2023-06-16 VITALS
TEMPERATURE: 99.1 F | RESPIRATION RATE: 16 BRPM | WEIGHT: 162.04 LBS | HEIGHT: 66 IN | DIASTOLIC BLOOD PRESSURE: 81 MMHG | BODY MASS INDEX: 26.04 KG/M2 | HEART RATE: 89 BPM | SYSTOLIC BLOOD PRESSURE: 126 MMHG | OXYGEN SATURATION: 94 %

## 2023-06-16 PROBLEM — N76.6 GENITAL LABIAL ULCER: Status: ACTIVE | Noted: 2023-06-16

## 2023-06-16 LAB
ANION GAP SERPL CALCULATED.3IONS-SCNC: 4 MMOL/L (ref 4–13)
ANISOCYTOSIS BLD QL SMEAR: PRESENT
BASOPHILS # BLD MANUAL: 0.04 THOUSAND/UL (ref 0–0.1)
BASOPHILS NFR MAR MANUAL: 2 % (ref 0–1)
BUN SERPL-MCNC: 5 MG/DL (ref 5–25)
CALCIUM SERPL-MCNC: 7.2 MG/DL (ref 8.4–10.2)
CHLORIDE SERPL-SCNC: 102 MMOL/L (ref 96–108)
CO2 SERPL-SCNC: 25 MMOL/L (ref 21–32)
CREAT SERPL-MCNC: 0.66 MG/DL (ref 0.6–1.3)
EOSINOPHIL # BLD MANUAL: 0.04 THOUSAND/UL (ref 0–0.4)
EOSINOPHIL NFR BLD MANUAL: 2 % (ref 0–6)
ERYTHROCYTE [DISTWIDTH] IN BLOOD BY AUTOMATED COUNT: 12.3 % (ref 11.6–15.1)
GFR SERPL CREATININE-BSD FRML MDRD: 110 ML/MIN/1.73SQ M
GLUCOSE SERPL-MCNC: 208 MG/DL (ref 65–140)
GLUCOSE SERPL-MCNC: 212 MG/DL (ref 65–140)
GLUCOSE SERPL-MCNC: 249 MG/DL (ref 65–140)
HCT VFR BLD AUTO: 31.4 % (ref 34.8–46.1)
HGB BLD-MCNC: 10.1 G/DL (ref 11.5–15.4)
LYMPHOCYTES # BLD AUTO: 0.74 THOUSAND/UL (ref 0.6–4.47)
LYMPHOCYTES # BLD AUTO: 41 % (ref 14–44)
MAGNESIUM SERPL-MCNC: 1.7 MG/DL (ref 1.9–2.7)
MCH RBC QN AUTO: 29.5 PG (ref 26.8–34.3)
MCHC RBC AUTO-ENTMCNC: 32.2 G/DL (ref 31.4–37.4)
MCV RBC AUTO: 92 FL (ref 82–98)
MONOCYTES # BLD AUTO: 0.22 THOUSAND/UL (ref 0–1.22)
MONOCYTES NFR BLD: 12 % (ref 4–12)
NEUTROPHILS # BLD MANUAL: 0.77 THOUSAND/UL (ref 1.85–7.62)
NEUTS BAND NFR BLD MANUAL: 5 % (ref 0–8)
NEUTS SEG NFR BLD AUTO: 38 % (ref 43–75)
PLATELET # BLD AUTO: 96 THOUSANDS/UL (ref 149–390)
PLATELET BLD QL SMEAR: ABNORMAL
PMV BLD AUTO: 9.8 FL (ref 8.9–12.7)
POTASSIUM SERPL-SCNC: 3.3 MMOL/L (ref 3.5–5.3)
RBC # BLD AUTO: 3.42 MILLION/UL (ref 3.81–5.12)
SODIUM SERPL-SCNC: 131 MMOL/L (ref 135–147)
WBC # BLD AUTO: 1.8 THOUSAND/UL (ref 4.31–10.16)

## 2023-06-16 PROCEDURE — 83735 ASSAY OF MAGNESIUM: CPT | Performed by: HOSPITALIST

## 2023-06-16 PROCEDURE — 80048 BASIC METABOLIC PNL TOTAL CA: CPT | Performed by: HOSPITALIST

## 2023-06-16 PROCEDURE — 85027 COMPLETE CBC AUTOMATED: CPT | Performed by: HOSPITALIST

## 2023-06-16 PROCEDURE — 85007 BL SMEAR W/DIFF WBC COUNT: CPT | Performed by: HOSPITALIST

## 2023-06-16 PROCEDURE — 99239 HOSP IP/OBS DSCHRG MGMT >30: CPT | Performed by: HOSPITALIST

## 2023-06-16 PROCEDURE — 82948 REAGENT STRIP/BLOOD GLUCOSE: CPT

## 2023-06-16 RX ORDER — POTASSIUM CHLORIDE 20 MEQ/1
40 TABLET, EXTENDED RELEASE ORAL ONCE
Status: COMPLETED | OUTPATIENT
Start: 2023-06-16 | End: 2023-06-16

## 2023-06-16 RX ADMIN — POTASSIUM CHLORIDE 40 MEQ: 1500 TABLET, EXTENDED RELEASE ORAL at 11:15

## 2023-06-16 RX ADMIN — ENOXAPARIN SODIUM 40 MG: 40 INJECTION SUBCUTANEOUS at 09:12

## 2023-06-16 RX ADMIN — INSULIN LISPRO 2 UNITS: 100 INJECTION, SOLUTION INTRAVENOUS; SUBCUTANEOUS at 11:16

## 2023-06-16 RX ADMIN — INSULIN LISPRO 10 UNITS: 100 INJECTION, SOLUTION INTRAVENOUS; SUBCUTANEOUS at 08:00

## 2023-06-16 RX ADMIN — ESCITALOPRAM OXALATE 10 MG: 10 TABLET ORAL at 09:12

## 2023-06-16 RX ADMIN — ALPRAZOLAM 0.5 MG: 0.5 TABLET ORAL at 06:32

## 2023-06-16 RX ADMIN — INSULIN LISPRO 10 UNITS: 100 INJECTION, SOLUTION INTRAVENOUS; SUBCUTANEOUS at 11:16

## 2023-06-16 RX ADMIN — INSULIN LISPRO 2 UNITS: 100 INJECTION, SOLUTION INTRAVENOUS; SUBCUTANEOUS at 08:00

## 2023-06-16 RX ADMIN — BUSPIRONE HYDROCHLORIDE 10 MG: 10 TABLET ORAL at 09:12

## 2023-06-16 NOTE — ASSESSMENT & PLAN NOTE
Lab Results   Component Value Date    HGBA1C 10 2 (H) 06/13/2023       Recent Labs     06/15/23  1602 06/15/23  2109 06/16/23  0724 06/16/23  1107   POCGLU 122 227* 212* 249*       Blood Sugar Average: Last 72 hrs:  (P) 177 76       Viral gastroenteritis likely caused this  No further diarrhea  She is eating well and wants to go home     Blood sugars are better controlled

## 2023-06-16 NOTE — DISCHARGE SUMMARY
[address] [facility]  Discharge- Gabriela Asif 1980, 41 y o  female MRN: [id number]  Unit/Bed#: [place] 68 2 [address] 221-02 Encounter: [id number]  Primary Care Provider: Murray Hyde MD   Date and time admitted to hospital: 6/13/2023  3:10 PM    * Diabetic ketoacidosis associated with type 1 diabetes mellitus [facility])  Assessment & Plan  Lab Results   Component Value Date    HGBA1C 10 2 (H) 06/13/2023       Recent Labs     06/15/23  1602 06/15/23  2109 06/16/23  0724 06/16/23  1107   POCGLU 122 227* 212* 249*       Blood Sugar Average: Last 72 hrs:  (P) 177 76       Viral gastroenteritis likely caused this  No further diarrhea  She is eating well and wants to go home     Blood sugars are better controlled    Genital labial ulcer  Assessment & Plan  Will refer to outpatient gyn    Diarrhea  Assessment & Plan  Likely viral gastroenteritis  Has resolved    [facility] )  Assessment & Plan  Due to dehydration  This has resolved        Discharging Physician / Practitioner: Griffin Malloy DO  PCP: Murray Hyde MD  Admission Date:   Admission Orders (From admission, onward)     Ordered        06/13/23 1702  INPATIENT ADMISSION  Once                      Discharge Date: 06/16/23    Medical Problems     Resolved Problems  Date Reviewed: 5/14/2023          Resolved    SIRS (systemic inflammatory response syndrome) ([facility] ) 6/14/2023     Resolved by  Hillary Fox MD          ·       Reason for Admission: diarrhea      Hospital Course:     Gabriela Asif is a 41 y o  female patient who originally presented to the hospital on 6/13/2023 due to diarrhea  Patient has had loose stools for a few days  Now has severe hyperglycemia with diabetic ketoacidosis  She responded to IV fluids and insulin drip  Now back on home insulin regimen  Diarrhea has stopped and was likely viral    Ok for patient to discharge home      Please see above list of diagnoses and related plan "for additional information  Condition at Discharge: good       Discharge Day Visit / Exam:     Subjective:  Feels well  Wants to go home  No further diarrhea  Eating and drinking well  Vitals: Blood Pressure: 126/81 (06/16/23 0733)  Pulse: 89 (06/16/23 0733)  Temperature: 99 1 °F (37 3 °C) (06/16/23 0733)  Temp Source: Oral (06/16/23 0733)  Respirations: 16 (06/16/23 0733)  Height: 5' 6\" (167 6 cm) (06/13/23 1816)  Weight - Scale: 73 5 kg (162 lb 0 6 oz) (06/13/23 1816)  SpO2: 94 % (06/16/23 0733)    Exam:     Physical Exam  Vitals and nursing note reviewed  HENT:      Head: Normocephalic and atraumatic  Eyes:      Pupils: Pupils are equal, round, and reactive to light  Cardiovascular:      Rate and Rhythm: Normal rate and regular rhythm  Heart sounds: No murmur heard  No friction rub  No gallop  Pulmonary:      Effort: Pulmonary effort is normal       Breath sounds: Normal breath sounds  No wheezing or rales  Abdominal:      General: Bowel sounds are normal       Palpations: Abdomen is soft  Tenderness: There is no abdominal tenderness  Musculoskeletal:      Right lower leg: No edema  Left lower leg: No edema            Discharge instructions/Information to patient and family:   See after visit summary for information provided to patient and family  Provisions for Follow-Up Care:  See after visit summary for information related to follow-up care and any pertinent home health orders  Disposition:     Home       Discharge Statement:  I spent 39 minutes discharging the patient  This time was spent on the day of discharge  I had direct contact with the patient on the day of discharge  Greater than 50% of the total time was spent examining patient, answering all patient questions, arranging and discussing plan of care with patient as well as directly providing post-discharge instructions  Additional time then spent on discharge activities      Discharge " Medications:  See after visit summary for reconciled discharge medications provided to patient and family        ** Please Note: This note has been constructed using a voice recognition system **

## 2023-06-16 NOTE — DISCHARGE INSTR - OTHER ORDERS
Wound Care Plan:   Perineum--have patient use sitz bath twice daily and squirt bottle for fabby-anal care as needed in between  Wear a fabby-pad in your underwear to manage drainage and protect friction on open areas  Fabby-rectal area--cleanse with squirt bottle and warm water, pat dry  Apply thin layer of calazime paste three times daily and as needed

## 2023-06-16 NOTE — PLAN OF CARE
Problem: MOBILITY - ADULT  Goal: Maintain or return to baseline ADL function  Description: INTERVENTIONS:  -  Assess patient's ability to carry out ADLs; assess patient's baseline for ADL function and identify physical deficits which impact ability to perform ADLs (bathing, care of mouth/teeth, toileting, grooming, dressing, etc )  - Assess/evaluate cause of self-care deficits   - Assess range of motion  - Assess patient's mobility; develop plan if impaired  - Assess patient's need for assistive devices and provide as appropriate  - Encourage maximum independence but intervene and supervise when necessary  - Involve family in performance of ADLs  - Assess for home care needs following discharge   - Consider OT consult to assist with ADL evaluation and planning for discharge  - Provide patient education as appropriate  6/16/2023 0009 by Henna Norton RN  Outcome: Progressing  6/16/2023 0009 by Henna Norton RN  Outcome: Progressing  Goal: Maintains/Returns to pre admission functional level  Description: INTERVENTIONS:  - Perform BMAT or MOVE assessment daily    - Set and communicate daily mobility goal to care team and patient/family/caregiver  - Collaborate with rehabilitation services on mobility goals if consulted  - Perform Range of Motion  times a day  - Reposition patient every  hours    - Dangle patient  times a day  - Stand patient  times a day  - Ambulate patient  times a day  - Out of bed to chair  times a day   - Out of bed for meals  times a day  - Out of bed for toileting  - Record patient progress and toleration of activity level   6/16/2023 0009 by Henna Norton RN  Outcome: Progressing  6/16/2023 0009 by Henna Norton RN  Outcome: Progressing     Problem: PAIN - ADULT  Goal: Verbalizes/displays adequate comfort level or baseline comfort level  Description: Interventions:  - Encourage patient to monitor pain and request assistance  - Assess pain using appropriate pain scale  - Administer analgesics based on type and severity of pain and evaluate response  - Implement non-pharmacological measures as appropriate and evaluate response  - Consider cultural and social influences on pain and pain management  - Notify physician/advanced practitioner if interventions unsuccessful or patient reports new pain  6/16/2023 0009 by Terry Lopez RN  Outcome: Progressing  6/16/2023 0009 by Terry Lopez RN  Outcome: Progressing     Problem: INFECTION - ADULT  Goal: Absence or prevention of progression during hospitalization  Description: INTERVENTIONS:  - Assess and monitor for signs and symptoms of infection  - Monitor lab/diagnostic results  - Monitor all insertion sites, i e  indwelling lines, tubes, and drains  - Monitor endotracheal if appropriate and nasal secretions for changes in amount and color  - [place] appropriate cooling/warming therapies per order  - Administer medications as ordered  - Instruct and encourage patient and family to use good hand hygiene technique  - Identify and instruct in appropriate isolation precautions for identified infection/condition  6/16/2023 0009 by Terry Lopez RN  Outcome: Progressing  6/16/2023 0009 by Terry Lopez RN  Outcome: Progressing  Goal: Absence of fever/infection during neutropenic period  Description: INTERVENTIONS:  - Monitor WBC    6/16/2023 0009 by Terry Lopez RN  Outcome: Progressing  6/16/2023 0009 by Terry Lopez RN  Outcome: Progressing     Problem: DISCHARGE PLANNING  Goal: Discharge to home or other facility with appropriate resources  Description: INTERVENTIONS:  - Identify barriers to discharge w/patient and caregiver  - Arrange for needed discharge resources and transportation as appropriate  - Identify discharge learning needs (meds, wound care, etc )  - Arrange for interpretive services to assist at discharge as needed  - Refer to Case Management Department for coordinating discharge planning if the patient needs post-hospital services based on physician/advanced practitioner order or complex needs related to functional status, cognitive ability, or social support system  6/16/2023 0009 by Henna Norton RN  Outcome: Progressing  6/16/2023 0009 by Henna Norton RN  Outcome: Progressing     Problem: Knowledge Deficit  Goal: Patient/family/caregiver demonstrates understanding of disease process, treatment plan, medications, and discharge instructions  Description: Complete learning assessment and assess knowledge base    Interventions:  - Provide teaching at level of understanding  - Provide teaching via preferred learning methods  6/16/2023 0009 by Henna Norton RN  Outcome: Progressing  6/16/2023 0009 by Henna Norton RN  Outcome: Progressing

## 2023-06-16 NOTE — NURSING NOTE
Reviewed discharge instructions and upcoming medications with patient  Discussed the importance of follow up care

## 2023-06-16 NOTE — PLAN OF CARE
Problem: INFECTION - ADULT  Goal: Absence or prevention of progression during hospitalization  Description: INTERVENTIONS:  - Assess and monitor for signs and symptoms of infection  - Monitor lab/diagnostic results  - Monitor all insertion sites, i e  indwelling lines, tubes, and drains  - Monitor endotracheal if appropriate and nasal secretions for changes in amount and color  - [place] appropriate cooling/warming therapies per order  - Administer medications as ordered  - Instruct and encourage patient and family to use good hand hygiene technique  - Identify and instruct in appropriate isolation precautions for identified infection/condition  Outcome: Progressing  Goal: Absence of fever/infection during neutropenic period  Description: INTERVENTIONS:  - Monitor WBC    Outcome: Completed     Problem: SAFETY ADULT  Goal: Maintain or return to baseline ADL function  Description: INTERVENTIONS:  -  Assess patient's ability to carry out ADLs; assess patient's baseline for ADL function and identify physical deficits which impact ability to perform ADLs (bathing, care of mouth/teeth, toileting, grooming, dressing, etc )  - Assess/evaluate cause of self-care deficits   - Assess range of motion  - Assess patient's mobility; develop plan if impaired  - Assess patient's need for assistive devices and provide as appropriate  - Encourage maximum independence but intervene and supervise when necessary  - Involve family in performance of ADLs  - Assess for home care needs following discharge   - Consider OT consult to assist with ADL evaluation and planning for discharge  - Provide patient education as appropriate  Outcome: Progressing  Goal: Maintains/Returns to pre admission functional level  Description: INTERVENTIONS:  - Perform BMAT or MOVE assessment daily    - Set and communicate daily mobility goal to care team and patient/family/caregiver     - Collaborate with rehabilitation services on mobility goals if consulted  - Perform Range of Motion 3 times a day  - Reposition patient every 2 hours    - Dangle patient 3 times a day  - Stand patient 3 times a day  - Ambulate patient 3 times a day  - Out of bed to chair 3 times a day   - Out of bed for meals 3 times a day  - Out of bed for toileting  - Record patient progress and toleration of activity level   Outcome: Progressing  Goal: Patient will remain free of falls  Description: INTERVENTIONS:  - Educate patient/family on patient safety including physical limitations  - Instruct patient to call for assistance with activity   - Consult OT/PT to assist with strengthening/mobility   - Keep Call bell within reach  - Keep bed low and locked with side rails adjusted as appropriate  - Keep care items and personal belongings within reach  - Initiate and maintain comfort rounds  - Make Fall Risk Sign visible to staff  - Offer Toileting every 2 Hours, in advance of need  - Initiate/Maintain alarm  - Obtain necessary fall risk management equipment:   - Apply yellow socks and bracelet for high fall risk patients  - Consider moving patient to room near nurses station  Outcome: Progressing     Problem: DISCHARGE PLANNING  Goal: Discharge to home or other facility with appropriate resources  Description: INTERVENTIONS:  - Identify barriers to discharge w/patient and caregiver  - Arrange for needed discharge resources and transportation as appropriate  - Identify discharge learning needs (meds, wound care, etc )  - Arrange for interpretive services to assist at discharge as needed  - Refer to Case Management Department for coordinating discharge planning if the patient needs post-hospital services based on physician/advanced practitioner order or complex needs related to functional status, cognitive ability, or social support system  Outcome: Progressing     Problem: METABOLIC, FLUID AND ELECTROLYTES - ADULT  Goal: Glucose maintained within target range  Description: INTERVENTIONS:  - Monitor Blood Glucose as ordered  - Assess for signs and symptoms of hyperglycemia and hypoglycemia  - Administer ordered medications to maintain glucose within target range  - Assess nutritional intake and initiate nutrition service referral as needed  Outcome: Progressing

## 2023-06-16 NOTE — WOUND OSTOMY CARE
Consult Note - Wound   Ysabel Givens 41 y o  female MRN: [id number]  Unit/Bed#: [place] 2 -02 Encounter: [id number]      History and Present Illness:  41year old female presented to the hospital with hyperglycemia and diarrhea, nausea, and vomiting  Patient's history significant for DM1, hashimoto's hypothyroidism, depression  Patient requesting no wound photographs be taken and sensitive documentation not to be shared in chart  Assessment Findings:   Patient agreeable to assessment  She reports several episodes of painful diarrhea yesterday  She is continent of bowel and bladder and independent for care  1   Ulcerations to bilateral labia and alecia-rectal area--patient with pink and yellow oval ulcerations to bilateral labia, left slightly larger than right  Wounds appear moist, there is tan drainage on mesh underwear and pants on exam   Alecia-wound without redness or induration  Very painful with care  Pain expressed is disproportionate to wound presentation  2  MASD to alecia-rectal area--scattered pink, partial thickness open areas around anus likely from frequent diarrhea and alecia-care  See flowsheet for wound details  Wound Care Plan:  1  Perineum--have patient use sitz bath twice daily and squirt bottle for alecia-anal care as needed in between  Wear a alecia-pad in your underwear to manage drainage and protect friction on open areas  2  Alecia-rectal area--cleanse with squirt bottle and warm water, pat dry  Apply thin layer of calazime paste three times daily and as needed  Wound care team to follow  Plan of care reviewed with primary RN  Dr Adrienne Cunningham made aware of assessment--recommend gynecology consultation      Neel CHU, RN, [employer]

## 2023-06-17 ENCOUNTER — HOSPITAL ENCOUNTER (EMERGENCY)
Facility: HOSPITAL | Age: 43
Discharge: HOME | End: 2023-06-17
Attending: EMERGENCY MEDICINE
Payer: COMMERCIAL

## 2023-06-17 VITALS
SYSTOLIC BLOOD PRESSURE: 124 MMHG | TEMPERATURE: 98.7 F | DIASTOLIC BLOOD PRESSURE: 76 MMHG | WEIGHT: 162 LBS | OXYGEN SATURATION: 98 % | RESPIRATION RATE: 16 BRPM | HEART RATE: 87 BPM | BODY MASS INDEX: 26.15 KG/M2

## 2023-06-17 DIAGNOSIS — S31.809A GLUTEAL CLEFT WOUND: ICD-10-CM

## 2023-06-17 DIAGNOSIS — R19.7 DIARRHEA: Primary | ICD-10-CM

## 2023-06-17 DIAGNOSIS — B00.9 HERPES SIMPLEX TYPE 2 INFECTION: ICD-10-CM

## 2023-06-17 LAB
ANION GAP SERPL CALCULATED.3IONS-SCNC: 8 MMOL/L (ref 4–13)
BASOPHILS # BLD AUTO: 0.01 THOUSANDS/ÂΜL (ref 0–0.1)
BASOPHILS NFR BLD AUTO: 0 % (ref 0–1)
BUN SERPL-MCNC: 6 MG/DL (ref 5–25)
CALCIUM SERPL-MCNC: 8.3 MG/DL (ref 8.4–10.2)
CHLORIDE SERPL-SCNC: 97 MMOL/L (ref 96–108)
CO2 SERPL-SCNC: 27 MMOL/L (ref 21–32)
CREAT SERPL-MCNC: 0.76 MG/DL (ref 0.6–1.3)
EOSINOPHIL # BLD AUTO: 0.03 THOUSAND/ÂΜL (ref 0–0.61)
EOSINOPHIL NFR BLD AUTO: 1 % (ref 0–6)
ERYTHROCYTE [DISTWIDTH] IN BLOOD BY AUTOMATED COUNT: 12.1 % (ref 11.6–15.1)
GFR SERPL CREATININE-BSD FRML MDRD: 97 ML/MIN/1.73SQ M
GLUCOSE SERPL-MCNC: 247 MG/DL (ref 65–140)
GLUCOSE SERPL-MCNC: 267 MG/DL (ref 65–140)
HCT VFR BLD AUTO: 33.8 % (ref 34.8–46.1)
HGB BLD-MCNC: 10.9 G/DL (ref 11.5–15.4)
IMM GRANULOCYTES # BLD AUTO: 0.01 THOUSAND/UL (ref 0–0.2)
IMM GRANULOCYTES NFR BLD AUTO: 0 % (ref 0–2)
LYMPHOCYTES # BLD AUTO: 0.53 THOUSANDS/ÂΜL (ref 0.6–4.47)
LYMPHOCYTES NFR BLD AUTO: 19 % (ref 14–44)
MCH RBC QN AUTO: 29.6 PG (ref 26.8–34.3)
MCHC RBC AUTO-ENTMCNC: 32.2 G/DL (ref 31.4–37.4)
MCV RBC AUTO: 92 FL (ref 82–98)
MONOCYTES # BLD AUTO: 0.26 THOUSAND/ÂΜL (ref 0.17–1.22)
MONOCYTES NFR BLD AUTO: 9 % (ref 4–12)
NEUTROPHILS # BLD AUTO: 1.96 THOUSANDS/ÂΜL (ref 1.85–7.62)
NEUTS SEG NFR BLD AUTO: 71 % (ref 43–75)
NRBC BLD AUTO-RTO: 0 /100 WBCS
PLATELET # BLD AUTO: 126 THOUSANDS/UL (ref 149–390)
PMV BLD AUTO: 10.1 FL (ref 8.9–12.7)
POTASSIUM SERPL-SCNC: 3.8 MMOL/L (ref 3.5–5.3)
RBC # BLD AUTO: 3.68 MILLION/UL (ref 3.81–5.12)
SODIUM SERPL-SCNC: 132 MMOL/L (ref 135–147)
WBC # BLD AUTO: 2.8 THOUSAND/UL (ref 4.31–10.16)

## 2023-06-17 PROCEDURE — 36415 COLL VENOUS BLD VENIPUNCTURE: CPT | Performed by: EMERGENCY MEDICINE

## 2023-06-17 PROCEDURE — 80048 BASIC METABOLIC PNL TOTAL CA: CPT | Performed by: EMERGENCY MEDICINE

## 2023-06-17 PROCEDURE — 99285 EMERGENCY DEPT VISIT HI MDM: CPT

## 2023-06-17 PROCEDURE — 87529 HSV DNA AMP PROBE: CPT | Performed by: EMERGENCY MEDICINE

## 2023-06-17 PROCEDURE — 82948 REAGENT STRIP/BLOOD GLUCOSE: CPT

## 2023-06-17 PROCEDURE — 96374 THER/PROPH/DIAG INJ IV PUSH: CPT

## 2023-06-17 PROCEDURE — 85025 COMPLETE CBC W/AUTO DIFF WBC: CPT | Performed by: EMERGENCY MEDICINE

## 2023-06-17 RX ORDER — OXYCODONE HYDROCHLORIDE AND ACETAMINOPHEN 5; 325 MG/1; MG/1
1 TABLET ORAL EVERY 6 HOURS PRN
Qty: 10 TABLET | Refills: 0 | Status: SHIPPED | OUTPATIENT
Start: 2023-06-17 | End: 2023-06-22

## 2023-06-17 RX ORDER — HYDROMORPHONE HCL/PF 1 MG/ML
0.5 SYRINGE (ML) INJECTION ONCE
Status: COMPLETED | OUTPATIENT
Start: 2023-06-17 | End: 2023-06-17

## 2023-06-17 RX ORDER — LIDOCAINE 40 MG/G
CREAM TOPICAL AS NEEDED
Qty: 30 G | Refills: 0 | Status: SHIPPED | OUTPATIENT
Start: 2023-06-17 | End: 2023-06-26 | Stop reason: SDUPTHER

## 2023-06-17 RX ORDER — OXYCODONE HYDROCHLORIDE AND ACETAMINOPHEN 5; 325 MG/1; MG/1
1 TABLET ORAL ONCE
Status: COMPLETED | OUTPATIENT
Start: 2023-06-17 | End: 2023-06-17

## 2023-06-17 RX ORDER — LIDOCAINE 40 MG/G
CREAM TOPICAL ONCE
Status: COMPLETED | OUTPATIENT
Start: 2023-06-17 | End: 2023-06-17

## 2023-06-17 RX ADMIN — LIDOCAINE: 40 CREAM TOPICAL at 15:18

## 2023-06-17 RX ADMIN — HYDROMORPHONE HYDROCHLORIDE 0.5 MG: 1 INJECTION, SOLUTION INTRAMUSCULAR; INTRAVENOUS; SUBCUTANEOUS at 15:18

## 2023-06-17 RX ADMIN — OXYCODONE HYDROCHLORIDE AND ACETAMINOPHEN 1 TABLET: 5; 325 TABLET ORAL at 17:00

## 2023-06-17 RX ADMIN — HYDROMORPHONE HYDROCHLORIDE 0.5 MG: 1 INJECTION, SOLUTION INTRAMUSCULAR; INTRAVENOUS; SUBCUTANEOUS at 14:49

## 2023-06-17 NOTE — ED PROVIDER NOTES
"History  Chief Complaint   Patient presents with   • Wound Check     Sacral wounds    • Diarrhea     57-year-old female with history of type 1 diabetes, recent admission for DKA secondary to viral gastroenteritis presents with anal discomfort which has been ongoing  Patient has ulcerations in the bilateral gluteal cleft diagnosed at her recent hospitalization, was instructed to use sitz bath by wound care however she has been unable to tolerate that  Pain is worse with bowel movement, weightbearing, movement, walking, pressure  Denies fevers or chills  She does have persistent diarrhea  Prior to Admission Medications   Prescriptions Last Dose Informant Patient Reported? Taking?    ALPRAZolam (XANAX) 0 5 mg tablet  Self Yes No   Sig: Take 0 5 mg by mouth daily as needed     Accu-Chek FastClix Lancets MISC   No No   Sig: USE 1 LANCET TO TEST BLOOD SUGAR UP TO 6 TIMES DAILY   Continuous Blood Gluc  (FreeStyle Samantha 14 Day [place]) KELVIN   No No   Sig: Use 1 Units in the morning   Continuous Blood Gluc  (FreeStyle Samantha 2 Wisdom) KELVIN   No No   Sig: Use 1 units in the morning   Continuous Blood Gluc Sensor (FreeStyle Samantha 14 Day Sensor) MISC   No No   Sig: Use 1 Units every 14 (fourteen) days   Continuous Blood Gluc Sensor (FreeStyle Samantha 2 Sensor) MISC   No No   Sig: Use 1 unit every 14 days   Insulin Pen Needle (BD Pen Needle Deanna U/F) 32G X 4 MM MISC   No No   Sig: Use 4/day   Insulin Syringe-Needle U-100 (INSULIN SYRINGE 1CC/28G) 28G X 1/2\" 1 ML MISC   Yes No   acetaminophen (TYLENOL) 500 mg tablet  Self Yes No   Sig:     busPIRone (BUSPAR) 30 MG tablet  Self Yes No   escitalopram (LEXAPRO) 20 mg tablet  Self Yes No   Sig: Take 10 mg by mouth daily Half tab daily   gabapentin (NEURONTIN) 600 MG tablet  Self No No   Sig: Take 0 5 tablets (300 mg total) by mouth 2 (two) times a day Half Tab 300mg at bedtime   glucose blood (Accu-Chek Guide) test strip  Self No No   Sig: Use to test blood " sugar up to 6 times daily  insulin glargine (Lantus) 100 units/mL subcutaneous injection  Self No No   Sig: Inject 36 Units under the skin daily at bedtime   ketoconazole (NIZORAL) 2 % cream   No No   Sig: Apply topically daily   levothyroxine (Synthroid) 25 mcg tablet   No No   Sig: Take 1 tablet (25 mcg total) by mouth daily in the early morning Combine with 200mcg, for total 225mcg daily     levothyroxine 200 mcg tablet  Self No No   Sig: Take 1 tablet (200 mcg total) by mouth every morning   meclizine (ANTIVERT) 25 mg tablet   No No   Sig: Take 1 tablet (25 mg total) by mouth every 8 (eight) hours as needed for dizziness   ondansetron (ZOFRAN) 4 mg tablet   No No   Sig: Take 1 tablet (4 mg total) by mouth every 8 (eight) hours as needed for nausea or vomiting   selenium 200 mcg   Yes No   Sig: Take 200 mcg by mouth daily   traZODone (DESYREL) 100 mg tablet  Self Yes No   Sig: Take 100 mg by mouth daily at bedtime      Facility-Administered Medications: None       Past Medical History:   Diagnosis Date   • Anemia    • Anxiety    • B12 deficiency    • Cervical disc disorder     On gabapentin    • Depression    • Diabetes mellitus (HCC)    • Disease of thyroid gland     hypothyroid   • Iron deficiency anemia    • Panic attack    • PTSD (post-traumatic stress disorder)    • Sleep difficulties    • [facility]    • Type 1 diabetes (HCC)    • Vitamin D deficiency        Past Surgical History:   Procedure Laterality Date   • ABDOMINAL SURGERY      gastric bypass   •  SECTION      x2   • CHOLECYSTECTOMY  2018   • GASTRIC BYPASS     • INTRAUTERINE DEVICE INSERTION  2015   • IR BIOPSY BONE MARROW  2020   • OTHER SURGICAL HISTORY      surgery for imperforate hymen/endometriosis/hydrometrocolpos   • TUBAL LIGATION     • WISDOM TOOTH EXTRACTION         Family History   Problem Relation Age of Onset   • Thyroid disease Mother    • URIEL disease Mother    • Hyperlipidemia Mother    • Hypertension Mother    • Osteoarthritis Mother    • Thyroid disease unspecified Mother    • Diabetes Father    • Alcohol abuse Father    • Diabetes type I Father    • Thyroid disease Sister    • Depression Sister    • Thyroid disease unspecified Sister    • Anxiety disorder Sister    • Heart disease Maternal Grandmother    • Hypertension Maternal Grandmother    • Arthritis Maternal Grandmother    • Diabetes type I Maternal Uncle    • Diabetes type I Maternal Grandfather      I have reviewed and agree with the history as documented  E-Cigarette/Vaping   • E-Cigarette Use Never User      E-Cigarette/Vaping Substances   • Nicotine No    • THC No    • CBD No    • Flavoring No    • Other No    • Unknown No      Social History     Tobacco Use   • Smoking status: Never   • Smokeless tobacco: Never   Vaping Use   • Vaping Use: Never used   Substance Use Topics   • Alcohol use: Never     Comment: occasional   • Drug use: No       Review of Systems    Physical Exam  Physical Exam  Vitals and nursing note reviewed  Exam conducted with a chaperone present (Nurse Michael Conrad)  Constitutional:       Appearance: She is normal weight  Comments: Uncomfortable   HENT:      Head: Normocephalic and atraumatic  Eyes:      Conjunctiva/sclera: Conjunctivae normal       Pupils: Pupils are equal, round, and reactive to light  Cardiovascular:      Rate and Rhythm: Normal rate and regular rhythm  Pulmonary:      Effort: Pulmonary effort is normal  No respiratory distress  Abdominal:      General: Abdomen is flat  There is no distension  Genitourinary:     Comments: Ulcerations bilateral gluteal cleft without surrounding erythema, purulent discharge, full fluctuance or other evidence of infection  Musculoskeletal:         General: No swelling or deformity  Cervical back: Normal range of motion and neck supple  Skin:     General: Skin is dry  Coloration: Skin is not jaundiced     Neurological:      General: No focal deficit present  Mental Status: She is alert and oriented to person, place, and time  Psychiatric:         Mood and Affect: Mood normal          Thought Content: Thought content normal          Vital Signs  ED Triage Vitals   Temperature Pulse Respirations Blood Pressure SpO2   06/17/23 1432 06/17/23 1414 06/17/23 1414 06/17/23 1414 06/17/23 1414   98 7 °F (37 1 °C) 87 16 124/76 98 %      Temp Source Heart Rate Source Patient Position - Orthostatic VS BP Location FiO2 (%)   06/17/23 1432 06/17/23 1414 06/17/23 1414 06/17/23 1414 --   Temporal Monitor Sitting Left arm       Pain Score       06/17/23 1414       10 - Worst Possible Pain           Vitals:    06/17/23 1414   BP: 124/76   Pulse: 87   Patient Position - Orthostatic VS: Sitting         Visual Acuity      ED Medications  Medications   HYDROmorphone (DILAUDID) injection 0 5 mg (0 5 mg Intravenous Given 6/17/23 1449)   HYDROmorphone (DILAUDID) injection 0 5 mg (0 5 mg Intravenous Given 6/17/23 1518)   lidocaine (LMX) 4 % cream ( Topical Given 6/17/23 1518)   oxyCODONE-acetaminophen (PERCOCET) 5-325 mg per tablet 1 tablet (1 tablet Oral Given 6/17/23 1700)       Diagnostic Studies  Results Reviewed     Procedure Component Value Units Date/Time    HSV TYPE 1,2 DNA PCR [facility] SWAB ONLY [[id number]] Collected: 06/17/23 1653    Lab Status:  In process Specimen: Swab from Wound Updated: 06/17/23 6303    Basic metabolic panel [[id number]]  (Abnormal) Collected: 06/17/23 1431    Lab Status: Final result Specimen: Blood from Arm, Left Updated: 06/17/23 1505     Sodium 132 mmol/L      Potassium 3 8 mmol/L      Chloride 97 mmol/L      CO2 27 mmol/L      ANION GAP 8 mmol/L      BUN 6 mg/dL      Creatinine 0 76 mg/dL      Glucose 267 mg/dL      Calcium 8 3 mg/dL      eGFR 97 ml/min/1 73sq m     Narrative:      [place] guidelines for Chronic Kidney Disease (CKD):   •  Stage 1 with normal or high GFR (GFR > 90 mL/min/1 73 square meters)  • Stage 2 Mild CKD (GFR = 60-89 mL/min/1 73 square meters)  •  Stage 3A Moderate CKD (GFR = 45-59 mL/min/1 73 square meters)  •  Stage 3B Moderate CKD (GFR = 30-44 mL/min/1 73 square meters)  •  Stage 4 Severe CKD (GFR = 15-29 mL/min/1 73 square meters)  •  Stage 5 End Stage CKD (GFR <15 mL/min/1 73 square meters)  Note: GFR calculation is accurate only with a steady state creatinine    CBC and differential [[id number]]  (Abnormal) Collected: 06/17/23 1431    Lab Status: Final result Specimen: Blood from Arm, Left Updated: 06/17/23 1440     WBC 2 80 Thousand/uL      RBC 3 68 Million/uL      Hemoglobin 10 9 g/dL      Hematocrit 33 8 %      MCV 92 fL      MCH 29 6 pg      MCHC 32 2 g/dL      RDW 12 1 %      MPV 10 1 fL      Platelets 788 Thousands/uL      nRBC 0 /100 WBCs      Neutrophils Relative 71 %      Immat GRANS % 0 %      Lymphocytes Relative 19 %      Monocytes Relative 9 %      Eosinophils Relative 1 %      Basophils Relative 0 %      Neutrophils Absolute 1 96 Thousands/µL      Immature Grans Absolute 0 01 Thousand/uL      Lymphocytes Absolute 0 53 Thousands/µL      Monocytes Absolute 0 26 Thousand/µL      Eosinophils Absolute 0 03 Thousand/µL      Basophils Absolute 0 01 Thousands/µL     Fingerstick Glucose (POCT) [[id number]]  (Abnormal) Collected: 06/17/23 1434    Lab Status: Final result Updated: 06/17/23 1435     POC Glucose 247 mg/dl                  No orders to display              Procedures  Procedures         ED Course  ED Course as of 06/17/23 1722   Sat Jun 17, 2023   1511 WBC(!): 2 80  Improved from discharge                               SBIRT 22yo+    Flowsheet Row Most Recent Value   Initial Alcohol Screen: US AUDIT-C     1  How often do you have a drink containing alcohol? 0 Filed at: 06/17/2023 1628   2  How many drinks containing alcohol do you have on a typical day you are drinking? 0 Filed at: 06/17/2023 1628   3a  Male UNDER 65:  How often do you have five or more drinks on one occasion? 0 Filed at: 06/17/2023 1628   3b  FEMALE Any Age, or MALE 65+: How often do you have 4 or more drinks on one occassion? 0 Filed at: 06/17/2023 1628   Audit-C Score 0 Filed at: 06/17/2023 1628   MICHELLE: How many times in the past year have you    Used an illegal drug or used a prescription medication for non-medical reasons? Never Filed at: 06/17/2023 1628                    Medical Decision Making  82-year-old female with persistent pain from gluteal ulcerations  Ulcerations do not appear acutely infected at this time  Clinically doubt HSV however will send swab  This appears to be a pain control issue related to her ulcerations which are likely excoriated secondary to her persistent diarrhea  Accu-Chek slightly elevated, differential includes DKA, will get chemistry look for anion gap  Slightly leukopenic on discharge labs, will get CBC to reassess and reevaluate  Patient appears uncomfortable secondary to her pain however does not appear to be acutely ill    Labs reviewed, some abnormalities however overall appears to be improving from her discharge labs  Her pain was much improved with LMX, although IV opiates were used to aid in examination and further control patient's pain  Due to patient's discomfort I believe a short course of opiates is indicated, this is a also have the additional benefit of slow GI transit and decreasing diarrhea  I counseled patient on wound care, barrier cream was given to the patient as well as topical lidocaine was prescribed  Patient symptoms much improved after treatment in the emergency department, very comfortable with follow-up  She will follow-up with her PCP on Monday for further evaluation and care regarding her recent hospitalization and today's ER visit  Wound referral placed  Diarrhea: acute illness or injury  Gluteal cleft wound: acute illness or injury  Amount and/or Complexity of Data Reviewed  Labs: ordered   Decision-making details documented in ED Course  Risk  OTC drugs  Prescription drug management  Disposition  Final diagnoses:   Diarrhea   Gluteal cleft wound     Time reflects when diagnosis was documented in both MDM as applicable and the Disposition within this note     Time User Action Codes Description Comment    6/17/2023  4:40 PM Juana Lloyd [R19 7] Diarrhea     6/17/2023  4:41 PM Juana Lloyd [S31 809A] Gluteal cleft wound       ED Disposition     ED Disposition   Discharge    Condition   Stable    Date/Time   Sat Jun 17, 2023  4:40 PM    Comment   Blas Webb discharge to home/self care  Follow-up Information     Follow up With Specialties Details Why Contact Info    Maye Runner, MD Internal Medicine Schedule an appointment as soon as possible for a visit   [facility] [address]  [phone]            Discharge Medication List as of 6/17/2023  4:49 PM      START taking these medications    Details   lidocaine (LMX) 4 % cream Apply topically as needed for moderate pain or mild pain, Starting Sat 6/17/2023, Normal      oxyCODONE-acetaminophen (Percocet) 5-325 mg per tablet Take 1 tablet by mouth every 6 (six) hours as needed for moderate pain for up to 5 days Max Daily Amount: 4 tablets, Starting Sat 6/17/2023, Until Thu 6/22/2023 at 2359, Normal         CONTINUE these medications which have NOT CHANGED    Details   Accu-Chek FastClix Lancets MISC USE 1 LANCET TO TEST BLOOD SUGAR UP TO 6 TIMES DAILY, Normal      acetaminophen (TYLENOL) 500 mg tablet  , Starting Sun 5/2/2021, Historical Med      ALPRAZolam (XANAX) 0 5 mg tablet Take 0 5 mg by mouth daily as needed  , Starting Wed 7/28/2021, Historical Med      busPIRone (BUSPAR) 30 MG tablet Starting Sun 8/28/2022, Historical Med      !! Continuous Blood Gluc  (FreeStyle Samantha 14 Day [place]) KELVIN Use 1 Units in the morning, Starting Wed 11/2/2022, Normal      !!  Continuous Blood Gluc  (FreeStjj [place] "2 [place]) KELVIN Use 1 units in the morning, Normal      !! Continuous Blood Gluc Sensor (FreeStyle Samantha 14 Day Sensor) MISC Use 1 Units every 14 (fourteen) days, Starting Wed 11/2/2022, Normal      !! Continuous Blood Gluc Sensor (FreeStyle Samantha 2 Sensor) MISC Use 1 unit every 14 days, Normal      escitalopram (LEXAPRO) 20 mg tablet Take 10 mg by mouth daily Half tab daily, Starting Wed 4/7/2021, Historical Med      gabapentin (NEURONTIN) 600 MG tablet Take 0 5 tablets (300 mg total) by mouth 2 (two) times a day Half Tab 300mg at bedtime, Starting Tue 4/5/2022, Normal      glucose blood (Accu-Chek Guide) test strip Use to test blood sugar up to 6 times daily  , Normal      insulin glargine (Lantus) 100 units/mL subcutaneous injection Inject 36 Units under the skin daily at bedtime, Starting Tue 4/5/2022, Normal      Insulin Pen Needle (BD Pen Needle Edanna U/F) 32G X 4 MM MISC Use 4/day, Normal      Insulin Syringe-Needle U-100 (INSULIN SYRINGE 1CC/28G) 28G X 1/2\" 1 ML MISC Historical Med      ketoconazole (NIZORAL) 2 % cream Apply topically daily, Starting Wed 11/16/2022, Normal      !! levothyroxine (Synthroid) 25 mcg tablet Take 1 tablet (25 mcg total) by mouth daily in the early morning Combine with 200mcg, for total 225mcg daily  , Starting Tue 11/22/2022, Normal      !! levothyroxine 200 mcg tablet Take 1 tablet (200 mcg total) by mouth every morning, Starting Tue 4/12/2022, Normal      meclizine (ANTIVERT) 25 mg tablet Take 1 tablet (25 mg total) by mouth every 8 (eight) hours as needed for dizziness, Starting Thu 5/18/2023, Normal      ondansetron (ZOFRAN) 4 mg tablet Take 1 tablet (4 mg total) by mouth every 8 (eight) hours as needed for nausea or vomiting, Starting Mon 11/7/2022, Normal      selenium 200 mcg Take 200 mcg by mouth daily, Starting Sat 4/17/2021, Historical Med      traZODone (DESYREL) 100 mg tablet Take 100 mg by mouth daily at bedtime, Starting Thu 8/12/2021, Historical Med       !! - " Potential duplicate medications found  Please discuss with provider                PDMP Review     None          ED Provider  Electronically Signed by           Ela Durbin DO  06/17/23 9783

## 2023-06-18 LAB
HSV1 DNA SPEC QL NAA+PROBE: DETECTED
HSV1 DNA SPEC QL NAA+PROBE: NOT DETECTED

## 2023-06-19 ENCOUNTER — TRANSITIONAL CARE MANAGEMENT (OUTPATIENT)
Dept: FAMILY MEDICINE CLINIC | Facility: CLINIC | Age: 43
End: 2023-06-19

## 2023-06-19 DIAGNOSIS — Z71.89 COORDINATION OF COMPLEX CARE: Primary | ICD-10-CM

## 2023-06-19 LAB
BACTERIA BLD CULT: NORMAL
BACTERIA BLD CULT: NORMAL

## 2023-06-19 RX ORDER — VALACYCLOVIR HYDROCHLORIDE 1 G/1
1000 TABLET, FILM COATED ORAL 2 TIMES DAILY
Qty: 20 TABLET | Refills: 0 | Status: SHIPPED | OUTPATIENT
Start: 2023-06-19 | End: 2023-06-26

## 2023-06-19 NOTE — UTILIZATION REVIEW
NOTIFICATION OF ADMISSION DISCHARGE   This is a Notification of Discharge from [facility]  Please be advised that this patient has been discharge from our facility  Below you will find the admission and discharge date and time including the patient’s disposition  UTILIZATION REVIEW CONTACT:  Donna Saeed  Utilization   Network Utilization Review Department  Phone: [phone] x carefully listen to the prompts  All voicemails are confidential   Email: [email or web address]     ADMISSION INFORMATION  PRESENTATION DATE: 6/13/2023  3:10 PM  OBERVATION ADMISSION DATE:   INPATIENT ADMISSION DATE: 6/13/23  5:02 PM   DISCHARGE DATE: 6/16/2023  2:26 PM   DISPOSITION:Home/Self Care    IMPORTANT INFORMATION:  Send all requests for admission clinical reviews, approved or denied determinations and any other requests to dedicated fax number below belonging to the campus where the patient is receiving treatment   List of dedicated fax numbers:  [address] DENIALS (Administrative/Medical Necessity) [phone]   [address] (Maternity/NICU/Pediatrics) [phone]   [facility] [phone]   [facility] 87 [phone]   [address] [phone]   [address] [facility] [phone] 90 [facility] [phone]   [facility] 119 [phone]   [facility]  [phone]   [address] [facility] [phone]   [address] [facility] [address] [facility] [phone]

## 2023-06-20 ENCOUNTER — PATIENT OUTREACH (OUTPATIENT)
Dept: FAMILY MEDICINE CLINIC | Facility: CLINIC | Age: 43
End: 2023-06-20

## 2023-06-20 NOTE — PROGRESS NOTES
Outpatient Care Management Note; In basket referral for complex care management  Chart review completed

## 2023-06-21 ENCOUNTER — PATIENT OUTREACH (OUTPATIENT)
Dept: FAMILY MEDICINE CLINIC | Facility: CLINIC | Age: 43
End: 2023-06-21

## 2023-06-21 NOTE — PROGRESS NOTES
Outpatient Care Management Note:  Outreach call attempted to Ms [facility]  Message left for patient to please return call  Contact information left on message

## 2023-06-26 ENCOUNTER — OFFICE VISIT (OUTPATIENT)
Dept: FAMILY MEDICINE CLINIC | Facility: CLINIC | Age: 43
End: 2023-06-26
Payer: COMMERCIAL

## 2023-06-26 VITALS
WEIGHT: 161 LBS | SYSTOLIC BLOOD PRESSURE: 138 MMHG | OXYGEN SATURATION: 99 % | DIASTOLIC BLOOD PRESSURE: 86 MMHG | BODY MASS INDEX: 25.88 KG/M2 | RESPIRATION RATE: 20 BRPM | HEART RATE: 82 BPM | TEMPERATURE: 98 F | HEIGHT: 66 IN

## 2023-06-26 DIAGNOSIS — A60.1 HERPES SIMPLEX INFECTION OF PERIANAL SKIN: Primary | ICD-10-CM

## 2023-06-26 DIAGNOSIS — S31.809A GLUTEAL CLEFT WOUND: ICD-10-CM

## 2023-06-26 DIAGNOSIS — E10.65 TYPE 1 DIABETES MELLITUS WITH HYPERGLYCEMIA (HCC): ICD-10-CM

## 2023-06-26 DIAGNOSIS — E66.3 OVERWEIGHT WITH BODY MASS INDEX (BMI) OF 25 TO 25.9 IN ADULT: ICD-10-CM

## 2023-06-26 PROCEDURE — 99495 TRANSJ CARE MGMT MOD F2F 14D: CPT | Performed by: NURSE PRACTITIONER

## 2023-06-26 RX ORDER — INSULIN ASPART 100 [IU]/ML
INJECTION, SOLUTION INTRAVENOUS; SUBCUTANEOUS
Qty: 10 ML | Refills: 3 | Status: SHIPPED | OUTPATIENT
Start: 2023-06-26

## 2023-06-26 RX ORDER — LIDOCAINE 50 MG/G
OINTMENT TOPICAL
COMMUNITY
Start: 2023-06-17 | End: 2023-06-26

## 2023-06-26 RX ORDER — VALACYCLOVIR HYDROCHLORIDE 500 MG/1
500 TABLET, FILM COATED ORAL DAILY
Qty: 90 TABLET | Refills: 3 | Status: SHIPPED | OUTPATIENT
Start: 2023-06-26 | End: 2024-06-25

## 2023-06-26 RX ORDER — LISDEXAMFETAMINE DIMESYLATE 70 MG/1
70 CAPSULE ORAL DAILY
COMMUNITY
Start: 2023-05-31

## 2023-06-26 RX ORDER — OXYCODONE HYDROCHLORIDE AND ACETAMINOPHEN 5; 325 MG/1; MG/1
1 TABLET ORAL EVERY 8 HOURS PRN
Qty: 15 TABLET | Refills: 0 | Status: SHIPPED | OUTPATIENT
Start: 2023-06-26

## 2023-06-26 RX ORDER — INSULIN GLARGINE 100 [IU]/ML
36 INJECTION, SOLUTION SUBCUTANEOUS
Qty: 10 ML | Refills: 1 | Status: SHIPPED | OUTPATIENT
Start: 2023-06-26

## 2023-06-26 RX ORDER — LIDOCAINE 40 MG/G
CREAM TOPICAL AS NEEDED
Qty: 30 G | Refills: 1 | Status: SHIPPED | OUTPATIENT
Start: 2023-06-26

## 2023-06-26 NOTE — PROGRESS NOTES
[facility]        NAME: Chace Hamm is a 41 y o  female  : 1980    MRN: [id number]  DATE: 2023  TIME: 1:49 PM    Assessment and Plan   Herpes simplex infection of perianal skin [A60 1]  1  Herpes simplex infection of perianal skin  valACYclovir (VALTREX) 500 mg tablet    oxyCODONE-acetaminophen (Percocet) 5-325 mg per tablet      2  Gluteal cleft wound  lidocaine (LMX) 4 % cream      3  Type 1 diabetes mellitus with hyperglycemia (HCC)  insulin glargine (Lantus) 100 units/mL subcutaneous injection    Insulin Aspart (NovoLOG) 100 units/mL injection      4  Overweight with body mass index (BMI) of 25 to 25 9 in adult              Patient Instructions     Patient Instructions   Valtrex 500mg daily for prevention  Lidocaine cream and Percocet as discussed for severe pain  Insulin refills given- please make appointment with Endocrinology  Call for problems/concerns      BMI Counseling: Body mass index is 25 99 kg/m²  The BMI is above normal  Nutrition recommendations include limiting drinks that contain sugar and moderation in carbohydrate intake  Rationale for BMI follow-up plan is due to patient being overweight or obese  Chief Complaint     Chief Complaint   Patient presents with   • Transition of Care Management         History of Present Illness       Date and time admitted to hospital: 2023  3:10 PM     * Diabetic ketoacidosis associated with type 1 diabetes mellitus [facility])  Assessment & Plan  Lab Results  Component Value Date    HGBA1C 10 2 (H) 2023        Recent Labs    06/15/23  1602 06/15/23  2109 23  0724 23  1107  POCGLU 122 227* 212* 249*        Blood Sugar Average: Last 72 hrs:  (P) 177 76         Viral gastroenteritis likely caused this  No further diarrhea  She is eating well and wants to go home     Blood sugars are better controlled     Genital labial ulcer  Assessment & Plan  Will refer to outpatient gyn     Diarrhea  Assessment & Plan  Likely viral gastroenteritis  Has resolved     THO (acute kidney injury) ([facility] [address] )  Assessment & Plan  Due to dehydration  This has resolved           Discharging Physician / Practitioner: Daniele Hoffman DO  PCP: Isrrael Mohamud MD  Admission Date:   Admission Orders (From admission, onward)        Ordered        06/13/23 1702     INPATIENT ADMISSION  Once                    Discharge Date: 06/16/23       Hospital Course:      Vidal Valdes is a 41 y o  female patient who originally presented to the hospital on 6/13/2023 due to diarrhea  Patient has had loose stools for a few days  Now has severe hyperglycemia with diabetic ketoacidosis  She responded to IV fluids and insulin drip  Now back on home insulin regimen  Diarrhea has stopped and was likely viral    Ok for patient to discharge home  ER visit 6/17- 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 No    Scheduled for follow up?   Yes    Did you obtain your prescribed medications  No    Why were you unable to obtain your medications  no new medication    Do you need help managing your prescriptions or medications  No    Is transportation to your appointment needed  No    I have advised the patient to call PCP with any new or worsening symptoms    Cruz Pacheco MA    Living Arrangements  Spouse or Significiant other    Support System  Friends    The type of support provided  Emotional; Physical; Other (comment)    Do you have social support  Yes, as much as I need            Review of Systems Review of Systems   Cardiovascular: Positive for leg swelling  Musculoskeletal: Positive for arthralgias, joint swelling and myalgias  Skin: Positive for rash and wound  Psychiatric/Behavioral: Positive for dysphoric mood and sleep disturbance  The patient is nervous/anxious  Current Medications       Current Outpatient Medications:   •  Insulin Aspart (NovoLOG) 100 units/mL injection, Sliding scale up to 3 times per day with meals   Max 30 units per day, Disp: 10 mL, Rfl: 3  •  insulin glargine (Lantus) 100 units/mL subcutaneous injection, Inject 36 Units under the skin daily at bedtime, Disp: 10 mL, Rfl: 1  •  lidocaine (LMX) 4 % cream, Apply topically as needed for moderate pain or mild pain, Disp: 30 g, Rfl: 1  •  oxyCODONE-acetaminophen (Percocet) 5-325 mg per tablet, Take 1 tablet by mouth every 8 (eight) hours as needed for moderate pain or severe pain Max Daily Amount: 3 tablets, Disp: 15 tablet, Rfl: 0  •  valACYclovir (VALTREX) 500 mg tablet, Take 1 tablet (500 mg total) by mouth daily, Disp: 90 tablet, Rfl: 3  •  Accu-Chek FastClix Lancets MISC, USE 1 LANCET TO TEST BLOOD SUGAR UP TO 6 TIMES DAILY, Disp: 102 each, Rfl: 0  •  acetaminophen (TYLENOL) 500 mg tablet,  , Disp: , Rfl:   •  ALPRAZolam (XANAX) 0 5 mg tablet, Take 0 5 mg by mouth daily as needed  , Disp: , Rfl:   •  busPIRone (BUSPAR) 30 MG tablet, , Disp: , Rfl:   •  Continuous Blood Gluc  (FreeStyle Samantha 14 Day [place]) KELVIN, Use 1 Units in the morning, Disp: 1 each, Rfl: 0  •  Continuous Blood Gluc  (FreeStyle Samantha 2 [place]) KELVIN, Use 1 units in the morning, Disp: 1 each, Rfl: 3  •  Continuous Blood Gluc Sensor (FreeStyle Samantha 14 Day Sensor) MISC, Use 1 Units every 14 (fourteen) days, Disp: 2 each, Rfl: 3  •  Continuous Blood Gluc Sensor (FreeStyle Samantha 2 Sensor) MISC, Use 1 unit every 14 days, Disp: 2 each, Rfl: 3  •  escitalopram (LEXAPRO) 20 mg tablet, Take 10 mg by mouth daily Half tab daily, Disp: , Rfl: "  •  gabapentin (NEURONTIN) 600 MG tablet, Take 0 5 tablets (300 mg total) by mouth 2 (two) times a day Half Tab 300mg at bedtime, Disp: 30 tablet, Rfl: 1  •  glucose blood (Accu-Chek Guide) test strip, Use to test blood sugar up to 6 times daily  , Disp: 200 each, Rfl: 2  •  Insulin Pen Needle (BD Pen Needle Deanna U/F) 32G X 4 MM MISC, Use 4/day, Disp: 100 each, Rfl: 11  •  Insulin Syringe-Needle U-100 (INSULIN SYRINGE 1CC/28G) 28G X 1/2\" 1 ML MISC, , Disp: , Rfl:   •  ketoconazole (NIZORAL) 2 % cream, Apply topically daily, Disp: 60 g, Rfl: 1  •  levothyroxine (Synthroid) 25 mcg tablet, Take 1 tablet (25 mcg total) by mouth daily in the early morning Combine with 200mcg, for total 225mcg daily  , Disp: 90 tablet, Rfl: 3  •  levothyroxine 200 mcg tablet, Take 1 tablet (200 mcg total) by mouth every morning, Disp: 90 tablet, Rfl: 3  •  meclizine (ANTIVERT) 25 mg tablet, Take 1 tablet (25 mg total) by mouth every 8 (eight) hours as needed for dizziness, Disp: 20 tablet, Rfl: 0  •  ondansetron (ZOFRAN) 4 mg tablet, Take 1 tablet (4 mg total) by mouth every 8 (eight) hours as needed for nausea or vomiting, Disp: 30 tablet, Rfl: 1  •  selenium 200 mcg, Take 200 mcg by mouth daily, Disp: , Rfl:   •  traZODone (DESYREL) 100 mg tablet, Take 100 mg by mouth daily at bedtime, Disp: , Rfl:   •  Vyvanse 70 MG capsule, Take 70 mg by mouth daily, Disp: , Rfl:     Current Allergies     Allergies as of 06/26/2023   • (No Known Allergies)            The following portions of the patient's history were reviewed and updated as appropriate: allergies, current medications, past family history, past medical history, past social history, past surgical history and problem list      Past Medical History:   Diagnosis Date   • Anemia    • Anxiety    • B12 deficiency    • Cervical disc disorder     On gabapentin    • Depression    • Diabetes mellitus ([facility] [address] )    • Disease of thyroid gland     hypothyroid   • Iron deficiency anemia    • Panic attack " "   • PTSD (post-traumatic stress disorder)    • Sleep difficulties    • Substance abuse ([facility] [address] )    • Type 1 diabetes ([facility] )    • Vitamin D deficiency        Past Surgical History:   Procedure Laterality Date   • ABDOMINAL SURGERY      gastric bypass   •  SECTION      x2   • CHOLECYSTECTOMY  2018   • GASTRIC BYPASS  2007   • INTRAUTERINE DEVICE INSERTION  2015   • IR BIOPSY BONE MARROW  2020   • OTHER SURGICAL HISTORY      surgery for imperforate hymen/endometriosis/hydrometrocolpos   • TUBAL LIGATION     • WISDOM TOOTH EXTRACTION         Family History   Problem Relation Age of Onset   • Thyroid disease Mother    • URIEL disease Mother    • Hyperlipidemia Mother    • Hypertension Mother    • Osteoarthritis Mother    • Thyroid disease unspecified Mother    • Diabetes Father    • Alcohol abuse Father    • Diabetes type I Father    • Thyroid disease Sister    • Depression Sister    • Thyroid disease unspecified Sister    • Anxiety disorder Sister    • Heart disease Maternal Grandmother    • Hypertension Maternal Grandmother    • Arthritis Maternal Grandmother    • Diabetes type I Maternal Uncle    • Diabetes type I Maternal Grandfather          Medications have been verified  Objective   /86   Pulse 82   Temp 98 °F (36 7 °C) (Tympanic)   Resp 20   Ht 5' 6\" (1 676 m)   Wt 73 kg (161 lb)   LMP  (LMP Unknown)   SpO2 99%   BMI 25 99 kg/m²        Physical Exam     Physical Exam  Vitals and nursing note reviewed  Constitutional:       General: She is not in acute distress  Appearance: She is well-developed  She is not diaphoretic  Cardiovascular:      Rate and Rhythm: Normal rate and regular rhythm  Heart sounds: Normal heart sounds  No murmur heard  Pulmonary:      Effort: Pulmonary effort is normal  No respiratory distress  Breath sounds: Normal breath sounds  No wheezing  Genitourinary:      Musculoskeletal:         General: Tenderness present        Right lower leg: " Edema present  Left lower leg: Edema present  Skin:     General: Skin is warm and dry  Coloration: Skin is pale  Findings: Erythema and rash (see ) present  Neurological:      Mental Status: She is alert and oriented to person, place, and time  Psychiatric:         Attention and Perception: Attention normal          Mood and Affect: Mood is anxious and depressed  Affect is tearful  Speech: Speech normal          Behavior: Behavior normal  Behavior is cooperative  Thought Content:  Thought content normal          Cognition and Memory: Cognition and memory normal          Judgment: Judgment normal

## 2023-06-26 NOTE — PATIENT INSTRUCTIONS
Valtrex 500mg daily for prevention  Lidocaine cream and Percocet as discussed for severe pain  Insulin refills given- please make appointment with Endocrinology  Call for problems/concerns

## 2023-06-27 ENCOUNTER — PATIENT OUTREACH (OUTPATIENT)
Dept: FAMILY MEDICINE CLINIC | Facility: CLINIC | Age: 43
End: 2023-06-27

## 2023-06-30 ENCOUNTER — TELEPHONE (OUTPATIENT)
Dept: FAMILY MEDICINE CLINIC | Facility: CLINIC | Age: 43
End: 2023-06-30

## 2023-06-30 NOTE — TELEPHONE ENCOUNTER
Called and left message for patient to call the office back  Due for DM eye  Calling to see if this was done elsewhere or if she can come in for nurse visit

## 2023-07-17 ENCOUNTER — VBI (OUTPATIENT)
Dept: ADMINISTRATIVE | Facility: OTHER | Age: 43
End: 2023-07-17

## 2023-08-08 DIAGNOSIS — E10.65 TYPE 1 DIABETES MELLITUS WITH HYPERGLYCEMIA (HCC): ICD-10-CM

## 2023-08-08 RX ORDER — INSULIN GLARGINE 100 [IU]/ML
36 INJECTION, SOLUTION SUBCUTANEOUS
Qty: 10 ML | Refills: 1 | Status: SHIPPED | OUTPATIENT
Start: 2023-08-08

## 2023-08-14 PROBLEM — E10.10 DIABETIC KETOACIDOSIS ASSOCIATED WITH TYPE 1 DIABETES MELLITUS (HCC): Status: RESOLVED | Noted: 2018-03-20 | Resolved: 2023-08-14

## 2023-09-15 ENCOUNTER — APPOINTMENT (OUTPATIENT)
Dept: RADIOLOGY | Facility: MEDICAL CENTER | Age: 43
End: 2023-09-15
Payer: OTHER MISCELLANEOUS

## 2023-09-15 ENCOUNTER — OCCMED (OUTPATIENT)
Dept: URGENT CARE | Facility: MEDICAL CENTER | Age: 43
End: 2023-09-15
Payer: OTHER MISCELLANEOUS

## 2023-09-15 DIAGNOSIS — S29.019A STRAIN OF THORACIC REGION, INITIAL ENCOUNTER: ICD-10-CM

## 2023-09-15 DIAGNOSIS — S29.019A STRAIN OF THORACIC REGION, INITIAL ENCOUNTER: Primary | ICD-10-CM

## 2023-09-15 PROCEDURE — 99213 OFFICE O/P EST LOW 20 MIN: CPT | Performed by: ORTHOPAEDIC SURGERY

## 2023-09-15 PROCEDURE — 72072 X-RAY EXAM THORAC SPINE 3VWS: CPT

## 2023-09-15 PROCEDURE — 72040 X-RAY EXAM NECK SPINE 2-3 VW: CPT

## 2023-09-18 NOTE — PATIENT INSTRUCTIONS
Start Ambien as directed  Encouraged counseling  Discussed blood sugar (HgA1c) results from today  Call or return for problems/concerns
2-3 weeks

## 2023-09-22 ENCOUNTER — APPOINTMENT (OUTPATIENT)
Dept: URGENT CARE | Facility: MEDICAL CENTER | Age: 43
End: 2023-09-22
Payer: OTHER MISCELLANEOUS

## 2023-09-22 PROCEDURE — 99213 OFFICE O/P EST LOW 20 MIN: CPT | Performed by: ORTHOPAEDIC SURGERY

## 2023-09-28 DIAGNOSIS — E10.65 TYPE 1 DIABETES MELLITUS WITH HYPERGLYCEMIA (HCC): ICD-10-CM

## 2023-09-28 RX ORDER — INSULIN GLARGINE 100 [IU]/ML
36 INJECTION, SOLUTION SUBCUTANEOUS
Qty: 10 ML | Refills: 1 | Status: SHIPPED | OUTPATIENT
Start: 2023-09-28

## 2023-10-14 NOTE — TELEPHONE ENCOUNTER
Patient scheduled for MRI brain today, 8/10/2020  Authorization needed  Called patient's insurance  Imaging authorization completed via [employer]  Transferred to [facility] and spoke with Ms Karthikeyan Petty  Authorization started for CPT code 98803  Pending # [id number]  Clinicals faxed to [phone]  The patient was received at 0700 while resting in bed. She refused vitals, and some meals. However, she did complete her ADL's [Shower]. The patient stated that she did not have a goal for today. The patient was very withdrawn today, however she did come out to watch a movie for a little while. Later, she asked for a book, and she seemed to enjoy reading it. Overall, the patient has been calm and composed for the shift. Staff will continue to monitor, document, and maintain safety measures.

## 2023-10-18 ENCOUNTER — HOSPITAL ENCOUNTER (EMERGENCY)
Facility: HOSPITAL | Age: 43
Discharge: HOME | End: 2023-10-18
Attending: EMERGENCY MEDICINE
Payer: OTHER MISCELLANEOUS

## 2023-10-18 VITALS
SYSTOLIC BLOOD PRESSURE: 117 MMHG | WEIGHT: 164.68 LBS | RESPIRATION RATE: 16 BRPM | OXYGEN SATURATION: 97 % | HEART RATE: 95 BPM | DIASTOLIC BLOOD PRESSURE: 67 MMHG | TEMPERATURE: 98 F | BODY MASS INDEX: 26.58 KG/M2

## 2023-10-18 DIAGNOSIS — R73.9 HYPERGLYCEMIA: ICD-10-CM

## 2023-10-18 DIAGNOSIS — N39.0 UTI (URINARY TRACT INFECTION): ICD-10-CM

## 2023-10-18 DIAGNOSIS — N17.9 AKI (ACUTE KIDNEY INJURY) (HCC): ICD-10-CM

## 2023-10-18 DIAGNOSIS — S06.0XAA CONCUSSION: Primary | ICD-10-CM

## 2023-10-18 LAB
ALBUMIN SERPL BCP-MCNC: 3.3 G/DL (ref 3.5–5)
ALP SERPL-CCNC: 68 U/L (ref 34–104)
ALT SERPL W P-5'-P-CCNC: 12 U/L (ref 7–52)
AMORPH URATE CRY URNS QL MICRO: ABNORMAL
ANION GAP SERPL CALCULATED.3IONS-SCNC: 6 MMOL/L
ANION GAP SERPL CALCULATED.3IONS-SCNC: 7 MMOL/L
AST SERPL W P-5'-P-CCNC: 12 U/L (ref 13–39)
ATRIAL RATE: 79 BPM
BACTERIA UR QL AUTO: ABNORMAL /HPF
BASOPHILS # BLD AUTO: 0.03 THOUSANDS/ÂΜL (ref 0–0.1)
BASOPHILS NFR BLD AUTO: 1 % (ref 0–1)
BILIRUB SERPL-MCNC: 0.79 MG/DL (ref 0.2–1)
BILIRUB UR QL STRIP: NEGATIVE
BUN SERPL-MCNC: 19 MG/DL (ref 5–25)
BUN SERPL-MCNC: 21 MG/DL (ref 5–25)
CALCIUM ALBUM COR SERPL-MCNC: 8.5 MG/DL (ref 8.3–10.1)
CALCIUM SERPL-MCNC: 7.7 MG/DL (ref 8.4–10.2)
CALCIUM SERPL-MCNC: 7.9 MG/DL (ref 8.4–10.2)
CHLORIDE SERPL-SCNC: 101 MMOL/L (ref 96–108)
CHLORIDE SERPL-SCNC: 98 MMOL/L (ref 96–108)
CLARITY UR: CLEAR
CO2 SERPL-SCNC: 24 MMOL/L (ref 21–32)
CO2 SERPL-SCNC: 24 MMOL/L (ref 21–32)
COLOR UR: YELLOW
CREAT SERPL-MCNC: 1.19 MG/DL (ref 0.6–1.3)
CREAT SERPL-MCNC: 1.33 MG/DL (ref 0.6–1.3)
EOSINOPHIL # BLD AUTO: 0.02 THOUSAND/ÂΜL (ref 0–0.61)
EOSINOPHIL NFR BLD AUTO: 0 % (ref 0–6)
ERYTHROCYTE [DISTWIDTH] IN BLOOD BY AUTOMATED COUNT: 12.8 % (ref 11.6–15.1)
EXT PREGNANCY TEST URINE: NEGATIVE
EXT. CONTROL: NORMAL
GFR SERPL CREATININE-BSD FRML MDRD: 49 ML/MIN/1.73SQ M
GFR SERPL CREATININE-BSD FRML MDRD: 56 ML/MIN/1.73SQ M
GLUCOSE SERPL-MCNC: 254 MG/DL (ref 65–140)
GLUCOSE SERPL-MCNC: 297 MG/DL (ref 65–140)
GLUCOSE UR STRIP-MCNC: ABNORMAL MG/DL
HCT VFR BLD AUTO: 33.3 % (ref 34.8–46.1)
HGB BLD-MCNC: 10.5 G/DL (ref 11.5–15.4)
HGB UR QL STRIP.AUTO: ABNORMAL
HYALINE CASTS #/AREA URNS LPF: ABNORMAL /LPF
IMM GRANULOCYTES # BLD AUTO: 0.04 THOUSAND/UL (ref 0–0.2)
IMM GRANULOCYTES NFR BLD AUTO: 1 % (ref 0–2)
KETONES UR STRIP-MCNC: NEGATIVE MG/DL
LEUKOCYTE ESTERASE UR QL STRIP: ABNORMAL
LYMPHOCYTES # BLD AUTO: 0.53 THOUSANDS/ÂΜL (ref 0.6–4.47)
LYMPHOCYTES NFR BLD AUTO: 9 % (ref 14–44)
MCH RBC QN AUTO: 29 PG (ref 26.8–34.3)
MCHC RBC AUTO-ENTMCNC: 31.5 G/DL (ref 31.4–37.4)
MCV RBC AUTO: 92 FL (ref 82–98)
MONOCYTES # BLD AUTO: 0.48 THOUSAND/ÂΜL (ref 0.17–1.22)
MONOCYTES NFR BLD AUTO: 8 % (ref 4–12)
NEUTROPHILS # BLD AUTO: 5.02 THOUSANDS/ÂΜL (ref 1.85–7.62)
NEUTS SEG NFR BLD AUTO: 81 % (ref 43–75)
NITRITE UR QL STRIP: NEGATIVE
NON-SQ EPI CELLS URNS QL MICRO: ABNORMAL /HPF
NRBC BLD AUTO-RTO: 0 /100 WBCS
P AXIS: 62 DEGREES
PH UR STRIP.AUTO: 5.5 [PH] (ref 4.5–8)
PLATELET # BLD AUTO: 151 THOUSANDS/UL (ref 149–390)
PMV BLD AUTO: 10.5 FL (ref 8.9–12.7)
POTASSIUM SERPL-SCNC: 4 MMOL/L (ref 3.5–5.3)
POTASSIUM SERPL-SCNC: 5.7 MMOL/L (ref 3.5–5.3)
PR INTERVAL: 126 MS
PROT SERPL-MCNC: 6.3 G/DL (ref 6.4–8.4)
PROT UR STRIP-MCNC: ABNORMAL MG/DL
QRS AXIS: 96 DEGREES
QRSD INTERVAL: 86 MS
QT INTERVAL: 382 MS
QTC INTERVAL: 438 MS
RBC # BLD AUTO: 3.62 MILLION/UL (ref 3.81–5.12)
RBC #/AREA URNS AUTO: ABNORMAL /HPF
SODIUM SERPL-SCNC: 129 MMOL/L (ref 135–147)
SODIUM SERPL-SCNC: 131 MMOL/L (ref 135–147)
SP GR UR STRIP.AUTO: 1.02 (ref 1–1.03)
T WAVE AXIS: 57 DEGREES
UROBILINOGEN UR QL STRIP.AUTO: 0.2 E.U./DL
VENTRICULAR RATE: 79 BPM
WBC # BLD AUTO: 6.12 THOUSAND/UL (ref 4.31–10.16)
WBC #/AREA URNS AUTO: ABNORMAL /HPF
WBC CLUMPS # UR AUTO: PRESENT /UL

## 2023-10-18 PROCEDURE — 80048 BASIC METABOLIC PNL TOTAL CA: CPT | Performed by: PHYSICIAN ASSISTANT

## 2023-10-18 PROCEDURE — 93005 ELECTROCARDIOGRAM TRACING: CPT

## 2023-10-18 PROCEDURE — 81025 URINE PREGNANCY TEST: CPT | Performed by: PHYSICIAN ASSISTANT

## 2023-10-18 PROCEDURE — 85025 COMPLETE CBC W/AUTO DIFF WBC: CPT | Performed by: PHYSICIAN ASSISTANT

## 2023-10-18 PROCEDURE — 81001 URINALYSIS AUTO W/SCOPE: CPT

## 2023-10-18 PROCEDURE — 99284 EMERGENCY DEPT VISIT MOD MDM: CPT

## 2023-10-18 PROCEDURE — 36415 COLL VENOUS BLD VENIPUNCTURE: CPT | Performed by: PHYSICIAN ASSISTANT

## 2023-10-18 PROCEDURE — 87086 URINE CULTURE/COLONY COUNT: CPT

## 2023-10-18 PROCEDURE — 87636 SARSCOV2 & INF A&B AMP PRB: CPT | Performed by: PHYSICIAN ASSISTANT

## 2023-10-18 PROCEDURE — 87077 CULTURE AEROBIC IDENTIFY: CPT

## 2023-10-18 PROCEDURE — 80053 COMPREHEN METABOLIC PANEL: CPT | Performed by: PHYSICIAN ASSISTANT

## 2023-10-18 PROCEDURE — 96365 THER/PROPH/DIAG IV INF INIT: CPT

## 2023-10-18 PROCEDURE — 99285 EMERGENCY DEPT VISIT HI MDM: CPT | Performed by: PHYSICIAN ASSISTANT

## 2023-10-18 PROCEDURE — 93010 ELECTROCARDIOGRAM REPORT: CPT | Performed by: INTERNAL MEDICINE

## 2023-10-18 PROCEDURE — 96361 HYDRATE IV INFUSION ADD-ON: CPT

## 2023-10-18 PROCEDURE — 96366 THER/PROPH/DIAG IV INF ADDON: CPT

## 2023-10-18 PROCEDURE — 96375 TX/PRO/DX INJ NEW DRUG ADDON: CPT

## 2023-10-18 PROCEDURE — 87186 SC STD MICRODIL/AGAR DIL: CPT

## 2023-10-18 RX ORDER — KETOROLAC TROMETHAMINE 30 MG/ML
15 INJECTION, SOLUTION INTRAMUSCULAR; INTRAVENOUS ONCE
Status: COMPLETED | OUTPATIENT
Start: 2023-10-18 | End: 2023-10-18

## 2023-10-18 RX ORDER — MAGNESIUM SULFATE HEPTAHYDRATE 40 MG/ML
2 INJECTION, SOLUTION INTRAVENOUS ONCE
Status: COMPLETED | OUTPATIENT
Start: 2023-10-18 | End: 2023-10-18

## 2023-10-18 RX ORDER — CEPHALEXIN 250 MG/1
500 CAPSULE ORAL ONCE
Status: COMPLETED | OUTPATIENT
Start: 2023-10-18 | End: 2023-10-18

## 2023-10-18 RX ORDER — ERGOCALCIFEROL 1.25 MG/1
50000 CAPSULE ORAL WEEKLY
COMMUNITY
Start: 2023-07-17

## 2023-10-18 RX ORDER — IBUPROFEN 600 MG/1
TABLET ORAL
COMMUNITY
Start: 2023-07-28 | End: 2023-10-25

## 2023-10-18 RX ORDER — CEPHALEXIN 500 MG/1
500 CAPSULE ORAL EVERY 8 HOURS SCHEDULED
Qty: 15 CAPSULE | Refills: 0 | Status: SHIPPED | OUTPATIENT
Start: 2023-10-19 | End: 2023-10-25

## 2023-10-18 RX ORDER — BUTALBITAL, ACETAMINOPHEN AND CAFFEINE 50; 325; 40 MG/1; MG/1; MG/1
1 TABLET ORAL EVERY 6 HOURS PRN
Qty: 15 TABLET | Refills: 0 | Status: SHIPPED | OUTPATIENT
Start: 2023-10-18

## 2023-10-18 RX ORDER — ONDANSETRON 4 MG/1
4 TABLET, ORALLY DISINTEGRATING ORAL EVERY 6 HOURS PRN
Qty: 20 TABLET | Refills: 0 | Status: SHIPPED | OUTPATIENT
Start: 2023-10-18

## 2023-10-18 RX ORDER — DIPHENHYDRAMINE HYDROCHLORIDE 50 MG/ML
12.5 INJECTION INTRAMUSCULAR; INTRAVENOUS ONCE
Status: COMPLETED | OUTPATIENT
Start: 2023-10-18 | End: 2023-10-18

## 2023-10-18 RX ORDER — METOCLOPRAMIDE HYDROCHLORIDE 5 MG/ML
10 INJECTION INTRAMUSCULAR; INTRAVENOUS ONCE
Status: COMPLETED | OUTPATIENT
Start: 2023-10-18 | End: 2023-10-18

## 2023-10-18 RX ADMIN — MAGNESIUM SULFATE HEPTAHYDRATE 2 G: 40 INJECTION, SOLUTION INTRAVENOUS at 20:57

## 2023-10-18 RX ADMIN — METOCLOPRAMIDE 10 MG: 5 INJECTION, SOLUTION INTRAMUSCULAR; INTRAVENOUS at 19:49

## 2023-10-18 RX ADMIN — CEPHALEXIN 500 MG: 250 CAPSULE ORAL at 20:30

## 2023-10-18 RX ADMIN — SODIUM CHLORIDE 1000 ML: 0.9 INJECTION, SOLUTION INTRAVENOUS at 19:37

## 2023-10-18 RX ADMIN — SODIUM CHLORIDE 500 ML: 0.9 INJECTION, SOLUTION INTRAVENOUS at 22:03

## 2023-10-18 RX ADMIN — DIPHENHYDRAMINE HYDROCHLORIDE 12.5 MG: 50 INJECTION, SOLUTION INTRAMUSCULAR; INTRAVENOUS at 19:48

## 2023-10-18 RX ADMIN — KETOROLAC TROMETHAMINE 15 MG: 30 INJECTION, SOLUTION INTRAMUSCULAR; INTRAVENOUS at 19:46

## 2023-10-18 NOTE — Clinical Note
Cathryn Davis was seen and treated in our emergency department on 10/18/2023. Diagnosis:     Clarissa  . She may return on this date:     May not return to work until cleared by primary care physician or concussion clinic/specialist.      If you have any questions or concerns, please don't hesitate to call.       Emeka Arora PA-C    ______________________________           _______________          _______________  Hospital Representative                              Date                                Time

## 2023-10-19 NOTE — ED PROVIDER NOTES
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 mg tablet Take 10 mg by mouth daily Half tab daily, Starting Wed 4/7/2021, Historical Med      glucose blood (Accu-Chek Guide) test strip Use to test blood sugar up to 6 times daily. , Normal      ketoconazole (NIZORAL) 2 % cream Apply topically daily, Starting Wed 11/16/2022, Normal      lidocaine (LMX) 4 % cream Apply topically as needed for moderate pain or mild pain, Starting Mon 6/26/2023, Normal      meclizine (ANTIVERT) 25 mg tablet Take 1 tablet (25 mg total) by mouth every 8 (eight) hours as needed for dizziness, Starting Thu 5/18/2023, Normal      ondansetron (ZOFRAN) 4 mg tablet Take 1 tablet (4 mg total) by mouth every 8 (eight) hours as needed for nausea or vomiting, Starting Mon 11/7/2022, Normal      oxyCODONE-acetaminophen (Percocet) 5-325 mg per tablet Take 1 tablet by mouth every 8 (eight) hours as needed for moderate pain or severe pain Max Daily Amount: 3 tablets, Starting Mon 6/26/2023, Normal selenium 200 mcg Take 200 mcg by mouth daily, Starting Sat 4/17/2021, Historical Med      valACYclovir (VALTREX) 500 mg tablet Take 1 tablet (500 mg total) by mouth daily, Starting Mon 6/26/2023, Until Tue 6/25/2024, Normal      Vyvanse 70 MG capsule Take 70 mg by mouth daily, Starting Wed 5/31/2023, Historical Med       !! - Potential duplicate medications found. Please discuss with provider.               PDMP Review       None            ED Provider  Electronically Signed by             Emeka Arora PA-C  10/19/23 500 [address]  10/19/23 [id number]

## 2023-10-20 LAB — BACTERIA UR CULT: ABNORMAL

## 2023-10-21 ENCOUNTER — APPOINTMENT (EMERGENCY)
Dept: CT IMAGING | Facility: HOSPITAL | Age: 43
DRG: 638 | End: 2023-10-21
Payer: COMMERCIAL

## 2023-10-21 ENCOUNTER — HOSPITAL ENCOUNTER (INPATIENT)
Facility: HOSPITAL | Age: 43
LOS: 4 days | Discharge: HOME | DRG: 638 | End: 2023-10-25
Attending: EMERGENCY MEDICINE | Admitting: INTERNAL MEDICINE
Payer: COMMERCIAL

## 2023-10-21 ENCOUNTER — APPOINTMENT (EMERGENCY)
Dept: RADIOLOGY | Facility: HOSPITAL | Age: 43
DRG: 638 | End: 2023-10-21
Payer: COMMERCIAL
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
## 2023-10-21 DIAGNOSIS — R41.82 ALTERED MENTAL STATUS: ICD-10-CM

## 2023-10-21 DIAGNOSIS — E11.10 DKA (DIABETIC KETOACIDOSIS) (HCC): ICD-10-CM

## 2023-10-21 DIAGNOSIS — T19.2XXA: ICD-10-CM

## 2023-10-21 DIAGNOSIS — E10.10 DIABETIC KETOACIDOSIS WITHOUT COMA ASSOCIATED WITH TYPE 1 DIABETES MELLITUS (HCC): Primary | ICD-10-CM

## 2023-10-21 PROBLEM — N39.0 UTI (URINARY TRACT INFECTION): Status: ACTIVE | Noted: 2023-10-21

## 2023-10-21 PROBLEM — S06.0XAA CONCUSSION: Status: ACTIVE | Noted: 2023-10-21

## 2023-10-21 LAB
ALBUMIN SERPL BCP-MCNC: 3.4 G/DL (ref 3.5–5)
ALP SERPL-CCNC: 107 U/L (ref 34–104)
ALT SERPL W P-5'-P-CCNC: 11 U/L (ref 7–52)
ANION GAP SERPL CALCULATED.3IONS-SCNC: 16 MMOL/L
ANION GAP SERPL CALCULATED.3IONS-SCNC: >24 MMOL/L
ANION GAP SERPL CALCULATED.3IONS-SCNC: >25 MMOL/L
AST SERPL W P-5'-P-CCNC: 13 U/L (ref 13–39)
ATRIAL RATE: 119 BPM
BACTERIA UR QL AUTO: ABNORMAL /HPF
BASE EX.OXY STD BLDV CALC-SCNC: 86.3 % (ref 60–80)
BASE EXCESS BLDV CALC-SCNC: -29.6 MMOL/L
BASOPHILS # BLD MANUAL: 0 THOUSAND/UL (ref 0–0.1)
BASOPHILS NFR MAR MANUAL: 0 % (ref 0–1)
BETA-HYDROXYBUTYRATE: 5.6 MMOL/L
BILIRUB SERPL-MCNC: 0.31 MG/DL (ref 0.2–1)
BILIRUB UR QL STRIP: ABNORMAL
BUN SERPL-MCNC: 26 MG/DL (ref 5–25)
BUN SERPL-MCNC: 31 MG/DL (ref 5–25)
BUN SERPL-MCNC: 32 MG/DL (ref 5–25)
CALCIUM ALBUM COR SERPL-MCNC: 8.8 MG/DL (ref 8.3–10.1)
CALCIUM SERPL-MCNC: 7.1 MG/DL (ref 8.4–10.2)
CALCIUM SERPL-MCNC: 8 MG/DL (ref 8.4–10.2)
CALCIUM SERPL-MCNC: 8.3 MG/DL (ref 8.4–10.2)
CARDIAC TROPONIN I PNL SERPL HS: 3 NG/L
CHLORIDE SERPL-SCNC: 103 MMOL/L (ref 96–108)
CHLORIDE SERPL-SCNC: 112 MMOL/L (ref 96–108)
CHLORIDE SERPL-SCNC: 98 MMOL/L (ref 96–108)
CLARITY UR: CLEAR
CO2 SERPL-SCNC: 9 MMOL/L (ref 21–32)
CO2 SERPL-SCNC: <6 MMOL/L (ref 21–32)
CO2 SERPL-SCNC: <6 MMOL/L (ref 21–32)
COLOR UR: YELLOW
CREAT SERPL-MCNC: 1.35 MG/DL (ref 0.6–1.3)
CREAT SERPL-MCNC: 1.57 MG/DL (ref 0.6–1.3)
CREAT SERPL-MCNC: 1.67 MG/DL (ref 0.6–1.3)
EOSINOPHIL # BLD MANUAL: 0 THOUSAND/UL (ref 0–0.4)
EOSINOPHIL NFR BLD MANUAL: 0 % (ref 0–6)
ERYTHROCYTE [DISTWIDTH] IN BLOOD BY AUTOMATED COUNT: 12.9 % (ref 11.6–15.1)
EXT PREGNANCY TEST URINE: NEGATIVE
EXT. CONTROL: NORMAL
FLUAV RNA RESP QL NAA+PROBE: NEGATIVE
FLUBV RNA RESP QL NAA+PROBE: NEGATIVE
GFR SERPL CREATININE-BSD FRML MDRD: 37 ML/MIN/1.73SQ M
GFR SERPL CREATININE-BSD FRML MDRD: 40 ML/MIN/1.73SQ M
GFR SERPL CREATININE-BSD FRML MDRD: 48 ML/MIN/1.73SQ M
GLUCOSE SERPL-MCNC: 209 MG/DL (ref 65–140)
GLUCOSE SERPL-MCNC: 217 MG/DL (ref 65–140)
GLUCOSE SERPL-MCNC: 228 MG/DL (ref 65–140)
GLUCOSE SERPL-MCNC: 237 MG/DL (ref 65–140)
GLUCOSE SERPL-MCNC: 305 MG/DL (ref 65–140)
GLUCOSE SERPL-MCNC: 308 MG/DL (ref 65–140)
GLUCOSE SERPL-MCNC: 334 MG/DL (ref 65–140)
GLUCOSE SERPL-MCNC: 335 MG/DL (ref 65–140)
GLUCOSE SERPL-MCNC: 399 MG/DL (ref 65–140)
GLUCOSE SERPL-MCNC: 455 MG/DL (ref 65–140)
GLUCOSE SERPL-MCNC: 500 MG/DL (ref 65–140)
GLUCOSE SERPL-MCNC: 509 MG/DL (ref 65–140)
GLUCOSE SERPL-MCNC: 542 MG/DL (ref 65–140)
GLUCOSE SERPL-MCNC: >500 MG/DL (ref 65–140)
GLUCOSE SERPL-MCNC: >500 MG/DL (ref 65–140)
GLUCOSE UR STRIP-MCNC: ABNORMAL MG/DL
HCO3 BLDV-SCNC: 3.4 MMOL/L (ref 24–30)
HCT VFR BLD AUTO: 42.6 % (ref 34.8–46.1)
HGB BLD-MCNC: 12.8 G/DL (ref 11.5–15.4)
HGB UR QL STRIP.AUTO: ABNORMAL
KETONES UR STRIP-MCNC: ABNORMAL MG/DL
LACTATE SERPL-SCNC: 0.9 MMOL/L (ref 0.5–2)
LEUKOCYTE ESTERASE UR QL STRIP: NEGATIVE
LIPASE SERPL-CCNC: 21 U/L (ref 11–82)
LYMPHOCYTES # BLD AUTO: 1.66 THOUSAND/UL (ref 0.6–4.47)
LYMPHOCYTES # BLD AUTO: 15 % (ref 14–44)
MAGNESIUM SERPL-MCNC: 2 MG/DL (ref 1.9–2.7)
MAGNESIUM SERPL-MCNC: 2.1 MG/DL (ref 1.9–2.7)
MAGNESIUM SERPL-MCNC: 2.2 MG/DL (ref 1.9–2.7)
MCH RBC QN AUTO: 29.5 PG (ref 26.8–34.3)
MCHC RBC AUTO-ENTMCNC: 30 G/DL (ref 31.4–37.4)
MCV RBC AUTO: 98 FL (ref 82–98)
METAMYELOCYTES NFR BLD MANUAL: 1 % (ref 0–1)
MONOCYTES # BLD AUTO: 0.55 THOUSAND/UL (ref 0–1.22)
MONOCYTES NFR BLD: 5 % (ref 4–12)
MYELOCYTES NFR BLD MANUAL: 1 % (ref 0–1)
NEUTROPHILS # BLD MANUAL: 8.62 THOUSAND/UL (ref 1.85–7.62)
NEUTS BAND NFR BLD MANUAL: 9 % (ref 0–8)
NEUTS SEG NFR BLD AUTO: 69 % (ref 43–75)
NITRITE UR QL STRIP: NEGATIVE
NON-SQ EPI CELLS URNS QL MICRO: ABNORMAL /HPF
O2 CT BLDV-SCNC: 16.2 ML/DL
P AXIS: 127 DEGREES
PCO2 BLDV: 20.1 MM HG (ref 42–50)
PH BLDV: 6.84 [PH] (ref 7.3–7.4)
PH UR STRIP.AUTO: 5.5 [PH] (ref 4.5–8)
PHOSPHATE SERPL-MCNC: 1.9 MG/DL (ref 2.7–4.5)
PHOSPHATE SERPL-MCNC: 4 MG/DL (ref 2.7–4.5)
PLATELET # BLD AUTO: 215 THOUSANDS/UL (ref 149–390)
PLATELET # BLD AUTO: 282 THOUSANDS/UL (ref 149–390)
PLATELET BLD QL SMEAR: ADEQUATE
PMV BLD AUTO: 10.8 FL (ref 8.9–12.7)
PMV BLD AUTO: 11.4 FL (ref 8.9–12.7)
PO2 BLDV: 70.9 MM HG (ref 35–45)
POTASSIUM SERPL-SCNC: 4.3 MMOL/L (ref 3.5–5.3)
POTASSIUM SERPL-SCNC: 4.4 MMOL/L (ref 3.5–5.3)
POTASSIUM SERPL-SCNC: 6.1 MMOL/L (ref 3.5–5.3)
PROCALCITONIN SERPL-MCNC: 3.17 NG/ML
PROT SERPL-MCNC: 6.9 G/DL (ref 6.4–8.4)
PROT UR STRIP-MCNC: ABNORMAL MG/DL
QRS AXIS: 106 DEGREES
QRSD INTERVAL: 102 MS
QT INTERVAL: 362 MS
QTC INTERVAL: 459 MS
RBC # BLD AUTO: 4.34 MILLION/UL (ref 3.81–5.12)
RBC #/AREA URNS AUTO: ABNORMAL /HPF
RBC MORPH BLD: NORMAL
RSV RNA RESP QL NAA+PROBE: NEGATIVE
SARS-COV-2 RNA RESP QL NAA+PROBE: NEGATIVE
SODIUM SERPL-SCNC: 129 MMOL/L (ref 135–147)
SODIUM SERPL-SCNC: 133 MMOL/L (ref 135–147)
SODIUM SERPL-SCNC: 137 MMOL/L (ref 135–147)
SP GR UR STRIP.AUTO: >=1.03 (ref 1–1.03)
T WAVE AXIS: 182 DEGREES
UROBILINOGEN UR QL STRIP.AUTO: 0.2 E.U./DL
VENTRICULAR RATE: 97 BPM
WBC # BLD AUTO: 11.05 THOUSAND/UL (ref 4.31–10.16)
WBC #/AREA URNS AUTO: ABNORMAL /HPF

## 2023-10-21 PROCEDURE — 74176 CT ABD & PELVIS W/O CONTRAST: CPT

## 2023-10-21 PROCEDURE — 93005 ELECTROCARDIOGRAM TRACING: CPT

## 2023-10-21 PROCEDURE — 80053 COMPREHEN METABOLIC PANEL: CPT | Performed by: EMERGENCY MEDICINE

## 2023-10-21 PROCEDURE — 85027 COMPLETE CBC AUTOMATED: CPT | Performed by: EMERGENCY MEDICINE

## 2023-10-21 PROCEDURE — 83690 ASSAY OF LIPASE: CPT | Performed by: EMERGENCY MEDICINE

## 2023-10-21 PROCEDURE — 96365 THER/PROPH/DIAG IV INF INIT: CPT

## 2023-10-21 PROCEDURE — 82010 KETONE BODYS QUAN: CPT | Performed by: EMERGENCY MEDICINE

## 2023-10-21 PROCEDURE — 85049 AUTOMATED PLATELET COUNT: CPT

## 2023-10-21 PROCEDURE — 93010 ELECTROCARDIOGRAM REPORT: CPT

## 2023-10-21 PROCEDURE — 81001 URINALYSIS AUTO W/SCOPE: CPT

## 2023-10-21 PROCEDURE — 82948 REAGENT STRIP/BLOOD GLUCOSE: CPT

## 2023-10-21 PROCEDURE — 82805 BLOOD GASES W/O2 SATURATION: CPT | Performed by: EMERGENCY MEDICINE

## 2023-10-21 PROCEDURE — 70450 CT HEAD/BRAIN W/O DYE: CPT

## 2023-10-21 PROCEDURE — 85007 BL SMEAR W/DIFF WBC COUNT: CPT | Performed by: EMERGENCY MEDICINE

## 2023-10-21 PROCEDURE — NC001 PR NO CHARGE: Performed by: INTERNAL MEDICINE

## 2023-10-21 PROCEDURE — 99285 EMERGENCY DEPT VISIT HI MDM: CPT

## 2023-10-21 PROCEDURE — G1004 CDSM NDSC: HCPCS

## 2023-10-21 PROCEDURE — 84100 ASSAY OF PHOSPHORUS: CPT | Performed by: NURSE PRACTITIONER

## 2023-10-21 PROCEDURE — 84145 PROCALCITONIN (PCT): CPT | Performed by: EMERGENCY MEDICINE

## 2023-10-21 PROCEDURE — 83605 ASSAY OF LACTIC ACID: CPT | Performed by: EMERGENCY MEDICINE

## 2023-10-21 PROCEDURE — 81025 URINE PREGNANCY TEST: CPT | Performed by: EMERGENCY MEDICINE

## 2023-10-21 PROCEDURE — 87040 BLOOD CULTURE FOR BACTERIA: CPT | Performed by: EMERGENCY MEDICINE

## 2023-10-21 PROCEDURE — 83735 ASSAY OF MAGNESIUM: CPT

## 2023-10-21 PROCEDURE — 36415 COLL VENOUS BLD VENIPUNCTURE: CPT

## 2023-10-21 PROCEDURE — 82947 ASSAY GLUCOSE BLOOD QUANT: CPT | Performed by: INTERNAL MEDICINE

## 2023-10-21 PROCEDURE — 96375 TX/PRO/DX INJ NEW DRUG ADDON: CPT

## 2023-10-21 PROCEDURE — 83735 ASSAY OF MAGNESIUM: CPT | Performed by: NURSE PRACTITIONER

## 2023-10-21 PROCEDURE — 71045 X-RAY EXAM CHEST 1 VIEW: CPT

## 2023-10-21 PROCEDURE — 99291 CRITICAL CARE FIRST HOUR: CPT | Performed by: EMERGENCY MEDICINE

## 2023-10-21 PROCEDURE — 0241U HB NFCT DS VIR RESP RNA 4 TRGT: CPT | Performed by: EMERGENCY MEDICINE

## 2023-10-21 PROCEDURE — 84484 ASSAY OF TROPONIN QUANT: CPT | Performed by: EMERGENCY MEDICINE

## 2023-10-21 PROCEDURE — 87086 URINE CULTURE/COLONY COUNT: CPT | Performed by: EMERGENCY MEDICINE

## 2023-10-21 PROCEDURE — 80048 BASIC METABOLIC PNL TOTAL CA: CPT | Performed by: NURSE PRACTITIONER

## 2023-10-21 RX ORDER — SODIUM CHLORIDE, SODIUM GLUCONATE, SODIUM ACETATE, POTASSIUM CHLORIDE, MAGNESIUM CHLORIDE, SODIUM PHOSPHATE, DIBASIC, AND POTASSIUM PHOSPHATE .53; .5; .37; .037; .03; .012; .00082 G/100ML; G/100ML; G/100ML; G/100ML; G/100ML; G/100ML; G/100ML
1000 INJECTION, SOLUTION INTRAVENOUS ONCE
Status: COMPLETED | OUTPATIENT
Start: 2023-10-21 | End: 2023-10-21

## 2023-10-21 RX ORDER — LORAZEPAM 2 MG/ML
0.5 INJECTION INTRAMUSCULAR ONCE
Status: COMPLETED | OUTPATIENT
Start: 2023-10-21 | End: 2023-10-21

## 2023-10-21 RX ORDER — HEPARIN SODIUM 5000 [USP'U]/ML
5000 INJECTION, SOLUTION INTRAVENOUS; SUBCUTANEOUS EVERY 8 HOURS SCHEDULED
Status: DISCONTINUED | OUTPATIENT
Start: 2023-10-21 | End: 2023-10-22

## 2023-10-21 RX ORDER — ONDANSETRON 2 MG/ML
4 INJECTION INTRAMUSCULAR; INTRAVENOUS ONCE
Status: COMPLETED | OUTPATIENT
Start: 2023-10-21 | End: 2023-10-21

## 2023-10-21 RX ORDER — FAMOTIDINE 10 MG/ML
20 INJECTION, SOLUTION INTRAVENOUS 2 TIMES DAILY
Status: DISCONTINUED | OUTPATIENT
Start: 2023-10-21 | End: 2023-10-22

## 2023-10-21 RX ORDER — POTASSIUM CHLORIDE 14.9 MG/ML
20 INJECTION INTRAVENOUS ONCE
Status: COMPLETED | OUTPATIENT
Start: 2023-10-21 | End: 2023-10-21

## 2023-10-21 RX ORDER — SODIUM CHLORIDE, SODIUM GLUCONATE, SODIUM ACETATE, POTASSIUM CHLORIDE, MAGNESIUM CHLORIDE, SODIUM PHOSPHATE, DIBASIC, AND POTASSIUM PHOSPHATE .53; .5; .37; .037; .03; .012; .00082 G/100ML; G/100ML; G/100ML; G/100ML; G/100ML; G/100ML; G/100ML
500 INJECTION, SOLUTION INTRAVENOUS ONCE
Status: COMPLETED | OUTPATIENT
Start: 2023-10-22 | End: 2023-10-22

## 2023-10-21 RX ORDER — LEVOTHYROXINE SODIUM 0.1 MG/1
200 TABLET ORAL
Status: DISCONTINUED | OUTPATIENT
Start: 2023-10-22 | End: 2023-10-25 | Stop reason: HOSPADM

## 2023-10-21 RX ORDER — ONDANSETRON 2 MG/ML
1 INJECTION INTRAMUSCULAR; INTRAVENOUS ONCE
Status: COMPLETED | OUTPATIENT
Start: 2023-10-21 | End: 2023-10-21

## 2023-10-21 RX ORDER — DEXTROSE AND SODIUM CHLORIDE 5; .9 G/100ML; G/100ML
250 INJECTION, SOLUTION INTRAVENOUS CONTINUOUS
Status: DISCONTINUED | OUTPATIENT
Start: 2023-10-21 | End: 2023-10-22

## 2023-10-21 RX ORDER — CHLORHEXIDINE GLUCONATE ORAL RINSE 1.2 MG/ML
15 SOLUTION DENTAL EVERY 12 HOURS SCHEDULED
Status: DISCONTINUED | OUTPATIENT
Start: 2023-10-21 | End: 2023-10-25 | Stop reason: HOSPADM

## 2023-10-21 RX ORDER — SODIUM CHLORIDE, SODIUM GLUCONATE, SODIUM ACETATE, POTASSIUM CHLORIDE, MAGNESIUM CHLORIDE, SODIUM PHOSPHATE, DIBASIC, AND POTASSIUM PHOSPHATE .53; .5; .37; .037; .03; .012; .00082 G/100ML; G/100ML; G/100ML; G/100ML; G/100ML; G/100ML; G/100ML
500 INJECTION, SOLUTION INTRAVENOUS CONTINUOUS
Status: DISPENSED | OUTPATIENT
Start: 2023-10-21 | End: 2023-10-21

## 2023-10-21 RX ORDER — CEFTRIAXONE 2 G/50ML
2000 INJECTION, SOLUTION INTRAVENOUS EVERY 24 HOURS
Status: COMPLETED | OUTPATIENT
Start: 2023-10-22 | End: 2023-10-22

## 2023-10-21 RX ORDER — CEFTRIAXONE 1 G/50ML
1000 INJECTION, SOLUTION INTRAVENOUS ONCE
Status: COMPLETED | OUTPATIENT
Start: 2023-10-21 | End: 2023-10-21

## 2023-10-21 RX ORDER — LEVOTHYROXINE SODIUM 0.03 MG/1
25 TABLET ORAL
Status: DISCONTINUED | OUTPATIENT
Start: 2023-10-22 | End: 2023-10-25 | Stop reason: HOSPADM

## 2023-10-21 RX ORDER — SODIUM CHLORIDE, SODIUM GLUCONATE, SODIUM ACETATE, POTASSIUM CHLORIDE, MAGNESIUM CHLORIDE, SODIUM PHOSPHATE, DIBASIC, AND POTASSIUM PHOSPHATE .53; .5; .37; .037; .03; .012; .00082 G/100ML; G/100ML; G/100ML; G/100ML; G/100ML; G/100ML; G/100ML
250 INJECTION, SOLUTION INTRAVENOUS CONTINUOUS
Status: DISCONTINUED | OUTPATIENT
Start: 2023-10-21 | End: 2023-10-21

## 2023-10-21 RX ORDER — SODIUM CHLORIDE, SODIUM GLUCONATE, SODIUM ACETATE, POTASSIUM CHLORIDE, MAGNESIUM CHLORIDE, SODIUM PHOSPHATE, DIBASIC, AND POTASSIUM PHOSPHATE .53; .5; .37; .037; .03; .012; .00082 G/100ML; G/100ML; G/100ML; G/100ML; G/100ML; G/100ML; G/100ML
250 INJECTION, SOLUTION INTRAVENOUS CONTINUOUS
Status: DISPENSED | OUTPATIENT
Start: 2023-10-21 | End: 2023-10-22

## 2023-10-21 RX ORDER — SODIUM CHLORIDE 9 MG/ML
3 INJECTION INTRAVENOUS
Status: DISCONTINUED | OUTPATIENT
Start: 2023-10-21 | End: 2023-10-25 | Stop reason: HOSPADM

## 2023-10-21 RX ORDER — MAGNESIUM SULFATE HEPTAHYDRATE 40 MG/ML
2 INJECTION, SOLUTION INTRAVENOUS ONCE
Status: COMPLETED | OUTPATIENT
Start: 2023-10-21 | End: 2023-10-21

## 2023-10-21 RX ADMIN — SODIUM CHLORIDE 30.8 UNITS/HR: 9 INJECTION, SOLUTION INTRAVENOUS at 23:59

## 2023-10-21 RX ADMIN — SODIUM CHLORIDE, SODIUM GLUCONATE, SODIUM ACETATE, POTASSIUM CHLORIDE, MAGNESIUM CHLORIDE, SODIUM PHOSPHATE, DIBASIC, AND POTASSIUM PHOSPHATE 250 ML/HR: .53; .5; .37; .037; .03; .012; .00082 INJECTION, SOLUTION INTRAVENOUS at 15:27

## 2023-10-21 RX ADMIN — MAGNESIUM SULFATE HEPTAHYDRATE 2 G: 40 INJECTION, SOLUTION INTRAVENOUS at 20:54

## 2023-10-21 RX ADMIN — CHLORHEXIDINE GLUCONATE 15 ML: 1.2 RINSE ORAL at 20:06

## 2023-10-21 RX ADMIN — LORAZEPAM 0.5 MG: 2 INJECTION INTRAMUSCULAR; INTRAVENOUS at 15:20

## 2023-10-21 RX ADMIN — SODIUM CHLORIDE, SODIUM GLUCONATE, SODIUM ACETATE, POTASSIUM CHLORIDE, MAGNESIUM CHLORIDE, SODIUM PHOSPHATE, DIBASIC, AND POTASSIUM PHOSPHATE 1000 ML: .53; .5; .37; .037; .03; .012; .00082 INJECTION, SOLUTION INTRAVENOUS at 14:32

## 2023-10-21 RX ADMIN — SODIUM CHLORIDE 7.7 UNITS/HR: 9 INJECTION, SOLUTION INTRAVENOUS at 15:19

## 2023-10-21 RX ADMIN — SODIUM CHLORIDE, SODIUM GLUCONATE, SODIUM ACETATE, POTASSIUM CHLORIDE, MAGNESIUM CHLORIDE, SODIUM PHOSPHATE, DIBASIC, AND POTASSIUM PHOSPHATE 500 ML: .53; .5; .37; .037; .03; .012; .00082 INJECTION, SOLUTION INTRAVENOUS at 23:56

## 2023-10-21 RX ADMIN — SODIUM CHLORIDE 1000 ML: 0.9 INJECTION, SOLUTION INTRAVENOUS at 20:30

## 2023-10-21 RX ADMIN — CEFTRIAXONE 1000 MG: 1 INJECTION, SOLUTION INTRAVENOUS at 16:26

## 2023-10-21 RX ADMIN — HEPARIN SODIUM 5000 UNITS: 5000 INJECTION INTRAVENOUS; SUBCUTANEOUS at 19:32

## 2023-10-21 RX ADMIN — SODIUM CHLORIDE, SODIUM GLUCONATE, SODIUM ACETATE, POTASSIUM CHLORIDE, MAGNESIUM CHLORIDE, SODIUM PHOSPHATE, DIBASIC, AND POTASSIUM PHOSPHATE 500 ML/HR: .53; .5; .37; .037; .03; .012; .00082 INJECTION, SOLUTION INTRAVENOUS at 18:34

## 2023-10-21 RX ADMIN — SODIUM CHLORIDE, SODIUM GLUCONATE, SODIUM ACETATE, POTASSIUM CHLORIDE, MAGNESIUM CHLORIDE, SODIUM PHOSPHATE, DIBASIC, AND POTASSIUM PHOSPHATE 500 ML/HR: .53; .5; .37; .037; .03; .012; .00082 INJECTION, SOLUTION INTRAVENOUS at 16:00

## 2023-10-21 RX ADMIN — POTASSIUM CHLORIDE 20 MEQ: 14.9 INJECTION, SOLUTION INTRAVENOUS at 20:54

## 2023-10-21 RX ADMIN — FAMOTIDINE 20 MG: 10 INJECTION INTRAVENOUS at 19:31

## 2023-10-21 RX ADMIN — SODIUM CHLORIDE 30.8 UNITS/HR: 9 INJECTION, SOLUTION INTRAVENOUS at 20:38

## 2023-10-21 RX ADMIN — ONDANSETRON 4 MG: 2 INJECTION INTRAMUSCULAR; INTRAVENOUS at 15:23

## 2023-10-21 RX ADMIN — DEXTROSE AND SODIUM CHLORIDE 250 ML/HR: 5; .9 INJECTION, SOLUTION INTRAVENOUS at 21:25

## 2023-10-21 NOTE — ASSESSMENT & PLAN NOTE
Patient is a known case of hypothyroidism  Continue home medication with 225 mcg of levothyroxine  Monitor vitals  Titrate as per TSH once acute event of DKA has resolved.

## 2023-10-21 NOTE — ED PROVIDER NOTES
History  Chief Complaint   Patient presents with    Hyperglycemia - Symptomatic     Pt is has a pre hospital BS of 578. Pt is SOB, fatigue, slurring words, generalized pain and nauseous. Genesis Khalil is a 61-year-old female here for evaluation of multiple complaints. She is an insulin-dependent diabetic with history of multiple previous admissions for diabetic ketoacidosis. She is a poor historian. She is able to answer some simple questions but unable to provide a detailed history of present illness. She says that she has been feeling generally unwell for "about a week."  She does state that she feels somewhat short of breath. Denies fevers. Prior to Admission Medications   Prescriptions Last Dose Informant Patient Reported? Taking? ALPRAZolam (XANAX) 0.5 mg tablet  Self Yes No   Sig: Take 0.5 mg by mouth daily as needed     Patient not taking: Reported on 10/18/2023   Accu-Chek FastClix Lancets MISC   No No   Sig: USE 1 LANCET TO TEST BLOOD SUGAR UP TO 6 TIMES DAILY   Continuous Blood Gluc  (FreeStyle Samantha 14 Day [place]) KELVIN   No No   Sig: Use 1 Units in the morning   Continuous Blood Gluc  (FreeStyle Samantha 2 [place]) KELVIN   No No   Sig: Use 1 units in the morning   Patient not taking: Reported on 10/18/2023   Continuous Blood Gluc Sensor (FreeStyle Samantha 14 Day Sensor) [facility] [place]   No No   Sig: Use 1 Units every 14 (fourteen) days   Continuous Blood Gluc Sensor (FreeStyle Samantha 2 Sensor) [facility] [place]   No No   Sig: Use 1 unit every 14 days   Insulin Aspart (NovoLOG) 100 units/mL injection   No No   Sig: Sliding scale up to 3 times per day with meals.  Max 30 units per day   Insulin Pen Needle (BD Pen Needle Deanna U/F) 32G X 4 MM MISC   No No   Sig: Use 4/day   Insulin Syringe-Needle U-100 (INSULIN SYRINGE 1CC/28G) 28G X 1/2" 1 ML MISC   Yes No   Vyvanse 70 MG capsule   Yes No   Sig: Take 70 mg by mouth daily   Patient not taking: Reported on 10/18/2023   acetaminophen (TYLENOL) 500 mg tablet  Self Yes No   Sig:     Patient not taking: Reported on 10/18/2023   busPIRone (BUSPAR) 30 MG tablet  Self Yes No   Patient not taking: Reported on 10/18/2023   butalbital-acetaminophen-caffeine (Esgic) -40 mg per tablet   No No   Sig: Take 1 tablet by mouth every 6 (six) hours as needed for headaches for up to 15 doses   cephalexin (KEFLEX) 500 mg capsule   No No   Sig: Take 1 capsule (500 mg total) by mouth every 8 (eight) hours for 5 days Do not start before October 19, 2023. ergocalciferol (VITAMIN D2) 50,000 units   Yes No   Sig: Take 50,000 Units by mouth once a week   escitalopram (LEXAPRO) 20 mg tablet  Self Yes No   Sig: Take 10 mg by mouth daily Half tab daily   Patient not taking: Reported on 10/18/2023   gabapentin (NEURONTIN) 600 MG tablet  Self No No   Sig: Take 0.5 tablets (300 mg total) by mouth 2 (two) times a day Half Tab 300mg at bedtime   glucose blood (Accu-Chek Guide) test strip  Self No No   Sig: Use to test blood sugar up to 6 times daily. Patient not taking: Reported on 10/18/2023   ibuprofen (MOTRIN) 600 mg tablet   Yes No   insulin glargine (LANTUS) 100 units/mL subcutaneous injection   No No   Sig: INJECT 36 UNITS UNDER THE SKIN DAILY AT BEDTIME   ketoconazole (NIZORAL) 2 % cream   No No   Sig: Apply topically daily   Patient not taking: Reported on 10/18/2023   levothyroxine (Synthroid) 25 mcg tablet   No No   Sig: Take 1 tablet (25 mcg total) by mouth daily in the early morning Combine with 200mcg, for total 225mcg daily.    levothyroxine 200 mcg tablet  Self No No   Sig: Take 1 tablet (200 mcg total) by mouth every morning   lidocaine (LMX) 4 % cream   No No   Sig: Apply topically as needed for moderate pain or mild pain   Patient not taking: Reported on 10/18/2023   meclizine (ANTIVERT) 25 mg tablet   No No   Sig: Take 1 tablet (25 mg total) by mouth every 8 (eight) hours as needed for dizziness   Patient not taking: Reported on 10/18/2023   ondansetron (ZOFRAN) 4 mg tablet   No No Sig: Take 1 tablet (4 mg total) by mouth every 8 (eight) hours as needed for nausea or vomiting   Patient not taking: Reported on 10/18/2023   ondansetron (ZOFRAN-ODT) 4 mg disintegrating tablet   No No   Sig: Take 1 tablet (4 mg total) by mouth every 6 (six) hours as needed for nausea or vomiting   oxyCODONE-acetaminophen (Percocet) 5-325 mg per tablet   No No   Sig: Take 1 tablet by mouth every 8 (eight) hours as needed for moderate pain or severe pain Max Daily Amount: 3 tablets   Patient not taking: Reported on 10/18/2023   selenium 200 mcg   Yes No   Sig: Take 200 mcg by mouth daily   Patient not taking: Reported on 10/18/2023   traZODone (DESYREL) 100 mg tablet  Self Yes No   Sig: Take 100 mg by mouth daily at bedtime   valACYclovir (VALTREX) 500 mg tablet   No No   Sig: Take 1 tablet (500 mg total) by mouth daily   Patient not taking: Reported on 10/18/2023      Facility-Administered Medications: None       Past Medical History:   Diagnosis Date    Anemia     Anxiety     B12 deficiency     Cervical disc disorder     On gabapentin     Depression     Diabetes mellitus ([address])     Disease of thyroid gland     hypothyroid    Iron deficiency anemia     Panic attack     PTSD (post-traumatic stress disorder)     Sleep difficulties     Substance abuse ([address])     Type 1 diabetes ([address])     Vitamin D deficiency        Past Surgical History:   Procedure Laterality Date    ABDOMINAL SURGERY      gastric bypass     SECTION      x2    CHOLECYSTECTOMY  2018    GASTRIC BYPASS  2007    INTRAUTERINE DEVICE INSERTION  2015    IR BIOPSY BONE MARROW  2020    OTHER SURGICAL HISTORY      surgery for imperforate hymen/endometriosis/hydrometrocolpos    TUBAL LIGATION      WISDOM TOOTH EXTRACTION         Family History   Problem Relation Age of Onset    Thyroid disease Mother     URIEL disease Mother     Hyperlipidemia Mother     Hypertension Mother     Osteoarthritis Mother     Thyroid disease unspecified Mother Diabetes Father     Alcohol abuse Father     Diabetes type I Father     Thyroid disease Sister     Depression Sister     Thyroid disease unspecified Sister     Anxiety disorder Sister     Heart disease Maternal Grandmother     Hypertension Maternal Grandmother     Arthritis Maternal Grandmother     Diabetes type I Maternal Uncle     Diabetes type I Maternal Grandfather      I have reviewed and agree with the history as documented. E-Cigarette/Vaping    E-Cigarette Use Never User      E-Cigarette/Vaping Substances    Nicotine No     THC No     CBD No     Flavoring No     Other No     Unknown No      Social History     Tobacco Use    Smoking status: Never    Smokeless tobacco: Never   Vaping Use    Vaping Use: Never used   Substance Use Topics    Alcohol use: Never     Comment: occasional    Drug use: No       Review of Systems   Unable to perform ROS: Acuity of condition   All other systems reviewed and are negative. Physical Exam  Physical Exam  Vitals and nursing note reviewed. Constitutional:       General: She is not in acute distress. Appearance: She is well-developed. HENT:      Head: Normocephalic and atraumatic. Eyes:      Conjunctiva/sclera: Conjunctivae normal.   Cardiovascular:      Rate and Rhythm: Regular rhythm. Tachycardia present. Heart sounds: No murmur heard. Pulmonary:      Effort: Pulmonary effort is normal.      Breath sounds: Normal breath sounds. Comments: Moderate tachypnea without significant accessory muscle use. Oxygen saturations 95% on room air. Abdominal:      Palpations: Abdomen is soft. Tenderness: There is no abdominal tenderness. Musculoskeletal:         General: No swelling. Cervical back: Neck supple. Skin:     General: Skin is warm and dry. Capillary Refill: Capillary refill takes less than 2 seconds. Neurological:      General: No focal deficit present. Mental Status: She is alert and oriented to person, place, and time. Psychiatric:         Mood and Affect: Mood normal.         Vital Signs  ED Triage Vitals [10/21/23 1318]   Temperature Pulse Respirations Blood Pressure SpO2   (!) 97.2 °F (36.2 °C) 98 (!) 30 117/53 98 %      Temp Source Heart Rate Source Patient Position - Orthostatic VS BP Location FiO2 (%)   Oral Monitor Lying Right arm --      Pain Score       --           Vitals:    10/21/23 1318 10/21/23 1445   BP: 117/53 125/65   Pulse: 98 94   Patient Position - Orthostatic VS: Lying Lying         Visual Acuity      ED Medications  Medications   sodium chloride (PF) 0.9 % injection 3 mL (has no administration in time range)   insulin regular (HumuLIN R,NovoLIN R) 1 Units/mL in sodium chloride 0.9 % 100 mL infusion (7.7 Units/hr Intravenous New Bag 10/21/23 1519)   multi-electrolyte (PLASMALYTE-A/ISOLYTE-S PH 7.4) IV solution (0 mL/hr Intravenous Stopped 10/21/23 1559)   cefTRIAXone (ROCEPHIN) IVPB (premix in dextrose) 1,000 mg 50 mL (1,000 mg Intravenous New Bag 10/21/23 1626)   levothyroxine tablet 25 mcg (has no administration in time range)   levothyroxine tablet 200 mcg (has no administration in time range)   chlorhexidine (PERIDEX) 0.12 % oral rinse 15 mL (has no administration in time range)   heparin (porcine) subcutaneous injection 5,000 Units (has no administration in time range)   Famotidine (PF) (PEPCID) injection 20 mg (has no administration in time range)   multi-electrolyte (ISOLYTE-S PH 7.4 equivalent) IV solution (500 mL/hr Intravenous New Bag 10/21/23 1600)     Followed by   multi-electrolyte (ISOLYTE-S PH 7.4 equivalent) IV solution (has no administration in time range)   dextrose 5 % and sodium chloride 0.9 % infusion (has no administration in time range)   cefTRIAXone (ROCEPHIN) IVPB (premix in dextrose) 2,000 mg 50 mL (has no administration in time range)   ondansetron (FOR EMS ONLY) (ZOFRAN) 4 mg/2 mL injection 4 mg (0 mg Does not apply Given to EMS 10/21/23 1316)   multi-electrolyte (ISOLYTE-S PH 7.4) bolus 1,000 mL (0 mL Intravenous Stopped 10/21/23 1532)   LORazepam (ATIVAN) injection 0.5 mg (0.5 mg Intravenous Given 10/21/23 1520)   ondansetron (ZOFRAN) injection 4 mg (4 mg Intravenous Given 10/21/23 1523)       Diagnostic Studies  Results Reviewed       Procedure Component Value Units Date/Time    Magnesium [31982]  (Normal) Collected: 10/21/23 1415    Lab Status: Final result Specimen: Blood from Arm, Left Updated: 10/21/23 1653     Magnesium 2.1 mg/dL     Comprehensive metabolic panel [[id number]]  (Abnormal) Collected: 10/21/23 1555    Lab Status: Final result Specimen: Blood from Arm, Right Updated: 10/21/23 1652     Sodium 129 mmol/L      Potassium 6.1 mmol/L      Chloride 98 mmol/L      CO2 <6 mmol/L      ANION GAP >25 mmol/L      BUN 32 mg/dL      Creatinine 1.67 mg/dL      Glucose 542 mg/dL      Calcium 8.3 mg/dL      Corrected Calcium 8.8 mg/dL      AST 13 U/L      ALT 11 U/L      Alkaline Phosphatase 107 U/L      Total Protein 6.9 g/dL      Albumin 3.4 g/dL      Total Bilirubin 0.31 mg/dL      eGFR 37 ml/min/1.73sq m     Narrative:      [place] guidelines for Chronic Kidney Disease (CKD):     Stage 1 with normal or high GFR (GFR > 90 mL/min/1.73 square meters)    Stage 2 Mild CKD (GFR = 60-89 mL/min/1.73 square meters)    Stage 3A Moderate CKD (GFR = 45-59 mL/min/1.73 square meters)    Stage 3B Moderate CKD (GFR = 30-44 mL/min/1.73 square meters)    Stage 4 Severe CKD (GFR = 15-29 mL/min/1.73 square meters)    Stage 5 End Stage CKD (GFR <15 mL/min/1.73 square meters)  Note: GFR calculation is accurate only with a steady state creatinine    POCT pregnancy, urine [[id number]]  (Normal) Resulted: 10/21/23 1649    Lab Status: Final result Updated: 10/21/23 1649     EXT Preg Test, Ur Negative     Control Valid    Glucose, random [[id number]] Collected: 10/21/23 1645    Lab Status:  In process Specimen: Blood from Arm, Right Updated: 10/21/23 5263    Urine Microscopic [[id number]] Collected: 10/21/23 1630    Lab Status: In process Specimen: Urine, Clean Catch Updated: 10/21/23 1634    Urine culture [[id number]] Collected: 10/21/23 1626    Lab Status: In process Specimen: Urine, Clean Catch Updated: 10/21/23 1634    Fingerstick Glucose (POCT) [[id number]]  (Abnormal) Collected: 10/21/23 1631    Lab Status: Final result Updated: 10/21/23 1632     POC Glucose >500 mg/dl     Urine Macroscopic, POC [[id number]]  (Abnormal) Collected: 10/21/23 1630    Lab Status: Final result Specimen: Urine Updated: 10/21/23 1631     Color, UA Yellow     Clarity, UA Clear     pH, UA 5.5     Leukocytes, UA Negative     Nitrite, UA Negative     Protein,  (2+) mg/dl      Glucose,  (1/2%) mg/dl      Ketones, UA 80 (3+) mg/dl      Urobilinogen, UA 0.2 E.U./dl      Bilirubin, UA Small     Occult Blood, UA Moderate     Specific Gravity, UA >=1.030    Narrative:      CLINITEK RESULT    Lipase [[id number]]  (Normal) Collected: 10/21/23 1555    Lab Status: Final result Specimen: Blood from Arm, Right Updated: 10/21/23 1626     Lipase 21 u/L     Lactic acid, plasma (w/reflex if result > 2.0) [[id number]]  (Normal) Collected: 10/21/23 1555    Lab Status: Final result Specimen: Blood from Arm, Right Updated: 10/21/23 1620     LACTIC ACID 0.9 mmol/L     Narrative:      Result may be elevated if tourniquet was used during collection.     Platelet count [[id number]]  (Normal) Collected: 10/21/23 1555    Lab Status: Final result Specimen: Blood from Arm, Right Updated: 10/21/23 1600     Platelets 663 Thousands/uL      MPV 10.8 fL     Calcium, ionized [[id number]]     Lab Status: No result Specimen: Blood     Beta Hydroxybutyrate [[id number]]  (Abnormal) Collected: 10/21/23 1509    Lab Status: Final result Specimen: Blood from Arm, Right Updated: 10/21/23 1529     BETA-HYDROXYBUTYRATE 5.6 mmol/L     Fingerstick Glucose (POCT) [[id number]]  (Abnormal) Collected: 10/21/23 6515    Lab Status: Final result Updated: 10/21/23 1520     POC Glucose >500 mg/dl     FLU/RSV/COVID - if FLU/RSV clinically relevant [[id number]]  (Normal) Collected: 10/21/23 1434    Lab Status: Final result Specimen: Nares from Nose Updated: 10/21/23 1517     SARS-CoV-2 Negative     INFLUENZA A PCR Negative     INFLUENZA B PCR Negative     RSV PCR Negative    Narrative:      FOR PEDIATRIC PATIENTS - copy/paste COVID Guidelines URL to browser: [email or web address]    SARS-CoV-2 assay is a Nucleic Acid Amplification assay intended for the  qualitative detection of nucleic acid from SARS-CoV-2 in nasopharyngeal  swabs. Results are for the presumptive identification of SARS-CoV-2 RNA. Positive results are indicative of infection with SARS-CoV-2, the virus  causing COVID-19, but do not rule out bacterial infection or co-infection  with other viruses. Laboratories within the [facility] and its  territories are required to report all positive results to the appropriate  public health authorities. Negative results do not preclude SARS-CoV-2  infection and should not be used as the sole basis for treatment or other  patient management decisions. Negative results must be combined with  clinical observations, patient history, and epidemiological information. This test has not been FDA cleared or approved. This test has been authorized by FDA under an Emergency Use Authorization  (EUA). This test is only authorized for the duration of time the  declaration that circumstances exist justifying the authorization of the  emergency use of an in vitro diagnostic tests for detection of SARS-CoV-2  virus and/or diagnosis of COVID-19 infection under section 564(b)(1) of  the Act, 21 U. S.C. 043OFA-8(A)(6), unless the authorization is terminated  or revoked sooner. The test has been validated but independent review by FDA  and CLIA is pending. Test performed using [employer]:  This RT-PCR assay targets N2,  a region unique to SARS-CoV-2. A conserved region in the E-gene was chosen  for pan-Sarbecovirus detection which includes SARS-CoV-2. According to CMS-2020-01-R, this platform meets the definition of high-throughput technology. Procalcitonin [[id number]]  (Abnormal) Collected: 10/21/23 1415    Lab Status: Final result Specimen: Blood from Arm, Left Updated: 10/21/23 1457     Procalcitonin 3.17 ng/ml     HS Troponin 0hr (reflex protocol) [[id number]]  (Normal) Collected: 10/21/23 1415    Lab Status: Final result Specimen: Blood from Arm, Left Updated: 10/21/23 1453     hs TnI 0hr 3 ng/L     CBC and differential [[id number]]  (Abnormal) Collected: 10/21/23 1332    Lab Status: Final result Specimen: Blood from Arm, Left Updated: 10/21/23 1450     WBC 11.05 Thousand/uL      RBC 4.34 Million/uL      Hemoglobin 12.8 g/dL      Hematocrit 42.6 %      MCV 98 fL      MCH 29.5 pg      MCHC 30.0 g/dL      RDW 12.9 %      MPV 11.4 fL      Platelets 460 Thousands/uL     Narrative: This is an appended report. These results have been appended to a previously verified report. Manual Differential(PHLEBS Do Not Order) [[id number]]  (Abnormal) Collected: 10/21/23 1332    Lab Status: Final result Specimen: Blood from Arm, Left Updated: 10/21/23 1450     Segmented % 69 %      Bands % 9 %      Lymphocytes % 15 %      Monocytes % 5 %      Eosinophils, % 0 %      Basophils % 0 %      Metamyelocytes% 1 %      Myelocytes % 1 %      Absolute Neutrophils 8.62 Thousand/uL      Lymphocytes Absolute 1.66 Thousand/uL      Monocytes Absolute 0.55 Thousand/uL      Eosinophils Absolute 0.00 Thousand/uL      Basophils Absolute 0.00 Thousand/uL      Total Counted --     RBC Morphology Normal     Platelet Estimate Adequate    Blood culture #2 [[id number]] Collected: 10/21/23 1442    Lab Status:  In process Specimen: Blood from Hand, Left Updated: 10/21/23 1447    Blood gas, venous [[id number]]  (Abnormal) Collected: 10/21/23 1416    Lab Status: Final result Specimen: Blood from Arm, Left Updated: 10/21/23 1440     pH, Tate 6.841     pCO2, Tate 20.1 mm Hg      pO2, Tate 70.9 mm Hg      HCO3, Tate 3.4 mmol/L      Base Excess, Tate -29.6 mmol/L      O2 Content, Tate 16.2 ml/dL      O2 HGB, VENOUS 86.3 %     Blood culture #1 [[id number]] Collected: 10/21/23 1420    Lab Status: In process Specimen: Blood from Arm, Right Updated: 10/21/23 1423    Fingerstick Glucose (POCT) [[id number]]  (Abnormal) Collected: 10/21/23 1327    Lab Status: Final result Updated: 10/21/23 1330     POC Glucose 500 mg/dl                    CT abdomen pelvis wo contrast   Final Result by Anna Brenner DO (10/21 1647)      *Limited study due to absence of IV or oral contrast as well as other artifact. No acute intra-abdominal or pelvic pathology within limitations. Moderate bladder distention. Followup imaging with oral and/or IV contrast may be considered for any persistent or worsening symptoms. Workstation performed: [id number]         CT head without contrast   Final Result by Miki Lee MD (10/21 1523)      No acute intracranial abnormality.                   Workstation performed: [id number]         XR chest 1 view portable    (Results Pending)              Procedures  CriticalCare Time    Date/Time: 10/21/2023 4:40 PM    Performed by: Michael Llanos MD  Authorized by: Michael Llanos MD    Critical care provider statement:     Critical care time (minutes):  51    Critical care time was exclusive of:  Separately billable procedures and treating other patients    Critical care was necessary to treat or prevent imminent or life-threatening deterioration of the following conditions:  Dehydration    Critical care was time spent personally by me on the following activities:  Blood draw for specimens, obtaining history from patient or surrogate, development of treatment plan with patient or surrogate, discussions with primary provider, evaluation of patient's response to treatment, examination of patient, ordering and performing treatments and interventions, ordering and review of laboratory studies, ordering and review of radiographic studies, re-evaluation of patient's condition and review of old charts           ED Course                               SBIRT 22yo+      Flowsheet Row Most Recent Value   Initial Alcohol Screen: US AUDIT-C     1. How often do you have a drink containing alcohol? 0 Filed at: 10/21/2023 1348   2. How many drinks containing alcohol do you have on a typical day you are drinking? 0 Filed at: 10/21/2023 1348   3a. Male UNDER 65: How often do you have five or more drinks on one occasion? 0 Filed at: 10/21/2023 1348   3b. FEMALE Any Age, or MALE 65+: How often do you have 4 or more drinks on one occassion? 0 Filed at: 10/21/2023 1348   Audit-C Score 0 Filed at: 10/21/2023 1348   MICHELLE: How many times in the past year have you. .. Used an illegal drug or used a prescription medication for non-medical reasons? Never Filed at: 10/21/2023 1348                      Medical Decision Making  61-year-old female complaining of generalized fatigue/malaise. She is overall altered and unable to provide a history of present illness. She does have history of diabetic ketoacidosis that is well documented from previous ED and inpatient records. Denies fevers or recent illnesses but still somewhat of an unreliable historian. Does have elevated procalcitonin and WBC. Was given empiric antibiotics and blood cultures were drawn. Unfortunately metabolic panel hemolyzed and had to be redrawn. VBG was notable for markedly decreased pH of 6.8 and insulin gtt. started for presumed DKA. Tyra Douglas Discussed with ICU, who evaluated the patient at bedside and they will admit her to their service.     Problems Addressed:  Altered mental status: acute illness or injury that poses a threat to life or bodily functions  DKA (diabetic ketoacidosis) ([address]): acute illness or injury that poses a threat to life or bodily functions    Amount and/or Complexity of Data Reviewed  Labs: ordered. Radiology: ordered. Risk  Prescription drug management. Disposition  Final diagnoses:   DKA (diabetic ketoacidosis) ([address])   Altered mental status     Time reflects when diagnosis was documented in both MDM as applicable and the Disposition within this note       Time User Action Codes Description Comment    10/21/2023  3:31 PM Pat Chambers Add [E10.10] Diabetic ketoacidosis without coma associated with type 1 diabetes mellitus ([address])     10/21/2023  4:51 PM Lj Vidal MIRANDA Add [E11.10] DKA (diabetic ketoacidosis) ([address])     10/21/2023  4:55 PM Barbara Edmonds Add [R41.82] Altered mental status           ED Disposition       ED Disposition   Admit    Condition   Stable    Date/Time   Sat Oct 21, 2023 1651    Comment   Case was discussed with ICU and the patient's admission status was agreed to be Admission Status: inpatient status to the service of Dr. Pooja Escobar.               Follow-up Information    None         Patient's Medications   Discharge Prescriptions    No medications on file       No discharge procedures on file.     PDMP Review       None            ED Provider  Electronically Signed by             Abeba Murray MD  10/21/23 [address] [facility],[address], [place]  10/21/23 7236

## 2023-10-21 NOTE — H&P
[address] [facility]  H&P  Name: Shakir Nam 41 y.o. female I MRN: [id number]  Unit/Bed#: ED-29 I Date of Admission: 10/21/2023   Date of Service: 10/21/2023 I Hospital Day: 0      Assessment/Plan   * DKA (diabetic ketoacidosis) [facility])  Assessment & Plan  Lab Results   Component Value Date    HGBA1C 10.2 (H) 06/13/2023       Recent Labs     10/21/23  1327 10/21/23  1518   POCGLU 500* >500*       Blood Sugar Average: Last 72 hrs:  (P) 500    Patient is a known case of type 1 diabetes. Patient has had similar episodes in the past.  Most likely UTI versus concussion is the trigger for her DKA. POA blood glucose more than 500, HbA1c 10.2  Beta hydroxybutyrate 5.6  Procalcitonin 3.17  Leukocytosis with blood count of 11,000  VBG shows metabolic acidosis with a pH of 6.84, PCO2 22.1, bicarb 3.4    Plan:  Fluid replacement, electrolyte replacement, insulin as per DKA protocol  Keep the patient n.p.o.  Monitor BMP every 4 hours  Frequent neurochecks  Monitor vitals. Monitor input and output. Concussion  Assessment & Plan  Patient has a history of concussion on 10/18 when she bumped her head while at work  10/21: CT head shows no intracranial abnormalities    Monitor for neurological changes or altered mental status      UTI (urinary tract infection)  Assessment & Plan  Patient had lower urinary tract symptoms on presentation on 10/18/2023  Was started on Keflex 500 mg  Stop Keflex, start ceftriaxone 2 g once daily  Monitor input and output  Follow-up on urine culture    Hypothyroidism  Assessment & Plan  Patient is a known case of hypothyroidism  Continue home medication with 225 mcg of levothyroxine  Monitor vitals  Titrate as per TSH once acute event of DKA has resolved.       THO (acute kidney injury) [facility])  Assessment & Plan  Recent Labs     10/18/23  2015 10/18/23  2259   CREATININE 1.33* 1.19   EGFR 49 56     Estimated Creatinine Clearance: 65.3 mL/min (by C-G formula based on SCr of 1.19 mg/dL). POA 1.3; (baseline 0.65-0.91)  Likely secondary to DKA secondary to UTI versus concussion    Plan:  UA w/ microscopic, Urinary retention protocol, Bladder scan, Daily weights, I/O  IV Fluids Isolyte as per DKA protocol  Monitor BMP daily and observe for downward trend of creatinine  Avoid hypoperfusion of the kidneys, minimize nephrotoxins           History of Present Illness     HPI: Michael Mckay is a 41 y.o. who presents with altered mental status, rapid shallow breathing. Patient hit her head on 10/18/2023 and was brought to ED with  She was diagnosed with concussion and also because of her ongoing lower urinary tract symptoms, she was diagnosed with a UTI at that time. She lives alone with her children in an apartment. This morning her children called the grandmother because did not test the patient having an altered mental status and rapid shallow breathing. According to the mother, the patient has been having ongoing chills, rigors, generalized body aches, decrease in appetite, lower urinary tract symptoms. History obtained from patient's mother. History was unobtainable from patient due to mental status change. Review of Systems   Constitutional:  Positive for activity change, appetite change, chills, fatigue and fever. HENT:  Negative for ear pain and sore throat. Eyes:  Negative for pain and visual disturbance. Respiratory:  Negative for cough and shortness of breath. Cardiovascular:  Negative for chest pain and palpitations. Gastrointestinal:  Positive for abdominal pain, diarrhea and nausea. Negative for vomiting. Genitourinary:  Positive for dysuria, frequency and urgency. Negative for hematuria. Musculoskeletal:  Negative for arthralgias and back pain. Skin:  Negative for color change and rash. Neurological:  Positive for speech difficulty and weakness. Negative for seizures and syncope. Psychiatric/Behavioral:  Positive for confusion.  The patient is nervous/anxious. All other systems reviewed and are negative. Historical Information   Past Medical History:  No date: Anemia  No date: Anxiety  No date: B12 deficiency  No date: Cervical disc disorder      Comment:  On gabapentin   No date: Depression  No date: Diabetes mellitus ([facility])  No date: Disease of thyroid gland      Comment:  hypothyroid  No date: Iron deficiency anemia  No date: Panic attack  No date: PTSD (post-traumatic stress disorder)  No date: Sleep difficulties  No date: Substance abuse ([facility])  No date: Type 1 diabetes ([facility])  No date: Vitamin D deficiency Past Surgical History:  No date: ABDOMINAL SURGERY      Comment:  gastric bypass  No date:  SECTION      Comment:  x2  2018: CHOLECYSTECTOMY  2007: GASTRIC BYPASS  2015: INTRAUTERINE DEVICE INSERTION  2020: IR BIOPSY BONE MARROW  No date: OTHER SURGICAL HISTORY      Comment:  surgery for imperforate                hymen/endometriosis/hydrometrocolpos  No date: TUBAL LIGATION  No date: WISDOM TOOTH EXTRACTION   Current Outpatient Medications   Medication Instructions    Accu-Chek FastClix Lancets MISC USE 1 LANCET TO TEST BLOOD SUGAR UP TO 6 TIMES DAILY    acetaminophen (TYLENOL) 500 mg tablet      ALPRAZolam (XANAX) 0.5 mg, Daily PRN    busPIRone (BUSPAR) 30 MG tablet No dose, route, or frequency recorded.     butalbital-acetaminophen-caffeine (Esgic) -40 mg per tablet 1 tablet, Oral, Every 6 hours PRN    cephalexin (KEFLEX) 500 mg, Oral, Every 8 hours scheduled    Continuous Blood Gluc  (FreeStyle Samantha 14 Day [place]) KELVIN 1 Units, Does not apply, Daily    Continuous Blood Gluc  (FreeStyle Gering 2 [place]) KELVIN Use 1 units in the morning    Continuous Blood Gluc Sensor (FreeStyle Samantha 14 Day Sensor) MISC 1 Units, Does not apply, Every 14 days    Continuous Blood Gluc Sensor (FreeStyle Samantha 2 Sensor) MISC Use 1 unit every 14 days    ergocalciferol (VITAMIN D2) 50,000 Units, Oral, Weekly escitalopram (LEXAPRO) 10 mg, Daily    gabapentin (NEURONTIN) 300 mg, Oral, 2 times daily, Half Tab 300mg at bedtime    glucose blood (Accu-Chek Guide) test strip Use to test blood sugar up to 6 times daily. ibuprofen (MOTRIN) 600 mg tablet     Insulin Aspart (NovoLOG) 100 units/mL injection Sliding scale up to 3 times per day with meals. Max 30 units per day    insulin glargine (LANTUS) 36 Units, Subcutaneous, Daily at bedtime    Insulin Pen Needle (BD Pen Needle Deanna U/F) 32G X 4 MM MISC Use 4/day    Insulin Syringe-Needle U-100 (INSULIN SYRINGE 1CC/28G) 28G X 1/2" 1 ML MISC     ketoconazole (NIZORAL) 2 % cream Topical, Daily    levothyroxine (SYNTHROID) 25 mcg, Oral, Daily (early morning), Combine with 200mcg, for total 225mcg daily.     levothyroxine 200 mcg, Oral, Every morning    lidocaine (LMX) 4 % cream Topical, As needed    meclizine (ANTIVERT) 25 mg, Oral, Every 8 hours PRN    ondansetron (ZOFRAN) 4 mg, Oral, Every 8 hours PRN    ondansetron (ZOFRAN-ODT) 4 mg, Oral, Every 6 hours PRN    oxyCODONE-acetaminophen (Percocet) 5-325 mg per tablet 1 tablet, Oral, Every 8 hours PRN    selenium 200 mcg, Daily    traZODone (DESYREL) 100 mg, Oral, Daily at bedtime    valACYclovir (VALTREX) 500 mg, Oral, Daily    Vyvanse 70 mg, Daily    No Known Allergies   Social History     Tobacco Use    Smoking status: Never    Smokeless tobacco: Never   Vaping Use    Vaping Use: Never used   Substance Use Topics    Alcohol use: Never     Comment: occasional    Drug use: No    Family History   Problem Relation Age of Onset    Thyroid disease Mother     URIEL disease Mother     Hyperlipidemia Mother     Hypertension Mother     Osteoarthritis Mother     Thyroid disease unspecified Mother     Diabetes Father     Alcohol abuse Father     Diabetes type I Father     Thyroid disease Sister     Depression Sister     Thyroid disease unspecified Sister     Anxiety disorder Sister     Heart disease Maternal Grandmother     Hypertension Maternal Grandmother     Arthritis Maternal Grandmother     Diabetes type I Maternal Uncle     Diabetes type I Maternal Grandfather           Objective                            Vitals I/O      Most Recent Min/Max in 24hrs   Temp (!) 97.2 °F (36.2 °C) Temp  Min: 97.2 °F (36.2 °C)  Max: 97.2 °F (36.2 °C)   Pulse 94 Pulse  Min: 94  Max: 98   Resp (!) 29 Resp  Min: 29  Max: 30   /65 BP  Min: 117/53  Max: 125/65   O2 Sat 100 % SpO2  Min: 98 %  Max: 100 %      Intake/Output Summary (Last 24 hours) at 10/21/2023 1628  Last data filed at 10/21/2023 1559  Gross per 24 hour   Intake 1633.33 ml   Output --   Net 1633.33 ml         Diet NPO     Invasive Monitoring Physical exam    Physical Exam  Vitals and nursing note reviewed. Constitutional:       General: She is in acute distress. Appearance: She is well-developed. She is obese. She is ill-appearing. HENT:      Head: Normocephalic and atraumatic. Mouth/Throat:      Mouth: Mucous membranes are dry. Eyes:      Conjunctiva/sclera: Conjunctivae normal.   Cardiovascular:      Rate and Rhythm: Regular rhythm. Tachycardia present. Heart sounds: No murmur heard. No friction rub. No gallop. Pulmonary:      Effort: Pulmonary effort is normal. No respiratory distress. Breath sounds: Normal breath sounds. No wheezing, rhonchi or rales. Abdominal:      General: Abdomen is flat. Palpations: Abdomen is soft. Tenderness: There is abdominal tenderness. There is no guarding. Musculoskeletal:         General: No swelling. Cervical back: Neck supple. Right lower leg: No edema. Left lower leg: No edema. Skin:     General: Skin is warm and dry. Capillary Refill: Capillary refill takes less than 2 seconds. Findings: No rash. Neurological:      Mental Status: She is alert. Comments: Patient is not alert.   Neurological status could not be assessed   Psychiatric:         Mood and Affect: Mood normal. Diagnostic Studies      EKG: Normal sinus rhythm with unusual P waves. Heart rate of 97 bpm  Imaging:  I have personally reviewed pertinent reports.        Medications:  Scheduled PRN   cefTRIAXone, 1,000 mg, Once  [START ON 10/22/2023] cefTRIAXone, 2,000 mg, Q24H  chlorhexidine, 15 mL, Q12H MICHAELA  Famotidine (PF), 20 mg, BID  heparin (porcine), 5,000 Units, Q8H [facility]  [START ON 10/22/2023] levothyroxine, 200 mcg, Daily Before Breakfast  [START ON 10/22/2023] levothyroxine, 25 mcg, Early Morning      sodium chloride (PF), 3 mL, Q1H PRN       Continuous    dextrose 5 % and sodium chloride 0.9 %, 250 mL/hr  insulin regular (HumuLIN R,NovoLIN R) 1 Units/mL in sodium chloride 0.9 % 100 mL infusion, 0.1-30 Units/hr, Last Rate: 7.7 Units/hr (10/21/23 1519)  multi-electrolyte, 500 mL/hr, Last Rate: 500 mL/hr (10/21/23 1600)   Followed by  multi-electrolyte, 250 mL/hr  multi-electrolyte, 250 mL/hr, Last Rate: Stopped (10/21/23 1559)         Labs:    CBC    Recent Labs     10/21/23  1332 10/21/23  1555   WBC 11.05*  --    HGB 12.8  --    HCT 42.6  --     215   BANDSPCT 9*  --      BMP    No recent results    Coags    No recent results     Additional Electrolytes  No recent results       Blood Gas    No recent results  Recent Labs     10/21/23  1416   PHVEN 6.841*   WHN0NFI 20.1*   PO2VEN 70.9*   RQT5ARB 3.4*   BEVEN -29.6    LFTs  No recent results    Infectious  Recent Labs     10/21/23  1415   PROCALCITONI 3.17*     Glucose  No recent results            Anticipated Length of Stay is > 2 Rocio Goel MD

## 2023-10-21 NOTE — ED NOTES
Patient's sister Edenilson Young called, she has concerns regarding her sister. She would like to speak to case management prior to patient discharge to discuss patient needs at home. She stated she has concerns regarding how many times patient has had DKA and been septic, she would like to have an at home care plan in place prior to patient discharge.       Christine Aguilar  10/21/23 0199

## 2023-10-21 NOTE — ASSESSMENT & PLAN NOTE
Patient has a history of concussion on 10/18 when she bumped her head while at work  10/21: CT head shows no intracranial abnormalities    Monitor for neurological changes or altered mental status

## 2023-10-21 NOTE — ASSESSMENT & PLAN NOTE
Recent Labs     10/18/23  2015 10/18/23  9789   CREATININE 1.33* 1.19   EGFR 49 56     Estimated Creatinine Clearance: 65.3 mL/min (by C-G formula based on SCr of 1.19 mg/dL).     POA 1.3; (baseline 0.65-0.91)  Likely secondary to DKA secondary to UTI versus concussion    Plan:  UA w/ microscopic, Urinary retention protocol, Bladder scan, Daily weights, I/O  IV Fluids Isolyte as per DKA protocol  Monitor BMP daily and observe for downward trend of creatinine  Avoid hypoperfusion of the kidneys, minimize nephrotoxins

## 2023-10-21 NOTE — LETTER
[phone]  Dept: [phone]    October 25, 2023     Patient: Keith Giles   YOB: 1980   Date of Visit: 10/21/2023       To Whom it May Concern:    Gricelda Haile is under my professional care. She was seen in the hospital from 10/21/2023 to 10/25/23. She  may return to work once cleared by Neurologist/Concussion specialist .    If you have any questions or concerns, please don't hesitate to call.          Sincerely,          Maia Hudson MD

## 2023-10-21 NOTE — ASSESSMENT & PLAN NOTE
Lab Results   Component Value Date    HGBA1C 10.2 (H) 06/13/2023       Recent Labs     10/21/23  1327 10/21/23  1518   POCGLU 500* >500*       Blood Sugar Average: Last 72 hrs:  (P) 500    Patient is a known case of type 1 diabetes. Patient has had similar episodes in the past.  Most likely UTI versus concussion is the trigger for her DKA. POA blood glucose more than 500, HbA1c 10.2  Beta hydroxybutyrate 5.6  Procalcitonin 3.17  Leukocytosis with blood count of 11,000  VBG shows metabolic acidosis with a pH of 6.84, PCO2 22.1, bicarb 3.4    Plan:  Fluid replacement, electrolyte replacement, insulin as per DKA protocol  Keep the patient n.p.o.  Monitor BMP every 4 hours  Frequent neurochecks  Monitor vitals. Monitor input and output.

## 2023-10-21 NOTE — ASSESSMENT & PLAN NOTE
Patient had lower urinary tract symptoms on presentation on 10/18/2023  Was started on Keflex 500 mg  Stop Keflex, start ceftriaxone 2 g once daily  Monitor input and output  Follow-up on urine culture

## 2023-10-22 LAB
ANION GAP SERPL CALCULATED.3IONS-SCNC: 5 MMOL/L
ANION GAP SERPL CALCULATED.3IONS-SCNC: 6 MMOL/L
ANION GAP SERPL CALCULATED.3IONS-SCNC: 9 MMOL/L
BASOPHILS # BLD AUTO: 0.01 THOUSANDS/ÂΜL (ref 0–0.1)
BASOPHILS NFR BLD AUTO: 0 % (ref 0–1)
BUN SERPL-MCNC: 15 MG/DL (ref 5–25)
BUN SERPL-MCNC: 19 MG/DL (ref 5–25)
BUN SERPL-MCNC: 23 MG/DL (ref 5–25)
CALCIUM SERPL-MCNC: 6 MG/DL (ref 8.4–10.2)
CALCIUM SERPL-MCNC: 6.8 MG/DL (ref 8.4–10.2)
CALCIUM SERPL-MCNC: 7.3 MG/DL (ref 8.4–10.2)
CHLORIDE SERPL-SCNC: 112 MMOL/L (ref 96–108)
CHLORIDE SERPL-SCNC: 115 MMOL/L (ref 96–108)
CHLORIDE SERPL-SCNC: 119 MMOL/L (ref 96–108)
CO2 SERPL-SCNC: 15 MMOL/L (ref 21–32)
CO2 SERPL-SCNC: 17 MMOL/L (ref 21–32)
CO2 SERPL-SCNC: 17 MMOL/L (ref 21–32)
CREAT SERPL-MCNC: 0.92 MG/DL (ref 0.6–1.3)
CREAT SERPL-MCNC: 1.25 MG/DL (ref 0.6–1.3)
CREAT SERPL-MCNC: 1.37 MG/DL (ref 0.6–1.3)
EOSINOPHIL # BLD AUTO: 0.01 THOUSAND/ÂΜL (ref 0–0.61)
EOSINOPHIL NFR BLD AUTO: 0 % (ref 0–6)
ERYTHROCYTE [DISTWIDTH] IN BLOOD BY AUTOMATED COUNT: 12.8 % (ref 11.6–15.1)
EST. AVERAGE GLUCOSE BLD GHB EST-MCNC: 246 MG/DL
GFR SERPL CREATININE-BSD FRML MDRD: 47 ML/MIN/1.73SQ M
GFR SERPL CREATININE-BSD FRML MDRD: 53 ML/MIN/1.73SQ M
GFR SERPL CREATININE-BSD FRML MDRD: 77 ML/MIN/1.73SQ M
GLUCOSE SERPL-MCNC: 113 MG/DL (ref 65–140)
GLUCOSE SERPL-MCNC: 118 MG/DL (ref 65–140)
GLUCOSE SERPL-MCNC: 138 MG/DL (ref 65–140)
GLUCOSE SERPL-MCNC: 150 MG/DL (ref 65–140)
GLUCOSE SERPL-MCNC: 182 MG/DL (ref 65–140)
GLUCOSE SERPL-MCNC: 184 MG/DL (ref 65–140)
GLUCOSE SERPL-MCNC: 187 MG/DL (ref 65–140)
GLUCOSE SERPL-MCNC: 188 MG/DL (ref 65–140)
GLUCOSE SERPL-MCNC: 193 MG/DL (ref 65–140)
GLUCOSE SERPL-MCNC: 205 MG/DL (ref 65–140)
GLUCOSE SERPL-MCNC: 205 MG/DL (ref 65–140)
GLUCOSE SERPL-MCNC: 224 MG/DL (ref 65–140)
GLUCOSE SERPL-MCNC: 226 MG/DL (ref 65–140)
GLUCOSE SERPL-MCNC: 292 MG/DL (ref 65–140)
GLUCOSE SERPL-MCNC: 48 MG/DL (ref 65–140)
GLUCOSE SERPL-MCNC: 53 MG/DL (ref 65–140)
GLUCOSE SERPL-MCNC: 62 MG/DL (ref 65–140)
GLUCOSE SERPL-MCNC: 67 MG/DL (ref 65–140)
GLUCOSE SERPL-MCNC: 70 MG/DL (ref 65–140)
GLUCOSE SERPL-MCNC: 73 MG/DL (ref 65–140)
GLUCOSE SERPL-MCNC: 91 MG/DL (ref 65–140)
GLUCOSE SERPL-MCNC: 99 MG/DL (ref 65–140)
HBA1C MFR BLD: 10.2 %
HCT VFR BLD AUTO: 27.8 % (ref 34.8–46.1)
HGB BLD-MCNC: 9 G/DL (ref 11.5–15.4)
IMM GRANULOCYTES # BLD AUTO: 0.11 THOUSAND/UL (ref 0–0.2)
IMM GRANULOCYTES NFR BLD AUTO: 2 % (ref 0–2)
LYMPHOCYTES # BLD AUTO: 0.6 THOUSANDS/ÂΜL (ref 0.6–4.47)
LYMPHOCYTES NFR BLD AUTO: 11 % (ref 14–44)
MAGNESIUM SERPL-MCNC: 1.9 MG/DL (ref 1.9–2.7)
MAGNESIUM SERPL-MCNC: 2 MG/DL (ref 1.9–2.7)
MAGNESIUM SERPL-MCNC: 2.3 MG/DL (ref 1.9–2.7)
MCH RBC QN AUTO: 28.6 PG (ref 26.8–34.3)
MCHC RBC AUTO-ENTMCNC: 32.4 G/DL (ref 31.4–37.4)
MCV RBC AUTO: 88 FL (ref 82–98)
MONOCYTES # BLD AUTO: 0.49 THOUSAND/ÂΜL (ref 0.17–1.22)
MONOCYTES NFR BLD AUTO: 9 % (ref 4–12)
NEUTROPHILS # BLD AUTO: 4.49 THOUSANDS/ÂΜL (ref 1.85–7.62)
NEUTS SEG NFR BLD AUTO: 78 % (ref 43–75)
NRBC BLD AUTO-RTO: 0 /100 WBCS
PHOSPHATE SERPL-MCNC: 2.4 MG/DL (ref 2.7–4.5)
PHOSPHATE SERPL-MCNC: 2.5 MG/DL (ref 2.7–4.5)
PHOSPHATE SERPL-MCNC: 3.4 MG/DL (ref 2.7–4.5)
PLATELET # BLD AUTO: 199 THOUSANDS/UL (ref 149–390)
PMV BLD AUTO: 9.9 FL (ref 8.9–12.7)
POTASSIUM SERPL-SCNC: 3.3 MMOL/L (ref 3.5–5.3)
POTASSIUM SERPL-SCNC: 3.5 MMOL/L (ref 3.5–5.3)
POTASSIUM SERPL-SCNC: 3.9 MMOL/L (ref 3.5–5.3)
RBC # BLD AUTO: 3.15 MILLION/UL (ref 3.81–5.12)
SODIUM SERPL-SCNC: 138 MMOL/L (ref 135–147)
SODIUM SERPL-SCNC: 138 MMOL/L (ref 135–147)
SODIUM SERPL-SCNC: 139 MMOL/L (ref 135–147)
WBC # BLD AUTO: 5.71 THOUSAND/UL (ref 4.31–10.16)

## 2023-10-22 PROCEDURE — 85025 COMPLETE CBC W/AUTO DIFF WBC: CPT

## 2023-10-22 PROCEDURE — 83036 HEMOGLOBIN GLYCOSYLATED A1C: CPT | Performed by: NURSE PRACTITIONER

## 2023-10-22 PROCEDURE — 82948 REAGENT STRIP/BLOOD GLUCOSE: CPT

## 2023-10-22 PROCEDURE — 80048 BASIC METABOLIC PNL TOTAL CA: CPT | Performed by: NURSE PRACTITIONER

## 2023-10-22 PROCEDURE — 99231 SBSQ HOSP IP/OBS SF/LOW 25: CPT | Performed by: OBSTETRICS & GYNECOLOGY

## 2023-10-22 PROCEDURE — 83735 ASSAY OF MAGNESIUM: CPT | Performed by: NURSE PRACTITIONER

## 2023-10-22 PROCEDURE — 99291 CRITICAL CARE FIRST HOUR: CPT | Performed by: NURSE PRACTITIONER

## 2023-10-22 PROCEDURE — 84100 ASSAY OF PHOSPHORUS: CPT | Performed by: NURSE PRACTITIONER

## 2023-10-22 RX ORDER — TRAZODONE HYDROCHLORIDE 100 MG/1
100 TABLET ORAL
Status: DISCONTINUED | OUTPATIENT
Start: 2023-10-22 | End: 2023-10-25 | Stop reason: HOSPADM

## 2023-10-22 RX ORDER — POTASSIUM CHLORIDE 20 MEQ/1
40 TABLET, EXTENDED RELEASE ORAL ONCE
Status: COMPLETED | OUTPATIENT
Start: 2023-10-22 | End: 2023-10-22

## 2023-10-22 RX ORDER — DEXTROSE MONOHYDRATE 25 G/50ML
INJECTION, SOLUTION INTRAVENOUS
Status: COMPLETED
Start: 2023-10-22 | End: 2023-10-22

## 2023-10-22 RX ORDER — POTASSIUM CHLORIDE 14.9 MG/ML
20 INJECTION INTRAVENOUS ONCE
Status: COMPLETED | OUTPATIENT
Start: 2023-10-22 | End: 2023-10-22

## 2023-10-22 RX ORDER — DEXTROSE MONOHYDRATE 25 G/50ML
25 INJECTION, SOLUTION INTRAVENOUS ONCE
Status: COMPLETED | OUTPATIENT
Start: 2023-10-22 | End: 2023-10-22

## 2023-10-22 RX ORDER — INSULIN GLARGINE 100 [IU]/ML
15 INJECTION, SOLUTION SUBCUTANEOUS EVERY 12 HOURS SCHEDULED
Status: DISCONTINUED | OUTPATIENT
Start: 2023-10-22 | End: 2023-10-23

## 2023-10-22 RX ORDER — ENOXAPARIN SODIUM 100 MG/ML
40 INJECTION SUBCUTANEOUS
Status: DISCONTINUED | OUTPATIENT
Start: 2023-10-22 | End: 2023-10-25 | Stop reason: HOSPADM

## 2023-10-22 RX ORDER — INSULIN LISPRO 100 [IU]/ML
1-5 INJECTION, SOLUTION INTRAVENOUS; SUBCUTANEOUS
Status: DISCONTINUED | OUTPATIENT
Start: 2023-10-22 | End: 2023-10-25 | Stop reason: HOSPADM

## 2023-10-22 RX ORDER — INSULIN LISPRO 100 [IU]/ML
2-12 INJECTION, SOLUTION INTRAVENOUS; SUBCUTANEOUS
Status: DISCONTINUED | OUTPATIENT
Start: 2023-10-22 | End: 2023-10-25 | Stop reason: HOSPADM

## 2023-10-22 RX ORDER — MAGNESIUM SULFATE HEPTAHYDRATE 40 MG/ML
2 INJECTION, SOLUTION INTRAVENOUS ONCE
Status: COMPLETED | OUTPATIENT
Start: 2023-10-22 | End: 2023-10-22

## 2023-10-22 RX ORDER — GABAPENTIN 300 MG/1
300 CAPSULE ORAL 2 TIMES DAILY
Status: DISCONTINUED | OUTPATIENT
Start: 2023-10-22 | End: 2023-10-25 | Stop reason: HOSPADM

## 2023-10-22 RX ADMIN — ENOXAPARIN SODIUM 40 MG: 40 INJECTION SUBCUTANEOUS at 11:27

## 2023-10-22 RX ADMIN — CHLORHEXIDINE GLUCONATE 15 ML: 1.2 RINSE ORAL at 20:24

## 2023-10-22 RX ADMIN — DEXTROSE MONOHYDRATE 25 ML: 25 INJECTION, SOLUTION INTRAVENOUS at 07:29

## 2023-10-22 RX ADMIN — DEXTROSE MONOHYDRATE 25 ML: 25 INJECTION, SOLUTION INTRAVENOUS at 11:23

## 2023-10-22 RX ADMIN — DEXTROSE AND SODIUM CHLORIDE 250 ML/HR: 5; .9 INJECTION, SOLUTION INTRAVENOUS at 01:30

## 2023-10-22 RX ADMIN — DEXTROSE MONOHYDRATE 25 ML: 25 INJECTION, SOLUTION INTRAVENOUS at 09:14

## 2023-10-22 RX ADMIN — INSULIN LISPRO 2 UNITS: 100 INJECTION, SOLUTION INTRAVENOUS; SUBCUTANEOUS at 22:33

## 2023-10-22 RX ADMIN — SODIUM PHOSPHATE, MONOBASIC, MONOHYDRATE AND SODIUM PHOSPHATE, DIBASIC, ANHYDROUS 12 MMOL: 142; 276 INJECTION, SOLUTION INTRAVENOUS at 04:18

## 2023-10-22 RX ADMIN — LEVOTHYROXINE SODIUM 25 MCG: 25 TABLET ORAL at 05:50

## 2023-10-22 RX ADMIN — POTASSIUM CHLORIDE 40 MEQ: 1500 TABLET, EXTENDED RELEASE ORAL at 16:05

## 2023-10-22 RX ADMIN — HEPARIN SODIUM 5000 UNITS: 5000 INJECTION INTRAVENOUS; SUBCUTANEOUS at 02:21

## 2023-10-22 RX ADMIN — MAGNESIUM SULFATE HEPTAHYDRATE 2 G: 40 INJECTION, SOLUTION INTRAVENOUS at 03:55

## 2023-10-22 RX ADMIN — SODIUM PHOSPHATE, MONOBASIC, MONOHYDRATE AND SODIUM PHOSPHATE, DIBASIC, ANHYDROUS 21 MMOL: 142; 276 INJECTION, SOLUTION INTRAVENOUS at 00:28

## 2023-10-22 RX ADMIN — LEVOTHYROXINE SODIUM 200 MCG: 100 TABLET ORAL at 06:38

## 2023-10-22 RX ADMIN — FAMOTIDINE 20 MG: 10 INJECTION INTRAVENOUS at 09:46

## 2023-10-22 RX ADMIN — SODIUM CHLORIDE 30.8 UNITS/HR: 9 INJECTION, SOLUTION INTRAVENOUS at 03:32

## 2023-10-22 RX ADMIN — DEXTROSE AND SODIUM CHLORIDE 250 ML/HR: 5; .9 INJECTION, SOLUTION INTRAVENOUS at 11:18

## 2023-10-22 RX ADMIN — CEFTRIAXONE 2000 MG: 2 INJECTION, SOLUTION INTRAVENOUS at 16:05

## 2023-10-22 RX ADMIN — CHLORHEXIDINE GLUCONATE 15 ML: 1.2 RINSE ORAL at 09:46

## 2023-10-22 RX ADMIN — INSULIN GLARGINE 15 UNITS: 100 INJECTION, SOLUTION SUBCUTANEOUS at 09:46

## 2023-10-22 RX ADMIN — INSULIN GLARGINE 15 UNITS: 100 INJECTION, SOLUTION SUBCUTANEOUS at 21:10

## 2023-10-22 RX ADMIN — POTASSIUM CHLORIDE 20 MEQ: 14.9 INJECTION, SOLUTION INTRAVENOUS at 14:33

## 2023-10-22 RX ADMIN — POTASSIUM CHLORIDE 20 MEQ: 14.9 INJECTION, SOLUTION INTRAVENOUS at 03:55

## 2023-10-22 RX ADMIN — DEXTROSE AND SODIUM CHLORIDE 250 ML/HR: 5; .9 INJECTION, SOLUTION INTRAVENOUS at 05:12

## 2023-10-22 RX ADMIN — GABAPENTIN 300 MG: 300 CAPSULE ORAL at 20:02

## 2023-10-22 RX ADMIN — INSULIN LISPRO 4 UNITS: 100 INJECTION, SOLUTION INTRAVENOUS; SUBCUTANEOUS at 17:11

## 2023-10-22 RX ADMIN — POTASSIUM CHLORIDE 20 MEQ: 14.9 INJECTION, SOLUTION INTRAVENOUS at 00:19

## 2023-10-22 RX ADMIN — GABAPENTIN 300 MG: 300 CAPSULE ORAL at 11:27

## 2023-10-22 RX ADMIN — DIBASIC SODIUM PHOSPHATE, MONOBASIC POTASSIUM PHOSPHATE AND MONOBASIC SODIUM PHOSPHATE 2 TABLET: 852; 155; 130 TABLET ORAL at 00:44

## 2023-10-22 RX ADMIN — TRAZODONE HYDROCHLORIDE 100 MG: 100 TABLET ORAL at 21:10

## 2023-10-22 RX ADMIN — POTASSIUM PHOSPHATE, MONOBASIC POTASSIUM PHOSPHATE, DIBASIC 30 MMOL: 224; 236 INJECTION, SOLUTION, CONCENTRATE INTRAVENOUS at 14:33

## 2023-10-22 RX ADMIN — DIBASIC SODIUM PHOSPHATE, MONOBASIC POTASSIUM PHOSPHATE AND MONOBASIC SODIUM PHOSPHATE 2 TABLET: 852; 155; 130 TABLET ORAL at 03:55

## 2023-10-22 NOTE — ASSESSMENT & PLAN NOTE
Recent Labs     10/21/23  1936 10/21/23  2310 10/22/23  0253   CREATININE 1.57* 1.35* 1.37*   EGFR 40 48 47     Estimated Creatinine Clearance: 48.8 mL/min (A) (by C-G formula based on SCr of 1.57 mg/dL (H)). POA 1.67 (baseline 0.65-0.91).   Had THO on outpatient labs from 10/18 with creatinine 1.33  Likely secondary to DKA secondary to UTI  10/18 Recent UTI with > 100,000 Ecoli pansensitive  CT on admission with urinary retention  Straight cath on arrival to ICU for 1.3 L    Plan:  UA with reflex to culture   IV Fluids per DKA protocol  Monitor UO and renal indices  BMP currently q4h on DKA protocol  Urinary retention protocol -- avoid urinary retention  Continue ceftriaxone  Avoid hypoperfusion of the kidneys, minimize nephrotoxins

## 2023-10-22 NOTE — PROGRESS NOTES
Called to bedside due to concern for retained tampon due to "vaginal debris" visualized on CT. Patient states she is currently on her period and remembers placing a tampon on Friday. She does not recall removing tampon. Nursing reports performing a digital exam and not appreciating a tampon. On speculum examination, cervical os visible with dark red menstrual blood in vaginal vault, no tampon present. On digital exam, no tampon appreciated. Determined that no tampon present in vagina. Patient assessed with Dr. Sara Wooten.     Sarah Sandhu MD  Obstetrics & Gynecology, PGY2

## 2023-10-22 NOTE — PROGRESS NOTES
[address] [facility]  Progress Note  Name: Laurie Chacon  MRN: [id number]  Unit/Bed#: ICU 09 I Date of Admission: 10/21/2023   Date of Service: 10/22/2023 I Hospital Day: 1    Assessment/Plan   * DKA (diabetic ketoacidosis) [facility])  Assessment & Plan  Lab Results   Component Value Date    HGBA1C 10.2 (H) 06/13/2023       Recent Labs     10/22/23  0100 10/22/23  0203 10/22/23  0252 10/22/23  0408   POCGLU 205* 193* 184* 182*       Blood Sugar Average: Last 72 hrs:  (P) 597.7510790973114583    Patient is a known case of type 1 diabetes. Home regimen with Lantus and Novolog   Patient has had similar episodes in the past.  Most likely UTI as DKA trigger, doubt concussion   POA blood glucose more than 500  Beta hydroxybutyrate 5.6  Procalcitonin 3.17  Leukocytosis with blood count of 11,000  VBG shows metabolic acidosis with a pH of 6.84, PCO2 22.1, bicarb 3.4  Had persistent acidosis overnight, received additional IVF bolus    Plan:  Holding home Lantus and Novolog for now  Continue DKA insulin  Continue DKA fluids  NPO until Anion gap closed x2  Monitor BMP, Mag, Phos every 4 hours  Replete electrolytes per DKA protocol  Frequent neurochecks  Check A1c  Monitor input and output.     Concussion  Assessment & Plan  Patient has a history of concussion on 10/18 when she bumped her head while at work  10/21: CT head shows no intracranial abnormalities    Monitor for neurological changes or altered mental status -- serial neuro checks       UTI (urinary tract infection)  Assessment & Plan  Patient had lower urinary tract symptoms on presentation on 10/18/2023  Was started on Keflex 500 mg -- stopped on admission  Continue ceftriaxone  Monitor input and output  Required straight cath for > 1.3L  Avoid urinary retention -- urinary retention protocol   New urine culture obtained, will follow     Hypothyroidism  Assessment & Plan  Lab Results   Component Value Date    ODV6RZJJDVYV 3.770 11/02/2022 Follows with endocrinology as outpatient  Continue home medication with 225 mcg of levothyroxine  Monitor vitals      THO (acute kidney injury) [facility])  Assessment & Plan  Recent Labs     10/21/23  1936 10/21/23  2310 10/22/23  0253   CREATININE 1.57* 1.35* 1.37*   EGFR 40 48 47     Estimated Creatinine Clearance: 48.8 mL/min (A) (by C-G formula based on SCr of 1.57 mg/dL (H)). POA 1.67 (baseline 0.65-0.91). Had THO on outpatient labs from 10/18 with creatinine 1.33  Likely secondary to DKA secondary to UTI  10/18 Recent UTI with > 100,000 Ecoli pansensitive  CT on admission with urinary retention  Straight cath on arrival to ICU for 1.3 L    Plan:  UA with reflex to culture   IV Fluids per DKA protocol  Monitor UO and renal indices  BMP currently q4h on DKA protocol  Urinary retention protocol -- avoid urinary retention  Continue ceftriaxone  Avoid hypoperfusion of the kidneys, minimize nephrotoxins             ICU Core Measures     A: Assess, Prevent, and Manage Pain Has pain been assessed? Yes  Need for changes to pain regimen? No   B: Both SAT/SAT  N/A   C: Choice of Sedation RASS Goal: N/A patient not on sedation  Need for changes to sedation or analgesia regimen? No   D: Delirium CAM-ICU: Negative   E: Early Mobility  Plan for early mobility? Yes   F: Family Engagement Plan for family engagement today? Yes       Antibiotic Review: Patient on appropriate coverage based on culture data. and Awaiting culture results. Prophylaxis:  VTE VTE covered by:  heparin (porcine), Subcutaneous, 5,000 Units at 10/22/23 0221       Stress Ulcer  covered byFamotidine (PF) (PEPCID) injection 20 mg [[id number]]       Subjective   HPI/24hr events:   Started on DKA IVF and insulin on admission  Overnight with persistent acidosis -- received additional IVF boluses overnight  Anion gap closed x1, but remains acidotic with serum CO2 17. Review of Systems   Constitutional:  Positive for fatigue.  Negative for chills and diaphoresis. HENT:  Negative for trouble swallowing. Eyes:  Negative for visual disturbance. Respiratory:  Negative for cough, chest tightness, shortness of breath and wheezing. Cardiovascular:  Negative for chest pain, palpitations and leg swelling. Gastrointestinal:  Negative for abdominal distention, diarrhea, nausea, rectal pain and vomiting. Endocrine: Positive for polydipsia, polyphagia and polyuria. Genitourinary:  Positive for vaginal bleeding. Negative for difficulty urinating, dysuria, hematuria, vaginal discharge and vaginal pain. Musculoskeletal:  Negative for arthralgias and myalgias. Skin:  Negative for pallor, rash and wound. Neurological:  Negative for dizziness, syncope, weakness and light-headedness. Hematological: Negative. Psychiatric/Behavioral: Negative. Objective                            Vitals I/O      Most Recent Min/Max in 24hrs   Temp 98.6 °F (37 °C) Temp  Min: 93 °F (33.9 °C)  Max: 98.6 °F (37 °C)   Pulse 94 Pulse  Min: 80  Max: 98   Resp 12 Resp  Min: 12  Max: 30   /73 BP  Min: 92/66  Max: 125/65   O2 Sat 97 % SpO2  Min: 93 %  Max: 100 %      Intake/Output Summary (Last 24 hours) at 10/22/2023 0437  Last data filed at 10/22/2023 0201  Gross per 24 hour   Intake 5518.95 ml   Output 2050 ml   Net 3468.95 ml         Diet NPO     Invasive Monitoring Physical exam    Physical Exam  Eyes:      General: Vision grossly intact. Neglect        Right eye: No discharge. Left eye: No discharge. Extraocular Movements: Extraocular movements intact. Conjunctiva/sclera: Conjunctivae normal.      Pupils: Pupils are equal, round, and reactive to light. Skin:     General: Skin is warm, dry and not mottled extremities. Capillary Refill: Capillary refill takes less than 2 seconds. Coloration: Skin is not pale. Findings: No lesion, rash or wound. HENT:      Head: Normocephalic and atraumatic.       Right Ear: Hearing normal. No drainage. Left Ear: Hearing normal. No drainage. Nose: No nasal deformity, congestion, rhinorrhea or epistaxis. Mouth/Throat:      Mouth: Mucous membranes are moist. No injury or angioedema. Dentition: Normal dentition. Pharynx: No oropharyngeal exudate. Neck:      Vascular: No JVD. no midline tenderness Cardiovascular:      Rate and Rhythm: Normal rate and regular rhythm. Pulses: No decreased pulses. Heart sounds: Normal heart sounds. Musculoskeletal:         General: No swelling, tenderness or deformity. Normal range of motion. Right lower leg: No edema. Left lower leg: No edema. Abdominal:      General: Bowel sounds are normal. There is no distension. Palpations: Abdomen is soft. Tenderness: There is no abdominal tenderness. Constitutional:       General: She is not in acute distress. Appearance: She is underdeveloped and malnourished. She is ill-appearing. She is not toxic-appearing. Interventions: She is not sedated, not intubated and not restrained. Pulmonary:      Effort: Tachypnea present. No accessory muscle usage, respiratory distress or accessory muscle usage. She is not intubated. Breath sounds: Normal breath sounds. No wheezing, rhonchi or rales. Psychiatric:         Mood and Affect:  mood and affect abnormal.        Speech: Speech is not no receptive aphasia or no expressive aphasia. Neurological:      General: No focal deficit present. Mental Status: She is alert and oriented to person, place and time. Mental status is at baseline. She is CAM ICU negative. Cranial Nerves: No dysarthria or facial asymmetry. Sensory: No sensory deficit. Motor: Strength full and intact in all extremities. No pronator drift or motor deficit. Genitourinary/Anorectal:     Comments: Menses present.   Patient states that she had tampon in place prior to coming to the hospital.  She is unable to find the string and is unable to feel the tampon herself. Verbal permission obtained from patient and digital exam performed. Unable to palpate string or the tampon itself. Vitals and nursing note reviewed. Exam conducted with a chaperone present. Diagnostic Studies      EKG: sinus rhythm on telemetry  Imaging:   10/21 CXR: without focal infiltration or consolidation  10/21 CT a/p: "no acute intra-abdominal or pelvic pathology within limitations. Moderate bladder distention. Debris in vaginal canal which can be correlated clinically."   I have personally reviewed pertinent reports.    and I have personally reviewed pertinent films in PACS     Medications:  Scheduled PRN   cefTRIAXone, 2,000 mg, Q24H  chlorhexidine, 15 mL, Q12H MICHAELA  Famotidine (PF), 20 mg, BID  heparin (porcine), 5,000 Units, Q8H [address]  levothyroxine, 200 mcg, Daily Before Breakfast  levothyroxine, 25 mcg, Early Morning  magnesium sulfate, 2 g, Once  potassium chloride, 20 mEq, Once  sodium phosphate, 12 mmol, Once      sodium chloride (PF), 3 mL, Q1H PRN       Continuous    dextrose 5 % and sodium chloride 0.9 %, 250 mL/hr, Last Rate: 250 mL/hr (10/22/23 0130)  insulin regular (HumuLIN R,NovoLIN R) 1 Units/mL in sodium chloride 0.9 % 100 mL infusion, 0.1-30 Units/hr, Last Rate: 30.8 Units/hr (10/22/23 0332)         Labs:    CBC    Recent Labs     10/21/23  1332 10/21/23  1555   WBC 11.05*  --    HGB 12.8  --    HCT 42.6  --     215   BANDSPCT 9*  --      BMP    Recent Labs     10/21/23  2310 10/22/23  0253   SODIUM 137 138   K 4.3 3.9   * 112*   CO2 9* 17*   AGAP 16 9   BUN 26* 23   CREATININE 1.35* 1.37*   CALCIUM 7.1* 7.3*       Coags    No recent results     Additional Electrolytes  Recent Labs     10/21/23  2310 10/22/23  0253   MG 2.2 2.0   PHOS 1.9* 2.4*          Blood Gas    No recent results  Recent Labs     10/21/23  1416   PHVEN 6.841*   KLP5JEN 20.1*   PO2VEN 70.9*   CGA9JYS 3.4*   BEVEN -29.6    LFTs  Recent Labs 10/21/23  1555   ALT 11   AST 13   ALKPHOS 107*   ALB 3.4*   TBILI 0.31       Infectious  Recent Labs     10/21/23  1415   PROCALCITONI 3.17*     Glucose  Recent Labs     10/21/23  1645 10/21/23  1936 10/21/23  2310 10/22/23  0253   GLUC 509* 335* 209* 187*               Critical Care Time Delivered : Upon my evaluation, this patient had a high probability of imminent or life-threatening deterioration due to DKA, UTI, THO, which required my direct attention, intervention, and personal management. I have personally provided 31 minutes of critical care time, exclusive of procedures, teaching, family meetings, and any prior time recorded by providers other than myself.      Gavino Miner

## 2023-10-22 NOTE — ASSESSMENT & PLAN NOTE
Lab Results   Component Value Date    [id number] 3.770 11/02/2022       Follows with endocrinology as outpatient  Continue home medication with 225 mcg of levothyroxine  Monitor vitals

## 2023-10-22 NOTE — ASSESSMENT & PLAN NOTE
Lab Results   Component Value Date    HGBA1C 10.2 (H) 06/13/2023       Recent Labs     10/22/23  0100 10/22/23  0203 10/22/23  0252 10/22/23  0408   POCGLU 205* 193* 184* 182*       Blood Sugar Average: Last 72 hrs:  (P) 308.0339128354329386    Patient is a known case of type 1 diabetes. Home regimen with Lantus and Novolog   Patient has had similar episodes in the past.  Most likely UTI as DKA trigger, doubt concussion   POA blood glucose more than 500  Beta hydroxybutyrate 5.6  Procalcitonin 3.17  Leukocytosis with blood count of 11,000  VBG shows metabolic acidosis with a pH of 6.84, PCO2 22.1, bicarb 3.4  Had persistent acidosis overnight, received additional IVF bolus    Plan:  Holding home Lantus and Novolog for now  Continue DKA insulin  Continue DKA fluids  NPO until Anion gap closed x2  Monitor BMP, Mag, Phos every 4 hours  Replete electrolytes per DKA protocol  Frequent neurochecks  Check A1c  Monitor input and output.

## 2023-10-22 NOTE — ASSESSMENT & PLAN NOTE
Patient has a history of concussion on 10/18 when she bumped her head while at work  10/21: CT head shows no intracranial abnormalities    Monitor for neurological changes or altered mental status -- serial neuro checks

## 2023-10-22 NOTE — ASSESSMENT & PLAN NOTE
Patient had lower urinary tract symptoms on presentation on 10/18/2023  Was started on Keflex 500 mg -- stopped on admission  Continue ceftriaxone  Monitor input and output  Required straight cath for > 1.3L  Avoid urinary retention -- urinary retention protocol   New urine culture obtained, will follow

## 2023-10-22 NOTE — PLAN OF CARE
Problem: Potential for Falls  Goal: Patient will remain free of falls  Description: INTERVENTIONS:  - Educate patient/family on patient safety including physical limitations  - Instruct patient to call for assistance with activity   - Consult OT/PT to assist with strengthening/mobility   - Keep Call bell within reach  - Keep bed low and locked with side rails adjusted as appropriate  - Keep care items and personal belongings within reach  - Initiate and maintain comfort rounds  - Make Fall Risk Sign visible to staff  - Offer Toileting every 2 Hours, in advance of need  - Initiate/Maintain bed alarm  - Obtain necessary fall risk management equipment  - Apply yellow socks and bracelet for high fall risk patients  - Consider moving patient to room near nurses station  Outcome: Progressing     Problem: MOBILITY - ADULT  Goal: Maintain or return to baseline ADL function  Description: INTERVENTIONS:  -  Assess patient's ability to carry out ADLs; assess patient's baseline for ADL function and identify physical deficits which impact ability to perform ADLs (bathing, care of mouth/teeth, toileting, grooming, dressing, etc.)  - Assess/evaluate cause of self-care deficits   - Assess range of motion  - Assess patient's mobility; develop plan if impaired  - Assess patient's need for assistive devices and provide as appropriate  - Encourage maximum independence but intervene and supervise when necessary  - Involve family in performance of ADLs  - Assess for home care needs following discharge   - Consider OT consult to assist with ADL evaluation and planning for discharge  - Provide patient education as appropriate  Outcome: Progressing  Goal: Maintains/Returns to pre admission functional level  Description: INTERVENTIONS:  - Perform BMAT or MOVE assessment daily.   - Set and communicate daily mobility goal to care team and patient/family/caregiver.    - Collaborate with rehabilitation services on mobility goals if consulted  - Perform Range of Motion 3 times a day. - Reposition patient every 2 hours.   - Dangle patient 3 times a day  - Stand patient 3 times a day  - Ambulate patient 3 times a day  - Out of bed to chair 3 times a day   - Out of bed for meals 3 times a day  - Out of bed for toileting  - Record patient progress and toleration of activity level   Outcome: Progressing     Problem: Prexisting or High Potential for Compromised Skin Integrity  Goal: Skin integrity is maintained or improved  Description: INTERVENTIONS:  - Identify patients at risk for skin breakdown  - Assess and monitor skin integrity  - Assess and monitor nutrition and hydration status  - Monitor labs   - Assess for incontinence   - Turn and reposition patient  - Assist with mobility/ambulation  - Relieve pressure over bony prominences  - Avoid friction and shearing  - Provide appropriate hygiene as needed including keeping skin clean and dry  - Evaluate need for skin moisturizer/barrier cream  - Collaborate with interdisciplinary team   - Patient/family teaching  - Consider wound care consult   Outcome: Progressing

## 2023-10-23 PROBLEM — R53.1 GENERALIZED WEAKNESS: Status: ACTIVE | Noted: 2023-10-23

## 2023-10-23 LAB
ANION GAP SERPL CALCULATED.3IONS-SCNC: 5 MMOL/L
BACTERIA UR CULT: NORMAL
BUN SERPL-MCNC: 12 MG/DL (ref 5–25)
CALCIUM SERPL-MCNC: 8 MG/DL (ref 8.4–10.2)
CHLORIDE SERPL-SCNC: 110 MMOL/L (ref 96–108)
CO2 SERPL-SCNC: 20 MMOL/L (ref 21–32)
CREAT SERPL-MCNC: 1.07 MG/DL (ref 0.6–1.3)
GFR SERPL CREATININE-BSD FRML MDRD: 64 ML/MIN/1.73SQ M
GLUCOSE SERPL-MCNC: 129 MG/DL (ref 65–140)
GLUCOSE SERPL-MCNC: 168 MG/DL (ref 65–140)
GLUCOSE SERPL-MCNC: 235 MG/DL (ref 65–140)
GLUCOSE SERPL-MCNC: 296 MG/DL (ref 65–140)
GLUCOSE SERPL-MCNC: 328 MG/DL (ref 65–140)
POTASSIUM SERPL-SCNC: 4.4 MMOL/L (ref 3.5–5.3)
SODIUM SERPL-SCNC: 135 MMOL/L (ref 135–147)

## 2023-10-23 PROCEDURE — 82948 REAGENT STRIP/BLOOD GLUCOSE: CPT

## 2023-10-23 PROCEDURE — 80048 BASIC METABOLIC PNL TOTAL CA: CPT | Performed by: STUDENT IN AN ORGANIZED HEALTH CARE EDUCATION/TRAINING PROGRAM

## 2023-10-23 PROCEDURE — 99233 SBSQ HOSP IP/OBS HIGH 50: CPT | Performed by: STUDENT IN AN ORGANIZED HEALTH CARE EDUCATION/TRAINING PROGRAM

## 2023-10-23 RX ORDER — INSULIN LISPRO 100 [IU]/ML
5 INJECTION, SOLUTION INTRAVENOUS; SUBCUTANEOUS
Status: DISCONTINUED | OUTPATIENT
Start: 2023-10-23 | End: 2023-10-25 | Stop reason: HOSPADM

## 2023-10-23 RX ORDER — INSULIN GLARGINE 100 [IU]/ML
20 INJECTION, SOLUTION SUBCUTANEOUS EVERY 12 HOURS SCHEDULED
Status: DISCONTINUED | OUTPATIENT
Start: 2023-10-23 | End: 2023-10-24

## 2023-10-23 RX ORDER — ACETAMINOPHEN 325 MG/1
650 TABLET ORAL EVERY 6 HOURS PRN
Status: DISCONTINUED | OUTPATIENT
Start: 2023-10-23 | End: 2023-10-25 | Stop reason: HOSPADM

## 2023-10-23 RX ORDER — LOPERAMIDE HYDROCHLORIDE 2 MG/1
2 CAPSULE ORAL ONCE
Status: COMPLETED | OUTPATIENT
Start: 2023-10-23 | End: 2023-10-23

## 2023-10-23 RX ADMIN — LEVOTHYROXINE SODIUM 25 MCG: 25 TABLET ORAL at 06:20

## 2023-10-23 RX ADMIN — GABAPENTIN 300 MG: 300 CAPSULE ORAL at 09:15

## 2023-10-23 RX ADMIN — LOPERAMIDE HYDROCHLORIDE 2 MG: 2 CAPSULE ORAL at 16:22

## 2023-10-23 RX ADMIN — ACETAMINOPHEN 325MG 650 MG: 325 TABLET ORAL at 16:22

## 2023-10-23 RX ADMIN — INSULIN LISPRO 1 UNITS: 100 INJECTION, SOLUTION INTRAVENOUS; SUBCUTANEOUS at 21:16

## 2023-10-23 RX ADMIN — INSULIN GLARGINE 20 UNITS: 100 INJECTION, SOLUTION SUBCUTANEOUS at 21:16

## 2023-10-23 RX ADMIN — TRAZODONE HYDROCHLORIDE 100 MG: 100 TABLET ORAL at 21:16

## 2023-10-23 RX ADMIN — INSULIN LISPRO 6 UNITS: 100 INJECTION, SOLUTION INTRAVENOUS; SUBCUTANEOUS at 11:35

## 2023-10-23 RX ADMIN — LEVOTHYROXINE SODIUM 200 MCG: 100 TABLET ORAL at 06:20

## 2023-10-23 RX ADMIN — INSULIN LISPRO 8 UNITS: 100 INJECTION, SOLUTION INTRAVENOUS; SUBCUTANEOUS at 16:23

## 2023-10-23 RX ADMIN — INSULIN GLARGINE 15 UNITS: 100 INJECTION, SOLUTION SUBCUTANEOUS at 09:15

## 2023-10-23 RX ADMIN — ENOXAPARIN SODIUM 40 MG: 40 INJECTION SUBCUTANEOUS at 09:15

## 2023-10-23 RX ADMIN — GABAPENTIN 300 MG: 300 CAPSULE ORAL at 18:33

## 2023-10-23 NOTE — ASSESSMENT & PLAN NOTE
Patient with history of concussion on 10/18 when she fell from a flight of stairs and bumped her head at work  CT scan done on 10/21 showing no intracranial abnormalities  Patient currently alert and oriented x4 with no deficits  We will continue neurochecks  Nausea and vomiting have improved

## 2023-10-23 NOTE — PROGRESS NOTES
[facility]  Progress Note  Name: Gwendolyn Boles  MRN: [id number]  Unit/Bed#: E2 -06 I Date of Admission: 10/21/2023   Date of Service: 10/23/2023 I Hospital Day: 2    Assessment/Plan   Generalized weakness  Assessment & Plan  States that she has been very very weak since fall last Monday, has been unable to climb a flight of stairs at home  However suspect weakness at this time multifactorial given DKA  Will get PT and OT eval  Continue supportive care    Concussion  Assessment & Plan  Patient with history of concussion on 10/18 when she fell from a flight of stairs and bumped her head at work  CT scan done on 10/21 showing no intracranial abnormalities  Patient currently alert and oriented x4 with no deficits  We will continue neurochecks  Nausea and vomiting have improved    UTI (urinary tract infection)  Assessment & Plan  Patient was being treated for urinary tract infection on 10/18/2023 with Keflex 500 mg which was stopped on admission  Patient was given ceftriaxone based on previous cultures, UTI thought to be etiology of DKA  Urinalysis and urine cultures negative on this admission, will continue to monitor off antibiotics, patient currently with no urinary symptoms    Hypothyroidism  Assessment & Plan  Continue outpatient follow-up with endocrinology  Continue home medication 225 mcg of levothyroxine    THO (acute kidney injury) [facility])  Assessment & Plan  Patient presented with creatinine of 1.67 which was elevated from her baseline of <1 in setting of DKA  Now resolved, creatinine back to baseline  Continue to monitor I's and O's  Avoid nephrotoxins    * DKA (diabetic ketoacidosis) [facility])  Assessment & Plan  Lab Results   Component Value Date    HGBA1C 10.2 (H) 10/22/2023       Recent Labs     10/22/23  1620 10/22/23  2106 10/23/23  0614 10/23/23  1134   POCGLU 224* 292* 129 296*       Blood Sugar Average: Last 72 hrs:  (P) 207.6    Patient was admitted to the ICU for treatment of DKA which was suspected to be secondary to UTI for which patient received a one-time dose of Rocephin  At this time DKA has resolved and anion gap is closed  Patient not tolerating a diet  Started patient on long-acting and Premeal insulin on top of sliding scale  Continue to monitor BMP               VTE Pharmacologic Prophylaxis: VTE Score: 3 Moderate Risk (Score 3-4) - Pharmacological DVT Prophylaxis Ordered: enoxaparin (Lovenox). Patient Centered Rounds: I performed bedside rounds with nursing staff today. Discussions with Specialists or Other Care Team Provider: none    Education and Discussions with Family / Patient:  will call mom. Current Length of Stay: 2 day(s)  Current Patient Status: Inpatient   Certification Statement: The patient will continue to require additional inpatient hospital stay due to generalized weakness, pending PT/OT eval  Discharge Plan: Anticipate discharge tomorrow to home. Code Status: Level 1 - Full Code    Subjective:   Patient seen and examined at bedside. Right now complains of generalized weakness. States that her stomach feels funny as well. Having some diarrhea. Objective:     Vitals:   Temp (24hrs), Av.9 °F (36.6 °C), Min:97.3 °F (36.3 °C), Max:98.6 °F (37 °C)    Temp:  [97.3 °F (36.3 °C)-98.6 °F (37 °C)] 97.9 °F (36.6 °C)  HR:  [] 81  Resp:  [16-20] 16  BP: (127-144)/(77-94) 144/94  SpO2:  [95 %-98 %] 98 %  Body mass index is 28.54 kg/m². Input and Output Summary (last 24 hours):   No intake or output data in the 24 hours ending 10/23/23 0269    Physical Exam:   Physical Exam  Vitals and nursing note reviewed. Constitutional:       General: She is not in acute distress. Appearance: She is well-developed. HENT:      Head: Normocephalic and atraumatic. Eyes:      Conjunctiva/sclera: Conjunctivae normal.   Cardiovascular:      Rate and Rhythm: Normal rate and regular rhythm. Heart sounds: No murmur heard.   Pulmonary: Effort: Pulmonary effort is normal. No respiratory distress. Breath sounds: Normal breath sounds. Abdominal:      Palpations: Abdomen is soft. Tenderness: There is no abdominal tenderness. Musculoskeletal:         General: No swelling. Cervical back: Neck supple. Skin:     General: Skin is warm and dry. Capillary Refill: Capillary refill takes less than 2 seconds. Neurological:      Mental Status: She is alert. Psychiatric:         Mood and Affect: Mood normal.            Additional Data:     Labs:  Results from last 7 days   Lab Units 10/22/23  0433 10/21/23  1555 10/21/23  1332   WBC Thousand/uL 5.71  --  11.05*   HEMOGLOBIN g/dL 9.0*  --  12.8   HEMATOCRIT % 27.8*  --  42.6   PLATELETS Thousands/uL 199   < > 282   BANDS PCT %  --   --  9*   NEUTROS PCT % 78*  --   --    LYMPHS PCT % 11*  --   --    LYMPHO PCT %  --   --  15   MONOS PCT % 9  --   --    MONO PCT %  --   --  5   EOS PCT % 0  --  0    < > = values in this interval not displayed. Results from last 7 days   Lab Units 10/23/23  1009 10/21/23  1645 10/21/23  1555   SODIUM mmol/L 135   < > 129*   POTASSIUM mmol/L 4.4   < > 6.1*   CHLORIDE mmol/L 110*   < > 98   CO2 mmol/L 20*   < > <6*   BUN mg/dL 12   < > 32*   CREATININE mg/dL 1.07   < > 1.67*   ANION GAP mmol/L 5   < > >25   CALCIUM mg/dL 8.0*   < > 8.3*   ALBUMIN g/dL  --   --  3.4*   TOTAL BILIRUBIN mg/dL  --   --  0.31   ALK PHOS U/L  --   --  107*   ALT U/L  --   --  11   AST U/L  --   --  13   GLUCOSE RANDOM mg/dL 235*   < > 542*    < > = values in this interval not displayed.          Results from last 7 days   Lab Units 10/23/23  1134 10/23/23  0614 10/22/23  2106 10/22/23  1620 10/22/23  1313 10/22/23  1207 10/22/23  1116 10/22/23  1115 10/22/23  1004 10/22/23  0933 10/22/23  0907 10/22/23  0803   POC GLUCOSE mg/dl 296* 129 292* 224* 205* 188* 53* 48* 91 113 70 118     Results from last 7 days   Lab Units 10/22/23  0433   HEMOGLOBIN A1C % 10.2*     Results from last 7 days   Lab Units 10/21/23  1555 10/21/23  1415   LACTIC ACID mmol/L 0.9  --    PROCALCITONIN ng/ml  --  3.17*       Lines/Drains:  Invasive Devices       Peripheral Intravenous Line  Duration             Peripheral IV 10/21/23 Right Antecubital 2 days    Peripheral IV 10/21/23 Left Forearm 1 day                          Imaging: Reviewed radiology reports from this admission including: abdominal/pelvic CT, CT head, and xray(s)    Recent Cultures (last 7 days):   Results from last 7 days   Lab Units 10/21/23  1626 10/21/23  1442 10/21/23  1420 10/18/23  1943   BLOOD CULTURE   --  No Growth at 24 hrs.  No Growth at 24 hrs.  --    URINE CULTURE  No Growth <1000 cfu/mL  --   --  >100,000 cfu/ml Escherichia coli*       Last 24 Hours Medication List:   Current Facility-Administered Medications   Medication Dose Route Frequency Provider Last Rate    acetaminophen  650 mg Oral Q6H PRN Nik Marcos MD      chlorhexidine  15 mL Mouth/Throat Q12H [address] JAROCHO Valenzuela      enoxaparin  40 mg Subcutaneous Q24H [address] JAROCHO Valenzuela      gabapentin  300 mg Oral BID JAROCHO Valenzuela      insulin glargine  20 Units Subcutaneous Q12H [address] Landry Marcos MD      insulin lispro  1-5 Units Subcutaneous HS JAROCHO Valenzuela      insulin lispro  2-12 Units Subcutaneous TID AC JAROCHO Valenzuela      insulin lispro  5 Units Subcutaneous TID With Meals Landry April Roth MD      levothyroxine  200 mcg Oral Daily Before Breakfast JAROCHO Valenzuela      levothyroxine  25 mcg Oral Early Morning JAROCHO Valenzuela      loperamide  2 mg Oral Once Nik Marcos MD      sodium chloride (PF)  3 mL Intravenous Q1H PRN JAROCHO Valenzuela      traZODone  100 mg Oral HS JAROCHO Valenzuela          Today, Patient Was Seen By: Rochelle Villarreal MD    **Please Note: This note may have been constructed using a voice recognition system. **

## 2023-10-23 NOTE — ASSESSMENT & PLAN NOTE
Lab Results   Component Value Date    HGBA1C 10.2 (H) 10/22/2023       Recent Labs     10/22/23  1620 10/22/23  2106 10/23/23  0614 10/23/23  1134   POCGLU 224* 292* 129 296*       Blood Sugar Average: Last 72 hrs:  (P) 207.6    Patient was admitted to the ICU for treatment of DKA which was suspected to be secondary to UTI for which patient received a one-time dose of Rocephin  At this time DKA has resolved and anion gap is closed  Patient not tolerating a diet  Started patient on long-acting and Premeal insulin on top of sliding scale  Continue to monitor BMP

## 2023-10-23 NOTE — ASSESSMENT & PLAN NOTE
Patient presented with creatinine of 1.67 which was elevated from her baseline of <1 in setting of DKA  Now resolved, creatinine back to baseline  Continue to monitor I's and O's  Avoid nephrotoxins

## 2023-10-23 NOTE — UTILIZATION REVIEW
Initial Clinical Review    Admission: Date/Time/Statement:   Admission Orders (From admission, onward)       Ordered        10/21/23 1532  Inpatient Admission  Once                          Orders Placed This Encounter   Procedures    Inpatient Admission     Standing Status:   Standing     Number of Occurrences:   1     Order Specific Question:   Level of Care     Answer:   Critical Care [15]     Order Specific Question:   Estimated length of stay     Answer:   More than 2 Midnights     Order Specific Question:   Certification     Answer:   I certify that inpatient services are medically necessary for this patient for a duration of greater than two midnights. See H&P and MD Progress Notes for additional information about the patient's course of treatment. ED Arrival Information       Expected   -    Arrival   10/21/2023 13:14    Acuity   Emergent              Means of arrival   Ambulance    Escorted by   [facility]    Admission type   Emergency              Arrival complaint   -             Chief Complaint   Patient presents with    Hyperglycemia - Symptomatic     Pt is has a pre hospital BS of 578. Pt is SOB, fatigue, slurring words, generalized pain and nauseous. Initial Presentation: 41 y.o. female presents to ED via  EMS  from home with altered mental status, rapid shallow breathing. Hit her head on  10/18, diagnosed with a concussion and UTI. Children noticed  altered mental status the  morning of admission. Mother states patient has  had ongoing chills, rigors, poor appetite, general body aches and  urinary symptoms. Ct head shows  no intracranial abnormality. Labs reveal  blood sugar> 500, BH  5.6, Procal  3.17, WBC  11  and  VBG  with Ph  6.84, PCO2  22.1  and bicarb  3.4. PMH  is  DM1. Admit  Ip, ICU LOC,  with DKA, UTI, concussion and plan is  monitor labs, IVF, insulin drip,  frequent neurochecks, NPO, I & O, NATHAN, urine culture.         Date: 10/22   Day 2: DKA  resolved. D/C  IVF and  insulin drip. Can transfer out of  ICU. Persistent acidosis overnight and received additional  IVF bolus. Serum  CO2   17,  AG  closed. Continue current meds/treatment plan.     ED Triage Vitals   Temperature Pulse Respirations Blood Pressure SpO2   10/21/23 1318 10/21/23 1318 10/21/23 1318 10/21/23 1318 10/21/23 1318   (!) 97.2 °F (36.2 °C) 98 (!) 30 117/53 98 %      Temp Source Heart Rate Source Patient Position - Orthostatic VS BP Location FiO2 (%)   10/21/23 1318 10/21/23 1318 10/21/23 1318 10/21/23 1318 --   Oral Monitor Lying Right arm       Pain Score       10/21/23 1808       No Pain          Wt Readings from Last 1 Encounters:   10/23/23 80.2 kg (176 lb 12.9 oz)     Additional Vital Signs:   0/22/23 1208 97.8 °F (36.6 °C) -- -- -- -- -- -- --   10/22/23 1125 -- 92 18 97/57 75 96 % -- --   10/22/23 1025 -- 90 22 102/62 74 95 % -- --   10/22/23 0925 -- 90 20 111/63 77 97 % -- --   10/22/23 0825 -- 92 18 97/67 76 95 % None (Room air) Lying   10/22/23 0725 -- 92 20 101/63 74 94 % -- --   10/22/23 0709 98.8 °F (37.1 °C) -- -- -- -- -- -- --   10/22/23 0500 -- 94 24 Abnormal  -- -- 96 % -- Lying   10/22/23 0300 98.6 °F (37 °C) 94 12 113/73 -- 97 % -- Lying   10/22/23 0130 -- 92 17 113/70 -- 97 % -- --   10/22/23 0000 -- 90 13 105/65 -- 99 % -- --   10/21/23 2351 98.6 °F (37 °C) 92 18 105/65 78 98 % None (Room air) Lying   10/21/23 2308 -- 90 19 103/66 79 98 % None (Room air) Lying   10/21/23 2237 -- 90 21 112/61 72 98 % -- --   10/21/23 2233 97.8 °F (36.6 °C) 92 22 -- -- 98 % None (Room air) Lying   10/21/23 2200 -- 88 18 105/56 -- 97 % -- Lying   10/21/23 2000 96.3 °F (35.7 °C) Abnormal  82 15 105/58 -- 99 % None (Room air) Lying   10/21/23 1915 -- 80 17 94/71 76 99 % -- --   10/21/23 1815 -- 80 26 Abnormal  105/64 74 100 % -- --   10/21/23 1745 93 °F (33.9 °C) Abnormal  88 23 Abnormal  92/66 72 100 % None (Room air) --   10/21/23 1654 -- 92 26 Abnormal  113/58 79 93 % None (Room air) Lying   10/21/23 1445 -- 94 29 Abnormal  125/65 89 100 % None (Room air) Lying   10/21/23 1342 -- -- -- -- -- -- None (Room air) --   10/21/23 1318 97.2 °F (36.2 °C) Abnormal  98 30 Abnormal  117/53 -- 98 % None (Room air) Lying     Pertinent Labs/Diagnostic Test Results:   CT abdomen pelvis wo contrast   Final Result by Danilo Mills DO (10/21 1647)      *Limited study due to absence of IV or oral contrast as well as other artifact. No acute intra-abdominal or pelvic pathology within limitations. Moderate bladder distention. Followup imaging with oral and/or IV contrast may be considered for any persistent or worsening symptoms. Workstation performed: [id number]         XR chest 1 view portable   Final Result by Ana Cotton MD (10/22 2018)      No acute cardiopulmonary disease. Workstation performed: [id number]         CT head without contrast   Final Result by Fredy Muñiz MD (10/21 1523)      No acute intracranial abnormality.                   Workstation performed: [id number]           Results from last 7 days   Lab Units 10/21/23  1434 10/18/23  1930   SARS-COV-2  Negative Negative     Results from last 7 days   Lab Units 10/22/23  0433 10/21/23  1555 10/21/23  1332 10/18/23  1930   WBC Thousand/uL 5.71  --  11.05* 6.12   HEMOGLOBIN g/dL 9.0*  --  12.8 10.5*   HEMATOCRIT % 27.8*  --  42.6 33.3*   PLATELETS Thousands/uL 199 215 282 151   NEUTROS ABS Thousands/µL 4.49  --   --  5.02   BANDS PCT %  --   --  9*  --          Results from last 7 days   Lab Units 10/23/23  1009 10/22/23  1127 10/22/23  0646 10/22/23  0253 10/21/23  2310 10/21/23  1936   SODIUM mmol/L 135 139 138 138 137 133*   POTASSIUM mmol/L 4.4 3.3* 3.5 3.9 4.3 4.4   CHLORIDE mmol/L 110* 119* 115* 112* 112* 103   CO2 mmol/L 20* 15* 17* 17* 9* <6*   ANION GAP mmol/L 5 5 6 9 16 >24   BUN mg/dL 12 15 19 23 26* 31*   CREATININE mg/dL 1.07 0.92 1.25 1.37* 1.35* 1.57*   EGFR ml/min/1.73sq m 64 77 53 47 48 40   CALCIUM mg/dL 8.0* 6.0* 6.8* 7.3* 7.1* 8.0*   MAGNESIUM mg/dL  --  1.9 2.3 2.0 2.2 2.0   PHOSPHORUS mg/dL  --  2.5* 3.4 2.4* 1.9* 4.0     Results from last 7 days   Lab Units 10/21/23  1555 10/18/23  2015   AST U/L 13 12*   ALT U/L 11 12   ALK PHOS U/L 107* 68   TOTAL PROTEIN g/dL 6.9 6.3*   ALBUMIN g/dL 3.4* 3.3*   TOTAL BILIRUBIN mg/dL 0.31 0.79     Results from last 7 days   Lab Units 10/23/23  0614 10/22/23  2106 10/22/23  1620 10/22/23  1313 10/22/23  1207 10/22/23  1116 10/22/23  1115 10/22/23  1004 10/22/23  0933 10/22/23  0907 10/22/23  0803 10/22/23  0721   POC GLUCOSE mg/dl 129 292* 224* 205* 188* 53* 48* 91 113 70 118 67     Results from last 7 days   Lab Units 10/23/23  1009 10/22/23  1127 10/22/23  0646 10/22/23  0253 10/21/23  2310 10/21/23  1936 10/21/23  1645 10/21/23  1555 10/18/23  2259 10/18/23  2015   GLUCOSE RANDOM mg/dL 235* 62* 99 187* 209* 335* 509* 542* 254* 297*         Results from last 7 days   Lab Units 10/22/23  0433   HEMOGLOBIN A1C % 10.2*   EAG mg/dl 246     BETA-HYDROXYBUTYRATE   Date Value Ref Range Status   10/21/2023 5.6 (H) <0.6 mmol/L Final   06/13/2023 7.3 (H) <0.6 mmol/L Final   05/23/2021 6.6 (H) <0.6 mmol/L Final   12/01/2020 0.0 <0.6 mmol/L Final   11/30/2020 4.6 (H) <0.6 mmol/L Final   08/06/2020 5.7 (H) <0.6 mmol/L Final   07/20/2020 0.4 <0.6 mmol/L Final   06/23/2020 5.3 (H) <0.6 mmol/L Final   12/28/2018 1.09 (H) 0.02 - 0.27 mmol/L Final   12/27/2018 2.69 (H) 0.02 - 0.27 mmol/L Final   12/27/2018 3.81 (H) 0.02 - 0.27 mmol/L Final          Results from last 7 days   Lab Units 10/21/23  1416   PH LANNY  6.841*   PCO2 LANNY mm Hg 20.1*   PO2 LANNY mm Hg 70.9*   HCO3 LANNY mmol/L 3.4*   BASE EXC LANNY mmol/L -29.6   O2 CONTENT LANNY ml/dL 16.2   O2 HGB, VENOUS % 86.3*             Results from last 7 days   Lab Units 10/21/23  1415   HS TNI 0HR ng/L 3                 Results from last 7 days   Lab Units 10/21/23  1415   PROCALCITONIN ng/ml 3.17* Results from last 7 days   Lab Units 10/21/23  1555   LACTIC ACID mmol/L 0.9           Results from last 7 days   Lab Units 10/21/23  1555   LIPASE u/L 21                 Results from last 7 days   Lab Units 10/21/23  1630 10/18/23  1943   CLARITY UA  Clear Clear   COLOR UA  Yellow Yellow   SPEC GRAV UA  >=1.030 1.020   PH UA  5.5 5.5   GLUCOSE UA mg/dl 500 (1/2%)* 500 (1/2%)*   KETONES UA mg/dl 80 (3+)* Negative   BLOOD UA  Moderate* Moderate*   PROTEIN UA mg/dl 100 (2+)* 100 (2+)*   NITRITE UA  Negative Negative   BILIRUBIN UA  Small* Negative   UROBILINOGEN UA E.U./dl 0.2 0.2   LEUKOCYTES UA  Negative Large*   WBC UA /hpf 4-10* Innumerable*   RBC UA /hpf 1-2 4-10*   BACTERIA UA /hpf None Seen Innumerable*   EPITHELIAL CELLS WET PREP /hpf Occasional Occasional     Results from last 7 days   Lab Units 10/21/23  1434 10/18/23  1930   INFLUENZA A PCR  Negative Negative   INFLUENZA B PCR  Negative Negative   RSV PCR  Negative  --                              Results from last 7 days   Lab Units 10/21/23  1626 10/21/23  1442 10/21/23  1420 10/18/23  1943   BLOOD CULTURE   --  No Growth at 24 hrs.  No Growth at 24 hrs.  --    URINE CULTURE  No Growth <1000 cfu/mL  --   --  >100,000 cfu/ml Escherichia coli*                   ED Treatment:   Medication Administration from 10/21/2023 1314 to 10/21/2023 1735         Date/Time Order Dose Route Action Comments     10/21/2023 1316 EDT ondansetron (FOR EMS ONLY) (ZOFRAN) 4 mg/2 mL injection 4 mg 0 mg Does not apply Given to EMS --     10/21/2023 1532 EDT multi-electrolyte (ISOLYTE-S PH 7.4) bolus 1,000 mL 0 mL Intravenous Stopped --     10/21/2023 1432 EDT multi-electrolyte (ISOLYTE-S PH 7.4) bolus 1,000 mL 1,000 mL Intravenous New Bag --     10/21/2023 1519 EDT insulin regular (HumuLIN R,NovoLIN R) 1 Units/mL in sodium chloride 0.9 % 100 mL infusion 7.7 Units/hr Intravenous New Bag --     10/21/2023 7968 EDT multi-electrolyte (PLASMALYTE-A/ISOLYTE-S PH 7.4) IV solution 0 mL/hr Intravenous Stopped --     10/21/2023 1558 EDT multi-electrolyte (PLASMALYTE-A/ISOLYTE-S PH 7.4) IV solution -- Intravenous Canceled Entry --     10/21/2023 1527 EDT multi-electrolyte (PLASMALYTE-A/ISOLYTE-S PH 7.4) IV solution 250 mL/hr Intravenous New Bag --     10/21/2023 1520 EDT LORazepam (ATIVAN) injection 0.5 mg 0.5 mg Intravenous Given --     10/21/2023 1523 EDT ondansetron (ZOFRAN) injection 4 mg 4 mg Intravenous Given --     10/21/2023 1656 EDT cefTRIAXone (ROCEPHIN) IVPB (premix in dextrose) 1,000 mg 50 mL 0 mg Intravenous Stopped --     10/21/2023 1626 EDT cefTRIAXone (ROCEPHIN) IVPB (premix in dextrose) 1,000 mg 50 mL 1,000 mg Intravenous New Bag --     10/21/2023 1600 EDT multi-electrolyte (ISOLYTE-S PH 7.4 equivalent) IV solution 500 mL/hr Intravenous New Bag --            Present on Admission:   DKA (diabetic ketoacidosis) ([facility])   THO (acute kidney injury) ([facility])      Admitting Diagnosis: Altered mental status [R41.82]  DKA (diabetic ketoacidosis) ([facility]) [E11.10]  Diabetic ketoacidosis without coma associated with type 1 diabetes mellitus ([facility]) [E10.10]  Age/Sex: 41 y.o. female  Admission Orders:  Scheduled Medications:  chlorhexidine, 15 mL, Mouth/Throat, Q12H [address]  enoxaparin, 40 mg, Subcutaneous, Q24H MICHAELA  gabapentin, 300 mg, Oral, BID  insulin glargine, 15 Units, Subcutaneous, Q12H MICHAELA  insulin lispro, 1-5 Units, Subcutaneous, HS  insulin lispro, 2-12 Units, Subcutaneous, TID AC  levothyroxine, 200 mcg, Oral, Daily Before Breakfast  levothyroxine, 25 mcg, Oral, Early Morning  traZODone, 100 mg, Oral, HS  IV  rocephin daily      Continuous IV Infusions:  Insulin drip - D/C  10/22  IVF  250/hr - d/c   10/22     PRN Meds:  sodium chloride (PF), 3 mL, Intravenous, Q1H PRN        IP CONSULT TO CASE MANAGEMENT  IP CONSULT TO OB GYN    Network Utilization Review Department  ATTENTION: Please call with any questions or concerns to [phone] and carefully listen to the prompts so that you are directed to the right person. All voicemails are confidential.   For Discharge needs, contact Care Management DC Support Team at [phone] opt. [phone]  Send all requests for admission clinical reviews, approved or denied determinations and any other requests to dedicated fax number below belonging to the campus where the patient is receiving treatment.  List of dedicated fax numbers for the Facilities:  [place] DENIALS (Administrative/Medical Necessity) [phone]   DISCHARGE SUPPORT TEAM (NETWORK) [id number] (Maternity/NICU/Pediatrics) [phone]   [address] [facility] [address] [place] [address] 1000 [facility] [phone]   [address] [place] [address] Road [address] [address] [facility] [address] [facility] [address] 1300 [facility] [place] [address] [phone]

## 2023-10-23 NOTE — ASSESSMENT & PLAN NOTE
Patient was being treated for urinary tract infection on 10/18/2023 with Keflex 500 mg which was stopped on admission  Patient was given ceftriaxone based on previous cultures, UTI thought to be etiology of DKA  Urinalysis and urine cultures negative on this admission, will continue to monitor off antibiotics, patient currently with no urinary symptoms

## 2023-10-23 NOTE — ASSESSMENT & PLAN NOTE
States that she has been very very weak since fall last Monday, has been unable to climb a flight of stairs at home  However suspect weakness at this time multifactorial given DKA  Will get PT and OT eval  Continue supportive care

## 2023-10-24 LAB
ANION GAP SERPL CALCULATED.3IONS-SCNC: 5 MMOL/L
BASOPHILS # BLD AUTO: 0.01 THOUSANDS/ÂΜL (ref 0–0.1)
BASOPHILS NFR BLD AUTO: 0 % (ref 0–1)
BUN SERPL-MCNC: 12 MG/DL (ref 5–25)
CALCIUM SERPL-MCNC: 7.9 MG/DL (ref 8.4–10.2)
CHLORIDE SERPL-SCNC: 113 MMOL/L (ref 96–108)
CO2 SERPL-SCNC: 22 MMOL/L (ref 21–32)
CREAT SERPL-MCNC: 0.86 MG/DL (ref 0.6–1.3)
EOSINOPHIL # BLD AUTO: 0.05 THOUSAND/ÂΜL (ref 0–0.61)
EOSINOPHIL NFR BLD AUTO: 2 % (ref 0–6)
ERYTHROCYTE [DISTWIDTH] IN BLOOD BY AUTOMATED COUNT: 13.5 % (ref 11.6–15.1)
GFR SERPL CREATININE-BSD FRML MDRD: 84 ML/MIN/1.73SQ M
GLUCOSE SERPL-MCNC: 100 MG/DL (ref 65–140)
GLUCOSE SERPL-MCNC: 128 MG/DL (ref 65–140)
GLUCOSE SERPL-MCNC: 174 MG/DL (ref 65–140)
GLUCOSE SERPL-MCNC: 194 MG/DL (ref 65–140)
GLUCOSE SERPL-MCNC: 50 MG/DL (ref 65–140)
GLUCOSE SERPL-MCNC: 55 MG/DL (ref 65–140)
HCT VFR BLD AUTO: 28 % (ref 34.8–46.1)
HGB BLD-MCNC: 8.9 G/DL (ref 11.5–15.4)
IMM GRANULOCYTES # BLD AUTO: 0.03 THOUSAND/UL (ref 0–0.2)
IMM GRANULOCYTES NFR BLD AUTO: 1 % (ref 0–2)
LYMPHOCYTES # BLD AUTO: 0.86 THOUSANDS/ÂΜL (ref 0.6–4.47)
LYMPHOCYTES NFR BLD AUTO: 29 % (ref 14–44)
MAGNESIUM SERPL-MCNC: 1.9 MG/DL (ref 1.9–2.7)
MCH RBC QN AUTO: 28.3 PG (ref 26.8–34.3)
MCHC RBC AUTO-ENTMCNC: 31.8 G/DL (ref 31.4–37.4)
MCV RBC AUTO: 89 FL (ref 82–98)
MONOCYTES # BLD AUTO: 0.24 THOUSAND/ÂΜL (ref 0.17–1.22)
MONOCYTES NFR BLD AUTO: 8 % (ref 4–12)
NEUTROPHILS # BLD AUTO: 1.8 THOUSANDS/ÂΜL (ref 1.85–7.62)
NEUTS SEG NFR BLD AUTO: 60 % (ref 43–75)
NRBC BLD AUTO-RTO: 0 /100 WBCS
PLATELET # BLD AUTO: 184 THOUSANDS/UL (ref 149–390)
PMV BLD AUTO: 10.7 FL (ref 8.9–12.7)
POTASSIUM SERPL-SCNC: 3.6 MMOL/L (ref 3.5–5.3)
PROCALCITONIN SERPL-MCNC: 0.73 NG/ML
RBC # BLD AUTO: 3.15 MILLION/UL (ref 3.81–5.12)
SODIUM SERPL-SCNC: 140 MMOL/L (ref 135–147)
WBC # BLD AUTO: 2.99 THOUSAND/UL (ref 4.31–10.16)

## 2023-10-24 PROCEDURE — 83735 ASSAY OF MAGNESIUM: CPT | Performed by: STUDENT IN AN ORGANIZED HEALTH CARE EDUCATION/TRAINING PROGRAM

## 2023-10-24 PROCEDURE — 99233 SBSQ HOSP IP/OBS HIGH 50: CPT | Performed by: STUDENT IN AN ORGANIZED HEALTH CARE EDUCATION/TRAINING PROGRAM

## 2023-10-24 PROCEDURE — 82948 REAGENT STRIP/BLOOD GLUCOSE: CPT

## 2023-10-24 PROCEDURE — 80048 BASIC METABOLIC PNL TOTAL CA: CPT | Performed by: STUDENT IN AN ORGANIZED HEALTH CARE EDUCATION/TRAINING PROGRAM

## 2023-10-24 PROCEDURE — 85025 COMPLETE CBC W/AUTO DIFF WBC: CPT | Performed by: STUDENT IN AN ORGANIZED HEALTH CARE EDUCATION/TRAINING PROGRAM

## 2023-10-24 PROCEDURE — 97166 OT EVAL MOD COMPLEX 45 MIN: CPT

## 2023-10-24 PROCEDURE — 97162 PT EVAL MOD COMPLEX 30 MIN: CPT

## 2023-10-24 PROCEDURE — 84145 PROCALCITONIN (PCT): CPT | Performed by: STUDENT IN AN ORGANIZED HEALTH CARE EDUCATION/TRAINING PROGRAM

## 2023-10-24 RX ORDER — INSULIN GLARGINE 100 [IU]/ML
15 INJECTION, SOLUTION SUBCUTANEOUS EVERY 12 HOURS SCHEDULED
Status: DISCONTINUED | OUTPATIENT
Start: 2023-10-24 | End: 2023-10-25 | Stop reason: HOSPADM

## 2023-10-24 RX ADMIN — LEVOTHYROXINE SODIUM 25 MCG: 25 TABLET ORAL at 06:20

## 2023-10-24 RX ADMIN — INSULIN GLARGINE 20 UNITS: 100 INJECTION, SOLUTION SUBCUTANEOUS at 08:19

## 2023-10-24 RX ADMIN — INSULIN LISPRO 5 UNITS: 100 INJECTION, SOLUTION INTRAVENOUS; SUBCUTANEOUS at 08:20

## 2023-10-24 RX ADMIN — INSULIN LISPRO 5 UNITS: 100 INJECTION, SOLUTION INTRAVENOUS; SUBCUTANEOUS at 11:32

## 2023-10-24 RX ADMIN — INSULIN LISPRO 2 UNITS: 100 INJECTION, SOLUTION INTRAVENOUS; SUBCUTANEOUS at 11:33

## 2023-10-24 RX ADMIN — INSULIN LISPRO 2 UNITS: 100 INJECTION, SOLUTION INTRAVENOUS; SUBCUTANEOUS at 16:56

## 2023-10-24 RX ADMIN — GABAPENTIN 300 MG: 300 CAPSULE ORAL at 17:00

## 2023-10-24 RX ADMIN — INSULIN LISPRO 5 UNITS: 100 INJECTION, SOLUTION INTRAVENOUS; SUBCUTANEOUS at 16:56

## 2023-10-24 RX ADMIN — GABAPENTIN 300 MG: 300 CAPSULE ORAL at 08:19

## 2023-10-24 RX ADMIN — LEVOTHYROXINE SODIUM 200 MCG: 100 TABLET ORAL at 06:20

## 2023-10-24 RX ADMIN — ENOXAPARIN SODIUM 40 MG: 40 INJECTION SUBCUTANEOUS at 08:19

## 2023-10-24 NOTE — ASSESSMENT & PLAN NOTE
Lab Results   Component Value Date    HGBA1C 10.2 (H) 10/22/2023       Recent Labs     10/24/23  0618 10/24/23  0637 10/24/23  1115 10/24/23  1531   POCGLU 55* 100 194* 174*         Blood Sugar Average: Last 72 hrs:  (P) 493.0535618838455647    Patient was admitted to the ICU for treatment of DKA which was suspected to be secondary to UTI for which patient received Rocephin  Patient's glucose better controlled and patient tolerating diet  Continue to monitor BMP  Continue SSI and basal, pre-meal insulin  DKA RESOLVED

## 2023-10-24 NOTE — PROGRESS NOTES
[facility]  Progress Note  Name: Gwendolyn Boles  MRN: [id number]  Unit/Bed#: E2 -68 I Date of Admission: 10/21/2023   Date of Service: 10/24/2023 I Hospital Day: 3    Assessment/Plan   * DKA (diabetic ketoacidosis) [facility])  Assessment & Plan  Lab Results   Component Value Date    HGBA1C 10.2 (H) 10/22/2023       Recent Labs     10/24/23  0618 10/24/23  0637 10/24/23  1115 10/24/23  1531   POCGLU 55* 100 194* 174*         Blood Sugar Average: Last 72 hrs:  (P) [id number]    Patient was admitted to the ICU for treatment of DKA which was suspected to be secondary to UTI for which patient received Rocephin  Patient's glucose better controlled and patient tolerating diet  Continue to monitor BMP  Continue SSI and basal, pre-meal insulin  DKA RESOLVED      Generalized weakness  Assessment & Plan  States that she has been very very weak since fall last Monday, has been unable to climb a flight of stairs at home  However suspect weakness at this time multifactorial given DKA  PT and OT evaluated patient: no rehab needs  Continue supportive care    Concussion  Assessment & Plan  Patient with history of concussion on 10/18 when she fell from a flight of stairs and bumped her head at work  CT scan done on 10/21 showing no intracranial abnormalities  Patient currently alert and oriented x4 with no deficits  We will continue neurochecks  Nausea and vomiting have improved    UTI (urinary tract infection)  Assessment & Plan  Patient was being treated for urinary tract infection on 10/18/2023 with Keflex 500 mg which was stopped on admission  Patient was given ceftriaxone based on previous cultures, UTI thought to be etiology of DKA  Urinalysis and urine cultures negative on this admission, will continue to monitor off antibiotics, patient currently with no urinary symptoms    Hypothyroidism  Assessment & Plan  Continue outpatient follow-up with endocrinology  Continue home medication 225 mcg of levothyroxine    THO (acute kidney injury) ([address])  Assessment & Plan  Patient presented with creatinine of 1.67 which was elevated from her baseline of <1 in setting of DKA  Now resolved, creatinine back to baseline  Continue to monitor I's and O's  Avoid nephrotoxins    Leukopenia  Assessment & Plan  WBC count 2.99  Trended patient's labs which show that patient has chronic leukopenia   Will recheck CBC in am  At this time, patient does not exhibit any new signs of infection           VTE Pharmacologic Prophylaxis: VTE Score: 3 Moderate Risk (Score 3-4) - Pharmacological DVT Prophylaxis Ordered: enoxaparin (Lovenox). Patient Centered Rounds: I performed bedside rounds with nursing staff today. Discussions with Specialists or Other Care Team Provider: none    Education and Discussions with Family / Patient: Attempted to update  (mother) via phone. Left voicemail. Current Length of Stay: 3 day(s)  Current Patient Status: Inpatient   Certification Statement: The patient will continue to require additional inpatient hospital stay due to leukopenia  Discharge Plan: Anticipate discharge tomorrow to home. Code Status: Level 1 - Full Code    Subjective:   Patient seen and examined at bedside. Had a long discussion with patient regarding work situation. She states that she is currently working on a way of getting her out of the system. However right now she feels like she is stuck as finding a new job is not that easy. Patient endorsing weakness is improving. Still feels very fatigued and emotional.    Objective:     Vitals:   Temp (24hrs), Av.3 °F (36.3 °C), Min:97 °F (36.1 °C), Max:97.7 °F (36.5 °C)    Temp:  [97 °F (36.1 °C)-97.7 °F (36.5 °C)] 97.7 °F (36.5 °C)  HR:  [84-91] 89  Resp:  [16-20] 17  BP: (137-158)/(79-90) 158/90  SpO2:  [96 %-97 %] 96 %  Body mass index is 28.36 kg/m².      Input and Output Summary (last 24 hours):   No intake or output data in the 24 hours ending 10/24/23 1655    Physical Exam:   Physical Exam  Vitals and nursing note reviewed. Constitutional:       General: She is not in acute distress. Appearance: She is well-developed. HENT:      Head: Normocephalic and atraumatic. Eyes:      Conjunctiva/sclera: Conjunctivae normal.   Cardiovascular:      Rate and Rhythm: Normal rate and regular rhythm. Heart sounds: No murmur heard. Pulmonary:      Effort: Pulmonary effort is normal. No respiratory distress. Breath sounds: Normal breath sounds. Abdominal:      Palpations: Abdomen is soft. Tenderness: There is no abdominal tenderness. Musculoskeletal:         General: No swelling. Cervical back: Neck supple. Skin:     General: Skin is warm and dry. Capillary Refill: Capillary refill takes less than 2 seconds. Neurological:      General: No focal deficit present. Mental Status: She is alert and oriented to person, place, and time. Psychiatric:         Mood and Affect: Mood normal.            Additional Data:     Labs:  Results from last 7 days   Lab Units 10/24/23  0445 10/21/23  1555 10/21/23  1332   WBC Thousand/uL 2.99*   < > 11.05*   HEMOGLOBIN g/dL 8.9*   < > 12.8   HEMATOCRIT % 28.0*   < > 42.6   PLATELETS Thousands/uL 184   < > 282   BANDS PCT %  --   --  9*   NEUTROS PCT % 60   < >  --    LYMPHS PCT % 29   < >  --    LYMPHO PCT %  --   --  15   MONOS PCT % 8   < >  --    MONO PCT %  --   --  5   EOS PCT % 2   < > 0    < > = values in this interval not displayed.      Results from last 7 days   Lab Units 10/24/23  0445 10/21/23  1645 10/21/23  1555   SODIUM mmol/L 140   < > 129*   POTASSIUM mmol/L 3.6   < > 6.1*   CHLORIDE mmol/L 113*   < > 98   CO2 mmol/L 22   < > <6*   BUN mg/dL 12   < > 32*   CREATININE mg/dL 0.86   < > 1.67*   ANION GAP mmol/L 5   < > >25   CALCIUM mg/dL 7.9*   < > 8.3*   ALBUMIN g/dL  --   --  3.4*   TOTAL BILIRUBIN mg/dL  --   --  0.31   ALK PHOS U/L  --   --  107*   ALT U/L  -- --  11   AST U/L  --   --  13   GLUCOSE RANDOM mg/dL 50*   < > 542*    < > = values in this interval not displayed. Results from last 7 days   Lab Units 10/24/23  1531 10/24/23  1115 10/24/23  9824 10/24/23  0618 10/23/23  2053 10/23/23  1515 10/23/23  1134 10/23/23  0614 10/22/23  2106 10/22/23  1620 10/22/23  1313 10/22/23  1207   POC GLUCOSE mg/dl 174* 194* 100 55* 168* 328* 296* 129 292* 224* 205* 188*     Results from last 7 days   Lab Units 10/22/23  0433   HEMOGLOBIN A1C % 10.2*     Results from last 7 days   Lab Units 10/24/23  0445 10/21/23  1555 10/21/23  1415   LACTIC ACID mmol/L  --  0.9  --    PROCALCITONIN ng/ml 0.73*  --  3.17*       Lines/Drains:  Invasive Devices       Peripheral Intravenous Line  Duration             Peripheral IV 10/21/23 Right Antecubital 3 days    Peripheral IV 10/21/23 Left Forearm 2 days                          Imaging: No pertinent imaging reviewed. Recent Cultures (last 7 days):   Results from last 7 days   Lab Units 10/21/23  1626 10/21/23  1442 10/21/23  1420 10/18/23  1943   BLOOD CULTURE   --  No Growth at 48 hrs. No Growth at 48 hrs.   --    URINE CULTURE  No Growth <1000 cfu/mL  --   --  >100,000 cfu/ml Escherichia coli*       Last 24 Hours Medication List:   Current Facility-Administered Medications   Medication Dose Route Frequency Provider Last Rate    acetaminophen  650 mg Oral Q6H PRN Akshat Marcos MD      chlorhexidine  15 mL Mouth/Throat Q12H [facility] JAROCHO Link      enoxaparin  40 mg Subcutaneous Q24H [facility] JAROCHO Link      gabapentin  300 mg Oral BID JAROCHO Link      insulin glargine  15 Units Subcutaneous Q12H [facility] Mack Marcos MD      insulin lispro  1-5 Units Subcutaneous HS JAROCHO Link      insulin lispro  2-12 Units Subcutaneous TID AC JAROCHO Link      insulin lispro  5 Units Subcutaneous TID With Meals Mack Ang MD levothyroxine  200 mcg Oral Daily Before Breakfast CARMEN RamosNP      levothyroxine  25 mcg Oral Early Morning Shereen CARMEN ShipleyNP      sodium chloride (PF)  3 mL Intravenous Q1H PRN Shereen CadetCARMENNP      traZODone  100 mg Oral HS JAROCHO Ramos          Today, Patient Was Seen By: Justin Chow MD    **Please Note: This note may have been constructed using a voice recognition system. **

## 2023-10-24 NOTE — ASSESSMENT & PLAN NOTE
WBC count 2.99  Trended patient's labs which show that patient has chronic leukopenia   Will recheck CBC in am  At this time, patient does not exhibit any new signs of infection

## 2023-10-24 NOTE — PLAN OF CARE
Problem: MOBILITY - ADULT  Goal: Maintain or return to baseline ADL function  Description: INTERVENTIONS:  -  Assess patient's ability to carry out ADLs; assess patient's baseline for ADL function and identify physical deficits which impact ability to perform ADLs (bathing, care of mouth/teeth, toileting, grooming, dressing, etc.)  - Assess/evaluate cause of self-care deficits   - Assess range of motion  - Assess patient's mobility; develop plan if impaired  - Assess patient's need for assistive devices and provide as appropriate  - Encourage maximum independence but intervene and supervise when necessary  - Involve family in performance of ADLs  - Assess for home care needs following discharge   - Consider OT consult to assist with ADL evaluation and planning for discharge  - Provide patient education as appropriate  Outcome: Progressing     Problem: METABOLIC, FLUID AND ELECTROLYTES - ADULT  Goal: Glucose maintained within target range  Description: INTERVENTIONS:  - Monitor Blood Glucose as ordered  - Assess for signs and symptoms of hyperglycemia and hypoglycemia  - Administer ordered medications to maintain glucose within target range  - Assess nutritional intake and initiate nutrition service referral as needed  Outcome: Progressing     Problem: Knowledge Deficit  Goal: Patient/family/caregiver demonstrates understanding of disease process, treatment plan, medications, and discharge instructions  Description: Complete learning assessment and assess knowledge base.   Interventions:  - Provide teaching at level of understanding  - Provide teaching via preferred learning methods  Outcome: Progressing     Problem: DISCHARGE PLANNING  Goal: Discharge to home or other facility with appropriate resources  Description: INTERVENTIONS:  - Identify barriers to discharge w/patient and caregiver  - Arrange for needed discharge resources and transportation as appropriate  - Identify discharge learning needs (meds, wound care, etc.)  - Arrange for interpretive services to assist at discharge as needed  - Refer to Case Management Department for coordinating discharge planning if the patient needs post-hospital services based on physician/advanced practitioner order or complex needs related to functional status, cognitive ability, or social support system  Outcome: Progressing

## 2023-10-24 NOTE — PHYSICAL THERAPY NOTE
PT EVALUATION    41 y.o.    [id number]    Altered mental status [R41.82]  DKA (diabetic ketoacidosis) ([address]) [E11.10]  Diabetic ketoacidosis without coma associated with type 1 diabetes mellitus ([address]) [E10.10]    Past Medical History:   Diagnosis Date    Anemia     Anxiety     B12 deficiency     Cervical disc disorder     On gabapentin     Depression     Diabetes mellitus ([address])     Disease of thyroid gland     hypothyroid    Iron deficiency anemia     Panic attack     PTSD (post-traumatic stress disorder)     Sleep difficulties     Substance abuse ([address])     Type 1 diabetes ([address])     Vitamin D deficiency          Past Surgical History:   Procedure Laterality Date    ABDOMINAL SURGERY      gastric bypass     SECTION      x2    CHOLECYSTECTOMY  2018    GASTRIC BYPASS  2007    INTRAUTERINE DEVICE INSERTION  2015    IR BIOPSY BONE MARROW  2020    OTHER SURGICAL HISTORY      surgery for imperforate hymen/endometriosis/hydrometrocolpos    TUBAL LIGATION      WISDOM TOOTH EXTRACTION          10/24/23 0954   PT Last Visit   PT Visit Date 10/24/23   Note Type   Note type Evaluation   Pain Assessment   Pain Assessment Tool 0-10   Pain Score No Pain   Restrictions/Precautions   Weight Bearing Precautions Per Order No   Home Living   Type of [address] [place] [address]  Multi-level;1/2 bath on main level;Bed/bath upstairs;Stairs to enter with rails   Bathroom Shower/Tub Tub/shower unit   Bathroom Toilet Standard   Home Equipment   (no DME)   Prior Function   Level of [place] Independent with ADLs; Independent with functional mobility; Independent with IADLS   Lives With Other (Comment)  (2 children (ages 6, 15))   IADLs Independent with driving; Independent with meal prep; Independent with medication management   Falls in the last 6 months 1 to 4  (2)   Vocational Full time employment  (Autistic  - injured last week (concussion))   General   Family/Caregiver Present No   Cognition   Overall Cognitive Status WFL   Arousal/Participation Alert   Orientation Level Oriented X4   Memory Within functional limits   Following Commands Follows all commands and directions without difficulty   Subjective   Subjective Pt agreeable to evaluation. RLE Assessment   RLE Assessment   (grossly 4+/5)   LLE Assessment   LLE Assessment   (grossly 4+/5)   Bed Mobility   Supine to Sit 6  Modified independent   Sit to Supine 6  Modified independent   Transfers   Sit to Stand 7  Independent   Stand to Sit 7  Independent   Ambulation/Elevation   Gait pattern WNL   Gait Assistance 7  Independent   Assistive Device None   Distance 120' x 2   Stair Management Assistance 5  Supervision   Additional items Verbal cues   Stair Management Technique Two rails; Alternating pattern; Foreward   Number of Stairs 10  (Practice Stairs x 2)   Balance   Static Sitting Normal   Dynamic Sitting Normal   Static Standing Good   Dynamic Standing Fair +   Ambulatory Fair +   Endurance Deficit   Endurance Deficit Yes   Endurance Deficit Description fatigue   Activity Tolerance   Activity Tolerance Patient tolerated treatment well   Medical Staff Made Aware OT   Nurse Made Aware yes, Katie Agarwal RN   Assessment   Assessment Pt is an 41 y.o. female admitted to [facility] on 10/21/23 with c/o change in [address] [facility]. Pt is being treated for DKA. PT consulted with eval and treat and activity as tolerated orders. Pt lives with kids in a multi-story house. At baseline, pt is independent with ADLs and ambulation without AD. Upon evaluation, pt was independent for all mobility. The patient's AM-PAC Basic Mobility Inpatient Short Form Raw Score is 24. A Raw score of greater than 16 suggests the patient may benefit from discharge to home. Please also refer to the recommendation of the Physical Therapist for safe discharge planning. No needs for PT services identified in acute setting.    Barriers to Discharge None   Goals   PT Treatment Day 0   Discharge Recommendation   PT Discharge Recommendation No rehabilitation needs   AM-PAC Basic Mobility Inpatient   Turning in Flat Bed Without Bedrails 4   Lying on Back to Sitting on Edge of Flat Bed Without Bedrails 4   Moving Bed to Chair 4   Standing Up From Chair Using Arms 4   Walk in Room 4   Climb 3-5 Stairs With Railing 4   Basic Mobility Inpatient Raw Score 24   Basic Mobility Standardized Score 57.68   Highest Level Of Mobility   JH-HLM Goal 8: Walk 250 feet or more   JH-HLM Achieved 7: Walk 25 feet or more   End of Consult   Patient Position at End of Consult Bedside chair; All needs within reach     Hx/personal factors: ROMEL home environment, steps within home environment, and limited home support, comorbidities    Examination:decreased strength , decreased activity tolerance, and lethargy . Clinical: unpredictable Ongoing medical status, Decreased activity tolerance compared to baseline, and Decline from PLOF. Dari Ortiz      Complexity: moderate          Misael Carreon, PT

## 2023-10-24 NOTE — OCCUPATIONAL THERAPY NOTE
Occupational Therapy Evaluation     Patient Name: Rekha Lantigua  NFAWT'V Date: 10/24/2023  Problem List  Principal Problem:    DKA (diabetic ketoacidosis) ([address])  Active Problems:    THO (acute kidney injury) ([address])    Hypothyroidism    UTI (urinary tract infection)    Concussion    Generalized weakness    Past Medical History  Past Medical History:   Diagnosis Date    Anemia     Anxiety     B12 deficiency     Cervical disc disorder     On gabapentin     Depression     Diabetes mellitus ([address])     Disease of thyroid gland     hypothyroid    Iron deficiency anemia     Panic attack     PTSD (post-traumatic stress disorder)     Sleep difficulties     Substance abuse ([address])     Type 1 diabetes ([address])     Vitamin D deficiency      Past Surgical History  Past Surgical History:   Procedure Laterality Date    ABDOMINAL SURGERY      gastric bypass     SECTION      x2    CHOLECYSTECTOMY  2018    GASTRIC BYPASS  2007    INTRAUTERINE DEVICE INSERTION  2015    IR BIOPSY BONE MARROW  2020    OTHER SURGICAL HISTORY      surgery for imperforate hymen/endometriosis/hydrometrocolpos    TUBAL LIGATION      WISDOM TOOTH EXTRACTION             10/24/23 0955   Note Type   Note type Evaluation   Pain Assessment   Pain Assessment Tool FLACC   Pain Rating: FLACC (Rest) - Face 0   Pain Rating: FLACC (Rest) - Legs 0   Pain Rating: FLACC (Rest) - Activity 0   Pain Rating: FLACC (Rest) - Cry 0   Pain Rating: FLACC (Rest) - Consolability 0   Score: FLACC (Rest) 0   Restrictions/Precautions   Weight Bearing Precautions Per Order No   Home Living   Type of [address] [place] [address]  Two level   Home Equipment   (denies)   Prior Function   Lives With   (2 children [id number])   Falls in the last 6 months 1 to 4  (2)   Comments PTA pt states independence with all aspects of her ADLs, transfers, ambulation--w/o device; +falls=2, +   Lifestyle   Autonomy PTA pt states independence with all aspects of her ADLs, transfers, ambulation--w/o device; +falls=2, +   Reciprocal Relationships limited family support   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   Intrinsic Gratification watching TV   Subjective   Subjective "I just feel weak."   ADL   Where Assessed Edge of bed   Eating Assistance 6  Modified independent   Grooming Assistance 6  Modified Independent   UB Bathing Assistance 6  Modified Independent   LB Bathing Assistance 6  Modified Independent   UB Dressing Assistance 6  Modified independent   LB Dressing Assistance 6  Modified independent   Toileting Assistance  6  Modified independent   Bed Mobility   Rolling R 6  Modified independent   Rolling L 6  Modified independent   Supine to Sit 6  Modified independent   Sit to Supine 6  Modified independent   Transfers   Sit to Stand 7  Independent   Stand to Sit 7  Independent   Functional Mobility   Functional Mobility 7  Independent   Additional items   (w/o device)   Balance   Static Sitting Normal   Dynamic Sitting Normal   Static Standing Good   Dynamic Standing Fair +   Activity Tolerance   Activity Tolerance Patient tolerated treatment well;Patient limited by fatigue   Medical Staff Made Aware nsg, P.T., CM   RUE Assessment   RUE Assessment WFL   RUE Strength   RUE Overall Strength Within Functional Limits - able to perform ADL tasks with strength  (4/5 throughout)   LUE Assessment   LUE Assessment WFL   LUE Strength   LUE Overall Strength Within Functional Limits - able to perform ADL tasks with strength  (4/5 throughout)   Hand Function   Gross Motor Coordination Functional   Fine Motor Coordination Functional   Sensation   Light Touch No apparent deficits   Proprioception   Proprioception No apparent deficits   Vision-Basic Assessment   Current Vision   (glasses)   Vision - Complex Assessment   Acuity Able to read clock/calendar on wall without difficulty   Psychosocial   Psychosocial (WDL) WDL   Perception   Inattention/Neglect Appears intact   Assessment   Limitation Decreased endurance   Prognosis Good   Assessment Pt is a 44y/o female admitted to the hospital 2* symptoms of chills, bodyaches, and altered mental status; CT(brain)=neg acute findings. Pt noted with DKA. Pt with PMH anemia, anxiety, DM, gastric bypass, and recent(10/8) concussion. PTA pt states independence with all aspects of her ADLs, transfers, ambulation--w/o device; +falls=2, +. During initial eval, pt demonstrate slight deficits with her functional balance, functional mobility, and activity tolerance. Pt was able to demonstrate good ADL status. Pt would benefit from a restorative program on the unit to improve her endurance, balance, and mobility. Acute OT tx not indicated at this time 2* limited ADL deficits. The patient's raw score on the AM-PAC Daily Activity Inpatient Short Form is 24. A raw score of greater than or equal to 19 suggests the patient may benefit from discharge to home. Please refer to the recommendation of the Occupational Therapist for safe discharge planning.    Goals   Patient Goals "to get some energy back."   Plan   OT Frequency Eval only   Discharge Recommendation   OT Discharge Recommendation No rehabilitation needs   AM-PAC Daily Activity Inpatient   Lower Body Dressing 4   Bathing 4   Toileting 4   Upper Body Dressing 4   Grooming 4   Eating 4   Daily Activity Raw Score 24   Daily Activity Standardized Score (Calc for Raw Score >=11) 57.54   AM-PAC Applied Cognition Inpatient   Following a Speech/Presentation 4   Understanding Ordinary Conversation 4   Taking Medications 4   Remembering Where Things Are Placed or Put Away 4   Remembering List of 4-5 Errands 4   Taking Care of Complicated Tasks 4   Applied Cognition Raw Score 24   Applied Cognition Standardized Score 62.21   Arminda Stout

## 2023-10-24 NOTE — ASSESSMENT & PLAN NOTE
States that she has been very very weak since fall last Monday, has been unable to climb a flight of stairs at home  However suspect weakness at this time multifactorial given DKA  PT and OT evaluated patient: no rehab needs  Continue supportive care

## 2023-10-25 VITALS
HEART RATE: 74 BPM | RESPIRATION RATE: 14 BRPM | BODY MASS INDEX: 28.42 KG/M2 | TEMPERATURE: 97.3 F | OXYGEN SATURATION: 95 % | HEIGHT: 66 IN | WEIGHT: 176.81 LBS | DIASTOLIC BLOOD PRESSURE: 82 MMHG | SYSTOLIC BLOOD PRESSURE: 129 MMHG

## 2023-10-25 DIAGNOSIS — E10.65 TYPE 1 DIABETES MELLITUS WITH HYPERGLYCEMIA (HCC): ICD-10-CM

## 2023-10-25 LAB
ANION GAP SERPL CALCULATED.3IONS-SCNC: 8 MMOL/L
BASOPHILS # BLD AUTO: 0.02 THOUSANDS/ÂΜL (ref 0–0.1)
BASOPHILS NFR BLD AUTO: 1 % (ref 0–1)
BUN SERPL-MCNC: 13 MG/DL (ref 5–25)
CALCIUM SERPL-MCNC: 8.2 MG/DL (ref 8.4–10.2)
CHLORIDE SERPL-SCNC: 106 MMOL/L (ref 96–108)
CO2 SERPL-SCNC: 22 MMOL/L (ref 21–32)
CREAT SERPL-MCNC: 0.84 MG/DL (ref 0.6–1.3)
EOSINOPHIL # BLD AUTO: 0.06 THOUSAND/ÂΜL (ref 0–0.61)
EOSINOPHIL NFR BLD AUTO: 2 % (ref 0–6)
ERYTHROCYTE [DISTWIDTH] IN BLOOD BY AUTOMATED COUNT: 13.4 % (ref 11.6–15.1)
GFR SERPL CREATININE-BSD FRML MDRD: 86 ML/MIN/1.73SQ M
GLUCOSE SERPL-MCNC: 122 MG/DL (ref 65–140)
GLUCOSE SERPL-MCNC: 129 MG/DL (ref 65–140)
GLUCOSE SERPL-MCNC: 155 MG/DL (ref 65–140)
GLUCOSE SERPL-MCNC: 168 MG/DL (ref 65–140)
GLUCOSE SERPL-MCNC: 44 MG/DL (ref 65–140)
HCT VFR BLD AUTO: 27.4 % (ref 34.8–46.1)
HGB BLD-MCNC: 8.8 G/DL (ref 11.5–15.4)
IMM GRANULOCYTES # BLD AUTO: 0.06 THOUSAND/UL (ref 0–0.2)
IMM GRANULOCYTES NFR BLD AUTO: 2 % (ref 0–2)
LYMPHOCYTES # BLD AUTO: 0.91 THOUSANDS/ÂΜL (ref 0.6–4.47)
LYMPHOCYTES NFR BLD AUTO: 28 % (ref 14–44)
MCH RBC QN AUTO: 28.6 PG (ref 26.8–34.3)
MCHC RBC AUTO-ENTMCNC: 32.1 G/DL (ref 31.4–37.4)
MCV RBC AUTO: 89 FL (ref 82–98)
MONOCYTES # BLD AUTO: 0.22 THOUSAND/ÂΜL (ref 0.17–1.22)
MONOCYTES NFR BLD AUTO: 7 % (ref 4–12)
NEUTROPHILS # BLD AUTO: 1.94 THOUSANDS/ÂΜL (ref 1.85–7.62)
NEUTS SEG NFR BLD AUTO: 60 % (ref 43–75)
NRBC BLD AUTO-RTO: 0 /100 WBCS
PLATELET # BLD AUTO: 223 THOUSANDS/UL (ref 149–390)
PMV BLD AUTO: 10.5 FL (ref 8.9–12.7)
POTASSIUM SERPL-SCNC: 4 MMOL/L (ref 3.5–5.3)
RBC # BLD AUTO: 3.08 MILLION/UL (ref 3.81–5.12)
SODIUM SERPL-SCNC: 136 MMOL/L (ref 135–147)
WBC # BLD AUTO: 3.21 THOUSAND/UL (ref 4.31–10.16)

## 2023-10-25 PROCEDURE — 99239 HOSP IP/OBS DSCHRG MGMT >30: CPT | Performed by: STUDENT IN AN ORGANIZED HEALTH CARE EDUCATION/TRAINING PROGRAM

## 2023-10-25 PROCEDURE — 82948 REAGENT STRIP/BLOOD GLUCOSE: CPT

## 2023-10-25 PROCEDURE — 80048 BASIC METABOLIC PNL TOTAL CA: CPT | Performed by: STUDENT IN AN ORGANIZED HEALTH CARE EDUCATION/TRAINING PROGRAM

## 2023-10-25 PROCEDURE — 85025 COMPLETE CBC W/AUTO DIFF WBC: CPT | Performed by: STUDENT IN AN ORGANIZED HEALTH CARE EDUCATION/TRAINING PROGRAM

## 2023-10-25 RX ORDER — INSULIN ASPART 100 [IU]/ML
INJECTION, SOLUTION INTRAVENOUS; SUBCUTANEOUS
Qty: 10 ML | Refills: 3 | Status: SHIPPED | OUTPATIENT
Start: 2023-10-25

## 2023-10-25 RX ADMIN — GABAPENTIN 300 MG: 300 CAPSULE ORAL at 09:37

## 2023-10-25 RX ADMIN — INSULIN LISPRO 5 UNITS: 100 INJECTION, SOLUTION INTRAVENOUS; SUBCUTANEOUS at 09:34

## 2023-10-25 RX ADMIN — ENOXAPARIN SODIUM 40 MG: 40 INJECTION SUBCUTANEOUS at 09:37

## 2023-10-25 RX ADMIN — LEVOTHYROXINE SODIUM 200 MCG: 100 TABLET ORAL at 06:16

## 2023-10-25 RX ADMIN — LEVOTHYROXINE SODIUM 25 MCG: 25 TABLET ORAL at 05:07

## 2023-10-25 RX ADMIN — INSULIN LISPRO 5 UNITS: 100 INJECTION, SOLUTION INTRAVENOUS; SUBCUTANEOUS at 12:31

## 2023-10-25 NOTE — DISCHARGE SUMMARY
[facility]  Discharge- [facility] 1980, 41 y.o. female MRN: [id number]  Unit/Bed#: E2 -01 Encounter: [id number]  Primary Care Provider: Edwin Krishna MD   Date and time admitted to hospital: 10/21/2023  1:14 PM    * DKA (diabetic ketoacidosis) [facility])  Assessment & Plan  Lab Results   Component Value Date    HGBA1C 10.2 (H) 10/22/2023       Recent Labs     10/25/23  0251 10/25/23  0314 10/25/23  0620 10/25/23  1212   POCGLU 44* 129 155* 168*         Blood Sugar Average: Last 72 hrs:  (P) 152.75    Patient was admitted to the ICU for treatment of DKA which was suspected to be secondary to UTI for which patient received Rocephin  Possibly multifactorial given recent UTI, trauma/concussion and significant social stressors at work  Patient's glucose better controlled and patient tolerating diet  DKA RESOLVED - patient will be discharged home today with instructions to resume home insulin regimen      Generalized weakness  Assessment & Plan  States that she has been very very weak since fall last Monday, has been unable to climb a flight of stairs at home  However suspect weakness at this time multifactorial given DKA  PT and OT evaluated patient: no rehab needs  Continue supportive care    Concussion  Assessment & Plan  Patient with history of concussion on 10/18 when she fell from a flight of stairs and bumped her head at work  CT scan done on 10/21 showing no intracranial abnormalities  Patient currently alert and oriented x4 with no deficits  We will continue neurochecks  Nausea and vomiting have improved    UTI (urinary tract infection)  Assessment & Plan  Patient was being treated for urinary tract infection on 10/18/2023 with Keflex 500 mg which was stopped on admission  Patient was given ceftriaxone based on previous cultures, UTI thought to be etiology of DKA  Urinalysis and urine cultures negative on this admission, will continue to monitor off antibiotics, patient currently with no urinary symptoms    Hypothyroidism  Assessment & Plan  Continue outpatient follow-up with endocrinology  Continue home medication 225 mcg of levothyroxine    THO (acute kidney injury) ([address])  Assessment & Plan  Patient presented with creatinine of 1.67 which was elevated from her baseline of <1 in setting of DKA  Now resolved, creatinine back to baseline  Continue to monitor I's and O's  Avoid nephrotoxins    Leukopenia  Assessment & Plan  improving  Trended patient's labs which show that patient has chronic leukopenia   No signs of acute infection      Medical Problems       Resolved Problems  Date Reviewed: 10/22/2023   None       Discharging Physician / Practitioner: Lubna Gandhi MD  PCP: Ramos Davis MD  Admission Date:   Admission Orders (From admission, onward)       Ordered        10/21/23 1532  Inpatient Admission  Once                          Discharge Date: 10/25/23    Consultations During Hospital Stay:  none    Procedures Performed:   none    Significant Findings / Test Results:   CT abdomen pelvis wo contrast   Final Result by Summer De Leon DO (10/21 1647)      *Limited study due to absence of IV or oral contrast as well as other artifact. No acute intra-abdominal or pelvic pathology within limitations. Moderate bladder distention. Followup imaging with oral and/or IV contrast may be considered for any persistent or worsening symptoms. Workstation performed: [id number]         XR chest 1 view portable   Final Result by Hemal Carreon MD (10/22 2018)      No acute cardiopulmonary disease. Workstation performed: [id number]         CT head without contrast   Final Result by Anusha Black MD (10/21 1523)      No acute intracranial abnormality.                   Workstation performed: [id number]               Incidental Findings:   none     Test Results Pending at Discharge (will require follow up):   none     Outpatient Tests Requested:  none    Complications:  none    Reason for Admission: DKA    Hospital Course:   Tano Gregg is a 41 y.o. female patient who originally presented to the hospital on 10/21/2023 due to altered mental status, significant weakness. Found to be in DKA on admission and was managed in the ICU. DKA etiology was thought to be multifactorial in setting of recent UTI, significant stressors. Patient was also evaluated by gynecology given concern for vaginal debris on CT scan. Speculum exam performed and no vaginal debris noted. Patient's glucose improved on adjusted insulin regimen and IVF. Patient is now currently tolerating a diet. Patient will be discharged home on her home insulin regimen. PT was consulted on the case given patient's generalized weakness. No rehab needs recommended. Patient is currently stable for discharge home at this time. Patient advised to follow-up with her primary care provider within the next 1 to 2 weeks. Please see above list of diagnoses and related plan for additional information. Condition at Discharge: stable    Discharge Day Visit / Exam:   Subjective: Patient seen and examined at bedside. Denies any complaints at this time. Vitals: Blood Pressure: 129/82 (10/25/23 0740)  Pulse: 74 (10/25/23 0740)  Temperature: (!) 97.3 °F (36.3 °C) (10/25/23 0740)  Temp Source: Temporal (10/25/23 0740)  Respirations: 14 (10/25/23 0740)  Height: 5' 6" (167.6 cm) (10/22/23 0557)  Weight - Scale: 80.2 kg (176 lb 12.9 oz) (10/25/23 0555)  SpO2: 95 % (10/25/23 0740)    Exam:   Physical Exam  Vitals and nursing note reviewed. Constitutional:       General: She is not in acute distress. Appearance: She is well-developed. HENT:      Head: Normocephalic and atraumatic. Eyes:      Conjunctiva/sclera: Conjunctivae normal.   Cardiovascular:      Rate and Rhythm: Normal rate and regular rhythm.       Heart sounds: No murmur heard.  Pulmonary:      Effort: Pulmonary effort is normal. No respiratory distress. Abdominal:      General: Abdomen is flat. Palpations: Abdomen is soft. Tenderness: There is no abdominal tenderness. Musculoskeletal:         General: No swelling. Cervical back: Neck supple. Skin:     General: Skin is warm and dry. Capillary Refill: Capillary refill takes less than 2 seconds. Neurological:      Mental Status: She is alert. Psychiatric:         Mood and Affect: Mood normal.        Discussion with Family:  patient will update her mom. Discharge instructions/Information to patient and family:   See after visit summary for information provided to patient and family. Provisions for Follow-Up Care:  See after visit summary for information related to follow-up care and any pertinent home health orders. Disposition:   Home    Planned Readmission: none     Discharge Statement:  I had direct contact with the patient on the day of discharge. Greater than 50% of the total time was spent examining patient, answering all patient questions, arranging and discussing plan of care with patient as well as directly providing post-discharge instructions. Additional time then spent on discharge activities. Discharge Medications:  See after visit summary for reconciled discharge medications provided to patient and/or family.       **Please Note: This note may have been constructed using a voice recognition system**

## 2023-10-25 NOTE — PLAN OF CARE
Problem: METABOLIC, FLUID AND ELECTROLYTES - ADULT  Goal: Glucose maintained within target range  Description: INTERVENTIONS:  - Monitor Blood Glucose as ordered  - Assess for signs and symptoms of hyperglycemia and hypoglycemia  - Administer ordered medications to maintain glucose within target range  - Assess nutritional intake and initiate nutrition service referral as needed  Outcome: Progressing     Problem: DISCHARGE PLANNING  Goal: Discharge to home or other facility with appropriate resources  Description: INTERVENTIONS:  - Identify barriers to discharge w/patient and caregiver  - Arrange for needed discharge resources and transportation as appropriate  - Identify discharge learning needs (meds, wound care, etc.)  - Arrange for interpretive services to assist at discharge as needed  - Refer to Case Management Department for coordinating discharge planning if the patient needs post-hospital services based on physician/advanced practitioner order or complex needs related to functional status, cognitive ability, or social support system  Outcome: Progressing

## 2023-10-25 NOTE — DISCHARGE INSTR - AVS FIRST PAGE
Dear Henry Dick,     It was our pleasure to care for you here at [facility]. It is our hope that we were always able to exceed the expected standards for your care during your stay. You were hospitalized due to Diabetic ketoacidosis . You were cared for on the 2nd floor under the service of Roman Benedict MD with the [facility] who covers for your primary care physician (PCP), Vanita Ruiz MD, while you were hospitalized. If you have any questions or concerns related to this hospitalization, you may contact us at [phone]. For follow up as well as medication refills, we recommend that you follow up with your primary care physician. A registered nurse will reach out to you by phone within a few days after your discharge to answer any additional questions that you may have after going home. However, at this time we provide for you here, the most important instructions / recommendations at discharge:     Notable Medication Adjustments -   You are to resume your home diabetic regimen  Testing Required after Discharge -   none  Incidental findings that Require Follow Up   none  Important Follow Up Information -   Please follow-up with your primary care provider within 1-2 weeks of discharge  Please follow-up with your Concussion specialist to determine when you will be able to return to work  Other Instructions -   While you are stable for discharge home at this time, your symptoms may change. Please seek immediate medical attention for any concerning symptoms including, but not limited to,   Please review this entire after visit summary as additional general instructions including medication list, appointments, activity, diet, any pertinent wound care, and other additional recommendations from your care team that may be provided for you.       Sincerely,     Roman Benedict MD

## 2023-10-25 NOTE — ASSESSMENT & PLAN NOTE
Patient presented with creatinine of 1.67 which was elevated from her baseline of <1 in setting of DKA  Now resolved, creatinine back to baseline  Continue to monitor I's and O's  Avoid nephrotoxins SUBJECTIVE:  Tuan Goff is a 51 year old male who is 3month(s) status post Lumbar laminectomy and TLIFL4-5.  Stiff in the morning, but gets better as the day goes on.  Numbness in the left foot.    PAIN:  Preoperative:  10/10.  Today:  1/10.     OSWESTRY DISABILITY INDEX:  OSWESTRY Score Calculated: 26 %       OBJECTIVE:  Tuan is awake, alert, and oriented x 3.  Range of Motion:  Lumbar:  Flexion: 50° and Extension: 10°.  Skin:  healing well, without erythema.  Palpation:  nontender.  Gait:  without antalgia; without ataxia.  Reflexes:  DTR's:  2+ (normal).  Sensory:  Normal except left dorsum foot.  Pulses:  2+ and equal.  Motor strength:  5/5 in all motor groups.    X-ray:  Plain x-rays of the lumbar spine were reviewed with the patient in detail.  My interpretation is hardware intact.      ASSESSMENT:  Lumbar stenosis L4-5 with neurogenic claudication and overall his feels improved.     PLAN:   We have instructed Tuan to increase his activities as tolerated.  We will begin physical therapy/home exercise and see him in follow-up if need be.  He was not given prescriptions for pain today.  Work status was not addressed.

## 2023-10-25 NOTE — PLAN OF CARE
Problem: METABOLIC, FLUID AND ELECTROLYTES - ADULT  Goal: Glucose maintained within target range  Description: INTERVENTIONS:  - Monitor Blood Glucose as ordered  - Assess for signs and symptoms of hyperglycemia and hypoglycemia  - Administer ordered medications to maintain glucose within target range  - Assess nutritional intake and initiate nutrition service referral as needed  Outcome: Progressing     Problem: MOBILITY - ADULT  Goal: Maintain or return to baseline ADL function  Description: INTERVENTIONS:  -  Assess patient's ability to carry out ADLs; assess patient's baseline for ADL function and identify physical deficits which impact ability to perform ADLs (bathing, care of mouth/teeth, toileting, grooming, dressing, etc.)  - Assess/evaluate cause of self-care deficits   - Assess range of motion  - Assess patient's mobility; develop plan if impaired  - Assess patient's need for assistive devices and provide as appropriate  - Encourage maximum independence but intervene and supervise when necessary  - Involve family in performance of ADLs  - Assess for home care needs following discharge   - Consider OT consult to assist with ADL evaluation and planning for discharge  - Provide patient education as appropriate  Outcome: Progressing     Problem: Knowledge Deficit  Goal: Patient/family/caregiver demonstrates understanding of disease process, treatment plan, medications, and discharge instructions  Description: Complete learning assessment and assess knowledge base.   Interventions:  - Provide teaching at level of understanding  - Provide teaching via preferred learning methods  Outcome: Progressing     Problem: DISCHARGE PLANNING  Goal: Discharge to home or other facility with appropriate resources  Description: INTERVENTIONS:  - Identify barriers to discharge w/patient and caregiver  - Arrange for needed discharge resources and transportation as appropriate  - Identify discharge learning needs (meds, wound care, etc.)  - Arrange for interpretive services to assist at discharge as needed  - Refer to Case Management Department for coordinating discharge planning if the patient needs post-hospital services based on physician/advanced practitioner order or complex needs related to functional status, cognitive ability, or social support system  Outcome: Progressing

## 2023-10-25 NOTE — ASSESSMENT & PLAN NOTE
Lab Results   Component Value Date    HGBA1C 10.2 (H) 10/22/2023       Recent Labs     10/25/23  0251 10/25/23  0314 10/25/23  0620 10/25/23  1212   POCGLU 44* 129 155* 168*         Blood Sugar Average: Last 72 hrs:  (P) 152.75    Patient was admitted to the ICU for treatment of DKA which was suspected to be secondary to UTI for which patient received Rocephin  Possibly multifactorial given recent UTI, trauma/concussion and significant social stressors at work  Patient's glucose better controlled and patient tolerating diet  DKA RESOLVED - patient will be discharged home today with instructions to resume home insulin regimen

## 2023-10-25 NOTE — UTILIZATION REVIEW
NOTIFICATION OF ADMISSION DISCHARGE   This is a Notification of Discharge from [address] [place]. Please be advised that this patient has been discharge from our facility. Below you will find the admission and discharge date and time including the patient’s disposition. UTILIZATION REVIEW CONTACT:  Beverley Simmonds  Utilization   Network Utilization Review Department  Phone: [phone] x carefully listen to the prompts. All voicemails are confidential.  Email: [email or web address]     ADMISSION INFORMATION  PRESENTATION DATE: 10/21/2023  1:14 PM  OBERVATION ADMISSION DATE:   INPATIENT ADMISSION DATE: 10/21/23  3:32 PM   DISCHARGE DATE: 10/25/2023  2:14 PM   DISPOSITION:Home/Self Care    Network Utilization Review Department  ATTENTION: Please call with any questions or concerns to [phone] and carefully listen to the prompts so that you are directed to the right person. All voicemails are confidential.   For Discharge needs, contact Care Management DC Support Team at [phone] opt. 2  Send all requests for admission clinical reviews, approved or denied determinations and any other requests to dedicated fax number below belonging to the campus where the patient is receiving treatment.  List of dedicated fax numbers for the Facilities:  [place] DENIALS (Administrative/Medical Necessity) [phone]   DISCHARGE SUPPORT TEAM (Network) [phone] [address] [facility] (Maternity/NICU/Pediatrics) [phone]   [address] [facility] [address] [phone] [facility] [phone]   [address] [place] [address]  [address]  [phone]

## 2023-10-25 NOTE — ASSESSMENT & PLAN NOTE
improving  Trended patient's labs which show that patient has chronic leukopenia   No signs of acute infection

## 2023-10-26 ENCOUNTER — TRANSITIONAL CARE MANAGEMENT (OUTPATIENT)
Dept: FAMILY MEDICINE CLINIC | Facility: CLINIC | Age: 43
End: 2023-10-26

## 2023-10-26 DIAGNOSIS — Z71.89 COMPLEX CARE COORDINATION: Primary | ICD-10-CM

## 2023-10-27 ENCOUNTER — PATIENT OUTREACH (OUTPATIENT)
Dept: FAMILY MEDICINE CLINIC | Facility: CLINIC | Age: 43
End: 2023-10-27

## 2023-10-27 ENCOUNTER — EVALUATION (OUTPATIENT)
Dept: PHYSICAL THERAPY | Facility: CLINIC | Age: 43
End: 2023-10-27
Payer: COMMERCIAL

## 2023-10-27 DIAGNOSIS — S06.0XAA CONCUSSION: ICD-10-CM

## 2023-10-27 DIAGNOSIS — S06.0XAD CONCUSSION WITH UNKNOWN LOSS OF CONSCIOUSNESS STATUS, SUBSEQUENT ENCOUNTER: Primary | ICD-10-CM

## 2023-10-27 LAB
BACTERIA BLD CULT: NORMAL
BACTERIA BLD CULT: NORMAL

## 2023-10-27 PROCEDURE — 97112 NEUROMUSCULAR REEDUCATION: CPT | Performed by: PHYSICAL THERAPIST

## 2023-10-27 PROCEDURE — 97162 PT EVAL MOD COMPLEX 30 MIN: CPT | Performed by: PHYSICAL THERAPIST
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
## 2023-10-27 NOTE — PROGRESS NOTES
Left message with my contact information for patient to return my call Reminded her to set up follow up appointment with Dr Rico jones

## 2023-10-30 ENCOUNTER — PATIENT OUTREACH (OUTPATIENT)
Dept: FAMILY MEDICINE CLINIC | Facility: CLINIC | Age: 43
End: 2023-10-30

## 2023-10-31 ENCOUNTER — OFFICE VISIT (OUTPATIENT)
Dept: PHYSICAL THERAPY | Facility: CLINIC | Age: 43
End: 2023-10-31
Payer: COMMERCIAL

## 2023-10-31 DIAGNOSIS — S06.0XAD CONCUSSION WITH UNKNOWN LOSS OF CONSCIOUSNESS STATUS, SUBSEQUENT ENCOUNTER: Primary | ICD-10-CM

## 2023-10-31 PROCEDURE — 97140 MANUAL THERAPY 1/> REGIONS: CPT

## 2023-10-31 PROCEDURE — 97112 NEUROMUSCULAR REEDUCATION: CPT

## 2023-10-31 PROCEDURE — 97110 THERAPEUTIC EXERCISES: CPT
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
## 2023-11-02 ENCOUNTER — TRANSITIONAL CARE MANAGEMENT (OUTPATIENT)
Dept: FAMILY MEDICINE CLINIC | Facility: CLINIC | Age: 43
End: 2023-11-02

## 2023-11-02 ENCOUNTER — PATIENT OUTREACH (OUTPATIENT)
Dept: FAMILY MEDICINE CLINIC | Facility: CLINIC | Age: 43
End: 2023-11-02

## 2023-11-02 NOTE — LETTER
Date: 11/02/23    Dear Raquel Solo,   My name is Kavon Terrell; I am a registered nurse care manager working with [employer] [facility] [address] [phone]. I have not been able to reach you and would like to set a time that I can talk with you over the phone. My work is to help patients that have complex medical conditions get the care they need. This includes patients who may have been in the hospital or emergency room. Please call me with any questions you may have. I look forward to meeting with you.   Sincerely,  Kavon Terrell  [phone]  Outpatient Care Manager

## 2023-11-03 ENCOUNTER — OFFICE VISIT (OUTPATIENT)
Dept: PHYSICAL THERAPY | Facility: CLINIC | Age: 43
End: 2023-11-03
Payer: COMMERCIAL

## 2023-11-03 DIAGNOSIS — S06.0XAD CONCUSSION WITH UNKNOWN LOSS OF CONSCIOUSNESS STATUS, SUBSEQUENT ENCOUNTER: Primary | ICD-10-CM

## 2023-11-03 PROCEDURE — 97110 THERAPEUTIC EXERCISES: CPT | Performed by: PHYSICAL THERAPIST

## 2023-11-03 PROCEDURE — 97140 MANUAL THERAPY 1/> REGIONS: CPT | Performed by: PHYSICAL THERAPIST

## 2023-11-03 NOTE — PROGRESS NOTES
Daily Note     Today's date: 11/3/2023  Patient name: Gena Navarrete  : 1980  MRN: [id number]  Referring provider: Marc Ramirez*  Dx:   Encounter Diagnosis     ICD-10-CM    1. Concussion with unknown loss of consciousness status, subsequent encounter  S06. 0XAD                      Subjective: Patient reports feeling progressively worse since last week. Has not attempted aerobic exercise. Feels tired and overwhelmed. Worried she's not getting better. Objective: See treatment diary below      Assessment: Patient reported a decrease in symptoms overall post manual therapy. Completed aerobic eexrcise on treadmill and therapist encouraged pt to do the same at home. Discussed improtance of perhaps doing a gradual return to activity if her symptoms aer significantly exacerbated with full routine. Referred to neurology. Plan: Continue per plan of care.       Precautions:       Manuals 10/31 11/3           C/S STM   UT, LS, SCM, rhomboid MH UT, LS, SCM, rhomboid           SOR  [facility] [address]           Distraction gentle  [facility] [address]                        Neuro Re-Ed             Static Balance              Dynamic Balance              Chin tucks  20x3" [id number]           Activity tolerance  1.4 mph x5   1.7 mph x5     3% inlcine thruout 2.0 mph 10 min 3% incline treadmill                                                   Ther Ex             Postural Strength  BTB 2x10 row + ext            C/S snag  15x3" rot, ext                                                                                            Ther Activity                                       Gait Training                                       Modalities

## 2023-11-06 ENCOUNTER — OFFICE VISIT (OUTPATIENT)
Dept: FAMILY MEDICINE CLINIC | Facility: CLINIC | Age: 43
End: 2023-11-06
Payer: COMMERCIAL

## 2023-11-06 ENCOUNTER — APPOINTMENT (OUTPATIENT)
Dept: LAB | Facility: CLINIC | Age: 43
End: 2023-11-06
Payer: COMMERCIAL

## 2023-11-06 ENCOUNTER — TELEPHONE (OUTPATIENT)
Dept: NEUROLOGY | Facility: CLINIC | Age: 43
End: 2023-11-06

## 2023-11-06 ENCOUNTER — APPOINTMENT (OUTPATIENT)
Dept: URGENT CARE | Facility: MEDICAL CENTER | Age: 43
End: 2023-11-06
Payer: OTHER MISCELLANEOUS

## 2023-11-06 VITALS
OXYGEN SATURATION: 98 % | TEMPERATURE: 98.6 F | HEIGHT: 66 IN | HEART RATE: 87 BPM | BODY MASS INDEX: 27.97 KG/M2 | RESPIRATION RATE: 18 BRPM | WEIGHT: 174 LBS | DIASTOLIC BLOOD PRESSURE: 90 MMHG | SYSTOLIC BLOOD PRESSURE: 140 MMHG

## 2023-11-06 DIAGNOSIS — E10.10 DIABETIC KETOACIDOSIS WITHOUT COMA ASSOCIATED WITH TYPE 1 DIABETES MELLITUS (HCC): ICD-10-CM

## 2023-11-06 DIAGNOSIS — D50.8 OTHER IRON DEFICIENCY ANEMIA: ICD-10-CM

## 2023-11-06 DIAGNOSIS — E55.9 VITAMIN D DEFICIENCY: ICD-10-CM

## 2023-11-06 DIAGNOSIS — E53.8 B12 DEFICIENCY: ICD-10-CM

## 2023-11-06 DIAGNOSIS — N17.9 AKI (ACUTE KIDNEY INJURY) (HCC): ICD-10-CM

## 2023-11-06 DIAGNOSIS — D50.9 IRON DEFICIENCY ANEMIA, UNSPECIFIED IRON DEFICIENCY ANEMIA TYPE: Chronic | ICD-10-CM

## 2023-11-06 DIAGNOSIS — E10.65 TYPE 1 DIABETES MELLITUS WITH HYPERGLYCEMIA (HCC): ICD-10-CM

## 2023-11-06 DIAGNOSIS — S06.0X0D CONCUSSION WITHOUT LOSS OF CONSCIOUSNESS, SUBSEQUENT ENCOUNTER: ICD-10-CM

## 2023-11-06 DIAGNOSIS — E06.3 HYPOTHYROIDISM DUE TO HASHIMOTO'S THYROIDITIS: ICD-10-CM

## 2023-11-06 DIAGNOSIS — D50.9 IRON DEFICIENCY ANEMIA, UNSPECIFIED IRON DEFICIENCY ANEMIA TYPE: ICD-10-CM

## 2023-11-06 DIAGNOSIS — E10.65 TYPE 1 DIABETES MELLITUS WITH HYPERGLYCEMIA (HCC): Primary | ICD-10-CM

## 2023-11-06 DIAGNOSIS — E03.8 HYPOTHYROIDISM DUE TO HASHIMOTO'S THYROIDITIS: ICD-10-CM

## 2023-11-06 DIAGNOSIS — N30.01 ACUTE CYSTITIS WITH HEMATURIA: ICD-10-CM

## 2023-11-06 PROBLEM — R19.7 DIARRHEA: Status: RESOLVED | Noted: 2023-06-14 | Resolved: 2023-11-06

## 2023-11-06 PROBLEM — R53.1 GENERALIZED WEAKNESS: Status: RESOLVED | Noted: 2023-10-23 | Resolved: 2023-11-06

## 2023-11-06 PROBLEM — B96.20 E COLI BACTEREMIA: Status: RESOLVED | Noted: 2022-10-20 | Resolved: 2023-11-06

## 2023-11-06 PROBLEM — R45.851 SUICIDAL IDEATIONS: Status: RESOLVED | Noted: 2020-06-25 | Resolved: 2023-11-06

## 2023-11-06 PROBLEM — R74.8 ALKALINE PHOSPHATASE ELEVATION: Status: RESOLVED | Noted: 2023-06-13 | Resolved: 2023-11-06

## 2023-11-06 PROBLEM — F10.10 ALCOHOL ABUSE: Status: RESOLVED | Noted: 2021-05-23 | Resolved: 2023-11-06

## 2023-11-06 PROBLEM — R74.01 TRANSAMINITIS: Status: RESOLVED | Noted: 2021-05-23 | Resolved: 2023-11-06

## 2023-11-06 PROBLEM — R78.81 E COLI BACTEREMIA: Status: RESOLVED | Noted: 2022-10-20 | Resolved: 2023-11-06

## 2023-11-06 LAB
25(OH)D3 SERPL-MCNC: 32.9 NG/ML (ref 30–100)
ALBUMIN SERPL BCP-MCNC: 4.3 G/DL (ref 3.5–5)
ALP SERPL-CCNC: 121 U/L (ref 34–104)
ALT SERPL W P-5'-P-CCNC: 25 U/L (ref 7–52)
ANION GAP SERPL CALCULATED.3IONS-SCNC: 10 MMOL/L
AST SERPL W P-5'-P-CCNC: 34 U/L (ref 13–39)
BASOPHILS # BLD AUTO: 0.05 THOUSANDS/ÂΜL (ref 0–0.1)
BASOPHILS NFR BLD AUTO: 1 % (ref 0–1)
BILIRUB SERPL-MCNC: 0.64 MG/DL (ref 0.2–1)
BUN SERPL-MCNC: 15 MG/DL (ref 5–25)
CALCIUM SERPL-MCNC: 9.8 MG/DL (ref 8.4–10.2)
CHLORIDE SERPL-SCNC: 94 MMOL/L (ref 96–108)
CO2 SERPL-SCNC: 27 MMOL/L (ref 21–32)
CREAT SERPL-MCNC: 1.04 MG/DL (ref 0.6–1.3)
CREAT UR-MCNC: 65.6 MG/DL
EOSINOPHIL # BLD AUTO: 0.04 THOUSAND/ÂΜL (ref 0–0.61)
EOSINOPHIL NFR BLD AUTO: 1 % (ref 0–6)
ERYTHROCYTE [DISTWIDTH] IN BLOOD BY AUTOMATED COUNT: 13.8 % (ref 11.6–15.1)
FERRITIN SERPL-MCNC: 31 NG/ML (ref 11–307)
GFR SERPL CREATININE-BSD FRML MDRD: 66 ML/MIN/1.73SQ M
GLUCOSE P FAST SERPL-MCNC: 300 MG/DL (ref 65–99)
HCT VFR BLD AUTO: 38.1 % (ref 34.8–46.1)
HGB BLD-MCNC: 12 G/DL (ref 11.5–15.4)
IMM GRANULOCYTES # BLD AUTO: 0.02 THOUSAND/UL (ref 0–0.2)
IMM GRANULOCYTES NFR BLD AUTO: 1 % (ref 0–2)
IRON SATN MFR SERPL: 22 % (ref 15–50)
IRON SERPL-MCNC: 95 UG/DL (ref 50–212)
LYMPHOCYTES # BLD AUTO: 1.1 THOUSANDS/ÂΜL (ref 0.6–4.47)
LYMPHOCYTES NFR BLD AUTO: 26 % (ref 14–44)
MAGNESIUM SERPL-MCNC: 1.8 MG/DL (ref 1.9–2.7)
MCH RBC QN AUTO: 28.5 PG (ref 26.8–34.3)
MCHC RBC AUTO-ENTMCNC: 31.5 G/DL (ref 31.4–37.4)
MCV RBC AUTO: 91 FL (ref 82–98)
MICROALBUMIN UR-MCNC: 18.2 MG/L
MICROALBUMIN/CREAT 24H UR: 28 MG/G CREATININE (ref 0–30)
MONOCYTES # BLD AUTO: 0.28 THOUSAND/ÂΜL (ref 0.17–1.22)
MONOCYTES NFR BLD AUTO: 7 % (ref 4–12)
NEUTROPHILS # BLD AUTO: 2.68 THOUSANDS/ÂΜL (ref 1.85–7.62)
NEUTS SEG NFR BLD AUTO: 64 % (ref 43–75)
NRBC BLD AUTO-RTO: 0 /100 WBCS
PHOSPHATE SERPL-MCNC: 3.2 MG/DL (ref 2.7–4.5)
PLATELET # BLD AUTO: 165 THOUSANDS/UL (ref 149–390)
PMV BLD AUTO: 11.5 FL (ref 8.9–12.7)
POTASSIUM SERPL-SCNC: 4.7 MMOL/L (ref 3.5–5.3)
PROT SERPL-MCNC: 7.7 G/DL (ref 6.4–8.4)
RBC # BLD AUTO: 4.21 MILLION/UL (ref 3.81–5.12)
SODIUM SERPL-SCNC: 131 MMOL/L (ref 135–147)
TIBC SERPL-MCNC: 441 UG/DL (ref 250–450)
UIBC SERPL-MCNC: 346 UG/DL (ref 155–355)
VIT B12 SERPL-MCNC: 297 PG/ML (ref 180–914)
WBC # BLD AUTO: 4.17 THOUSAND/UL (ref 4.31–10.16)

## 2023-11-06 PROCEDURE — 82607 VITAMIN B-12: CPT

## 2023-11-06 PROCEDURE — G0382 LEV 3 HOSP TYPE B ED VISIT: HCPCS

## 2023-11-06 PROCEDURE — 82728 ASSAY OF FERRITIN: CPT

## 2023-11-06 PROCEDURE — 82043 UR ALBUMIN QUANTITATIVE: CPT

## 2023-11-06 PROCEDURE — 82306 VITAMIN D 25 HYDROXY: CPT

## 2023-11-06 PROCEDURE — 83735 ASSAY OF MAGNESIUM: CPT

## 2023-11-06 PROCEDURE — 80053 COMPREHEN METABOLIC PANEL: CPT

## 2023-11-06 PROCEDURE — 84100 ASSAY OF PHOSPHORUS: CPT

## 2023-11-06 PROCEDURE — 36415 COLL VENOUS BLD VENIPUNCTURE: CPT

## 2023-11-06 PROCEDURE — 82570 ASSAY OF URINE CREATININE: CPT

## 2023-11-06 PROCEDURE — 85025 COMPLETE CBC W/AUTO DIFF WBC: CPT

## 2023-11-06 PROCEDURE — 83540 ASSAY OF IRON: CPT

## 2023-11-06 PROCEDURE — 83550 IRON BINDING TEST: CPT

## 2023-11-06 PROCEDURE — 99495 TRANSJ CARE MGMT MOD F2F 14D: CPT | Performed by: INTERNAL MEDICINE

## 2023-11-06 PROCEDURE — 99283 EMERGENCY DEPT VISIT LOW MDM: CPT

## 2023-11-06 RX ORDER — FLASH GLUCOSE SENSOR
1 KIT MISCELLANEOUS
Qty: 2 EACH | Refills: 3 | Status: SHIPPED | OUTPATIENT
Start: 2023-11-06

## 2023-11-06 RX ORDER — INSULIN GLARGINE 100 [IU]/ML
30 INJECTION, SOLUTION SUBCUTANEOUS
Qty: 10 ML | Refills: 1
Start: 2023-11-06 | End: 2023-11-16
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
## 2023-11-06 NOTE — TELEPHONE ENCOUNTER
LMOM for patient to call back and schedule an evaluation for a concussion with Dr. Love Gan in [facility] on 12/11/23 or in the next available concussion spot.

## 2023-11-06 NOTE — PATIENT INSTRUCTIONS
Post Concussion Syndrome   WHAT YOU NEED TO KNOW:   What is post-concussion syndrome (PCS)? PCS is a group of symptoms that affect your body, thinking, and behavior. PCS develops 10 to 14 days after a concussion and can last for weeks to years. What increases my risk for PCS? Older age    Being female    A substance use disorder, such as drugs or alcohol    A past brain injury    A current mood or anxiety disorder    Health problems before your concussion    What are the signs and symptoms of PCS? Headaches or vision problems    Dizziness or poor balance    Forgetfulness or problems concentrating    Problems with sleep    Changes in your personality    Seizures    Depression or anxiety    How is PCS diagnosed? Your healthcare provider will ask about your injury. Tell him or her when it happened, what hit you, and the force of the blow. You, or someone close to you, should tell your provider about any confusion you have or changes in your behavior. You may need any of the following:  A neurologic exam  is used to test your memory and concentration. It will also show healthcare providers your eye, verbal, and muscle responses. Blood and urine tests  will be done to check for any other cause of your symptoms. Infections and substances, such as alcohol, can affect your memory and behavior. CT scan or MRI pictures  of your head may show an injury or bleeding. You may be given contrast liquid to help healthcare providers see the pictures better. Tell the healthcare provider if you have ever had an allergic reaction to contrast liquid. Do not enter the MRI room with anything metal. Metal can cause serious injury. Tell the provider if you have any metal in or on your body. How is PCS treated? Treatment will focus on your symptoms:  Acetaminophen  decreases pain and fever. It is available without a doctor's order. Ask how much to take and how often to take it. Follow directions.  Read the labels of all other medicines you are using to see if they also contain acetaminophen, or ask your doctor or pharmacist. Acetaminophen can cause liver damage if not taken correctly. NSAIDs  help decrease swelling and pain or fever. This medicine is available with or without a doctor's order. NSAIDs can cause stomach bleeding or kidney problems in certain people. If you take blood thinner medicine, always ask your healthcare provider if NSAIDs are safe for you. Always read the medicine label and follow directions. Antidepressants  may be given for depression or sleep problems. Migraine medicines  may be given for migraine headaches. How can I prevent PCS? Follow your treatment plan after a concussion to help you heal.  You will heal more quickly if you follow your healthcare provider's instructions. Make your home safe. Home safety measures can help prevent head injuries that could lead to a concussion. Install handrails for every staircase. Put soft bumpers on furniture edges and corners. Secure furniture, such as dressers and bookcases so they do not fall over. Always wear a seatbelt in the car. A seatbelt helps decrease your risk for a head injury if you are in a car accident. Wear protective sports equipment that fits properly. Helmets help decrease your risk for a serious brain injury. Talk to your provider about other ways that you can decrease your risk for a concussion if you play sports. Ask for more information about sports concussions. What can I do to manage my symptoms? Rest from physical and mental activities as directed. Mental activities need you to think, concentrate, and pay attention. Rest will help you recover from your concussion. Ask your healthcare provider when you can return to school and other daily activities. Go to therapy as directed. A cognitive behavioral therapist teaches you skills to help with any thinking and behavior problems you may have.  An occupational therapist teaches you skills to help with daily activities. Do not participate in sports or physical activities until your provider says it is okay. These activities could make your symptoms worse or lead to another concussion. Your provider will tell you when it is okay to return to sports or physical activities. Where can I find more information? [employer]  [address]  [place] , [address]  Phone: [address]  Phone: [phone]  Web Address: [employer]    Have someone call your local emergency number (911 in the [address]) if:   You have a seizure. You have trouble breathing. You are not responding, or you cannot be woken. When should I seek immediate care? You have a sudden headache that seems different or much worse than your usual headaches. You cannot stop vomiting. You have sudden changes in your vision, or your pupils are different sizes. When should I call my doctor? You feel depressed. You have nausea or are vomiting. You have trouble concentrating. You have trouble speaking or thinking. Your symptoms get worse. You have questions or concerns about your condition or care. CARE AGREEMENT:   You have the right to help plan your care. Learn about your health condition and how it may be treated. Discuss treatment options with your healthcare providers to decide what care you want to receive. You always have the right to refuse treatment. The above information is an  only. It is not intended as medical advice for individual conditions or treatments. Talk to your doctor, nurse or pharmacist before following any medical regimen to see if it is safe and effective for you. © Copyright [employer] 2023 Information is for End User's use only and may not be sold, redistributed or otherwise used for commercial purposes.

## 2023-11-09 ENCOUNTER — PATIENT OUTREACH (OUTPATIENT)
Dept: FAMILY MEDICINE CLINIC | Facility: CLINIC | Age: 43
End: 2023-11-09

## 2023-11-09 NOTE — TELEPHONE ENCOUNTER
Patient called to be scheduled per a message they received     Informed the patient that we needed to Triage the patient first with a referral and our Triage questions and send that off to be viewed and wait for a response     Patient was agreeable and we completed the process      Patient will call tomorrow with all the [facility] insurance information

## 2023-11-10 ENCOUNTER — TELEPHONE (OUTPATIENT)
Dept: NEUROLOGY | Facility: CLINIC | Age: 43
End: 2023-11-10

## 2023-11-10 ENCOUNTER — APPOINTMENT (OUTPATIENT)
Dept: PHYSICAL THERAPY | Facility: CLINIC | Age: 43
End: 2023-11-10
Payer: COMMERCIAL

## 2023-11-10 NOTE — TELEPHONE ENCOUNTER
2nd attempt to reach patient from triage. No answer. Left message on machine. Message sent via [address] [place] [address].

## 2023-11-10 NOTE — TELEPHONE ENCOUNTER
Called the patient back today per Dr. Chris Kraus response    No Answer, LM to CB to get scheduled and provide the [facility] information we need

## 2023-11-14 ENCOUNTER — TELEPHONE (OUTPATIENT)
Dept: NEUROLOGY | Facility: CLINIC | Age: 43
End: 2023-11-14

## 2023-11-15 ENCOUNTER — APPOINTMENT (OUTPATIENT)
Dept: URGENT CARE | Facility: MEDICAL CENTER | Age: 43
End: 2023-11-15
Payer: OTHER MISCELLANEOUS

## 2023-11-15 PROCEDURE — 99213 OFFICE O/P EST LOW 20 MIN: CPT | Performed by: ORTHOPAEDIC SURGERY

## 2023-11-16 DIAGNOSIS — E10.65 TYPE 1 DIABETES MELLITUS WITH HYPERGLYCEMIA (HCC): ICD-10-CM

## 2023-11-16 RX ORDER — INSULIN GLARGINE 100 [IU]/ML
INJECTION, SOLUTION SUBCUTANEOUS
Qty: 10 ML | Refills: 1 | Status: SHIPPED | OUTPATIENT
Start: 2023-11-16

## 2023-11-16 NOTE — TELEPHONE ENCOUNTER
Patient called in to schedule a concussion appointment with Dr. Tarik Zhao. Patient stated she had attempted to call the call center but was unable to accommodate the long estimated wait time. When patient was able to get through to the call center to schedule, the representative stated that she was unable to schedule as the previous individual she had talked to was not following protocol and attempting to schedule incorrectly. I apologized to the patient for the confusion and scheduled her with Dr. Tarik Zhao on 12/13/23.

## 2023-11-20 ENCOUNTER — OCCMED (OUTPATIENT)
Dept: URGENT CARE | Facility: MEDICAL CENTER | Age: 43
End: 2023-11-20
Payer: OTHER MISCELLANEOUS

## 2023-11-20 DIAGNOSIS — S06.0X0D CONCUSSION, MENTAL CONFUSION OR DISORIENTATION NO LOSS CONSCIOUS, SUBSEQUENT ENCOUNTER: Primary | ICD-10-CM

## 2023-11-20 DIAGNOSIS — F07.81 POSTCONCUSSION SYNDROME: ICD-10-CM

## 2023-11-20 PROCEDURE — 99213 OFFICE O/P EST LOW 20 MIN: CPT | Performed by: NURSE PRACTITIONER

## 2023-11-28 ENCOUNTER — APPOINTMENT (OUTPATIENT)
Dept: URGENT CARE | Age: 43
End: 2023-11-28
Payer: OTHER MISCELLANEOUS

## 2023-11-28 PROCEDURE — 99215 OFFICE O/P EST HI 40 MIN: CPT | Performed by: EMERGENCY MEDICINE

## 2023-12-05 ENCOUNTER — APPOINTMENT (OUTPATIENT)
Dept: URGENT CARE | Age: 43
End: 2023-12-05
Payer: OTHER MISCELLANEOUS

## 2023-12-05 PROCEDURE — 99213 OFFICE O/P EST LOW 20 MIN: CPT | Performed by: EMERGENCY MEDICINE

## 2023-12-06 ENCOUNTER — TELEPHONE (OUTPATIENT)
Dept: NEUROLOGY | Facility: CLINIC | Age: 43
End: 2023-12-06

## 2023-12-06 NOTE — TELEPHONE ENCOUNTER
LMOM for patient to confirm appointment on 12/13/23 with Dr. Geneva Rose in [facility]

## 2023-12-16 ENCOUNTER — VBI (OUTPATIENT)
Dept: ADMINISTRATIVE | Facility: OTHER | Age: 43
End: 2023-12-16

## 2023-12-20 PROBLEM — N39.0 UTI (URINARY TRACT INFECTION): Status: RESOLVED | Noted: 2023-10-21 | Resolved: 2023-12-20

## 2023-12-20 PROBLEM — E11.10 DKA (DIABETIC KETOACIDOSIS) (HCC): Status: RESOLVED | Noted: 2018-03-20 | Resolved: 2023-12-20

## 2024-01-17 ENCOUNTER — HOSPITAL ENCOUNTER (EMERGENCY)
Facility: HOSPITAL | Age: 44
Discharge: HOME | End: 2024-01-17
Attending: EMERGENCY MEDICINE
Payer: COMMERCIAL

## 2024-01-17 ENCOUNTER — APPOINTMENT (EMERGENCY)
Dept: RADIOLOGY | Facility: HOSPITAL | Age: 44
End: 2024-01-17
Payer: COMMERCIAL

## 2024-01-17 VITALS
SYSTOLIC BLOOD PRESSURE: 134 MMHG | DIASTOLIC BLOOD PRESSURE: 81 MMHG | OXYGEN SATURATION: 98 % | BODY MASS INDEX: 27.11 KG/M2 | TEMPERATURE: 98.2 F | WEIGHT: 167.99 LBS | RESPIRATION RATE: 17 BRPM | HEART RATE: 76 BPM

## 2024-01-17 DIAGNOSIS — U07.1 COVID-19 VIRUS INFECTION: ICD-10-CM

## 2024-01-17 DIAGNOSIS — R73.9 HYPERGLYCEMIA: Primary | ICD-10-CM

## 2024-01-17 LAB
ALBUMIN SERPL BCP-MCNC: 4.4 G/DL (ref 3.5–5)
ALP SERPL-CCNC: 124 U/L (ref 34–104)
ALT SERPL W P-5'-P-CCNC: 16 U/L (ref 7–52)
ANION GAP SERPL CALCULATED.3IONS-SCNC: 8 MMOL/L
AST SERPL W P-5'-P-CCNC: 19 U/L (ref 13–39)
ATRIAL RATE: 80 BPM
BASE EX.OXY STD BLDV CALC-SCNC: 51.4 % (ref 60–80)
BASE EXCESS BLDV CALC-SCNC: -2 MMOL/L
BASOPHILS # BLD AUTO: 0.02 THOUSANDS/ÂΜL (ref 0–0.1)
BASOPHILS NFR BLD AUTO: 1 % (ref 0–1)
BETA-HYDROXYBUTYRATE: 0.1 MMOL/L
BILIRUB SERPL-MCNC: 0.48 MG/DL (ref 0.2–1)
BILIRUB UR QL STRIP: NEGATIVE
BUN SERPL-MCNC: 18 MG/DL (ref 5–25)
CALCIUM SERPL-MCNC: 8.8 MG/DL (ref 8.4–10.2)
CHLORIDE SERPL-SCNC: 96 MMOL/L (ref 96–108)
CLARITY UR: CLEAR
CO2 SERPL-SCNC: 25 MMOL/L (ref 21–32)
COLOR UR: YELLOW
CREAT SERPL-MCNC: 1.27 MG/DL (ref 0.6–1.3)
EOSINOPHIL # BLD AUTO: 0.09 THOUSAND/ÂΜL (ref 0–0.61)
EOSINOPHIL NFR BLD AUTO: 2 % (ref 0–6)
ERYTHROCYTE [DISTWIDTH] IN BLOOD BY AUTOMATED COUNT: 12.5 % (ref 11.6–15.1)
EXT PREGNANCY TEST URINE: NEGATIVE
EXT. CONTROL: NORMAL
FLUAV RNA RESP QL NAA+PROBE: NEGATIVE
FLUBV RNA RESP QL NAA+PROBE: NEGATIVE
GFR SERPL CREATININE-BSD FRML MDRD: 52 ML/MIN/1.73SQ M
GLUCOSE SERPL-MCNC: 453 MG/DL (ref 65–140)
GLUCOSE SERPL-MCNC: 473 MG/DL (ref 65–140)
GLUCOSE UR STRIP-MCNC: ABNORMAL MG/DL
HCO3 BLDV-SCNC: 25.6 MMOL/L (ref 24–30)
HCT VFR BLD AUTO: 42 % (ref 34.8–46.1)
HGB BLD-MCNC: 13.9 G/DL (ref 11.5–15.4)
HGB UR QL STRIP.AUTO: NEGATIVE
IMM GRANULOCYTES # BLD AUTO: 0.02 THOUSAND/UL (ref 0–0.2)
IMM GRANULOCYTES NFR BLD AUTO: 1 % (ref 0–2)
KETONES UR STRIP-MCNC: NEGATIVE MG/DL
LEUKOCYTE ESTERASE UR QL STRIP: NEGATIVE
LYMPHOCYTES # BLD AUTO: 0.9 THOUSANDS/ÂΜL (ref 0.6–4.47)
LYMPHOCYTES NFR BLD AUTO: 22 % (ref 14–44)
MCH RBC QN AUTO: 29.6 PG (ref 26.8–34.3)
MCHC RBC AUTO-ENTMCNC: 33.1 G/DL (ref 31.4–37.4)
MCV RBC AUTO: 90 FL (ref 82–98)
MONOCYTES # BLD AUTO: 0.24 THOUSAND/ÂΜL (ref 0.17–1.22)
MONOCYTES NFR BLD AUTO: 6 % (ref 4–12)
NEUTROPHILS # BLD AUTO: 2.78 THOUSANDS/ÂΜL (ref 1.85–7.62)
NEUTS SEG NFR BLD AUTO: 68 % (ref 43–75)
NITRITE UR QL STRIP: NEGATIVE
NRBC BLD AUTO-RTO: 0 /100 WBCS
O2 CT BLDV-SCNC: 10.1 ML/DL
P AXIS: 67 DEGREES
PCO2 BLDV: 55.5 MM HG (ref 42–50)
PH BLDV: 7.28 [PH] (ref 7.3–7.4)
PH UR STRIP.AUTO: 6 [PH] (ref 4.5–8)
PLATELET # BLD AUTO: 203 THOUSANDS/UL (ref 149–390)
PMV BLD AUTO: 10.9 FL (ref 8.9–12.7)
PO2 BLDV: 30.6 MM HG (ref 35–45)
POTASSIUM SERPL-SCNC: 3.9 MMOL/L (ref 3.5–5.3)
PR INTERVAL: 132 MS
PROT SERPL-MCNC: 7.9 G/DL (ref 6.4–8.4)
PROT UR STRIP-MCNC: NEGATIVE MG/DL
QRS AXIS: 104 DEGREES
QRSD INTERVAL: 82 MS
QT INTERVAL: 410 MS
QTC INTERVAL: 472 MS
RBC # BLD AUTO: 4.69 MILLION/UL (ref 3.81–5.12)
RSV RNA RESP QL NAA+PROBE: NEGATIVE
S PYO DNA THROAT QL NAA+PROBE: NOT DETECTED
SARS-COV-2 RNA RESP QL NAA+PROBE: POSITIVE
SODIUM SERPL-SCNC: 129 MMOL/L (ref 135–147)
SP GR UR STRIP.AUTO: 1.01 (ref 1–1.03)
T WAVE AXIS: 69 DEGREES
UROBILINOGEN UR QL STRIP.AUTO: 0.2 E.U./DL
VENTRICULAR RATE: 80 BPM
WBC # BLD AUTO: 4.05 THOUSAND/UL (ref 4.31–10.16)

## 2024-01-17 PROCEDURE — 82805 BLOOD GASES W/O2 SATURATION: CPT | Performed by: EMERGENCY MEDICINE

## 2024-01-17 PROCEDURE — 96374 THER/PROPH/DIAG INJ IV PUSH: CPT

## 2024-01-17 PROCEDURE — 71045 X-RAY EXAM CHEST 1 VIEW: CPT

## 2024-01-17 PROCEDURE — 99285 EMERGENCY DEPT VISIT HI MDM: CPT

## 2024-01-17 PROCEDURE — 82948 REAGENT STRIP/BLOOD GLUCOSE: CPT

## 2024-01-17 PROCEDURE — 93005 ELECTROCARDIOGRAM TRACING: CPT

## 2024-01-17 PROCEDURE — 36415 COLL VENOUS BLD VENIPUNCTURE: CPT | Performed by: EMERGENCY MEDICINE

## 2024-01-17 PROCEDURE — 80053 COMPREHEN METABOLIC PANEL: CPT | Performed by: EMERGENCY MEDICINE

## 2024-01-17 PROCEDURE — 0241U HB NFCT DS VIR RESP RNA 4 TRGT: CPT | Performed by: EMERGENCY MEDICINE

## 2024-01-17 PROCEDURE — 99285 EMERGENCY DEPT VISIT HI MDM: CPT | Performed by: EMERGENCY MEDICINE

## 2024-01-17 PROCEDURE — 85025 COMPLETE CBC W/AUTO DIFF WBC: CPT | Performed by: EMERGENCY MEDICINE

## 2024-01-17 PROCEDURE — 81025 URINE PREGNANCY TEST: CPT | Performed by: EMERGENCY MEDICINE

## 2024-01-17 PROCEDURE — 81003 URINALYSIS AUTO W/O SCOPE: CPT

## 2024-01-17 PROCEDURE — 96361 HYDRATE IV INFUSION ADD-ON: CPT

## 2024-01-17 PROCEDURE — 87651 STREP A DNA AMP PROBE: CPT | Performed by: EMERGENCY MEDICINE

## 2024-01-17 PROCEDURE — 82010 KETONE BODYS QUAN: CPT | Performed by: EMERGENCY MEDICINE

## 2024-01-17 RX ORDER — ALPRAZOLAM 0.25 MG/1
0.25 TABLET ORAL ONCE
Status: COMPLETED | OUTPATIENT
Start: 2024-01-17 | End: 2024-01-17

## 2024-01-17 RX ORDER — ONDANSETRON 2 MG/ML
4 INJECTION INTRAMUSCULAR; INTRAVENOUS ONCE
Status: COMPLETED | OUTPATIENT
Start: 2024-01-17 | End: 2024-01-17

## 2024-01-17 RX ORDER — NIRMATRELVIR AND RITONAVIR 150-100 MG
2 KIT ORAL 2 TIMES DAILY
Qty: 20 TABLET | Refills: 0 | Status: SHIPPED | OUTPATIENT
Start: 2024-01-17 | End: 2024-01-17

## 2024-01-17 RX ORDER — NIRMATRELVIR AND RITONAVIR 150-100 MG
2 KIT ORAL 2 TIMES DAILY
Qty: 20 TABLET | Refills: 0 | Status: SHIPPED | OUTPATIENT
Start: 2024-01-17 | End: 2024-01-22

## 2024-01-17 RX ADMIN — SODIUM CHLORIDE 1000 ML: 0.9 INJECTION, SOLUTION INTRAVENOUS at 13:57

## 2024-01-17 RX ADMIN — ONDANSETRON 4 MG: 2 INJECTION INTRAMUSCULAR; INTRAVENOUS at 13:57

## 2024-01-17 RX ADMIN — ALPRAZOLAM 0.25 MG: 0.25 TABLET ORAL at 13:57

## 2024-01-17 NOTE — Clinical Note
Clarissa Webb was seen and treated in our emergency department on 1/17/2024.                Diagnosis:     Clarissa  may return to work on return date.    She may return on this date: 01/22/2024         If you have any questions or concerns, please don't hesitate to call.      Danial Guerra MD    ______________________________           _______________          _______________  Hospital Representative                              Date                                Time

## 2024-01-17 NOTE — ED PROVIDER NOTES
History  Chief Complaint   Patient presents with    Hyperglycemia - Symptomatic     Patient reports cold like symptoms for past 5 days, reports blood sugars have been high at home. Patient has been taking insulin at home. Blood sugar 473 on arrival.        History provided by:  Patient   used: No    Hyperglycemia - Symptomatic  Blood sugar level PTA:  300s  Severity:  Moderate  Onset quality:  Gradual  Duration:  5 days  Timing:  Intermittent  Progression:  Waxing and waning  Chronicity:  New  Diabetes status:  Controlled with insulin  Current diabetic therapy:  Novolog with melas, Lantus 26 units HS  Time since last antidiabetic medication:  3 hours  Context: recent illness    Context: not change in medication    Relieved by:  Nothing  Ineffective treatments:  None tried  Associated symptoms: weakness    Associated symptoms: no abdominal pain, no chest pain, no dizziness, no dysuria, no fever, no nausea, no shortness of breath and no vomiting    Weakness:     Severity:  Moderate    Onset quality:  Gradual    Duration:  2 days    Timing:  Intermittent    Progression:  Waxing and waning    Chronicity:  New  Risk factors: hx of DKA        Prior to Admission Medications   Prescriptions Last Dose Informant Patient Reported? Taking?   Accu-Chek FastClix Lancets MISC   No No   Sig: USE 1 LANCET TO TEST BLOOD SUGAR UP TO 6 TIMES DAILY   Continuous Blood Gluc  (FreeStyle Samantha 14 Day Kennedy) KELVIN   No No   Sig: Use 1 Units in the morning   Continuous Blood Gluc  (FreeStyle Samantha 2 Kennedy) KELVIN   No No   Sig: Use 1 units in the morning   Patient not taking: Reported on 10/18/2023   Continuous Blood Gluc Sensor (FreeStyle Samantha 14 Day Sensor) [facility] [place]   No No   Sig: Use 1 Units every 14 (fourteen) days   Continuous Blood Gluc Sensor (FreeStyle Samantha 2 Sensor) [facility] [place]   No No   Sig: Use 1 unit every 14 days   Insulin Aspart (NovoLOG) 100 units/mL injection   No Yes   Sig: SLIDING SCALE UP TO 3  "TIMES PER DAY WITH MEALS. MAX 30 UNITS PER DAY   Insulin Pen Needle (BD Pen Needle Deanna U/F) 32G X 4 MM MISC   No No   Sig: Use 4/day   Insulin Syringe-Needle U-100 (INSULIN SYRINGE 1CC/28G) 28G X 1/2\" 1 ML MISC   Yes No   acetaminophen (TYLENOL) 500 mg tablet  Self Yes No   Sig:     Patient not taking: Reported on 10/18/2023   butalbital-acetaminophen-caffeine (Esgic) -40 mg per tablet   No No   Sig: Take 1 tablet by mouth every 6 (six) hours as needed for headaches for up to 15 doses   ergocalciferol (VITAMIN D2) 50,000 units   Yes No   Sig: Take 50,000 Units by mouth once a week   gabapentin (NEURONTIN) 600 MG tablet  Self No No   Sig: Take 0.5 tablets (300 mg total) by mouth 2 (two) times a day Half Tab 300mg at bedtime   glucose blood (Accu-Chek Guide) test strip  Self No No   Sig: Use to test blood sugar up to 6 times daily.   insulin glargine (LANTUS) 100 units/mL subcutaneous injection   No Yes   Sig: INJECT 36 UNITS UNDER THE SKIN DAILY AT BEDTIME   levothyroxine (Synthroid) 25 mcg tablet   No No   Sig: Take 1 tablet (25 mcg total) by mouth daily in the early morning Combine with 200mcg, for total 225mcg daily.   levothyroxine 200 mcg tablet  Self No No   Sig: Take 1 tablet (200 mcg total) by mouth every morning   ondansetron (ZOFRAN-ODT) 4 mg disintegrating tablet   No No   Sig: Take 1 tablet (4 mg total) by mouth every 6 (six) hours as needed for nausea or vomiting   traZODone (DESYREL) 100 mg tablet  Self Yes No   Sig: Take 100 mg by mouth daily at bedtime      Facility-Administered Medications: None       Past Medical History:   Diagnosis Date    Anemia     Anxiety     B12 deficiency     Cervical disc disorder     On gabapentin     Depression     Diabetes mellitus (HCC)     Disease of thyroid gland     hypothyroid    Iron deficiency anemia     Panic attack     PTSD (post-traumatic stress disorder)     Sleep difficulties     Substance abuse (HCC)     Type 1 diabetes (HCC)     Vitamin D deficiency  "       Past Surgical History:   Procedure Laterality Date    ABDOMINAL SURGERY      gastric bypass     SECTION      x2    CHOLECYSTECTOMY  2018    GASTRIC BYPASS  2007    INTRAUTERINE DEVICE INSERTION  2015    IR BIOPSY BONE MARROW  2020    OTHER SURGICAL HISTORY      surgery for imperforate hymen/endometriosis/hydrometrocolpos    ROOT CANAL  2024    TUBAL LIGATION      WISDOM TOOTH EXTRACTION         Family History   Problem Relation Age of Onset    Thyroid disease Mother     URIEL disease Mother     Hyperlipidemia Mother     Hypertension Mother     Osteoarthritis Mother     Thyroid disease unspecified Mother     Diabetes Father     Alcohol abuse Father     Diabetes type I Father     Thyroid disease Sister     Depression Sister     Thyroid disease unspecified Sister     Anxiety disorder Sister     Heart disease Maternal Grandmother     Hypertension Maternal Grandmother     Arthritis Maternal Grandmother     Diabetes type I Maternal Uncle     Diabetes type I Maternal Grandfather      I have reviewed and agree with the history as documented.    E-Cigarette/Vaping    E-Cigarette Use Never User      E-Cigarette/Vaping Substances    Nicotine No     THC No     CBD No     Flavoring No     Other No     Unknown No      Social History     Tobacco Use    Smoking status: Never    Smokeless tobacco: Never   Vaping Use    Vaping status: Never Used   Substance Use Topics    Alcohol use: Never     Comment: occasional    Drug use: No       Review of Systems   Constitutional:  Negative for chills and fever.   HENT:  Negative for facial swelling, sore throat and trouble swallowing.    Eyes:  Negative for pain and visual disturbance.   Respiratory:  Negative for cough, chest tightness and shortness of breath.    Cardiovascular:  Negative for chest pain and leg swelling.   Gastrointestinal:  Negative for abdominal pain, diarrhea, nausea and vomiting.   Genitourinary:  Negative for dysuria and flank pain.    Musculoskeletal:  Negative for back pain, neck pain and neck stiffness.   Skin:  Negative for pallor and rash.   Allergic/Immunologic: Negative for environmental allergies and immunocompromised state.   Neurological:  Positive for weakness. Negative for dizziness and headaches.   Hematological:  Negative for adenopathy. Does not bruise/bleed easily.   Psychiatric/Behavioral:  Negative for agitation and behavioral problems.    All other systems reviewed and are negative.      Physical Exam  Physical Exam  Vitals and nursing note reviewed.   Constitutional:       General: She is not in acute distress.     Appearance: She is well-developed.   HENT:      Head: Normocephalic and atraumatic.   Eyes:      Extraocular Movements: Extraocular movements intact.   Cardiovascular:      Rate and Rhythm: Normal rate and regular rhythm.      Heart sounds: Normal heart sounds.   Pulmonary:      Effort: Pulmonary effort is normal. No respiratory distress.      Breath sounds: Normal breath sounds.   Abdominal:      Palpations: Abdomen is soft.      Tenderness: There is no abdominal tenderness. There is no guarding or rebound.   Musculoskeletal:         General: Normal range of motion.      Cervical back: Normal range of motion and neck supple.   Skin:     General: Skin is warm and dry.   Neurological:      General: No focal deficit present.      Mental Status: She is alert and oriented to person, place, and time.   Psychiatric:         Mood and Affect: Mood normal.         Behavior: Behavior normal.         Vital Signs  ED Triage Vitals   Temperature Pulse Respirations Blood Pressure SpO2   01/17/24 1321 01/17/24 1320 01/17/24 1320 01/17/24 1320 01/17/24 1320   98.2 °F (36.8 °C) 82 16 149/91 98 %      Temp Source Heart Rate Source Patient Position - Orthostatic VS BP Location FiO2 (%)   01/17/24 1321 01/17/24 1320 01/17/24 1320 01/17/24 1320 --   Oral Monitor Lying Right arm       Pain Score       01/17/24 1320       No Pain            Vitals:    01/17/24 1320 01/17/24 1525   BP: 149/91 134/81   Pulse: 82 76   Patient Position - Orthostatic VS: Lying Lying         Visual Acuity      ED Medications  Medications   sodium chloride 0.9 % bolus 1,000 mL (0 mL Intravenous Stopped 1/17/24 1600)   ALPRAZolam (XANAX) tablet 0.25 mg (0.25 mg Oral Given 1/17/24 1357)   ondansetron (ZOFRAN) injection 4 mg (4 mg Intravenous Given 1/17/24 1357)       Diagnostic Studies  Results Reviewed       Procedure Component Value Units Date/Time    Strep A PCR [[id number]]  (Normal) Collected: 01/17/24 1359    Lab Status: Final result Specimen: Throat Updated: 01/17/24 1430     STREP A PCR Not Detected    FLU/RSV/COVID - if FLU/RSV clinically relevant [[id number]]  (Abnormal) Collected: 01/17/24 1334    Lab Status: Final result Specimen: Nares from Nose Updated: 01/17/24 1428     SARS-CoV-2 Positive     INFLUENZA A PCR Negative     INFLUENZA B PCR Negative     RSV PCR Negative    Narrative:      FOR PEDIATRIC PATIENTS - copy/paste COVID Guidelines URL to browser: [email or web address]    SARS-CoV-2 assay is a Nucleic Acid Amplification assay intended for the  qualitative detection of nucleic acid from SARS-CoV-2 in nasopharyngeal  swabs. Results are for the presumptive identification of SARS-CoV-2 RNA.    Positive results are indicative of infection with SARS-CoV-2, the virus  causing COVID-19, but do not rule out bacterial infection or co-infection  with other viruses. Laboratories within the [place] and its  territories are required to report all positive results to the appropriate  public health authorities. Negative results do not preclude SARS-CoV-2  infection and should not be used as the sole basis for treatment or other  patient management decisions. Negative results must be combined with  clinical observations, patient history, and epidemiological information.  This test has not been FDA cleared or  approved.    This test has been authorized by FDA under an Emergency Use Authorization  (EUA). This test is only authorized for the duration of time the  declaration that circumstances exist justifying the authorization of the  emergency use of an in vitro diagnostic tests for detection of SARS-CoV-2  virus and/or diagnosis of COVID-19 infection under section 564(b)(1) of  the Act, 21 U.S.C. 360bbb-3(b)(1), unless the authorization is terminated  or revoked sooner. The test has been validated but independent review by FDA  and CLIA is pending.    Test performed using [employer] GeneXpert: This RT-PCR assay targets N2,  a region unique to SARS-CoV-2. A conserved region in the E-gene was chosen  for pan-Sarbecovirus detection which includes SARS-CoV-2.    According to CMS-2020-01-R, this platform meets the definition of high-throughput technology.    Comprehensive metabolic panel [[id number]]  (Abnormal) Collected: 01/17/24 1348    Lab Status: Final result Specimen: Blood from Arm, Right Updated: 01/17/24 1426     Sodium 129 mmol/L      Potassium 3.9 mmol/L      Chloride 96 mmol/L      CO2 25 mmol/L      ANION GAP 8 mmol/L      BUN 18 mg/dL      Creatinine 1.27 mg/dL      Glucose 453 mg/dL      Calcium 8.8 mg/dL      AST 19 U/L      ALT 16 U/L      Alkaline Phosphatase 124 U/L      Total Protein 7.9 g/dL      Albumin 4.4 g/dL      Total Bilirubin 0.48 mg/dL      eGFR 52 ml/min/1.73sq m     Narrative:      National Kidney Disease Foundation guidelines for Chronic Kidney Disease (CKD):     Stage 1 with normal or high GFR (GFR > 90 mL/min/1.73 square meters)    Stage 2 Mild CKD (GFR = 60-89 mL/min/1.73 square meters)    Stage 3A Moderate CKD (GFR = 45-59 mL/min/1.73 square meters)    Stage 3B Moderate CKD (GFR = 30-44 mL/min/1.73 square meters)    Stage 4 Severe CKD (GFR = 15-29 mL/min/1.73 square meters)    Stage 5 End Stage CKD (GFR <15 mL/min/1.73 square meters)  Note: GFR calculation is accurate only with a steady state  creatinine    Beta Hydroxybutyrate [[id number]]  (Normal) Collected: 01/17/24 1348    Lab Status: Final result Specimen: Blood from Arm, Right Updated: 01/17/24 1424     BETA-HYDROXYBUTYRATE 0.1 mmol/L     Blood gas, venous [[id number]]  (Abnormal) Collected: 01/17/24 1348    Lab Status: Final result Specimen: Blood from Arm, Right Updated: 01/17/24 1412     pH, Tate 7.282     pCO2, Tate 55.5 mm Hg      pO2, Tate 30.6 mm Hg      HCO3, Tate 25.6 mmol/L      Base Excess, Tate -2.0 mmol/L      O2 Content, Tate 10.1 ml/dL      O2 HGB, VENOUS 51.4 %     CBC and differential [[id number]]  (Abnormal) Collected: 01/17/24 1348    Lab Status: Final result Specimen: Blood from Arm, Right Updated: 01/17/24 1410     WBC 4.05 Thousand/uL      RBC 4.69 Million/uL      Hemoglobin 13.9 g/dL      Hematocrit 42.0 %      MCV 90 fL      MCH 29.6 pg      MCHC 33.1 g/dL      RDW 12.5 %      MPV 10.9 fL      Platelets 203 Thousands/uL      nRBC 0 /100 WBCs      Neutrophils Relative 68 %      Immat GRANS % 1 %      Lymphocytes Relative 22 %      Monocytes Relative 6 %      Eosinophils Relative 2 %      Basophils Relative 1 %      Neutrophils Absolute 2.78 Thousands/µL      Immature Grans Absolute 0.02 Thousand/uL      Lymphocytes Absolute 0.90 Thousands/µL      Monocytes Absolute 0.24 Thousand/µL      Eosinophils Absolute 0.09 Thousand/µL      Basophils Absolute 0.02 Thousands/µL     POCT pregnancy, urine [[id number]]  (Normal) Resulted: 01/17/24 1406    Lab Status: Final result Updated: 01/17/24 1406     EXT Preg Test, Ur Negative     Control Valid    Urine Macroscopic, POC [[id number]]  (Abnormal) Collected: 01/17/24 1402    Lab Status: Final result Specimen: Urine Updated: 01/17/24 1404     Color, UA Yellow     Clarity, UA Clear     pH, UA 6.0     Leukocytes, UA Negative     Nitrite, UA Negative     Protein, UA Negative mg/dl      Glucose,  (1/2%) mg/dl      Ketones, UA Negative mg/dl      Urobilinogen, UA 0.2 E.U./dl      Bilirubin, UA  Negative     Occult Blood, UA Negative     Specific Gravity, UA 1.010    Narrative:      CLINITEK RESULT    Fingerstick Glucose (POCT) [[id number]]  (Abnormal) Collected: 01/17/24 1317    Lab Status: Final result Updated: 01/17/24 1322     POC Glucose 473 mg/dl                    XR chest 1 view portable   Final Result by Goran Edge MD (01/17 1558)      No acute cardiopulmonary disease.                  Workstation performed: [id number]                    Procedures  ECG 12 Lead Documentation Only    Date/Time: 1/17/2024 1:23 PM    Performed by: Danial Guerra MD  Authorized by: Danial Guerra MD    Indications / Diagnosis:  Weakness  ECG reviewed by me, the ED Provider: yes    Patient location:  ED  Interpretation:     Interpretation: normal    Rate:     ECG rate assessment: normal    Rhythm:     Rhythm: sinus rhythm    Ectopy:     Ectopy: none    QRS:     QRS axis:  Normal  Conduction:     Conduction: normal    ST segments:     ST segments:  Normal  T waves:     T waves: normal             ED Course  ED Course as of 01/17/24 2045 Wed Jan 17, 2024   1428 WBC(!): 4.05   1428 Hemoglobin: 13.9   1428 Platelet Count: 203   1428 Sodium(!): 129   1428 Potassium: 3.9   1428 CO2: 25   1428 BUN: 18   1428 Creatinine: 1.27   1428 Glucose, Random(!): 453  Labs reviewed, jett CO2 and anion gap.   1521 SARS-COV-2(!): Positive  COVID positive noted.   1522 XR chest 1 view portable  Chest x-ray independently reviewed, possible hilar opacities c/w viral illness, no significant acute abnormality noted.   1609 Patient informed about the workup results, no signs of DKA, positive COVID informed, stable for outpatient treatment, patient okay with prescribing Paxlovid, advised oral hydration.                               SBIRT 22yo+      Flowsheet Row Most Recent Value   Initial Alcohol Screen: US AUDIT-C     1. How often do you have a drink containing alcohol? 1 Filed at: 01/17/2024 1320   2. How many drinks containing  alcohol do you have on a typical day you are drinking?  1 Filed at: 01/17/2024 1320   3b. FEMALE Any Age, or MALE 65+: How often do you have 4 or more drinks on one occassion? 0 Filed at: 01/17/2024 1320   Audit-C Score 2 Filed at: 01/17/2024 1320   MICHELLE: How many times in the past year have you...    Used an illegal drug or used a prescription medication for non-medical reasons? Never Filed at: 01/17/2024 1320                      Medical Decision Making  Patient is a 43-year-old female, history of type 1 diabetes, comes in with complaints of cold symptoms for past 5 days, generalized weakness, elevated blood sugars at home, and has been compliant with her NovoLog with meals and Lantus at night, denies headache, chest pain, dyspnea, abdominal pain, vomiting, diarrhea.  On exam, patient is conscious, alert, vital signs stable, no acute distress, lungs are clear, heart sounds regular, abdomen soft, nontender, nonfocal neuroexam.  D/D: DKA, Nausea, Viiral URI, labs, COVID flu swab, give IV fluids, Zofran, patient complains of anxiety, will give dose of Xanax.    Problems Addressed:  COVID-19 virus infection: acute illness or injury  Hyperglycemia: acute illness or injury    Amount and/or Complexity of Data Reviewed  Labs: ordered. Decision-making details documented in ED Course.  Radiology: ordered. Decision-making details documented in ED Course.  ECG/medicine tests: ordered and independent interpretation performed. Decision-making details documented in ED Course.    Risk  Prescription drug management.             Disposition  Final diagnoses:   Hyperglycemia   COVID-19 virus infection     Time reflects when diagnosis was documented in both MDM as applicable and the Disposition within this note       Time User Action Codes Description Comment    1/17/2024  4:10 PM Danial Guerra Add [R73.9] Hyperglycemia     1/17/2024  4:10 PM Danial Guerra Add [U07.1] COVID-19 virus infection           ED Disposition       ED  Disposition   Discharge    Condition   Stable    Date/Time   Wed Jan 17, 2024 1610    Comment   Clarissa Webb discharge to home/self care.                   Follow-up Information       Follow up With Specialties Details Why Contact Info    Annette Scales MD Internal Medicine Schedule an appointment as soon as possible for a visit   [address] [place], [place] [id number]  [phone]              Discharge Medication List as of 1/17/2024  4:39 PM        CONTINUE these medications which have CHANGED    Details   nirmatrelvir & ritonavir (Paxlovid, 150/100,) tablet therapy pack Take 2 tablets by mouth 2 (two) times a day for 5 days Take 1 nirmatrelvir tablet + 1 ritonavir tablet together per dose, Starting Wed 1/17/2024, Until Mon 1/22/2024, Normal           CONTINUE these medications which have NOT CHANGED    Details   Insulin Aspart (NovoLOG) 100 units/mL injection SLIDING SCALE UP TO 3 TIMES PER DAY WITH MEALS. MAX 30 UNITS PER DAY, Normal      insulin glargine (LANTUS) 100 units/mL subcutaneous injection INJECT 36 UNITS UNDER THE SKIN DAILY AT BEDTIME, Normal      Accu-Chek FastClix Lancets MISC USE 1 LANCET TO TEST BLOOD SUGAR UP TO 6 TIMES DAILY, Normal      acetaminophen (TYLENOL) 500 mg tablet  , Starting Sun 5/2/2021, Historical Med      butalbital-acetaminophen-caffeine (Esgic) -40 mg per tablet Take 1 tablet by mouth every 6 (six) hours as needed for headaches for up to 15 doses, Starting Wed 10/18/2023, Normal      !! Continuous Blood Gluc  (FreeStyle Samantha 14 Day Kelly) KELVIN Use 1 Units in the morning, Starting Wed 11/2/2022, Normal      !! Continuous Blood Gluc  (FreeStyle Samantha 2 Kelly) KELVIN Use 1 units in the morning, Normal      !! Continuous Blood Gluc Sensor (FreeStyle Samantha 14 Day Sensor) MISC Use 1 Units every 14 (fourteen) days, Starting Mon 11/6/2023, Normal      !! Continuous Blood Gluc Sensor (FreeStyle Samantha 2 Sensor) MISC Use 1 unit every 14 days, Normal  "     ergocalciferol (VITAMIN D2) 50,000 units Take 50,000 Units by mouth once a week, Starting Mon 7/17/2023, Historical Med      gabapentin (NEURONTIN) 600 MG tablet Take 0.5 tablets (300 mg total) by mouth 2 (two) times a day Half Tab 300mg at bedtime, Starting Tue 4/5/2022, Normal      glucose blood (Accu-Chek Guide) test strip Use to test blood sugar up to 6 times daily., Normal      Insulin Pen Needle (BD Pen Needle Deanna U/F) 32G X 4 MM MISC Use 4/day, Normal      Insulin Syringe-Needle U-100 (INSULIN SYRINGE 1CC/28G) 28G X 1/2\" 1 ML MISC Historical Med      !! levothyroxine (Synthroid) 25 mcg tablet Take 1 tablet (25 mcg total) by mouth daily in the early morning Combine with 200mcg, for total 225mcg daily., Starting Tue 11/22/2022, Normal      !! levothyroxine 200 mcg tablet Take 1 tablet (200 mcg total) by mouth every morning, Starting Tue 4/12/2022, Normal      ondansetron (ZOFRAN-ODT) 4 mg disintegrating tablet Take 1 tablet (4 mg total) by mouth every 6 (six) hours as needed for nausea or vomiting, Starting Wed 10/18/2023, Normal      traZODone (DESYREL) 100 mg tablet Take 100 mg by mouth daily at bedtime, Starting Thu 8/12/2021, Historical Med       !! - Potential duplicate medications found. Please discuss with provider.          No discharge procedures on file.    PDMP Review       None            ED Provider  Electronically Signed by             Danial Guerra MD  01/17/24 2048    "

## 2024-02-05 DIAGNOSIS — E10.65 TYPE 1 DIABETES MELLITUS WITH HYPERGLYCEMIA (HCC): ICD-10-CM

## 2024-02-05 RX ORDER — INSULIN GLARGINE 100 [IU]/ML
INJECTION, SOLUTION SUBCUTANEOUS
Qty: 10 ML | Refills: 1 | Status: ON HOLD | OUTPATIENT
Start: 2024-02-05 | End: 2024-02-15

## 2024-02-05 NOTE — TELEPHONE ENCOUNTER
Patient requesting refill(s) of: Lantus     Last filled: 11/16/23  Last appt: 11/6/23  Next appt: none   [facility]arm[facility]

## 2024-02-06 ENCOUNTER — OFFICE VISIT (OUTPATIENT)
Dept: URGENT CARE | Age: 44
End: 2024-02-06
Payer: COMMERCIAL

## 2024-02-06 VITALS
OXYGEN SATURATION: 99 % | RESPIRATION RATE: 18 BRPM | HEART RATE: 98 BPM | SYSTOLIC BLOOD PRESSURE: 134 MMHG | TEMPERATURE: 97.3 F | DIASTOLIC BLOOD PRESSURE: 80 MMHG

## 2024-02-06 DIAGNOSIS — J40 BRONCHITIS: ICD-10-CM

## 2024-02-06 DIAGNOSIS — Z20.822 ENCOUNTER FOR LABORATORY TESTING FOR COVID-19 VIRUS: ICD-10-CM

## 2024-02-06 DIAGNOSIS — J01.00 ACUTE MAXILLARY SINUSITIS, RECURRENCE NOT SPECIFIED: Primary | ICD-10-CM

## 2024-02-06 PROCEDURE — 87636 SARSCOV2 & INF A&B AMP PRB: CPT | Performed by: PHYSICIAN ASSISTANT

## 2024-02-06 PROCEDURE — 99213 OFFICE O/P EST LOW 20 MIN: CPT | Performed by: PHYSICIAN ASSISTANT

## 2024-02-06 RX ORDER — ALBUTEROL SULFATE 90 UG/1
2 AEROSOL, METERED RESPIRATORY (INHALATION) EVERY 6 HOURS PRN
Qty: 8.5 G | Refills: 0 | Status: SHIPPED | OUTPATIENT
Start: 2024-02-06

## 2024-02-06 RX ORDER — AZITHROMYCIN 250 MG/1
TABLET, FILM COATED ORAL
Qty: 6 TABLET | Refills: 0 | Status: SHIPPED | OUTPATIENT
Start: 2024-02-06 | End: 2024-02-10

## 2024-02-06 RX ORDER — BENZONATATE 100 MG/1
100 CAPSULE ORAL 3 TIMES DAILY PRN
Qty: 20 CAPSULE | Refills: 0 | Status: SHIPPED | OUTPATIENT
Start: 2024-02-06 | End: 2024-02-15

## 2024-02-06 RX ORDER — BUDESONIDE 90 UG/1
1 AEROSOL, POWDER RESPIRATORY (INHALATION) 2 TIMES DAILY
Qty: 1 EACH | Refills: 0 | Status: SHIPPED | OUTPATIENT
Start: 2024-02-06

## 2024-02-06 NOTE — PROGRESS NOTES
[facility]        NAME: Clarissa Webb is a 43 y.o. female  : 1980    MRN: [id number]  DATE: 2024  TIME: 4:14 PM    /80   Pulse 98   Temp (!) 97.3 °F (36.3 °C) (Tympanic)   Resp 18   LMP 2023 (Exact Date)   SpO2 99%     Assessment and Plan   Acute maxillary sinusitis, recurrence not specified [J01.00]  1. Acute maxillary sinusitis, recurrence not specified  Covid/Flu- Office Collect Normal    Covid/Flu- Office Collect Normal    albuterol (ProAir HFA) 90 mcg/act inhaler    budesonide (Pulmicort Flexhaler) 90 MCG/ACT inhaler    benzonatate (TESSALON PERLES) 100 mg capsule    azithromycin (ZITHROMAX) 250 mg tablet      2. Bronchitis  albuterol (ProAir HFA) 90 mcg/act inhaler    budesonide (Pulmicort Flexhaler) 90 MCG/ACT inhaler    benzonatate (TESSALON PERLES) 100 mg capsule    azithromycin (ZITHROMAX) 250 mg tablet      3. Encounter for laboratory testing for COVID-19 virus  albuterol (ProAir HFA) 90 mcg/act inhaler    budesonide (Pulmicort Flexhaler) 90 MCG/ACT inhaler    benzonatate (TESSALON PERLES) 100 mg capsule    azithromycin (ZITHROMAX) 250 mg tablet            Patient Instructions       Follow up with PCP in 3-5 days.  Proceed to  ER if symptoms worsen.    Chief Complaint     Chief Complaint   Patient presents with    Fatigue     Fatigue, cough, runny nose, sore throat. Sx for 4 days. Taking mucinex. Had covid a few weeks ago.         History of Present Illness       Pt was sick last month with covid , got better, got sick again 4 days ago, productive cough and sinus congestion     Fatigue  Associated symptoms include congestion, coughing and fatigue.       Review of Systems   Review of Systems   Constitutional:  Positive for fatigue.   HENT:  Positive for congestion, sinus pressure and sinus pain.    Eyes: Negative.    Respiratory:  Positive for cough.    Cardiovascular: Negative.    Gastrointestinal: Negative.    Endocrine: Negative.    Genitourinary:  "Negative.    Musculoskeletal: Negative.    Skin: Negative.    Allergic/Immunologic: Negative.    Neurological: Negative.    Hematological: Negative.    Psychiatric/Behavioral: Negative.     All other systems reviewed and are negative.        Current Medications       Current Outpatient Medications:     albuterol (ProAir HFA) 90 mcg/act inhaler, Inhale 2 puffs every 6 (six) hours as needed for wheezing, Disp: 8.5 g, Rfl: 0    azithromycin (ZITHROMAX) 250 mg tablet, Take 2 tablets today then 1 tablet daily x 4 days, Disp: 6 tablet, Rfl: 0    benzonatate (TESSALON PERLES) 100 mg capsule, Take 1 capsule (100 mg total) by mouth 3 (three) times a day as needed for cough, Disp: 20 capsule, Rfl: 0    budesonide (Pulmicort Flexhaler) 90 MCG/ACT inhaler, Inhale 1 puff 2 (two) times a day Rinse mouth after use., Disp: 1 each, Rfl: 0    Continuous Blood Gluc Sensor (FreeStyle Samantha 14 Day Sensor) MISC, Use 1 Units every 14 (fourteen) days, Disp: 2 each, Rfl: 3    Continuous Blood Gluc Sensor (FreeStyle Samantha 2 Sensor) MISC, Use 1 unit every 14 days, Disp: 2 each, Rfl: 3    ergocalciferol (VITAMIN D2) 50,000 units, Take 50,000 Units by mouth once a week, Disp: , Rfl:     gabapentin (NEURONTIN) 600 MG tablet, Take 0.5 tablets (300 mg total) by mouth 2 (two) times a day Half Tab 300mg at bedtime, Disp: 30 tablet, Rfl: 1    insulin glargine (LANTUS) 100 units/mL subcutaneous injection, INJECT 36 UNITS UNDER THE SKIN DAILY AT BEDTIME, Disp: 10 mL, Rfl: 1    Insulin Pen Needle (BD Pen Needle Deanna U/F) 32G X 4 MM MISC, Use 4/day, Disp: 100 each, Rfl: 11    Insulin Syringe-Needle U-100 (INSULIN SYRINGE 1CC/28G) 28G X 1/2\" 1 ML MISC, , Disp: , Rfl:     levothyroxine (Synthroid) 25 mcg tablet, Take 1 tablet (25 mcg total) by mouth daily in the early morning Combine with 200mcg, for total 225mcg daily., Disp: 90 tablet, Rfl: 3    levothyroxine 200 mcg tablet, Take 1 tablet (200 mcg total) by mouth every morning, Disp: 90 tablet, Rfl: 3    " ondansetron (ZOFRAN-ODT) 4 mg disintegrating tablet, Take 1 tablet (4 mg total) by mouth every 6 (six) hours as needed for nausea or vomiting, Disp: 20 tablet, Rfl: 0    traZODone (DESYREL) 100 mg tablet, Take 100 mg by mouth daily at bedtime, Disp: , Rfl:     Accu-Chek FastClix Lancets MISC, USE 1 LANCET TO TEST BLOOD SUGAR UP TO 6 TIMES DAILY, Disp: 102 each, Rfl: 0    acetaminophen (TYLENOL) 500 mg tablet,   (Patient not taking: Reported on 10/18/2023), Disp: , Rfl:     butalbital-acetaminophen-caffeine (Esgic) -40 mg per tablet, Take 1 tablet by mouth every 6 (six) hours as needed for headaches for up to 15 doses, Disp: 15 tablet, Rfl: 0    Continuous Blood Gluc  (FreeStyle Samantha 14 Day Gould) KELVIN, Use 1 Units in the morning, Disp: 1 each, Rfl: 0    Continuous Blood Gluc  (FreeStyle Samantha 2 Gould) KELVIN, Use 1 units in the morning (Patient not taking: Reported on 10/18/2023), Disp: 1 each, Rfl: 3    glucose blood (Accu-Chek Guide) test strip, Use to test blood sugar up to 6 times daily., Disp: 200 each, Rfl: 2    Insulin Aspart (NovoLOG) 100 units/mL injection, SLIDING SCALE UP TO 3 TIMES PER DAY WITH MEALS. MAX 30 UNITS PER DAY, Disp: 10 mL, Rfl: 3    Current Allergies     Allergies as of 02/06/2024    (No Known Allergies)            The following portions of the patient's history were reviewed and updated as appropriate: allergies, current medications, past family history, past medical history, past social history, past surgical history and problem list.     Past Medical History:   Diagnosis Date    Anemia     Anxiety     B12 deficiency     Cervical disc disorder     On gabapentin     Depression     Diabetes mellitus (HCC)     Disease of thyroid gland     hypothyroid    Iron deficiency anemia     Panic attack     PTSD (post-traumatic stress disorder)     Sleep difficulties     Substance abuse (HCC)     Type 1 diabetes (HCC)     Vitamin D deficiency        Past Surgical History:    Procedure Laterality Date    ABDOMINAL SURGERY      gastric bypass     SECTION      x2    CHOLECYSTECTOMY  2018    GASTRIC BYPASS  2007    INTRAUTERINE DEVICE INSERTION  2015    IR BIOPSY BONE MARROW  2020    OTHER SURGICAL HISTORY      surgery for imperforate hymen/endometriosis/hydrometrocolpos    ROOT CANAL  2024    TUBAL LIGATION      WISDOM TOOTH EXTRACTION         Family History   Problem Relation Age of Onset    Thyroid disease Mother     URIEL disease Mother     Hyperlipidemia Mother     Hypertension Mother     Osteoarthritis Mother     Thyroid disease unspecified Mother     Diabetes Father     Alcohol abuse Father     Diabetes type I Father     Thyroid disease Sister     Depression Sister     Thyroid disease unspecified Sister     Anxiety disorder Sister     Heart disease Maternal Grandmother     Hypertension Maternal Grandmother     Arthritis Maternal Grandmother     Diabetes type I Maternal Uncle     Diabetes type I Maternal Grandfather          Medications have been verified.        Objective   /80   Pulse 98   Temp (!) 97.3 °F (36.3 °C) (Tympanic)   Resp 18   LMP 2023 (Exact Date)   SpO2 99%        Physical Exam     Physical Exam  Vitals and nursing note reviewed.   Constitutional:       Appearance: Normal appearance. She is normal weight.   HENT:      Head: Normocephalic and atraumatic.      Right Ear: Tympanic membrane, ear canal and external ear normal.      Left Ear: Tympanic membrane, ear canal and external ear normal.      Nose: Congestion and rhinorrhea present.      Comments: Boggy mucosa   yellow nasal d/c   Eyes:      Extraocular Movements: Extraocular movements intact.      Conjunctiva/sclera: Conjunctivae normal.      Pupils: Pupils are equal, round, and reactive to light.   Cardiovascular:      Rate and Rhythm: Normal rate and regular rhythm.      Pulses: Normal pulses.      Heart sounds: Normal heart sounds.   Pulmonary:      Effort: Pulmonary  effort is normal.      Comments: Minor coarse sounds   Abdominal:      General: Abdomen is flat. Bowel sounds are normal.   Musculoskeletal:         General: Normal range of motion.      Cervical back: Normal range of motion and neck supple.   Skin:     General: Skin is warm.      Capillary Refill: Capillary refill takes less than 2 seconds.   Neurological:      Mental Status: She is alert and oriented to person, place, and time.

## 2024-02-12 ENCOUNTER — HOSPITAL ENCOUNTER (INPATIENT)
Facility: HOSPITAL | Age: 44
LOS: 3 days | Discharge: HOME | DRG: 638 | End: 2024-02-15
Attending: EMERGENCY MEDICINE | Admitting: INTERNAL MEDICINE
Payer: COMMERCIAL

## 2024-02-12 DIAGNOSIS — F33.9 DEPRESSION, RECURRENT (HCC): ICD-10-CM

## 2024-02-12 DIAGNOSIS — E10.649 HYPOGLYCEMIA DUE TO TYPE 1 DIABETES MELLITUS (HCC): ICD-10-CM

## 2024-02-12 DIAGNOSIS — R53.1 GENERALIZED WEAKNESS: ICD-10-CM

## 2024-02-12 DIAGNOSIS — R82.90 ABNORMAL URINALYSIS: ICD-10-CM

## 2024-02-12 DIAGNOSIS — Z78.9 ALCOHOL USE: ICD-10-CM

## 2024-02-12 DIAGNOSIS — E10.65 TYPE 1 DIABETES MELLITUS WITH HYPERGLYCEMIA (HCC): ICD-10-CM

## 2024-02-12 DIAGNOSIS — N39.0 UTI (URINARY TRACT INFECTION): ICD-10-CM

## 2024-02-12 DIAGNOSIS — E03.8 HYPOTHYROIDISM DUE TO HASHIMOTO'S THYROIDITIS: ICD-10-CM

## 2024-02-12 DIAGNOSIS — E03.9 HYPOTHYROIDISM: ICD-10-CM

## 2024-02-12 DIAGNOSIS — E06.3 HYPOTHYROIDISM DUE TO HASHIMOTO'S THYROIDITIS: ICD-10-CM

## 2024-02-12 DIAGNOSIS — E16.2 HYPOGLYCEMIA: Primary | ICD-10-CM

## 2024-02-12 PROBLEM — R11.2 NAUSEA VOMITING AND DIARRHEA: Status: ACTIVE | Noted: 2024-02-12

## 2024-02-12 PROBLEM — R19.7 NAUSEA VOMITING AND DIARRHEA: Status: ACTIVE | Noted: 2024-02-12

## 2024-02-12 PROBLEM — F10.90 ALCOHOL USE: Status: ACTIVE | Noted: 2024-02-12

## 2024-02-12 LAB
2HR DELTA HS TROPONIN: <0 NG/L
4HR DELTA HS TROPONIN: 0 NG/L
ALBUMIN SERPL BCP-MCNC: 4.1 G/DL (ref 3.5–5)
ALP SERPL-CCNC: 100 U/L (ref 34–104)
ALT SERPL W P-5'-P-CCNC: 34 U/L (ref 7–52)
ANION GAP SERPL CALCULATED.3IONS-SCNC: 7 MMOL/L
AST SERPL W P-5'-P-CCNC: 42 U/L (ref 13–39)
ATRIAL RATE: 78 BPM
BACTERIA UR QL AUTO: ABNORMAL /HPF
BASOPHILS # BLD AUTO: 0.03 THOUSANDS/ÂΜL (ref 0–0.1)
BASOPHILS NFR BLD AUTO: 1 % (ref 0–1)
BILIRUB SERPL-MCNC: 0.41 MG/DL (ref 0.2–1)
BILIRUB UR QL STRIP: NEGATIVE
BUN SERPL-MCNC: 20 MG/DL (ref 5–25)
CALCIUM SERPL-MCNC: 9.2 MG/DL (ref 8.4–10.2)
CARDIAC TROPONIN I PNL SERPL HS: 2 NG/L
CARDIAC TROPONIN I PNL SERPL HS: 2 NG/L
CARDIAC TROPONIN I PNL SERPL HS: <2 NG/L
CHLORIDE SERPL-SCNC: 101 MMOL/L (ref 96–108)
CLARITY UR: ABNORMAL
CO2 SERPL-SCNC: 29 MMOL/L (ref 21–32)
COLOR UR: YELLOW
CREAT SERPL-MCNC: 0.95 MG/DL (ref 0.6–1.3)
EOSINOPHIL # BLD AUTO: 0.11 THOUSAND/ÂΜL (ref 0–0.61)
EOSINOPHIL NFR BLD AUTO: 3 % (ref 0–6)
ERYTHROCYTE [DISTWIDTH] IN BLOOD BY AUTOMATED COUNT: 12.9 % (ref 11.6–15.1)
EXT PREGNANCY TEST URINE: NEGATIVE
EXT. CONTROL: NORMAL
FLUAV RNA RESP QL NAA+PROBE: NEGATIVE
FLUBV RNA RESP QL NAA+PROBE: NEGATIVE
GFR SERPL CREATININE-BSD FRML MDRD: 74 ML/MIN/1.73SQ M
GLUCOSE SERPL-MCNC: 110 MG/DL (ref 65–140)
GLUCOSE SERPL-MCNC: 271 MG/DL (ref 65–140)
GLUCOSE SERPL-MCNC: 291 MG/DL (ref 65–140)
GLUCOSE SERPL-MCNC: 47 MG/DL (ref 65–140)
GLUCOSE SERPL-MCNC: 52 MG/DL (ref 65–140)
GLUCOSE SERPL-MCNC: 65 MG/DL (ref 65–140)
GLUCOSE SERPL-MCNC: 77 MG/DL (ref 65–140)
GLUCOSE UR STRIP-MCNC: ABNORMAL MG/DL
HCT VFR BLD AUTO: 35.6 % (ref 34.8–46.1)
HGB BLD-MCNC: 11.4 G/DL (ref 11.5–15.4)
HGB UR QL STRIP.AUTO: ABNORMAL
HYALINE CASTS #/AREA URNS LPF: ABNORMAL /LPF
IMM GRANULOCYTES # BLD AUTO: 0.01 THOUSAND/UL (ref 0–0.2)
IMM GRANULOCYTES NFR BLD AUTO: 0 % (ref 0–2)
KETONES UR STRIP-MCNC: NEGATIVE MG/DL
LACTATE SERPL-SCNC: 0.8 MMOL/L (ref 0.5–2)
LEUKOCYTE ESTERASE UR QL STRIP: ABNORMAL
LIPASE SERPL-CCNC: 52 U/L (ref 11–82)
LYMPHOCYTES # BLD AUTO: 1.4 THOUSANDS/ÂΜL (ref 0.6–4.47)
LYMPHOCYTES NFR BLD AUTO: 42 % (ref 14–44)
MCH RBC QN AUTO: 28.6 PG (ref 26.8–34.3)
MCHC RBC AUTO-ENTMCNC: 32 G/DL (ref 31.4–37.4)
MCV RBC AUTO: 89 FL (ref 82–98)
MONOCYTES # BLD AUTO: 0.31 THOUSAND/ÂΜL (ref 0.17–1.22)
MONOCYTES NFR BLD AUTO: 9 % (ref 4–12)
MUCOUS THREADS UR QL AUTO: ABNORMAL
NEUTROPHILS # BLD AUTO: 1.47 THOUSANDS/ÂΜL (ref 1.85–7.62)
NEUTS SEG NFR BLD AUTO: 45 % (ref 43–75)
NITRITE UR QL STRIP: POSITIVE
NON-SQ EPI CELLS URNS QL MICRO: ABNORMAL /HPF
NRBC BLD AUTO-RTO: 0 /100 WBCS
P AXIS: 64 DEGREES
PH UR STRIP.AUTO: 5.5 [PH] (ref 4.5–8)
PLATELET # BLD AUTO: 183 THOUSANDS/UL (ref 149–390)
PMV BLD AUTO: 9.6 FL (ref 8.9–12.7)
POTASSIUM SERPL-SCNC: 3.8 MMOL/L (ref 3.5–5.3)
PR INTERVAL: 128 MS
PROT SERPL-MCNC: 7.2 G/DL (ref 6.4–8.4)
PROT UR STRIP-MCNC: ABNORMAL MG/DL
QRS AXIS: 89 DEGREES
QRSD INTERVAL: 86 MS
QT INTERVAL: 396 MS
QTC INTERVAL: 451 MS
RBC # BLD AUTO: 3.99 MILLION/UL (ref 3.81–5.12)
RBC #/AREA URNS AUTO: ABNORMAL /HPF
RSV RNA RESP QL NAA+PROBE: NEGATIVE
SARS-COV-2 RNA RESP QL NAA+PROBE: NEGATIVE
SODIUM SERPL-SCNC: 137 MMOL/L (ref 135–147)
SP GR UR STRIP.AUTO: 1.02 (ref 1–1.03)
T WAVE AXIS: 53 DEGREES
T3 SERPL-MCNC: 0.4 NG/ML
T4 FREE SERPL-MCNC: 0.57 NG/DL (ref 0.61–1.12)
TSH SERPL DL<=0.05 MIU/L-ACNC: 145.66 UIU/ML (ref 0.45–4.5)
UROBILINOGEN UR QL STRIP.AUTO: 0.2 E.U./DL
VENTRICULAR RATE: 78 BPM
WBC # BLD AUTO: 3.33 THOUSAND/UL (ref 4.31–10.16)
WBC #/AREA URNS AUTO: ABNORMAL /HPF
WBC CLUMPS # UR AUTO: PRESENT /UL

## 2024-02-12 PROCEDURE — 93005 ELECTROCARDIOGRAM TRACING: CPT

## 2024-02-12 PROCEDURE — 84480 ASSAY TRIIODOTHYRONINE (T3): CPT | Performed by: EMERGENCY MEDICINE

## 2024-02-12 PROCEDURE — 87086 URINE CULTURE/COLONY COUNT: CPT

## 2024-02-12 PROCEDURE — 85025 COMPLETE CBC W/AUTO DIFF WBC: CPT | Performed by: EMERGENCY MEDICINE

## 2024-02-12 PROCEDURE — 84443 ASSAY THYROID STIM HORMONE: CPT | Performed by: EMERGENCY MEDICINE

## 2024-02-12 PROCEDURE — 87147 CULTURE TYPE IMMUNOLOGIC: CPT

## 2024-02-12 PROCEDURE — 99223 1ST HOSP IP/OBS HIGH 75: CPT | Performed by: INTERNAL MEDICINE

## 2024-02-12 PROCEDURE — 96361 HYDRATE IV INFUSION ADD-ON: CPT

## 2024-02-12 PROCEDURE — 81001 URINALYSIS AUTO W/SCOPE: CPT

## 2024-02-12 PROCEDURE — 0241U HB NFCT DS VIR RESP RNA 4 TRGT: CPT | Performed by: INTERNAL MEDICINE

## 2024-02-12 PROCEDURE — 99285 EMERGENCY DEPT VISIT HI MDM: CPT | Performed by: EMERGENCY MEDICINE

## 2024-02-12 PROCEDURE — 82948 REAGENT STRIP/BLOOD GLUCOSE: CPT

## 2024-02-12 PROCEDURE — 87186 SC STD MICRODIL/AGAR DIL: CPT

## 2024-02-12 PROCEDURE — 94762 N-INVAS EAR/PLS OXIMTRY CONT: CPT

## 2024-02-12 PROCEDURE — 96374 THER/PROPH/DIAG INJ IV PUSH: CPT

## 2024-02-12 PROCEDURE — 96375 TX/PRO/DX INJ NEW DRUG ADDON: CPT

## 2024-02-12 PROCEDURE — 84484 ASSAY OF TROPONIN QUANT: CPT | Performed by: EMERGENCY MEDICINE

## 2024-02-12 PROCEDURE — 83036 HEMOGLOBIN GLYCOSYLATED A1C: CPT | Performed by: INTERNAL MEDICINE

## 2024-02-12 PROCEDURE — 84439 ASSAY OF FREE THYROXINE: CPT | Performed by: EMERGENCY MEDICINE

## 2024-02-12 PROCEDURE — 99285 EMERGENCY DEPT VISIT HI MDM: CPT

## 2024-02-12 PROCEDURE — 87077 CULTURE AEROBIC IDENTIFY: CPT

## 2024-02-12 PROCEDURE — 87040 BLOOD CULTURE FOR BACTERIA: CPT | Performed by: EMERGENCY MEDICINE

## 2024-02-12 PROCEDURE — 94760 N-INVAS EAR/PLS OXIMETRY 1: CPT

## 2024-02-12 PROCEDURE — 93010 ELECTROCARDIOGRAM REPORT: CPT | Performed by: INTERNAL MEDICINE

## 2024-02-12 PROCEDURE — 83690 ASSAY OF LIPASE: CPT | Performed by: EMERGENCY MEDICINE

## 2024-02-12 PROCEDURE — 83605 ASSAY OF LACTIC ACID: CPT | Performed by: EMERGENCY MEDICINE

## 2024-02-12 PROCEDURE — 81025 URINE PREGNANCY TEST: CPT | Performed by: EMERGENCY MEDICINE

## 2024-02-12 PROCEDURE — 80053 COMPREHEN METABOLIC PANEL: CPT | Performed by: EMERGENCY MEDICINE

## 2024-02-12 PROCEDURE — 36415 COLL VENOUS BLD VENIPUNCTURE: CPT | Performed by: EMERGENCY MEDICINE

## 2024-02-12 RX ORDER — ONDANSETRON 2 MG/ML
4 INJECTION INTRAMUSCULAR; INTRAVENOUS EVERY 6 HOURS PRN
Status: DISCONTINUED | OUTPATIENT
Start: 2024-02-12 | End: 2024-02-15 | Stop reason: HOSPADM

## 2024-02-12 RX ORDER — DEXTROSE AND SODIUM CHLORIDE 5; .45 G/100ML; G/100ML
100 INJECTION, SOLUTION INTRAVENOUS CONTINUOUS
Status: DISCONTINUED | OUTPATIENT
Start: 2024-02-12 | End: 2024-02-13

## 2024-02-12 RX ORDER — LANOLIN ALCOHOL/MO/W.PET/CERES
100 CREAM (GRAM) TOPICAL DAILY
Status: DISCONTINUED | OUTPATIENT
Start: 2024-02-12 | End: 2024-02-15 | Stop reason: HOSPADM

## 2024-02-12 RX ORDER — DEXTROSE MONOHYDRATE 100 MG/ML
100 INJECTION, SOLUTION INTRAVENOUS CONTINUOUS
Status: DISCONTINUED | OUTPATIENT
Start: 2024-02-12 | End: 2024-02-12

## 2024-02-12 RX ORDER — ALBUTEROL SULFATE 90 UG/1
2 AEROSOL, METERED RESPIRATORY (INHALATION) EVERY 6 HOURS PRN
Status: DISCONTINUED | OUTPATIENT
Start: 2024-02-12 | End: 2024-02-15 | Stop reason: HOSPADM

## 2024-02-12 RX ORDER — INSULIN GLARGINE 100 [IU]/ML
10 INJECTION, SOLUTION SUBCUTANEOUS
Status: DISCONTINUED | OUTPATIENT
Start: 2024-02-12 | End: 2024-02-14

## 2024-02-12 RX ORDER — FOLIC ACID 1 MG/1
1 TABLET ORAL DAILY
Status: DISCONTINUED | OUTPATIENT
Start: 2024-02-12 | End: 2024-02-15 | Stop reason: HOSPADM

## 2024-02-12 RX ORDER — TRAZODONE HYDROCHLORIDE 100 MG/1
200 TABLET ORAL
Status: DISCONTINUED | OUTPATIENT
Start: 2024-02-12 | End: 2024-02-15 | Stop reason: HOSPADM

## 2024-02-12 RX ORDER — DOCUSATE SODIUM 100 MG/1
100 CAPSULE, LIQUID FILLED ORAL 2 TIMES DAILY
Status: DISCONTINUED | OUTPATIENT
Start: 2024-02-12 | End: 2024-02-15 | Stop reason: HOSPADM

## 2024-02-12 RX ORDER — INSULIN LISPRO 100 [IU]/ML
1-5 INJECTION, SOLUTION INTRAVENOUS; SUBCUTANEOUS
Status: DISCONTINUED | OUTPATIENT
Start: 2024-02-13 | End: 2024-02-15 | Stop reason: HOSPADM

## 2024-02-12 RX ORDER — DEXTROSE MONOHYDRATE 25 G/50ML
25 INJECTION, SOLUTION INTRAVENOUS ONCE
Status: COMPLETED | OUTPATIENT
Start: 2024-02-12 | End: 2024-02-12

## 2024-02-12 RX ORDER — CEFTRIAXONE 1 G/50ML
1000 INJECTION, SOLUTION INTRAVENOUS ONCE
Status: COMPLETED | OUTPATIENT
Start: 2024-02-12 | End: 2024-02-12

## 2024-02-12 RX ADMIN — DEXTROSE AND SODIUM CHLORIDE 100 ML/HR: 5; .45 INJECTION, SOLUTION INTRAVENOUS at 22:02

## 2024-02-12 RX ADMIN — DEXTROSE MONOHYDRATE 25 ML: 25 INJECTION, SOLUTION INTRAVENOUS at 17:57

## 2024-02-12 RX ADMIN — SODIUM CHLORIDE 1000 ML: 0.9 INJECTION, SOLUTION INTRAVENOUS at 17:10

## 2024-02-12 RX ADMIN — FOLIC ACID 1 MG: 1 TABLET ORAL at 22:01

## 2024-02-12 RX ADMIN — INSULIN GLARGINE 10 UNITS: 100 INJECTION, SOLUTION SUBCUTANEOUS at 22:01

## 2024-02-12 RX ADMIN — CEFTRIAXONE 1000 MG: 1 INJECTION, SOLUTION INTRAVENOUS at 19:15

## 2024-02-12 RX ADMIN — TRAZODONE HYDROCHLORIDE 200 MG: 100 TABLET ORAL at 22:01

## 2024-02-12 RX ADMIN — THIAMINE HCL TAB 100 MG 100 MG: 100 TAB at 22:01

## 2024-02-12 RX ADMIN — DEXTROSE 100 ML/HR: 10 SOLUTION INTRAVENOUS at 19:37

## 2024-02-12 NOTE — ED PROVIDER NOTES
"History  Chief Complaint   Patient presents with    Hypoglycemia - Symptomatic     Pt reports multiple episodes where blood sugar has crashed 30/40's. Pt reporting weakness, nausea, aching.      43-year-old female with multiple episodes of hypoglycemia, with her blood sugar dropping down to the 30s -40s.  She is taking her NovoLog regularly before meals and eating regularly.  She takes Lantus at night around 9 PM.  No changes in the dose of the insulin.  And she states that she is eating and drinking okay.  She recently had bronchitis 1 to 2 weeks ago and was on antibiotics for 5 days.  She has had diarrhea since then, nonbloody.  No fevers or cough now.  No chest pain, no abdominal pain or shortness of breath.  She feels achy all over and somewhat weak        Prior to Admission Medications   Prescriptions Last Dose Informant Patient Reported? Taking?   Accu-Chek FastClix Lancets MISC   No No   Sig: USE 1 LANCET TO TEST BLOOD SUGAR UP TO 6 TIMES DAILY   Continuous Blood Gluc  (FreeStyle Samantha 14 Day [place]) KELVIN   No No   Sig: Use 1 Units in the morning   Continuous Blood Gluc  (FreeStyle Samantha 2 [place]) KELVIN   No No   Sig: Use 1 units in the morning   Patient not taking: Reported on 10/18/2023   Continuous Blood Gluc Sensor (FreeStyle Samantha 14 Day Sensor) MISC   No No   Sig: Use 1 Units every 14 (fourteen) days   Continuous Blood Gluc Sensor (FreeStyle Samantha 2 Sensor) MISC   No No   Sig: Use 1 unit every 14 days   Insulin Aspart (NovoLOG) 100 units/mL injection   No Yes   Sig: SLIDING SCALE UP TO 3 TIMES PER DAY WITH MEALS. MAX 30 UNITS PER DAY   Insulin Pen Needle (BD Pen Needle Deanna U/F) 32G X 4 MM MISC   No Yes   Sig: Use 4/day   Insulin Syringe-Needle U-100 (INSULIN SYRINGE 1CC/28G) 28G X 1/2\" 1 ML MISC   Yes Yes   acetaminophen (TYLENOL) 500 mg tablet  Self Yes No   Sig:     Patient not taking: Reported on 10/18/2023   albuterol (ProAir HFA) 90 mcg/act inhaler   No No   Sig: Inhale 2 puffs " every 6 (six) hours as needed for wheezing   benzonatate (TESSALON PERLES) 100 mg capsule   No No   Sig: Take 1 capsule (100 mg total) by mouth 3 (three) times a day as needed for cough   budesonide (Pulmicort Flexhaler) 90 MCG/ACT inhaler   No No   Sig: Inhale 1 puff 2 (two) times a day Rinse mouth after use.   butalbital-acetaminophen-caffeine (Esgic) -40 mg per tablet   No No   Sig: Take 1 tablet by mouth every 6 (six) hours as needed for headaches for up to 15 doses   ergocalciferol (VITAMIN D2) 50,000 units   Yes Yes   Sig: Take 50,000 Units by mouth once a week   gabapentin (NEURONTIN) 600 MG tablet  Self No No   Sig: Take 0.5 tablets (300 mg total) by mouth 2 (two) times a day Half Tab 300mg at bedtime   glucose blood (Accu-Chek Guide) test strip  Self No No   Sig: Use to test blood sugar up to 6 times daily.   insulin glargine (LANTUS) 100 units/mL subcutaneous injection   No Yes   Sig: INJECT 36 UNITS UNDER THE SKIN DAILY AT BEDTIME   levothyroxine (Synthroid) 25 mcg tablet 2/12/2024  No Yes   Sig: Take 1 tablet (25 mcg total) by mouth daily in the early morning Combine with 200mcg, for total 225mcg daily.   levothyroxine 200 mcg tablet 2/12/2024 Self No Yes   Sig: Take 1 tablet (200 mcg total) by mouth every morning   ondansetron (ZOFRAN-ODT) 4 mg disintegrating tablet   No Yes   Sig: Take 1 tablet (4 mg total) by mouth every 6 (six) hours as needed for nausea or vomiting   traZODone (DESYREL) 100 mg tablet 2/11/2024 Self Yes Yes   Sig: Take 100 mg by mouth daily at bedtime      Facility-Administered Medications: None       Past Medical History:   Diagnosis Date    Anemia     Anxiety     B12 deficiency     Cervical disc disorder     On gabapentin     Depression     Diabetes mellitus (HCC)     Disease of thyroid gland     hypothyroid    Iron deficiency anemia     Panic attack     PTSD (post-traumatic stress disorder)     Sleep difficulties     Substance abuse (HCC)     Type 1 diabetes (HCC)      Vitamin D deficiency        Past Surgical History:   Procedure Laterality Date    ABDOMINAL SURGERY      gastric bypass     SECTION      x2    CHOLECYSTECTOMY  2018    GASTRIC BYPASS  2007    INTRAUTERINE DEVICE INSERTION  2015    IR BIOPSY BONE MARROW  2020    OTHER SURGICAL HISTORY      surgery for imperforate hymen/endometriosis/hydrometrocolpos    ROOT CANAL  2024    TUBAL LIGATION      WISDOM TOOTH EXTRACTION         Family History   Problem Relation Age of Onset    Thyroid disease Mother     URIEL disease Mother     Hyperlipidemia Mother     Hypertension Mother     Osteoarthritis Mother     Thyroid disease unspecified Mother     Diabetes Father     Alcohol abuse Father     Diabetes type I Father     Thyroid disease Sister     Depression Sister     Thyroid disease unspecified Sister     Anxiety disorder Sister     Heart disease Maternal Grandmother     Hypertension Maternal Grandmother     Arthritis Maternal Grandmother     Diabetes type I Maternal Uncle     Diabetes type I Maternal Grandfather      I have reviewed and agree with the history as documented.    E-Cigarette/Vaping    E-Cigarette Use Never User      E-Cigarette/Vaping Substances    Nicotine No     THC No     CBD No     Flavoring No     Other No     Unknown No      Social History     Tobacco Use    Smoking status: Never    Smokeless tobacco: Never   Vaping Use    Vaping status: Never Used   Substance Use Topics    Alcohol use: Yes     Comment: a couple beers a day    Drug use: No       Review of Systems   Constitutional:  Positive for fatigue. Negative for fever.   HENT:  Negative for rhinorrhea and sore throat.    Eyes:  Negative for pain and redness.   Respiratory:  Negative for cough and shortness of breath.    Cardiovascular:  Negative for chest pain and leg swelling.   Gastrointestinal:  Positive for diarrhea. Negative for abdominal pain and vomiting.   Genitourinary:  Negative for difficulty urinating and flank pain.    Musculoskeletal:  Negative for back pain and myalgias.   Skin:  Negative for color change and rash.   Neurological:  Positive for weakness. Negative for dizziness, syncope and headaches.       Physical Exam  Physical Exam  Vitals and nursing note reviewed.   Constitutional:       Appearance: She is well-developed.   HENT:      Head: Normocephalic and atraumatic.      Right Ear: External ear normal.      Left Ear: External ear normal.   Eyes:      General: No scleral icterus.     Extraocular Movements: Extraocular movements intact.   Cardiovascular:      Rate and Rhythm: Normal rate and regular rhythm.   Pulmonary:      Effort: Pulmonary effort is normal. No respiratory distress.      Breath sounds: Normal breath sounds.   Abdominal:      Palpations: Abdomen is soft.      Tenderness: There is no abdominal tenderness.   Musculoskeletal:         General: No deformity or signs of injury. Normal range of motion.      Cervical back: Normal range of motion and neck supple.   Skin:     General: Skin is warm and dry.      Coloration: Skin is not jaundiced or pale.   Neurological:      General: No focal deficit present.      Mental Status: She is alert and oriented to person, place, and time.   Psychiatric:         Mood and Affect: Mood normal.         Behavior: Behavior normal.         Vital Signs  ED Triage Vitals   Temperature Pulse Respirations Blood Pressure SpO2   02/12/24 1616 02/12/24 1616 02/12/24 1616 02/12/24 1616 02/12/24 1616   97.8 °F (36.6 °C) 83 16 163/94 99 %      Temp Source Heart Rate Source Patient Position - Orthostatic VS BP Location FiO2 (%)   02/12/24 1616 -- 02/12/24 2028 02/12/24 2028 --   Oral  Lying Right arm       Pain Score       02/12/24 2028       No Pain           Vitals:    02/12/24 1900 02/12/24 2000 02/12/24 2028 02/12/24 2307   BP: 143/83 111/55 138/89 100/75   Pulse: 86 94 86 82   Patient Position - Orthostatic VS:   Lying Lying         Visual Acuity      ED Medications  Medications    albuterol (PROVENTIL HFA,VENTOLIN HFA) inhaler 2 puff (has no administration in time range)   fluticasone (ARNUITY ELLIPTA) 100 MCG/ACT inhaler 1 puff (has no administration in time range)   levothyroxine tablet 250 mcg (has no administration in time range)   traZODone (DESYREL) tablet 200 mg (200 mg Oral Given 2/12/24 2201)   docusate sodium (COLACE) capsule 100 mg (100 mg Oral Not Given 2/12/24 2202)   ondansetron (ZOFRAN) injection 4 mg (has no administration in time range)   insulin glargine (LANTUS) subcutaneous injection 10 Units 0.1 mL (10 Units Subcutaneous Given 2/12/24 2201)   insulin lispro (HumaLOG) 100 units/mL subcutaneous injection 1-5 Units (has no administration in time range)   dextrose 5 % and sodium chloride 0.45 % infusion (100 mL/hr Intravenous New Bag 2/12/24 2202)   thiamine tablet 100 mg (100 mg Oral Given 2/12/24 2201)   folic acid (FOLVITE) tablet 1 mg (1 mg Oral Given 2/12/24 2201)   sodium chloride 0.9 % bolus 1,000 mL (0 mL Intravenous Stopped 2/12/24 1756)   dextrose 50 % IV solution 25 mL (25 mL Intravenous Given 2/12/24 1757)   cefTRIAXone (ROCEPHIN) IVPB (premix in dextrose) 1,000 mg 50 mL (0 mg Intravenous Stopped 2/12/24 1945)       Diagnostic Studies  Results Reviewed       Procedure Component Value Units Date/Time    Blood culture #1 [[id number]] Collected: 02/12/24 1910    Lab Status: Preliminary result Specimen: Blood from Arm, Right Updated: 02/13/24 0001     Blood Culture Received in Microbiology Lab. Culture in Progress.    Blood culture #2 [[id number]] Collected: 02/12/24 1852    Lab Status: Preliminary result Specimen: Blood from Arm, Right Updated: 02/13/24 0001     Blood Culture Received in Microbiology Lab. Culture in Progress.    T3 [[id number]]  (Abnormal) Collected: 02/12/24 1951    Lab Status: Final result Specimen: Blood from Arm, Left Updated: 02/12/24 2328     T3, Total 0.4 ng/mL     Narrative:      Specimens with biotin concentrations > 1 ng/mL can lead to  "significant (> 10%) positive bias in result.    T4, free [[id number]]  (Abnormal) Collected: 02/12/24 1709    Lab Status: Final result Specimen: Blood from Arm, Left Updated: 02/12/24 2324     Free T4 0.57 ng/dL     Narrative:        \"Therapeutic range for patients medicated with thyroid disorders: 0.61-1.24 ng/dL.\"    HS Troponin I 4hr [[id number]]  (Normal) Collected: 02/12/24 2113    Lab Status: Final result Specimen: Blood from Arm, Left Updated: 02/12/24 2153     hs TnI 4hr 2 ng/L      Delta 4hr hsTnI 0 ng/L     Hemoglobin A1C [[id number]] Collected: 02/12/24 1709    Lab Status: In process Specimen: Blood from Arm, Left Updated: 02/12/24 2145    HS Troponin I 2hr [[id number]]  (Normal) Collected: 02/12/24 1910    Lab Status: Final result Specimen: Blood from Hand, Right Updated: 02/12/24 1945     hs TnI 2hr <2 ng/L      Delta 2hr hsTnI <0 ng/L     Lactic acid, plasma (w/reflex if result > 2.0) [[id number]]  (Normal) Collected: 02/12/24 1852    Lab Status: Final result Specimen: Blood from Arm, Right Updated: 02/12/24 1926     LACTIC ACID 0.8 mmol/L     Narrative:      Result may be elevated if tourniquet was used during collection.    Fingerstick Glucose (POCT) [[id number]]  (Abnormal) Collected: 02/12/24 1906    Lab Status: Final result Updated: 02/12/24 1906     POC Glucose 52 mg/dl     Fingerstick Glucose (POCT) [[id number]]  (Normal) Collected: 02/12/24 1841    Lab Status: Final result Updated: 02/12/24 1842     POC Glucose 65 mg/dl     TSH, 3rd generation with Free T4 reflex [[id number]]  (Abnormal) Collected: 02/12/24 1709    Lab Status: Final result Specimen: Blood from Arm, Left Updated: 02/12/24 1824     TSH 3RD GENERATON 145.664 uIU/mL     Fingerstick Glucose (POCT) [[id number]]  (Abnormal) Collected: 02/12/24 1752    Lab Status: Final result Updated: 02/12/24 1753     POC Glucose 47 mg/dl     HS Troponin 0hr (reflex protocol) [[id number]]  (Normal) Collected: 02/12/24 1709    Lab Status: Final result Specimen: " Blood from Arm, Left Updated: 02/12/24 1738     hs TnI 0hr 2 ng/L     Urine Microscopic [[id number]]  (Abnormal) Collected: 02/12/24 1709    Lab Status: Final result Specimen: Urine, Clean Catch Updated: 02/12/24 1737     RBC, UA 4-10 /hpf      WBC, UA Innumerable /hpf      Epithelial Cells Occasional /hpf      Bacteria, UA Innumerable /hpf      MUCUS THREADS Occasional     Hyaline Casts, UA 0-3 /lpf      WBC Clumps Present    Urine culture [[id number]] Collected: 02/12/24 1709    Lab Status: In process Specimen: Urine, Clean Catch Updated: 02/12/24 1737    Comprehensive metabolic panel [[id number]]  (Abnormal) Collected: 02/12/24 1709    Lab Status: Final result Specimen: Blood from Arm, Left Updated: 02/12/24 1732     Sodium 137 mmol/L      Potassium 3.8 mmol/L      Chloride 101 mmol/L      CO2 29 mmol/L      ANION GAP 7 mmol/L      BUN 20 mg/dL      Creatinine 0.95 mg/dL      Glucose 77 mg/dL      Calcium 9.2 mg/dL      AST 42 U/L      ALT 34 U/L      Alkaline Phosphatase 100 U/L      Total Protein 7.2 g/dL      Albumin 4.1 g/dL      Total Bilirubin 0.41 mg/dL      eGFR 74 ml/min/1.73sq m     Narrative:      [employer] guidelines for Chronic Kidney Disease (CKD):     Stage 1 with normal or high GFR (GFR > 90 mL/min/1.73 square meters)    Stage 2 Mild CKD (GFR = 60-89 mL/min/1.73 square meters)    Stage 3A Moderate CKD (GFR = 45-59 mL/min/1.73 square meters)    Stage 3B Moderate CKD (GFR = 30-44 mL/min/1.73 square meters)    Stage 4 Severe CKD (GFR = 15-29 mL/min/1.73 square meters)    Stage 5 End Stage CKD (GFR <15 mL/min/1.73 square meters)  Note: GFR calculation is accurate only with a steady state creatinine    Lipase [[id number]]  (Normal) Collected: 02/12/24 1709    Lab Status: Final result Specimen: Blood from Arm, Left Updated: 02/12/24 1732     Lipase 52 u/L     CBC and differential [[id number]]  (Abnormal) Collected: 02/12/24 1709    Lab Status: Final result Specimen: Blood from Arm,  Left Updated: 02/12/24 1716     WBC 3.33 Thousand/uL      RBC 3.99 Million/uL      Hemoglobin 11.4 g/dL      Hematocrit 35.6 %      MCV 89 fL      MCH 28.6 pg      MCHC 32.0 g/dL      RDW 12.9 %      MPV 9.6 fL      Platelets 183 Thousands/uL      nRBC 0 /100 WBCs      Neutrophils Relative 45 %      Immat GRANS % 0 %      Lymphocytes Relative 42 %      Monocytes Relative 9 %      Eosinophils Relative 3 %      Basophils Relative 1 %      Neutrophils Absolute 1.47 Thousands/µL      Immature Grans Absolute 0.01 Thousand/uL      Lymphocytes Absolute 1.40 Thousands/µL      Monocytes Absolute 0.31 Thousand/µL      Eosinophils Absolute 0.11 Thousand/µL      Basophils Absolute 0.03 Thousands/µL     POCT pregnancy, urine [[id number]]  (Normal) Resulted: 02/12/24 1713    Lab Status: Final result Updated: 02/12/24 1713     EXT Preg Test, Ur Negative     Control Valid    Urine Macroscopic, POC [[id number]]  (Abnormal) Collected: 02/12/24 1709    Lab Status: Final result Specimen: Urine Updated: 02/12/24 1710     Color, UA Yellow     Clarity, UA Cloudy     pH, UA 5.5     Leukocytes, UA Small     Nitrite, UA Positive     Protein, UA 30 (1+) mg/dl      Glucose, UA >=1000 (1%) mg/dl      Ketones, UA Negative mg/dl      Urobilinogen, UA 0.2 E.U./dl      Bilirubin, UA Negative     Occult Blood, UA Trace     Specific Gravity, UA 1.025    Narrative:      CLINITEK RESULT    Clostridium difficile toxin by PCR with EIA [[id number]]     Lab Status: No result Specimen: Stool from Per Rectum     Fingerstick Glucose (POCT) [[id number]]  (Normal) Collected: 02/12/24 1619    Lab Status: Final result Updated: 02/12/24 1620     POC Glucose 110 mg/dl                    No orders to display              Procedures  ECG 12 Lead Documentation Only    Date/Time: 2/12/2024 5:07 PM    Performed by: Glory Henson MD  Authorized by: Glory Henson MD    Rate:     ECG rate:  78  Rhythm:     Rhythm: sinus rhythm    Comments:      No st  elevation or depression           ED Course                                             Medical Decision Making  And over the results with the patient.  Her blood sugar Dropping down despite giving her D50.  She was placed on a D10 IV drip.  Her TSH was very high and she has UTI.  She was given IV antibiotics and further thyroids studies were ordered.  She was admitted to hospitalist for further workup/management    Amount and/or Complexity of Data Reviewed  Labs: ordered.    Risk  Prescription drug management.  Decision regarding hospitalization.             Disposition  Final diagnoses:   Hypoglycemia   Hypothyroidism   UTI (urinary tract infection)   Generalized weakness     Time reflects when diagnosis was documented in both MDM as applicable and the Disposition within this note       Time User Action Codes Description Comment    2/12/2024  7:24 PM Glory Yañez Add [E16.2] Hypoglycemia     2/12/2024  7:24 PM Glory Yañez Add [E03.9] Hypothyroidism     2/12/2024  7:24 PM Glory Yañez Add [N39.0] UTI (urinary tract infection)     2/12/2024  7:24 PM Glory Yañez Add [R53.1] Generalized weakness     2/12/2024  9:36 PM Elizabeth Benoit Add [E10.649] Hypoglycemia due to type 1 diabetes mellitus (HCC)     2/12/2024  9:40 PM Elizabeth Benoit Add [Z78.9] Alcohol use           ED Disposition       ED Disposition   Admit    Condition   Stable    Date/Time   Mon Feb 12, 2024  7:24 PM    Comment   Case was discussed with Dr. Benoit and the patient's admission status was agreed to be Admission Status: inpatient status/telemetry to the service of Dr. Benoit .               Follow-up Information    None         Current Discharge Medication List        CONTINUE these medications which have NOT CHANGED    Details   ergocalciferol (VITAMIN D2) 50,000 units Take 50,000 Units by mouth once a week      Insulin Aspart (NovoLOG) 100 units/mL injection SLIDING SCALE UP TO 3 TIMES PER DAY  "WITH MEALS. MAX 30 UNITS PER DAY  Qty: 10 mL, Refills: 3    Associated Diagnoses: Type 1 diabetes mellitus with hyperglycemia (HCC)      insulin glargine (LANTUS) 100 units/mL subcutaneous injection INJECT 36 UNITS UNDER THE SKIN DAILY AT BEDTIME  Qty: 10 mL, Refills: 1    Associated Diagnoses: Type 1 diabetes mellitus with hyperglycemia (HCC)      Insulin Pen Needle (BD Pen Needle Deanna U/F) 32G X 4 MM MISC Use 4/day  Qty: 100 each, Refills: 11    Associated Diagnoses: Type 1 diabetes mellitus with hyperglycemia (HCC)      Insulin Syringe-Needle U-100 (INSULIN SYRINGE 1CC/28G) 28G X 1/2\" 1 ML MISC       !! levothyroxine (Synthroid) 25 mcg tablet Take 1 tablet (25 mcg total) by mouth daily in the early morning Combine with 200mcg, for total 225mcg daily.  Qty: 90 tablet, Refills: 3    Associated Diagnoses: Hypothyroidism due to Hashimoto's thyroiditis      !! levothyroxine 200 mcg tablet Take 1 tablet (200 mcg total) by mouth every morning  Qty: 90 tablet, Refills: 3    Associated Diagnoses: Hypothyroidism due to Hashimoto's thyroiditis      ondansetron (ZOFRAN-ODT) 4 mg disintegrating tablet Take 1 tablet (4 mg total) by mouth every 6 (six) hours as needed for nausea or vomiting  Qty: 20 tablet, Refills: 0    Associated Diagnoses: Concussion      traZODone (DESYREL) 100 mg tablet Take 100 mg by mouth daily at bedtime      Accu-Chek FastClix Lancets MISC USE 1 LANCET TO TEST BLOOD SUGAR UP TO 6 TIMES DAILY  Qty: 102 each, Refills: 0    Associated Diagnoses: Type 1 diabetes mellitus with hyperglycemia (HCC)      acetaminophen (TYLENOL) 500 mg tablet        albuterol (ProAir HFA) 90 mcg/act inhaler Inhale 2 puffs every 6 (six) hours as needed for wheezing  Qty: 8.5 g, Refills: 0    Comments: Substitution to a formulary equivalent within the same pharmaceutical class is authorized.  Associated Diagnoses: Acute maxillary sinusitis, recurrence not specified; Bronchitis; Encounter for laboratory testing for COVID-19 virus "      benzonatate (TESSALON PERLES) 100 mg capsule Take 1 capsule (100 mg total) by mouth 3 (three) times a day as needed for cough  Qty: 20 capsule, Refills: 0    Associated Diagnoses: Acute maxillary sinusitis, recurrence not specified; Bronchitis; Encounter for laboratory testing for COVID-19 virus      budesonide (Pulmicort Flexhaler) 90 MCG/ACT inhaler Inhale 1 puff 2 (two) times a day Rinse mouth after use.  Qty: 1 each, Refills: 0    Associated Diagnoses: Acute maxillary sinusitis, recurrence not specified; Bronchitis; Encounter for laboratory testing for COVID-19 virus      butalbital-acetaminophen-caffeine (Esgic) -40 mg per tablet Take 1 tablet by mouth every 6 (six) hours as needed for headaches for up to 15 doses  Qty: 15 tablet, Refills: 0    Associated Diagnoses: Concussion      !! Continuous Blood Gluc  (FreeStyle Samantha 14 Day [place]) KELVIN Use 1 Units in the morning  Qty: 1 each, Refills: 0    Associated Diagnoses: Type 1 diabetes mellitus with hyperglycemia (HCC)      !! Continuous Blood Gluc  (FreeStyle Samantha 2 [place]) KELVIN Use 1 units in the morning  Qty: 1 each, Refills: 3    Associated Diagnoses: Type 1 diabetes mellitus with hyperglycemia (HCC)      !! Continuous Blood Gluc Sensor (FreeStyle Samantha 14 Day Sensor) MISC Use 1 Units every 14 (fourteen) days  Qty: 2 each, Refills: 3    Associated Diagnoses: Type 1 diabetes mellitus with hyperglycemia (HCC)      !! Continuous Blood Gluc Sensor (FreeStyle Samantha 2 Sensor) MISC Use 1 unit every 14 days  Qty: 2 each, Refills: 3    Associated Diagnoses: Type 1 diabetes mellitus with hyperglycemia (HCC)      gabapentin (NEURONTIN) 600 MG tablet Take 0.5 tablets (300 mg total) by mouth 2 (two) times a day Half Tab 300mg at bedtime  Qty: 30 tablet, Refills: 1    Associated Diagnoses: Alcohol use disorder, moderate, in early remission (HCC); Anxiety and depression      glucose blood (Accu-Chek Guide) test strip Use to test blood sugar up  to 6 times daily.  Qty: 200 each, Refills: 2    Associated Diagnoses: Type 1 diabetes mellitus with hyperglycemia (HCC)       !! - Potential duplicate medications found. Please discuss with provider.          No discharge procedures on file.    PDMP Review       None            ED Provider  Electronically Signed by             Glory Henson MD  02/13/24 0052       Glory Henson MD  02/13/24 0053

## 2024-02-13 ENCOUNTER — APPOINTMENT (INPATIENT)
Dept: ULTRASOUND IMAGING | Facility: HOSPITAL | Age: 44
DRG: 638 | End: 2024-02-13
Payer: COMMERCIAL

## 2024-02-13 PROBLEM — R82.90 ABNORMAL URINALYSIS: Status: ACTIVE | Noted: 2024-02-13

## 2024-02-13 LAB
ANION GAP SERPL CALCULATED.3IONS-SCNC: 4 MMOL/L
ATRIAL RATE: 83 BPM
BUN SERPL-MCNC: 14 MG/DL (ref 5–25)
CALCIUM SERPL-MCNC: 8 MG/DL (ref 8.4–10.2)
CHLORIDE SERPL-SCNC: 107 MMOL/L (ref 96–108)
CO2 SERPL-SCNC: 26 MMOL/L (ref 21–32)
CORTIS AM PEAK SERPL-MCNC: 11 UG/DL (ref 6.7–22.6)
CREAT SERPL-MCNC: 0.83 MG/DL (ref 0.6–1.3)
ERYTHROCYTE [DISTWIDTH] IN BLOOD BY AUTOMATED COUNT: 12.9 % (ref 11.6–15.1)
EST. AVERAGE GLUCOSE BLD GHB EST-MCNC: 298 MG/DL
FOLATE SERPL-MCNC: 10.3 NG/ML
GFR SERPL CREATININE-BSD FRML MDRD: 87 ML/MIN/1.73SQ M
GLUCOSE SERPL-MCNC: 114 MG/DL (ref 65–140)
GLUCOSE SERPL-MCNC: 120 MG/DL (ref 65–140)
GLUCOSE SERPL-MCNC: 165 MG/DL (ref 65–140)
GLUCOSE SERPL-MCNC: 216 MG/DL (ref 65–140)
GLUCOSE SERPL-MCNC: 235 MG/DL (ref 65–140)
GLUCOSE SERPL-MCNC: 237 MG/DL (ref 65–140)
GLUCOSE SERPL-MCNC: 286 MG/DL (ref 65–140)
GLUCOSE SERPL-MCNC: 96 MG/DL (ref 65–140)
HBA1C MFR BLD: 12 %
HCT VFR BLD AUTO: 31.3 % (ref 34.8–46.1)
HGB BLD-MCNC: 10.1 G/DL (ref 11.5–15.4)
MCH RBC QN AUTO: 28.8 PG (ref 26.8–34.3)
MCHC RBC AUTO-ENTMCNC: 32.3 G/DL (ref 31.4–37.4)
MCV RBC AUTO: 89 FL (ref 82–98)
P AXIS: 76 DEGREES
PLATELET # BLD AUTO: 138 THOUSANDS/UL (ref 149–390)
PMV BLD AUTO: 9.6 FL (ref 8.9–12.7)
POTASSIUM SERPL-SCNC: 3.5 MMOL/L (ref 3.5–5.3)
PR INTERVAL: 150 MS
QRS AXIS: 90 DEGREES
QRSD INTERVAL: 86 MS
QT INTERVAL: 400 MS
QTC INTERVAL: 470 MS
RBC # BLD AUTO: 3.51 MILLION/UL (ref 3.81–5.12)
SODIUM SERPL-SCNC: 137 MMOL/L (ref 135–147)
T WAVE AXIS: 68 DEGREES
VENTRICULAR RATE: 83 BPM
WBC # BLD AUTO: 2.4 THOUSAND/UL (ref 4.31–10.16)

## 2024-02-13 PROCEDURE — 99233 SBSQ HOSP IP/OBS HIGH 50: CPT | Performed by: FAMILY MEDICINE

## 2024-02-13 PROCEDURE — 82533 TOTAL CORTISOL: CPT | Performed by: INTERNAL MEDICINE

## 2024-02-13 PROCEDURE — 87493 C DIFF AMPLIFIED PROBE: CPT | Performed by: INTERNAL MEDICINE

## 2024-02-13 PROCEDURE — 82948 REAGENT STRIP/BLOOD GLUCOSE: CPT

## 2024-02-13 PROCEDURE — 84207 ASSAY OF VITAMIN B-6: CPT | Performed by: INTERNAL MEDICINE

## 2024-02-13 PROCEDURE — 85027 COMPLETE CBC AUTOMATED: CPT | Performed by: INTERNAL MEDICINE

## 2024-02-13 PROCEDURE — 76705 ECHO EXAM OF ABDOMEN: CPT

## 2024-02-13 PROCEDURE — 80048 BASIC METABOLIC PNL TOTAL CA: CPT | Performed by: INTERNAL MEDICINE

## 2024-02-13 PROCEDURE — 93010 ELECTROCARDIOGRAM REPORT: CPT | Performed by: INTERNAL MEDICINE

## 2024-02-13 PROCEDURE — 82607 VITAMIN B-12: CPT | Performed by: INTERNAL MEDICINE

## 2024-02-13 PROCEDURE — 82746 ASSAY OF FOLIC ACID SERUM: CPT | Performed by: INTERNAL MEDICINE

## 2024-02-13 RX ADMIN — LEVOTHYROXINE SODIUM 250 MCG: 100 TABLET ORAL at 05:09

## 2024-02-13 RX ADMIN — TRAZODONE HYDROCHLORIDE 200 MG: 100 TABLET ORAL at 21:55

## 2024-02-13 RX ADMIN — INSULIN LISPRO 3 UNITS: 100 INJECTION, SOLUTION INTRAVENOUS; SUBCUTANEOUS at 18:35

## 2024-02-13 RX ADMIN — FOLIC ACID 1 MG: 1 TABLET ORAL at 08:17

## 2024-02-13 RX ADMIN — THIAMINE HCL TAB 100 MG 100 MG: 100 TAB at 08:17

## 2024-02-13 RX ADMIN — INSULIN LISPRO 2 UNITS: 100 INJECTION, SOLUTION INTRAVENOUS; SUBCUTANEOUS at 12:05

## 2024-02-13 RX ADMIN — INSULIN GLARGINE 10 UNITS: 100 INJECTION, SOLUTION SUBCUTANEOUS at 21:55

## 2024-02-13 NOTE — ASSESSMENT & PLAN NOTE
Patient follows with mental health team as an outpatient  Continue home trazodone 200 mg nightly as well as Lexapro 20 mg every morning  Patient notes she does need a refill on her Lexapro at discharge as she ran out yesterday

## 2024-02-13 NOTE — UTILIZATION REVIEW
Initial Clinical Review    Admission: Date/Time/Statement:   Admission Orders (From admission, onward)       Ordered        02/12/24 1925  INPATIENT ADMISSION  Once                          Orders Placed This Encounter   Procedures    INPATIENT ADMISSION     Standing Status:   Standing     Number of Occurrences:   1     Order Specific Question:   Level of Care     Answer:   Level 2 Stepdown / HOT [14]     Order Specific Question:   Estimated length of stay     Answer:   More than 2 Midnights     Order Specific Question:   Certification     Answer:   I certify that inpatient services are medically necessary for this patient for a duration of greater than two midnights. See H&P and MD Progress Notes for additional information about the patient's course of treatment.     ED Arrival Information       Expected   -    Arrival   2/12/2024 16:12    Acuity   Emergent              Means of arrival   Walk-In    Escorted by   Self    Service   Hospitalist    Admission type   Emergency              Arrival complaint   low blood sugar             Chief Complaint   Patient presents with    Hypoglycemia - Symptomatic     Pt reports multiple episodes where blood sugar has crashed 30/40's. Pt reporting weakness, nausea, aching.        Initial Presentation: 43 y.o. female presents to the ED from home with c/o hypoglycemia x 1 wk, nausea, has been taking usual insulin dosage, overall feels terrible x 1 wk.  Fatigued, brain fog, sleepiness, aching, abd cramping, water diarrhea x 3 days.  PMH: s/p Amanda, Type 1 DM, Hashimoto's thyroiditis, alcohol use, h/o gastric bypass, chronic leukopenia 2022. Anxiety, depression, iron def anemia and B 12 deficiency.  In the ED she was HTN, initial glucose 77.  Labs - abnormal UA, neutropenia, low glucose, elevated AST, A1C.  Imaging - no acute disease.    Treated with IV fluids, IV D50, IV D 10, IV antibiotics.    On exam no deficits.  She is admitted to INPATIENT status with Hypoglycemia d/t type 1  DM, N/V/D, alcohol use, hashimotos's thyroiditis, chronic anemia - D10 infusion (now d/c 2/12), AM cortisol, C didd, increase Synthroid to 250 mcg, CIWA - initial 3, Thiamine, folic acid, check B12, B6.      Date: 2/13   Day 2:   Off IV fluids today.  Pt is planning on switching to an insulin pump. ETOH may be planning a part.  CIWA 1 & 0 today.  Will get Endocrinology consult.  Was treated with half of home Lantus dose last night, liver US pending, restart vitamins, poss cystitis, starting antibiotics, follow urine culture, Cortisol WNL.  On exam pt is anxious and tearful today.  VS stable.      2/13 Endocrinology Consult - P       ED Triage Vitals   Temperature Pulse Respirations Blood Pressure SpO2   02/12/24 1616 02/12/24 1616 02/12/24 1616 02/12/24 1616 02/12/24 1616   97.8 °F (36.6 °C) 83 16 163/94 99 %      Temp Source Heart Rate Source Patient Position - Orthostatic VS BP Location FiO2 (%)   02/12/24 1616 -- 02/12/24 2028 02/12/24 2028 --   Oral  Lying Right arm       Pain Score       02/12/24 2028       No Pain          Wt Readings from Last 1 Encounters:   02/13/24 78 kg (172 lb)     Additional Vital Signs:   Date/Time Temp Pulse Resp BP MAP (mmHg) SpO2 O2 Device Patient Position - Orthostatic VS   02/13/24 1607 98.4 °F (36.9 °C) 82 18 138/90 109 94 % None (Room air) Lying   02/13/24 1300 97.6 °F (36.4 °C) 90 18 141/90 101 94 % None (Room air) Lying   02/13/24 1128 97.9 °F (36.6 °C) 76 18 143/84 97 95 % None (Room air) Lying   02/13/24 0738 98.3 °F (36.8 °C) 78 18 124/85 93 97 % None (Room air) Lying   02/13/24 0730 -- -- -- -- -- -- None (Room air) --   02/13/24 0301 97.9 °F (36.6 °C) 77 18 116/72 85 95 % None (Room air) Lying   02/12/24 2307 96.8 °F (36 °C) Abnormal  82 18 100/75 80 93 % None (Room air) Lying   02/12/24 2234 -- -- -- -- -- 96 % None (Room air) --   02/12/24 2148 -- -- -- -- -- -- None (Room air) --   02/12/24 2028 96.4 °F (35.8 °C) Abnormal  86 19 138/89 99 95 % None (Room air) Lying    02/12/24 2000 -- 94 21 111/55 78 95 % -- --   02/12/24 1900 -- 86 16 143/83 107 96 % -- --   02/12/24 1800 -- 81 21 129/80 100 97 % -- --       Pertinent Labs/Diagnostic Test Results:   US right upper quadrant   Final Result by Bubba So DO (02/13 1404)      1. No acute sonographic findings within the right upper quadrant.   2. Status post cholecystectomy.      Workstation performed: [id number]           Results from last 7 days   Lab Units 02/12/24  2213   SARS-COV-2  Negative     Results from last 7 days   Lab Units 02/13/24  0508 02/12/24  1709   WBC Thousand/uL 2.40* 3.33*   HEMOGLOBIN g/dL 10.1* 11.4*   HEMATOCRIT % 31.3* 35.6   PLATELETS Thousands/uL 138* 183   NEUTROS ABS Thousands/µL  --  1.47*         Results from last 7 days   Lab Units 02/13/24  0508 02/12/24  1709   SODIUM mmol/L 137 137   POTASSIUM mmol/L 3.5 3.8   CHLORIDE mmol/L 107 101   CO2 mmol/L 26 29   ANION GAP mmol/L 4 7   BUN mg/dL 14 20   CREATININE mg/dL 0.83 0.95   EGFR ml/min/1.73sq m 87 74   CALCIUM mg/dL 8.0* 9.2     Results from last 7 days   Lab Units 02/12/24  1709   AST U/L 42*   ALT U/L 34   ALK PHOS U/L 100   TOTAL PROTEIN g/dL 7.2   ALBUMIN g/dL 4.1   TOTAL BILIRUBIN mg/dL 0.41     Results from last 7 days   Lab Units 02/13/24  1610 02/13/24  1149 02/13/24  1058 02/13/24  0708 02/13/24  0508 02/13/24  0301 02/13/24  0101 02/12/24  2313 02/12/24  2052 02/12/24  1906 02/12/24  1841 02/12/24  1752   POC GLUCOSE mg/dl 286* 237* 216* 96 114 165* 235* 291* 271* 52* 65 47*     Results from last 7 days   Lab Units 02/13/24  0508 02/12/24  1709   GLUCOSE RANDOM mg/dL 120 77         Results from last 7 days   Lab Units 02/12/24  1709   HEMOGLOBIN A1C % 12.0*   EAG mg/dl 298     BETA-HYDROXYBUTYRATE   Date Value Ref Range Status   01/17/2024 0.1 <0.6 mmol/L Final   10/21/2023 5.6 (H) <0.6 mmol/L Final   06/13/2023 7.3 (H) <0.6 mmol/L Final   05/23/2021 6.6 (H) <0.6 mmol/L Final   12/01/2020 0.0 <0.6 mmol/L Final   11/30/2020 4.6  (H) <0.6 mmol/L Final   08/06/2020 5.7 (H) <0.6 mmol/L Final   07/20/2020 0.4 <0.6 mmol/L Final   06/23/2020 5.3 (H) <0.6 mmol/L Final   12/28/2018 1.09 (H) 0.02 - 0.27 mmol/L Final   12/27/2018 2.69 (H) 0.02 - 0.27 mmol/L Final        Results from last 7 days   Lab Units 02/12/24 2113 02/12/24 1910 02/12/24  1709   HS TNI 0HR ng/L  --   --  2   HS TNI 2HR ng/L  --  <2  --    HSTNI D2 ng/L  --  <0  --    HS TNI 4HR ng/L 2  --   --    HSTNI D4 ng/L 0  --   --              Results from last 7 days   Lab Units 02/12/24  1709   TSH 3RD GENERATON uIU/mL 145.664*         Results from last 7 days   Lab Units 02/12/24  1852   LACTIC ACID mmol/L 0.8     Results from last 7 days   Lab Units 02/12/24  1709   LIPASE u/L 52                 Results from last 7 days   Lab Units 02/12/24  1709   CLARITY UA  Cloudy   COLOR UA  Yellow   SPEC GRAV UA  1.025   PH UA  5.5   GLUCOSE UA mg/dl >=1000 (1%)*   KETONES UA mg/dl Negative   BLOOD UA  Trace*   PROTEIN UA mg/dl 30 (1+)*   NITRITE UA  Positive*   BILIRUBIN UA  Negative   UROBILINOGEN UA E.U./dl 0.2   LEUKOCYTES UA  Small*   WBC UA /hpf Innumerable*   RBC UA /hpf 4-10*   BACTERIA UA /hpf Innumerable*   EPITHELIAL CELLS WET PREP /hpf Occasional   MUCUS THREADS  Occasional*     Results from last 7 days   Lab Units 02/12/24  2213   INFLUENZA A PCR  Negative   INFLUENZA B PCR  Negative   RSV PCR  Negative     Results from last 7 days   Lab Units 02/12/24 1910 02/12/24 1852 02/12/24  1709   BLOOD CULTURE  Received in Microbiology Lab. Culture in Progress. Received in Microbiology Lab. Culture in Progress.  --    URINE CULTURE   --   --  60,000-69,000 cfu/ml Gram Negative José Miguel Enteric Like*     ED Treatment:   Medication Administration from 02/12/2024 1612 to 02/12/2024 2023         Date/Time Order Dose Route Action     02/12/2024 1710 EST sodium chloride 0.9 % bolus 1,000 mL 1,000 mL Intravenous New Bag     02/12/2024 1757 EST dextrose 50 % IV solution 25 mL 25 mL Intravenous Given      02/12/2024 1915 EST cefTRIAXone (ROCEPHIN) IVPB (premix in dextrose) 1,000 mg 50 mL 1,000 mg Intravenous New Bag     02/12/2024 1937 EST dextrose infusion 10 % 100 mL/hr Intravenous New Bag          Past Medical History:   Diagnosis Date    Anemia     Anxiety     B12 deficiency     Cervical disc disorder     On gabapentin     Depression     Diabetes mellitus (HCC)     Disease of thyroid gland     hypothyroid    Iron deficiency anemia     Panic attack     PTSD (post-traumatic stress disorder)     Sleep difficulties     Substance abuse (HCC)     Type 1 diabetes (HCC)     Vitamin D deficiency      Present on Admission:   Anxiety and depression   B12 deficiency   Hypothyroidism due to Hashimoto's thyroiditis   Iron deficiency anemia   Leukopenia      Admitting Diagnosis: UTI (urinary tract infection) [N39.0]  Hypoglycemia [E16.2]  Hypothyroidism [E03.9]  Low blood sugar [E16.2]  Generalized weakness [R53.1]  Age/Sex: 43 y.o. female  Admission Orders:  Scheduled Medications:  docusate sodium, 100 mg, Oral, BID  fluticasone, 1 puff, Inhalation, Daily  folic acid, 1 mg, Oral, Daily  insulin glargine, 10 Units, Subcutaneous, HS  insulin lispro, 1-5 Units, Subcutaneous, TID AC  levothyroxine, 250 mcg, Oral, QAM  thiamine, 100 mg, Oral, Daily  traZODone, 200 mg, Oral, HS      Continuous IV Infusions:    IV D5W then D5/0.45% NACL @ 100 ml/hr - d/c 2/13     PRN Meds:  albuterol, 2 puff, Inhalation, Q6H PRN  ondansetron, 4 mg, Intravenous, Q6H PRN    C diff  POC GLUCOSE AC/HS WITH SSI COVERAGE   Thiamine, folic acid, ambulate  CIWA  Cont pulse ox  IP CONSULT TO ENDOCRINOLOGY  IP CONSULT TO CASE MANAGEMENT    Network Utilization Review Department  ATTENTION: Please call with any questions or concerns to [phone] and carefully listen to the prompts so that you are directed to the right person. All voicemails are confidential.   For Discharge needs, contact Care Management DC Support Team at [phone] opt. [phone]  Send all  requests for admission clinical reviews, approved or denied determinations and any other requests to dedicated fax number below belonging to the campus where the patient is receiving treatment. List of dedicated fax numbers for the Facilities:  FACILITY NAME UR FAX NUMBER   ADMISSION DENIALS (Administrative/Medical Necessity) [phone]   DISCHARGE SUPPORT TEAM (NETWORK) [phone]   PARENT CHILD HEALTH (Maternity/NICU/Pediatrics) [phone]   [facility] [phone]   [facility] [phone]   [facility] [phone]   [facility] [phone]   [facility] [phone]   [facility] [phone]   [facility] [phone]   [facility] [phone]   [facility] [phone]   [facility] [phone]   [facility] [phone]   [facility] [phone]

## 2024-02-13 NOTE — ASSESSMENT & PLAN NOTE
UA consistent with possible cystitis although patient denies significant urinary symptoms  Given her acute illness we will for now proceed with antibiotics, follow-up urine culture results

## 2024-02-13 NOTE — ASSESSMENT & PLAN NOTE
Patient is a 43-year-old female with history of type 1 diabetes since 1992 who presented to the ER with 1 week of refractory hypoglycemia.  In the ER she was noted to have hypoglycemia and required starting D10 infusion    Recent Labs     02/13/24  0508 02/13/24  0708 02/13/24  1058 02/13/24  1149   POCGLU 114 96 216* 237*       Patient previously followed with [facility] endocrinology and was last seen 11/22 in the office  At home she takes Lantus 20 units at bedtime  She takes NovoLog 3 times a day with meals based on carb counting 1 unit for every 8 g of carbs: She typically takes approximately 3-4 units before breakfast, lunch, dinner  Patient notes the last week she has had an upset stomach, with nausea, occasional vomiting, and has diarrhea the last 3 days.  She states however she was able to keep food down and continue doing her typical dose of insulin  The patient is also noting she is awaiting an insulin pump and plans to transition to insulin pump in favor of current subcutaneous insulin regimen  Her hemoglobin A1c is noted to be significantly elevated  Patient was placed on a D10 infusion for refractory hypoglycemia in the ER despite tolerating oral diet  Admitting physician discussed with endocrinology  Alcohol could be contributing to hypoglycemia  Given half of home lantus at night, 10u instead of 20u  Checked morning cortisol   Patient is status post gastric bypass: Prior to this she states she never had postprandial hypoglycemia  Patient also with significant hypothyroidism  Patient also with recent COVID infection and currently with UA suspicious for UTI  Formal Endocrinology consult placed. Will await further endocrinology input

## 2024-02-13 NOTE — PLAN OF CARE
Problem: Potential for Falls  Goal: Patient will remain free of falls  Description: INTERVENTIONS:  - Educate patient/family on patient safety including physical limitations  - Instruct patient to call for assistance with activity   - Consult OT/PT to assist with strengthening/mobility   - Keep Call bell within reach  - Keep bed low and locked with side rails adjusted as appropriate  - Keep care items and personal belongings within reach  - Initiate and maintain comfort rounds  Outcome: Progressing     Problem: PAIN - ADULT  Goal: Verbalizes/displays adequate comfort level or baseline comfort level  Description: Interventions:  - Encourage patient to monitor pain and request assistance  - Assess pain using appropriate pain scale  - Administer analgesics based on type and severity of pain and evaluate response  - Implement non-pharmacological measures as appropriate and evaluate response  - Consider cultural and social influences on pain and pain management  - Notify physician/advanced practitioner if interventions unsuccessful or patient reports new pain  Outcome: Progressing     Problem: INFECTION - ADULT  Goal: Absence or prevention of progression during hospitalization  Description: INTERVENTIONS:  - Assess and monitor for signs and symptoms of infection  - Monitor lab/diagnostic results  - Monitor all insertion sites, i.e. indwelling lines, tubes, and drains  - Monitor endotracheal if appropriate and nasal secretions for changes in amount and color  - Perry appropriate cooling/warming therapies per order  - Administer medications as ordered  - Instruct and encourage patient and family to use good hand hygiene technique  - Identify and instruct in appropriate isolation precautions for identified infection/condition  Outcome: Progressing     Problem: SAFETY ADULT  Goal: Maintain or return to baseline ADL function  Description: INTERVENTIONS:  -  Assess patient's ability to carry out ADLs; assess patient's  baseline for ADL function and identify physical deficits which impact ability to perform ADLs (bathing, care of mouth/teeth, toileting, grooming, dressing, etc.)  - Assess/evaluate cause of self-care deficits   - Assess range of motion  - Assess patient's mobility; develop plan if impaired  - Assess patient's need for assistive devices and provide as appropriate  - Encourage maximum independence but intervene and supervise when necessary  - Involve family in performance of ADLs  - Assess for home care needs following discharge   - Consider OT consult to assist with ADL evaluation and planning for discharge  - Provide patient education as appropriate  Outcome: Progressing  Goal: Maintains/Returns to pre admission functional level  Description: INTERVENTIONS:  - Perform AM-PAC 6 Click Basic Mobility/ Daily Activity assessment daily.  - Set and communicate daily mobility goal to care team and patient/family/caregiver.   - Collaborate with rehabilitation services on mobility goals if consulted  Outcome: Progressing     Problem: DISCHARGE PLANNING  Goal: Discharge to home or other facility with appropriate resources  Description: INTERVENTIONS:  - Identify barriers to discharge w/patient and caregiver  - Arrange for needed discharge resources and transportation as appropriate  - Identify discharge learning needs (meds, wound care, etc.)  - Arrange for interpretive services to assist at discharge as needed  - Refer to Case Management Department for coordinating discharge planning if the patient needs post-hospital services based on physician/advanced practitioner order or complex needs related to functional status, cognitive ability, or social support system  Outcome: Progressing     Problem: Knowledge Deficit  Goal: Patient/family/caregiver demonstrates understanding of disease process, treatment plan, medications, and discharge instructions  Description: Complete learning assessment and assess knowledge  base.  Interventions:  - Provide teaching at level of understanding  - Provide teaching via preferred learning methods  Outcome: Progressing     Problem: METABOLIC, FLUID AND ELECTROLYTES - ADULT  Goal: Electrolytes maintained within normal limits  Description: INTERVENTIONS:  - Monitor labs and assess patient for signs and symptoms of electrolyte imbalances  - Administer electrolyte replacement as ordered  - Monitor response to electrolyte replacements, including repeat lab results as appropriate  - Instruct patient on fluid and nutrition as appropriate  Outcome: Progressing  Goal: Fluid balance maintained  Description: INTERVENTIONS:  - Monitor labs   - Monitor I/O and WT  - Instruct patient on fluid and nutrition as appropriate  - Assess for signs & symptoms of volume excess or deficit  Outcome: Progressing  Goal: Glucose maintained within target range  Description: INTERVENTIONS:  - Monitor Blood Glucose as ordered  - Assess for signs and symptoms of hyperglycemia and hypoglycemia  - Administer ordered medications to maintain glucose within target range  - Assess nutritional intake and initiate nutrition service referral as needed  Outcome: Progressing     Problem: GASTROINTESTINAL - ADULT  Goal: Minimal or absence of nausea and/or vomiting  Description: INTERVENTIONS:  - Administer IV fluids if ordered to ensure adequate hydration  - Maintain NPO status until nausea and vomiting are resolved  - Nasogastric tube if ordered  - Administer ordered antiemetic medications as needed  - Provide nonpharmacologic comfort measures as appropriate  - Advance diet as tolerated, if ordered  - Consider nutrition services referral to assist patient with adequate nutrition and appropriate food choices  Outcome: Progressing  Goal: Maintains or returns to baseline bowel function  Description: INTERVENTIONS:  - Assess bowel function  - Encourage oral fluids to ensure adequate hydration  - Administer IV fluids if ordered to ensure  adequate hydration  - Administer ordered medications as needed  - Encourage mobilization and activity  - Consider nutritional services referral to assist patient with adequate nutrition and appropriate food choices  Outcome: Progressing

## 2024-02-13 NOTE — ASSESSMENT & PLAN NOTE
Patient has a history of iron deficiency anemia  Follows with hematology oncology as an outpatient and receiving Venofer injections  Hb stable at 11

## 2024-02-13 NOTE — PLAN OF CARE
Problem: Potential for Falls  Goal: Patient will remain free of falls  Description: INTERVENTIONS:  - Educate patient/family on patient safety including physical limitations  - Instruct patient to call for assistance with activity   - Consult OT/PT to assist with strengthening/mobility   - Keep Call bell within reach  - Keep bed low and locked with side rails adjusted as appropriate  - Keep care items and personal belongings within reach  - Initiate and maintain comfort rounds  Outcome: Progressing     Problem: PAIN - ADULT  Goal: Verbalizes/displays adequate comfort level or baseline comfort level  Description: Interventions:  - Encourage patient to monitor pain and request assistance  - Assess pain using appropriate pain scale  - Administer analgesics based on type and severity of pain and evaluate response  - Implement non-pharmacological measures as appropriate and evaluate response  - Consider cultural and social influences on pain and pain management  - Notify physician/advanced practitioner if interventions unsuccessful or patient reports new pain  Outcome: Progressing     Problem: INFECTION - ADULT  Goal: Absence or prevention of progression during hospitalization  Description: INTERVENTIONS:  - Assess and monitor for signs and symptoms of infection  - Monitor lab/diagnostic results  - Monitor all insertion sites, i.e. indwelling lines, tubes, and drains  - Monitor endotracheal if appropriate and nasal secretions for changes in amount and color  - [place] appropriate cooling/warming therapies per order  - Administer medications as ordered  - Instruct and encourage patient and family to use good hand hygiene technique  - Identify and instruct in appropriate isolation precautions for identified infection/condition  Outcome: Progressing     Problem: SAFETY ADULT  Goal: Maintain or return to baseline ADL function  Description: INTERVENTIONS:  -  Assess patient's ability to carry out ADLs; assess patient's  baseline for ADL function and identify physical deficits which impact ability to perform ADLs (bathing, care of mouth/teeth, toileting, grooming, dressing, etc.)  - Assess/evaluate cause of self-care deficits   - Assess range of motion  - Assess patient's mobility; develop plan if impaired  - Assess patient's need for assistive devices and provide as appropriate  - Encourage maximum independence but intervene and supervise when necessary  - Involve family in performance of ADLs  - Assess for home care needs following discharge   - Consider OT consult to assist with ADL evaluation and planning for discharge  - Provide patient education as appropriate  Outcome: Progressing  Goal: Maintains/Returns to pre admission functional level  Description: INTERVENTIONS:  - Perform AM-PAC 6 Click Basic Mobility/ Daily Activity assessment daily.  - Set and communicate daily mobility goal to care team and patient/family/caregiver.   - Collaborate with rehabilitation services on mobility goals if consulted  Outcome: Progressing     Problem: DISCHARGE PLANNING  Goal: Discharge to home or other facility with appropriate resources  Description: INTERVENTIONS:  - Identify barriers to discharge w/patient and caregiver  - Arrange for needed discharge resources and transportation as appropriate  - Identify discharge learning needs (meds, wound care, etc.)  - Arrange for interpretive services to assist at discharge as needed  - Refer to Case Management Department for coordinating discharge planning if the patient needs post-hospital services based on physician/advanced practitioner order or complex needs related to functional status, cognitive ability, or social support system  Outcome: Progressing     Problem: Knowledge Deficit  Goal: Patient/family/caregiver demonstrates understanding of disease process, treatment plan, medications, and discharge instructions  Description: Complete learning assessment and assess knowledge  base.  Interventions:  - Provide teaching at level of understanding  - Provide teaching via preferred learning methods  Outcome: Progressing     Problem: METABOLIC, FLUID AND ELECTROLYTES - ADULT  Goal: Electrolytes maintained within normal limits  Description: INTERVENTIONS:  - Monitor labs and assess patient for signs and symptoms of electrolyte imbalances  - Administer electrolyte replacement as ordered  - Monitor response to electrolyte replacements, including repeat lab results as appropriate  - Instruct patient on fluid and nutrition as appropriate  Outcome: Progressing  Goal: Fluid balance maintained  Description: INTERVENTIONS:  - Monitor labs   - Monitor I/O and WT  - Instruct patient on fluid and nutrition as appropriate  - Assess for signs & symptoms of volume excess or deficit  Outcome: Progressing  Goal: Glucose maintained within target range  Description: INTERVENTIONS:  - Monitor Blood Glucose as ordered  - Assess for signs and symptoms of hyperglycemia and hypoglycemia  - Administer ordered medications to maintain glucose within target range  - Assess nutritional intake and initiate nutrition service referral as needed  Outcome: Progressing     Problem: GASTROINTESTINAL - ADULT  Goal: Minimal or absence of nausea and/or vomiting  Description: INTERVENTIONS:  - Administer IV fluids if ordered to ensure adequate hydration  - Maintain NPO status until nausea and vomiting are resolved  - Nasogastric tube if ordered  - Administer ordered antiemetic medications as needed  - Provide nonpharmacologic comfort measures as appropriate  - Advance diet as tolerated, if ordered  - Consider nutrition services referral to assist patient with adequate nutrition and appropriate food choices  Outcome: Progressing  Goal: Maintains or returns to baseline bowel function  Description: INTERVENTIONS:  - Assess bowel function  - Encourage oral fluids to ensure adequate hydration  - Administer IV fluids if ordered to ensure  adequate hydration  - Administer ordered medications as needed  - Encourage mobilization and activity  - Consider nutritional services referral to assist patient with adequate nutrition and appropriate food choices  Outcome: Progressing

## 2024-02-13 NOTE — ASSESSMENT & PLAN NOTE
Patient relates due to increased stress in her life she is drinking more alcohol than usual  She notes she drinks 2-3 beers, or 2 to 3 glasses of wine daily.  Complete cessation recommended especially in light of gastric bypass  Continue thiamine, folic acid  CIWA  With mildly elevated AST will obtain liver ultrasound

## 2024-02-13 NOTE — ASSESSMENT & PLAN NOTE
Patient is a 43-year-old female with history of type 1 diabetes since 1992 who presented to the ER with 1 week of refractory hypoglycemia.  In the ER she was noted to have hypoglycemia and required starting D10 infusion    Recent Labs     02/12/24  1619 02/12/24  1752 02/12/24  1841 02/12/24  1906   POCGLU 110 47* 65 52*     Patient previously followed with [facility] endocrinology and was last seen 11/22 in the office  At home she takes Lantus 20 units at bedtime  She takes NovoLog 3 times a day with meals based on carb counting 1 unit for every 8 g of carbs: She typically takes approximately 3-4 units before breakfast, lunch, dinner  Patient notes the last week she has had an upset stomach, with nausea, occasional vomiting, and has diarrhea the last 3 days.  She states however she was able to keep food down and continue doing her typical dose of insulin  Patient was placed on a D10 infusion for refractory hypoglycemia in the ER despite tolerating oral diet  Discussed with endocrinology  Alcohol could be contributing to hypoglycemia  Follow up -->will decrease dextrose infusion and give half of home lantus tonight, 10u instead of 20u  Check am cortisol dose  Patient is status post gastric bypass: Prior to this she states she never had postprandial hypoglycemia  Patient also with significant hypothyroidism

## 2024-02-13 NOTE — ASSESSMENT & PLAN NOTE
Patient relates due to increased stress in her life she is drinking more alcohol than usual  She notes she drinks 2-3 beers, or 2 to 3 glasses of wine daily.  Complete cessation recommended especially in light of gastric bypass  Start thiamine, folic acid  JR

## 2024-02-13 NOTE — ASSESSMENT & PLAN NOTE
Patient appears to have a chronic leukopenia as far back as 2022  She follows with hematology oncology as an outpatient for chronic anemia  Red blood cell count stable proximal.  Her baseline of 3.3.

## 2024-02-13 NOTE — ASSESSMENT & PLAN NOTE
Patient follows with [facility] endocrinology for hypothyroidism secondary to Hashimoto  Most recently saw endocrinology 11/2022  Has been on Synthroid 225 mcg since that time:  pt notes compliance with synthroid  Previous TSH was checked 11/2022 and was within normal limits  TSH this admission markedly increased at 145  Conferred with Endocrinology team  Increase synthroid to 250mcg  Check am cortisol

## 2024-02-13 NOTE — ASSESSMENT & PLAN NOTE
Reports loss of  4 years ago, single mother  Patient follows with mental health team as an outpatient  Continue home trazodone 200 mg nightly as well as Lexapro 20 mg every morning  Patient notes she does need a refill on her Lexapro at discharge as she ran day prior to discharge

## 2024-02-13 NOTE — H&P
[facility]  H&P  Name: Clarissa Webb 43 y.o. female I MRN: [id number]  Unit/Bed#: E4 -01 I Date of Admission: 2/12/2024   Date of Service: 2/12/2024 I Hospital Day: 0      Assessment/Plan   * Hypoglycemia due to type 1 diabetes mellitus (HCC)  Assessment & Plan  Patient is a 43-year-old female with history of type 1 diabetes since 1992 who presented to the ER with 1 week of refractory hypoglycemia.  In the ER she was noted to have hypoglycemia and required starting D10 infusion    Recent Labs     02/12/24  1619 02/12/24  1752 02/12/24  1841 02/12/24  1906   POCGLU 110 47* 65 52*     Patient previously followed with [facility] endocrinology and was last seen 11/22 in the office  At home she takes Lantus 20 units at bedtime  She takes NovoLog 3 times a day with meals based on carb counting 1 unit for every 8 g of carbs: She typically takes approximately 3-4 units before breakfast, lunch, dinner  Patient notes the last week she has had an upset stomach, with nausea, occasional vomiting, and has diarrhea the last 3 days.  She states however she was able to keep food down and continue doing her typical dose of insulin  Patient was placed on a D10 infusion for refractory hypoglycemia in the ER despite tolerating oral diet  Discussed with endocrinology  Alcohol could be contributing to hypoglycemia  Follow up -->will decrease dextrose infusion and give half of home lantus tonight, 10u instead of 20u  Check am cortisol dose  Patient is status post gastric bypass: Prior to this she states she never had postprandial hypoglycemia  Patient also with significant hypothyroidism    Nausea vomiting and diarrhea  Assessment & Plan  She was on antibiotics 2/6 with a Z-Hany for acute sinusitis  Patient notes a 1 week history of GI upset, nausea, occasional vomiting.  Currently tolerating p.o.  Patient notes 3-day history of watery diarrhea with abdominal cramping  Will check C. difficile  due to recent antibiotic use  Will also check covid/influenza pcr    Hypothyroidism due to Hashimoto's thyroiditis  Assessment & Plan  Patient follows with [facility] endocrinology for hypothyroidism secondary to Hashimoto  Most recently saw endocrinology 11/2022  Has been on Synthroid 225 mcg since that time:  pt notes compliance with synthroid  Previous TSH was checked 11/2022 and was within normal limits  TSH this admission markedly increased at 145  Conferred with Endocrinology team  Increase synthroid to 250mcg  Check am cortisol      Alcohol use  Assessment & Plan  Patient relates due to increased stress in her life she is drinking more alcohol than usual  She notes she drinks 2-3 beers, or 2 to 3 glasses of wine daily.  Complete cessation recommended especially in light of gastric bypass  Start thiamine, folic acid  CIWA    H/O gastric bypass  Assessment & Plan  Restart vitamins  Check B12, folate, B6    Leukopenia  Assessment & Plan  Patient appears to have a chronic leukopenia as far back as 2022  She follows with hematology oncology as an outpatient for chronic anemia  Red blood cell count stable proximal.  Her baseline of 3.3.    Anxiety and depression  Assessment & Plan  Patient follows with mental health team as an outpatient  Continue home trazodone 200 mg nightly as well as Lexapro 20 mg every morning  Patient notes she does need a refill on her Lexapro at discharge as she ran out yesterday    B12 deficiency  Assessment & Plan  Patient notes she had not been taking her supplements  Will recheck B12 level/folate    Iron deficiency anemia  Assessment & Plan  Patient has a history of iron deficiency anemia  Follows with hematology oncology as an outpatient and receiving Venofer injections  Hb stable at 11           ======================================================================    History of Present Illness     HPI:  Clarissa Webb is a 43 y.o. female who presents to the ER for evaluation of  hypoglycemia.  Patient relates for the past week she has been having low blood sugars.  She states she had nausea and upset stomach but she was still able to tolerate p.o.  She states she was taking her typical dose of insulin which is Lantus 20 units at bedtime and NovoLog 1 unit for every 8 g of carbohydrates: It would usually estimate to 3-4 units of NovoLog before meals.    Patient notes over the past week she has felt terrible.  She has fatigue.  Felt like she had brain fog.  She was very sleepy.  Felt achy all over.  She also notes nausea, upset stomach for the past week plus, abdominal cramping, and watery diarrhea for the past 3 days    Patient notes for the past week she has been having low blood sugars.  She has had previous gastric bypass surgery but notes in the past she never had hypoglycemia after eating.  Patient relates she would see her blood sugar of 30-40.  She would then eat a granola bar, or drink juice and it would improve her sugar, however shortly afterward dropped down again.  Patient became concerned and presented to the ER.        Historical Information   Past Medical History:   Diagnosis Date    Anemia     Anxiety     B12 deficiency     Cervical disc disorder     On gabapentin     Depression     Diabetes mellitus (HCC)     Disease of thyroid gland     hypothyroid    Iron deficiency anemia     Panic attack     PTSD (post-traumatic stress disorder)     Sleep difficulties     Substance abuse ([facility])     Type 1 diabetes ([facility])     Vitamin D deficiency      Patient Active Problem List   Diagnosis    Iron deficiency anemia    Vitamin D deficiency    B12 deficiency    Anxiety and depression    Depression, recurrent ([facility])    Leukopenia    THO (acute kidney injury) ([facility])    Hypothyroidism due to Hashimoto's thyroiditis    Bilateral carpal tunnel syndrome    Cervical spondylosis    H/O gastric bypass    Hydrosalpinx    Intestinal malabsorption, unspecified    Invagination of intestine ([facility])     Protrusion of cervical intervertebral disc    Overweight with body mass index (BMI) of 26 to 26.9 in adult    Hypothyroidism    Iron deficiency anemia, unspecified    Type 1 diabetes mellitus with hyperglycemia (HCC)    Genital labial ulcer    Herpes simplex infection of perianal skin    Concussion    Hypoglycemia due to type 1 diabetes mellitus (HCC)    Alcohol use    Nausea vomiting and diarrhea     Past Surgical History:   Procedure Laterality Date    ABDOMINAL SURGERY      gastric bypass     SECTION      x2    CHOLECYSTECTOMY  2018    GASTRIC BYPASS  2007    INTRAUTERINE DEVICE INSERTION  2015    IR BIOPSY BONE MARROW  2020    OTHER SURGICAL HISTORY      surgery for imperforate hymen/endometriosis/hydrometrocolpos    ROOT CANAL  2024    TUBAL LIGATION      WISDOM TOOTH EXTRACTION         Social History   Social History     Substance and Sexual Activity   Alcohol Use Yes    Comment: a couple beers a day     Social History     Substance and Sexual Activity   Drug Use No     Social History     Tobacco Use   Smoking Status Never   Smokeless Tobacco Never       Family History:   Family History   Problem Relation Age of Onset    Thyroid disease Mother     URIEL disease Mother     Hyperlipidemia Mother     Hypertension Mother     Osteoarthritis Mother     Thyroid disease unspecified Mother     Diabetes Father     Alcohol abuse Father     Diabetes type I Father     Thyroid disease Sister     Depression Sister     Thyroid disease unspecified Sister     Anxiety disorder Sister     Heart disease Maternal Grandmother     Hypertension Maternal Grandmother     Arthritis Maternal Grandmother     Diabetes type I Maternal Uncle     Diabetes type I Maternal Grandfather        Meds/Allergies       Current Facility-Administered Medications:     dextrose infusion 10 %, 100 mL/hr, Intravenous, Continuous, Glory Henson MD, Last Rate: 100 mL/hr at 24, 100 mL/hr at 24    No  Known Allergies    Review of Systems  A detailed 12 point review of systems was conducted and is negative apart from those mentioned in the HPI.        Objective   Vitals: Blood pressure 138/89, pulse 86, temperature (!) 96.4 °F (35.8 °C), temperature source Temporal, resp. rate 19, weight 76.7 kg (169 lb 1.5 oz), last menstrual period 01/21/2024, SpO2 95%, not currently breastfeeding.    Physical Exam   General: Very pleasant female.  No acute distress  HEENT: EOMI, sclera anicteric, dry mucous membranes.  Neck: supple,   Heart: Regular rate and rhythm, S1S2 present.  No murmur, rub or gallop.    Lungs; Clear to auscultation bilaterally.  No wheezing, crackles or rhonchi.  No accessory muscle use or respiratory distress.  Abdomen: soft, non-tender, non-distended, NABS.  No guarding or rebound. No peritoneal sound or mass.  Extremities: no clubbing, cyanosis, or edema.  2+ pedal pulses bilaterally.  Full range of motion.  Neurologic: Muscles of facial expression intact.  No facial droop.  Awake and alert.  Fluent speech.  Moving all 4 extremities.  Skin: warm and dry. No petechiae, purpura or rash.    Lab Results:   Results from last 7 days   Lab Units 02/12/24  1709   WBC Thousand/uL 3.33*   HEMOGLOBIN g/dL 11.4*   HEMATOCRIT % 35.6   PLATELETS Thousands/uL 183     Results from last 7 days   Lab Units 02/12/24  1709   POTASSIUM mmol/L 3.8   CHLORIDE mmol/L 101   CO2 mmol/L 29   BUN mg/dL 20   CREATININE mg/dL 0.95   CALCIUM mg/dL 9.2         EKG: Normal sinus rhythm.  No acute ST or T wave changes        Code Status: Full code    Disposition: Based on patient's refractory hypoglycemia, vomiting, and malaise should be admitted to the hospital: Anticipated length of stay greater than 2 midnights    Discussed with patient's nurse on the floor    Portions of the record may have been created with voice recognition software.

## 2024-02-13 NOTE — ASSESSMENT & PLAN NOTE
Patient appears to have a chronic leukopenia as far back as 2022  She follows with hematology oncology as an outpatient for chronic anemia  WBC count with a mild drop from 3.3 to 2.4 likely due to IVF

## 2024-02-13 NOTE — PROGRESS NOTES
[facility]  Progress Note  Name: Clarissa Webb I  MRN: [id number]  Unit/Bed#: E4 -01 I Date of Admission: 2/12/2024   Date of Service: 2/13/2024 I Hospital Day: 1    Assessment/Plan   * Hypoglycemia due to type 1 diabetes mellitus (HCC)  Assessment & Plan  Patient is a 43-year-old female with history of type 1 diabetes since 1992 who presented to the ER with 1 week of refractory hypoglycemia.  In the ER she was noted to have hypoglycemia and required starting D10 infusion    Recent Labs     02/13/24  0508 02/13/24  0708 02/13/24  1058 02/13/24  1149   POCGLU 114 96 216* 237*       Patient previously followed with [facility] endocrinology and was last seen 11/22 in the office  At home she takes Lantus 20 units at bedtime  She takes NovoLog 3 times a day with meals based on carb counting 1 unit for every 8 g of carbs: She typically takes approximately 3-4 units before breakfast, lunch, dinner  Patient notes the last week she has had an upset stomach, with nausea, occasional vomiting, and has diarrhea the last 3 days.  She states however she was able to keep food down and continue doing her typical dose of insulin  The patient is also noting she is awaiting an insulin pump and plans to transition to insulin pump in favor of current subcutaneous insulin regimen  Her hemoglobin A1c is noted to be significantly elevated  Patient was placed on a D10 infusion for refractory hypoglycemia in the ER despite tolerating oral diet  Admitting physician discussed with endocrinology  Alcohol could be contributing to hypoglycemia  Given half of home lantus at night, 10u instead of 20u  Checked morning cortisol   Patient is status post gastric bypass: Prior to this she states she never had postprandial hypoglycemia  Patient also with significant hypothyroidism  Patient also with recent COVID infection and currently with UA suspicious for UTI  Formal Endocrinology consult placed. Will await  further endocrinology input    Alcohol use  Assessment & Plan  Patient relates due to increased stress in her life she is drinking more alcohol than usual  She notes she drinks 2-3 beers, or 2 to 3 glasses of wine daily.  Complete cessation recommended especially in light of gastric bypass  Continue thiamine, folic acid  CIWA  With mildly elevated AST will obtain liver ultrasound    H/O gastric bypass  Assessment & Plan  Restarted vitamins  Folate level at goal, B12 level pending      Abnormal urinalysis  Assessment & Plan  UA consistent with possible cystitis although patient denies significant urinary symptoms  Given her acute illness we will for now proceed with antibiotics, follow-up urine culture results    Hypothyroidism due to Hashimoto's thyroiditis  Assessment & Plan  Patient follows with [facility] endocrinology for hypothyroidism secondary to Hashimoto  Most recently saw endocrinology 11/2022  Has been on Synthroid 225 mcg since that time:  pt notes compliance with synthroid  Previous TSH was checked 11/2022 and was within normal limits  TSH this admission markedly increased at 145, low T3/T4 low  Admitting physician conferred with Endocrinology team  Increased synthroid to 250mcg  Cortisol level 11 - within normal range      Leukopenia  Assessment & Plan  Patient appears to have a chronic leukopenia as far back as 2022  She follows with hematology oncology as an outpatient for chronic anemia  WBC count with a mild drop from 3.3 to 2.4 likely due to IVF    Anxiety and depression  Assessment & Plan  Reports loss of  4 years ago, single mother  Patient follows with mental health team as an outpatient  Continue home trazodone 200 mg nightly as well as Lexapro 20 mg every morning  Patient notes she does need a refill on her Lexapro at discharge as she ran day prior to discharge    B12 deficiency  Assessment & Plan  Patient notes she had not been taking her supplements  Folate normal, B12 level  pending    Iron deficiency anemia  Assessment & Plan  Patient has a history of iron deficiency anemia  Follows with hematology oncology as an outpatient and receiving Venofer injections  Hb stable at 11               VTE Pharmacologic Prophylaxis: VTE Score: 2 Low Risk (Score 0-2) - Encourage Ambulation.    Mobility:   Basic Mobility Inpatient Raw Score: 24  JH-HLM Goal: 8: Walk 250 feet or more  JH-HLM Achieved: 6: Walk 10 steps or more      Patient Centered Rounds: I performed bedside rounds with nursing staff today.   Discussions with Specialists or Other Care Team Provider: TOMY    Education and Discussions with Family / Patient:  patient.     Total Time Spent on Date of Encounter in care of patient: 30 mins. This time was spent on one or more of the following: performing physical exam; counseling and coordination of care; obtaining or reviewing history; documenting in the medical record; reviewing/ordering tests, medications or procedures; communicating with other healthcare professionals and discussing with patient's family/caregivers.    Current Length of Stay: 1 day(s)  Current Patient Status: Inpatient   Certification Statement: The patient will continue to require additional inpatient hospital stay due to close monitoring, specialist evaluation  Discharge Plan: Anticipate discharge in 24-48 hrs to home.    Code Status: Level 1 - Full Code    Subjective:   Patient voices her concern regarding her fluctuating blood sugars, as well as her severe anxiety which she partly attributes to her uncontrolled hypothyroidism.    Objective:     Vitals:   Temp (24hrs), Av.5 °F (36.4 °C), Min:96.4 °F (35.8 °C), Max:98.3 °F (36.8 °C)    Temp:  [96.4 °F (35.8 °C)-98.3 °F (36.8 °C)] 97.6 °F (36.4 °C)  HR:  [76-94] 90  Resp:  [16-21] 18  BP: (100-163)/(55-94) 141/90  SpO2:  [93 %-99 %] 94 %  Body mass index is 27.76 kg/m².     Input and Output Summary (last 24 hours):     Intake/Output Summary (Last 24 hours) at 2024  1513  Last data filed at 2/12/2024 1945  Gross per 24 hour   Intake 1050 ml   Output --   Net 1050 ml       Physical Exam:   Physical Exam  Constitutional:       General: She is not in acute distress.  HENT:      Head: Normocephalic and atraumatic.   Eyes:      Conjunctiva/sclera: Conjunctivae normal.   Cardiovascular:      Rate and Rhythm: Normal rate and regular rhythm.   Pulmonary:      Effort: No respiratory distress.   Abdominal:      General: There is no distension.      Tenderness: There is no abdominal tenderness. There is no guarding.   Musculoskeletal:      Right lower leg: No edema.      Left lower leg: No edema.   Skin:     General: Skin is warm and dry.   Neurological:      Mental Status: She is oriented to person, place, and time.   Psychiatric:         Mood and Affect: Affect is tearful.          Additional Data:     Labs:  Results from last 7 days   Lab Units 02/13/24  0508 02/12/24  1709   WBC Thousand/uL 2.40* 3.33*   HEMOGLOBIN g/dL 10.1* 11.4*   HEMATOCRIT % 31.3* 35.6   PLATELETS Thousands/uL 138* 183   NEUTROS PCT %  --  45   LYMPHS PCT %  --  42   MONOS PCT %  --  9   EOS PCT %  --  3     Results from last 7 days   Lab Units 02/13/24  0508 02/12/24  1709   SODIUM mmol/L 137 137   POTASSIUM mmol/L 3.5 3.8   CHLORIDE mmol/L 107 101   CO2 mmol/L 26 29   BUN mg/dL 14 20   CREATININE mg/dL 0.83 0.95   ANION GAP mmol/L 4 7   CALCIUM mg/dL 8.0* 9.2   ALBUMIN g/dL  --  4.1   TOTAL BILIRUBIN mg/dL  --  0.41   ALK PHOS U/L  --  100   ALT U/L  --  34   AST U/L  --  42*   GLUCOSE RANDOM mg/dL 120 77         Results from last 7 days   Lab Units 02/13/24  1149 02/13/24  1058 02/13/24  0708 02/13/24  0508 02/13/24  0301 02/13/24  0101 02/12/24  2313 02/12/24  2052 02/12/24  1906 02/12/24  1841 02/12/24  1752 02/12/24  1619   POC GLUCOSE mg/dl 237* 216* 96 114 165* 235* 291* 271* 52* 65 47* 110     Results from last 7 days   Lab Units 02/12/24  1709   HEMOGLOBIN A1C % 12.0*     Results from last 7 days   Lab  Units 02/12/24  1852   LACTIC ACID mmol/L 0.8       Lines/Drains:  Invasive Devices       Peripheral Intravenous Line  Duration             Peripheral IV 02/12/24 Left Antecubital <1 day    Peripheral IV 02/12/24 Proximal;Right;Ventral (anterior) Forearm <1 day                          Imaging: Reviewed radiology reports from this admission including: ultrasound(s) and Personally reviewed the following imaging: ultrasound(s) RUQ    Recent Cultures (last 7 days):   Results from last 7 days   Lab Units 02/12/24  1910 02/12/24  1852   BLOOD CULTURE  Received in Microbiology Lab. Culture in Progress. Received in Microbiology Lab. Culture in Progress.       Last 24 Hours Medication List:   Current Facility-Administered Medications   Medication Dose Route Frequency Provider Last Rate    albuterol  2 puff Inhalation Q6H PRN Elizabeth Benoit MD      docusate sodium  100 mg Oral BID Elizabeth Benoit MD      fluticasone  1 puff Inhalation Daily Elizabeth Benoit MD      folic acid  1 mg Oral Daily Elizabeth Benoit MD      insulin glargine  10 Units Subcutaneous HS Elizabeth Benoit MD      insulin lispro  1-5 Units Subcutaneous TID AC Elizabeth Benoit MD      levothyroxine  250 mcg Oral QAM Elizabeth Benoit MD      ondansetron  4 mg Intravenous Q6H PRN Elizabeth Benoit MD      thiamine  100 mg Oral Daily Elizabeth Benoit MD      traZODone  200 mg Oral HS Elizabeth Benoit MD          Today, Patient Was Seen By: Nancy Contreras MD    **Please Note: This note may have been constructed using a voice recognition system.**

## 2024-02-13 NOTE — ASSESSMENT & PLAN NOTE
Patient follows with [facility] endocrinology for hypothyroidism secondary to Hashimoto  Most recently saw endocrinology 11/2022  Has been on Synthroid 225 mcg since that time:  pt notes compliance with synthroid  Previous TSH was checked 11/2022 and was within normal limits  TSH this admission markedly increased at 145, low T3/T4 low  Admitting physician conferred with Endocrinology team  Increased synthroid to 250mcg  Cortisol level 11 - within normal range

## 2024-02-13 NOTE — ASSESSMENT & PLAN NOTE
She was on antibiotics 2/6 with a Z-Hany for acute sinusitis  Patient notes a 1 week history of GI upset, nausea, occasional vomiting.  Currently tolerating p.o.  Patient notes 3-day history of watery diarrhea with abdominal cramping  Will check C. difficile due to recent antibiotic use  Will also check covid/influenza pcr

## 2024-02-14 LAB
ALBUMIN SERPL BCP-MCNC: 3.3 G/DL (ref 3.5–5)
ALP SERPL-CCNC: 78 U/L (ref 34–104)
ALT SERPL W P-5'-P-CCNC: 32 U/L (ref 7–52)
ANION GAP SERPL CALCULATED.3IONS-SCNC: 5 MMOL/L
AST SERPL W P-5'-P-CCNC: 51 U/L (ref 13–39)
BACTERIA UR CULT: ABNORMAL
BACTERIA UR CULT: ABNORMAL
BASOPHILS # BLD AUTO: 0.02 THOUSANDS/ÂΜL (ref 0–0.1)
BASOPHILS NFR BLD AUTO: 1 % (ref 0–1)
BILIRUB SERPL-MCNC: 0.44 MG/DL (ref 0.2–1)
BUN SERPL-MCNC: 16 MG/DL (ref 5–25)
C DIFF TOX GENS STL QL NAA+PROBE: NEGATIVE
CALCIUM ALBUM COR SERPL-MCNC: 9.1 MG/DL (ref 8.3–10.1)
CALCIUM SERPL-MCNC: 8.5 MG/DL (ref 8.4–10.2)
CHLORIDE SERPL-SCNC: 102 MMOL/L (ref 96–108)
CO2 SERPL-SCNC: 27 MMOL/L (ref 21–32)
CREAT SERPL-MCNC: 0.88 MG/DL (ref 0.6–1.3)
EOSINOPHIL # BLD AUTO: 0.08 THOUSAND/ÂΜL (ref 0–0.61)
EOSINOPHIL NFR BLD AUTO: 4 % (ref 0–6)
ERYTHROCYTE [DISTWIDTH] IN BLOOD BY AUTOMATED COUNT: 12.6 % (ref 11.6–15.1)
GFR SERPL CREATININE-BSD FRML MDRD: 81 ML/MIN/1.73SQ M
GLUCOSE SERPL-MCNC: 188 MG/DL (ref 65–140)
GLUCOSE SERPL-MCNC: 233 MG/DL (ref 65–140)
GLUCOSE SERPL-MCNC: 327 MG/DL (ref 65–140)
GLUCOSE SERPL-MCNC: 348 MG/DL (ref 65–140)
HCT VFR BLD AUTO: 32.5 % (ref 34.8–46.1)
HGB BLD-MCNC: 10.5 G/DL (ref 11.5–15.4)
IMM GRANULOCYTES # BLD AUTO: 0.01 THOUSAND/UL (ref 0–0.2)
IMM GRANULOCYTES NFR BLD AUTO: 0 % (ref 0–2)
LYMPHOCYTES # BLD AUTO: 0.97 THOUSANDS/ÂΜL (ref 0.6–4.47)
LYMPHOCYTES NFR BLD AUTO: 42 % (ref 14–44)
MCH RBC QN AUTO: 29.5 PG (ref 26.8–34.3)
MCHC RBC AUTO-ENTMCNC: 32.3 G/DL (ref 31.4–37.4)
MCV RBC AUTO: 91 FL (ref 82–98)
MONOCYTES # BLD AUTO: 0.18 THOUSAND/ÂΜL (ref 0.17–1.22)
MONOCYTES NFR BLD AUTO: 8 % (ref 4–12)
NEUTROPHILS # BLD AUTO: 1.04 THOUSANDS/ÂΜL (ref 1.85–7.62)
NEUTS SEG NFR BLD AUTO: 45 % (ref 43–75)
NRBC BLD AUTO-RTO: 0 /100 WBCS
PLATELET # BLD AUTO: 132 THOUSANDS/UL (ref 149–390)
PMV BLD AUTO: 10.5 FL (ref 8.9–12.7)
POTASSIUM SERPL-SCNC: 4.2 MMOL/L (ref 3.5–5.3)
PROT SERPL-MCNC: 6.1 G/DL (ref 6.4–8.4)
RBC # BLD AUTO: 3.56 MILLION/UL (ref 3.81–5.12)
SODIUM SERPL-SCNC: 134 MMOL/L (ref 135–147)
WBC # BLD AUTO: 2.3 THOUSAND/UL (ref 4.31–10.16)

## 2024-02-14 PROCEDURE — 80053 COMPREHEN METABOLIC PANEL: CPT | Performed by: FAMILY MEDICINE

## 2024-02-14 PROCEDURE — 82948 REAGENT STRIP/BLOOD GLUCOSE: CPT

## 2024-02-14 PROCEDURE — 99448 NTRPROF PH1/NTRNET/EHR 21-30: CPT | Performed by: INTERNAL MEDICINE

## 2024-02-14 PROCEDURE — 85025 COMPLETE CBC W/AUTO DIFF WBC: CPT | Performed by: FAMILY MEDICINE

## 2024-02-14 PROCEDURE — 94762 N-INVAS EAR/PLS OXIMTRY CONT: CPT

## 2024-02-14 PROCEDURE — 99232 SBSQ HOSP IP/OBS MODERATE 35: CPT | Performed by: FAMILY MEDICINE

## 2024-02-14 RX ORDER — INSULIN GLARGINE 100 [IU]/ML
10 INJECTION, SOLUTION SUBCUTANEOUS ONCE
Status: COMPLETED | OUTPATIENT
Start: 2024-02-14 | End: 2024-02-14

## 2024-02-14 RX ORDER — INSULIN GLARGINE 100 [IU]/ML
12 INJECTION, SOLUTION SUBCUTANEOUS
Status: DISCONTINUED | OUTPATIENT
Start: 2024-02-14 | End: 2024-02-14

## 2024-02-14 RX ORDER — ALPRAZOLAM 0.5 MG/1
0.5 TABLET ORAL 3 TIMES DAILY PRN
Status: DISCONTINUED | OUTPATIENT
Start: 2024-02-14 | End: 2024-02-15 | Stop reason: HOSPADM

## 2024-02-14 RX ORDER — INSULIN LISPRO 100 [IU]/ML
4 INJECTION, SOLUTION INTRAVENOUS; SUBCUTANEOUS
Status: DISCONTINUED | OUTPATIENT
Start: 2024-02-14 | End: 2024-02-15 | Stop reason: HOSPADM

## 2024-02-14 RX ORDER — INSULIN GLARGINE 100 [IU]/ML
12 INJECTION, SOLUTION SUBCUTANEOUS EVERY MORNING
Status: DISCONTINUED | OUTPATIENT
Start: 2024-02-15 | End: 2024-02-14

## 2024-02-14 RX ORDER — INSULIN GLARGINE 100 [IU]/ML
12 INJECTION, SOLUTION SUBCUTANEOUS
Status: DISCONTINUED | OUTPATIENT
Start: 2024-02-14 | End: 2024-02-15

## 2024-02-14 RX ADMIN — LEVOTHYROXINE SODIUM 250 MCG: 100 TABLET ORAL at 05:50

## 2024-02-14 RX ADMIN — INSULIN LISPRO 4 UNITS: 100 INJECTION, SOLUTION INTRAVENOUS; SUBCUTANEOUS at 08:21

## 2024-02-14 RX ADMIN — INSULIN LISPRO 4 UNITS: 100 INJECTION, SOLUTION INTRAVENOUS; SUBCUTANEOUS at 08:24

## 2024-02-14 RX ADMIN — INSULIN LISPRO 2 UNITS: 100 INJECTION, SOLUTION INTRAVENOUS; SUBCUTANEOUS at 12:17

## 2024-02-14 RX ADMIN — INSULIN LISPRO 1 UNITS: 100 INJECTION, SOLUTION INTRAVENOUS; SUBCUTANEOUS at 17:13

## 2024-02-14 RX ADMIN — TRAZODONE HYDROCHLORIDE 200 MG: 100 TABLET ORAL at 21:14

## 2024-02-14 RX ADMIN — FOLIC ACID 1 MG: 1 TABLET ORAL at 08:20

## 2024-02-14 RX ADMIN — INSULIN GLARGINE 10 UNITS: 100 INJECTION, SOLUTION SUBCUTANEOUS at 08:26

## 2024-02-14 RX ADMIN — INSULIN LISPRO 4 UNITS: 100 INJECTION, SOLUTION INTRAVENOUS; SUBCUTANEOUS at 17:14

## 2024-02-14 RX ADMIN — INSULIN GLARGINE 12 UNITS: 100 INJECTION, SOLUTION SUBCUTANEOUS at 21:14

## 2024-02-14 RX ADMIN — THIAMINE HCL TAB 100 MG 100 MG: 100 TAB at 08:20

## 2024-02-14 RX ADMIN — INSULIN LISPRO 4 UNITS: 100 INJECTION, SOLUTION INTRAVENOUS; SUBCUTANEOUS at 12:17

## 2024-02-14 NOTE — ASSESSMENT & PLAN NOTE
Patient relates due to increased stress in her life she is drinking more alcohol than usual  She notes she drinks 2-3 beers, or 2 to 3 glasses of wine daily.  Complete cessation recommended especially in light of gastric bypass  Continue thiamine, folic acid  CIWA  With mildly elevated AST and ultrasound was obtained with unremarkable liver findings

## 2024-02-14 NOTE — ASSESSMENT & PLAN NOTE
Patient follows with [facility] endocrinology for hypothyroidism secondary to Hashimoto  Most recently saw endocrinology 11/2022  Has been on Synthroid 225 mcg since that time:  pt notes compliance with synthroid  Previous TSH was checked 11/2022 and was within normal limits  TSH this admission markedly increased at 145, low T3/T4 low.  Levothyroxine increased to 50 mcg daily  Admitting physician conferred with Endocrinology team  Increased synthroid to 250mcg  Cortisol level 11 - within normal range

## 2024-02-14 NOTE — PLAN OF CARE
Problem: Potential for Falls  Goal: Patient will remain free of falls  Description: INTERVENTIONS:  - Educate patient/family on patient safety including physical limitations  - Instruct patient to call for assistance with activity   - Consult OT/PT to assist with strengthening/mobility   - Keep Call bell within reach  - Keep bed low and locked with side rails adjusted as appropriate  - Keep care items and personal belongings within reach  - Initiate and maintain comfort rounds  Outcome: Progressing     Problem: PAIN - ADULT  Goal: Verbalizes/displays adequate comfort level or baseline comfort level  Description: Interventions:  - Encourage patient to monitor pain and request assistance  - Assess pain using appropriate pain scale  - Administer analgesics based on type and severity of pain and evaluate response  - Implement non-pharmacological measures as appropriate and evaluate response  - Consider cultural and social influences on pain and pain management  - Notify physician/advanced practitioner if interventions unsuccessful or patient reports new pain  Outcome: Progressing     Problem: INFECTION - ADULT  Goal: Absence or prevention of progression during hospitalization  Description: INTERVENTIONS:  - Assess and monitor for signs and symptoms of infection  - Monitor lab/diagnostic results  - Monitor all insertion sites, i.e. indwelling lines, tubes, and drains  - Monitor endotracheal if appropriate and nasal secretions for changes in amount and color  - [place] appropriate cooling/warming therapies per order  - Administer medications as ordered  - Instruct and encourage patient and family to use good hand hygiene technique  - Identify and instruct in appropriate isolation precautions for identified infection/condition  Outcome: Progressing     Problem: SAFETY ADULT  Goal: Maintain or return to baseline ADL function  Description: INTERVENTIONS:  -  Assess patient's ability to carry out ADLs; assess patient's  baseline for ADL function and identify physical deficits which impact ability to perform ADLs (bathing, care of mouth/teeth, toileting, grooming, dressing, etc.)  - Assess/evaluate cause of self-care deficits   - Assess range of motion  - Assess patient's mobility; develop plan if impaired  - Assess patient's need for assistive devices and provide as appropriate  - Encourage maximum independence but intervene and supervise when necessary  - Involve family in performance of ADLs  - Assess for home care needs following discharge   - Consider OT consult to assist with ADL evaluation and planning for discharge  - Provide patient education as appropriate  Outcome: Progressing  Goal: Maintains/Returns to pre admission functional level  Description: INTERVENTIONS:  - Perform AM-PAC 6 Click Basic Mobility/ Daily Activity assessment daily.  - Set and communicate daily mobility goal to care team and patient/family/caregiver.   - Collaborate with rehabilitation services on mobility goals if consulted  Outcome: Progressing     Problem: DISCHARGE PLANNING  Goal: Discharge to home or other facility with appropriate resources  Description: INTERVENTIONS:  - Identify barriers to discharge w/patient and caregiver  - Arrange for needed discharge resources and transportation as appropriate  - Identify discharge learning needs (meds, wound care, etc.)  - Arrange for interpretive services to assist at discharge as needed  - Refer to Case Management Department for coordinating discharge planning if the patient needs post-hospital services based on physician/advanced practitioner order or complex needs related to functional status, cognitive ability, or social support system  Outcome: Progressing     Problem: Knowledge Deficit  Goal: Patient/family/caregiver demonstrates understanding of disease process, treatment plan, medications, and discharge instructions  Description: Complete learning assessment and assess knowledge  base.  Interventions:  - Provide teaching at level of understanding  - Provide teaching via preferred learning methods  Outcome: Progressing     Problem: METABOLIC, FLUID AND ELECTROLYTES - ADULT  Goal: Electrolytes maintained within normal limits  Description: INTERVENTIONS:  - Monitor labs and assess patient for signs and symptoms of electrolyte imbalances  - Administer electrolyte replacement as ordered  - Monitor response to electrolyte replacements, including repeat lab results as appropriate  - Instruct patient on fluid and nutrition as appropriate  Outcome: Progressing  Goal: Fluid balance maintained  Description: INTERVENTIONS:  - Monitor labs   - Monitor I/O and WT  - Instruct patient on fluid and nutrition as appropriate  - Assess for signs & symptoms of volume excess or deficit  Outcome: Progressing  Goal: Glucose maintained within target range  Description: INTERVENTIONS:  - Monitor Blood Glucose as ordered  - Assess for signs and symptoms of hyperglycemia and hypoglycemia  - Administer ordered medications to maintain glucose within target range  - Assess nutritional intake and initiate nutrition service referral as needed  Outcome: Progressing     Problem: GASTROINTESTINAL - ADULT  Goal: Minimal or absence of nausea and/or vomiting  Description: INTERVENTIONS:  - Administer IV fluids if ordered to ensure adequate hydration  - Maintain NPO status until nausea and vomiting are resolved  - Nasogastric tube if ordered  - Administer ordered antiemetic medications as needed  - Provide nonpharmacologic comfort measures as appropriate  - Advance diet as tolerated, if ordered  - Consider nutrition services referral to assist patient with adequate nutrition and appropriate food choices  Outcome: Progressing  Goal: Maintains or returns to baseline bowel function  Description: INTERVENTIONS:  - Assess bowel function  - Encourage oral fluids to ensure adequate hydration  - Administer IV fluids if ordered to ensure  adequate hydration  - Administer ordered medications as needed  - Encourage mobilization and activity  - Consider nutritional services referral to assist patient with adequate nutrition and appropriate food choices  Outcome: Progressing

## 2024-02-14 NOTE — ASSESSMENT & PLAN NOTE
Patient has a history of iron deficiency anemia  Follows with hematology oncology as an outpatient and receiving Venofer injections  Hb stable around 10

## 2024-02-14 NOTE — CONSULTS
e-Consult (IPC)   Clarissa RAMIREZ Jon 43 y.o. female MRN: [id number]  Unit/Bed#: E4 -01 Encounter: [id number]      Reason for Consult / Principal Problem: Type 1 DM, Hypoglycemia, hypothyroidism  Inpatient consult to Endocrinology  Consult performed by: Diego Main DO  Consult ordered by: Nancy Contreras MD        02/14/24      ASSESSMENT:  43-year-old female with history of type 1 diabetes since 1992, hypothyroidism, history of gastric bypass surgery who is admitted to the hospital with symptomatic hypoglycemia.  Endocrinology is asked for consultation and primary service is comfortable with the consult.  Per review of records patient was having nausea and upset stomach and unable to tolerate p.o. and was taking her normal insulin regimen which per review of records is Lantus 20 units daily at bedtime and NovoLog 1 unit for every 8 g of carbs.  Blood sugar was found to be in the 30s and 40s at home and treated with fast acting carbohydrates.     Additionally she has history of hypothyroidism and prescribed Synthroid 225 mcg and reports compliance with this.  Synthroid dose was increased to 250 mcg daily and an a.m. cortisol has been ordered.    Component      Latest Ref Rng 2/12/2024 2/13/2024 2/14/2024   Sodium      135 - 147 mmol/L  137  134 (L)    Potassium      3.5 - 5.3 mmol/L  3.5  4.2    Chloride      96 - 108 mmol/L  107  102    Carbon Dioxide      21 - 32 mmol/L  26  27    ANION GAP      mmol/L  4  5    BUN      5 - 25 mg/dL  14  16    Creatinine      0.60 - 1.30 mg/dL  0.83  0.88    GLUCOSE      65 - 140 mg/dL  120  327 (H)    Calcium      8.4 - 10.2 mg/dL  8.0 (L)  8.5    CORRECTED CALCIUM      8.3 - 10.1 mg/dL   9.1    AST      13 - 39 U/L   51 (H)    ALT      7 - 52 U/L   32    ALK PHOS      34 - 104 U/L   78    Total Protein      6.4 - 8.4 g/dL   6.1 (L)    Albumin      3.5 - 5.0 g/dL   3.3 (L)    Total Bilirubin      0.20 - 1.00 mg/dL   0.44    GFR, Calculated      ml/min/1.73sq m   87  81    Hemoglobin A1C      Normal 4.0-5.6%; PreDiabetic 5.7-6.4%; Diabetic >=6.5%; Glycemic control for adults with diabetes <7.0% % 12.0 (H)      eAG, EST AVG Glucose      mg/dl 298      TSH 3RD GENERATON      0.450 - 4.500 uIU/mL 145.664 (H)      Free T4      0.61 - 1.12 ng/dL 0.57 (L)      T3, Total      0.9-1.8 ng/mL ng/mL 0.4 (L)      POC Glucose      65 - 140 mg/dl  286 (H)  233 (H)    POC Glucose        237 (H)  348 (H)    POC Glucose        216 (H)     POC Glucose        96     POC Glucose        114     POC Glucose        165 (H)     POC Glucose        235 (H)     Cortisol - AM      6.7 - 22.6 ug/dL  11.0            RECOMMENDATIONS:  Type 1 DM with hypoglycemia: Now with hyperglycemia. Will increase Lantus to 12 units daily and continue Humalog 4 units ac plus scale for correction.  Will continue to follow and make changes as needed.  Monitor for hypoglycemia and treat per protocol.  Hypothyroidism: Agree with increase in levothyroxine.  Recommend making sure patient is taking levothyroxine appropriately at home, on an empty stomach, waiting to eat or drink at least 30-60 minutes and  any supplement such as calcium or iron containing vitamins/supplements by at least 3-4 hours.  Repeat TSH and free T4 in about 1-2 weeks.    Recommend outpt follow up with endocrinology.     21-30 minutes, >50% of the total time devoted to medical consultative verbal/EMR discussion between providers. Written report will be generated in the EMR.    Diego Main DO

## 2024-02-14 NOTE — PROGRESS NOTES
[facility]  Progress Note  Name: Clarissa Webb I  MRN: [id number]  Unit/Bed#: E4 -01 I Date of Admission: 2/12/2024   Date of Service: 2/14/2024 I Hospital Day: 2    Assessment/Plan   * Hypoglycemia due to type 1 diabetes mellitus (HCC)  Assessment & Plan  Patient is a 43-year-old female with history of type 1 diabetes since 1992 who presented to the ER with 1 week of refractory hypoglycemia.  In the ER she was noted to have hypoglycemia and required starting D10 infusion    Recent Labs     02/13/24  1149 02/13/24  1610 02/14/24  0749 02/14/24  1127   POCGLU 237* 286* 348* 233*       Patient previously followed with [facility] endocrinology and was last seen 11/22 in the office  At home she takes Lantus 20 units at bedtime  She takes NovoLog 3 times a day with meals based on carb counting 1 unit for every 8 g of carbs: She typically takes approximately 3-4 units before breakfast, lunch, dinner  Patient notes the last week she has had an upset stomach, with nausea, occasional vomiting, and has diarrhea the last 3 days.  She states however she was able to keep food down and continue doing her typical dose of insulin  The patient is also noting she is awaiting an insulin pump and plans to transition to insulin pump in favor of current subcutaneous insulin regimen  Her hemoglobin A1c is noted to be significantly elevated  Patient was placed on a D10 infusion for refractory hypoglycemia in the ER despite tolerating oral diet  Admitting physician discussed with endocrinology  Alcohol could be contributing to hypoglycemia  Given half of home lantus at night, 10u instead of 20u  Checked morning cortisol   Patient is status post gastric bypass: Prior to this she states she never had postprandial hypoglycemia  Patient also with significant hypothyroidism  Patient also with recent COVID infection and currently with UA suspicious for UTI  Endocrinology e-consult appreciated.  Insulin now  adjusted to Lantus 12 units, Humalog 4 units 3 times daily    Abnormal urinalysis  Assessment & Plan  UA consistent with possible cystitis although patient denies significant urinary symptoms  Given her acute illness we will for now proceed with antibiotics, follow-up urine culture results    Alcohol use  Assessment & Plan  Patient relates due to increased stress in her life she is drinking more alcohol than usual  She notes she drinks 2-3 beers, or 2 to 3 glasses of wine daily.  Complete cessation recommended especially in light of gastric bypass  Continue thiamine, folic acid  CIWA  With mildly elevated AST and ultrasound was obtained with unremarkable liver findings    H/O gastric bypass  Assessment & Plan  Restarted vitamins  Folate level at goal, B12 level pending    Hypothyroidism due to Hashimoto's thyroiditis  Assessment & Plan  Patient follows with [facility] endocrinology for hypothyroidism secondary to Hashimoto  Most recently saw endocrinology 11/2022  Has been on Synthroid 225 mcg since that time:  pt notes compliance with synthroid  Previous TSH was checked 11/2022 and was within normal limits  TSH this admission markedly increased at 145, low T3/T4 low.  Levothyroxine increased to 50 mcg daily  Admitting physician conferred with Endocrinology team  Increased synthroid to 250mcg  Cortisol level 11 - within normal range      Leukopenia  Assessment & Plan  Patient appears to have a chronic leukopenia as far back as 2022  She follows with hematology oncology as an outpatient for chronic anemia  WBC count with a mild drop from 3.3 to 2.4 and stable    Anxiety and depression  Assessment & Plan  Reports loss of  4 years ago, single mother  Patient follows with mental health team as an outpatient  Continue home trazodone 200 mg nightly as well as Lexapro 20 mg every morning  Patient notes she does need a refill on her Lexapro at discharge as she ran day prior to discharge    B12 deficiency  Assessment  & Plan  Patient notes she had not been taking her supplements  Folate normal, B12 level pending    Iron deficiency anemia  Assessment & Plan  Patient has a history of iron deficiency anemia  Follows with hematology oncology as an outpatient and receiving Venofer injections  Hb stable around 10               VTE Pharmacologic Prophylaxis: VTE Score: 2 Low Risk (Score 0-2) - Encourage Ambulation.    Mobility:   Basic Mobility Inpatient Raw Score: 24  JH-HLM Goal: 8: Walk 250 feet or more  JH-HLM Achieved: 1: Laying in bed    Patient Centered Rounds: I performed bedside rounds with nursing staff today.   Discussions with Specialists or Other Care Team Provider: Endocrinology    Education and Discussions with Family / Patient: Patient    Total Time Spent on Date of Encounter in care of patient: 50 mins. This time was spent on one or more of the following: performing physical exam; counseling and coordination of care; obtaining or reviewing history; documenting in the medical record; reviewing/ordering tests, medications or procedures; communicating with other healthcare professionals and discussing with patient's family/caregivers.    Current Length of Stay: 2 day(s)  Current Patient Status: Inpatient   Certification Statement: The patient will continue to require additional inpatient hospital stay due to close monitoring  Discharge Plan: Anticipate discharge tomorrow to home.    Code Status: Level 1 - Full Code    Subjective:   Patient reports significant anxiety.  She otherwise voices no acute complaints.  She states that she would like for her blood glucose to improve prior to discharge.    Objective:     Vitals:   Temp (24hrs), Av.4 °F (36.9 °C), Min:97.6 °F (36.4 °C), Max:99.2 °F (37.3 °C)    Temp:  [97.6 °F (36.4 °C)-99.2 °F (37.3 °C)] 99.2 °F (37.3 °C)  HR:  [78-92] 82  Resp:  [18] 18  BP: (114-140)/(64-90) 138/81  SpO2:  [90 %-97 %] 94 %  Body mass index is 27.12 kg/m².     Input and Output Summary (last 24  hours):   No intake or output data in the 24 hours ending 02/14/24 1459    Physical Exam:   Physical Exam  Constitutional:       General: She is not in acute distress.  HENT:      Head: Normocephalic and atraumatic.      Nose: No congestion.   Eyes:      Conjunctiva/sclera: Conjunctivae normal.   Cardiovascular:      Rate and Rhythm: Normal rate and regular rhythm.      Heart sounds: No murmur heard.  Pulmonary:      Effort: No respiratory distress.      Breath sounds: No wheezing or rales.   Abdominal:      General: There is no distension.      Tenderness: There is no abdominal tenderness. There is no guarding.   Musculoskeletal:      Right lower leg: No edema.      Left lower leg: No edema.   Skin:     General: Skin is warm and dry.   Neurological:      Mental Status: She is oriented to person, place, and time.   Psychiatric:         Mood and Affect: Mood normal.          Additional Data:     Labs:  Results from last 7 days   Lab Units 02/14/24  0542   WBC Thousand/uL 2.30*   HEMOGLOBIN g/dL 10.5*   HEMATOCRIT % 32.5*   PLATELETS Thousands/uL 132*   NEUTROS PCT % 45   LYMPHS PCT % 42   MONOS PCT % 8   EOS PCT % 4     Results from last 7 days   Lab Units 02/14/24  0542   SODIUM mmol/L 134*   POTASSIUM mmol/L 4.2   CHLORIDE mmol/L 102   CO2 mmol/L 27   BUN mg/dL 16   CREATININE mg/dL 0.88   ANION GAP mmol/L 5   CALCIUM mg/dL 8.5   ALBUMIN g/dL 3.3*   TOTAL BILIRUBIN mg/dL 0.44   ALK PHOS U/L 78   ALT U/L 32   AST U/L 51*   GLUCOSE RANDOM mg/dL 327*         Results from last 7 days   Lab Units 02/14/24  1127 02/14/24  0749 02/13/24  1610 02/13/24  1149 02/13/24  1058 02/13/24  0708 02/13/24  0508 02/13/24  0301 02/13/24  0101 02/12/24  2313 02/12/24  2052 02/12/24  1906   POC GLUCOSE mg/dl 233* 348* 286* 237* 216* 96 114 165* 235* 291* 271* 52*     Results from last 7 days   Lab Units 02/12/24  1709   HEMOGLOBIN A1C % 12.0*     Results from last 7 days   Lab Units 02/12/24  1852   LACTIC ACID mmol/L 0.8        Lines/Drains:  Invasive Devices       Peripheral Intravenous Line  Duration             Peripheral IV 02/12/24 Left Antecubital 1 day    Peripheral IV 02/12/24 Proximal;Right;Ventral (anterior) Forearm 1 day                      Imaging: no new    Recent Cultures (last 7 days):   Results from last 7 days   Lab Units 02/13/24  1105 02/12/24  1910 02/12/24  1852 02/12/24  1709   BLOOD CULTURE   --  No Growth at 24 hrs. No Growth at 24 hrs.  --    URINE CULTURE   --   --   --  60,000-69,000 cfu/ml Klebsiella variicola*   C DIFF TOXIN B BY PCR  Negative  --   --   --        Last 24 Hours Medication List:   Current Facility-Administered Medications   Medication Dose Route Frequency Provider Last Rate    albuterol  2 puff Inhalation Q6H PRN Elizabeth Benoit MD      ALPRAZolam  0.5 mg Oral TID PRN Nancy Contreras MD      docusate sodium  100 mg Oral BID Elizabeth Benoit MD      fluticasone  1 puff Inhalation Daily Elizabeth Benoit MD      folic acid  1 mg Oral Daily Elizabeth Benoit MD      insulin glargine  12 Units Subcutaneous HS Diego Main DO      insulin lispro  1-5 Units Subcutaneous TID AC Elizabeth Benoit MD      insulin lispro  4 Units Subcutaneous TID With Meals Nancy Contreras MD      levothyroxine  250 mcg Oral QAM Elizabeth Benoit MD      ondansetron  4 mg Intravenous Q6H PRN Elizabeth Benoit MD      thiamine  100 mg Oral Daily Elizabeth Benoit MD      traZODone  200 mg Oral HS Elizabeth Benoit MD          Today, Patient Was Seen By: Nancy Contreras MD    **Please Note: This note may have been constructed using a voice recognition system.**

## 2024-02-14 NOTE — CASE MANAGEMENT
Case Management Assessment & Discharge Planning Note    Patient name Clarissa Webb  Location East 4 /E4 -* MRN [id number]  : 1980 Date 2024       Current Admission Date: 2024  Current Admission Diagnosis:Hypoglycemia due to type 1 diabetes mellitus (HCC)   Patient Active Problem List    Diagnosis Date Noted    Abnormal urinalysis 2024    Hypoglycemia due to type 1 diabetes mellitus (HCC) 2024    Alcohol use 2024    Nausea vomiting and diarrhea 2024    Concussion 10/21/2023    Herpes simplex infection of perianal skin 2023    Genital labial ulcer 2023    Iron deficiency anemia, unspecified 2021    Overweight with body mass index (BMI) of 26 to 26.9 in adult 2021    THO (acute kidney injury) (HCC) 2020    Hypothyroidism due to Hashimoto's thyroiditis 2020    Depression, recurrent (HCC) 2020    Protrusion of cervical intervertebral disc 10/03/2019    Leukopenia 07/15/2019    Hydrosalpinx 2019    Invagination of intestine (HCC) 2019    Iron deficiency anemia 2018    Bilateral carpal tunnel syndrome 2017    H/O gastric bypass 2017    B12 deficiency 2016    Intestinal malabsorption, unspecified 2016    Anxiety and depression 2016    Cervical spondylosis 2015    Vitamin D deficiency 11/10/2011    Type 1 diabetes mellitus with hyperglycemia (HCC) 2011    Hypothyroidism 10/29/2009      LOS (days): 2  Geometric Mean LOS (GMLOS) (days): 3  Days to GMLOS:1.1     OBJECTIVE:    Risk of Unplanned Readmission Score: 15.12         Current admission status: Inpatient       Preferred Pharmacy:   [facility] - [facility] [place]  [address] [address]  Phone: [phone] Fax: [phone]    Primary Care Provider: Annette Scales MD    Primary Insurance: [employer]  Secondary Insurance:     ASSESSMENT:  Active Health Care  Proxies    There are no active Health Care Proxies on file.       Advance Directives  Does patient have a Health Care POA?: No  Was patient offered paperwork?: Yes (Given information for 5 Wishes)  Does patient currently have a Health Care decision maker?: No  Does patient have Advance Directives?: No  Was patient offered paperwork?: Yes (Given info for 5 wishes)  Primary Contact: parents         Readmission Root Cause  30 Day Readmission: No    Patient Information  Admitted from:: Home  Mental Status: Alert  During Assessment patient was accompanied by: Not accompanied during assessment  Assessment information provided by:: Patient  Primary Caregiver: Self  Support Systems: Self  County of Residence: [place]  What city do you live in?: [place]  Home entry access options. Select all that apply.: Stairs  Number of steps to enter home.: 6  Do the steps have railings?: Yes  Type of Current Residence: 3 story home  Upon entering residence, is there a bedroom on the main floor (no further steps)?: No  A bedroom is located on the following floor levels of residence (select all that apply):: 2nd Floor  Upon entering residence, is there a bathroom on the main floor (no further steps)?: Yes  Number of steps to 2nd floor from main floor: One Flight  Living Arrangements: Other (Comment) (Lives with preteen son and dtr)  Is patient a ?: No    Activities of Daily Living Prior to Admission  Functional Status: Independent  Completes ADLs independently?: Yes  Ambulates independently?: Yes  Does patient use assisted devices?: Yes  Assisted Devices (DME) used: Walker  Does patient currently own DME?: Yes  What DME does the patient currently own?: Walker  Does patient have a history of Outpatient Therapy (PT/OT)?: No  Does the patient have a history of Short-Term Rehab?: No  Does patient have a history of HHC?: No  Does patient currently have HHC?: No         Patient Information Continued  Income Source: Employed  Does  patient have prescription coverage?: Yes  Does patient receive dialysis treatments?: No  Does patient have a history of substance abuse?: Yes  Historical substance use preference: Alcohol/ETOH  History of Withdrawal Symptoms: Denies past symptoms  Is patient currently in treatment for substance abuse?: No. Patient declined treatment information. (Pt offered HOST services but she had declined.)  Does patient have a history of Mental Health Diagnosis?: Yes  Is patient receiving treatment for mental health?: Yes (States that she has depression and anxiety.  started antidepressetns provided [facility] and they also provide therapy services)  Has patient received inpatient treatment related to mental health in the last 2 years?: No                Social Determinants of Health (SDOH)      Flowsheet Row Most Recent Value   Housing Stability    In the last 12 months, was there a time when you were not able to pay the mortgage or rent on time? N  [She staed over a year ago she was out of work for a few months and had gotten help from [place] [facility] churches]   In the last 12 months, how many places have you lived? 1   In the last 12 months, was there a time when you did not have a steady place to sleep or slept in a shelter (including now)? N   Transportation Needs    In the past 12 months, has lack of transportation kept you from medical appointments or from getting medications? no   In the past 12 months, has lack of transportation kept you from meetings, work, or from getting things needed for daily living? No   Food Insecurity    Within the past 12 months, you worried that your food would run out before you got the money to buy more. Never true   Utilities    In the past 12 months has the electric, gas, oil, or water company threatened to shut off services in your home? No            DISCHARGE DETAILS:    Discharge planning discussed with:: pt  Freedom of Choice: Yes     CM contacted family/caregiver?: No- see  comments (pt able to answer for herself)  Were Treatment Team discharge recommendations reviewed with patient/caregiver?: Yes  Did patient/caregiver verbalize understanding of patient care needs?: Yes  Were patient/caregiver advised of the risks associated with not following Treatment Team discharge recommendations?: Yes    Contacts  Patient Contacts: Patient  Contact Method: In Person  Reason/Outcome: Continuity of Care, Emergency Contact, Discharge Planning    Requested Home Health Care         Is the patient interested in HHC at discharge?: No    DME Referral Provided  Referral made for DME?: No         Would you like to participate in our [facility] service program?  : No - Declined    Treatment Team Recommendation: Home  Discharge Destination Plan:: Home                                         Additional Comments: CM had completed assessment with the patient.  During assessment pt made littel eye contact and also had a stressed facial expression.  We had discussed ETOH abuse and HOST offered.  She stated she did not need help, but observed that she started becoming teary eyed.    We reflected on that observation and she had stated that she was stressed out and knew that drinking was not going to solve her problems.  she also admitted going to AA meetings and has stopped.  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  due to the rigors of that job she has resinged effective in one month.  she then stated her  was a drug addict and has recently passed awy.  Plus, she has poor health issues including uncontrolled DM, recent COVID that made her weak.  she was using a relative's walker at times to help her climb steps.    Now is better.   She also admnits to missing her thyroid medications at least once a week.  She said she has started antidepressants about a month ago and also is supposed to see a  therapist.  This CM acknowledged all the stressors and trauma she has been experiencing and pt became more teary eyed.  Dr. Contreras came to see the pt and CM present.  she was informed that medically stable and will plan on discahrge in am since pt would like to see her BS more stable.  Admits her FBS has been over 200 lately at home.  CM will attempt to assist with endocrinology appoint post acute.  Message left to SLendo.  Pt does see Rosina Abdullahi [phone].  Will recheck with pt in am if she feels she neds additiional support.  Pt agreed.  5 Wishes documentation given at her request.

## 2024-02-14 NOTE — ASSESSMENT & PLAN NOTE
Patient appears to have a chronic leukopenia as far back as 2022  She follows with hematology oncology as an outpatient for chronic anemia  WBC count with a mild drop from 3.3 to 2.4 and stable

## 2024-02-14 NOTE — ASSESSMENT & PLAN NOTE
Patient is a 43-year-old female with history of type 1 diabetes since 1992 who presented to the ER with 1 week of refractory hypoglycemia.  In the ER she was noted to have hypoglycemia and required starting D10 infusion    Recent Labs     02/13/24  1149 02/13/24  1610 02/14/24  0749 02/14/24  1127   POCGLU 237* 286* 348* 233*       Patient previously followed with [facility] endocrinology and was last seen 11/22 in the office  At home she takes Lantus 20 units at bedtime  She takes NovoLog 3 times a day with meals based on carb counting 1 unit for every 8 g of carbs: She typically takes approximately 3-4 units before breakfast, lunch, dinner  Patient notes the last week she has had an upset stomach, with nausea, occasional vomiting, and has diarrhea the last 3 days.  She states however she was able to keep food down and continue doing her typical dose of insulin  The patient is also noting she is awaiting an insulin pump and plans to transition to insulin pump in favor of current subcutaneous insulin regimen  Her hemoglobin A1c is noted to be significantly elevated  Patient was placed on a D10 infusion for refractory hypoglycemia in the ER despite tolerating oral diet  Admitting physician discussed with endocrinology  Alcohol could be contributing to hypoglycemia  Given half of home lantus at night, 10u instead of 20u  Checked morning cortisol   Patient is status post gastric bypass: Prior to this she states she never had postprandial hypoglycemia  Patient also with significant hypothyroidism  Patient also with recent COVID infection and currently with UA suspicious for UTI  Endocrinology e-consult appreciated.  Insulin now adjusted to Lantus 12 units, Humalog 4 units 3 times daily

## 2024-02-15 ENCOUNTER — TELEPHONE (OUTPATIENT)
Dept: ENDOCRINOLOGY | Facility: CLINIC | Age: 44
End: 2024-02-15

## 2024-02-15 VITALS
OXYGEN SATURATION: 97 % | RESPIRATION RATE: 18 BRPM | WEIGHT: 165.57 LBS | SYSTOLIC BLOOD PRESSURE: 118 MMHG | DIASTOLIC BLOOD PRESSURE: 67 MMHG | TEMPERATURE: 97.5 F | HEART RATE: 78 BPM | BODY MASS INDEX: 26.72 KG/M2

## 2024-02-15 PROBLEM — N30.00 ACUTE CYSTITIS WITHOUT HEMATURIA: Status: ACTIVE | Noted: 2024-02-13

## 2024-02-15 LAB
ANION GAP SERPL CALCULATED.3IONS-SCNC: 6 MMOL/L
BASOPHILS # BLD AUTO: 0.03 THOUSANDS/ÂΜL (ref 0–0.1)
BASOPHILS NFR BLD AUTO: 1 % (ref 0–1)
BUN SERPL-MCNC: 15 MG/DL (ref 5–25)
CALCIUM SERPL-MCNC: 8.6 MG/DL (ref 8.4–10.2)
CHLORIDE SERPL-SCNC: 102 MMOL/L (ref 96–108)
CO2 SERPL-SCNC: 27 MMOL/L (ref 21–32)
CREAT SERPL-MCNC: 0.84 MG/DL (ref 0.6–1.3)
EOSINOPHIL # BLD AUTO: 0.11 THOUSAND/ÂΜL (ref 0–0.61)
EOSINOPHIL NFR BLD AUTO: 4 % (ref 0–6)
ERYTHROCYTE [DISTWIDTH] IN BLOOD BY AUTOMATED COUNT: 12.8 % (ref 11.6–15.1)
GFR SERPL CREATININE-BSD FRML MDRD: 86 ML/MIN/1.73SQ M
GLUCOSE SERPL-MCNC: 206 MG/DL (ref 65–140)
GLUCOSE SERPL-MCNC: 216 MG/DL (ref 65–140)
GLUCOSE SERPL-MCNC: 224 MG/DL (ref 65–140)
HCT VFR BLD AUTO: 33.2 % (ref 34.8–46.1)
HGB BLD-MCNC: 10.8 G/DL (ref 11.5–15.4)
IMM GRANULOCYTES # BLD AUTO: 0 THOUSAND/UL (ref 0–0.2)
IMM GRANULOCYTES NFR BLD AUTO: 0 % (ref 0–2)
LYMPHOCYTES # BLD AUTO: 1.18 THOUSANDS/ÂΜL (ref 0.6–4.47)
LYMPHOCYTES NFR BLD AUTO: 45 % (ref 14–44)
MCH RBC QN AUTO: 28.7 PG (ref 26.8–34.3)
MCHC RBC AUTO-ENTMCNC: 32.5 G/DL (ref 31.4–37.4)
MCV RBC AUTO: 88 FL (ref 82–98)
MONOCYTES # BLD AUTO: 0.21 THOUSAND/ÂΜL (ref 0.17–1.22)
MONOCYTES NFR BLD AUTO: 8 % (ref 4–12)
NEUTROPHILS # BLD AUTO: 1.11 THOUSANDS/ÂΜL (ref 1.85–7.62)
NEUTS SEG NFR BLD AUTO: 42 % (ref 43–75)
NRBC BLD AUTO-RTO: 0 /100 WBCS
PLATELET # BLD AUTO: 149 THOUSANDS/UL (ref 149–390)
PMV BLD AUTO: 10.3 FL (ref 8.9–12.7)
POTASSIUM SERPL-SCNC: 4 MMOL/L (ref 3.5–5.3)
RBC # BLD AUTO: 3.76 MILLION/UL (ref 3.81–5.12)
SODIUM SERPL-SCNC: 135 MMOL/L (ref 135–147)
VIT B12 SERPL-MCNC: 116 PG/ML (ref 180–914)
WBC # BLD AUTO: 2.64 THOUSAND/UL (ref 4.31–10.16)

## 2024-02-15 PROCEDURE — 85025 COMPLETE CBC W/AUTO DIFF WBC: CPT | Performed by: FAMILY MEDICINE

## 2024-02-15 PROCEDURE — 80048 BASIC METABOLIC PNL TOTAL CA: CPT | Performed by: FAMILY MEDICINE

## 2024-02-15 PROCEDURE — 82948 REAGENT STRIP/BLOOD GLUCOSE: CPT

## 2024-02-15 PROCEDURE — 99239 HOSP IP/OBS DSCHRG MGMT >30: CPT | Performed by: FAMILY MEDICINE

## 2024-02-15 RX ORDER — SYRINGE AND NEEDLE,INSULIN,1ML 30 G X1/2"
SYRINGE, EMPTY DISPOSABLE MISCELLANEOUS 4 TIMES DAILY
Qty: 100 EACH | Refills: 1 | Status: SHIPPED | OUTPATIENT
Start: 2024-02-15

## 2024-02-15 RX ORDER — FOLIC ACID 1 MG/1
1 TABLET ORAL DAILY
Qty: 30 TABLET | Refills: 0 | Status: SHIPPED | OUTPATIENT
Start: 2024-02-16

## 2024-02-15 RX ORDER — INSULIN GLARGINE 100 [IU]/ML
15 INJECTION, SOLUTION SUBCUTANEOUS
Status: DISCONTINUED | OUTPATIENT
Start: 2024-02-15 | End: 2024-02-15 | Stop reason: HOSPADM

## 2024-02-15 RX ORDER — CEPHALEXIN 500 MG/1
500 CAPSULE ORAL EVERY 6 HOURS SCHEDULED
Status: DISCONTINUED | OUTPATIENT
Start: 2024-02-15 | End: 2024-02-15 | Stop reason: HOSPADM

## 2024-02-15 RX ORDER — CEPHALEXIN 500 MG/1
500 CAPSULE ORAL EVERY 12 HOURS SCHEDULED
Qty: 10 CAPSULE | Refills: 0 | Status: SHIPPED | OUTPATIENT
Start: 2024-02-15 | End: 2024-02-20

## 2024-02-15 RX ORDER — LEVOTHYROXINE SODIUM 0.05 MG/1
50 TABLET ORAL
Qty: 30 TABLET | Refills: 0 | Status: SHIPPED | OUTPATIENT
Start: 2024-02-15

## 2024-02-15 RX ORDER — LANOLIN ALCOHOL/MO/W.PET/CERES
1000 CREAM (GRAM) TOPICAL DAILY
Qty: 30 TABLET | Refills: 0 | OUTPATIENT
Start: 2024-02-15

## 2024-02-15 RX ORDER — ESCITALOPRAM OXALATE 20 MG/1
20 TABLET ORAL DAILY
Status: DISCONTINUED | OUTPATIENT
Start: 2024-02-15 | End: 2024-02-15 | Stop reason: HOSPADM

## 2024-02-15 RX ORDER — INSULIN GLARGINE 100 [IU]/ML
15 INJECTION, SOLUTION SUBCUTANEOUS DAILY
Qty: 10 ML | Refills: 0 | Status: SHIPPED | OUTPATIENT
Start: 2024-02-15 | End: 2024-02-20

## 2024-02-15 RX ORDER — ESCITALOPRAM OXALATE 10 MG/1
20 TABLET ORAL DAILY
Qty: 30 TABLET | Refills: 1 | Status: SHIPPED | OUTPATIENT
Start: 2024-02-15

## 2024-02-15 RX ORDER — LANOLIN ALCOHOL/MO/W.PET/CERES
100 CREAM (GRAM) TOPICAL DAILY
Qty: 30 TABLET | Refills: 0 | Status: SHIPPED | OUTPATIENT
Start: 2024-02-16

## 2024-02-15 RX ADMIN — THIAMINE HCL TAB 100 MG 100 MG: 100 TAB at 08:04

## 2024-02-15 RX ADMIN — ESCITALOPRAM OXALATE 20 MG: 20 TABLET, FILM COATED ORAL at 13:33

## 2024-02-15 RX ADMIN — INSULIN LISPRO 4 UNITS: 100 INJECTION, SOLUTION INTRAVENOUS; SUBCUTANEOUS at 12:07

## 2024-02-15 RX ADMIN — CEPHALEXIN 500 MG: 500 CAPSULE ORAL at 12:06

## 2024-02-15 RX ADMIN — FOLIC ACID 1 MG: 1 TABLET ORAL at 08:04

## 2024-02-15 RX ADMIN — INSULIN LISPRO 4 UNITS: 100 INJECTION, SOLUTION INTRAVENOUS; SUBCUTANEOUS at 08:04

## 2024-02-15 RX ADMIN — LEVOTHYROXINE SODIUM 250 MCG: 100 TABLET ORAL at 06:09

## 2024-02-15 RX ADMIN — INSULIN LISPRO 1 UNITS: 100 INJECTION, SOLUTION INTRAVENOUS; SUBCUTANEOUS at 12:06

## 2024-02-15 RX ADMIN — INSULIN LISPRO 2 UNITS: 100 INJECTION, SOLUTION INTRAVENOUS; SUBCUTANEOUS at 08:04

## 2024-02-15 NOTE — CASE MANAGEMENT
Case Management Discharge Planning Note    Patient name Clarissa Webb  Location [facility] 4 /E4 -* MRN [id number]  : 1980 Date 2/15/2024       Current Admission Date: 2024  Current Admission Diagnosis:Hypoglycemia due to type 1 diabetes mellitus (HCC)   Patient Active Problem List    Diagnosis Date Noted    Abnormal urinalysis 2024    Hypoglycemia due to type 1 diabetes mellitus (HCC) 2024    Alcohol use 2024    Nausea vomiting and diarrhea 2024    Concussion 10/21/2023    Herpes simplex infection of perianal skin 2023    Genital labial ulcer 2023    Iron deficiency anemia, unspecified 2021    Overweight with body mass index (BMI) of 26 to 26.9 in adult 2021    THO (acute kidney injury) (HCC) 2020    Hypothyroidism due to Hashimoto's thyroiditis 2020    Depression, recurrent (HCC) 2020    Protrusion of cervical intervertebral disc 10/03/2019    Leukopenia 07/15/2019    Hydrosalpinx 2019    Invagination of intestine (HCC) 2019    Iron deficiency anemia 2018    Bilateral carpal tunnel syndrome 2017    H/O gastric bypass 2017    B12 deficiency 2016    Intestinal malabsorption, unspecified 2016    Anxiety and depression 2016    Cervical spondylosis 2015    Vitamin D deficiency 11/10/2011    Type 1 diabetes mellitus with hyperglycemia (HCC) 2011    Hypothyroidism 10/29/2009      LOS (days): 3  Geometric Mean LOS (GMLOS) (days): 3  Days to GMLOS:0.2     OBJECTIVE:  Risk of Unplanned Readmission Score: 14.86         Current admission status: Inpatient   Preferred Pharmacy:   [facility] [place] [address] [address]  Phone: [phone] Fax: [phone]    Primary Care Provider: Annette Scales MD    Primary Insurance: [employer]  Secondary Insurance:     DISCHARGE DETAILS:                                                                                                  Additional Comments: Pt will be discharged today.  CM had contacted Endocrinology and an appoinment has been set up for 2/20/24 at 0915 at the [facility].  Pt has been informed and verbalized understanding.  Pt was also provided resources from [facility] help for grieving support services, and Drug alcohol recover/support groups.  The pt had accpeted these resources.  Still has declined HOST services.  Verbalizes the importance of following up with help for medical and emotional management.

## 2024-02-15 NOTE — RESTORATIVE TECHNICIAN NOTE
Restorative Technician Note      Patient Name: Clarissa RAMIREZ Jon     Restorative Tech Visit Date: 02/15/24  Note Type: Mobility  Patient Position Upon Consult: Supine  Activity Performed: Ambulated  Patient Position at End of Consult: Supine; All needs within reach

## 2024-02-15 NOTE — TELEPHONE ENCOUNTER
Paul Banks called regarding patient being admitted and having hypo and hyperglycemia.    Call was placed for the patient to be scheduled for Tuesday at 9:15 with erica.

## 2024-02-15 NOTE — QUICK NOTE
Reviewed recent blood sugar trend.  Will increase Lantus to 15 units nightly.  Continue Humalog 4 units with meals plus scale for correction.  Will continue to follow and make changes as needed.  Monitor for hypoglycemia and treat per protocol.    POC Glucose (mg/dl)   Date Value   02/15/2024 206 (H)   02/15/2024 224 (H)   02/14/2024 188 (H)   02/14/2024 233 (H)   02/14/2024 348 (H)   02/13/2024 286 (H)   02/13/2024 237 (H)   02/13/2024 216 (H)   02/13/2024 96   02/13/2024 114        no weakness/no headache/no syncope

## 2024-02-15 NOTE — PLAN OF CARE
Problem: Potential for Falls  Goal: Patient will remain free of falls  Description: INTERVENTIONS:  - Educate patient/family on patient safety including physical limitations  - Instruct patient to call for assistance with activity   - Consult OT/PT to assist with strengthening/mobility   - Keep Call bell within reach  - Keep bed low and locked with side rails adjusted as appropriate  - Keep care items and personal belongings within reach  - Initiate and maintain comfort rounds  Outcome: Progressing     Problem: PAIN - ADULT  Goal: Verbalizes/displays adequate comfort level or baseline comfort level  Description: Interventions:  - Encourage patient to monitor pain and request assistance  - Assess pain using appropriate pain scale  - Administer analgesics based on type and severity of pain and evaluate response  - Implement non-pharmacological measures as appropriate and evaluate response  - Consider cultural and social influences on pain and pain management  - Notify physician/advanced practitioner if interventions unsuccessful or patient reports new pain  Outcome: Progressing     Problem: INFECTION - ADULT  Goal: Absence or prevention of progression during hospitalization  Description: INTERVENTIONS:  - Assess and monitor for signs and symptoms of infection  - Monitor lab/diagnostic results  - Monitor all insertion sites, i.e. indwelling lines, tubes, and drains  - Monitor endotracheal if appropriate and nasal secretions for changes in amount and color  - Hewitt appropriate cooling/warming therapies per order  - Administer medications as ordered  - Instruct and encourage patient and family to use good hand hygiene technique  - Identify and instruct in appropriate isolation precautions for identified infection/condition  Outcome: Progressing     Problem: SAFETY ADULT  Goal: Maintain or return to baseline ADL function  Description: INTERVENTIONS:  -  Assess patient's ability to carry out ADLs; assess patient's  baseline for ADL function and identify physical deficits which impact ability to perform ADLs (bathing, care of mouth/teeth, toileting, grooming, dressing, etc.)  - Assess/evaluate cause of self-care deficits   - Assess range of motion  - Assess patient's mobility; develop plan if impaired  - Assess patient's need for assistive devices and provide as appropriate  - Encourage maximum independence but intervene and supervise when necessary  - Involve family in performance of ADLs  - Assess for home care needs following discharge   - Consider OT consult to assist with ADL evaluation and planning for discharge  - Provide patient education as appropriate  Outcome: Progressing  Goal: Maintains/Returns to pre admission functional level  Description: INTERVENTIONS:  - Perform AM-PAC 6 Click Basic Mobility/ Daily Activity assessment daily.  - Set and communicate daily mobility goal to care team and patient/family/caregiver.   - Collaborate with rehabilitation services on mobility goals if consulted  Outcome: Progressing     Problem: DISCHARGE PLANNING  Goal: Discharge to home or other facility with appropriate resources  Description: INTERVENTIONS:  - Identify barriers to discharge w/patient and caregiver  - Arrange for needed discharge resources and transportation as appropriate  - Identify discharge learning needs (meds, wound care, etc.)  - Arrange for interpretive services to assist at discharge as needed  - Refer to Case Management Department for coordinating discharge planning if the patient needs post-hospital services based on physician/advanced practitioner order or complex needs related to functional status, cognitive ability, or social support system  Outcome: Progressing     Problem: Knowledge Deficit  Goal: Patient/family/caregiver demonstrates understanding of disease process, treatment plan, medications, and discharge instructions  Description: Complete learning assessment and assess knowledge  base.  Interventions:  - Provide teaching at level of understanding  - Provide teaching via preferred learning methods  Outcome: Progressing     Problem: METABOLIC, FLUID AND ELECTROLYTES - ADULT  Goal: Electrolytes maintained within normal limits  Description: INTERVENTIONS:  - Monitor labs and assess patient for signs and symptoms of electrolyte imbalances  - Administer electrolyte replacement as ordered  - Monitor response to electrolyte replacements, including repeat lab results as appropriate  - Instruct patient on fluid and nutrition as appropriate  Outcome: Progressing  Goal: Fluid balance maintained  Description: INTERVENTIONS:  - Monitor labs   - Monitor I/O and WT  - Instruct patient on fluid and nutrition as appropriate  - Assess for signs & symptoms of volume excess or deficit  Outcome: Progressing  Goal: Glucose maintained within target range  Description: INTERVENTIONS:  - Monitor Blood Glucose as ordered  - Assess for signs and symptoms of hyperglycemia and hypoglycemia  - Administer ordered medications to maintain glucose within target range  - Assess nutritional intake and initiate nutrition service referral as needed  Outcome: Progressing     Problem: GASTROINTESTINAL - ADULT  Goal: Minimal or absence of nausea and/or vomiting  Description: INTERVENTIONS:  - Administer IV fluids if ordered to ensure adequate hydration  - Maintain NPO status until nausea and vomiting are resolved  - Nasogastric tube if ordered  - Administer ordered antiemetic medications as needed  - Provide nonpharmacologic comfort measures as appropriate  - Advance diet as tolerated, if ordered  - Consider nutrition services referral to assist patient with adequate nutrition and appropriate food choices  Outcome: Progressing  Goal: Maintains or returns to baseline bowel function  Description: INTERVENTIONS:  - Assess bowel function  - Encourage oral fluids to ensure adequate hydration  - Administer IV fluids if ordered to ensure  adequate hydration  - Administer ordered medications as needed  - Encourage mobilization and activity  - Consider nutritional services referral to assist patient with adequate nutrition and appropriate food choices  Outcome: Progressing

## 2024-02-16 ENCOUNTER — PATIENT OUTREACH (OUTPATIENT)
Dept: FAMILY MEDICINE CLINIC | Facility: CLINIC | Age: 44
End: 2024-02-16

## 2024-02-16 ENCOUNTER — TRANSITIONAL CARE MANAGEMENT (OUTPATIENT)
Dept: FAMILY MEDICINE CLINIC | Facility: CLINIC | Age: 44
End: 2024-02-16

## 2024-02-16 DIAGNOSIS — Z71.89 COMPLEX CARE COORDINATION: Primary | ICD-10-CM

## 2024-02-16 NOTE — ASSESSMENT & PLAN NOTE
UA consistent with possible cystitis although patient denies significant urinary symptoms  Urine culture with Klebsiella species as well as GBS  Given her acute illness we will proceed with discharge on a course of Keflex

## 2024-02-16 NOTE — PROGRESS NOTES
Outpatient Care :  HRR Referral.  Patient was admitted to [facility] 2/12/24-2/15/24 for Hypoglycemia due to Type 1 diabetes.  Placed outreach call to patient with no answer. Left voicemail with care  information.  Noted per chart review, that patient has an Endocrinology appointment scheduled on 2/20/24.  Care manager will place another outreach call to patient next week.

## 2024-02-16 NOTE — ASSESSMENT & PLAN NOTE
Patient follows with [facility] endocrinology for hypothyroidism secondary to Hashimoto  Most recently saw endocrinology 11/2022  Has been on Synthroid 225 mcg since that time:  pt notes compliance with synthroid  Previous TSH was checked 11/2022 and was within normal limits  TSH this admission markedly increased at 145, low T3/T4 low.  Levothyroxine increased to 50 mcg daily  Admitting physician conferred with Endocrinology team  Increased synthroid to 250mcg  Short-term follow-up with endocrinology, hopeful in 1 week

## 2024-02-16 NOTE — ASSESSMENT & PLAN NOTE
Patient relates due to increased stress in her life she is drinking more alcohol than usual  She notes she drinks 2-3 beers, or 2 to 3 glasses of wine daily.  Complete cessation recommended especially in light of gastric bypass  Continue thiamine, folic acid on discharge  Case management and appreciated regarding referral to the necessary resources to help with alcohol use

## 2024-02-16 NOTE — ASSESSMENT & PLAN NOTE
Patient appears to have a chronic leukopenia as far back as 2022  She follows with hematology/oncology as an outpatient for chronic anemia  WBC count with a mild drop from 3.3 to around 2.5 and stable  In the case patient is having a true UTI she is discharged on a course of antibiotics

## 2024-02-16 NOTE — UTILIZATION REVIEW
NOTIFICATION OF ADMISSION DISCHARGE   This is a Notification of Discharge from [facility]. Please be advised that this patient has been discharge from our facility. Below you will find the admission and discharge date and time including the patient’s disposition.   UTILIZATION REVIEW CONTACT:  Kari Anderson  Utilization   Network Utilization Review Department  Phone: [phone] x carefully listen to the prompts. All voicemails are confidential.  Email: [email or web address]     ADMISSION INFORMATION  PRESENTATION DATE: 2/12/2024  4:18 PM  OBERVATION ADMISSION DATE:   INPATIENT ADMISSION DATE: 2/12/24  7:25 PM   DISCHARGE DATE: 2/15/2024  2:22 PM   DISPOSITION:Home/Self Care    Network Utilization Review Department  ATTENTION: Please call with any questions or concerns to [phone] and carefully listen to the prompts so that you are directed to the right person. All voicemails are confidential.   For Discharge needs, contact Care Management DC Support Team at [phone] opt. [phone]  Send all requests for admission clinical reviews, approved or denied determinations and any other requests to dedicated fax number below belonging to the campus where the patient is receiving treatment. List of dedicated fax numbers for the Facilities:  FACILITY NAME UR FAX NUMBER   ADMISSION DENIALS (Administrative/Medical Necessity) [phone]   DISCHARGE SUPPORT TEAM ([facility]) [phone]   PARENT CHILD HEALTH (Maternity/NICU/Pediatrics) [phone]   [facility] [phone]   [facility] [phone]   [facility] [phone]   [facility] [phone]   [facility] [phone]   [facility] [phone]   [facility] [phone]   [facility] [phone]   [facility]  [facility] [phone]   Replaced by [facility] [phone]   [facility] [phone]   [facility] [phone]

## 2024-02-16 NOTE — ASSESSMENT & PLAN NOTE
Patient is a 43-year-old female with history of type 1 diabetes since 1992 who presented to the ER with 1 week of refractory hypoglycemia.  In the ER she was noted to have hypoglycemia and required starting D10 infusion    Recent Labs     02/14/24  1127 02/14/24  1607 02/15/24  0710 02/15/24  1115   POCGLU 233* 188* 224* 206*       Patient previously followed with [facility] endocrinology and was last seen 11/22 in the office  At home she takes Lantus 20 units at bedtime  She takes NovoLog 3 times a day with meals based on carb counting 1 unit for every 8 g of carbs: She typically takes approximately 3-4 units before breakfast, lunch, dinner  Patient notes the last week she has had an upset stomach, with nausea, occasional vomiting, and has diarrhea the last 3 days.  She states however she was able to keep food down and continue doing her typical dose of insulin  The patient is also noting she is awaiting an insulin pump and plans to transition to insulin pump in favor of current subcutaneous insulin regimen  Her hemoglobin A1c is noted to be significantly elevated  Patient was placed on a D10 infusion for refractory hypoglycemia in the ER despite tolerating oral diet  Admitting physician discussed with endocrinology  Alcohol could be contributing to hypoglycemia  Patient has now been hyperglycemic  Insulin regimen changed to Lantus 15 units daily, continue to use NovoLog based on carb counting on discharge  Short-term follow-up with endocrinology

## 2024-02-16 NOTE — ASSESSMENT & PLAN NOTE
Reports loss of  4 years ago, single mother  Patient follows with mental health team as an outpatient  Continue home trazodone 200 mg nightly as well as Lexapro 20 mg every morning  Patient notes she does need a refill on her Lexapro at discharge as she ran day prior to discharge, refill electronically sent to pharmacy

## 2024-02-16 NOTE — DISCHARGE SUMMARY
[facility]  Discharge- Clarissa RAMIREZ Bamdzimoise 1980, 43 y.o. female MRN: [id number]  Unit/Bed#: E4 -01 Encounter: [id number]  Primary Care Provider: Annette Scales MD   Date and time admitted to hospital: 2/12/2024  4:18 PM    * Hypoglycemia due to type 1 diabetes mellitus (HCC)  Assessment & Plan  Patient is a 43-year-old female with history of type 1 diabetes since 1992 who presented to the ER with 1 week of refractory hypoglycemia.  In the ER she was noted to have hypoglycemia and required starting D10 infusion    Recent Labs     02/14/24  1127 02/14/24  1607 02/15/24  0710 02/15/24  1115   POCGLU 233* 188* 224* 206*       Patient previously followed with [facility] endocrinology and was last seen 11/22 in the office  At home she takes Lantus 20 units at bedtime  She takes NovoLog 3 times a day with meals based on carb counting 1 unit for every 8 g of carbs: She typically takes approximately 3-4 units before breakfast, lunch, dinner  Patient notes the last week she has had an upset stomach, with nausea, occasional vomiting, and has diarrhea the last 3 days.  She states however she was able to keep food down and continue doing her typical dose of insulin  The patient is also noting she is awaiting an insulin pump and plans to transition to insulin pump in favor of current subcutaneous insulin regimen  Her hemoglobin A1c is noted to be significantly elevated  Patient was placed on a D10 infusion for refractory hypoglycemia in the ER despite tolerating oral diet  Admitting physician discussed with endocrinology  Alcohol could be contributing to hypoglycemia  Patient has now been hyperglycemic  Insulin regimen changed to Lantus 15 units daily, continue to use NovoLog based on carb counting on discharge  Short-term follow-up with endocrinology      Acute cystitis without hematuria  Assessment & Plan  UA consistent with possible cystitis although patient denies significant  urinary symptoms  Urine culture with Klebsiella species as well as GBS  Given her acute illness we will proceed with discharge on a course of Keflex    Alcohol use  Assessment & Plan  Patient relates due to increased stress in her life she is drinking more alcohol than usual  She notes she drinks 2-3 beers, or 2 to 3 glasses of wine daily.  Complete cessation recommended especially in light of gastric bypass  Continue thiamine, folic acid on discharge  Case management and appreciated regarding referral to the necessary resources to help with alcohol use    H/O gastric bypass  Assessment & Plan  Restarted vitamins      Hypothyroidism due to Hashimoto's thyroiditis  Assessment & Plan  Patient follows with [facility] endocrinology for hypothyroidism secondary to Hashimoto  Most recently saw endocrinology 11/2022  Has been on Synthroid 225 mcg since that time:  pt notes compliance with synthroid  Previous TSH was checked 11/2022 and was within normal limits  TSH this admission markedly increased at 145, low T3/T4 low.  Levothyroxine increased to 50 mcg daily  Admitting physician conferred with Endocrinology team  Increased synthroid to 250mcg  Short-term follow-up with endocrinology, hopeful in 1 week      Leukopenia  Assessment & Plan  Patient appears to have a chronic leukopenia as far back as 2022  She follows with hematology/oncology as an outpatient for chronic anemia  WBC count with a mild drop from 3.3 to around 2.5 and stable  In the case patient is having a true UTI she is discharged on a course of antibiotics    Anxiety and depression  Assessment & Plan  Reports loss of  4 years ago, single mother  Patient follows with mental health team as an outpatient  Continue home trazodone 200 mg nightly as well as Lexapro 20 mg every morning  Patient notes she does need a refill on her Lexapro at discharge as she ran day prior to discharge, refill electronically sent to pharmacy    B12 deficiency  Assessment &  Plan  Rx for Vit B12 1000 mcg sent to pharmacy    Iron deficiency anemia  Assessment & Plan  Patient has a history of iron deficiency anemia  Follows with hematology oncology as an outpatient and receiving Venofer injections  Hb stable around 10      Medical Problems       Resolved Problems  Date Reviewed: 2/15/2024   None       Discharging Physician / Practitioner: Nancy Contreras MD  PCP: Annette Scales MD  Admission Date:   Admission Orders (From admission, onward)       Ordered        02/12/24 1925  INPATIENT ADMISSION  Once                          Discharge Date: 02/15/24    Consultations During Hospital Stay:  E consult by endocrinology, case management    Procedures Performed:   US right upper quadrant   Final Result by Bubba So DO (02/13 1404)      1. No acute sonographic findings within the right upper quadrant.   2. Status post cholecystectomy.      Workstation performed: [id number]               Significant Findings / Test Results:     Results from last 7 days   Lab Units 02/15/24  0605   WBC Thousand/uL 2.64*   HEMOGLOBIN g/dL 10.8*   HEMATOCRIT % 33.2*   PLATELETS Thousands/uL 149         Results from last 7 days   Lab Units 02/15/24  0605   SODIUM mmol/L 135   POTASSIUM mmol/L 4.0   CHLORIDE mmol/L 102   CO2 mmol/L 27   BUN mg/dL 15   CREATININE mg/dL 0.84   CALCIUM mg/dL 8.6          Outpatient Tests Requested:  None    Complications:  None    Reason for Admission: hypoglycemia    Hospital Course:   Clarissa Webb is a 43 y.o. female patient who originally presented to the hospital on 2/12/2024 due to hypoglycemia.  Patient was also found to have uncontrolled hypothyroidism.  Her treatments were adjusted as above.  She is instructed to follow-up with her endocrinologist preferably within 1 week.    The patient was also noted for depressive symptoms associated with alcohol misuse disorder.  Case management offered to refer to appropriate services.    The patient was  discharged in improved and stable condition.  Besides following up with endocrinology she is also recommended to follow-up with her PCP within 1 week.  She verbalized understanding.    Please see above list of diagnoses and related plan for additional information.     Condition at Discharge: stable    Discharge Day Visit / Exam:     Subjective: Patient reports feeling somewhat improved and is willing to be discharged home.  She voices no acute complaints.    Vitals: Blood Pressure: 118/67 (02/15/24 1112)  Pulse: 78 (02/15/24 1112)  Temperature: 97.5 °F (36.4 °C) (02/15/24 1112)  Temp Source: Temporal (02/15/24 1112)  Respirations: 18 (02/15/24 1112)  Weight - Scale: 75.1 kg (165 lb 9.1 oz) (02/15/24 0600)  SpO2: 97 % (02/15/24 1112)  Exam:   Physical Exam  Constitutional:       General: She is not in acute distress.  HENT:      Head: Normocephalic and atraumatic.      Nose: No congestion.   Eyes:      Conjunctiva/sclera: Conjunctivae normal.   Cardiovascular:      Rate and Rhythm: Normal rate and regular rhythm.   Pulmonary:      Effort: No respiratory distress.   Abdominal:      General: There is no distension.      Tenderness: There is no abdominal tenderness.   Musculoskeletal:      Right lower leg: No edema.      Left lower leg: No edema.   Skin:     General: Skin is warm and dry.   Neurological:      Mental Status: She is oriented to person, place, and time.          Discharge instructions/Information to patient and family:   See after visit summary for information provided to patient and family.      Provisions for Follow-Up Care:  See after visit summary for information related to follow-up care and any pertinent home health orders.      Mobility at time of Discharge:   Basic Mobility Inpatient Raw Score: 24  JH-HLM Goal: 8: Walk 250 feet or more  JH-HLM Achieved: 2: Bed activities/Dependent transfer  HLM Goal achieved. Continue to encourage appropriate mobility.     Disposition:   Home    Planned  Readmission: No     Discharge Statement:  I spent 35 minutes discharging the patient. This time was spent on the day of discharge. I had direct contact with the patient on the day of discharge. Greater than 50% of the total time was spent examining patient, answering all patient questions, arranging and discussing plan of care with patient as well as directly providing post-discharge instructions.  Additional time then spent on discharge activities.    Discharge Medications:  See after visit summary for reconciled discharge medications provided to patient and/or family.      **Please Note: This note may have been constructed using a voice recognition system**

## 2024-02-17 LAB
BACTERIA BLD CULT: NORMAL
BACTERIA BLD CULT: NORMAL

## 2024-02-19 ENCOUNTER — PATIENT OUTREACH (OUTPATIENT)
Dept: FAMILY MEDICINE CLINIC | Facility: CLINIC | Age: 44
End: 2024-02-19

## 2024-02-19 LAB — VIT B6 SERPL-MCNC: 2.8 UG/L (ref 3.4–65.2)

## 2024-02-19 NOTE — PROGRESS NOTES
Outpatient Care :  HRR Referral.  Patient was admitted at [facility] from 2/12/24-2/15/24 for Hypoglycemia.  Placed second outreach attempt to patient with no answer, left message on voicemail with care  information.  Noted per [facility] chart that patient has an appointment scheduled with Endocrine on 2/20/24.  Unable to reach letter sent via patient My Chart.

## 2024-02-19 NOTE — LETTER
Date: 02/19/24    Dear Clarissa Webb,   My name is Lela Isidro; I am a registered nurse care manager working with [address] [address]  [phone].     I have not been able to reach you and would like to set a time that I can talk with you over the phone.  My work is to help patients that have complex medical conditions get the care they need. This includes patients who may have been in the hospital or emergency room.    Please call me with any questions you may have. I look forward to speaking with you.  Sincerely,  Lela Isidro  [phone]  Outpatient Care Manager

## 2024-02-20 ENCOUNTER — OFFICE VISIT (OUTPATIENT)
Dept: ENDOCRINOLOGY | Facility: CLINIC | Age: 44
End: 2024-02-20
Payer: COMMERCIAL

## 2024-02-20 VITALS
BODY MASS INDEX: 26.52 KG/M2 | WEIGHT: 165 LBS | HEART RATE: 80 BPM | SYSTOLIC BLOOD PRESSURE: 130 MMHG | OXYGEN SATURATION: 98 % | DIASTOLIC BLOOD PRESSURE: 70 MMHG | RESPIRATION RATE: 18 BRPM | HEIGHT: 66 IN

## 2024-02-20 DIAGNOSIS — E03.8 HYPOTHYROIDISM DUE TO HASHIMOTO'S THYROIDITIS: ICD-10-CM

## 2024-02-20 DIAGNOSIS — E06.3 HYPOTHYROIDISM DUE TO HASHIMOTO'S THYROIDITIS: ICD-10-CM

## 2024-02-20 DIAGNOSIS — E10.65 TYPE 1 DIABETES MELLITUS WITH HYPERGLYCEMIA (HCC): Primary | ICD-10-CM

## 2024-02-20 DIAGNOSIS — E10.649 HYPOGLYCEMIA DUE TO TYPE 1 DIABETES MELLITUS (HCC): ICD-10-CM

## 2024-02-20 DIAGNOSIS — E10.49 OTHER DIABETIC NEUROLOGICAL COMPLICATION ASSOCIATED WITH TYPE 1 DIABETES MELLITUS (HCC): ICD-10-CM

## 2024-02-20 LAB
LEFT EYE DIABETIC RETINOPATHY: NORMAL
LEFT EYE IMAGE QUALITY: NORMAL
LEFT EYE MACULAR EDEMA: NORMAL
LEFT EYE OTHER RETINOPATHY: NORMAL
RIGHT EYE DIABETIC RETINOPATHY: NORMAL
RIGHT EYE IMAGE QUALITY: NORMAL
RIGHT EYE MACULAR EDEMA: NORMAL
RIGHT EYE OTHER RETINOPATHY: NORMAL
SEVERITY (EYE EXAM): NORMAL
SL AMB POCT HEMOGLOBIN AIC: 10.7 (ref ?–6.5)

## 2024-02-20 PROCEDURE — 95251 CONT GLUC MNTR ANALYSIS I&R: CPT | Performed by: NURSE PRACTITIONER

## 2024-02-20 PROCEDURE — 99214 OFFICE O/P EST MOD 30 MIN: CPT | Performed by: NURSE PRACTITIONER

## 2024-02-20 PROCEDURE — 83036 HEMOGLOBIN GLYCOSYLATED A1C: CPT | Performed by: NURSE PRACTITIONER

## 2024-02-20 PROCEDURE — 92250 FUNDUS PHOTOGRAPHY W/I&R: CPT | Performed by: NURSE PRACTITIONER

## 2024-02-20 RX ORDER — PROCHLORPERAZINE 25 MG/1
1 SUPPOSITORY RECTAL
COMMUNITY
End: 2024-02-20 | Stop reason: CLARIF

## 2024-02-20 RX ORDER — INSULIN GLARGINE 100 [IU]/ML
15 INJECTION, SOLUTION SUBCUTANEOUS DAILY
Qty: 10 ML | Refills: 0 | Status: SHIPPED | OUTPATIENT
Start: 2024-02-20

## 2024-02-20 RX ORDER — GABAPENTIN 600 MG/1
TABLET ORAL
Qty: 90 TABLET | Refills: 1 | Status: SHIPPED | OUTPATIENT
Start: 2024-02-20 | End: 2024-02-20 | Stop reason: SDUPTHER

## 2024-02-20 RX ORDER — ALCOHOL ANTISEPTIC PADS
PADS, MEDICATED (EA) TOPICAL
COMMUNITY
Start: 2024-01-12 | End: 2024-02-20 | Stop reason: ALTCHOICE

## 2024-02-20 RX ORDER — CLINDAMYCIN HYDROCHLORIDE 300 MG/1
CAPSULE ORAL
COMMUNITY
Start: 2024-01-03

## 2024-02-20 RX ORDER — FLASH GLUCOSE SCANNING READER
1 EACH MISCELLANEOUS DAILY
Start: 2024-02-20

## 2024-02-20 RX ORDER — GABAPENTIN 600 MG/1
TABLET ORAL
Qty: 90 TABLET | Refills: 1 | Status: SHIPPED | OUTPATIENT
Start: 2024-02-20

## 2024-02-20 NOTE — ASSESSMENT & PLAN NOTE
Patient is uncontrolled. Counseled on pathophysiology of diabetes. Counseled on negative, long-term effects associated with uncontrolled diabetes including neuropathy, nephropathy, retinopathy, heart attack, and stroke.  Patient is willing to resume insulin pump use. Will start new [employer] 780G. Will reach out to [employer] rep to set up pump start appointment. For now, reduce Lantus to 15 units nightly. Continue carb ratio of 1:8. Relax ISF to 60. Patient knows to notify me with persistent hyperglycemia or episodes of hypoglycemia.  F/U in 3 months.   Lab Results   Component Value Date    HGBA1C 10.7 (A) 02/20/2024

## 2024-02-20 NOTE — PATIENT INSTRUCTIONS
Reduce Lantus to 15 units nightly. Inject at the same time nightly- 9pm.  Continue with 1:8 carb ratio- please aim to inject at least 15 minutes prior to eating.   Relax correction factor to 1 units for every 60 mg/dL above 100 mg/dL.   Continue 250 mcg of levothyroxine daily. Take all by itself. In approximately 6-8 weeks, complete tsh with reflex to t4.  Complete other labs prior to next diabetes visit  I will reach out to Eileen who will reach to you regarding pump start.

## 2024-02-20 NOTE — PROGRESS NOTES
Established Patient Progress Note    Chief Complaint:  Diabetes follow up visit    Impression & Plan:    Problem List Items Addressed This Visit       Hypothyroidism due to Hashimoto's thyroiditis     Continue 250 mcg of levothyroxine daily. Repeat TFT in 6 weeks.          Relevant Orders    TSH, 3rd generation with Free T4 reflex    TSH, 3rd generation    T4, free    Type 1 diabetes mellitus with hyperglycemia (HCC) - Primary     Patient is uncontrolled. Counseled on pathophysiology of diabetes. Counseled on negative, long-term effects associated with uncontrolled diabetes including neuropathy, nephropathy, retinopathy, heart attack, and stroke.  Patient is willing to resume insulin pump use. Will start new Med[employer]onic 780G. Will reach out to [employer] rep to set up pump start appointment. For now, reduce Lantus to 15 units nightly. Continue carb ratio of 1:8. Relax ISF to 60. Patient knows to notify me with persistent hyperglycemia or episodes of hypoglycemia.  F/U in 3 months.   Lab Results   Component Value Date    HGBA1C 10.7 (A) 02/20/2024            Relevant Medications    gabapentin (NEURONTIN) 600 MG tablet    Continuous Blood Gluc Sensor (FreeStyle Samantha 2 Sensor) MISC    Continuous Blood Gluc  (FreeStyle Samantha 14 Day [place]) KELVIN    insulin glargine (LANTUS) 100 units/mL subcutaneous injection    Other Relevant Orders    POCT hemoglobin A1c (Completed)    Hemoglobin A1C    Albumin / creatinine urine ratio    Comprehensive metabolic panel    Hypoglycemia due to type 1 diabetes mellitus (HCC)     See insulin adjustments.  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 female with hypothyroidism and type 1 diabetes with long-term use of insulin since 1992 presenting to the office today for a posthospitalization follow-up.    Surgical history significant for gastric bypass surgery.    Patient was last seen in the office by Dr. Aragon on 11/22/2022.    Had previously been using a [employer] pump.  However, she is back to using multiple daily injections.    Patient was hospitalized on 2/12/2024 for severe hypoglycemia.  Hypoglycemia believed to be related to poor p.o. intake as well as alcohol use.       Hemoglobin A1C   Latest Ref Rng Normal 4.0-5.6%; PreDiabetic 5.7-6.4%; Diabetic >=6.5%; Glycemic control for adults with diabetes <7.0% %   6/13/2023 10.2 (H)    10/22/2023 10.2 (H)    2/12/2024 12.0 (H)       eAG, EST AVG Glucose   Latest Ref Rng mg/dl   6/13/2023 246    10/22/2023 246    2/12/2024 298       Legend:  (H) High    Current regimen:  Lantus 15 units daily? (Had been taking 20 units)  NovoLog sliding scale up to 3 times daily; max daily dose 30 units    Carb ratio: 1: 8  ISF 50 above 100    Clarissa Webb   Device used Freestyle [place] 2  Home use     Indication   Type 1 Diabetes    More than 72 hours of data was reviewed. Report to be scanned to chart.     Date Range: 2/6/24-2/19/24    Analysis of data:   Average Glucose: 200 mg/dL  Coefficient of Variation: 49.2%   GMI: 8.1%   Time in Target Range: 38%   Time Above Range: 26% 181-250 mg/dL; 28% >250 mg/dL   Time Below Range: 5% 54-69 mg/dL; 3% <54 mg/dL     Interpretation of data: Patient is uncontrolled with both hyperglycemia and hypoglycemia. Hyperglycemia is often occurring in response to hypoglycemia.         For hypothyroidism, she is taking levothyroxine 250 mcg daily TSH from 2/12/2024 is noted to be extremely elevated at 145.6.  T4 noted to be quite low at 0.57.  Total T3 also low at 0.4 ng/mL. Patient reports that she had not been taking her levothyroxine consistently prior  to these labs being completed    Patient Active Problem List   Diagnosis    Iron deficiency anemia    Vitamin D deficiency    B12 deficiency    Anxiety and depression    Depression, recurrent (HCC)    Leukopenia    THO (acute kidney injury) (HCC)    Hypothyroidism due to Hashimoto's thyroiditis    Bilateral carpal tunnel syndrome    Cervical spondylosis    H/O gastric bypass    Hydrosalpinx    Intestinal malabsorption, unspecified    Invagination of intestine (HCC)    Protrusion of cervical intervertebral disc    Overweight with body mass index (BMI) of 26 to 26.9 in adult    Hypothyroidism    Iron deficiency anemia, unspecified    Type 1 diabetes mellitus with hyperglycemia (HCC)    Genital labial ulcer    Herpes simplex infection of perianal skin    Concussion    Hypoglycemia due to type 1 diabetes mellitus (HCC)    Alcohol use    Nausea vomiting and diarrhea    Acute cystitis without hematuria    Other diabetic neurological complication associated with type 1 diabetes mellitus (HCC)      Past Medical History:   Diagnosis Date    Anemia     Anxiety     B12 deficiency     Cervical disc disorder     On gabapentin     Depression     Diabetes mellitus (HCC)     Disease of thyroid gland     hypothyroid    Iron deficiency anemia     Panic attack     PTSD (post-traumatic stress disorder)     Sleep difficulties     Substance abuse (HCC)     Type 1 diabetes (HCC)     Vitamin D deficiency       Past Surgical History:   Procedure Laterality Date    ABDOMINAL SURGERY      gastric bypass     SECTION      x2    CHOLECYSTECTOMY  2018    GASTRIC BYPASS  2007    INTRAUTERINE DEVICE INSERTION  2015    IR BIOPSY BONE MARROW  2020    OTHER SURGICAL HISTORY      surgery for imperforate hymen/endometriosis/hydrometrocolpos    ROOT CANAL  2024    TUBAL LIGATION      WISDOM TOOTH EXTRACTION        Family History   Problem Relation Age of Onset    Thyroid disease Mother     URIEL disease Mother     Hyperlipidemia  Mother     Hypertension Mother     Osteoarthritis Mother     Thyroid disease unspecified Mother     Diabetes Father     Alcohol abuse Father     Diabetes type I Father     Thyroid disease Sister     Depression Sister     Thyroid disease unspecified Sister     Anxiety disorder Sister     Heart disease Maternal Grandmother     Hypertension Maternal Grandmother     Arthritis Maternal Grandmother     Diabetes type I Maternal Uncle     Diabetes type I Maternal Grandfather      Social History     Tobacco Use    Smoking status: Never    Smokeless tobacco: Never   Substance Use Topics    Alcohol use: Yes     Comment: a couple beers a day     No Known Allergies      Current Outpatient Medications:     Accu-Chek FastClix Lancets MISC, USE 1 LANCET TO TEST BLOOD SUGAR UP TO 6 TIMES DAILY, Disp: 102 each, Rfl: 0    acetaminophen (TYLENOL) 500 mg tablet,  , Disp: , Rfl:     butalbital-acetaminophen-caffeine (Esgic) -40 mg per tablet, Take 1 tablet by mouth every 6 (six) hours as needed for headaches for up to 15 doses, Disp: 15 tablet, Rfl: 0    cephalexin (KEFLEX) 500 mg capsule, Take 1 capsule (500 mg total) by mouth every 12 (twelve) hours for 5 days, Disp: 10 capsule, Rfl: 0    clindamycin (CLEOCIN) 300 MG capsule, TAKE ONE CAPSULE EVERY 6 HOURS UNTIL GONE, Disp: , Rfl:     Continuous Blood Gluc  (FreeStyle Samantha 14 Day [place]) [facility], Use 1 Units in the morning, Disp: , Rfl:     Continuous Blood Gluc Sensor (FreeStyle Samantha 2 Sensor) MISC, Use 1 unit every 14 days, Disp: 2 each, Rfl: 3    escitalopram (Lexapro) 10 mg tablet, Take 2 tablets (20 mg total) by mouth daily, Disp: 30 tablet, Rfl: 1    folic acid (FOLVITE) 1 mg tablet, Take 1 tablet (1 mg total) by mouth daily, Disp: 30 tablet, Rfl: 0    gabapentin (NEURONTIN) 600 MG tablet, Take 600 mg before bed., Disp: 90 tablet, Rfl: 1    glucose blood (Accu-Chek Guide) test strip, Use to test blood sugar up to 6 times daily., Disp: 200 each, Rfl: 2    INS  "SYRINGE/NEEDLE 1CC/28G (B-D INS SYR MICROFINE 1CC/28G) 28G X 1/2\" 1 ML MISC, Inject under the skin 4 (four) times a day, Disp: 100 each, Rfl: 1    Insulin Aspart (NovoLOG) 100 units/mL injection, SLIDING SCALE UP TO 3 TIMES PER DAY WITH MEALS. MAX 30 UNITS PER DAY (Patient taking differently: Sliding scale up to 3 times per day with meals. Max 15 units per day), Disp: 10 mL, Rfl: 3    insulin glargine (LANTUS) 100 units/mL subcutaneous injection, Inject 15 Units under the skin daily, Disp: 10 mL, Rfl: 0    Insulin Pen Needle (BD Pen Needle Deanna U/F) 32G X 4 MM MISC, Use 4/day, Disp: 100 each, Rfl: 11    levothyroxine (Synthroid) 50 mcg tablet, Take 1 tablet (50 mcg total) by mouth daily in the early morning Combine with 200mcg, for total 225mcg daily., Disp: 30 tablet, Rfl: 0    levothyroxine 200 mcg tablet, Take 1 tablet (200 mcg total) by mouth every morning, Disp: 90 tablet, Rfl: 3    ondansetron (ZOFRAN-ODT) 4 mg disintegrating tablet, Take 1 tablet (4 mg total) by mouth every 6 (six) hours as needed for nausea or vomiting, Disp: 20 tablet, Rfl: 0    thiamine 100 MG tablet, Take 1 tablet (100 mg total) by mouth daily, Disp: 30 tablet, Rfl: 0    traZODone (DESYREL) 100 mg tablet, Take 100 mg by mouth daily at bedtime, Disp: , Rfl:     Review of Systems   Constitutional:  Positive for fatigue. Negative for activity change, appetite change and unexpected weight change.   HENT:  Negative for dental problem, sore throat, trouble swallowing and voice change.    Eyes:  Negative for visual disturbance.   Respiratory:  Negative for cough, chest tightness and shortness of breath.    Cardiovascular:  Negative for chest pain, palpitations and leg swelling.   Gastrointestinal:  Negative for constipation, diarrhea, nausea and vomiting.   Endocrine: Negative for cold intolerance, heat intolerance, polydipsia, polyphagia and polyuria.   Genitourinary:  Negative for frequency.   Musculoskeletal:  Negative for arthralgias, back " "pain, gait problem and myalgias.   Skin:  Negative for wound.   Allergic/Immunologic: Negative for environmental allergies and food allergies.   Neurological:  Positive for numbness. Negative for dizziness, weakness, light-headedness and headaches.   Psychiatric/Behavioral:  Positive for decreased concentration and dysphoric mood. Negative for sleep disturbance. The patient is not nervous/anxious.        Physical Exam:  Body mass index is 26.63 kg/m².  /70   Pulse 80   Resp 18   Ht 5' 6\" (1.676 m)   Wt 74.8 kg (165 lb)   LMP 01/21/2024 (Approximate)   SpO2 98%   BMI 26.63 kg/m²    Wt Readings from Last 3 Encounters:   02/20/24 74.8 kg (165 lb)   02/15/24 75.1 kg (165 lb 9.1 oz)   01/17/24 76.2 kg (167 lb 15.9 oz)       Physical Exam  Vitals reviewed.   Constitutional:       General: She is not in acute distress.     Appearance: She is well-developed. She is not ill-appearing.   HENT:      Head: Normocephalic and atraumatic.   Eyes:      Pupils: Pupils are equal, round, and reactive to light.   Neck:      Thyroid: No thyromegaly.   Cardiovascular:      Rate and Rhythm: Normal rate and regular rhythm.      Pulses: Normal pulses.      Heart sounds: Normal heart sounds.   Pulmonary:      Effort: Pulmonary effort is normal.      Breath sounds: Normal breath sounds.   Abdominal:      General: Bowel sounds are normal. There is no distension.      Palpations: Abdomen is soft.      Tenderness: There is no abdominal tenderness.   Musculoskeletal:      Cervical back: Normal range of motion and neck supple.      Right lower leg: No edema.      Left lower leg: No edema.   Lymphadenopathy:      Cervical: No cervical adenopathy.   Skin:     General: Skin is warm and dry.      Capillary Refill: Capillary refill takes less than 2 seconds.   Neurological:      Mental Status: She is alert and oriented to person, place, and time.      Gait: Gait normal.   Psychiatric:         Mood and Affect: Mood normal.         Behavior: " Behavior normal.           Labs:   Lab Results   Component Value Date    HGBA1C 10.7 (A) 02/20/2024    HGBA1C 12.0 (H) 02/12/2024    HGBA1C 10.2 (H) 10/22/2023     Lab Results   Component Value Date    CREATININE 0.84 02/15/2024    CREATININE 0.88 02/14/2024    CREATININE 0.83 02/13/2024    BUN 15 02/15/2024     05/16/2018    K 4.0 02/15/2024     02/15/2024    CO2 27 02/15/2024     GFR, Calculated   Date Value Ref Range Status   07/03/2019 85 >60 mL/min/1.73m2 Final     Comment:     mL/min per 1.73 square meters                                            Normal Function or Mild Renal    Disease (if clinically at risk):  >or=60  Moderately Decreased:                30-59  Severely Decreased:                  15-29  Renal Failure:                         <15                                            -American GFR: multiply reported GFR by 1.16    Please note that the eGFR is based on the CKD-EPI calculation, and is not intended to be used for drug dosing.                                            Note: Calculated GFR may not be an accurate indicator of renal function if the patient's renal function is not in a steady state.     eGFRcr   Date Value Ref Range Status   10/23/2022 95 >59 Final     eGFR   Date Value Ref Range Status   02/15/2024 86 ml/min/1.73sq m Final     Lab Results   Component Value Date     08/10/2022    TRIG 63 08/10/2022     Lab Results   Component Value Date    ALT 32 02/14/2024    AST 51 (H) 02/14/2024    ALKPHOS 78 02/14/2024    BILITOT 0.4 05/16/2018     Lab Results   Component Value Date    ABO6YDZKZYLX 145.664 (H) 02/12/2024    GGI5DALQTNDB 3.770 11/02/2022    IRH3YUCLJJHG 1.890 08/10/2022     Lab Results   Component Value Date    FREET4 0.57 (L) 02/12/2024       Discussed with the patient and all questioned fully answered. She will call me if any problems arise.    Follow-up appointment in 3 months.     Counseled patient on diagnostic results, prognosis, risk and  benefit of treatment options, instruction for management, importance of treatment compliance, Risk  factor reduction and impressions    JAROCHO Bryan

## 2024-02-21 PROBLEM — N30.00 ACUTE CYSTITIS WITHOUT HEMATURIA: Status: RESOLVED | Noted: 2024-02-13 | Resolved: 2024-02-21

## 2024-02-26 ENCOUNTER — PATIENT OUTREACH (OUTPATIENT)
Dept: FAMILY MEDICINE CLINIC | Facility: CLINIC | Age: 44
End: 2024-02-26

## 2024-02-26 NOTE — PROGRESS NOTES
Outpatient Care :  No response to patient outreach call and no response to unable to reach letter.  Will close patient to case management at this time.

## 2024-02-27 ENCOUNTER — TELEPHONE (OUTPATIENT)
Dept: ENDOCRINOLOGY | Facility: CLINIC | Age: 44
End: 2024-02-27

## 2024-02-27 NOTE — TELEPHONE ENCOUNTER
[employer] called in regarding a pending prescription referral and needing a certificate of medical necessity sent back to them. It was refaxxed on 2/21. Qnekt cb#: [phone] if a refax is needed.

## 2024-02-29 NOTE — TELEPHONE ENCOUNTER
[employer] called and stated that they are waiting for the forms regarding the pump. It was refaxed on 2/21

## 2024-03-01 NOTE — TELEPHONE ENCOUNTER
[employer] left message following up with the CMN from 2/21. Requesting a call back or to fax back ASAP for pump upgrade.    Ph- [phone] Opt 6

## 2024-03-06 NOTE — TELEPHONE ENCOUNTER
Spoke to [employer] rep they are resending form, they just needed patients last visit chart notes. Scanned and faxed to [phone]

## 2024-03-21 ENCOUNTER — TELEPHONE (OUTPATIENT)
Age: 44
End: 2024-03-21

## 2024-03-21 NOTE — TELEPHONE ENCOUNTER
Representative Venus called in from [employer] regarding a insulin pump request. She mentioned she sent over a fax on Monday 03/18/2024. Please give her a call back regarding this matter [phone] option 6

## 2024-03-21 NOTE — TELEPHONE ENCOUNTER
Patient called in regards to her insulin pump she has been trying to get now for over 2 months. [employer] says they sent a replacement pump back in 2021, and this pump should be under warranty. Patient states she doesn't have a pump. [employer] sent a letter back to the patient stating they Need  erica Abdullahi to contact them about an approval letter for insulin pump.      Patient stated that her [employer] insurance ends Tuesday the 26th. She needs to have this done ASAP. Please contact patient with an update.

## 2024-03-22 NOTE — TELEPHONE ENCOUNTER
Called [employer]  They never received the fax for the office notes.  We sent it over 3/6. The requested that they only needed office notes.  They gave me another fax number to use. [phone]   I faxed all documentation requested over 3/22/24  Fax with confirmation in media

## 2024-03-22 NOTE — TELEPHONE ENCOUNTER
Called [employer] to verify. Documented on previous encounter.  Faxed documentation that they requested 3/22/24    Scanned into media

## 2024-03-26 DIAGNOSIS — E06.3 HYPOTHYROIDISM DUE TO HASHIMOTO'S THYROIDITIS: ICD-10-CM

## 2024-03-26 DIAGNOSIS — E03.8 HYPOTHYROIDISM DUE TO HASHIMOTO'S THYROIDITIS: ICD-10-CM

## 2024-03-26 DIAGNOSIS — E10.65 TYPE 1 DIABETES MELLITUS WITH HYPERGLYCEMIA (HCC): ICD-10-CM

## 2024-03-26 RX ORDER — SYRINGE AND NEEDLE,INSULIN,1ML 30 G X1/2"
SYRINGE, EMPTY DISPOSABLE MISCELLANEOUS 4 TIMES DAILY
Qty: 100 EACH | Refills: 1 | Status: SHIPPED | OUTPATIENT
Start: 2024-03-26

## 2024-03-26 RX ORDER — LEVOTHYROXINE SODIUM 0.05 MG/1
50 TABLET ORAL
Qty: 30 TABLET | Refills: 0 | Status: SHIPPED | OUTPATIENT
Start: 2024-03-26

## 2024-03-26 RX ORDER — LEVOTHYROXINE SODIUM 0.2 MG/1
200 TABLET ORAL EVERY MORNING
Qty: 90 TABLET | Refills: 3 | Status: SHIPPED | OUTPATIENT
Start: 2024-03-26

## 2024-03-26 RX ORDER — INSULIN GLARGINE 100 [IU]/ML
15 INJECTION, SOLUTION SUBCUTANEOUS DAILY
Qty: 10 ML | Refills: 0 | Status: SHIPPED | OUTPATIENT
Start: 2024-03-26

## 2024-03-28 ENCOUNTER — TELEPHONE (OUTPATIENT)
Dept: ENDOCRINOLOGY | Facility: CLINIC | Age: 44
End: 2024-03-28

## 2024-03-28 NOTE — TELEPHONE ENCOUNTER
[employer] called RX refill line. They received office notes on 3/22 and they are still waiting for the prescription for the insulin pump upgrade. This can be faxed to Fax: [phone]

## 2024-04-01 ENCOUNTER — TELEPHONE (OUTPATIENT)
Dept: ENDOCRINOLOGY | Facility: CLINIC | Age: 44
End: 2024-04-01

## 2024-04-01 NOTE — TELEPHONE ENCOUNTER
----- Message from Merna Aragon MD sent at 4/1/2024  4:17 PM EDT -----  Tabitha Ochoaerine changed her regimen at last visit, I would go max daily dose of 50 units for now, but we may need to change moving forward,  ----- Message -----  From: Zoë Borrero  Sent: 4/1/2024  10:32 AM EDT  To: MD Dr Margo Griffiths,     I am trying to update the patients script for her medtronic 780 G pump but I am not sure what her max daily dose should or would be, would you be able to take a look and let me know.  Thank you   Xiomara

## 2024-04-03 ENCOUNTER — TELEPHONE (OUTPATIENT)
Age: 44
End: 2024-04-03

## 2024-04-03 NOTE — TELEPHONE ENCOUNTER
[employer] called asking us to fax over the prescription for the insulin pump upgrade, they never received it.     Fax: [phone]

## 2024-04-04 DIAGNOSIS — E10.65 TYPE 1 DIABETES MELLITUS WITH HYPERGLYCEMIA (HCC): ICD-10-CM

## 2024-04-04 DIAGNOSIS — G47.00 INSOMNIA, UNSPECIFIED TYPE: Primary | ICD-10-CM

## 2024-04-04 RX ORDER — TRAZODONE HYDROCHLORIDE 100 MG/1
100 TABLET ORAL
Qty: 30 TABLET | Refills: 0 | Status: SHIPPED | OUTPATIENT
Start: 2024-04-04

## 2024-04-04 RX ORDER — INSULIN ASPART 100 [IU]/ML
INJECTION, SOLUTION INTRAVENOUS; SUBCUTANEOUS
Qty: 10 ML | Refills: 3 | Status: SHIPPED | OUTPATIENT
Start: 2024-04-04

## 2024-04-04 NOTE — TELEPHONE ENCOUNTER
Patient requesting refill(s) of: Trazodone    Last filled: Historical  Last appt: 11/6/23  Next appt:   [facility]

## 2024-04-04 NOTE — TELEPHONE ENCOUNTER
Patient requesting refill(s) of: Novolog 100 IU/mL     Last filled: 10/25/2023 #10mL x 3  Last appt: 11/6/2023  Next appt: none  Pharmacy: [facility]

## 2024-04-17 ENCOUNTER — APPOINTMENT (OUTPATIENT)
Dept: LAB | Facility: MEDICAL CENTER | Age: 44
End: 2024-04-17
Payer: COMMERCIAL

## 2024-04-17 DIAGNOSIS — E03.8 HYPOTHYROIDISM DUE TO HASHIMOTO'S THYROIDITIS: ICD-10-CM

## 2024-04-17 DIAGNOSIS — E10.65 TYPE 1 DIABETES MELLITUS WITH HYPERGLYCEMIA (HCC): ICD-10-CM

## 2024-04-17 DIAGNOSIS — E06.3 HYPOTHYROIDISM DUE TO HASHIMOTO'S THYROIDITIS: ICD-10-CM

## 2024-04-17 LAB
ALBUMIN SERPL BCP-MCNC: 3.7 G/DL (ref 3.5–5)
ALP SERPL-CCNC: 107 U/L (ref 34–104)
ALT SERPL W P-5'-P-CCNC: 16 U/L (ref 7–52)
ANION GAP SERPL CALCULATED.3IONS-SCNC: 9 MMOL/L (ref 4–13)
AST SERPL W P-5'-P-CCNC: 26 U/L (ref 13–39)
BILIRUB SERPL-MCNC: 0.52 MG/DL (ref 0.2–1)
BUN SERPL-MCNC: 13 MG/DL (ref 5–25)
CALCIUM SERPL-MCNC: 8.4 MG/DL (ref 8.4–10.2)
CHLORIDE SERPL-SCNC: 100 MMOL/L (ref 96–108)
CO2 SERPL-SCNC: 27 MMOL/L (ref 21–32)
CREAT SERPL-MCNC: 0.77 MG/DL (ref 0.6–1.3)
CREAT UR-MCNC: 80.8 MG/DL
GFR SERPL CREATININE-BSD FRML MDRD: 95 ML/MIN/1.73SQ M
GLUCOSE P FAST SERPL-MCNC: 173 MG/DL (ref 65–99)
MICROALBUMIN UR-MCNC: 11.7 MG/L
MICROALBUMIN/CREAT 24H UR: 14 MG/G CREATININE (ref 0–30)
POTASSIUM SERPL-SCNC: 4.2 MMOL/L (ref 3.5–5.3)
PROT SERPL-MCNC: 6.6 G/DL (ref 6.4–8.4)
SODIUM SERPL-SCNC: 136 MMOL/L (ref 135–147)
T4 FREE SERPL-MCNC: 0.93 NG/DL (ref 0.61–1.12)
T4 FREE SERPL-MCNC: 0.95 NG/DL (ref 0.61–1.12)
TSH SERPL DL<=0.05 MIU/L-ACNC: 6.45 UIU/ML (ref 0.45–4.5)

## 2024-04-17 PROCEDURE — 84443 ASSAY THYROID STIM HORMONE: CPT

## 2024-04-17 PROCEDURE — 84439 ASSAY OF FREE THYROXINE: CPT

## 2024-04-17 PROCEDURE — 36415 COLL VENOUS BLD VENIPUNCTURE: CPT

## 2024-04-17 PROCEDURE — 82043 UR ALBUMIN QUANTITATIVE: CPT

## 2024-04-17 PROCEDURE — 82570 ASSAY OF URINE CREATININE: CPT

## 2024-04-17 PROCEDURE — 80053 COMPREHEN METABOLIC PANEL: CPT

## 2024-04-17 PROCEDURE — 83036 HEMOGLOBIN GLYCOSYLATED A1C: CPT

## 2024-04-18 LAB
EST. AVERAGE GLUCOSE BLD GHB EST-MCNC: 246 MG/DL
HBA1C MFR BLD: 10.2 %

## 2024-04-23 DIAGNOSIS — E06.3 HYPOTHYROIDISM DUE TO HASHIMOTO'S THYROIDITIS: ICD-10-CM

## 2024-04-23 DIAGNOSIS — E03.8 HYPOTHYROIDISM DUE TO HASHIMOTO'S THYROIDITIS: ICD-10-CM

## 2024-04-23 RX ORDER — LEVOTHYROXINE SODIUM 0.05 MG/1
50 TABLET ORAL
Qty: 30 TABLET | Refills: 5 | Status: SHIPPED | OUTPATIENT
Start: 2024-04-23

## 2024-04-29 ENCOUNTER — PATIENT MESSAGE (OUTPATIENT)
Dept: FAMILY MEDICINE CLINIC | Facility: CLINIC | Age: 44
End: 2024-04-29

## 2024-04-29 DIAGNOSIS — D50.9 IRON DEFICIENCY ANEMIA, UNSPECIFIED IRON DEFICIENCY ANEMIA TYPE: ICD-10-CM

## 2024-04-29 DIAGNOSIS — E53.8 B12 DEFICIENCY: Primary | ICD-10-CM

## 2024-04-29 DIAGNOSIS — E55.9 VITAMIN D DEFICIENCY: ICD-10-CM

## 2024-04-29 DIAGNOSIS — E10.65 TYPE 1 DIABETES MELLITUS WITH HYPERGLYCEMIA (HCC): ICD-10-CM

## 2024-04-30 ENCOUNTER — APPOINTMENT (OUTPATIENT)
Dept: LAB | Facility: MEDICAL CENTER | Age: 44
End: 2024-04-30
Payer: COMMERCIAL

## 2024-04-30 DIAGNOSIS — E55.9 VITAMIN D DEFICIENCY: ICD-10-CM

## 2024-04-30 DIAGNOSIS — D50.9 IRON DEFICIENCY ANEMIA, UNSPECIFIED IRON DEFICIENCY ANEMIA TYPE: ICD-10-CM

## 2024-04-30 DIAGNOSIS — F41.9 ANXIETY AND DEPRESSION: Primary | ICD-10-CM

## 2024-04-30 DIAGNOSIS — F33.9 DEPRESSION, RECURRENT (HCC): ICD-10-CM

## 2024-04-30 DIAGNOSIS — F32.A ANXIETY AND DEPRESSION: Primary | ICD-10-CM

## 2024-04-30 DIAGNOSIS — E53.8 B12 DEFICIENCY: ICD-10-CM

## 2024-04-30 PROCEDURE — 82306 VITAMIN D 25 HYDROXY: CPT

## 2024-04-30 PROCEDURE — 82728 ASSAY OF FERRITIN: CPT

## 2024-04-30 PROCEDURE — 85025 COMPLETE CBC W/AUTO DIFF WBC: CPT

## 2024-04-30 PROCEDURE — 82607 VITAMIN B-12: CPT

## 2024-04-30 PROCEDURE — 83540 ASSAY OF IRON: CPT

## 2024-04-30 PROCEDURE — 83550 IRON BINDING TEST: CPT

## 2024-04-30 PROCEDURE — 36415 COLL VENOUS BLD VENIPUNCTURE: CPT

## 2024-04-30 RX ORDER — INSULIN GLARGINE 100 [IU]/ML
INJECTION, SOLUTION SUBCUTANEOUS
Qty: 10 ML | Refills: 5 | Status: SHIPPED | OUTPATIENT
Start: 2024-04-30

## 2024-05-01 ENCOUNTER — TELEPHONE (OUTPATIENT)
Dept: HEMATOLOGY ONCOLOGY | Facility: CLINIC | Age: 44
End: 2024-05-01

## 2024-05-01 DIAGNOSIS — D50.8 OTHER IRON DEFICIENCY ANEMIA: ICD-10-CM

## 2024-05-01 DIAGNOSIS — E53.8 B12 DEFICIENCY: Primary | ICD-10-CM

## 2024-05-01 LAB
25(OH)D3 SERPL-MCNC: 31 NG/ML (ref 30–100)
BASOPHILS # BLD AUTO: 0.03 THOUSANDS/ÂΜL (ref 0–0.1)
BASOPHILS NFR BLD AUTO: 1 % (ref 0–1)
EOSINOPHIL # BLD AUTO: 0.1 THOUSAND/ÂΜL (ref 0–0.61)
EOSINOPHIL NFR BLD AUTO: 2 % (ref 0–6)
ERYTHROCYTE [DISTWIDTH] IN BLOOD BY AUTOMATED COUNT: 13.1 % (ref 11.6–15.1)
FERRITIN SERPL-MCNC: 5 NG/ML (ref 11–307)
HCT VFR BLD AUTO: 33.1 % (ref 34.8–46.1)
HGB BLD-MCNC: 10.3 G/DL (ref 11.5–15.4)
IMM GRANULOCYTES # BLD AUTO: 0.01 THOUSAND/UL (ref 0–0.2)
IMM GRANULOCYTES NFR BLD AUTO: 0 % (ref 0–2)
IRON SATN MFR SERPL: 10 % (ref 15–50)
IRON SERPL-MCNC: 46 UG/DL (ref 50–212)
LYMPHOCYTES # BLD AUTO: 1.09 THOUSANDS/ÂΜL (ref 0.6–4.47)
LYMPHOCYTES NFR BLD AUTO: 21 % (ref 14–44)
MCH RBC QN AUTO: 27 PG (ref 26.8–34.3)
MCHC RBC AUTO-ENTMCNC: 31.1 G/DL (ref 31.4–37.4)
MCV RBC AUTO: 87 FL (ref 82–98)
MONOCYTES # BLD AUTO: 0.36 THOUSAND/ÂΜL (ref 0.17–1.22)
MONOCYTES NFR BLD AUTO: 7 % (ref 4–12)
NEUTROPHILS # BLD AUTO: 3.63 THOUSANDS/ÂΜL (ref 1.85–7.62)
NEUTS SEG NFR BLD AUTO: 69 % (ref 43–75)
NRBC BLD AUTO-RTO: 0 /100 WBCS
PLATELET # BLD AUTO: 196 THOUSANDS/UL (ref 149–390)
PMV BLD AUTO: 11.6 FL (ref 8.9–12.7)
RBC # BLD AUTO: 3.81 MILLION/UL (ref 3.81–5.12)
TIBC SERPL-MCNC: 461 UG/DL (ref 250–450)
UIBC SERPL-MCNC: 415 UG/DL (ref 155–355)
VIT B12 SERPL-MCNC: 108 PG/ML (ref 180–914)
WBC # BLD AUTO: 5.22 THOUSAND/UL (ref 4.31–10.16)

## 2024-05-01 RX ORDER — SODIUM CHLORIDE 9 MG/ML
20 INJECTION, SOLUTION INTRAVENOUS ONCE
OUTPATIENT
Start: 2024-05-13

## 2024-05-01 RX ORDER — CYANOCOBALAMIN 1000 UG/ML
1000 INJECTION, SOLUTION INTRAMUSCULAR; SUBCUTANEOUS ONCE
OUTPATIENT
Start: 2024-05-13 | End: 2024-05-13

## 2024-05-01 NOTE — TELEPHONE ENCOUNTER
Reviewed patient symptoms with Dr. Maya  Attempted to phone patient with Dr. Maya recommendations for venofer X 5 with B12 and follow up labs prior to office visit in July.  Left voice message with Dr. Maya recommendations.  Provided direct call back number for questions.

## 2024-05-01 NOTE — TELEPHONE ENCOUNTER
Patient Call    Who are you speaking with? Patient    If it is not the patient, are they listed on an active communication consent form? Yes   What is the reason for this call? Feeling very tired, can't function, weak, need treatment plan scheduled   Does this require a call back? Yes   If a call back is required, please list best call back number [phone]    If a call back is required, advise that a message will be forwarded to their care team and someone will return their call as soon as possible.   Did you relay this information to the patient? Yes

## 2024-05-01 NOTE — TELEPHONE ENCOUNTER
Appointment Schedule   Who are you speaking with? Patient   If it is not the patient, are they listed on an active communication consent form? Yes   Which provider is the appointment scheduled with? Dr. Maya   At which location is the appointment scheduled for? [place]   When is the appointment scheduled?  Please list date and time 7/24   What is the reason for this appointment? Tired, weak, need infusion   Did patient voice understanding of the details of this appointment? Yes   Was the no show policy reviewed with patient? Yes

## 2024-05-02 RX ORDER — ESCITALOPRAM OXALATE 20 MG/1
20 TABLET ORAL DAILY
Qty: 90 TABLET | Refills: 1 | Status: SHIPPED | OUTPATIENT
Start: 2024-05-02

## 2024-05-02 NOTE — TELEPHONE ENCOUNTER
Spoke with patient and went over scheduled dates and times. Patient Is aware and understanding of her upcoming appointments

## 2024-05-03 ENCOUNTER — TELEPHONE (OUTPATIENT)
Dept: FAMILY MEDICINE CLINIC | Facility: CLINIC | Age: 44
End: 2024-05-03

## 2024-05-03 ENCOUNTER — APPOINTMENT (EMERGENCY)
Dept: RADIOLOGY | Facility: HOSPITAL | Age: 44
End: 2024-05-03
Payer: COMMERCIAL

## 2024-05-03 ENCOUNTER — HOSPITAL ENCOUNTER (EMERGENCY)
Facility: HOSPITAL | Age: 44
Discharge: HOME | End: 2024-05-03
Attending: EMERGENCY MEDICINE | Admitting: EMERGENCY MEDICINE
Payer: COMMERCIAL

## 2024-05-03 VITALS
HEART RATE: 100 BPM | TEMPERATURE: 98 F | SYSTOLIC BLOOD PRESSURE: 154 MMHG | DIASTOLIC BLOOD PRESSURE: 70 MMHG | RESPIRATION RATE: 18 BRPM | OXYGEN SATURATION: 99 %

## 2024-05-03 DIAGNOSIS — R42 DIZZINESS: ICD-10-CM

## 2024-05-03 DIAGNOSIS — R53.83 FATIGUE: Primary | ICD-10-CM

## 2024-05-03 LAB
ALBUMIN SERPL BCP-MCNC: 4.2 G/DL (ref 3.5–5)
ALP SERPL-CCNC: 137 U/L (ref 34–104)
ALT SERPL W P-5'-P-CCNC: 17 U/L (ref 7–52)
ANION GAP SERPL CALCULATED.3IONS-SCNC: 10 MMOL/L (ref 4–13)
AST SERPL W P-5'-P-CCNC: 21 U/L (ref 13–39)
BACTERIA UR QL AUTO: NORMAL /HPF
BASOPHILS # BLD AUTO: 0.03 THOUSANDS/ÂΜL (ref 0–0.1)
BASOPHILS NFR BLD AUTO: 1 % (ref 0–1)
BILIRUB SERPL-MCNC: 0.62 MG/DL (ref 0.2–1)
BILIRUB UR QL STRIP: NEGATIVE
BUN SERPL-MCNC: 19 MG/DL (ref 5–25)
CALCIUM SERPL-MCNC: 9.1 MG/DL (ref 8.4–10.2)
CHLORIDE SERPL-SCNC: 100 MMOL/L (ref 96–108)
CLARITY UR: CLEAR
CO2 SERPL-SCNC: 24 MMOL/L (ref 21–32)
COLOR UR: ABNORMAL
CREAT SERPL-MCNC: 1.01 MG/DL (ref 0.6–1.3)
EOSINOPHIL # BLD AUTO: 0.09 THOUSAND/ÂΜL (ref 0–0.61)
EOSINOPHIL NFR BLD AUTO: 3 % (ref 0–6)
ERYTHROCYTE [DISTWIDTH] IN BLOOD BY AUTOMATED COUNT: 12.9 % (ref 11.6–15.1)
EXT PREGNANCY TEST URINE: NEGATIVE
EXT. CONTROL: NORMAL
FLUAV RNA RESP QL NAA+PROBE: NEGATIVE
FLUBV RNA RESP QL NAA+PROBE: NEGATIVE
GFR SERPL CREATININE-BSD FRML MDRD: 68 ML/MIN/1.73SQ M
GLUCOSE SERPL-MCNC: 260 MG/DL (ref 65–140)
GLUCOSE UR STRIP-MCNC: ABNORMAL MG/DL
HCT VFR BLD AUTO: 35.1 % (ref 34.8–46.1)
HETEROPH AB SER QL: NEGATIVE
HGB BLD-MCNC: 10.9 G/DL (ref 11.5–15.4)
HGB UR QL STRIP.AUTO: ABNORMAL
IMM GRANULOCYTES # BLD AUTO: 0.01 THOUSAND/UL (ref 0–0.2)
IMM GRANULOCYTES NFR BLD AUTO: 0 % (ref 0–2)
KETONES UR STRIP-MCNC: NEGATIVE MG/DL
LEUKOCYTE ESTERASE UR QL STRIP: NEGATIVE
LYMPHOCYTES # BLD AUTO: 1.04 THOUSANDS/ÂΜL (ref 0.6–4.47)
LYMPHOCYTES NFR BLD AUTO: 33 % (ref 14–44)
MCH RBC QN AUTO: 27.2 PG (ref 26.8–34.3)
MCHC RBC AUTO-ENTMCNC: 31.1 G/DL (ref 31.4–37.4)
MCV RBC AUTO: 88 FL (ref 82–98)
MONOCYTES # BLD AUTO: 0.23 THOUSAND/ÂΜL (ref 0.17–1.22)
MONOCYTES NFR BLD AUTO: 7 % (ref 4–12)
NEUTROPHILS # BLD AUTO: 1.79 THOUSANDS/ÂΜL (ref 1.85–7.62)
NEUTS SEG NFR BLD AUTO: 56 % (ref 43–75)
NITRITE UR QL STRIP: NEGATIVE
NON-SQ EPI CELLS URNS QL MICRO: NORMAL /HPF
NRBC BLD AUTO-RTO: 0 /100 WBCS
PH UR STRIP.AUTO: 6 [PH]
PLATELET # BLD AUTO: 212 THOUSANDS/UL (ref 149–390)
PMV BLD AUTO: 10.4 FL (ref 8.9–12.7)
POTASSIUM SERPL-SCNC: 3.7 MMOL/L (ref 3.5–5.3)
PROT SERPL-MCNC: 7.5 G/DL (ref 6.4–8.4)
PROT UR STRIP-MCNC: NEGATIVE MG/DL
RBC # BLD AUTO: 4.01 MILLION/UL (ref 3.81–5.12)
RBC #/AREA URNS AUTO: NORMAL /HPF
RSV RNA RESP QL NAA+PROBE: NEGATIVE
SARS-COV-2 RNA RESP QL NAA+PROBE: NEGATIVE
SODIUM SERPL-SCNC: 134 MMOL/L (ref 135–147)
SP GR UR STRIP.AUTO: <=1.005
TSH SERPL DL<=0.05 MIU/L-ACNC: 1.08 UIU/ML (ref 0.45–4.5)
UROBILINOGEN UR QL STRIP.AUTO: 0.2 E.U./DL
WBC # BLD AUTO: 3.19 THOUSAND/UL (ref 4.31–10.16)
WBC #/AREA URNS AUTO: NORMAL /HPF

## 2024-05-03 PROCEDURE — 96360 HYDRATION IV INFUSION INIT: CPT

## 2024-05-03 PROCEDURE — 80053 COMPREHEN METABOLIC PANEL: CPT

## 2024-05-03 PROCEDURE — 99285 EMERGENCY DEPT VISIT HI MDM: CPT

## 2024-05-03 PROCEDURE — 93005 ELECTROCARDIOGRAM TRACING: CPT

## 2024-05-03 PROCEDURE — 85025 COMPLETE CBC W/AUTO DIFF WBC: CPT

## 2024-05-03 PROCEDURE — 36415 COLL VENOUS BLD VENIPUNCTURE: CPT

## 2024-05-03 PROCEDURE — 81001 URINALYSIS AUTO W/SCOPE: CPT

## 2024-05-03 PROCEDURE — 86308 HETEROPHILE ANTIBODY SCREEN: CPT | Performed by: EMERGENCY MEDICINE

## 2024-05-03 PROCEDURE — 84443 ASSAY THYROID STIM HORMONE: CPT | Performed by: EMERGENCY MEDICINE

## 2024-05-03 PROCEDURE — 81025 URINE PREGNANCY TEST: CPT

## 2024-05-03 PROCEDURE — 0241U HB NFCT DS VIR RESP RNA 4 TRGT: CPT | Performed by: EMERGENCY MEDICINE

## 2024-05-03 PROCEDURE — 71046 X-RAY EXAM CHEST 2 VIEWS: CPT

## 2024-05-03 RX ADMIN — SODIUM CHLORIDE 1000 ML: 0.9 INJECTION, SOLUTION INTRAVENOUS at 14:21

## 2024-05-03 NOTE — DISCHARGE INSTRUCTIONS
Increase hydration and rest. Maintain close monitoring and control of blood sugars. Follow-up with PCP, hematology, and return to ED for any worsening symptoms.

## 2024-05-03 NOTE — ED PROVIDER NOTES
"History  Chief Complaint   Patient presents with    Weakness - Generalized     Patient comes in for generalized weakness. Reports that she is severely anemic and due for an infusion 5/16 but feels that she can't wait until then.     Patient is a 44-year-old female with a past medical history including iron-deficiency anemia, diabetes mellitus type 1, and thyroid disease. Presents today for a chief complaint of generalized weakness. Patient states that she experienced a quick onset of weakness 1 week ago which has been progressive over the last week. Has been experiencing fatigue, headaches, dizziness, and lightheadedness. Dizziness and lightheadedness only upon standing or with ambulation. States that she will occasionally feel weak secondary to her anemia/B12 deficiency but she states that the fatigue is much worse than what she typically experiences. Last infusion was about 2 years ago and she is scheduled for an iron/B12 infusion on 05/16. Reports normal PO intake. Denies any fever, chills, chest pain, shortness of breath, abdominal pain, nausea, or vomiting. Denies any recent travel, illness, or illness exposure.        Prior to Admission Medications   Prescriptions Last Dose Informant Patient Reported? Taking?   Accu-Chek FastClix Lancets MISC   No No   Sig: USE 1 LANCET TO TEST BLOOD SUGAR UP TO 6 TIMES DAILY   Continuous Blood Gluc  (FreeStyle Samantha [address]) KELVIN   No No   Sig: Use 1 Units in the morning   Continuous Blood Gluc Sensor (FreeStyle Samantha 2 Sensor) [facility]   No No   Sig: Use 1 unit every 14 days   INS SYRINGE/NEEDLE 1CC/28G (B-D INS SYR MICROFINE 1CC/28G) 28G X 1/2\" 1 ML MISC   No No   Sig: Inject under the skin 4 (four) times a day   Insulin Aspart (NovoLOG) 100 units/mL injection   No No   Sig: Sliding scale up to 3 times per day with meals. Max 15 units per day   Insulin Pen Needle (BD Pen Needle Deanna U/F) 32G X 4 MM MISC   No No   Sig: Use 4/day   Lantus 100 UNIT/ML subcutaneous " injection   No No   Sig: INJECT 15 UNITS UNDER THE SKIN DAILY. DISCARD AFTER 28 DAYS.   acetaminophen (TYLENOL) 500 mg tablet  Self Yes No   Sig:     butalbital-acetaminophen-caffeine (Esgic) -40 mg per tablet   No No   Sig: Take 1 tablet by mouth every 6 (six) hours as needed for headaches for up to 15 doses   clindamycin (CLEOCIN) 300 MG capsule   Yes No   Sig: TAKE ONE CAPSULE EVERY 6 HOURS UNTIL GONE   escitalopram (LEXAPRO) 20 mg tablet   No No   Sig: Take 1 tablet (20 mg total) by mouth daily   folic acid (FOLVITE) 1 mg tablet   No No   Sig: Take 1 tablet (1 mg total) by mouth daily   gabapentin (NEURONTIN) 600 MG tablet   No No   Sig: Take 600 mg before bed.   glucose blood (Accu-Chek Guide) test strip  Self No No   Sig: Use to test blood sugar up to 6 times daily.   levothyroxine 200 mcg tablet   No No   Sig: Take 1 tablet (200 mcg total) by mouth every morning   levothyroxine 50 mcg tablet   No No   Sig: TAKE 1 TABLET (50 MCG TOTAL) BY MOUTH DAILY IN THE EARLY MORNING COMBINE WITH 200MCG, FOR TOTAL 225MCG DAILY.   ondansetron (ZOFRAN-ODT) 4 mg disintegrating tablet   No No   Sig: Take 1 tablet (4 mg total) by mouth every 6 (six) hours as needed for nausea or vomiting   thiamine 100 MG tablet   No No   Sig: Take 1 tablet (100 mg total) by mouth daily   traZODone (DESYREL) 100 mg tablet   No No   Sig: Take 1 tablet (100 mg total) by mouth daily at bedtime      Facility-Administered Medications: None       Past Medical History:   Diagnosis Date    Anemia     Anxiety     B12 deficiency     Cervical disc disorder     On gabapentin     Depression     Diabetes mellitus (HCC)     Disease of thyroid gland     hypothyroid    Iron deficiency anemia     Panic attack     PTSD (post-traumatic stress disorder)     Sleep difficulties     Substance abuse (HCC)     Type 1 diabetes (HCC)     Vitamin D deficiency        Past Surgical History:   Procedure Laterality Date    ABDOMINAL SURGERY      gastric bypass      SECTION      x2    CHOLECYSTECTOMY  2018    GASTRIC BYPASS  2007    INTRAUTERINE DEVICE INSERTION  2015    IR BIOPSY BONE MARROW  2020    OTHER SURGICAL HISTORY      surgery for imperforate hymen/endometriosis/hydrometrocolpos    ROOT CANAL  2024    TUBAL LIGATION      WISDOM TOOTH EXTRACTION         Family History   Problem Relation Age of Onset    Thyroid disease Mother     URIEL disease Mother     Hyperlipidemia Mother     Hypertension Mother     Osteoarthritis Mother     Thyroid disease unspecified Mother     Diabetes Father     Alcohol abuse Father     Diabetes type I Father     Thyroid disease Sister     Depression Sister     Thyroid disease unspecified Sister     Anxiety disorder Sister     Heart disease Maternal Grandmother     Hypertension Maternal Grandmother     Arthritis Maternal Grandmother     Diabetes type I Maternal Uncle     Diabetes type I Maternal Grandfather      I have reviewed and agree with the history as documented.    E-Cigarette/Vaping    E-Cigarette Use Never User      E-Cigarette/Vaping Substances    Nicotine No     THC No     CBD No     Flavoring No     Other No     Unknown No      Social History     Tobacco Use    Smoking status: Never    Smokeless tobacco: Never   Vaping Use    Vaping status: Never Used   Substance Use Topics    Alcohol use: Yes     Comment: a couple beers a day    Drug use: No       Review of Systems   Constitutional:  Positive for fatigue. Negative for chills and fever.   HENT:  Negative for congestion and rhinorrhea.    Respiratory:  Negative for chest tightness and shortness of breath.    Cardiovascular:  Negative for chest pain and palpitations.   Gastrointestinal:  Negative for abdominal pain, nausea and vomiting.   Musculoskeletal:  Positive for myalgias. Back pain: generalized.  Neurological:  Positive for dizziness, light-headedness and headaches. Negative for weakness.       Physical Exam  Physical Exam  Vitals and nursing note  reviewed.   Constitutional:       Appearance: Normal appearance.   HENT:      Head: Normocephalic and atraumatic.      Mouth/Throat:      Mouth: Mucous membranes are moist.      Pharynx: Oropharynx is clear.   Eyes:      Extraocular Movements: Extraocular movements intact.      Conjunctiva/sclera: Conjunctivae normal.      Pupils: Pupils are equal, round, and reactive to light.      Comments: Pupils 4+ and reactive bilaterally.    Cardiovascular:      Rate and Rhythm: Normal rate and regular rhythm.      Heart sounds: Normal heart sounds.   Pulmonary:      Effort: Pulmonary effort is normal.      Breath sounds: Normal breath sounds. No wheezing.   Abdominal:      General: Abdomen is flat. Bowel sounds are normal. There is no distension.      Tenderness: There is no abdominal tenderness.   Musculoskeletal:         General: Normal range of motion.      Cervical back: Normal range of motion.      Comments: Normal strength in all extremities.   Skin:     General: Skin is warm and dry.      Capillary Refill: Capillary refill takes less than 2 seconds.   Neurological:      General: No focal deficit present.      Mental Status: She is alert and oriented to person, place, and time.   Psychiatric:         Mood and Affect: Mood normal.         Behavior: Behavior normal.       Vital Signs  ED Triage Vitals [05/03/24 1347]   Temperature Pulse Respirations Blood Pressure SpO2   98 °F (36.7 °C) 100 18 154/70 99 %      Temp Source Heart Rate Source Patient Position - Orthostatic VS BP Location FiO2 (%)   Temporal -- Sitting Left arm --      Pain Score       No Pain           Vitals:    05/03/24 1347   BP: 154/70   Pulse: 100   Patient Position - Orthostatic VS: Sitting         Visual Acuity      ED Medications  Medications   sodium chloride 0.9 % bolus 1,000 mL (0 mL Intravenous Stopped 5/3/24 1540)       Diagnostic Studies  Results Reviewed       Procedure Component Value Units Date/Time    Urine Microscopic [[id number]]  (Normal)  Collected: 05/03/24 1459    Lab Status: Final result Specimen: Urine, Clean Catch Updated: 05/03/24 1602     RBC, UA 0-1 /hpf      WBC, UA 0-1 /hpf      Epithelial Cells Occasional /hpf      Bacteria, UA Occasional /hpf     TSH, 3rd generation with Free T4 reflex [[id number]]  (Normal) Collected: 05/03/24 1417    Lab Status: Final result Specimen: Blood from Arm, Right Updated: 05/03/24 1525     TSH 3RD GENERATON 1.078 uIU/mL     Mononucleosis screen [[id number]] Collected: 05/03/24 1502    Lab Status: In process Specimen: Blood from Arm, Right Updated: 05/03/24 1520    UA (URINE) with reflex to Scope [[id number]]  (Abnormal) Collected: 05/03/24 1459    Lab Status: Final result Specimen: Urine, Clean Catch Updated: 05/03/24 1517     Color, UA Straw     Clarity, UA Clear     Specific Gravity, UA <=1.005     pH, UA 6.0     Leukocytes, UA Negative     Nitrite, UA Negative     Protein, UA Negative mg/dl      Glucose, UA 3+ mg/dl      Ketones, UA Negative mg/dl      Urobilinogen, UA 0.2 E.U./dl      Bilirubin, UA Negative     Occult Blood, UA Trace-Intact    FLU/RSV/COVID - if FLU/RSV clinically relevant [[id number]]  (Normal) Collected: 05/03/24 1424    Lab Status: Final result Specimen: Nares from Nose Updated: 05/03/24 1508     SARS-CoV-2 Negative     INFLUENZA A PCR Negative     INFLUENZA B PCR Negative     RSV PCR Negative    Narrative:      FOR PEDIATRIC PATIENTS - copy/paste COVID Guidelines URL to browser: [email or web address]    SARS-CoV-2 assay is a Nucleic Acid Amplification assay intended for the  qualitative detection of nucleic acid from SARS-CoV-2 in nasopharyngeal  swabs. Results are for the presumptive identification of SARS-CoV-2 RNA.    Positive results are indicative of infection with SARS-CoV-2, the virus  causing COVID-19, but do not rule out bacterial infection or co-infection  with other viruses. Laboratories within the [place] and  its  territories are required to report all positive results to the appropriate  public health authorities. Negative results do not preclude SARS-CoV-2  infection and should not be used as the sole basis for treatment or other  patient management decisions. Negative results must be combined with  clinical observations, patient history, and epidemiological information.  This test has not been FDA cleared or approved.    This test has been authorized by FDA under an Emergency Use Authorization  (EUA). This test is only authorized for the duration of time the  declaration that circumstances exist justifying the authorization of the  emergency use of an in vitro diagnostic tests for detection of SARS-CoV-2  virus and/or diagnosis of COVID-19 infection under section 564(b)(1) of  the Act, 21 U.S.C. 360bbb-3(b)(1), unless the authorization is terminated  or revoked sooner. The test has been validated but independent review by FDA  and CLIA is pending.    Test performed using [employer] GeneXpert: This RT-PCR assay targets N2,  a region unique to SARS-CoV-2. A conserved region in the E-gene was chosen  for pan-Sarbecovirus detection which includes SARS-CoV-2.    According to CMS-2020-01-R, this platform meets the definition of high-throughput technology.    POCT pregnancy, urine [[id number]]  (Normal) Resulted: 05/03/24 1503    Lab Status: Final result Updated: 05/03/24 1503     EXT Preg Test, Ur Negative     Control Valid    Comprehensive metabolic panel [[id number]]  (Abnormal) Collected: 05/03/24 1417    Lab Status: Final result Specimen: Blood from Arm, Right Updated: 05/03/24 1457     Sodium 134 mmol/L      Potassium 3.7 mmol/L      Chloride 100 mmol/L      CO2 24 mmol/L      ANION GAP 10 mmol/L      BUN 19 mg/dL      Creatinine 1.01 mg/dL      Glucose 260 mg/dL      Calcium 9.1 mg/dL      AST 21 U/L      ALT 17 U/L      Alkaline Phosphatase 137 U/L      Total Protein 7.5 g/dL      Albumin 4.2 g/dL      Total Bilirubin  0.62 mg/dL      eGFR 68 ml/min/1.73sq m     Narrative:      National Kidney Disease Foundation guidelines for Chronic Kidney Disease (CKD):     Stage 1 with normal or high GFR (GFR > 90 mL/min/1.73 square meters)    Stage 2 Mild CKD (GFR = 60-89 mL/min/1.73 square meters)    Stage 3A Moderate CKD (GFR = 45-59 mL/min/1.73 square meters)    Stage 3B Moderate CKD (GFR = 30-44 mL/min/1.73 square meters)    Stage 4 Severe CKD (GFR = 15-29 mL/min/1.73 square meters)    Stage 5 End Stage CKD (GFR <15 mL/min/1.73 square meters)  Note: GFR calculation is accurate only with a steady state creatinine    CBC and differential [[id number]]  (Abnormal) Collected: 05/03/24 1417    Lab Status: Final result Specimen: Blood from Arm, Right Updated: 05/03/24 1425     WBC 3.19 Thousand/uL      RBC 4.01 Million/uL      Hemoglobin 10.9 g/dL      Hematocrit 35.1 %      MCV 88 fL      MCH 27.2 pg      MCHC 31.1 g/dL      RDW 12.9 %      MPV 10.4 fL      Platelets 212 Thousands/uL      nRBC 0 /100 WBCs      Segmented % 56 %      Immature Grans % 0 %      Lymphocytes % 33 %      Monocytes % 7 %      Eosinophils Relative 3 %      Basophils Relative 1 %      Absolute Neutrophils 1.79 Thousands/µL      Absolute Immature Grans 0.01 Thousand/uL      Absolute Lymphocytes 1.04 Thousands/µL      Absolute Monocytes 0.23 Thousand/µL      Eosinophils Absolute 0.09 Thousand/µL      Basophils Absolute 0.03 Thousands/µL                    XR chest 2 views   ED Interpretation by JAROCHO Mario (05/03 9004)   No pneumothorax or pneumonia visualized.                  Procedures  ECG 12 Lead Documentation Only    Date/Time: 5/3/2024 2:36 PM    Performed by: JAROCHO Mario  Authorized by: JAROCHO Mario    Indications / Diagnosis:  Dizziness  ECG reviewed by me, the ED Provider: yes    Patient location:  ED  Previous ECG:     Previous ECG:  Compared to current    Comparison ECG info:  NSR, HR=83    Similarity:  No change     Comparison to cardiac monitor: Yes    Interpretation:     Interpretation: normal    Rate:     ECG rate:  100    ECG rate assessment: tachycardic    Rhythm:     Rhythm: sinus tachycardia    Ectopy:     Ectopy: none    QRS:     QRS axis:  Normal  Conduction:     Conduction: normal    ST segments:     ST segments:  Normal  T waves:     T waves: normal             ED Course  ED Course as of 05/03/24 1634   Fri May 03, 2024   1454 CBC and differential(!)  WBC 3.19. Hemoglobin stable at baseline.    1505 Comprehensive metabolic panel(!)  Elevated glucose=260 and alk phos=137.   1509 FLU/RSV/COVID - if FLU/RSV clinically relevant  Negative result   1548 TSH, 3rd generation with Free T4 reflex  Normal TSH   1602 Urine Microscopic  Normal.                                SBIRT 20yo+      Flowsheet Row Most Recent Value   Initial Alcohol Screen: US AUDIT-C     1. How often do you have a drink containing alcohol? 0 Filed at: 05/03/2024 1346   2. How many drinks containing alcohol do you have on a typical day you are drinking?  0 Filed at: 05/03/2024 1346   3a. Male UNDER 65: How often do you have five or more drinks on one occasion? 0 Filed at: 05/03/2024 1346   3b. FEMALE Any Age, or MALE 65+: How often do you have 4 or more drinks on one occassion? 0 Filed at: 05/03/2024 1346   Audit-C Score 0 Filed at: 05/03/2024 1346   MICHELLE: How many times in the past year have you...    Used an illegal drug or used a prescription medication for non-medical reasons? Never Filed at: 05/03/2024 1346                      Medical Decision Making  Patient is a 44-year-old female presenting for evaluation of generalized weakness. Differentials include but are not limited to: anemia, arrhythmia, electrolyte imbalance, thyroid disease, pneumonia, mononucleosis, urinary tract infection, COVID, influenza, and RSV. Low concern for neurological compromise given complete neurologic exam intact.    Discussed with patient treatment plan to include: blood  work (CBC, CMP, TSH, mononucleosis), EKG, chest x-ray, IV hydration, urinalysis, and COVID/flu/RSV swab. Blood work does not show any indication of acute infection, electrolyte imbalance, or thyroid dysfunction. Stable hemoglobin and WBC at baseline compared to previous tests. Pending mononucleosis result. Urinalysis does not indicate a UTI is present. Chest x-ray does not show signs of pneumonia. Patient is COVID/flu/RSV negative. Reviewed strict ED return precautions with patient. Encouraged patient to follow-up with her PCP and hematologist for further management of her infusions. Patient verbalizes understanding of discharge instructions and follow-up care at this time.     Amount and/or Complexity of Data Reviewed  Labs: ordered. Decision-making details documented in ED Course.     Details: Reviewed.  Radiology: ordered and independent interpretation performed.     Details: Reviewed.              Disposition  Final diagnoses:   Fatigue   Dizziness     Time reflects when diagnosis was documented in both MDM as applicable and the Disposition within this note       Time User Action Codes Description Comment    5/3/2024  4:03 PM Sophia Esparza Add [R53.83] Fatigue     5/3/2024  4:03 PM Sophia Esparza Add [R42] Dizziness           ED Disposition       ED Disposition   Discharge    Condition   Stable    Date/Time   Fri May 3, 2024 1603    Comment   Clarissa Webb discharge to home/self care.                   Follow-up Information       Follow up With Specialties Details Why Contact Info Additional Information    Annette Scales MD Internal Medicine Call in 1 week  [address] [facility] [id number] [phone]       [facility] Emergency Medicine  If symptoms worsen [address] [place] [id number] [phone] [facility] Emergency Department, [address] [place], [place] [address]            Discharge  "Medication List as of 5/3/2024  4:05 PM        CONTINUE these medications which have NOT CHANGED    Details   Accu-Chek FastClix Lancets MISC USE 1 LANCET TO TEST BLOOD SUGAR UP TO 6 TIMES DAILY, Normal      acetaminophen (TYLENOL) 500 mg tablet  , Starting Sun 5/2/2021, Historical Med      butalbital-acetaminophen-caffeine (Esgic) -40 mg per tablet Take 1 tablet by mouth every 6 (six) hours as needed for headaches for up to 15 doses, Starting Wed 10/18/2023, Normal      clindamycin (CLEOCIN) 300 MG capsule TAKE ONE CAPSULE EVERY 6 HOURS UNTIL GONE, Historical Med      Continuous Blood Gluc  (FreeStyle Samantha 14 Day [place]) KELVIN Use 1 Units in the morning, Starting Tue 2/20/2024, No Print      Continuous Blood Gluc Sensor (FreeStyle Samantha 2 Sensor) MISC Use 1 unit every 14 days, Normal      escitalopram (LEXAPRO) 20 mg tablet Take 1 tablet (20 mg total) by mouth daily, Starting Thu 5/2/2024, Normal      folic acid (FOLVITE) 1 mg tablet Take 1 tablet (1 mg total) by mouth daily, Starting Fri 2/16/2024, Normal      gabapentin (NEURONTIN) 600 MG tablet Take 600 mg before bed., Normal      glucose blood (Accu-Chek Guide) test strip Use to test blood sugar up to 6 times daily., Normal      INS SYRINGE/NEEDLE 1CC/28G (B-D INS SYR MICROFINE 1CC/28G) 28G X 1/2\" 1 ML MISC Inject under the skin 4 (four) times a day, Starting Tue 3/26/2024, Normal      Insulin Aspart (NovoLOG) 100 units/mL injection Sliding scale up to 3 times per day with meals. Max 15 units per day, Normal      Insulin Pen Needle (BD Pen Needle Deanna U/F) 32G X 4 MM MISC Use 4/day, Normal      Lantus 100 UNIT/ML subcutaneous injection INJECT 15 UNITS UNDER THE SKIN DAILY. DISCARD AFTER 28 DAYS., Normal      !! levothyroxine 200 mcg tablet Take 1 tablet (200 mcg total) by mouth every morning, Starting Tue 3/26/2024, Normal      !! levothyroxine 50 mcg tablet TAKE 1 TABLET (50 MCG TOTAL) BY MOUTH DAILY IN THE EARLY MORNING COMBINE WITH 200MCG, FOR " TOTAL 225MCG DAILY., Starting Tue 4/23/2024, Normal      ondansetron (ZOFRAN-ODT) 4 mg disintegrating tablet Take 1 tablet (4 mg total) by mouth every 6 (six) hours as needed for nausea or vomiting, Starting Wed 10/18/2023, Normal      thiamine 100 MG tablet Take 1 tablet (100 mg total) by mouth daily, Starting Fri 2/16/2024, Normal      traZODone (DESYREL) 100 mg tablet Take 1 tablet (100 mg total) by mouth daily at bedtime, Starting Thu 4/4/2024, Normal       !! - Potential duplicate medications found. Please discuss with provider.          No discharge procedures on file.    PDMP Review       None            ED Provider  Electronically Signed by             JAROCHO Mario  05/03/24 7836

## 2024-05-03 NOTE — TELEPHONE ENCOUNTER
----- Message from Annette Scales MD sent at 5/3/2024 10:48 AM EDT -----  B12 very low, does she receive injections for this? If not, would recommend weekly IM x 4 weeks, then monthly thereafter. Iron stores also low with mild anemia. Does she receive iron infusions?

## 2024-05-06 LAB
ATRIAL RATE: 100 BPM
P AXIS: 73 DEGREES
PR INTERVAL: 140 MS
QRS AXIS: 92 DEGREES
QRSD INTERVAL: 84 MS
QT INTERVAL: 344 MS
QTC INTERVAL: 443 MS
T WAVE AXIS: 42 DEGREES
VENTRICULAR RATE: 100 BPM

## 2024-05-06 PROCEDURE — 93010 ELECTROCARDIOGRAM REPORT: CPT | Performed by: INTERNAL MEDICINE

## 2024-05-16 ENCOUNTER — HOSPITAL ENCOUNTER (OUTPATIENT)
Dept: INFUSION CENTER | Facility: HOSPITAL | Age: 44
End: 2024-05-16
Attending: INTERNAL MEDICINE
Payer: COMMERCIAL

## 2024-05-16 VITALS
RESPIRATION RATE: 16 BRPM | DIASTOLIC BLOOD PRESSURE: 70 MMHG | HEART RATE: 88 BPM | TEMPERATURE: 96.9 F | OXYGEN SATURATION: 100 % | SYSTOLIC BLOOD PRESSURE: 126 MMHG

## 2024-05-16 DIAGNOSIS — E10.65 TYPE 1 DIABETES MELLITUS WITH HYPERGLYCEMIA (HCC): ICD-10-CM

## 2024-05-16 DIAGNOSIS — E53.8 B12 DEFICIENCY: Primary | ICD-10-CM

## 2024-05-16 DIAGNOSIS — D50.8 OTHER IRON DEFICIENCY ANEMIA: ICD-10-CM

## 2024-05-16 PROCEDURE — 96372 THER/PROPH/DIAG INJ SC/IM: CPT

## 2024-05-16 PROCEDURE — 96365 THER/PROPH/DIAG IV INF INIT: CPT

## 2024-05-16 RX ORDER — SYRINGE AND NEEDLE,INSULIN,1ML 28GX1/2"
SYRINGE, EMPTY DISPOSABLE MISCELLANEOUS
Qty: 100 EACH | Refills: 1 | Status: SHIPPED | OUTPATIENT
Start: 2024-05-16

## 2024-05-16 RX ORDER — CYANOCOBALAMIN 1000 UG/ML
1000 INJECTION, SOLUTION INTRAMUSCULAR; SUBCUTANEOUS ONCE
Status: COMPLETED | OUTPATIENT
Start: 2024-05-16 | End: 2024-05-16

## 2024-05-16 RX ORDER — SODIUM CHLORIDE 9 MG/ML
20 INJECTION, SOLUTION INTRAVENOUS ONCE
Status: COMPLETED | OUTPATIENT
Start: 2024-05-16 | End: 2024-05-16

## 2024-05-16 RX ORDER — CYANOCOBALAMIN 1000 UG/ML
1000 INJECTION, SOLUTION INTRAMUSCULAR; SUBCUTANEOUS ONCE
Status: CANCELLED | OUTPATIENT
Start: 2024-05-23 | End: 2024-05-23

## 2024-05-16 RX ORDER — SODIUM CHLORIDE 9 MG/ML
20 INJECTION, SOLUTION INTRAVENOUS ONCE
Status: CANCELLED | OUTPATIENT
Start: 2024-05-23

## 2024-05-16 RX ADMIN — IRON SUCROSE 200 MG: 20 INJECTION, SOLUTION INTRAVENOUS at 08:32

## 2024-05-16 RX ADMIN — CYANOCOBALAMIN 1000 MCG: 1000 INJECTION INTRAMUSCULAR; SUBCUTANEOUS at 09:15

## 2024-05-16 RX ADMIN — SODIUM CHLORIDE 20 ML/HR: 0.9 INJECTION, SOLUTION INTRAVENOUS at 08:30

## 2024-05-16 NOTE — PROGRESS NOTES
Clarissa Webb  tolerated venofer and vit b 12 injection to rt arm well with no complications.      Clarissa Webb is aware of future appt on 5/23 1430    AVS printed and given to Clarissa Webb:  No (Declined by Clarissa Webb)

## 2024-05-22 ENCOUNTER — OFFICE VISIT (OUTPATIENT)
Dept: ENDOCRINOLOGY | Facility: CLINIC | Age: 44
End: 2024-05-22
Payer: COMMERCIAL

## 2024-05-22 VITALS
TEMPERATURE: 97.9 F | SYSTOLIC BLOOD PRESSURE: 126 MMHG | BODY MASS INDEX: 27.31 KG/M2 | HEART RATE: 80 BPM | OXYGEN SATURATION: 100 % | WEIGHT: 169.2 LBS | DIASTOLIC BLOOD PRESSURE: 70 MMHG

## 2024-05-22 DIAGNOSIS — E03.8 HYPOTHYROIDISM DUE TO HASHIMOTO'S THYROIDITIS: ICD-10-CM

## 2024-05-22 DIAGNOSIS — E06.3 HYPOTHYROIDISM DUE TO HASHIMOTO'S THYROIDITIS: ICD-10-CM

## 2024-05-22 DIAGNOSIS — E10.65 TYPE 1 DIABETES MELLITUS WITH HYPERGLYCEMIA (HCC): Primary | ICD-10-CM

## 2024-05-22 DIAGNOSIS — E10.49 OTHER DIABETIC NEUROLOGICAL COMPLICATION ASSOCIATED WITH TYPE 1 DIABETES MELLITUS (HCC): ICD-10-CM

## 2024-05-22 PROCEDURE — 95251 CONT GLUC MNTR ANALYSIS I&R: CPT | Performed by: STUDENT IN AN ORGANIZED HEALTH CARE EDUCATION/TRAINING PROGRAM

## 2024-05-22 PROCEDURE — 99214 OFFICE O/P EST MOD 30 MIN: CPT | Performed by: STUDENT IN AN ORGANIZED HEALTH CARE EDUCATION/TRAINING PROGRAM

## 2024-05-22 NOTE — PROGRESS NOTES
"                                                                                                                    Established patient Progress Note      Cc: diabetes    Referring Provider  No referring provider defined for this encounter.     History of Present Illness:   Clarissa Webb is a 44 y.o. female with a history of type 1 diabetes  who presented for follow up.    Type 1 diabetes since age 12,   Previously was on Medtronic pump which as she describes was faulty for which she \" gets rid of it \".    Currently on MDI, on Lantus 16 units at bedtime  Novolog, with ICR : 1: 8 , ISF : 50, goal 150, on average she takes 30 units daily,    Clarissa Webb   Device used,winston 2  Home use       Indication   Type 1 Diabetes      More than 72 hours of data was reviewed. Report to be scanned to chart.     Date Range: may 9 - 22    Analysis of data:   Average Glucose: 249 mg/dl  Coefficient of Variation: 40.7%   Time in Target Range: 26%   Time Above Range: 52%   Time Below Range: 2%       Reports complications of polyneuropathy. Denies recent illness or hospitalizations.  Hypoglycemic episodes:   H/o of hypoglycemia causing hospitalization or Intervention such as glucagon injection  or ambulance call : yes  Has awareness: no      Opthamology:  UTD;   Podiatry: no    on ACE inhibitor or ARB: no  on statin: no    Thyroid disorders: hypothyroidism on levothyroxine 250 mcg daily      Patient Active Problem List   Diagnosis    Iron deficiency anemia    Vitamin D deficiency    B12 deficiency    Anxiety and depression    Depression, recurrent (HCC)    Leukopenia    THO (acute kidney injury) (HCC)    Hypothyroidism due to Hashimoto's thyroiditis    Bilateral carpal tunnel syndrome    Cervical spondylosis    H/O gastric bypass    Hydrosalpinx    Intestinal malabsorption, unspecified    Invagination of intestine (HCC)    Protrusion of cervical intervertebral disc    Overweight with body mass index (BMI) of 26 to 26.9 in " adult    Hypothyroidism    Iron deficiency anemia, unspecified    Type 1 diabetes mellitus with hyperglycemia (HCC)    Genital labial ulcer    Herpes simplex infection of perianal skin    Concussion    Hypoglycemia due to type 1 diabetes mellitus (HCC)    Alcohol use    Nausea vomiting and diarrhea    Other diabetic neurological complication associated with type 1 diabetes mellitus (HCC)      Past Medical History:   Diagnosis Date    Anemia     Anxiety     B12 deficiency     Cervical disc disorder     On gabapentin     Depression     Diabetes mellitus (HCC)     Disease of thyroid gland     hypothyroid    Iron deficiency anemia     Panic attack     PTSD (post-traumatic stress disorder)     Sleep difficulties     Substance abuse (HCC)     Type 1 diabetes (HCC)     Vitamin D deficiency       Past Surgical History:   Procedure Laterality Date    ABDOMINAL SURGERY      gastric bypass     SECTION      x2    CHOLECYSTECTOMY  2018    GASTRIC BYPASS  2007    INTRAUTERINE DEVICE INSERTION  2015    IR BIOPSY BONE MARROW  2020    OTHER SURGICAL HISTORY      surgery for imperforate hymen/endometriosis/hydrometrocolpos    ROOT CANAL  2024    TUBAL LIGATION      WISDOM TOOTH EXTRACTION        Family History   Problem Relation Age of Onset    Thyroid disease Mother     URIEL disease Mother     Hyperlipidemia Mother     Hypertension Mother     Osteoarthritis Mother     Thyroid disease unspecified Mother     Diabetes Father     Alcohol abuse Father     Diabetes type I Father     Thyroid disease Sister     Depression Sister     Thyroid disease unspecified Sister     Anxiety disorder Sister     Heart disease Maternal Grandmother     Hypertension Maternal Grandmother     Arthritis Maternal Grandmother     Diabetes type I Maternal Uncle     Diabetes type I Maternal Grandfather      Social History     Tobacco Use    Smoking status: Never    Smokeless tobacco: Never   Substance Use Topics    Alcohol use: Yes      "Comment: a couple beers a day     No Known Allergies      Current Outpatient Medications:     Accu-Chek FastClix Lancets MISC, USE 1 LANCET TO TEST BLOOD SUGAR UP TO 6 TIMES DAILY, Disp: 102 each, Rfl: 0    Continuous Blood Gluc  (FreeStyle Samantha 14 Day [place]) KELVIN, Use 1 Units in the morning, Disp: , Rfl:     Continuous Blood Gluc Sensor (FreeStyle Samantha 2 Sensor) MISC, Use 1 unit every 14 days, Disp: 6 each, Rfl: 4    gabapentin (NEURONTIN) 600 MG tablet, Take 600 mg before bed., Disp: 90 tablet, Rfl: 1    glucose blood (Accu-Chek Guide) test strip, Use to test blood sugar up to 6 times daily., Disp: 200 each, Rfl: 2    Insulin Aspart (NovoLOG) 100 units/mL injection, Sliding scale up to 3 times per day with meals. Max 15 units per day, Disp: 10 mL, Rfl: 3    Insulin Pen Needle (BD Pen Needle Deanna U/F) 32G X 4 MM MISC, Use 4/day, Disp: 100 each, Rfl: 11    Lantus 100 UNIT/ML subcutaneous injection, INJECT 15 UNITS UNDER THE SKIN DAILY. DISCARD AFTER 28 DAYS., Disp: 10 mL, Rfl: 5    levothyroxine 200 mcg tablet, Take 1 tablet (200 mcg total) by mouth every morning, Disp: 90 tablet, Rfl: 3    levothyroxine 50 mcg tablet, TAKE 1 TABLET (50 MCG TOTAL) BY MOUTH DAILY IN THE EARLY MORNING COMBINE WITH 200MCG, FOR TOTAL 225MCG DAILY., Disp: 30 tablet, Rfl: 5    traZODone (DESYREL) 100 mg tablet, Take 1 tablet (100 mg total) by mouth daily at bedtime, Disp: 30 tablet, Rfl: 0    UltiCare Insulin Syringe 28G X 1/2\" 1 ML MISC, INJECT UNDER THE SKIN 4 (FOUR) TIMES A DAY, Disp: 100 each, Rfl: 1    acetaminophen (TYLENOL) 500 mg tablet,   (Patient not taking: Reported on 5/22/2024), Disp: , Rfl:     butalbital-acetaminophen-caffeine (Esgic) -40 mg per tablet, Take 1 tablet by mouth every 6 (six) hours as needed for headaches for up to 15 doses (Patient not taking: Reported on 5/22/2024), Disp: 15 tablet, Rfl: 0    clindamycin (CLEOCIN) 300 MG capsule, TAKE ONE CAPSULE EVERY 6 HOURS UNTIL GONE (Patient not taking: " Reported on 5/22/2024), Disp: , Rfl:     escitalopram (LEXAPRO) 20 mg tablet, Take 1 tablet (20 mg total) by mouth daily (Patient not taking: Reported on 5/22/2024), Disp: 90 tablet, Rfl: 1    folic acid (FOLVITE) 1 mg tablet, Take 1 tablet (1 mg total) by mouth daily (Patient not taking: Reported on 5/22/2024), Disp: 30 tablet, Rfl: 0    ondansetron (ZOFRAN-ODT) 4 mg disintegrating tablet, Take 1 tablet (4 mg total) by mouth every 6 (six) hours as needed for nausea or vomiting (Patient not taking: Reported on 5/22/2024), Disp: 20 tablet, Rfl: 0    thiamine 100 MG tablet, Take 1 tablet (100 mg total) by mouth daily (Patient not taking: Reported on 5/22/2024), Disp: 30 tablet, Rfl: 0  Review of Systems   Constitutional:  Positive for unexpected weight change. Negative for appetite change and fatigue.   HENT:  Negative for trouble swallowing and voice change.    Eyes:  Negative for visual disturbance.   Respiratory:  Negative for cough, shortness of breath and wheezing.    Cardiovascular:  Negative for palpitations and leg swelling.   Gastrointestinal:  Negative for abdominal pain, constipation, diarrhea, nausea and vomiting.   Endocrine: Negative for cold intolerance, heat intolerance, polyphagia and polyuria.   Musculoskeletal:  Negative for arthralgias.   Skin:  Negative for color change, rash and wound.   Neurological:  Negative for dizziness, tremors, weakness, light-headedness, numbness and headaches.   Psychiatric/Behavioral:  Negative for agitation and sleep disturbance. The patient is not nervous/anxious.        Physical Exam:  Body mass index is 27.31 kg/m².  /70 (BP Location: Right arm, Patient Position: Sitting, Cuff Size: Adult)   Pulse 80   Temp 97.9 °F (36.6 °C)   Wt 76.7 kg (169 lb 3.2 oz)   SpO2 100%   BMI 27.31 kg/m²    Wt Readings from Last 3 Encounters:   05/22/24 76.7 kg (169 lb 3.2 oz)   02/20/24 74.8 kg (165 lb)   02/15/24 75.1 kg (165 lb 9.1 oz)       GEN: NAD  E/n/m nl facies,  "  Eyes: no stare or proptosis,   Ab+BS  Neuro: no tremor,   Skin: warm and dry,   Ext:  no edema bilaterally,   Psych: nl mood and affect, no gross lapses in memory      Labs:   No components found for: \"HA1C\"  No components found for: \"GLU\"    Lab Results   Component Value Date    CREATININE 1.01 05/03/2024    CREATININE 0.77 04/17/2024    CREATININE 0.84 02/15/2024    BUN 19 05/03/2024     05/16/2018    K 3.7 05/03/2024     05/03/2024    CO2 24 05/03/2024     GFR, Calculated   Date Value Ref Range Status   07/03/2019 85 >60 mL/min/1.73m2 Final     Comment:     mL/min per 1.73 square meters                                            Normal Function or Mild Renal    Disease (if clinically at risk):  >or=60  Moderately Decreased:                30-59  Severely Decreased:                  15-29  Renal Failure:                         <15                                            -American GFR: multiply reported GFR by 1.16    Please note that the eGFR is based on the CKD-EPI calculation, and is not intended to be used for drug dosing.                                            Note: Calculated GFR may not be an accurate indicator of renal function if the patient's renal function is not in a steady state.     eGFRcr   Date Value Ref Range Status   10/23/2022 95 >59 Final     eGFR   Date Value Ref Range Status   05/03/2024 68 ml/min/1.73sq m Final     No components found for: \"MALBCRER\"    Lab Results   Component Value Date     08/10/2022    TRIG 63 08/10/2022       Lab Results   Component Value Date    ALT 17 05/03/2024    AST 21 05/03/2024    ALKPHOS 137 (H) 05/03/2024    BILITOT 0.4 05/16/2018       Lab Results   Component Value Date    TSH 10.38 (H) 08/08/2023    FREET4 0.93 04/17/2024    FREET4 0.95 04/17/2024       Impression:  1. Type 1 diabetes mellitus with hyperglycemia (HCC)    2. Other diabetic neurological complication associated with type 1 diabetes mellitus (HCC)    3. " "Hypothyroidism due to Hashimoto's thyroiditis           Plan:    Problem List Items Addressed This Visit          Endocrine    Hypothyroidism due to Hashimoto's thyroiditis     She is on levothyroxine 250 mcg once daily, which had been poorly controlled, however her most recent TSH is within goal, and she is clinically euthyroid.  Will continue levothyroxine at current dose.  Advised that levotyroxine should be taken on an empty stomach. Should avoid taking medications like iron, calcium, tums, [id number], PPI at the same time that may decrease absorption of T4. Should separate administration by 4hrs.           Type 1 diabetes mellitus with hyperglycemia (HCC) - Primary       Lab Results   Component Value Date    HGBA1C 10.2 (H) 04/17/2024     Diabetes has been poorly controlled, with recent A1c of 10.2%, I reviewed importance of glycemic control to avoid complications, dietary indiscretion, possible poor adherence or bring minerals, she was on Medtronic 670 in the past, she states that it was very faulty, and she states \" I get rid of it\".  So far, her conversation with insurance company and [employer] was not successful and states she is not able to get her pump.  I reviewed her CGM report, she is on winstno 2,  I strongly believe that she will greatly benefit from diabetes technology available to her, improve self-management of diabetes, we reach out to [employer] representative, hopefully we will be able to help her to get her on pump as soon as possible.  In the meantime I increased Lantus to 18 units nightly, I decreased insulin sensitive factor to 1: 40 with goal of 150.  She will call the office in 2 weeks to download her report to make necessary changes.  Referral to CDE for MNT was recommended, she declined.  We reviewed hypoglycemia symptoms, rule of 15's to treat hypoglycemia.  Return back in 2 months.         Other diabetic neurological complication associated with type 1 diabetes mellitus (HCC)       Lab " Results   Component Value Date    HGBA1C 10.2 (H) 04/17/2024   See plan for type 1 diabetes.                  Discussed with the patient and all questioned fully answered. She will call me if any problems arise.    Counseled patient on diagnostic results, prognosis, risk and benefit of treatment options, instruction for management, importance of treatment compliance, Risk  factor reduction and impressions      Merna Aragon MD    I have spent a total time of 35 minutes on 05/22/24 in caring for this patient including Diagnostic results, Prognosis, Risks and benefits of tx options, Instructions for management, Patient and family education, Importance of tx compliance, Risk factor reductions, Impressions, Counseling / Coordination of care, Documenting in the medical record, Reviewing / ordering tests, medicine, procedures  , and Obtaining or reviewing history  .

## 2024-05-22 NOTE — ASSESSMENT & PLAN NOTE
Lab Results   Component Value Date    HGBA1C 10.2 (H) 04/17/2024   See plan for type 1 diabetes.

## 2024-05-22 NOTE — ASSESSMENT & PLAN NOTE
"  Lab Results   Component Value Date    HGBA1C 10.2 (H) 04/17/2024     Diabetes has been poorly controlled, with recent A1c of 10.2%, I reviewed importance of glycemic control to avoid complications, dietary indiscretion, possible poor adherence or bring minerals, she was on Medtronic 670 in the past, she states that it was very faulty, and she states \" I get rid of it\".  So far, her conversation with insurance company and [employer] was not successful and states she is not able to get her pump.  I reviewed her CGM report, she is on winston 2,  I strongly believe that she will greatly benefit from diabetes technology available to her, improve self-management of diabetes, we reach out to [employer] representative, hopefully we will be able to help her to get her on pump as soon as possible.  In the meantime I increased Lantus to 18 units nightly, I decreased insulin sensitive factor to 1: 40 with goal of 150.  She will call the office in 2 weeks to download her report to make necessary changes.  Referral to CDE for MNT was recommended, she declined.  We reviewed hypoglycemia symptoms, rule of 15's to treat hypoglycemia.  Return back in 2 months.  "

## 2024-05-22 NOTE — ASSESSMENT & PLAN NOTE
She is on levothyroxine 250 mcg once daily, which had been poorly controlled, however her most recent TSH is within goal, and she is clinically euthyroid.  Will continue levothyroxine at current dose.  Advised that levotyroxine should be taken on an empty stomach. Should avoid taking medications like iron, calcium, tums, [id number], PPI at the same time that may decrease absorption of T4. Should separate administration by 4hrs.

## 2024-05-22 NOTE — PATIENT INSTRUCTIONS
We increased Lantus to 18 units at night  I decreased your sensitivity to 1 unit for each 40 above 150.  Continue carb ratio 1:8  Please call our office in 2 weeks to download your winston      Treatment for HYPOGLYCEMIA- RULE OF 15  Symptoms include tremors, diaphoresis, hunger, confusion, headache  Treat IMMEDIATELY with 15 grams of Carbs  ½ cup regular juice/soda  2 tablespoons raisins  4 teaspoons sugar  1 tablespoon honey  3-4 glucose tablets  Recheck blood glucose in 15 mins and repeat above if still symptomatic/blood glucose <80

## 2024-05-23 ENCOUNTER — HOSPITAL ENCOUNTER (OUTPATIENT)
Dept: INFUSION CENTER | Facility: HOSPITAL | Age: 44
End: 2024-05-23
Attending: INTERNAL MEDICINE
Payer: COMMERCIAL

## 2024-05-23 VITALS
HEART RATE: 68 BPM | TEMPERATURE: 98 F | RESPIRATION RATE: 16 BRPM | DIASTOLIC BLOOD PRESSURE: 56 MMHG | SYSTOLIC BLOOD PRESSURE: 113 MMHG

## 2024-05-23 DIAGNOSIS — E53.8 B12 DEFICIENCY: Primary | ICD-10-CM

## 2024-05-23 DIAGNOSIS — D50.8 OTHER IRON DEFICIENCY ANEMIA: ICD-10-CM

## 2024-05-23 PROCEDURE — 96365 THER/PROPH/DIAG IV INF INIT: CPT

## 2024-05-23 PROCEDURE — 96372 THER/PROPH/DIAG INJ SC/IM: CPT

## 2024-05-23 RX ORDER — CYANOCOBALAMIN 1000 UG/ML
1000 INJECTION, SOLUTION INTRAMUSCULAR; SUBCUTANEOUS ONCE
Status: CANCELLED | OUTPATIENT
Start: 2024-05-30 | End: 2024-05-30

## 2024-05-23 RX ORDER — SODIUM CHLORIDE 9 MG/ML
20 INJECTION, SOLUTION INTRAVENOUS ONCE
Status: CANCELLED | OUTPATIENT
Start: 2024-05-30

## 2024-05-23 RX ORDER — SODIUM CHLORIDE 9 MG/ML
20 INJECTION, SOLUTION INTRAVENOUS ONCE
Status: COMPLETED | OUTPATIENT
Start: 2024-05-23 | End: 2024-05-23

## 2024-05-23 RX ORDER — CYANOCOBALAMIN 1000 UG/ML
1000 INJECTION, SOLUTION INTRAMUSCULAR; SUBCUTANEOUS ONCE
Status: COMPLETED | OUTPATIENT
Start: 2024-05-23 | End: 2024-05-23

## 2024-05-23 RX ADMIN — CYANOCOBALAMIN 1000 MCG: 1000 INJECTION, SOLUTION INTRAMUSCULAR at 15:51

## 2024-05-23 RX ADMIN — IRON SUCROSE 200 MG: 20 INJECTION, SOLUTION INTRAVENOUS at 15:04

## 2024-05-23 RX ADMIN — SODIUM CHLORIDE 20 ML/HR: 0.9 INJECTION, SOLUTION INTRAVENOUS at 15:05

## 2024-05-23 NOTE — PROGRESS NOTES
Clarissa Webb  tolerated venofer and vit b 12 injection to rt arm well with no complications.      Clarissa Webb is aware of future appt on 5/30/24 1420    AVS printed and given to Clarissa Webb:  No (Declined by Clarissa Webb)

## 2024-05-30 ENCOUNTER — HOSPITAL ENCOUNTER (OUTPATIENT)
Dept: INFUSION CENTER | Facility: HOSPITAL | Age: 44
Discharge: HOME | End: 2024-05-30
Attending: INTERNAL MEDICINE

## 2024-05-31 ENCOUNTER — HOSPITAL ENCOUNTER (OUTPATIENT)
Dept: INFUSION CENTER | Facility: CLINIC | Age: 44
End: 2024-05-31
Payer: COMMERCIAL

## 2024-05-31 VITALS
DIASTOLIC BLOOD PRESSURE: 84 MMHG | SYSTOLIC BLOOD PRESSURE: 139 MMHG | TEMPERATURE: 97.5 F | HEART RATE: 87 BPM | RESPIRATION RATE: 18 BRPM

## 2024-05-31 DIAGNOSIS — D50.8 OTHER IRON DEFICIENCY ANEMIA: ICD-10-CM

## 2024-05-31 DIAGNOSIS — E53.8 B12 DEFICIENCY: Primary | ICD-10-CM

## 2024-05-31 PROCEDURE — 96372 THER/PROPH/DIAG INJ SC/IM: CPT

## 2024-05-31 PROCEDURE — 96365 THER/PROPH/DIAG IV INF INIT: CPT

## 2024-05-31 RX ORDER — CYANOCOBALAMIN 1000 UG/ML
1000 INJECTION, SOLUTION INTRAMUSCULAR; SUBCUTANEOUS ONCE
Status: COMPLETED | OUTPATIENT
Start: 2024-05-31 | End: 2024-05-31

## 2024-05-31 RX ORDER — SODIUM CHLORIDE 9 MG/ML
20 INJECTION, SOLUTION INTRAVENOUS ONCE
Status: COMPLETED | OUTPATIENT
Start: 2024-05-31 | End: 2024-05-31

## 2024-05-31 RX ORDER — SODIUM CHLORIDE 9 MG/ML
20 INJECTION, SOLUTION INTRAVENOUS ONCE
Status: CANCELLED | OUTPATIENT
Start: 2024-06-06

## 2024-05-31 RX ORDER — CYANOCOBALAMIN 1000 UG/ML
1000 INJECTION, SOLUTION INTRAMUSCULAR; SUBCUTANEOUS ONCE
Status: CANCELLED | OUTPATIENT
Start: 2024-06-06 | End: 2024-06-06

## 2024-05-31 RX ADMIN — SODIUM CHLORIDE 20 ML/HR: 9 INJECTION, SOLUTION INTRAVENOUS at 15:50

## 2024-05-31 RX ADMIN — IRON SUCROSE 200 MG: 20 INJECTION, SOLUTION INTRAVENOUS at 15:59

## 2024-05-31 RX ADMIN — CYANOCOBALAMIN 1000 MCG: 1000 INJECTION, SOLUTION INTRAMUSCULAR at 15:53

## 2024-06-06 ENCOUNTER — HOSPITAL ENCOUNTER (OUTPATIENT)
Dept: INFUSION CENTER | Facility: CLINIC | Age: 44
Discharge: HOME | End: 2024-06-06
Payer: COMMERCIAL

## 2024-06-06 VITALS
TEMPERATURE: 97.2 F | DIASTOLIC BLOOD PRESSURE: 74 MMHG | SYSTOLIC BLOOD PRESSURE: 110 MMHG | RESPIRATION RATE: 18 BRPM | HEART RATE: 81 BPM

## 2024-06-06 DIAGNOSIS — E53.8 B12 DEFICIENCY: Primary | ICD-10-CM

## 2024-06-06 DIAGNOSIS — D50.8 OTHER IRON DEFICIENCY ANEMIA: ICD-10-CM

## 2024-06-06 PROCEDURE — 96365 THER/PROPH/DIAG IV INF INIT: CPT

## 2024-06-06 PROCEDURE — 96372 THER/PROPH/DIAG INJ SC/IM: CPT

## 2024-06-06 RX ORDER — SODIUM CHLORIDE 9 MG/ML
20 INJECTION, SOLUTION INTRAVENOUS ONCE
Status: COMPLETED | OUTPATIENT
Start: 2024-06-06 | End: 2024-06-06

## 2024-06-06 RX ORDER — CYANOCOBALAMIN 1000 UG/ML
1000 INJECTION, SOLUTION INTRAMUSCULAR; SUBCUTANEOUS ONCE
OUTPATIENT
Start: 2024-06-13 | End: 2024-06-07

## 2024-06-06 RX ORDER — CYANOCOBALAMIN 1000 UG/ML
1000 INJECTION, SOLUTION INTRAMUSCULAR; SUBCUTANEOUS ONCE
Status: COMPLETED | OUTPATIENT
Start: 2024-06-06 | End: 2024-06-06

## 2024-06-06 RX ORDER — SODIUM CHLORIDE 9 MG/ML
20 INJECTION, SOLUTION INTRAVENOUS ONCE
OUTPATIENT
Start: 2024-06-13

## 2024-06-06 RX ADMIN — SODIUM CHLORIDE 20 ML/HR: 0.9 INJECTION, SOLUTION INTRAVENOUS at 08:44

## 2024-06-06 RX ADMIN — IRON SUCROSE 200 MG: 20 INJECTION, SOLUTION INTRAVENOUS at 08:44

## 2024-06-06 RX ADMIN — CYANOCOBALAMIN 1000 MCG: 1000 INJECTION, SOLUTION INTRAMUSCULAR at 08:45

## 2024-06-06 NOTE — PROGRESS NOTES
Clarissa Webb  tolerated treatment well with no complications.      Clarissa Webb is aware of future appt on Thursday Jun 13, 2024 8:00 AM.    AVS declined by Clarissa Webb.   24-Nov-2021 15:45

## 2024-06-12 ENCOUNTER — OFFICE VISIT (OUTPATIENT)
Dept: URGENT CARE | Age: 44
End: 2024-06-12
Payer: COMMERCIAL

## 2024-06-12 VITALS
TEMPERATURE: 98 F | SYSTOLIC BLOOD PRESSURE: 151 MMHG | OXYGEN SATURATION: 99 % | HEIGHT: 66 IN | RESPIRATION RATE: 18 BRPM | DIASTOLIC BLOOD PRESSURE: 87 MMHG | HEART RATE: 83 BPM | BODY MASS INDEX: 27.16 KG/M2 | WEIGHT: 169 LBS

## 2024-06-12 DIAGNOSIS — J01.00 ACUTE NON-RECURRENT MAXILLARY SINUSITIS: ICD-10-CM

## 2024-06-12 DIAGNOSIS — J02.0 STREP PHARYNGITIS: ICD-10-CM

## 2024-06-12 DIAGNOSIS — J02.9 SORE THROAT: Primary | ICD-10-CM

## 2024-06-12 LAB — S PYO AG THROAT QL: NEGATIVE

## 2024-06-12 PROCEDURE — 87070 CULTURE OTHR SPECIMN AEROBIC: CPT

## 2024-06-12 PROCEDURE — S9083 URGENT CARE CENTER GLOBAL: HCPCS

## 2024-06-12 PROCEDURE — 99213 OFFICE O/P EST LOW 20 MIN: CPT

## 2024-06-12 PROCEDURE — 87147 CULTURE TYPE IMMUNOLOGIC: CPT

## 2024-06-12 RX ORDER — VALACYCLOVIR HYDROCHLORIDE 500 MG/1
500 TABLET, FILM COATED ORAL DAILY
COMMUNITY
Start: 2024-04-04

## 2024-06-14 ENCOUNTER — TELEPHONE (OUTPATIENT)
Dept: URGENT CARE | Age: 44
End: 2024-06-14

## 2024-06-14 LAB — BACTERIA THROAT CULT: NORMAL

## 2024-06-14 RX ORDER — AMOXICILLIN 500 MG/1
500 CAPSULE ORAL EVERY 12 HOURS SCHEDULED
Qty: 20 CAPSULE | Refills: 0 | Status: SHIPPED | OUTPATIENT
Start: 2024-06-14 | End: 2024-06-24

## 2024-06-14 NOTE — PROGRESS NOTES
[facility]        NAME: Clarissa Webb is a 44 y.o. female  : 1980    MRN: [id number]  DATE: 2024  TIME: 7:34 PM    Assessment and Plan   Sore throat [J02.9]  1. Sore throat  POCT rapid ANTIGEN strepA    Throat culture    Throat culture      2. Acute non-recurrent maxillary sinusitis        3. Strep pharyngitis  amoxicillin (AMOXIL) 500 mg capsule        Sore throat, PND, no fevers. No meds taken. Rapid strep negative. Will send culture.     - called patient with culture results-     Positive for beta-hemolytic Streptococcus, not Group A          Patient Instructions       Follow up with PCP in 3-5 days.  Proceed to  ER if symptoms worsen.    If tests have been performed at [facility], our office will contact you with results if changes need to be made to the care plan discussed with you at the visit.  [facility]    Chief Complaint     Chief Complaint   Patient presents with    Cough    Sore Throat     Patient presents with complaint of sore throat and dry cough starting last night.          History of Present Illness       Sore throat, PND, no fevers. No meds taken. Rapid strep negative. Will send culture.     Cough  Associated symptoms include postnasal drip and a sore throat. Pertinent negatives include no fever, shortness of breath or wheezing.   Sore Throat   Associated symptoms include congestion and coughing. Pertinent negatives include no shortness of breath.       Review of Systems   Review of Systems   Constitutional:  Negative for fever.   HENT:  Positive for congestion, postnasal drip and sore throat.    Respiratory:  Positive for cough. Negative for shortness of breath and wheezing.    All other systems reviewed and are negative.        Current Medications       Current Outpatient Medications:     amoxicillin (AMOXIL) 500 mg capsule, Take 1 capsule (500 mg total) by mouth every 12 (twelve) hours for 10 days, Disp: 20 capsule,  Rfl: 0    valACYclovir (VALTREX) 500 mg tablet, Take 500 mg by mouth daily, Disp: , Rfl:     Accu-Chek FastClix Lancets MISC, USE 1 LANCET TO TEST BLOOD SUGAR UP TO 6 TIMES DAILY, Disp: 102 each, Rfl: 0    acetaminophen (TYLENOL) 500 mg tablet,   (Patient not taking: Reported on 5/22/2024), Disp: , Rfl:     butalbital-acetaminophen-caffeine (Esgic) -40 mg per tablet, Take 1 tablet by mouth every 6 (six) hours as needed for headaches for up to 15 doses (Patient not taking: Reported on 5/22/2024), Disp: 15 tablet, Rfl: 0    clindamycin (CLEOCIN) 300 MG capsule, TAKE ONE CAPSULE EVERY 6 HOURS UNTIL GONE (Patient not taking: Reported on 5/22/2024), Disp: , Rfl:     Continuous Blood Gluc  (FreeStyle Samantha 14 Day [place]) [facility], Use 1 Units in the morning, Disp: , Rfl:     Continuous Blood Gluc Sensor (FreeStyle Samantha 2 Sensor) MISC, Use 1 unit every 14 days, Disp: 6 each, Rfl: 4    escitalopram (LEXAPRO) 20 mg tablet, Take 1 tablet (20 mg total) by mouth daily (Patient not taking: Reported on 5/22/2024), Disp: 90 tablet, Rfl: 1    folic acid (FOLVITE) 1 mg tablet, Take 1 tablet (1 mg total) by mouth daily (Patient not taking: Reported on 5/22/2024), Disp: 30 tablet, Rfl: 0    gabapentin (NEURONTIN) 600 MG tablet, Take 600 mg before bed., Disp: 90 tablet, Rfl: 1    glucose blood (Accu-Chek Guide) test strip, Use to test blood sugar up to 6 times daily., Disp: 200 each, Rfl: 2    Insulin Aspart (NovoLOG) 100 units/mL injection, Sliding scale up to 3 times per day with meals. Max 15 units per day, Disp: 10 mL, Rfl: 3    Insulin Pen Needle (BD Pen Needle Deanna U/F) 32G X 4 MM MISC, Use 4/day, Disp: 100 each, Rfl: 11    Lantus 100 UNIT/ML subcutaneous injection, INJECT 15 UNITS UNDER THE SKIN DAILY. DISCARD AFTER 28 DAYS., Disp: 10 mL, Rfl: 5    levothyroxine 200 mcg tablet, Take 1 tablet (200 mcg total) by mouth every morning, Disp: 90 tablet, Rfl: 3    levothyroxine 50 mcg tablet, TAKE 1 TABLET (50 MCG TOTAL) BY  "MOUTH DAILY IN THE EARLY MORNING COMBINE WITH 200MCG, FOR TOTAL 225MCG DAILY., Disp: 30 tablet, Rfl: 5    ondansetron (ZOFRAN-ODT) 4 mg disintegrating tablet, Take 1 tablet (4 mg total) by mouth every 6 (six) hours as needed for nausea or vomiting (Patient not taking: Reported on 2024), Disp: 20 tablet, Rfl: 0    thiamine 100 MG tablet, Take 1 tablet (100 mg total) by mouth daily (Patient not taking: Reported on 2024), Disp: 30 tablet, Rfl: 0    traZODone (DESYREL) 100 mg tablet, Take 1 tablet (100 mg total) by mouth daily at bedtime, Disp: 30 tablet, Rfl: 0    UltiCare Insulin Syringe 28G X 1/2\" 1 ML MISC, INJECT UNDER THE SKIN 4 (FOUR) TIMES A DAY, Disp: 100 each, Rfl: 1    Current Allergies     Allergies as of 2024    (No Known Allergies)            The following portions of the patient's history were reviewed and updated as appropriate: allergies, current medications, past family history, past medical history, past social history, past surgical history and problem list.     Past Medical History:   Diagnosis Date    Anemia     Anxiety     B12 deficiency     Cervical disc disorder     On gabapentin     Depression     Diabetes mellitus (HCC)     Disease of thyroid gland     hypothyroid    Iron deficiency anemia     Panic attack     PTSD (post-traumatic stress disorder)     Sleep difficulties     Substance abuse (HCC)     Type 1 diabetes (HCC)     Vitamin D deficiency        Past Surgical History:   Procedure Laterality Date    ABDOMINAL SURGERY      gastric bypass     SECTION      x2    CHOLECYSTECTOMY  2018    GASTRIC BYPASS  2007    INTRAUTERINE DEVICE INSERTION  2015    IR BIOPSY BONE MARROW  2020    OTHER SURGICAL HISTORY      surgery for imperforate hymen/endometriosis/hydrometrocolpos    ROOT CANAL  2024    TUBAL LIGATION      WISDOM TOOTH EXTRACTION         Family History   Problem Relation Age of Onset    Thyroid disease Mother     URIEL disease Mother     " "Hyperlipidemia Mother     Hypertension Mother     Osteoarthritis Mother     Thyroid disease unspecified Mother     Diabetes Father     Alcohol abuse Father     Diabetes type I Father     Thyroid disease Sister     Depression Sister     Thyroid disease unspecified Sister     Anxiety disorder Sister     Heart disease Maternal Grandmother     Hypertension Maternal Grandmother     Arthritis Maternal Grandmother     Diabetes type I Maternal Uncle     Diabetes type I Maternal Grandfather          Medications have been verified.        Objective   /87   Pulse 83   Temp 98 °F (36.7 °C) (Tympanic)   Resp 18   Ht 5' 6\" (1.676 m)   Wt 76.7 kg (169 lb)   LMP  (LMP Unknown)   SpO2 99%   BMI 27.28 kg/m²   No LMP recorded (lmp unknown).       Physical Exam     Physical Exam  Vitals reviewed.   Constitutional:       Appearance: She is well-developed.   HENT:      Right Ear: Tympanic membrane normal.      Left Ear: Tympanic membrane normal.      Nose: Congestion present.      Mouth/Throat:      Pharynx: No pharyngeal swelling or posterior oropharyngeal erythema.      Tonsils: No tonsillar exudate.   Cardiovascular:      Rate and Rhythm: Normal rate and regular rhythm.      Heart sounds: Normal heart sounds.   Pulmonary:      Effort: Pulmonary effort is normal.      Breath sounds: Normal breath sounds.   Musculoskeletal:      Cervical back: Normal range of motion and neck supple.   Lymphadenopathy:      Cervical: No cervical adenopathy.   Neurological:      Mental Status: She is alert.                   "

## 2024-07-22 ENCOUNTER — APPOINTMENT (OUTPATIENT)
Dept: LAB | Facility: MEDICAL CENTER | Age: 44
End: 2024-07-22
Payer: COMMERCIAL

## 2024-07-22 DIAGNOSIS — D50.8 OTHER IRON DEFICIENCY ANEMIA: ICD-10-CM

## 2024-07-22 DIAGNOSIS — E53.8 B12 DEFICIENCY: ICD-10-CM

## 2024-07-22 LAB
ALBUMIN SERPL BCG-MCNC: 3.9 G/DL (ref 3.5–5)
ALP SERPL-CCNC: 106 U/L (ref 34–104)
ALT SERPL W P-5'-P-CCNC: 17 U/L (ref 7–52)
ANION GAP SERPL CALCULATED.3IONS-SCNC: 9 MMOL/L (ref 4–13)
AST SERPL W P-5'-P-CCNC: 20 U/L (ref 13–39)
BASOPHILS # BLD AUTO: 0.03 THOUSANDS/ÂΜL (ref 0–0.1)
BASOPHILS NFR BLD AUTO: 1 % (ref 0–1)
BILIRUB SERPL-MCNC: 0.38 MG/DL (ref 0.2–1)
BUN SERPL-MCNC: 18 MG/DL (ref 5–25)
CALCIUM SERPL-MCNC: 8.8 MG/DL (ref 8.4–10.2)
CHLORIDE SERPL-SCNC: 106 MMOL/L (ref 96–108)
CO2 SERPL-SCNC: 23 MMOL/L (ref 21–32)
CREAT SERPL-MCNC: 1.06 MG/DL (ref 0.6–1.3)
CRP SERPL QL: 1.7 MG/L
EOSINOPHIL # BLD AUTO: 0.12 THOUSAND/ÂΜL (ref 0–0.61)
EOSINOPHIL NFR BLD AUTO: 5 % (ref 0–6)
ERYTHROCYTE [DISTWIDTH] IN BLOOD BY AUTOMATED COUNT: 15.1 % (ref 11.6–15.1)
ERYTHROCYTE [SEDIMENTATION RATE] IN BLOOD: 10 MM/HOUR (ref 0–19)
FERRITIN SERPL-MCNC: 41 NG/ML (ref 11–307)
GFR SERPL CREATININE-BSD FRML MDRD: 64 ML/MIN/1.73SQ M
GLUCOSE SERPL-MCNC: 153 MG/DL (ref 65–140)
HCT VFR BLD AUTO: 37.9 % (ref 34.8–46.1)
HGB BLD-MCNC: 12 G/DL (ref 11.5–15.4)
IMM GRANULOCYTES # BLD AUTO: 0.01 THOUSAND/UL (ref 0–0.2)
IMM GRANULOCYTES NFR BLD AUTO: 0 % (ref 0–2)
IRON SATN MFR SERPL: 21 % (ref 15–50)
IRON SERPL-MCNC: 73 UG/DL (ref 50–212)
LYMPHOCYTES # BLD AUTO: 0.91 THOUSANDS/ÂΜL (ref 0.6–4.47)
LYMPHOCYTES NFR BLD AUTO: 35 % (ref 14–44)
MCH RBC QN AUTO: 28.9 PG (ref 26.8–34.3)
MCHC RBC AUTO-ENTMCNC: 31.7 G/DL (ref 31.4–37.4)
MCV RBC AUTO: 91 FL (ref 82–98)
MONOCYTES # BLD AUTO: 0.19 THOUSAND/ÂΜL (ref 0.17–1.22)
MONOCYTES NFR BLD AUTO: 7 % (ref 4–12)
NEUTROPHILS # BLD AUTO: 1.38 THOUSANDS/ÂΜL (ref 1.85–7.62)
NEUTS SEG NFR BLD AUTO: 52 % (ref 43–75)
NRBC BLD AUTO-RTO: 0 /100 WBCS
PLATELET # BLD AUTO: 189 THOUSANDS/UL (ref 149–390)
PMV BLD AUTO: 11.2 FL (ref 8.9–12.7)
POTASSIUM SERPL-SCNC: 4.5 MMOL/L (ref 3.5–5.3)
PROT SERPL-MCNC: 6.8 G/DL (ref 6.4–8.4)
RBC # BLD AUTO: 4.15 MILLION/UL (ref 3.81–5.12)
SODIUM SERPL-SCNC: 138 MMOL/L (ref 135–147)
TIBC SERPL-MCNC: 355 UG/DL (ref 250–450)
UIBC SERPL-MCNC: 282 UG/DL (ref 155–355)
VIT B12 SERPL-MCNC: 255 PG/ML (ref 180–914)
WBC # BLD AUTO: 2.64 THOUSAND/UL (ref 4.31–10.16)

## 2024-07-22 PROCEDURE — 83540 ASSAY OF IRON: CPT

## 2024-07-22 PROCEDURE — 82728 ASSAY OF FERRITIN: CPT

## 2024-07-22 PROCEDURE — 83550 IRON BINDING TEST: CPT

## 2024-07-23 ENCOUNTER — TELEPHONE (OUTPATIENT)
Dept: HEMATOLOGY ONCOLOGY | Facility: CLINIC | Age: 44
End: 2024-07-23

## 2024-07-23 NOTE — TELEPHONE ENCOUNTER
Phoned pt and left a message for pt to take OTC Vit B 12 1000 mcg daily, if the sublingual form is available that would be best as it is better absorbed Left telephone number [phone] for any questions.     ----- Message from Rickey Maya MD sent at 7/23/2024 12:50 PM EDT -----  Vitamin B12 supplements.  ----- Message -----  From: Lab, Background User  Sent: 7/22/2024   9:07 PM EDT  To: Rickey Maya MD

## 2024-08-06 ENCOUNTER — TELEPHONE (OUTPATIENT)
Age: 44
End: 2024-08-06

## 2024-08-06 DIAGNOSIS — E10.65 TYPE 1 DIABETES MELLITUS WITH HYPERGLYCEMIA (HCC): ICD-10-CM

## 2024-08-06 RX ORDER — INSULIN ASPART 100 [IU]/ML
INJECTION, SOLUTION INTRAVENOUS; SUBCUTANEOUS
Qty: 30 ML | Refills: 3 | Status: SHIPPED | OUTPATIENT
Start: 2024-08-06

## 2024-08-06 NOTE — TELEPHONE ENCOUNTER
Patient calling because she went to the pharmacy to get a refill on her novolog insulin and they told her it was to soon. She said the directions say to give 15 units per meal, but she has been tripling that dose because her sugars have been in the 300-400's. She said she has been like that for a week. She has extreme thirst, headache, body-aches, and just does not feel well. I recommended urgent care for her symptoms, and to get a dose of insulin in her system. I'm not sure if she will go, but She wants to know if Dr. Aragon can adjust her insulin order to a higher dose per meal and send it to [facility] so she can get a refill? Please call patient back with an update.

## 2024-08-28 DIAGNOSIS — G47.00 INSOMNIA, UNSPECIFIED TYPE: ICD-10-CM

## 2024-08-28 RX ORDER — TRAZODONE HYDROCHLORIDE 100 MG/1
100 TABLET ORAL
Qty: 30 TABLET | Refills: 5 | Status: SHIPPED | OUTPATIENT
Start: 2024-08-28

## 2024-08-28 NOTE — TELEPHONE ENCOUNTER
Reason for call:   [x] Refill   [] Prior Auth  [] Other:     Office:   [x] PCP/Provider - : Annette Scales MD /[facility]   [] Specialty/Provider -     Medication:     traZODone (DESYREL) 100 mg tablet       Dose/Frequency:  Take 1 tablet (100 mg total) by mouth daily at bedtime,     Quantity: 30    Pharmacy:   [facility] - [address]        Does the patient have enough for 3 days?   [] Yes   [x] No - Send as HP to POD

## 2024-09-19 DIAGNOSIS — E10.65 TYPE 1 DIABETES MELLITUS WITH HYPERGLYCEMIA (HCC): ICD-10-CM

## 2024-09-19 RX ORDER — SYRINGE AND NEEDLE,INSULIN,1ML 28GX1/2"
SYRINGE, EMPTY DISPOSABLE MISCELLANEOUS
Qty: 100 EACH | Refills: 1 | Status: SHIPPED | OUTPATIENT
Start: 2024-09-19

## 2024-10-04 ENCOUNTER — TELEPHONE (OUTPATIENT)
Dept: FAMILY MEDICINE CLINIC | Facility: CLINIC | Age: 44
End: 2024-10-04

## 2024-10-04 NOTE — TELEPHONE ENCOUNTER
Yanira called stated that she needs a physical and TB test for a job. ASAP.  Last seen in 04/20/23 for a physical.  She see Petra. Asking if Petra can see her?      Please advise    Phone [phone]

## 2024-10-08 ENCOUNTER — APPOINTMENT (OUTPATIENT)
Dept: URGENT CARE | Facility: MEDICAL CENTER | Age: 44
End: 2024-10-08

## 2024-10-12 DIAGNOSIS — E06.3 HYPOTHYROIDISM DUE TO HASHIMOTO'S THYROIDITIS: ICD-10-CM

## 2024-10-14 RX ORDER — LEVOTHYROXINE SODIUM 50 UG/1
TABLET ORAL
Qty: 30 TABLET | Refills: 5 | Status: SHIPPED | OUTPATIENT
Start: 2024-10-14

## 2024-10-29 NOTE — TELEPHONE ENCOUNTER
Pt is unsure of where she got it for previously. Said she only has 2 left and is dependent on it for sleep   Dr Leonard

## 2024-11-05 DIAGNOSIS — E10.65 TYPE 1 DIABETES MELLITUS WITH HYPERGLYCEMIA (HCC): ICD-10-CM

## 2024-11-05 RX ORDER — INSULIN GLARGINE 100 [IU]/ML
INJECTION, SOLUTION SUBCUTANEOUS
Qty: 10 ML | Refills: 5 | Status: SHIPPED | OUTPATIENT
Start: 2024-11-05

## 2024-11-07 ENCOUNTER — TELEPHONE (OUTPATIENT)
Age: 44
End: 2024-11-07

## 2024-11-07 DIAGNOSIS — E10.65 TYPE 1 DIABETES MELLITUS WITH HYPERGLYCEMIA (HCC): ICD-10-CM

## 2024-11-07 RX ORDER — GABAPENTIN 600 MG/1
TABLET ORAL
Qty: 90 TABLET | Refills: 0 | Status: SHIPPED | OUTPATIENT
Start: 2024-11-07

## 2024-11-07 RX ORDER — SYRINGE AND NEEDLE,INSULIN,1ML 28GX1/2"
SYRINGE, EMPTY DISPOSABLE MISCELLANEOUS
Qty: 100 EACH | Refills: 1 | Status: SHIPPED | OUTPATIENT
Start: 2024-11-07

## 2024-11-07 NOTE — TELEPHONE ENCOUNTER
Patient called in to schedule a follow up with Rosina because she is over due for her July follow up after seeing Dr Aragon in May.    She explains that she has not been feeling good and she thinks it is because her sugars have been all over the place. For example she said that her blood sugar this morning was 60 and then it shot up to 381 after having cereal to bring her sugar up. But she said that she can give me a million examples of it doing that all the time and has been for a long time now.    She now she needs to go back on her insulin pump but is having all these issues with the medtronic.    She also has gained about 30 pounds in the last 6 months.    She has been scheduled for Rosina's next available in March and placed on wait list ( she feels more comfortable with her )      She is thinking that maybe a nurse should call her back with some assistance.

## 2024-11-07 NOTE — TELEPHONE ENCOUNTER
Based on Dr. Aragon's note, she was using the medtronic pump- which generation? She needs to know which pump she wants to use. I have not seen her since last February therefore, I am not up to date on all of her uses of insulin. I would recommend downloading her CGM and confirming exactly how she is using her insulin presently: doses, timing, etc.     The newest medtronic pump, the 780G is far superior to the one she had previously used. Please reach out to Eileen Sims to contact the patient to discuss. I also recommend medical nutrition therapy and basic carbohydrate counting with our diabetes educator to assist her/prepare her to resume insulin pump use.

## 2024-11-12 ENCOUNTER — TELEPHONE (OUTPATIENT)
Dept: ENDOCRINOLOGY | Facility: CLINIC | Age: 44
End: 2024-11-12

## 2024-11-12 NOTE — TELEPHONE ENCOUNTER
I called and left a message for Yanira to the office to go over Dr. Aragon recommendations.  I also sent a detailed my chart message with Dr. Aragon recommendations.

## 2024-11-13 ENCOUNTER — TELEPHONE (OUTPATIENT)
Age: 44
End: 2024-11-13

## 2024-11-21 ENCOUNTER — TELEPHONE (OUTPATIENT)
Age: 44
End: 2024-11-21

## 2024-11-21 DIAGNOSIS — A60.1 HERPES SIMPLEX INFECTION OF PERIANAL SKIN: Primary | ICD-10-CM

## 2024-11-21 RX ORDER — VALACYCLOVIR HYDROCHLORIDE 500 MG/1
500 TABLET, FILM COATED ORAL DAILY
Qty: 30 TABLET | Refills: 0 | Status: SHIPPED | OUTPATIENT
Start: 2024-11-21 | End: 2024-12-21

## 2024-11-23 ENCOUNTER — NURSE TRIAGE (OUTPATIENT)
Dept: OTHER | Facility: OTHER | Age: 44
End: 2024-11-23

## 2024-11-23 DIAGNOSIS — Z11.7 ENCOUNTER FOR TESTING FOR LATENT TUBERCULOSIS: ICD-10-CM

## 2024-11-23 DIAGNOSIS — Z02.1 ENCOUNTER FOR PRE-EMPLOYMENT HEALTH SCREENING EXAMINATION: Primary | ICD-10-CM

## 2024-11-23 NOTE — TELEPHONE ENCOUNTER
"Regarding: TB Blood Test not put in the System.  ----- Message from Eloisa JEAN sent at 11/23/2024 11:29 AM EST -----  \" My Boss will be very upset with me because the TB test was not done, I went to the Lab this morning and the order was not in. I need this test by Monday.\"    "

## 2024-11-23 NOTE — TELEPHONE ENCOUNTER
Assisted patient in ordering TB lab needed for new employment. Discussed note regarding patient needing an appointment, states she will gladly call this week to schedule one.     Reason for Disposition   [1] Follow-up call to recent contact AND [2] information only call, no triage required    Protocols used: Information Only Call - No Triage-Adult-

## 2024-11-25 ENCOUNTER — TELEPHONE (OUTPATIENT)
Age: 44
End: 2024-11-25

## 2024-11-25 NOTE — TELEPHONE ENCOUNTER
Pt called in stating that she will be starting a new job soon, and that they need pt to get the following vaccines:    Hep A  Hep B    Pt states her PCP is Dr. Scales.     Pt was last seen:    10/08/24 - Physical at [facility]  11/06/23 - [facility]/ Dr. Scales  06/26/23 - [facility] w/ Petra Medinane    Did inform pt she will need an appt, as it has been some time she was last seen. Pt understood, will schedule appt if needed, she chose to inquire first about vaccines.     Please advise & call pt back with update on vaccine request. Thank you!    Yanira Webb M: [phone]

## 2024-11-26 ENCOUNTER — APPOINTMENT (OUTPATIENT)
Dept: LAB | Facility: CLINIC | Age: 44
End: 2024-11-26
Payer: COMMERCIAL

## 2024-11-26 DIAGNOSIS — Z02.1 ENCOUNTER FOR PRE-EMPLOYMENT HEALTH SCREENING EXAMINATION: ICD-10-CM

## 2024-11-26 DIAGNOSIS — Z11.7 ENCOUNTER FOR TESTING FOR LATENT TUBERCULOSIS: ICD-10-CM

## 2024-11-26 PROCEDURE — 36415 COLL VENOUS BLD VENIPUNCTURE: CPT

## 2024-11-26 PROCEDURE — 86480 TB TEST CELL IMMUN MEASURE: CPT

## 2024-11-27 LAB
GAMMA INTERFERON BACKGROUND BLD IA-ACNC: 0.06 IU/ML
M TB IFN-G BLD-IMP: NEGATIVE
M TB IFN-G CD4+ BCKGRND COR BLD-ACNC: -0.03 IU/ML
M TB IFN-G CD4+ BCKGRND COR BLD-ACNC: -0.04 IU/ML
MITOGEN IGNF BCKGRD COR BLD-ACNC: 7.3 IU/ML

## 2024-12-02 ENCOUNTER — HOSPITAL ENCOUNTER (EMERGENCY)
Facility: HOSPITAL | Age: 44
Discharge: HOME | End: 2024-12-02
Attending: EMERGENCY MEDICINE
Payer: COMMERCIAL

## 2024-12-02 VITALS
HEART RATE: 92 BPM | OXYGEN SATURATION: 99 % | TEMPERATURE: 98.4 F | RESPIRATION RATE: 20 BRPM | WEIGHT: 173.72 LBS | SYSTOLIC BLOOD PRESSURE: 151 MMHG | BODY MASS INDEX: 28.04 KG/M2 | DIASTOLIC BLOOD PRESSURE: 89 MMHG

## 2024-12-02 DIAGNOSIS — M54.9 UPPER BACK PAIN ON RIGHT SIDE: Primary | ICD-10-CM

## 2024-12-02 PROCEDURE — 96372 THER/PROPH/DIAG INJ SC/IM: CPT

## 2024-12-02 PROCEDURE — 99283 EMERGENCY DEPT VISIT LOW MDM: CPT

## 2024-12-02 PROCEDURE — 99284 EMERGENCY DEPT VISIT MOD MDM: CPT | Performed by: EMERGENCY MEDICINE

## 2024-12-02 RX ORDER — BACLOFEN 10 MG/1
10 TABLET ORAL ONCE
Status: COMPLETED | OUTPATIENT
Start: 2024-12-02 | End: 2024-12-02

## 2024-12-02 RX ORDER — DIAZEPAM 5 MG/1
5 TABLET ORAL EVERY 12 HOURS PRN
Qty: 2 TABLET | Refills: 0 | Status: SHIPPED | OUTPATIENT
Start: 2024-12-02 | End: 2024-12-11 | Stop reason: ALTCHOICE

## 2024-12-02 RX ORDER — KETOROLAC TROMETHAMINE 30 MG/ML
30 INJECTION, SOLUTION INTRAMUSCULAR; INTRAVENOUS ONCE
Status: COMPLETED | OUTPATIENT
Start: 2024-12-02 | End: 2024-12-02

## 2024-12-02 RX ORDER — DIAZEPAM 10 MG/2ML
5 INJECTION, SOLUTION INTRAMUSCULAR; INTRAVENOUS ONCE
Status: COMPLETED | OUTPATIENT
Start: 2024-12-02 | End: 2024-12-02

## 2024-12-02 RX ORDER — BACLOFEN 10 MG/1
10 TABLET ORAL 3 TIMES DAILY PRN
Qty: 21 TABLET | Refills: 0 | Status: SHIPPED | OUTPATIENT
Start: 2024-12-02

## 2024-12-02 RX ADMIN — KETOROLAC TROMETHAMINE 30 MG: 30 INJECTION, SOLUTION INTRAMUSCULAR; INTRAVENOUS at 19:37

## 2024-12-02 RX ADMIN — BACLOFEN 10 MG: 10 TABLET ORAL at 19:36

## 2024-12-02 RX ADMIN — DIAZEPAM 5 MG: 10 INJECTION, SOLUTION INTRAMUSCULAR; INTRAVENOUS at 19:40
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
## 2024-12-03 NOTE — DISCHARGE INSTRUCTIONS
Cervical Radiculopathy   WHAT YOU NEED TO KNOW:   Cervical radiculopathy is a painful condition that happens when a spinal nerve in your neck is pinched or irritated.   DISCHARGE INSTRUCTIONS:   Medicines:  You may need any of the following:  NSAIDs  help decrease swelling and pain.   Follow up with your healthcare provider  as directed:  Write down your questions so you remember to ask them during your visits.   Physical therapy:  Your healthcare provider may suggest physical therapy to stretch and strengthen your muscles. Your physical therapist can teach you how to improve your posture and the way you hold your neck. He may also teach you how to be safely active and avoid further injury. He can also help you develop an exercise program that is safe for your back and neck.   Self-care:   Ice  helps decrease swelling and pain. Ice may also help prevent tissue damage. Use an ice pack, or put crushed ice in a plastic bag. Cover it with a towel and place it on your neck for 15 to 20 minutes every hour or as directed.    Rest  when you feel it is needed. Slowly start to do more each day. Return to your daily activities as directed.          Contact your healthcare provider or spine specialist if:   You have a fever.    You are losing weight without trying.    Your pain is worse, even with medicine.    One or both hands feel more numb than before, or you cannot move your fingers well.    You have questions or concerns about your condition or care.

## 2024-12-11 ENCOUNTER — HOSPITAL ENCOUNTER (EMERGENCY)
Facility: HOSPITAL | Age: 44
Discharge: HOME | End: 2024-12-11
Attending: INTERNAL MEDICINE
Payer: COMMERCIAL

## 2024-12-11 VITALS
SYSTOLIC BLOOD PRESSURE: 165 MMHG | HEART RATE: 108 BPM | DIASTOLIC BLOOD PRESSURE: 95 MMHG | OXYGEN SATURATION: 98 % | RESPIRATION RATE: 17 BRPM | TEMPERATURE: 98 F

## 2024-12-11 DIAGNOSIS — M54.6 TRIGGER POINT OF THORACIC REGION: Primary | ICD-10-CM

## 2024-12-11 DIAGNOSIS — M79.18 MYOFASCIAL PAIN SYNDROME: ICD-10-CM

## 2024-12-11 PROCEDURE — 20552 NJX 1/MLT TRIGGER POINT 1/2: CPT | Performed by: INTERNAL MEDICINE

## 2024-12-11 PROCEDURE — 99283 EMERGENCY DEPT VISIT LOW MDM: CPT

## 2024-12-11 PROCEDURE — 99284 EMERGENCY DEPT VISIT MOD MDM: CPT | Performed by: INTERNAL MEDICINE

## 2024-12-11 PROCEDURE — 96372 THER/PROPH/DIAG INJ SC/IM: CPT

## 2024-12-11 RX ORDER — LIDOCAINE HYDROCHLORIDE 20 MG/ML
1 INJECTION, SOLUTION EPIDURAL; INFILTRATION; INTRACAUDAL; PERINEURAL ONCE
Status: COMPLETED | OUTPATIENT
Start: 2024-12-11 | End: 2024-12-11

## 2024-12-11 RX ORDER — BUPIVACAINE HYDROCHLORIDE 5 MG/ML
5 INJECTION, SOLUTION EPIDURAL; INTRACAUDAL ONCE
Status: COMPLETED | OUTPATIENT
Start: 2024-12-11 | End: 2024-12-11

## 2024-12-11 RX ORDER — METHYLPREDNISOLONE ACETATE 80 MG/ML
80 INJECTION, SUSPENSION INTRA-ARTICULAR; INTRALESIONAL; INTRAMUSCULAR; SOFT TISSUE ONCE
Status: COMPLETED | OUTPATIENT
Start: 2024-12-11 | End: 2024-12-11

## 2024-12-11 RX ORDER — DIAZEPAM 2 MG/1
2 TABLET ORAL EVERY 8 HOURS PRN
Qty: 10 TABLET | Refills: 0 | Status: SHIPPED | OUTPATIENT
Start: 2024-12-11

## 2024-12-11 RX ORDER — DIAZEPAM 10 MG/2ML
5 INJECTION, SOLUTION INTRAMUSCULAR; INTRAVENOUS ONCE
Status: COMPLETED | OUTPATIENT
Start: 2024-12-11 | End: 2024-12-11

## 2024-12-11 RX ADMIN — LIDOCAINE HYDROCHLORIDE 1 ML: 20 INJECTION, SOLUTION EPIDURAL; INFILTRATION; INTRACAUDAL; PERINEURAL at 17:13

## 2024-12-11 RX ADMIN — METHYLPREDNISOLONE ACETATE 80 MG: 80 INJECTION, SUSPENSION INTRA-ARTICULAR; INTRALESIONAL; INTRAMUSCULAR; SOFT TISSUE at 17:15

## 2024-12-11 RX ADMIN — BUPIVACAINE HYDROCHLORIDE 5 ML: 5 INJECTION, SOLUTION EPIDURAL; INTRACAUDAL; PERINEURAL at 17:13

## 2024-12-11 RX ADMIN — DIAZEPAM 5 MG: 5 INJECTION INTRAMUSCULAR; INTRAVENOUS at 19:15
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
## 2024-12-12 NOTE — DISCHARGE INSTRUCTIONS
Alternate ice and heat  Attempt to strengthen rhomboids and trapezius as discussed.  Follow-up with physiatry  Soma twice daily as needed for spasm.  Salonpas

## 2024-12-27 DIAGNOSIS — E10.65 TYPE 1 DIABETES MELLITUS WITH HYPERGLYCEMIA (HCC): ICD-10-CM

## 2024-12-27 RX ORDER — SYRINGE AND NEEDLE,INSULIN,1ML 28GX1/2"
SYRINGE, EMPTY DISPOSABLE MISCELLANEOUS
Qty: 100 EACH | Refills: 1 | Status: SHIPPED | OUTPATIENT
Start: 2024-12-27

## 2025-01-10 ENCOUNTER — OFFICE VISIT (OUTPATIENT)
Dept: FAMILY MEDICINE CLINIC | Facility: CLINIC | Age: 45
End: 2025-01-10
Payer: COMMERCIAL

## 2025-01-10 VITALS
OXYGEN SATURATION: 98 % | RESPIRATION RATE: 18 BRPM | HEIGHT: 66 IN | WEIGHT: 171 LBS | BODY MASS INDEX: 27.48 KG/M2 | HEART RATE: 86 BPM | DIASTOLIC BLOOD PRESSURE: 84 MMHG | SYSTOLIC BLOOD PRESSURE: 112 MMHG | TEMPERATURE: 98.6 F

## 2025-01-10 DIAGNOSIS — E10.65 TYPE 1 DIABETES MELLITUS WITH HYPERGLYCEMIA (HCC): Primary | ICD-10-CM

## 2025-01-10 DIAGNOSIS — Z00.00 ANNUAL PHYSICAL EXAM: ICD-10-CM

## 2025-01-10 DIAGNOSIS — Z23 ENCOUNTER FOR IMMUNIZATION: ICD-10-CM

## 2025-01-10 LAB — SL AMB POCT HEMOGLOBIN AIC: 9.6 (ref ?–6.5)

## 2025-01-10 PROCEDURE — 90746 HEPB VACCINE 3 DOSE ADULT IM: CPT

## 2025-01-10 PROCEDURE — 90632 HEPA VACCINE ADULT IM: CPT

## 2025-01-10 PROCEDURE — 83036 HEMOGLOBIN GLYCOSYLATED A1C: CPT | Performed by: NURSE PRACTITIONER

## 2025-01-10 PROCEDURE — 99396 PREV VISIT EST AGE 40-64: CPT | Performed by: NURSE PRACTITIONER

## 2025-01-10 PROCEDURE — 90472 IMMUNIZATION ADMIN EACH ADD: CPT

## 2025-01-10 PROCEDURE — 90471 IMMUNIZATION ADMIN: CPT

## 2025-01-10 NOTE — PROGRESS NOTES
Adult Annual Physical  Name: Clarissa Webb      : 1980      MRN: [id number]  Encounter Provider: JAROCHO Pabon  Encounter Date: 1/10/2025   Encounter department: [facility] PRIMARY [facility]ARE    Assessment & Plan  Annual physical exam    Orders:    Lipid panel; Future    Albumin / creatinine urine ratio; Future    CBC and differential; Future    Comprehensive metabolic panel; Future    TSH, 3rd generation with Free T4 reflex; Future    Vitamin D 25 hydroxy; Future    Encounter for immunization    Orders:    HEPATITIS A VACCINE ADULT IM    HEPATITIS B VACCINE ADULT IM    Type 1 diabetes mellitus with hyperglycemia (HCC)    Lab Results   Component Value Date    HGBA1C 9.6 (A) 01/10/2025       Orders:    POCT hemoglobin A1c    Immunizations and preventive care screenings were discussed with patient today. Appropriate education was printed on patient's after visit summary.    Counseling:            History of Present Illness     Adult Annual Physical:  Patient presents for annual physical. 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   Will be following with endo but most recent appointment has been cancelled, will have staff schedule today for first available .     Diet and Physical Activity:  - Diet/Nutrition: diabetic diet, limited junk food and portion control.  - Exercise: no formal exercise.    Depression Screening:    - PHQ-9 Score: 0    General Health:  - Sleep: sleeps poorly, 4-6 hours of sleep on average, daytime hypersomnolence and unrefreshing sleep.  - Hearing: normal hearing bilateral ears.  - Vision: vision problems, wears glasses, wears contacts and most recent eye exam > 1 year ago.  - Dental: no dental visits for > 1 year and brushes teeth twice daily.    Review of Systems   Constitutional:  Negative for chills and fever.   HENT:  Negative for ear pain and sore throat.    Eyes:  Negative for pain and visual  "disturbance.   Respiratory:  Negative for cough and shortness of breath.    Cardiovascular:  Negative for chest pain and palpitations.   Gastrointestinal:  Negative for abdominal pain and vomiting.   Genitourinary:  Negative for dysuria and hematuria.   Musculoskeletal:  Negative for arthralgias and back pain.   Skin:  Negative for color change and rash.   Neurological:  Negative for seizures and syncope.   All other systems reviewed and are negative.        Objective   /84   Pulse 86   Temp 98.6 °F (37 °C) (Tympanic)   Resp 18   Ht 5' 6\" (1.676 m)   Wt 77.6 kg (171 lb)   SpO2 98%   BMI 27.60 kg/m²     Physical Exam  Vitals and nursing note reviewed.   Constitutional:       General: She is not in acute distress.     Appearance: She is well-developed.   HENT:      Head: Normocephalic and atraumatic.   Eyes:      Conjunctiva/sclera: Conjunctivae normal.   Cardiovascular:      Rate and Rhythm: Normal rate and regular rhythm.      Heart sounds: No murmur heard.  Pulmonary:      Effort: Pulmonary effort is normal. No respiratory distress.      Breath sounds: Normal breath sounds.   Abdominal:      Palpations: Abdomen is soft.      Tenderness: There is no abdominal tenderness.   Musculoskeletal:         General: No swelling.      Cervical back: Neck supple.   Skin:     General: Skin is warm and dry.      Capillary Refill: Capillary refill takes less than 2 seconds.   Neurological:      Mental Status: She is alert and oriented to person, place, and time.   Psychiatric:         Mood and Affect: Mood normal.         Behavior: Behavior normal.         Thought Content: Thought content normal.         Judgment: Judgment normal.         "

## 2025-01-10 NOTE — ASSESSMENT & PLAN NOTE
Lab Results   Component Value Date    HGBA1C 9.6 (A) 01/10/2025       Orders:    POCT hemoglobin A1c

## 2025-01-10 NOTE — PATIENT INSTRUCTIONS
"Patient Education     Routine physical for adults   The Basics   Written by the doctors and editors at [facility]   What is a physical? -- A physical is a routine visit, or \"check-up,\" with your doctor. You might also hear it called a \"wellness visit\" or \"preventive visit.\"  During each visit, the doctor will:   Ask about your physical and mental health   Ask about your habits, behaviors, and lifestyle   Do an exam   Give you vaccines if needed   Talk to you about any medicines you take   Give advice about your health   Answer your questions  Getting regular check-ups is an important part of taking care of your health. It can help your doctor find and treat any problems you have. But it's also important for preventing health problems.  A routine physical is different from a \"sick visit.\" A sick visit is when you see a doctor because of a health concern or problem. Since physicals are scheduled ahead of time, you can think about what you want to ask the doctor.  How often should I get a physical? -- It depends on your age and health. In general, for people age 21 years and older:   If you are younger than 50 years, you might be able to get a physical every 3 years.   If you are 50 years or older, your doctor might recommend a physical every year.  If you have an ongoing health condition, like diabetes or high blood pressure, your doctor will probably want to see you more often.  What happens during a physical? -- In general, each visit will include:   Physical exam - The doctor or nurse will check your height, weight, heart rate, and blood pressure. They will also look at your eyes and ears. They will ask about how you are feeling and whether you have any symptoms that bother you.   Medicines - It's a good idea to bring a list of all the medicines you take to each doctor visit. Your doctor will talk to you about your medicines and answer any questions. Tell them if you are having any side effects that bother you. You " "should also tell them if you are having trouble paying for any of your medicines.   Habits and behaviors - This includes:   Your diet   Your exercise habits   Whether you smoke, drink alcohol, or use drugs   Whether you are sexually active   Whether you feel safe at home  Your doctor will talk to you about things you can do to improve your health and lower your risk of health problems. They will also offer help and support. For example, if you want to quit smoking, they can give you advice and might prescribe medicines. If you want to improve your diet or get more physical activity, they can help you with this, too.   Lab tests, if needed - The tests you get will depend on your age and situation. For example, your doctor might want to check your:   Cholesterol   Blood sugar   Iron level   Vaccines - The recommended vaccines will depend on your age, health, and what vaccines you already had. Vaccines are very important because they can prevent certain serious or deadly infections.   Discussion of screening - \"Screening\" means checking for diseases or other health problems before they cause symptoms. Your doctor can recommend screening based on your age, risk, and preferences. This might include tests to check for:   Cancer, such as breast, prostate, cervical, ovarian, colorectal, prostate, lung, or skin cancer   Sexually transmitted infections, such as chlamydia and gonorrhea   Mental health conditions like depression and anxiety  Your doctor will talk to you about the different types of screening tests. They can help you decide which screenings to have. They can also explain what the results might mean.   Answering questions - The physical is a good time to ask the doctor or nurse questions about your health. If needed, they can refer you to other doctors or specialists, too.  Adults older than 65 years often need other care, too. As you get older, your doctor will talk to you about:   How to prevent falling at " home   Hearing or vision tests   Memory testing   How to take your medicines safely   Making sure that you have the help and support you need at home  All topics are updated as new evidence becomes available and our peer review process is complete.  This topic retrieved from [employer] on: May 02, 2024.  Topic 427359 Version 1.0  Release: 32.4.3 - C32.122  © 2024 UpToDate, Inc. and/or its affiliates. All rights reserved.  Consumer Information Use and Disclaimer   Disclaimer: This generalized information is a limited summary of diagnosis, treatment, and/or medication information. It is not meant to be comprehensive and should be used as a tool to help the user understand and/or assess potential diagnostic and treatment options. It does NOT include all information about conditions, treatments, medications, side effects, or risks that may apply to a specific patient. It is not intended to be medical advice or a substitute for the medical advice, diagnosis, or treatment of a health care provider based on the health care provider's examination and assessment of a patient's specific and unique circumstances. Patients must speak with a health care provider for complete information about their health, medical questions, and treatment options, including any risks or benefits regarding use of medications. This information does not endorse any treatments or medications as safe, effective, or approved for treating a specific patient. UpToDate, Inc. and its affiliates disclaim any warranty or liability relating to this information or the use thereof.The use of this information is governed by the Terms of Use, available at https://www.[employer].com/en/know/clinical-effectiveness-terms. 2024© UpToDate, Inc. and its affiliates and/or licensors. All rights reserved.  Copyright   © 2024 UpToDate, Inc. and/or its affiliates. All rights reserved.

## 2025-01-31 DIAGNOSIS — E10.65 TYPE 1 DIABETES MELLITUS WITH HYPERGLYCEMIA (HCC): ICD-10-CM

## 2025-01-31 RX ORDER — GABAPENTIN 600 MG/1
TABLET ORAL
Qty: 90 TABLET | Refills: 1 | Status: SHIPPED | OUTPATIENT
Start: 2025-01-31

## 2025-02-16 DIAGNOSIS — E10.65 TYPE 1 DIABETES MELLITUS WITH HYPERGLYCEMIA (HCC): ICD-10-CM

## 2025-02-17 ENCOUNTER — CLINICAL SUPPORT (OUTPATIENT)
Dept: FAMILY MEDICINE CLINIC | Facility: CLINIC | Age: 45
End: 2025-02-17
Payer: COMMERCIAL

## 2025-02-17 ENCOUNTER — APPOINTMENT (OUTPATIENT)
Dept: LAB | Facility: CLINIC | Age: 45
End: 2025-02-17
Payer: COMMERCIAL

## 2025-02-17 DIAGNOSIS — Z23 ENCOUNTER FOR IMMUNIZATION: Primary | ICD-10-CM

## 2025-02-17 DIAGNOSIS — Z00.00 ANNUAL PHYSICAL EXAM: ICD-10-CM

## 2025-02-17 LAB
ALBUMIN SERPL BCG-MCNC: 4.1 G/DL (ref 3.5–5)
ALP SERPL-CCNC: 130 U/L (ref 34–104)
ALT SERPL W P-5'-P-CCNC: 16 U/L (ref 7–52)
ANION GAP SERPL CALCULATED.3IONS-SCNC: 12 MMOL/L (ref 4–13)
AST SERPL W P-5'-P-CCNC: 22 U/L (ref 13–39)
BASOPHILS # BLD AUTO: 0.05 THOUSANDS/ΜL (ref 0–0.1)
BASOPHILS NFR BLD AUTO: 2 % (ref 0–1)
BILIRUB SERPL-MCNC: 0.59 MG/DL (ref 0.2–1)
BUN SERPL-MCNC: 13 MG/DL (ref 5–25)
CALCIUM SERPL-MCNC: 10 MG/DL (ref 8.4–10.2)
CHLORIDE SERPL-SCNC: 97 MMOL/L (ref 96–108)
CHOLEST SERPL-MCNC: 218 MG/DL (ref ?–200)
CO2 SERPL-SCNC: 27 MMOL/L (ref 21–32)
CREAT SERPL-MCNC: 1.01 MG/DL (ref 0.6–1.3)
EOSINOPHIL # BLD AUTO: 0.11 THOUSAND/ΜL (ref 0–0.61)
EOSINOPHIL NFR BLD AUTO: 3 % (ref 0–6)
ERYTHROCYTE [DISTWIDTH] IN BLOOD BY AUTOMATED COUNT: 12.1 % (ref 11.6–15.1)
GFR SERPL CREATININE-BSD FRML MDRD: 68 ML/MIN/1.73SQ M
GLUCOSE P FAST SERPL-MCNC: 75 MG/DL (ref 65–99)
HCT VFR BLD AUTO: 42 % (ref 34.8–46.1)
HDLC SERPL-MCNC: 149 MG/DL
HGB BLD-MCNC: 13.2 G/DL (ref 11.5–15.4)
IMM GRANULOCYTES # BLD AUTO: 0.01 THOUSAND/UL (ref 0–0.2)
IMM GRANULOCYTES NFR BLD AUTO: 0 % (ref 0–2)
LDLC SERPL CALC-MCNC: 47 MG/DL (ref 0–100)
LYMPHOCYTES # BLD AUTO: 1.02 THOUSANDS/ΜL (ref 0.6–4.47)
LYMPHOCYTES NFR BLD AUTO: 30 % (ref 14–44)
MCH RBC QN AUTO: 28.9 PG (ref 26.8–34.3)
MCHC RBC AUTO-ENTMCNC: 31.4 G/DL (ref 31.4–37.4)
MCV RBC AUTO: 92 FL (ref 82–98)
MONOCYTES # BLD AUTO: 0.37 THOUSAND/ΜL (ref 0.17–1.22)
MONOCYTES NFR BLD AUTO: 11 % (ref 4–12)
NEUTROPHILS # BLD AUTO: 1.83 THOUSANDS/ΜL (ref 1.85–7.62)
NEUTS SEG NFR BLD AUTO: 54 % (ref 43–75)
NONHDLC SERPL-MCNC: 69 MG/DL
NRBC BLD AUTO-RTO: 0 /100 WBCS
PLATELET # BLD AUTO: 249 THOUSANDS/UL (ref 149–390)
PMV BLD AUTO: 10.9 FL (ref 8.9–12.7)
POTASSIUM SERPL-SCNC: 4.6 MMOL/L (ref 3.5–5.3)
PROT SERPL-MCNC: 7.3 G/DL (ref 6.4–8.4)
RBC # BLD AUTO: 4.57 MILLION/UL (ref 3.81–5.12)
SODIUM SERPL-SCNC: 136 MMOL/L (ref 135–147)
TRIGL SERPL-MCNC: 112 MG/DL (ref ?–150)
WBC # BLD AUTO: 3.39 THOUSAND/UL (ref 4.31–10.16)

## 2025-02-17 PROCEDURE — 82570 ASSAY OF URINE CREATININE: CPT

## 2025-02-17 PROCEDURE — 82306 VITAMIN D 25 HYDROXY: CPT

## 2025-02-17 PROCEDURE — 80053 COMPREHEN METABOLIC PANEL: CPT

## 2025-02-17 PROCEDURE — 84443 ASSAY THYROID STIM HORMONE: CPT

## 2025-02-17 PROCEDURE — 80061 LIPID PANEL: CPT

## 2025-02-17 PROCEDURE — 96372 THER/PROPH/DIAG INJ SC/IM: CPT | Performed by: NURSE PRACTITIONER

## 2025-02-17 PROCEDURE — 90471 IMMUNIZATION ADMIN: CPT

## 2025-02-17 PROCEDURE — 85025 COMPLETE CBC W/AUTO DIFF WBC: CPT

## 2025-02-17 PROCEDURE — 90746 HEPB VACCINE 3 DOSE ADULT IM: CPT

## 2025-02-17 PROCEDURE — 36415 COLL VENOUS BLD VENIPUNCTURE: CPT

## 2025-02-17 PROCEDURE — 82043 UR ALBUMIN QUANTITATIVE: CPT

## 2025-02-17 RX ORDER — SYRINGE AND NEEDLE,INSULIN,1ML 28GX1/2"
SYRINGE, EMPTY DISPOSABLE MISCELLANEOUS
Qty: 100 EACH | Refills: 2 | Status: SHIPPED | OUTPATIENT
Start: 2025-02-17

## 2025-02-18 LAB
25(OH)D3 SERPL-MCNC: 18.4 NG/ML (ref 30–100)
CREAT UR-MCNC: 143 MG/DL
MICROALBUMIN UR-MCNC: 10.7 MG/L
MICROALBUMIN/CREAT 24H UR: 7 MG/G CREATININE (ref 0–30)
TSH SERPL DL<=0.05 MIU/L-ACNC: 3.11 UIU/ML (ref 0.45–4.5)

## 2025-02-19 ENCOUNTER — RESULTS FOLLOW-UP (OUTPATIENT)
Dept: FAMILY MEDICINE CLINIC | Facility: CLINIC | Age: 45
End: 2025-02-19

## 2025-02-19 DIAGNOSIS — E55.9 VITAMIN D DEFICIENCY: Primary | ICD-10-CM

## 2025-02-19 DIAGNOSIS — R74.8 ELEVATED LIVER ENZYMES: ICD-10-CM

## 2025-02-19 RX ORDER — ERGOCALCIFEROL 1.25 MG/1
50000 CAPSULE, LIQUID FILLED ORAL WEEKLY
Qty: 4 CAPSULE | Refills: 2 | Status: SHIPPED | OUTPATIENT
Start: 2025-02-19

## 2025-03-05 ENCOUNTER — OFFICE VISIT (OUTPATIENT)
Dept: ENDOCRINOLOGY | Facility: CLINIC | Age: 45
End: 2025-03-05
Payer: COMMERCIAL

## 2025-03-05 VITALS
HEART RATE: 99 BPM | RESPIRATION RATE: 18 BRPM | SYSTOLIC BLOOD PRESSURE: 130 MMHG | BODY MASS INDEX: 26.52 KG/M2 | HEIGHT: 66 IN | TEMPERATURE: 97.6 F | WEIGHT: 165 LBS | OXYGEN SATURATION: 99 % | DIASTOLIC BLOOD PRESSURE: 88 MMHG

## 2025-03-05 DIAGNOSIS — E06.3 HYPOTHYROIDISM DUE TO HASHIMOTO THYROIDITIS: ICD-10-CM

## 2025-03-05 DIAGNOSIS — E10.65 TYPE 1 DIABETES MELLITUS WITH HYPERGLYCEMIA (HCC): Primary | ICD-10-CM

## 2025-03-05 DIAGNOSIS — E10.49 OTHER DIABETIC NEUROLOGICAL COMPLICATION ASSOCIATED WITH TYPE 1 DIABETES MELLITUS (HCC): ICD-10-CM

## 2025-03-05 LAB — SL AMB POCT HEMOGLOBIN AIC: 8.7 (ref ?–6.5)

## 2025-03-05 PROCEDURE — 83036 HEMOGLOBIN GLYCOSYLATED A1C: CPT | Performed by: NURSE PRACTITIONER

## 2025-03-05 PROCEDURE — 99214 OFFICE O/P EST MOD 30 MIN: CPT | Performed by: NURSE PRACTITIONER

## 2025-03-05 PROCEDURE — 95251 CONT GLUC MNTR ANALYSIS I&R: CPT | Performed by: NURSE PRACTITIONER

## 2025-03-05 RX ORDER — GABAPENTIN 600 MG/1
TABLET ORAL
Qty: 90 TABLET | Refills: 1 | Status: SHIPPED | OUTPATIENT
Start: 2025-03-05

## 2025-03-05 NOTE — PROGRESS NOTES
Name: Clarissa Webb      : 1980      MRN: [id number]  Encounter Provider: JAROCHO Bryan  Encounter Date: 3/5/2025   Encounter department: [facility] [place]  :  Assessment & Plan  Type 1 diabetes mellitus with hyperglycemia (HCC)  Patient remains uncontrolled. Counseled on pathophysiology of diabetes. Counseled on negative, long-term effects associated with uncontrolled diabetes including neuropathy, nephropathy, retinopathy, heart attack, and stroke.  Patient is very interested/motivated in resuming insulin pump use. Recommend starting Medtronic 780G with Medtronic Guardian. Briefly educated on the benefits of the newest technology/algorithm. Will reach out to [employer] representatives to assist patient with ordering process. Lengthy discussion about the importance of eating more consistently throughout the day rather than skipping meals. Reviewed the importance of injecting novolog (with both meal coverage and correction) more consistently throughout the day- at least q 6 hours. Continue with 20 units of Lantus nightly. Continue with IC 8. Recommend using ISF of 50 over 150 mg/dL during the day. At HS, use ISF 50 for > 200 mg/dL. Patient knows to notify me with persistent hyperglycemia or episodes of hypoglycemia.  F/U in 3 months.   Lab Results   Component Value Date    HGBA1C 8.7 (A) 2025       Orders:    POCT hemoglobin A1c    Comprehensive metabolic panel    C-peptide; Future    Hemoglobin A1C; Future    Basic metabolic panel    gabapentin (NEURONTIN) 600 MG tablet; Take 1.5 tablet (900 mg) nightly before bed.    Hypothyroidism due to Hashimoto thyroiditis  Clinically and biochemically euthyroid.  Continue 250 mcg of levothyroxine daily.  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  Currently taking 600 mg of gabapentin before bed.  Recommend increasing to 900 mg nightly.  Lab Results   Component Value Date    HGBA1C 8.7 (A) 03/05/2025                History of Present Illness   HPI  Clarissa Webb is a 44 y.o. female with type 1 diabetes and hypothyroidism presenting to the office today for late follow-up.        Patient last evaluated on 5/22/2024 by Dr. Margo MD.    At that appointment, patient was encouraged to reconsider use of an insulin pump.  She was offered a referral to 2 diabetes education for MNT however, this was declined.    Adjustments were made to diabetes regimen on 11/7/2024. However, pt has continued to use IC 8. Reports she is not eating consistently throughout the day. Reports poor appetite. Notes highly labile blood sugars.    Lantus 18 units daily  Carb ratio 1:6  ISF 50 greater than 150 mg/dL        Component      Latest Ref Rng 2/20/2024 4/17/2024 1/10/2025   Hemoglobin A1C      <=6.5  10.7 !  10.2 (H)  9.6 !       Legend:  ! Abnormal  (H) High    Clarissa Webb   Device used Freestyle winston 2+  Home use       Indication   Type 1 Diabetes    More than 72 hours of data was reviewed. Report to be scanned to chart.     Date Range: 2/20/25-3/5/25    Analysis of data:   Average Glucose: 220 mg/dL  Coefficient of Variation: 39.2%  GMI: 8.6%  Time in Target Range: 35%  Time Above Range: 26% 181 to 250 mg/dL; 38% greater than 250 mg/dL  Time Below Range: 1% 54 to 69 mg/dL; 0% less than 54 mg/dL    Interpretation of data: Poorly controlled with persistent hyperglycemia especially from the hours of 6 PM through 9 AM.  Rare episodes of hypoglycemia.        Review of Systems   Constitutional:  Positive for appetite change and fatigue. Negative for activity change and unexpected weight change.   HENT:  Negative for dental problem, sore throat, trouble swallowing and voice change.    Eyes:  Negative for visual disturbance.   Respiratory:  Negative for cough,  "chest tightness and shortness of breath.    Cardiovascular:  Negative for chest pain, palpitations and leg swelling.   Gastrointestinal:  Positive for nausea. Negative for constipation, diarrhea and vomiting.   Endocrine: Negative for cold intolerance, heat intolerance, polydipsia, polyphagia and polyuria.   Genitourinary:  Negative for frequency.   Musculoskeletal:  Positive for myalgias. Negative for arthralgias, back pain and gait problem.   Skin:  Negative for wound.   Allergic/Immunologic: Negative for environmental allergies and food allergies.   Neurological:  Positive for numbness. Negative for dizziness, weakness, light-headedness and headaches.   Psychiatric/Behavioral:  Positive for dysphoric mood and sleep disturbance. Negative for decreased concentration. The patient is not nervous/anxious.           Objective   /88 (BP Location: Left arm, Patient Position: Sitting, Cuff Size: Adult)   Pulse 99   Temp 97.6 °F (36.4 °C) (Temporal)   Resp 18   Ht 5' 6\" (1.676 m)   Wt 74.8 kg (165 lb)   SpO2 99%   BMI 26.63 kg/m²      Physical Exam  Vitals reviewed.   Constitutional:       General: She is not in acute distress.     Appearance: She is well-developed. She is not ill-appearing.   HENT:      Head: Normocephalic and atraumatic.   Eyes:      Conjunctiva/sclera: Conjunctivae normal.   Cardiovascular:      Rate and Rhythm: Normal rate.      Pulses: Normal pulses.      Heart sounds: No murmur heard.  Pulmonary:      Effort: Pulmonary effort is normal. No respiratory distress.   Abdominal:      Palpations: Abdomen is soft.      Tenderness: There is no abdominal tenderness.   Musculoskeletal:      Cervical back: Normal range of motion and neck supple.      Right lower leg: No edema.      Left lower leg: No edema.   Skin:     General: Skin is warm and dry.      Capillary Refill: Capillary refill takes less than 2 seconds.   Neurological:      Mental Status: She is alert.   Psychiatric:         Mood and " Affect: Mood normal.

## 2025-03-05 NOTE — ASSESSMENT & PLAN NOTE
Clinically and biochemically euthyroid.  Continue 250 mcg of levothyroxine daily.  Orders:    TSH, 3rd generation; Future    T4, free; Future

## 2025-03-05 NOTE — ASSESSMENT & PLAN NOTE
Patient remains uncontrolled. Counseled on pathophysiology of diabetes. Counseled on negative, long-term effects associated with uncontrolled diabetes including neuropathy, nephropathy, retinopathy, heart attack, and stroke.  Patient is very interested/motivated in resuming insulin pump use. Recommend starting Medtronic 780G with Medtronic Guardian. Briefly educated on the benefits of the newest technology/algorithm. Will reach out to [employer] representatives to assist patient with ordering process. Lengthy discussion about the importance of eating more consistently throughout the day rather than skipping meals. Reviewed the importance of injecting novolog (with both meal coverage and correction) more consistently throughout the day- at least q 6 hours. Continue with 20 units of Lantus nightly. Continue with IC 8. Recommend using ISF of 50 over 150 mg/dL during the day. At HS, use ISF 50 for > 200 mg/dL. Patient knows to notify me with persistent hyperglycemia or episodes of hypoglycemia.  F/U in 3 months.   Lab Results   Component Value Date    HGBA1C 8.7 (A) 03/05/2025       Orders:    POCT hemoglobin A1c    Comprehensive metabolic panel    C-peptide; Future    Hemoglobin A1C; Future    Basic metabolic panel    gabapentin (NEURONTIN) 600 MG tablet; Take 1.5 tablet (900 mg) nightly before bed.

## 2025-03-05 NOTE — ASSESSMENT & PLAN NOTE
Patient reports disturbed/interrupted sleep due to neuropathy.  Currently taking 600 mg of gabapentin before bed.  Recommend increasing to 900 mg nightly.  Lab Results   Component Value Date    HGBA1C 8.7 (A) 03/05/2025             Yes

## 2025-03-07 DIAGNOSIS — E06.3 HYPOTHYROIDISM DUE TO HASHIMOTO'S THYROIDITIS: ICD-10-CM

## 2025-03-07 RX ORDER — LEVOTHYROXINE SODIUM 200 UG/1
200 TABLET ORAL EVERY MORNING
Qty: 90 TABLET | Refills: 1 | Status: SHIPPED | OUTPATIENT
Start: 2025-03-07

## 2025-03-10 ENCOUNTER — HOSPITAL ENCOUNTER (OUTPATIENT)
Dept: ULTRASOUND IMAGING | Facility: HOSPITAL | Age: 45
Discharge: HOME | End: 2025-03-10
Payer: COMMERCIAL

## 2025-03-10 DIAGNOSIS — R74.8 ELEVATED LIVER ENZYMES: ICD-10-CM

## 2025-03-10 PROCEDURE — 93976 VASCULAR STUDY: CPT

## 2025-03-17 ENCOUNTER — RESULTS FOLLOW-UP (OUTPATIENT)
Dept: FAMILY MEDICINE CLINIC | Facility: CLINIC | Age: 45
End: 2025-03-17

## 2025-04-04 ENCOUNTER — TELEPHONE (OUTPATIENT)
Dept: ENDOCRINOLOGY | Facility: CLINIC | Age: 45
End: 2025-04-04

## 2025-04-04 NOTE — TELEPHONE ENCOUNTER
Email with information stating her new pump is fully covered.     From: Dianna Gomes   Subject: New Pump    After this patient's last appointment Rosina had reached out to Eileen to try to get her back on pump therapy. (She lost her previous pump).  Yanira had to wait the time until her insurance would approve a new pump and now is the time.  She is fully covered!! ??   We've been trying to reach her this week with no luck.   Can you send her a portal message or reach out from office to call either Eileen or myself?  Also Can you please share the update with Rosina?  Thank you and have an amazing Friday and weekend!

## 2025-04-08 ENCOUNTER — TELEPHONE (OUTPATIENT)
Age: 45
End: 2025-04-08

## 2025-04-08 NOTE — TELEPHONE ENCOUNTER
Patient is requesting a referral for behavior health. The program she wants to get in to is ECT. Pls call patient when his is done.

## 2025-04-09 ENCOUNTER — TELEPHONE (OUTPATIENT)
Age: 45
End: 2025-04-09

## 2025-04-09 DIAGNOSIS — A60.1 HERPES SIMPLEX INFECTION OF PERIANAL SKIN: ICD-10-CM

## 2025-04-09 DIAGNOSIS — E06.3 HYPOTHYROIDISM DUE TO HASHIMOTO'S THYROIDITIS: ICD-10-CM

## 2025-04-09 DIAGNOSIS — E10.65 TYPE 1 DIABETES MELLITUS WITH HYPERGLYCEMIA (HCC): ICD-10-CM

## 2025-04-09 DIAGNOSIS — E55.9 VITAMIN D DEFICIENCY: ICD-10-CM

## 2025-04-09 RX ORDER — LEVOTHYROXINE SODIUM 50 UG/1
50 TABLET ORAL DAILY
Qty: 30 TABLET | Refills: 5 | Status: ON HOLD | OUTPATIENT
Start: 2025-04-09

## 2025-04-09 RX ORDER — INSULIN ASPART 100 [IU]/ML
INJECTION, SOLUTION INTRAVENOUS; SUBCUTANEOUS
Qty: 30 ML | Refills: 1 | Status: ON HOLD | OUTPATIENT
Start: 2025-04-09

## 2025-04-09 RX ORDER — ERGOCALCIFEROL 1.25 MG/1
50000 CAPSULE, LIQUID FILLED ORAL WEEKLY
Qty: 4 CAPSULE | Refills: 0 | Status: ON HOLD | OUTPATIENT
Start: 2025-04-09

## 2025-04-09 NOTE — TELEPHONE ENCOUNTER
Received email from Dianna/[employer].    Certificate of medical necessity to be filled out by Rosina and faxed back to [employer].    Given to Rosina.

## 2025-04-09 NOTE — TELEPHONE ENCOUNTER
Patient called and requested to know more information about ECT therapy and the process. Please call to discuss further questions.

## 2025-04-10 RX ORDER — VALACYCLOVIR HYDROCHLORIDE 500 MG/1
500 TABLET, FILM COATED ORAL DAILY
Qty: 30 TABLET | Refills: 0 | Status: ON HOLD | OUTPATIENT
Start: 2025-04-10 | End: 2025-05-10

## 2025-04-11 ENCOUNTER — SOCIAL WORK (OUTPATIENT)
Dept: BEHAVIORAL/MENTAL HEALTH CLINIC | Facility: CLINIC | Age: 45
End: 2025-04-11

## 2025-04-11 ENCOUNTER — HOSPITAL ENCOUNTER (INPATIENT)
Facility: HOSPITAL | Age: 45
LOS: 11 days | Discharge: HOME | DRG: 751 | End: 2025-04-22
Attending: EMERGENCY MEDICINE | Admitting: PSYCHIATRY & NEUROLOGY
Payer: COMMERCIAL

## 2025-04-11 DIAGNOSIS — E53.8 B12 DEFICIENCY: ICD-10-CM

## 2025-04-11 DIAGNOSIS — E53.8 FOLIC ACID DEFICIENCY: ICD-10-CM

## 2025-04-11 DIAGNOSIS — F32.2 SEVERE DEPRESSION (HCC): Primary | ICD-10-CM

## 2025-04-11 DIAGNOSIS — F41.9 ANXIETY: ICD-10-CM

## 2025-04-11 DIAGNOSIS — E10.65 TYPE 1 DIABETES MELLITUS WITH HYPERGLYCEMIA (HCC): ICD-10-CM

## 2025-04-11 DIAGNOSIS — D50.9 IRON DEFICIENCY ANEMIA, UNSPECIFIED IRON DEFICIENCY ANEMIA TYPE: Chronic | ICD-10-CM

## 2025-04-11 DIAGNOSIS — E54 VITAMIN C DEFICIENCY: ICD-10-CM

## 2025-04-11 DIAGNOSIS — E51.9 THIAMINE DEFICIENCY: ICD-10-CM

## 2025-04-11 DIAGNOSIS — E03.9 HYPOTHYROIDISM: ICD-10-CM

## 2025-04-11 LAB
AMPHETAMINES SERPL QL SCN: POSITIVE
ANION GAP SERPL CALCULATED.3IONS-SCNC: 12 MMOL/L (ref 4–13)
BARBITURATES UR QL: NEGATIVE
BENZODIAZ UR QL: NEGATIVE
BUN SERPL-MCNC: 16 MG/DL (ref 5–25)
CALCIUM SERPL-MCNC: 9.3 MG/DL (ref 8.4–10.2)
CHLORIDE SERPL-SCNC: 97 MMOL/L (ref 96–108)
CO2 SERPL-SCNC: 22 MMOL/L (ref 21–32)
COCAINE UR QL: NEGATIVE
CREAT SERPL-MCNC: 0.86 MG/DL (ref 0.6–1.3)
ETHANOL EXG-MCNC: 0.04 MG/DL
EXT PREGNANCY TEST URINE: NEGATIVE
EXT. CONTROL: NORMAL
FENTANYL UR QL SCN: NEGATIVE
GFR SERPL CREATININE-BSD FRML MDRD: 83 ML/MIN/1.73SQ M
GLUCOSE SERPL-MCNC: 259 MG/DL (ref 65–140)
GLUCOSE SERPL-MCNC: 272 MG/DL (ref 65–140)
HYDROCODONE UR QL SCN: NEGATIVE
METHADONE UR QL: NEGATIVE
OPIATES UR QL SCN: NEGATIVE
OXYCODONE+OXYMORPHONE UR QL SCN: NEGATIVE
PCP UR QL: NEGATIVE
POTASSIUM SERPL-SCNC: 4.3 MMOL/L (ref 3.5–5.3)
SODIUM SERPL-SCNC: 131 MMOL/L (ref 135–147)
THC UR QL: NEGATIVE

## 2025-04-11 PROCEDURE — 81025 URINE PREGNANCY TEST: CPT | Performed by: EMERGENCY MEDICINE

## 2025-04-11 PROCEDURE — 99285 EMERGENCY DEPT VISIT HI MDM: CPT

## 2025-04-11 PROCEDURE — 82948 REAGENT STRIP/BLOOD GLUCOSE: CPT

## 2025-04-11 PROCEDURE — 80053 COMPREHEN METABOLIC PANEL: CPT | Performed by: NURSE PRACTITIONER

## 2025-04-11 PROCEDURE — 99285 EMERGENCY DEPT VISIT HI MDM: CPT | Performed by: EMERGENCY MEDICINE

## 2025-04-11 PROCEDURE — 80307 DRUG TEST PRSMV CHEM ANLYZR: CPT | Performed by: EMERGENCY MEDICINE

## 2025-04-11 PROCEDURE — 36415 COLL VENOUS BLD VENIPUNCTURE: CPT | Performed by: EMERGENCY MEDICINE

## 2025-04-11 PROCEDURE — 96372 THER/PROPH/DIAG INJ SC/IM: CPT

## 2025-04-11 PROCEDURE — 80048 BASIC METABOLIC PNL TOTAL CA: CPT | Performed by: EMERGENCY MEDICINE

## 2025-04-11 PROCEDURE — 82075 ASSAY OF BREATH ETHANOL: CPT | Performed by: EMERGENCY MEDICINE

## 2025-04-11 RX ORDER — INSULIN LISPRO 100 [IU]/ML
1-6 INJECTION, SOLUTION INTRAVENOUS; SUBCUTANEOUS
Status: DISCONTINUED | OUTPATIENT
Start: 2025-04-12 | End: 2025-04-22 | Stop reason: HOSPADM

## 2025-04-11 RX ORDER — ESCITALOPRAM OXALATE 20 MG/1
30 TABLET ORAL DAILY
Status: ON HOLD | COMMUNITY
Start: 2025-03-26 | End: 2025-04-22

## 2025-04-11 RX ORDER — ESCITALOPRAM OXALATE 10 MG/1
30 TABLET ORAL DAILY
COMMUNITY
Start: 2025-03-26 | End: 2025-04-22

## 2025-04-11 RX ORDER — LISDEXAMFETAMINE DIMESYLATE 50 MG/1
50 CAPSULE ORAL EVERY MORNING
COMMUNITY
Start: 2025-03-25

## 2025-04-11 RX ORDER — TRAZODONE HYDROCHLORIDE 100 MG/1
100 TABLET ORAL
Status: DISCONTINUED | OUTPATIENT
Start: 2025-04-11 | End: 2025-04-12

## 2025-04-11 RX ORDER — NALTREXONE 380 MG
380 KIT INTRAMUSCULAR
COMMUNITY
Start: 2025-04-03

## 2025-04-11 RX ORDER — LEVOTHYROXINE SODIUM 125 UG/1
250 TABLET ORAL
Status: DISCONTINUED | OUTPATIENT
Start: 2025-04-12 | End: 2025-04-22 | Stop reason: HOSPADM

## 2025-04-11 RX ORDER — GABAPENTIN 300 MG/1
900 CAPSULE ORAL
Status: DISCONTINUED | OUTPATIENT
Start: 2025-04-11 | End: 2025-04-12

## 2025-04-11 RX ORDER — INSULIN GLARGINE 100 [IU]/ML
15 INJECTION, SOLUTION SUBCUTANEOUS DAILY
Status: DISCONTINUED | OUTPATIENT
Start: 2025-04-12 | End: 2025-04-12

## 2025-04-11 RX ADMIN — HUMAN INSULIN 4 UNITS: 100 INJECTION, SOLUTION SUBCUTANEOUS at 21:46

## 2025-04-11 RX ADMIN — GABAPENTIN 900 MG: 300 CAPSULE ORAL at 21:31

## 2025-04-11 NOTE — TELEPHONE ENCOUNTER
Patient called in returning a call from the ECT MA regarding services.    Writer spoke with office staff and stated that they would contact them back as soon as they had the chance to reach out.

## 2025-04-11 NOTE — TELEPHONE ENCOUNTER
Spoke with patient discussed interest in ECT noticed patient very tearful and has increasing depression and anxiety advised patient to go to ER

## 2025-04-11 NOTE — LETTER
[address] [facility]  [address] [facility] [address]  [phone]  Dept: [phone]    April 22, 2025     Patient: Clarissa Webb   YOB: 1980   Date of Visit: 4/11/2025       To Whom it May Concern:    Clarissa Webb is under my professional care. She was seen in the hospital from 4/11/2025 to 04/22/25. She may return to work on 4/29/25 with the following limitations : Clarissa will require follow up appointments in the hospital on Mondays/Wednesdays/Fridays. She will also require additional weekly outpatient appointments to continue her treatment.     If you have any questions or concerns, please don't hesitate to call.         Sincerely,          Winsome Henry, DO

## 2025-04-11 NOTE — PROGRESS NOTES
Pt presented to the walk in center looking for possible outpatient mental services. During registration pt received a phone call stating that she could walk in at [place] for possible outpatient ECT services. Pt decided that she would leave the walk in [place] and travel to [place]. PT did not express any SI/HI/AH/AV.         Ideations  Violence Risk to Others: Denies within past 6 months    C-SSRS  [place] Suicide Severity Rating Scale  C-SSRS Q1. Wish to be Dead: No - In the Past Month  *Risk Level*: Low Risk      Patient was referred by: Self or Family    Start Time: (P) 1500  Stop Time: (P) 1519  Total Visit Time: (P) 19 minutes    Pt is traveling by personal vehicle to [place] for possible outpatient ECT services.           National Suicide Prevention Hotline:  988      [facility] [phone] - Crisis   [facility] [phone] - LVF Crisis/Mobile Crisis   [phone] - SLPF Crisis   [facility]: [phone]  [facility]: [phone]   [facility] [phone] - Crisis   [facility] [phone] - Crisis     [facility] [phone] - Crisis   [facility] [phone] - Crisis   [phone] - Peer Support Talk Line (1-9pm daily)  [phone] - Teen Support Talk Line (1-9pm daily)  [phone] - [facility] [phone]- Crisis    [facility] [phone] - Crisis   [facility] [phone] - Crisis    [facility]) [phone] - [facility] Crisis

## 2025-04-12 PROBLEM — E87.1 HYPONATREMIA: Status: ACTIVE | Noted: 2025-04-12

## 2025-04-12 PROBLEM — Z00.8 MEDICAL CLEARANCE FOR PSYCHIATRIC ADMISSION: Status: ACTIVE | Noted: 2017-03-30

## 2025-04-12 PROBLEM — F90.9 ADHD (ATTENTION DEFICIT HYPERACTIVITY DISORDER): Status: ACTIVE | Noted: 2025-04-12

## 2025-04-12 LAB
25(OH)D3 SERPL-MCNC: 25.7 NG/ML (ref 30–100)
ALBUMIN SERPL BCG-MCNC: 4.3 G/DL (ref 3.5–5)
ALP SERPL-CCNC: 108 U/L (ref 34–104)
ALT SERPL W P-5'-P-CCNC: 21 U/L (ref 7–52)
ANION GAP SERPL CALCULATED.3IONS-SCNC: 12 MMOL/L (ref 4–13)
AST SERPL W P-5'-P-CCNC: 30 U/L (ref 13–39)
ATRIAL RATE: 70 BPM
BACTERIA UR QL AUTO: ABNORMAL /HPF
BASOPHILS # BLD AUTO: 0.03 THOUSANDS/ÂΜL (ref 0–0.1)
BASOPHILS NFR BLD AUTO: 1 % (ref 0–1)
BILIRUB SERPL-MCNC: 0.62 MG/DL (ref 0.2–1)
BILIRUB UR QL STRIP: NEGATIVE
BUN SERPL-MCNC: 16 MG/DL (ref 5–25)
CALCIUM SERPL-MCNC: 9.3 MG/DL (ref 8.4–10.2)
CHLORIDE SERPL-SCNC: 97 MMOL/L (ref 96–108)
CHOLEST SERPL-MCNC: 184 MG/DL (ref ?–200)
CLARITY UR: CLEAR
CO2 SERPL-SCNC: 22 MMOL/L (ref 21–32)
COLOR UR: ABNORMAL
CREAT SERPL-MCNC: 0.86 MG/DL (ref 0.6–1.3)
EOSINOPHIL # BLD AUTO: 0.11 THOUSAND/ÂΜL (ref 0–0.61)
EOSINOPHIL NFR BLD AUTO: 4 % (ref 0–6)
ERYTHROCYTE [DISTWIDTH] IN BLOOD BY AUTOMATED COUNT: 12.2 % (ref 11.6–15.1)
FOLATE SERPL-MCNC: 17 NG/ML
GFR SERPL CREATININE-BSD FRML MDRD: 83 ML/MIN/1.73SQ M
GLUCOSE SERPL-MCNC: 101 MG/DL (ref 65–140)
GLUCOSE SERPL-MCNC: 103 MG/DL (ref 65–140)
GLUCOSE SERPL-MCNC: 215 MG/DL (ref 65–140)
GLUCOSE SERPL-MCNC: 272 MG/DL (ref 65–140)
GLUCOSE SERPL-MCNC: 345 MG/DL (ref 65–140)
GLUCOSE SERPL-MCNC: 384 MG/DL (ref 65–140)
GLUCOSE UR STRIP-MCNC: ABNORMAL MG/DL
HCG SERPL QL: NEGATIVE
HCT VFR BLD AUTO: 34.7 % (ref 34.8–46.1)
HDLC SERPL-MCNC: 118 MG/DL
HGB BLD-MCNC: 10.9 G/DL (ref 11.5–15.4)
HGB UR QL STRIP.AUTO: 10
IMM GRANULOCYTES # BLD AUTO: 0.01 THOUSAND/UL (ref 0–0.2)
IMM GRANULOCYTES NFR BLD AUTO: 0 % (ref 0–2)
KETONES UR STRIP-MCNC: NEGATIVE MG/DL
LDLC SERPL CALC-MCNC: 38 MG/DL (ref 0–100)
LEUKOCYTE ESTERASE UR QL STRIP: NEGATIVE
LYMPHOCYTES # BLD AUTO: 1.55 THOUSANDS/ÂΜL (ref 0.6–4.47)
LYMPHOCYTES NFR BLD AUTO: 53 % (ref 14–44)
MCH RBC QN AUTO: 28 PG (ref 26.8–34.3)
MCHC RBC AUTO-ENTMCNC: 31.4 G/DL (ref 31.4–37.4)
MCV RBC AUTO: 89 FL (ref 82–98)
MONOCYTES # BLD AUTO: 0.29 THOUSAND/ÂΜL (ref 0.17–1.22)
MONOCYTES NFR BLD AUTO: 10 % (ref 4–12)
NEUTROPHILS # BLD AUTO: 0.91 THOUSANDS/ÂΜL (ref 1.85–7.62)
NEUTS SEG NFR BLD AUTO: 32 % (ref 43–75)
NITRITE UR QL STRIP: NEGATIVE
NON-SQ EPI CELLS URNS QL MICRO: ABNORMAL /HPF
NONHDLC SERPL-MCNC: 66 MG/DL
NRBC BLD AUTO-RTO: 0 /100 WBCS
P AXIS: 57 DEGREES
PH UR STRIP.AUTO: 6 [PH]
PLATELET # BLD AUTO: 228 THOUSANDS/UL (ref 149–390)
PMV BLD AUTO: 10.7 FL (ref 8.9–12.7)
POTASSIUM SERPL-SCNC: 4.3 MMOL/L (ref 3.5–5.3)
PR INTERVAL: 122 MS
PROT SERPL-MCNC: 7.7 G/DL (ref 6.4–8.4)
PROT UR STRIP-MCNC: NEGATIVE MG/DL
QRS AXIS: 82 DEGREES
QRSD INTERVAL: 84 MS
QT INTERVAL: 408 MS
QTC INTERVAL: 441 MS
RBC # BLD AUTO: 3.89 MILLION/UL (ref 3.81–5.12)
RBC #/AREA URNS AUTO: ABNORMAL /HPF
SODIUM SERPL-SCNC: 131 MMOL/L (ref 135–147)
SP GR UR STRIP.AUTO: 1 (ref 1–1.04)
T WAVE AXIS: 49 DEGREES
TRIGL SERPL-MCNC: 138 MG/DL (ref ?–150)
TSH SERPL DL<=0.05 MIU/L-ACNC: 1.06 UIU/ML (ref 0.45–4.5)
UROBILINOGEN UA: NEGATIVE MG/DL
VENTRICULAR RATE: 70 BPM
VIT B12 SERPL-MCNC: 201 PG/ML (ref 180–914)
WBC # BLD AUTO: 2.9 THOUSAND/UL (ref 4.31–10.16)
WBC #/AREA URNS AUTO: ABNORMAL /HPF

## 2025-04-12 PROCEDURE — 99222 1ST HOSP IP/OBS MODERATE 55: CPT | Performed by: PSYCHIATRY & NEUROLOGY

## 2025-04-12 PROCEDURE — 84703 CHORIONIC GONADOTROPIN ASSAY: CPT | Performed by: PSYCHIATRY & NEUROLOGY

## 2025-04-12 PROCEDURE — 93010 ELECTROCARDIOGRAM REPORT: CPT | Performed by: INTERNAL MEDICINE

## 2025-04-12 PROCEDURE — 84443 ASSAY THYROID STIM HORMONE: CPT | Performed by: PSYCHIATRY & NEUROLOGY

## 2025-04-12 PROCEDURE — 99254 IP/OBS CNSLTJ NEW/EST MOD 60: CPT | Performed by: NURSE PRACTITIONER

## 2025-04-12 PROCEDURE — 82746 ASSAY OF FOLIC ACID SERUM: CPT | Performed by: PSYCHIATRY & NEUROLOGY

## 2025-04-12 PROCEDURE — 80061 LIPID PANEL: CPT | Performed by: PSYCHIATRY & NEUROLOGY

## 2025-04-12 PROCEDURE — 82607 VITAMIN B-12: CPT | Performed by: PSYCHIATRY & NEUROLOGY

## 2025-04-12 PROCEDURE — 86780 TREPONEMA PALLIDUM: CPT | Performed by: PSYCHIATRY & NEUROLOGY

## 2025-04-12 PROCEDURE — 81001 URINALYSIS AUTO W/SCOPE: CPT | Performed by: PSYCHIATRY & NEUROLOGY

## 2025-04-12 PROCEDURE — 85025 COMPLETE CBC W/AUTO DIFF WBC: CPT | Performed by: PSYCHIATRY & NEUROLOGY

## 2025-04-12 PROCEDURE — 93005 ELECTROCARDIOGRAM TRACING: CPT

## 2025-04-12 PROCEDURE — 82948 REAGENT STRIP/BLOOD GLUCOSE: CPT

## 2025-04-12 PROCEDURE — 82306 VITAMIN D 25 HYDROXY: CPT | Performed by: PSYCHIATRY & NEUROLOGY

## 2025-04-12 RX ORDER — LORAZEPAM 2 MG/ML
2 INJECTION INTRAMUSCULAR
Status: DISCONTINUED | OUTPATIENT
Start: 2025-04-12 | End: 2025-04-22 | Stop reason: HOSPADM

## 2025-04-12 RX ORDER — IBUPROFEN 600 MG/1
600 TABLET, FILM COATED ORAL EVERY 6 HOURS PRN
Status: DISCONTINUED | OUTPATIENT
Start: 2025-04-12 | End: 2025-04-22 | Stop reason: HOSPADM

## 2025-04-12 RX ORDER — BENZTROPINE MESYLATE 1 MG/1
1 TABLET ORAL
Status: DISCONTINUED | OUTPATIENT
Start: 2025-04-12 | End: 2025-04-22 | Stop reason: HOSPADM

## 2025-04-12 RX ORDER — BENZTROPINE MESYLATE 1 MG/ML
1 INJECTION, SOLUTION INTRAMUSCULAR; INTRAVENOUS
Status: DISCONTINUED | OUTPATIENT
Start: 2025-04-12 | End: 2025-04-22 | Stop reason: HOSPADM

## 2025-04-12 RX ORDER — HALOPERIDOL 1 MG/1
1 TABLET ORAL EVERY 6 HOURS PRN
Status: DISCONTINUED | OUTPATIENT
Start: 2025-04-12 | End: 2025-04-22 | Stop reason: HOSPADM

## 2025-04-12 RX ORDER — LORAZEPAM 2 MG/ML
1 INJECTION INTRAMUSCULAR
Status: DISCONTINUED | OUTPATIENT
Start: 2025-04-12 | End: 2025-04-22 | Stop reason: HOSPADM

## 2025-04-12 RX ORDER — GABAPENTIN 400 MG/1
400 CAPSULE ORAL 3 TIMES DAILY
Status: DISCONTINUED | OUTPATIENT
Start: 2025-04-12 | End: 2025-04-22 | Stop reason: HOSPADM

## 2025-04-12 RX ORDER — INSULIN ASPART 100 [IU]/ML
7 INJECTION, SOLUTION INTRAVENOUS; SUBCUTANEOUS
Status: DISCONTINUED | OUTPATIENT
Start: 2025-04-12 | End: 2025-04-12

## 2025-04-12 RX ORDER — DIPHENHYDRAMINE HYDROCHLORIDE 50 MG/ML
50 INJECTION, SOLUTION INTRAMUSCULAR; INTRAVENOUS EVERY 6 HOURS PRN
Status: DISCONTINUED | OUTPATIENT
Start: 2025-04-12 | End: 2025-04-22 | Stop reason: HOSPADM

## 2025-04-12 RX ORDER — FOLIC ACID 1 MG/1
1 TABLET ORAL DAILY
Status: DISCONTINUED | OUTPATIENT
Start: 2025-04-12 | End: 2025-04-22 | Stop reason: HOSPADM

## 2025-04-12 RX ORDER — INSULIN LISPRO 100 [IU]/ML
7 INJECTION, SOLUTION INTRAVENOUS; SUBCUTANEOUS
Status: DISCONTINUED | OUTPATIENT
Start: 2025-04-12 | End: 2025-04-16

## 2025-04-12 RX ORDER — HALOPERIDOL 5 MG/1
5 TABLET ORAL
Status: DISCONTINUED | OUTPATIENT
Start: 2025-04-12 | End: 2025-04-22 | Stop reason: HOSPADM

## 2025-04-12 RX ORDER — HALOPERIDOL 5 MG/ML
5 INJECTION INTRAMUSCULAR
Status: DISCONTINUED | OUTPATIENT
Start: 2025-04-12 | End: 2025-04-22 | Stop reason: HOSPADM

## 2025-04-12 RX ORDER — BISACODYL 10 MG
10 SUPPOSITORY, RECTAL RECTAL DAILY PRN
Status: DISCONTINUED | OUTPATIENT
Start: 2025-04-12 | End: 2025-04-12

## 2025-04-12 RX ORDER — LANOLIN ALCOHOL/MO/W.PET/CERES
100 CREAM (GRAM) TOPICAL DAILY
Status: DISCONTINUED | OUTPATIENT
Start: 2025-04-12 | End: 2025-04-22 | Stop reason: HOSPADM

## 2025-04-12 RX ORDER — HYDROXYZINE HYDROCHLORIDE 25 MG/1
25 TABLET, FILM COATED ORAL
Status: DISCONTINUED | OUTPATIENT
Start: 2025-04-12 | End: 2025-04-22 | Stop reason: HOSPADM

## 2025-04-12 RX ORDER — MAGNESIUM HYDROXIDE/ALUMINUM HYDROXICE/SIMETHICONE 120; 1200; 1200 MG/30ML; MG/30ML; MG/30ML
30 SUSPENSION ORAL EVERY 4 HOURS PRN
Status: DISCONTINUED | OUTPATIENT
Start: 2025-04-12 | End: 2025-04-22 | Stop reason: HOSPADM

## 2025-04-12 RX ORDER — POLYETHYLENE GLYCOL 3350 17 G/17G
17 POWDER, FOR SOLUTION ORAL DAILY PRN
Status: DISCONTINUED | OUTPATIENT
Start: 2025-04-12 | End: 2025-04-22 | Stop reason: HOSPADM

## 2025-04-12 RX ORDER — INSULIN GLARGINE 100 [IU]/ML
18 INJECTION, SOLUTION SUBCUTANEOUS
Status: DISCONTINUED | OUTPATIENT
Start: 2025-04-12 | End: 2025-04-16

## 2025-04-12 RX ORDER — ESCITALOPRAM OXALATE 10 MG/1
10 TABLET ORAL DAILY
Status: DISCONTINUED | OUTPATIENT
Start: 2025-04-12 | End: 2025-04-14

## 2025-04-12 RX ORDER — HYDROXYZINE HYDROCHLORIDE 50 MG/1
50 TABLET, FILM COATED ORAL
Status: DISCONTINUED | OUTPATIENT
Start: 2025-04-12 | End: 2025-04-22 | Stop reason: HOSPADM

## 2025-04-12 RX ORDER — BISACODYL 10 MG
10 SUPPOSITORY, RECTAL RECTAL DAILY PRN
Status: DISCONTINUED | OUTPATIENT
Start: 2025-04-12 | End: 2025-04-22 | Stop reason: HOSPADM

## 2025-04-12 RX ORDER — PROPRANOLOL HYDROCHLORIDE 10 MG/1
10 TABLET ORAL EVERY 8 HOURS PRN
Status: DISCONTINUED | OUTPATIENT
Start: 2025-04-12 | End: 2025-04-22 | Stop reason: HOSPADM

## 2025-04-12 RX ORDER — AMOXICILLIN 250 MG
1 CAPSULE ORAL DAILY PRN
Status: DISCONTINUED | OUTPATIENT
Start: 2025-04-12 | End: 2025-04-22 | Stop reason: HOSPADM

## 2025-04-12 RX ORDER — BENZTROPINE MESYLATE 1 MG/ML
0.5 INJECTION, SOLUTION INTRAMUSCULAR; INTRAVENOUS
Status: DISCONTINUED | OUTPATIENT
Start: 2025-04-12 | End: 2025-04-22 | Stop reason: HOSPADM

## 2025-04-12 RX ORDER — LORAZEPAM 2 MG/ML
2 INJECTION INTRAMUSCULAR EVERY 6 HOURS PRN
Status: DISCONTINUED | OUTPATIENT
Start: 2025-04-12 | End: 2025-04-22 | Stop reason: HOSPADM

## 2025-04-12 RX ORDER — IBUPROFEN 400 MG/1
800 TABLET, FILM COATED ORAL EVERY 8 HOURS PRN
Status: DISCONTINUED | OUTPATIENT
Start: 2025-04-12 | End: 2025-04-22 | Stop reason: HOSPADM

## 2025-04-12 RX ORDER — TRAZODONE HYDROCHLORIDE 50 MG/1
50 TABLET ORAL
Status: DISCONTINUED | OUTPATIENT
Start: 2025-04-12 | End: 2025-04-22 | Stop reason: HOSPADM

## 2025-04-12 RX ORDER — HALOPERIDOL 5 MG/ML
2.5 INJECTION INTRAMUSCULAR
Status: DISCONTINUED | OUTPATIENT
Start: 2025-04-12 | End: 2025-04-22 | Stop reason: HOSPADM

## 2025-04-12 RX ORDER — HYDROXYZINE HYDROCHLORIDE 50 MG/1
100 TABLET, FILM COATED ORAL
Status: DISCONTINUED | OUTPATIENT
Start: 2025-04-12 | End: 2025-04-22 | Stop reason: HOSPADM

## 2025-04-12 RX ORDER — ESCITALOPRAM OXALATE 10 MG/1
20 TABLET ORAL DAILY
Status: DISCONTINUED | OUTPATIENT
Start: 2025-04-12 | End: 2025-04-14

## 2025-04-12 RX ORDER — IBUPROFEN 400 MG/1
400 TABLET, FILM COATED ORAL EVERY 4 HOURS PRN
Status: DISCONTINUED | OUTPATIENT
Start: 2025-04-12 | End: 2025-04-22 | Stop reason: HOSPADM

## 2025-04-12 RX ORDER — POLYETHYLENE GLYCOL 3350 17 G/17G
17 POWDER, FOR SOLUTION ORAL DAILY PRN
Status: DISCONTINUED | OUTPATIENT
Start: 2025-04-12 | End: 2025-04-12

## 2025-04-12 RX ADMIN — GABAPENTIN 400 MG: 400 CAPSULE ORAL at 16:33

## 2025-04-12 RX ADMIN — FOLIC ACID 1 MG: 1 TABLET ORAL at 10:25

## 2025-04-12 RX ADMIN — Medication 3 MG: at 01:01

## 2025-04-12 RX ADMIN — INSULIN LISPRO 6 UNITS: 100 INJECTION, SOLUTION INTRAVENOUS; SUBCUTANEOUS at 11:51

## 2025-04-12 RX ADMIN — TRAZODONE HYDROCHLORIDE 100 MG: 100 TABLET ORAL at 01:01

## 2025-04-12 RX ADMIN — INSULIN GLARGINE 18 UNITS: 100 INJECTION, SOLUTION SUBCUTANEOUS at 00:04

## 2025-04-12 RX ADMIN — LEVOTHYROXINE SODIUM 250 MCG: 0.12 TABLET ORAL at 06:39

## 2025-04-12 RX ADMIN — ESCITALOPRAM OXALATE 20 MG: 10 TABLET ORAL at 11:54

## 2025-04-12 RX ADMIN — Medication 3 MG: at 21:16

## 2025-04-12 RX ADMIN — TRAZODONE HYDROCHLORIDE 150 MG: 100 TABLET ORAL at 21:15

## 2025-04-12 RX ADMIN — INSULIN LISPRO 7 UNITS: 100 INJECTION, SOLUTION INTRAVENOUS; SUBCUTANEOUS at 16:52

## 2025-04-12 RX ADMIN — INSULIN GLARGINE 18 UNITS: 100 INJECTION, SOLUTION SUBCUTANEOUS at 21:19

## 2025-04-12 RX ADMIN — ESCITALOPRAM OXALATE 10 MG: 10 TABLET ORAL at 11:54

## 2025-04-12 RX ADMIN — Medication 1 TABLET: at 10:25

## 2025-04-12 RX ADMIN — GABAPENTIN 400 MG: 400 CAPSULE ORAL at 11:53

## 2025-04-12 RX ADMIN — INSULIN LISPRO 2 UNITS: 100 INJECTION, SOLUTION INTRAVENOUS; SUBCUTANEOUS at 16:52

## 2025-04-12 RX ADMIN — THIAMINE HCL TAB 100 MG 100 MG: 100 TAB at 10:25

## 2025-04-12 RX ADMIN — GABAPENTIN 400 MG: 400 CAPSULE ORAL at 21:15

## 2025-04-12 RX ADMIN — INSULIN LISPRO 7 UNITS: 100 INJECTION, SOLUTION INTRAVENOUS; SUBCUTANEOUS at 13:18

## 2025-04-12 NOTE — CONSULTS
Consultation - Hospitalist   Name: Clarissa Webb 45 y.o. female I MRN: [id number]  Unit/Bed#: -01 I Date of Admission: 4/11/2025   Date of Service: 4/12/2025 I Hospital Day: 1   Inpatient consult for Medical Clearance for  patient  Consult performed by: JAROHCO Martin  Consult ordered by: Elodia Kaur MD        Physician Requesting Evaluation: Amarjit Marquez MD   Reason for Evaluation / Principal Problem: Medical clearance for psychiatric admission    Assessment & Plan  Medical clearance for psychiatric admission  Vital signs stable afebrile patient seen and examined by myself labs from 4/11/2020 5 in the PM reviewed by myself sodium 131 potassium 4.3 creatinine 0.86  Patient medically stable for admit  Please reach out to  IM with any medical questions or concerns  Hypothyroidism due to Hashimoto's thyroiditis  Patient will continue on her home dose of levothyroxine 250 mcg p.o. daily  H/O gastric bypass  Patient has a history of gastric bypass  Small frequent meals  Type 1 diabetes mellitus with hyperglycemia (HCC)  Lab Results   Component Value Date    HGBA1C 8.7 (A) 03/05/2025       Recent Labs     04/11/25 2129 04/12/25  0832   POCGLU 259* 103       Blood Sugar Average: Last 72 hrs:  (P) 181  Patient will continue on Lantus 18 mg SQ nightly  After discussion with the patient and she explained to me how she does her insulin at home we are going to do 7 units of NovoLog AC meals  She will have a nutrition consult with a nutritionist on Monday  She will be on 4 times daily blood sugar with a sliding scale insulin  She will be on a 5 g carb controlled diet  Alcohol use  Patient has been self-medicating with alcohol daily secondary to not being able to sleep and having so much anxiety and depression  MVI/thiamine/folic acid  Alcohol cessation education  Hyponatremia  Patient's sodium last evening was 131  Patient will be on 1500 mL fluid restriction  Patient will have a repeat CMP  drawn 4/13/2025 and I will review it and make the appropriate changes      ECT Clearance:  History of recent seizure or stroke: Patient denies  History of pheochromocytoma: Patient denies patient denies  History of active bleeding (Intracranial hemorrhage, aneurysm or AVM): Patient denies  History of metallic implants in the head or neck: Patient denies  History of increased intracranial pressure with mass effect: Patient denies    EKG within 3 months yes 4/12/2025  If yes, was an arrhythmia present and at baseline no   If yes, what is the baseline QT interval 414  If no, obtain prior to ECT for arrhythmia evaluation and baseline QT interval.    Based on above criteria, Patient Is medically cleared for ECT should it be recommended.    Collaboration of Care: Were Recommendations Directly Discussed with Primary Treatment Team? - No     History of Present Illness   Clarissa Webb is a 45 y.o. female who is originally admitted to the psychiatry service due to increasing anxiety and depression with passive suicidal ideation. We are consulted for medical clearance for admission to Behavioral Health Unit and treatment of underlying psychiatric illness.      Patient presents to [facility] ED from a psych walk-in clinic.  Patient endorses increasing in depression and anxiety which she has had ongoing for the last 5 years since her  committed suicide.  She has passive suicidal ideation with the hopes that she will just go to sleep and not wake up.  She endorses decreased sleep and decreased appetite.  She reports that she drinks alcohol on a nightly basis in order to help with the anxiety the depression and the sleep.  She is having trouble with her ADLs and taking care of her children.  She is here for evaluation and she would like to pursue ECT therapy.  Patient's past medical history is significant for Hashimoto's thyroiditis, type 1 insulin-dependent diabetes mellitus, gastric bypass.  She is now  admitted to the [facility] for stabilization of her mental health issues.    Review of Systems   Constitutional:  Positive for activity change and appetite change. Negative for chills and fever.   HENT:  Negative for ear pain and sore throat.    Eyes:  Negative for pain and visual disturbance.   Respiratory:  Negative for cough and shortness of breath.    Cardiovascular:  Negative for chest pain and palpitations.   Gastrointestinal:  Negative for abdominal pain and vomiting.   Genitourinary:  Negative for dysuria and hematuria.   Musculoskeletal:  Negative for arthralgias and back pain.   Skin:  Negative for color change and rash.   Neurological:  Negative for seizures and syncope.   Psychiatric/Behavioral:  Positive for decreased concentration, dysphoric mood and sleep disturbance. The patient is nervous/anxious.    All other systems reviewed and are negative.      Historical Information   Past Medical History:   Diagnosis Date    Anemia     Anxiety     B12 deficiency     Cervical disc disorder     On gabapentin     Depression     Diabetes mellitus (HCC)     Disease of thyroid gland     hypothyroid    Iron deficiency anemia     Panic attack     PTSD (post-traumatic stress disorder)     Sleep difficulties     Substance abuse (HCC)     Type 1 diabetes (HCC)     Vitamin D deficiency      Past Surgical History:   Procedure Laterality Date    ABDOMINAL SURGERY      gastric bypass     SECTION      x2    CHOLECYSTECTOMY  2018    GASTRIC BYPASS  2007    INTRAUTERINE DEVICE INSERTION  2015    IR BIOPSY BONE MARROW  2020    OTHER SURGICAL HISTORY      surgery for imperforate hymen/endometriosis/hydrometrocolpos    ROOT CANAL  2024    TUBAL LIGATION      WISDOM TOOTH EXTRACTION       Social History     Tobacco Use    Smoking status: Never    Smokeless tobacco: Never   Vaping Use    Vaping status: Never Used   Substance and Sexual Activity    Alcohol use: Yes     Alcohol/week: 3.0 standard drinks of alcohol      Types: 3 Cans of beer per week     Comment: weekly a few drinks    Drug use: No    Sexual activity: Yes     Partners: Male     E-Cigarette/Vaping    E-Cigarette Use Never User      E-Cigarette/Vaping Substances    Nicotine No     THC No     CBD No     Flavoring No     Other No     Unknown No        Marital Status:     Meds/Allergies   I have reviewed home medications with patient personally.  Prior to Admission medications    Medication Sig Start Date End Date Taking? Authorizing Provider   ergocalciferol (VITAMIN D2) 50,000 units Take 1 capsule (50,000 Units total) by mouth once a week 4/9/25  Yes JAROCHO Arellano   escitalopram (LEXAPRO) 10 mg tablet Take 30 mg by mouth daily 3/26/25  Yes Historical Provider, MD   escitalopram (LEXAPRO) 20 mg tablet Take 30 mg by mouth daily 3/26/25  Yes Historical Provider, MD   gabapentin (NEURONTIN) 600 MG tablet Take 1.5 tablet (900 mg) nightly before bed. 3/5/25  Yes JAROCHO Bryan   Insulin Aspart (NovoLOG) 100 units/mL injection Sliding scale up to 3 times per day with meals. Max 30 units per day 4/9/25  Yes Viktoriya Cruz MD   levothyroxine 200 mcg tablet TAKE 1 TABLET (200 MCG TOTAL) BY MOUTH EVERY MORNING 3/7/25  Yes Merna Aragon MD   levothyroxine 50 mcg tablet Take 1 tablet (50 mcg total) by mouth daily 4/9/25  Yes Merna Aragon MD   lisdexamfetamine (VYVANSE) 50 MG capsule Take 50 mg by mouth every morning 3/25/25  Yes Historical Provider, MD   traZODone (DESYREL) 100 mg tablet Take 1 tablet (100 mg total) by mouth daily at bedtime 8/28/24  Yes Annette Scales MD   valACYclovir (VALTREX) 500 mg tablet Take 1 tablet (500 mg total) by mouth daily 4/10/25 5/10/25 Yes JAROCHO Gomez   Vivitrol 380 MG SUSR 380 mg 4/3/25  Yes Historical Provider, MD   Accu-Chek FastClix Lancets MISC USE 1 LANCET TO TEST BLOOD SUGAR UP TO 6 TIMES DAILY 6/9/21   Cory Lincoln PA-C   acetaminophen (TYLENOL) 500 mg tablet    Patient not taking:  "Reported on 5/22/2024 5/2/21   Historical Provider, MD   Continuous Blood Gluc  (FreeStyle Samantha 14 Day [place]) KELVIN Use 1 Units in the morning 2/20/24   JAROCHO Bryan   Continuous Glucose Sensor (FreeStyle Samantha 2 Sensor) MISC USE 1 UNIT EVERY 14 DAYS 4/9/25   Merna Aragon MD   glucose blood (Accu-Chek Guide) test strip Use to test blood sugar up to 6 times daily. 5/18/21   JAROCHO Bryan   Insulin Pen Needle (BD Pen Needle Deanna U/F) 32G X 4 MM MISC Use 4/day 11/15/22   Annette Scales MD   Lantus 100 UNIT/ML subcutaneous injection INJECT 15 UNITS UNDER THE SKIN DAILY. DISCARD AFTER 28 DAYS.  Patient not taking: Reported on 4/12/2025 11/5/24   Merna Aragon MD   UltiCare Insulin Syringe 28G X 1/2\" 1 ML MISC INJECT UNDER THE SKIN 4 (FOUR) TIMES A DAY 2/17/25   Merna Aragon MD     No Known Allergies    Objective :  Temp:  [98 °F (36.7 °C)-98.2 °F (36.8 °C)] 98.1 °F (36.7 °C)  HR:  [78-89] 78  BP: (104-158)/(54-94) 104/54  Resp:  [16-18] 16  SpO2:  [98 %-100 %] 98 %  O2 Device: None (Room air)    Height: 5' 6\" (167.6 cm) (04/12/25 0022)  Weight - Scale: 75.3 kg (166 lb) (04/12/25 0022)  Physical Exam  Constitutional:       Appearance: Normal appearance.   HENT:      Head: Normocephalic and atraumatic.      Nose: Nose normal.      Mouth/Throat:      Mouth: Mucous membranes are moist.      Pharynx: Oropharynx is clear.   Eyes:      Extraocular Movements: Extraocular movements intact.      Pupils: Pupils are equal, round, and reactive to light.   Cardiovascular:      Rate and Rhythm: Normal rate and regular rhythm.      Pulses: Normal pulses.      Heart sounds: Normal heart sounds.   Pulmonary:      Effort: Pulmonary effort is normal.      Breath sounds: Normal breath sounds.   Abdominal:      General: Abdomen is flat. Bowel sounds are normal.      Palpations: Abdomen is soft.   Musculoskeletal:         General: Normal range of motion.      Cervical back: Normal range of motion and " neck supple.   Skin:     General: Skin is warm and dry.   Neurological:      Mental Status: She is alert and oriented to person, place, and time.   Psychiatric:         Attention and Perception: Attention normal.         Mood and Affect: Mood is depressed. Affect is tearful.         Speech: Speech normal.         Behavior: Behavior normal. Behavior is cooperative.         Thought Content: Thought content normal.         Cognition and Memory: Cognition normal.         Judgment: Judgment normal.           Lab Results: I have reviewed the following results:  Results from last 7 days   Lab Units 04/12/25  0651   WBC Thousand/uL 2.90*   HEMOGLOBIN g/dL 10.9*   HEMATOCRIT % 34.7*   PLATELETS Thousands/uL 228   SEGS PCT % 32*   LYMPHO PCT % 53*   MONO PCT % 10   EOS PCT % 4     Results from last 7 days   Lab Units 04/11/25  2051   SODIUM mmol/L 131*  131*   POTASSIUM mmol/L 4.3  4.3   CHLORIDE mmol/L 97  97   CO2 mmol/L 22  22   BUN mg/dL 16  16   CREATININE mg/dL 0.86  0.86   ANION GAP mmol/L 12  12   CALCIUM mg/dL 9.3  9.3   ALBUMIN g/dL 4.3   TOTAL BILIRUBIN mg/dL 0.62   ALK PHOS U/L 108*   ALT U/L 21   AST U/L 30   GLUCOSE RANDOM mg/dL 272*  272*             Lab Results   Component Value Date/Time    HGBA1C 8.7 (A) 03/05/2025 03:43 PM    HGBA1C 9.6 (A) 01/10/2025 07:44 AM    HGBA1C 10.2 (H) 04/17/2024 09:24 AM    HGBA1C 10.7 (A) 02/20/2024 10:10 AM    HGBA1C 12.0 (H) 02/12/2024 05:09 PM    HGBA1C 10.2 (H) 10/22/2023 04:33 AM    HGBA1C 9.5 (H) 03/25/2022 03:33 PM    HGBA1C 11.8 (H) 05/20/2019 03:30 PM    HGBA1C 11.1 (H) 03/13/2019 04:12 AM     Results from last 7 days   Lab Units 04/12/25  0832 04/11/25  2129   POC GLUCOSE mg/dl 103 259*         Other Study Results Review: EKG was reviewed.   4/12/2025 twelve-lead EKG reviewed by myself as well as interpreted by myself ventricular rate 70 QT interval 414 QTc 447 normal sinus rhythm normal EKG when compared with an EKG from 5/3/2024 there are no no acute EKG  findings noted in this EKG.  Administrative Statements   I have spent a total time of 45 minutes in caring for this patient on the day of the visit/encounter including Diagnostic results, Prognosis, Risks and benefits of tx options, Instructions for management, Patient and family education, Importance of tx compliance, Risk factor reductions, Impressions, Counseling / Coordination of care, Documenting in the medical record, Reviewing/placing orders in the medical record (including tests, medications, and/or procedures), Obtaining or reviewing history  , and Communicating with other healthcare professionals .  ** Please Note: This note has been constructed using a voice recognition system. **

## 2025-04-12 NOTE — ED PROVIDER NOTES
Time reflects when diagnosis was documented in both MDM as applicable and the Disposition within this note       Time User Action Codes Description Comment    4/11/2025  9:08 PM Manjinder Kaplan Add [F32.2] Severe depression (HCC)     4/11/2025  9:09 PM Manjinder Kaplan Add [F41.9] Anxiety     4/12/2025 12:56 PM Orlando Tatiana Add [E10.65] Type 1 diabetes mellitus with hyperglycemia (HCC)           ED Disposition       ED Disposition   Transfer to Behavioral Health Condition   --    Date/Time   Fri Apr 11, 2025  9:08 PM    Comment   Clarissa Webb should be transferred out to [facility] and has been medically cleared.               Assessment & Plan       Medical Decision Making  Patient is medically cleared for crisis evaluation and psychiatric admission.    Amount and/or Complexity of Data Reviewed  Labs: ordered. Decision-making details documented in ED Course.    Risk  OTC drugs.  Prescription drug management.  Decision regarding hospitalization.        ED Course as of 04/14/25 1805   Fri Apr 11, 2025   2330 201 signed         Medications   levothyroxine tablet 250 mcg (has no administration in time range)   insulin lispro (HumALOG/ADMELOG) 100 units/mL subcutaneous injection 1-6 Units (has no administration in time range)   insulin glargine (LANTUS) subcutaneous injection 18 Units 0.18 mL (18 Units Subcutaneous Given 4/12/25 0004)   insulin regular (HumuLIN R,NovoLIN R) injection 4 Units (4 Units Subcutaneous Given 4/11/25 2146)       ED Risk Strat Scores                 CIWA-Ar Score       Row Name 04/12/25 1158             CIWA-Ar    Blood Pressure 128/69      Pulse 75      Nausea and Vomiting 0      Tactile Disturbances 0      Tremor 0      Auditory Disturbances 0      Paroxysmal Sweats 0      Visual Disturbances 0      Anxiety 2      Headache, Fullness in Head 0      Agitation 0      Orientation and Clouding of Sensorium 0      CIWA-Ar Total 2                      No data recorded        SBIRT 20yo+       Flowsheet Row Most Recent Value   Initial Alcohol Screen: US AUDIT-C     1. How often do you have a drink containing alcohol? 3 Filed at: 04/12/2025 0040   2. How many drinks containing alcohol do you have on a typical day you are drinking?  2 Filed at: 04/12/2025 0040   3a. Male UNDER 65: How often do you have five or more drinks on one occasion? 0 Filed at: 04/12/2025 0040   3b. FEMALE Any Age, or MALE 65+: How often do you have 4 or more drinks on one occassion? 0 Filed at: 04/12/2025 0040   Audit-C Score 5 Filed at: 04/12/2025 0040   Full Alcohol Screen: US AUDIT    4. How often during the last year have you found that you were not able to stop drinking once you had started? 0 Filed at: 04/12/2025 0040   5. How often during past year have you failed to do what was normally expected of you because of drinking?  0 Filed at: 04/12/2025 0040   6. How often in past year have you needed a first drink in the morning to get yourself going after a heavy drinking session?  0 Filed at: 04/12/2025 0040   7. How often in past year have you had feeling of guilt or remorse after drinking?  0 Filed at: 04/12/2025 0040   8. How often in past year have you been unable to remember what happened night before because you had been drinking?  0 Filed at: 04/12/2025 0040   9. Have you or someone else been injured as a result of your drinking?  0 Filed at: 04/12/2025 0040   10. Has a relative, friend, doctor or other health worker been concerned about your drinking and suggested you cut down?  0 Filed at: 04/12/2025 0040   AUDIT Total Score 5 Filed at: 04/12/2025 0040   MICHELLE: How many times in the past year have you...    Used an illegal drug or used a prescription medication for non-medical reasons? Never Filed at: 04/11/2025 2022                            History of Present Illness       Chief Complaint   Patient presents with    Psychiatric Evaluation     PT states she has been struggling with depression and anxiety for 5 years  since her  committed suicide. Pt is looking for help and interested in ECT therapy. PT is passively suicidal hoping to fall asleep and not wake up. PT denies HI/AH/VH.        Past Medical History:   Diagnosis Date    Anemia     Anxiety     B12 deficiency     Cervical disc disorder     On gabapentin     Depression     Diabetes mellitus (HCC)     Disease of thyroid gland     hypothyroid    Iron deficiency anemia     Panic attack     PTSD (post-traumatic stress disorder)     Sleep difficulties     Substance abuse (HCC)     Type 1 diabetes (HCC)     Vitamin D deficiency       Past Surgical History:   Procedure Laterality Date    ABDOMINAL SURGERY      gastric bypass     SECTION      x2    CHOLECYSTECTOMY  2018    GASTRIC BYPASS  2007    INTRAUTERINE DEVICE INSERTION  2015    IR BIOPSY BONE MARROW  2020    OTHER SURGICAL HISTORY      surgery for imperforate hymen/endometriosis/hydrometrocolpos    ROOT CANAL  2024    TUBAL LIGATION      WISDOM TOOTH EXTRACTION        Family History   Problem Relation Age of Onset    Thyroid disease Mother     URIEL disease Mother     Hyperlipidemia Mother     Hypertension Mother     Osteoarthritis Mother     Thyroid disease unspecified Mother     Diabetes Father     Alcohol abuse Father     Diabetes type I Father     Thyroid disease Sister     Depression Sister     Thyroid disease unspecified Sister     Anxiety disorder Sister     Heart disease Maternal Grandmother     Hypertension Maternal Grandmother     Arthritis Maternal Grandmother     Diabetes type I Maternal Uncle     Diabetes type I Maternal Grandfather       Social History     Tobacco Use    Smoking status: Never    Smokeless tobacco: Never   Vaping Use    Vaping status: Never Used   Substance Use Topics    Alcohol use: Yes     Alcohol/week: 3.0 standard drinks of alcohol     Types: 3 Cans of beer per week     Comment: weekly a few drinks    Drug use: No      E-Cigarette/Vaping    E-Cigarette Use  Never User       E-Cigarette/Vaping Substances    Nicotine No     THC No     CBD No     Flavoring No     Other No     Unknown No       I have reviewed and agree with the history as documented.     Patient is a 45-year-old female.  Past medical history of anxiety, depression, and ADD.  History of gastric bypass.  History of hypothyroidism.  History of diabetes.  Denies drug or alcohol abuse.  Patient presents to the emergency room today complaining of severe depression and anxiety.  She is not actively suicidal.  No hallucinations.  She reports her depression and anxiety are severe.  Nothing has been helping.  She has been compliant with medications.  SSRIs have not worked.  Symptoms started approximately 5 years ago after her   by suicide.  She presented to crisis intake at [facility] and was referred here.  She is seeking admission for ECT.        Review of Systems   Constitutional:  Negative for chills and fever.   HENT:  Negative for rhinorrhea and sore throat.    Eyes:  Negative for pain, redness and visual disturbance.   Respiratory:  Negative for cough and shortness of breath.    Cardiovascular:  Negative for chest pain and leg swelling.   Gastrointestinal:  Negative for abdominal pain, diarrhea and vomiting.   Endocrine: Negative for polydipsia and polyuria.   Genitourinary:  Negative for dysuria, frequency, hematuria, vaginal bleeding and vaginal discharge.   Musculoskeletal:  Negative for back pain and neck pain.   Skin:  Negative for rash and wound.   Allergic/Immunologic: Negative for immunocompromised state.   Neurological:  Negative for weakness, numbness and headaches.   Hematological:  Does not bruise/bleed easily.   Psychiatric/Behavioral:  Positive for dysphoric mood. Negative for hallucinations. The patient is nervous/anxious.    All other systems reviewed and are negative.          Objective       ED Triage Vitals   Temperature Pulse Blood Pressure Respirations SpO2 Patient Position -  Orthostatic VS   04/11/25 2026 04/11/25 2006 04/11/25 2006 04/11/25 2026 04/11/25 2006 04/11/25 2006   98 °F (36.7 °C) 79 158/94 16 100 % Sitting      Temp Source Heart Rate Source BP Location FiO2 (%) Pain Score    04/11/25 2026 04/11/25 2006 04/11/25 2006 -- 04/11/25 2006    Oral Monitor Left arm  No Pain      Vitals      Date and Time Temp Pulse SpO2 Resp BP Pain Score FACES Pain Rating User   04/14/25 1653 -- -- -- -- -- 9 -- AF   04/14/25 1532 97.5 °F (36.4 °C) 75 98 % 17 124/75 No Pain -- ZG   04/14/25 1135 -- 76 99 % 16 122/66 No Pain -- RS   04/14/25 1119 -- -- -- -- -- No Pain -- SLD   04/14/25 0743 96.7 °F (35.9 °C) 81 96 % 16 129/72 No Pain -- BR   04/14/25 0523 97.8 °F (36.6 °C) 83 98 % 16 110/58 No Pain -- JS   04/13/25 2015 97.8 °F (36.6 °C) 77 98 % 16 124/74 -- -- AM   04/13/25 1800 -- 85 -- -- 126/87 -- -- VS   04/13/25 1548 97.2 °F (36.2 °C) 78 98 % 16 97/62 No Pain -- SB   04/13/25 1400 -- 78 -- -- 97/62 -- -- VS   04/13/25 1000 -- 77 -- -- 110/66 -- -- VS   04/13/25 0930 -- -- -- -- -- No Pain -- VS   04/13/25 0736 97.7 °F (36.5 °C) 77 95 % 16 100/62 No Pain -- JS   04/13/25 0556 97.5 °F (36.4 °C) 72 97 % 15 102/59 -- -- AM   04/12/25 2000 -- 80 -- -- 106/60 -- -- TANYA   04/12/25 1600 -- 82 -- -- 102/59 -- -- VS   04/12/25 1543 98 °F (36.7 °C) 82 100 % 16 102/59 No Pain -- SB   04/12/25 1158 -- 75 -- -- 128/69 -- -- VS   04/12/25 1125 96.8 °F (36 °C) 75 96 % 16 128/69 No Pain -- JS   04/12/25 1032 -- -- -- -- -- No Pain -- VS   04/12/25 0737 98.1 °F (36.7 °C) 78 98 % 16 104/54 No Pain -- HMH   04/12/25 0100 -- -- -- -- -- No Pain -- AF   04/12/25 0022 98.2 °F (36.8 °C) 82 98 % 16 131/78 No Pain -- TW   04/11/25 2334 -- 89 100 % 18 143/88 -- -- EY   04/11/25 2026 98 °F (36.7 °C) -- -- 16 -- -- -- KR   04/11/25 2006 -- 79 100 % -- 158/94 No Pain -- OG            Physical Exam  Vitals reviewed.   Constitutional:       General: She is not in acute distress.  HENT:      Head: Normocephalic and  atraumatic.      Nose: Nose normal.      Mouth/Throat:      Mouth: Mucous membranes are moist.   Eyes:      General:         Right eye: No discharge.         Left eye: No discharge.      Conjunctiva/sclera: Conjunctivae normal.   Cardiovascular:      Rate and Rhythm: Normal rate and regular rhythm.      Pulses: Normal pulses.      Heart sounds: Normal heart sounds. No murmur heard.     No friction rub. No gallop.   Pulmonary:      Effort: Pulmonary effort is normal. No respiratory distress.      Breath sounds: Normal breath sounds. No stridor. No wheezing, rhonchi or rales.   Abdominal:      General: Bowel sounds are normal. There is no distension.      Palpations: Abdomen is soft.      Tenderness: There is no abdominal tenderness. There is no right CVA tenderness, left CVA tenderness, guarding or rebound.   Musculoskeletal:         General: No swelling, tenderness, deformity or signs of injury. Normal range of motion.      Cervical back: Normal range of motion and neck supple. No rigidity.      Right lower leg: No edema.      Left lower leg: No edema.      Comments: No calf tenderness or unilateral leg swelling.   Skin:     General: Skin is warm and dry.      Coloration: Skin is not jaundiced.      Findings: No rash.   Neurological:      General: No focal deficit present.      Mental Status: She is alert and oriented to person, place, and time.      Sensory: No sensory deficit.      Motor: Motor function is intact.   Psychiatric:         Mood and Affect: Affect is tearful.         Results Reviewed       Procedure Component Value Units Date/Time    Comprehensive metabolic panel [[id number]]  (Abnormal) Collected: 04/11/25 2051    Lab Status: Final result Specimen: Blood from Arm, Left Updated: 04/12/25 0923     Sodium 131 mmol/L      Potassium 4.3 mmol/L      Chloride 97 mmol/L      CO2 22 mmol/L      ANION GAP 12 mmol/L      BUN 16 mg/dL      Creatinine 0.86 mg/dL      Glucose 272 mg/dL      Calcium 9.3 mg/dL       AST 30 U/L      ALT 21 U/L      Alkaline Phosphatase 108 U/L      Total Protein 7.7 g/dL      Albumin 4.3 g/dL      Total Bilirubin 0.62 mg/dL      eGFR 83 ml/min/1.73sq m     Narrative:      National Kidney Disease Foundation guidelines for Chronic Kidney Disease (CKD):     Stage 1 with normal or high GFR (GFR > 90 mL/min/1.73 square meters)    Stage 2 Mild CKD (GFR = 60-89 mL/min/1.73 square meters)    Stage 3A Moderate CKD (GFR = 45-59 mL/min/1.73 square meters)    Stage 3B Moderate CKD (GFR = 30-44 mL/min/1.73 square meters)    Stage 4 Severe CKD (GFR = 15-29 mL/min/1.73 square meters)    Stage 5 End Stage CKD (GFR <15 mL/min/1.73 square meters)  Note: GFR calculation is accurate only with a steady state creatinine    Basic metabolic panel [[id number]]  (Abnormal) Collected: 04/11/25 2051    Lab Status: Final result Specimen: Blood from Arm, Left Updated: 04/11/25 2133     Sodium 131 mmol/L      Potassium 4.3 mmol/L      Chloride 97 mmol/L      CO2 22 mmol/L      ANION GAP 12 mmol/L      BUN 16 mg/dL      Creatinine 0.86 mg/dL      Glucose 272 mg/dL      Calcium 9.3 mg/dL      eGFR 83 ml/min/1.73sq m     Narrative:      National Kidney Disease Foundation guidelines for Chronic Kidney Disease (CKD):     Stage 1 with normal or high GFR (GFR > 90 mL/min/1.73 square meters)    Stage 2 Mild CKD (GFR = 60-89 mL/min/1.73 square meters)    Stage 3A Moderate CKD (GFR = 45-59 mL/min/1.73 square meters)    Stage 3B Moderate CKD (GFR = 30-44 mL/min/1.73 square meters)    Stage 4 Severe CKD (GFR = 15-29 mL/min/1.73 square meters)    Stage 5 End Stage CKD (GFR <15 mL/min/1.73 square meters)  Note: GFR calculation is accurate only with a steady state creatinine    Fingerstick Glucose (POCT) [[id number]]  (Abnormal) Collected: 04/11/25 2129    Lab Status: Final result Specimen: Blood Updated: 04/11/25 2129     POC Glucose 259 mg/dl     Rapid drug screen, urine [[id number]]  (Abnormal) Collected: 04/11/25 2056    Lab Status: Final  result Specimen: Urine, Clean Catch Updated: 04/11/25 2120     Amph/Meth UR Positive     Barbiturate Ur Negative     Benzodiazepine Urine Negative     Cocaine Urine Negative     Methadone Urine Negative     Opiate Urine Negative     PCP Ur Negative     THC Urine Negative     Oxycodone Urine Negative     Fentanyl Urine Negative     HYDROCODONE URINE Negative    Narrative:      FOR MEDICAL PURPOSES ONLY.   IF CONFIRMATION NEEDED PLEASE CONTACT THE LAB WITHIN 5 DAYS.    Drug Screen Cutoff Levels:  AMPHETAMINE/METHAMPHETAMINES  1000 ng/mL  BARBITURATES     200 ng/mL  BENZODIAZEPINES     200 ng/mL  COCAINE      300 ng/mL  METHADONE      300 ng/mL  OPIATES      300 ng/mL  PHENCYCLIDINE     25 ng/mL  THC       50 ng/mL  OXYCODONE      100 ng/mL  FENTANYL      5 ng/mL  HYDROCODONE     300 ng/mL    POCT pregnancy, urine [[id number]]  (Normal) Collected: 04/11/25 2100    Lab Status: Final result Updated: 04/11/25 2100     EXT Preg Test, Ur Negative     Control Valid    POCT alcohol breath test [[id number]]  (Normal) Resulted: 04/11/25 2053    Lab Status: Final result Updated: 04/11/25 2053     EXTBreath Alcohol 0.040            No orders to display       Procedures    ED Medication and Procedure Management   Prior to Admission Medications   Prescriptions Last Dose Informant Patient Reported? Taking?   Accu-Chek FastClix Lancets MISC   No No   Sig: USE 1 LANCET TO TEST BLOOD SUGAR UP TO 6 TIMES DAILY   Continuous Blood Gluc  (FreeStyle Samantha 14 Day Gilman) KELVIN   No No   Sig: Use 1 Units in the morning   Continuous Glucose Sensor (FreeStyle Samantha 2 Sensor) MISC   No No   Sig: USE 1 UNIT EVERY 14 DAYS   Insulin Aspart (NovoLOG) 100 units/mL injection 4/12/2025 Morning  No Yes   Sig: Sliding scale up to 3 times per day with meals. Max 30 units per day   Insulin Pen Needle (BD Pen Needle Deanna U/F) 32G X 4 MM MISC   No No   Sig: Use 4/day   Lantus 100 UNIT/ML subcutaneous injection Not Taking  No No   Sig: INJECT 15 UNITS  "UNDER THE SKIN DAILY. DISCARD AFTER 28 DAYS.   Patient not taking: Reported on 2025   UltiCare Insulin Syringe 28G X 1/2\" 1 ML MISC   No No   Sig: INJECT UNDER THE SKIN 4 (FOUR) TIMES A DAY   Vivitrol 380 MG SUSR Past Week  Yes Yes   Si mg   acetaminophen (TYLENOL) 500 mg tablet  Self Yes No   ergocalciferol (VITAMIN D2) 50,000 units Past Week  No Yes   Sig: Take 1 capsule (50,000 Units total) by mouth once a week   escitalopram (LEXAPRO) 10 mg tablet 2025  Yes Yes   Sig: Take 30 mg by mouth daily   escitalopram (LEXAPRO) 20 mg tablet 2025  Yes Yes   Sig: Take 30 mg by mouth daily   gabapentin (NEURONTIN) 600 MG tablet 2025  No Yes   Sig: Take 1.5 tablet (900 mg) nightly before bed.   glucose blood (Accu-Chek Guide) test strip  Self No No   Sig: Use to test blood sugar up to 6 times daily.   levothyroxine 200 mcg tablet 2025  No Yes   Sig: TAKE 1 TABLET (200 MCG TOTAL) BY MOUTH EVERY MORNING   levothyroxine 50 mcg tablet 2025  No Yes   Sig: Take 1 tablet (50 mcg total) by mouth daily   lisdexamfetamine (VYVANSE) 50 MG capsule 2025  Yes Yes   Sig: Take 50 mg by mouth every morning   traZODone (DESYREL) 100 mg tablet 2025  No Yes   Sig: Take 1 tablet (100 mg total) by mouth daily at bedtime   valACYclovir (VALTREX) 500 mg tablet 2025  No Yes   Sig: Take 1 tablet (500 mg total) by mouth daily      Facility-Administered Medications: None     Current Discharge Medication List        CONTINUE these medications which have NOT CHANGED    Details   ergocalciferol (VITAMIN D2) 50,000 units Take 1 capsule (50,000 Units total) by mouth once a week  Qty: 4 capsule, Refills: 0    Associated Diagnoses: Vitamin D deficiency      !! escitalopram (LEXAPRO) 10 mg tablet Take 30 mg by mouth daily      !! escitalopram (LEXAPRO) 20 mg tablet Take 30 mg by mouth daily      gabapentin (NEURONTIN) 600 MG tablet Take 1.5 tablet (900 mg) nightly before bed.  Qty: 90 tablet, Refills: 1    " Associated Diagnoses: Type 1 diabetes mellitus with hyperglycemia (HCC)      Insulin Aspart (NovoLOG) 100 units/mL injection Sliding scale up to 3 times per day with meals. Max 30 units per day  Qty: 30 mL, Refills: 1    Associated Diagnoses: Type 1 diabetes mellitus with hyperglycemia (HCC)      !! levothyroxine 200 mcg tablet TAKE 1 TABLET (200 MCG TOTAL) BY MOUTH EVERY MORNING  Qty: 90 tablet, Refills: 1    Associated Diagnoses: Hypothyroidism due to Hashimoto's thyroiditis      !! levothyroxine 50 mcg tablet Take 1 tablet (50 mcg total) by mouth daily  Qty: 30 tablet, Refills: 5    Associated Diagnoses: Hypothyroidism due to Hashimoto's thyroiditis      lisdexamfetamine (VYVANSE) 50 MG capsule Take 50 mg by mouth every morning      traZODone (DESYREL) 100 mg tablet Take 1 tablet (100 mg total) by mouth daily at bedtime  Qty: 30 tablet, Refills: 5    Associated Diagnoses: Insomnia, unspecified type      valACYclovir (VALTREX) 500 mg tablet Take 1 tablet (500 mg total) by mouth daily  Qty: 30 tablet, Refills: 0    Associated Diagnoses: Herpes simplex infection of perianal skin      Vivitrol 380 MG SUSR 380 mg      Accu-Chek FastClix Lancets MISC USE 1 LANCET TO TEST BLOOD SUGAR UP TO 6 TIMES DAILY  Qty: 102 each, Refills: 0    Associated Diagnoses: Type 1 diabetes mellitus with hyperglycemia (HCC)      acetaminophen (TYLENOL) 500 mg tablet       Continuous Blood Gluc  (FreeStyle Samantha 14 Day Haslet) KELVIN Use 1 Units in the morning    Associated Diagnoses: Type 1 diabetes mellitus with hyperglycemia (HCC)      Continuous Glucose Sensor (FreeStyle Samantha 2 Sensor) MISC USE 1 UNIT EVERY 14 DAYS  Qty: 6 each, Refills: 1    Associated Diagnoses: Type 1 diabetes mellitus with hyperglycemia (HCC)      glucose blood (Accu-Chek Guide) test strip Use to test blood sugar up to 6 times daily.  Qty: 200 each, Refills: 2    Associated Diagnoses: Type 1 diabetes mellitus with hyperglycemia (HCC)      Insulin Pen Needle  "(BD Pen Needle Deanna U/F) 32G X 4 MM MISC Use 4/day  Qty: 100 each, Refills: 11    Associated Diagnoses: Type 1 diabetes mellitus with hyperglycemia (HCC)      Lantus 100 UNIT/ML subcutaneous injection INJECT 15 UNITS UNDER THE SKIN DAILY. DISCARD AFTER 28 DAYS.  Qty: 10 mL, Refills: 5    Associated Diagnoses: Type 1 diabetes mellitus with hyperglycemia (HCC)      UltiCare Insulin Syringe 28G X 1/2\" 1 ML MISC INJECT UNDER THE SKIN 4 (FOUR) TIMES A DAY  Qty: 100 each, Refills: 2    Associated Diagnoses: Type 1 diabetes mellitus with hyperglycemia (HCC)       !! - Potential duplicate medications found. Please discuss with provider.        No discharge procedures on file.  ED SEPSIS DOCUMENTATION   Time reflects when diagnosis was documented in both MDM as applicable and the Disposition within this note       Time User Action Codes Description Comment    4/11/2025  9:08 PM Manjinder Kaplan Add [F32.2] Severe depression (HCC)     4/11/2025  9:09 PM Manjinder Kaplan Add [F41.9] Anxiety     4/12/2025 12:56 PM Tatiana Perry Add [E10.65] Type 1 diabetes mellitus with hyperglycemia (HCC)                  Manjinder Kaplan MD  04/14/25 1805    "

## 2025-04-12 NOTE — ASSESSMENT & PLAN NOTE
Recent Labs     04/11/25 2051   SODIUM 131*  131*   Corrected for hyperglycemia, 135   CMP tomorrow, continue to monitor

## 2025-04-12 NOTE — ASSESSMENT & PLAN NOTE
Lab Results   Component Value Date    HGBA1C 8.7 (A) 03/05/2025       Recent Labs     04/11/25 2129 04/12/25  0832   POCGLU 259* 103       Blood Sugar Average: Last 72 hrs:  (P) 181  Patient will continue on Lantus 18 mg SQ nightly  After discussion with the patient and she explained to me how she does her insulin at home we are going to do 7 units of NovoLog AC meals  She will have a nutrition consult with a nutritionist on Monday  She will be on 4 times daily blood sugar with a sliding scale insulin  She will be on a 5 g carb controlled diet   Oxybutynin Pregnancy And Lactation Text: This medication is Pregnancy Category B and is considered safe during pregnancy. It is unknown if it is excreted in breast milk.

## 2025-04-12 NOTE — ED NOTES
Pt presented to the ED as a self-referral at the recommendation of the [facility] for depression, passive SI, and ECT evaluation. Pt is tearful, but calm and cooperative. Pt reports her symptoms began and have been worsening over the last 5 years since her   by suicide. She has gone through several medication options as an outpatient with unsuccess. She had TMS that was the most improvement she felt, but it was short-lived. Pt reports that she was recently at [facility] for 2 weeks and had a terrible experience/signed out AMA due to it not being helpful and not addressing her depression. Although she denies an outright suicide plan, she wishes she would fall asleep and not wake up. She has not been able to sleep well and typically will take extra of her PM medications to help with sleep, but then she is unable to function well in the morning. She has had poor appetite, but her glucose levels still fluctuate. She feels like a failure to her 2 children who have already lost their father, and have a mother than cannot function. She is typically able to show up to work, but it has become noticeable that she is not functioning well. 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 Pt has been told by providers that she would be a good candidate for ECt, but has never been evaluated further for it to begin the process. She is established with outpatient providers through [facility], but has only been able to meet with them monthly due to limited staff. Pt has isolated herself from family and friends. She has had difficulty completing ADLs. She typically stays in bed most of the day when home and not completing the laundry/household duties. Pt does not feel she could remain safe at home with the decreased level of functioning she is at. She was explained and understood voluntary inpatient treatment. ED provider is in agreement with treatment plan.

## 2025-04-12 NOTE — ASSESSMENT & PLAN NOTE
Lab Results   Component Value Date    HGBA1C 8.7 (A) 03/05/2025       Recent Labs     04/11/25  2129 04/12/25  0832 04/12/25  1140   POCGLU 259* 103 384*       Blood Sugar Average: Last 72 hrs:  (P) [id number]  Per SLIM

## 2025-04-12 NOTE — NURSING NOTE
"Pt intermittently visible on unit. Pleasant and cooperative. Med/group/lab compliant. Good eye contact. Fair insight. Denies HI/AVH. Endorses passive/fleeting SI with no intent to act out stating \"this is baseline.\" Endorses mild anxiety, moderate depression. Expresses gratitude and that she feels \"safe in hospital.\"  "

## 2025-04-12 NOTE — ED NOTES
Insurance Authorization for admission:   Phone call placed to  Santi.  Phone number: [phone].     Spoke to Rosita.     3 days approved.  Level of care: IP Psych 201.  Review on 4/14.   Authorization # [id number].        Eligibility Verification System checked - ([phone]).  Online system / automated system indicates: active, [facility], ID# [id number]

## 2025-04-12 NOTE — ED NOTES
Patient is accepted at [facility].  Patient is accepted by Dr. Vincent Chang.     Patient may go to the floor upon completion of report.        Nurse report is to be called to [phone] prior to patient transfer.

## 2025-04-12 NOTE — ED NOTES
201 emailed to Intake for consideration at [facility] due to Pt requesting further evaluation for ECT.

## 2025-04-12 NOTE — NURSING NOTE
"Patient F 45 Y.O 201 transferred in from [facility] ED. Per Ed report/NN presented to the ED as a self-referral at the recommendation of the Psychiatric walk in clinic  for depression, passive SI, and ECT evaluation. Pt  presented tearful, but calm and cooperative. Pt reported  her symptoms began and have been worsening over the last 5 years since her   by suicide.    Patient arrived calm and cooperative,alert and oriented x 4. Patient hyper verbal at times and needed to be redirected to answer questions.Patient reports feeling depressed and helpless for the past five years since   by suicide. Patient reports use of alcohol on a weekly basis to help cope. Patient denies tobacco/ and drug use. UDS positive for Amp/Meth. Patient reports taking Vyvanse daily for ADD. Patient attributes depression to financial hardship and inability to cope. Patient lives alone with children and has support of parent and close friend. Patient reports wishing to start ECT to help with chronic depression. Patient denies Suicide attempts but has had  SI of taking pills and not waking up. Patient reported taking multiple doses of sleep aid and \"would not be mad If I did not wake up\". Currently denies SI/HI/AH/VH at this time.  Lifetime CSSRS Low Risk/Last contact /Low risk.  Diagnosis of Type 1 diabetes. Patient currently has Samantha on Right upper arm in place. Oriented to unit and room. Q15 minute checks initiated on arrival.   "

## 2025-04-12 NOTE — ASSESSMENT & PLAN NOTE
Patient's sodium last evening was 131  Patient will be on 1500 mL fluid restriction  Patient will have a repeat CMP drawn 4/13/2025 and I will review it and make the appropriate changes

## 2025-04-12 NOTE — ASSESSMENT & PLAN NOTE
Vital signs stable afebrile patient seen and examined by myself labs from 4/11/2020 5 in the PM reviewed by myself sodium 131 potassium 4.3 creatinine 0.86  Patient medically stable for admit  Please reach out to [facility] with any medical questions or concerns

## 2025-04-12 NOTE — NURSING NOTE
~1145 Pt  - this is after regular house breakfast of [place] toast. Order for diabetic diet received and has been updated. Med Provider notified of this reading and the 6 units humalog coverage. RN told to wait for provider to round to discuss any potential changes to coverages, or interventions if any. Will monitor BGL closely and ensure next meal is Diabetic friendly.   CIWA started at 1200. Score of 2. Pt denies all except slight anxiety.   Administration of Gabapentin and Lexapro after confirming with home pharmacy.   No distress reported or observed.

## 2025-04-12 NOTE — PLAN OF CARE
Problem: Risk for Self Injury/Neglect  Goal: Verbalize thoughts and feelings  Description: Interventions:- Assess and re-assess patient's lethality and potential for self-injury- Engage patient in 1:1 interactions, daily, for a minimum of 15 minutes- Encourage patient to express feelings, fears, frustrations, hopes- Establish rapport/trust with patient   Outcome: Not Progressing  Goal: Refrain from harming self  Description: Interventions:- Monitor patient closely, per order- Develop a trusting relationship- Supervise medication ingestion, monitor effects and side effects   Outcome: Not Progressing  Goal: Recognize maladaptive responses and adopt new coping mechanisms  Outcome: Not Progressing     Problem: Depression  Goal: Treatment Goal: Demonstrate behavioral control of depressive symptoms, verbalize feelings of improved mood/affect, and adopt new coping skills prior to discharge  Outcome: Not Progressing  Goal: Refrain from isolation  Description: Interventions:- Develop a trusting relationship - Encourage socialization   Outcome: Not Progressing  Goal: Attend and participate in unit activities, including therapeutic, recreational, and educational groups  Description: Interventions:- Provide therapeutic and educational activities daily, encourage attendance and participation, and document same in the medical record   Outcome: Not Progressing     Problem: Anxiety  Goal: Anxiety is at manageable level  Description: Interventions:- Assess and monitor patient's anxiety level. - Monitor for signs and symptoms (heart palpitations, chest pain, shortness of breath, headaches, nausea, feeling jumpy, restlessness, irritable, apprehensive). - Collaborate with interdisciplinary team and initiate plan and interventions as ordered.- [place] patient to unit/surroundings- Explain treatment plan- Encourage participation in care- Encourage verbalization of concerns/fears- Identify coping mechanisms- Assist in developing  anxiety-reducing skills- Administer/offer alternative therapies- Limit or eliminate stimulants  Outcome: Not Progressing     Problem: Electroconvulsive therapy (ECT)  Goal: Treatment Goal: Demonstrate a reduction of confusion and improved orientation to person, place, time and/or situation upon discharge, according to optimum baseline/ability  Outcome: Not Progressing

## 2025-04-12 NOTE — TREATMENT TEAM
04/12/25 0122   Provider Notification   Reason for Communication Admission   Provider Name Vincent Clifton MD   Provider Role Attending physician   Method of Communication Other (Comment)  (Epic Chat)   Response No new orders   Notification Time 0122   Shift Event Other (Comment)  (PTA medications not reconciled pharmacy closed,patient alert and oriented and able to say what medications taking and last taken. EMAR updated to reflect current medications per patient . Oncoming shift to be notified)

## 2025-04-12 NOTE — ASSESSMENT & PLAN NOTE
Patient has been self-medicating with alcohol daily secondary to not being able to sleep and having so much anxiety and depression  MVI/thiamine/folic acid  Alcohol cessation education

## 2025-04-12 NOTE — ASSESSMENT & PLAN NOTE
Yanira Webb is a 45-year-old  female with past psychiatric history significant for major depressive disorder and ASA with panic attacks who presents to [facility] due to persistent depressive symptoms. She endorses overall dysphoric mood with anhedonia, poor sleep, weight gain despite decreased appetite, low energy, and intermittent concentration issues. She notes fleeting passive death wishes, but adamantly denies a plan and contracts for safety. She reports feeling hopeless due to several failed medication trials,  participation in multiple modalities of therapy, and previous TMS treatments without complete remission of symptoms. She was agreeable to medication adjustments outlined below and at this time is interested in ECT treatment which would likely benefit her.     Treatment and medication recommendations as follows:   Recommend evaluation for ECT, will defer to primary team to make final recommendation and ensure that it would be covered by insurance  Increase Trazodone to 150 mg at bed time to improve sleep  Change Gabapentin from 900 mg at bed time to 400 mg three times daily to better control anxiety as well as complaints of neuropathy and to help with alcohol cravings   Further upward titration as tolerated   Continue Lexapro 30 mg daily for depression and anxiety   Confirmed with pharmacy that this was patient's home dose of medication, reports compliance   msec   Hyponatremia noted on admission, repeat CMP tomorrow, patient is asymptomatic, suspect this is due to hyperglycemia as corrected sodium is 135  Discussed adding SGA as augmentation for treatment resistant depression, patient is concerned about weight gain and declined Seroquel and Zyprexa. She has previously tried Abilify, Lamictal, and Latuda which were not effective. Discussed potentially using newer agents, such as, Vraylar or Rexulti, but due to potential cost of medications, I will defer this to primary team who can work with  case management to determine pricing if they would like to go this route.

## 2025-04-12 NOTE — TREATMENT PLAN
TREATMENT PLAN REVIEW - Behavioral Health Clarissa RAMIREZ Bamyuliana 45 y.o. 1980 female MRN: [id number]    [facility] BEHAVIORAL [facility] Room / Bed: [facility] Encounter: [id number]        Admit Date/Time:  4/11/2025  8:16 PM    Treatment Team:   MD Vivian Coleman MD Victoria Suhoski, RN    Diagnosis: Active Problems:  There are no active Hospital Problems.      Patient Strengths/Assets: average or above intelligence, capable of independent living, cooperative, communication skills, family ties, good insight, good physical health, good support system, motivation for treatment/growth, negotiates basic needs, patient is on a voluntary commitment, patient is willing to work on problems, stable/recent employment, supportive family, supportive friends, well educated, work skills      Patient Barriers/Limitations: chronic mental illness, financial instability, poor self-care    Short Term Goals: decrease in depressive symptoms, decrease in anxiety symptoms, ability to stay safe on the unit, improvement in self care, sleep improvement, improvement in appetite, tolerate medications, mood stabilization, increase in group attendance, increase in group participation, increase in socialization with peers on the unit, acceptance of need for psychiatric treatment, acceptance of psychiatric medications    Long Term Goals: improvement in depression, improvement in anxiety, resolution of depressive symptoms, stabilization of mood, free of suicidal thoughts, acceptance of need for psychiatric follow up after discharge, acceptance of psychiatric diagnosis, adequate self care, adequate sleep, adequate appetite, adequate oral intake, appropriate interaction with peers, appropriate interaction with family, stable living arrangements upon discharge, establishment of family support upon discharge    Progress Towards Goals: continue psychiatric medications as prescribed, potential  plan to initiate ECT    Recommended Treatment: medication management, patient medication education, group therapy, milieu therapy, continued Behavioral Health psychiatric evaluation/assessment process, consideration for ECT     Treatment Frequency: daily medication monitoring, group and milieu therapy daily, monitoring through interdisciplinary rounds, monitoring through weekly patient care conferences    Expected Discharge Date: 10 days - 4/22/2025    Discharge Plan: referrals as indicated    Treatment Plan Created/Updated By: Renate Pond DO

## 2025-04-12 NOTE — ED NOTES
Crisis @ bedside      Sarahy Gutierrez RN  04/11/25 2150       Sarahy Gutierrez RN  04/11/25 [id number]

## 2025-04-12 NOTE — ASSESSMENT & PLAN NOTE
Per [facility]  Continue Levothyroxine  Lab Results   Component Value Date    [id number] 1.057 04/12/2025    FREET4 0.93 04/17/2024    FREET4 0.95 04/17/2024

## 2025-04-12 NOTE — H&P
H&P - Behavioral Health   Name: Clarissa Webb 45 y.o. female I MRN: [id number]  Unit/Bed#: -01 I Date of Admission: 4/11/2025   Date of Service: 4/12/2025 I Hospital Day: 1     Assessment & Plan  Recurrent major depressive disorder (HCC)  Yanira Webb is a 45-year-old  female with past psychiatric history significant for major depressive disorder and ASA with panic attacks who presents to [facility] due to persistent depressive symptoms. She endorses overall dysphoric mood with anhedonia, poor sleep, weight gain despite decreased appetite, low energy, and intermittent concentration issues. She notes fleeting passive death wishes, but adamantly denies a plan and contracts for safety. She reports feeling hopeless due to several failed medication trials,  participation in multiple modalities of therapy, and previous TMS treatments without complete remission of symptoms. She was agreeable to medication adjustments outlined below and at this time is interested in ECT treatment which would likely benefit her.     Treatment and medication recommendations as follows:   Recommend evaluation for ECT, will defer to primary team to make final recommendation and ensure that it would be covered by insurance  Increase Trazodone to 150 mg at bed time to improve sleep  Change Gabapentin from 900 mg at bed time to 400 mg three times daily to better control anxiety as well as complaints of neuropathy and to help with alcohol cravings   Further upward titration as tolerated   Continue Lexapro 30 mg daily for depression and anxiety   Confirmed with pharmacy that this was patient's home dose of medication, reports compliance   msec   Hyponatremia noted on admission, repeat CMP tomorrow, patient is asymptomatic, suspect this is due to hyperglycemia as corrected sodium is 135  Discussed adding SGA as augmentation for treatment resistant depression, patient is concerned about weight gain and declined Seroquel and  Zyprexa. She has previously tried Abilify, Lamictal, and Latuda which were not effective. Discussed potentially using newer agents, such as, Vraylar or Rexulti, but due to potential cost of medications, I will defer this to primary team who can work with case management to determine pricing if they would like to go this route.     Generalized anxiety disorder with panic attacks  See principal problem for treatment recommendations  Report baseline excessive level of worry with increased fatigue, generalized body aches, and nausea, worsening since October  Experiencing frequent, unprovoked panic attacks consisting of diaphoresis, palpitations, dyspnea, and crying spells - children have wanted to call the ambulance multiple times for these attacks  ADHD (attention deficit hyperactivity disorder)  Per history   PDMP reviewed  Prescribed Vyvanse 50 mg daily, last filled 30-day prescription on 3/25/2025  Patient aware that medication will not be prescribed while in hospital  Hypothyroidism due to Hashimoto's thyroiditis  Per [facility]  Continue Levothyroxine  Lab Results   Component Value Date    DDL0DORHHPLA 1.057 04/12/2025    FREET4 0.93 04/17/2024    FREET4 0.95 04/17/2024     Medical clearance for psychiatric admission  Per SLIM  H/O gastric bypass  Per [facility]IM  Type 1 diabetes mellitus with hyperglycemia (HCC)  Lab Results   Component Value Date    HGBA1C 8.7 (A) 03/05/2025       Recent Labs     04/11/25  2129 04/12/25  0832 04/12/25  1140   POCGLU 259* 103 384*       Blood Sugar Average: Last 72 hrs:  (P) [id number]  Per SLIM  Alcohol use  Placed on CIWA protocol  Hyponatremia  Recent Labs     04/11/25  2051   SODIUM 131*  131*   Corrected for hyperglycemia, 135   CMP tomorrow, continue to monitor     Current medications:  Current Facility-Administered Medications   Medication Dose Route Frequency Provider Last Rate    aluminum-magnesium hydroxide-simethicone  30 mL Oral Q4H PRN Elodia Kaur MD       haloperidol lactate  2.5 mg Intramuscular Q4H PRN Max 4/day Elodia Kaur MD      And    LORazepam  1 mg Intramuscular Q4H PRN Max 4/day Elodia Kaur MD      And    benztropine  0.5 mg Intramuscular Q4H PRN Max 4/day Elodia Kaur MD      haloperidol lactate  5 mg Intramuscular Q4H PRN Max 4/day Elodia Kaur MD      And    LORazepam  2 mg Intramuscular Q4H PRN Max 4/day Elodia Kaur MD      And    benztropine  1 mg Intramuscular Q4H PRN Max 4/day Elodia Kaur MD      benztropine  1 mg Intramuscular Q4H PRN Max 6/day Elodia Kaur MD      benztropine  1 mg Oral Q4H PRN Max 6/day Elodia Kaur MD      bisacodyl  10 mg Rectal Daily PRN Elodia Kaur MD      hydrOXYzine HCL  50 mg Oral Q6H PRN Max 4/day Elodia Kaur MD      Or    diphenhydrAMINE  50 mg Intramuscular Q6H PRN Elodia Kaur MD      escitalopram  20 mg Oral Daily Renate Pond DO      And    escitalopram  10 mg Oral Daily Renate Pond DO      folic acid  1 mg Oral Daily JAROCHO Martin      gabapentin  400 mg Oral TID Renate Pond DO      haloperidol  1 mg Oral Q6H PRN Elodia Kaur MD      haloperidol  2.5 mg Oral Q4H PRN Max 4/day Elodia Kaur MD      haloperidol  5 mg Oral Q4H PRN Max 4/day Elodia Kaur MD      hydrOXYzine HCL  100 mg Oral Q6H PRN Max 4/day Elodia Kaur MD      Or    LORazepam  2 mg Intramuscular Q6H PRN Elodia Kaur MD      hydrOXYzine HCL  25 mg Oral Q6H PRN Max 4/day Elodia Kaur MD      ibuprofen  400 mg Oral Q4H PRN Elodia Kaur MD      ibuprofen  600 mg Oral Q6H PRN Elodia Kaur MD      ibuprofen  800 mg Oral Q8H PRN Elodia Kaur MD      insulin glargine  18 Units Subcutaneous HS Dieudonne Colon MD      insulin lispro  1-6 Units Subcutaneous TID AC Manjinder Kaplan MD      levothyroxine  250 mcg Oral Early Morning Manjinder Kaplan MD      melatonin  3 mg Oral HS Elodia Kaur MD      multivitamin-minerals  1 tablet Oral  "Daily JAROCHO Martin      polyethylene glycol  17 g Oral Daily PRN Elodia Kaur MD      propranolol  10 mg Oral Q8H PRN Elodia Kaur MD      senna-docusate sodium  1 tablet Oral Daily PRN Elodia Kaur MD      thiamine  100 mg Oral Daily JAROCHO Martin      traZODone  150 mg Oral HS Renate Pond DO      traZODone  50 mg Oral HS PRN Elodia Kaur MD          Risks/Benefits of Treatment:     Risks, benefits, and possible side effects of medications explained to patient and patient verbalizes understanding and agreement for treatment.    Treatment Planning:     All current active medications have been reviewed.  Continue to monitor response to treatment and assess for potential side effects of medications.  Encourage group therapy, milieu therapy and occupational therapy  Collaboration with medical service for medical comorbidities as indicated.  Behavioral Health checks for safety monitoring.  [facility] protocol to prevent withdrawal.  Estimated Discharge Day:  10 days (4/22/2025)  Legal Status : Voluntary 201 commitment.      Chief Complaint: \"I've tried everything.\"    History of Present Illness     Yanira Webb is a 45 y.o. overtly appearing , , female domiciled in a home with her two children, with a history of major depressive disorder, alcohol use disorder, generalized anxiety disorder who was admitted to the inpatient psychiatric unit on a voluntary 201 commitment basis due to depression.    Symptoms prior to admission included worsening depression, fleeting passive death wishes, hopelessness, helplessness, poor concentration, poor appetite, difficulty sleeping, weight gain, anxiety symptoms, anxiety attacks, and difficulty attending to activities of daily living. Onset of symptoms was gradual starting several months ago with progressively worsening course since that time. Stressors preceding admission included alcohol use problems, anniversary of " "'s death, children experiencing difficulties, financial problems, job stress, recent move, everyday stressors, ongoing anxiety, and chronic mental illness.    Per ED crisis worker, Yamel Yates on 2025:  \"Pt presented to the ED as a self-referral at the recommendation of the [facility] for depression, passive SI, and ECT evaluation. Pt is tearful, but calm and cooperative. Pt reports her symptoms began and have been worsening over the last 5 years since her   by suicide. She has gone through several medication options as an outpatient with unsuccess. She had TMS that was the most improvement she felt, but it was short-lived. Pt reports that she was recently at [facility] for 2 weeks and had a terrible experience/signed out AMA due to it not being helpful and not addressing her depression. Although she denies an outright suicide plan, she wishes she would fall asleep and not wake up. She has not been able to sleep well and typically will take extra of her PM medications to help with sleep, but then she is unable to function well in the morning. She has had poor appetite, but her glucose levels still fluctuate. She feels like a failure to her 2 children who have already lost their father, and have a mother than cannot function. She is typically able to show up to work, but it has become noticeable that she is not functioning well. 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 Pt has been told by providers that she would be a good candidate for ECt, but has never been evaluated further for it to begin the process. She is established with outpatient providers through [facility], but has only been able to meet with them monthly due to limited staff. Pt has isolated herself from family and friends. She has had difficulty completing ADLs. She typically stays in bed most of the day when home and not completing the laundry/household duties. Pt does not " "feel she could remain safe at home with the decreased level of functioning she is at. She was explained and understood voluntary inpatient treatment. ED provider is in agreement with treatment plan.\"    On initial evaluation after admission to the inpatient psychiatric unit Yanira is calm and cooperative, albeit tearful at times. She reports that she has been struggling with her depression for 5 years now since the death of her , who successfully completed suicide. She has been in active psychiatric treatment since then and obtained proper medication management and therapeutic modalities, including TMS. She notes some periods of improvement during this time, but that her symptoms always return. Since October 2024, she has had progressively worsening symptoms. She switched jobs earlier in 2024 which resulting in a pay cut and eventually a move in October. She notes ongoing financial stress, difficulty supporting her daughter who is being bullied, and ongoing guilty regarding her late 's suicide attempt. She presented to  as she is interested in pursuing ECT.     She describes her overall mood as \"sad\" and she feels like a failure. She feels like she is just going through the motions and has been isolating herself from her friends and family. She no longer experiences enjoyment in things and has difficulty going to the grocery store or even cooking for her children. Her sleep is suffering and she sleeps approximately 4 hours a night at the maximum. She feels extremely guilty about not having the motivation to do anything and endorses feelings of hopelessness and helplessness with frequent crying spells. She reports that she has zero energy, evidenced by her previously keeping a clean household and her not feeling motivated to clean for months. She states that since her move, she has not made her new house feel like a home. Her appetite is extremely poor and her children celebrate when she is able to " eat something. Despite this, she has gained 20 pounds in about 5 years and feels very down on herself for this. Her concentration fluctuates, but she notes she is prescribed Vyvanse which is effectively treating this. She notes fleeting passive death wishes, but denied any active plan. However, she does note a suicidal gesture two weeks ago when she took extra Trazodone to help with sleep and felt like she did not care if she didn't wake up. She endorses ongoing anxiety accompanied by excessive worrying about various topics, restlessness/feeling on edge, nausea, muscle tension, fatigue, lasting longer than 6 months. She also describes weekly unprovoked panic attacks that have previously lead her children to ask if they could call the ambulance for her. When she has a panic attacks, she experiences crying spells, diaphoresis, dyspnea, and palpitations.     Clarissa Webb denies history of manic or hypomanic episodes, including distractibility, impulsive behavior, grandiosity, flight of ideas, increased goal oriented behavior, decreased need for sleep, or talkativeness. Clarissa denies historical symptomatology suggestive of an underlying psychotic process. Clarissa does not currently endorse acute perceptual disturbances such as A/V hallucinations, paranoia, referential ideation, or delusions. Clarissa denies acute and chronic Schneiderian symptoms, including: thought-broadcasting, thought-insertion, thought-withdrawal or audible thoughts. During today's evaluation, Clarissa does not exhibit objective evidence of mehdi psychosis as the patient does not appear internally preoccupied or easily distracted. Clarissa's thoughts are organized, linear, and reality-based. Clarissa denies historical symptomatology suggestive of PTSD, OCD, or disordered eating.       Psychiatric Review Of Systems:    Pertinent items are noted in HPI; all others negative    Historical Information     Past Psychiatric History:     Past  Inpatient Psychiatric Treatment:   One past inpatient psychiatric admission at [facility], was admitted for 10 days, signed a 72 hour notice and was discharged on Tuesday 4/8/2025  One previous admission at a dual diagnosis unit at [facility] 2 years ago for depression and alcohol use disorder  Past Outpatient Psychiatric Treatment:    Recently in outpatient psychiatric treatment with a psychiatrist at [facility] in [place]  Past Suicide Attempts: Suicidal gesture about 2 weeks ago in which she took 5 to 6 extra Trazodone to sleep, stated it wasn't an attempt but she didn't care if she didn't wake up   Past Violent Behavior: no  Past Psychiatric Medication Trials: Multiple psychotropic medication trials, including Zoloft, Prozac, propranolol, Abilify, Effexor, Cymbalta, Lamictal, Strattera, Clonidine, Concerta, Latuda    Substance Abuse History:    Social History       Tobacco History       Smoking Status  Never      Smokeless Tobacco Use  Never              Alcohol History       Alcohol Use Status  Yes Drinks/Week  3 Cans of beer per week Amount  3.0 standard drinks of alcohol/wk Comment  weekly a few drinks              Drug Use       Drug Use Status  No              Sexual Activity       Sexually Active  Yes Partners  Male              Other Factors    Not Asked                 Additional Substance Use Detail       Questions Responses    Problems Due to Past Use of Alcohol? Yes    Problems Due to Past Use of Substances? No    Substance Use Assessment Denies substance use within the past 12 months    Alcohol Use Frequency 3 or more times/week    Cannabis frequency Never used    Comment:  Never used on 4/12/2025     Heroin Frequency Denies use in past 12 months    Cocaine frequency Never used    Comment:  Never used on 4/12/2025     Crack Cocaine Frequency Denies use in past 12 months    Methamphetamine Frequency Denies use in past 12 months    Narcotic Frequency Denies use in past 12 months    Benzodiazepine Frequency  Denies use in past 12 months    Amphetamine frequency Denies use in past 12 months    Barbituate Frequency Denies use use in past 12 months    Inhalant frequency Never used    Comment:  Never used on 4/12/2025     Hallucinogen frequency Never used    Comment:  Never used on 4/12/2025     Ecstasy frequency Never used    Comment:  Never used on 4/12/2025     Other drug frequency Never used    Comment:  Never used on 4/12/2025     Opiate frequency Denies use in past 12 months    Last reviewed by Gloria Singletary RN on 4/12/2025          I have assessed this patient for substance use within the past 12 months    Alcohol use: current use: yes - reports drinking nightly, estimates a couple of beers/wine/shots, could not provide clear quantity  Recreational drug use:   Cocaine: denies use  Heroin: denies use  Cannabis: denies use  Other drugs:   denies use  Longest clean time: unknown  History of Inpatient/Outpatient rehabilitation program:  previous admission at [facility] dual diagnosis program  Smoking history: denies use  Use of caffeine: None    Family Psychiatric History:     Psychiatric Illness no family history of psychiatric illness  Substance Abuse Father - alcohol abuse   Suicide Attempts no family history of suicide attempts    Social History:    Education: master's degree  Learning Disabilities: ADHD history  Marital History:   Children: 2 children, 12 yo son and 15 yo girl  Living Arrangement: lives in home with daughter and son  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  Functioning Relationships: good support system  Legal History: history of DUI   History: None  Access to firearms: none    Traumatic History:     Abuse:no history of sexual abuse, no history of physical abuse, no history of verbal abuse  Other Traumatic Events: none    Past Medical History:    History of Seizures: no  History of Head injury with loss of consciousness: history of concussions    Past Medical History:  "  Diagnosis Date    Anemia     Anxiety     B12 deficiency     Cervical disc disorder     On gabapentin     Depression     Diabetes mellitus (HCC)     Disease of thyroid gland     hypothyroid    Iron deficiency anemia     Panic attack     PTSD (post-traumatic stress disorder)     Sleep difficulties     Substance abuse (HCC)     Type 1 diabetes (HCC)     Vitamin D deficiency      Past Surgical History:   Procedure Laterality Date    ABDOMINAL SURGERY      gastric bypass     SECTION      x2    CHOLECYSTECTOMY  2018    GASTRIC BYPASS  2007    INTRAUTERINE DEVICE INSERTION  2015    IR BIOPSY BONE MARROW  2020    OTHER SURGICAL HISTORY      surgery for imperforate hymen/endometriosis/hydrometrocolpos    ROOT CANAL  2024    TUBAL LIGATION      WISDOM TOOTH EXTRACTION       Meds/Allergies      Objective :  Temp:  [96.8 °F (36 °C)-98.2 °F (36.8 °C)] 96.8 °F (36 °C)  HR:  [75-89] 75  BP: (104-158)/(54-94) 128/69  Resp:  [16-18] 16  SpO2:  [96 %-100 %] 96 %  O2 Device: None (Room air)    Temp:  [96.8 °F (36 °C)-98.2 °F (36.8 °C)] 96.8 °F (36 °C)  HR:  [75-89] 75  BP: (104-158)/(54-94) 128/69  Resp:  [16-18] 16  SpO2:  [96 %-100 %] 96 %  O2 Device: None (Room air)    Mental Status Evaluation:    Appearance:  age appropriate, casually dressed, adequate grooming, looks stated age, overweight   Behavior:  pleasant, cooperative, calm   Speech:  normal rate and volume, fluent, clear   Mood:  \"Sad\"   Affect:  Dysphoric   Language: naming objects, repeating phrases   Thought Process:  Generally organized and linear, but circumstantial at times   Thought Content:  no overt delusions, negative thinking   Perceptual Disturbances: no auditory hallucinations, no visual hallucinations, does not appear responding to internal stimuli   Risk Potential: Suicidal Ideation - not at present, fleeting passive death wishes prior to admission  Homicidal Ideations - None  Potential for Aggression - No   Sensorium:  oriented " to person, place, and time/date   Memory:  recent and remote memory grossly intact   Consciousness:  alert and awake   Attention/Concentration: attention span and concentration are age appropriate   Intellect: average   Fund of Knowledge: awareness of current events: yes  past history: yes  vocabulary: normal   Insight:  fair   Judgment: partial   Muscle Strength:  Muscle Tone: normal  normal   Gait/Station: normal gait/station, normal balance   Motor Activity: no abnormal movements     Patient Strengths/Assets: ability for insight, average or above intelligence, capable of independent living, cooperative, communication skills, family ties, general fund of knowledge, motivation for treatment/growth, negotiates basic needs, patient is on a voluntary commitment, patient is willing to work on problems, supportive family/friends    Patient Barriers/Limitations: chronic mental illness, financial instability, poor self-care, substance abuse        Lab Results: I have reviewed the following results:  Results from the past 24 hours:   Recent Results (from the past 24 hours)   Basic metabolic panel    Collection Time: 04/11/25  8:51 PM   Result Value Ref Range    Sodium 131 (L) 135 - 147 mmol/L    Potassium 4.3 3.5 - 5.3 mmol/L    Chloride 97 96 - 108 mmol/L    CO2 22 21 - 32 mmol/L    ANION GAP 12 4 - 13 mmol/L    BUN 16 5 - 25 mg/dL    Creatinine 0.86 0.60 - 1.30 mg/dL    Glucose 272 (H) 65 - 140 mg/dL    Calcium 9.3 8.4 - 10.2 mg/dL    eGFR 83 ml/min/1.73sq m   Comprehensive metabolic panel    Collection Time: 04/11/25  8:51 PM   Result Value Ref Range    Sodium 131 (L) 135 - 147 mmol/L    Potassium 4.3 3.5 - 5.3 mmol/L    Chloride 97 96 - 108 mmol/L    CO2 22 21 - 32 mmol/L    ANION GAP 12 4 - 13 mmol/L    BUN 16 5 - 25 mg/dL    Creatinine 0.86 0.60 - 1.30 mg/dL    Glucose 272 (H) 65 - 140 mg/dL    Calcium 9.3 8.4 - 10.2 mg/dL    AST 30 13 - 39 U/L    ALT 21 7 - 52 U/L    Alkaline Phosphatase 108 (H) 34 - 104 U/L     Total Protein 7.7 6.4 - 8.4 g/dL    Albumin 4.3 3.5 - 5.0 g/dL    Total Bilirubin 0.62 0.20 - 1.00 mg/dL    eGFR 83 ml/min/1.73sq m   POCT alcohol breath test    Collection Time: 04/11/25  8:53 PM   Result Value Ref Range    EXTBreath Alcohol 0.040    Rapid drug screen, urine    Collection Time: 04/11/25  8:56 PM   Result Value Ref Range    Amph/Meth UR Positive (A) Negative    Barbiturate Ur Negative Negative    Benzodiazepine Urine Negative Negative    Cocaine Urine Negative Negative    Methadone Urine Negative Negative    Opiate Urine Negative Negative    PCP Ur Negative Negative    THC Urine Negative Negative    Oxycodone Urine Negative Negative    Fentanyl Urine Negative Negative    HYDROCODONE URINE Negative Negative   POCT pregnancy, urine    Collection Time: 04/11/25  9:00 PM   Result Value Ref Range    EXT Preg Test, Ur Negative     Control Valid    Fingerstick Glucose (POCT)    Collection Time: 04/11/25  9:29 PM   Result Value Ref Range    POC Glucose 259 (H) 65 - 140 mg/dl   Urinalysis    Collection Time: 04/12/25  6:51 AM   Result Value Ref Range    Clarity, UA Clear Clear, Other    Color, UA Straw Straw, Yellow, Pale Yellow    Specific Gravity, UA 1.005 1.003 - 1.040    pH, UA 6.0 4.5, 5.0, 5.5, 6.0, 6.5, 7.0, 7.5, 8.0    Glucose,  (1/10%) (A) Negative mg/dl    Ketones, UA Negative Negative mg/dl    Occult Blood, UA 10.0 (A) Negative    Protein, UA Negative Negative mg/dl    Nitrite, UA Negative Negative    Bilirubin, UA Negative Negative    Leukocytes, UA Negative Negative    WBC, UA None Seen None Seen, 2-4, 5-60 /hpf    RBC, UA None Seen None Seen, 2-4 /hpf    Bacteria, UA Occasional None Seen, Occasional /hpf    Epithelial Cells Occasional None Seen, Occasional /hpf    UROBILINOGEN UA Negative 1.0, Negative mg/dL   CBC and differential    Collection Time: 04/12/25  6:51 AM   Result Value Ref Range    WBC 2.90 (L) 4.31 - 10.16 Thousand/uL    RBC 3.89 3.81 - 5.12 Million/uL    Hemoglobin 10.9  (L) 11.5 - 15.4 g/dL    Hematocrit 34.7 (L) 34.8 - 46.1 %    MCV 89 82 - 98 fL    MCH 28.0 26.8 - 34.3 pg    MCHC 31.4 31.4 - 37.4 g/dL    RDW 12.2 11.6 - 15.1 %    MPV 10.7 8.9 - 12.7 fL    Platelets 228 149 - 390 Thousands/uL    nRBC 0 /100 WBCs    Segmented % 32 (L) 43 - 75 %    Immature Grans % 0 0 - 2 %    Lymphocytes % 53 (H) 14 - 44 %    Monocytes % 10 4 - 12 %    Eosinophils Relative 4 0 - 6 %    Basophils Relative 1 0 - 1 %    Absolute Neutrophils 0.91 (L) 1.85 - 7.62 Thousands/µL    Absolute Immature Grans 0.01 0.00 - 0.20 Thousand/uL    Absolute Lymphocytes 1.55 0.60 - 4.47 Thousands/µL    Absolute Monocytes 0.29 0.17 - 1.22 Thousand/µL    Eosinophils Absolute 0.11 0.00 - 0.61 Thousand/µL    Basophils Absolute 0.03 0.00 - 0.10 Thousands/µL   hCG, serum, qualitative    Collection Time: 04/12/25  6:51 AM   Result Value Ref Range    Preg, Serum Negative Negative   TSH, 3rd generation with Free T4 reflex    Collection Time: 04/12/25  6:51 AM   Result Value Ref Range    TSH 3RD GENERATON 1.057 0.450 - 4.500 uIU/mL   Lipid panel    Collection Time: 04/12/25  6:51 AM   Result Value Ref Range    Cholesterol 184 See Comment mg/dL    Triglycerides 138 See Comment mg/dL    HDL, Direct 118 >=50 mg/dL    LDL Calculated 38 0 - 100 mg/dL    Non-HDL-Chol (CHOL-HDL) 66 mg/dl   Fingerstick Glucose (POCT)    Collection Time: 04/12/25  8:32 AM   Result Value Ref Range    POC Glucose 103 65 - 140 mg/dl   Fingerstick Glucose (POCT)    Collection Time: 04/12/25 11:40 AM   Result Value Ref Range    POC Glucose 384 (H) 65 - 140 mg/dl       Imaging Results Review: No pertinent imaging studies reviewed.  Other Study Results Review: EKG was reviewed.     Code Status: Level 1 - Full Code  Advance Directive and Living Will: <no information>  Next of Kin: Extended Emergency Contact Information  Primary Emergency Contact: Neva Mathis   [facility]  Home Phone: [phone]  Mobile Phone: [phone]  Relation:  Mother  Secondary Emergency Contact: Jolie Warner   [place]  Home Phone: [phone]  Mobile Phone: [phone]  Relation: Sister    Administrative Statements     Counseling / Coordination of Care:   Patient's progress reviewed with nursing staff.  Medication changes discussed with patient.  Medication education provided to patient.  Patient's diagnosis and treatment indicated reviewed with patient.  Events leading to admission reviewed with patient.  Supportive therapy provided to patient.  Group attendance encouraged.  Encouraged participation in milieu and group therapy on the unit..    Inpatient Psychiatric Certification:  Estimated length of stay: 10 midnights   Based upon physical, mental and social evaluations, I certify that inpatient psychiatric services are medically necessary for this patient for a duration of 10 midnights for the treatment of Recurrent major depressive disorder (HCC)  Available alternative community resources do not meet the patient's mental health care needs.  I further attest that an established written individualized plan of care has been implemented and is outlined in the patient's medical records.    Renate Pond DO 04/12/25

## 2025-04-12 NOTE — ASSESSMENT & PLAN NOTE
Per history   PDMP reviewed  Prescribed Vyvanse 50 mg daily, last filled 30-day prescription on 3/25/2025  Patient aware that medication will not be prescribed while in hospital

## 2025-04-12 NOTE — ASSESSMENT & PLAN NOTE
See principal problem for treatment recommendations  Report baseline excessive level of worry with increased fatigue, generalized body aches, and nausea, worsening since October  Experiencing frequent, unprovoked panic attacks consisting of diaphoresis, palpitations, dyspnea, and crying spells - children have wanted to call the ambulance multiple times for these attacks

## 2025-04-12 NOTE — ED NOTES
"Patient presented to  ED expressing depression w/o relief from previously prescribed medications. Denies active SI/HI but did state to triage RN she \"wouldn't be upset if she didn't wake up in the morning\". Tearful but pleasant/cooperative during assessment. Awaiting medical clearance/CRISIS evaluation.      Sarahy Gutierrez RN  04/11/25 [id number]    "

## 2025-04-13 LAB
ALBUMIN SERPL BCG-MCNC: 3.6 G/DL (ref 3.5–5)
ALP SERPL-CCNC: 86 U/L (ref 34–104)
ALT SERPL W P-5'-P-CCNC: 15 U/L (ref 7–52)
ANION GAP SERPL CALCULATED.3IONS-SCNC: 5 MMOL/L (ref 4–13)
AST SERPL W P-5'-P-CCNC: 17 U/L (ref 13–39)
BILIRUB SERPL-MCNC: 0.34 MG/DL (ref 0.2–1)
BUN SERPL-MCNC: 15 MG/DL (ref 5–25)
CALCIUM SERPL-MCNC: 8.7 MG/DL (ref 8.4–10.2)
CHLORIDE SERPL-SCNC: 105 MMOL/L (ref 96–108)
CO2 SERPL-SCNC: 29 MMOL/L (ref 21–32)
CREAT SERPL-MCNC: 0.82 MG/DL (ref 0.6–1.3)
GFR SERPL CREATININE-BSD FRML MDRD: 87 ML/MIN/1.73SQ M
GLUCOSE P FAST SERPL-MCNC: 188 MG/DL (ref 65–99)
GLUCOSE SERPL-MCNC: 107 MG/DL (ref 65–140)
GLUCOSE SERPL-MCNC: 169 MG/DL (ref 65–140)
GLUCOSE SERPL-MCNC: 188 MG/DL (ref 65–140)
GLUCOSE SERPL-MCNC: 229 MG/DL (ref 65–140)
GLUCOSE SERPL-MCNC: 256 MG/DL (ref 65–140)
GLUCOSE SERPL-MCNC: 71 MG/DL (ref 65–140)
POTASSIUM SERPL-SCNC: 4.5 MMOL/L (ref 3.5–5.3)
PROT SERPL-MCNC: 6.1 G/DL (ref 6.4–8.4)
SODIUM SERPL-SCNC: 139 MMOL/L (ref 135–147)
TREPONEMA PALLIDUM IGG+IGM AB [PRESENCE] IN SERUM OR PLASMA BY IMMUNOASSAY: NORMAL

## 2025-04-13 PROCEDURE — 80053 COMPREHEN METABOLIC PANEL: CPT | Performed by: NURSE PRACTITIONER

## 2025-04-13 PROCEDURE — 82948 REAGENT STRIP/BLOOD GLUCOSE: CPT

## 2025-04-13 PROCEDURE — 99232 SBSQ HOSP IP/OBS MODERATE 35: CPT | Performed by: PSYCHIATRY & NEUROLOGY

## 2025-04-13 RX ORDER — ERGOCALCIFEROL 1.25 MG/1
50000 CAPSULE, LIQUID FILLED ORAL WEEKLY
Status: DISCONTINUED | OUTPATIENT
Start: 2025-04-13 | End: 2025-04-22 | Stop reason: HOSPADM

## 2025-04-13 RX ORDER — CYANOCOBALAMIN 1000 UG/ML
1000 INJECTION, SOLUTION INTRAMUSCULAR; SUBCUTANEOUS ONCE
Status: COMPLETED | OUTPATIENT
Start: 2025-04-13 | End: 2025-04-13

## 2025-04-13 RX ORDER — LOPERAMIDE HYDROCHLORIDE 2 MG/1
2 CAPSULE ORAL 4 TIMES DAILY PRN
Status: DISCONTINUED | OUTPATIENT
Start: 2025-04-13 | End: 2025-04-22 | Stop reason: HOSPADM

## 2025-04-13 RX ADMIN — INSULIN LISPRO 3 UNITS: 100 INJECTION, SOLUTION INTRAVENOUS; SUBCUTANEOUS at 17:10

## 2025-04-13 RX ADMIN — LEVOTHYROXINE SODIUM 250 MCG: 0.12 TABLET ORAL at 06:42

## 2025-04-13 RX ADMIN — ESCITALOPRAM OXALATE 10 MG: 10 TABLET ORAL at 08:46

## 2025-04-13 RX ADMIN — Medication 1 TABLET: at 08:45

## 2025-04-13 RX ADMIN — GABAPENTIN 400 MG: 400 CAPSULE ORAL at 16:08

## 2025-04-13 RX ADMIN — ESCITALOPRAM OXALATE 20 MG: 10 TABLET ORAL at 08:46

## 2025-04-13 RX ADMIN — LOPERAMIDE HYDROCHLORIDE 2 MG: 2 CAPSULE ORAL at 14:06

## 2025-04-13 RX ADMIN — FOLIC ACID 1 MG: 1 TABLET ORAL at 08:46

## 2025-04-13 RX ADMIN — INSULIN LISPRO 7 UNITS: 100 INJECTION, SOLUTION INTRAVENOUS; SUBCUTANEOUS at 08:39

## 2025-04-13 RX ADMIN — ERGOCALCIFEROL 50000 UNITS: 1.25 CAPSULE ORAL at 09:02

## 2025-04-13 RX ADMIN — THIAMINE HCL TAB 100 MG 100 MG: 100 TAB at 08:46

## 2025-04-13 RX ADMIN — Medication 3 MG: at 21:15

## 2025-04-13 RX ADMIN — LOPERAMIDE HYDROCHLORIDE 2 MG: 2 CAPSULE ORAL at 12:21

## 2025-04-13 RX ADMIN — INSULIN GLARGINE 18 UNITS: 100 INJECTION, SOLUTION SUBCUTANEOUS at 21:15

## 2025-04-13 RX ADMIN — INSULIN LISPRO 7 UNITS: 100 INJECTION, SOLUTION INTRAVENOUS; SUBCUTANEOUS at 17:10

## 2025-04-13 RX ADMIN — GABAPENTIN 400 MG: 400 CAPSULE ORAL at 21:15

## 2025-04-13 RX ADMIN — INSULIN LISPRO 1 UNITS: 100 INJECTION, SOLUTION INTRAVENOUS; SUBCUTANEOUS at 08:39

## 2025-04-13 RX ADMIN — CYANOCOBALAMIN 1000 MCG: 1000 INJECTION INTRAMUSCULAR; SUBCUTANEOUS at 09:02

## 2025-04-13 RX ADMIN — GABAPENTIN 400 MG: 400 CAPSULE ORAL at 08:46

## 2025-04-13 RX ADMIN — INSULIN LISPRO 7 UNITS: 100 INJECTION, SOLUTION INTRAVENOUS; SUBCUTANEOUS at 12:22

## 2025-04-13 RX ADMIN — TRAZODONE HYDROCHLORIDE 150 MG: 100 TABLET ORAL at 21:15

## 2025-04-13 NOTE — PLAN OF CARE
Problem: Risk for Self Injury/Neglect  Goal: Verbalize thoughts and feelings  Description: Interventions:- Assess and re-assess patient's lethality and potential for self-injury- Engage patient in 1:1 interactions, daily, for a minimum of 15 minutes- Encourage patient to express feelings, fears, frustrations, hopes- Establish rapport/trust with patient   Outcome: Progressing  Goal: Refrain from harming self  Description: Interventions:- Monitor patient closely, per order- Develop a trusting relationship- Supervise medication ingestion, monitor effects and side effects   Outcome: Progressing  Goal: Recognize maladaptive responses and adopt new coping mechanisms  Outcome: Progressing     Problem: Depression  Goal: Treatment Goal: Demonstrate behavioral control of depressive symptoms, verbalize feelings of improved mood/affect, and adopt new coping skills prior to discharge  Outcome: Progressing  Goal: Refrain from isolation  Description: Interventions:- Develop a trusting relationship - Encourage socialization   Outcome: Progressing  Goal: Attend and participate in unit activities, including therapeutic, recreational, and educational groups  Description: Interventions:- Provide therapeutic and educational activities daily, encourage attendance and participation, and document same in the medical record   Outcome: Progressing     Problem: Anxiety  Goal: Anxiety is at manageable level  Description: Interventions:- Assess and monitor patient's anxiety level. - Monitor for signs and symptoms (heart palpitations, chest pain, shortness of breath, headaches, nausea, feeling jumpy, restlessness, irritable, apprehensive). - Collaborate with interdisciplinary team and initiate plan and interventions as ordered.- [place] patient to unit/surroundings- Explain treatment plan- Encourage participation in care- Encourage verbalization of concerns/fears- Identify coping mechanisms- Assist in developing anxiety-reducing skills-  Administer/offer alternative therapies- Limit or eliminate stimulants  Outcome: Progressing

## 2025-04-13 NOTE — ASSESSMENT & PLAN NOTE
Yanira Webb is a 45-year-old  female with past psychiatric history significant for major depressive disorder and ASA with panic attacks who presents to [facility] due to persistent depressive symptoms. She endorses overall dysphoric mood with anhedonia, poor sleep, weight gain despite decreased appetite, low energy, and intermittent concentration issues. She notes fleeting passive death wishes, but adamantly denies a plan and contracts for safety. She reports feeling hopeless due to several failed medication trials,  participation in multiple modalities of therapy, and previous TMS treatments without complete remission of symptoms. She was agreeable to medication adjustments and at this time is interested in ECT treatment which would likely benefit her.     Treatment and medication recommendations as follows, no changes as of 4/13/2025:   Recommend evaluation for ECT, will defer to primary team to make final recommendation and ensure that it would be covered by insurance  Continue Trazodone to 150 mg at bed time to improve sleep  Continue Gabapentin 400 mg three times daily for anxiety, neuropathy, and alcohol cravings   Further upward titration as tolerated   Continue Lexapro 30 mg daily for depression and anxiety   Confirmed with pharmacy that this was patient's home dose of medication, reports compliance  Would consider dose decreased with further medication adjustments vs. ECT   msec   Hyponatremia noted on admission, corrected to 135 due to hyperglycemia, repeat on 4/13/2025 139  Discussed adding SGA as augmentation for treatment resistant depression, patient is concerned about weight gain and declined Seroquel and Zyprexa. She has previously tried Abilify, Lamictal, and Latuda which were not effective. Discussed potentially using newer agents, such as, Vraylar or Rexulti, but due to potential cost of medications, I will defer this to primary team who can work with case management to determine  pricing if they would like to go this route.

## 2025-04-13 NOTE — NURSING NOTE
Pt c/o diarrhea. Obtained order for Imodium, administered at this time. Will monitor for effectiveness.

## 2025-04-13 NOTE — ASSESSMENT & PLAN NOTE
Lab Results   Component Value Date    HGBA1C 8.7 (A) 03/05/2025       Recent Labs     04/12/25  1233 04/12/25  1635 04/12/25 2006 04/13/25  0805   POCGLU 345* 215* 101 169*       Blood Sugar Average: Last 72 hrs:  (P) [id number]  Per SLIM

## 2025-04-13 NOTE — NURSING NOTE
Pt intermittently visible on unit. Positive appetite. Expresses feeling a bit more cheerful; still with baseline depression. Interacts well with peers and staff. Intermittently experiencing SI without any plan or intent, NO HI/AVH. Compliant with meds. No distress reported or observed.

## 2025-04-13 NOTE — PROGRESS NOTES
Progress Note - Behavioral Health   Name: Clarissa Webb 45 y.o. female I MRN: [id number]  Unit/Bed#: -01 I Date of Admission: 4/11/2025   Date of Service: 4/13/2025 I Hospital Day: 2     Assessment & Plan  Recurrent major depressive disorder (HCC)  Yanira Webb is a 45-year-old  female with past psychiatric history significant for major depressive disorder and ASA with panic attacks who presents to [facility] due to persistent depressive symptoms. She endorses overall dysphoric mood with anhedonia, poor sleep, weight gain despite decreased appetite, low energy, and intermittent concentration issues. She notes fleeting passive death wishes, but adamantly denies a plan and contracts for safety. She reports feeling hopeless due to several failed medication trials,  participation in multiple modalities of therapy, and previous TMS treatments without complete remission of symptoms. She was agreeable to medication adjustments and at this time is interested in ECT treatment which would likely benefit her.     Treatment and medication recommendations as follows, no changes as of 4/13/2025:   Recommend evaluation for ECT, will defer to primary team to make final recommendation and ensure that it would be covered by insurance  Continue Trazodone to 150 mg at bed time to improve sleep  Continue Gabapentin 400 mg three times daily for anxiety, neuropathy, and alcohol cravings   Further upward titration as tolerated   Continue Lexapro 30 mg daily for depression and anxiety   Confirmed with pharmacy that this was patient's home dose of medication, reports compliance  Would consider dose decreased with further medication adjustments vs. ECT   msec   Hyponatremia noted on admission, corrected to 135 due to hyperglycemia, repeat on 4/13/2025 139  Discussed adding SGA as augmentation for treatment resistant depression, patient is concerned about weight gain and declined Seroquel and Zyprexa. She has  previously tried Abilify, Lamictal, and Latuda which were not effective. Discussed potentially using newer agents, such as, Vraylar or Rexulti, but due to potential cost of medications, I will defer this to primary team who can work with case management to determine pricing if they would like to go this route.     Generalized anxiety disorder with panic attacks  See principal problem for treatment recommendations  Report baseline excessive level of worry with increased fatigue, generalized body aches, and nausea, worsening since October  Experiencing frequent, unprovoked panic attacks consisting of diaphoresis, palpitations, dyspnea, and crying spells - children have wanted to call the ambulance multiple times for these attacks  ADHD (attention deficit hyperactivity disorder)  Per history   PDMP reviewed  Prescribed Vyvanse 50 mg daily, last filled 30-day prescription on 3/25/2025  Patient aware that medication will not be prescribed while in hospital  Hypothyroidism due to Hashimoto's thyroiditis  Per SLIM  Continue Levothyroxine  Lab Results   Component Value Date    [id number] 1.057 04/12/2025    FREET4 0.93 04/17/2024    FREET4 0.95 04/17/2024     Medical clearance for psychiatric admission  Per SLIM  H/O gastric bypass  Per SLIM  Type 1 diabetes mellitus with hyperglycemia (HCC)  Lab Results   Component Value Date    HGBA1C 8.7 (A) 03/05/2025       Recent Labs     04/12/25  1233 04/12/25  1635 04/12/25 2006 04/13/25  0805   POCGLU 345* 215* 101 169*       Blood Sugar Average: Last 72 hrs:  (P) [id number]  Per SLIM  Alcohol use  [facility] protocol     Hyponatremia  Recent Labs     04/11/25  2051 04/13/25  0559   SODIUM 131*  131* 139   Corrected for hyperglycemia, 135, on admission  Repeat WNL   Continue to monitor      ------------------------------------------------------------    Recommended Treatment Plan:   Behavioral checks every 15 minutes  Encourage participation in group therapy, milieu therapy  "and occupational therapy.  Assess for side effects of medications.  Collaboration with MASSIEL for medical co-morbidities as indicated.  Continue discussion with CM/SW to assist with obtaining collateral, disposition planning, and the implementation of patient-centered individualized plan of care.  Estimated Discharge Date:     Risks, benefits and possible side effects of Medications: Risks, benefits, and possible side effects of medications have previously been explained. No new medications at this time.    Legal status: 201    Disposition: to be determined      Subjective: Patient's chart was reviewed. Patient's progress and plan was discussed with nursing staff. Per nursing report, Clarissa has been calm and cooperative on the unit. She remains compliant with medications.     Prn medications over the past 24 hours: None    Clarissa was evaluated this morning for continuity of care. On examination, Clarissa is lying comfortably in bed, appearing brighter than yesterday. She states her mood is \"tired...feeling hopeful.\" She reports sleeping well and tolerated the increased dose of Trazodone, believes that it was helpful. Her appetite has been improving and she found herself excited about snack last night. She denies adverse effects from medications. She likes having scheduled Gabapentin throughout the day and believes she could benefit from further titration in the coming days. She denies active or passive suicidal ideation and homicidal ideation. She denies auditory or visual hallucinations.    VS: Reviewed, within normal limits    Progress Toward Goals: medication and meal compliant, calm, cooperative, brighter    Psychiatric Review of Systems:  Behavior over the last 24 hours: improved  Sleep: normal and improving  Appetite: adequate, improving  Medication side effects: none verbalized  Medical ROS: Complete review of systems is negative except as noted above.    Vital signs in last 24 hours:  Temp:  [96.8 °F " "(36 °C)-98 °F (36.7 °C)] 97.7 °F (36.5 °C)  HR:  [72-82] 77  BP: (100-128)/(59-69) 100/62  Resp:  [15-16] 16  SpO2:  [95 %-100 %] 95 %  O2 Device: None (Room air)    Mental Status Exam:    Appearance:  alert, appropriate grooming and hygiene, good eye contact, and appears stated age   Behavior:  calm and cooperative   Speech:  spontaneous, normal rate, normal volume, and coherent   Mood:  \"Tired... feeling hopeful\"   Affect:  Less dysphoric, brighter   Thought Process:  Organized, logical, goal-directed   Associations: intact associations   Thought Content:  no verbalized delusions or overt paranoia   Perceptual Disturbances: no reported hallucinations and does not appear to be responding to internal stimuli at this time   Risk Potential: Suicidal ideation -  Denies at present   Homicidal ideation - Denies at present  Potential for aggression - No   Sensorium:  oriented to person, place, and time/date   Memory:  recent and remote memory grossly intact   Consciousness:  alert and awake   Attention/Concentration: attention span and concentration are age appropriate   Insight:  fair   Judgment: improving   Gait/Station: Lying in bed, did not assess   Motor Activity: no abnormal movements     Current Medications:  Current Facility-Administered Medications   Medication Dose Route Frequency Provider Last Rate    aluminum-magnesium hydroxide-simethicone  30 mL Oral Q4H PRN Elodia Kaur MD      haloperidol lactate  2.5 mg Intramuscular Q4H PRN Max 4/day Elodia Kaur MD      And    LORazepam  1 mg Intramuscular Q4H PRN Max 4/day Elodia Kaur MD      And    benztropine  0.5 mg Intramuscular Q4H PRN Max 4/day Elodia Kaur MD      haloperidol lactate  5 mg Intramuscular Q4H PRN Max 4/day Elodia Kaur MD      And    LORazepam  2 mg Intramuscular Q4H PRN Max 4/day Elodia Kaur MD      And    benztropine  1 mg Intramuscular Q4H PRN Max 4/day Elodia Kaur MD      benztropine  1 mg Intramuscular " Q4H PRN Max 6/day Elodia Kaur MD      benztropine  1 mg Oral Q4H PRN Max 6/day Elodia Kaur MD      bisacodyl  10 mg Rectal Daily PRN Elodia Kaur MD      [START ON 4/14/2025] cyanocobalamin  1,000 mcg Oral Daily JAROCHO Martin      hydrOXYzine HCL  50 mg Oral Q6H PRN Max 4/day Elodia Kaur MD      Or    diphenhydrAMINE  50 mg Intramuscular Q6H PRN Elodia Kaur MD      ergocalciferol  50,000 Units Oral Weekly JAROHCO Martin      escitalopram  20 mg Oral Daily Renate Pond DO      And    escitalopram  10 mg Oral Daily Renate Pond DO      folic acid  1 mg Oral Daily JAROCHO Martin      gabapentin  400 mg Oral TID Renate Pond DO      haloperidol  1 mg Oral Q6H PRN Elodia Kaur MD      haloperidol  2.5 mg Oral Q4H PRN Max 4/day Elodia Kaur MD      haloperidol  5 mg Oral Q4H PRN Max 4/day Elodia Kaur MD      hydrOXYzine HCL  100 mg Oral Q6H PRN Max 4/day Elodia Kaur MD      Or    LORazepam  2 mg Intramuscular Q6H PRN Elodia Kaur MD      hydrOXYzine HCL  25 mg Oral Q6H PRN Max 4/day Elodia Kaur MD      ibuprofen  400 mg Oral Q4H PRN Elodia Kaur MD      ibuprofen  600 mg Oral Q6H PRN Elodia Kaur MD      ibuprofen  800 mg Oral Q8H PRN Elodia Kaur MD      insulin glargine  18 Units Subcutaneous HS Dieudonne Colon MD      insulin lispro  1-6 Units Subcutaneous TID AC Manjinder Kaplan MD      insulin lispro  7 Units Subcutaneous TID With Meals JAROCHO Martin      levothyroxine  250 mcg Oral Early Morning Manjinder Kaplan MD      melatonin  3 mg Oral HS Elodia Kaur MD      multivitamin-minerals  1 tablet Oral Daily JAROCHO Martin      polyethylene glycol  17 g Oral Daily PRN Elodia Kaur MD      propranolol  10 mg Oral Q8H PRN Elodia Kaur MD      senna-docusate sodium  1 tablet Oral Daily PRN Elodia Kaur MD      thiamine  100 mg Oral Daily JAROCHO Martin       traZODone  150 mg Oral HS Renate Pond DO      traZODone  50 mg Oral HS TESS Kaur MD         Behavioral Health Medications: all current active meds have been reviewed. Changes as in plan section above.    Laboratory results:  I have personally reviewed all pertinent laboratory/tests results.   Recent Results (from the past 48 hours)   Basic metabolic panel    Collection Time: 04/11/25  8:51 PM   Result Value Ref Range    Sodium 131 (L) 135 - 147 mmol/L    Potassium 4.3 3.5 - 5.3 mmol/L    Chloride 97 96 - 108 mmol/L    CO2 22 21 - 32 mmol/L    ANION GAP 12 4 - 13 mmol/L    BUN 16 5 - 25 mg/dL    Creatinine 0.86 0.60 - 1.30 mg/dL    Glucose 272 (H) 65 - 140 mg/dL    Calcium 9.3 8.4 - 10.2 mg/dL    eGFR 83 ml/min/1.73sq m   Comprehensive metabolic panel    Collection Time: 04/11/25  8:51 PM   Result Value Ref Range    Sodium 131 (L) 135 - 147 mmol/L    Potassium 4.3 3.5 - 5.3 mmol/L    Chloride 97 96 - 108 mmol/L    CO2 22 21 - 32 mmol/L    ANION GAP 12 4 - 13 mmol/L    BUN 16 5 - 25 mg/dL    Creatinine 0.86 0.60 - 1.30 mg/dL    Glucose 272 (H) 65 - 140 mg/dL    Calcium 9.3 8.4 - 10.2 mg/dL    AST 30 13 - 39 U/L    ALT 21 7 - 52 U/L    Alkaline Phosphatase 108 (H) 34 - 104 U/L    Total Protein 7.7 6.4 - 8.4 g/dL    Albumin 4.3 3.5 - 5.0 g/dL    Total Bilirubin 0.62 0.20 - 1.00 mg/dL    eGFR 83 ml/min/1.73sq m   POCT alcohol breath test    Collection Time: 04/11/25  8:53 PM   Result Value Ref Range    EXTBreath Alcohol 0.040    Rapid drug screen, urine    Collection Time: 04/11/25  8:56 PM   Result Value Ref Range    Amph/Meth UR Positive (A) Negative    Barbiturate Ur Negative Negative    Benzodiazepine Urine Negative Negative    Cocaine Urine Negative Negative    Methadone Urine Negative Negative    Opiate Urine Negative Negative    PCP Ur Negative Negative    THC Urine Negative Negative    Oxycodone Urine Negative Negative    Fentanyl Urine Negative Negative    HYDROCODONE URINE Negative Negative    POCT pregnancy, urine    Collection Time: 04/11/25  9:00 PM   Result Value Ref Range    EXT Preg Test, Ur Negative     Control Valid    Fingerstick Glucose (POCT)    Collection Time: 04/11/25  9:29 PM   Result Value Ref Range    POC Glucose 259 (H) 65 - 140 mg/dl   Urinalysis    Collection Time: 04/12/25  6:51 AM   Result Value Ref Range    Clarity, UA Clear Clear, Other    Color, UA Straw Straw, Yellow, Pale Yellow    Specific Gravity, UA 1.005 1.003 - 1.040    pH, UA 6.0 4.5, 5.0, 5.5, 6.0, 6.5, 7.0, 7.5, 8.0    Glucose,  (1/10%) (A) Negative mg/dl    Ketones, UA Negative Negative mg/dl    Occult Blood, UA 10.0 (A) Negative    Protein, UA Negative Negative mg/dl    Nitrite, UA Negative Negative    Bilirubin, UA Negative Negative    Leukocytes, UA Negative Negative    WBC, UA None Seen None Seen, 2-4, 5-60 /hpf    RBC, UA None Seen None Seen, 2-4 /hpf    Bacteria, UA Occasional None Seen, Occasional /hpf    Epithelial Cells Occasional None Seen, Occasional /hpf    UROBILINOGEN UA Negative 1.0, Negative mg/dL   CBC and differential    Collection Time: 04/12/25  6:51 AM   Result Value Ref Range    WBC 2.90 (L) 4.31 - 10.16 Thousand/uL    RBC 3.89 3.81 - 5.12 Million/uL    Hemoglobin 10.9 (L) 11.5 - 15.4 g/dL    Hematocrit 34.7 (L) 34.8 - 46.1 %    MCV 89 82 - 98 fL    MCH 28.0 26.8 - 34.3 pg    MCHC 31.4 31.4 - 37.4 g/dL    RDW 12.2 11.6 - 15.1 %    MPV 10.7 8.9 - 12.7 fL    Platelets 228 149 - 390 Thousands/uL    nRBC 0 /100 WBCs    Segmented % 32 (L) 43 - 75 %    Immature Grans % 0 0 - 2 %    Lymphocytes % 53 (H) 14 - 44 %    Monocytes % 10 4 - 12 %    Eosinophils Relative 4 0 - 6 %    Basophils Relative 1 0 - 1 %    Absolute Neutrophils 0.91 (L) 1.85 - 7.62 Thousands/µL    Absolute Immature Grans 0.01 0.00 - 0.20 Thousand/uL    Absolute Lymphocytes 1.55 0.60 - 4.47 Thousands/µL    Absolute Monocytes 0.29 0.17 - 1.22 Thousand/µL    Eosinophils Absolute 0.11 0.00 - 0.61 Thousand/µL    Basophils Absolute 0.03  0.00 - 0.10 Thousands/µL   hCG, serum, qualitative    Collection Time: 04/12/25  6:51 AM   Result Value Ref Range    Preg, Serum Negative Negative   TSH, 3rd generation with Free T4 reflex    Collection Time: 04/12/25  6:51 AM   Result Value Ref Range    TSH 3RD GENERATON 1.057 0.450 - 4.500 uIU/mL   Vitamin B12    Collection Time: 04/12/25  6:51 AM   Result Value Ref Range    Vitamin B-12 201 180 - 914 pg/mL   Folate    Collection Time: 04/12/25  6:51 AM   Result Value Ref Range    Folate 17.0 >5.9 ng/mL   Vitamin D 25 hydroxy    Collection Time: 04/12/25  6:51 AM   Result Value Ref Range    Vit D, 25-Hydroxy 25.7 (L) 30.0 - 100.0 ng/mL   Lipid panel    Collection Time: 04/12/25  6:51 AM   Result Value Ref Range    Cholesterol 184 See Comment mg/dL    Triglycerides 138 See Comment mg/dL    HDL, Direct 118 >=50 mg/dL    LDL Calculated 38 0 - 100 mg/dL    Non-HDL-Chol (CHOL-HDL) 66 mg/dl   Total Syphilis (IgG/IgM) Screening    Collection Time: 04/12/25  6:51 AM   Result Value Ref Range    Treponema Pallidium Total Antibodies Non-reactive Non-reactive   Fingerstick Glucose (POCT)    Collection Time: 04/12/25  8:32 AM   Result Value Ref Range    POC Glucose 103 65 - 140 mg/dl   ECG 12 lead    Collection Time: 04/12/25 11:01 AM   Result Value Ref Range    Ventricular Rate 70 BPM    Atrial Rate 70 BPM    DE Interval 122 ms    QRSD Interval 84 ms    QT Interval 408 ms    QTC Interval 441 ms    P [place] 57 degrees    QRS Axis 82 degrees    T Wave Axis 49 degrees   Fingerstick Glucose (POCT)    Collection Time: 04/12/25 11:40 AM   Result Value Ref Range    POC Glucose 384 (H) 65 - 140 mg/dl   Fingerstick Glucose (POCT)    Collection Time: 04/12/25 12:33 PM   Result Value Ref Range    POC Glucose 345 (H) 65 - 140 mg/dl   Fingerstick Glucose (POCT)    Collection Time: 04/12/25  4:35 PM   Result Value Ref Range    POC Glucose 215 (H) 65 - 140 mg/dl   Fingerstick Glucose (POCT)    Collection Time: 04/12/25  8:06 PM   Result Value  Ref Range    POC Glucose 101 65 - 140 mg/dl   Comprehensive metabolic panel    Collection Time: 04/13/25  5:59 AM   Result Value Ref Range    Sodium 139 135 - 147 mmol/L    Potassium 4.5 3.5 - 5.3 mmol/L    Chloride 105 96 - 108 mmol/L    CO2 29 21 - 32 mmol/L    ANION GAP 5 4 - 13 mmol/L    BUN 15 5 - 25 mg/dL    Creatinine 0.82 0.60 - 1.30 mg/dL    Glucose 188 (H) 65 - 140 mg/dL    Glucose, Fasting 188 (H) 65 - 99 mg/dL    Calcium 8.7 8.4 - 10.2 mg/dL    AST 17 13 - 39 U/L    ALT 15 7 - 52 U/L    Alkaline Phosphatase 86 34 - 104 U/L    Total Protein 6.1 (L) 6.4 - 8.4 g/dL    Albumin 3.6 3.5 - 5.0 g/dL    Total Bilirubin 0.34 0.20 - 1.00 mg/dL    eGFR 87 ml/min/1.73sq m   Fingerstick Glucose (POCT)    Collection Time: 04/13/25  8:05 AM   Result Value Ref Range    POC Glucose 169 (H) 65 - 140 mg/dl        Counseling / Coordination of Care:  Patient's progress reviewed with nursing staff.  Medications, treatment progress and treatment plan reviewed with patient.  Medication education provided to patient.      Renate Pond DO 04/13/25  Psychiatry Residency, PGY-II  This note has been constructed using a voice recognition system. There may be translation, syntax, or grammatical errors. If you have any questions, please contact the dictating author.

## 2025-04-13 NOTE — ASSESSMENT & PLAN NOTE
Per SLIM  Continue Levothyroxine  Lab Results   Component Value Date    [id number] 1.057 04/12/2025    FREET4 0.93 04/17/2024    FREET4 0.95 04/17/2024

## 2025-04-13 NOTE — NURSING NOTE
Imodium so far effective. No diarrhea since administration. Will continue to monitor for effectiveness.

## 2025-04-13 NOTE — NURSING NOTE
Pt reported still having loose stool. Administered another dose of Imodium 2 mg PO. Will monitor for effectiveness.

## 2025-04-13 NOTE — NURSING NOTE
Pt is calm, cooperative, and med compliant. Pt is visible in the day room watching TV. Pt denies depression and anxiety. Pt denies SI/HI and AH/VH. No unmet needs reported. Will continue to monitor.

## 2025-04-14 ENCOUNTER — TELEPHONE (OUTPATIENT)
Dept: ENDOCRINOLOGY | Facility: CLINIC | Age: 45
End: 2025-04-14

## 2025-04-14 LAB
GLUCOSE SERPL-MCNC: 116 MG/DL (ref 65–140)
GLUCOSE SERPL-MCNC: 138 MG/DL (ref 65–140)
GLUCOSE SERPL-MCNC: 163 MG/DL (ref 65–140)
GLUCOSE SERPL-MCNC: 172 MG/DL (ref 65–140)
GLUCOSE SERPL-MCNC: 48 MG/DL (ref 65–140)

## 2025-04-14 PROCEDURE — 99232 SBSQ HOSP IP/OBS MODERATE 35: CPT | Performed by: PSYCHIATRY & NEUROLOGY

## 2025-04-14 PROCEDURE — 82948 REAGENT STRIP/BLOOD GLUCOSE: CPT

## 2025-04-14 RX ORDER — ESCITALOPRAM OXALATE 10 MG/1
30 TABLET ORAL DAILY
Status: DISCONTINUED | OUTPATIENT
Start: 2025-04-15 | End: 2025-04-21

## 2025-04-14 RX ADMIN — THIAMINE HCL TAB 100 MG 100 MG: 100 TAB at 08:27

## 2025-04-14 RX ADMIN — GABAPENTIN 400 MG: 400 CAPSULE ORAL at 08:27

## 2025-04-14 RX ADMIN — INSULIN GLARGINE 18 UNITS: 100 INJECTION, SOLUTION SUBCUTANEOUS at 21:56

## 2025-04-14 RX ADMIN — IBUPROFEN 800 MG: 400 TABLET, FILM COATED ORAL at 16:53

## 2025-04-14 RX ADMIN — FOLIC ACID 1 MG: 1 TABLET ORAL at 08:27

## 2025-04-14 RX ADMIN — ESCITALOPRAM OXALATE 10 MG: 10 TABLET ORAL at 08:27

## 2025-04-14 RX ADMIN — INSULIN LISPRO 1 UNITS: 100 INJECTION, SOLUTION INTRAVENOUS; SUBCUTANEOUS at 08:30

## 2025-04-14 RX ADMIN — GABAPENTIN 400 MG: 400 CAPSULE ORAL at 21:18

## 2025-04-14 RX ADMIN — LEVOTHYROXINE SODIUM 250 MCG: 0.12 TABLET ORAL at 05:40

## 2025-04-14 RX ADMIN — CYANOCOBALAMIN TAB 1000 MCG 1000 MCG: 1000 TAB at 08:27

## 2025-04-14 RX ADMIN — CARIPRAZINE 1.5 MG: 1.5 CAPSULE, GELATIN COATED ORAL at 12:26

## 2025-04-14 RX ADMIN — Medication 3 MG: at 21:18

## 2025-04-14 RX ADMIN — INSULIN LISPRO 7 UNITS: 100 INJECTION, SOLUTION INTRAVENOUS; SUBCUTANEOUS at 17:20

## 2025-04-14 RX ADMIN — INSULIN LISPRO 7 UNITS: 100 INJECTION, SOLUTION INTRAVENOUS; SUBCUTANEOUS at 12:25

## 2025-04-14 RX ADMIN — GABAPENTIN 400 MG: 400 CAPSULE ORAL at 16:53

## 2025-04-14 RX ADMIN — INSULIN LISPRO 7 UNITS: 100 INJECTION, SOLUTION INTRAVENOUS; SUBCUTANEOUS at 08:30

## 2025-04-14 RX ADMIN — TRAZODONE HYDROCHLORIDE 150 MG: 100 TABLET ORAL at 21:18

## 2025-04-14 RX ADMIN — Medication 1 TABLET: at 08:27

## 2025-04-14 RX ADMIN — ESCITALOPRAM OXALATE 20 MG: 10 TABLET ORAL at 08:27

## 2025-04-14 NOTE — NURSING NOTE
Patient has been about the unit. Attending groups and interacting with peers. Denies SI/HI, denies hallucinations. Compliant with medications. No complaints. CIWAs have been discontinued. No c/o alcohol w/d symptoms.

## 2025-04-14 NOTE — ASSESSMENT & PLAN NOTE
Per SLIM  Continue Levothyroxine  Lab Results   Component Value Date    [id number] 1.057 04/12/2025    FREET4 0.93 04/17/2024    FREET4 0.95 04/17/2024

## 2025-04-14 NOTE — TELEPHONE ENCOUNTER
Left a voice message for Yanira to call the office back to inform us how often her pump supplies are changed.   Filling out the medical necessity form for her pump and I need to know how often they are changed.

## 2025-04-14 NOTE — TREATMENT TEAM
04/14/25 1951   Provider Notification   Reason for Communication Change in status   Provider Name Tamrashaquille Jcneida AVENDAÑO   Provider Role Attending physician   Method of Communication Other (Comment)  (Epic chat)   Response No new orders   Notification Time 1951   Shift Event Other (Comment)  (Update on blood glucose level 172.)

## 2025-04-14 NOTE — PROGRESS NOTES
04/14/25 6611   Team Meeting   Meeting Type Daily Rounds   Team Members Present   Team Members Present Physician;Nurse;   Physician Team Member Jimmy   Nursing Team Member makiroscoe   Care Management Team Member Candie   Patient/Family Present   Patient Present No   Patient's Family Present No     Pt is a 45 year 201 coming from [facility] ED with a readmit score of 24. Pt requesting ECT evaluation. Pt reported her  committed suicide and she has had mental health symptoms since. Pt has fleeting SI with no plan and Pt reported this is baseline for her. Pt denied AVH. Pt reported she was using alcohol prior to coming to the hospital.

## 2025-04-14 NOTE — TREATMENT TEAM
04/14/25 1921   Lab Notifications   Diagnostic Test blood glucose   Date critical lab was drawn 04/14/25   Time critical lab was drawn 1921   Lab personnel reporting value Madison kenyon MARTIN   Critical Value received by Gloria Singletary RN   Read Back and Verified Yes   Nurse notified Gloria Singletary MD/AP notified if appropriate (name) Tamra AVENDAÑO   Time MD/AP notfied 1930     Patient given snacks and juice asymptomatic and ,alert and oriented offers no complaints at this time. Blood glucose to be rechecked.

## 2025-04-14 NOTE — ASSESSMENT & PLAN NOTE
Yanira Webb is a 45-year-old  female with past psychiatric history significant for major depressive disorder and ASA with panic attacks who presents to [facility] due to persistent depressive symptoms. She endorses overall dysphoric mood with anhedonia, poor sleep, weight gain despite decreased appetite, low energy, and intermittent concentration issues. She notes fleeting passive death wishes, but adamantly denies a plan and contracts for safety. She reports feeling hopeless due to several failed medication trials,  participation in multiple modalities of therapy, and previous TMS treatments without complete remission of symptoms. She was agreeable to medication adjustments and at this time is interested in ECT treatment which would likely benefit her.     Treatment and medication recommendations as follows, no changes as of 4/13/2025:   Recommend evaluation for ECT, will defer to primary team to make final recommendation and ensure that it would be covered by insurance  Continue Trazodone to 150 mg at bed time to improve sleep  Continue Gabapentin 400 mg three times daily for anxiety, neuropathy, and alcohol cravings   Further upward titration as tolerated   Continue Lexapro 30 mg daily for depression and anxiety   Confirmed with pharmacy that this was patient's home dose of medication, reports compliance  Would consider dose decreased with further medication adjustments vs. ECT   msec   Hyponatremia noted on admission, corrected to 135 due to hyperglycemia, repeat on 4/13/2025 139  Discussed adding SGA as augmentation for treatment resistant depression, patient is concerned about weight gain and declined Seroquel and Zyprexa. She has previously tried Abilify, Lamictal, and Latuda which were not effective. Discussed potentially using newer agents, such as, Vraylar or Rexulti, but due to potential cost of medications, I will defer this to primary team who can work with case management to determine  pricing if they would like to go this route.

## 2025-04-14 NOTE — ASSESSMENT & PLAN NOTE
Lab Results   Component Value Date    HGBA1C 8.7 (A) 03/05/2025       Recent Labs     04/13/25  1513 04/13/25  1658 04/13/25 2005 04/14/25  0805   POCGLU 71 256* 229* 163*       Blood Sugar Average: Last 72 hrs:  (P) [id number]  Per SLIM

## 2025-04-14 NOTE — PLAN OF CARE
Problem: Risk for Self Injury/Neglect  Goal: Verbalize thoughts and feelings  Description: Interventions:- Assess and re-assess patient's lethality and potential for self-injury- Engage patient in 1:1 interactions, daily, for a minimum of 15 minutes- Encourage patient to express feelings, fears, frustrations, hopes- Establish rapport/trust with patient   Outcome: Progressing  Goal: Refrain from harming self  Description: Interventions:- Monitor patient closely, per order- Develop a trusting relationship- Supervise medication ingestion, monitor effects and side effects   Outcome: Progressing  Goal: Recognize maladaptive responses and adopt new coping mechanisms  Outcome: Progressing     Problem: Depression  Goal: Treatment Goal: Demonstrate behavioral control of depressive symptoms, verbalize feelings of improved mood/affect, and adopt new coping skills prior to discharge  Outcome: Progressing  Goal: Refrain from isolation  Description: Interventions:- Develop a trusting relationship - Encourage socialization   Outcome: Progressing  Goal: Attend and participate in unit activities, including therapeutic, recreational, and educational groups  Description: Interventions:- Provide therapeutic and educational activities daily, encourage attendance and participation, and document same in the medical record   Outcome: Progressing     Problem: Anxiety  Goal: Anxiety is at manageable level  Description: Interventions:- Assess and monitor patient's anxiety level. - Monitor for signs and symptoms (heart palpitations, chest pain, shortness of breath, headaches, nausea, feeling jumpy, restlessness, irritable, apprehensive). - Collaborate with interdisciplinary team and initiate plan and interventions as ordered.- [place] patient to unit/surroundings- Explain treatment plan- Encourage participation in care- Encourage verbalization of concerns/fears- Identify coping mechanisms- Assist in developing anxiety-reducing skills-  Administer/offer alternative therapies- Limit or eliminate stimulants  Outcome: Progressing

## 2025-04-14 NOTE — ASSESSMENT & PLAN NOTE
Recent Labs     04/11/25 2051 04/13/25  0559   SODIUM 131*  131* 139   Corrected for hyperglycemia, 135, on admission  Repeat WNL   Continue to monitor

## 2025-04-14 NOTE — NURSING NOTE
Pt is pleasant, cooperative, and med compliant. Pt is visible socializing with peers in the dayroom. Pt denies depression and anxiety. Pt denies SI/HI/AH/VH. No unmet needs reported.

## 2025-04-14 NOTE — PROGRESS NOTES
Pastoral Care Progress Note          Chaplaincy Interventions Utilized:   Empowerment: Encouraged focus on present, Encouraged self-care, Facilitated group experience, and Normalized experience of patient/family    Exploration: Explored hope, Explored emotional needs & resources, and Explored spiritual needs & resources    Relationship Building: Cultivated a relationship of care and support, Listened empathically, and Hospitality    The pt participated in spirituality group facilitated by the  today. The pt joined late, but was active in engagement.

## 2025-04-14 NOTE — PROGRESS NOTES
04/14/25 1600   Team Meeting   Meeting Type Tx Team Meeting   Team Members Present   Team Members Present Physician;Nurse;   Physician Team Member Jimmy   Nursing Team Member Hayder   Care Management Team Member Candie   Patient/Family Present   Patient Present Yes   Patient's Family Present No     Tx plan was reviewed and discussed with Pt. Pt was encouraged to attend groups. Medication was discussed with Pt. Pt signed tx plan.

## 2025-04-14 NOTE — PROGRESS NOTES
Psychiatric Progress Note - Department of Behavioral Health   Clarissa Webb 45 y.o. female MRN: [id number]  Unit/Bed#: [facility] 340-01 Encounter: [id number]    ASSESSMENT & PLAN     Assessment & Plan  Recurrent major depressive disorder (HCC)  Yanira Webb is a 45-year-old  female with past psychiatric history significant for major depressive disorder and ASA with panic attacks who presents to [facility] due to persistent depressive symptoms. She endorses overall dysphoric mood with anhedonia, poor sleep, weight gain despite decreased appetite, low energy, and intermittent concentration issues. She notes fleeting passive death wishes, but adamantly denies a plan and contracts for safety. She reports feeling hopeless due to several failed medication trials,  participation in multiple modalities of therapy, and previous TMS treatments without complete remission of symptoms. She was agreeable to medication adjustments and at this time is interested in ECT treatment which would likely benefit her.   Discussed adding SGA as augmentation for treatment resistant depression, patient is concerned about weight gain and declined Seroquel and Zyprexa. She has previously tried Abilify, Lamictal, and Latuda which were not effective.     Treatment and medication recommendations as follows:   Recommend ECT  Continue Trazodone to 150 mg at bed time to improve sleep  Continue Gabapentin 400 mg three times daily for anxiety, neuropathy, and alcohol cravings   Further upward titration as tolerated   Continue Lexapro 30 mg daily for depression and anxiety   Plan for taper after initiation and clinical benefit of vraylar   msec   Start vraylar 1.5mg daily for treatment resistant depression  Plan to titrate    Generalized anxiety disorder with panic attacks  See principal problem for treatment recommendations  Report baseline excessive level of worry with increased fatigue, generalized body aches, and nausea, worsening  since October  Experiencing frequent, unprovoked panic attacks consisting of diaphoresis, palpitations, dyspnea, and crying spells - children have wanted to call the ambulance multiple times for these attacks  ADHD (attention deficit hyperactivity disorder)  Per history   PDMP reviewed  Prescribed Vyvanse 50 mg daily, last filled 30-day prescription on 3/25/2025  Patient aware that medication will not be prescribed while in hospital  Hypothyroidism due to Hashimoto's thyroiditis  Per SLIM  Continue Levothyroxine  Lab Results   Component Value Date    [id number] 1.057 04/12/2025    FREET4 0.93 04/17/2024    FREET4 0.95 04/17/2024     Medical clearance for psychiatric admission  Per SLIM  H/O gastric bypass  Per SLIM  Type 1 diabetes mellitus with hyperglycemia (HCC)  Lab Results   Component Value Date    HGBA1C 8.7 (A) 03/05/2025       Recent Labs     04/13/25  1513 04/13/25  1658 04/13/25  2005 04/14/25  0805   POCGLU 71 256* 229* 163*       Blood Sugar Average: Last 72 hrs:  (P) [id number]  Per SLIM  Alcohol use  Discontinue CIWA due to 0's over the last 48 hours    Hyponatremia  Recent Labs     04/11/25  2051 04/13/25  0559   SODIUM 131*  131* 139   Corrected for hyperglycemia, 135, on admission  Repeat WNL   Continue to monitor    Treatment Recommendations/Precautions:  Continue to promote patient participation in therapeutic milieu.  Continue medical management per medicine.  Continue previously prescribed psychotropic medication regimen; see below.  Continue behavioral health checks q.15 minutes.   Continue vitals per behavioral health unit protocol.  Discharge date per primary team; 201 commitment status.    SUBJECTIVE     Patient evaluated this a.m. for continuity of care. Patient was discussed at length with nursing and treatment team. Per nursing, patient has been cooperative and compliant with medications. No acute distress is noted throughout evaluation. Clarissa Webb denies  "suicidal/homicidal ideation in addition to thoughts of self-injury, receptive to crisis planning provided by this writer, contacting for safety in the inpatient setting, admitting to an ability to appropriately confide in staff including this writer. Discussed at length with patient today about history and treatment options, she states she feels \"hopeful\" for being here and feels she has been feeling depressed for quite some time. \"I shouldn't feel that way\" and other negative thought patterns prevail. Education on gastric bypass and alcohol as a CNS depressant given, patient receptive, discussed possible DC to PHP when ready. States she sleeps better here than at home. Discussed that she just had a vivitrol shot and will be due for next one May 7. Eating ok. Discussed her iron infusions she would like on a regular basis as this may help her sleep, she plans to go back to  upon DC as well. Plans today to go to groups per patient.    PSYCHIATRIC REVIEW OF SYSTEMS     Behavior over the last 24 hours:  some improvement  Sleep: normal  Appetite: improving  Medication side effects: No    REVIEW OF SYSTEMS   Review of systems: no complaints    OBJECTIVE     Vital Signs in Past 24 Hours:  Temp:  [96.7 °F (35.9 °C)-97.8 °F (36.6 °C)] 96.7 °F (35.9 °C)  HR:  [76-85] 76  BP: ()/(58-87) 122/66  Resp:  [16] 16  SpO2:  [96 %-99 %] 99 %  O2 Device: None (Room air)    Intake/Output in Past 24 hours:  No intake/output data recorded.  No intake/output data recorded.        Laboratory Results:  I have personally reviewed all pertinent laboratory/tests results.  Most Recent Labs:   Lab Results   Component Value Date    WBC 2.90 (L) 04/12/2025    RBC 3.89 04/12/2025    HGB 10.9 (L) 04/12/2025    HCT 34.7 (L) 04/12/2025     04/12/2025    RDW 12.2 04/12/2025    TOTANEUTABS 8.62 (H) 10/21/2023    NEUTROABS 0.91 (L) 04/12/2025    SODIUM 139 04/13/2025    K 4.5 04/13/2025     04/13/2025    CO2 29 04/13/2025    BUN 15 " 04/13/2025    CREATININE 0.82 04/13/2025    GLUC 188 (H) 04/13/2025    GLUF 188 (H) 04/13/2025    CALCIUM 8.7 04/13/2025    AST 17 04/13/2025    ALT 15 04/13/2025    ALKPHOS 86 04/13/2025    TP 6.1 (L) 04/13/2025    ALB 3.6 04/13/2025    TBILI 0.34 04/13/2025    CHOLESTEROL 184 04/12/2025     04/12/2025    TRIG 138 04/12/2025    LDLCALC 38 04/12/2025    NONHDLC 66 04/12/2025    [id number] 1.057 04/12/2025    FREET4 0.93 04/17/2024    FREET4 0.95 04/17/2024    T3FREE 2.58 05/07/2021    PREGUR negative 11/30/2020    PREGSERUM Negative 04/12/2025    HGBA1C 8.7 (A) 03/05/2025     04/17/2024       Behavioral Health Medications: all current active meds have been reviewed, continue current psychiatric medications, and current meds:   Current Facility-Administered Medications:     aluminum-magnesium hydroxide-simethicone (MAALOX) oral suspension 30 mL, Q4H PRN    haloperidol lactate (HALDOL) injection 2.5 mg, Q4H PRN Max 4/day **AND** LORazepam (ATIVAN) injection 1 mg, Q4H PRN Max 4/day **AND** benztropine (COGENTIN) injection 0.5 mg, Q4H PRN Max 4/day    haloperidol lactate (HALDOL) injection 5 mg, Q4H PRN Max 4/day **AND** LORazepam (ATIVAN) injection 2 mg, Q4H PRN Max 4/day **AND** benztropine (COGENTIN) injection 1 mg, Q4H PRN Max 4/day    benztropine (COGENTIN) injection 1 mg, Q4H PRN Max 6/day    benztropine (COGENTIN) tablet 1 mg, Q4H PRN Max 6/day    bisacodyl (DULCOLAX) rectal suppository 10 mg, Daily PRN    cariprazine (VRAYLAR) capsule 1.5 mg, Daily    cyanocobalamin (VITAMIN B-12) tablet 1,000 mcg, Daily    hydrOXYzine HCL (ATARAX) tablet 50 mg, Q6H PRN Max 4/day **OR** diphenhydrAMINE (BENADRYL) injection 50 mg, Q6H PRN    ergocalciferol (VITAMIN D2) capsule 50,000 Units, Weekly    [START ON 4/15/2025] escitalopram (LEXAPRO) tablet 30 mg, Daily **AND** [DISCONTINUED] escitalopram (LEXAPRO) tablet 10 mg, Daily    folic acid (FOLVITE) tablet 1 mg, Daily    gabapentin (NEURONTIN) capsule 400 mg,  "TID    haloperidol (HALDOL) tablet 1 mg, Q6H PRN    haloperidol (HALDOL) tablet 2.5 mg, Q4H PRN Max 4/day    haloperidol (HALDOL) tablet 5 mg, Q4H PRN Max 4/day    hydrOXYzine HCL (ATARAX) tablet 100 mg, Q6H PRN Max 4/day **OR** LORazepam (ATIVAN) injection 2 mg, Q6H PRN    hydrOXYzine HCL (ATARAX) tablet 25 mg, Q6H PRN Max 4/day    ibuprofen (MOTRIN) tablet 400 mg, Q4H PRN    ibuprofen (MOTRIN) tablet 600 mg, Q6H PRN    ibuprofen (MOTRIN) tablet 800 mg, Q8H PRN    insulin glargine (LANTUS) subcutaneous injection 18 Units 0.18 mL, HS    insulin lispro (HumALOG/ADMELOG) 100 units/mL subcutaneous injection 1-6 Units, TID AC **AND** Fingerstick Glucose (POCT), TID AC    insulin lispro (HumALOG/ADMELOG) 100 units/mL subcutaneous injection 7 Units, TID With Meals    levothyroxine tablet 250 mcg, Early Morning    loperamide (IMODIUM) capsule 2 mg, 4x Daily PRN    melatonin tablet 3 mg, HS    multivitamin-minerals (CENTRUM) tablet 1 tablet, Daily    polyethylene glycol (MIRALAX) packet 17 g, Daily PRN    propranolol (INDERAL) tablet 10 mg, Q8H PRN    senna-docusate sodium (SENOKOT S) 8.6-50 mg per tablet 1 tablet, Daily PRN    thiamine tablet 100 mg, Daily    traZODone (DESYREL) tablet 150 mg, HS    traZODone (DESYREL) tablet 50 mg, HS PRN.    Risks, benefits and possible side effects of Medications:   Risks, benefits, and possible side effects of medications explained to patient and patient verbalizes understanding and agreement for treatment.    Dual Antipsychotic Rationale: NA     Mental Status Evaluation:  Appearance:  Overtly appearing  female dressed appropriately, with adequate hygiene and good eye contact   Behavior:  Pleasant, cooperative   Speech:  normal volume   Mood:  \"Hopeful\"   Affect:  constricted   Language anomia No and aphasia  No   Thought Process:  rumination   Thought Content:  Negative thinking   Perceptual Disturbances: None   Risk Potential: Suicidal Ideations none, Homicidal Ideations " none, and Potential for Aggression No   Sensorium:  person, place, and situation   Cognition:  recent and remote memory grossly intact   Consciousness:  alert and awake    Attention: attention span and concentration were age appropriate   Insight:  limited   Judgment: limited   Intellect Not assessed   Gait/Station: normal gait/station   Motor Activity: no abnormal movements     Memory: Short and long term memory  in tact     Progress Toward Goals: improving, as evidenced by their participation (or lack thereof) in individual, social and therapeutic milieu in addition to adherence to their medication regimen.    Recommended Treatment:   See above for assessment and plan.    Inpatient Psychiatric Certification: Based upon physical, mental and social evaluations, I certify that inpatient psychiatric services are medically necessary for this patient for a duration of greater than 2 midnights for the treatment of Recurrent major depressive disorder (HCC) including psychotropic medication management, participation in the therapeutic milieu and referrals as indicated. Available alternative community resources do not meet the patient's mental health care needs. I further attest that an established written individualized plan of care has been implemented and is outlined in the patient's medical records.    Counseling/Coordination of Care    I have expended greater than 35 minutes in which more than 50% of this time was expended in counseling/coordination of patient care relating to diagnostic results, prognosis, potential risks and benefits of management options, instructions for appropriate management, patient and/or collateral education, importance of adherence to management and/or risk factor reductions. Patient's rights, confidentiality, exceptions to confidentiality, use of electronic medical record including appropriate staff access to medical record regarding behavioral health services and consent to treatment were  reviewed.    Note Share:     This note was not shared with the patient due to reasonable likelihood of causing patient harm     This note has been constructed using a voice recognition system. There may be translation, syntax,  or grammatical errors. If you have any questions, please contact the dictating provider.    Keagan Elkins,  04/14/25

## 2025-04-15 LAB
FERRITIN SERPL-MCNC: 8 NG/ML (ref 30–307)
GLUCOSE SERPL-MCNC: 117 MG/DL (ref 65–140)
GLUCOSE SERPL-MCNC: 156 MG/DL (ref 65–140)
GLUCOSE SERPL-MCNC: 184 MG/DL (ref 65–140)
GLUCOSE SERPL-MCNC: 197 MG/DL (ref 65–140)
GLUCOSE SERPL-MCNC: 356 MG/DL (ref 65–140)
GLUCOSE SERPL-MCNC: 52 MG/DL (ref 65–140)
GLUCOSE SERPL-MCNC: 63 MG/DL (ref 65–140)
IRON SATN MFR SERPL: 9 % (ref 15–50)
IRON SERPL-MCNC: 36 UG/DL (ref 50–212)
TIBC SERPL-MCNC: 417.2 UG/DL (ref 250–450)
TRANSFERRIN SERPL-MCNC: 298 MG/DL (ref 203–362)
UIBC SERPL-MCNC: 381 UG/DL (ref 155–355)

## 2025-04-15 PROCEDURE — 83550 IRON BINDING TEST: CPT

## 2025-04-15 PROCEDURE — 99231 SBSQ HOSP IP/OBS SF/LOW 25: CPT | Performed by: PSYCHIATRY & NEUROLOGY

## 2025-04-15 PROCEDURE — 82728 ASSAY OF FERRITIN: CPT

## 2025-04-15 PROCEDURE — 83540 ASSAY OF IRON: CPT

## 2025-04-15 PROCEDURE — 82948 REAGENT STRIP/BLOOD GLUCOSE: CPT

## 2025-04-15 RX ORDER — GINSENG 100 MG
1 CAPSULE ORAL 2 TIMES DAILY
Status: DISPENSED | OUTPATIENT
Start: 2025-04-15 | End: 2025-04-20

## 2025-04-15 RX ORDER — FERROUS SULFATE 325(65) MG
325 TABLET ORAL
Status: DISCONTINUED | OUTPATIENT
Start: 2025-04-16 | End: 2025-04-16

## 2025-04-15 RX ADMIN — CARIPRAZINE 1.5 MG: 1.5 CAPSULE, GELATIN COATED ORAL at 21:12

## 2025-04-15 RX ADMIN — INSULIN LISPRO 7 UNITS: 100 INJECTION, SOLUTION INTRAVENOUS; SUBCUTANEOUS at 12:47

## 2025-04-15 RX ADMIN — TRAZODONE HYDROCHLORIDE 150 MG: 100 TABLET ORAL at 21:12

## 2025-04-15 RX ADMIN — INSULIN LISPRO 1 UNITS: 100 INJECTION, SOLUTION INTRAVENOUS; SUBCUTANEOUS at 12:47

## 2025-04-15 RX ADMIN — INSULIN GLARGINE 18 UNITS: 100 INJECTION, SOLUTION SUBCUTANEOUS at 21:11

## 2025-04-15 RX ADMIN — INSULIN LISPRO 1 UNITS: 100 INJECTION, SOLUTION INTRAVENOUS; SUBCUTANEOUS at 08:16

## 2025-04-15 RX ADMIN — INSULIN LISPRO 6 UNITS: 100 INJECTION, SOLUTION INTRAVENOUS; SUBCUTANEOUS at 16:42

## 2025-04-15 RX ADMIN — ESCITALOPRAM OXALATE 30 MG: 10 TABLET ORAL at 08:13

## 2025-04-15 RX ADMIN — THIAMINE HCL TAB 100 MG 100 MG: 100 TAB at 08:13

## 2025-04-15 RX ADMIN — BACITRACIN 1 SMALL APPLICATION: 500 OINTMENT TOPICAL at 17:11

## 2025-04-15 RX ADMIN — CYANOCOBALAMIN TAB 1000 MCG 1000 MCG: 1000 TAB at 08:13

## 2025-04-15 RX ADMIN — FOLIC ACID 1 MG: 1 TABLET ORAL at 08:14

## 2025-04-15 RX ADMIN — Medication 3 MG: at 21:12

## 2025-04-15 RX ADMIN — GABAPENTIN 400 MG: 400 CAPSULE ORAL at 08:13

## 2025-04-15 RX ADMIN — INSULIN LISPRO 7 UNITS: 100 INJECTION, SOLUTION INTRAVENOUS; SUBCUTANEOUS at 16:42

## 2025-04-15 RX ADMIN — INSULIN LISPRO 7 UNITS: 100 INJECTION, SOLUTION INTRAVENOUS; SUBCUTANEOUS at 08:16

## 2025-04-15 RX ADMIN — GABAPENTIN 400 MG: 400 CAPSULE ORAL at 16:40

## 2025-04-15 RX ADMIN — LEVOTHYROXINE SODIUM 250 MCG: 0.12 TABLET ORAL at 05:51

## 2025-04-15 RX ADMIN — Medication 1 TABLET: at 08:13

## 2025-04-15 NOTE — TREATMENT TEAM
04/15/25 0824   Team Meeting   Meeting Type Daily Rounds   Team Members Present   Team Members Present Physician;Nurse;   Physician Team Member Jimmy   Nursing Team Member [id number] Team Member Candie/Samira   Patient/Family Present   Patient Present No   Patient's Family Present No     Pt is visible and social on unit. Pt is participating in groups. Pt denied SI/HI/AVH. Pt is medication and meal compliant and is social with select peers.

## 2025-04-15 NOTE — NURSING NOTE
Patient c/o feeling hypoglycemic. Blood sugar checked at 1123 which was 63. Requested juice and several peanut butter crackers. Re-checked blood sugar 15 minutes later which was 117. No further complaints by patient.

## 2025-04-15 NOTE — PLAN OF CARE
Problem: Risk for Self Injury/Neglect  Goal: Verbalize thoughts and feelings  Description: Interventions:- Assess and re-assess patient's lethality and potential for self-injury- Engage patient in 1:1 interactions, daily, for a minimum of 15 minutes- Encourage patient to express feelings, fears, frustrations, hopes- Establish rapport/trust with patient   Outcome: Progressing  Goal: Refrain from harming self  Description: Interventions:- Monitor patient closely, per order- Develop a trusting relationship- Supervise medication ingestion, monitor effects and side effects   Outcome: Progressing  Goal: Recognize maladaptive responses and adopt new coping mechanisms  Outcome: Progressing     Problem: Depression  Goal: Treatment Goal: Demonstrate behavioral control of depressive symptoms, verbalize feelings of improved mood/affect, and adopt new coping skills prior to discharge  Outcome: Progressing  Goal: Refrain from isolation  Description: Interventions:- Develop a trusting relationship - Encourage socialization   Outcome: Progressing     Problem: Anxiety  Goal: Anxiety is at manageable level  Description: Interventions:- Assess and monitor patient's anxiety level. - Monitor for signs and symptoms (heart palpitations, chest pain, shortness of breath, headaches, nausea, feeling jumpy, restlessness, irritable, apprehensive). - Collaborate with interdisciplinary team and initiate plan and interventions as ordered.- [place] patient to unit/surroundings- Explain treatment plan- Encourage participation in care- Encourage verbalization of concerns/fears- Identify coping mechanisms- Assist in developing anxiety-reducing skills- Administer/offer alternative therapies- Limit or eliminate stimulants  Outcome: Progressing

## 2025-04-15 NOTE — NURSING NOTE
Patient refused Vraylar this morning requesting that it be scheduled for HS. Stated that it made her too tired yesterday when she took it during the day. Compliant with all other medications. About the unit. Pleasant, calm and cooperative. Attending groups and interacting with peers. Reports sleeping great last night. Looking forward to starting ECT tomorrow.

## 2025-04-15 NOTE — NURSING NOTE
Patient is calm and cooperative upon approach. Patient is visible on unit, watching tv. Patient denies SI/HI/AH/VH. Patient is compliant with meds and meals.

## 2025-04-15 NOTE — PROGRESS NOTES
Psychiatric Progress Note - Department of Behavioral Health   Clarissa Webb 45 y.o. female MRN: [id number]  Unit/Bed#: [facility] 340-01 Encounter: [id number]    ASSESSMENT & PLAN     Assessment & Plan  Recurrent major depressive disorder (HCC)  Yanira Webb is a 45-year-old  female with past psychiatric history significant for major depressive disorder and ASA with panic attacks who presents to [facility] 3B due to persistent depressive symptoms. She endorses overall dysphoric mood with anhedonia, poor sleep, weight gain despite decreased appetite, low energy, and intermittent concentration issues. She notes fleeting passive death wishes, but adamantly denies a plan and contracts for safety. She reports feeling hopeless due to several failed medication trials,  participation in multiple modalities of therapy, and previous TMS treatments without complete remission of symptoms. She was agreeable to medication adjustments and at this time is interested in ECT treatment which would likely benefit her.   Discussed adding SGA as augmentation for treatment resistant depression, patient is concerned about weight gain and declined Seroquel and Zyprexa. She has previously tried Abilify, Lamictal, and Latuda which were not effective.     Treatment and medication recommendations as follows, no changes as of 4/13/2025:   Consented and ordered for initiation of ECT   NPO after midnight  Continue Trazodone to 150 mg at bed time to improve sleep  Continue Gabapentin 400 mg three times daily for anxiety, neuropathy, and alcohol cravings   Hold evenings before ECT  Continue Lexapro 30 mg daily for depression and anxiety   Consider dose decreased with further medication adjustments   msec   Change vraylar to HS due to sedation 1.5mg HS      No associated orders from this encounter found during lookback period of 72 hours.  Generalized anxiety disorder with panic attacks  See principal problem for treatment  recommendations  Report baseline excessive level of worry with increased fatigue, generalized body aches, and nausea, worsening since October  Experiencing frequent, unprovoked panic attacks consisting of diaphoresis, palpitations, dyspnea, and crying spells - children have wanted to call the ambulance multiple times for these attacks    No associated orders from this encounter found during lookback period of 72 hours.  ADHD (attention deficit hyperactivity disorder)  Per history   PDMP reviewed  Prescribed Vyvanse 50 mg daily, last filled 30-day prescription on 3/25/2025  Patient aware that medication will not be prescribed while in hospital    No associated orders from this encounter found during lookback period of 72 hours.  Hypothyroidism due to Hashimoto's thyroiditis  Per [facility]  Continue Levothyroxine  Lab Results   Component Value Date    HKH8ZCGHXPPE 1.057 04/12/2025    FREET4 0.93 04/17/2024    FREET4 0.95 04/17/2024       No associated orders from this encounter found during lookback period of 72 hours.  Medical clearance for psychiatric admission  Per [facility]    No associated orders from this encounter found during lookback period of 72 hours.  H/O gastric bypass  Per [facility]    No associated orders from this encounter found during lookback period of 72 hours.  Type 1 diabetes mellitus with hyperglycemia (HCC)  Lab Results   Component Value Date    HGBA1C 8.7 (A) 03/05/2025       Recent Labs     04/14/25  1949 04/15/25  0749 04/15/25  1123 04/15/25  1136   POCGLU 172* 156* 63* 117       Blood Sugar Average: Last 72 hrs:  (P) [id number]  Per [facility]    Orders from past 72 hours:    Inpatient Consult to Nutrition Services; Standing    Alcohol use  Plans to go to AA after DC        No associated orders from this encounter found during lookback period of 72 hours.  Hyponatremia  Recent Labs     04/13/25  0559   SODIUM 139   Corrected for hyperglycemia, 135, on admission  Repeat WNL   Continue to monitor    No  associated orders from this encounter found during lookback period of 72 hours.    Treatment Recommendations/Precautions:  Continue to promote patient participation in therapeutic milieu.  Continue medical management per medicine.  Continue previously prescribed psychotropic medication regimen; see below.  Continue behavioral health checks q.15 minutes.   Continue vitals per behavioral health unit protocol.  Discharge date per primary team; 201 commitment status.    SUBJECTIVE     Patient evaluated this a.m. for continuity of care. Patient was discussed at length with nursing and treatment team. Per nursing, patient cooperative on unit and with medications, attending groups, pleasant. No acute distress is noted throughout evaluation. Clarissa Webb denies suicidal/homicidal ideation in addition to thoughts of self-injury, receptive to crisis planning provided by this writer, contacting for safety in the inpatient setting, admitting to an ability to appropriately confide in staff including this writer. Patient looking forward to ECT, denies symptoms, feels hopeful about ECT. States she felt sedated on vraylar AM dosing, will convert to PM dosing, discussed NPO, gabapentin HS hold for ECT, recommend approximately 12 sessions. Physician came by to consent her.    PSYCHIATRIC REVIEW OF SYSTEMS     Behavior over the last 24 hours:  improving  Sleep: normal  Appetite: improving  Medication side effects: Yes sedation from vraylar initiation    REVIEW OF SYSTEMS   Review of systems: no complaints    OBJECTIVE     Vital Signs in Past 24 Hours:  Temp:  [97.5 °F (36.4 °C)-97.8 °F (36.6 °C)] 97.8 °F (36.6 °C)  HR:  [75-83] 83  BP: (116-124)/(74-75) 116/74  Resp:  [16-17] 16  SpO2:  [96 %-98 %] 96 %  O2 Device: None (Room air)    Intake/Output in Past 24 hours:  No intake/output data recorded.  No intake/output data recorded.        Laboratory Results:  I have personally reviewed all pertinent laboratory/tests results.  Most  Recent Labs:   Lab Results   Component Value Date    WBC 2.90 (L) 04/12/2025    RBC 3.89 04/12/2025    HGB 10.9 (L) 04/12/2025    HCT 34.7 (L) 04/12/2025     04/12/2025    RDW 12.2 04/12/2025    TOTANEUTABS 8.62 (H) 10/21/2023    NEUTROABS 0.91 (L) 04/12/2025    SODIUM 139 04/13/2025    K 4.5 04/13/2025     04/13/2025    CO2 29 04/13/2025    BUN 15 04/13/2025    CREATININE 0.82 04/13/2025    GLUC 188 (H) 04/13/2025    GLUF 188 (H) 04/13/2025    CALCIUM 8.7 04/13/2025    AST 17 04/13/2025    ALT 15 04/13/2025    ALKPHOS 86 04/13/2025    TP 6.1 (L) 04/13/2025    ALB 3.6 04/13/2025    TBILI 0.34 04/13/2025    CHOLESTEROL 184 04/12/2025     04/12/2025    TRIG 138 04/12/2025    LDLCALC 38 04/12/2025    NONHDLC 66 04/12/2025    BNB2ZPZNIFHQ 1.057 04/12/2025    FREET4 0.93 04/17/2024    FREET4 0.95 04/17/2024    T3FREE 2.58 05/07/2021    PREGUR negative 11/30/2020    PREGSERUM Negative 04/12/2025    HGBA1C 8.7 (A) 03/05/2025     04/17/2024       Behavioral Health Medications: all current active meds have been reviewed, continue current psychiatric medications, and current meds:   Current Facility-Administered Medications:     aluminum-magnesium hydroxide-simethicone (MAALOX) oral suspension 30 mL, Q4H PRN    haloperidol lactate (HALDOL) injection 2.5 mg, Q4H PRN Max 4/day **AND** LORazepam (ATIVAN) injection 1 mg, Q4H PRN Max 4/day **AND** benztropine (COGENTIN) injection 0.5 mg, Q4H PRN Max 4/day    haloperidol lactate (HALDOL) injection 5 mg, Q4H PRN Max 4/day **AND** LORazepam (ATIVAN) injection 2 mg, Q4H PRN Max 4/day **AND** benztropine (COGENTIN) injection 1 mg, Q4H PRN Max 4/day    benztropine (COGENTIN) injection 1 mg, Q4H PRN Max 6/day    benztropine (COGENTIN) tablet 1 mg, Q4H PRN Max 6/day    bisacodyl (DULCOLAX) rectal suppository 10 mg, Daily PRN    cariprazine (VRAYLAR) capsule 1.5 mg, HS    cyanocobalamin (VITAMIN B-12) tablet 1,000 mcg, Daily    hydrOXYzine HCL (ATARAX) tablet 50  mg, Q6H PRN Max 4/day **OR** diphenhydrAMINE (BENADRYL) injection 50 mg, Q6H PRN    ergocalciferol (VITAMIN D2) capsule 50,000 Units, Weekly    escitalopram (LEXAPRO) tablet 30 mg, Daily **AND** [DISCONTINUED] escitalopram (LEXAPRO) tablet 10 mg, Daily    folic acid (FOLVITE) tablet 1 mg, Daily    gabapentin (NEURONTIN) capsule 400 mg, TID    haloperidol (HALDOL) tablet 1 mg, Q6H PRN    haloperidol (HALDOL) tablet 2.5 mg, Q4H PRN Max 4/day    haloperidol (HALDOL) tablet 5 mg, Q4H PRN Max 4/day    hydrOXYzine HCL (ATARAX) tablet 100 mg, Q6H PRN Max 4/day **OR** LORazepam (ATIVAN) injection 2 mg, Q6H PRN    hydrOXYzine HCL (ATARAX) tablet 25 mg, Q6H PRN Max 4/day    ibuprofen (MOTRIN) tablet 400 mg, Q4H PRN    ibuprofen (MOTRIN) tablet 600 mg, Q6H PRN    ibuprofen (MOTRIN) tablet 800 mg, Q8H PRN    insulin glargine (LANTUS) subcutaneous injection 18 Units 0.18 mL, HS    insulin lispro (HumALOG/ADMELOG) 100 units/mL subcutaneous injection 1-6 Units, TID AC **AND** Fingerstick Glucose (POCT), TID AC    insulin lispro (HumALOG/ADMELOG) 100 units/mL subcutaneous injection 7 Units, TID With Meals    levothyroxine tablet 250 mcg, Early Morning    loperamide (IMODIUM) capsule 2 mg, 4x Daily PRN    melatonin tablet 3 mg, HS    multivitamin-minerals (CENTRUM) tablet 1 tablet, Daily    polyethylene glycol (MIRALAX) packet 17 g, Daily PRN    propranolol (INDERAL) tablet 10 mg, Q8H PRN    senna-docusate sodium (SENOKOT S) 8.6-50 mg per tablet 1 tablet, Daily PRN    thiamine tablet 100 mg, Daily    traZODone (DESYREL) tablet 150 mg, HS    traZODone (DESYREL) tablet 50 mg, HS PRN.    Risks, benefits and possible side effects of Medications:   Risks, benefits, and possible side effects of medications explained to patient and patient verbalizes understanding and agreement for treatment.    Dual Antipsychotic Rationale: NA     Mental Status Evaluation:  Appearance:  age appropriate and improved hygiene, good eye contact   Behavior:   "Pleasant, cooperative   Speech:  normal volume   Mood:  \"Better\"   Affect:  constricted and but less depressed than previous, less anxious than previous   Language anomia No and aphasia  No   Thought Process:  goal directed   Thought Content:  Negative thinking   Perceptual Disturbances: None   Risk Potential: Suicidal Ideations none, Homicidal Ideations none, and Potential for Aggression No   Sensorium:  person and situation   Cognition:  recent and remote memory grossly intact   Consciousness:  alert and awake    Attention: attention span and concentration were age appropriate   Insight:  limited   Judgment: limited   Intellect Not assessed   Gait/Station: normal gait/station   Motor Activity: no abnormal movements     Memory: Short and long term memory  intact     Progress Toward Goals: improving, as evidenced by their participation (or lack thereof) in individual, social and therapeutic milieu in addition to adherence to their medication regimen.    Recommended Treatment:   See above for assessment and plan.    Inpatient Psychiatric Certification: Based upon physical, mental and social evaluations, I certify that inpatient psychiatric services are medically necessary for this patient for a duration of greater than 2 midnights for the treatment of Recurrent major depressive disorder (HCC) including psychotropic medication management, participation in the therapeutic milieu and referrals as indicated. Available alternative community resources do not meet the patient's mental health care needs. I further attest that an established written individualized plan of care has been implemented and is outlined in the patient's medical records.    Counseling/Coordination of Care    I have expended greater than 25 minutes in which more than 50% of this time was expended in counseling/coordination of patient care relating to diagnostic results, prognosis, potential risks and benefits of management options, instructions for " appropriate management, patient and/or collateral education, importance of adherence to management and/or risk factor reductions. Patient's rights, confidentiality, exceptions to confidentiality, use of electronic medical record including appropriate staff access to medical record regarding behavioral health services and consent to treatment were reviewed.    Note Share:     This note was not shared with the patient due to reasonable likelihood of causing patient harm     This note has been constructed using a voice recognition system. There may be translation, syntax,  or grammatical errors. If you have any questions, please contact the dictating provider.    Keagan Elkins, DO 04/15/25

## 2025-04-15 NOTE — ASSESSMENT & PLAN NOTE
Yanira Webb is a 45-year-old  female with past psychiatric history significant for major depressive disorder and ASA with panic attacks who presents to \[facility]\"" 3B due to persistent depressive symptoms. She endorses overall dysphoric mood with anhedonia, poor sleep, weight gain despite decreased appetite, low energy, and intermittent concentration issues. She notes fleeting passive death wishes, but adamantly denies a plan and contracts for safety. She reports feeling hopeless due to several failed medication trials,  participation in multiple modalities of therapy, and previous TMS treatments without complete remission of symptoms. She was agreeable to medication adjustments and at this time is interested in ECT treatment which would likely benefit her.   Discussed adding SGA as augmentation for treatment resistant depression, patient is concerned about weight gain and declined Seroquel and Zyprexa. She has previously tried Abilify, Lamictal, and Latuda which were not effective.     Treatment and medication recommendations as follows, no changes as of 4/13/2025:   Consented and ordered for initiation of ECT   NPO after midnight  Continue Trazodone to 150 mg at bed time to improve sleep  Continue Gabapentin 400 mg three times daily for anxiety, neuropathy, and alcohol cravings   Hold evenings before ECT  Continue Lexapro 30 mg daily for depression and anxiety plan to decrease to 25 mg on 4/18.  Consider dose decreased with further medication adjustments   msec   4/16 increased vraylar 3 mg HS for treatment resistant MDD  Patient initiated on ECT, completed first session 9/16.  Will plan for 4 sessions of inpatient, total of 12 sessions.  Plan to discharge patient to partial patient hospitalization, she is agreeable.

## 2025-04-15 NOTE — QUICK NOTE
Electroconvulsive Therapy (ECT) Consultation Note - Department of Behavioral Health   Clarissa Webb 45 y.o. female MRN: [id number]  Unit/Bed#: [facility] 340-01 Encounter: [id number]    Treatment Recommendations/Precautions:  Patient adheres to criteria and can potentially benefit from ECT.  Risks, benefits, and possible side effects of ECT explained to patient and patient verbalizes understanding.  Patient was counseled on potential risks of procedure including not limited to: cardiac complications, headaches, memory impairment and anesthetic complications.  ECT consent was signed by patient and this writer; provided to /electronic medical record.  Medical clearance was obtained on 04/12/25.  Orders were placed and ECT is set to begin on 04/16/25.    This note has been constructed using a voice recognition system.    There may be translation, syntax,  or grammatical errors. If you have any questions, please contact the dictating provider.    Jordan Christopher Holter, DO 04/15/25

## 2025-04-15 NOTE — SOCIAL WORK
Cm emailed hospitalization letter to     Oneyda; amita; [email or web address].    Upon Pt's request for her employer.

## 2025-04-15 NOTE — SOCIAL WORK
Admission Status    Status of admission 201   [facility]        Patient Intake   Address to discharge to [address] [place] [address] [place] [address]   Living Arrangement Lives with her children   Can patient return home Yes   Patient's Telephone Number [phone]   Patient's e-mail Address [email or web address]   Insurance PA MA   PCP [facility]   Education Master's  in Education   Type of work Pre-K Teacher    History Pt denied   Access to Firearms Pt. denied   Marital Status/Children , 2 children, 13 y.o. Son, 15 y.o daughter   Spirituality/Anglican Pt denied   Transportation Pt. Reports that she drives herself   Preferred Pharmacy [facility]      Patient History   Presenting Problem Increased depressive symptoms, SI   Stressor/Trigger Finances, family relationships and job   Treatment History Previous [facility], [place] admittances, [place] most recently   Current psychiatrist/therapist [facility]   ACT/ICM Pt denied   Family History of Mental Health Pt reported that there is no diagnosed family history   Suicide Attempts Pt denied   Legal Issues Pt denied current legal issues   Trauma/Psychosocial loss Pt denied, per chart review and conversation 's death     Substance Abuse Assessment   UDS: (+) Amphetamine, Pt is prescribed   Audit Score: 5  Nicotine/Tobacco: pt denied   Substance First use Last Use and amount Frequency Amount Used How long Longest period of sobriety and when Method of use   THC            Heroin            Cocaine            ETOH   5 years ago 3/31/25  2 drinks Here and there 1-6 drinks depending on occasion  8 months PO   Meth            Benzos            Other:            History of EWELINA Pt reports history of AUD   Family History of EWELINA Pt reports Father-AUD   Prior Inpatient EWELINA Treatment Pt reports past IP EWELINA tx at [facility] and [facility]   Current Outpatient treatment Pt denied current OP EWELINA tx.   Response to Referral Pt is not currently  interested but is interested in attending AA     Referrals/ROIs   Referrals Needed OP Psychiatry and Therapy   ROIs Signed Avi (Friend),[facility], [facility], [facility], [facility], [facility]

## 2025-04-15 NOTE — NURSING NOTE
Patient visible on the unit,social with certain peers.Patient makes needs known, enjoys reading and partaking in groups.Denies SI/HI/AH/VH,compliant with medications and routine vitals.

## 2025-04-16 ENCOUNTER — APPOINTMENT (INPATIENT)
Dept: PREOP/PACU | Facility: HOSPITAL | Age: 45
DRG: 751 | End: 2025-04-16
Attending: PSYCHIATRY & NEUROLOGY
Payer: COMMERCIAL

## 2025-04-16 ENCOUNTER — ANESTHESIA (INPATIENT)
Dept: PREOP/PACU | Facility: HOSPITAL | Age: 45
DRG: 751 | End: 2025-04-16
Payer: COMMERCIAL

## 2025-04-16 ENCOUNTER — ANESTHESIA EVENT (INPATIENT)
Dept: PREOP/PACU | Facility: HOSPITAL | Age: 45
DRG: 751 | End: 2025-04-16
Payer: COMMERCIAL

## 2025-04-16 LAB
GLUCOSE SERPL-MCNC: 117 MG/DL (ref 65–140)
GLUCOSE SERPL-MCNC: 201 MG/DL (ref 65–140)
GLUCOSE SERPL-MCNC: 245 MG/DL (ref 65–140)
GLUCOSE SERPL-MCNC: 315 MG/DL (ref 65–140)
GLUCOSE SERPL-MCNC: 65 MG/DL (ref 65–140)
GLUCOSE SERPL-MCNC: 98 MG/DL (ref 65–140)

## 2025-04-16 PROCEDURE — 90870 ELECTROCONVULSIVE THERAPY: CPT | Performed by: PSYCHIATRY & NEUROLOGY

## 2025-04-16 PROCEDURE — 99232 SBSQ HOSP IP/OBS MODERATE 35: CPT | Performed by: PSYCHIATRY & NEUROLOGY

## 2025-04-16 PROCEDURE — 82948 REAGENT STRIP/BLOOD GLUCOSE: CPT

## 2025-04-16 PROCEDURE — 90870 ELECTROCONVULSIVE THERAPY: CPT

## 2025-04-16 PROCEDURE — GZB0ZZZ ELECTROCONVULSIVE THERAPY, UNILATERAL-SINGLE SEIZURE: ICD-10-PCS | Performed by: PSYCHIATRY & NEUROLOGY

## 2025-04-16 RX ORDER — INSULIN LISPRO 100 [IU]/ML
5 INJECTION, SOLUTION INTRAVENOUS; SUBCUTANEOUS
Status: DISCONTINUED | OUTPATIENT
Start: 2025-04-16 | End: 2025-04-16

## 2025-04-16 RX ORDER — SODIUM CHLORIDE, SODIUM LACTATE, POTASSIUM CHLORIDE, CALCIUM CHLORIDE 600; 310; 30; 20 MG/100ML; MG/100ML; MG/100ML; MG/100ML
INJECTION, SOLUTION INTRAVENOUS CONTINUOUS PRN
Status: DISCONTINUED | OUTPATIENT
Start: 2025-04-16 | End: 2025-04-16

## 2025-04-16 RX ORDER — GLYCOPYRROLATE 0.2 MG/ML
INJECTION INTRAMUSCULAR; INTRAVENOUS AS NEEDED
Status: DISCONTINUED | OUTPATIENT
Start: 2025-04-16 | End: 2025-04-16

## 2025-04-16 RX ORDER — INSULIN LISPRO 100 [IU]/ML
3 INJECTION, SOLUTION INTRAVENOUS; SUBCUTANEOUS
Status: DISCONTINUED | OUTPATIENT
Start: 2025-04-16 | End: 2025-04-17

## 2025-04-16 RX ORDER — INSULIN GLARGINE 100 [IU]/ML
16 INJECTION, SOLUTION SUBCUTANEOUS
Status: DISCONTINUED | OUTPATIENT
Start: 2025-04-16 | End: 2025-04-16

## 2025-04-16 RX ORDER — METHOHEXITAL IN WATER/PF 100MG/10ML
SYRINGE (ML) INTRAVENOUS AS NEEDED
Status: DISCONTINUED | OUTPATIENT
Start: 2025-04-16 | End: 2025-04-16

## 2025-04-16 RX ORDER — FERROUS SULFATE 325(65) MG
325 TABLET ORAL EVERY OTHER DAY
Status: DISCONTINUED | OUTPATIENT
Start: 2025-04-18 | End: 2025-04-22 | Stop reason: HOSPADM

## 2025-04-16 RX ORDER — SUCCINYLCHOLINE/SOD CL,ISO/PF 100 MG/5ML
SYRINGE (ML) INTRAVENOUS AS NEEDED
Status: DISCONTINUED | OUTPATIENT
Start: 2025-04-16 | End: 2025-04-16

## 2025-04-16 RX ORDER — ESMOLOL HYDROCHLORIDE 10 MG/ML
INJECTION INTRAVENOUS AS NEEDED
Status: DISCONTINUED | OUTPATIENT
Start: 2025-04-16 | End: 2025-04-16

## 2025-04-16 RX ORDER — ASCORBIC ACID 500 MG
500 TABLET ORAL DAILY
Status: DISCONTINUED | OUTPATIENT
Start: 2025-04-16 | End: 2025-04-22 | Stop reason: HOSPADM

## 2025-04-16 RX ORDER — INSULIN GLARGINE 100 [IU]/ML
15 INJECTION, SOLUTION SUBCUTANEOUS
Status: DISCONTINUED | OUTPATIENT
Start: 2025-04-16 | End: 2025-04-22 | Stop reason: HOSPADM

## 2025-04-16 RX ORDER — INSULIN LISPRO 100 [IU]/ML
3 INJECTION, SOLUTION INTRAVENOUS; SUBCUTANEOUS ONCE
Status: COMPLETED | OUTPATIENT
Start: 2025-04-16 | End: 2025-04-16

## 2025-04-16 RX ORDER — INSULIN LISPRO 100 [IU]/ML
1-5 INJECTION, SOLUTION INTRAVENOUS; SUBCUTANEOUS
Status: DISCONTINUED | OUTPATIENT
Start: 2025-04-16 | End: 2025-04-22 | Stop reason: HOSPADM

## 2025-04-16 RX ADMIN — INSULIN LISPRO 3 UNITS: 100 INJECTION, SOLUTION INTRAVENOUS; SUBCUTANEOUS at 16:16

## 2025-04-16 RX ADMIN — GABAPENTIN 400 MG: 400 CAPSULE ORAL at 22:14

## 2025-04-16 RX ADMIN — THIAMINE HCL TAB 100 MG 100 MG: 100 TAB at 08:16

## 2025-04-16 RX ADMIN — IBUPROFEN 800 MG: 400 TABLET, FILM COATED ORAL at 08:16

## 2025-04-16 RX ADMIN — INSULIN LISPRO 3 UNITS: 100 INJECTION, SOLUTION INTRAVENOUS; SUBCUTANEOUS at 14:21

## 2025-04-16 RX ADMIN — Medication 1 TABLET: at 08:16

## 2025-04-16 RX ADMIN — FERROUS SULFATE TAB 325 MG (65 MG ELEMENTAL FE) 325 MG: 325 (65 FE) TAB at 08:16

## 2025-04-16 RX ADMIN — INSULIN LISPRO 1 UNITS: 100 INJECTION, SOLUTION INTRAVENOUS; SUBCUTANEOUS at 22:15

## 2025-04-16 RX ADMIN — CARIPRAZINE 3 MG: 3 CAPSULE, GELATIN COATED ORAL at 22:14

## 2025-04-16 RX ADMIN — INSULIN GLARGINE 15 UNITS: 100 INJECTION, SOLUTION SUBCUTANEOUS at 22:14

## 2025-04-16 RX ADMIN — CYANOCOBALAMIN TAB 1000 MCG 1000 MCG: 1000 TAB at 08:16

## 2025-04-16 RX ADMIN — SODIUM CHLORIDE, SODIUM LACTATE, POTASSIUM CHLORIDE, AND CALCIUM CHLORIDE: .6; .31; .03; .02 INJECTION, SOLUTION INTRAVENOUS at 06:40

## 2025-04-16 RX ADMIN — TRAZODONE HYDROCHLORIDE 150 MG: 100 TABLET ORAL at 22:14

## 2025-04-16 RX ADMIN — GABAPENTIN 400 MG: 400 CAPSULE ORAL at 08:33

## 2025-04-16 RX ADMIN — LEVOTHYROXINE SODIUM 250 MCG: 0.12 TABLET ORAL at 08:15

## 2025-04-16 RX ADMIN — BACITRACIN 1 SMALL APPLICATION: 500 OINTMENT TOPICAL at 08:16

## 2025-04-16 RX ADMIN — Medication 3 MG: at 22:14

## 2025-04-16 RX ADMIN — IBUPROFEN 800 MG: 400 TABLET, FILM COATED ORAL at 16:19

## 2025-04-16 RX ADMIN — INSULIN LISPRO 7 UNITS: 100 INJECTION, SOLUTION INTRAVENOUS; SUBCUTANEOUS at 08:18

## 2025-04-16 RX ADMIN — Medication 100 MG: at 07:07

## 2025-04-16 RX ADMIN — GABAPENTIN 400 MG: 400 CAPSULE ORAL at 16:15

## 2025-04-16 RX ADMIN — Medication 50 MG: at 07:11

## 2025-04-16 RX ADMIN — FOLIC ACID 1 MG: 1 TABLET ORAL at 08:16

## 2025-04-16 RX ADMIN — OXYCODONE HYDROCHLORIDE AND ACETAMINOPHEN 500 MG: 500 TABLET ORAL at 12:50

## 2025-04-16 RX ADMIN — ESCITALOPRAM OXALATE 30 MG: 10 TABLET ORAL at 08:16

## 2025-04-16 RX ADMIN — GLYCOPYRROLATE 0.2 MG: 0.2 INJECTION, SOLUTION INTRAMUSCULAR; INTRAVENOUS at 07:02

## 2025-04-16 NOTE — ANESTHESIA PREPROCEDURE EVALUATION
"Procedure:  ELECTROCONVULSIVE THERAPY (ECT)    Relevant Problems   ENDO   (+) Hypothyroidism   (+) Hypothyroidism due to Hashimoto's thyroiditis   (+) Type 1 diabetes mellitus with hyperglycemia (HCC)      /RENAL   (+) THO (acute kidney injury) (HCC)      HEMATOLOGY   (+) Iron deficiency anemia   (+) Iron deficiency anemia, unspecified      NEURO/PSYCH   (+) Generalized anxiety disorder with panic attacks   (+) Recurrent major depressive disorder (HCC)      Rheumatology   (+) Cervical spondylosis      Surgery/Wound/Pain   (+) H/O gastric bypass        Physical Exam    Airway    Mallampati score: II  TM Distance: >3 FB  Neck ROM: full     Dental   No notable dental hx     Cardiovascular      Pulmonary   No wheezes    Other Findings  post-pubertal.    Anesthesia Plan  ASA Score- 2     Anesthesia Type- general with ASA Monitors.         Additional Monitors:     Airway Plan:            Plan Factors-    Chart reviewed. EKG reviewed.  Existing labs reviewed. Patient summary reviewed.              Intended use of anticholinesterase.    Induction- intravenous.    Postoperative Plan-     Perioperative Resuscitation Plan - Level 1 - Full Code.       Informed Consent- Anesthetic plan and risks discussed with patient.  I personally reviewed this patient with the CRNA. Discussed and agreed on the Anesthesia Plan with the CRNA..      NPO Status:  Vitals Value Taken Time   Date of last liquid 04/15/25 04/16/25 0426   Time of last liquid 2200 04/16/25 0426   Date of last solid 04/15/25 04/16/25 0426   Time of last solid 2200 04/16/25 0426       VITALS  /88   Pulse 76   Temp 97.6 °F (36.4 °C) (Temporal)   Resp 16   Ht 5' 6\" (1.676 m)   Wt 76.6 kg (168 lb 12.8 oz) Comment: waist 39 in  LMP 03/11/2025 (Approximate)   SpO2 97%   BMI 27.25 kg/m²  BP Readings from Last 3 Encounters:   04/16/25 144/88   03/05/25 130/88   01/10/25 112/84        LABS  Results from Last 12 Months   Lab Units 04/12/25  0651 02/17/25  0807 " "  WBC Thousand/uL 2.90* 3.39*   HEMOGLOBIN g/dL 10.9* 13.2   HEMATOCRIT % 34.7* 42.0   PLATELETS Thousands/uL 228 249     No results for input(s): \"APTT\", \"INR\", \"PTT\" in the last 8784 hours. Results from Last 12 Months   Lab Units 04/13/25  0559 04/11/25 2051 03/05/25  1543 02/17/25  0807 01/10/25  0744   SODIUM mmol/L 139 131*  131*  --    < >  --    POTASSIUM mmol/L 4.5 4.3  4.3  --    < >  --    CHLORIDE mmol/L 105 97  97  --    < >  --    CO2 mmol/L 29 22  22  --    < >  --    ANION GAP mmol/L 5 12  12  --    < >  --    BUN mg/dL 15 16  16  --    < >  --    CREATININE mg/dL 0.82 0.86  0.86  --    < >  --    CALCIUM mg/dL 8.7 9.3  9.3  --    < >  --    GLUCOSE RANDOM mg/dL 188* 272*  272*  --   --   --    HEMOGLOBIN A1C   --   --  8.7*  --  9.6*    < > = values in this interval not displayed.        ECG      ECHOCARDIOGRAPHY OR OTHER TESTING/IMAGING  Encounter Date: 04/11/25   ECG 12 lead   Result Value    Ventricular Rate 70    Atrial Rate 70    UT Interval 122    QRSD Interval 84    QT Interval 408    QTC Interval 441    P Axis 57    QRS Axis 82    T Wave Axis 49    Narrative    Normal sinus rhythm  Normal ECG  When compared with ECG of 03-May-2024 13:49,  T wave inversion no longer evident in Inferior leads  Confirmed by Shilo Jenkins ([id number]) on 4/12/2025 5:58:45 PM     No results found for this or any previous visit. N/a     ------- ANESTHESIA RISK-BENEFIT DISCUSSION -------  BENEFITS OF A SPECIALIZED ANESTHESIA TEAM INCLUDE (NBK [id number], PMID [id number]):  (1) Reduce mortality and morbidity for major surgeries/procedures. (2) The team provides analgesia/sedation/amnesia/akinesia as safely as possible. (3) The team strives to reduce discomfort as safely as possible.    RISKS, AND PLANS TO MITIGATE RISKS, INCLUDE:    - Neurologic system: IntraOp awareness (Risk is ~1:1,000 - 1:14,000; PMID 92548112), Stroke (Risk ~<0.1-2% for most cases; PMID 70974199), nerve injury, vision loss, and POCD.     - Airway " and Pulmonary system: Dental or mouth injury, throat pain, critical hypoxia, pneumothorax, prolonged intubation, post-op respiratory compromise.  Airway/Intubation risks and prior data: No prior advanced airway notes in [facility] EMR  Major ARISCAT risk factors for pulmonary complications include: none, yielding a score of 0-1= Low risk, 1.6%.  - Cardiovascular system: Hypotension, arrhythmias, cardiac injury or arrest, blood clots, bleeding, infection, vascular injuries.  Atilio's RCRI score items: none, yielding an RCRI Score of 0= 0.4% risk of MACE  Are fabby-op or intra-op beta blockers indicated? (PMID [id number]): no  - FEN/GI system: Aspiration risk (~0.5% per PMC 0773068) and PONV (10-80% per Apfel score) especially if the patient has not fasted.  ASA NPO guideline compliance?: Yes  - Medication risk assessment: Allergic reactions, excessive bleeding with anticoagulant use, overdoses, drug-drug interactions, injury to a fetus or  in pregnant or breastfeeding patients, fabby-procedural sedation including while driving/operating machinery.  Recent relevant medications: See MAR or Med Review  Personal or family history of anesthesia complications: no  Pregnancy Status: N/A  - Estimate mortality risks associated with anesthesia based on ASA-PS (PMID 21570146): ASA-PS II: risk 1:20,000

## 2025-04-16 NOTE — NURSING NOTE
Patient has been about the unit. Attending groups. Interacting with peers. Pleasant, calm and cooperative. Appropriate. Denies SI/HI, denies AH/VH. Compliant with medications.

## 2025-04-16 NOTE — PROCEDURES
Procedure Note - ECT  Clarissa Webb 45 y.o. female MRN: [id number]    Time out was taken with staff to confirm correct patient and correct procedure to be performed.  Prior to starting the procedure the patient's questions and concerns were addressed.  Today the patient reports depression which has been refractory to medication management.  She states that she would like to begin ECT treatments today.  Nondominant unilateral stimulus was applied at 15%.  Patient had a satisfactory seizure.  No immediate side effects were noted after the procedure.         Session Number: 001    Diagnosis: Principal Problem:    Recurrent major depressive disorder (HCC)  Active Problems:    Generalized anxiety disorder with panic attacks    Hypothyroidism due to Hashimoto's thyroiditis    Medical clearance for psychiatric admission    H/O gastric bypass    Type 1 diabetes mellitus with hyperglycemia (HCC)    Alcohol use    Hyponatremia    ADHD (attention deficit hyperactivity disorder)      ECT Type: Inpatient, Acute    Anesthesia: Methohexital    Electrode Placement: Non-dominant unilateral    Energy level:  15 %      Seizure Duration     EE Sec.    EMG : 46 Sec per visual observation    Post-ictal Suppression Index: Not recorded    Results:Clinical seizure was satisfactory, Patient tolerated ECT well    Vitals:    25 0745   BP: 147/71   Pulse: 83   Resp: 16   Temp: 97.8 °F (36.6 °C)   SpO2: 96%        Medication Administration - last 24 hours from 04/15/2025 0802 to 2025 0802         Date/Time Order Dose Route Action Action by     04/15/2025 1642 EDT insulin lispro (HumALOG/ADMELOG) 100 units/mL subcutaneous injection 1-6 Units 6 Units Subcutaneous Given Nathalie Marquez RN     04/15/2025 1247 EDT insulin lispro (HumALOG/ADMELOG) 100 units/mL subcutaneous injection 1-6 Units 1 Units Subcutaneous Given Terri Blackwood RN     04/15/2025 0816 EDT insulin lispro (HumALOG/ADMELOG) 100 units/mL subcutaneous  injection 1-6 Units 1 Units Subcutaneous Given Terri Blackwood, CATARINO     04/15/2025 2112 EDT melatonin tablet 3 mg 3 mg Oral Given Nathalie Marquez, CATARINO     04/15/2025 2111 EDT insulin glargine (LANTUS) subcutaneous injection 18 Units 0.18 mL 18 Units Subcutaneous Given Nathalie Marquez, CATARINO     04/15/2025 0813 EDT multivitamin-minerals (CENTRUM) tablet 1 tablet 1 tablet Oral Given Terri Blackwood, CATARINO     04/15/2025 0813 EDT thiamine tablet 100 mg 100 mg Oral Given Terri Blackwood, CATARINO     04/15/2025 0814 EDT folic acid (FOLVITE) tablet 1 mg 1 mg Oral Given Terri Blackwood RN     04/15/2025 2112 EDT traZODone (DESYREL) tablet 150 mg 150 mg Oral Given Nathalie Marquez, CATARINO     04/15/2025 2112 EDT gabapentin (NEURONTIN) capsule 400 mg 0 mg Oral Hold Nathalie Marquez, CATARINO     04/15/2025 1640 EDT gabapentin (NEURONTIN) capsule 400 mg 400 mg Oral Given Nathalie Marquez, CATARINO     04/15/2025 0813 EDT gabapentin (NEURONTIN) capsule 400 mg 400 mg Oral Given Terri Blackwood, CATARINO     04/15/2025 1642 EDT insulin lispro (HumALOG/ADMELOG) 100 units/mL subcutaneous injection 7 Units 7 Units Subcutaneous Given Nathalie Marquez, CATARINO     04/15/2025 1247 EDT insulin lispro (HumALOG/ADMELOG) 100 units/mL subcutaneous injection 7 Units 7 Units Subcutaneous Given Terri Blackwood RN     04/15/2025 0816 EDT insulin lispro (HumALOG/ADMELOG) 100 units/mL subcutaneous injection 7 Units 7 Units Subcutaneous Given Terri Blackwood RN     04/15/2025 0813 EDT cyanocobalamin (VITAMIN B-12) tablet 1,000 mcg 1,000 mcg Oral Given Terri Blackwood RN     04/15/2025 0814 EDT cariprazine (VRAYLAR) capsule 1.5 mg 1.5 mg Oral Not Given Terri Blackwood RN     04/15/2025 0813 EDT escitalopram (LEXAPRO) tablet 30 mg 30 mg Oral Given Terri Blackwood, CATARINO     04/15/2025 2112 EDT cariprazine (VRAYLAR) capsule 1.5 mg 1.5 mg Oral Given Nathalie Marquez, CATARINO     04/15/2025 1711 EDT bacitracin topical ointment 1 small application 1 small application Topical Given Nathalie  Alma, RN

## 2025-04-16 NOTE — TREATMENT TEAM
04/16/25 0814   Team Meeting   Meeting Type Daily Rounds   Team Members Present   Team Members Present Physician;Nurse;   Physician Team Member Jimmy   [facility]   Care Management Team Member Marisa   Patient/Family Present   Patient Present No   Patient's Family Present No     Pt is visible on the unit and social with selective peers. Pt denied all symptoms and is medication and meal compliant. Pt started ECT today. PRN motrin for a headache. Blood sugars have been all over.

## 2025-04-16 NOTE — ASSESSMENT & PLAN NOTE
"No results for input(s): \"SODIUM\" in the last 72 hours.  Corrected for hyperglycemia, 135, on admission  Repeat WNL   Continue to monitor  "

## 2025-04-16 NOTE — ASSESSMENT & PLAN NOTE
Per SLIM  Continue Levothyroxine  Lab Results   Component Value Date    [id number] 1.057 04/12/2025    FREET4 0.93 04/17/2024    FREET4 0.95 04/17/2024

## 2025-04-16 NOTE — ANESTHESIA POSTPROCEDURE EVALUATION
Post-Op Assessment Note    CV Status:  Stable    Pain management: adequate       Mental Status:  Awake and alert   Hydration Status:  Stable   PONV Controlled:  None   Airway Patency:  Patent     Post Op Vitals Reviewed: Yes    No anethesia notable event occurred.    Staff: Anesthesiologist           Last Filed PACU Vitals:  Vitals Value Taken Time   Temp 97.8 °F (36.6 °C) 04/16/25 0735   Pulse 85 04/16/25 0735   /91 04/16/25 0735   Resp 16 04/16/25 0735   SpO2 96 % 04/16/25 0735       Modified Eleuterio:     Vitals Value Taken Time   Activity 2 04/16/25 0735   Respiration 2 04/16/25 0735   Circulation 2 04/16/25 0735   Consciousness 2 04/16/25 0735   Oxygen Saturation 2 04/16/25 0735     Modified Eleuterio Score: 10

## 2025-04-16 NOTE — PROGRESS NOTES
Progress Note - Behavioral Health   Name: Clarissa Webb 45 y.o. female I MRN: [id number]  Unit/Bed#: -01 I Date of Admission: 4/11/2025   Date of Service: 4/16/2025 I Hospital Day: 5    Assessment & Plan  Recurrent major depressive disorder (HCC)  Yanira Webb is a 45-year-old  female with past psychiatric history significant for major depressive disorder and ASA with panic attacks who presents to [facility] due to persistent depressive symptoms. She endorses overall dysphoric mood with anhedonia, poor sleep, weight gain despite decreased appetite, low energy, and intermittent concentration issues. She notes fleeting passive death wishes, but adamantly denies a plan and contracts for safety. She reports feeling hopeless due to several failed medication trials,  participation in multiple modalities of therapy, and previous TMS treatments without complete remission of symptoms. She was agreeable to medication adjustments and at this time is interested in ECT treatment which would likely benefit her.   Discussed adding SGA as augmentation for treatment resistant depression, patient is concerned about weight gain and declined Seroquel and Zyprexa. She has previously tried Abilify, Lamictal, and Latuda which were not effective.     Treatment and medication recommendations as follows, no changes as of 4/13/2025:   Consented and ordered for initiation of ECT   NPO after midnight  Continue Trazodone to 150 mg at bed time to improve sleep  Continue Gabapentin 400 mg three times daily for anxiety, neuropathy, and alcohol cravings   Hold evenings before ECT  Continue Lexapro 30 mg daily for depression and anxiety plan to decrease to 25 mg on 4/18.  Consider dose decreased with further medication adjustments   msec   4/16 increased vraylar 3 mg HS for treatment resistant MDD  Patient initiated on ECT, completed first session 9/16.  Will plan for 4 sessions of inpatient, total of 12 sessions.  Plan to  "discharge patient to partial patient hospitalization, she is agreeable.         Generalized anxiety disorder with panic attacks  See principal problem for treatment recommendations  Report baseline excessive level of worry with increased fatigue, generalized body aches, and nausea, worsening since October  Experiencing frequent, unprovoked panic attacks consisting of diaphoresis, palpitations, dyspnea, and crying spells - children have wanted to call the ambulance multiple times for these attacks  ADHD (attention deficit hyperactivity disorder)  Per history   PDMP reviewed  Prescribed Vyvanse 50 mg daily, last filled 30-day prescription on 3/25/2025  Patient aware that medication will not be prescribed while in hospital  Hypothyroidism due to Hashimoto's thyroiditis  Per SLIM  Continue Levothyroxine  Lab Results   Component Value Date    [id number] 1.057 04/12/2025    FREET4 0.93 04/17/2024    FREET4 0.95 04/17/2024     Medical clearance for psychiatric admission  Per SLIM  H/O gastric bypass  Per SLIM  Type 1 diabetes mellitus with hyperglycemia (HCC)  Lab Results   Component Value Date    HGBA1C 8.7 (A) 03/05/2025       Recent Labs     04/16/25  0805 04/16/25  1149 04/16/25  1211 04/16/25  1410   POCGLU 117 65 98 315*       Blood Sugar Average: Last 72 hrs:  (P) 151.8936485591408562  Per SLIM  Alcohol use  CIWA protocol     Hyponatremia  No results for input(s): \"SODIUM\" in the last 72 hours.  Corrected for hyperglycemia, 135, on admission  Repeat WNL   Continue to monitor    Current medications:  Current Facility-Administered Medications   Medication Dose Route Frequency Provider Last Rate    aluminum-magnesium hydroxide-simethicone  30 mL Oral Q4H PRN Elodia Kaur MD      ascorbic acid  500 mg Oral Daily JAROCHO Glover      bacitracin  1 small application Topical BID JAROCHO Martin      haloperidol lactate  2.5 mg Intramuscular Q4H PRN Max 4/day Elodia Kaur MD      And    " LORazepam  1 mg Intramuscular Q4H PRN Max 4/day Elodia Kaur MD      And    benztropine  0.5 mg Intramuscular Q4H PRN Max 4/day Elodia Kaur MD      haloperidol lactate  5 mg Intramuscular Q4H PRN Max 4/day Elodia Kaur MD      And    LORazepam  2 mg Intramuscular Q4H PRN Max 4/day Elodia Kaur MD      And    benztropine  1 mg Intramuscular Q4H PRN Max 4/day Elodia Kaur MD      benztropine  1 mg Intramuscular Q4H PRN Max 6/day Elodia Kaur MD      benztropine  1 mg Oral Q4H PRN Max 6/day Elodia Kaur MD      bisacodyl  10 mg Rectal Daily PRN Elodia Kaur MD      cariprazine  3 mg Oral HS Winsome Henry,       cyanocobalamin  1,000 mcg Oral Daily JAROCHO Martin      hydrOXYzine HCL  50 mg Oral Q6H PRN Max 4/day Elodia Kaur MD      Or    diphenhydrAMINE  50 mg Intramuscular Q6H PRN Elodia Kaur MD      ergocalciferol  50,000 Units Oral Weekly JAROCHO Martin      escitalopram  30 mg Oral Daily Amarjit Marquez MD      [START ON 4/18/2025] ferrous sulfate  325 mg Oral Every Other Day Kenya Baldwin, JAROCHO      folic acid  1 mg Oral Daily JAROCHO Martin      gabapentin  400 mg Oral TID Keagan Elkins,       haloperidol  1 mg Oral Q6H PRN Elodia Kaur MD      haloperidol  2.5 mg Oral Q4H PRN Max 4/day Elodia Kaur MD      haloperidol  5 mg Oral Q4H PRN Max 4/day Elodia Kaur MD      hydrOXYzine HCL  100 mg Oral Q6H PRN Max 4/day Elodia Kaur MD      Or    LORazepam  2 mg Intramuscular Q6H PRN Elodia Kaur MD      hydrOXYzine HCL  25 mg Oral Q6H PRN Max 4/day Elodia Kaur MD      ibuprofen  400 mg Oral Q4H PRN Elodia Kaur MD      ibuprofen  600 mg Oral Q6H PRN Elodia Kaur MD      ibuprofen  800 mg Oral Q8H PRN Elodia Kaur MD      insulin glargine  15 Units Subcutaneous HS JAROCHO Glover      insulin lispro  1-5 Units Subcutaneous HS JAROCHO Glover       insulin lispro  1-6 Units Subcutaneous TID AC Manjinder Kaplan MD      insulin lispro  3 Units Subcutaneous TID With Meals Kenyarenetta Zamarripa JAROCHO Baldwin      levothyroxine  250 mcg Oral Early Morning Manjinder Kaplan MD      loperamide  2 mg Oral 4x Daily PRN JAROCHO Martin      melatonin  3 mg Oral HS Elodia Kaur MD      multivitamin-minerals  1 tablet Oral Daily JAROCHO Martin      polyethylene glycol  17 g Oral Daily PRN Elodia Kaur MD      propranolol  10 mg Oral Q8H PRN Elodia Kaur MD      senna-docusate sodium  1 tablet Oral Daily PRN Elodia aKur MD      thiamine  100 mg Oral Daily JAROCHO Martin      traZODone  150 mg Oral HS Renate Pond DO      traZODone  50 mg Oral HS PRN Elodia Kaur MD          Risks/Benefits of Treatment:     Risks, benefits, and possible side effects of medications explained to patient and patient verbalizes understanding and agreement for treatment.    Progress Toward Goals: improving    Treatment Planning:     All current active medications have been reviewed.  Continue to monitor response to treatment and assess for potential side effects of medications.  Encourage group therapy, milieu therapy and occupational therapy  Collaboration with medical service for medical comorbidities as indicated.  Behavioral Health checks for safety monitoring.  Long Stay Certification : Not Applicable  Estimated Discharge Day: 4/21/2025 7 days (4/23/2025)  Legal Status : Voluntary 201 commitment.    Subjective     Behavior over the last 24 hours: improved.    Yanira continues to improve. She had first ECT session today which she describes as going well. She notes side effect of mild headache. Otherwise she is very eager about initiating therapy. She slept very well last night. Patient denies any side effects with current medication. She feels good on the Vraylar. She reports a more positive mood and denies any SI or HI at this time. She  also denies any auditory or visual hallucinations. She does have questions about her low iron and is requesting help to schedule infusions with hematology in the outpatient setting. Discussed with case management who will be working on coordination for outpatient appointment. Per chart review patient was seeing Dr. Rickey Maya.     Sleep: normal  Appetite: normal  Medication side effects: No  ROS: review of systems as noted above in HPI/Subjective report, all other systems are negative    Objective :  Temp:  [97.6 °F (36.4 °C)-98.4 °F (36.9 °C)] 97.6 °F (36.4 °C)  HR:  [76-91] 80  BP: (120-166)/(66-91) 162/80  Resp:  [16] 16  SpO2:  [96 %-99 %] 99 %  O2 Device: None (Room air)    Temp:  [97.6 °F (36.4 °C)-98.4 °F (36.9 °C)] 97.6 °F (36.4 °C)  HR:  [76-91] 80  BP: (120-166)/(66-91) 162/80  Resp:  [16] 16  SpO2:  [96 %-99 %] 99 %  O2 Device: None (Room air)    Mental Status Evaluation:    Appearance:  Overtly appearing  female, age appropriate, casually dressed   Behavior:  normal, pleasant, cooperative   Speech:  normal rate, normal volume, normal pitch, spontaneous   Mood:  normal   Affect:  normal range and intensity   Thought Process:  organized, goal directed, logical, coherent   Thought Content:  no overt delusions   Perceptual Disturbances: denies auditory hallucinations when asked, does not appear responding to internal stimuli   Risk Potential: Suicidal Ideation -   denies at present  Homicidal Ideation -   denies at present  Potential for Aggression - Not at present   Sensorium:  oriented to person, place, and time/date   Memory:  recent and remote memory grossly intact   Consciousness:  alert and awake   Attention/Concentration: attention span and concentration are age appropriate   Insight:  improving   Judgment: improving   Gait/Station: normal gait/station, normal balance   Motor Activity: no abnormal movements       Lab Results: I have reviewed the following results:  Results from the past 24  hours:   Recent Results (from the past 24 hours)   Fingerstick Glucose (POCT)    Collection Time: 04/15/25  4:33 PM   Result Value Ref Range    POC Glucose 356 (H) 65 - 140 mg/dl   Fingerstick Glucose (POCT)    Collection Time: 04/15/25  7:26 PM   Result Value Ref Range    POC Glucose 52 (L) 65 - 140 mg/dl   Fingerstick Glucose (POCT)    Collection Time: 04/15/25  8:27 PM   Result Value Ref Range    POC Glucose 184 (H) 65 - 140 mg/dl   Fingerstick Glucose (POCT)    Collection Time: 04/16/25  8:05 AM   Result Value Ref Range    POC Glucose 117 65 - 140 mg/dl   Fingerstick Glucose (POCT)    Collection Time: 04/16/25 11:49 AM   Result Value Ref Range    POC Glucose 65 65 - 140 mg/dl   Fingerstick Glucose (POCT)    Collection Time: 04/16/25 12:11 PM   Result Value Ref Range    POC Glucose 98 65 - 140 mg/dl   Fingerstick Glucose (POCT)    Collection Time: 04/16/25  2:10 PM   Result Value Ref Range    POC Glucose 315 (H) 65 - 140 mg/dl       Administrative Statements     Counseling / Coordination of Care:   Patient's progress discussed with staff in treatment team meeting.  Medication changes reviewed with staff in treatment team meeting..    Winsome Henry DO 04/16/25

## 2025-04-16 NOTE — NURSING NOTE
Patient reported dizziness. Patient reported feeling like BS is low. BS taken. BS 52. Patient given orange juice and snacks at this time. Patient is alert and orientated x4. Patient able to respond to staff and questions.

## 2025-04-16 NOTE — PLAN OF CARE
Problem: Risk for Self Injury/Neglect  Goal: Verbalize thoughts and feelings  Description: Interventions:- Assess and re-assess patient's lethality and potential for self-injury- Engage patient in 1:1 interactions, daily, for a minimum of 15 minutes- Encourage patient to express feelings, fears, frustrations, hopes- Establish rapport/trust with patient   Outcome: Progressing  Goal: Refrain from harming self  Description: Interventions:- Monitor patient closely, per order- Develop a trusting relationship- Supervise medication ingestion, monitor effects and side effects   Outcome: Progressing  Goal: Recognize maladaptive responses and adopt new coping mechanisms  Outcome: Progressing     Problem: Depression  Goal: Treatment Goal: Demonstrate behavioral control of depressive symptoms, verbalize feelings of improved mood/affect, and adopt new coping skills prior to discharge  Outcome: Progressing  Goal: Refrain from isolation  Description: Interventions:- Develop a trusting relationship - Encourage socialization   Outcome: Progressing  Goal: Attend and participate in unit activities, including therapeutic, recreational, and educational groups  Description: Interventions:- Provide therapeutic and educational activities daily, encourage attendance and participation, and document same in the medical record   Outcome: Progressing     Problem: Anxiety  Goal: Anxiety is at manageable level  Description: Interventions:- Assess and monitor patient's anxiety level. - Monitor for signs and symptoms (heart palpitations, chest pain, shortness of breath, headaches, nausea, feeling jumpy, restlessness, irritable, apprehensive). - Collaborate with interdisciplinary team and initiate plan and interventions as ordered.- [place] patient to unit/surroundings- Explain treatment plan- Encourage participation in care- Encourage verbalization of concerns/fears- Identify coping mechanisms- Assist in developing anxiety-reducing skills-  Administer/offer alternative therapies- Limit or eliminate stimulants  Outcome: Progressing     Problem: Electroconvulsive therapy (ECT)  Goal: Treatment Goal: Demonstrate a reduction of confusion and improved orientation to person, place, time and/or situation upon discharge, according to optimum baseline/ability  Outcome: Progressing     Problem: Ineffective Coping  Goal: Participates in unit activities  Description: Interventions:- Provide therapeutic environment - Provide required programming - Redirect inappropriate behaviors   Outcome: Progressing

## 2025-04-16 NOTE — ASSESSMENT & PLAN NOTE
Lab Results   Component Value Date    HGBA1C 8.7 (A) 03/05/2025       Recent Labs     04/16/25  0805 04/16/25  1149 04/16/25  1211 04/16/25  1410   POCGLU 117 65 98 315*       Blood Sugar Average: Last 72 hrs:  (P) 151.2263556546370756  Per SLIM

## 2025-04-16 NOTE — NURSING NOTE
Patient upset that insulin at lunch to be held. Requested that writer reach out to medical to get the order changed. Kenya Baldwin notified via epic secure chat. Blood sugar to now be checked at 1400.

## 2025-04-16 NOTE — NURSING NOTE
Medical provider Kenya Baldwin notified regarding patients blood sugar of 65 at 1150. Asymptomatic but was given orange juice. Repeat at 1210 was 98. Per Kenya, hold scheduled Lispro at lunch.

## 2025-04-16 NOTE — NURSING NOTE
"Patient arrived back on the unit from ECT. No complaints of a headache. Reports feeling \"ok\". Cooperative. Pleasant,   "

## 2025-04-17 LAB
GLUCOSE SERPL-MCNC: 134 MG/DL (ref 65–140)
GLUCOSE SERPL-MCNC: 260 MG/DL (ref 65–140)
GLUCOSE SERPL-MCNC: 325 MG/DL (ref 65–140)
GLUCOSE SERPL-MCNC: 94 MG/DL (ref 65–140)

## 2025-04-17 PROCEDURE — 82948 REAGENT STRIP/BLOOD GLUCOSE: CPT

## 2025-04-17 PROCEDURE — 99232 SBSQ HOSP IP/OBS MODERATE 35: CPT | Performed by: PSYCHIATRY & NEUROLOGY

## 2025-04-17 RX ORDER — SODIUM CHLORIDE, SODIUM LACTATE, POTASSIUM CHLORIDE, CALCIUM CHLORIDE 600; 310; 30; 20 MG/100ML; MG/100ML; MG/100ML; MG/100ML
125 INJECTION, SOLUTION INTRAVENOUS CONTINUOUS
Status: CANCELLED | OUTPATIENT
Start: 2025-04-17

## 2025-04-17 RX ORDER — ONDANSETRON 2 MG/ML
4 INJECTION INTRAMUSCULAR; INTRAVENOUS ONCE AS NEEDED
Status: CANCELLED | OUTPATIENT
Start: 2025-04-17

## 2025-04-17 RX ORDER — INSULIN LISPRO 100 [IU]/ML
5 INJECTION, SOLUTION INTRAVENOUS; SUBCUTANEOUS
Status: DISCONTINUED | OUTPATIENT
Start: 2025-04-17 | End: 2025-04-22 | Stop reason: HOSPADM

## 2025-04-17 RX ORDER — ALBUTEROL SULFATE 0.83 MG/ML
2.5 SOLUTION RESPIRATORY (INHALATION) ONCE AS NEEDED
Status: CANCELLED | OUTPATIENT
Start: 2025-04-17

## 2025-04-17 RX ADMIN — GABAPENTIN 400 MG: 400 CAPSULE ORAL at 08:10

## 2025-04-17 RX ADMIN — Medication 1 TABLET: at 08:10

## 2025-04-17 RX ADMIN — CYANOCOBALAMIN TAB 1000 MCG 1000 MCG: 1000 TAB at 08:10

## 2025-04-17 RX ADMIN — Medication 3 MG: at 21:25

## 2025-04-17 RX ADMIN — INSULIN GLARGINE 15 UNITS: 100 INJECTION, SOLUTION SUBCUTANEOUS at 21:23

## 2025-04-17 RX ADMIN — BACITRACIN 1 SMALL APPLICATION: 500 OINTMENT TOPICAL at 08:10

## 2025-04-17 RX ADMIN — BACITRACIN 1 SMALL APPLICATION: 500 OINTMENT TOPICAL at 18:46

## 2025-04-17 RX ADMIN — IBUPROFEN 600 MG: 600 TABLET, FILM COATED ORAL at 16:07

## 2025-04-17 RX ADMIN — GABAPENTIN 400 MG: 400 CAPSULE ORAL at 16:06

## 2025-04-17 RX ADMIN — OXYCODONE HYDROCHLORIDE AND ACETAMINOPHEN 500 MG: 500 TABLET ORAL at 08:10

## 2025-04-17 RX ADMIN — LEVOTHYROXINE SODIUM 250 MCG: 0.12 TABLET ORAL at 06:18

## 2025-04-17 RX ADMIN — INSULIN LISPRO 3 UNITS: 100 INJECTION, SOLUTION INTRAVENOUS; SUBCUTANEOUS at 21:24

## 2025-04-17 RX ADMIN — INSULIN LISPRO 3 UNITS: 100 INJECTION, SOLUTION INTRAVENOUS; SUBCUTANEOUS at 08:12

## 2025-04-17 RX ADMIN — CARIPRAZINE 3 MG: 3 CAPSULE, GELATIN COATED ORAL at 21:25

## 2025-04-17 RX ADMIN — INSULIN LISPRO 5 UNITS: 100 INJECTION, SOLUTION INTRAVENOUS; SUBCUTANEOUS at 13:06

## 2025-04-17 RX ADMIN — IBUPROFEN 800 MG: 400 TABLET, FILM COATED ORAL at 06:45

## 2025-04-17 RX ADMIN — TRAZODONE HYDROCHLORIDE 150 MG: 100 TABLET ORAL at 21:25

## 2025-04-17 RX ADMIN — FOLIC ACID 1 MG: 1 TABLET ORAL at 08:10

## 2025-04-17 RX ADMIN — THIAMINE HCL TAB 100 MG 100 MG: 100 TAB at 08:10

## 2025-04-17 RX ADMIN — INSULIN LISPRO 3 UNITS: 100 INJECTION, SOLUTION INTRAVENOUS; SUBCUTANEOUS at 17:14

## 2025-04-17 RX ADMIN — ESCITALOPRAM OXALATE 30 MG: 10 TABLET ORAL at 08:10

## 2025-04-17 RX ADMIN — INSULIN LISPRO 5 UNITS: 100 INJECTION, SOLUTION INTRAVENOUS; SUBCUTANEOUS at 17:13

## 2025-04-17 NOTE — NURSING NOTE
Patient has been about the unit. Pleasant, calm and cooperative. Interacting with peers appropriately and attending groups. Denies symptoms at this time. No complaints. Will have ECT #2 tomorrow which she is looking forward to .

## 2025-04-17 NOTE — ASSESSMENT & PLAN NOTE
Yanira Webb is a 45-year-old  female with past psychiatric history significant for major depressive disorder and ASA with panic attacks who presents to [facility] due to persistent depressive symptoms. She endorses overall dysphoric mood with anhedonia, poor sleep, weight gain despite decreased appetite, low energy, and intermittent concentration issues. She notes fleeting passive death wishes, but adamantly denies a plan and contracts for safety. She reports feeling hopeless due to several failed medication trials,  participation in multiple modalities of therapy, and previous TMS treatments without complete remission of symptoms. She was agreeable to medication adjustments and at this time is interested in ECT treatment which would likely benefit her.   Discussed adding SGA as augmentation for treatment resistant depression, patient is concerned about weight gain and declined Seroquel and Zyprexa. She has previously tried Abilify, Lamictal, and Latuda which were not effective.     Treatment and medication recommendations as follows, no changes as of 4/13/2025:   Consented and ordered for initiation of ECT   NPO after midnight  Continue Trazodone to 150 mg at bed time to improve sleep  Continue Gabapentin 400 mg three times daily for anxiety, neuropathy, and alcohol cravings   Hold evenings before ECT  Continue Lexapro 30 mg daily for depression and anxiety plan to decrease in the next few days to 25 mg.   Consider dose decreased with further medication adjustments   msec   Continue Vraylar 3 mg HS for treatment resistant MDD  Patient initiated on ECT, completed first session 9/16. Upcoming ect 4/18. Will plan for 4 sessions of inpatient, total of 12 sessions.  Plan to discharge patient to partial patient hospitalization, she is agreeable.

## 2025-04-17 NOTE — PROGRESS NOTES
04/15/25 1141   Team Meeting   Meeting Type Tx Team Meeting   Team Members Present   Team Members Present Physician;Nurse;   Physician Team Member Jimmy   [facility]   Care Management Team Member Candie   Patient/Family Present   Patient Present Yes   Patient's Family Present No     Tx plan was reviewed and discussed with Pt. Pt was encouraged to attend groups. Medication was discussed with Pt. Pt signed tx plan.

## 2025-04-17 NOTE — ASSESSMENT & PLAN NOTE
Lab Results   Component Value Date    HGBA1C 8.7 (A) 03/05/2025       Recent Labs     04/16/25  1410 04/16/25  1552 04/16/25 2011 04/17/25  0804   POCGLU 315* 245* 201* 94       Blood Sugar Average: Last 72 hrs:  (P) [id number]  Per SLIM

## 2025-04-17 NOTE — ASSESSMENT & PLAN NOTE
Lab Results   Component Value Date    HGBA1C 8.7 (A) 03/05/2025       Recent Labs     04/16/25  1410 04/16/25  1552 04/16/25 2011 04/17/25  0804   POCGLU 315* 245* 201* 94       Blood Sugar Average: Last 72 hrs:  (P) 152.0598854257269608  Per SLIM

## 2025-04-17 NOTE — ASSESSMENT & PLAN NOTE
Yanira Webb is a 45-year-old  female with past psychiatric history significant for major depressive disorder and ASA with panic attacks who presents to [facility] due to persistent depressive symptoms. She endorses overall dysphoric mood with anhedonia, poor sleep, weight gain despite decreased appetite, low energy, and intermittent concentration issues. She notes fleeting passive death wishes, but adamantly denies a plan and contracts for safety. She reports feeling hopeless due to several failed medication trials,  participation in multiple modalities of therapy, and previous TMS treatments without complete remission of symptoms. She was agreeable to medication adjustments and at this time is interested in ECT treatment which would likely benefit her.   Discussed adding SGA as augmentation for treatment resistant depression, patient is concerned about weight gain and declined Seroquel and Zyprexa. She has previously tried Abilify, Lamictal, and Latuda which were not effective.     Treatment and medication recommendations as follows, no changes as of 4/13/2025:   Consented and ordered for initiation of ECT   NPO after midnight  Continue Trazodone to 150 mg at bed time to improve sleep  Continue Gabapentin 400 mg three times daily for anxiety, neuropathy, and alcohol cravings   Hold evenings before ECT  Continue Lexapro 30 mg daily for depression and anxiety plan to decrease to 25 mg on 4/18.  Consider dose decreased with further medication adjustments   msec   4/16 increased vraylar 3 mg HS for treatment resistant MDD  Patient initiated on ECT, completed first session 9/16.  Will plan for 4 sessions of inpatient, total of 12 sessions.  Plan to discharge patient to partial patient hospitalization, she is agreeable.

## 2025-04-17 NOTE — ASSESSMENT & PLAN NOTE
Per SLIM  Continue Levothyroxine  Lab Results   Component Value Date    [id number] 1.057 04/12/2025    FREET4 0.93 04/17/2024    FREET4 0.95 04/17/2024

## 2025-04-17 NOTE — PLAN OF CARE
Problem: Risk for Self Injury/Neglect  Goal: Verbalize thoughts and feelings  Description: Interventions:- Assess and re-assess patient's lethality and potential for self-injury- Engage patient in 1:1 interactions, daily, for a minimum of 15 minutes- Encourage patient to express feelings, fears, frustrations, hopes- Establish rapport/trust with patient   Outcome: Progressing  Goal: Refrain from harming self  Description: Interventions:- Monitor patient closely, per order- Develop a trusting relationship- Supervise medication ingestion, monitor effects and side effects   Outcome: Progressing  Goal: Recognize maladaptive responses and adopt new coping mechanisms  Outcome: Progressing     Problem: Depression  Goal: Treatment Goal: Demonstrate behavioral control of depressive symptoms, verbalize feelings of improved mood/affect, and adopt new coping skills prior to discharge  Outcome: Progressing  Goal: Refrain from isolation  Description: Interventions:- Develop a trusting relationship - Encourage socialization   Outcome: Progressing  Goal: Attend and participate in unit activities, including therapeutic, recreational, and educational groups  Description: Interventions:- Provide therapeutic and educational activities daily, encourage attendance and participation, and document same in the medical record   Outcome: Progressing     Problem: Anxiety  Goal: Anxiety is at manageable level  Description: Interventions:- Assess and monitor patient's anxiety level. - Monitor for signs and symptoms (heart palpitations, chest pain, shortness of breath, headaches, nausea, feeling jumpy, restlessness, irritable, apprehensive). - Collaborate with interdisciplinary team and initiate plan and interventions as ordered.- [place] patient to unit/surroundings- Explain treatment plan- Encourage participation in care- Encourage verbalization of concerns/fears- Identify coping mechanisms- Assist in developing anxiety-reducing skills-  Administer/offer alternative therapies- Limit or eliminate stimulants  Outcome: Progressing     Problem: Ineffective Coping  Goal: Participates in unit activities  Description: Interventions:- Provide therapeutic environment - Provide required programming - Redirect inappropriate behaviors   Outcome: Progressing

## 2025-04-17 NOTE — ASSESSMENT & PLAN NOTE
Per SLIM  Continue Levothyroxine  Lab Results   Component Value Date    [id number] 1.057 04/12/2025    FREET4 0.93 04/17/2024    FREET4 0.95 04/17/2024

## 2025-04-17 NOTE — PROGRESS NOTES
04/17/25 0827   Team Meeting   Meeting Type Daily Rounds   Team Members Present   Team Members Present Physician;Nurse;   Physician Team Member Jimmy   Nursing Team Member Merced   Care Management Team Member Candie   Patient/Family Present   Patient Present No   Patient's Family Present No     Pt had first ECT yesterday. Pt will have second tomorrow. Pt is medication and meal compliant. Pt is requesting to speak with medical regarding diabetic medication regiment. Pt denies SI/HI/AVH.

## 2025-04-17 NOTE — NURSING NOTE
Patient is in the milieu, participating in unit activities. Patient is social with peers, calm and cooperative. Patient reports mood to be okay. Patient c/o generalized muscle pain and related it to a medication. Patient denies SI/AVH/HI. Patient is compliant with scheduled medications. Q15 observation maintained.

## 2025-04-17 NOTE — PROGRESS NOTES
Progress Note - Behavioral Health   Name: Clarissa Webb 45 y.o. female I MRN: [id number]  Unit/Bed#: -01 I Date of Admission: 4/11/2025   Date of Service: 4/17/2025 I Hospital Day: 6    Assessment & Plan  Recurrent major depressive disorder (HCC)  Yanira Webb is a 45-year-old  female with past psychiatric history significant for major depressive disorder and ASA with panic attacks who presents to [facility] due to persistent depressive symptoms. She endorses overall dysphoric mood with anhedonia, poor sleep, weight gain despite decreased appetite, low energy, and intermittent concentration issues. She notes fleeting passive death wishes, but adamantly denies a plan and contracts for safety. She reports feeling hopeless due to several failed medication trials,  participation in multiple modalities of therapy, and previous TMS treatments without complete remission of symptoms. She was agreeable to medication adjustments and at this time is interested in ECT treatment which would likely benefit her.   Discussed adding SGA as augmentation for treatment resistant depression, patient is concerned about weight gain and declined Seroquel and Zyprexa. She has previously tried Abilify, Lamictal, and Latuda which were not effective.     Treatment and medication recommendations as follows, no changes as of 4/13/2025:   Consented and ordered for initiation of ECT   NPO after midnight  Continue Trazodone to 150 mg at bed time to improve sleep  Continue Gabapentin 400 mg three times daily for anxiety, neuropathy, and alcohol cravings   Hold evenings before ECT  Continue Lexapro 30 mg daily for depression and anxiety plan to decrease in the next few days to 25 mg.   Consider dose decreased with further medication adjustments   msec   Continue Vraylar 3 mg HS for treatment resistant MDD  Patient initiated on ECT, completed first session 9/16. Upcoming ect 4/18. Will plan for 4 sessions of inpatient, total  "of 12 sessions.  Plan to discharge patient to partial patient hospitalization, she is agreeable.         Generalized anxiety disorder with panic attacks  See principal problem for treatment recommendations  Report baseline excessive level of worry with increased fatigue, generalized body aches, and nausea, worsening since October  Experiencing frequent, unprovoked panic attacks consisting of diaphoresis, palpitations, dyspnea, and crying spells - children have wanted to call the ambulance multiple times for these attacks  ADHD (attention deficit hyperactivity disorder)  Per history   PDMP reviewed  Prescribed Vyvanse 50 mg daily, last filled 30-day prescription on 3/25/2025  Patient aware that medication will not be prescribed while in hospital  Hypothyroidism due to Hashimoto's thyroiditis  Per SLIM  Continue Levothyroxine  Lab Results   Component Value Date    [id number] 1.057 04/12/2025    FREET4 0.93 04/17/2024    FREET4 0.95 04/17/2024     Medical clearance for psychiatric admission  Per SLIM  H/O gastric bypass  Per SLIM  Type 1 diabetes mellitus with hyperglycemia (HCC)  Lab Results   Component Value Date    HGBA1C 8.7 (A) 03/05/2025       Recent Labs     04/16/25  1410 04/16/25  1552 04/16/25 2011 04/17/25  0804   POCGLU 315* 245* 201* 94       Blood Sugar Average: Last 72 hrs:  (P) [id number]  Per SLIM  Alcohol use  CIWA protocol     Hyponatremia  No results for input(s): \"SODIUM\" in the last 72 hours.  Corrected for hyperglycemia, 135, on admission  Repeat WNL   Continue to monitor    Current medications:  Current Facility-Administered Medications   Medication Dose Route Frequency Provider Last Rate    aluminum-magnesium hydroxide-simethicone  30 mL Oral Q4H PRN Elodia Kaur MD      ascorbic acid  500 mg Oral Daily JAROCHO Glover      bacitracin  1 small application Topical BID JAROCHO Martin      haloperidol lactate  2.5 mg Intramuscular Q4H PRN Max 4/day Elodia" APARNA Kaur MD      And    LORazepam  1 mg Intramuscular Q4H PRN Max 4/day Elodia Kaur MD      And    benztropine  0.5 mg Intramuscular Q4H PRN Max 4/day Elodia Kaur MD      haloperidol lactate  5 mg Intramuscular Q4H PRN Max 4/day Elodia Kaur MD      And    LORazepam  2 mg Intramuscular Q4H PRN Max 4/day Elodia Kaur MD      And    benztropine  1 mg Intramuscular Q4H PRN Max 4/day Elodia Kaur MD      benztropine  1 mg Intramuscular Q4H PRN Max 6/day Elodia Kaur MD      benztropine  1 mg Oral Q4H PRN Max 6/day Elodia Kaur MD      bisacodyl  10 mg Rectal Daily PRN Elodia Kaur MD      cariprazine  3 mg Oral HS Winsome Henry, DO      cyanocobalamin  1,000 mcg Oral Daily JAROCHO Martin      hydrOXYzine HCL  50 mg Oral Q6H PRN Max 4/day Elodia Kaur MD      Or    diphenhydrAMINE  50 mg Intramuscular Q6H PRN Elodia Kaur MD      ergocalciferol  50,000 Units Oral Weekly JAROCHO Martin      escitalopram  30 mg Oral Daily Amarjit Marquez MD      [START ON 4/18/2025] ferrous sulfate  325 mg Oral Every Other Day Kenya Baldwin, JAROCHO      folic acid  1 mg Oral Daily JAROCHO Martin      gabapentin  400 mg Oral TID Keagan Elkins,       haloperidol  1 mg Oral Q6H PRN Elodia Kaur MD      haloperidol  2.5 mg Oral Q4H PRN Max 4/day Elodia Kaur MD      haloperidol  5 mg Oral Q4H PRN Max 4/day Elodia Kaur MD      hydrOXYzine HCL  100 mg Oral Q6H PRN Max 4/day Elodia Kaur MD      Or    LORazepam  2 mg Intramuscular Q6H PRN Elodia Kaur MD      hydrOXYzine HCL  25 mg Oral Q6H PRN Max 4/day Elodia Kaur MD      ibuprofen  400 mg Oral Q4H PRN Elodia Kaur MD      ibuprofen  600 mg Oral Q6H PRN Elodia Kaur MD      ibuprofen  800 mg Oral Q8H PRN Elodia Kaur MD      insulin glargine  15 Units Subcutaneous HS JAROCHO Glover      insulin lispro  1-5 Units Subcutaneous HS [place]  JAROCHO Andino      insulin lispro  1-6 Units Subcutaneous TID AC Manjinder Kaplan MD      insulin lispro  3 Units Subcutaneous TID With Meals [place] JAROCHO Andino      levothyroxine  250 mcg Oral Early Morning Manjinder Kaplan MD      loperamide  2 mg Oral 4x Daily PRN JAROCHO Martin      melatonin  3 mg Oral HS Elodia Kaur MD      multivitamin-minerals  1 tablet Oral Daily JAROCHO Martin      polyethylene glycol  17 g Oral Daily PRN Elodia Kaur MD      propranolol  10 mg Oral Q8H PRN Elodia Kaur MD      senna-docusate sodium  1 tablet Oral Daily PRN Elodia Kaur MD      thiamine  100 mg Oral Daily JAROCHO Martin      traZODone  150 mg Oral HS Renate Pond DO      traZODone  50 mg Oral HS PRROSE MARY Kaur MD          Risks/Benefits of Treatment:     Risks, benefits, and possible side effects of medications explained to patient and patient verbalizes understanding and agreement for treatment.    Progress Toward Goals: improving    Treatment Planning:     All current active medications have been reviewed.  Continue to monitor response to treatment and assess for potential side effects of medications.  Encourage group therapy, milieu therapy and occupational therapy  Collaboration with medical service for medical comorbidities as indicated.  Behavioral Health checks for safety monitoring.  Long Stay Certification : Not Applicable  Estimated Discharge Day: 4/28/2025 6 days (4/23/2025)  Legal Status : Voluntary 201 commitment.    Subjective     Behavior over the last 24 hours: improving.    Yanira is an overtly appearing  female. On exam she appears bright, has normal speech rate and volume, and normal thought process and content. She reports sleeping well last night. She does have lots of muscle soreness post ect but understands this is a potential side effect. She did have Ibuprofen which was effective and had a hot shower that  improved her symptoms. Patient has been attending groups and creating artwork. She has spoken to her children and discusses extensively that she is thankful to be able to talk to them while on the unit. Patient reports her children will be with her parents  and in-laws for [place]. She is looking forward to returning home. She would like to be discharged soon but understands the importance of doing ect while inpatient.     Sleep: normal  Appetite: normal  Medication side effects: No  ROS: review of systems as noted above in HPI/Subjective report, all other systems are negative    Objective :  Temp:  [97.5 °F (36.4 °C)-97.6 °F (36.4 °C)] 97.5 °F (36.4 °C)  HR:  [79-82] 79  BP: (123-162)/(72-82) 123/72  Resp:  [16-17] 16  SpO2:  [97 %-99 %] 97 %  O2 Device: None (Room air)    Temp:  [97.5 °F (36.4 °C)-97.6 °F (36.4 °C)] 97.5 °F (36.4 °C)  HR:  [79-82] 79  BP: (123-162)/(72-82) 123/72  Resp:  [16-17] 16  SpO2:  [97 %-99 %] 97 %  O2 Device: None (Room air)    Mental Status Evaluation:    Appearance:  Overtly appearing  female, age appropriate, casually dressed, adequate grooming   Behavior:  normal, pleasant, cooperative, calm   Speech:  normal rate, normal volume   Mood:  euthymic   Affect:  normal range and intensity, appropriate   Thought Process:  organized, goal directed, logical, coherent   Thought Content:  no overt delusions   Perceptual Disturbances: denies auditory hallucinations when asked, does not appear responding to internal stimuli   Risk Potential: Suicidal Ideation -   denies at present  Homicidal Ideation -   denies at present  Potential for Aggression - Not at present   Sensorium:  oriented to person, place, and time/date   Memory:  recent and remote memory grossly intact   Consciousness:  alert and awake   Attention/Concentration: attention span and concentration are age appropriate   Insight:  good   Judgment: good   Gait/Station: normal gait/station, normal balance   Motor Activity: no  abnormal movements       Lab Results: I have reviewed the following results:  Most Recent Labs:   Lab Results   Component Value Date    WBC 2.90 (L) 04/12/2025    RBC 3.89 04/12/2025    HGB 10.9 (L) 04/12/2025    HCT 34.7 (L) 04/12/2025     04/12/2025    RDW 12.2 04/12/2025    NEUTROABS 0.91 (L) 04/12/2025    TOTANEUTABS 8.62 (H) 10/21/2023    SODIUM 139 04/13/2025    K 4.5 04/13/2025     04/13/2025    CO2 29 04/13/2025    BUN 15 04/13/2025    CREATININE 0.82 04/13/2025    GLUC 188 (H) 04/13/2025    CALCIUM 8.7 04/13/2025    AST 17 04/13/2025    ALT 15 04/13/2025    ALKPHOS 86 04/13/2025    TP 6.1 (L) 04/13/2025    ALB 3.6 04/13/2025    TBILI 0.34 04/13/2025    CHOLESTEROL 184 04/12/2025     04/12/2025    TRIG 138 04/12/2025    LDLCALC 38 04/12/2025    NONHDLC 66 04/12/2025    WUU2XLDUWVPM 1.057 04/12/2025    FREET4 0.93 04/17/2024    FREET4 0.95 04/17/2024    T3FREE 2.58 05/07/2021    PREGUR negative 11/30/2020    PREGSERUM Negative 04/12/2025    HGBA1C 8.7 (A) 03/05/2025     04/17/2024       Administrative Statements     Counseling / Coordination of Care:   Patient's progress discussed with staff in treatment team meeting.  Medication changes reviewed with staff in treatment team meeting..    Winsome Henry DO 04/17/25

## 2025-04-18 ENCOUNTER — ANESTHESIA (INPATIENT)
Dept: PREOP/PACU | Facility: HOSPITAL | Age: 45
DRG: 751 | End: 2025-04-18
Payer: COMMERCIAL

## 2025-04-18 ENCOUNTER — ANESTHESIA EVENT (INPATIENT)
Dept: PREOP/PACU | Facility: HOSPITAL | Age: 45
DRG: 751 | End: 2025-04-18
Payer: COMMERCIAL

## 2025-04-18 ENCOUNTER — APPOINTMENT (INPATIENT)
Dept: PREOP/PACU | Facility: HOSPITAL | Age: 45
DRG: 751 | End: 2025-04-18
Attending: PSYCHIATRY & NEUROLOGY
Payer: COMMERCIAL

## 2025-04-18 LAB
GLUCOSE SERPL-MCNC: 106 MG/DL (ref 65–140)
GLUCOSE SERPL-MCNC: 107 MG/DL (ref 65–140)
GLUCOSE SERPL-MCNC: 108 MG/DL (ref 65–140)
GLUCOSE SERPL-MCNC: 127 MG/DL (ref 65–140)
GLUCOSE SERPL-MCNC: 211 MG/DL (ref 65–140)
GLUCOSE SERPL-MCNC: 281 MG/DL (ref 65–140)

## 2025-04-18 PROCEDURE — 99232 SBSQ HOSP IP/OBS MODERATE 35: CPT | Performed by: STUDENT IN AN ORGANIZED HEALTH CARE EDUCATION/TRAINING PROGRAM

## 2025-04-18 PROCEDURE — GZB0ZZZ ELECTROCONVULSIVE THERAPY, UNILATERAL-SINGLE SEIZURE: ICD-10-PCS | Performed by: STUDENT IN AN ORGANIZED HEALTH CARE EDUCATION/TRAINING PROGRAM

## 2025-04-18 PROCEDURE — 90870 ELECTROCONVULSIVE THERAPY: CPT | Performed by: STUDENT IN AN ORGANIZED HEALTH CARE EDUCATION/TRAINING PROGRAM

## 2025-04-18 PROCEDURE — 82948 REAGENT STRIP/BLOOD GLUCOSE: CPT

## 2025-04-18 RX ORDER — GLYCOPYRROLATE 0.2 MG/ML
INJECTION INTRAMUSCULAR; INTRAVENOUS AS NEEDED
Status: DISCONTINUED | OUTPATIENT
Start: 2025-04-18 | End: 2025-04-18

## 2025-04-18 RX ORDER — SUCCINYLCHOLINE/SOD CL,ISO/PF 100 MG/5ML
SYRINGE (ML) INTRAVENOUS AS NEEDED
Status: DISCONTINUED | OUTPATIENT
Start: 2025-04-18 | End: 2025-04-18

## 2025-04-18 RX ORDER — METHOHEXITAL IN WATER/PF 100MG/10ML
SYRINGE (ML) INTRAVENOUS AS NEEDED
Status: DISCONTINUED | OUTPATIENT
Start: 2025-04-18 | End: 2025-04-18

## 2025-04-18 RX ORDER — ESMOLOL HYDROCHLORIDE 10 MG/ML
INJECTION INTRAVENOUS AS NEEDED
Status: DISCONTINUED | OUTPATIENT
Start: 2025-04-18 | End: 2025-04-18

## 2025-04-18 RX ORDER — SODIUM CHLORIDE, SODIUM LACTATE, POTASSIUM CHLORIDE, CALCIUM CHLORIDE 600; 310; 30; 20 MG/100ML; MG/100ML; MG/100ML; MG/100ML
INJECTION, SOLUTION INTRAVENOUS CONTINUOUS PRN
Status: DISCONTINUED | OUTPATIENT
Start: 2025-04-18 | End: 2025-04-18

## 2025-04-18 RX ADMIN — SODIUM CHLORIDE, SODIUM LACTATE, POTASSIUM CHLORIDE, AND CALCIUM CHLORIDE: .6; .31; .03; .02 INJECTION, SOLUTION INTRAVENOUS at 06:29

## 2025-04-18 RX ADMIN — IBUPROFEN 800 MG: 400 TABLET, FILM COATED ORAL at 07:52

## 2025-04-18 RX ADMIN — Medication 3 MG: at 23:00

## 2025-04-18 RX ADMIN — Medication 1 TABLET: at 08:01

## 2025-04-18 RX ADMIN — CARIPRAZINE 3 MG: 3 CAPSULE, GELATIN COATED ORAL at 21:47

## 2025-04-18 RX ADMIN — Medication 100 MG: at 06:53

## 2025-04-18 RX ADMIN — INSULIN LISPRO 5 UNITS: 100 INJECTION, SOLUTION INTRAVENOUS; SUBCUTANEOUS at 08:17

## 2025-04-18 RX ADMIN — GLYCOPYRROLATE 0.2 MG: 0.2 INJECTION, SOLUTION INTRAMUSCULAR; INTRAVENOUS at 06:48

## 2025-04-18 RX ADMIN — CYANOCOBALAMIN TAB 1000 MCG 1000 MCG: 1000 TAB at 08:01

## 2025-04-18 RX ADMIN — THIAMINE HCL TAB 100 MG 100 MG: 100 TAB at 08:01

## 2025-04-18 RX ADMIN — OXYCODONE HYDROCHLORIDE AND ACETAMINOPHEN 500 MG: 500 TABLET ORAL at 08:01

## 2025-04-18 RX ADMIN — INSULIN LISPRO 1 UNITS: 100 INJECTION, SOLUTION INTRAVENOUS; SUBCUTANEOUS at 22:02

## 2025-04-18 RX ADMIN — ESCITALOPRAM OXALATE 30 MG: 10 TABLET ORAL at 08:01

## 2025-04-18 RX ADMIN — BACITRACIN 1 SMALL APPLICATION: 500 OINTMENT TOPICAL at 17:10

## 2025-04-18 RX ADMIN — LEVOTHYROXINE SODIUM 250 MCG: 0.12 TABLET ORAL at 08:01

## 2025-04-18 RX ADMIN — TRAZODONE HYDROCHLORIDE 150 MG: 100 TABLET ORAL at 23:00

## 2025-04-18 RX ADMIN — GABAPENTIN 400 MG: 400 CAPSULE ORAL at 21:47

## 2025-04-18 RX ADMIN — IBUPROFEN 800 MG: 400 TABLET, FILM COATED ORAL at 18:31

## 2025-04-18 RX ADMIN — GABAPENTIN 400 MG: 400 CAPSULE ORAL at 17:10

## 2025-04-18 RX ADMIN — FERROUS SULFATE TAB 325 MG (65 MG ELEMENTAL FE) 325 MG: 325 (65 FE) TAB at 08:01

## 2025-04-18 RX ADMIN — GABAPENTIN 400 MG: 400 CAPSULE ORAL at 08:01

## 2025-04-18 RX ADMIN — INSULIN LISPRO 5 UNITS: 100 INJECTION, SOLUTION INTRAVENOUS; SUBCUTANEOUS at 17:10

## 2025-04-18 RX ADMIN — BACITRACIN 1 SMALL APPLICATION: 500 OINTMENT TOPICAL at 08:01

## 2025-04-18 RX ADMIN — INSULIN GLARGINE 15 UNITS: 100 INJECTION, SOLUTION SUBCUTANEOUS at 22:02

## 2025-04-18 RX ADMIN — FOLIC ACID 1 MG: 1 TABLET ORAL at 08:01

## 2025-04-18 RX ADMIN — Medication 50 MG: at 06:53

## 2025-04-18 RX ADMIN — INSULIN LISPRO 5 UNITS: 100 INJECTION, SOLUTION INTRAVENOUS; SUBCUTANEOUS at 12:24

## 2025-04-18 NOTE — PLAN OF CARE
Problem: Risk for Self Injury/Neglect  Goal: Verbalize thoughts and feelings  Description: Interventions:- Assess and re-assess patient's lethality and potential for self-injury- Engage patient in 1:1 interactions, daily, for a minimum of 15 minutes- Encourage patient to express feelings, fears, frustrations, hopes- Establish rapport/trust with patient   Outcome: Progressing  Goal: Refrain from harming self  Description: Interventions:- Monitor patient closely, per order- Develop a trusting relationship- Supervise medication ingestion, monitor effects and side effects   Outcome: Progressing  Goal: Recognize maladaptive responses and adopt new coping mechanisms  Outcome: Progressing     Problem: Depression  Goal: Treatment Goal: Demonstrate behavioral control of depressive symptoms, verbalize feelings of improved mood/affect, and adopt new coping skills prior to discharge  Outcome: Progressing  Goal: Refrain from isolation  Description: Interventions:- Develop a trusting relationship - Encourage socialization   Outcome: Progressing  Goal: Attend and participate in unit activities, including therapeutic, recreational, and educational groups  Description: Interventions:- Provide therapeutic and educational activities daily, encourage attendance and participation, and document same in the medical record   Outcome: Progressing     Problem: Anxiety  Goal: Anxiety is at manageable level  Description: Interventions:- Assess and monitor patient's anxiety level. - Monitor for signs and symptoms (heart palpitations, chest pain, shortness of breath, headaches, nausea, feeling jumpy, restlessness, irritable, apprehensive). - Collaborate with interdisciplinary team and initiate plan and interventions as ordered.- [place] patient to unit/surroundings- Explain treatment plan- Encourage participation in care- Encourage verbalization of concerns/fears- Identify coping mechanisms- Assist in developing anxiety-reducing skills-  Administer/offer alternative therapies- Limit or eliminate stimulants  Outcome: Progressing     Problem: Ineffective Coping  Goal: Participates in unit activities  Description: Interventions:- Provide therapeutic environment - Provide required programming - Redirect inappropriate behaviors   Outcome: Progressing     Problem: Electroconvulsive therapy (ECT)  Goal: Treatment Goal: Demonstrate a reduction of confusion and improved orientation to person, place, time and/or situation upon discharge, according to optimum baseline/ability  Outcome: Progressing

## 2025-04-18 NOTE — ANESTHESIA POSTPROCEDURE EVALUATION
Post-Op Assessment Note    CV Status:  Stable    Pain management: adequate       Mental Status:  Awake   Hydration Status:  Stable   PONV Controlled:  Controlled   Airway Patency:  Patent     Post Op Vitals Reviewed: Yes    No anethesia notable event occurred.    Staff: Anesthesiologist           Last Filed PACU Vitals:  Vitals Value Taken Time   Temp     Pulse 93 04/18/25 0722   /83 04/18/25 0718   Resp 36 04/18/25 0722   SpO2 95 % 04/18/25 0722   Vitals shown include unfiled device data.    Modified Eleuterio:     Vitals Value Taken Time   Activity 2 04/18/25 0703   Respiration 2 04/18/25 0703   Circulation 2 04/18/25 0703   Consciousness 1 04/18/25 0703   Oxygen Saturation 1 04/18/25 0703     Modified Eleuterio Score: 8

## 2025-04-18 NOTE — ASSESSMENT & PLAN NOTE
Lab Results   Component Value Date    HGBA1C 8.7 (A) 03/05/2025       Recent Labs     04/17/25  1629 04/17/25  2009 04/18/25  0718 04/18/25  0813   POCGLU 260* 325* 108 127       Blood Sugar Average: Last 72 hrs:  (P) [id number]  Per SLIM

## 2025-04-18 NOTE — PROGRESS NOTES
Progress Note - Behavioral Health   Name: Clarissa Webb 45 y.o. female I MRN: [id number]  Unit/Bed#: -01 I Date of Admission: 4/11/2025   Date of Service: 4/18/2025 I Hospital Day: 7     Assessment & Plan  Recurrent major depressive disorder (HCC)  Yanira Webb is a 45-year-old  female with past psychiatric history significant for major depressive disorder and ASA with panic attacks who presents to \[facility]\"" 3B due to persistent depressive symptoms. She endorses overall dysphoric mood with anhedonia, poor sleep, weight gain despite decreased appetite, low energy, and intermittent concentration issues. She notes fleeting passive death wishes, but adamantly denies a plan and contracts for safety. She reports feeling hopeless due to several failed medication trials,  participation in multiple modalities of therapy, and previous TMS treatments without complete remission of symptoms. She was agreeable to medication adjustments and at this time is interested in ECT treatment which would likely benefit her.   Discussed adding SGA as augmentation for treatment resistant depression, patient is concerned about weight gain and declined Seroquel and Zyprexa. She has previously tried Abilify, Lamictal, and Latuda which were not effective.     Treatment and medication recommendations as follows, no changes as of 4/18/2025:   Consented and ordered for initiation of ECT   NPO after midnight  Continue Trazodone to 150 mg at bed time to improve sleep  Continue Gabapentin 400 mg three times daily for anxiety, neuropathy, and alcohol cravings   Hold evenings before ECT  Continue Lexapro 30 mg daily for depression and anxiety plan to decrease in the next few days to 25 mg.   Consider dose decreased with further medication adjustments   msec   Continue Vraylar 3 mg HS for treatment resistant MDD  Patient initiated on ECT, completed first session 9/16. Upcoming ect 4/18. Will plan for 4 sessions of inpatient, total  "of 12 sessions.  Plan to discharge patient to partial patient hospitalization, she is agreeable.         Generalized anxiety disorder with panic attacks  See principal problem for treatment recommendations  Report baseline excessive level of worry with increased fatigue, generalized body aches, and nausea, worsening since October  Experiencing frequent, unprovoked panic attacks consisting of diaphoresis, palpitations, dyspnea, and crying spells - children have wanted to call the ambulance multiple times for these attacks  ADHD (attention deficit hyperactivity disorder)  Per history   PDMP reviewed  Prescribed Vyvanse 50 mg daily, last filled 30-day prescription on 3/25/2025  Patient aware that medication will not be prescribed while in hospital  Hypothyroidism due to Hashimoto's thyroiditis  Per [facility]IM  Continue Levothyroxine  Lab Results   Component Value Date    [id number] 1.057 04/12/2025    FREET4 0.93 04/17/2024    FREET4 0.95 04/17/2024     Medical clearance for psychiatric admission  Per SLIM  H/O gastric bypass  Per SLIM  Type 1 diabetes mellitus with hyperglycemia (HCC)  Lab Results   Component Value Date    HGBA1C 8.7 (A) 03/05/2025       Recent Labs     04/17/25  1629 04/17/25 2009 04/18/25  0718 04/18/25  0813   POCGLU 260* 325* 108 127       Blood Sugar Average: Last 72 hrs:  (P) 169.0919880220515933  Per SLIM  Alcohol use  CIWA protocol     Hyponatremia  No results for input(s): \"SODIUM\" in the last 72 hours.  Corrected for hyperglycemia, 135, on admission  Repeat WNL   Continue to monitor    Current medications:  Current Facility-Administered Medications   Medication Dose Route Frequency Provider Last Rate    aluminum-magnesium hydroxide-simethicone  30 mL Oral Q4H PRN Elodia Kaur MD      ascorbic acid  500 mg Oral Daily JAROCHO Glover      bacitracin  1 small application Topical BID JAROCHO Martin      haloperidol lactate  2.5 mg Intramuscular Q4H PRN Max 4/day Elodia" APARNA Kaur MD      And    LORazepam  1 mg Intramuscular Q4H PRN Max 4/day Elodia Kaur MD      And    benztropine  0.5 mg Intramuscular Q4H PRN Max 4/day Elodia Kaur MD      haloperidol lactate  5 mg Intramuscular Q4H PRN Max 4/day Elodia Kaur MD      And    LORazepam  2 mg Intramuscular Q4H PRN Max 4/day Elodia Kaur MD      And    benztropine  1 mg Intramuscular Q4H PRN Max 4/day Elodia Kaur MD      benztropine  1 mg Intramuscular Q4H PRN Max 6/day Elodia Kaur MD      benztropine  1 mg Oral Q4H PRN Max 6/day Elodia Kaur MD      bisacodyl  10 mg Rectal Daily PRN Elodia Kaur MD      cariprazine  3 mg Oral HS Winsome Henry,       cyanocobalamin  1,000 mcg Oral Daily JAROCHO Martin      hydrOXYzine HCL  50 mg Oral Q6H PRN Max 4/day Elodia Kaur MD      Or    diphenhydrAMINE  50 mg Intramuscular Q6H PRN Elodia Kaur MD      ergocalciferol  50,000 Units Oral Weekly JAROCHO Martin      escitalopram  30 mg Oral Daily Amarjit Marquez MD      ferrous sulfate  325 mg Oral Every Other Day Kenya Baldwin, JAROCHO      folic acid  1 mg Oral Daily JAROCHO Martin      gabapentin  400 mg Oral TID Keagan Elkins,       haloperidol  1 mg Oral Q6H PRN Elodia Kaur MD      haloperidol  2.5 mg Oral Q4H PRN Max 4/day Elodia Kaur MD      haloperidol  5 mg Oral Q4H PRN Max 4/day Elodia Kaur MD      hydrOXYzine HCL  100 mg Oral Q6H PRN Max 4/day Elodia Kaur MD      Or    LORazepam  2 mg Intramuscular Q6H PRN Elodia Kaur MD      hydrOXYzine HCL  25 mg Oral Q6H PRN Max 4/day Elodia Kaur MD      ibuprofen  400 mg Oral Q4H PRN Elodia Kaur MD      ibuprofen  600 mg Oral Q6H PRN Elodia Kaur MD      ibuprofen  800 mg Oral Q8H PRN Elodia Kaur MD      insulin glargine  15 Units Subcutaneous HS JAROCHO Glover      insulin lispro  1-5 Units Subcutaneous HS JAROCHO Glover       insulin lispro  1-6 Units Subcutaneous TID AC Manjinder Kaplan MD      insulin lispro  5 Units Subcutaneous TID With Meals Kenya Zamarripa JAROCHO Baldwin      levothyroxine  250 mcg Oral Early Morning Manjinder Kaplan MD      loperamide  2 mg Oral 4x Daily PRN JAROCHO Martin      melatonin  3 mg Oral HS lEodia Kaur MD      multivitamin-minerals  1 tablet Oral Daily JAROCHO Martin      polyethylene glycol  17 g Oral Daily PRN Elodia Kaur MD      propranolol  10 mg Oral Q8H PRN Elodia Kaur MD      senna-docusate sodium  1 tablet Oral Daily PRN Elodia Kaur MD      thiamine  100 mg Oral Daily JAROCHO Martin      traZODone  150 mg Oral HS Renate Pond DO      traZODone  50 mg Oral HS PRN Elodia Kaur MD          Risks/Benefits of Treatment:     Risks, benefits, and possible side effects of medications explained to patient and patient verbalizes understanding and agreement for treatment.    Progress Toward Goals: progressing    Treatment Planning:     All current active medications have been reviewed.  Continue to monitor response to treatment and assess for potential side effects of medications.  Encourage group therapy, milieu therapy and occupational therapy  Collaboration with medical service for medical comorbidities as indicated.  Behavioral Health checks for safety monitoring.    Estimated Discharge Day: 4/24/2025       Subjective     Behavior over the last 24 hours: improving.        Patient was visited on unit for continuing care; chart reviewed and discussed with multidisciplinary treatment team.  On approach, the patient was calm and cooperative. Denied any changes in mood, appetite, and energy level.  Continues to be responding well from a behavioral health standpoint to the ECT.  Did note today that she is suffering more general body discomfort after ECT the soreness/stiffness compared to her session on Wednesday.  However she excepted as needed  "medications for physical discomfort and otherwise has no acute complaints or concerns.  Feeling somewhat tired today and will rest prior to attending group later in the morning and afternoon.  Patient continues to be amenable with further ECT treatments next week with goal for discharge on Wednesday after her fourth session.  No problem initiating and maintaining sleep.  Denied A/VH currently.  Denied SI/HI, intent or plan upon direct inquiry at this time.    Patient continues to be visible in the milieu and interacts with staff and peers. No reports of aggression or self-injurious behavior on unit. No PRN medications used in the past 24 hours.    Patient accepted all offered medications and no adverse effects of medications noted or reported.      Sleep: normal  Appetite: normal  Medication side effects: No  ROS: review of systems as noted above in HPI/Subjective report, all other systems are negative    Objective :  Temp:  [97.3 °F (36.3 °C)-98.2 °F (36.8 °C)] 97.8 °F (36.6 °C)  HR:  [71-90] 80  BP: (126-166)/(67-95) 143/82  Resp:  [15-18] 16  SpO2:  [96 %-99 %] 98 %  O2 Device: None (Room air)  Nasal Cannula O2 Flow Rate (L/min):  [2 L/min] 2 L/min    Temp:  [97.3 °F (36.3 °C)-98.2 °F (36.8 °C)] 97.8 °F (36.6 °C)  HR:  [71-90] 80  BP: (126-166)/(67-95) 143/82  Resp:  [15-18] 16  SpO2:  [96 %-99 %] 98 %  O2 Device: None (Room air)  Nasal Cannula O2 Flow Rate (L/min):  [2 L/min] 2 L/min    Mental Status Evaluation:  Appearance: casually dressed, appears consistent with stated age  Motor: no psychomotor disturbances  Behavior: cooperative, interacts with this writer appropriately  Speech: normal rate, rhythm, and volume  Mood: \"fine, just tired\"  Affect: euthymic, normal range and intensity  Thought Process: organized, linear, and goal-oriented  Thought Content: denies delusions and paranoia  Perception: denies auditory hallucinations, denies visual hallucinations,   Risk Potential: denies suicidal ideation, plan, " or intent. Denies homicidal ideation  Sensorium: Oriented to person, place, time, and situation  Cognition: cognitive ability appears intact but was not quantitatively tested  Consciousness: alert and awake  Attention: able to focus without difficulty  Insight: fair  Judgement: fair        Lab Results: I have reviewed the following results:      Administrative Statements     Counseling / Coordination of Care:   Patient's progress discussed with staff in treatment team meeting.  Medication changes reviewed with staff in treatment team meeting..    Roque Sharpe MD 04/18/25

## 2025-04-18 NOTE — PLAN OF CARE
Pt attends groups regularly and participates appropriately. Pt with good insight and future oriented.

## 2025-04-18 NOTE — PROCEDURES
Procedure Note - ECT  Clarissa Webb 45 y.o. female MRN: [id number]    Time out was taken with staff to confirm correct patient and correct procedure to be performed.  Tolerating procedures well, no side effects, agrees to proceed.    Session Number: 002    Diagnosis: Principal Problem:    Recurrent major depressive disorder (HCC)  Active Problems:    Generalized anxiety disorder with panic attacks    Hypothyroidism due to Hashimoto's thyroiditis    Medical clearance for psychiatric admission    H/O gastric bypass    Type 1 diabetes mellitus with hyperglycemia (HCC)    Alcohol use    Hyponatremia    ADHD (attention deficit hyperactivity disorder)      ECT Type: Inpatient, Acute    Anesthesia: Methohexital    Electrode Placement: Non-dominant unilateral    Energy level:  15 %      Seizure Duration     EE Sec.    EMG : 24 Sec    Post-ictal Suppression Index: 42.9 %    Results:Clinical seizure was satisfactory, Patient tolerated ECT well    Vitals:    25 0920   BP: 143/82   Pulse: 80   Resp: 16   Temp: 97.8 °F (36.6 °C)   SpO2: 98%        Medication Administration - last 24 hours from 2025 1435 to 2025 1435         Date/Time Order Dose Route Action Action by     2025 0801 EDT levothyroxine tablet 250 mcg 250 mcg Oral Given Arlette Eric RN     2025 1224 EDT insulin lispro (HumALOG/ADMELOG) 100 units/mL subcutaneous injection 1-6 Units -- Subcutaneous Not Given Arlette Eric RN     2025 0816 EDT insulin lispro (HumALOG/ADMELOG) 100 units/mL subcutaneous injection 1-6 Units -- Subcutaneous Not Given Arlette Eric RN     2025 1714 EDT insulin lispro (HumALOG/ADMELOG) 100 units/mL subcutaneous injection 1-6 Units 3 Units Subcutaneous Given Erendira Stephens RN     2025 2125 EDT melatonin tablet 3 mg 3 mg Oral Given Erendira Stephens RN     2025 1607 EDT ibuprofen (MOTRIN) tablet 600 mg 600 mg Oral Given Erendira Stephens RN     2025 0752 EDT  ibuprofen (MOTRIN) tablet 800 mg 800 mg Oral Given Arlette Eric RN     04/18/2025 0801 EDT multivitamin-minerals (CENTRUM) tablet 1 tablet 1 tablet Oral Given Arlette Eric RN     04/18/2025 0801 EDT thiamine tablet 100 mg 100 mg Oral Given Arlette Eric RN     04/18/2025 0801 EDT folic acid (FOLVITE) tablet 1 mg 1 mg Oral Given Arlette Eric RN     04/17/2025 2125 EDT traZODone (DESYREL) tablet 150 mg 150 mg Oral Given Erendira Stephens RN     04/18/2025 0801 EDT gabapentin (NEURONTIN) capsule 400 mg 400 mg Oral Given Arlette Eric RN     04/17/2025 2124 EDT gabapentin (NEURONTIN) capsule 400 mg 400 mg Oral Not Given Erendira Stephens RN     04/17/2025 1606 EDT gabapentin (NEURONTIN) capsule 400 mg 400 mg Oral Given Erendira Stephens RN     04/18/2025 0801 EDT cyanocobalamin (VITAMIN B-12) tablet 1,000 mcg 1,000 mcg Oral Given Arlette Eric RN     04/18/2025 0801 EDT escitalopram (LEXAPRO) tablet 30 mg 30 mg Oral Given Arlette Eric RN     04/18/2025 0801 EDT bacitracin topical ointment 1 small application 1 small application Topical Given Arlette Eric RN     04/17/2025 1846 EDT bacitracin topical ointment 1 small application 1 small application Topical Given Erendira Stephens RN     04/17/2025 2124 EDT insulin lispro (HumALOG/ADMELOG) 100 units/mL subcutaneous injection 1-5 Units 3 Units Subcutaneous Given Erendira Stephens RN     04/18/2025 0801 EDT ferrous sulfate tablet 325 mg 325 mg Oral Given Arlette Eric RN     04/18/2025 0801 EDT ascorbic acid (VITAMIN C) tablet 500 mg 500 mg Oral Given Arlette Eric RN     04/17/2025 2123 EDT insulin glargine (LANTUS) subcutaneous injection 15 Units 0.15 mL 15 Units Subcutaneous Given Erendira Stephens RN     04/17/202517/2025 2125 EDT cariprazine (VRAYLAR) capsule 3 mg 3 mg Oral Given Erendira Stephens RN     04/18/2025 1224 EDT insulin lispro (HumALOG/ADMELOG) 100 units/mL subcutaneous injection 5 Units 5 Units Subcutaneous Given Arlette Eric RN      04/18/2025 0817 EDT insulin lispro (HumALOG/ADMELOG) 100 units/mL subcutaneous injection 5 Units 5 Units Subcutaneous Given Arlette Eric RN     04/17/2025 1713 EDT insulin lispro (HumALOG/ADMELOG) 100 units/mL subcutaneous injection 5 Units 5 Units Subcutaneous Given Erendira Stephens RN

## 2025-04-18 NOTE — ASSESSMENT & PLAN NOTE
Per SLIM  Continue Levothyroxine  Lab Results   Component Value Date    [id number] 1.057 04/12/2025    FREET4 0.93 04/17/2024    FREET4 0.95 04/17/2024

## 2025-04-18 NOTE — ANESTHESIA POSTPROCEDURE EVALUATION
Post-Op Assessment Note    CV Status:  Stable  Pain Score: 0    Pain management: adequate       Mental Status:  Alert and awake   Hydration Status:  Euvolemic   PONV Controlled:  Controlled   Airway Patency:  Patent     Post Op Vitals Reviewed: Yes    No anethesia notable event occurred.    Staff: Anesthesiologist, CRNA           Last Filed PACU Vitals:  Vitals Value Taken Time   Temp     Pulse 100    /98    Resp 18    SpO2 100

## 2025-04-18 NOTE — ANESTHESIA PREPROCEDURE EVALUATION
Procedure:  ELECTROCONVULSIVE THERAPY (ECT)    Relevant Problems   ENDO   (+) Hypothyroidism   (+) Hypothyroidism due to Hashimoto's thyroiditis   (+) Type 1 diabetes mellitus with hyperglycemia (HCC)      HEMATOLOGY   (+) Iron deficiency anemia   (+) Iron deficiency anemia, unspecified      NEURO/PSYCH   (+) Generalized anxiety disorder with panic attacks   (+) Recurrent major depressive disorder (HCC)      Endocrine   (+) Hypoglycemia due to type 1 diabetes mellitus (HCC)      Behavioral Health   (+) ADHD (attention deficit hyperactivity disorder)   (+) Alcohol use      Obstetrics/Gynecology   (+) Hydrosalpinx      Urinary   (+) Genital labial ulcer      Surgery/Wound/Pain   (+) H/O gastric bypass      FEN/Gastrointestinal   (+) Nausea vomiting and diarrhea      Dermatology   (+) Herpes simplex infection of perianal skin      Other   (+) Hyponatremia (No na today, last na 139)   (+) Overweight with body mass index (BMI) of 26 to 26.9 in adult        Physical Exam    Airway    Mallampati score: II         Dental   No notable dental hx     Cardiovascular  Rhythm: regular, Rate: normal    Pulmonary   Breath sounds clear to auscultation    Other Findings  post-pubertal.      Anesthesia Plan  ASA Score- 2     Anesthesia Type- IV sedation with anesthesia with ASA Monitors.         Additional Monitors:     Airway Plan:     Comment: GA PRN.       Plan Factors-Exercise tolerance (METS): >4 METS.    Chart reviewed.   Existing labs reviewed.     Patient is not a current smoker.      Obstructive sleep apnea risk education given perioperatively.        Induction- intravenous.    Postoperative Plan-     Perioperative Resuscitation Plan - Level 1 - Full Code.       Informed Consent- Anesthetic plan and risks discussed with patient.        NPO Status:  Vitals Value Taken Time   Date of last liquid 04/17/25 04/18/25 0526   Time of last liquid 2200 04/18/25 0526   Date of last solid 04/17/25 04/18/25 0526   Time of last solid 2200  04/18/25 0526

## 2025-04-18 NOTE — NURSING NOTE
Pt is visible on the unit and social with peers. Bright and cooperative in interactions. Med and meal compliant. Denies SI/HI/AH/VH at this time. Pt is hopeful to continue her ECT treatment outpatient next week. Attending groups. Denies any unmet needs or complaints.

## 2025-04-18 NOTE — TREATMENT TEAM
04/18/25 0855   Team Meeting   Meeting Type Daily Rounds   Team Members Present   Team Members Present Physician;Nurse;   Physician Team Member Mee/Annemarie   Nursing Team Member Hernesto   Care Management Team Member Candie/Samira   Patient/Family Present   Patient Present No   Patient's Family Present No     Pt completed her second ECT today, Pt was complaining of 12/10 generalized pain, PRN motrin was provided. Pt denied SI/HI/AVH. Pt is visible on the unit and social with peers. Pt is medication and meal compliant.

## 2025-04-19 LAB
BNP SERPL-MCNC: 117 PG/ML (ref 0–100)
GLUCOSE SERPL-MCNC: 131 MG/DL (ref 65–140)
GLUCOSE SERPL-MCNC: 170 MG/DL (ref 65–140)
GLUCOSE SERPL-MCNC: 225 MG/DL (ref 65–140)
GLUCOSE SERPL-MCNC: 227 MG/DL (ref 65–140)

## 2025-04-19 PROCEDURE — 83880 ASSAY OF NATRIURETIC PEPTIDE: CPT | Performed by: NURSE PRACTITIONER

## 2025-04-19 PROCEDURE — 82948 REAGENT STRIP/BLOOD GLUCOSE: CPT

## 2025-04-19 PROCEDURE — 99232 SBSQ HOSP IP/OBS MODERATE 35: CPT | Performed by: STUDENT IN AN ORGANIZED HEALTH CARE EDUCATION/TRAINING PROGRAM

## 2025-04-19 RX ORDER — FUROSEMIDE 20 MG/1
20 TABLET ORAL DAILY
Status: COMPLETED | OUTPATIENT
Start: 2025-04-19 | End: 2025-04-21

## 2025-04-19 RX ADMIN — Medication 1 TABLET: at 08:17

## 2025-04-19 RX ADMIN — IBUPROFEN 600 MG: 600 TABLET, FILM COATED ORAL at 19:39

## 2025-04-19 RX ADMIN — ESCITALOPRAM OXALATE 30 MG: 10 TABLET ORAL at 08:17

## 2025-04-19 RX ADMIN — INSULIN LISPRO 1 UNITS: 100 INJECTION, SOLUTION INTRAVENOUS; SUBCUTANEOUS at 17:23

## 2025-04-19 RX ADMIN — FOLIC ACID 1 MG: 1 TABLET ORAL at 08:17

## 2025-04-19 RX ADMIN — CYANOCOBALAMIN TAB 1000 MCG 1000 MCG: 1000 TAB at 08:17

## 2025-04-19 RX ADMIN — CARIPRAZINE 3 MG: 3 CAPSULE, GELATIN COATED ORAL at 21:20

## 2025-04-19 RX ADMIN — BACITRACIN 1 SMALL APPLICATION: 500 OINTMENT TOPICAL at 17:21

## 2025-04-19 RX ADMIN — INSULIN LISPRO 2 UNITS: 100 INJECTION, SOLUTION INTRAVENOUS; SUBCUTANEOUS at 21:00

## 2025-04-19 RX ADMIN — INSULIN LISPRO 5 UNITS: 100 INJECTION, SOLUTION INTRAVENOUS; SUBCUTANEOUS at 08:19

## 2025-04-19 RX ADMIN — INSULIN LISPRO 2 UNITS: 100 INJECTION, SOLUTION INTRAVENOUS; SUBCUTANEOUS at 08:21

## 2025-04-19 RX ADMIN — INSULIN LISPRO 5 UNITS: 100 INJECTION, SOLUTION INTRAVENOUS; SUBCUTANEOUS at 12:11

## 2025-04-19 RX ADMIN — INSULIN GLARGINE 15 UNITS: 100 INJECTION, SOLUTION SUBCUTANEOUS at 21:00

## 2025-04-19 RX ADMIN — HYDROXYZINE HYDROCHLORIDE 100 MG: 50 TABLET, FILM COATED ORAL at 21:35

## 2025-04-19 RX ADMIN — BACITRACIN 1 SMALL APPLICATION: 500 OINTMENT TOPICAL at 08:19

## 2025-04-19 RX ADMIN — GABAPENTIN 400 MG: 400 CAPSULE ORAL at 17:21

## 2025-04-19 RX ADMIN — INSULIN LISPRO 5 UNITS: 100 INJECTION, SOLUTION INTRAVENOUS; SUBCUTANEOUS at 17:22

## 2025-04-19 RX ADMIN — OXYCODONE HYDROCHLORIDE AND ACETAMINOPHEN 500 MG: 500 TABLET ORAL at 08:17

## 2025-04-19 RX ADMIN — FUROSEMIDE 20 MG: 20 TABLET ORAL at 09:10

## 2025-04-19 RX ADMIN — GABAPENTIN 400 MG: 400 CAPSULE ORAL at 08:17

## 2025-04-19 RX ADMIN — LEVOTHYROXINE SODIUM 250 MCG: 0.12 TABLET ORAL at 06:41

## 2025-04-19 RX ADMIN — THIAMINE HCL TAB 100 MG 100 MG: 100 TAB at 08:17

## 2025-04-19 RX ADMIN — Medication 3 MG: at 22:08

## 2025-04-19 RX ADMIN — TRAZODONE HYDROCHLORIDE 150 MG: 100 TABLET ORAL at 22:09

## 2025-04-19 RX ADMIN — GABAPENTIN 400 MG: 400 CAPSULE ORAL at 21:20

## 2025-04-19 NOTE — ASSESSMENT & PLAN NOTE
Yanira Webb is a 45-year-old  female with past psychiatric history significant for major depressive disorder and ASA with panic attacks who presents to [facility] due to persistent depressive symptoms. She endorses overall dysphoric mood with anhedonia, poor sleep, weight gain despite decreased appetite, low energy, and intermittent concentration issues. She notes fleeting passive death wishes, but adamantly denies a plan and contracts for safety. She reports feeling hopeless due to several failed medication trials,  participation in multiple modalities of therapy, and previous TMS treatments without complete remission of symptoms. She was agreeable to medication adjustments and at this time is interested in ECT treatment which would likely benefit her.   Discussed adding SGA as augmentation for treatment resistant depression, patient is concerned about weight gain and declined Seroquel and Zyprexa. She has previously tried Abilify, Lamictal, and Latuda which were not effective.     Treatment and medication recommendations as follows, no changes as of 4/19/2025:   Consented and ordered for initiation of ECT   NPO after midnight  Continue Trazodone to 150 mg at bed time to improve sleep  Continue Gabapentin 400 mg three times daily for anxiety, neuropathy, and alcohol cravings   Hold evenings before ECT  Continue Lexapro 30 mg daily for depression and anxiety plan to decrease in the next few days to 25 mg.   Consider dose decreased with further medication adjustments   msec   Continue Vraylar 3 mg HS for treatment resistant MDD  Patient initiated on ECT, completed first session 9/16. Upcoming ect 4/18. Will plan for 4 sessions of inpatient, total of 12 sessions.  Plan to discharge patient to partial patient hospitalization, she is agreeable.

## 2025-04-19 NOTE — ASSESSMENT & PLAN NOTE
Lab Results   Component Value Date    HGBA1C 8.7 (A) 03/05/2025       Recent Labs     04/18/25  1956 04/18/25  2152 04/19/25  0747 04/19/25  1148   POCGLU 211* 281* 227* 131       Blood Sugar Average: Last 72 hrs:  (P) [id number]  Per SLIM

## 2025-04-19 NOTE — PROGRESS NOTES
Progress Note - Behavioral Health   Name: Clarissa Webb 45 y.o. female I MRN: [id number]  Unit/Bed#: -01 I Date of Admission: 4/11/2025   Date of Service: 4/19/2025 I Hospital Day: 8    Assessment & Plan  Recurrent major depressive disorder (HCC)  Yanira Webb is a 45-year-old  female with past psychiatric history significant for major depressive disorder and ASA with panic attacks who presents to [facility] due to persistent depressive symptoms. She endorses overall dysphoric mood with anhedonia, poor sleep, weight gain despite decreased appetite, low energy, and intermittent concentration issues. She notes fleeting passive death wishes, but adamantly denies a plan and contracts for safety. She reports feeling hopeless due to several failed medication trials,  participation in multiple modalities of therapy, and previous TMS treatments without complete remission of symptoms. She was agreeable to medication adjustments and at this time is interested in ECT treatment which would likely benefit her.   Discussed adding SGA as augmentation for treatment resistant depression, patient is concerned about weight gain and declined Seroquel and Zyprexa. She has previously tried Abilify, Lamictal, and Latuda which were not effective.     Treatment and medication recommendations as follows, no changes as of 4/19/2025:   Consented and ordered for initiation of ECT   NPO after midnight  Continue Trazodone to 150 mg at bed time to improve sleep  Continue Gabapentin 400 mg three times daily for anxiety, neuropathy, and alcohol cravings   Hold evenings before ECT  Continue Lexapro 30 mg daily for depression and anxiety plan to decrease in the next few days to 25 mg.   Consider dose decreased with further medication adjustments   msec   Continue Vraylar 3 mg HS for treatment resistant MDD  Patient initiated on ECT, completed first session 9/16. Upcoming ect 4/18. Will plan for 4 sessions of inpatient, total  "of 12 sessions.  Plan to discharge patient to partial patient hospitalization, she is agreeable.         Generalized anxiety disorder with panic attacks  See principal problem for treatment recommendations  Report baseline excessive level of worry with increased fatigue, generalized body aches, and nausea, worsening since October  Experiencing frequent, unprovoked panic attacks consisting of diaphoresis, palpitations, dyspnea, and crying spells - children have wanted to call the ambulance multiple times for these attacks  ADHD (attention deficit hyperactivity disorder)  Per history   PDMP reviewed  Prescribed Vyvanse 50 mg daily, last filled 30-day prescription on 3/25/2025  Patient aware that medication will not be prescribed while in hospital  Hypothyroidism due to Hashimoto's thyroiditis  Per [facility]  Continue Levothyroxine  Lab Results   Component Value Date    [id number] 1.057 04/12/2025    FREET4 0.93 04/17/2024    FREET4 0.95 04/17/2024     Medical clearance for psychiatric admission  Per [facility]  H/O gastric bypass  Per [facility]  Type 1 diabetes mellitus with hyperglycemia (HCC)  Lab Results   Component Value Date    HGBA1C 8.7 (A) 03/05/2025       Recent Labs     04/18/25  1956 04/18/25  2152 04/19/25  0747 04/19/25  1148   POCGLU 211* 281* 227* 131       Blood Sugar Average: Last 72 hrs:  (P) [id number]  Per [facility]  Alcohol use  CIWA protocol     Hyponatremia  No results for input(s): \"SODIUM\" in the last 72 hours.  Corrected for hyperglycemia, 135, on admission  Repeat WNL   Continue to monitor      Current medications:  Current Facility-Administered Medications   Medication Dose Route Frequency Provider Last Rate    aluminum-magnesium hydroxide-simethicone  30 mL Oral Q4H PRN Elodia Kaur MD      ascorbic acid  500 mg Oral Daily JAROCHO Glover      bacitracin  1 small application Topical BID JAROCHO Martin      haloperidol lactate  2.5 mg Intramuscular Q4H PRN Max 4/day " Elodia Kaur MD      And    LORazepam  1 mg Intramuscular Q4H PRN Max 4/day Elodia Kaur MD      And    benztropine  0.5 mg Intramuscular Q4H PRN Max 4/day Elodia Kaur MD      haloperidol lactate  5 mg Intramuscular Q4H PRN Max 4/day Elodia Kaur MD      And    LORazepam  2 mg Intramuscular Q4H PRN Max 4/day Elodia Kaur MD      And    benztropine  1 mg Intramuscular Q4H PRN Max 4/day Elodia Kaur MD      benztropine  1 mg Intramuscular Q4H PRN Max 6/day Elodia Kaur MD      benztropine  1 mg Oral Q4H PRN Max 6/day Elodia Kaur MD      bisacodyl  10 mg Rectal Daily PRN Elodia Kaur MD      cariprazine  3 mg Oral HS Winsome Henry, DO      cyanocobalamin  1,000 mcg Oral Daily JAROCHO Martin      hydrOXYzine HCL  50 mg Oral Q6H PRN Max 4/day Elodia Kaur MD      Or    diphenhydrAMINE  50 mg Intramuscular Q6H PRN Elodia Kaur MD      ergocalciferol  50,000 Units Oral Weekly JAROCHO Martin      escitalopram  30 mg Oral Daily Amarjit Marquez MD      ferrous sulfate  325 mg Oral Every Other Day Kenya Baldwin, JAROCHO      folic acid  1 mg Oral Daily JAROCHO Martin      furosemide  20 mg Oral Daily Tatiana Perry, JAROCHO      gabapentin  400 mg Oral TID Keagan Elkins,       haloperidol  1 mg Oral Q6H PRN Elodia Kaur MD      haloperidol  2.5 mg Oral Q4H PRN Max 4/day Elodia Kaur MD      haloperidol  5 mg Oral Q4H PRN Max 4/day Elodia Kaur MD      hydrOXYzine HCL  100 mg Oral Q6H PRN Max 4/day Elodia Kaur MD      Or    LORazepam  2 mg Intramuscular Q6H PRN Elodia Kaur MD      hydrOXYzine HCL  25 mg Oral Q6H PRN Max 4/day Elodia Kaur MD      ibuprofen  400 mg Oral Q4H PRN Elodia Kaur MD      ibuprofen  600 mg Oral Q6H PRN Elodia Kaur MD      ibuprofen  800 mg Oral Q8H PRN Elodia Kaur MD      insulin glargine  15 Units Subcutaneous HS JAROCHO Glover      insulin  "lispro  1-5 Units Subcutaneous HS [place] JAROCHO Andino      insulin lispro  1-6 Units Subcutaneous TID AC Manjinder Kaplan MD      insulin lispro  5 Units Subcutaneous TID With Meals [place] JAROCHO Andino      levothyroxine  250 mcg Oral Early Morning Manjinder Kaplan MD      loperamide  2 mg Oral 4x Daily PRN JAROCHO Martin      melatonin  3 mg Oral HS Elodia Kaur MD      multivitamin-minerals  1 tablet Oral Daily JAROCHO Martin      polyethylene glycol  17 g Oral Daily PRN Elodia Kaur MD      propranolol  10 mg Oral Q8H PRN Elodia Kaur MD      senna-docusate sodium  1 tablet Oral Daily PRN Elodia Kaur MD      thiamine  100 mg Oral Daily JAROCHO Martni      traZODone  150 mg Oral HS Renate Pond DO      traZODone  50 mg Oral HS PRN Elodia Kaur MD          Risks/Benefits of Treatment:     Risks, benefits, and possible side effects of medications explained to patient and patient verbalizes understanding and agreement for treatment.    Progress Toward Goals: progressing    Treatment Planning:     All current active medications have been reviewed.  Continue to monitor response to treatment and assess for potential side effects of medications.  Encourage group therapy, milieu therapy and occupational therapy  Collaboration with medical service for medical comorbidities as indicated.  Behavioral Health checks for safety monitoring.  Estimated Discharge Day: 4/24/2025   Legal Status: Status of Admission  Status of Admission: 201  Release of Information Signed: Yes (Anya STODDARD, Nuluciano Mock, Avi Arango (Friend), Josie Meadows, PCP, [facility].).    Subjective     Per nursing report patient experiencing some lower extremity edema and started Lasix today by [facility].  No behavioral outbursts or issues and compliant with medications as scheduled.    She reports that her mood today is \"okay\".  She does report that she experienced some minor side effects " "from ECT yesterday, noting some difficulty awakening from anesthesia.  She reports some sadness missing her family and feeling guilt about being on the inpatient psychiatric unit although was pleased overall to be focusing on her own mental health.  She still is endorsing some depressive symptoms, feeling that she masks her depression with happiness and positivity.  Reports that she continues to experience anxiety, feeling tense and on edge and inquired about increasing gabapentin although advised to avoid so due to potential of worsening ECT response.  She did express future orientation about starting partial hospital program and insurance approval of insulin pump.    Sleep: slept better  Appetite: normal  Medication side effects: No  ROS: review of systems as noted above in HPI/Subjective report, all other systems are negative    Objective :  Temp:  [97 °F (36.1 °C)-97.9 °F (36.6 °C)] 97.9 °F (36.6 °C)  HR:  [78-79] 78  BP: (136-149)/(71-86) 141/71  Resp:  [16] 16  SpO2:  [96 %-98 %] 96 %  O2 Device: None (Room air)    Temp:  [97 °F (36.1 °C)-97.9 °F (36.6 °C)] 97.9 °F (36.6 °C)  HR:  [78-79] 78  BP: (136-149)/(71-86) 141/71  Resp:  [16] 16  SpO2:  [96 %-98 %] 96 %  O2 Device: None (Room air)    Mental Status Evaluation:    Appearance:  casually dressed, adequate grooming   Behavior:  pleasant, cooperative   Speech:  normal rate and volume   Mood:  \"OK\"   Affect:  reactive   Thought Process:  organized, goal directed, logical   Thought Content:  no overt delusions, negative thinking   Perceptual Disturbances: no auditory hallucinations, no visual hallucinations   Risk Potential: Suicidal Ideation -  None at present  Homicidal Ideation -  None at present  Potential for Aggression - Not at present   Sensorium:  oriented to person, place, and time/date   Memory:  recent and remote memory grossly intact   Consciousness:  alert and awake   Attention/Concentration: attention span and concentration are age appropriate "   Insight:  fair   Judgment: fair   Gait/Station: normal gait/station   Motor Activity: no abnormal movements       Lab Results: I have reviewed the following results:  Results from the past 24 hours:   Recent Results (from the past 24 hours)   Fingerstick Glucose (POCT)    Collection Time: 04/18/25  5:00 PM   Result Value Ref Range    POC Glucose 107 65 - 140 mg/dl   Fingerstick Glucose (POCT)    Collection Time: 04/18/25  7:56 PM   Result Value Ref Range    POC Glucose 211 (H) 65 - 140 mg/dl   Fingerstick Glucose (POCT)    Collection Time: 04/18/25  9:52 PM   Result Value Ref Range    POC Glucose 281 (H) 65 - 140 mg/dl   B-Type Natriuretic Peptide(BNP)    Collection Time: 04/19/25  6:06 AM   Result Value Ref Range     (H) 0 - 100 pg/mL   Fingerstick Glucose (POCT)    Collection Time: 04/19/25  7:47 AM   Result Value Ref Range    POC Glucose 227 (H) 65 - 140 mg/dl   Fingerstick Glucose (POCT)    Collection Time: 04/19/25 11:48 AM   Result Value Ref Range    POC Glucose 131 65 - 140 mg/dl       Administrative Statements     Counseling / Coordination of Care:   Patient's progress discussed with staff in treatment team meeting.  Medication changes reviewed with staff in treatment team meeting..    Gabriele Vicente MD 04/19/25

## 2025-04-19 NOTE — PLAN OF CARE
Problem: Risk for Self Injury/Neglect  Goal: Verbalize thoughts and feelings  Description: Interventions:- Assess and re-assess patient's lethality and potential for self-injury- Engage patient in 1:1 interactions, daily, for a minimum of 15 minutes- Encourage patient to express feelings, fears, frustrations, hopes- Establish rapport/trust with patient   Outcome: Progressing  Goal: Refrain from harming self  Description: Interventions:- Monitor patient closely, per order- Develop a trusting relationship- Supervise medication ingestion, monitor effects and side effects   Outcome: Progressing  Goal: Recognize maladaptive responses and adopt new coping mechanisms  Outcome: Progressing     Problem: Depression  Goal: Treatment Goal: Demonstrate behavioral control of depressive symptoms, verbalize feelings of improved mood/affect, and adopt new coping skills prior to discharge  Outcome: Progressing  Goal: Refrain from isolation  Description: Interventions:- Develop a trusting relationship - Encourage socialization   Outcome: Progressing  Goal: Attend and participate in unit activities, including therapeutic, recreational, and educational groups  Description: Interventions:- Provide therapeutic and educational activities daily, encourage attendance and participation, and document same in the medical record   Outcome: Progressing     Problem: Anxiety  Goal: Anxiety is at manageable level  Description: Interventions:- Assess and monitor patient's anxiety level. - Monitor for signs and symptoms (heart palpitations, chest pain, shortness of breath, headaches, nausea, feeling jumpy, restlessness, irritable, apprehensive). - Collaborate with interdisciplinary team and initiate plan and interventions as ordered.- [place] patient to unit/surroundings- Explain treatment plan- Encourage participation in care- Encourage verbalization of concerns/fears- Identify coping mechanisms- Assist in developing anxiety-reducing skills-  Administer/offer alternative therapies- Limit or eliminate stimulants  Outcome: Progressing     Problem: Electroconvulsive therapy (ECT)  Goal: Treatment Goal: Demonstrate a reduction of confusion and improved orientation to person, place, time and/or situation upon discharge, according to optimum baseline/ability  Outcome: Progressing     Problem: Ineffective Coping  Goal: Participates in unit activities  Description: Interventions:- Provide therapeutic environment - Provide required programming - Redirect inappropriate behaviors   Outcome: Progressing

## 2025-04-19 NOTE — NURSING NOTE
"Patient has been in the milieu al evening interacting with peers and staff. She is hopeful for discharge this week \"I would go Monday if I could but I don't think they'll let me.\" She is anxious to get home \"to my shower, my own bed and my dogs.\" Although she does not note any difference in her mood since ECT began she is very hopeful she will begin to experience an improvement in her mood.  She denies S.I.H.I.A/H V/H  "

## 2025-04-19 NOTE — NURSING NOTE
Pt is visible on the unit and social with peers and staff. Medication and meal compliant. Pt attending groups. Denying SI/HI/AH/VH. Pt does endorse anxiety about her finances while she is in the hospital. Pt voicing hope to be discharged early this week and to receive ECT outpatient. Denies any unmet needs or complaints.

## 2025-04-19 NOTE — ASSESSMENT & PLAN NOTE
Per [facility]  Continue Levothyroxine  Lab Results   Component Value Date    [id number] 1.057 04/12/2025    FREET4 0.93 04/17/2024    FREET4 0.95 04/17/2024

## 2025-04-19 NOTE — QUICK NOTE
Patient with lower extremity edema.  Today's weight is 78.3 which is up 3 kg from her admission.  Her BNP is 117.  Lasix 20 mg p.o. daily x 3 days.  Will repeat a BMP in a.m. Sunday, 4/20/2025.

## 2025-04-20 LAB
ANION GAP SERPL CALCULATED.3IONS-SCNC: 7 MMOL/L (ref 4–13)
BUN SERPL-MCNC: 14 MG/DL (ref 5–25)
CALCIUM SERPL-MCNC: 9.1 MG/DL (ref 8.4–10.2)
CHLORIDE SERPL-SCNC: 103 MMOL/L (ref 96–108)
CO2 SERPL-SCNC: 29 MMOL/L (ref 21–32)
CREAT SERPL-MCNC: 0.77 MG/DL (ref 0.6–1.3)
GFR SERPL CREATININE-BSD FRML MDRD: 94 ML/MIN/1.73SQ M
GLUCOSE P FAST SERPL-MCNC: 114 MG/DL (ref 65–99)
GLUCOSE SERPL-MCNC: 101 MG/DL (ref 65–140)
GLUCOSE SERPL-MCNC: 114 MG/DL (ref 65–140)
GLUCOSE SERPL-MCNC: 146 MG/DL (ref 65–140)
GLUCOSE SERPL-MCNC: 212 MG/DL (ref 65–140)
GLUCOSE SERPL-MCNC: 235 MG/DL (ref 65–140)
GLUCOSE SERPL-MCNC: 62 MG/DL (ref 65–140)
POTASSIUM SERPL-SCNC: 4.1 MMOL/L (ref 3.5–5.3)
SODIUM SERPL-SCNC: 139 MMOL/L (ref 135–147)

## 2025-04-20 PROCEDURE — 80048 BASIC METABOLIC PNL TOTAL CA: CPT | Performed by: NURSE PRACTITIONER

## 2025-04-20 PROCEDURE — 99232 SBSQ HOSP IP/OBS MODERATE 35: CPT | Performed by: STUDENT IN AN ORGANIZED HEALTH CARE EDUCATION/TRAINING PROGRAM

## 2025-04-20 PROCEDURE — 82948 REAGENT STRIP/BLOOD GLUCOSE: CPT

## 2025-04-20 RX ADMIN — Medication 1 TABLET: at 08:24

## 2025-04-20 RX ADMIN — IBUPROFEN 800 MG: 400 TABLET, FILM COATED ORAL at 18:50

## 2025-04-20 RX ADMIN — GABAPENTIN 400 MG: 400 CAPSULE ORAL at 08:24

## 2025-04-20 RX ADMIN — FERROUS SULFATE TAB 325 MG (65 MG ELEMENTAL FE) 325 MG: 325 (65 FE) TAB at 08:23

## 2025-04-20 RX ADMIN — CARIPRAZINE 3 MG: 3 CAPSULE, GELATIN COATED ORAL at 22:33

## 2025-04-20 RX ADMIN — INSULIN LISPRO 5 UNITS: 100 INJECTION, SOLUTION INTRAVENOUS; SUBCUTANEOUS at 08:24

## 2025-04-20 RX ADMIN — INSULIN LISPRO 2 UNITS: 100 INJECTION, SOLUTION INTRAVENOUS; SUBCUTANEOUS at 17:02

## 2025-04-20 RX ADMIN — TRAZODONE HYDROCHLORIDE 150 MG: 100 TABLET ORAL at 22:33

## 2025-04-20 RX ADMIN — THIAMINE HCL TAB 100 MG 100 MG: 100 TAB at 08:24

## 2025-04-20 RX ADMIN — FUROSEMIDE 20 MG: 20 TABLET ORAL at 08:23

## 2025-04-20 RX ADMIN — ESCITALOPRAM OXALATE 30 MG: 10 TABLET ORAL at 08:26

## 2025-04-20 RX ADMIN — GABAPENTIN 400 MG: 400 CAPSULE ORAL at 16:58

## 2025-04-20 RX ADMIN — INSULIN LISPRO 5 UNITS: 100 INJECTION, SOLUTION INTRAVENOUS; SUBCUTANEOUS at 17:02

## 2025-04-20 RX ADMIN — LEVOTHYROXINE SODIUM 250 MCG: 0.12 TABLET ORAL at 06:01

## 2025-04-20 RX ADMIN — INSULIN GLARGINE 15 UNITS: 100 INJECTION, SOLUTION SUBCUTANEOUS at 21:33

## 2025-04-20 RX ADMIN — INSULIN LISPRO 5 UNITS: 100 INJECTION, SOLUTION INTRAVENOUS; SUBCUTANEOUS at 12:44

## 2025-04-20 RX ADMIN — OXYCODONE HYDROCHLORIDE AND ACETAMINOPHEN 500 MG: 500 TABLET ORAL at 08:24

## 2025-04-20 RX ADMIN — Medication 3 MG: at 22:33

## 2025-04-20 RX ADMIN — FOLIC ACID 1 MG: 1 TABLET ORAL at 08:23

## 2025-04-20 RX ADMIN — CYANOCOBALAMIN TAB 1000 MCG 1000 MCG: 1000 TAB at 08:23

## 2025-04-20 RX ADMIN — ERGOCALCIFEROL 50000 UNITS: 1.25 CAPSULE ORAL at 08:23

## 2025-04-20 NOTE — ASSESSMENT & PLAN NOTE
Per [facility]IM  Continue Levothyroxine  Lab Results   Component Value Date    [id number] 1.057 04/12/2025    FREET4 0.93 04/17/2024    FREET4 0.95 04/17/2024

## 2025-04-20 NOTE — ASSESSMENT & PLAN NOTE
Recent Labs     04/20/25  0541   SODIUM 139     Corrected for hyperglycemia, 135, on admission  Repeat WNL   Continue to monitor

## 2025-04-20 NOTE — PLAN OF CARE
Problem: Risk for Self Injury/Neglect  Goal: Verbalize thoughts and feelings  Description: Interventions:- Assess and re-assess patient's lethality and potential for self-injury- Engage patient in 1:1 interactions, daily, for a minimum of 15 minutes- Encourage patient to express feelings, fears, frustrations, hopes- Establish rapport/trust with patient   Outcome: Progressing  Goal: Refrain from harming self  Description: Interventions:- Monitor patient closely, per order- Develop a trusting relationship- Supervise medication ingestion, monitor effects and side effects   Outcome: Progressing  Goal: Recognize maladaptive responses and adopt new coping mechanisms  Outcome: Progressing     Problem: Depression  Goal: Treatment Goal: Demonstrate behavioral control of depressive symptoms, verbalize feelings of improved mood/affect, and adopt new coping skills prior to discharge  Outcome: Progressing  Goal: Refrain from isolation  Description: Interventions:- Develop a trusting relationship - Encourage socialization   Outcome: Progressing  Goal: Attend and participate in unit activities, including therapeutic, recreational, and educational groups  Description: Interventions:- Provide therapeutic and educational activities daily, encourage attendance and participation, and document same in the medical record   Outcome: Progressing     Problem: Anxiety  Goal: Anxiety is at manageable level  Description: Interventions:- Assess and monitor patient's anxiety level. - Monitor for signs and symptoms (heart palpitations, chest pain, shortness of breath, headaches, nausea, feeling jumpy, restlessness, irritable, apprehensive). - Collaborate with interdisciplinary team and initiate plan and interventions as ordered.- [place] patient to unit/surroundings- Explain treatment plan- Encourage participation in care- Encourage verbalization of concerns/fears- Identify coping mechanisms- Assist in developing anxiety-reducing skills-  Administer/offer alternative therapies- Limit or eliminate stimulants  Outcome: Progressing  Note: Required PRN Atarax 4/19     Problem: Electroconvulsive therapy (ECT)  Goal: Treatment Goal: Demonstrate a reduction of confusion and improved orientation to person, place, time and/or situation upon discharge, according to optimum baseline/ability  Outcome: Progressing     Problem: Ineffective Coping  Goal: Participates in unit activities  Description: Interventions:- Provide therapeutic environment - Provide required programming - Redirect inappropriate behaviors   Outcome: Progressing

## 2025-04-20 NOTE — ASSESSMENT & PLAN NOTE
Lab Results   Component Value Date    HGBA1C 8.7 (A) 03/05/2025       Recent Labs     04/19/25  1148 04/19/25  1647 04/19/25 2001 04/20/25  0805   POCGLU 131 170* 225* 101       Blood Sugar Average: Last 72 hrs:  (P) 173.8  Per [facility]

## 2025-04-20 NOTE — NURSING NOTE
Patient reports Motrin 600mgs po prn for 6/10 generalized pain was effective and she now reports 3/10 pain score.

## 2025-04-20 NOTE — NURSING NOTE
Patient is calm and cooperative upon approach. Patient is visible on unit, socializing with peers. Patient denies SI/HI/AH/VH, depression and anxiety. Patient is compliant with meds and meals.     Patient BS is 62. Patient reported feeling shaky. Sliding scale insulin, held. Patient provided with orange juice and cookies. Patient currently drinking juice in dayroom, while socializing with peers.

## 2025-04-20 NOTE — ASSESSMENT & PLAN NOTE
Yanira Webb is a 45-year-old  female with past psychiatric history significant for major depressive disorder and ASA with panic attacks who presents to [facility] due to persistent depressive symptoms. She endorses overall dysphoric mood with anhedonia, poor sleep, weight gain despite decreased appetite, low energy, and intermittent concentration issues. She notes fleeting passive death wishes, but adamantly denies a plan and contracts for safety. She reports feeling hopeless due to several failed medication trials,  participation in multiple modalities of therapy, and previous TMS treatments without complete remission of symptoms. She was agreeable to medication adjustments and at this time is interested in ECT treatment which would likely benefit her.   Discussed adding SGA as augmentation for treatment resistant depression, patient is concerned about weight gain and declined Seroquel and Zyprexa. She has previously tried Abilify, Lamictal, and Latuda which were not effective.     Treatment and medication recommendations as follows, no changes as of 4/12/2025:   Consented and ordered for initiation of ECT   NPO after midnight  Continue Trazodone to 150 mg at bed time to improve sleep  Continue Gabapentin 400 mg three times daily for anxiety, neuropathy, and alcohol cravings   Hold evenings before ECT  Continue Lexapro 30 mg daily for depression and anxiety plan to decrease in the next few days to 25 mg.   Consider dose decreased with further medication adjustments   msec   Continue Vraylar 3 mg HS for treatment resistant MDD  Patient initiated on ECT, completed first session 9/16. Upcoming ect 4/18. Will plan for 4 sessions of inpatient, total of 12 sessions.  Plan to discharge patient to partial patient hospitalization, she is agreeable.

## 2025-04-20 NOTE — PROGRESS NOTES
Progress Note - Behavioral Health   Name: Clarissa Webb 45 y.o. female I MRN: [id number]  Unit/Bed#: -01 I Date of Admission: 4/11/2025   Date of Service: 4/20/2025 I Hospital Day: 9    Assessment & Plan  Recurrent major depressive disorder (HCC)  Yanira Webb is a 45-year-old  female with past psychiatric history significant for major depressive disorder and ASA with panic attacks who presents to [facility] due to persistent depressive symptoms. She endorses overall dysphoric mood with anhedonia, poor sleep, weight gain despite decreased appetite, low energy, and intermittent concentration issues. She notes fleeting passive death wishes, but adamantly denies a plan and contracts for safety. She reports feeling hopeless due to several failed medication trials,  participation in multiple modalities of therapy, and previous TMS treatments without complete remission of symptoms. She was agreeable to medication adjustments and at this time is interested in ECT treatment which would likely benefit her.   Discussed adding SGA as augmentation for treatment resistant depression, patient is concerned about weight gain and declined Seroquel and Zyprexa. She has previously tried Abilify, Lamictal, and Latuda which were not effective.     Treatment and medication recommendations as follows, no changes as of 4/12/2025:   Consented and ordered for initiation of ECT   NPO after midnight  Continue Trazodone to 150 mg at bed time to improve sleep  Continue Gabapentin 400 mg three times daily for anxiety, neuropathy, and alcohol cravings   Hold evenings before ECT  Continue Lexapro 30 mg daily for depression and anxiety plan to decrease in the next few days to 25 mg.   Consider dose decreased with further medication adjustments   msec   Continue Vraylar 3 mg HS for treatment resistant MDD  Patient initiated on ECT, completed first session 9/16. Upcoming ect 4/18. Will plan for 4 sessions of inpatient, total  of 12 sessions.  Plan to discharge patient to partial patient hospitalization, she is agreeable.         Generalized anxiety disorder with panic attacks  See principal problem for treatment recommendations  Report baseline excessive level of worry with increased fatigue, generalized body aches, and nausea, worsening since October  Experiencing frequent, unprovoked panic attacks consisting of diaphoresis, palpitations, dyspnea, and crying spells - children have wanted to call the ambulance multiple times for these attacks  ADHD (attention deficit hyperactivity disorder)  Per history   PDMP reviewed  Prescribed Vyvanse 50 mg daily, last filled 30-day prescription on 3/25/2025  Patient aware that medication will not be prescribed while in hospital  Hypothyroidism due to Hashimoto's thyroiditis  Per [facility]  Continue Levothyroxine  Lab Results   Component Value Date    [id number] 1.057 04/12/2025    FREET4 0.93 04/17/2024    FREET4 0.95 04/17/2024     Medical clearance for psychiatric admission  Per [facility]  H/O gastric bypass  Per [facility]  Type 1 diabetes mellitus with hyperglycemia (HCC)  Lab Results   Component Value Date    HGBA1C 8.7 (A) 03/05/2025       Recent Labs     04/19/25  1148 04/19/25  1647 04/19/25  2001 04/20/25  0805   POCGLU 131 170* 225* 101       Blood Sugar Average: Last 72 hrs:  (P) 173.8  Per [facility]  Alcohol use  CIWA protocol     Hyponatremia  Recent Labs     04/20/25  0541   SODIUM 139     Corrected for hyperglycemia, 135, on admission  Repeat WNL   Continue to monitor      Current medications:  Current Facility-Administered Medications   Medication Dose Route Frequency Provider Last Rate    aluminum-magnesium hydroxide-simethicone  30 mL Oral Q4H PRN Elodia Kaur MD      ascorbic acid  500 mg Oral Daily JAROCHO Glover      bacitracin  1 small application Topical BID JAROCHO Martin      haloperidol lactate  2.5 mg Intramuscular Q4H PRN Max 4/day Elodia Kaur MD      And     LORazepam  1 mg Intramuscular Q4H PRN Max 4/day Elodia Kaur MD      And    benztropine  0.5 mg Intramuscular Q4H PRN Max 4/day Elodia Kaur MD      haloperidol lactate  5 mg Intramuscular Q4H PRN Max 4/day Elodia Kaur MD      And    LORazepam  2 mg Intramuscular Q4H PRN Max 4/day Elodia Kaur MD      And    benztropine  1 mg Intramuscular Q4H PRN Max 4/day Elodia Kaur MD      benztropine  1 mg Intramuscular Q4H PRN Max 6/day Elodia Kaur MD      benztropine  1 mg Oral Q4H PRN Max 6/day Elodia Kaur MD      bisacodyl  10 mg Rectal Daily PRN Elodia Kaur MD      cariprazine  3 mg Oral HS Winsome Henry,       cyanocobalamin  1,000 mcg Oral Daily Tatiana Perry, JAROCHO      hydrOXYzine HCL  50 mg Oral Q6H PRN Max 4/day Elodia Kaur MD      Or    diphenhydrAMINE  50 mg Intramuscular Q6H PRN Elodia Kaur MD      ergocalciferol  50,000 Units Oral Weekly JAROCHO Martin      escitalopram  30 mg Oral Daily Amarjit Marquez MD      ferrous sulfate  325 mg Oral Every Other Day Kenya Baldwin, JAROCHO      folic acid  1 mg Oral Daily Tatiana Perry, JAROCHO      furosemide  20 mg Oral Daily JAROCHO Martin      gabapentin  400 mg Oral TID Keagan Elkins DO      haloperidol  1 mg Oral Q6H PRN Elodia Kaur MD      haloperidol  2.5 mg Oral Q4H PRN Max 4/day Elodia Kaur MD      haloperidol  5 mg Oral Q4H PRN Max 4/day Elodia Kaur MD      hydrOXYzine HCL  100 mg Oral Q6H PRN Max 4/day Elodia Kaur MD      Or    LORazepam  2 mg Intramuscular Q6H PRN Elodia Kaur MD      hydrOXYzine HCL  25 mg Oral Q6H PRN Max 4/day Elodia Kaur MD      ibuprofen  400 mg Oral Q4H PRN Elodia Kaur MD      ibuprofen  600 mg Oral Q6H PRN Elodia Kaur MD      ibuprofen  800 mg Oral Q8H PRN Elodia Kaur MD      insulin glargine  15 Units Subcutaneous HS JAROCHO Glover      insulin lispro  1-5 Units Subcutaneous  "HS Kenya Zamarripa JAROCHO Baldwin      insulin lispro  1-6 Units Subcutaneous TID AC Manjinder Kaplan MD      insulin lispro  5 Units Subcutaneous TID With Meals Kenya KinseyJAROCHO Schwab      levothyroxine  250 mcg Oral Early Morning Manjinder Kaplan MD      loperamide  2 mg Oral 4x Daily PRN JAROCHO Martin      melatonin  3 mg Oral HS Elodia Kaur MD      multivitamin-minerals  1 tablet Oral Daily JAROCHO Martin      polyethylene glycol  17 g Oral Daily PRN Elodia Kaur MD      propranolol  10 mg Oral Q8H PRN Elodia Kaur MD      senna-docusate sodium  1 tablet Oral Daily PRN Elodia Kaur MD      thiamine  100 mg Oral Daily JAROCHO Martin      traZODone  150 mg Oral HS Renate Pond DO      traZODone  50 mg Oral HS PRROSE MARY Kaur MD          Risks/Benefits of Treatment:     Risks, benefits, and possible side effects of medications explained to patient and patient verbalizes understanding and agreement for treatment.    Progress Toward Goals: progressing    Treatment Planning:     All current active medications have been reviewed.  Continue to monitor response to treatment and assess for potential side effects of medications.  Encourage group therapy, milieu therapy and occupational therapy  Collaboration with medical service for medical comorbidities as indicated.  Behavioral Health checks for safety monitoring.  Estimated Discharge Day: 4/24/2025   Legal Status: Status of Admission  Status of Admission: 201  Release of Information Signed: Yes (Anya STODDARD, Nuluciano Mock, Avi Arango (Friend), Josie Meadows, PCP, [facility].).    Subjective     Per nursing report, patient had some anxiety yesterday and reportedly panic attack in the evening given as needed Atarax which was effective.    Reports today that her mood is \"a little anxious\".  She reports some sadness related to being hospitalized during the Easter holiday, missing the time with her family and " children.  She reports that she is hoping to call them later today.  Relates that this was part of the symptoms of anxiety that she experienced yesterday.  She is trying to remain future oriented and goal directed, feeling that ECT is helping thus far with her depressive symptoms and denies any significant side effects from it at this time.  She reports that she slept well last night, appetite levels are stable and adequate.  Energy levels are fair.  No side effects reported to the medication otherwise.  Denies any SI, HI, AVH.    Sleep: normal  Appetite: normal  Medication side effects: No  ROS: review of systems as noted above in HPI/Subjective report, all other systems are negative    Objective :  Temp:  [96.7 °F (35.9 °C)-97.9 °F (36.6 °C)] 96.7 °F (35.9 °C)  HR:  [73-80] 73  BP: (113-159)/(62-91) 113/62  Resp:  [16] 16  SpO2:  [96 %-98 %] 96 %  O2 Device: None (Room air)    Temp:  [96.7 °F (35.9 °C)-97.9 °F (36.6 °C)] 96.7 °F (35.9 °C)  HR:  [73-80] 73  BP: (113-159)/(62-91) 113/62  Resp:  [16] 16  SpO2:  [96 %-98 %] 96 %  O2 Device: None (Room air)    Mental Status Evaluation:    Appearance:  casually dressed, adequate grooming, dressed appropriately   Behavior:  pleasant, cooperative   Speech:  normal rate and volume   Mood:  euthymic   Affect:  normal range and intensity, appropriate, mood-congruent   Thought Process:  organized, goal directed   Thought Content:  no overt delusions   Perceptual Disturbances: no auditory hallucinations, no visual hallucinations   Risk Potential: Suicidal Ideation -  None at present  Homicidal Ideation -  None at present  Potential for Aggression - Not at present   Sensorium:  oriented to person, place, and time/date   Memory:  recent and remote memory grossly intact   Consciousness:  alert and awake   Attention/Concentration: attention span and concentration are age appropriate   Insight:  intact   Judgment: intact   Gait/Station: normal gait/station   Motor Activity: no  abnormal movements       Lab Results: I have reviewed the following results:  Results from the past 24 hours:   Recent Results (from the past 24 hours)   Fingerstick Glucose (POCT)    Collection Time: 04/19/25  4:47 PM   Result Value Ref Range    POC Glucose 170 (H) 65 - 140 mg/dl   Fingerstick Glucose (POCT)    Collection Time: 04/19/25  8:01 PM   Result Value Ref Range    POC Glucose 225 (H) 65 - 140 mg/dl   Basic metabolic panel    Collection Time: 04/20/25  5:41 AM   Result Value Ref Range    Sodium 139 135 - 147 mmol/L    Potassium 4.1 3.5 - 5.3 mmol/L    Chloride 103 96 - 108 mmol/L    CO2 29 21 - 32 mmol/L    ANION GAP 7 4 - 13 mmol/L    BUN 14 5 - 25 mg/dL    Creatinine 0.77 0.60 - 1.30 mg/dL    Glucose 114 65 - 140 mg/dL    Glucose, Fasting 114 (H) 65 - 99 mg/dL    Calcium 9.1 8.4 - 10.2 mg/dL    eGFR 94 ml/min/1.73sq m   Fingerstick Glucose (POCT)    Collection Time: 04/20/25  8:05 AM   Result Value Ref Range    POC Glucose 101 65 - 140 mg/dl       Administrative Statements     Counseling / Coordination of Care:   Patient's progress discussed with staff in treatment team meeting.  Medication changes reviewed with staff in treatment team meeting..    Gabriele Vicente MD 04/20/25

## 2025-04-20 NOTE — NURSING NOTE
"Patient suddenly became very anxious, tearful, holding her chest, leaning over the counter in front of TV lounge \"I'm very anxious, my chest hurts, I feel like I can't breathe.\" She could not think of a precipitant but when we began to talk about what might be some possibilities for this anxiety - she said \"well I'm missing out on a whole family weekend by being here.\" \"They will all be together and I'll be here doing nothing.\"    Suggested she might be able to have facetime with them - she will talk about this with MD in am  "

## 2025-04-21 ENCOUNTER — ANESTHESIA (INPATIENT)
Dept: PREOP/PACU | Facility: HOSPITAL | Age: 45
DRG: 751 | End: 2025-04-21
Payer: COMMERCIAL

## 2025-04-21 ENCOUNTER — TELEPHONE (OUTPATIENT)
Age: 45
End: 2025-04-21

## 2025-04-21 ENCOUNTER — ANESTHESIA EVENT (INPATIENT)
Dept: PREOP/PACU | Facility: HOSPITAL | Age: 45
DRG: 751 | End: 2025-04-21
Payer: COMMERCIAL

## 2025-04-21 ENCOUNTER — APPOINTMENT (INPATIENT)
Dept: PREOP/PACU | Facility: HOSPITAL | Age: 45
DRG: 751 | End: 2025-04-21
Attending: PSYCHIATRY & NEUROLOGY
Payer: COMMERCIAL

## 2025-04-21 DIAGNOSIS — E10.65 TYPE 1 DIABETES MELLITUS WITH HYPERGLYCEMIA (HCC): Primary | ICD-10-CM

## 2025-04-21 DIAGNOSIS — E10.65 TYPE 1 DIABETES MELLITUS WITH HYPERGLYCEMIA (HCC): ICD-10-CM

## 2025-04-21 PROBLEM — Z00.8 MEDICAL CLEARANCE FOR PSYCHIATRIC ADMISSION: Status: RESOLVED | Noted: 2017-03-30 | Resolved: 2025-04-21

## 2025-04-21 LAB
GLUCOSE SERPL-MCNC: 151 MG/DL (ref 65–140)
GLUCOSE SERPL-MCNC: 223 MG/DL (ref 65–140)
GLUCOSE SERPL-MCNC: 223 MG/DL (ref 65–140)
GLUCOSE SERPL-MCNC: 237 MG/DL (ref 65–140)
GLUCOSE SERPL-MCNC: 55 MG/DL (ref 65–140)
GLUCOSE SERPL-MCNC: 66 MG/DL (ref 65–140)
GLUCOSE SERPL-MCNC: 98 MG/DL (ref 65–140)

## 2025-04-21 PROCEDURE — 99232 SBSQ HOSP IP/OBS MODERATE 35: CPT | Performed by: PSYCHIATRY & NEUROLOGY

## 2025-04-21 PROCEDURE — 90870 ELECTROCONVULSIVE THERAPY: CPT | Performed by: PSYCHIATRY & NEUROLOGY

## 2025-04-21 PROCEDURE — 90870 ELECTROCONVULSIVE THERAPY: CPT

## 2025-04-21 PROCEDURE — 82948 REAGENT STRIP/BLOOD GLUCOSE: CPT

## 2025-04-21 PROCEDURE — GZB0ZZZ ELECTROCONVULSIVE THERAPY, UNILATERAL-SINGLE SEIZURE: ICD-10-PCS | Performed by: PSYCHIATRY & NEUROLOGY

## 2025-04-21 RX ORDER — INSULIN GLARGINE 100 [IU]/ML
INJECTION, SOLUTION SUBCUTANEOUS
Qty: 10 ML | Refills: 5 | Status: SHIPPED | OUTPATIENT
Start: 2025-04-21

## 2025-04-21 RX ORDER — SUCCINYLCHOLINE/SOD CL,ISO/PF 100 MG/5ML
SYRINGE (ML) INTRAVENOUS AS NEEDED
Status: DISCONTINUED | OUTPATIENT
Start: 2025-04-21 | End: 2025-04-21

## 2025-04-21 RX ORDER — METHOHEXITAL IN WATER/PF 100MG/10ML
SYRINGE (ML) INTRAVENOUS AS NEEDED
Status: DISCONTINUED | OUTPATIENT
Start: 2025-04-21 | End: 2025-04-21

## 2025-04-21 RX ORDER — ALBUTEROL SULFATE 0.83 MG/ML
2.5 SOLUTION RESPIRATORY (INHALATION) ONCE AS NEEDED
Status: DISCONTINUED | OUTPATIENT
Start: 2025-04-21 | End: 2025-04-21 | Stop reason: HOSPADM

## 2025-04-21 RX ORDER — SODIUM CHLORIDE, SODIUM LACTATE, POTASSIUM CHLORIDE, CALCIUM CHLORIDE 600; 310; 30; 20 MG/100ML; MG/100ML; MG/100ML; MG/100ML
75 INJECTION, SOLUTION INTRAVENOUS CONTINUOUS
Status: DISCONTINUED | OUTPATIENT
Start: 2025-04-21 | End: 2025-04-22 | Stop reason: HOSPADM

## 2025-04-21 RX ORDER — SODIUM CHLORIDE, SODIUM LACTATE, POTASSIUM CHLORIDE, CALCIUM CHLORIDE 600; 310; 30; 20 MG/100ML; MG/100ML; MG/100ML; MG/100ML
125 INJECTION, SOLUTION INTRAVENOUS CONTINUOUS
Status: DISCONTINUED | OUTPATIENT
Start: 2025-04-21 | End: 2025-04-22 | Stop reason: HOSPADM

## 2025-04-21 RX ORDER — ESCITALOPRAM OXALATE 5 MG/1
25 TABLET ORAL DAILY
Refills: 0 | Status: CANCELLED | OUTPATIENT
Start: 2025-04-22

## 2025-04-21 RX ORDER — SODIUM CHLORIDE, SODIUM LACTATE, POTASSIUM CHLORIDE, CALCIUM CHLORIDE 600; 310; 30; 20 MG/100ML; MG/100ML; MG/100ML; MG/100ML
INJECTION, SOLUTION INTRAVENOUS CONTINUOUS PRN
Status: DISCONTINUED | OUTPATIENT
Start: 2025-04-21 | End: 2025-04-21

## 2025-04-21 RX ORDER — GLYCOPYRROLATE 0.2 MG/ML
INJECTION INTRAMUSCULAR; INTRAVENOUS AS NEEDED
Status: DISCONTINUED | OUTPATIENT
Start: 2025-04-21 | End: 2025-04-21

## 2025-04-21 RX ORDER — INSULIN GLARGINE 100 [IU]/ML
15 INJECTION, SOLUTION SUBCUTANEOUS
Qty: 10 ML | Refills: 0 | Status: CANCELLED | OUTPATIENT
Start: 2025-04-21

## 2025-04-21 RX ORDER — ONDANSETRON 2 MG/ML
4 INJECTION INTRAMUSCULAR; INTRAVENOUS ONCE AS NEEDED
Status: DISCONTINUED | OUTPATIENT
Start: 2025-04-21 | End: 2025-04-21 | Stop reason: HOSPADM

## 2025-04-21 RX ORDER — ESMOLOL HYDROCHLORIDE 10 MG/ML
INJECTION INTRAVENOUS AS NEEDED
Status: DISCONTINUED | OUTPATIENT
Start: 2025-04-21 | End: 2025-04-21

## 2025-04-21 RX ADMIN — GLYCOPYRROLATE 0.2 MG: 0.2 INJECTION, SOLUTION INTRAMUSCULAR; INTRAVENOUS at 06:56

## 2025-04-21 RX ADMIN — INSULIN LISPRO 2 UNITS: 100 INJECTION, SOLUTION INTRAVENOUS; SUBCUTANEOUS at 21:52

## 2025-04-21 RX ADMIN — THIAMINE HCL TAB 100 MG 100 MG: 100 TAB at 08:31

## 2025-04-21 RX ADMIN — Medication 1 TABLET: at 08:31

## 2025-04-21 RX ADMIN — Medication 100 MG: at 07:04

## 2025-04-21 RX ADMIN — INSULIN LISPRO 5 UNITS: 100 INJECTION, SOLUTION INTRAVENOUS; SUBCUTANEOUS at 17:10

## 2025-04-21 RX ADMIN — FOLIC ACID 1 MG: 1 TABLET ORAL at 08:31

## 2025-04-21 RX ADMIN — OXYCODONE HYDROCHLORIDE AND ACETAMINOPHEN 500 MG: 500 TABLET ORAL at 08:31

## 2025-04-21 RX ADMIN — INSULIN LISPRO 5 UNITS: 100 INJECTION, SOLUTION INTRAVENOUS; SUBCUTANEOUS at 08:30

## 2025-04-21 RX ADMIN — INSULIN GLARGINE 15 UNITS: 100 INJECTION, SOLUTION SUBCUTANEOUS at 21:50

## 2025-04-21 RX ADMIN — LOPERAMIDE HYDROCHLORIDE 2 MG: 2 CAPSULE ORAL at 08:37

## 2025-04-21 RX ADMIN — Medication 80 MG: at 07:05

## 2025-04-21 RX ADMIN — INSULIN LISPRO 2 UNITS: 100 INJECTION, SOLUTION INTRAVENOUS; SUBCUTANEOUS at 17:10

## 2025-04-21 RX ADMIN — GABAPENTIN 400 MG: 400 CAPSULE ORAL at 17:09

## 2025-04-21 RX ADMIN — IBUPROFEN 800 MG: 400 TABLET, FILM COATED ORAL at 18:38

## 2025-04-21 RX ADMIN — IBUPROFEN 800 MG: 400 TABLET, FILM COATED ORAL at 08:38

## 2025-04-21 RX ADMIN — INSULIN LISPRO 5 UNITS: 100 INJECTION, SOLUTION INTRAVENOUS; SUBCUTANEOUS at 12:44

## 2025-04-21 RX ADMIN — SODIUM CHLORIDE, SODIUM LACTATE, POTASSIUM CHLORIDE, AND CALCIUM CHLORIDE 125 ML/HR: .6; .31; .03; .02 INJECTION, SOLUTION INTRAVENOUS at 06:34

## 2025-04-21 RX ADMIN — INSULIN LISPRO 2 UNITS: 100 INJECTION, SOLUTION INTRAVENOUS; SUBCUTANEOUS at 12:43

## 2025-04-21 RX ADMIN — CARIPRAZINE 3 MG: 3 CAPSULE, GELATIN COATED ORAL at 21:51

## 2025-04-21 RX ADMIN — GABAPENTIN 400 MG: 400 CAPSULE ORAL at 21:36

## 2025-04-21 RX ADMIN — LEVOTHYROXINE SODIUM 250 MCG: 0.12 TABLET ORAL at 08:37

## 2025-04-21 RX ADMIN — GABAPENTIN 400 MG: 400 CAPSULE ORAL at 08:31

## 2025-04-21 RX ADMIN — ESCITALOPRAM OXALATE 30 MG: 10 TABLET ORAL at 08:31

## 2025-04-21 RX ADMIN — CYANOCOBALAMIN TAB 1000 MCG 1000 MCG: 1000 TAB at 08:31

## 2025-04-21 RX ADMIN — INSULIN LISPRO 1 UNITS: 100 INJECTION, SOLUTION INTRAVENOUS; SUBCUTANEOUS at 08:29

## 2025-04-21 RX ADMIN — TRAZODONE HYDROCHLORIDE 150 MG: 100 TABLET ORAL at 21:51

## 2025-04-21 RX ADMIN — SODIUM CHLORIDE, SODIUM LACTATE, POTASSIUM CHLORIDE, AND CALCIUM CHLORIDE: .6; .31; .03; .02 INJECTION, SOLUTION INTRAVENOUS at 06:35

## 2025-04-21 RX ADMIN — Medication 50 MG: at 07:05

## 2025-04-21 RX ADMIN — FUROSEMIDE 20 MG: 20 TABLET ORAL at 08:32

## 2025-04-21 RX ADMIN — Medication 3 MG: at 21:51

## 2025-04-21 NOTE — PROGRESS NOTES
04/21/25 0845   Team Meeting   Meeting Type Daily Rounds   Team Members Present   Team Members Present Physician;;Nurse   Physician Team Member Annemarie   [facility] Cand[facility]   Patient/Family Present   Patient Present No   Patient's Family Present No     Pt received ECT number 3 today. Pt reported she had diarrhea. Pt has been visible and social with peers. Pt reported anxiety during the weekend. Pt was given Atarax and that was affective. Pt is requesting discharge. Pt is pending discharge tomorrow.

## 2025-04-21 NOTE — ANESTHESIA POSTPROCEDURE EVALUATION
Post-Op Assessment Note    CV Status:  Stable  Pain Score: 0    Pain management: adequate       Mental Status:  Alert and awake   Hydration Status:  Stable   PONV Controlled:  None   Airway Patency:  Patent     Post Op Vitals Reviewed: Yes    No anethesia notable event occurred.    Staff: CRNA           Last Filed PACU Vitals:  Vitals Value Taken Time   Temp     Pulse 100    /80    Resp 20    SpO2 100

## 2025-04-21 NOTE — ASSESSMENT & PLAN NOTE
Yanira Webb is a 45-year-old  female with past psychiatric history significant for major depressive disorder and ASA with panic attacks who presents to [facility] due to persistent depressive symptoms. She endorses overall dysphoric mood with anhedonia, poor sleep, weight gain despite decreased appetite, low energy, and intermittent concentration issues. She notes fleeting passive death wishes, but adamantly denies a plan and contracts for safety. She reports feeling hopeless due to several failed medication trials,  participation in multiple modalities of therapy, and previous TMS treatments without complete remission of symptoms. She was agreeable to medication adjustments and at this time is interested in ECT treatment which would likely benefit her.   Discussed adding SGA as augmentation for treatment resistant depression, patient is concerned about weight gain and declined Seroquel and Zyprexa. She has previously tried Abilify, Lamictal, and Latuda which were not effective.     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 Patient completed 3 sessions inpatient but prefers completing remaining outpatient to be with family.  Plan to discharge to weekly therapy, option to do PHP after ECT. Also CM working on establishing outpatient  for patient. She is agreeable with this plan. Discharge 4/22

## 2025-04-21 NOTE — PLAN OF CARE
Problem: Risk for Self Injury/Neglect  Goal: Verbalize thoughts and feelings  Description: Interventions:- Assess and re-assess patient's lethality and potential for self-injury- Engage patient in 1:1 interactions, daily, for a minimum of 15 minutes- Encourage patient to express feelings, fears, frustrations, hopes- Establish rapport/trust with patient   Outcome: Progressing  Goal: Refrain from harming self  Description: Interventions:- Monitor patient closely, per order- Develop a trusting relationship- Supervise medication ingestion, monitor effects and side effects   Outcome: Progressing  Goal: Recognize maladaptive responses and adopt new coping mechanisms  Outcome: Progressing     Problem: Depression  Goal: Treatment Goal: Demonstrate behavioral control of depressive symptoms, verbalize feelings of improved mood/affect, and adopt new coping skills prior to discharge  Outcome: Progressing  Goal: Refrain from isolation  Description: Interventions:- Develop a trusting relationship - Encourage socialization   Outcome: Progressing  Goal: Attend and participate in unit activities, including therapeutic, recreational, and educational groups  Description: Interventions:- Provide therapeutic and educational activities daily, encourage attendance and participation, and document same in the medical record   Outcome: Progressing     Problem: Anxiety  Goal: Anxiety is at manageable level  Description: Interventions:- Assess and monitor patient's anxiety level. - Monitor for signs and symptoms (heart palpitations, chest pain, shortness of breath, headaches, nausea, feeling jumpy, restlessness, irritable, apprehensive). - Collaborate with interdisciplinary team and initiate plan and interventions as ordered.- [place] patient to unit/surroundings- Explain treatment plan- Encourage participation in care- Encourage verbalization of concerns/fears- Identify coping mechanisms- Assist in developing anxiety-reducing skills-  Administer/offer alternative therapies- Limit or eliminate stimulants  Outcome: Progressing     Problem: Electroconvulsive therapy (ECT)  Goal: Treatment Goal: Demonstrate a reduction of confusion and improved orientation to person, place, time and/or situation upon discharge, according to optimum baseline/ability  Outcome: Progressing     Problem: Ineffective Coping  Goal: Participates in unit activities  Description: Interventions:- Provide therapeutic environment - Provide required programming - Redirect inappropriate behaviors   Outcome: Progressing

## 2025-04-21 NOTE — ASSESSMENT & PLAN NOTE
Per [facility]  Continue Levothyroxine  Lab Results   Component Value Date    [id number] 1.057 04/12/2025    FREET4 0.93 04/17/2024    FREET4 0.95 04/17/2024

## 2025-04-21 NOTE — DISCHARGE SUMMARY
Discharge Summary - Behavioral Health   Name: Clarissa Webb 45 y.o. female I MRN: [id number]  Unit/Bed#: -01 I Date of Admission: 4/11/2025   Date of Service: 4/22/2025 I Hospital Day: 11    Assessment & Plan  Recurrent major depressive disorder (HCC)  Yanira Webb is a 45-year-old  female with past psychiatric history significant for major depressive disorder and ASA with panic attacks who presents to [facility] [place] [facility] due to persistent depressive symptoms. She endorses overall dysphoric mood with anhedonia, poor sleep, weight gain despite decreased appetite, low energy, and intermittent concentration issues. She notes fleeting passive death wishes, but adamantly denies a plan and contracts for safety. She reports feeling hopeless due to several failed medication trials,  participation in multiple modalities of therapy, and previous TMS treatments without complete remission of symptoms. She was agreeable to medication adjustments and at this time is interested in ECT treatment which would likely benefit her.   Discussed adding SGA as augmentation for treatment resistant depression, patient is concerned about weight gain and declined Seroquel and Zyprexa. She has previously tried Abilify, Lamictal, and Latuda which were not effective.     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 Patient completed 3 sessions inpatient but prefers completing remaining outpatient  to be with family.  Plan to discharge to weekly therapy, option to do PHP after ECT. Also CM working on establishing outpatient  for patient. She is agreeable with this plan. Discharge 4/22         Generalized anxiety disorder with panic attacks  See principal problem for treatment recommendations  Report baseline excessive level of worry with increased fatigue, generalized body aches, and nausea, worsening since October  Experiencing frequent, unprovoked panic attacks consisting of diaphoresis, palpitations, dyspnea, and crying spells - children have wanted to call the ambulance multiple times for these attacks  ADHD (attention deficit hyperactivity disorder)  Per history   PDMP reviewed  Prescribed Vyvanse 50 mg daily, last filled 30-day prescription on 3/25/2025  Patient aware that medication will not be prescribed while in hospital  Hypothyroidism due to Hashimoto's thyroiditis  Per SLIM  Continue Levothyroxine  Lab Results   Component Value Date    GVR2SXYQVBAR 1.057 04/12/2025    FREET4 0.93 04/17/2024    FREET4 0.95 04/17/2024     H/O gastric bypass  Per SL[facility]  Type 1 diabetes mellitus with hyperglycemia (HCC)  Lab Results   Component Value Date    HGBA1C 8.7 (A) 03/05/2025       Recent Labs     04/21/25  1214 04/21/25  1701 04/21/25  2002 04/22/25  0803   POCGLU 223* 223* 237* 233*       Blood Sugar Average: Last 72 hrs:  (P) [id number]  Per SLIM  Alcohol use  [facility] protocol     Hyponatremia (Resolved: 4/22/2025)  Recent Labs     04/20/25  0541   SODIUM 139     Corrected for hyperglycemia, 135, on admission  Repeat WNL   Continue to monitor    Admission Date: 4/11/2025       Discharge Date: 4/22/2025 11:51 AM  Attending Psychiatrist: No att. providers found    Admission Diagnosis:  Principal Problem:    Recurrent major depressive disorder (HCC)  Active Problems:    Generalized anxiety disorder with panic attacks    Hypothyroidism due to Hashimoto's thyroiditis    H/O gastric bypass     Type 1 diabetes mellitus with hyperglycemia (HCC)    Alcohol use    ADHD (attention deficit hyperactivity disorder)    Discharge Diagnosis:   Principal Problem:    Recurrent major depressive disorder (HCC)  Active Problems:    Generalized anxiety disorder with panic attacks    Hypothyroidism due to Hashimoto's thyroiditis    H/O gastric bypass    Type 1 diabetes mellitus with hyperglycemia (HCC)    Alcohol use    ADHD (attention deficit hyperactivity disorder)  Resolved Problems:    Medical clearance for psychiatric admission    Hyponatremia    Medical Problems       Resolved Problems  Date Reviewed: 4/22/2025          Resolved    Medical clearance for psychiatric admission 4/21/2025     Resolved by  Winsome Henry DO    Overview Signed 2/4/2021  5:33 PM by JAROCHO Bach   Last Assessment & Plan:   WWE  diet/exercise reviewed  Calcium/vit D reviewed  contraceptive options reviewed  Pap due 2018         Hyponatremia 4/22/2025     Resolved by  Winsome Henry DO          Reason for Admission/HPI:     Discharge Summary - Behavioral Health   Name: Clarissa Webb 45 y.o. female I MRN: [id number]  Unit/Bed#: -01 I Date of Admission: 4/11/2025   Date of Service: 4/21/2025 I Hospital Day: 10    Assessment & Plan  Recurrent major depressive disorder (HCC)  Yanira Webb is a 45-year-old  female with past psychiatric history significant for major depressive disorder and ASA with panic attacks who presents to [facility] due to persistent depressive symptoms. She endorses overall dysphoric mood with anhedonia, poor sleep, weight gain despite decreased appetite, low energy, and intermittent concentration issues. She notes fleeting passive death wishes, but adamantly denies a plan and contracts for safety. She reports feeling hopeless due to several failed medication trials,  participation in multiple modalities of therapy, and previous TMS treatments without complete remission of symptoms. She was agreeable  to medication adjustments and at this time is interested in ECT treatment which would likely benefit her.   Discussed adding SGA as augmentation for treatment resistant depression, patient is concerned about weight gain and declined Seroquel and Zyprexa. She has previously tried Abilify, Lamictal, and Latuda which were not effective.      Treatment and medication recommendations as follows, no changes as of 4/12/2025:   Consented and ordered for initiation of ECT   NPO after midnight  Continue Trazodone to 150 mg at bed time to improve sleep  Continue Gabapentin 400 mg three times daily for anxiety, neuropathy, and alcohol cravings   Hold evenings before ECT  Decrease Lexapro from 25 mg daily to 20 mg daily in outpatient setting for depressive symptoms.    msec   Continue Vraylar 3 mg HS for treatment resistant MDD  Patient initiated on ECT, completed first session 9/16. Patient completed 3 sessions inpatient but prefers completing remaining outpatient to be with family.  Plan to discharge to weekly therapy, option to do PHP after ECT. Also CM working on establishing outpatient  for patient. She is agreeable with this plan. Discharge 4/22  Patient with intake to [facility] for weekly therapy.           Generalized anxiety disorder with panic attacks  See principal problem for treatment recommendations  Report baseline excessive level of worry with increased fatigue, generalized body aches, and nausea, worsening since October  Experiencing frequent, unprovoked panic attacks consisting of diaphoresis, palpitations, dyspnea, and crying spells - children have wanted to call the ambulance multiple times for these attacks  ADHD (attention deficit hyperactivity disorder)  Per history   PDMP reviewed  Prescribed Vyvanse 50 mg daily, last filled 30-day prescription on 3/25/2025  Patient aware that medication will not be prescribed while in hospital  Hypothyroidism due to Hashimoto's thyroiditis  Per  SLIM  Continue Levothyroxine        Lab Results   Component Value Date     TJB4VMORGORN 1.057 04/12/2025     FREET4 0.93 04/17/2024     FREET4 0.95 04/17/2024      H/O gastric bypass  Per SLIM  Type 1 diabetes mellitus with hyperglycemia (HCC)        Lab Results   Component Value Date     HGBA1C 8.7 (A) 03/05/2025                Recent Labs     04/21/25  1034 04/21/25  1055 04/21/25  1113 04/21/25  1214   POCGLU 55* 66 98 223*         Blood Sugar Average: Last 72 hrs:  (P) 152.1  Per SLIM  Alcohol use  [facility] protocol      Hyponatremia      Recent Labs     04/20/25  0541   SODIUM 139      Corrected for hyperglycemia, 135, on admission  Repeat WNL   Continue to monitor        Admission Date: 4/11/2025 2016  Discharge Date: 04/21/25  Admitting Diagnosis: Major depressive disorder [F32.9]  Anxiety [F41.9]  Severe depression (HCC) [F32.2]  Encounter for psychological evaluation [Z00.8]  Discharge Diagnosis:   Medical Problems         Resolved Problems  Date Reviewed: 4/21/2025            Resolved     Medical clearance for psychiatric admission 4/21/2025       Resolved by  Winsome Henry DO     Overview Signed 2/4/2021  5:33 PM by JAROCHO Bach   Last Assessment & Plan:   WWE  diet/exercise reviewed  Calcium/vit D reviewed  contraceptive options reviewed  Pap due 2018               Dr Renate Pond DO     HPI: Yanira Webb is a 45 y.o. overtly appearing , , female domiciled in a home with her two children, with a history of major depressive disorder, alcohol use disorder, generalized anxiety disorder who was admitted to the inpatient psychiatric unit on a voluntary 201 commitment basis due to depression.     Symptoms prior to admission included worsening depression, fleeting passive death wishes, hopelessness, helplessness, poor concentration, poor appetite, difficulty sleeping, weight gain, anxiety symptoms, anxiety attacks, and difficulty attending to activities of daily living. Onset of  "symptoms was gradual starting several months ago with progressively worsening course since that time. Stressors preceding admission included alcohol use problems, anniversary of 's death, children experiencing difficulties, financial problems, job stress, recent move, everyday stressors, ongoing anxiety, and chronic mental illness.     Per ED crisis worker, Yamel Yates on 2025:  \"Pt presented to the ED as a self-referral at the recommendation of the [facility] for depression, passive SI, and ECT evaluation. Pt is tearful, but calm and cooperative. Pt reports her symptoms began and have been worsening over the last 5 years since her   by suicide. She has gone through several medication options as an outpatient with unsuccess. She had TMS that was the most improvement she felt, but it was short-lived. Pt reports that she was recently at [facility] for 2 weeks and had a terrible experience/signed out AMA due to it not being helpful and not addressing her depression. Although she denies an outright suicide plan, she wishes she would fall asleep and not wake up. She has not been able to sleep well and typically will take extra of her PM medications to help with sleep, but then she is unable to function well in the morning. She has had poor appetite, but her glucose levels still fluctuate. She feels like a failure to her 2 children who have already lost their father, and have a mother than cannot function. She is typically able to show up to work, but it has become noticeable that she is not functioning well. 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 Pt has been told by providers that she would be a good candidate for ECt, but has never been evaluated further for it to begin the process. She is established with outpatient providers through [email or web address], but has only been able to meet with them monthly due to limited staff. Pt has isolated herself " "from family and friends. She has had difficulty completing ADLs. She typically stays in bed most of the day when home and not completing the laundry/household duties. Pt does not feel she could remain safe at home with the decreased level of functioning she is at. She was explained and understood voluntary inpatient treatment. ED provider is in agreement with treatment plan.\"     On initial evaluation after admission to the inpatient psychiatric unit Yanira is calm and cooperative, albeit tearful at times. She reports that she has been struggling with her depression for 5 years now since the death of her , who successfully completed suicide. She has been in active psychiatric treatment since then and obtained proper medication management and therapeutic modalities, including TMS. She notes some periods of improvement during this time, but that her symptoms always return. Since October 2024, she has had progressively worsening symptoms. She switched jobs earlier in 2024 which resulting in a pay cut and eventually a move in October. She notes ongoing financial stress, difficulty supporting her daughter who is being bullied, and ongoing guilty regarding her late 's suicide attempt. She presented to  as she is interested in pursuing ECT.      She describes her overall mood as \"sad\" and she feels like a failure. She feels like she is just going through the motions and has been isolating herself from her friends and family. She no longer experiences enjoyment in things and has difficulty going to the grocery store or even cooking for her children. Her sleep is suffering and she sleeps approximately 4 hours a night at the maximum. She feels extremely guilty about not having the motivation to do anything and endorses feelings of hopelessness and helplessness with frequent crying spells. She reports that she has zero energy, evidenced by her previously keeping a clean household and her not feeling motivated " to clean for months. She states that since her move, she has not made her new house feel like a home. Her appetite is extremely poor and her children celebrate when she is able to eat something. Despite this, she has gained 20 pounds in about 5 years and feels very down on herself for this. Her concentration fluctuates, but she notes she is prescribed Vyvanse which is effectively treating this. She notes fleeting passive death wishes, but denied any active plan. However, she does note a suicidal gesture two weeks ago when she took extra Trazodone to help with sleep and felt like she did not care if she didn't wake up. She endorses ongoing anxiety accompanied by excessive worrying about various topics, restlessness/feeling on edge, nausea, muscle tension, fatigue, lasting longer than 6 months. She also describes weekly unprovoked panic attacks that have previously lead her children to ask if they could call the ambulance for her. When she has a panic attacks, she experiences crying spells, diaphoresis, dyspnea, and palpitations.      Clarissa RAMIREZ Jeimydaphne denies history of manic or hypomanic episodes, including distractibility, impulsive behavior, grandiosity, flight of ideas, increased goal oriented behavior, decreased need for sleep, or talkativeness. Clarissa denies historical symptomatology suggestive of an underlying psychotic process. Clarissa does not currently endorse acute perceptual disturbances such as A/V hallucinations, paranoia, referential ideation, or delusions. Clarissa denies acute and chronic Schneiderian symptoms, including: thought-broadcasting, thought-insertion, thought-withdrawal or audible thoughts. During today's evaluation, Clarissa does not exhibit objective evidence of mehdi psychosis as the patient does not appear internally preoccupied or easily distracted. Clarissa's thoughts are organized, linear, and reality-based. Clarissa denies historical symptomatology suggestive of PTSD, OCD,  or disordered eating.        Past Medical History:   Diagnosis Date    Anemia     Anxiety     B12 deficiency     Cervical disc disorder     On gabapentin     Depression     Diabetes mellitus (HCC)     Disease of thyroid gland     hypothyroid    Iron deficiency anemia     Panic attack     PTSD (post-traumatic stress disorder)     Sleep difficulties     Substance abuse (HCC)     Type 1 diabetes (HCC)     Vitamin D deficiency      Past Surgical History:   Procedure Laterality Date    ABDOMINAL SURGERY      gastric bypass     SECTION      x2    CHOLECYSTECTOMY  2018    GASTRIC BYPASS  2007    INTRAUTERINE DEVICE INSERTION  2015    IR BIOPSY BONE MARROW  2020    OTHER SURGICAL HISTORY      surgery for imperforate hymen/endometriosis/hydrometrocolpos    ROOT CANAL  2024    TUBAL LIGATION      WISDOM TOOTH EXTRACTION       No Known Allergies    Objective :  Temp:  [97.9 °F (36.6 °C)-98.1 °F (36.7 °C)] 97.9 °F (36.6 °C)  HR:  [71-80] 71  BP: (127-140)/(79-81) 127/79  Resp:  [16] 16  SpO2:  [98 %] 98 %  O2 Device: None (Room air)    Hospital Course: She was initially admitted with symptoms of depression including anhedonia, poor sleep, weight gain, low energy, and intermittent concentration issues.  Patient did have initially fleeting passive death wishes without a plan.  Patient had failed several previous medication trials and multiple modalities including therapy and TMS treatments.  She was admitted on 30 mg of Lexapro which was titrated down to 20 mg prior to discharge.  Due to treatment resistant depression initiated new agent Vraylar which was uptitrated to 3 mg and effective in improving patient's depressive symptoms.  Also patient was initiated on ECT that was well-tolerated throughout her inpatient stay.  Patient completed 3 sessions of ECT and will continue receiving ECT in the outpatient setting.  Patient's gabapentin was switched from 1 dose at night to 400 mg 3 times daily to help  anxious symptoms, address alcohol cravings, and address complaints of neuropathy.  Patient reports feeling more hopeful throughout her stay.  She is denying any SI multiple days prior to discharge.  She reports improvement in her anxious symptoms.  Patient had brief moment of anxiety that was resolved when she spoke to her children on a Zoom call.  She was seen by medical team and cleared upon admission.      At the time of discharge patient was denying any passive or active SI and denying any HI.  She reports significant improvement in her mood symptoms with current medication regimen and ECT sessions.  Patient is looking forward to going back to being with her children at home.  She was also provided with an outpatient case management to address financial stressors at home.  Patient will be attending appointment for intake at [facility].  She is interested in getting weekly therapy which is provided at this location.  Patient will also be following up with her rheumatologist to address iron deficiency and receive iron infusions.       Mental Status at Time of Discharge:     Appearance:  Overtly appearing  female, age appropriate, casually dressed, adequate grooming, dressed appropriately   Behavior:  normal, pleasant, cooperative   Speech:  normal rate, normal volume, normal pitch   Mood:  normal, euthymic   Affect:  normal range and intensity, appropriate, mood-congruent   Thought Process:  organized, goal directed, logical, coherent   Thought Content:  no overt delusions   Perceptual Disturbances: denies auditory hallucinations when asked, does not appear responding to internal stimuli   Risk Potential: Suicidal Ideation -   denies at present  Homicidal Ideation -   denies at present  Potential for Aggression - Not at present   Sensorium:  oriented to person, place, and time/date   Memory:  recent and remote memory grossly intact   Consciousness:  alert and awake   Attention/Concentration: attention  span and concentration are age appropriate   Insight:  good   Judgment: good   Gait/Station: normal gait/station   Motor Activity: no abnormal movements     Suicide/Homicide Risk Assessment:    Risk of Harm to Self:   The following ratings are based on assessment at the time of the interview and observation over the last several days  Nursing Suicide Risk Assessment Last 24 hours: C-SSRS Risk (Since Last Contact)  Calculated C-SSRS Risk Score (Since Last Contact): No Risk Indicated  Demographic Risk Factors include: ,  status  Historical Risk Factors include: chronic depression, chronic depressive symptoms, history of depression, history of suicidal behaviors, alcohol use  Current Specific Risk Factors include: recent inpatient psychiatric admission - being discharged today, recent suicidal gesture, diagnosis of depression, chronic depressive symptoms  Protective Factors: no current suicidal ideation, no current depressive symptoms, stable mood, improved mood, ability to adapt to change, ability to make plans for the future, family support established, being a parent, compliant with medications, compliant with mental health treatment, stable job, stable housing, safe and stable living environment, connection to own children, having a desire to be alive, medical compliance, restricted access to lethal means, sobriety, strong relationships, supportive family, ability to contract for safety with staff, ability to communicate with staff  Weapons/Firearms: none. The following steps have been taken to ensure weapons are properly secured: not applicable  Based on today's assessment, Yanira presents the following risk of harm to self: minimal    Risk of Harm to Others:  The following ratings are based on  assessment at the time of the interview and observation over the last several days  Nursing Homicide Risk Assessment: Violence Risk to Others: Denies within past 6 months  Demographic Risk Factors include:  none  Historical Risk Factors include: alcohol abuse  Current Specific Risk Factors include: none  Protective Factors: no current homicidal ideation, good impulse control, improved mood, no current psychotic symptoms, compliant with medications, compliant with mental health treatment, willing to continue psychiatric treatment, willing to remain free from substance use, outpatient psychiatric follow up established, ability to adapt to change, able to manage anger well, access to mental health treatment, stable living environment, safe and stable living environment, good support system, supportive family, supportive friends, responsibilities and duties to others, being a parent, connection to own children, restricted access to lethal means  Weapons/Firearms: none. The following steps have been taken to ensure weapons are properly secured: not applicable  Based on today's assessment, Yanira presents the following risk of harm to others: minimal    The following interventions are recommended: outpatient follow up with a psychiatrist, outpatient follow up with a therapist, continuing ECT in outpatient setting. Appointments established per case management      Lab Results: I have reviewed the following results:  Most Recent Labs:   Lab Results   Component Value Date    WBC 2.90 (L) 04/12/2025    RBC 3.89 04/12/2025    HGB 10.9 (L) 04/12/2025    HCT 34.7 (L) 04/12/2025     04/12/2025    RDW 12.2 04/12/2025    NEUTROABS 0.91 (L) 04/12/2025    TOTANEUTABS 8.62 (H) 10/21/2023    SODIUM 139 04/20/2025    K 4.1 04/20/2025     04/20/2025    CO2 29 04/20/2025    BUN 14 04/20/2025    CREATININE 0.77 04/20/2025    GLUC 114 04/20/2025    CALCIUM 9.1 04/20/2025    AST 17 04/13/2025    ALT 15 04/13/2025    ALKPHOS 86 04/13/2025    TP 6.1 (L) 04/13/2025    ALB 3.6 04/13/2025    TBILI 0.34 04/13/2025    CHOLESTEROL 184 04/12/2025     04/12/2025    TRIG 138 04/12/2025    LDLCALC 38 04/12/2025    NONHDLC 66 04/12/2025     CYQ0YDTDEZQP 1.057 04/12/2025    FREET4 0.93 04/17/2024    FREET4 0.95 04/17/2024    T3FREE 2.58 05/07/2021    PREGUR negative 11/30/2020    PREGSERUM Negative 04/12/2025    TREPONEMAPA Non-reactive 04/12/2025    HGBA1C 8.7 (A) 03/05/2025     04/17/2024       Discharge Medications:    Home Medications After Discharge    Scheduled   ascorbic acid (VITAMIN C) 500 MG tablet, Take 1 tablet (500 mg total) by mouth daily   cariprazine (VRAYLAR) 3 MG capsule, Take 1 capsule (3 mg total) by mouth daily at bedtime   Continuous Blood Gluc  ([employer] Day [place]) KELVIN, Use 1 Units in the morning   cyanocobalamin (VITAMIN B-12) 1000 MCG tablet, Take 1 tablet (1,000 mcg total) by mouth daily   ergocalciferol (VITAMIN D2) 50,000 units, Take 1 capsule (50,000 Units total) by mouth once a week   escitalopram (LEXAPRO) 20 mg tablet, Take 1 tablet (20 mg total) by mouth daily   ferrous sulfate 325 (65 Fe) mg tablet, Take 1 tablet (325 mg total) by mouth every other day   folic acid (FOLVITE) 1 mg tablet, Take 1 tablet (1 mg total) by mouth daily   gabapentin (NEURONTIN) 400 mg capsule, Take 1 capsule (400 mg total) by mouth 3 (three) times a day   levothyroxine 125 mcg tablet, Take 2 tablets (250 mcg total) by mouth daily in the early morning   lisdexamfetamine (VYVANSE) 50 MG capsule, Take 50 mg by mouth every morning   thiamine 100 MG tablet, Take 1 tablet (100 mg total) by mouth daily   traZODone (DESYREL) 150 mg tablet, Take 1 tablet (150 mg total) by mouth daily at bedtime   valACYclovir (VALTREX) 500 mg tablet, Take 1 tablet (500 mg total) by mouth daily    No Frequency   Accu-Chek FastClix Lancets MISC, USE 1 LANCET TO TEST BLOOD SUGAR UP TO 6 TIMES DAILY   acetaminophen (TYLENOL) 500 mg tablet, Starting Sun 5/2/2021, Historical Med   Continuous Glucose Sensor (FreeStyle Samantha 2 Sensor) MISC, USE 1 UNIT EVERY 14 DAYS   glucose blood (Accu-Chek Guide) test strip, Use to test blood sugar up to 6 times  "daily.   Insulin Aspart (NovoLOG) 100 units/mL injection, Sliding scale up to 3 times per day with meals. Max 30 units per day   Insulin Pen Needle (BD Pen Needle Deanna U/F) 32G X 4 MM MISC, Use 4/day   Lantus 100 UNIT/ML subcutaneous injection, INJECT 15 UNITS UNDER THE SKIN DAILY. DISCARD AFTER 28 DAYS.   UltiCare Insulin Syringe 28G X 1/2\" 1 ML MISC, INJECT UNDER THE SKIN 4 (FOUR) TIMES A DAY   Vivitrol 380 MG SUSR, 380 mg    Discharge instructions/Information to patient and family:   See After Visit Summary for information provided to patient and family.      Provisions for Follow-Up Care:  See after visit summary for information related to follow-up care and any pertinent home health orders.      Discharge Statement:    I have spent a total time of 35 minutes in caring for this patient on the day of the visit/encounter.   >30 minutes of time was spent on: Diagnostic results, Prognosis, Risks and benefits of tx options, Instructions for management, Patient and family education, Importance of tx compliance, Risk factor reductions, Impressions, Counseling / Coordination of care, Documenting in the medical record, Reviewing / ordering tests, medicine, procedures  , and Communicating with other healthcare professionals .  I reviewed with Yanira importance of compliance with medications and outpatient treatment after discharge.    Discharge on Two Antipsychotic Medications: No    Portions of the record may have been created with voice recognition software. Occasional wrong word or \"sound a like\" substitutions may have occurred due to the inherent limitations of voice recognition software. Read the chart carefully and recognize, using context, where substitutions have occurred.    Winsome Henry DO 04/22/25   "

## 2025-04-21 NOTE — ASSESSMENT & PLAN NOTE
Per SLIM  Continue Levothyroxine  Lab Results   Component Value Date    [id number] 1.057 04/12/2025    FREET4 0.93 04/17/2024    FREET4 0.95 04/17/2024

## 2025-04-21 NOTE — PROGRESS NOTES
04/21/25 1155   Activity/Group Checklist   Group Admission/Discharge  (relapse prevention plan)   Attendance Attended   Attendance Duration (min) 0-15   Interactions Interacted appropriately   Affect/Mood Appropriate   Goals Achieved Identified feelings;Identified triggers;Identified relapse prevention strategies;Identified medication adherence strategies;Discussed safety plan;Discussed coping strategies;Discussed discharge plans;Identified resources and support systems;Able to listen to others;Able to reflect/comment on own behavior;Able to self-disclose;Able to recieve feedback     Relapse prevention plan completed with pt. Pt pleasant and cooperative. Pt completed plan appropriately with minimal prompting. Pt looking forward to discharge and appears future oriented.

## 2025-04-21 NOTE — NURSING NOTE
Patient arrived back on the unit from ECT. Cooperative and pleasant. C/O 6/10 headache. States she will like a PRN for the headache after she gets washed up.  No other complaints as of this time.

## 2025-04-21 NOTE — NURSING NOTE
Patient has been about the unit. Appropriate, attending groups. Denies symptoms and is looking forward to discharge tomorrow.

## 2025-04-21 NOTE — SOCIAL WORK
Cm called Friend- Avi [phone]. He reported that when Pt is not doing well she will call or text him multiple times. He reported that he is going to help her figure out her bills to try to reduce stressors. He stated that Pt sounds better in recent conversations and that he is looking forward to Pt coming home. Friend reported that he would be going to Pt's home and making sure that it was clean and that all alcohol was removed before she gets home tomorrow.    Avi reported that there is no access to firearms in the home.

## 2025-04-21 NOTE — ANESTHESIA POSTPROCEDURE EVALUATION
Post-Op Assessment Note    CV Status:  Stable    Pain management: adequate       Mental Status:  Alert and awake   Hydration Status:  Euvolemic   PONV Controlled:  Controlled   Airway Patency:  Patent     Post Op Vitals Reviewed: Yes    No anethesia notable event occurred.    Staff: Anesthesiologist           Last Filed PACU Vitals:  Vitals Value Taken Time   Temp 97.6 °F (36.4 °C) 04/21/25 0810   Pulse 81 04/21/25 0810   /89 04/21/25 0810   Resp 16 04/21/25 0810   SpO2 98 % 04/21/25 0810       Modified Eleuterio:     Vitals Value Taken Time   Activity 2 04/21/25 0727   Respiration 2 04/21/25 0727   Circulation 2 04/21/25 0727   Consciousness 2 04/21/25 0727   Oxygen Saturation 2 04/21/25 0727     Modified Eleuterio Score: 10

## 2025-04-21 NOTE — DISCHARGE INSTR - AVS FIRST PAGE
You have appointments for the ECT.  The following are the dates were you have to attend ECT at [facility]: 4/23, 4/25, 4/28, 4/30, 5/2, 5/5, 5/7, 5/9, 5/12.    Please take care to not take anything by mouth after midnight the night before your appointments.  Also do not take your evening gabapentin the night prior to your ECT appointment. Please arrive to your appointment no later than 5:00 am.     Please follow up with your hematologist to establish schedule for iron infusions.

## 2025-04-21 NOTE — ASSESSMENT & PLAN NOTE
Yanira Webb is a 45-year-old  female with past psychiatric history significant for major depressive disorder and ASA with panic attacks who presents to [facility] due to persistent depressive symptoms. She endorses overall dysphoric mood with anhedonia, poor sleep, weight gain despite decreased appetite, low energy, and intermittent concentration issues. She notes fleeting passive death wishes, but adamantly denies a plan and contracts for safety. She reports feeling hopeless due to several failed medication trials,  participation in multiple modalities of therapy, and previous TMS treatments without complete remission of symptoms. She was agreeable to medication adjustments and at this time is interested in ECT treatment which would likely benefit her.   Discussed adding SGA as augmentation for treatment resistant depression, patient is concerned about weight gain and declined Seroquel and Zyprexa. She has previously tried Abilify, Lamictal, and Latuda which were not effective.     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 Patient completed 3 sessions inpatient but prefers completing remaining outpatient to be with family.  Plan to discharge to weekly therapy, option to do PHP after ECT. Also CM working on establishing outpatient  for patient. She is agreeable with this plan. Discharge 4/22

## 2025-04-21 NOTE — TELEPHONE ENCOUNTER
Randi calling in from [facility] requesting to set up iron infusions for patient, patient last seen in 2021. Scheduled consult for patient with Dr. Maya per pt's request - pt will need labs entered.    Patient is being d/c from behavioral health unit tomorrow.    Thank you!

## 2025-04-21 NOTE — ASSESSMENT & PLAN NOTE
Per SLIM  Continue Levothyroxine  Lab Results   Component Value Date    [id number] 1.057 04/12/2025    FREET4 0.93 04/17/2024    FREET4 0.95 04/17/2024

## 2025-04-21 NOTE — ASSESSMENT & PLAN NOTE
Lab Results   Component Value Date    HGBA1C 8.7 (A) 03/05/2025       Recent Labs     04/21/25  1214 04/21/25  1701 04/21/25 2002 04/22/25  0803   POCGLU 223* 223* 237* 233*       Blood Sugar Average: Last 72 hrs:  (P) 164.6765985873064692  Per SLIM

## 2025-04-21 NOTE — ANESTHESIA PREPROCEDURE EVALUATION
Procedure:  ELECTROCONVULSIVE THERAPY (ECT)    Relevant Problems   ENDO   (+) Hypothyroidism   (+) Hypothyroidism due to Hashimoto's thyroiditis   (+) Type 1 diabetes mellitus with hyperglycemia (HCC)      /RENAL   (+) THO (acute kidney injury) (HCC)      HEMATOLOGY   (+) Iron deficiency anemia   (+) Iron deficiency anemia, unspecified      NEURO/PSYCH   (+) Generalized anxiety disorder with panic attacks   (+) Recurrent major depressive disorder (HCC)      Lab Results   Component Value Date     05/16/2018    SODIUM 139 04/20/2025    K 4.1 04/20/2025     04/20/2025    CO2 29 04/20/2025    ANIONGAP 11.3 05/16/2018    AGAP 7 04/20/2025    BUN 14 04/20/2025    CREATININE 0.77 04/20/2025    GLUC 114 04/20/2025    GLUF 114 (H) 04/20/2025    CALCIUM 9.1 04/20/2025    AST 17 04/13/2025    ALT 15 04/13/2025    ALKPHOS 86 04/13/2025    PROT 6.8 05/16/2018    TP 6.1 (L) 04/13/2025    BILITOT 0.4 05/16/2018    TBILI 0.34 04/13/2025    EGFR 94 04/20/2025     Lab Results   Component Value Date    WBC 2.90 (L) 04/12/2025    HGB 10.9 (L) 04/12/2025    HCT 34.7 (L) 04/12/2025    MCV 89 04/12/2025     04/12/2025       Physical Exam    Airway    Mallampati score: II  TM Distance: >3 FB  Neck ROM: full     Dental       Cardiovascular      Pulmonary      Other Findings  post-pubertal.      Anesthesia Plan  ASA Score- 2     Anesthesia Type- general with ASA Monitors.         Additional Monitors:     Airway Plan:            Plan Factors-Exercise tolerance (METS): >4 METS.    Chart reviewed. EKG reviewed. Imaging results reviewed. Existing labs reviewed. Patient summary reviewed.                  Induction- intravenous.    Postoperative Plan-     Perioperative Resuscitation Plan - Level 1 - Full Code.       Informed Consent- Anesthetic plan and risks discussed with patient.  I personally reviewed this patient with the CRNA. Discussed and agreed on the Anesthesia Plan with the CRNA..      NPO Status:  Vitals Value  Taken Time   Date of last liquid 04/20/25 04/21/25 0453   Time of last liquid 2200 04/21/25 0453   Date of last solid 04/20/25 04/21/25 0453   Time of last solid 2200 04/21/25 0453

## 2025-04-21 NOTE — PROGRESS NOTES
Progress Note - Behavioral Health   Name: Clarissa Webb 45 y.o. female I MRN: [id number]  Unit/Bed#: -01 I Date of Admission: 4/11/2025   Date of Service: 4/21/2025 I Hospital Day: 10    Assessment & Plan  Recurrent major depressive disorder (HCC)  Yanira Webb is a 45-year-old  female with past psychiatric history significant for major depressive disorder and ASA with panic attacks who presents to [facility] due to persistent depressive symptoms. She endorses overall dysphoric mood with anhedonia, poor sleep, weight gain despite decreased appetite, low energy, and intermittent concentration issues. She notes fleeting passive death wishes, but adamantly denies a plan and contracts for safety. She reports feeling hopeless due to several failed medication trials,  participation in multiple modalities of therapy, and previous TMS treatments without complete remission of symptoms. She was agreeable to medication adjustments and at this time is interested in ECT treatment which would likely benefit her.   Discussed adding SGA as augmentation for treatment resistant depression, patient is concerned about weight gain and declined Seroquel and Zyprexa. She has previously tried Abilify, Lamictal, and Latuda which were not effective.     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 Patient completed 3 sessions inpatient but prefers completing remaining outpatient to be  with family.  Plan to discharge to weekly therapy, option to do PHP after ECT. Also CM working on establishing outpatient  for patient. She is agreeable with this plan. Discharge 4/22         Generalized anxiety disorder with panic attacks  See principal problem for treatment recommendations  Report baseline excessive level of worry with increased fatigue, generalized body aches, and nausea, worsening since October  Experiencing frequent, unprovoked panic attacks consisting of diaphoresis, palpitations, dyspnea, and crying spells - children have wanted to call the ambulance multiple times for these attacks  ADHD (attention deficit hyperactivity disorder)  Per history   PDMP reviewed  Prescribed Vyvanse 50 mg daily, last filled 30-day prescription on 3/25/2025  Patient aware that medication will not be prescribed while in hospital  Hypothyroidism due to Hashimoto's thyroiditis  Per SLIM  Continue Levothyroxine  Lab Results   Component Value Date    [id number] 1.057 04/12/2025    FREET4 0.93 04/17/2024    FREET4 0.95 04/17/2024     Medical clearance for psychiatric admission  Per SLIM  H/O gastric bypass  Per SLIM  Type 1 diabetes mellitus with hyperglycemia (HCC)  Lab Results   Component Value Date    HGBA1C 8.7 (A) 03/05/2025       Recent Labs     04/21/25  1034 04/21/25  1055 04/21/25  1113 04/21/25  1214   POCGLU 55* 66 98 223*       Blood Sugar Average: Last 72 hrs:  (P) 152.1  Per SLIM  Alcohol use  [facility] protocol     Hyponatremia  Recent Labs     04/20/25  0541   SODIUM 139     Corrected for hyperglycemia, 135, on admission  Repeat WNL   Continue to monitor    Current Medications:    Current Facility-Administered Medications:     ascorbic acid (VITAMIN C) tablet 500 mg, Oral, Daily    cariprazine (VRAYLAR) capsule 3 mg, Oral, HS    cyanocobalamin (VITAMIN B-12) tablet 1,000 mcg, Oral, Daily    ergocalciferol (VITAMIN D2) capsule 50,000 Units, Oral, Weekly    [START ON 4/22/2025] escitalopram  (LEXAPRO) tablet 25 mg, Oral, Daily **AND** [DISCONTINUED] escitalopram (LEXAPRO) tablet 10 mg, Oral, Daily    ferrous sulfate tablet 325 mg, Oral, Every Other Day    folic acid (FOLVITE) tablet 1 mg, Oral, Daily    gabapentin (NEURONTIN) capsule 400 mg, Oral, TID    insulin glargine (LANTUS) subcutaneous injection 15 Units 0.15 mL, Subcutaneous, HS    insulin lispro (HumALOG/ADMELOG) 100 units/mL subcutaneous injection 1-5 Units, Subcutaneous, HS    insulin lispro (HumALOG/ADMELOG) 100 units/mL subcutaneous injection 1-6 Units, Subcutaneous, TID AC **AND** Fingerstick Glucose (POCT), , TID AC    insulin lispro (HumALOG/ADMELOG) 100 units/mL subcutaneous injection 5 Units, Subcutaneous, TID With Meals    levothyroxine tablet 250 mcg, Oral, Early Morning    melatonin tablet 3 mg, Oral, HS    multivitamin-minerals (CENTRUM) tablet 1 tablet, Oral, Daily    thiamine tablet 100 mg, Oral, Daily    traZODone (DESYREL) tablet 150 mg, Oral, HS    Current Facility-Administered Medications:     aluminum-magnesium hydroxide-simethicone (MAALOX) oral suspension 30 mL, Oral, Q4H PRN    haloperidol lactate (HALDOL) injection 2.5 mg, Intramuscular, Q4H PRN Max 4/day **AND** LORazepam (ATIVAN) injection 1 mg, Intramuscular, Q4H PRN Max 4/day **AND** benztropine (COGENTIN) injection 0.5 mg, Intramuscular, Q4H PRN Max 4/day    haloperidol lactate (HALDOL) injection 5 mg, Intramuscular, Q4H PRN Max 4/day **AND** LORazepam (ATIVAN) injection 2 mg, Intramuscular, Q4H PRN Max 4/day **AND** benztropine (COGENTIN) injection 1 mg, Intramuscular, Q4H PRN Max 4/day    benztropine (COGENTIN) injection 1 mg, Intramuscular, Q4H PRN Max 6/day    benztropine (COGENTIN) tablet 1 mg, Oral, Q4H PRN Max 6/day    bisacodyl (DULCOLAX) rectal suppository 10 mg, Rectal, Daily PRN    hydrOXYzine HCL (ATARAX) tablet 50 mg, Oral, Q6H PRN Max 4/day **OR** diphenhydrAMINE (BENADRYL) injection 50 mg, Intramuscular, Q6H PRN    haloperidol (HALDOL) tablet 1 mg,  Oral, Q6H PRN    haloperidol (HALDOL) tablet 2.5 mg, Oral, Q4H PRN Max 4/day    haloperidol (HALDOL) tablet 5 mg, Oral, Q4H PRN Max 4/day    hydrOXYzine HCL (ATARAX) tablet 100 mg, Oral, Q6H PRN Max 4/day **OR** LORazepam (ATIVAN) injection 2 mg, Intramuscular, Q6H PRN    hydrOXYzine HCL (ATARAX) tablet 25 mg, Oral, Q6H PRN Max 4/day    ibuprofen (MOTRIN) tablet 400 mg, Oral, Q4H PRN    ibuprofen (MOTRIN) tablet 600 mg, Oral, Q6H PRN    ibuprofen (MOTRIN) tablet 800 mg, Oral, Q8H PRN    loperamide (IMODIUM) capsule 2 mg, Oral, 4x Daily PRN    polyethylene glycol (MIRALAX) packet 17 g, Oral, Daily PRN    propranolol (INDERAL) tablet 10 mg, Oral, Q8H PRN    senna-docusate sodium (SENOKOT S) 8.6-50 mg per tablet 1 tablet, Oral, Daily PRN    traZODone (DESYREL) tablet 50 mg, Oral, HS PRN    Risks/Benefits of Treatment:     Risks, benefits, and possible side effects of medications explained to patient and patient verbalizes understanding and agreement for treatment.    Progress Toward Goals: improving    Treatment Planning:     All current active medications have been reviewed.  Continue to monitor response to treatment and assess for potential side effects of medications.  Encourage group therapy, milieu therapy and occupational therapy.  Collaboration with medical service for medical comorbidities as indicated.  Behavioral Health checks for safety monitoring.  Estimated Discharge Day: 4/22/2025 1 day (4/22/2025)  Legal Status : Voluntary 201 commitment.  Long Stay Certification : Not Applicable    Subjective     Behavior over the last 24 hours: mason Doyle was evaluated today by this writer.  She has been ultimately appearing  female, well-groomed, adequate hygiene with normal speech rate and volume.  She reports she had a good weekend.  She had some mild anxiety on Saturday due to missing her kids and missing Easter.  This resolved after she had a Zoom video call with them and she says this really  helped her.  Patient does have some mild stressors regarding finances as she has been out of work during her hospital stay.  She has a mother at home was helped her apply to certain government assistance programs although she has been got denied by most.  Patient would like the option of having a weekly therapist in lieu of her biweekly therapy.  She is still interested in doing partial program upon discharge.  Patient has her own car and will be using that to get home.  [facility] and would [facility] her [facility] there.  She is denying any passive or active SI at this time.  She is denying any HI.  Patient denies any auditory visual hallucinations.  She had ECT this morning and had some mild bowel incontinence afterwards.  Otherwise she denies any other symptoms.  She is feeling stable on current medications.  Plan was to decrease Lexapro so we will do the decrease today.    Sleep: normal  Appetite: normal  Medication side effects: No  ROS: review of systems as noted above in HPI/Subjective report, all other systems are negative    Objective :  Temp:  [97.5 °F (36.4 °C)-98.3 °F (36.8 °C)] 98.3 °F (36.8 °C)  HR:  [65-96] 82  BP: (113-166)/(63-89) 140/80  Resp:  [16-18] 16  SpO2:  [95 %-100 %] 98 %  O2 Device: None (Room air)    Mental Status Evaluation:    Appearance:  Overtly appearing  female, age appropriate, casually dressed, adequate grooming   Behavior:  normal, pleasant, cooperative, calm   Speech:  normal rate, normal volume   Mood:  normal, euthymic   Affect:  normal range and intensity   Thought Process:  organized, goal directed   Thought Content:  no overt delusions   Perceptual Disturbances: denies auditory hallucinations when asked, does not appear responding to internal stimuli   Risk Potential: Suicidal Ideation -   denies at present  Homicidal Ideation -   denies at present  Potential for Aggression - Not at present   Sensorium:  oriented to person, place, and time/date  "  Memory:  recent and remote memory grossly intact   Consciousness:  alert and awake   Attention/Concentration: attention span and concentration are age appropriate   Insight:  age appropriate   Judgment: age appropriate   Gait/Station: normal gait/station   Motor Activity: no abnormal movements       Lab Results: I have reviewed the following results:  Most Recent Labs:   Lab Results   Component Value Date    WBC 2.90 (L) 04/12/2025    RBC 3.89 04/12/2025    HGB 10.9 (L) 04/12/2025    HCT 34.7 (L) 04/12/2025     04/12/2025    RDW 12.2 04/12/2025    NEUTROABS 0.91 (L) 04/12/2025    TOTANEUTABS 8.62 (H) 10/21/2023    SODIUM 139 04/20/2025    K 4.1 04/20/2025     04/20/2025    CO2 29 04/20/2025    BUN 14 04/20/2025    CREATININE 0.77 04/20/2025    GLUC 114 04/20/2025    CALCIUM 9.1 04/20/2025    AST 17 04/13/2025    ALT 15 04/13/2025    ALKPHOS 86 04/13/2025    TP 6.1 (L) 04/13/2025    ALB 3.6 04/13/2025    TBILI 0.34 04/13/2025    CHOLESTEROL 184 04/12/2025     04/12/2025    TRIG 138 04/12/2025    LDLCALC 38 04/12/2025    NONHDLC 66 04/12/2025    [id number] 1.057 04/12/2025    FREET4 0.93 04/17/2024    FREET4 0.95 04/17/2024    T3FREE 2.58 05/07/2021    PREGUR negative 11/30/2020    PREGSERUM Negative 04/12/2025    TREPONEMAPA Non-reactive 04/12/2025    HGBA1C 8.7 (A) 03/05/2025     04/17/2024       Administrative Statements     Counseling / Coordination of Care:   Patient's progress discussed with staff in treatment team meeting.  Medication changes reviewed with staff in treatment team meeting.    Portions of the record may have been created with voice recognition software. Occasional wrong word or \"sound a like\" substitutions may have occurred due to the inherent limitations of voice recognition software. Read the chart carefully and recognize, using context, where substitutions have occurred.    Winsome Henry,  04/21/25  "

## 2025-04-21 NOTE — ASSESSMENT & PLAN NOTE
Lab Results   Component Value Date    HGBA1C 8.7 (A) 03/05/2025       Recent Labs     04/21/25  1034 04/21/25  1055 04/21/25  1113 04/21/25  1214   POCGLU 55* 66 98 223*       Blood Sugar Average: Last 72 hrs:  (P) 152.1  Per SLIM

## 2025-04-21 NOTE — PROCEDURES
Procedure Note - ECT  Clarissa Webb 45 y.o. female MRN: [id number]    Time out was taken with staff to confirm correct patient and correct procedure to be performed. The patient reported side effect of muscle soreness. She agrees to proceed with the treatment. Increased energy level to 45%.       Session Number: 003    Diagnosis: Principal Problem:    Recurrent major depressive disorder (HCC)  Active Problems:    Generalized anxiety disorder with panic attacks    Hypothyroidism due to Hashimoto's thyroiditis    Medical clearance for psychiatric admission    H/O gastric bypass    Type 1 diabetes mellitus with hyperglycemia (HCC)    Alcohol use    Hyponatremia    ADHD (attention deficit hyperactivity disorder)      ECT Type: Inpatient    Anesthesia: Methohexital    Electrode Placement: Non-dominant unilateral    Energy level:  45 %      Seizure Duration     EE Sec.    EMG : 37 Sec    Post-ictal Suppression Index: 48.7 %    Results:Clinical seizure was satisfactory, Patient tolerated ECT well        Vitals:    25 0615   BP: 114/72   Pulse: 65   Resp: 18   Temp: 97.5 °F (36.4 °C)   SpO2: 99%        Medication Administration - last 24 hours from 2025 0623 to 2025 0623         Date/Time Order Dose Route Action Action by     2025 1702 EDT insulin lispro (HumALOG/ADMELOG) 100 units/mL subcutaneous injection 1-6 Units 2 Units Subcutaneous Given Nathalie Marquez RN     2025 1207 EDT insulin lispro (HumALOG/ADMELOG) 100 units/mL subcutaneous injection 1-6 Units -- Subcutaneous Not Given Nathalie Marquez RN     2025 0807 EDT insulin lispro (HumALOG/ADMELOG) 100 units/mL subcutaneous injection 1-6 Units -- Subcutaneous Not Given Nathalie Marquez RN     2025 2233 EDT melatonin tablet 3 mg 3 mg Oral Given Nathalie Marquez RN     2025 2233 EDT traZODone (DESYREL) tablet 50 mg 0 mg Oral Return to Cabinet Nathalie Marquez RN     2025 1850 EDT ibuprofen (MOTRIN) tablet 800 mg 800 mg  Oral Given Nathalie Marquez, RN     04/20/2025 0824 EDT multivitamin-minerals (CENTRUM) tablet 1 tablet 1 tablet Oral Given Nathalie Marquez, RN     04/20/2025 0824 EDT thiamine tablet 100 mg 100 mg Oral Given Nathalie Marquez, RN     04/20/2025 0823 EDT folic acid (FOLVITE) tablet 1 mg 1 mg Oral Given Nathalie Marquez, RN     04/20/2025 2233 EDT traZODone (DESYREL) tablet 150 mg 150 mg Oral Given Nathalie Marquez, RN     04/20/2025 2130 EDT gabapentin (NEURONTIN) capsule 400 mg 400 mg Oral Not Given Nathalie Marquez, RN     04/20/2025 1658 EDT gabapentin (NEURONTIN) capsule 400 mg 400 mg Oral Given Nathalie Marquez, RN     04/20/2025 0824 EDT gabapentin (NEURONTIN) capsule 400 mg 400 mg Oral Given Nathalie Marquez, RN     04/20/2025 0823 EDT ergocalciferol (VITAMIN D2) capsule 50,000 Units 50,000 Units Oral Given Nathalie Marquez, RN     04/20/2025 0823 EDT cyanocobalamin (VITAMIN B-12) tablet 1,000 mcg 1,000 mcg Oral Given Nathalie Marquez, RN     04/20/2025 0826 EDT escitalopram (LEXAPRO) tablet 30 mg 30 mg Oral Given Nathalie Marquez, CATARINO     04/20/2025 0823 EDT bacitracin topical ointment 1 small application 1 small application Topical Not Given Nathalie Marquez, CATARINO     04/20/2025 2134 EDT insulin lispro (HumALOG/ADMELOG) 100 units/mL subcutaneous injection 1-5 Units -- Subcutaneous Not Given Nathalie Marquez, CATARINO     04/20/2025 0823 EDT ferrous sulfate tablet 325 mg 325 mg Oral Given Nathalie Marquez, RN     04/20/2025 0824 EDT ascorbic acid (VITAMIN C) tablet 500 mg 500 mg Oral Given Nathalie Marquez, CATARINO     04/20/2025 2133 EDT insulin glargine (LANTUS) subcutaneous injection 15 Units 0.15 mL 15 Units Subcutaneous Given Nathalie Marquez, CATARINO     04/20/2025 2233 EDT cariprazine (VRAYLAR) capsule 3 mg 3 mg Oral Given Nathalie Marquez RN     04/20/2025 1702 EDT insulin lispro (HumALOG/ADMELOG) 100 units/mL subcutaneous injection 5 Units 5 Units Subcutaneous Given Nathalie Marquez RN     04/20/2025 1244 EDT insulin lispro (HumALOG/ADMELOG) 100 units/mL subcutaneous injection 5 Units 5  Units Subcutaneous Given Nathalie Marquez RN     04/20/2025 0824 EDT insulin lispro (HumALOG/ADMELOG) 100 units/mL subcutaneous injection 5 Units 5 Units Subcutaneous Given Nathalie Marquez RN     04/20/2025 0823 EDT furosemide (LASIX) tablet 20 mg 20 mg Oral Given Nathalie Marquez RN            This note was not shared with the patient due to reasonable likelihood of causing patient harm    Ester Stewart MD

## 2025-04-21 NOTE — TELEPHONE ENCOUNTER
Patient called in to advise that she received her pump and is asking if Rosina can put in a referral for pump training.     Patient also wanted to thank Rosina for helping her get the pump.

## 2025-04-21 NOTE — ASSESSMENT & PLAN NOTE
Yanira Webb is a 45-year-old  female with past psychiatric history significant for major depressive disorder and ASA with panic attacks who presents to [facility] due to persistent depressive symptoms. She endorses overall dysphoric mood with anhedonia, poor sleep, weight gain despite decreased appetite, low energy, and intermittent concentration issues. She notes fleeting passive death wishes, but adamantly denies a plan and contracts for safety. She reports feeling hopeless due to several failed medication trials,  participation in multiple modalities of therapy, and previous TMS treatments without complete remission of symptoms. She was agreeable to medication adjustments and at this time is interested in ECT treatment which would likely benefit her.   Discussed adding SGA as augmentation for treatment resistant depression, patient is concerned about weight gain and declined Seroquel and Zyprexa. She has previously tried Abilify, Lamictal, and Latuda which were not effective.     Treatment and medication recommendations as follows, no changes as of 4/12/2025:   Consented and ordered for initiation of ECT   NPO after midnight  Continue Trazodone to 150 mg at bed time to improve sleep  Continue Gabapentin 400 mg three times daily for anxiety, neuropathy, and alcohol cravings   Hold evenings before ECT  Decrease Lexapro from 25 mg daily to 20 mg daily in outpatient setting for depressive symptoms.    msec   Continue Vraylar 3 mg HS for treatment resistant MDD  Patient initiated on ECT, completed first session 9/16. Patient completed 3 sessions inpatient but prefers completing remaining outpatient to be with family.  Plan to discharge to weekly therapy, option to do PHP after ECT. Also CM working on establishing outpatient  for patient. She is agreeable with this plan. Discharge 4/22  Patient with intake to [facility] for weekly therapy.

## 2025-04-21 NOTE — BH TRANSITION RECORD
Contact Information: If you have any questions, concerns, pended studies, tests and/or procedures, or emergencies regarding your inpatient behavioral health visit. Please contact [facility] [phone]  and ask to speak to a , nurse or physician. A contact is available 24 hours/ 7 days a week at this number.     Summary of Procedures Performed During your Stay:  Below is a list of major procedures performed during your hospital stay and a summary of results:  - Operative/Interventional procedures: ECT session completed on 4/16, 4/18, and 4/21 .  - Cardiac Procedures/Studies: ECG on 4/12/25 normal sinus rhythm 70 bpm, qtc 441.    Pending Studies (From admission, onward)       Start     Ordered    04/16/25 1011  Electroconvulsive therapy (ECT)  Every Mon-Wed-Fri      Start Status   04/23/25 1011 Scheduled   04/25/25 1011 Scheduled   04/28/25 1011 Scheduled   04/30/25 1011 Scheduled   05/02/25 1011 Scheduled   05/05/25 1011 Scheduled   05/07/25 1011 Scheduled   05/09/25 1011 Scheduled   05/12/25 1011 Scheduled       04/15/25 1011                  Please follow up on the above pending studies with your PCP and/or referring provider.

## 2025-04-21 NOTE — DISCHARGE INSTR - OTHER ORDERS
CRISIS INFORMATION  If you are experiencing a mental health emergency, you may call the [facility] Crisis Intervention Office 24 hours a day, 7 days per week at [phone].    In [facility], call [phone].    Warmline is a confidential 24/7 telephone support service manned by trained mental health consumers.  Warmline provides support, a listening ear and can provide information about available services.Warmline specializes in the concerns of mental health consumers, their families and friends.  However, we are also here for anyone who has a mental health concern, is confused about or just doesn't know anything about mental health or where to get information.  To reach [facility], call [phone].    HOW TO GET SUBSTANCE ABUSE HELP:  If you or someone you know has a drug or alcohol problem, there is help:  [facility] Drug & Alcohol Abuse Services: [phone]  [facility] Drug & Alcohol Abuse Services: [phone]  An assessment is the first step.   In addition to those listed there are other programs available in the area but assessment is best to determine an appropriate level of care.  If you DO NOT have Medical Assistance (MA) or Private Insurance, an assessment can be scheduled at one of these providers:  [facility]  [address] [place] [address]  [phone]   [facility]  [address] [place] [address]  [phone]   [address] [place] [address]  [phone]   [facility]  [address] [place] [address]  [phone]   [facility]  [address] [place] [address]  [phone]   [facility]  [address] [place] [address]  [phone]     If you HAVE Medical Assistance, an assessment can be scheduled at one of these providers:  [place] [facility]  [address] [place] [address]  [phone]   [facility]  [address] [place] [address]  [phone]   [facility]  [address] 1st Floor, [place] [address]  [phone]  [address] [place] [address], [address], [place] [address] [phone]   [address] [address]  [phone]   [address] [place] [address]  [phone]   [facility]  [address] [place] [address]  [phone]   [facility]  [address] [place] [address]  [phone]   [facility]  [address] [place] [address]  [phone]   [facility]  [address] [place] [address]  [phone]     If you HAVE Private Insurance, an assessment can be scheduled at one of these providers.  Please contact these Providers to determine if they are in your network plan:  [facility]  [address] 1st Floor, [place] [address]  [phone]   [address] [address]  [phone]   [address] [place] [address]  [phone]   [facility] ([facility])  [address] [place] [address]  [phone]   [facility]  [address] [place] [address]  [phone]     From the [facility] website www.[place].gov/guides/opioid-epidemic/#GetNaloxone    How do I get naloxone?  Family members and friends can access naloxone by:    Obtaining a prescription from their family doctor  Using the standing order issued by [facility] Physician General Autumn Aleman. A standing order is a prescription written for the general public, rather than specifically for an individual.  To use the standing order, print it and take it with you to the pharmacy or have the digital version on your phone. Download the standing order from the Department of [facility] (PDF).    If you are unable to print  it or use the digital version, the standing order is kept on file at many pharmacies. If a pharmacy does not have it on file, they may have the ability to look it up.    Naloxone prescriptions can be filled at most pharmacies. Although the medication might not be available for same-day pickup, it often can be ordered and available within a day or two.    [facility] Locations & Contact Information:  [facility] [place]  Municipalities:  [place], [place], [place], [place], [place], [place], [place], [place],  [place], and [place]  Emergency Food Pantries  [place] [address]  [facility]  Address: [address] [place] [address]  Phone: [phone]  Hours of Operation: Fridays 10:00AM-1:00PM  [facility]  Address: [address] [address]  Phone: [phone]  Hours of Operation: Thursdays 5:30PM-6:30PM    [address] THINK360 [facility]  Address: [address] [place] [address]  Phone: [phone]  Hours of Operation: Monday-Friday 9:30AM-12:00PM  [facility]  Address: [address] [address]  Phone: [phone]  Hours of Operation: By appointment -- Mondays, Tuesdays, Thursdays, & Fridays 8:30AM-4:00PM;  Wednesdays 8:30AM-12:00PM  Jean-Paul Leon  Address: [address] [address]  Phone: [phone]  Hours of Operation: 1st & 3rd Saturdays 10:00AM-12:00PM  [facility]  Address: [address] [place] [address]  Phone: [phone]  Hours of Operation: 2nd & 4th Thursdays 4:00PM-5:30PM (Only 4th Thursdays during the summer)  [facility]  Address: [address] [place] [address]  Phone: [phone]  Hours of Operation: By appointment -- Wednesdays 6:00PM  [facility]  Address: [address] [place] [address], [place] [address]  Phone: [phone]  Hours of Operation: Thursdays 11:00AM-2:00PM or  By appointment  [facility]  Address: [address] [address]  Phone: [phone]  Hours of Operation: Saturdays 9:00AM-11:30AM  [facility]  Address: [address] [address]  Phone: [phone]  Hours of Operation: Fridays, 3rd & 4th Saturdays 9:00AM-11:00AM  Drew Bhatt Mario Pent[facility]ostales  Address: [address] [place] [address]  Phone: [phone]  Hours of Operation: Tuesdays 3:00PM-5:00PM or By appointment  [facility]  Address: [address] [place] [address]  Phone: [phone]  Hours of Operation: By appointment  [facility]  Address: [address] [place] [address]  Phone: [phone]  Hours of Operation: 3rd Saturday, 8:00AM-11:00AM  RIPPLE  Address: [address] [place] [address]  Phone: [phone]  Hours of Operation: 3rd Sunday 4:00PM-5:30PM    [place] [employer] ([employer])  Address: [address] [address]  Phone: [phone]  Hours of Operation: 3rd Wednesday 9:00AM-4:00PM  [facility]  Address: [address] [place] [address]  Phone: [phone]  Hours of Operation: 1st & 3rd Saturdays 9:00AM-11:30AM  [facility]  Address: [address] [address]  Phone: [phone]  Hours of Operation: By appointment  Leticia Dunbar [address]  [place] [employer]  Address: [address] [place] [address]  Phone: [phone]  Hours of Operation: 3rd Sunday 12:00PM-1:30PM  [facility]  Address: [address] [facility] [place], [place] [address]  Phone: [phone]  Hours of Operation: 1st & 3rd Thursdays 6:30PM-7:30PM; By appointment -- Mondays  [facility] [place] [facility]  Address: [address] [place] [address]  Phone: [phone]  Hours of Operation: By appointment  [facility]  Address: [address] [facility], [place] [address]  Phone:  [phone]  Hours of Operation: 1st & 3rd Wednesdays 4:00PM-5:30PM  [facility]  Address: [address] [address]  Phone: [phone]  Hours of Operation: 4th Wednesday 6:30PM-7:30PM  [facility]  Address: [address] [place] [address]  Phone: [phone]  Hours of Operation: 2nd Tuesday 10:00AM-12:00PM; 4th Thursday 4:00PM-6:00PM  [place]  [address]  [facility] ([place] [employer] Non-Kosher)  Address: [address] [place] [address], [address]  Phone: [phone]  Hours of Operation: By appointment -- Monday-Friday 9:00AM-5:00PM  Leticia Dunbar [address]  [facility]  Address: [address] [place] [address]  Phone: [phone]  Hours of Operation: 1st & 3rd Tuesdays 9:00AM-10:30AM; Thursdays 6:00PM-8:00PM    [facility]  Address: [address] [place] [address]  Phone: [phone]  Hours of Operation: Every other Saturday 10:00AM-12:00PM  [facility]  Address: [facility] [place] [address]  Phone: [phone]  Hours of Operation: 3rd Monday 6:00PM-7:30PM  [place]icia Dunbar [address]  [facility]  Address: [address] [place] [address]  Phone: [phone]  Hours of Operation: 4th Sunday 9:00AM-11:00AM  [facility] [address], [place]  Address: [address] [place] [address]  Phone: [phone]  Hours of Operation: Saturdays 10:30AM-12:30PM  [facility]  Address: [address] [place] [address]  Phone: [phone]  Hours of Operation: By appointment -- Tuesday-Thursday 8:30AM-4:30PM  [facility]  Address: [address] [facility], [place] [address]  Phone: [phone]  Hours of Operation: 1st & 3rd Wednesdays 6:00PM-8:00PM  [place] [id number]  [facility] [employer]  Address: [address] [place] [address], [place] [address]  Phone: [phone]  Hours of Operation: Wednesdays & Fridays 3:00PM-4:00PM  [facility]  [address]  Eliana Stephens[facility]s Pantry  Address: [address] [place] [address]  Phone: [phone]  Hours of Operation: Every other Saturday 10:30AM-12:30PM  [place] - [address]  [facility]  Address: [address] [place] [address], [place] [address]  Phone: [phone]  Hours of Operation: Tuesdays & Wednesdays 10:00AM-2:00PM; Closed last week of the month  Harmeet Mcdonald - [address]  [facility]  Address: [facility] [place] [address]  Phone: [phone]  Hours of Operation: 1st Saturday 9:00AM-12:00PM; 3rd Thursday 4:00PM-7:00PM    [facility] [address]  [facility]  Address: [address] [place] [address]  Phone: [phone]  Hours of Operation: 3rd Monday & 3rd Wednesday 4:00PM-6:00PM; 3rd Saturday 10:00AM-12:00PM  [facility] [address]  [facility]  Address: [address] [place] [address]  Phone: [phone]  Hours of Operation: 1st & 3rd Mondays 9:00AM-11:30AM  [facility] [address]  [facility]  Address: [address] [facility] [address] [place] [address]  Phone: [phone]  Hours of Operation: 2nd Saturday 9:00AM-11:00AM  [address] Food Bank  Address: [address] [place] [address]  Phone: [phone]  Hours of Operation: 1st, 2nd, & 3rd Thursdays 4:00PM-7:00PM; Last Saturday 9:30AM-12:00PM  [place] [id number]  [facility]  Address: [facility] [address] [address]  Phone: [phone]  Hours of Operation: Tuesdays 6:00PM-7:00PM; Saturdays 4:00PM-5:00PM; Sundays 12:30PM-1:30PM  [place] [employer] [facility]  Address: [address] [place] [address]  Phone: [phone]  Hours of Operation: By appointment -- Mondays 6:00PM      Yuli Davis  [facility]  Address: [address] [place] [address]  Phone: [phone]  Hours of Operation: Monday-Friday 1:30PM-2:30PM  [facility]  Address: [address] [place] [address]  Phone: [phone]  Hours of  Operation: Monday-Friday 9:00AM-5:00PM  [facility]  Address: [address], [place] [address]  Phone: [phone]  Hours of Operation: Tuesday-Thursday 12:00PM-1:00PM    [facility]  Address: [address], [place] [address]  Phone: [phone]  Hours of Operation: Sundays 8:00AM-9:00AM; Some holidays  [facility] Food [place]  Municipalities:  [place], [place], [place], [place], [place], [place], and [place]  Emergency Food Pantries  [place] - [address]  [facility] Food [facility]  Address: [address] [facility], [place] [address]  Phone: [phone]  Hours of Operation: 2nd Tuesday 10:00AM-12:00PM  [place] - [address]  [facility]  Address: [address] [facility], [place] [address]  Phone: [phone]  Hours of Operation: Wednesdays 10:00AM-12:00PM  [facility] Den[facility]  Address: [address] [facility], [place] [address]  Phone: [phone]  Hours of Operation: 1st & 3rd Tuesdays 5:00PM-6:00PM  [facility]  Address: [address] [facility], [place], [address]  Phone: [phone]  Hours of Operation: Tuesdays 6:00PM-7:00PM  [facility]  Address: [address], [place] [address]  Phone: [phone]  Hours of Operation: 1st & 2nd Saturdays 12:00PM- 4:00PM  [facility] [employer]  Address: [address], [place] [address]  Phone: [phone]  Hours of Operation: Monday-Friday 10:30AM-11:30AM  [place] [facility]  Address: [address], [place] [address]  Phone: [phone]  Hours of Operation: 3rd & 4th Saturdays 9:00AM-11:00AM; [facility] residents only    [place] - [address]  [facility]  Address: [address] [place] [address], [place] [address]  Phone: [phone]  Hours of Operation: 2nd Thursday 10:00AM-12:00PM  [facility] Pant[facility]  Address: [address] [place] [address], [place] [address]  Phone: [phone]  Hours of Operation: Monday & Tuesday of  the first full week of the month 9:00AM-11:00AM  [facility]  Address: [address] [facility], [place] [address]  Phone: [phone]  Hours of Operation: Mondays, Wednesdays, & Thursdays 9:30AM-11:30AM  [facility]  Address: [address] [facility], [place] [address]  Phone: [phone]  Hours of Operation: 2nd & 4th Saturdays 10:00AM-12:00PM  [place] - [address]  [place] [facility]  Address: [address] [facility], [place] [address]  Phone: [phone]  Hours of Operation: By appointment -- Tuesdays & Wednesdays 10:00AM-4:00PM, Thursdays 10:00AM-  12:00PM, & Sundays 4:00PM-7:00PM  [facility]  Address: [address] [facility], [place] [address]  Phone: [phone]  Hours of Operation: 3rd Saturday 10:00AM-12:00PM  ANGEL Warner  Address: [address] [facility] [place] [address]  Phone: [phone]  Hours of Operation: 3rd Saturday 9:00AM-11:30AM or by appointment  [facility]  Address: [address] [facility], [place] [address]  Phone: [phone]  Hours of Operation: 2nd & 4thTuesdays 10:00AM-12:00PM  [facility] Pant[facility]  Address: [address] [facility], [place] [address]  Phone: [phone]  Hours of Operation: 1st, 2nd, & 4th Wednesdays 11:00AM-1:00PM; 3rd Wednesday 5:00PM-7:00PM  [place] Pantry  Address: [address] [place] [address] [place] [address]  Phone: [phone]  Hours of Operation: Wednesdays 10:00AM-12:00PM; Last Wednesday 6:00PM-8:00PM  [place]  [address]  [facility]  Address: [address] [place] [address]  Phone: [phone]  Hours of Operation: Fridays 8:30AM-11:30AM & 1:30PM-3:30PM    [facility]  Address: [facility] [place] [address]  Phone: [phone]  Hours of Operation: By appointment--Mondays-Fridays 9:00AM -4:30PM.  [facility]  Address: [address] [place] [address]  Phone: [phone]  Hours of Operation: 1st & 3rd Tuesdays 6:00PM-7:30PM  [facility]  [facility]  Address: [address] [place] [address]  Phone: [phone]  Hours of Operation: By appointment -- Mondays-Fridays 9:00AM-5:00PM  [facility]  Address: [address] [facility] [place] [address]  Phone: [phone]  Hours of Operation: 3rd, 4th, & 5th Thursdays 5:00PM-7:00PM  [facility]  Address: [address] [place] [address]  Phone: [phone]  Hours of Operation: Mondays 10:00AM-12:30PM; Thursdays 10:00AM-12:30PM & 1:00PM-3:30 PM  Revival [facility], [facility]  Address: [address] [facility], [address], [place] [address]  Phone: [phone]  Hours of Operation: Tuesdays 5:00PM-6:00PM  [facility]  Address: [address] [place] [address]  Phone: [phone]  Hours of Operation: Thursdays 6:00PM-7:30PM; Closed 5th Thursday  [place] - [address]  [facility]imus  Address: [address] [place] [address]  Phone: [phone]  Hours of Operation: For last names beginning with A-M: 2nd Tuesday 8:00AM-10:00AM or 6:00PM-7:00PM;  For last names beginning with N-Z: 2nd Wednesday 8:00AM-10:00AM  [place] - [address]  [facility] [employer]  Address: [address] [place] [address]  Phone: [phone]  Hours of Operation: Wednesdays 9:30AM-12:00PM; 1st, 2nd, & 3rd Thursdays 6:00PM-8:00PM; 2nd & 3rd Saturdays 9:30AM-12:00PM  [place] - [address]  [facility]  Address: [address] [facility], [place] [address]  Phone: [phone]  Hours of Operation: 3rd Saturday 9:00AM-11:00AM    [facility] [place]  Address: [address] [facility], [place] [address]  Phone: [phone]  Hours of Operation: Tuesdays 10:00AM-2:00PM  [place] [facility]  Address: [address] [facility], [place] [address]  Phone: [phone]  Hours of Operation: Tuesdays 10:00AM-12:00PM; Closed 5th Tuesday  [place] [address]  [employer]  Address: [address] [place] [address]  Phone: [phone]  Hours of Operation: Mondays 11:00AM-12:30PM &  7:00PM-8:30PM  [place] - [id number] [place] [address] Food Pantry  Address: [address] [facility], [place] [address]  Phone: [phone]  Hours of Operation: Mondays 5:00PM-7:00PM  [address]  Address: [address] [facility], [place] [address]  Phone: [phone]  Hours of Operation: 2nd & 4th Saturdays 9:00AM-10:00AM  Soup Susan  [place]  [facility] [place]  Address: [address] [facility], [place] [address]  Phone: [phone]  Hours of Operation: 2nd Saturday - Lunch (Call for times)  [place]  [place] [facility]  Address: [address] [facility], [place] [address]  Phone: [phone]  Ours of Operation: Sundays 1:00PM-2:00PM  [facility]  Address: [address] [place] [address]  Phone: [phone]  Hours of Operation: Saturdays 12:00PM-1:00PM  [facility]  Address: [address] [facility] [place] [address]  Phone: [phone]  Hours of Operation: Monday-Friday 8:00AM-4:00PM  Erin [facility]eter[facility] [employer]  Address: [address] [place] [address]  Phone: [phone]  Hours of Operation: Monday-Friday 12:00PM-1:00PM    [facility]  Address: [address] [place] [address]  Phone: [phone]  Hours of Operation: Call for times  [facility]  Address: [address] [place] [address]  Phone: [phone]  Hours of Operation: Monday-Friday 12:00PM-1:00PM

## 2025-04-21 NOTE — NURSING NOTE
"Patient approached the nurses station around 1035 c/o feeling hypoglycemic. Reported feeling \"shaky\". Blood sugar checked which was 55. Declined glucose tablets per protocol and requested milk and crackers which she was given. Re-checked at 1050 which was 66. Continued to report feeling a \"bit shaky\". Continued to decline glucose tablets but requested orange juice which was given. Re-checked at 1110. Blood sugar was 98. No further complaints.   "

## 2025-04-21 NOTE — DISCHARGE INSTR - APPOINTMENTS
Behavioral Health Nurse Navigator, Katalina or Nati will be calling you after your discharge, on the phone number that you provided.  They will be available as an additional support, if needed.   If you wish to speak with Katalina, you may contact her at [phone].

## 2025-04-22 ENCOUNTER — ANESTHESIA (OUTPATIENT)
Dept: ANESTHESIOLOGY | Facility: HOSPITAL | Age: 45
End: 2025-04-22

## 2025-04-22 ENCOUNTER — ANESTHESIA EVENT (OUTPATIENT)
Dept: ANESTHESIOLOGY | Facility: HOSPITAL | Age: 45
End: 2025-04-22

## 2025-04-22 VITALS
SYSTOLIC BLOOD PRESSURE: 127 MMHG | DIASTOLIC BLOOD PRESSURE: 79 MMHG | HEART RATE: 71 BPM | TEMPERATURE: 97.9 F | HEIGHT: 66 IN | RESPIRATION RATE: 16 BRPM | WEIGHT: 174.8 LBS | OXYGEN SATURATION: 98 % | BODY MASS INDEX: 28.09 KG/M2

## 2025-04-22 DIAGNOSIS — R74.8 ABNORMAL LEVELS OF OTHER SERUM ENZYMES: ICD-10-CM

## 2025-04-22 DIAGNOSIS — E53.8 B12 DEFICIENCY: ICD-10-CM

## 2025-04-22 DIAGNOSIS — D50.9 IRON DEFICIENCY ANEMIA, UNSPECIFIED IRON DEFICIENCY ANEMIA TYPE: Primary | Chronic | ICD-10-CM

## 2025-04-22 PROBLEM — E87.1 HYPONATREMIA: Status: RESOLVED | Noted: 2025-04-12 | Resolved: 2025-04-22

## 2025-04-22 LAB — GLUCOSE SERPL-MCNC: 233 MG/DL (ref 65–140)

## 2025-04-22 PROCEDURE — 99239 HOSP IP/OBS DSCHRG MGMT >30: CPT | Performed by: PSYCHIATRY & NEUROLOGY

## 2025-04-22 PROCEDURE — 82948 REAGENT STRIP/BLOOD GLUCOSE: CPT

## 2025-04-22 RX ORDER — FERROUS SULFATE 325(65) MG
325 TABLET ORAL EVERY OTHER DAY
Qty: 15 TABLET | Refills: 1 | Status: SHIPPED | OUTPATIENT
Start: 2025-04-22

## 2025-04-22 RX ORDER — GABAPENTIN 400 MG/1
400 CAPSULE ORAL 3 TIMES DAILY
Qty: 90 CAPSULE | Refills: 1 | Status: SHIPPED | OUTPATIENT
Start: 2025-04-22

## 2025-04-22 RX ORDER — SODIUM CHLORIDE, SODIUM LACTATE, POTASSIUM CHLORIDE, CALCIUM CHLORIDE 600; 310; 30; 20 MG/100ML; MG/100ML; MG/100ML; MG/100ML
50 INJECTION, SOLUTION INTRAVENOUS CONTINUOUS
Status: CANCELLED | OUTPATIENT
Start: 2025-04-22

## 2025-04-22 RX ORDER — ESCITALOPRAM OXALATE 20 MG/1
20 TABLET ORAL DAILY
Qty: 30 TABLET | Refills: 1 | Status: SHIPPED | OUTPATIENT
Start: 2025-04-22

## 2025-04-22 RX ORDER — LANOLIN ALCOHOL/MO/W.PET/CERES
100 CREAM (GRAM) TOPICAL DAILY
Qty: 30 TABLET | Refills: 1 | Status: SHIPPED | OUTPATIENT
Start: 2025-04-22

## 2025-04-22 RX ORDER — LEVOTHYROXINE SODIUM 125 UG/1
250 TABLET ORAL
Qty: 60 TABLET | Refills: 1 | Status: SHIPPED | OUTPATIENT
Start: 2025-04-22

## 2025-04-22 RX ORDER — TRAZODONE HYDROCHLORIDE 150 MG/1
150 TABLET ORAL
Qty: 30 TABLET | Refills: 1 | Status: SHIPPED | OUTPATIENT
Start: 2025-04-22

## 2025-04-22 RX ORDER — ASCORBIC ACID 500 MG
500 TABLET ORAL DAILY
Qty: 30 TABLET | Refills: 1 | Status: SHIPPED | OUTPATIENT
Start: 2025-04-22

## 2025-04-22 RX ORDER — FOLIC ACID 1 MG/1
1 TABLET ORAL DAILY
Qty: 30 TABLET | Refills: 1 | Status: SHIPPED | OUTPATIENT
Start: 2025-04-22

## 2025-04-22 RX ORDER — ESCITALOPRAM OXALATE 20 MG/1
20 TABLET ORAL DAILY
Qty: 30 TABLET | Refills: 1 | Status: CANCELLED | OUTPATIENT
Start: 2025-04-22

## 2025-04-22 RX ADMIN — THIAMINE HCL TAB 100 MG 100 MG: 100 TAB at 08:11

## 2025-04-22 RX ADMIN — CYANOCOBALAMIN TAB 1000 MCG 1000 MCG: 1000 TAB at 08:10

## 2025-04-22 RX ADMIN — LEVOTHYROXINE SODIUM 250 MCG: 0.12 TABLET ORAL at 06:16

## 2025-04-22 RX ADMIN — GABAPENTIN 400 MG: 400 CAPSULE ORAL at 08:10

## 2025-04-22 RX ADMIN — INSULIN LISPRO 5 UNITS: 100 INJECTION, SOLUTION INTRAVENOUS; SUBCUTANEOUS at 08:11

## 2025-04-22 RX ADMIN — Medication 1 TABLET: at 08:10

## 2025-04-22 RX ADMIN — OXYCODONE HYDROCHLORIDE AND ACETAMINOPHEN 500 MG: 500 TABLET ORAL at 08:10

## 2025-04-22 RX ADMIN — ESCITALOPRAM OXALATE 25 MG: 5 TABLET, FILM COATED ORAL at 08:10

## 2025-04-22 RX ADMIN — INSULIN LISPRO 3 UNITS: 100 INJECTION, SOLUTION INTRAVENOUS; SUBCUTANEOUS at 08:11

## 2025-04-22 RX ADMIN — FERROUS SULFATE TAB 325 MG (65 MG ELEMENTAL FE) 325 MG: 325 (65 FE) TAB at 08:10

## 2025-04-22 RX ADMIN — FOLIC ACID 1 MG: 1 TABLET ORAL at 08:10

## 2025-04-22 NOTE — ANESTHESIA PREPROCEDURE EVALUATION
"Procedure:  PRE-OP ONLY    Relevant Problems   ENDO   (+) Hypothyroidism   (+) Hypothyroidism due to Hashimoto's thyroiditis   (+) Type 1 diabetes mellitus with hyperglycemia (HCC)      /RENAL   (+) THO (acute kidney injury) (HCC)      HEMATOLOGY   (+) Iron deficiency anemia   (+) Iron deficiency anemia, unspecified      NEURO/PSYCH   (+) Generalized anxiety disorder with panic attacks   (+) Recurrent major depressive disorder (HCC)        Physical Exam    Airway    Mallampati score: II  TM Distance: >3 FB  Neck ROM: full     Dental   No notable dental hx     Cardiovascular      Pulmonary   No wheezes    Other Findings  post-pubertal.      Anesthesia Plan  ASA Score- 2     Anesthesia Type- general with ASA Monitors.         Additional Monitors:     Airway Plan:            Plan Factors-    Chart reviewed. EKG reviewed.  Existing labs reviewed. Patient summary reviewed.              Intended use of anticholinesterase.    Induction- intravenous.    Postoperative Plan-     Perioperative Resuscitation Plan - Level 1 - Full Code.       Informed Consent- Anesthetic plan and risks discussed with patient.  I personally reviewed this patient with the CRNA. Discussed and agreed on the Anesthesia Plan with the CRNA..      NPO Status:  No vitals data found for the desired time range.      VITALS  LMP 03/11/2025 (Approximate)  BP Readings from Last 3 Encounters:   03/05/25 130/88   01/10/25 112/84   12/11/24 165/95        LABS  Results from Last 12 Months   Lab Units 04/12/25  0651 02/17/25  0807   WBC Thousand/uL 2.90* 3.39*   HEMOGLOBIN g/dL 10.9* 13.2   HEMATOCRIT % 34.7* 42.0   PLATELETS Thousands/uL 228 249     No results for input(s): \"APTT\", \"INR\", \"PTT\" in the last 8784 hours. Results from Last 12 Months   Lab Units 04/20/25  0541 04/13/25  0559 04/11/25  2051 03/05/25  1543 02/17/25  0807 01/10/25  0744   SODIUM mmol/L 139 139   < >  --    < >  --    POTASSIUM mmol/L 4.1 4.5   < >  --    < >  --    CHLORIDE mmol/L 103 " 105   < >  --    < >  --    CO2 mmol/L 29 29   < >  --    < >  --    ANION GAP mmol/L 7 5   < >  --    < >  --    BUN mg/dL 14 15   < >  --    < >  --    CREATININE mg/dL 0.77 0.82   < >  --    < >  --    CALCIUM mg/dL 9.1 8.7   < >  --    < >  --    GLUCOSE RANDOM mg/dL 114 188*   < >  --   --   --    HEMOGLOBIN A1C   --   --   --  8.7*  --  9.6*    < > = values in this interval not displayed.        ECG      ECHOCARDIOGRAPHY OR OTHER TESTING/IMAGING  No results found for this or any previous visit (from the past 4464 hours).    No results found for this or any previous visit. N/a     ------- ANESTHESIA RISK-BENEFIT DISCUSSION -------  BENEFITS OF A SPECIALIZED ANESTHESIA TEAM INCLUDE (NBK [id number], PMID 63753001):  (1) Reduce mortality and morbidity for major surgeries/procedures. (2) The team provides analgesia/sedation/amnesia/akinesia as safely as possible. (3) The team strives to reduce discomfort as safely as possible.    RISKS, AND PLANS TO MITIGATE RISKS, INCLUDE:    - Neurologic system: IntraOp awareness (Risk is ~1:1,000 - 1:14,000; PMID 51412726), Stroke (Risk ~<0.1-2% for most cases; PMID 80333377), nerve injury, vision loss, and POCD.     - Airway and Pulmonary system: Dental or mouth injury, throat pain, critical hypoxia, pneumothorax, prolonged intubation, post-op respiratory compromise.  Airway/Intubation risks and prior data: No prior advanced airway notes in [facility] EMR  Major ARISCAT risk factors for pulmonary complications include: none, yielding a score of 0-1= Low risk, 1.6%.  - Cardiovascular system: Hypotension, arrhythmias, cardiac injury or arrest, blood clots, bleeding, infection, vascular injuries.  Atilio's RCRI score items: none, yielding an RCRI Score of 0= 0.4% risk of MACE  Are fabby-op or intra-op beta blockers indicated? (PMID 94278977): no  - FEN/GI system: Aspiration risk (~0.5% per [facility] [id number]) and PONV (10-80% per Apfel score) especially if the patient has not fasted.  ASA NPO  guideline compliance?: Yes  - Medication risk assessment: Allergic reactions, excessive bleeding with anticoagulant use, overdoses, drug-drug interactions, injury to a fetus or  in pregnant or breastfeeding patients, fabby-procedural sedation including while driving/operating machinery.  Recent relevant medications: See MAR or Med Review  Personal or family history of anesthesia complications: no  Pregnancy Status: N/A  - Estimate mortality risks associated with anesthesia based on ASA-PS (PMID 44163688): ASA-PS II: risk 1:20,000

## 2025-04-22 NOTE — NURSING NOTE
Patient denies all symptoms and is looking forward to discharge today. Pleasant, calm and cooperative. Compliant with all medications.

## 2025-04-22 NOTE — PLAN OF CARE
Problem: Risk for Self Injury/Neglect  Goal: Verbalize thoughts and feelings  Description: Interventions:- Assess and re-assess patient's lethality and potential for self-injury- Engage patient in 1:1 interactions, daily, for a minimum of 15 minutes- Encourage patient to express feelings, fears, frustrations, hopes- Establish rapport/trust with patient   Outcome: Progressing  Goal: Refrain from harming self  Description: Interventions:- Monitor patient closely, per order- Develop a trusting relationship- Supervise medication ingestion, monitor effects and side effects   Outcome: Progressing  Goal: Recognize maladaptive responses and adopt new coping mechanisms  Outcome: Progressing     Problem: Depression  Goal: Treatment Goal: Demonstrate behavioral control of depressive symptoms, verbalize feelings of improved mood/affect, and adopt new coping skills prior to discharge  Outcome: Progressing  Goal: Refrain from isolation  Description: Interventions:- Develop a trusting relationship - Encourage socialization   Outcome: Progressing  Goal: Attend and participate in unit activities, including therapeutic, recreational, and educational groups  Description: Interventions:- Provide therapeutic and educational activities daily, encourage attendance and participation, and document same in the medical record   Outcome: Progressing     Problem: Anxiety  Goal: Anxiety is at manageable level  Description: Interventions:- Assess and monitor patient's anxiety level. - Monitor for signs and symptoms (heart palpitations, chest pain, shortness of breath, headaches, nausea, feeling jumpy, restlessness, irritable, apprehensive). - Collaborate with interdisciplinary team and initiate plan and interventions as ordered.- [place] patient to unit/surroundings- Explain treatment plan- Encourage participation in care- Encourage verbalization of concerns/fears- Identify coping mechanisms- Assist in developing anxiety-reducing skills-  Administer/offer alternative therapies- Limit or eliminate stimulants  Outcome: Progressing     Problem: Electroconvulsive therapy (ECT)  Goal: Treatment Goal: Demonstrate a reduction of confusion and improved orientation to person, place, time and/or situation upon discharge, according to optimum baseline/ability  Outcome: Progressing     Problem: Ineffective Coping  Goal: Participates in unit activities  Description: Interventions:- Provide therapeutic environment - Provide required programming - Redirect inappropriate behaviors   Outcome: Progressing

## 2025-04-22 NOTE — SOCIAL WORK
Pt to D/C today. Pt denies SI/HI/AVH. Pt oriented x3. Pt to d/c to home and Pt requested to walk to car that is located in the parking lot upon discharge. Pt to follow up with [facility] on 5/5/25 and  [facility] on 4/29/25. Pt to follow up with [place] [facility] referral. Scripts sent to preferred pharmacy.     Discharge Address:[address] [place] [address]  Phone: [phone]

## 2025-04-22 NOTE — PLAN OF CARE
Problem: Risk for Self Injury/Neglect  Goal: Verbalize thoughts and feelings  Description: Interventions:- Assess and re-assess patient's lethality and potential for self-injury- Engage patient in 1:1 interactions, daily, for a minimum of 15 minutes- Encourage patient to express feelings, fears, frustrations, hopes- Establish rapport/trust with patient   Outcome: Completed  Goal: Refrain from harming self  Description: Interventions:- Monitor patient closely, per order- Develop a trusting relationship- Supervise medication ingestion, monitor effects and side effects   Outcome: Completed  Goal: Recognize maladaptive responses and adopt new coping mechanisms  Outcome: Completed     Problem: Depression  Goal: Treatment Goal: Demonstrate behavioral control of depressive symptoms, verbalize feelings of improved mood/affect, and adopt new coping skills prior to discharge  Outcome: Completed  Goal: Refrain from isolation  Description: Interventions:- Develop a trusting relationship - Encourage socialization   Outcome: Completed  Goal: Attend and participate in unit activities, including therapeutic, recreational, and educational groups  Description: Interventions:- Provide therapeutic and educational activities daily, encourage attendance and participation, and document same in the medical record   Outcome: Completed     Problem: Anxiety  Goal: Anxiety is at manageable level  Description: Interventions:- Assess and monitor patient's anxiety level. - Monitor for signs and symptoms (heart palpitations, chest pain, shortness of breath, headaches, nausea, feeling jumpy, restlessness, irritable, apprehensive). - Collaborate with interdisciplinary team and initiate plan and interventions as ordered.- [place] patient to unit/surroundings- Explain treatment plan- Encourage participation in care- Encourage verbalization of concerns/fears- Identify coping mechanisms- Assist in developing anxiety-reducing skills- Administer/offer  alternative therapies- Limit or eliminate stimulants  Outcome: Completed     Problem: Electroconvulsive therapy (ECT)  Goal: Treatment Goal: Demonstrate a reduction of confusion and improved orientation to person, place, time and/or situation upon discharge, according to optimum baseline/ability  Outcome: Completed     Problem: Ineffective Coping  Goal: Participates in unit activities  Description: Interventions:- Provide therapeutic environment - Provide required programming - Redirect inappropriate behaviors   Outcome: Completed

## 2025-04-22 NOTE — TREATMENT TEAM
04/22/25 1108   Team Meeting   Meeting Type Daily Rounds   Team Members Present   Team Members Present Physician;Nurse;   Physician Team Member Annemarie/Dedra   [facility]   Care Management Team Member Candie/Samira   Patient/Family Present   Patient Present No   Patient's Family Present No     Pt is scheduled to discharge today.

## 2025-04-23 ENCOUNTER — HOSPITAL ENCOUNTER (OUTPATIENT)
Dept: PREOP/PACU | Facility: HOSPITAL | Age: 45
Setting detail: OUTPATIENT SURGERY
Discharge: HOME | DRG: 751 | End: 2025-04-23
Payer: COMMERCIAL

## 2025-04-23 ENCOUNTER — ANESTHESIA EVENT (INPATIENT)
Dept: PREOP/PACU | Facility: HOSPITAL | Age: 45
DRG: 751 | End: 2025-04-23
Payer: COMMERCIAL

## 2025-04-23 ENCOUNTER — ANESTHESIA (INPATIENT)
Dept: PREOP/PACU | Facility: HOSPITAL | Age: 45
DRG: 751 | End: 2025-04-23
Payer: COMMERCIAL

## 2025-04-23 VITALS
HEART RATE: 98 BPM | OXYGEN SATURATION: 100 % | DIASTOLIC BLOOD PRESSURE: 97 MMHG | WEIGHT: 174 LBS | RESPIRATION RATE: 18 BRPM | TEMPERATURE: 97.9 F | SYSTOLIC BLOOD PRESSURE: 166 MMHG | BODY MASS INDEX: 27.97 KG/M2 | HEIGHT: 66 IN

## 2025-04-23 DIAGNOSIS — F33.9 MAJOR DEPRESSIVE DISORDER, RECURRENT, UNSPECIFIED (HCC): ICD-10-CM

## 2025-04-23 LAB
EXT PREGNANCY TEST URINE: NEGATIVE
EXT. CONTROL: NORMAL

## 2025-04-23 PROCEDURE — 90870 ELECTROCONVULSIVE THERAPY: CPT

## 2025-04-23 PROCEDURE — 90870 ELECTROCONVULSIVE THERAPY: CPT | Performed by: PSYCHIATRY & NEUROLOGY

## 2025-04-23 PROCEDURE — 81025 URINE PREGNANCY TEST: CPT | Performed by: STUDENT IN AN ORGANIZED HEALTH CARE EDUCATION/TRAINING PROGRAM

## 2025-04-23 RX ORDER — SODIUM CHLORIDE, SODIUM LACTATE, POTASSIUM CHLORIDE, CALCIUM CHLORIDE 600; 310; 30; 20 MG/100ML; MG/100ML; MG/100ML; MG/100ML
50 INJECTION, SOLUTION INTRAVENOUS CONTINUOUS
Status: DISCONTINUED | OUTPATIENT
Start: 2025-04-23 | End: 2025-04-27 | Stop reason: HOSPADM

## 2025-04-23 RX ORDER — ESMOLOL HYDROCHLORIDE 10 MG/ML
INJECTION INTRAVENOUS AS NEEDED
Status: DISCONTINUED | OUTPATIENT
Start: 2025-04-23 | End: 2025-04-23

## 2025-04-23 RX ORDER — METHOHEXITAL IN WATER/PF 100MG/10ML
SYRINGE (ML) INTRAVENOUS AS NEEDED
Status: DISCONTINUED | OUTPATIENT
Start: 2025-04-23 | End: 2025-04-23

## 2025-04-23 RX ORDER — GLYCOPYRROLATE 0.2 MG/ML
INJECTION INTRAMUSCULAR; INTRAVENOUS AS NEEDED
Status: DISCONTINUED | OUTPATIENT
Start: 2025-04-23 | End: 2025-04-23

## 2025-04-23 RX ORDER — SUCCINYLCHOLINE/SOD CL,ISO/PF 100 MG/5ML
SYRINGE (ML) INTRAVENOUS AS NEEDED
Status: DISCONTINUED | OUTPATIENT
Start: 2025-04-23 | End: 2025-04-23

## 2025-04-23 RX ADMIN — GLYCOPYRROLATE 0.2 MG: 0.2 INJECTION, SOLUTION INTRAMUSCULAR; INTRAVENOUS at 07:11

## 2025-04-23 RX ADMIN — SODIUM CHLORIDE, SODIUM LACTATE, POTASSIUM CHLORIDE, AND CALCIUM CHLORIDE 50 ML/HR: .6; .31; .03; .02 INJECTION, SOLUTION INTRAVENOUS at 05:31

## 2025-04-23 RX ADMIN — Medication 50 MG: at 07:13

## 2025-04-23 RX ADMIN — Medication 80 MG: at 07:11

## 2025-04-23 RX ADMIN — Medication 100 MG: at 07:11

## 2025-04-23 NOTE — ANESTHESIA PREPROCEDURE EVALUATION
"Procedure:  HB ECT IN OR    Relevant Problems   ENDO   (+) Hypothyroidism   (+) Hypothyroidism due to Hashimoto's thyroiditis   (+) Type 1 diabetes mellitus with hyperglycemia (HCC)      /RENAL   (+) THO (acute kidney injury) (HCC)      HEMATOLOGY   (+) Iron deficiency anemia   (+) Iron deficiency anemia, unspecified      NEURO/PSYCH   (+) Generalized anxiety disorder with panic attacks   (+) Recurrent major depressive disorder (HCC)        Physical Exam    Airway    Mallampati score: II  TM Distance: >3 FB  Neck ROM: full     Dental   No notable dental hx     Cardiovascular      Pulmonary   No wheezes    Other Findings  post-pubertal.      Anesthesia Plan  ASA Score- 2     Anesthesia Type- general with ASA Monitors.         Additional Monitors:     Airway Plan:            Plan Factors-    Chart reviewed. EKG reviewed.  Existing labs reviewed. Patient summary reviewed.              Intended use of anticholinesterase.    Induction- intravenous.    Postoperative Plan-     Perioperative Resuscitation Plan - Level 1 - Full Code.       Informed Consent- Anesthetic plan and risks discussed with patient.  I personally reviewed this patient with the CRNA. Discussed and agreed on the Anesthesia Plan with the CRNA..      NPO Status:  Vitals Value Taken Time   Date of last liquid 04/22/25 04/23/25 0525   Time of last liquid 2100 04/23/25 0525   Date of last solid 04/22/25 04/23/25 0525   Time of last solid 1030 04/23/25 0525       VITALS  BP (!) 176/96   Pulse 94   Temp (!) 96.9 °F (36.1 °C)   Resp 16   Ht 5' 6\" (1.676 m)   Wt 78.9 kg (174 lb)   LMP 04/18/2025 (Approximate)   SpO2 99%   BMI 28.08 kg/m²  BP Readings from Last 3 Encounters:   04/23/25 (!) 176/96   03/05/25 130/88   01/10/25 112/84        LABS  Results from Last 12 Months   Lab Units 04/12/25  0651 02/17/25  0807   WBC Thousand/uL 2.90* 3.39*   HEMOGLOBIN g/dL 10.9* 13.2   HEMATOCRIT % 34.7* 42.0   PLATELETS Thousands/uL 228 249     No results for " "input(s): \"APTT\", \"INR\", \"PTT\" in the last 8784 hours. Results from Last 12 Months   Lab Units 04/20/25  0541 04/13/25  0559 04/11/25 2051 03/05/25  1543 02/17/25  0807 01/10/25  0744   SODIUM mmol/L 139 139   < >  --    < >  --    POTASSIUM mmol/L 4.1 4.5   < >  --    < >  --    CHLORIDE mmol/L 103 105   < >  --    < >  --    CO2 mmol/L 29 29   < >  --    < >  --    ANION GAP mmol/L 7 5   < >  --    < >  --    BUN mg/dL 14 15   < >  --    < >  --    CREATININE mg/dL 0.77 0.82   < >  --    < >  --    CALCIUM mg/dL 9.1 8.7   < >  --    < >  --    GLUCOSE RANDOM mg/dL 114 188*   < >  --   --   --    HEMOGLOBIN A1C   --   --   --  8.7*  --  9.6*    < > = values in this interval not displayed.        ECG      ECHOCARDIOGRAPHY OR OTHER TESTING/IMAGING  No results found for this or any previous visit (from the past 4464 hours).    No results found for this or any previous visit. N/a     ------- ANESTHESIA RISK-BENEFIT DISCUSSION -------  BENEFITS OF A SPECIALIZED ANESTHESIA TEAM INCLUDE (NBK [id number], PMID [id number]):  (1) Reduce mortality and morbidity for major surgeries/procedures. (2) The team provides analgesia/sedation/amnesia/akinesia as safely as possible. (3) The team strives to reduce discomfort as safely as possible.    RISKS, AND PLANS TO MITIGATE RISKS, INCLUDE:    - Neurologic system: IntraOp awareness (Risk is ~1:1,000 - 1:14,000; PMID 64508566), Stroke (Risk ~<0.1-2% for most cases; PMID 71039950), nerve injury, vision loss, and POCD.     - Airway and Pulmonary system: Dental or mouth injury, throat pain, critical hypoxia, pneumothorax, prolonged intubation, post-op respiratory compromise.  Airway/Intubation risks and prior data: No prior advanced airway notes in [facility]  Major ARISCAT risk factors for pulmonary complications include: none, yielding a score of 0-1= Low risk, 1.6%.  - Cardiovascular system: Hypotension, arrhythmias, cardiac injury or arrest, blood clots, bleeding, infection, vascular " injuries.  Atilio's RCRI score items: none, yielding an RCRI Score of 0= 0.4% risk of MACE  Are fabby-op or intra-op beta blockers indicated? (PMID 76133648): no  - FEN/GI system: Aspiration risk (~0.5% per [facility] [id number]) and PONV (10-80% per Apfel score) especially if the patient has not fasted.  ASA NPO guideline compliance?: Yes  - Medication risk assessment: Allergic reactions, excessive bleeding with anticoagulant use, overdoses, drug-drug interactions, injury to a fetus or  in pregnant or breastfeeding patients, fabby-procedural sedation including while driving/operating machinery.  Recent relevant medications: See MAR or Med Review  Personal or family history of anesthesia complications: no  Pregnancy Status: N/A  - Estimate mortality risks associated with anesthesia based on ASA-PS (PMID 65153469): ASA-PS II: risk 1:20,000

## 2025-04-23 NOTE — ANESTHESIA POSTPROCEDURE EVALUATION
Post-Op Assessment Note    CV Status:  Stable    Pain management: adequate       Mental Status:  Alert and awake   Hydration Status:  Stable   PONV Controlled:  None   Airway Patency:  Patent     Post Op Vitals Reviewed: Yes    No anethesia notable event occurred.    Staff: CRNA           Last Filed PACU Vitals:  Vitals Value Taken Time   Temp     Pulse 96    /99    Resp 20    SpO2 97

## 2025-04-23 NOTE — NURSING NOTE
Patient expressed concern that she is having difficulty arranging a ride for her treatments and suggested that she drives to her workplace down the street, takes an uber to and from [place] on her treatment days, and then goes to work in the same day. Advised patient to reach out to office to get approval for this arrangement and patient states she already received a note from the office clearing her to work on the same days she gets her treatments. Also advised patient that she should not be driving the same day as treatments because of the anesthesia and she verbalized understanding. AVS printed and reviewed with patient, step-father at bedside. Patient discharged in stable condition.

## 2025-04-23 NOTE — ANESTHESIA POSTPROCEDURE EVALUATION
Post-Op Assessment Note    CV Status:  Stable  Pain Score: 0    Pain management: adequate       Mental Status:  Alert and awake   Hydration Status:  Euvolemic   PONV Controlled:  Controlled   Airway Patency:  Patent     Post Op Vitals Reviewed: Yes    No anethesia notable event occurred.    Staff: Anesthesiologist           Last Filed PACU Vitals:  Vitals Value Taken Time   Temp     Pulse 96 04/23/25 0740   /102 04/23/25 0740   Resp 16 04/23/25 0740   SpO2 96 % 04/23/25 0740       Modified Eleuterio:     Vitals Value Taken Time   Activity 2 04/23/25 0740   Respiration 2 04/23/25 0740   Circulation 2 04/23/25 0740   Consciousness 2 04/23/25 0740   Oxygen Saturation 2 04/23/25 0740     Modified Eleuterio Score: 10

## 2025-04-23 NOTE — PROCEDURES
Procedure Note - Electroconvulsive Therapy (ECT)  Clarissa Webb 45 y.o. female MRN: [id number]    Time out was taken with staff to confirm correct patient and correct procedure to be performed; session number 04. In the interim, patient denies any adverse effects pertaining to ECT    Diagnosis: Principal Problem:    Recurrent major depressive disorder (HCC)      ECT Type: Outpatient    Anesthesia: Brevital    Electrode Placement: unilateral    Energy level: 45%    Seizure Duration     EE sec  EM sec    PSI 35.5%     Results:Clinical seizure was satisfactory, Patient tolerated ECT well    Vitals:    25 0730   BP:    Pulse: 99   Resp:    Temp:    SpO2: 100%        Medication Administration - last 24 hours from 2025 to 2025         Date/Time Order Dose Route Action Action by     2025 0531 EDT lactated ringers infusion 50 mL/hr Intravenous New Bag Ericka Schlecht, RN Jordan Christopher Holter, DO

## 2025-04-25 ENCOUNTER — ANESTHESIA EVENT (OUTPATIENT)
Dept: PREOP/PACU | Facility: HOSPITAL | Age: 45
End: 2025-04-25
Payer: COMMERCIAL

## 2025-04-25 ENCOUNTER — HOSPITAL ENCOUNTER (OUTPATIENT)
Dept: PREOP/PACU | Facility: HOSPITAL | Age: 45
Setting detail: OUTPATIENT SURGERY
End: 2025-04-25
Attending: PSYCHIATRY & NEUROLOGY
Payer: COMMERCIAL

## 2025-04-25 ENCOUNTER — TELEPHONE (OUTPATIENT)
Dept: ENDOCRINOLOGY | Facility: CLINIC | Age: 45
End: 2025-04-25

## 2025-04-25 ENCOUNTER — ANESTHESIA (OUTPATIENT)
Dept: PREOP/PACU | Facility: HOSPITAL | Age: 45
End: 2025-04-25
Payer: COMMERCIAL

## 2025-04-25 VITALS
BODY MASS INDEX: 27.97 KG/M2 | HEIGHT: 66 IN | HEART RATE: 100 BPM | WEIGHT: 174 LBS | TEMPERATURE: 97.7 F | DIASTOLIC BLOOD PRESSURE: 78 MMHG | RESPIRATION RATE: 16 BRPM | OXYGEN SATURATION: 98 % | SYSTOLIC BLOOD PRESSURE: 147 MMHG

## 2025-04-25 DIAGNOSIS — F33.9 MAJOR DEPRESSIVE DISORDER, RECURRENT, UNSPECIFIED (HCC): ICD-10-CM

## 2025-04-25 LAB
EXT PREGNANCY TEST URINE: NEGATIVE
EXT. CONTROL: NORMAL

## 2025-04-25 PROCEDURE — 81025 URINE PREGNANCY TEST: CPT | Performed by: STUDENT IN AN ORGANIZED HEALTH CARE EDUCATION/TRAINING PROGRAM

## 2025-04-25 PROCEDURE — 90870 ELECTROCONVULSIVE THERAPY: CPT

## 2025-04-25 RX ORDER — METHOHEXITAL IN WATER/PF 100MG/10ML
SYRINGE (ML) INTRAVENOUS AS NEEDED
Status: DISCONTINUED | OUTPATIENT
Start: 2025-04-25 | End: 2025-04-25

## 2025-04-25 RX ORDER — SUCCINYLCHOLINE/SOD CL,ISO/PF 100 MG/5ML
SYRINGE (ML) INTRAVENOUS AS NEEDED
Status: DISCONTINUED | OUTPATIENT
Start: 2025-04-25 | End: 2025-04-25

## 2025-04-25 RX ORDER — SODIUM CHLORIDE, SODIUM LACTATE, POTASSIUM CHLORIDE, CALCIUM CHLORIDE 600; 310; 30; 20 MG/100ML; MG/100ML; MG/100ML; MG/100ML
INJECTION, SOLUTION INTRAVENOUS CONTINUOUS PRN
Status: DISCONTINUED | OUTPATIENT
Start: 2025-04-25 | End: 2025-04-25

## 2025-04-25 RX ORDER — GLYCOPYRROLATE 0.2 MG/ML
INJECTION INTRAMUSCULAR; INTRAVENOUS AS NEEDED
Status: DISCONTINUED | OUTPATIENT
Start: 2025-04-25 | End: 2025-04-25

## 2025-04-25 RX ORDER — DEXTROSE MONOHYDRATE 25 G/50ML
25 INJECTION, SOLUTION INTRAVENOUS ONCE
Status: COMPLETED | OUTPATIENT
Start: 2025-04-25 | End: 2025-04-25

## 2025-04-25 RX ORDER — ESMOLOL HYDROCHLORIDE 10 MG/ML
INJECTION INTRAVENOUS AS NEEDED
Status: DISCONTINUED | OUTPATIENT
Start: 2025-04-25 | End: 2025-04-25

## 2025-04-25 RX ORDER — DEXTROSE MONOHYDRATE 25 G/50ML
INJECTION, SOLUTION INTRAVENOUS AS NEEDED
Status: DISCONTINUED | OUTPATIENT
Start: 2025-04-25 | End: 2025-04-25

## 2025-04-25 RX ADMIN — Medication 100 MG: at 07:20

## 2025-04-25 RX ADMIN — Medication 50 MG: at 07:23

## 2025-04-25 RX ADMIN — DEXTROSE MONOHYDRATE 50 ML: 25 INJECTION, SOLUTION INTRAVENOUS at 07:19

## 2025-04-25 RX ADMIN — SODIUM CHLORIDE, SODIUM LACTATE, POTASSIUM CHLORIDE, AND CALCIUM CHLORIDE: .6; .31; .03; .02 INJECTION, SOLUTION INTRAVENOUS at 05:42

## 2025-04-25 RX ADMIN — Medication 80 MG: at 07:20

## 2025-04-25 RX ADMIN — GLYCOPYRROLATE 0.2 MG: 0.2 INJECTION, SOLUTION INTRAMUSCULAR; INTRAVENOUS at 07:10

## 2025-04-25 RX ADMIN — DEXTROSE MONOHYDRATE 25 G: 25 INJECTION, SOLUTION INTRAVENOUS at 07:10

## 2025-04-25 RX ADMIN — DEXTROSE MONOHYDRATE 25 ML: 25 INJECTION, SOLUTION INTRAVENOUS at 06:57

## 2025-04-25 NOTE — ANESTHESIA PREPROCEDURE EVALUATION
Procedure:  HB ECT IN OR    Relevant Problems   ENDO   (+) Hypothyroidism   (+) Hypothyroidism due to Hashimoto's thyroiditis   (+) Type 1 diabetes mellitus with hyperglycemia (HCC)      /RENAL   (+) THO (acute kidney injury) (HCC)      HEMATOLOGY   (+) Iron deficiency anemia   (+) Iron deficiency anemia, unspecified      NEURO/PSYCH   (+) Generalized anxiety disorder with panic attacks   (+) Recurrent major depressive disorder (HCC)      Latest Reference Range & Units 04/25/25 05:48   PREGNANCY TEST URINE Negative  Negative        Physical Exam    Airway    Mallampati score: II  TM Distance: >3 FB  Neck ROM: full     Dental   No notable dental hx     Cardiovascular      Pulmonary      Other Findings  post-pubertal.      Anesthesia Plan  ASA Score- 3     Anesthesia Type- general with ASA Monitors.         Additional Monitors:     Airway Plan:            Plan Factors-Exercise tolerance (METS): >4 METS.    Chart reviewed. EKG reviewed.  Existing labs reviewed. Patient summary reviewed.    Patient is not a current smoker.  Patient did not smoke on day of surgery.        Intended use of anticholinesterase.    Induction- intravenous.    Postoperative Plan-     Perioperative Resuscitation Plan - Level 1 - Full Code.       Informed Consent- Anesthetic plan and risks discussed with patient.  I personally reviewed this patient with the CRNA. Discussed and agreed on the Anesthesia Plan with the CRNA..      NPO Status:  Vitals Value Taken Time   Date of last liquid 04/24/25 04/25/25 0537   Time of last liquid 2200 04/25/25 0537   Date of last solid 04/24/25 04/25/25 0537   Time of last solid 1900 04/25/25 0537

## 2025-04-25 NOTE — ANESTHESIA POSTPROCEDURE EVALUATION
Post-Op Assessment Note    CV Status:  Stable  Pain Score: 0    Pain management: adequate       Mental Status:  Alert and awake   Hydration Status:  Stable   PONV Controlled:  None   Airway Patency:  Patent     Post Op Vitals Reviewed: Yes    No anethesia notable event occurred.    Staff: CRNA           Last Filed PACU Vitals:  Vitals Value Taken Time   Temp     Pulse 94    /64    Resp 20    SpO2 100

## 2025-04-25 NOTE — TELEPHONE ENCOUNTER
Pump settings form faxed to Eileen @ [phone] and emailed to [email or web address].   Scanned into chart

## 2025-04-25 NOTE — NURSING NOTE
Pt returned to [facility] awake,alert,tolerated juice, written and verbal instructions given, pt verbalizes an understanding and voices no complaints.

## 2025-04-25 NOTE — PROCEDURES
Procedure Note - ECT  Clarissa Webb 45 y.o. female MRN: [id number]    Time out was taken with staff to confirm correct patient and correct procedure to be performed.  Patient with hypoglycemia given glucose prior to procedure was well-tolerated.  She reports that she is planning to return back to work throughout course of ECT, advised to avoid this due to memory impairments and certainly not to work on days of ECT due to anesthetic complications. Informed we can provide additional paperwork for her job should she require it. Patient expressed understanding.    Session Number: 005    Diagnosis: Major depressive disorder, recurrent, severe    ECT Type: Outpatient, Acute    Anesthesia: Etomidate :    Electrode Placement: Non-dominant unilateral    Energy level:  45 %      Seizure Duration     EE Sec.    EMG : 38 Sec    Post-ictal Suppression Index: 40.4 %    Results:Clinical seizure was satisfactory, Patient tolerated ECT well    Vitals:    25 0800   BP: 147/78   Pulse: 100   Resp: 16   Temp: 97.7 °F (36.5 °C)   SpO2: 98%        Medication Administration - last 24 hours from 2025 0934 to 2025 0934         Date/Time Order Dose Route Action Action by     2025 0840 EDT lactated ringers infusion 0 mL/kg/hr Intravenous Stopped Genie Travis RN     2025 0729 EDT lactated ringers infusion -- Intravenous Anesthesia Volume Adjustment Esequiel Thorne CRNA     2025 0542 EDT lactated ringers infusion -- Intravenous New Bag Esequiel Thorne CRNA     2025 0657 EDT dextrose 50 % IV solution 25 mL 25 mL Intravenous Given Genny Quesada RN     2025 0719 EDT dextrose 50 % IV solution 50 mL Intravenous Given Genny Quesada RN     2025 0710 EDT dextrose 50 % IV solution 25 g Intravenous Given Esequiel Thorne CRNA

## 2025-04-25 NOTE — ANESTHESIA POSTPROCEDURE EVALUATION
Authorization request sent to pt's insurance; will contact pt once response is received.    Post-Op Assessment Note    CV Status:  Stable    Pain management: adequate       Mental Status:  Alert and awake   Hydration Status:  Euvolemic   PONV Controlled:  Controlled   Airway Patency:  Patent     Post Op Vitals Reviewed: Yes    No anethesia notable event occurred.    Staff: Anesthesiologist           Last Filed PACU Vitals:  Vitals Value Taken Time   Temp     Pulse 107 04/25/25 0750   /78 04/25/25 0746   Resp 16 04/25/25 0746   SpO2 95 % 04/25/25 0750   Vitals shown include unfiled device data.    Modified Eleuterio:     Vitals Value Taken Time   Activity 2 04/25/25 0736   Respiration 2 04/25/25 0736   Circulation 2 04/25/25 0736   Consciousness 2 04/25/25 0736   Oxygen Saturation 2 04/25/25 0736     Modified Eleuterio Score: 10

## 2025-04-28 ENCOUNTER — HOSPITAL ENCOUNTER (OUTPATIENT)
Dept: PREOP/PACU | Facility: HOSPITAL | Age: 45
Setting detail: OUTPATIENT SURGERY
Discharge: HOME | End: 2025-04-28
Payer: COMMERCIAL

## 2025-04-28 ENCOUNTER — ANESTHESIA (OUTPATIENT)
Dept: PREOP/PACU | Facility: HOSPITAL | Age: 45
End: 2025-04-28
Payer: COMMERCIAL

## 2025-04-28 ENCOUNTER — HOSPITAL ENCOUNTER (OUTPATIENT)
Facility: HOSPITAL | Age: 45
Setting detail: OBSERVATION
Discharge: LEFT BEFORE DISCHARGE | End: 2025-04-29
Attending: EMERGENCY MEDICINE | Admitting: INTERNAL MEDICINE
Payer: COMMERCIAL

## 2025-04-28 ENCOUNTER — TELEPHONE (OUTPATIENT)
Dept: ENDOCRINOLOGY | Facility: CLINIC | Age: 45
End: 2025-04-28

## 2025-04-28 ENCOUNTER — ANESTHESIA EVENT (OUTPATIENT)
Dept: PREOP/PACU | Facility: HOSPITAL | Age: 45
End: 2025-04-28
Payer: COMMERCIAL

## 2025-04-28 VITALS
WEIGHT: 174 LBS | HEIGHT: 66 IN | DIASTOLIC BLOOD PRESSURE: 81 MMHG | RESPIRATION RATE: 18 BRPM | SYSTOLIC BLOOD PRESSURE: 135 MMHG | HEART RATE: 86 BPM | OXYGEN SATURATION: 98 % | TEMPERATURE: 97.7 F | BODY MASS INDEX: 27.97 KG/M2

## 2025-04-28 DIAGNOSIS — E10.65 TYPE 1 DIABETES MELLITUS WITH HYPERGLYCEMIA (HCC): Primary | ICD-10-CM

## 2025-04-28 DIAGNOSIS — N39.0 UTI (URINARY TRACT INFECTION): ICD-10-CM

## 2025-04-28 DIAGNOSIS — F33.9 MAJOR DEPRESSIVE DISORDER, RECURRENT, UNSPECIFIED (HCC): ICD-10-CM

## 2025-04-28 DIAGNOSIS — E10.65 TYPE 1 DIABETES MELLITUS WITH HYPERGLYCEMIA (HCC): ICD-10-CM

## 2025-04-28 DIAGNOSIS — R73.9 HYPERGLYCEMIA: Primary | ICD-10-CM

## 2025-04-28 LAB
ALBUMIN SERPL BCG-MCNC: 3.9 G/DL (ref 3.5–5)
ALP SERPL-CCNC: 78 U/L (ref 34–104)
ALT SERPL W P-5'-P-CCNC: 16 U/L (ref 7–52)
ANION GAP SERPL CALCULATED.3IONS-SCNC: 10 MMOL/L (ref 4–13)
ANION GAP SERPL CALCULATED.3IONS-SCNC: 4 MMOL/L (ref 4–13)
AST SERPL W P-5'-P-CCNC: 12 U/L (ref 13–39)
B-OH-BUTYR SERPL-MCNC: 0.34 MMOL/L (ref 0.02–0.27)
BACTERIA UR QL AUTO: ABNORMAL /HPF
BASE EX.OXY STD BLDV CALC-SCNC: 91.1 % (ref 60–80)
BASE EXCESS BLDV CALC-SCNC: -5.5 MMOL/L
BILIRUB SERPL-MCNC: 0.48 MG/DL (ref 0.2–1)
BILIRUB UR QL STRIP: NEGATIVE
BUN SERPL-MCNC: 11 MG/DL (ref 5–25)
BUN SERPL-MCNC: 19 MG/DL (ref 5–25)
CALCIUM SERPL-MCNC: 6 MG/DL (ref 8.4–10.2)
CALCIUM SERPL-MCNC: 9.2 MG/DL (ref 8.4–10.2)
CHLORIDE SERPL-SCNC: 100 MMOL/L (ref 96–108)
CHLORIDE SERPL-SCNC: 118 MMOL/L (ref 96–108)
CK SERPL-CCNC: 30 U/L (ref 26–192)
CLARITY UR: ABNORMAL
CO2 SERPL-SCNC: 18 MMOL/L (ref 21–32)
CO2 SERPL-SCNC: 20 MMOL/L (ref 21–32)
COLOR UR: YELLOW
CREAT SERPL-MCNC: 0.51 MG/DL (ref 0.6–1.3)
CREAT SERPL-MCNC: 0.83 MG/DL (ref 0.6–1.3)
ERYTHROCYTE [DISTWIDTH] IN BLOOD BY AUTOMATED COUNT: 12 % (ref 11.6–15.1)
EXT PREGNANCY TEST URINE: NEGATIVE
EXT. CONTROL: NORMAL
GFR SERPL CREATININE-BSD FRML MDRD: 118 ML/MIN/1.73SQ M
GFR SERPL CREATININE-BSD FRML MDRD: 86 ML/MIN/1.73SQ M
GLUCOSE SERPL-MCNC: 193 MG/DL (ref 65–140)
GLUCOSE SERPL-MCNC: 242 MG/DL (ref 65–140)
GLUCOSE SERPL-MCNC: 283 MG/DL (ref 65–140)
GLUCOSE UR STRIP-MCNC: ABNORMAL MG/DL
HCO3 BLDV-SCNC: 18.5 MMOL/L (ref 24–30)
HCT VFR BLD AUTO: 37.9 % (ref 34.8–46.1)
HGB BLD-MCNC: 11.8 G/DL (ref 11.5–15.4)
HGB UR QL STRIP.AUTO: ABNORMAL
KETONES UR STRIP-MCNC: ABNORMAL MG/DL
LEUKOCYTE ESTERASE UR QL STRIP: ABNORMAL
LIPASE SERPL-CCNC: 8 U/L (ref 11–82)
MAGNESIUM SERPL-MCNC: 1.1 MG/DL (ref 1.9–2.7)
MAGNESIUM SERPL-MCNC: 1.5 MG/DL (ref 1.9–2.7)
MCH RBC QN AUTO: 27.6 PG (ref 26.8–34.3)
MCHC RBC AUTO-ENTMCNC: 31.1 G/DL (ref 31.4–37.4)
MCV RBC AUTO: 89 FL (ref 82–98)
NITRITE UR QL STRIP: POSITIVE
NON-SQ EPI CELLS URNS QL MICRO: ABNORMAL /HPF
O2 CT BLDV-SCNC: 16.2 ML/DL
PCO2 BLDV: 31.5 MM HG (ref 42–50)
PH BLDV: 7.39 [PH] (ref 7.3–7.4)
PH UR STRIP.AUTO: 7 [PH]
PHOSPHATE SERPL-MCNC: 2.1 MG/DL (ref 2.7–4.5)
PHOSPHATE SERPL-MCNC: 3.6 MG/DL (ref 2.7–4.5)
PLATELET # BLD AUTO: 210 THOUSANDS/UL (ref 149–390)
PMV BLD AUTO: 10.5 FL (ref 8.9–12.7)
PO2 BLDV: 77.4 MM HG (ref 35–45)
POTASSIUM SERPL-SCNC: 2.9 MMOL/L (ref 3.5–5.3)
POTASSIUM SERPL-SCNC: 4.1 MMOL/L (ref 3.5–5.3)
PROT SERPL-MCNC: 6.9 G/DL (ref 6.4–8.4)
PROT UR STRIP-MCNC: NEGATIVE MG/DL
RBC # BLD AUTO: 4.28 MILLION/UL (ref 3.81–5.12)
RBC #/AREA URNS AUTO: ABNORMAL /HPF
SODIUM SERPL-SCNC: 130 MMOL/L (ref 135–147)
SODIUM SERPL-SCNC: 140 MMOL/L (ref 135–147)
SP GR UR STRIP.AUTO: 1.01
UROBILINOGEN UR QL STRIP.AUTO: 0.2 E.U./DL
WBC # BLD AUTO: 5.29 THOUSAND/UL (ref 4.31–10.16)
WBC #/AREA URNS AUTO: ABNORMAL /HPF

## 2025-04-28 PROCEDURE — 80048 BASIC METABOLIC PNL TOTAL CA: CPT | Performed by: INTERNAL MEDICINE

## 2025-04-28 PROCEDURE — 36415 COLL VENOUS BLD VENIPUNCTURE: CPT | Performed by: EMERGENCY MEDICINE

## 2025-04-28 PROCEDURE — 81001 URINALYSIS AUTO W/SCOPE: CPT | Performed by: EMERGENCY MEDICINE

## 2025-04-28 PROCEDURE — 83690 ASSAY OF LIPASE: CPT | Performed by: EMERGENCY MEDICINE

## 2025-04-28 PROCEDURE — 93005 ELECTROCARDIOGRAM TRACING: CPT

## 2025-04-28 PROCEDURE — 82805 BLOOD GASES W/O2 SATURATION: CPT | Performed by: EMERGENCY MEDICINE

## 2025-04-28 PROCEDURE — 83735 ASSAY OF MAGNESIUM: CPT | Performed by: EMERGENCY MEDICINE

## 2025-04-28 PROCEDURE — 90870 ELECTROCONVULSIVE THERAPY: CPT

## 2025-04-28 PROCEDURE — 82550 ASSAY OF CK (CPK): CPT | Performed by: EMERGENCY MEDICINE

## 2025-04-28 PROCEDURE — 99222 1ST HOSP IP/OBS MODERATE 55: CPT | Performed by: INTERNAL MEDICINE

## 2025-04-28 PROCEDURE — 87077 CULTURE AEROBIC IDENTIFY: CPT | Performed by: EMERGENCY MEDICINE

## 2025-04-28 PROCEDURE — 84100 ASSAY OF PHOSPHORUS: CPT | Performed by: EMERGENCY MEDICINE

## 2025-04-28 PROCEDURE — 96361 HYDRATE IV INFUSION ADD-ON: CPT

## 2025-04-28 PROCEDURE — 85027 COMPLETE CBC AUTOMATED: CPT | Performed by: EMERGENCY MEDICINE

## 2025-04-28 PROCEDURE — 81025 URINE PREGNANCY TEST: CPT | Performed by: STUDENT IN AN ORGANIZED HEALTH CARE EDUCATION/TRAINING PROGRAM

## 2025-04-28 PROCEDURE — 83735 ASSAY OF MAGNESIUM: CPT | Performed by: INTERNAL MEDICINE

## 2025-04-28 PROCEDURE — 90870 ELECTROCONVULSIVE THERAPY: CPT | Performed by: PSYCHIATRY & NEUROLOGY

## 2025-04-28 PROCEDURE — 87181 SC STD AGAR DILUTION PER AGT: CPT | Performed by: EMERGENCY MEDICINE

## 2025-04-28 PROCEDURE — 84100 ASSAY OF PHOSPHORUS: CPT | Performed by: INTERNAL MEDICINE

## 2025-04-28 PROCEDURE — 82948 REAGENT STRIP/BLOOD GLUCOSE: CPT

## 2025-04-28 PROCEDURE — 82010 KETONE BODYS QUAN: CPT | Performed by: EMERGENCY MEDICINE

## 2025-04-28 PROCEDURE — 80053 COMPREHEN METABOLIC PANEL: CPT | Performed by: EMERGENCY MEDICINE

## 2025-04-28 PROCEDURE — 87186 SC STD MICRODIL/AGAR DIL: CPT | Performed by: EMERGENCY MEDICINE

## 2025-04-28 PROCEDURE — 96374 THER/PROPH/DIAG INJ IV PUSH: CPT

## 2025-04-28 PROCEDURE — 99285 EMERGENCY DEPT VISIT HI MDM: CPT

## 2025-04-28 PROCEDURE — 87086 URINE CULTURE/COLONY COUNT: CPT | Performed by: EMERGENCY MEDICINE

## 2025-04-28 RX ORDER — SODIUM CHLORIDE 9 MG/ML
3 INJECTION INTRAVENOUS
Status: DISCONTINUED | OUTPATIENT
Start: 2025-04-28 | End: 2025-04-29 | Stop reason: HOSPADM

## 2025-04-28 RX ORDER — SODIUM CHLORIDE 9 MG/ML
125 INJECTION, SOLUTION INTRAVENOUS CONTINUOUS
Status: DISCONTINUED | OUTPATIENT
Start: 2025-04-28 | End: 2025-04-29 | Stop reason: HOSPADM

## 2025-04-28 RX ORDER — ONDANSETRON 2 MG/ML
4 INJECTION INTRAMUSCULAR; INTRAVENOUS ONCE
Status: COMPLETED | OUTPATIENT
Start: 2025-04-28 | End: 2025-04-28

## 2025-04-28 RX ORDER — VALACYCLOVIR HYDROCHLORIDE 500 MG/1
500 TABLET, FILM COATED ORAL DAILY
Status: DISCONTINUED | OUTPATIENT
Start: 2025-04-29 | End: 2025-04-29 | Stop reason: HOSPADM

## 2025-04-28 RX ORDER — GLYCOPYRROLATE 0.2 MG/ML
INJECTION INTRAMUSCULAR; INTRAVENOUS AS NEEDED
Status: DISCONTINUED | OUTPATIENT
Start: 2025-04-28 | End: 2025-04-28

## 2025-04-28 RX ORDER — ACETAMINOPHEN 325 MG/1
650 TABLET ORAL EVERY 6 HOURS PRN
Status: DISCONTINUED | OUTPATIENT
Start: 2025-04-28 | End: 2025-04-29 | Stop reason: HOSPADM

## 2025-04-28 RX ORDER — TRAZODONE HYDROCHLORIDE 150 MG/1
150 TABLET ORAL
Status: DISCONTINUED | OUTPATIENT
Start: 2025-04-28 | End: 2025-04-29 | Stop reason: HOSPADM

## 2025-04-28 RX ORDER — MAGNESIUM SULFATE HEPTAHYDRATE 40 MG/ML
4 INJECTION, SOLUTION INTRAVENOUS ONCE
Status: COMPLETED | OUTPATIENT
Start: 2025-04-28 | End: 2025-04-29

## 2025-04-28 RX ORDER — LANOLIN ALCOHOL/MO/W.PET/CERES
100 CREAM (GRAM) TOPICAL DAILY
Status: DISCONTINUED | OUTPATIENT
Start: 2025-04-29 | End: 2025-04-29 | Stop reason: HOSPADM

## 2025-04-28 RX ORDER — FOLIC ACID 1 MG/1
1 TABLET ORAL DAILY
Status: DISCONTINUED | OUTPATIENT
Start: 2025-04-29 | End: 2025-04-29 | Stop reason: HOSPADM

## 2025-04-28 RX ORDER — CEPHALEXIN 500 MG/1
500 CAPSULE ORAL ONCE
Status: COMPLETED | OUTPATIENT
Start: 2025-04-28 | End: 2025-04-28

## 2025-04-28 RX ORDER — ESMOLOL HYDROCHLORIDE 10 MG/ML
INJECTION INTRAVENOUS AS NEEDED
Status: DISCONTINUED | OUTPATIENT
Start: 2025-04-28 | End: 2025-04-28

## 2025-04-28 RX ORDER — ENOXAPARIN SODIUM 100 MG/ML
40 INJECTION SUBCUTANEOUS DAILY
Status: DISCONTINUED | OUTPATIENT
Start: 2025-04-29 | End: 2025-04-29 | Stop reason: HOSPADM

## 2025-04-28 RX ORDER — POTASSIUM CHLORIDE 1500 MG/1
40 TABLET, EXTENDED RELEASE ORAL ONCE
Status: COMPLETED | OUTPATIENT
Start: 2025-04-28 | End: 2025-04-28

## 2025-04-28 RX ORDER — SUCCINYLCHOLINE/SOD CL,ISO/PF 100 MG/5ML
SYRINGE (ML) INTRAVENOUS AS NEEDED
Status: DISCONTINUED | OUTPATIENT
Start: 2025-04-28 | End: 2025-04-28

## 2025-04-28 RX ORDER — METHOHEXITAL IN WATER/PF 100MG/10ML
SYRINGE (ML) INTRAVENOUS AS NEEDED
Status: DISCONTINUED | OUTPATIENT
Start: 2025-04-28 | End: 2025-04-28

## 2025-04-28 RX ORDER — SODIUM CHLORIDE, SODIUM LACTATE, POTASSIUM CHLORIDE, CALCIUM CHLORIDE 600; 310; 30; 20 MG/100ML; MG/100ML; MG/100ML; MG/100ML
INJECTION, SOLUTION INTRAVENOUS CONTINUOUS PRN
Status: DISCONTINUED | OUTPATIENT
Start: 2025-04-28 | End: 2025-04-28

## 2025-04-28 RX ORDER — GABAPENTIN 400 MG/1
400 CAPSULE ORAL 3 TIMES DAILY
Status: DISCONTINUED | OUTPATIENT
Start: 2025-04-28 | End: 2025-04-29 | Stop reason: HOSPADM

## 2025-04-28 RX ORDER — ESCITALOPRAM OXALATE 10 MG/1
20 TABLET ORAL DAILY
Status: DISCONTINUED | OUTPATIENT
Start: 2025-04-29 | End: 2025-04-29 | Stop reason: HOSPADM

## 2025-04-28 RX ORDER — SODIUM CHLORIDE, SODIUM LACTATE, POTASSIUM CHLORIDE, CALCIUM CHLORIDE 600; 310; 30; 20 MG/100ML; MG/100ML; MG/100ML; MG/100ML
75 INJECTION, SOLUTION INTRAVENOUS CONTINUOUS
Status: CANCELLED | OUTPATIENT
Start: 2025-04-28

## 2025-04-28 RX ORDER — DEXTROAMPHETAMINE SACCHARATE, AMPHETAMINE ASPARTATE, DEXTROAMPHETAMINE SULFATE AND AMPHETAMINE SULFATE 2.5; 2.5; 2.5; 2.5 MG/1; MG/1; MG/1; MG/1
20 TABLET ORAL DAILY
Status: DISCONTINUED | OUTPATIENT
Start: 2025-04-29 | End: 2025-04-29 | Stop reason: HOSPADM

## 2025-04-28 RX ORDER — POTASSIUM CHLORIDE 1500 MG/1
40 TABLET, EXTENDED RELEASE ORAL ONCE
Status: COMPLETED | OUTPATIENT
Start: 2025-04-29 | End: 2025-04-29

## 2025-04-28 RX ORDER — LANOLIN ALCOHOL/MO/W.PET/CERES
800 CREAM (GRAM) TOPICAL ONCE
Status: COMPLETED | OUTPATIENT
Start: 2025-04-28 | End: 2025-04-28

## 2025-04-28 RX ADMIN — ESMOLOL HYDROCHLORIDE 50 MG: 100 INJECTION, SOLUTION INTRAVENOUS at 06:50

## 2025-04-28 RX ADMIN — SODIUM CHLORIDE 125 ML/HR: 0.9 INJECTION, SOLUTION INTRAVENOUS at 20:07

## 2025-04-28 RX ADMIN — ONDANSETRON 4 MG: 2 INJECTION INTRAMUSCULAR; INTRAVENOUS at 15:08

## 2025-04-28 RX ADMIN — GABAPENTIN 400 MG: 400 CAPSULE ORAL at 20:08

## 2025-04-28 RX ADMIN — MAGNESIUM SULFATE HEPTAHYDRATE 4 G: 40 INJECTION, SOLUTION INTRAVENOUS at 21:39

## 2025-04-28 RX ADMIN — CARIPRAZINE 3 MG: 1.5 CAPSULE, GELATIN COATED ORAL at 21:38

## 2025-04-28 RX ADMIN — TRAZODONE HYDROCHLORIDE 150 MG: 150 TABLET ORAL at 21:39

## 2025-04-28 RX ADMIN — SODIUM CHLORIDE, SODIUM LACTATE, POTASSIUM CHLORIDE, AND CALCIUM CHLORIDE: .6; .31; .03; .02 INJECTION, SOLUTION INTRAVENOUS at 06:18

## 2025-04-28 RX ADMIN — SODIUM CHLORIDE 1000 ML: 0.9 INJECTION, SOLUTION INTRAVENOUS at 14:51

## 2025-04-28 RX ADMIN — POTASSIUM CHLORIDE 40 MEQ: 1500 TABLET, EXTENDED RELEASE ORAL at 21:39

## 2025-04-28 RX ADMIN — SODIUM CHLORIDE 1000 ML: 0.9 INJECTION, SOLUTION INTRAVENOUS at 16:36

## 2025-04-28 RX ADMIN — POTASSIUM & SODIUM PHOSPHATES POWDER PACK 280-160-250 MG 2 PACKET: 280-160-250 PACK at 21:45

## 2025-04-28 RX ADMIN — Medication 800 MG: at 15:07

## 2025-04-28 RX ADMIN — METHOHEXITAL SODIUM 100 MG: 500 INJECTION, POWDER, LYOPHILIZED, FOR SOLUTION INTRAMUSCULAR; INTRAVENOUS; RECTAL at 06:47

## 2025-04-28 RX ADMIN — CEFTRIAXONE SODIUM 1000 MG: 10 INJECTION, POWDER, FOR SOLUTION INTRAVENOUS at 22:02

## 2025-04-28 RX ADMIN — GLYCOPYRROLATE 0.2 MG: 0.2 INJECTION, SOLUTION INTRAMUSCULAR; INTRAVENOUS at 06:42

## 2025-04-28 RX ADMIN — Medication 80 MG: at 06:47

## 2025-04-28 RX ADMIN — CEPHALEXIN 500 MG: 500 CAPSULE ORAL at 16:36

## 2025-04-28 NOTE — PLAN OF CARE
Problem: PAIN - ADULT  Goal: Verbalizes/displays adequate comfort level or baseline comfort level  Description: Interventions:- Encourage patient to monitor pain and request assistance- Assess pain using appropriate pain scale- Administer analgesics based on type and severity of pain and evaluate response- Implement non-pharmacological measures as appropriate and evaluate response- Consider cultural and social influences on pain and pain management- Notify physician/advanced practitioner if interventions unsuccessful or patient reports new pain  Outcome: Progressing     Problem: INFECTION - ADULT  Goal: Absence or prevention of progression during hospitalization  Description: INTERVENTIONS:- Assess and monitor for signs and symptoms of infection- Monitor lab/diagnostic results- Monitor all insertion sites, i.e. indwelling lines, tubes, and drains- Monitor endotracheal if appropriate and nasal secretions for changes in amount and color- Towner appropriate cooling/warming therapies per order- Administer medications as ordered- Instruct and encourage patient and family to use good hand hygiene technique- Identify and instruct in appropriate isolation precautions for identified infection/condition  Outcome: Progressing

## 2025-04-28 NOTE — ASSESSMENT & PLAN NOTE
"Lab Results   Component Value Date    HGBA1C 8.7 (A) 03/05/2025       No results for input(s): \"POCGLU\" in the last 72 hours.    Blood Sugar Average: Last 72 hrs:    Early DKA - beta hydroxybutyrate 0.3  Likely triggered by UTI  Trend labs q4H  IV Fluids  Continue insulin Pump with home settings (glucose readings improved 140s currently)  Will double check readings with q4H accuchecks    "

## 2025-04-28 NOTE — ASSESSMENT & PLAN NOTE
Continue home medication regimen  Patient had recent behavioral health stay and has since been feeling much better

## 2025-04-28 NOTE — ANESTHESIA POSTPROCEDURE EVALUATION
Post-Op Assessment Note    CV Status:  Stable    Pain management: satisfactory to patient       Mental Status:  Sleepy   Hydration Status:  Stable   PONV Controlled:  None   Airway Patency:  Patent     Post Op Vitals Reviewed: Yes    No anethesia notable event occurred.    Staff: CRNA           Last Filed PACU Vitals:  Vitals Value Taken Time   Temp     Pulse 79    /87    Resp 14    SpO2 99

## 2025-04-28 NOTE — ED PROVIDER NOTES
"Time reflects when diagnosis was documented in both MDM as applicable and the Disposition within this note       Time User Action Codes Description Comment    2025  4:40 PM Chinmay Dunlap Add [R73.9] Hyperglycemia     2025  4:40 PM Chinmay Dunlap Add [N39.0] UTI (urinary tract infection)     2025  5:16 PM Milena Toth [E10.65] Type 1 diabetes mellitus with hyperglycemia (HCC)           ED Disposition       ED Disposition   Admit    Condition   Stable    Date/Time     4:40 PM    Comment   Case was discussed with Milena Toth MD and the patient's admission status was agreed to be Admission Status: observation status to the service of Dr. Toth .               Assessment & Plan       Medical Decision Making  Amount and/or Complexity of Data Reviewed  Labs: ordered.    Risk  OTC drugs.  Prescription drug management.  Decision regarding hospitalization.      45-year-old female, history of type 1 diabetes and elevated hemoglobin A1c as an outpatient, presents show any blood sugars over the last 24 to 48 hours, mild acidosis on labs noted.  UTI on UA, will admit to IM.  IVF initiated.    Portions of the record may have been created with voice recognition software. Occasional wrong word or \"sound a like\" substitutions may have occurred due to the inherent limitations of voice recognition software. Read the chart carefully and recognize, using context, where substitutions have occurred.   ED Course as of 25 1727      1435 Patient seen, examined, evaluated, chart reviewed.  Recently, greater than 400, recently had insulin pump installed today, no basal rate.    Brief focused diff dx: DKA vs. Mild dehydration vs. Symptomatic hyperglycemia w/o evidence of acidosis.   1600 + UTI, by hx pt having mild symptoms, magnesium level low, replaced PO rate, noted Beta Hydro marginally elevated, bicarb  to 20, will repeat IVF for a total of 2 liters of NC., possible " early DKA.   1638 Case d/w Dr. Milena Toth, [facility] provider on, due to patient having new insulin pump w/ volatile BS, will admit       Medications   sodium chloride (PF) 0.9 % injection 3 mL (has no administration in time range)   sodium chloride 0.9 % bolus 1,000 mL (1,000 mL Intravenous New Bag 25 1636)   sodium chloride 0.9 % bolus 1,000 mL (0 mL Intravenous Stopped 25 1551)   magnesium Oxide (MAG-OX) tablet 800 mg (800 mg Oral Given 25 1507)   ondansetron (ZOFRAN) injection 4 mg (4 mg Intravenous Given 25 1508)   cephalexin (KEFLEX) capsule 500 mg (500 mg Oral Given 25 1636)       ED Risk Strat Scores                    No data recorded                            History of Present Illness       Chief Complaint   Patient presents with    Hyperglycemia - Symptomatic     Insulin placed today at 12, pt complains of N/thrist/urgency/dizziness       Past Medical History:   Diagnosis Date    Anemia     Anxiety     B12 deficiency     Cervical disc disorder     On gabapentin     Depression     Diabetes mellitus (HCC)     Disease of thyroid gland     hypothyroid    Iron deficiency anemia     Panic attack     PTSD (post-traumatic stress disorder)     Sleep difficulties     Substance abuse (HCC)     Type 1 diabetes (HCC)     Vitamin D deficiency       Past Surgical History:   Procedure Laterality Date    ABDOMINAL SURGERY      gastric bypass     SECTION      x2    CHOLECYSTECTOMY  2018    ELECTROCONVULSIVE THERAPY (ECT)  2025    GASTRIC BYPASS  2007    INTRAUTERINE DEVICE INSERTION  2015    IR BIOPSY BONE MARROW  2020    OTHER SURGICAL HISTORY      surgery for imperforate hymen/endometriosis/hydrometrocolpos    ROOT CANAL  2024    TUBAL LIGATION      WISDOM TOOTH EXTRACTION        Family History   Problem Relation Age of Onset    Thyroid disease Mother     URIEL disease Mother     Hyperlipidemia Mother     Hypertension Mother     Osteoarthritis Mother     Thyroid disease  unspecified Mother     Diabetes Father     Alcohol abuse Father     Diabetes type I Father     Thyroid disease Sister     Depression Sister     Thyroid disease unspecified Sister     Anxiety disorder Sister     Heart disease Maternal Grandmother     Hypertension Maternal Grandmother     Arthritis Maternal Grandmother     Diabetes type I Maternal Uncle     Diabetes type I Maternal Grandfather       Social History     Tobacco Use    Smoking status: Never    Smokeless tobacco: Never   Vaping Use    Vaping status: Never Used   Substance Use Topics    Alcohol use: Not Currently     Alcohol/week: 3.0 standard drinks of alcohol     Types: 3 Cans of beer per week     Comment: weekly a few drinks    Drug use: No      E-Cigarette/Vaping    E-Cigarette Use Never User       E-Cigarette/Vaping Substances    Nicotine No     THC No     CBD No     Flavoring No     Other No     Unknown No       I have reviewed and agree with the history as documented.     HPI    Nontoxic-appearing, 45-year-old female, hx of severe depression, Type 1 DM w/ HGBA1c: 8.7 as of March 2025, hypothyroidism due to Hashimoto thyroiditis, presents to ED as a reliable competent historian, w/ no language barrier concern w/ 24 hour hx of elevated BS: > 400, had insulin pump training today, current basal rate not established yet, reports mild dizzy, no vomiting, + fatigued, +nausea w/ abdominal pain.    Had ECT today.    Review of Systems   Constitutional: Negative.  Negative for chills, diaphoresis, fatigue and fever.   HENT: Negative.  Negative for congestion, drooling, nosebleeds, sinus pain, sore throat and trouble swallowing.    Eyes: Negative.    Respiratory: Negative.  Negative for cough, chest tightness and shortness of breath.    Cardiovascular: Negative.  Negative for chest pain, palpitations and leg swelling.   Gastrointestinal:  Positive for nausea. Negative for abdominal pain, blood in stool, constipation, diarrhea, rectal pain and vomiting.    Endocrine: Negative.    Genitourinary:  Positive for frequency. Negative for decreased urine volume, dysuria, urgency, vaginal bleeding, vaginal discharge and vaginal pain.   Musculoskeletal: Negative.    Skin: Negative.  Negative for pallor and wound.   Allergic/Immunologic: Negative.    Neurological: Negative.    Hematological: Negative.    Psychiatric/Behavioral: Negative.             Objective       ED Triage Vitals   Temperature Pulse Blood Pressure Respirations SpO2 Patient Position - Orthostatic VS   04/28/25 1416 04/28/25 1445 04/28/25 1415 04/28/25 1415 04/28/25 1415 04/28/25 1415   97.6 °F (36.4 °C) 79 123/65 18 98 % Sitting      Temp Source Heart Rate Source BP Location FiO2 (%) Pain Score    04/28/25 1415 04/28/25 1500 04/28/25 1415 -- 04/28/25 1415    Temporal Monitor Right arm  No Pain      Vitals      Date and Time Temp Pulse SpO2 Resp BP Pain Score FACES Pain Rating User   04/28/25 1725 97.6 °F (36.4 °C) 76 98 % 20 134/87 -- -- DII   04/28/25 1724 97.6 °F (36.4 °C) 77 98 % 20 134/87 -- -- DII   04/28/25 1700 -- 78 99 % 18 135/85 -- -- EM   04/28/25 1600 -- 83 98 % 18 130/62 -- -- EM   04/28/25 1500 -- 79 98 % 18 134/73 -- -- EM   04/28/25 1445 -- 79 99 % -- -- -- -- EM   04/28/25 1416 97.6 °F (36.4 °C) -- -- -- -- -- -- JJ   04/28/25 1415 -- -- 98 % 18 123/65 No Pain -- JJ            Physical Exam  Vitals and nursing note reviewed.   Constitutional:       General: She is not in acute distress.     Appearance: Normal appearance. She is normal weight. She is not ill-appearing, toxic-appearing or diaphoretic.   HENT:      Head: Normocephalic and atraumatic.      Right Ear: External ear normal.      Nose: Nose normal.      Mouth/Throat:      Mouth: Mucous membranes are moist.      Pharynx: Oropharynx is clear.   Eyes:      Extraocular Movements: Extraocular movements intact.      Conjunctiva/sclera: Conjunctivae normal.      Pupils: Pupils are equal, round, and reactive to light.   Cardiovascular:       Rate and Rhythm: Normal rate and regular rhythm.      Pulses: Normal pulses.      Heart sounds: Normal heart sounds. No murmur heard.     No friction rub. No gallop.   Pulmonary:      Effort: Pulmonary effort is normal. No respiratory distress.      Breath sounds: Normal breath sounds. No stridor. No wheezing, rhonchi or rales.   Chest:      Chest wall: No tenderness.   Abdominal:      General: Abdomen is flat. Bowel sounds are normal.      Palpations: Abdomen is soft.   Musculoskeletal:         General: No swelling, tenderness, deformity or signs of injury. Normal range of motion.      Cervical back: Normal range of motion.      Right lower leg: No edema.      Left lower leg: No edema.   Skin:     General: Skin is warm.      Capillary Refill: Capillary refill takes less than 2 seconds.   Neurological:      General: No focal deficit present.      Mental Status: She is alert and oriented to person, place, and time. Mental status is at baseline.   Psychiatric:         Mood and Affect: Mood normal.         Behavior: Behavior normal.         Thought Content: Thought content normal.         Judgment: Judgment normal.         Results Reviewed       Procedure Component Value Units Date/Time    CK [[id number]]  (Normal) Collected: 04/28/25 1428    Lab Status: Final result Specimen: Blood from Arm, Right Updated: 04/28/25 1706     Total CK 30 U/L     Urine Microscopic [[id number]]  (Abnormal) Collected: 04/28/25 1509    Lab Status: Final result Specimen: Urine, Clean Catch Updated: 04/28/25 1523     RBC, UA 2-4 /hpf      WBC, UA Innumerable /hpf      Epithelial Cells Occasional /hpf      Bacteria, UA Moderate /hpf     Urine culture [[id number]] Collected: 04/28/25 1509    Lab Status: In process Specimen: Urine, Clean Catch Updated: 04/28/25 1523    UA w Reflex to Microscopic w Reflex to Culture [[id number]]  (Abnormal) Collected: 04/28/25 1509    Lab Status: Final result Specimen: Urine, Clean Catch Updated: 04/28/25 1515      Color, UA Yellow     Clarity, UA Cloudy     Specific Gravity, UA 1.015     pH, UA 7.0     Leukocytes, UA 2+     Nitrite, UA Positive     Protein, UA Negative mg/dl      Glucose, UA 3+ mg/dl      Ketones, UA Trace mg/dl      Urobilinogen, UA 0.2 E.U./dl      Bilirubin, UA Negative     Occult Blood, UA Trace-Intact    Comprehensive metabolic panel [[id number]]  (Abnormal) Collected: 04/28/25 1428    Lab Status: Final result Specimen: Blood from Arm, Right Updated: 04/28/25 1449     Sodium 130 mmol/L      Potassium 4.1 mmol/L      Chloride 100 mmol/L      CO2 20 mmol/L      ANION GAP 10 mmol/L      BUN 19 mg/dL      Creatinine 0.83 mg/dL      Glucose 283 mg/dL      Calcium 9.2 mg/dL      AST 12 U/L      ALT 16 U/L      Alkaline Phosphatase 78 U/L      Total Protein 6.9 g/dL      Albumin 3.9 g/dL      Total Bilirubin 0.48 mg/dL      eGFR 86 ml/min/1.73sq m     Narrative:      [employer] guidelines for Chronic Kidney Disease (CKD):     Stage 1 with normal or high GFR (GFR > 90 mL/min/1.73 square meters)    Stage 2 Mild CKD (GFR = 60-89 mL/min/1.73 square meters)    Stage 3A Moderate CKD (GFR = 45-59 mL/min/1.73 square meters)    Stage 3B Moderate CKD (GFR = 30-44 mL/min/1.73 square meters)    Stage 4 Severe CKD (GFR = 15-29 mL/min/1.73 square meters)    Stage 5 End Stage CKD (GFR <15 mL/min/1.73 square meters)  Note: GFR calculation is accurate only with a steady state creatinine    Beta Hydroxybutyrate [[id number]]  (Abnormal) Collected: 04/28/25 1428    Lab Status: Final result Specimen: Blood from Arm, Right Updated: 04/28/25 1449     Beta- Hydroxybutyrate 0.34 mmol/L     Lipase [[id number]]  (Abnormal) Collected: 04/28/25 1428    Lab Status: Final result Specimen: Blood from Arm, Right Updated: 04/28/25 1449     Lipase 8 u/L     Magnesium [[id number]]  (Abnormal) Collected: 04/28/25 1428    Lab Status: Final result Specimen: Blood from Arm, Right Updated: 04/28/25 1449     Magnesium 1.5 mg/dL      "Phosphorus [[id number]]  (Normal) Collected: 04/28/25 1428    Lab Status: Final result Specimen: Blood from Arm, Right Updated: 04/28/25 1449     Phosphorus 3.6 mg/dL     CBC [[id number]]  (Abnormal) Collected: 04/28/25 1428    Lab Status: Final result Specimen: Blood from Arm, Right Updated: 04/28/25 1434     WBC 5.29 Thousand/uL      RBC 4.28 Million/uL      Hemoglobin 11.8 g/dL      Hematocrit 37.9 %      MCV 89 fL      MCH 27.6 pg      MCHC 31.1 g/dL      RDW 12.0 %      Platelets 210 Thousands/uL      MPV 10.5 fL     Blood gas, venous [[id number]]  (Abnormal) Collected: 04/28/25 1428    Lab Status: Final result Specimen: Blood from Arm, Right Updated: 04/28/25 1434     pH, Tate 7.386     pCO2, Tate 31.5 mm Hg      pO2, Tate 77.4 mm Hg      HCO3, Tate 18.5 mmol/L      Base Excess, Tate -5.5 mmol/L      O2 Content, Tate 16.2 ml/dL      O2 HGB, VENOUS 91.1 %             No orders to display       ECG 12 Lead Documentation Only    Date/Time: 4/28/2025 2:37 PM    Performed by: Chinmay Dunlap III, DO  Authorized by: Chinmay Dunlap III, DO    Indications / Diagnosis:  Fatigue, brief episode of chest pain yesterday  ECG reviewed by me, the ED Provider: no    Patient location:  ED  Comments:      Personally reviewed this EKG that was performed and the patient April 5, EKG was completed at 2:27 PM interpreted by me at the same time, normal sinus rhythm with no acute ST abnormalities.    Portions of the record may have been created with voice recognition software. Occasional wrong word or \"sound a like\" substitutions may have occurred due to the inherent limitations of voice recognition software. Read the chart carefully and recognize, using context, where substitutions have occurred.         ED Medication and Procedure Management   Prior to Admission Medications   Prescriptions Last Dose Informant Patient Reported? Taking?   Accu-Chek FastClix Lancets MISC   No No   Sig: USE 1 LANCET TO TEST BLOOD SUGAR UP TO 6 " "TIMES DAILY   Continuous Blood Gluc  (FreeStyle Samantha 14 Day [place]) KELVIN   No No   Sig: Use 1 Units in the morning   Continuous Glucose Sensor (FreeStyle Samantha 2 Sensor) MISC   No No   Sig: USE 1 UNIT EVERY 14 DAYS   Insulin Aspart (NovoLOG) 100 units/mL injection   No No   Sig: Sliding scale up to 3 times per day with meals. Max 30 units per day   Insulin Pen Needle (BD Pen Needle Deanna U/F) 32G X 4 MM MISC   No No   Sig: Use 4/day   Lantus 100 UNIT/ML subcutaneous injection   No No   Sig: INJECT 15 UNITS UNDER THE SKIN DAILY. DISCARD AFTER 28 DAYS.   UltiCare Insulin Syringe 28G X 1/2\" 1 ML MISC   No No   Sig: INJECT UNDER THE SKIN 4 (FOUR) TIMES A DAY   Vivitrol 380 MG SUSR   Yes No   Si mg   acetaminophen (TYLENOL) 500 mg tablet  Self Yes No   ascorbic acid (VITAMIN C) 500 MG tablet   No No   Sig: Take 1 tablet (500 mg total) by mouth daily   cariprazine (VRAYLAR) 3 MG capsule   No No   Sig: Take 1 capsule (3 mg total) by mouth daily at bedtime   cyanocobalamin (VITAMIN B-12) 1000 MCG tablet   No No   Sig: Take 1 tablet (1,000 mcg total) by mouth daily   ergocalciferol (VITAMIN D2) 50,000 units   No No   Sig: Take 1 capsule (50,000 Units total) by mouth once a week   escitalopram (LEXAPRO) 20 mg tablet   No No   Sig: Take 1 tablet (20 mg total) by mouth daily   ferrous sulfate 325 (65 Fe) mg tablet   No No   Sig: Take 1 tablet (325 mg total) by mouth every other day   folic acid (FOLVITE) 1 mg tablet   No No   Sig: Take 1 tablet (1 mg total) by mouth daily   gabapentin (NEURONTIN) 400 mg capsule   No No   Sig: Take 1 capsule (400 mg total) by mouth 3 (three) times a day   glucose blood (Accu-Chek Guide) test strip  Self No No   Sig: Use to test blood sugar up to 6 times daily.   levothyroxine 125 mcg tablet   No No   Sig: Take 2 tablets (250 mcg total) by mouth daily in the early morning   lisdexamfetamine (VYVANSE) 50 MG capsule   Yes No   Sig: Take 50 mg by mouth every morning   thiamine 100 MG " tablet   No No   Sig: Take 1 tablet (100 mg total) by mouth daily   traZODone (DESYREL) 150 mg tablet   No No   Sig: Take 1 tablet (150 mg total) by mouth daily at bedtime   valACYclovir (VALTREX) 500 mg tablet   No No   Sig: Take 1 tablet (500 mg total) by mouth daily      Facility-Administered Medications: None     Current Discharge Medication List        CONTINUE these medications which have NOT CHANGED    Details   Accu-Chek FastClix Lancets MISC USE 1 LANCET TO TEST BLOOD SUGAR UP TO 6 TIMES DAILY  Qty: 102 each, Refills: 0    Associated Diagnoses: Type 1 diabetes mellitus with hyperglycemia (HCC)      acetaminophen (TYLENOL) 500 mg tablet       ascorbic acid (VITAMIN C) 500 MG tablet Take 1 tablet (500 mg total) by mouth daily  Qty: 30 tablet, Refills: 1    Associated Diagnoses: Vitamin C deficiency      cariprazine (VRAYLAR) 3 MG capsule Take 1 capsule (3 mg total) by mouth daily at bedtime  Qty: 30 capsule, Refills: 1    Associated Diagnoses: Severe depression (HCC)      Continuous Blood Gluc  (FreeStyle Samantha 14 Day [place]) KELVIN Use 1 Units in the morning    Associated Diagnoses: Type 1 diabetes mellitus with hyperglycemia (HCC)      Continuous Glucose Sensor (FreeStyle Samantha 2 Sensor) MISC USE 1 UNIT EVERY 14 DAYS  Qty: 6 each, Refills: 1    Associated Diagnoses: Type 1 diabetes mellitus with hyperglycemia (HCC)      cyanocobalamin (VITAMIN B-12) 1000 MCG tablet Take 1 tablet (1,000 mcg total) by mouth daily  Qty: 30 tablet, Refills: 1    Associated Diagnoses: B12 deficiency      ergocalciferol (VITAMIN D2) 50,000 units Take 1 capsule (50,000 Units total) by mouth once a week  Qty: 4 capsule, Refills: 0    Associated Diagnoses: Vitamin D deficiency      escitalopram (LEXAPRO) 20 mg tablet Take 1 tablet (20 mg total) by mouth daily  Qty: 30 tablet, Refills: 1    Associated Diagnoses: Anxiety      ferrous sulfate 325 (65 Fe) mg tablet Take 1 tablet (325 mg total) by mouth every other day  Qty: 15  "tablet, Refills: 1    Associated Diagnoses: Iron deficiency anemia, unspecified iron deficiency anemia type      folic acid (FOLVITE) 1 mg tablet Take 1 tablet (1 mg total) by mouth daily  Qty: 30 tablet, Refills: 1    Associated Diagnoses: Folic acid deficiency      gabapentin (NEURONTIN) 400 mg capsule Take 1 capsule (400 mg total) by mouth 3 (three) times a day  Qty: 90 capsule, Refills: 1    Associated Diagnoses: Anxiety      glucose blood (Accu-Chek Guide) test strip Use to test blood sugar up to 6 times daily.  Qty: 200 each, Refills: 2    Associated Diagnoses: Type 1 diabetes mellitus with hyperglycemia (HCC)      Insulin Aspart (NovoLOG) 100 units/mL injection Sliding scale up to 3 times per day with meals. Max 30 units per day  Qty: 30 mL, Refills: 1    Associated Diagnoses: Type 1 diabetes mellitus with hyperglycemia (HCC)      Insulin Pen Needle (BD Pen Needle Deanna U/F) 32G X 4 MM MISC Use 4/day  Qty: 100 each, Refills: 11    Associated Diagnoses: Type 1 diabetes mellitus with hyperglycemia (HCC)      Lantus 100 UNIT/ML subcutaneous injection INJECT 15 UNITS UNDER THE SKIN DAILY. DISCARD AFTER 28 DAYS.  Qty: 10 mL, Refills: 5    Associated Diagnoses: Type 1 diabetes mellitus with hyperglycemia (HCC)      levothyroxine 125 mcg tablet Take 2 tablets (250 mcg total) by mouth daily in the early morning  Qty: 60 tablet, Refills: 1    Associated Diagnoses: Hypothyroidism      lisdexamfetamine (VYVANSE) 50 MG capsule Take 50 mg by mouth every morning      thiamine 100 MG tablet Take 1 tablet (100 mg total) by mouth daily  Qty: 30 tablet, Refills: 1    Associated Diagnoses: Thiamine deficiency      traZODone (DESYREL) 150 mg tablet Take 1 tablet (150 mg total) by mouth daily at bedtime  Qty: 30 tablet, Refills: 1    Associated Diagnoses: Anxiety      UltiCare Insulin Syringe 28G X 1/2\" 1 ML MISC INJECT UNDER THE SKIN 4 (FOUR) TIMES A DAY  Qty: 100 each, Refills: 2    Associated Diagnoses: Type 1 diabetes mellitus " with hyperglycemia (HCC)      valACYclovir (VALTREX) 500 mg tablet Take 1 tablet (500 mg total) by mouth daily  Qty: 30 tablet, Refills: 0    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  ED SEPSIS DOCUMENTATION   Time reflects when diagnosis was documented in both MDM as applicable and the Disposition within this note       Time User Action Codes Description Comment    4/28/2025  4:40 PM Chinmay Dunlap [R73.9] Hyperglycemia     4/28/2025  4:40 PM Chinmay Dunlap [N39.0] UTI (urinary tract infection)     4/28/2025  5:16 PM Milena Toth Add [E10.65] Type 1 diabetes mellitus with hyperglycemia (HCC)                  Chinmay Dunlap III, DO  04/28/25 3924

## 2025-04-28 NOTE — ANESTHESIA PREPROCEDURE EVALUATION
Medical History    History Comments   Anemia    Disease of thyroid gland hypothyroid   Vitamin D deficiency    B12 deficiency    Iron deficiency anemia    Cervical disc disorder On gabapentin   Anxiety    Depression    Type 1 diabetes (HCC)    PTSD (post-traumatic stress disorder)    Substance abuse (HCC)    Sleep difficulties    Panic attack    Diabetes mellitus (HCC)    Procedure:  HB ECT IN OR    Relevant Problems   ANESTHESIA (within normal limits)      ENDO   (+) Hypothyroidism   (+) Hypothyroidism due to Hashimoto's thyroiditis   (+) Type 1 diabetes mellitus with hyperglycemia (HCC)      /RENAL   (+) THO (acute kidney injury) (HCC)      HEMATOLOGY   (+) Iron deficiency anemia   (+) Iron deficiency anemia, unspecified      NEURO/PSYCH   (+) Generalized anxiety disorder with panic attacks   (+) Recurrent major depressive disorder (HCC)        Physical Exam    Airway    Mallampati score: II  TM Distance: >3 FB  Neck ROM: full     Dental       Cardiovascular  Rate: normal    Pulmonary  Pulmonary exam normal     Other Findings  Per pt denies anything remaining that is loose or removeablepost-pubertal.      Anesthesia Plan  ASA Score- 3     Anesthesia Type- general with ASA Monitors.         Additional Monitors:     Airway Plan:     Comment: mv.       Plan Factors-Exercise tolerance (METS): >4 METS.    Chart reviewed. EKG reviewed.  Existing labs reviewed. Patient summary reviewed.    Patient is not a current smoker.              Induction- intravenous.    Postoperative Plan-         Informed Consent- Anesthetic plan and risks discussed with patient.  I personally reviewed this patient with the CRNA. Discussed and agreed on the Anesthesia Plan with the CRNA..      NPO Status:  Vitals Value Taken Time   Date of last liquid 04/27/25 04/28/25 0538   Time of last liquid 2000 04/28/25 0538   Date of last solid 04/27/25 04/28/25 0538   Time of last solid 2000 04/28/25 0538

## 2025-04-28 NOTE — TELEPHONE ENCOUNTER
Patient was in the office with [facility] for pump start up. Patient said her sugars all week have been 300 plus. She was asking for keto strips be sent in to pharmacy    I downloaded winston report  and scanned into media

## 2025-04-28 NOTE — PROCEDURES
Procedure Note - ECT  Clarissa Webb 45 y.o. female MRN: [id number]    Time out was taken with staff to confirm correct patient and correct procedure to be performed.    Session Number: 006    Diagnosis: Active Problems:  There are no active Hospital Problems.      ECT Type: Outpatient    Anesthesia: Methohexital    Electrode Placement: Non-dominant unilateral    Energy level:  45 %      Seizure Duration     EE Sec.    EMG : 40 Sec    Post-ictal Suppression Index:NA    Results:Clinical seizure was satisfactory, Patient tolerated ECT well     Media Information    Document Information    Clinical Image - Mobile Device   ECT   2025 06:52   Attached To:   Hospital Encounter on 25 with  PACU   Source Information    Elias Bernal MD   Pacu   Document History      Vitals:    25 0730   BP: 135/81   Pulse: 86   Resp: 18   Temp: 97.7 °F (36.5 °C)   SpO2: 98%

## 2025-04-28 NOTE — H&P
"H&P - Hospitalist   Name: Clarissa Webb 45 y.o. female I MRN: [id number]  Unit/Bed#: -01 I Date of Admission: 4/28/2025   Date of Service: 4/28/2025 I Hospital Day: 0     Assessment & Plan  DKA (diabetic ketoacidosis) (HCC)  Lab Results   Component Value Date    HGBA1C 8.7 (A) 03/05/2025       No results for input(s): \"POCGLU\" in the last 72 hours.    Blood Sugar Average: Last 72 hrs:    Early DKA - beta hydroxybutyrate 0.3  Likely triggered by UTI  Trend labs q4H  IV Fluids  Continue insulin Pump with home settings (glucose readings improved 140s currently)  Will double check readings with q4H accuchecks    UTI (urinary tract infection)  Ceftriaxone pending culture results  Iron deficiency anemia  On iron supplementation at home  Generalized anxiety disorder with panic attacks  Continue home regimen  Recurrent major depressive disorder (HCC)  Continue home medication regimen  Patient had recent behavioral health stay and has since been feeling much better  Hypothyroidism due to Hashimoto's thyroiditis  Continue Synthroid      VTE Pharmacologic Prophylaxis:   Moderate Risk (Score 3-4) - Pharmacological DVT Prophylaxis Ordered: enoxaparin (Lovenox).  Code Status: Prior full  Discussion with family: Patient declined call to .     Anticipated Length of Stay: Patient will be admitted on an observation basis with an anticipated length of stay of less than 2 midnights secondary to dka.    History of Present Illness   Chief Complaint: High glucose readings    Clarissa Webb is a 45 y.o. female with a PMH of type I diabetes, hypothyroidism, depression who presents with elevated glucose readings.  Patient notes her symptoms began approximately 2 days ago, with decreased appetite and glucose readings of 400.  Patient was recently started on an insulin pump, underwent insulin pump training as this is a new device that she is starting to use (had experience with insulin pump years ago).  She was " transition from Lantus and sliding scale insulin to the insulin pump today.  Patient notes that she had some dysuria and urinary frequency since Thursday, consistent with UTI like symptoms.  Patient otherwise denies any fevers, chills and any other recent illness.  She was recently hospitalized for depression and notes that she has been doing well since discharge.  Patient given IV fluids and antibiotics in the emergency department with improvement in her symptoms.  Patient notes her diabetes to be relatively well-controlled.    Review of Systems   Constitutional:  Positive for appetite change and fatigue.   Genitourinary:  Positive for dysuria.   All other systems reviewed and are negative.      Historical Information   Past Medical History:   Diagnosis Date    Anemia     Anxiety     B12 deficiency     Cervical disc disorder     On gabapentin     Depression     Diabetes mellitus (HCC)     Disease of thyroid gland     hypothyroid    Iron deficiency anemia     Panic attack     PTSD (post-traumatic stress disorder)     Sleep difficulties     Substance abuse (HCC)     Type 1 diabetes (HCC)     Vitamin D deficiency      Past Surgical History:   Procedure Laterality Date    ABDOMINAL SURGERY      gastric bypass     SECTION      x2    CHOLECYSTECTOMY  2018    ELECTROCONVULSIVE THERAPY (ECT)  2025    GASTRIC BYPASS  2007    INTRAUTERINE DEVICE INSERTION  2015    IR BIOPSY BONE MARROW  2020    OTHER SURGICAL HISTORY      surgery for imperforate hymen/endometriosis/hydrometrocolpos    ROOT CANAL  2024    TUBAL LIGATION      WISDOM TOOTH EXTRACTION       Social History     Tobacco Use    Smoking status: Never    Smokeless tobacco: Never   Vaping Use    Vaping status: Never Used   Substance and Sexual Activity    Alcohol use: Not Currently     Alcohol/week: 3.0 standard drinks of alcohol     Types: 3 Cans of beer per week     Comment: weekly a few drinks    Drug use: No    Sexual activity: Yes      Partners: Male     E-Cigarette/Vaping    E-Cigarette Use Never User      E-Cigarette/Vaping Substances    Nicotine No     THC No     CBD No     Flavoring No     Other No     Unknown No      Family history non-contributory  Social History:  Marital Status:    Occupation: n/a  Patient Pre-hospital Living Situation: Home  Patient Pre-hospital Level of Mobility: walks  Patient Pre-hospital Diet Restrictions: diabetic    Meds/Allergies   I have reviewed home medications with patient personally.  Prior to Admission medications    Medication Sig Start Date End Date Taking? Authorizing Provider   Accu-Chek FastClix Lancets MISC USE 1 LANCET TO TEST BLOOD SUGAR UP TO 6 TIMES DAILY 6/9/21   Cory Lincoln PA-C   acetaminophen (TYLENOL) 500 mg tablet  5/2/21   Historical Provider, MD   ascorbic acid (VITAMIN C) 500 MG tablet Take 1 tablet (500 mg total) by mouth daily 4/22/25   Winsome Henry DO   cariprazine (VRAYLAR) 3 MG capsule Take 1 capsule (3 mg total) by mouth daily at bedtime 4/22/25   Winsome Henry DO   Continuous Blood Gluc  (FreeStyle Samantha 14 Day [place]) KELVIN Use 1 Units in the morning 2/20/24   JAROCHO Bryan   Continuous Glucose Sensor (FreeStyle Samantha 2 Sensor) MISC USE 1 UNIT EVERY 14 DAYS 4/9/25   Merna Aragon MD   cyanocobalamin (VITAMIN B-12) 1000 MCG tablet Take 1 tablet (1,000 mcg total) by mouth daily 4/22/25   Winsome Henry DO   ergocalciferol (VITAMIN D2) 50,000 units Take 1 capsule (50,000 Units total) by mouth once a week 4/9/25   JAROCHO Arellano   escitalopram (LEXAPRO) 20 mg tablet Take 1 tablet (20 mg total) by mouth daily 4/22/25   Winsome Henry, DO   ferrous sulfate 325 (65 Fe) mg tablet Take 1 tablet (325 mg total) by mouth every other day 4/22/25   Winsome Henry, DO   folic acid (FOLVITE) 1 mg tablet Take 1 tablet (1 mg total) by mouth daily 4/22/25   Winsome Henry, DO   gabapentin (NEURONTIN) 400 mg capsule Take 1 capsule (400 mg total) by mouth 3 (three) times  "a day 4/22/25   Winsome Henry DO   glucose blood (Accu-Chek Guide) test strip Use to test blood sugar up to 6 times daily. 5/18/21   JAROCHO Bryan   Insulin Aspart (NovoLOG) 100 units/mL injection Sliding scale up to 3 times per day with meals. Max 30 units per day 4/9/25   Viktoriya Cruz MD   Insulin Pen Needle (BD Pen Needle Deanna U/F) 32G X 4 MM MISC Use 4/day 11/15/22   Annette Scales MD   Lantus 100 UNIT/ML subcutaneous injection INJECT 15 UNITS UNDER THE SKIN DAILY. DISCARD AFTER 28 DAYS. 4/21/25   Merna Aragon MD   levothyroxine 125 mcg tablet Take 2 tablets (250 mcg total) by mouth daily in the early morning 4/22/25   Winsome Henry DO   lisdexamfetamine (VYVANSE) 50 MG capsule Take 50 mg by mouth every morning 3/25/25   Historical Provider, MD   thiamine 100 MG tablet Take 1 tablet (100 mg total) by mouth daily 4/22/25   Winsome Henry DO   traZODone (DESYREL) 150 mg tablet Take 1 tablet (150 mg total) by mouth daily at bedtime 4/22/25   Winsome Henry DO   UltiCare Insulin Syringe 28G X 1/2\" 1 ML MISC INJECT UNDER THE SKIN 4 (FOUR) TIMES A DAY 2/17/25   Merna Aragon MD   valACYclovir (VALTREX) 500 mg tablet Take 1 tablet (500 mg total) by mouth daily 4/10/25 5/10/25  JAROCHO Gomez   Vivitrol 380 MG SUSR 380 mg 4/3/25   Historical Provider, MD     No Known Allergies    Objective :  Temp:  [97 °F (36.1 °C)-97.7 °F (36.5 °C)] 97.6 °F (36.4 °C)  HR:  [75-94] 76  BP: (114-179)/(62-87) 134/87  Resp:  [18-20] 18  SpO2:  [97 %-100 %] 98 %  O2 Device: None (Room air)    Physical Exam  Vitals reviewed.   Constitutional:       Appearance: Normal appearance.   HENT:      Head: Normocephalic.      Nose: Nose normal.      Mouth/Throat:      Mouth: Mucous membranes are moist.   Eyes:      Pupils: Pupils are equal, round, and reactive to light.   Cardiovascular:      Rate and Rhythm: Normal rate and regular rhythm.   Pulmonary:      Effort: Pulmonary effort is normal.      Breath sounds: Normal breath " sounds.   Abdominal:      General: Abdomen is flat.      Palpations: Abdomen is soft.   Musculoskeletal:         General: No swelling or tenderness. Normal range of motion.      Cervical back: Normal range of motion.   Skin:     General: Skin is warm.      Coloration: Skin is not jaundiced.   Neurological:      General: No focal deficit present.      Mental Status: She is alert and oriented to person, place, and time. Mental status is at baseline.   Psychiatric:         Mood and Affect: Mood normal.         Behavior: Behavior normal.         Thought Content: Thought content normal.         Judgment: Judgment normal.          Lines/Drains:            Lab Results: I have reviewed the following results:  Results from last 7 days   Lab Units 04/28/25  1428   WBC Thousand/uL 5.29   HEMOGLOBIN g/dL 11.8   HEMATOCRIT % 37.9   PLATELETS Thousands/uL 210     Results from last 7 days   Lab Units 04/28/25  1428   SODIUM mmol/L 130*   POTASSIUM mmol/L 4.1   CHLORIDE mmol/L 100   CO2 mmol/L 20*   BUN mg/dL 19   CREATININE mg/dL 0.83   ANION GAP mmol/L 10   CALCIUM mg/dL 9.2   ALBUMIN g/dL 3.9   TOTAL BILIRUBIN mg/dL 0.48   ALK PHOS U/L 78   ALT U/L 16   AST U/L 12*   GLUCOSE RANDOM mg/dL 283*         Results from last 7 days   Lab Units 04/22/25  0803 04/21/25 2002   POC GLUCOSE mg/dl 233* 237*     Lab Results   Component Value Date    HGBA1C 8.7 (A) 03/05/2025    HGBA1C 9.6 (A) 01/10/2025    HGBA1C 10.2 (H) 04/17/2024           Imaging Results Review: No pertinent imaging studies reviewed.  Other Study Results Review: No additional pertinent studies reviewed.    Administrative Statements   I have spent a total time of 36 minutes in caring for this patient on the day of the visit/encounter including Diagnostic results, Instructions for management, Patient and family education, Documenting in the medical record, Reviewing/placing orders in the medical record (including tests, medications, and/or procedures), Obtaining or  reviewing history  , and Communicating with other healthcare professionals .    ** Please Note: This note has been constructed using a voice recognition system. **

## 2025-04-29 ENCOUNTER — TELEPHONE (OUTPATIENT)
Age: 45
End: 2025-04-29

## 2025-04-29 ENCOUNTER — TELEPHONE (OUTPATIENT)
Dept: BEHAVIORAL/MENTAL HEALTH CLINIC | Facility: CLINIC | Age: 45
End: 2025-04-29

## 2025-04-29 DIAGNOSIS — E10.65 TYPE 1 DIABETES MELLITUS WITH HYPERGLYCEMIA (HCC): ICD-10-CM

## 2025-04-29 PROBLEM — F33.9 RECURRENT MAJOR DEPRESSIVE DISORDER (HCC): Status: RESOLVED | Noted: 2020-06-24 | Resolved: 2025-04-29

## 2025-04-29 PROBLEM — F90.9 ADHD: Status: ACTIVE | Noted: 2025-04-29

## 2025-04-29 PROBLEM — D64.9 CHRONIC ANEMIA: Status: ACTIVE | Noted: 2018-06-05

## 2025-04-29 PROBLEM — E03.9 HYPOTHYROIDISM: Status: ACTIVE | Noted: 2020-08-06

## 2025-04-29 LAB
ALBUMIN SERPL BCG-MCNC: 3.4 G/DL (ref 3.5–5)
ALP SERPL-CCNC: 59 U/L (ref 34–104)
ALT SERPL W P-5'-P-CCNC: 15 U/L (ref 7–52)
ANION GAP SERPL CALCULATED.3IONS-SCNC: 5 MMOL/L (ref 4–13)
ANION GAP SERPL CALCULATED.3IONS-SCNC: 6 MMOL/L (ref 4–13)
ANION GAP SERPL CALCULATED.3IONS-SCNC: 7 MMOL/L (ref 4–13)
AST SERPL W P-5'-P-CCNC: 16 U/L (ref 13–39)
B-OH-BUTYR SERPL-MCNC: <0.05 MMOL/L (ref 0.02–0.27)
BASOPHILS # BLD AUTO: 0.03 THOUSANDS/ÂΜL (ref 0–0.1)
BASOPHILS NFR BLD AUTO: 1 % (ref 0–1)
BILIRUB SERPL-MCNC: 0.23 MG/DL (ref 0.2–1)
BUN SERPL-MCNC: 13 MG/DL (ref 5–25)
BUN SERPL-MCNC: 13 MG/DL (ref 5–25)
BUN SERPL-MCNC: 15 MG/DL (ref 5–25)
CALCIUM ALBUM COR SERPL-MCNC: 9.1 MG/DL (ref 8.3–10.1)
CALCIUM SERPL-MCNC: 8.5 MG/DL (ref 8.4–10.2)
CALCIUM SERPL-MCNC: 8.5 MG/DL (ref 8.4–10.2)
CALCIUM SERPL-MCNC: 8.6 MG/DL (ref 8.4–10.2)
CHLORIDE SERPL-SCNC: 107 MMOL/L (ref 96–108)
CHLORIDE SERPL-SCNC: 108 MMOL/L (ref 96–108)
CHLORIDE SERPL-SCNC: 108 MMOL/L (ref 96–108)
CO2 SERPL-SCNC: 21 MMOL/L (ref 21–32)
CO2 SERPL-SCNC: 23 MMOL/L (ref 21–32)
CO2 SERPL-SCNC: 24 MMOL/L (ref 21–32)
CREAT SERPL-MCNC: 0.74 MG/DL (ref 0.6–1.3)
CREAT SERPL-MCNC: 0.75 MG/DL (ref 0.6–1.3)
CREAT SERPL-MCNC: 0.79 MG/DL (ref 0.6–1.3)
EOSINOPHIL # BLD AUTO: 0.22 THOUSAND/ÂΜL (ref 0–0.61)
EOSINOPHIL NFR BLD AUTO: 5 % (ref 0–6)
ERYTHROCYTE [DISTWIDTH] IN BLOOD BY AUTOMATED COUNT: 12 % (ref 11.6–15.1)
GFR SERPL CREATININE-BSD FRML MDRD: 91 ML/MIN/1.73SQ M
GFR SERPL CREATININE-BSD FRML MDRD: 97 ML/MIN/1.73SQ M
GFR SERPL CREATININE-BSD FRML MDRD: 99 ML/MIN/1.73SQ M
GLUCOSE SERPL-MCNC: 140 MG/DL (ref 65–140)
GLUCOSE SERPL-MCNC: 142 MG/DL (ref 65–140)
GLUCOSE SERPL-MCNC: 160 MG/DL (ref 65–140)
GLUCOSE SERPL-MCNC: 183 MG/DL (ref 65–140)
GLUCOSE SERPL-MCNC: 186 MG/DL (ref 65–140)
GLUCOSE SERPL-MCNC: 194 MG/DL (ref 65–140)
GLUCOSE SERPL-MCNC: 220 MG/DL (ref 65–140)
GLUCOSE SERPL-MCNC: 297 MG/DL (ref 65–140)
GLUCOSE SERPL-MCNC: 308 MG/DL (ref 65–140)
HCT VFR BLD AUTO: 34.7 % (ref 34.8–46.1)
HGB BLD-MCNC: 10.9 G/DL (ref 11.5–15.4)
IMM GRANULOCYTES # BLD AUTO: 0.01 THOUSAND/UL (ref 0–0.2)
IMM GRANULOCYTES NFR BLD AUTO: 0 % (ref 0–2)
LYMPHOCYTES # BLD AUTO: 1.29 THOUSANDS/ÂΜL (ref 0.6–4.47)
LYMPHOCYTES NFR BLD AUTO: 28 % (ref 14–44)
MAGNESIUM SERPL-MCNC: 2.3 MG/DL (ref 1.9–2.7)
MAGNESIUM SERPL-MCNC: 3 MG/DL (ref 1.9–2.7)
MAGNESIUM SERPL-MCNC: 3 MG/DL (ref 1.9–2.7)
MCH RBC QN AUTO: 27.5 PG (ref 26.8–34.3)
MCHC RBC AUTO-ENTMCNC: 31.4 G/DL (ref 31.4–37.4)
MCV RBC AUTO: 88 FL (ref 82–98)
MONOCYTES # BLD AUTO: 0.35 THOUSAND/ÂΜL (ref 0.17–1.22)
MONOCYTES NFR BLD AUTO: 8 % (ref 4–12)
NEUTROPHILS # BLD AUTO: 2.75 THOUSANDS/ÂΜL (ref 1.85–7.62)
NEUTS SEG NFR BLD AUTO: 58 % (ref 43–75)
NRBC BLD AUTO-RTO: 0 /100 WBCS
PHOSPHATE SERPL-MCNC: 3.1 MG/DL (ref 2.7–4.5)
PHOSPHATE SERPL-MCNC: 3.1 MG/DL (ref 2.7–4.5)
PLATELET # BLD AUTO: 205 THOUSANDS/UL (ref 149–390)
PMV BLD AUTO: 10.7 FL (ref 8.9–12.7)
POTASSIUM SERPL-SCNC: 4.4 MMOL/L (ref 3.5–5.3)
POTASSIUM SERPL-SCNC: 4.5 MMOL/L (ref 3.5–5.3)
POTASSIUM SERPL-SCNC: 4.5 MMOL/L (ref 3.5–5.3)
PROT SERPL-MCNC: 6.1 G/DL (ref 6.4–8.4)
RBC # BLD AUTO: 3.96 MILLION/UL (ref 3.81–5.12)
SODIUM SERPL-SCNC: 134 MMOL/L (ref 135–147)
SODIUM SERPL-SCNC: 137 MMOL/L (ref 135–147)
SODIUM SERPL-SCNC: 138 MMOL/L (ref 135–147)
WBC # BLD AUTO: 4.65 THOUSAND/UL (ref 4.31–10.16)

## 2025-04-29 PROCEDURE — 80053 COMPREHEN METABOLIC PANEL: CPT | Performed by: INTERNAL MEDICINE

## 2025-04-29 PROCEDURE — 83735 ASSAY OF MAGNESIUM: CPT | Performed by: INTERNAL MEDICINE

## 2025-04-29 PROCEDURE — 80048 BASIC METABOLIC PNL TOTAL CA: CPT | Performed by: INTERNAL MEDICINE

## 2025-04-29 PROCEDURE — 82948 REAGENT STRIP/BLOOD GLUCOSE: CPT

## 2025-04-29 PROCEDURE — 99244 OFF/OP CNSLTJ NEW/EST MOD 40: CPT | Performed by: STUDENT IN AN ORGANIZED HEALTH CARE EDUCATION/TRAINING PROGRAM

## 2025-04-29 PROCEDURE — 85025 COMPLETE CBC W/AUTO DIFF WBC: CPT | Performed by: INTERNAL MEDICINE

## 2025-04-29 PROCEDURE — 82010 KETONE BODYS QUAN: CPT | Performed by: INTERNAL MEDICINE

## 2025-04-29 PROCEDURE — 84100 ASSAY OF PHOSPHORUS: CPT | Performed by: INTERNAL MEDICINE

## 2025-04-29 PROCEDURE — 99232 SBSQ HOSP IP/OBS MODERATE 35: CPT | Performed by: INTERNAL MEDICINE

## 2025-04-29 RX ADMIN — GABAPENTIN 400 MG: 400 CAPSULE ORAL at 08:58

## 2025-04-29 RX ADMIN — DEXTROAMPHETAMINE SACCHARATE, AMPHETAMINE ASPARTATE, DEXTROAMPHETAMINE SULFATE AND AMPHETAMINE SULFATE 20 MG: 2.5; 2.5; 2.5; 2.5 TABLET ORAL at 08:58

## 2025-04-29 RX ADMIN — FOLIC ACID 1 MG: 1 TABLET ORAL at 08:58

## 2025-04-29 RX ADMIN — ESCITALOPRAM OXALATE 20 MG: 10 TABLET ORAL at 08:58

## 2025-04-29 RX ADMIN — VALACYCLOVIR HYDROCHLORIDE 500 MG: 500 TABLET, FILM COATED ORAL at 08:58

## 2025-04-29 RX ADMIN — THIAMINE HCL TAB 100 MG 100 MG: 100 TAB at 08:58

## 2025-04-29 RX ADMIN — SODIUM CHLORIDE 125 ML/HR: 0.9 INJECTION, SOLUTION INTRAVENOUS at 08:59

## 2025-04-29 RX ADMIN — LEVOTHYROXINE SODIUM 250 MCG: 100 TABLET ORAL at 05:47

## 2025-04-29 RX ADMIN — ENOXAPARIN SODIUM 40 MG: 40 INJECTION SUBCUTANEOUS at 08:57

## 2025-04-29 RX ADMIN — GABAPENTIN 400 MG: 400 CAPSULE ORAL at 15:56

## 2025-04-29 RX ADMIN — POTASSIUM CHLORIDE 40 MEQ: 1500 TABLET, EXTENDED RELEASE ORAL at 00:04

## 2025-04-29 NOTE — ASSESSMENT & PLAN NOTE
Continue IV Rocephin  Preliminary urine culture growing gram-negative rods, resembling E. coli -> await final culture/sensitivities for de-escalation

## 2025-04-29 NOTE — TELEPHONE ENCOUNTER
Successful follow up with the PT. Pt stated that she is doing well. Pt stated that she finished in patient ECT and now transitioned to outpatient ECT which she continues to receive treatment. PT stated that she would reach out to the walk in center if any further assistance is needed.

## 2025-04-29 NOTE — CONSULTS
Consultation - Clarissa Webb 45 y.o. female MRN: [id number]    Unit/Bed#: -01 Encounter: [id number]    Administrative Statements   VIRTUAL CARE DOCUMENTATION:     1. This service was provided via Telemedicine using [employer] Kit     2. Parties in the room with patient during teleconsult Patient only    3. Confidentiality My office door was closed     4. Participants No one else was in the room    5. Patient acknowledged consent and understanding of privacy and security of the  Telemedicine consult. I informed the patient that I have reviewed their record in Epic and presented the opportunity for them to ask any questions regarding the visit today.  The patient agreed to participate.    6. I have spent a total time of 30 minutes in caring for this patient on the day of the visit/encounter including Diagnostic results, Instructions for management, Patient and family education, Impressions, Counseling / Coordination of care, Documenting in the medical record, Reviewing/placing orders in the medical record (including tests, medications, and/or procedures), and Obtaining or reviewing history  , not including the time spent for establishing the audio/video connection.       Assessment & Plan       Type 1 diabetes mellitus with hyperglycemia (HCC)  Assessment & Plan  Lab Results   Component Value Date    HGBA1C 8.7 (A) 03/05/2025       Recent Labs     04/29/25  0548 04/29/25  0713 04/29/25  1100 04/29/25  1557   POCGLU 140 142* 160* 220*       Blood Sugar Average: Last 72 hrs:  (P) [id number]    Diabetes is suboptimally controlled with A1C of 8.7% and goal of < 6.5% and complicated with hyperglycemia.  She was started on insulin pump Medtronic 780 G with guardian sensor yesterday, she she noticed hyperglycemia , although pump continued at her admission and blood sugars are reasonably controlled,  Insulin pump can be continued with below setting and she will need follow up with our office asap.    Pump  setting:  Basal rate:  12 am- 12 am: 0.75 units/hr  ICR: 1:12  ISF: 1:47  Goal: 70 - 180          CC: Diabetes Consult    History of Present Illness     HPI: Clarissa Webb is a 45 y.o. year old female with type 1 diabetes and hypothyroidism who is admitted for hyperglycemia.    She has history of type 1 diabetes since age 12, she is following with our office and last visit was with Rosina Abdullahi on 3/05/2025.    She was on MDI and was placed on Medtronic 780G pump which was started yesterday, she experience hyperglycemia.    Inial labs showed hyperglycemia of 283 mg/dl, with anion gap of 10, and bicarb of 20 with mildly elevated BHB.    She reports she was having symptoms including dysuria and urinary frequency,    Pump setting:  Basal rate:  12 am- 12 am: 0.75 units/hr  ICR: 1:12  ISF: 1:47  Goal: 70 - 180    Inpatient consult to Endocrinology  Consult performed by: Merna Aragon MD  Consult ordered by: Luciana Castro MD          Review of Systems: as per HPI      Historical Information   Past Medical History:   Diagnosis Date    Anemia     Anxiety     B12 deficiency     Cervical disc disorder     On gabapentin     Depression     Diabetes mellitus (HCC)     Disease of thyroid gland     hypothyroid    Iron deficiency anemia     Panic attack     PTSD (post-traumatic stress disorder)     Sleep difficulties     Substance abuse (HCC)     Type 1 diabetes (HCC)     Vitamin D deficiency      Past Surgical History:   Procedure Laterality Date    ABDOMINAL SURGERY      gastric bypass     SECTION      x2    CHOLECYSTECTOMY  2018    ELECTROCONVULSIVE THERAPY (ECT)  2025    GASTRIC BYPASS  2007    INTRAUTERINE DEVICE INSERTION  2015    IR BIOPSY BONE MARROW  2020    OTHER SURGICAL HISTORY      surgery for imperforate hymen/endometriosis/hydrometrocolpos    ROOT CANAL  2024    TUBAL LIGATION      WISDOM TOOTH EXTRACTION       Social History   Social History     Substance and Sexual Activity    Alcohol Use Not Currently    Alcohol/week: 3.0 standard drinks of alcohol    Types: 3 Cans of beer per week    Comment: weekly a few drinks     Social History     Substance and Sexual Activity   Drug Use No     Social History     Tobacco Use   Smoking Status Never   Smokeless Tobacco Never     Family History:   Family History   Problem Relation Age of Onset    Thyroid disease Mother     URIEL disease Mother     Hyperlipidemia Mother     Hypertension Mother     Osteoarthritis Mother     Thyroid disease unspecified Mother     Diabetes Father     Alcohol abuse Father     Diabetes type I Father     Thyroid disease Sister     Depression Sister     Thyroid disease unspecified Sister     Anxiety disorder Sister     Heart disease Maternal Grandmother     Hypertension Maternal Grandmother     Arthritis Maternal Grandmother     Diabetes type I Maternal Uncle     Diabetes type I Maternal Grandfather        Meds/Allergies   Current Facility-Administered Medications   Medication Dose Route Frequency Provider Last Rate Last Admin    acetaminophen (TYLENOL) tablet 650 mg  650 mg Oral Q6H PRN Milena Toth MD        amphetamine-dextroamphetamine (ADDERALL) tablet 20 mg  20 mg Oral Daily Milena Toth MD   20 mg at 04/29/25 0858    cariprazine (VRAYLAR) capsule 3 mg  3 mg Oral HS Milena Toth MD   3 mg at 04/28/25 2138    ceftriaxone (ROCEPHIN) 1 g/50 mL in dextrose IVPB  1,000 mg Intravenous Q24H Milena Toth  mL/hr at 04/28/25 2202 1,000 mg at 04/28/25 2202    enoxaparin (LOVENOX) subcutaneous injection 40 mg  40 mg Subcutaneous Daily Milena Toth MD   40 mg at 04/29/25 0857    escitalopram (LEXAPRO) tablet 20 mg  20 mg Oral Daily Milena Toth MD   20 mg at 04/29/25 0858    folic acid (FOLVITE) tablet 1 mg  1 mg Oral Daily Milena Toth MD   1 mg at 04/29/25 0858    gabapentin (NEURONTIN) capsule 400 mg  400 mg Oral TID Milena Toth MD   400 mg at 04/29/25 1556    insulin aspart (NovoLOG) FOR PUMP REFILLS 1 Units  1 Units  "Subcutaneous Insulin Pump Daily PRN Milena Toth MD        levothyroxine tablet 250 mcg  250 mcg Oral Early Morning Milena Toth MD   250 mcg at 04/29/25 0547    PATIENT MAINTAINED INSULIN PUMP 1 each  1 each Subcutaneous Q8H Milena Toth MD   1 each at 04/29/25 1120    sodium chloride (PF) 0.9 % injection 3 mL  3 mL Intravenous Q1H PRN Milena Toth MD        sodium chloride 0.9 % infusion  125 mL/hr Intravenous Continuous Milena Toth  mL/hr at 04/29/25 0859 125 mL/hr at 04/29/25 0859    thiamine tablet 100 mg  100 mg Oral Daily Milena Toth MD   100 mg at 04/29/25 0858    traZODone (DESYREL) tablet 150 mg  150 mg Oral HS Milena Toth MD   150 mg at 04/28/25 2139    valACYclovir (VALTREX) tablet 500 mg  500 mg Oral Daily Milena Toth MD   500 mg at 04/29/25 0858     No Known Allergies    Objective   Vitals: Blood pressure 153/99, pulse 93, temperature 98.9 °F (37.2 °C), resp. rate 18, height 5' 6\" (1.676 m), weight 75.6 kg (166 lb 10.7 oz), last menstrual period 04/18/2025, SpO2 95%, not currently breastfeeding.    Intake/Output Summary (Last 24 hours) at 4/29/2025 1651  Last data filed at 4/29/2025 0904  Gross per 24 hour   Intake 460 ml   Output --   Net 460 ml     Invasive Devices       Peripheral Intravenous Line  Duration             Peripheral IV 04/28/25 Right Forearm 1 day                    Physical Exam  Constitutional:       General: She is not in acute distress.     Appearance: She is not ill-appearing.   Pulmonary:      Effort: Pulmonary effort is normal. No respiratory distress.   Neurological:      Mental Status: She is oriented to person, place, and time.         The history was obtained from the review of the chart, patient.    Lab Results:       Lab Results   Component Value Date    WBC 4.65 04/29/2025    HGB 10.9 (L) 04/29/2025    HCT 34.7 (L) 04/29/2025    MCV 88 04/29/2025     04/29/2025     Lab Results   Component Value Date/Time    BUN 13 04/29/2025 04:35 AM    BUN 7 10/23/2022 " "02:00 AM     05/16/2018 09:45 PM    K 4.5 04/29/2025 04:35 AM    K 3.6 10/23/2022 02:00 AM     04/29/2025 04:35 AM     10/23/2022 02:00 AM    CO2 24 04/29/2025 04:35 AM    CO2 27 10/23/2022 02:00 AM    CREATININE 0.74 04/29/2025 04:35 AM    CREATININE 0.80 10/23/2022 02:00 AM    AST 16 04/29/2025 04:35 AM    AST 28 10/20/2022 10:13 AM    ALT 15 04/29/2025 04:35 AM    ALT 38 10/20/2022 10:13 AM    TP 6.1 (L) 04/29/2025 04:35 AM    TP 5.7 (L) 10/20/2022 10:13 AM    ALB 3.4 (L) 04/29/2025 04:35 AM    ALB 1.9 (L) 10/20/2022 10:13 AM     No results for input(s): \"CHOL\", \"HDL\", \"LDL\", \"TRIG\", \"VLDL\" in the last 72 hours.  No results found for: \"MICROALBUR\", \"[id number]\"  POC Glucose (mg/dl)   Date Value   04/29/2025 220 (H)   04/29/2025 160 (H)   04/29/2025 142 (H)   04/29/2025 140   04/29/2025 194 (H)   04/29/2025 297 (H)   04/28/2025 242 (H)   04/22/2025 233 (H)   04/21/2025 237 (H)   04/21/2025 223 (H)       Imaging Studies: Results Review Statement: No pertinent imaging studies reviewed.    Portions of the record may have been created with voice recognition software.    "

## 2025-04-29 NOTE — ASSESSMENT & PLAN NOTE
Lab Results   Component Value Date    HGBA1C 8.7 (A) 03/05/2025     Symptomatically improved - mild beta-hydroxybutyrate elevation on presentation  Suspect UTI as precipitating factor (see below)  Continue IV fluid hydration  Due to rapid improvement of blood sugars, maintained on home insulin pump  Await endocrinology input  Carbohydrate restriction  Monitor/replete electrolyte deficiencies, as necessary

## 2025-04-29 NOTE — ASSESSMENT & PLAN NOTE
Lab Results   Component Value Date    HGBA1C 8.7 (A) 03/05/2025       Recent Labs     04/29/25  0548 04/29/25  0713 04/29/25  1100 04/29/25  1557   POCGLU 140 142* 160* 220*       Blood Sugar Average: Last 72 hrs:  (P) [id number]    Diabetes is suboptimally controlled with A1C of 8.7% and goal of < 6.5% and complicated with hyperglycemia.  She was started on insulin pump Medtronic 780 G with guardian sensor yesterday, she she noticed hyperglycemia , although pump continued at her admission and blood sugars are reasonably controlled,  Insulin pump can be continued with below setting and she will need follow up with our office asap.    Pump setting:  Basal rate:  12 am- 12 am: 0.75 units/hr  ICR: 1:12  ISF: 1:47  Goal: 70 - 180

## 2025-04-29 NOTE — NURSING NOTE
Pt is signing out AMA due to a money emergency at home and can not stay until tomorrow morning to be discharged pending the lab results. Hospitalist notified and pt signed the AMA form.  IV removed and pt. Gathered belongings and waiting for her ride to come.

## 2025-04-29 NOTE — TELEPHONE ENCOUNTER
Phone call from patient stating she is coming in for a new pt tcm appointment  on 5/6/2025 and is due for vivitrol inj 5/7/2025 and is wondering if the office provides this or will she need to  from pharmacy once seen by the provider.

## 2025-04-29 NOTE — ANESTHESIA POSTPROCEDURE EVALUATION
Post-Op Assessment Note    Last Filed PACU Vitals:  Vitals Value Taken Time   Temp     Pulse 94 04/28/25 0711   /80 04/28/25 0711   Resp 18 04/28/25 0711   SpO2 97 % 04/28/25 0711       Modified Eleuterio:     Vitals Value Taken Time   Activity 2 04/28/25 0711   Respiration 2 04/28/25 0711   Circulation 2 04/28/25 0711   Consciousness 2 04/28/25 0711   Oxygen Saturation 2 04/28/25 0711     Modified Eleuterio Score: 10

## 2025-04-29 NOTE — PROGRESS NOTES
Progress Note - Hospitalist   Name: Clarissa Webb 45 y.o. female I MRN: [id number]  Unit/Bed#: -01 I Date of Admission: 4/28/2025   Date of Service: 4/29/2025 I Hospital Day: 0    Assessment & Plan  Mild DKA (diabetic ketoacidosis) (HCC)  Lab Results   Component Value Date    HGBA1C 8.7 (A) 03/05/2025     Symptomatically improved - mild beta-hydroxybutyrate elevation on presentation  Suspect UTI as precipitating factor (see below)  Continue IV fluid hydration  Due to rapid improvement of blood sugars, maintained on home insulin pump  Await endocrinology input  Carbohydrate restriction  Monitor/replete electrolyte deficiencies, as necessary  UTI (urinary tract infection)  Continue IV Rocephin  Preliminary urine culture growing gram-negative rods, resembling E. coli -> await final culture/sensitivities for de-escalation  Chronic anemia  H/H stable  Continue folic acid supplementation  Anxiety and depression  Continue Vraylar/Lexapro/Trazodone  Undergoes outpatient electroconvulsive therapy 3x/weekly  Hypothyroidism  Continue Synthroid  TSH normal earlier this month  ADHD  Continue Adderall  Multiple electrolyte abnormalities  Monitor/replace serum potassium/magnesium/phosphate, as necessary  Multivitamin initiated      VTE Pharmacologic Prophylaxis: VTE Score: 3 Moderate Risk (Score 3-4) - Pharmacological DVT Prophylaxis Ordered: Enoxaparin (Lovenox).    AM-PAC Basic Mobility:  Basic Mobility Inpatient Raw Score: 24  JH-HLM Goal: 8: Walk 250 feet or more  JH-HLM Achieved: 7: Walk 25 feet or more  JH-HLM Goal NOT achieved. Continue with multidisciplinary rounding and encourage appropriate mobility to improve upon JH-HLM goals.    Patient Centered Rounds:  I have performed bedside rounds and discussed plan of care with nursing today.  Discussions with Specialists or Other Care Team Provider:  see above assessments if applicable    Education and Discussions with Family / Patient:  Patient at bedside, who did  not need to update other contacts currently    Time Spent for Care:  35 minutes. More than 50% of total time spent on counseling and coordination of care, on one or more of the following: performing physical exam; counseling and coordination of care, obtaining or reviewing history, documenting in the medical record, reviewing/ordering tests/medications/procedures, and communicating with other healthcare professionals.    Current Length of Stay: 0 day(s)  Current Patient Status: Observation   Certification Statement:  Patient will continue to require additional hospital stay due to assessments as noted above.    Code Status: Level 1 - Full Code      Subjective     Encountered earlier today.  Sitting upright in bed without acute complaints at this time.  Currently denies fever/chills.      Objective     Vitals:   Temp (24hrs), Av.4 °F (36.9 °C), Min:97.6 °F (36.4 °C), Max:98.9 °F (37.2 °C)    Temp:  [97.6 °F (36.4 °C)-98.9 °F (37.2 °C)] 98.9 °F (37.2 °C)  HR:  [73-93] 93  Resp:  [18-20] 18  BP: (117-153)/(81-99) 153/99  SpO2:  [95 %-98 %] 95 %  Body mass index is 26.9 kg/m².     Input and Output Summary (last 24 hours):       Intake/Output Summary (Last 24 hours) at 2025 1730  Last data filed at 2025 0904  Gross per 24 hour   Intake 460 ml   Output --   Net 460 ml       Physical Exam:     GENERAL No immediate distress at rest   HEAD   Normocephalic - atraumatic   EYES Nonicteric   MOUTH   Mucosa moist   NECK   Supple - full range of motion   CARDIAC Rate controlled   PULMONARY Clear bilateral breath sounds   ABDOMEN Currently nontender/nondistended   MUSCULOSKELETAL   Motor strength/range of motion intact   NEUROLOGIC   Alert/oriented at baseline   PSYCHIATRIC   Mood/affect less anxious currently         Labs & Recent Cultures:  Results from last 7 days   Lab Units 25  0435   WBC Thousand/uL 4.65   HEMOGLOBIN g/dL 10.9*   HEMATOCRIT % 34.7*   PLATELETS Thousands/uL 205   SEGS PCT % 58   LYMPHO PCT  % 28   MONO PCT % 8   EOS PCT % 5     Results from last 7 days   Lab Units 04/29/25  0435   POTASSIUM mmol/L 4.5   CHLORIDE mmol/L 108   CO2 mmol/L 24   BUN mg/dL 13   CREATININE mg/dL 0.74   CALCIUM mg/dL 8.6   ALK PHOS U/L 59   ALT U/L 15   AST U/L 16         Results from last 7 days   Lab Units 04/29/25  1557 04/29/25  1100 04/29/25  0713 04/29/25  0548 04/29/25  0356 04/29/25  0008 04/28/25 2005   POC GLUCOSE mg/dl 220* 160* 142* 140 194* 297* 242*                 Results from last 7 days   Lab Units 04/28/25  1509   URINE CULTURE  >100,000 cfu/ml Gram Negative José Miguel resembling Escherichia coli*         Lines/Drains/Telemetry:  Invasive Devices       Peripheral Intravenous Line  Duration             Peripheral IV 04/28/25 Right Forearm 1 day                    Last 24 Hours Medication List:   Current Facility-Administered Medications   Medication Dose Route Frequency Provider Last Rate    acetaminophen  650 mg Oral Q6H PRN Milena Toth MD      amphetamine-dextroamphetamine  20 mg Oral Daily Milena Toth MD      cariprazine  3 mg Oral HS Milena Toth MD      cefTRIAXone  1,000 mg Intravenous Q24H Milena Toth MD 1,000 mg (04/28/25 2202)    enoxaparin  40 mg Subcutaneous Daily Milena Toth MD      escitalopram  20 mg Oral Daily Milena Toth MD      folic acid  1 mg Oral Daily Milena oTth MD      gabapentin  400 mg Oral TID Milena Toth MD      insulin aspart  1 Units Subcutaneous Insulin Pump Daily PRN Milena Toth MD      levothyroxine  250 mcg Oral Early Morning Milena Toth MD      patient maintained insulin pump  1 each Subcutaneous Q8H Milena Toth MD      sodium chloride (PF)  3 mL Intravenous Q1H PRN Milena Toth MD      sodium chloride  125 mL/hr Intravenous Continuous Milena Toth  mL/hr (04/29/25 0859)    thiamine  100 mg Oral Daily Milena Toth MD      traZODone  150 mg Oral HS Milena Toth MD      valACYclovir  500 mg Oral Daily MD MUKUL Jenkins MD   Hospitalist [facility]  [facility]        ** Please Note:  Documentation is constructed using a voice recognition dictation system.  An occasional wrong word/phrase or “sound-a-like” substitution may have been picked up by dictation device due to the inherent limitations of voice recognition software.  Read the chart carefully and recognize, using reasonable context, where substitutions may have occurred.**

## 2025-04-30 ENCOUNTER — ANESTHESIA (OUTPATIENT)
Dept: PREOP/PACU | Facility: HOSPITAL | Age: 45
End: 2025-04-30
Payer: COMMERCIAL

## 2025-04-30 ENCOUNTER — HOSPITAL ENCOUNTER (OUTPATIENT)
Dept: PREOP/PACU | Facility: HOSPITAL | Age: 45
Setting detail: OUTPATIENT SURGERY
Discharge: HOME | End: 2025-04-30
Payer: COMMERCIAL

## 2025-04-30 ENCOUNTER — OFFICE VISIT (OUTPATIENT)
Dept: URGENT CARE | Facility: CLINIC | Age: 45
End: 2025-04-30
Payer: COMMERCIAL

## 2025-04-30 ENCOUNTER — ANESTHESIA EVENT (OUTPATIENT)
Dept: PREOP/PACU | Facility: HOSPITAL | Age: 45
End: 2025-04-30
Payer: COMMERCIAL

## 2025-04-30 VITALS
SYSTOLIC BLOOD PRESSURE: 158 MMHG | HEART RATE: 93 BPM | RESPIRATION RATE: 16 BRPM | DIASTOLIC BLOOD PRESSURE: 88 MMHG | HEIGHT: 66 IN | TEMPERATURE: 97.6 F | OXYGEN SATURATION: 98 % | BODY MASS INDEX: 26.68 KG/M2 | WEIGHT: 166 LBS

## 2025-04-30 VITALS
TEMPERATURE: 98.1 F | DIASTOLIC BLOOD PRESSURE: 92 MMHG | HEART RATE: 84 BPM | RESPIRATION RATE: 16 BRPM | SYSTOLIC BLOOD PRESSURE: 148 MMHG | OXYGEN SATURATION: 98 % | BODY MASS INDEX: 27.76 KG/M2 | WEIGHT: 172 LBS

## 2025-04-30 DIAGNOSIS — E10.65 TYPE 1 DIABETES MELLITUS WITH HYPERGLYCEMIA (HCC): Primary | ICD-10-CM

## 2025-04-30 DIAGNOSIS — N30.90 CYSTITIS: Primary | ICD-10-CM

## 2025-04-30 DIAGNOSIS — F33.9 MAJOR DEPRESSIVE DISORDER, RECURRENT, UNSPECIFIED (HCC): ICD-10-CM

## 2025-04-30 LAB
ATRIAL RATE: 79 BPM
EXT PREGNANCY TEST URINE: NEGATIVE
EXT. CONTROL: NORMAL
P AXIS: 62 DEGREES
PR INTERVAL: 120 MS
QRS AXIS: 87 DEGREES
QRSD INTERVAL: 84 MS
QT INTERVAL: 382 MS
QTC INTERVAL: 438 MS
SL AMB  POCT GLUCOSE, UA: 250
SL AMB LEUKOCYTE ESTERASE,UA: ABNORMAL
SL AMB POCT BILIRUBIN,UA: ABNORMAL
SL AMB POCT BLOOD,UA: ABNORMAL
SL AMB POCT CLARITY,UA: CLEAR
SL AMB POCT COLOR,UA: YELLOW
SL AMB POCT KETONES,UA: ABNORMAL
SL AMB POCT NITRITE,UA: ABNORMAL
SL AMB POCT PH,UA: 7
SL AMB POCT SPECIFIC GRAVITY,UA: 1
SL AMB POCT URINE PROTEIN: ABNORMAL
SL AMB POCT UROBILINOGEN: 0.2
T WAVE AXIS: 61 DEGREES
VENTRICULAR RATE: 79 BPM

## 2025-04-30 PROCEDURE — 90870 ELECTROCONVULSIVE THERAPY: CPT

## 2025-04-30 PROCEDURE — 99214 OFFICE O/P EST MOD 30 MIN: CPT

## 2025-04-30 PROCEDURE — 81002 URINALYSIS NONAUTO W/O SCOPE: CPT

## 2025-04-30 PROCEDURE — S9083 URGENT CARE CENTER GLOBAL: HCPCS

## 2025-04-30 PROCEDURE — 93010 ELECTROCARDIOGRAM REPORT: CPT | Performed by: INTERNAL MEDICINE

## 2025-04-30 PROCEDURE — 81025 URINE PREGNANCY TEST: CPT | Performed by: STUDENT IN AN ORGANIZED HEALTH CARE EDUCATION/TRAINING PROGRAM

## 2025-04-30 PROCEDURE — 87086 URINE CULTURE/COLONY COUNT: CPT

## 2025-04-30 RX ORDER — ESMOLOL HYDROCHLORIDE 10 MG/ML
INJECTION INTRAVENOUS AS NEEDED
Status: DISCONTINUED | OUTPATIENT
Start: 2025-04-30 | End: 2025-04-30

## 2025-04-30 RX ORDER — SUCCINYLCHOLINE/SOD CL,ISO/PF 100 MG/5ML
SYRINGE (ML) INTRAVENOUS AS NEEDED
Status: DISCONTINUED | OUTPATIENT
Start: 2025-04-30 | End: 2025-04-30

## 2025-04-30 RX ORDER — CEPHALEXIN 500 MG/1
500 CAPSULE ORAL EVERY 6 HOURS SCHEDULED
Qty: 20 CAPSULE | Refills: 0 | Status: SHIPPED | OUTPATIENT
Start: 2025-04-30 | End: 2025-05-05

## 2025-04-30 RX ORDER — METHOHEXITAL IN WATER/PF 100MG/10ML
SYRINGE (ML) INTRAVENOUS AS NEEDED
Status: DISCONTINUED | OUTPATIENT
Start: 2025-04-30 | End: 2025-04-30

## 2025-04-30 RX ORDER — SODIUM CHLORIDE, SODIUM LACTATE, POTASSIUM CHLORIDE, CALCIUM CHLORIDE 600; 310; 30; 20 MG/100ML; MG/100ML; MG/100ML; MG/100ML
INJECTION, SOLUTION INTRAVENOUS CONTINUOUS PRN
Status: DISCONTINUED | OUTPATIENT
Start: 2025-04-30 | End: 2025-04-30

## 2025-04-30 RX ORDER — GLYCOPYRROLATE 0.2 MG/ML
INJECTION INTRAMUSCULAR; INTRAVENOUS AS NEEDED
Status: DISCONTINUED | OUTPATIENT
Start: 2025-04-30 | End: 2025-04-30

## 2025-04-30 RX ORDER — SYRINGE AND NEEDLE,INSULIN,1ML 28GX1/2"
SYRINGE, EMPTY DISPOSABLE MISCELLANEOUS
Qty: 100 EACH | Refills: 3 | Status: SHIPPED | OUTPATIENT
Start: 2025-04-30

## 2025-04-30 RX ADMIN — GLYCOPYRROLATE 0.2 MG: 0.2 INJECTION, SOLUTION INTRAMUSCULAR; INTRAVENOUS at 06:59

## 2025-04-30 RX ADMIN — SODIUM CHLORIDE, SODIUM LACTATE, POTASSIUM CHLORIDE, AND CALCIUM CHLORIDE: .6; .31; .03; .02 INJECTION, SOLUTION INTRAVENOUS at 06:44

## 2025-04-30 RX ADMIN — Medication 80 MG: at 07:10

## 2025-04-30 RX ADMIN — Medication 100 MG: at 07:10

## 2025-04-30 RX ADMIN — Medication 50 MG: at 07:10

## 2025-04-30 NOTE — ANESTHESIA POSTPROCEDURE EVALUATION
Post-Op Assessment Note    CV Status:  Stable  Pain Score: 0    Pain management: adequate       Mental Status:  Alert and awake   Hydration Status:  Euvolemic   PONV Controlled:  Controlled   Airway Patency:  Patent     Post Op Vitals Reviewed: Yes    No anethesia notable event occurred.    Staff: Anesthesiologist, CRNA           Last Filed PACU Vitals:  Vitals Value Taken Time   Temp     Pulse 98    /100    Resp 14    SpO2 97

## 2025-04-30 NOTE — ANESTHESIA POSTPROCEDURE EVALUATION
Post-Op Assessment Note    Last Filed PACU Vitals:  Vitals Value Taken Time   Temp     Pulse 91 04/30/25 0736   /89 04/30/25 0735   Resp 15 04/30/25 0735   SpO2 94 % 04/30/25 0736   Vitals shown include unfiled device data.    Modified Eleuterio:     Vitals Value Taken Time   Activity 2 04/30/25 0735   Respiration 2 04/30/25 0735   Circulation 2 04/30/25 0735   Consciousness 2 04/30/25 0735   Oxygen Saturation 2 04/30/25 0735     Modified Eleuterio Score: 10

## 2025-04-30 NOTE — ANESTHESIA PREPROCEDURE EVALUATION
Procedure:  HB ECT IN OR    Relevant Problems   ENDO   (+) Hypothyroidism   (+) Type 1 diabetes mellitus with hyperglycemia (HCC)      /RENAL   (+) THO (acute kidney injury) (HCC)      HEMATOLOGY   (+) Chronic anemia   (+) Iron deficiency anemia, unspecified      NEURO/PSYCH   (+) Anxiety and depression        Physical Exam    Airway    Mallampati score: II  TM Distance: <3 FB  Neck ROM: full     Dental       Cardiovascular  Cardiovascular exam normal    Pulmonary  Pulmonary exam normal     Other Findings  post-pubertal.      Anesthesia Plan  ASA Score- 3     Anesthesia Type- general with ASA Monitors.         Additional Monitors:     Airway Plan:            Plan Factors-    Chart reviewed. EKG reviewed.                     Induction- intravenous.    Postoperative Plan-         Informed Consent- Anesthetic plan and risks discussed with patient.  I personally reviewed this patient with the CRNA. Discussed and agreed on the Anesthesia Plan with the CRNA..      NPO Status:  Vitals Value Taken Time   Date of last liquid 04/29/25 04/30/25 0537   Time of last liquid 2200 04/30/25 0537   Date of last solid 04/29/25 04/30/25 0537   Time of last solid 1800 04/30/25 0537

## 2025-04-30 NOTE — PROGRESS NOTES
[facility]  Name: Clarissa Webb      : 1980      MRN: [id number]  Encounter Provider: Chava Rice PA-C  Encounter Date: 2025   Encounter department: [facility] [place]  :  Assessment & Plan  Cystitis    Orders:    POCT urine dip    Urine culture; Future    cephalexin (KEFLEX) 500 mg capsule; Take 1 capsule (500 mg total) by mouth every 6 (six) hours for 5 days    Patient already had culture of 100,000 E. coli 2 days ago.  Was getting Keflex as well as ceftriaxone in the hospital however when leaving AMA patient was not discharged with any antibiotics.  Urine dip shows moderate leuks at this time, placing on 5-day course of Keflex with pending urine culture that will show ABX sensitivity panel.  Provided strict precautions to go directly to the emergency room should she be showing any systemic symptoms such as fevers, chills, body aches, nausea or vomiting, back pain or flank pain, or altered mental status.  Patient expressed understanding.    Patient Instructions  Follow up with PCP in 3-5 days.  Proceed to  ER if symptoms worsen.    If tests are performed, our office will contact you with results only if changes need to made to the care plan discussed with you at the visit. [facility]    Chief Complaint:   Chief Complaint   Patient presents with    Possible UTI     Pt presents with uti symptoms. Reports recent hospitalization, treated with iv antibiotics.due to signing out AMA, need abx.     History of Present Illness   45-year-old female with PMH type 1 diabetes, depression, anxiety, and anemia presenting for UTI x 3 days.  Patiently was recently in the emergency room on 2025 to which she was diagnosed with mild DKA as well as urinary tract infection with preliminary growth on a culture showing >100,000 E. coli growth.  She was treated with Keflex and then IV ceftriaxone shortly after.  She received fluids and magnesium  supplementation.  However patient believes that there was a discrepancy in her stating that she felt no longer doing anything for her other than having her sit in the hospital until her antibiotic sensitivity panel came back.  Patient felt that there is no reason for her to stay in the hospital just for the antibiotic sensitivity panel for the bacteria she had and wanted to be discharged.  Patient ended up leaving [place] as she felt stable enough to leave but hospital recommending otherwise.  Upon discharge patient was not given any antibiotics to which she said the hospital stated since she decided to leave [place] that she is no longer under their care and they cannot send her any antibiotics.  Senting today with dysuria, frequency and urgency that has improved since initially symptoms started but still persistent today.  Denies any fevers, chills, bodyaches nausea vomiting or diarrhea.  States that her sugars have been settling down into the high 100s to low 200s.  Patient ate some French fries from [employer] before coming in and her blood sugar reading from her personal monitor showing blood sugar at 230.  Denies any flank pain, back pain, vaginal bleeding, vaginal Tatian or vaginal discharge.  States she feels fine otherwise from the isolated UTI symptoms now.        Review of Systems   Constitutional:  Negative for chills, fatigue and fever.   HENT:  Negative for congestion, ear pain and sore throat.    Eyes:  Negative for discharge and redness.   Respiratory:  Negative for chest tightness and shortness of breath.    Cardiovascular:  Negative for chest pain and palpitations.   Gastrointestinal:  Negative for abdominal pain, nausea and vomiting.   Genitourinary:  Positive for dysuria, frequency and urgency. Negative for decreased urine volume, dyspareunia, flank pain, hematuria, vaginal bleeding, vaginal discharge and vaginal pain.   Musculoskeletal:  Negative for back pain and myalgias.   Neurological:  Negative for  dizziness, light-headedness and headaches.   Psychiatric/Behavioral:  Negative for confusion.      Past Medical History   Past Medical History:   Diagnosis Date    Anemia     Anxiety     B12 deficiency     Cervical disc disorder     On gabapentin     Depression     Diabetes mellitus (HCC)     Disease of thyroid gland     hypothyroid    Iron deficiency anemia     Panic attack     PTSD (post-traumatic stress disorder)     Sleep difficulties     Substance abuse (HCC)     Type 1 diabetes (HCC)     Vitamin D deficiency      Past Surgical History:   Procedure Laterality Date    ABDOMINAL SURGERY      gastric bypass     SECTION      x2    CHOLECYSTECTOMY  2018    ELECTROCONVULSIVE THERAPY (ECT)  2025    GASTRIC BYPASS  2007    INTRAUTERINE DEVICE INSERTION  2015    IR BIOPSY BONE MARROW  2020    OTHER SURGICAL HISTORY      surgery for imperforate hymen/endometriosis/hydrometrocolpos    ROOT CANAL  2024    TUBAL LIGATION      WISDOM TOOTH EXTRACTION       Family History   Problem Relation Age of Onset    Thyroid disease Mother     URIEL disease Mother     Hyperlipidemia Mother     Hypertension Mother     Osteoarthritis Mother     Thyroid disease unspecified Mother     Diabetes Father     Alcohol abuse Father     Diabetes type I Father     Thyroid disease Sister     Depression Sister     Thyroid disease unspecified Sister     Anxiety disorder Sister     Heart disease Maternal Grandmother     Hypertension Maternal Grandmother     Arthritis Maternal Grandmother     Diabetes type I Maternal Uncle     Diabetes type I Maternal Grandfather      she reports that she has never smoked. She has never used smokeless tobacco. She reports that she does not currently use alcohol after a past usage of about 3.0 standard drinks of alcohol per week. She reports that she does not use drugs.  Current Outpatient Medications   Medication Instructions    Accu-Chek FastClix Lancets MISC USE 1 LANCET TO TEST BLOOD SUGAR  "UP TO 6 TIMES DAILY    acetaminophen (TYLENOL) 500 mg tablet No dose, route, or frequency recorded.    acetone, urine, test strip Use 1 strip to test for ketones.    ascorbic acid (VITAMIN C) 500 mg, Oral, Daily    cariprazine (VRAYLAR) 3 mg, Oral, Daily at bedtime    cephalexin (KEFLEX) 500 mg, Oral, Every 6 hours scheduled    Continuous Blood Gluc  (FreeStyle Samantha 14 Day [place]) KELVIN 1 Units, Does not apply, Daily    Continuous Glucose Sensor (FreeStyle Samantha 2 Sensor) MISC USE 1 UNIT EVERY 14 DAYS    cyanocobalamin (VITAMIN B-12) 1,000 mcg, Oral, Daily    ergocalciferol (VITAMIN D2) 50,000 Units, Oral, Weekly    escitalopram (LEXAPRO) 20 mg, Oral, Daily    ferrous sulfate 325 mg, Oral, Every other day    folic acid (FOLVITE) 1 mg, Oral, Daily    gabapentin (NEURONTIN) 400 mg, Oral, 3 times daily    glucose blood (Accu-Chek Guide) test strip Use to test blood sugar up to 6 times daily.    Insulin Aspart (NovoLOG) 100 units/mL injection Sliding scale up to 3 times per day with meals. Max 30 units per day    Insulin Pen Needle (BD Pen Needle Deanna U/F) 32G X 4 MM MISC Use 4/day    Lantus 100 UNIT/ML subcutaneous injection INJECT 15 UNITS UNDER THE SKIN DAILY. DISCARD AFTER 28 DAYS.    levothyroxine 250 mcg, Oral, Daily (early morning)    lisdexamfetamine (VYVANSE) 50 mg, Every morning    thiamine 100 mg, Oral, Daily    traZODone (DESYREL) 150 mg, Oral, Daily at bedtime    UltiCare Insulin Syringe 28G X 1/2\" 1 ML MISC INJECT UNDER THE SKIN 4 (FOUR) TIMES A DAY    valACYclovir (VALTREX) 500 mg, Oral, Daily    Vivitrol 380 mg, Every 30 days   No Known Allergies     Objective   /92 Comment: manual  Pulse 84   Temp 98.1 °F (36.7 °C)   Resp 16   Wt 78 kg (172 lb)   LMP 04/18/2025 (Approximate)   SpO2 98%   BMI 27.76 kg/m²      Physical Exam  Vitals and nursing note reviewed.   Constitutional:       Appearance: She is normal weight.   HENT:      Head: Normocephalic and atraumatic.      Right Ear: " "External ear normal.      Left Ear: External ear normal.      Nose: Nose normal.      Mouth/Throat:      Mouth: Mucous membranes are moist.      Pharynx: Oropharynx is clear.   Eyes:      Conjunctiva/sclera: Conjunctivae normal.      Pupils: Pupils are equal, round, and reactive to light.   Cardiovascular:      Rate and Rhythm: Normal rate and regular rhythm.      Pulses: Normal pulses.      Heart sounds: Normal heart sounds.   Pulmonary:      Effort: Pulmonary effort is normal.      Breath sounds: Normal breath sounds.   Abdominal:      General: Bowel sounds are normal.      Palpations: Abdomen is soft.      Tenderness: There is no abdominal tenderness. There is no right CVA tenderness or left CVA tenderness.   Skin:     General: Skin is warm and dry.      Capillary Refill: Capillary refill takes less than 2 seconds.   Neurological:      General: No focal deficit present.      Mental Status: She is alert and oriented to person, place, and time.      Sensory: No sensory deficit.   Psychiatric:         Mood and Affect: Mood normal.         Behavior: Behavior normal.       Portions of the record may have been created with voice recognition software.  Occasional wrong word or \"sound a like\" substitutions may have occurred due to the inherent limitations of voice recognition software.  Read the chart carefully and recognize, using context, where substitutions have occurred.  "

## 2025-05-01 ENCOUNTER — ANESTHESIA EVENT (OUTPATIENT)
Dept: ANESTHESIOLOGY | Facility: HOSPITAL | Age: 45
End: 2025-05-01

## 2025-05-01 ENCOUNTER — RESULTS FOLLOW-UP (OUTPATIENT)
Dept: EMERGENCY DEPT | Facility: HOSPITAL | Age: 45
End: 2025-05-01

## 2025-05-01 ENCOUNTER — TELEPHONE (OUTPATIENT)
Dept: PSYCHIATRY | Facility: CLINIC | Age: 45
End: 2025-05-01

## 2025-05-01 ENCOUNTER — ANESTHESIA (OUTPATIENT)
Dept: ANESTHESIOLOGY | Facility: HOSPITAL | Age: 45
End: 2025-05-01

## 2025-05-01 VITALS
HEIGHT: 66 IN | HEART RATE: 93 BPM | OXYGEN SATURATION: 95 % | RESPIRATION RATE: 18 BRPM | WEIGHT: 166.67 LBS | TEMPERATURE: 98.9 F | BODY MASS INDEX: 26.79 KG/M2 | SYSTOLIC BLOOD PRESSURE: 153 MMHG | DIASTOLIC BLOOD PRESSURE: 99 MMHG

## 2025-05-01 LAB
BACTERIA UR CULT: ABNORMAL
BACTERIA UR CULT: NORMAL

## 2025-05-01 RX ORDER — SODIUM CHLORIDE, SODIUM LACTATE, POTASSIUM CHLORIDE, CALCIUM CHLORIDE 600; 310; 30; 20 MG/100ML; MG/100ML; MG/100ML; MG/100ML
50 INJECTION, SOLUTION INTRAVENOUS CONTINUOUS
OUTPATIENT
Start: 2025-05-01

## 2025-05-01 RX ORDER — SODIUM CHLORIDE, SODIUM LACTATE, POTASSIUM CHLORIDE, CALCIUM CHLORIDE 600; 310; 30; 20 MG/100ML; MG/100ML; MG/100ML; MG/100ML
20 INJECTION, SOLUTION INTRAVENOUS CONTINUOUS
OUTPATIENT
Start: 2025-05-01

## 2025-05-01 NOTE — ANESTHESIA PREPROCEDURE EVALUATION
"Procedure:  PRE-OP ONLY    Relevant Problems   ENDO   (+) Hypothyroidism   (+) Type 1 diabetes mellitus with hyperglycemia (HCC)      /RENAL   (+) THO (acute kidney injury) (HCC)      HEMATOLOGY   (+) Chronic anemia   (+) Iron deficiency anemia, unspecified      NEURO/PSYCH   (+) Anxiety and depression        Physical Exam    Airway    Mallampati score: II  TM Distance: >3 FB  Neck ROM: full     Dental   No notable dental hx     Cardiovascular      Pulmonary   No wheezes    Other Findings  post-pubertal.      Anesthesia Plan  ASA Score- 2     Anesthesia Type- general with ASA Monitors.         Additional Monitors:     Airway Plan:            Plan Factors-    Chart reviewed. EKG reviewed.  Existing labs reviewed. Patient summary reviewed.              Intended use of anticholinesterase.    Induction- intravenous.    Postoperative Plan-     Perioperative Resuscitation Plan - Level 1 - Full Code.       Informed Consent- Anesthetic plan and risks discussed with patient.  I personally reviewed this patient with the CRNA. Discussed and agreed on the Anesthesia Plan with the CRNA..      NPO Status:  No vitals data found for the desired time range.      VITALS  LMP 04/18/2025 (Approximate)  BP Readings from Last 3 Encounters:   04/30/25 148/92   04/30/25 158/88   04/28/25 135/81        LABS  Results from Last 12 Months   Lab Units 04/29/25  0435 04/28/25  1428   WBC Thousand/uL 4.65 5.29   HEMOGLOBIN g/dL 10.9* 11.8   HEMATOCRIT % 34.7* 37.9   PLATELETS Thousands/uL 205 210     No results for input(s): \"APTT\", \"INR\", \"PTT\" in the last 8784 hours. Results from Last 12 Months   Lab Units 04/29/25  0435 04/29/25  0351 04/29/25  0011 04/11/25  2051 03/05/25  1543 02/17/25  0807 01/10/25  0744   SODIUM mmol/L 137 138 134*   < >  --    < >  --    POTASSIUM mmol/L 4.5 4.5 4.4   < >  --    < >  --    CHLORIDE mmol/L 108 108 107   < >  --    < >  --    CO2 mmol/L 24 23 21   < >  --    < >  --    ANION GAP mmol/L 5 7 6   < >  --    " < >  --    BUN mg/dL 13 13 15   < >  --    < >  --    CREATININE mg/dL 0.74 0.75 0.79   < >  --    < >  --    CALCIUM mg/dL 8.6 8.5 8.5   < >  --    < >  --    GLUCOSE RANDOM mg/dL 186* 183* 308*   < >  --   --   --    PHOSPHORUS mg/dL  --  3.1 3.1   < >  --   --   --    MAGNESIUM mg/dL 3.0* 3.0* 2.3   < >  --   --   --    HEMOGLOBIN A1C   --   --   --   --  8.7*  --  9.6*    < > = values in this interval not displayed.        ECG      ECHOCARDIOGRAPHY OR OTHER TESTING/IMAGING  Encounter Date: 04/28/25   ECG 12 lead   Result Value    Ventricular Rate 79    Atrial Rate 79    OH Interval 120    QRSD Interval 84    QT Interval 382    QTC Interval 438    P Axis 62    QRS Axis 87    T Wave Axis 61    Narrative    Normal sinus rhythm  V1 and V5 are reversed   Otherwise no true change  When compared with ECG of 12-Apr-2025 11:01,    Confirmed by Reid Sanchez ([id number]) on 4/30/2025 10:55:21 AM       No results found for this or any previous visit. N/a     ------- ANESTHESIA RISK-BENEFIT DISCUSSION -------  BENEFITS OF A SPECIALIZED ANESTHESIA TEAM INCLUDE (NBK [id number], PMID 79764432):  (1) Reduce mortality and morbidity for major surgeries/procedures. (2) The team provides analgesia/sedation/amnesia/akinesia as safely as possible. (3) The team strives to reduce discomfort as safely as possible.    RISKS, AND PLANS TO MITIGATE RISKS, INCLUDE:    - Neurologic system: IntraOp awareness (Risk is ~1:1,000 - 1:14,000; PMID 77725537), Stroke (Risk ~<0.1-2% for most cases; PMID 72472457), nerve injury, vision loss, and POCD.     - Airway and Pulmonary system: Dental or mouth injury, throat pain, critical hypoxia, pneumothorax, prolonged intubation, post-op respiratory compromise.  Airway/Intubation risks and prior data: No prior advanced airway notes in [facility] EMR  Major ARISCAT risk factors for pulmonary complications include: none, yielding a score of 0-1= Low risk, 1.6%.  - Cardiovascular system: Hypotension, arrhythmias,  cardiac injury or arrest, blood clots, bleeding, infection, vascular injuries.  Atilio's RCRI score items: none, yielding an RCRI Score of 0= 0.4% risk of MACE  Are fabby-op or intra-op beta blockers indicated? (PMID [id number]): no  - FEN/GI system: Aspiration risk (~0.5% per PMC 0358648) and PONV (10-80% per Apfel score) especially if the patient has not fasted.  ASA NPO guideline compliance?: Yes  - Medication risk assessment: Allergic reactions, excessive bleeding with anticoagulant use, overdoses, drug-drug interactions, injury to a fetus or  in pregnant or breastfeeding patients, fabby-procedural sedation including while driving/operating machinery.  Recent relevant medications: See MAR or Med Review  Personal or family history of anesthesia complications: no  Pregnancy Status: N/A  - Estimate mortality risks associated with anesthesia based on ASA-PS (PMID [id number]): ASA-PS II: risk 1:20,000

## 2025-05-02 ENCOUNTER — ANESTHESIA EVENT (OUTPATIENT)
Dept: PREOP/PACU | Facility: HOSPITAL | Age: 45
End: 2025-05-02
Payer: COMMERCIAL

## 2025-05-02 ENCOUNTER — HOSPITAL ENCOUNTER (OUTPATIENT)
Dept: PREOP/PACU | Facility: HOSPITAL | Age: 45
Setting detail: OUTPATIENT SURGERY
Discharge: HOME | End: 2025-05-02
Payer: COMMERCIAL

## 2025-05-02 ENCOUNTER — ANESTHESIA (OUTPATIENT)
Dept: PREOP/PACU | Facility: HOSPITAL | Age: 45
End: 2025-05-02
Payer: COMMERCIAL

## 2025-05-02 VITALS
WEIGHT: 172 LBS | BODY MASS INDEX: 27.64 KG/M2 | RESPIRATION RATE: 18 BRPM | HEIGHT: 66 IN | DIASTOLIC BLOOD PRESSURE: 89 MMHG | OXYGEN SATURATION: 100 % | TEMPERATURE: 97.1 F | HEART RATE: 91 BPM | SYSTOLIC BLOOD PRESSURE: 135 MMHG

## 2025-05-02 DIAGNOSIS — F33.9 MAJOR DEPRESSIVE DISORDER, RECURRENT, UNSPECIFIED (HCC): ICD-10-CM

## 2025-05-02 PROCEDURE — 90870 ELECTROCONVULSIVE THERAPY: CPT | Performed by: STUDENT IN AN ORGANIZED HEALTH CARE EDUCATION/TRAINING PROGRAM

## 2025-05-02 PROCEDURE — 90870 ELECTROCONVULSIVE THERAPY: CPT

## 2025-05-02 RX ORDER — GLYCOPYRROLATE 0.2 MG/ML
INJECTION INTRAMUSCULAR; INTRAVENOUS AS NEEDED
Status: DISCONTINUED | OUTPATIENT
Start: 2025-05-02 | End: 2025-05-02

## 2025-05-02 RX ORDER — SUCCINYLCHOLINE/SOD CL,ISO/PF 100 MG/5ML
SYRINGE (ML) INTRAVENOUS AS NEEDED
Status: DISCONTINUED | OUTPATIENT
Start: 2025-05-02 | End: 2025-05-02

## 2025-05-02 RX ORDER — SODIUM CHLORIDE, SODIUM LACTATE, POTASSIUM CHLORIDE, CALCIUM CHLORIDE 600; 310; 30; 20 MG/100ML; MG/100ML; MG/100ML; MG/100ML
INJECTION, SOLUTION INTRAVENOUS CONTINUOUS PRN
Status: DISCONTINUED | OUTPATIENT
Start: 2025-05-02 | End: 2025-05-02

## 2025-05-02 RX ORDER — ESMOLOL HYDROCHLORIDE 10 MG/ML
INJECTION INTRAVENOUS AS NEEDED
Status: DISCONTINUED | OUTPATIENT
Start: 2025-05-02 | End: 2025-05-02

## 2025-05-02 RX ORDER — METHOHEXITAL IN WATER/PF 100MG/10ML
SYRINGE (ML) INTRAVENOUS AS NEEDED
Status: DISCONTINUED | OUTPATIENT
Start: 2025-05-02 | End: 2025-05-02

## 2025-05-02 RX ADMIN — Medication 80 MG: at 06:35

## 2025-05-02 RX ADMIN — GLYCOPYRROLATE 0.2 MG: 0.2 INJECTION, SOLUTION INTRAMUSCULAR; INTRAVENOUS at 06:18

## 2025-05-02 RX ADMIN — Medication 100 MG: at 06:34

## 2025-05-02 RX ADMIN — Medication 50 MG: at 06:35

## 2025-05-02 RX ADMIN — SODIUM CHLORIDE, SODIUM LACTATE, POTASSIUM CHLORIDE, AND CALCIUM CHLORIDE: .6; .31; .03; .02 INJECTION, SOLUTION INTRAVENOUS at 06:14

## 2025-05-02 NOTE — ANESTHESIA POSTPROCEDURE EVALUATION
Post-Op Assessment Note    CV Status:  Stable  Pain Score: 0    Pain management: adequate       Mental Status:  Alert and awake   Hydration Status:  Stable   PONV Controlled:  None   Airway Patency:  Patent     Post Op Vitals Reviewed: Yes    No anethesia notable event occurred.    Staff: CRNA           Last Filed PACU Vitals:  Vitals Value Taken Time   Temp     Pulse 85    /81    Resp 20    SpO2 100

## 2025-05-02 NOTE — NURSING NOTE
Patient reporting memory loss. States she was out using her debit card and forgot the PIN number. She also lost her way coming to the unit. No other side effects reported. Patient also requesting PICC line get inserted because of the difficult IV stick. Multiple bruises noted to bilateral upper extremities. Doctor notified.

## 2025-05-02 NOTE — ANESTHESIA POSTPROCEDURE EVALUATION
Post-Op Assessment Note    CV Status:  Stable  Pain Score: 1    Pain management: adequate       Mental Status:  Alert and awake   Hydration Status:  Euvolemic   PONV Controlled:  Controlled   Airway Patency:  Patent     Post Op Vitals Reviewed: Yes    No anethesia notable event occurred.    Staff: Anesthesiologist           Last Filed PACU Vitals:  Vitals Value Taken Time   Temp     Pulse 89 05/02/25 0709   /80 05/02/25 0701   Resp 19 05/02/25 0709   SpO2 95 % 05/02/25 0709   Vitals shown include unfiled device data.    Modified Eleuterio:     Vitals Value Taken Time   Activity 2 05/02/25 0700   Respiration 2 05/02/25 0700   Circulation 2 05/02/25 0700   Consciousness 2 05/02/25 0700   Oxygen Saturation 2 05/02/25 0700     Modified Eleuterio Score: 10

## 2025-05-02 NOTE — ANESTHESIA PREPROCEDURE EVALUATION
"Procedure:  HB ECT IN OR    Relevant Problems   ENDO   (+) Hypothyroidism   (+) Type 1 diabetes mellitus with hyperglycemia (HCC)      /RENAL   (+) THO (acute kidney injury) (HCC)      HEMATOLOGY   (+) Chronic anemia   (+) Iron deficiency anemia, unspecified      NEURO/PSYCH   (+) Anxiety and depression        Physical Exam    Airway    Mallampati score: II  TM Distance: >3 FB  Neck ROM: full     Dental   No notable dental hx     Cardiovascular      Pulmonary   No wheezes    Other Findings  post-pubertal.      Anesthesia Plan  ASA Score- 2     Anesthesia Type- general with ASA Monitors.         Additional Monitors:     Airway Plan:            Plan Factors-    Chart reviewed. EKG reviewed.  Existing labs reviewed. Patient summary reviewed.              Intended use of anticholinesterase.    Induction- intravenous.    Postoperative Plan-     Perioperative Resuscitation Plan - Level 1 - Full Code.       Informed Consent- Anesthetic plan and risks discussed with patient.  I personally reviewed this patient with the CRNA. Discussed and agreed on the Anesthesia Plan with the CRNA..      NPO Status:  Vitals Value Taken Time   Date of last liquid 05/01/25 05/02/25 0531   Time of last liquid 2030 05/02/25 0531   Date of last solid 05/01/25 05/02/25 0531   Time of last solid 2030 05/02/25 0531       VITALS  /67   Pulse 80   Temp (!) 97.2 °F (36.2 °C) (Temporal)   Resp 18   Ht 5' 6\" (1.676 m)   Wt 78 kg (172 lb)   LMP 04/18/2025 (Approximate)   SpO2 99%   BMI 27.76 kg/m²  BP Readings from Last 3 Encounters:   05/02/25 114/67   04/30/25 148/92   04/30/25 158/88        LABS  Results from Last 12 Months   Lab Units 04/29/25  0435 04/28/25  1428   WBC Thousand/uL 4.65 5.29   HEMOGLOBIN g/dL 10.9* 11.8   HEMATOCRIT % 34.7* 37.9   PLATELETS Thousands/uL 205 210     No results for input(s): \"APTT\", \"INR\", \"PTT\" in the last 8784 hours. Results from Last 12 Months   Lab Units 04/29/25  0435 04/29/25  0351 04/29/25  0011 " 04/11/25 2051 03/05/25  1543 02/17/25  0807 01/10/25  0744   SODIUM mmol/L 137 138 134*   < >  --    < >  --    POTASSIUM mmol/L 4.5 4.5 4.4   < >  --    < >  --    CHLORIDE mmol/L 108 108 107   < >  --    < >  --    CO2 mmol/L 24 23 21   < >  --    < >  --    ANION GAP mmol/L 5 7 6   < >  --    < >  --    BUN mg/dL 13 13 15   < >  --    < >  --    CREATININE mg/dL 0.74 0.75 0.79   < >  --    < >  --    CALCIUM mg/dL 8.6 8.5 8.5   < >  --    < >  --    GLUCOSE RANDOM mg/dL 186* 183* 308*   < >  --   --   --    PHOSPHORUS mg/dL  --  3.1 3.1   < >  --   --   --    MAGNESIUM mg/dL 3.0* 3.0* 2.3   < >  --   --   --    HEMOGLOBIN A1C   --   --   --   --  8.7*  --  9.6*    < > = values in this interval not displayed.        ECG      ECHOCARDIOGRAPHY OR OTHER TESTING/IMAGING  Encounter Date: 04/28/25   ECG 12 lead   Result Value    Ventricular Rate 79    Atrial Rate 79    NM Interval 120    QRSD Interval 84    QT Interval 382    QTC Interval 438    P Axis 62    QRS Axis 87    T Wave Axis 61    Narrative    Normal sinus rhythm  V1 and V5 are reversed   Otherwise no true change  When compared with ECG of 12-Apr-2025 11:01,    Confirmed by Reid Sanchez ([id number]) on 4/30/2025 10:55:21 AM       No results found for this or any previous visit. N/a     ------- ANESTHESIA RISK-BENEFIT DISCUSSION -------  BENEFITS OF A SPECIALIZED ANESTHESIA TEAM INCLUDE (NBK [id number], PMID 42763704):  (1) Reduce mortality and morbidity for major surgeries/procedures. (2) The team provides analgesia/sedation/amnesia/akinesia as safely as possible. (3) The team strives to reduce discomfort as safely as possible.    RISKS, AND PLANS TO MITIGATE RISKS, INCLUDE:    - Neurologic system: IntraOp awareness (Risk is ~1:1,000 - 1:14,000; PMID 28517255), Stroke (Risk ~<0.1-2% for most cases; PMID 08532120), nerve injury, vision loss, and POCD.     - Airway and Pulmonary system: Dental or mouth injury, throat pain, critical hypoxia, pneumothorax, prolonged  intubation, post-op respiratory compromise.  Airway/Intubation risks and prior data: No prior advanced airway notes in [facility] EMR  Major ARISCAT risk factors for pulmonary complications include: none, yielding a score of 0-1= Low risk, 1.6%.  - Cardiovascular system: Hypotension, arrhythmias, cardiac injury or arrest, blood clots, bleeding, infection, vascular injuries.  Atilio's RCRI score items: none, yielding an RCRI Score of 0= 0.4% risk of MACE  Are fabby-op or intra-op beta blockers indicated? (PMID 96098840): no  - FEN/GI system: Aspiration risk (~0.5% per PMC 9469355) and PONV (10-80% per Apfel score) especially if the patient has not fasted.  ASA NPO guideline compliance?: Yes  - Medication risk assessment: Allergic reactions, excessive bleeding with anticoagulant use, overdoses, drug-drug interactions, injury to a fetus or  in pregnant or breastfeeding patients, fabby-procedural sedation including while driving/operating machinery.  Recent relevant medications: See MAR or Med Review  Personal or family history of anesthesia complications: no  Pregnancy Status: N/A  - Estimate mortality risks associated with anesthesia based on ASA-PS (PMID 68441832): ASA-PS II: risk 1:20,000

## 2025-05-02 NOTE — PROCEDURES
Procedure Note - ECT  Clarissa RAMIREZ Jon 45 y.o. female MRN: [id number]    Time out was taken with staff to confirm correct patient and correct procedure to be performed.  Reporting depression symptoms improving, having some minor issues with memory although overall tolerating treatment and agrees to proceed.    Session Number: 008    Diagnosis: Major depressive disorder, recurrent, severe    ECT Type: Outpatient, Acute    Anesthesia: Methohexital    Electrode Placement: Non-dominant unilateral    Energy level:  45 %      Seizure Duration     EE Sec.    EMG : 47 Sec    Post-ictal Suppression Index: 51.7 %    Results:Clinical seizure was satisfactory, Patient tolerated ECT well    Vitals:    25 0716   BP: 135/89   Pulse: 91   Resp: 18   Temp: (!) 97.1 °F (36.2 °C)   SpO2: 100%

## 2025-05-04 RX ORDER — ONDANSETRON 2 MG/ML
4 INJECTION INTRAMUSCULAR; INTRAVENOUS ONCE AS NEEDED
Status: CANCELLED | OUTPATIENT
Start: 2025-05-04

## 2025-05-05 ENCOUNTER — ANESTHESIA (OUTPATIENT)
Dept: PREOP/PACU | Facility: HOSPITAL | Age: 45
End: 2025-05-05
Payer: COMMERCIAL

## 2025-05-05 ENCOUNTER — ANESTHESIA EVENT (OUTPATIENT)
Dept: PREOP/PACU | Facility: HOSPITAL | Age: 45
End: 2025-05-05
Payer: COMMERCIAL

## 2025-05-05 ENCOUNTER — HOSPITAL ENCOUNTER (OUTPATIENT)
Dept: PREOP/PACU | Facility: HOSPITAL | Age: 45
Setting detail: OUTPATIENT SURGERY
Discharge: HOME | End: 2025-05-05
Payer: COMMERCIAL

## 2025-05-05 VITALS
DIASTOLIC BLOOD PRESSURE: 84 MMHG | HEART RATE: 84 BPM | RESPIRATION RATE: 16 BRPM | SYSTOLIC BLOOD PRESSURE: 125 MMHG | TEMPERATURE: 97.2 F | OXYGEN SATURATION: 96 %

## 2025-05-05 DIAGNOSIS — F33.9 MAJOR DEPRESSIVE DISORDER, RECURRENT, UNSPECIFIED (HCC): ICD-10-CM

## 2025-05-05 PROBLEM — F33.2 MAJOR DEPRESSIVE DISORDER, RECURRENT SEVERE WITHOUT PSYCHOTIC FEATURES (HCC): Status: ACTIVE | Noted: 2025-05-05

## 2025-05-05 LAB
EXT PREGNANCY TEST URINE: NEGATIVE
EXT. CONTROL: NORMAL

## 2025-05-05 PROCEDURE — 90870 ELECTROCONVULSIVE THERAPY: CPT

## 2025-05-05 PROCEDURE — 90870 ELECTROCONVULSIVE THERAPY: CPT | Performed by: PSYCHIATRY & NEUROLOGY

## 2025-05-05 PROCEDURE — 81025 URINE PREGNANCY TEST: CPT | Performed by: STUDENT IN AN ORGANIZED HEALTH CARE EDUCATION/TRAINING PROGRAM

## 2025-05-05 RX ORDER — SUCCINYLCHOLINE/SOD CL,ISO/PF 100 MG/5ML
SYRINGE (ML) INTRAVENOUS AS NEEDED
Status: DISCONTINUED | OUTPATIENT
Start: 2025-05-05 | End: 2025-05-05

## 2025-05-05 RX ORDER — SODIUM CHLORIDE, SODIUM LACTATE, POTASSIUM CHLORIDE, CALCIUM CHLORIDE 600; 310; 30; 20 MG/100ML; MG/100ML; MG/100ML; MG/100ML
20 INJECTION, SOLUTION INTRAVENOUS CONTINUOUS
Status: DISCONTINUED | OUTPATIENT
Start: 2025-05-05 | End: 2025-05-09 | Stop reason: HOSPADM

## 2025-05-05 RX ORDER — GLYCOPYRROLATE 0.2 MG/ML
INJECTION INTRAMUSCULAR; INTRAVENOUS AS NEEDED
Status: DISCONTINUED | OUTPATIENT
Start: 2025-05-05 | End: 2025-05-05

## 2025-05-05 RX ORDER — ESMOLOL HYDROCHLORIDE 10 MG/ML
INJECTION INTRAVENOUS AS NEEDED
Status: DISCONTINUED | OUTPATIENT
Start: 2025-05-05 | End: 2025-05-05

## 2025-05-05 RX ORDER — METHOHEXITAL IN WATER/PF 100MG/10ML
SYRINGE (ML) INTRAVENOUS AS NEEDED
Status: DISCONTINUED | OUTPATIENT
Start: 2025-05-05 | End: 2025-05-05

## 2025-05-05 RX ORDER — SODIUM CHLORIDE, SODIUM LACTATE, POTASSIUM CHLORIDE, CALCIUM CHLORIDE 600; 310; 30; 20 MG/100ML; MG/100ML; MG/100ML; MG/100ML
50 INJECTION, SOLUTION INTRAVENOUS CONTINUOUS
Status: DISCONTINUED | OUTPATIENT
Start: 2025-05-05 | End: 2025-05-09 | Stop reason: HOSPADM

## 2025-05-05 RX ORDER — SODIUM CHLORIDE, SODIUM LACTATE, POTASSIUM CHLORIDE, CALCIUM CHLORIDE 600; 310; 30; 20 MG/100ML; MG/100ML; MG/100ML; MG/100ML
INJECTION, SOLUTION INTRAVENOUS CONTINUOUS PRN
Status: DISCONTINUED | OUTPATIENT
Start: 2025-05-05 | End: 2025-05-05

## 2025-05-05 RX ADMIN — ESMOLOL HYDROCHLORIDE 50 MG: 100 INJECTION, SOLUTION INTRAVENOUS at 06:47

## 2025-05-05 RX ADMIN — SODIUM CHLORIDE, SODIUM LACTATE, POTASSIUM CHLORIDE, AND CALCIUM CHLORIDE: .6; .31; .03; .02 INJECTION, SOLUTION INTRAVENOUS at 06:29

## 2025-05-05 RX ADMIN — Medication 80 MG: at 06:43

## 2025-05-05 RX ADMIN — SODIUM CHLORIDE, SODIUM LACTATE, POTASSIUM CHLORIDE, AND CALCIUM CHLORIDE: .6; .31; .03; .02 INJECTION, SOLUTION INTRAVENOUS at 06:48

## 2025-05-05 RX ADMIN — GLYCOPYRROLATE 0.2 MG: 0.2 INJECTION, SOLUTION INTRAMUSCULAR; INTRAVENOUS at 06:37

## 2025-05-05 RX ADMIN — SODIUM CHLORIDE, SODIUM LACTATE, POTASSIUM CHLORIDE, AND CALCIUM CHLORIDE 50 ML/HR: .6; .31; .03; .02 INJECTION, SOLUTION INTRAVENOUS at 06:22

## 2025-05-05 RX ADMIN — Medication 100 MG: at 06:43

## 2025-05-05 NOTE — ANESTHESIA POSTPROCEDURE EVALUATION
Post-Op Assessment Note    CV Status:  Stable  Pain Score: 0    Pain management: adequate       Mental Status:  Alert and awake   Hydration Status:  Euvolemic   PONV Controlled:  Controlled   Airway Patency:  Patent     Post Op Vitals Reviewed: Yes    No anethesia notable event occurred.    Staff: CRNA           Last Filed PACU Vitals:  Vitals Value Taken Time   Temp 98 651   Pulse 103    /74    Resp 16    SpO2 98

## 2025-05-05 NOTE — PROCEDURES
"Procedure Note - ECT  Clarissa Webb 45 y.o. female MRN: [id number]    Time out was taken with staff to confirm correct patient and correct procedure to be performed. The patient reported no side effects with the last session of ECT. She feels much better and is wondering when her acute series would end as she is wanting to go back to work soon. Informed patient that we usually treat through plateau of symptoms, which is typically around 12 sessions. If she is desiring to end the series sooner, she would have to discuss with Dr. Vicente. Informed patient that I would pas the message along. She agrees to proceed with the treatment.      Session Number: 009    Diagnosis: Active Problems:    Major depressive disorder, recurrent severe without psychotic features (HCC)      ECT Type: Outpatient    Anesthesia: Methohexital    Electrode Placement: Non-dominant unilateral    Energy level:  45 %      Seizure Duration     EE Sec.    EMG : 29 Sec    Post-ictal Suppression Index: 60.0 %    Results:Clinical seizure was satisfactory, Patient tolerated ECT well    Vitals   Most Recent Value 2025  0728 2025  0705 2025  0700   Height: 5' \" (1.676 m)  as of 2025 -- -- --   Last Wt: 73.9 kg (163 lb)  as of 2025 -- -- --   Body Mass Index: 26.31 kg/m² Abnormal   \" (1.676 m)  as of 2025  73.9 kg (163 lb)  as of 2025      Blood Pressure: 124/82  as of 2025 125/84 139/76 145/76   Pulse: 87  as of 2025 84 92 84   Respirations: 18  as of 2025 16 16 16   Temperature: 97.4 °F (36.3 °C) Abnormal   as of 2025 97.2 °F (36.2 °C) Abnormal  -- --   SpO2: 99%  as of 2025 96 % 100 % 98 %             This note was not shared with the patient due to reasonable likelihood of causing patient harm    Ester Stewart MD    "

## 2025-05-05 NOTE — ANESTHESIA PREPROCEDURE EVALUATION
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      Anesthesia Plan  ASA Score- 2     Anesthesia Type- general with ASA Monitors.         Additional Monitors:     Airway Plan:            Plan Factors-    Chart reviewed. EKG reviewed.  Existing labs reviewed. Patient summary reviewed.              Intended use of anticholinesterase.    Induction- intravenous.    Postoperative Plan-     Perioperative Resuscitation Plan - Level 1 - Full Code.       Informed Consent- Anesthetic plan and risks discussed with patient.  I personally reviewed this patient with the CRNA. Discussed and agreed on the Anesthesia Plan with the CRNA..      NPO Status:  Vitals Value Taken Time   Date of last liquid 05/04/25 05/05/25 0614   Time of last liquid 2200 05/05/25 0614   Date of last solid 05/04/25 05/05/25 0614   Time of last solid 2200 05/05/25 0614

## 2025-05-05 NOTE — ANESTHESIA POSTPROCEDURE EVALUATION
Post-Op Assessment Note    CV Status:  Stable    Pain management: adequate       Mental Status:  Awake   Hydration Status:  Stable   PONV Controlled:  None   Airway Patency:  Patent     Post Op Vitals Reviewed: Yes    No anethesia notable event occurred.    Staff: Anesthesiologist           Last Filed PACU Vitals:  Vitals Value Taken Time   Temp     Pulse 92 05/05/25 0705   /76 05/05/25 0705   Resp 16 05/05/25 0705   SpO2 100 % 05/05/25 0705       Modified Eleuterio:     Vitals Value Taken Time   Activity 2 05/05/25 0705   Respiration 2 05/05/25 0705   Circulation 2 05/05/25 0705   Consciousness 2 05/05/25 0705   Oxygen Saturation 2 05/05/25 0705     Modified Eleuterio Score: 10

## 2025-05-05 NOTE — NURSING NOTE
Pt is awake,alert,OOB to BR Written and verbal instructions given to pt, who verbalizes an understanding. Tolerated diet.

## 2025-05-06 ENCOUNTER — TELEPHONE (OUTPATIENT)
Age: 45
End: 2025-05-06

## 2025-05-06 ENCOUNTER — OFFICE VISIT (OUTPATIENT)
Dept: FAMILY MEDICINE CLINIC | Facility: CLINIC | Age: 45
End: 2025-05-06
Payer: COMMERCIAL

## 2025-05-06 VITALS
TEMPERATURE: 97.4 F | HEART RATE: 87 BPM | RESPIRATION RATE: 18 BRPM | BODY MASS INDEX: 26.2 KG/M2 | DIASTOLIC BLOOD PRESSURE: 82 MMHG | HEIGHT: 66 IN | WEIGHT: 163 LBS | OXYGEN SATURATION: 99 % | SYSTOLIC BLOOD PRESSURE: 124 MMHG

## 2025-05-06 DIAGNOSIS — F32.A ANXIETY AND DEPRESSION: ICD-10-CM

## 2025-05-06 DIAGNOSIS — Z76.89 ENCOUNTER TO ESTABLISH CARE WITH NEW DOCTOR: ICD-10-CM

## 2025-05-06 DIAGNOSIS — Z12.4 SCREENING FOR CERVICAL CANCER: ICD-10-CM

## 2025-05-06 DIAGNOSIS — F41.9 ANXIETY AND DEPRESSION: ICD-10-CM

## 2025-05-06 DIAGNOSIS — F10.21 ALCOHOL USE DISORDER, SEVERE, IN EARLY REMISSION (HCC): Primary | ICD-10-CM

## 2025-05-06 DIAGNOSIS — Z12.31 ENCOUNTER FOR SCREENING MAMMOGRAM FOR BREAST CANCER: ICD-10-CM

## 2025-05-06 PROCEDURE — 99204 OFFICE O/P NEW MOD 45 MIN: CPT | Performed by: FAMILY MEDICINE

## 2025-05-06 NOTE — PROGRESS NOTES
Name: Clarissa Webb      : 1980      MRN: [id number]  Encounter Provider: Live Wallace MD  Encounter Date: 2025   Encounter department: [facility] PRIMARY CARE    :  Assessment & Plan  Screening for cervical cancer         Encounter for screening mammogram for breast cancer         Anxiety and depression      Orders:    Ambulatory referral to Psych Services; Future    Alcohol use disorder, severe, in early remission (HCC)    Orders:    Ambulatory referral to Psych Services; Future    Naltrexone (Vivitrol) 380 MG SUSR; Inject 4 mL (380 mg total) into a muscle once for 1 dose    Encounter to establish care with new doctor           Assessment & Plan  1. Alcohol Use Disorder.  - Expressed interest in Vivitrol injections, a treatment she sought years ago but was unable to pursue due to insurance limitations.  - Prescription for Vivitrol issued today and sent to pharmacy, with a total of 11 doses provided. She had 1 dose already while in inpatient detox and had no side effects.  - Scheduled to commence therapy on Thursday, with sessions planned for every other week. Psychiatric team at [facility] will oversee psychiatric conditions and medication management.  - Strongly recommended to abstain from alcohol consumption.   - Alcohol cessation counseling conducted during visit    2. Diabetes Mellitus.  - Advised to continue follow-up appointments with endocrinology department, who will manage blood sugar levels.  - Started an insulin pump recently, which has significantly improved blood sugar control.  - Encouraged to maintain abstinence from alcohol, as this will contribute to better blood sugar control.  - Recent blood sugar levels have been within the target range since starting the insulin pump.           History of Present Illness     History of Present Illness  The patient presents for evaluation of alcohol use disorder and diabetes. She has expressed interest in Vivitrol  "injections, a treatment she sought years ago but was unable to pursue due to insurance limitations. She is currently covered by state insurance, which includes Vivitrol. She received one Vivitrol injection at the [facility] during her stay for mental health and alcohol issues. However, she left the institute against medical advice due to dissatisfaction with the treatment program. She acknowledges her ongoing struggles with mental health and addiction but is determined to avoid similar facilities in the future. Following her discharge from the [facility], she sought help at a crisis center through [facility] and voluntarily admitted herself to an inpatient psychiatric unit. She has been abstinent from alcohol for the past 7 days. She has never self-administered Vivitrol but is open to learning. She has an upcoming appointment with psychiatry on Thursday at [facility], where she will also begin biweekly therapy sessions. She has previously received medication management from this facility.    She continues to see her endocrinologist and recently started using an insulin pump, which has significantly improved her blood sugar control. She plans to contact [employer] regarding a sensor issue. She reports that her A1c levels were lowest last year when she was consuming beer, which she believes lowered her blood sugar.    SOCIAL HISTORY  She admits to drinking alcohol but has not consumed any in the past 7 days.     Review of Systems   All other systems reviewed and are negative.    Objective   /82   Pulse 87   Temp (!) 97.4 °F (36.3 °C)   Resp 18   Ht 5' 6\" (1.676 m)   Wt 73.9 kg (163 lb)   LMP 04/18/2025 (Approximate)   SpO2 99%   BMI 26.31 kg/m²     Physical Exam    Physical Exam  Vitals and nursing note reviewed.   Constitutional:       General: She is not in acute distress.     Appearance: Normal appearance. She is well-developed. She is not ill-appearing, toxic-appearing or " diaphoretic.   HENT:      Head: Normocephalic and atraumatic.   Eyes:      General:         Right eye: No discharge.         Left eye: No discharge.      Extraocular Movements: Extraocular movements intact.      Conjunctiva/sclera: Conjunctivae normal.   Cardiovascular:      Rate and Rhythm: Normal rate.      Pulses: no weak pulses.           Dorsalis pedis pulses are 2+ on the right side and 2+ on the left side.        Posterior tibial pulses are 2+ on the right side and 2+ on the left side.   Pulmonary:      Effort: Pulmonary effort is normal.   Feet:      Right foot:      Skin integrity: No ulcer, skin breakdown, erythema, warmth, callus or dry skin.      Left foot:      Skin integrity: No ulcer, skin breakdown, erythema, warmth, callus or dry skin.   Skin:     General: Skin is warm and dry.      Capillary Refill: Capillary refill takes less than 2 seconds.   Neurological:      Mental Status: She is alert and oriented to person, place, and time.   Psychiatric:         Mood and Affect: Mood normal.         Behavior: Behavior normal.         Thought Content: Thought content normal.         Judgment: Judgment normal.           Diabetic Foot Exam    Patient's shoes and socks removed.    Right Foot/Ankle   Right Foot Inspection  Skin Exam: skin normal and skin intact. No dry skin, no warmth, no callus, no erythema, no maceration, no abnormal color, no pre-ulcer, no ulcer and no callus.     Toe Exam: ROM and strength within normal limits.     Sensory   Monofilament testing: intact    Vascular  The right DP pulse is 2+. The right PT pulse is 2+.     Left Foot/Ankle  Left Foot Inspection  Skin Exam: skin normal and skin intact. No dry skin, no warmth, no erythema, no maceration, normal color, no pre-ulcer, no ulcer and no callus.     Toe Exam: ROM and strength within normal limits.     Sensory   Monofilament testing: intact    Vascular  The left DP pulse is 2+. The left PT pulse is 2+.     Assign Risk Category  No  deformity present  No loss of protective sensation  No weak pulses  Risk: 0

## 2025-05-06 NOTE — TELEPHONE ENCOUNTER
Contacted patient in regards to ASAP/STAT Referral. Patient states she is already working with a Psychiatrist in [place] and is not looking to switch over. Closing referral.

## 2025-05-06 NOTE — TELEPHONE ENCOUNTER
[employer] sent to [facility] for ECT    [facility]  Good Afternoon,    Please see referral for ECT.    Thank you

## 2025-05-07 ENCOUNTER — ANESTHESIA EVENT (OUTPATIENT)
Dept: PREOP/PACU | Facility: HOSPITAL | Age: 45
End: 2025-05-07
Payer: COMMERCIAL

## 2025-05-07 ENCOUNTER — HOSPITAL ENCOUNTER (OUTPATIENT)
Dept: PREOP/PACU | Facility: HOSPITAL | Age: 45
Setting detail: OUTPATIENT SURGERY
Discharge: HOME | End: 2025-05-07
Attending: PSYCHIATRY & NEUROLOGY
Payer: COMMERCIAL

## 2025-05-07 ENCOUNTER — ANESTHESIA (OUTPATIENT)
Dept: PREOP/PACU | Facility: HOSPITAL | Age: 45
End: 2025-05-07
Payer: COMMERCIAL

## 2025-05-07 VITALS
RESPIRATION RATE: 16 BRPM | HEART RATE: 91 BPM | OXYGEN SATURATION: 100 % | TEMPERATURE: 97.5 F | DIASTOLIC BLOOD PRESSURE: 99 MMHG | SYSTOLIC BLOOD PRESSURE: 162 MMHG

## 2025-05-07 DIAGNOSIS — F33.9 MAJOR DEPRESSIVE DISORDER, RECURRENT, UNSPECIFIED (HCC): ICD-10-CM

## 2025-05-07 LAB
EXT PREGNANCY TEST URINE: NEGATIVE
EXT. CONTROL: NORMAL

## 2025-05-07 PROCEDURE — 81025 URINE PREGNANCY TEST: CPT | Performed by: ANESTHESIOLOGY

## 2025-05-07 PROCEDURE — 90870 ELECTROCONVULSIVE THERAPY: CPT

## 2025-05-07 RX ORDER — SODIUM CHLORIDE, SODIUM LACTATE, POTASSIUM CHLORIDE, CALCIUM CHLORIDE 600; 310; 30; 20 MG/100ML; MG/100ML; MG/100ML; MG/100ML
INJECTION, SOLUTION INTRAVENOUS CONTINUOUS PRN
Status: DISCONTINUED | OUTPATIENT
Start: 2025-05-07 | End: 2025-05-07

## 2025-05-07 RX ORDER — ESMOLOL HYDROCHLORIDE 10 MG/ML
INJECTION INTRAVENOUS AS NEEDED
Status: DISCONTINUED | OUTPATIENT
Start: 2025-05-07 | End: 2025-05-07

## 2025-05-07 RX ORDER — GLYCOPYRROLATE 0.2 MG/ML
INJECTION INTRAMUSCULAR; INTRAVENOUS AS NEEDED
Status: DISCONTINUED | OUTPATIENT
Start: 2025-05-07 | End: 2025-05-07

## 2025-05-07 RX ORDER — METHOHEXITAL IN WATER/PF 100MG/10ML
SYRINGE (ML) INTRAVENOUS AS NEEDED
Status: DISCONTINUED | OUTPATIENT
Start: 2025-05-07 | End: 2025-05-07

## 2025-05-07 RX ORDER — SUCCINYLCHOLINE/SOD CL,ISO/PF 100 MG/5ML
SYRINGE (ML) INTRAVENOUS AS NEEDED
Status: DISCONTINUED | OUTPATIENT
Start: 2025-05-07 | End: 2025-05-07

## 2025-05-07 RX ADMIN — ESMOLOL HYDROCHLORIDE 50 MG: 100 INJECTION, SOLUTION INTRAVENOUS at 06:39

## 2025-05-07 RX ADMIN — Medication 100 MG: at 06:39

## 2025-05-07 RX ADMIN — SODIUM CHLORIDE, SODIUM LACTATE, POTASSIUM CHLORIDE, AND CALCIUM CHLORIDE: .6; .31; .03; .02 INJECTION, SOLUTION INTRAVENOUS at 06:18

## 2025-05-07 RX ADMIN — Medication 80 MG: at 06:39

## 2025-05-07 RX ADMIN — GLYCOPYRROLATE 0.2 MG: 0.2 INJECTION, SOLUTION INTRAMUSCULAR; INTRAVENOUS at 06:23

## 2025-05-07 NOTE — ANESTHESIA PREPROCEDURE EVALUATION
Procedure:  HB ECT IN OR    Relevant Problems   ENDO   (+) Hypothyroidism   (+) Type 1 diabetes mellitus with hyperglycemia (HCC)      /RENAL   (+) THO (acute kidney injury) (HCC)      HEMATOLOGY   (+) Chronic anemia   (+) Iron deficiency anemia, unspecified      NEURO/PSYCH   (+) Anxiety and depression   (+) Major depressive disorder, recurrent severe without psychotic features (HCC)        Physical Exam    Airway    Mallampati score: II  TM Distance: <3 FB  Neck ROM: full     Dental       Cardiovascular  Cardiovascular exam normal    Pulmonary  Pulmonary exam normal     Other Findings  post-pubertal.      Anesthesia Plan  ASA Score- 2     Anesthesia Type- general with ASA Monitors.         Additional Monitors:     Airway Plan:            Plan Factors-    Chart reviewed. EKG reviewed.  Existing labs reviewed.                   Induction- intravenous.    Postoperative Plan-         Informed Consent- Anesthetic plan and risks discussed with patient.  I personally reviewed this patient with the CRNA. Discussed and agreed on the Anesthesia Plan with the CRNA..      NPO Status:  Vitals Value Taken Time   Date of last liquid 05/06/25 05/07/25 0534   Time of last liquid 2030 05/07/25 0534   Date of last solid 05/06/25 05/07/25 0534   Time of last solid 2030 05/07/25 0534

## 2025-05-07 NOTE — PROCEDURES
"Procedure Note - ECT  Clarissa Webb 45 y.o. female MRN: [id number]    Time out was taken with staff to confirm correct patient and correct procedure to be performed. This was the patient's  10th session of ECT. Prior to starting the procedure, the patient's questions and concerns were addressed. Patient reports improvement in mood/symptoms especially increased \"enjoying things more\". Patient reports some memory loss since last session of ECT. Patient agreed to continuing treatment. Stimulus dose was 45  % . Patient had a satisfactory seizure. No immediate side effects were noted after the procedure. Patient is interested in continuing to 12 total treatments.     This procedure was performed in the presence of and under the direct supervision of Dr. Pappas.    Session Number: 010    Diagnosis: Principal Problem:    Major depressive disorder, recurrent severe without psychotic features ([facility])      ECT Type: Outpatient, acute    Anesthesia: Methohexital    Electrode Placement: Non-dominant unilateral    Energy level:  45 %      Seizure Duration     EE sec. (per interpretation of  EEG strip)    EMG : 19 sec. (visual)    Post-ictal Suppression Index:  59.4 %    Results:Clinical seizure was satisfactory, Patient tolerated ECT well      Vitals:    25 0723   BP: 162/99   Pulse: 91   Resp: 16   Temp: 97.5 °F (36.4 °C)   SpO2: 100%        Medication Administration - last 24 hours from 2025 0803 to 2025 0803         Date/Time Order Dose Route Action Action by     2025 0729 EDT lactated ringers infusion 0 mL/kg/hr Intravenous Stopped Genie Travis RN     2025 0647 EDT lactated ringers infusion -- Intravenous Anesthesia Volume Adjustment Eddi Chopra CRNA     2025 0618 EDT lactated ringers infusion -- Intravenous New Bag NEFTALI Stanley DO 25  Psychiatry Resident, PGY- II   "

## 2025-05-07 NOTE — NURSING NOTE
Pt returned to APU awake,alert,tolerated liquids. Instructions given, pt verbalizes an understanding. Father at bedside.

## 2025-05-07 NOTE — ANESTHESIA POSTPROCEDURE EVALUATION
Post-Op Assessment Note    Last Filed PACU Vitals:  Vitals Value Taken Time   Temp     Pulse 89 05/07/25 0710   /86 05/07/25 0707   Resp 12 05/07/25 0710   SpO2 100 % 05/07/25 0710   Vitals shown include unfiled device data.    Modified Eleuterio:     Vitals Value Taken Time   Activity 2 05/07/25 0700   Respiration 2 05/07/25 0700   Circulation 2 05/07/25 0700   Consciousness 2 05/07/25 0700   Oxygen Saturation 2 05/07/25 0700     Modified Eleuterio Score: 10

## 2025-05-07 NOTE — ANESTHESIA POSTPROCEDURE EVALUATION
Post-Op Assessment Note    CV Status:  Stable  Pain Score: 0    Pain management: adequate       Mental Status:  Alert   Hydration Status:  Stable   PONV Controlled:  Controlled   Airway Patency:  Patent     Post Op Vitals Reviewed: Yes    No anethesia notable event occurred.    Staff: CRNA           Last Filed PACU Vitals:  Vitals Value Taken Time   Temp     Pulse 114    /86    Resp 20    SpO2 99

## 2025-05-08 ENCOUNTER — CLINICAL SUPPORT (OUTPATIENT)
Dept: FAMILY MEDICINE CLINIC | Facility: CLINIC | Age: 45
End: 2025-05-08
Payer: COMMERCIAL

## 2025-05-08 DIAGNOSIS — F10.21 ALCOHOL USE DISORDER, SEVERE, IN EARLY REMISSION (HCC): Primary | ICD-10-CM

## 2025-05-08 PROCEDURE — 96372 THER/PROPH/DIAG INJ SC/IM: CPT

## 2025-05-09 ENCOUNTER — HOSPITAL ENCOUNTER (OUTPATIENT)
Dept: PREOP/PACU | Facility: HOSPITAL | Age: 45
Setting detail: OUTPATIENT SURGERY
End: 2025-05-09
Payer: COMMERCIAL

## 2025-05-09 ENCOUNTER — ANESTHESIA (OUTPATIENT)
Dept: PREOP/PACU | Facility: HOSPITAL | Age: 45
End: 2025-05-09
Payer: COMMERCIAL

## 2025-05-09 ENCOUNTER — ANESTHESIA EVENT (OUTPATIENT)
Dept: PREOP/PACU | Facility: HOSPITAL | Age: 45
End: 2025-05-09
Payer: COMMERCIAL

## 2025-05-09 VITALS
HEART RATE: 94 BPM | DIASTOLIC BLOOD PRESSURE: 68 MMHG | OXYGEN SATURATION: 98 % | RESPIRATION RATE: 18 BRPM | TEMPERATURE: 97.8 F | SYSTOLIC BLOOD PRESSURE: 129 MMHG

## 2025-05-09 DIAGNOSIS — F33.9 MAJOR DEPRESSIVE DISORDER, RECURRENT, UNSPECIFIED (HCC): ICD-10-CM

## 2025-05-09 LAB
EXT PREGNANCY TEST URINE: NEGATIVE
EXT. CONTROL: NORMAL

## 2025-05-09 PROCEDURE — 81025 URINE PREGNANCY TEST: CPT | Performed by: STUDENT IN AN ORGANIZED HEALTH CARE EDUCATION/TRAINING PROGRAM

## 2025-05-09 PROCEDURE — 90870 ELECTROCONVULSIVE THERAPY: CPT

## 2025-05-09 RX ORDER — METHOHEXITAL IN WATER/PF 100MG/10ML
SYRINGE (ML) INTRAVENOUS AS NEEDED
Status: DISCONTINUED | OUTPATIENT
Start: 2025-05-09 | End: 2025-05-09

## 2025-05-09 RX ORDER — SODIUM CHLORIDE, SODIUM LACTATE, POTASSIUM CHLORIDE, CALCIUM CHLORIDE 600; 310; 30; 20 MG/100ML; MG/100ML; MG/100ML; MG/100ML
INJECTION, SOLUTION INTRAVENOUS CONTINUOUS PRN
Status: DISCONTINUED | OUTPATIENT
Start: 2025-05-09 | End: 2025-05-09

## 2025-05-09 RX ORDER — ESMOLOL HYDROCHLORIDE 10 MG/ML
INJECTION INTRAVENOUS AS NEEDED
Status: DISCONTINUED | OUTPATIENT
Start: 2025-05-09 | End: 2025-05-09

## 2025-05-09 RX ORDER — SODIUM CHLORIDE, SODIUM LACTATE, POTASSIUM CHLORIDE, CALCIUM CHLORIDE 600; 310; 30; 20 MG/100ML; MG/100ML; MG/100ML; MG/100ML
75 INJECTION, SOLUTION INTRAVENOUS CONTINUOUS
Status: CANCELLED | OUTPATIENT
Start: 2025-05-09

## 2025-05-09 RX ORDER — SUCCINYLCHOLINE/SOD CL,ISO/PF 100 MG/5ML
SYRINGE (ML) INTRAVENOUS AS NEEDED
Status: DISCONTINUED | OUTPATIENT
Start: 2025-05-09 | End: 2025-05-09

## 2025-05-09 RX ORDER — GLYCOPYRROLATE 0.2 MG/ML
INJECTION INTRAMUSCULAR; INTRAVENOUS AS NEEDED
Status: DISCONTINUED | OUTPATIENT
Start: 2025-05-09 | End: 2025-05-09

## 2025-05-09 RX ADMIN — SODIUM CHLORIDE, SODIUM LACTATE, POTASSIUM CHLORIDE, AND CALCIUM CHLORIDE: .6; .31; .03; .02 INJECTION, SOLUTION INTRAVENOUS at 06:19

## 2025-05-09 RX ADMIN — GLYCOPYRROLATE 0.2 MG: 0.2 INJECTION, SOLUTION INTRAMUSCULAR; INTRAVENOUS at 06:23

## 2025-05-09 RX ADMIN — Medication 100 MG: at 06:25

## 2025-05-09 RX ADMIN — ESMOLOL HYDROCHLORIDE 50 MG: 100 INJECTION, SOLUTION INTRAVENOUS at 06:28

## 2025-05-09 RX ADMIN — Medication 80 MG: at 06:26

## 2025-05-09 NOTE — NURSING NOTE
Pt returned to APU awake,alert,tolerated liquids. Written and verbal instructions given, pt verbalizes an understanding.

## 2025-05-09 NOTE — LETTER
[facility]  [address]   [place] [address]  [phone]  Dept: [phone]    May 9, 2025     Patient: Clarissa Webb   YOB: 1980   Date of Visit: 5/9/2025       To Whom it May Concern:    Clarissa Webb is under my professional care. She was seen in the hospital from 5/9/2025 to 05/09/25. She may return to work on Tuesday 5/13/2025 without limitations.    If you have any questions or concerns, please don't hesitate to call.         Sincerely,          Devora Liriano, DO

## 2025-05-09 NOTE — ANESTHESIA PREPROCEDURE EVALUATION
Procedure:  HB ECT IN OR    Relevant Problems   ENDO   (+) Hypothyroidism   (+) Type 1 diabetes mellitus with hyperglycemia (HCC)      /RENAL   (+) THO (acute kidney injury) (HCC)      HEMATOLOGY   (+) Chronic anemia   (+) Iron deficiency anemia, unspecified      NEURO/PSYCH   (+) Anxiety and depression   (+) Major depressive disorder, recurrent severe without psychotic features (HCC)      Latest Reference Range & Units 05/09/25 06:02   PREGNANCY TEST URINE Negative  Negative       Physical Exam    Airway    Mallampati score: II  TM Distance: >3 FB  Neck ROM: full     Dental   No notable dental hx     Cardiovascular      Pulmonary      Other Findings  post-pubertal.      Anesthesia Plan  ASA Score- 3     Anesthesia Type- general with ASA Monitors.         Additional Monitors:     Airway Plan:            Plan Factors-Exercise tolerance (METS): >4 METS.    Chart reviewed. EKG reviewed.  Existing labs reviewed. Patient summary reviewed.    Patient is not a current smoker.  Patient did not smoke on day of surgery.        Intended use of anticholinesterase.    Induction- intravenous.    Postoperative Plan-     Perioperative Resuscitation Plan - Level 1 - Full Code.       Informed Consent- Anesthetic plan and risks discussed with patient.  I personally reviewed this patient with the CRNA. Discussed and agreed on the Anesthesia Plan with the CRNA..      NPO Status:  Vitals Value Taken Time   Date of last liquid 05/09/25 05/09/25 0548   Time of last liquid 0430 05/09/25 0548   Date of last solid 05/08/25 05/09/25 0548   Time of last solid 2000 05/09/25 0548

## 2025-05-09 NOTE — PROCEDURES
Procedure Note - ECT  Clarissa Webb 45 y.o. female MRN: [id number]    Time out was taken with staff to confirm correct patient and correct procedure to be performed. This was the patient's 11th session of ECT. Prior to starting the procedure, the patient's questions and concerns were addressed. Patient reports continued improvement in mood/symptoms. Patient reports a slight headache since last session of ECT, but denies worsening of previous memory loss. Patient agreed to continuing treatment. Stimulus dose was increased to 55% . Patient had a short/borderline satisfactory seizure lasting only 15 seconds despite the increase in energy. No immediate side effects were noted after the procedure.     This procedure was performed in the presence of and under the direct supervision of Dr. Separ.    This writer will send a message to patient's outpatient psychiatrist to discuss whether or not patient should have her last/12th session of ECT on Monday.    Work note was provided to patient for return to work on May 13 in the event that she does have her 12th session on May 12, 2025.    Session Number: 011    Diagnosis: Principal Problem:    Major depressive disorder, recurrent severe without psychotic features (HCC)      ECT Type: Outpatient    Anesthesia: Methohexital    Electrode Placement: Non-dominant unilateral    Energy level:  55 %      Seizure Duration     EEG:  15 sec.    EMG : 15 sec. (visual)    Post-ictal Suppression Index:  74.2 %    Results:Patient had a short/borderline satisfactory seizure lasting only 15 seconds despite the increase in energy.Patient tolerated ECT well        Vitals:    05/09/25 0650   BP: 124/62   Pulse: 96   Resp: 18   Temp:    SpO2: 98%              Devora Liriano DO 05/09/25  Psychiatry Resident, PGY- II

## 2025-05-09 NOTE — ANESTHESIA POSTPROCEDURE EVALUATION
Post-Op Assessment Note    CV Status:  Stable  Pain Score: 0    Pain management: adequate       Mental Status:  Alert and awake   Hydration Status:  Stable   PONV Controlled:  None   Airway Patency:  Patent     Post Op Vitals Reviewed: Yes    No anethesia notable event occurred.    Staff: CRNA           Last Filed PACU Vitals:  Vitals Value Taken Time   Temp     Pulse 86    /70    Resp 20    SpO2 100

## 2025-05-09 NOTE — ANESTHESIA POSTPROCEDURE EVALUATION
Post-Op Assessment Note    CV Status:  Stable    Pain management: adequate       Mental Status:  Alert and awake   Hydration Status:  Euvolemic   PONV Controlled:  Controlled   Airway Patency:  Patent     Post Op Vitals Reviewed: Yes    No anethesia notable event occurred.    Staff: Anesthesiologist           Last Filed PACU Vitals:  Vitals Value Taken Time   Temp     Pulse 96 05/09/25 0651   /62 05/09/25 0650   Resp 12 05/09/25 0651   SpO2 98 % 05/09/25 0651   Vitals shown include unfiled device data.    Modified Eleuterio:     Vitals Value Taken Time   Activity 2 05/09/25 0650   Respiration 2 05/09/25 0650   Circulation 2 05/09/25 0650   Consciousness 2 05/09/25 0650   Oxygen Saturation 2 05/09/25 0650     Modified Eleuterio Score: 10

## 2025-05-10 ENCOUNTER — ANESTHESIA EVENT (OUTPATIENT)
Dept: ANESTHESIOLOGY | Facility: HOSPITAL | Age: 45
End: 2025-05-10

## 2025-05-10 ENCOUNTER — ANESTHESIA (OUTPATIENT)
Dept: ANESTHESIOLOGY | Facility: HOSPITAL | Age: 45
End: 2025-05-10

## 2025-05-10 RX ORDER — SODIUM CHLORIDE, SODIUM LACTATE, POTASSIUM CHLORIDE, CALCIUM CHLORIDE 600; 310; 30; 20 MG/100ML; MG/100ML; MG/100ML; MG/100ML
50 INJECTION, SOLUTION INTRAVENOUS CONTINUOUS
Status: CANCELLED | OUTPATIENT
Start: 2025-05-10

## 2025-05-10 RX ORDER — SODIUM CHLORIDE, SODIUM LACTATE, POTASSIUM CHLORIDE, CALCIUM CHLORIDE 600; 310; 30; 20 MG/100ML; MG/100ML; MG/100ML; MG/100ML
20 INJECTION, SOLUTION INTRAVENOUS CONTINUOUS
Status: CANCELLED | OUTPATIENT
Start: 2025-05-10

## 2025-05-12 ENCOUNTER — ANESTHESIA EVENT (OUTPATIENT)
Dept: PREOP/PACU | Facility: HOSPITAL | Age: 45
End: 2025-05-12
Payer: COMMERCIAL

## 2025-05-12 ENCOUNTER — ANESTHESIA (OUTPATIENT)
Dept: PREOP/PACU | Facility: HOSPITAL | Age: 45
End: 2025-05-12
Payer: COMMERCIAL

## 2025-05-12 ENCOUNTER — HOSPITAL ENCOUNTER (OUTPATIENT)
Dept: PREOP/PACU | Facility: HOSPITAL | Age: 45
Setting detail: OUTPATIENT SURGERY
Discharge: HOME | End: 2025-05-12
Payer: COMMERCIAL

## 2025-05-12 ENCOUNTER — TELEPHONE (OUTPATIENT)
Age: 45
End: 2025-05-12

## 2025-05-12 VITALS
OXYGEN SATURATION: 98 % | RESPIRATION RATE: 18 BRPM | HEART RATE: 106 BPM | DIASTOLIC BLOOD PRESSURE: 105 MMHG | TEMPERATURE: 97.7 F | SYSTOLIC BLOOD PRESSURE: 160 MMHG

## 2025-05-12 DIAGNOSIS — F33.9 MAJOR DEPRESSIVE DISORDER, RECURRENT, UNSPECIFIED (HCC): ICD-10-CM

## 2025-05-12 PROCEDURE — 90870 ELECTROCONVULSIVE THERAPY: CPT

## 2025-05-12 PROCEDURE — 90870 ELECTROCONVULSIVE THERAPY: CPT | Performed by: PSYCHIATRY & NEUROLOGY

## 2025-05-12 RX ORDER — GLYCOPYRROLATE 0.2 MG/ML
INJECTION INTRAMUSCULAR; INTRAVENOUS AS NEEDED
Status: DISCONTINUED | OUTPATIENT
Start: 2025-05-12 | End: 2025-05-12

## 2025-05-12 RX ORDER — SODIUM CHLORIDE, SODIUM LACTATE, POTASSIUM CHLORIDE, CALCIUM CHLORIDE 600; 310; 30; 20 MG/100ML; MG/100ML; MG/100ML; MG/100ML
50 INJECTION, SOLUTION INTRAVENOUS CONTINUOUS
Status: DISCONTINUED | OUTPATIENT
Start: 2025-05-12 | End: 2025-05-16 | Stop reason: HOSPADM

## 2025-05-12 RX ORDER — SODIUM CHLORIDE, SODIUM LACTATE, POTASSIUM CHLORIDE, CALCIUM CHLORIDE 600; 310; 30; 20 MG/100ML; MG/100ML; MG/100ML; MG/100ML
20 INJECTION, SOLUTION INTRAVENOUS CONTINUOUS
Status: DISCONTINUED | OUTPATIENT
Start: 2025-05-12 | End: 2025-05-16 | Stop reason: HOSPADM

## 2025-05-12 RX ORDER — SODIUM CHLORIDE, SODIUM LACTATE, POTASSIUM CHLORIDE, CALCIUM CHLORIDE 600; 310; 30; 20 MG/100ML; MG/100ML; MG/100ML; MG/100ML
INJECTION, SOLUTION INTRAVENOUS CONTINUOUS PRN
Status: DISCONTINUED | OUTPATIENT
Start: 2025-05-12 | End: 2025-05-12

## 2025-05-12 RX ORDER — ESMOLOL HYDROCHLORIDE 10 MG/ML
INJECTION INTRAVENOUS AS NEEDED
Status: DISCONTINUED | OUTPATIENT
Start: 2025-05-12 | End: 2025-05-12

## 2025-05-12 RX ORDER — METHOHEXITAL IN WATER/PF 100MG/10ML
SYRINGE (ML) INTRAVENOUS AS NEEDED
Status: DISCONTINUED | OUTPATIENT
Start: 2025-05-12 | End: 2025-05-12

## 2025-05-12 RX ORDER — SUCCINYLCHOLINE/SOD CL,ISO/PF 100 MG/5ML
SYRINGE (ML) INTRAVENOUS AS NEEDED
Status: DISCONTINUED | OUTPATIENT
Start: 2025-05-12 | End: 2025-05-12

## 2025-05-12 RX ADMIN — ESMOLOL HYDROCHLORIDE 50 MG: 100 INJECTION, SOLUTION INTRAVENOUS at 07:29

## 2025-05-12 RX ADMIN — SODIUM CHLORIDE, SODIUM LACTATE, POTASSIUM CHLORIDE, AND CALCIUM CHLORIDE 20 ML/HR: .6; .31; .03; .02 INJECTION, SOLUTION INTRAVENOUS at 05:54

## 2025-05-12 RX ADMIN — Medication 80 MG: at 07:29

## 2025-05-12 RX ADMIN — Medication 100 MG: at 07:29

## 2025-05-12 RX ADMIN — GLYCOPYRROLATE 0.2 MG: 0.2 INJECTION, SOLUTION INTRAMUSCULAR; INTRAVENOUS at 07:28

## 2025-05-12 RX ADMIN — SODIUM CHLORIDE, SODIUM LACTATE, POTASSIUM CHLORIDE, AND CALCIUM CHLORIDE: .6; .31; .03; .02 INJECTION, SOLUTION INTRAVENOUS at 06:51

## 2025-05-12 NOTE — NURSING NOTE
Patient returned from ECT in stable condition. /105, 106, 97.7, 98% on room air. AVS printed and reviewed. IV removed. Patient discharged in stable condition.

## 2025-05-12 NOTE — ANESTHESIA PREPROCEDURE EVALUATION
"Procedure:  HB ECT IN OR    Relevant Problems   ENDO   (+) Hypothyroidism   (+) Type 1 diabetes mellitus with hyperglycemia (HCC)      /RENAL   (+) THO (acute kidney injury) (HCC)      HEMATOLOGY   (+) Chronic anemia   (+) Iron deficiency anemia, unspecified      NEURO/PSYCH   (+) Anxiety and depression   (+) Major depressive disorder, recurrent severe without psychotic features (HCC)        Physical Exam    Airway    Mallampati score: II  TM Distance: >3 FB  Neck ROM: full     Dental   No notable dental hx     Cardiovascular      Pulmonary   No wheezes    Other Findings  post-pubertal.      Anesthesia Plan  ASA Score- 2     Anesthesia Type- general with ASA Monitors.         Additional Monitors:     Airway Plan:            Plan Factors-    Chart reviewed. EKG reviewed.  Existing labs reviewed. Patient summary reviewed.              Intended use of anticholinesterase.    Induction- intravenous.    Postoperative Plan-     Perioperative Resuscitation Plan - Level 1 - Full Code.       Informed Consent- Anesthetic plan and risks discussed with patient.  I personally reviewed this patient with the CRNA. Discussed and agreed on the Anesthesia Plan with the CRNA..      NPO Status:  Vitals Value Taken Time   Date of last liquid 05/11/25 05/12/25 0536   Time of last liquid 1600 05/12/25 0536   Date of last solid 05/11/25 05/12/25 0536   Time of last solid 1600 05/12/25 0536       VITALS  /90   Pulse 96   Temp 97.6 °F (36.4 °C) (Temporal)   Resp 15   LMP 04/18/2025 (Approximate)   SpO2 98%  BP Readings from Last 3 Encounters:   05/12/25 139/90   05/09/25 129/68   05/07/25 162/99        LABS  Results from Last 12 Months   Lab Units 04/29/25  0435 04/28/25  1428   WBC Thousand/uL 4.65 5.29   HEMOGLOBIN g/dL 10.9* 11.8   HEMATOCRIT % 34.7* 37.9   PLATELETS Thousands/uL 205 210     No results for input(s): \"APTT\", \"INR\", \"PTT\" in the last 8784 hours. Results from Last 12 Months   Lab Units 04/29/25  0435 " 04/29/25  0351 04/29/25  0011 04/11/25 2051 03/05/25  1543 02/17/25  0807 01/10/25  0744   SODIUM mmol/L 137 138 134*   < >  --    < >  --    POTASSIUM mmol/L 4.5 4.5 4.4   < >  --    < >  --    CHLORIDE mmol/L 108 108 107   < >  --    < >  --    CO2 mmol/L 24 23 21   < >  --    < >  --    ANION GAP mmol/L 5 7 6   < >  --    < >  --    BUN mg/dL 13 13 15   < >  --    < >  --    CREATININE mg/dL 0.74 0.75 0.79   < >  --    < >  --    CALCIUM mg/dL 8.6 8.5 8.5   < >  --    < >  --    GLUCOSE RANDOM mg/dL 186* 183* 308*   < >  --   --   --    PHOSPHORUS mg/dL  --  3.1 3.1   < >  --   --   --    MAGNESIUM mg/dL 3.0* 3.0* 2.3   < >  --   --   --    HEMOGLOBIN A1C   --   --   --   --  8.7*  --  9.6*    < > = values in this interval not displayed.        ECG      ECHOCARDIOGRAPHY OR OTHER TESTING/IMAGING  Encounter Date: 04/28/25   ECG 12 lead   Result Value    Ventricular Rate 79    Atrial Rate 79    AL Interval 120    QRSD Interval 84    QT Interval 382    QTC Interval 438    P Axis 62    QRS Axis 87    T Wave Axis 61    Narrative    Normal sinus rhythm  V1 and V5 are reversed   Otherwise no true change  When compared with ECG of 12-Apr-2025 11:01,    Confirmed by Reid Sanchez ([id number]) on 4/30/2025 10:55:21 AM       No results found for this or any previous visit. N/a     ------- ANESTHESIA RISK-BENEFIT DISCUSSION -------  BENEFITS OF A SPECIALIZED ANESTHESIA TEAM INCLUDE (NBK [id number], PMID 40668051):  (1) Reduce mortality and morbidity for major surgeries/procedures. (2) The team provides analgesia/sedation/amnesia/akinesia as safely as possible. (3) The team strives to reduce discomfort as safely as possible.    RISKS, AND PLANS TO MITIGATE RISKS, INCLUDE:    - Neurologic system: IntraOp awareness (Risk is ~1:1,000 - 1:14,000; PMID 45821369), Stroke (Risk ~<0.1-2% for most cases; PMID 26060139), nerve injury, vision loss, and POCD.     - Airway and Pulmonary system: Dental or mouth injury, throat pain, critical  hypoxia, pneumothorax, prolonged intubation, post-op respiratory compromise.  Airway/Intubation risks and prior data: [facility]  Major ARISCAT risk factors for pulmonary complications include: none, yielding a score of 0-1= Low risk, 1.6%.  - Cardiovascular system: Hypotension, arrhythmias, cardiac injury or arrest, blood clots, bleeding, infection, vascular injuries.  Atilio's RCRI score items: none, yielding an RCRI Score of 0= 0.4% risk of MACE  Are fabby-op or intra-op beta blockers indicated? (PMID 42371332): no  - FEN/GI system: Aspiration risk (~0.5% per PMC 2484739) and PONV (10-80% per Apfel score) especially if the patient has not fasted.  ASA NPO guideline compliance?: Yes  - Medication risk assessment: Allergic reactions, excessive bleeding with anticoagulant use, overdoses, drug-drug interactions, injury to a fetus or  in pregnant or breastfeeding patients, fabby-procedural sedation including while driving/operating machinery.  Recent relevant medications: See MAR or Med Review  Personal or family history of anesthesia complications: no  Pregnancy Status: N/A  - Estimate mortality risks associated with anesthesia based on ASA-PS (PMID 41109131): ASA-PS II: risk 1:20,000

## 2025-05-12 NOTE — ANESTHESIA POSTPROCEDURE EVALUATION
Post-Op Assessment Note    CV Status:  Stable  Pain Score: 0    Pain management: adequate       Mental Status:  Alert   Hydration Status:  Stable   PONV Controlled:  Controlled   Airway Patency:  Patent     Post Op Vitals Reviewed: Yes    No anethesia notable event occurred.    Staff: CRNA           Last Filed PACU Vitals:  Vitals Value Taken Time   Temp     Pulse 920    /95    Resp 20    SpO2 100

## 2025-05-12 NOTE — PROCEDURES
Procedure Note - ECT  Clarissa Webb 45 y.o. female MRN: [id number]    Time out was taken with staff to confirm correct patient and correct procedure to be performed.    Session Number: 012    Diagnosis:   1. Major depressive disorder, recurrent, unspecified (HCC)  HB ECT IN OR    HB ECT IN OR    HB ECT IN OR           ECT Type: Outpatient    Anesthesia: Methohexital    Electrode Placement: Non-dominant unilateral    Energy level:  65 %      Seizure Duration     EE Sec.    EMG : 65 Sec    Post-ictal Suppression Index:NA    Results:Clinical seizure was satisfactory, Patient tolerated ECT well     Media Information    Document Information    Clinical Image - Mobile Device   ECT   2025 07:38   Attached To:   Hospital Encounter on 25 with  PACU   Source Information    Elias Bernal MD   Pacu   Document History      Vitals:    25 0810   BP: (!) 160/105   Pulse: (!) 106   Resp: 18   Temp: 97.7 °F (36.5 °C)   SpO2: 98%        Medication Administration - last 24 hours from 2025 0828 to 2025 0828         Date/Time Order Dose Route Action Action by     2025 0554 EDT lactated ringers infusion 20 mL/hr Intravenous New Bag Genie Travis RN

## 2025-05-13 NOTE — TELEPHONE ENCOUNTER
Patient inquiring about getting onto a maintenance schedule for ECT treatment.    Please contact patient @     [phone] (Mobile)

## 2025-05-14 NOTE — ANESTHESIA POSTPROCEDURE EVALUATION
Post-Op Assessment Note    CV Status:  Stable    Pain management: adequate       Mental Status:  Alert and awake   Hydration Status:  Euvolemic   PONV Controlled:  Controlled   Airway Patency:  Patent     Post Op Vitals Reviewed: Yes    No anethesia notable event occurred.    Staff: Anesthesiologist           Last Filed PACU Vitals:  Vitals Value Taken Time   Temp     Pulse 111 05/12/25 07:49   /90 05/12/25 07:49   Resp 16 05/12/25 07:49   SpO2 98 % 05/12/25 07:49       Modified Eleuterio:     Vitals Value Taken Time   Activity 2 05/12/25 07:49   Respiration 2 05/12/25 07:49   Circulation 2 05/12/25 07:49   Consciousness 2 05/12/25 07:49   Oxygen Saturation 2 05/12/25 07:49     Modified Eleuterio Score: 10

## 2025-05-15 ENCOUNTER — TELEPHONE (OUTPATIENT)
Dept: HEMATOLOGY ONCOLOGY | Facility: CLINIC | Age: 45
End: 2025-05-15

## 2025-05-15 ENCOUNTER — OFFICE VISIT (OUTPATIENT)
Dept: HEMATOLOGY ONCOLOGY | Facility: CLINIC | Age: 45
End: 2025-05-15
Payer: COMMERCIAL

## 2025-05-15 VITALS
RESPIRATION RATE: 16 BRPM | TEMPERATURE: 97.5 F | DIASTOLIC BLOOD PRESSURE: 86 MMHG | HEART RATE: 87 BPM | OXYGEN SATURATION: 99 % | WEIGHT: 169 LBS | BODY MASS INDEX: 26.53 KG/M2 | SYSTOLIC BLOOD PRESSURE: 150 MMHG | HEIGHT: 67 IN

## 2025-05-15 DIAGNOSIS — Z98.84 H/O GASTRIC BYPASS: ICD-10-CM

## 2025-05-15 DIAGNOSIS — E53.8 B12 DEFICIENCY: Primary | ICD-10-CM

## 2025-05-15 DIAGNOSIS — D50.8 OTHER IRON DEFICIENCY ANEMIA: ICD-10-CM

## 2025-05-15 DIAGNOSIS — Z12.31 ENCOUNTER FOR SCREENING MAMMOGRAM FOR BREAST CANCER: ICD-10-CM

## 2025-05-15 PROCEDURE — 99244 OFF/OP CNSLTJ NEW/EST MOD 40: CPT | Performed by: INTERNAL MEDICINE

## 2025-05-15 RX ORDER — CYANOCOBALAMIN 1000 UG/ML
1000 INJECTION, SOLUTION INTRAMUSCULAR; SUBCUTANEOUS ONCE
OUTPATIENT
Start: 2025-05-28 | End: 2025-05-15

## 2025-05-15 RX ORDER — SODIUM CHLORIDE 9 MG/ML
20 INJECTION, SOLUTION INTRAVENOUS ONCE
OUTPATIENT
Start: 2025-05-28

## 2025-05-15 NOTE — PROGRESS NOTES
Name: Clarissa Webb      : 1980      MRN: [id number]  Encounter Provider: Rickey Maya MD  Encounter Date: 5/15/2025   Encounter department: [facility] [place]  :  Assessment & Plan  B12 deficiency  She will be treated with 5 doses of vitamin B12 with the planned iron IV treatment.    Orders:    CBC and differential; Future    Comprehensive metabolic panel; Future    Iron Panel (Includes Ferritin, Iron Sat%, Iron, and TIBC); Future    Magnesium; Future    Ferritin; Future    Vitamin B12; Future    Other iron deficiency anemia  Her ferritin was 8 on 4/15/2025 with hemoglobin of 10.9.  The patient was offered 5 doses of Venofer IV to correct her iron deficiency which is most likely going to improve her hemoglobin level.  We will see her again about 6 months from now for close follow-up with blood work prior.  Orders:    CBC and differential; Future    Comprehensive metabolic panel; Future    Iron Panel (Includes Ferritin, Iron Sat%, Iron, and TIBC); Future    Magnesium; Future    Ferritin; Future    Vitamin B12; Future    Encounter for screening mammogram for breast cancer  She is overdue for her annual mammogram.  She was encouraged to get it done as soon as possible.  Orders:    Mammo screening bilateral w 3d and cad; Future    H/O gastric bypass  Contributing factor for the decreased absorption of vitamin B12 and iron.           Return in about 26 weeks (around 2025) for Office Visit 20 min, Labs, Infusion.    History of Present Illness   Chief Complaint   Patient presents with    Consult   This is the 45-year-old female with history of type 1 diabetes mellitus, Hashimoto's thyroiditis diagnosed 2020, morbid obesity status post gastric bypass surgery in . She lost about 100 lb at least.  She developed iron deficiency and was treated with iron IV Venofer on multiple occasions under Dr. Bhatt from the [facility] group.  She also seems to have the long  history of low white cell count.     The patient was seen in our clinic on multiple occasions for iron IV and vitamin B12 treatment since she has a history of gastric bypass surgery which is interfering with absorption.  Her last visit to our office was October 2021.    She was found to have vitamin B12 and iron deficiency according to the blood work from April 2025.  The patient stated that she continues to have her menstrual bleeding on a monthly basis.  She denied obvious bleeding from any other sites.      Review of Systems   Constitutional:  Positive for fatigue. Negative for chills and fever.   HENT:  Negative for ear pain and sore throat.    Eyes:  Negative for pain and visual disturbance.   Respiratory:  Positive for shortness of breath. Negative for cough.    Cardiovascular:  Negative for chest pain and palpitations.   Gastrointestinal:  Positive for diarrhea and nausea. Negative for abdominal pain and vomiting.   Genitourinary:  Negative for dysuria and hematuria.   Musculoskeletal:  Negative for arthralgias and back pain.   Skin:  Negative for color change and rash.   Neurological:  Positive for dizziness, numbness and headaches. Negative for seizures and syncope.   Psychiatric/Behavioral:  Positive for sleep disturbance.    All other systems reviewed and are negative.    Medical History Reviewed by provider this encounter:  Tobacco  Allergies  Meds  Problems  Med Hx  Surg Hx  Fam Hx     .  Medications Ordered Prior to Encounter[1]   Social History     Tobacco Use    Smoking status: Never    Smokeless tobacco: Never   Vaping Use    Vaping status: Never Used   Substance and Sexual Activity    Alcohol use: Not Currently     Alcohol/week: 3.0 standard drinks of alcohol     Types: 3 Cans of beer per week    Drug use: No    Sexual activity: Yes     Partners: Male         Objective   /86 (BP Location: Right arm, Patient Position: Sitting, Cuff Size: Adult)   Pulse 87   Temp 97.5 °F (36.4 °C)    "Resp 16   Ht 5' 6.5\" (1.689 m)   Wt 76.7 kg (169 lb)   LMP 04/18/2025 (Approximate)   SpO2 99%   BMI 26.87 kg/m²     Physical Exam  Constitutional:       General: She is not in acute distress.     Appearance: She is well-developed. She is not diaphoretic.   HENT:      Head: Normocephalic and atraumatic.      Nose: Nose normal.     Eyes:      General: No scleral icterus.        Right eye: No discharge.         Left eye: No discharge.      Conjunctiva/sclera: Conjunctivae normal.      Pupils: Pupils are equal, round, and reactive to light.     Neck:      Thyroid: No thyromegaly.      Vascular: No JVD.      Trachea: No tracheal deviation.     Cardiovascular:      Rate and Rhythm: Normal rate and regular rhythm.      Heart sounds: Normal heart sounds. No murmur heard.     No friction rub.   Pulmonary:      Effort: Pulmonary effort is normal. No respiratory distress.      Breath sounds: Normal breath sounds. No stridor. No wheezing or rales.   Chest:      Chest wall: No tenderness.   Abdominal:      General: Bowel sounds are normal. There is no distension.      Palpations: Abdomen is soft. There is no hepatomegaly or splenomegaly.      Tenderness: There is no abdominal tenderness. There is no guarding or rebound.     Musculoskeletal:         General: No tenderness or deformity. Normal range of motion.      Cervical back: Normal range of motion and neck supple.   Lymphadenopathy:      Cervical: No cervical adenopathy.     Skin:     General: Skin is warm and dry.      Coloration: Skin is not pale.      Findings: No erythema or rash.     Neurological:      Mental Status: She is alert and oriented to person, place, and time.      Cranial Nerves: No cranial nerve deficit.      Coordination: Coordination normal.      Deep Tendon Reflexes: Reflexes are normal and symmetric.     Psychiatric:         Behavior: Behavior normal.         Thought Content: Thought content normal.         Judgment: Judgment normal.         Labs: I " have reviewed the following labs:  Results for orders placed or performed during the hospital encounter of 05/09/25   POCT pregnancy, urine   Result Value Ref Range    EXT Preg Test, Ur Negative Negative    Control Valid Valid     *Note: Due to a large number of results and/or encounters for the requested time period, some results have not been displayed. A complete set of results can be found in Results Review.     Lab Results   Component Value Date/Time    WBC 4.65 04/29/2025 04:35 AM    RBC 3.96 04/29/2025 04:35 AM    Hemoglobin 10.9 (L) 04/29/2025 04:35 AM    Hematocrit 34.7 (L) 04/29/2025 04:35 AM    MCV 88 04/29/2025 04:35 AM    MCH 27.5 04/29/2025 04:35 AM    RDW 12.0 04/29/2025 04:35 AM    Platelets 205 04/29/2025 04:35 AM    Segmented % 58 04/29/2025 04:35 AM    Lymphocytes % 28 04/29/2025 04:35 AM    Monocytes % 8 04/29/2025 04:35 AM    Eosinophils Relative 5 04/29/2025 04:35 AM    Basophils Relative 1 04/29/2025 04:35 AM    Immature Grans % 0 04/29/2025 04:35 AM    Absolute Neutrophils 2.75 04/29/2025 04:35 AM      Lab Results   Component Value Date/Time    Sodium 137 04/29/2025 04:35 AM    Potassium 4.5 04/29/2025 04:35 AM    Chloride 108 04/29/2025 04:35 AM    CO2 24 04/29/2025 04:35 AM    ANION GAP 5 04/29/2025 04:35 AM    BUN 13 04/29/2025 04:35 AM    Creatinine 0.74 04/29/2025 04:35 AM    Glucose 186 (H) 04/29/2025 04:35 AM    Glucose, Fasting 114 (H) 04/20/2025 05:41 AM    Calcium 8.6 04/29/2025 04:35 AM    Corrected Calcium 9.1 04/29/2025 04:35 AM    AST 16 04/29/2025 04:35 AM    ALT 15 04/29/2025 04:35 AM    Alkaline Phosphatase 59 04/29/2025 04:35 AM    Total Protein 6.1 (L) 04/29/2025 04:35 AM    Albumin 3.4 (L) 04/29/2025 04:35 AM    Total Bilirubin 0.23 04/29/2025 04:35 AM    eGFR 99 04/29/2025 04:35 AM      Lab Results   Component Value Date/Time    Iron 36 (L) 04/15/2025 06:29 AM    Iron Saturation 9 (L) 04/15/2025 06:29 AM    Ferritin 8 (L) 04/15/2025 06:29 AM    Vitamin B-12 201 04/12/2025  06:51 AM    Folate 17.0 04/12/2025 06:51 AM      Results for orders placed or performed during the hospital encounter of 05/09/25   POCT pregnancy, urine   Result Value Ref Range    EXT Preg Test, Ur Negative Negative    Control Valid Valid                   [1]   Current Outpatient Medications on File Prior to Visit   Medication Sig Dispense Refill    Accu-Chek FastClix Lancets MISC USE 1 LANCET TO TEST BLOOD SUGAR UP TO 6 TIMES DAILY 102 each 0    acetaminophen (TYLENOL) 500 mg tablet       acetone, urine, test strip Use 1 strip to test for ketones. 25 each 3    ascorbic acid (VITAMIN C) 500 MG tablet Take 1 tablet (500 mg total) by mouth daily 30 tablet 1    cariprazine (VRAYLAR) 3 MG capsule Take 1 capsule (3 mg total) by mouth daily at bedtime 30 capsule 1    Continuous Blood Gluc  (FreeStyle Samantha 14 Day Gower) KELVIN Use 1 Units in the morning      Continuous Glucose Sensor (FreeStyle Samantha 2 Sensor) MISC USE 1 UNIT EVERY 14 DAYS 6 each 1    cyanocobalamin (VITAMIN B-12) 1000 MCG tablet Take 1 tablet (1,000 mcg total) by mouth daily 30 tablet 1    ergocalciferol (VITAMIN D2) 50,000 units Take 1 capsule (50,000 Units total) by mouth once a week 4 capsule 0    escitalopram (LEXAPRO) 20 mg tablet Take 1 tablet (20 mg total) by mouth daily 30 tablet 1    ferrous sulfate 325 (65 Fe) mg tablet Take 1 tablet (325 mg total) by mouth every other day 15 tablet 1    folic acid (FOLVITE) 1 mg tablet Take 1 tablet (1 mg total) by mouth daily 30 tablet 1    gabapentin (NEURONTIN) 400 mg capsule Take 1 capsule (400 mg total) by mouth 3 (three) times a day 90 capsule 1    glucose blood (Accu-Chek Guide) test strip Use to test blood sugar up to 6 times daily. 200 each 2    Insulin Aspart (NovoLOG) 100 units/mL injection Sliding scale up to 3 times per day with meals. Max 30 units per day 30 mL 1    Insulin Pen Needle (BD Pen Needle Deanna U/F) 32G X 4 MM MISC Use 4/day 100 each 11    levothyroxine 125 mcg tablet Take 2  "tablets (250 mcg total) by mouth daily in the early morning 60 tablet 1    lisdexamfetamine (VYVANSE) 50 MG capsule Take 50 mg by mouth every morning      Naltrexone (Vivitrol) 380 MG SUSR Inject 4 mL (380 mg total) into muscle every 4 weeks 1 each 10    thiamine 100 MG tablet Take 1 tablet (100 mg total) by mouth daily 30 tablet 1    traZODone (DESYREL) 150 mg tablet Take 1 tablet (150 mg total) by mouth daily at bedtime 30 tablet 1    UltiCare Insulin Syringe 28G X 1/2\" 1 ML MISC INJECT UNDER THE SKIN 4 (FOUR) TIMES A  each 3    Vivitrol 380 MG SUSR Inject 380 mg into a muscle every 30 (thirty) days      Lantus 100 UNIT/ML subcutaneous injection INJECT 15 UNITS UNDER THE SKIN DAILY. DISCARD AFTER 28 DAYS. (Patient not taking: Reported on 5/15/2025) 10 mL 5    valACYclovir (VALTREX) 500 mg tablet Take 1 tablet (500 mg total) by mouth daily 30 tablet 0     Current Facility-Administered Medications on File Prior to Visit   Medication Dose Route Frequency Provider Last Rate Last Admin    lactated ringers infusion  50 mL/hr Intravenous Continuous Kathy Cade MD        lactated ringers infusion  20 mL/hr Intravenous Continuous Kathy Cade MD 20 mL/hr at 05/12/25 0554 20 mL/hr at 05/12/25 0554     "

## 2025-05-15 NOTE — ASSESSMENT & PLAN NOTE
She will be treated with 5 doses of vitamin B12 with the planned iron IV treatment.    Orders:    CBC and differential; Future    Comprehensive metabolic panel; Future    Iron Panel (Includes Ferritin, Iron Sat%, Iron, and TIBC); Future    Magnesium; Future    Ferritin; Future    Vitamin B12; Future

## 2025-05-15 NOTE — ASSESSMENT & PLAN NOTE
Her ferritin was 8 on 4/15/2025 with hemoglobin of 10.9.  The patient was offered 5 doses of Venofer IV to correct her iron deficiency which is most likely going to improve her hemoglobin level.  We will see her again about 6 months from now for close follow-up with blood work prior.  Orders:    CBC and differential; Future    Comprehensive metabolic panel; Future    Iron Panel (Includes Ferritin, Iron Sat%, Iron, and TIBC); Future    Magnesium; Future    Ferritin; Future    Vitamin B12; Future

## 2025-05-20 NOTE — TELEPHONE ENCOUNTER
Spoke with patient and explained ECT treatment. Patient will call intake and schedule with psychiatric provider through [facility]

## 2025-05-21 ENCOUNTER — TELEPHONE (OUTPATIENT)
Age: 45
End: 2025-05-21

## 2025-05-21 DIAGNOSIS — E10.65 TYPE 1 DIABETES MELLITUS WITH HYPERGLYCEMIA (HCC): ICD-10-CM

## 2025-05-21 NOTE — TELEPHONE ENCOUNTER
Per [employer] received. Writer contacted patient for ASAP Referral on 05/06/2025 to verify needs of services in attempts to offer patient an appointment at Available [facility].Spoke with patient whom stated she will call back later as she was busy at the moment.      NOTE: upon callback please route to TMS referrals.

## 2025-05-21 NOTE — TELEPHONE ENCOUNTER
The patient called to return a call to intake. The writer successfully transferred the call to [facility] for further assistance, as noted above.    Please contact the patient, after 4pm to assist further. Thank you.

## 2025-05-21 NOTE — TELEPHONE ENCOUNTER
Pt calling to schedule with a provider. Pt was receiving ECT treatment when she was inpatient and she received some treatments outpatient. She would like to continue seeing a provider for ECT and maintenance. Writer informed pt that she will send messages for further review. Pt was appreciaitve.     After review pt has an ASAP referral in chart. Pt does not and did not have an appt scheduled for C

## 2025-05-21 NOTE — TELEPHONE ENCOUNTER
Per [employer] received, Writer contacted patient for ASAP Referral on 05/06/2025 to verify needs of services in attempts to offer patient an appointment at Available [facility]. Writer verified N/A - NO ANSWER. Writer RADHA and left callback number [phone]; option 3.    NOTE: upon callback please route to TMS referrals.

## 2025-05-23 RX ORDER — INSULIN ASPART 100 [IU]/ML
INJECTION, SOLUTION INTRAVENOUS; SUBCUTANEOUS
Qty: 50 ML | Refills: 3 | Status: SHIPPED | OUTPATIENT
Start: 2025-05-23

## 2025-05-23 NOTE — TELEPHONE ENCOUNTER
Called and spoke with patient, patient was unsure of how much insulin she was using per day.    Pulled most recent medtronic report and scanned into media.     Pended adjustment to script

## 2025-05-28 ENCOUNTER — HOSPITAL ENCOUNTER (OUTPATIENT)
Dept: INFUSION CENTER | Facility: HOSPITAL | Age: 45
Discharge: HOME | End: 2025-05-28
Attending: INTERNAL MEDICINE
Payer: COMMERCIAL

## 2025-05-28 VITALS
DIASTOLIC BLOOD PRESSURE: 83 MMHG | TEMPERATURE: 97.3 F | HEART RATE: 94 BPM | SYSTOLIC BLOOD PRESSURE: 139 MMHG | RESPIRATION RATE: 18 BRPM

## 2025-05-28 DIAGNOSIS — E53.8 B12 DEFICIENCY: ICD-10-CM

## 2025-05-28 DIAGNOSIS — D50.8 OTHER IRON DEFICIENCY ANEMIA: Primary | ICD-10-CM

## 2025-05-28 PROBLEM — N39.0 UTI (URINARY TRACT INFECTION): Status: RESOLVED | Noted: 2023-10-21 | Resolved: 2025-05-28

## 2025-05-28 PROCEDURE — 96365 THER/PROPH/DIAG IV INF INIT: CPT

## 2025-05-28 PROCEDURE — 96372 THER/PROPH/DIAG INJ SC/IM: CPT

## 2025-05-28 RX ORDER — SODIUM CHLORIDE 9 MG/ML
20 INJECTION, SOLUTION INTRAVENOUS ONCE
Status: COMPLETED | OUTPATIENT
Start: 2025-05-28 | End: 2025-05-28

## 2025-05-28 RX ORDER — SODIUM CHLORIDE 9 MG/ML
20 INJECTION, SOLUTION INTRAVENOUS ONCE
Status: CANCELLED | OUTPATIENT
Start: 2025-06-06

## 2025-05-28 RX ORDER — CYANOCOBALAMIN 1000 UG/ML
1000 INJECTION, SOLUTION INTRAMUSCULAR; SUBCUTANEOUS ONCE
Status: CANCELLED | OUTPATIENT
Start: 2025-06-06 | End: 2025-06-04

## 2025-05-28 RX ORDER — CYANOCOBALAMIN 1000 UG/ML
1000 INJECTION, SOLUTION INTRAMUSCULAR; SUBCUTANEOUS ONCE
Status: COMPLETED | OUTPATIENT
Start: 2025-05-28 | End: 2025-05-28

## 2025-05-28 RX ADMIN — IRON SUCROSE 200 MG: 20 INJECTION, SOLUTION INTRAVENOUS at 15:05

## 2025-05-28 RX ADMIN — CYANOCOBALAMIN 1000 MCG: 1000 INJECTION INTRAMUSCULAR; SUBCUTANEOUS at 15:08

## 2025-05-28 RX ADMIN — SODIUM CHLORIDE 20 ML/HR: 0.9 INJECTION, SOLUTION INTRAVENOUS at 15:02

## 2025-05-28 NOTE — PROGRESS NOTES
Patient tolerated Venofer infusion and LEFT deltoid B12 IM injection without complications. AVS declined. Next appt confirmed for June 6, at 2:00 pm. Left unit ambulatory with a steady gait.

## 2025-05-29 PROBLEM — E11.10 DKA (DIABETIC KETOACIDOSIS) (HCC): Status: RESOLVED | Noted: 2018-03-20 | Resolved: 2025-05-29

## 2025-06-03 ENCOUNTER — OFFICE VISIT (OUTPATIENT)
Dept: FAMILY MEDICINE CLINIC | Facility: CLINIC | Age: 45
End: 2025-06-03
Payer: COMMERCIAL

## 2025-06-03 VITALS
HEART RATE: 98 BPM | BODY MASS INDEX: 25.58 KG/M2 | OXYGEN SATURATION: 99 % | RESPIRATION RATE: 18 BRPM | WEIGHT: 163 LBS | SYSTOLIC BLOOD PRESSURE: 130 MMHG | TEMPERATURE: 97.9 F | DIASTOLIC BLOOD PRESSURE: 68 MMHG | HEIGHT: 67 IN

## 2025-06-03 DIAGNOSIS — F10.21 ALCOHOL USE DISORDER, SEVERE, IN EARLY REMISSION (HCC): Primary | ICD-10-CM

## 2025-06-03 DIAGNOSIS — D50.9 IRON DEFICIENCY ANEMIA, UNSPECIFIED IRON DEFICIENCY ANEMIA TYPE: ICD-10-CM

## 2025-06-03 PROCEDURE — 99213 OFFICE O/P EST LOW 20 MIN: CPT | Performed by: FAMILY MEDICINE

## 2025-06-03 PROCEDURE — 96372 THER/PROPH/DIAG INJ SC/IM: CPT | Performed by: FAMILY MEDICINE

## 2025-06-03 NOTE — PROGRESS NOTES
Name: Clarissa Webb      : 1980      MRN: [id number]  Encounter Provider: Live Wallace MD  Encounter Date: 6/3/2025   Encounter department: [facility] PRIMARY CARE    :  Assessment & Plan  Alcohol use disorder, severe, in early remission (HCC)    Orders:    Naltrexone (Vivitrol) IM Injection SUSR 380 mg    Iron deficiency anemia, unspecified iron deficiency anemia type  Following with heme/onc. Getting infusions.          Assessment & Plan  1. Alcohol use disorder.  - Her condition remains stable, with no reported cravings for alcohol.  - She has been sober since 2025, and is currently on day 70 of her sobriety journey.  - The primary objective at this juncture is to maintain her sobriety.  - She will receive her Vivitrol injection today. If any cravings or urges to drink arise, she is encouraged to think about the positive changes in her life and relationships since achieving sobriety.               History of Present Illness     History of Present Illness  The patient presents for a 4-week follow-up.    She reports a general sense of well-being and has brought her Vivitrol medication for administration today. She is currently on a regimen of iron infusions, which she initiated last week, and has received a total of 12 injections to date. She anticipates that it may take several weeks for the full effects of the iron infusions to manifest. She has been abstinent from alcohol since 2025, marking approximately 70 days of sobriety. She expresses no current desire to consume alcohol. She also notes an improvement in her relationships with her family members, including her sister, whom she had previously distanced herself from. She recalls a past experience with naltrexone, which she found ineffective in managing her condition.    SOCIAL HISTORY  She reports no alcohol intake.     Review of Systems   All other systems reviewed and are negative.    Objective   /68    "Pulse 98   Temp 97.9 °F (36.6 °C) (Temporal)   Resp 18   Ht 5' 6.5\" (1.689 m)   Wt 73.9 kg (163 lb)   SpO2 99%   BMI 25.91 kg/m²     Physical Exam  Neurological: Awake, alert, oriented x4, no focal deficit  Head: Normocephalic, atraumatic  Ears: External ear canals and tympanic membranes intact  Eyes: Pupils equal and round, conjunctivae clear  Nose: Septum midline, nares patent, mucosa normal  Mouth/Throat: Mucous membranes moist, no erythema, no exudate  Neck: Supple, no abnormalities  Respiratory: Clear to auscultation, no wheezing, rales or rhonchi  Cardiovascular: Regular rate and rhythm, no murmurs, rubs, or gallops  Gastrointestinal: Soft, no tenderness, no distention, no masses  Extremities: No edema, no cyanosis  Musculoskeletal: No joint or muscular abnormalities noted  Skin: No abnormalities, no rashes or lesions  Physical Exam  Vitals and nursing note reviewed.   Constitutional:       General: She is not in acute distress.     Appearance: Normal appearance. She is well-developed. She is not ill-appearing, toxic-appearing or diaphoretic.   HENT:      Head: Normocephalic and atraumatic.     Eyes:      General:         Right eye: No discharge.         Left eye: No discharge.      Extraocular Movements: Extraocular movements intact.      Conjunctiva/sclera: Conjunctivae normal.       Cardiovascular:      Rate and Rhythm: Normal rate.   Pulmonary:      Effort: Pulmonary effort is normal.     Skin:     General: Skin is warm and dry.      Capillary Refill: Capillary refill takes less than 2 seconds.     Neurological:      Mental Status: She is alert and oriented to person, place, and time.     Psychiatric:         Mood and Affect: Mood normal.         Behavior: Behavior normal.         Thought Content: Thought content normal.         Judgment: Judgment normal.         "

## 2025-06-05 DIAGNOSIS — E53.8 B12 DEFICIENCY: Primary | ICD-10-CM

## 2025-06-05 DIAGNOSIS — D50.8 OTHER IRON DEFICIENCY ANEMIA: ICD-10-CM

## 2025-06-05 RX ORDER — CYANOCOBALAMIN 1000 UG/ML
1000 INJECTION, SOLUTION INTRAMUSCULAR; SUBCUTANEOUS ONCE
Status: CANCELLED | OUTPATIENT
Start: 2025-06-06 | End: 2025-06-06

## 2025-06-05 RX ORDER — SODIUM CHLORIDE 9 MG/ML
20 INJECTION, SOLUTION INTRAVENOUS ONCE
Status: CANCELLED | OUTPATIENT
Start: 2025-06-06

## 2025-06-06 ENCOUNTER — HOSPITAL ENCOUNTER (OUTPATIENT)
Dept: INFUSION CENTER | Facility: HOSPITAL | Age: 45
End: 2025-06-06
Attending: INTERNAL MEDICINE
Payer: COMMERCIAL

## 2025-06-06 VITALS
HEART RATE: 98 BPM | DIASTOLIC BLOOD PRESSURE: 98 MMHG | SYSTOLIC BLOOD PRESSURE: 149 MMHG | TEMPERATURE: 97.7 F | RESPIRATION RATE: 16 BRPM

## 2025-06-06 DIAGNOSIS — D50.8 OTHER IRON DEFICIENCY ANEMIA: ICD-10-CM

## 2025-06-06 DIAGNOSIS — E53.8 B12 DEFICIENCY: Primary | ICD-10-CM

## 2025-06-06 PROCEDURE — 96372 THER/PROPH/DIAG INJ SC/IM: CPT

## 2025-06-06 PROCEDURE — 96365 THER/PROPH/DIAG IV INF INIT: CPT

## 2025-06-06 RX ORDER — CYANOCOBALAMIN 1000 UG/ML
1000 INJECTION, SOLUTION INTRAMUSCULAR; SUBCUTANEOUS ONCE
Status: CANCELLED | OUTPATIENT
Start: 2025-06-13 | End: 2025-06-13

## 2025-06-06 RX ORDER — CYANOCOBALAMIN 1000 UG/ML
1000 INJECTION, SOLUTION INTRAMUSCULAR; SUBCUTANEOUS ONCE
Status: COMPLETED | OUTPATIENT
Start: 2025-06-06 | End: 2025-06-06

## 2025-06-06 RX ORDER — SODIUM CHLORIDE 9 MG/ML
20 INJECTION, SOLUTION INTRAVENOUS ONCE
Status: CANCELLED | OUTPATIENT
Start: 2025-06-13

## 2025-06-06 RX ORDER — SODIUM CHLORIDE 9 MG/ML
20 INJECTION, SOLUTION INTRAVENOUS ONCE
Status: COMPLETED | OUTPATIENT
Start: 2025-06-06 | End: 2025-06-06

## 2025-06-06 RX ADMIN — CYANOCOBALAMIN 1000 MCG: 1000 INJECTION INTRAMUSCULAR; SUBCUTANEOUS at 14:48

## 2025-06-06 RX ADMIN — SODIUM CHLORIDE 20 ML/HR: 0.9 INJECTION, SOLUTION INTRAVENOUS at 14:44

## 2025-06-06 RX ADMIN — IRON SUCROSE 200 MG: 20 INJECTION, SOLUTION INTRAVENOUS at 14:45

## 2025-06-06 NOTE — PROGRESS NOTES
Clarissa Webb  tolerated treatment well with no complications.      Clarissa Webb is aware of future appt on 6/13 @ 1300.    AVS printed and given to Clarissa Webb:   No (Declined by Clarissa Webb)

## 2025-06-13 ENCOUNTER — HOSPITAL ENCOUNTER (OUTPATIENT)
Dept: INFUSION CENTER | Facility: HOSPITAL | Age: 45
End: 2025-06-13
Attending: INTERNAL MEDICINE
Payer: COMMERCIAL

## 2025-06-13 VITALS
SYSTOLIC BLOOD PRESSURE: 136 MMHG | HEART RATE: 97 BPM | TEMPERATURE: 97.6 F | RESPIRATION RATE: 18 BRPM | DIASTOLIC BLOOD PRESSURE: 87 MMHG

## 2025-06-13 DIAGNOSIS — E53.8 B12 DEFICIENCY: ICD-10-CM

## 2025-06-13 DIAGNOSIS — D50.8 OTHER IRON DEFICIENCY ANEMIA: Primary | ICD-10-CM

## 2025-06-13 PROCEDURE — 96365 THER/PROPH/DIAG IV INF INIT: CPT

## 2025-06-13 PROCEDURE — 96372 THER/PROPH/DIAG INJ SC/IM: CPT

## 2025-06-13 RX ORDER — SODIUM CHLORIDE 9 MG/ML
20 INJECTION, SOLUTION INTRAVENOUS ONCE
Status: CANCELLED | OUTPATIENT
Start: 2025-06-19

## 2025-06-13 RX ORDER — SODIUM CHLORIDE 9 MG/ML
20 INJECTION, SOLUTION INTRAVENOUS ONCE
Status: COMPLETED | OUTPATIENT
Start: 2025-06-13 | End: 2025-06-13

## 2025-06-13 RX ORDER — CYANOCOBALAMIN 1000 UG/ML
1000 INJECTION, SOLUTION INTRAMUSCULAR; SUBCUTANEOUS ONCE
Status: CANCELLED | OUTPATIENT
Start: 2025-06-19 | End: 2025-06-20

## 2025-06-13 RX ORDER — CYANOCOBALAMIN 1000 UG/ML
1000 INJECTION, SOLUTION INTRAMUSCULAR; SUBCUTANEOUS ONCE
Status: COMPLETED | OUTPATIENT
Start: 2025-06-13 | End: 2025-06-13

## 2025-06-13 RX ADMIN — SODIUM CHLORIDE 20 ML/HR: 0.9 INJECTION, SOLUTION INTRAVENOUS at 13:30

## 2025-06-13 RX ADMIN — IRON SUCROSE 200 MG: 20 INJECTION, SOLUTION INTRAVENOUS at 13:35

## 2025-06-13 RX ADMIN — CYANOCOBALAMIN 1000 MCG: 1000 INJECTION INTRAMUSCULAR; SUBCUTANEOUS at 13:35

## 2025-06-13 NOTE — PROGRESS NOTES
Pt tolerated venofer infusion and B12 injection to L deltoid without difficulty.  Confirmed next appt on 6/19 at 1430.  AVS declined.  Left ambulatory in stable condition.

## 2025-06-23 ENCOUNTER — HOSPITAL ENCOUNTER (INPATIENT)
Facility: HOSPITAL | Age: 45
LOS: 2 days | Discharge: HOME | End: 2025-06-25
Attending: EMERGENCY MEDICINE | Admitting: INTERNAL MEDICINE
Payer: COMMERCIAL

## 2025-06-23 DIAGNOSIS — E10.10 DIABETIC KETOACIDOSIS WITHOUT COMA ASSOCIATED WITH TYPE 1 DIABETES MELLITUS (HCC): ICD-10-CM

## 2025-06-23 DIAGNOSIS — E83.42 HYPOMAGNESEMIA: ICD-10-CM

## 2025-06-23 DIAGNOSIS — E87.1 HYPONATREMIA: ICD-10-CM

## 2025-06-23 DIAGNOSIS — E11.10 DKA (DIABETIC KETOACIDOSIS) (HCC): Primary | ICD-10-CM

## 2025-06-23 LAB
ALBUMIN SERPL BCG-MCNC: 3.9 G/DL (ref 3.5–5)
ALP SERPL-CCNC: 94 U/L (ref 34–104)
ALT SERPL W P-5'-P-CCNC: 40 U/L (ref 7–52)
AMPHETAMINES SERPL QL SCN: POSITIVE
ANION GAP SERPL CALCULATED.3IONS-SCNC: 12 MMOL/L (ref 4–13)
ANION GAP SERPL CALCULATED.3IONS-SCNC: 15 MMOL/L (ref 4–13)
ANION GAP SERPL CALCULATED.3IONS-SCNC: 4 MMOL/L (ref 4–13)
ANION GAP SERPL CALCULATED.3IONS-SCNC: 4 MMOL/L (ref 4–13)
APAP SERPL-MCNC: <2 UG/ML (ref 10–20)
APTT PPP: 28 SECONDS (ref 23–34)
AST SERPL W P-5'-P-CCNC: 50 U/L (ref 13–39)
ATRIAL RATE: 82 BPM
B-OH-BUTYR SERPL-MCNC: 4.74 MMOL/L (ref 0.2–0.6)
BARBITURATES UR QL: NEGATIVE
BASE EX.OXY STD BLDV CALC-SCNC: 67.3 % (ref 60–80)
BASE EXCESS BLDV CALC-SCNC: -11.4 MMOL/L
BASOPHILS # BLD AUTO: 0.02 THOUSANDS/ÂΜL (ref 0–0.1)
BASOPHILS NFR BLD AUTO: 0 % (ref 0–1)
BENZODIAZ UR QL: NEGATIVE
BILIRUB DIRECT SERPL-MCNC: 0.22 MG/DL (ref 0–0.2)
BILIRUB SERPL-MCNC: 1.07 MG/DL (ref 0.2–1)
BUN SERPL-MCNC: 12 MG/DL (ref 5–25)
BUN SERPL-MCNC: 13 MG/DL (ref 5–25)
BUN SERPL-MCNC: 15 MG/DL (ref 5–25)
BUN SERPL-MCNC: 16 MG/DL (ref 5–25)
CALCIUM SERPL-MCNC: 8.1 MG/DL (ref 8.4–10.2)
CALCIUM SERPL-MCNC: 8.2 MG/DL (ref 8.4–10.2)
CALCIUM SERPL-MCNC: 8.7 MG/DL (ref 8.4–10.2)
CALCIUM SERPL-MCNC: 9.1 MG/DL (ref 8.4–10.2)
CHLORIDE SERPL-SCNC: 104 MMOL/L (ref 96–108)
CHLORIDE SERPL-SCNC: 107 MMOL/L (ref 96–108)
CHLORIDE SERPL-SCNC: 108 MMOL/L (ref 96–108)
CHLORIDE SERPL-SCNC: 95 MMOL/L (ref 96–108)
CO2 SERPL-SCNC: 17 MMOL/L (ref 21–32)
CO2 SERPL-SCNC: 18 MMOL/L (ref 21–32)
CO2 SERPL-SCNC: 24 MMOL/L (ref 21–32)
CO2 SERPL-SCNC: 24 MMOL/L (ref 21–32)
COCAINE UR QL: NEGATIVE
CREAT SERPL-MCNC: 0.72 MG/DL (ref 0.6–1.3)
CREAT SERPL-MCNC: 0.72 MG/DL (ref 0.6–1.3)
CREAT SERPL-MCNC: 0.87 MG/DL (ref 0.6–1.3)
CREAT SERPL-MCNC: 1.02 MG/DL (ref 0.6–1.3)
EOSINOPHIL # BLD AUTO: 0.01 THOUSAND/ÂΜL (ref 0–0.61)
EOSINOPHIL NFR BLD AUTO: 0 % (ref 0–6)
ERYTHROCYTE [DISTWIDTH] IN BLOOD BY AUTOMATED COUNT: 14.4 % (ref 11.6–15.1)
ETHANOL SERPL-MCNC: <10 MG/DL
FENTANYL UR QL SCN: NEGATIVE
GFR SERPL CREATININE-BSD FRML MDRD: 102 ML/MIN/1.73SQ M
GFR SERPL CREATININE-BSD FRML MDRD: 102 ML/MIN/1.73SQ M
GFR SERPL CREATININE-BSD FRML MDRD: 67 ML/MIN/1.73SQ M
GFR SERPL CREATININE-BSD FRML MDRD: 81 ML/MIN/1.73SQ M
GLUCOSE SERPL-MCNC: 105 MG/DL (ref 65–140)
GLUCOSE SERPL-MCNC: 127 MG/DL (ref 65–140)
GLUCOSE SERPL-MCNC: 129 MG/DL (ref 65–140)
GLUCOSE SERPL-MCNC: 132 MG/DL (ref 65–140)
GLUCOSE SERPL-MCNC: 135 MG/DL (ref 65–140)
GLUCOSE SERPL-MCNC: 139 MG/DL (ref 65–140)
GLUCOSE SERPL-MCNC: 143 MG/DL (ref 65–140)
GLUCOSE SERPL-MCNC: 144 MG/DL (ref 65–140)
GLUCOSE SERPL-MCNC: 151 MG/DL (ref 65–140)
GLUCOSE SERPL-MCNC: 159 MG/DL (ref 65–140)
GLUCOSE SERPL-MCNC: 172 MG/DL (ref 65–140)
GLUCOSE SERPL-MCNC: 173 MG/DL (ref 65–140)
GLUCOSE SERPL-MCNC: 190 MG/DL (ref 65–140)
GLUCOSE SERPL-MCNC: 305 MG/DL (ref 65–140)
GLUCOSE SERPL-MCNC: 444 MG/DL (ref 65–140)
GLUCOSE SERPL-MCNC: 546 MG/DL (ref 65–140)
GLUCOSE SERPL-MCNC: 600 MG/DL (ref 65–140)
GLUCOSE SERPL-MCNC: 602 MG/DL (ref 65–140)
HCO3 BLDV-SCNC: 16 MMOL/L (ref 24–30)
HCT VFR BLD AUTO: 39.4 % (ref 34.8–46.1)
HGB BLD-MCNC: 12.2 G/DL (ref 11.5–15.4)
HYDROCODONE UR QL SCN: NEGATIVE
IMM GRANULOCYTES # BLD AUTO: 0.01 THOUSAND/UL (ref 0–0.2)
IMM GRANULOCYTES NFR BLD AUTO: 0 % (ref 0–2)
INR PPP: 0.96 (ref 0.85–1.19)
LYMPHOCYTES # BLD AUTO: 0.38 THOUSANDS/ÂΜL (ref 0.6–4.47)
LYMPHOCYTES NFR BLD AUTO: 6 % (ref 14–44)
MAGNESIUM SERPL-MCNC: 1.7 MG/DL (ref 1.9–2.7)
MAGNESIUM SERPL-MCNC: 1.8 MG/DL (ref 1.9–2.7)
MAGNESIUM SERPL-MCNC: 1.9 MG/DL (ref 1.9–2.7)
MAGNESIUM SERPL-MCNC: 1.9 MG/DL (ref 1.9–2.7)
MCH RBC QN AUTO: 27.4 PG (ref 26.8–34.3)
MCHC RBC AUTO-ENTMCNC: 31 G/DL (ref 31.4–37.4)
MCV RBC AUTO: 89 FL (ref 82–98)
METHADONE UR QL: NEGATIVE
MONOCYTES # BLD AUTO: 0.14 THOUSAND/ÂΜL (ref 0.17–1.22)
MONOCYTES NFR BLD AUTO: 2 % (ref 4–12)
NEUTROPHILS # BLD AUTO: 5.98 THOUSANDS/ÂΜL (ref 1.85–7.62)
NEUTS SEG NFR BLD AUTO: 92 % (ref 43–75)
NRBC BLD AUTO-RTO: 0 /100 WBCS
O2 CT BLDV-SCNC: 12.1 ML/DL
OPIATES UR QL SCN: NEGATIVE
OXYCODONE+OXYMORPHONE UR QL SCN: NEGATIVE
P AXIS: 69 DEGREES
PCO2 BLDV: 41.7 MM HG (ref 42–50)
PCP UR QL: NEGATIVE
PH BLDV: 7.2 [PH] (ref 7.3–7.4)
PHOSPHATE SERPL-MCNC: 2.4 MG/DL (ref 2.7–4.5)
PHOSPHATE SERPL-MCNC: 4.3 MG/DL (ref 2.7–4.5)
PHOSPHATE SERPL-MCNC: 4.7 MG/DL (ref 2.7–4.5)
PHOSPHATE SERPL-MCNC: 4.8 MG/DL (ref 2.7–4.5)
PLATELET # BLD AUTO: 170 THOUSANDS/UL (ref 149–390)
PMV BLD AUTO: 10.5 FL (ref 8.9–12.7)
PO2 BLDV: 40.7 MM HG (ref 35–45)
POTASSIUM SERPL-SCNC: 3.5 MMOL/L (ref 3.5–5.3)
POTASSIUM SERPL-SCNC: 4.1 MMOL/L (ref 3.5–5.3)
POTASSIUM SERPL-SCNC: 4.1 MMOL/L (ref 3.5–5.3)
POTASSIUM SERPL-SCNC: 4.5 MMOL/L (ref 3.5–5.3)
PR INTERVAL: 138 MS
PROT SERPL-MCNC: 6.6 G/DL (ref 6.4–8.4)
PROTHROMBIN TIME: 13 SECONDS (ref 12.3–15)
QRS AXIS: 98 DEGREES
QRSD INTERVAL: 88 MS
QT INTERVAL: 382 MS
QTC INTERVAL: 446 MS
RBC # BLD AUTO: 4.45 MILLION/UL (ref 3.81–5.12)
SALICYLATES SERPL-MCNC: <5 MG/DL (ref 5–20)
SODIUM SERPL-SCNC: 128 MMOL/L (ref 135–147)
SODIUM SERPL-SCNC: 133 MMOL/L (ref 135–147)
SODIUM SERPL-SCNC: 135 MMOL/L (ref 135–147)
SODIUM SERPL-SCNC: 136 MMOL/L (ref 135–147)
T WAVE AXIS: 6 DEGREES
THC UR QL: POSITIVE
VENTRICULAR RATE: 82 BPM
WBC # BLD AUTO: 6.54 THOUSAND/UL (ref 4.31–10.16)

## 2025-06-23 PROCEDURE — 85025 COMPLETE CBC W/AUTO DIFF WBC: CPT | Performed by: PHYSICIAN ASSISTANT

## 2025-06-23 PROCEDURE — 82077 ASSAY SPEC XCP UR&BREATH IA: CPT | Performed by: PHYSICIAN ASSISTANT

## 2025-06-23 PROCEDURE — 80307 DRUG TEST PRSMV CHEM ANLYZR: CPT

## 2025-06-23 PROCEDURE — 93010 ELECTROCARDIOGRAM REPORT: CPT | Performed by: INTERNAL MEDICINE

## 2025-06-23 PROCEDURE — 80076 HEPATIC FUNCTION PANEL: CPT | Performed by: PHYSICIAN ASSISTANT

## 2025-06-23 PROCEDURE — 80143 DRUG ASSAY ACETAMINOPHEN: CPT | Performed by: PHYSICIAN ASSISTANT

## 2025-06-23 PROCEDURE — 80048 BASIC METABOLIC PNL TOTAL CA: CPT | Performed by: PHYSICIAN ASSISTANT

## 2025-06-23 PROCEDURE — 93005 ELECTROCARDIOGRAM TRACING: CPT

## 2025-06-23 PROCEDURE — 99285 EMERGENCY DEPT VISIT HI MDM: CPT

## 2025-06-23 PROCEDURE — 85610 PROTHROMBIN TIME: CPT | Performed by: PHYSICIAN ASSISTANT

## 2025-06-23 PROCEDURE — 36415 COLL VENOUS BLD VENIPUNCTURE: CPT | Performed by: PHYSICIAN ASSISTANT

## 2025-06-23 PROCEDURE — 83735 ASSAY OF MAGNESIUM: CPT | Performed by: PHYSICIAN ASSISTANT

## 2025-06-23 PROCEDURE — 82805 BLOOD GASES W/O2 SATURATION: CPT | Performed by: PHYSICIAN ASSISTANT

## 2025-06-23 PROCEDURE — 80048 BASIC METABOLIC PNL TOTAL CA: CPT

## 2025-06-23 PROCEDURE — 80179 DRUG ASSAY SALICYLATE: CPT | Performed by: PHYSICIAN ASSISTANT

## 2025-06-23 PROCEDURE — 85730 THROMBOPLASTIN TIME PARTIAL: CPT | Performed by: PHYSICIAN ASSISTANT

## 2025-06-23 PROCEDURE — 99223 1ST HOSP IP/OBS HIGH 75: CPT | Performed by: INTERNAL MEDICINE

## 2025-06-23 PROCEDURE — 99291 CRITICAL CARE FIRST HOUR: CPT | Performed by: EMERGENCY MEDICINE

## 2025-06-23 PROCEDURE — 82948 REAGENT STRIP/BLOOD GLUCOSE: CPT

## 2025-06-23 PROCEDURE — 96365 THER/PROPH/DIAG IV INF INIT: CPT

## 2025-06-23 PROCEDURE — 84100 ASSAY OF PHOSPHORUS: CPT

## 2025-06-23 PROCEDURE — 83735 ASSAY OF MAGNESIUM: CPT

## 2025-06-23 PROCEDURE — 82010 KETONE BODYS QUAN: CPT | Performed by: PHYSICIAN ASSISTANT

## 2025-06-23 RX ORDER — LEVOTHYROXINE SODIUM 125 UG/1
250 TABLET ORAL
Status: CANCELLED | OUTPATIENT
Start: 2025-06-24

## 2025-06-23 RX ORDER — LANOLIN ALCOHOL/MO/W.PET/CERES
800 CREAM (GRAM) TOPICAL ONCE
Status: COMPLETED | OUTPATIENT
Start: 2025-06-23 | End: 2025-06-23

## 2025-06-23 RX ORDER — CALCIUM GLUCONATE 20 MG/ML
2 INJECTION, SOLUTION INTRAVENOUS ONCE
Status: COMPLETED | OUTPATIENT
Start: 2025-06-23 | End: 2025-06-24

## 2025-06-23 RX ORDER — SODIUM CHLORIDE, SODIUM GLUCONATE, SODIUM ACETATE, POTASSIUM CHLORIDE, MAGNESIUM CHLORIDE, SODIUM PHOSPHATE, DIBASIC, AND POTASSIUM PHOSPHATE .53; .5; .37; .037; .03; .012; .00082 G/100ML; G/100ML; G/100ML; G/100ML; G/100ML; G/100ML; G/100ML
500 INJECTION, SOLUTION INTRAVENOUS ONCE
Status: COMPLETED | OUTPATIENT
Start: 2025-06-23 | End: 2025-06-24

## 2025-06-23 RX ORDER — DEXTROAMPHETAMINE SACCHARATE, AMPHETAMINE ASPARTATE, DEXTROAMPHETAMINE SULFATE AND AMPHETAMINE SULFATE 2.5; 2.5; 2.5; 2.5 MG/1; MG/1; MG/1; MG/1
20 TABLET ORAL DAILY
Status: CANCELLED | OUTPATIENT
Start: 2025-06-24

## 2025-06-23 RX ORDER — ERGOCALCIFEROL 1.25 MG/1
50000 CAPSULE, LIQUID FILLED ORAL WEEKLY
Status: CANCELLED | OUTPATIENT
Start: 2025-06-29

## 2025-06-23 RX ORDER — ONDANSETRON 2 MG/ML
4 INJECTION INTRAMUSCULAR; INTRAVENOUS EVERY 6 HOURS PRN
Status: DISCONTINUED | OUTPATIENT
Start: 2025-06-23 | End: 2025-06-25 | Stop reason: HOSPADM

## 2025-06-23 RX ORDER — FOLIC ACID 1 MG/1
1 TABLET ORAL DAILY
Status: CANCELLED | OUTPATIENT
Start: 2025-06-23

## 2025-06-23 RX ORDER — ASCORBIC ACID 500 MG
500 TABLET ORAL DAILY
Status: CANCELLED | OUTPATIENT
Start: 2025-06-23

## 2025-06-23 RX ORDER — ERGOCALCIFEROL 1.25 MG/1
50000 CAPSULE, LIQUID FILLED ORAL WEEKLY
Status: DISCONTINUED | OUTPATIENT
Start: 2025-06-29 | End: 2025-06-25 | Stop reason: HOSPADM

## 2025-06-23 RX ORDER — DEXTROAMPHETAMINE SACCHARATE, AMPHETAMINE ASPARTATE, DEXTROAMPHETAMINE SULFATE AND AMPHETAMINE SULFATE 2.5; 2.5; 2.5; 2.5 MG/1; MG/1; MG/1; MG/1
20 TABLET ORAL DAILY
Refills: 0 | Status: DISCONTINUED | OUTPATIENT
Start: 2025-06-24 | End: 2025-06-25 | Stop reason: HOSPADM

## 2025-06-23 RX ORDER — DEXTROSE MONOHYDRATE AND SODIUM CHLORIDE 5; .45 G/100ML; G/100ML
250 INJECTION, SOLUTION INTRAVENOUS CONTINUOUS
Status: DISCONTINUED | OUTPATIENT
Start: 2025-06-23 | End: 2025-06-24

## 2025-06-23 RX ORDER — GABAPENTIN 400 MG/1
400 CAPSULE ORAL 3 TIMES DAILY
Status: DISCONTINUED | OUTPATIENT
Start: 2025-06-23 | End: 2025-06-25 | Stop reason: HOSPADM

## 2025-06-23 RX ORDER — ASCORBIC ACID 500 MG
500 TABLET ORAL DAILY
Status: DISCONTINUED | OUTPATIENT
Start: 2025-06-23 | End: 2025-06-25 | Stop reason: HOSPADM

## 2025-06-23 RX ORDER — LEVOTHYROXINE SODIUM 125 UG/1
250 TABLET ORAL
Status: DISCONTINUED | OUTPATIENT
Start: 2025-06-24 | End: 2025-06-25 | Stop reason: HOSPADM

## 2025-06-23 RX ORDER — LANOLIN ALCOHOL/MO/W.PET/CERES
100 CREAM (GRAM) TOPICAL DAILY
Status: CANCELLED | OUTPATIENT
Start: 2025-06-23

## 2025-06-23 RX ORDER — GABAPENTIN 400 MG/1
400 CAPSULE ORAL 3 TIMES DAILY
Status: CANCELLED | OUTPATIENT
Start: 2025-06-23

## 2025-06-23 RX ORDER — POTASSIUM CHLORIDE 1500 MG/1
40 TABLET, EXTENDED RELEASE ORAL ONCE
Status: COMPLETED | OUTPATIENT
Start: 2025-06-23 | End: 2025-06-23

## 2025-06-23 RX ORDER — MAGNESIUM SULFATE HEPTAHYDRATE 40 MG/ML
2 INJECTION, SOLUTION INTRAVENOUS ONCE
Status: COMPLETED | OUTPATIENT
Start: 2025-06-23 | End: 2025-06-23

## 2025-06-23 RX ORDER — ESCITALOPRAM OXALATE 20 MG/1
20 TABLET ORAL DAILY
Status: CANCELLED | OUTPATIENT
Start: 2025-06-23

## 2025-06-23 RX ORDER — FOLIC ACID 1 MG/1
1 TABLET ORAL DAILY
Status: DISCONTINUED | OUTPATIENT
Start: 2025-06-23 | End: 2025-06-25 | Stop reason: HOSPADM

## 2025-06-23 RX ORDER — ESCITALOPRAM OXALATE 20 MG/1
20 TABLET ORAL DAILY
Status: DISCONTINUED | OUTPATIENT
Start: 2025-06-23 | End: 2025-06-25 | Stop reason: HOSPADM

## 2025-06-23 RX ORDER — FERROUS SULFATE 325(65) MG
325 TABLET ORAL EVERY OTHER DAY
Status: CANCELLED | OUTPATIENT
Start: 2025-06-24

## 2025-06-23 RX ORDER — FERROUS SULFATE 325(65) MG
325 TABLET ORAL EVERY OTHER DAY
Status: DISCONTINUED | OUTPATIENT
Start: 2025-06-24 | End: 2025-06-25 | Stop reason: HOSPADM

## 2025-06-23 RX ORDER — LANOLIN ALCOHOL/MO/W.PET/CERES
100 CREAM (GRAM) TOPICAL DAILY
Status: DISCONTINUED | OUTPATIENT
Start: 2025-06-23 | End: 2025-06-25 | Stop reason: HOSPADM

## 2025-06-23 RX ADMIN — CALCIUM GLUCONATE 2 G: 20 INJECTION, SOLUTION INTRAVENOUS at 23:35

## 2025-06-23 RX ADMIN — POTASSIUM CHLORIDE 40 MEQ: 1500 TABLET, EXTENDED RELEASE ORAL at 17:06

## 2025-06-23 RX ADMIN — SODIUM CHLORIDE 1.5 UNITS/HR: 9 INJECTION, SOLUTION INTRAVENOUS at 19:00

## 2025-06-23 RX ADMIN — ESCITALOPRAM OXALATE 20 MG: 20 TABLET, FILM COATED ORAL at 14:15

## 2025-06-23 RX ADMIN — DEXTROSE AND SODIUM CHLORIDE 250 ML/HR: 5; .45 INJECTION, SOLUTION INTRAVENOUS at 23:49

## 2025-06-23 RX ADMIN — SODIUM CHLORIDE 15 UNITS/HR: 9 INJECTION, SOLUTION INTRAVENOUS at 11:30

## 2025-06-23 RX ADMIN — Medication 2 TABLET: at 17:07

## 2025-06-23 RX ADMIN — Medication 800 MG: at 20:11

## 2025-06-23 RX ADMIN — SODIUM CHLORIDE 1000 ML: 0.9 INJECTION, SOLUTION INTRAVENOUS at 10:18

## 2025-06-23 RX ADMIN — ONDANSETRON 4 MG: 2 INJECTION INTRAMUSCULAR; INTRAVENOUS at 15:42

## 2025-06-23 RX ADMIN — FOLIC ACID 1 MG: 1 TABLET ORAL at 14:16

## 2025-06-23 RX ADMIN — DEXTROSE AND SODIUM CHLORIDE 250 ML/HR: 5; .45 INJECTION, SOLUTION INTRAVENOUS at 15:21

## 2025-06-23 RX ADMIN — SODIUM PHOSPHATE, MONOBASIC, MONOHYDRATE AND SODIUM PHOSPHATE, DIBASIC, ANHYDROUS 12 MMOL: 142; 276 INJECTION, SOLUTION INTRAVENOUS at 18:08

## 2025-06-23 RX ADMIN — TRAZODONE HYDROCHLORIDE 150 MG: 100 TABLET ORAL at 22:10

## 2025-06-23 RX ADMIN — SODIUM CHLORIDE, SODIUM GLUCONATE, SODIUM ACETATE, POTASSIUM CHLORIDE, MAGNESIUM CHLORIDE, SODIUM PHOSPHATE, DIBASIC, AND POTASSIUM PHOSPHATE 500 ML: .53; .5; .37; .037; .03; .012; .00082 INJECTION, SOLUTION INTRAVENOUS at 23:35

## 2025-06-23 RX ADMIN — SODIUM CHLORIDE, SODIUM LACTATE, POTASSIUM CHLORIDE, AND CALCIUM CHLORIDE 2000 ML: .6; .31; .03; .02 INJECTION, SOLUTION INTRAVENOUS at 10:35

## 2025-06-23 RX ADMIN — Medication 800 MG: at 17:06

## 2025-06-23 RX ADMIN — GABAPENTIN 400 MG: 400 CAPSULE ORAL at 20:11

## 2025-06-23 RX ADMIN — MAGNESIUM SULFATE HEPTAHYDRATE 2 G: 40 INJECTION, SOLUTION INTRAVENOUS at 11:30

## 2025-06-23 RX ADMIN — Medication 800 MG: at 23:15

## 2025-06-23 RX ADMIN — Medication 1000 MCG: at 14:16

## 2025-06-23 RX ADMIN — OXYCODONE HYDROCHLORIDE AND ACETAMINOPHEN 500 MG: 500 TABLET ORAL at 14:16

## 2025-06-23 RX ADMIN — Medication 100 MG: at 14:16

## 2025-06-23 RX ADMIN — GABAPENTIN 400 MG: 400 CAPSULE ORAL at 15:47

## 2025-06-23 NOTE — H&P
"H&P - Critical Care/ICU   Name: Clarissa Webb 45 y.o. female I MRN: [id number]  Unit/Bed#: ICU 13 I Date of Admission: 6/23/2025   Date of Service: 6/23/2025 I Hospital Day: 0       Assessment & Plan  Diabetic ketoacidosis without coma associated with type 1 diabetes mellitus (HCC)  Lab Results   Component Value Date    HGBA1C 8.7 (A) 03/05/2025       Recent Labs     06/23/25  1231 06/23/25  1343 06/23/25  1502 06/23/25  1557   POCGLU 444* 305* 190* 143*     Blood Sugar Average: Last 72 hrs:  (P) [id number]  Likely due to malfunctioning insulin pump  Presented with extreme fatigue, nausea and vomiting x 2 days  Glucose levels on admission 600  High anion gap met acidosis 2/2 DKA   Multiple DKA hospital admissions in past,most recent one on 10/21/23    Plan  DKA protocol initiated  Goal glucose 100-200  NPO  Reassess hydration status pending repeat BMP  Optimize electrolytes  To consider D5NS (250ml/hr) if glucose levels <250 with anion gap not closed  Q4H BMP, Mg, phos  DKA resolution if glucose 100-200, tolerating clear liquid diet alert and 2/3 bicarb >18, pH >7.3, anion gap <12  Endocrinology consult    Type 1 diabetes mellitus with hyperglycemia (HCC)  Lab Results   Component Value Date    HGBA1C 8.7 (A) 03/05/2025     Recent Labs     06/23/25  1231 06/23/25  1343 06/23/25  1502 06/23/25  1557   POCGLU 444* 305* 190* 143*     Blood Sugar Average: Last 72 hrs:  (P) [id number]  Uncontrolled DM, likely due to inadherence to home med  Home med: insulin pump  Endocrinology following    Plan  Refer to plan for \"DKA\"  Hypothyroidism  Stable  Home med: lecothyroxine 250 mg qd    Plan  Cont home med  Anxiety and depression  Stable  Home med: trazadone 150 mg HS, lisdexamfetamine 50 mg, lexapro 20 mg, cariprazine 30 mg    Plan  Cont home med  Vitamin D deficiency  Vitamin D insufficiency  Last vit D level : 25.7 (4/12/25)  Home med: vit D2 50,000 units/week    Plan  Cont home med  Recheck in 3 " months (25)  B12 deficiency  Last Vit B12: 201(25)- within normal limits   Home med: vitB12 1000 mcg qd    Plan  Cont home med  Chronic anemia  Stable   Following with heme/onc. Getting venofer infusions.   Home med: ferrous sulphate 325 mg qd    Plan  Cont home med  Disposition: Critical care    History of Present Illness   Clarissa Webb is a 45 y.o. with PMH of DM type 1, hypothyroidism and mood disorders who presents with extreme fatigue for past 2 days. She states it  associated with nausea, vomittig, mild generalized abdominal pain and diarrhea. She denies fever, recent illness or sick contacts.       She is unsure for how long it had not been working for but notes over past 2 days her insulin pump wasn't displaying any readings but was blank. She reports she has used it before for over 10 years and that this particular insulin pump was about a month old. Usual glucose readings at home ranged between 100-110 on average.  History obtained from chart review and the patient.  Review of Systems: See HPI for Review of Systems    Historical Information   Past Medical History:  No date: Anemia  No date: Anxiety  No date: B12 deficiency  No date: Cervical disc disorder      Comment:  On gabapentin   No date: Depression  No date: Diabetes mellitus (HCC)  No date: Disease of thyroid gland      Comment:  hypothyroid  No date: Iron deficiency anemia  No date: Panic attack  No date: PTSD (post-traumatic stress disorder)  No date: Sleep difficulties  No date: Substance abuse (HCC)  No date: Type 1 diabetes (HCC)  No date: Vitamin D deficiency Past Surgical History:  No date: ABDOMINAL SURGERY      Comment:  gastric bypass  No date:  SECTION      Comment:  x2  2018: CHOLECYSTECTOMY  2025: ELECTROCONVULSIVE THERAPY (ECT)  2025: ELECTROCONVULSIVE THERAPY (ECT)  2025: ELECTROCONVULSIVE THERAPY (ECT)  2007: GASTRIC BYPASS  2015: INTRAUTERINE DEVICE INSERTION  2020: IR BIOPSY BONE  "MARROW  No date: OTHER SURGICAL HISTORY      Comment:  surgery for imperforate                hymen/endometriosis/hydrometrocolpos  01/11/2024: ROOT CANAL  No date: TUBAL LIGATION  No date: WISDOM TOOTH EXTRACTION   Current Outpatient Medications   Medication Instructions    Accu-Chek FastClix Lancets MISC USE 1 LANCET TO TEST BLOOD SUGAR UP TO 6 TIMES DAILY    acetaminophen (TYLENOL) 500 mg tablet No dose, route, or frequency recorded.    acetone, urine, test strip Use 1 strip to test for ketones.    ascorbic acid (VITAMIN C) 500 mg, Oral, Daily    cariprazine (VRAYLAR) 3 mg, Oral, Daily at bedtime    Continuous Blood Gluc  (FreeStyle Samantha 14 Day [place]) KELVIN 1 Units, Does not apply, Daily    Continuous Glucose Sensor (FreeStyle Samantha 2 Sensor) MISC USE 1 UNIT EVERY 14 DAYS    cyanocobalamin (VITAMIN B-12) 1,000 mcg, Oral, Daily    ergocalciferol (VITAMIN D2) 50,000 Units, Oral, Weekly    escitalopram (LEXAPRO) 20 mg, Oral, Daily    ferrous sulfate 325 mg, Oral, Every other day    folic acid (FOLVITE) 1 mg, Oral, Daily    gabapentin (NEURONTIN) 400 mg, Oral, 3 times daily    glucose blood (Accu-Chek Guide) test strip Use to test blood sugar up to 6 times daily.    Insulin Aspart (NovoLOG) 100 units/mL injection For use in insulin pump. Max daily dose 50 units.    Insulin Pen Needle (BD Pen Needle Deanna U/F) 32G X 4 MM MISC Use 4/day    Lantus 100 UNIT/ML subcutaneous injection INJECT 15 UNITS UNDER THE SKIN DAILY. DISCARD AFTER 28 DAYS.    levothyroxine 250 mcg, Oral, Daily (early morning)    lisdexamfetamine (VYVANSE) 50 mg, Every morning    Naltrexone (Vivitrol) 380 MG SUSR Inject 4 mL (380 mg total) into muscle every 4 weeks    thiamine 100 mg, Oral, Daily    traZODone (DESYREL) 150 mg, Oral, Daily at bedtime    UltiCare Insulin Syringe 28G X 1/2\" 1 ML MISC INJECT UNDER THE SKIN 4 (FOUR) TIMES A DAY    valACYclovir (VALTREX) 500 mg, Oral, Daily    Vivitrol 380 mg, Every 30 days    Allergies[1]   Social " History[2] Family History[3]       Objective :                   Vitals I/O      Most Recent Min/Max in 24hrs   Temp 97.7 °F (36.5 °C) Temp  Min: 97.4 °F (36.3 °C)  Max: 97.7 °F (36.5 °C)   Pulse 96 Pulse  Min: 92  Max: 96   Resp 16 Resp  Min: 16  Max: 20   /50 BP  Min: 86/45  Max: 108/55   O2 Sat 95 % SpO2  Min: 95 %  Max: 100 %      Intake/Output Summary (Last 24 hours) at 6/23/2025 1605  Last data filed at 6/23/2025 1315  Gross per 24 hour   Intake 2000 ml   Output --   Net 2000 ml       Diet NPO; Sips with meds    Invasive Monitoring   N/A        Physical Exam   Physical Exam  Vitals and nursing note reviewed.   Eyes:      General: Vision grossly intact.      Extraocular Movements: Extraocular movements intact.      Conjunctiva/sclera: Conjunctivae normal.   Skin:     General: Skin is warm.   HENT:      Mouth/Throat:      Mouth: Mucous membranes are dry.   Cardiovascular:      Rate and Rhythm: Normal rate and regular rhythm.      Pulses: Normal pulses.      Heart sounds: Normal heart sounds.   Musculoskeletal:      Right lower leg: No edema.      Left lower leg: No edema.      Comments: In bed during examination   Abdominal: General: Bowel sounds are normal.      Palpations: Abdomen is soft.   Constitutional:       Appearance: She is well-developed and well-nourished.      Comments: Very uncomfortable   Pulmonary:      Effort: Pulmonary effort is normal.      Breath sounds: Normal breath sounds.   Neurological:      General: No focal deficit present.      Mental Status: She is alert and oriented to person, place and time. Mental status is at baseline.      Sensory: Sensation is intact.          Diagnostic Studies        Lab Results: I have reviewed the following results:     Medications:  Scheduled PRN   [START ON 6/24/2025] amphetamine-dextroamphetamine, 20 mg, Daily  ascorbic acid, 500 mg, Daily  cyanocobalamin, 1,000 mcg, Daily  [START ON 6/29/2025] ergocalciferol, 50,000 Units, Weekly  escitalopram, 20  mg, Daily  [START ON 6/24/2025] ferrous sulfate, 325 mg, Every Other Day  folic acid, 1 mg, Daily  gabapentin, 400 mg, TID  [START ON 6/24/2025] levothyroxine, 250 mcg, Early Morning  thiamine, 100 mg, Daily  traZODone, 150 mg, HS      ondansetron, 4 mg, Q6H PRN       Continuous    dextrose 5 % and sodium chloride 0.45 %, 250 mL/hr, Last Rate: 250 mL/hr (06/23/25 1521)  insulin regular (HumuLIN R,NovoLIN R) 1 Units/mL in sodium chloride 0.9 % 100 mL infusion, 0.3-21 Units/hr, Last Rate: 6 Units/hr (06/23/25 1507)         Labs:   CBC    Recent Labs     06/23/25  1018   WBC 6.54   HGB 12.2   HCT 39.4        BMP    Recent Labs     06/23/25  1018   SODIUM 128*   K 4.5   CL 95*   CO2 18*   AGAP 15*   BUN 16   CREATININE 1.02   CALCIUM 9.1       Coags    Recent Labs     06/23/25  1018   INR 0.96   PTT 28        Additional Electrolytes  Recent Labs     06/23/25  1018   MG 1.7*          Blood Gas    No recent results  Recent Labs     06/23/25  1018   PHVEN 7.203*   PIQ8EOL 41.7*   PO2VEN 40.7   KWD5INM 16.0*   BEVEN -11.4   Y2AJFPF 67.3    LFTs  Recent Labs     06/23/25  1018   ALT 40   AST 50*   ALKPHOS 94   ALB 3.9   TBILI 1.07*       Infectious  No recent results  Glucose  Recent Labs     06/23/25  1018   GLUC 602*               [1] No Known Allergies  [2]   Social History  Tobacco Use    Smoking status: Never    Smokeless tobacco: Never   Vaping Use    Vaping status: Never Used   Substance Use Topics    Alcohol use: Not Currently     Alcohol/week: 3.0 standard drinks of alcohol     Types: 3 Cans of beer per week    Drug use: No   [3]   Family History  Problem Relation Name Age of Onset    Thyroid disease Mother      URIEL disease Mother      Hyperlipidemia Mother      Hypertension Mother      Osteoarthritis Mother      Thyroid disease unspecified Mother      Diabetes Father      Alcohol abuse Father      Diabetes type I Father      Thyroid disease Sister      Depression Sister      Thyroid disease unspecified  Sister      Anxiety disorder Sister      Heart disease Maternal Grandmother      Hypertension Maternal Grandmother      Arthritis Maternal Grandmother      Diabetes type I Maternal Uncle      Diabetes type I Maternal Grandfather

## 2025-06-23 NOTE — ASSESSMENT & PLAN NOTE
"Lab Results   Component Value Date    HGBA1C 8.7 (A) 03/05/2025     Recent Labs     06/23/25  1231 06/23/25  1343 06/23/25  1502 06/23/25  1557   POCGLU 444* 305* 190* 143*     Blood Sugar Average: Last 72 hrs:  (P) [id number]  Uncontrolled DM, likely due to inadherence to home med  Home med: insulin pump  Endocrinology following    Plan  Refer to plan for \"DKA\"  "

## 2025-06-23 NOTE — CONSULTS
"Consultation - Critical Care/ICU   Name: Clarissa Webb 45 y.o. female I MRN: [id number]  Unit/Bed#: ICU 13 I Date of Admission: 6/23/2025   Date of Service: 6/23/2025 I Hospital Day: 0   Consults  Physician Requesting Evaluation: Goran Steiner MD   Reason for Evaluation / Principal Problem: DKA with type 1 DM        Assessment & Plan  Diabetic ketoacidosis without coma associated with type 1 diabetes mellitus (HCC)  Lab Results   Component Value Date    HGBA1C 8.7 (A) 03/05/2025       Recent Labs     06/23/25  1128 06/23/25  1231 06/23/25  1343 06/23/25  1502   POCGLU 546* 444* 305* 190*     Blood Sugar Average: Last 72 hrs:  (P) 417  Likely due to malfunctioning insulin pump  Presented with extreme fatigue, nausea and vomiting x 2 days  Glucose levels on admission 600  High anion gap met acidosis 2/2 DKA   Multiple DKA hospital admissions in past,most recent one on 10/21/23    Plan  DKA protocol initiated  Goal glucose 100-200  NPO  Reassess hydration status pending repeat BMP  Optimize electrolytes  To consider D5NS (250ml/hr) if glucose levels <250 with anion gap not closed  Q4H BMP, Mg, phos  DKA resolution if glucose 100-200, tolerating clear liquid diet alert and 2/3 bicarb >18, pH >7.3, anion gap <12  Endocrinology consult    Type 1 diabetes mellitus with hyperglycemia (HCC)  Lab Results   Component Value Date    HGBA1C 8.7 (A) 03/05/2025     Recent Labs     06/23/25  1128 06/23/25  1231 06/23/25  1343 06/23/25  1502   POCGLU 546* 444* 305* 190*     Blood Sugar Average: Last 72 hrs:  (P) 417  Uncontrolled DM, likely due to inadherence to home med  Home med: insulin pump  Endocrinology following    Plan  Refer to plan for \"DKA\"  Hypothyroidism  Stable  Home med: lecothyroxine 250 mg qd    Plan  Cont home med  Anxiety and depression  Stable  Home med: trazadone 150 mg HS, lisdexamfetamine 50 mg, lexapro 20 mg, cariprazine 30 mg    Plan  Cont home med  Vitamin D deficiency  Vitamin D " insufficiency  Last vit D level : 25.7 (25)  Home med: vit D2 50,000 units/week    Plan  Cont home med  Recheck in 3 months (25)  B12 deficiency  Vit B12: 201(25)- within normal limits  Home med: vit b12     Plan  Cont home med  Recheck in 3 months (25)  Chronic anemia  Stable   Following with heme/onc. Getting venofer infusions.   Home med: ferrous sulphate 325 mg qd    Plan  Cont home med  Disposition: Critical care    History of Present Illness   Clarissa Webb is a 45 y.o. with PMH of DM type 1, hypothyroidism and mood disorders who presents with extreme fatigue for past 2 days. She states it  associated with nausea, vomittig, mild generalized abdominal pain and diarrhea. She denies fever, recent illness or sick contacts.      She is unsure for how long it had not been working for but notes over past 2 days her insulin pump wasn't displaying any readings but was blank. She reports she has used it before for over 10 years and that this particular insulin pump was about a month old. Usual glucose readings at home ranged between 100-110 on average.    History obtained from chart review and the patient.      Historical Information   Past Medical History:  No date: Anemia  No date: Anxiety  No date: B12 deficiency  No date: Cervical disc disorder      Comment:  On gabapentin   No date: Depression  No date: Diabetes mellitus (HCC)  No date: Disease of thyroid gland      Comment:  hypothyroid  No date: Iron deficiency anemia  No date: Panic attack  No date: PTSD (post-traumatic stress disorder)  No date: Sleep difficulties  No date: Substance abuse (HCC)  No date: Type 1 diabetes (HCC)  No date: Vitamin D deficiency Past Surgical History:  No date: ABDOMINAL SURGERY      Comment:  gastric bypass  No date:  SECTION      Comment:  x2  2018: CHOLECYSTECTOMY  2025: ELECTROCONVULSIVE THERAPY (ECT)  2025: ELECTROCONVULSIVE THERAPY (ECT)  2025: ELECTROCONVULSIVE THERAPY  "(ECT)  2007: GASTRIC BYPASS  01/06/2015: INTRAUTERINE DEVICE INSERTION  08/27/2020: IR BIOPSY BONE MARROW  No date: OTHER SURGICAL HISTORY      Comment:  surgery for imperforate                hymen/endometriosis/hydrometrocolpos  01/11/2024: ROOT CANAL  No date: TUBAL LIGATION  No date: WISDOM TOOTH EXTRACTION   Current Outpatient Medications   Medication Instructions    Accu-Chek FastClix Lancets MISC USE 1 LANCET TO TEST BLOOD SUGAR UP TO 6 TIMES DAILY    acetaminophen (TYLENOL) 500 mg tablet No dose, route, or frequency recorded.    acetone, urine, test strip Use 1 strip to test for ketones.    ascorbic acid (VITAMIN C) 500 mg, Oral, Daily    cariprazine (VRAYLAR) 3 mg, Oral, Daily at bedtime    Continuous Blood Gluc  (FreeStyle Samantha 14 Day [place]) KELVIN 1 Units, Does not apply, Daily    Continuous Glucose Sensor (FreeStyle Samantha 2 Sensor) MISC USE 1 UNIT EVERY 14 DAYS    cyanocobalamin (VITAMIN B-12) 1,000 mcg, Oral, Daily    ergocalciferol (VITAMIN D2) 50,000 Units, Oral, Weekly    escitalopram (LEXAPRO) 20 mg, Oral, Daily    ferrous sulfate 325 mg, Oral, Every other day    folic acid (FOLVITE) 1 mg, Oral, Daily    gabapentin (NEURONTIN) 400 mg, Oral, 3 times daily    glucose blood (Accu-Chek Guide) test strip Use to test blood sugar up to 6 times daily.    Insulin Aspart (NovoLOG) 100 units/mL injection For use in insulin pump. Max daily dose 50 units.    Insulin Pen Needle (BD Pen Needle Deanna U/F) 32G X 4 MM MISC Use 4/day    Lantus 100 UNIT/ML subcutaneous injection INJECT 15 UNITS UNDER THE SKIN DAILY. DISCARD AFTER 28 DAYS.    levothyroxine 250 mcg, Oral, Daily (early morning)    lisdexamfetamine (VYVANSE) 50 mg, Every morning    Naltrexone (Vivitrol) 380 MG SUSR Inject 4 mL (380 mg total) into muscle every 4 weeks    thiamine 100 mg, Oral, Daily    traZODone (DESYREL) 150 mg, Oral, Daily at bedtime    UltiCare Insulin Syringe 28G X 1/2\" 1 ML MISC INJECT UNDER THE SKIN 4 (FOUR) TIMES A DAY    " valACYclovir (VALTREX) 500 mg, Oral, Daily    Vivitrol 380 mg, Every 30 days    Allergies[1]   Social History[2] Family History[3]       Objective :                   Vitals I/O      Most Recent Min/Max in 24hrs   Temp 97.7 °F (36.5 °C) Temp  Min: 97.4 °F (36.3 °C)  Max: 97.7 °F (36.5 °C)   Pulse 96 Pulse  Min: 92  Max: 96   Resp 16 Resp  Min: 16  Max: 20   /50 BP  Min: 86/45  Max: 108/55   O2 Sat 95 % SpO2  Min: 95 %  Max: 100 %      Intake/Output Summary (Last 24 hours) at 6/23/2025 1520  Last data filed at 6/23/2025 1315  Gross per 24 hour   Intake 2000 ml   Output --   Net 2000 ml       Diet NPO; Sips with meds    Invasive Monitoring           Physical Exam   Physical Exam  Eyes:      General: Vision grossly intact.      Extraocular Movements: Extraocular movements intact.      Conjunctiva/sclera: Conjunctivae normal.   Skin:     General: Skin is warm.   HENT:      Mouth/Throat:      Mouth: Mucous membranes are dry.   Cardiovascular:      Rate and Rhythm: Normal rate and regular rhythm.      Pulses: Normal pulses.      Heart sounds: Normal heart sounds.   Musculoskeletal:      Right lower leg: No edema.      Left lower leg: No edema.      Comments: In bed   Abdominal: General: Bowel sounds are normal.      Palpations: Abdomen is soft.   Constitutional:       Appearance: She is well-developed. She is not ill-appearing.      Comments: Looks uncomfortable   Pulmonary:      Effort: Pulmonary effort is normal.      Breath sounds: Normal breath sounds.   Neurological:      Mental Status: She is alert and oriented to person, place and time. Mental status is at baseline.          Diagnostic Studies        Lab Results: I have reviewed the following results:     Medications:  Scheduled PRN   [START ON 6/24/2025] amphetamine-dextroamphetamine, 20 mg, Daily  ascorbic acid, 500 mg, Daily  cyanocobalamin, 1,000 mcg, Daily  [START ON 6/29/2025] ergocalciferol, 50,000 Units, Weekly  escitalopram, 20 mg, Daily  [START ON  6/24/2025] ferrous sulfate, 325 mg, Every Other Day  folic acid, 1 mg, Daily  gabapentin, 400 mg, TID  [START ON 6/24/2025] levothyroxine, 250 mcg, Early Morning  thiamine, 100 mg, Daily  traZODone, 150 mg, HS            Continuous    dextrose 5 % and sodium chloride 0.45 %, 250 mL/hr  insulin regular (HumuLIN R,NovoLIN R) 1 Units/mL in sodium chloride 0.9 % 100 mL infusion, 0.3-21 Units/hr, Last Rate: 6 Units/hr (06/23/25 1507)         Labs:   CBC    Recent Labs     06/23/25  1018   WBC 6.54   HGB 12.2   HCT 39.4        BMP    Recent Labs     06/23/25  1018   SODIUM 128*   K 4.5   CL 95*   CO2 18*   AGAP 15*   BUN 16   CREATININE 1.02   CALCIUM 9.1       Coags    Recent Labs     06/23/25  1018   INR 0.96   PTT 28        Additional Electrolytes  Recent Labs     06/23/25  1018   MG 1.7*          Blood Gas    No recent results  Recent Labs     06/23/25  1018   PHVEN 7.203*   EYQ2HIM 41.7*   PO2VEN 40.7   [id number] 16.0*   BEVEN -11.4   H0ZANRJ 67.3    LFTs  Recent Labs     06/23/25  1018   ALT 40   AST 50*   ALKPHOS 94   ALB 3.9   TBILI 1.07*       Infectious  No recent results  Glucose  Recent Labs     06/23/25  1018   GLUC 602*               [1] No Known Allergies  [2]   Social History  Tobacco Use    Smoking status: Never    Smokeless tobacco: Never   Vaping Use    Vaping status: Never Used   Substance Use Topics    Alcohol use: Not Currently     Alcohol/week: 3.0 standard drinks of alcohol     Types: 3 Cans of beer per week    Drug use: No   [3]   Family History  Problem Relation Name Age of Onset    Thyroid disease Mother      URIEL disease Mother      Hyperlipidemia Mother      Hypertension Mother      Osteoarthritis Mother      Thyroid disease unspecified Mother      Diabetes Father      Alcohol abuse Father      Diabetes type I Father      Thyroid disease Sister      Depression Sister      Thyroid disease unspecified Sister      Anxiety disorder Sister      Heart disease Maternal Grandmother       Hypertension Maternal Grandmother      Arthritis Maternal Grandmother      Diabetes type I Maternal Uncle      Diabetes type I Maternal Grandfather

## 2025-06-23 NOTE — ASSESSMENT & PLAN NOTE
Stable  Home med: trazadone 150 mg HS, lisdexamfetamine 50 mg, lexapro 20 mg, cariprazine 30 mg    Plan  Cont home med

## 2025-06-23 NOTE — ASSESSMENT & PLAN NOTE
Lab Results   Component Value Date    HGBA1C 8.7 (A) 03/05/2025       Recent Labs     06/23/25  1128 06/23/25  1231 06/23/25  1343 06/23/25  1502   POCGLU 546* 444* 305* 190*     Blood Sugar Average: Last 72 hrs:  (P) 417  Likely due to malfunctioning insulin pump  Presented with extreme fatigue, nausea and vomiting x 2 days  Glucose levels on admission 600  High anion gap met acidosis 2/2 DKA   Multiple DKA hospital admissions in past,most recent one on 10/21/23    Plan  DKA protocol initiated  Goal glucose 100-200  NPO  Reassess hydration status pending repeat BMP  Optimize electrolytes  To consider D5NS (250ml/hr) if glucose levels <250 with anion gap not closed  Q4H BMP, Mg, phos  DKA resolution if glucose 100-200, tolerating clear liquid diet alert and 2/3 bicarb >18, pH >7.3, anion gap <12  Endocrinology consult

## 2025-06-23 NOTE — ASSESSMENT & PLAN NOTE
"Lab Results   Component Value Date    HGBA1C 8.7 (A) 03/05/2025     Recent Labs     06/23/25  1128 06/23/25  1231 06/23/25  1343 06/23/25  1502   POCGLU 546* 444* 305* 190*     Blood Sugar Average: Last 72 hrs:  (P) 417  Uncontrolled DM, likely due to inadherence to home med  Home med: insulin pump  Endocrinology following    Plan  Refer to plan for \"DKA\"  "

## 2025-06-23 NOTE — ASSESSMENT & PLAN NOTE
Vitamin D insufficiency  Last vit D level : 25.7 (4/12/25)  Home med: vit D2 50,000 units/week    Plan  Cont home med  Recheck in 3 months (7/12/25)

## 2025-06-23 NOTE — LETTER
Clarissa Galinamoise was treated in the hospital from 6/23/2025 to 6/25/2025  She may return to work on 6/26/2025.      If you have any questions or concerns, please don't hesitate to call.

## 2025-06-23 NOTE — ED ATTENDING ATTESTATION
6/23/2025  I, Goran Mendez MD, saw and evaluated the patient. I have discussed the patient with the resident/non-physician practitioner and agree with the resident's/non-physician practitioner's findings, Plan of Care, and MDM as documented in the resident's/non-physician practitioner's note, except where noted. All available labs and Radiology studies were reviewed.  I was present for key portions of any procedure(s) performed by the resident/non-physician practitioner and I was immediately available to provide assistance.       At this point I agree with the current assessment done in the Emergency Department.  I have conducted an independent evaluation of this patient a history and physical is as follows:  Type I diabetic, not feeling well complaining of nausea vomiting and had some episodes of diarrhea but no fever.  Has not really had polyuria or urinary complaints.  She has a Dexcom and insulin pump and it appears that those are not working.  Blood sugar was greater than 600 and she is acidotic so looks like she is in DKA.  I had the PA switch over from normal saline to LR as she may need large volume of fluids ordering 2 L upfront and will most likely need an insulin drip and will speak with ICU for admission for critical care versus stepdown.  Magnesium is being replaced IV.  Potassium was 4.5.  Suspect the hyponatremia is due to the very elevated glucose so pseudohyponatremia.  She has no fever to suggest any type of infectious etiology at the moment.  ED Course         Critical Care Time  Procedures

## 2025-06-23 NOTE — ASSESSMENT & PLAN NOTE
Vit B12: 201(4/12/25)- within normal limits  Home med: vit b12     Plan  Cont home med  Recheck in 3 months (7/12/25)

## 2025-06-23 NOTE — ASSESSMENT & PLAN NOTE
Last Vit B12: 201(4/12/25)- within normal limits   Home med: vitB12 1000 mcg qd    Plan  Cont home med

## 2025-06-23 NOTE — ASSESSMENT & PLAN NOTE
Stable   Following with heme/onc. Getting venofer infusions.   Home med: ferrous sulphate 325 mg qd    Plan  Cont home med

## 2025-06-23 NOTE — ASSESSMENT & PLAN NOTE
Lab Results   Component Value Date    HGBA1C 8.7 (A) 03/05/2025       Recent Labs     06/23/25  1231 06/23/25  1343 06/23/25  1502 06/23/25  1557   POCGLU 444* 305* 190* 143*     Blood Sugar Average: Last 72 hrs:  (P) [id number]  Likely due to malfunctioning insulin pump  Presented with extreme fatigue, nausea and vomiting x 2 days  Glucose levels on admission 600  High anion gap met acidosis 2/2 DKA   Multiple DKA hospital admissions in past,most recent one on 10/21/23    Plan  DKA protocol initiated  Goal glucose 100-200  NPO  Reassess hydration status pending repeat BMP  Optimize electrolytes  To consider D5NS (250ml/hr) if glucose levels <250 with anion gap not closed  Q4H BMP, Mg, phos  DKA resolution if glucose 100-200, tolerating clear liquid diet alert and 2/3 bicarb >18, pH >7.3, anion gap <12  Endocrinology consult

## 2025-06-23 NOTE — ED PROVIDER NOTES
Time reflects when diagnosis was documented in both MDM as applicable and the Disposition within this note       Time User Action Codes Description Comment    6/23/2025 11:08 AM Goran Mendez [E11.10] DKA (diabetic ketoacidosis) (HCC)     6/23/2025 11:20 AM Rosa Herrera [E83.42] Hypomagnesemia     6/23/2025 11:20 AM Rosa Herrera [E87.1] Hyponatremia           ED Disposition       ED Disposition   Admit    Condition   Stable    Date/Time   Mon Jun 23, 2025 11:20 AM    Comment   Case was discussed with Obdulia and the patient's admission status was agreed to be Admission Status: inpatient status to the service of Dr. Steiner .               Assessment & Plan       Medical Decision Making  Patient is hypotensive suspect significant volume depletion will start IV hydration and get labs.  Patient is a type I diabetic a fingerstick glucose was obtained immediately and is greater than 600 will add in DKA labs    Amount and/or Complexity of Data Reviewed  External Data Reviewed: labs, radiology, ECG and notes.  Labs: ordered. Decision-making details documented in ED Course.  ECG/medicine tests: ordered and independent interpretation performed.  Discussion of management or test interpretation with external provider(s): Andrea - discussed case - also saw and eval pt  Critical care     Risk  Prescription drug management.  Decision regarding hospitalization.  Risk Details: Critical Care Time Statement: Upon my evaluation, this patient had a high probability of imminent or life-threatening deterioration due to DKA, which required my direct attention, intervention, and personal management.  I spent a total of 35 minutes directly providing critical care services, including management of organ system failure(s) , interpretation of hemodynamic data, and titration of continuous IV medications (drips). This time is exclusive of procedures, teaching, treating other patients, family meetings, and any prior time  recorded by providers other than myself.    Close monitoring and discussion with DR Mendez who also saw and eval pt and discussed with critical care.  Frequent close monitoring          ED Course as of 06/23/25 1659   Mon Jun 23, 2025   1013 POC Glucose(!!): 600  Will add in beta hydroxybutyrate and VBG - concern for dka   1027 WBC: 6.54   1028 Hemoglobin: 12.2   1028 pH, Tate(!): 7.203   1054 Beta- Hydroxybutyrate(!): 4.74   1055 GLUCOSE(!!): 602   1055 ANION GAP(!): 15   1055 Potassium: 4.5   1104 Hx alcohol use disorder quit 3+ m ago - states has not been drinking        Medications   insulin regular (HumuLIN R,NovoLIN R) 1 Units/mL in sodium chloride 0.9 % 100 mL infusion (15 Units/hr Intravenous New Bag 6/23/25 1130)   lactated ringers bolus 2,000 mL (0 mL Intravenous Stopped 6/23/25 1315)   magnesium sulfate 2 g/50 mL IVPB (premix) 2 g (2 g Intravenous New Bag 6/23/25 1130)       ED Risk Strat Scores                    No data recorded        SBIRT 20yo+      Flowsheet Row Most Recent Value   Initial Alcohol Screen: US AUDIT-C     1. How often do you have a drink containing alcohol? 1 Filed at: 06/23/2025 0957   2. How many drinks containing alcohol do you have on a typical day you are drinking?  0 Filed at: 06/23/2025 0957   3b. FEMALE Any Age, or MALE 65+: How often do you have 4 or more drinks on one occassion? 0 Filed at: 06/23/2025 0957   Audit-C Score 1 Filed at: 06/23/2025 0957   MICHELLE: How many times in the past year have you...    Used an illegal drug or used a prescription medication for non-medical reasons? Never Filed at: 06/23/2025 0957                            History of Present Illness       Chief Complaint   Patient presents with    Weakness - Generalized     Pt reports n/v/d x2 days. Reports feeling weak. Denies fevers.        Past Medical History[1]   Past Surgical History[2]   Family History[3]   Social History[4]   E-Cigarette/Vaping    E-Cigarette Use Never User       E-Cigarette/Vaping  Substances    Nicotine No     THC No     CBD No     Flavoring No     Other No     Unknown No       I have reviewed and agree with the history as documented.     Patient is a insulin-dependent type 1 diabetic on insulin pump and Dexcom.  Pt presents to the ED with nausea and vomitign and diarrhea x 3 days, feeling weak.  Hx dm - has insulin pump - thought It was working -   Dexcom stopped workign wouldn't hook when - for some time unsure how long   Upon arrival here she looked at her insulin pump and realized that it is not working and not giving insulin/detecting her glucose levels and so patient disconnected it.        Review of Systems   Constitutional:  Negative for fever.   Respiratory: Negative.     Cardiovascular: Negative.    Gastrointestinal:  Positive for diarrhea, nausea and vomiting. Negative for abdominal pain.   Genitourinary: Negative.    Neurological:  Positive for weakness.   All other systems reviewed and are negative.          Objective       ED Triage Vitals   Temperature Pulse Blood Pressure Respirations SpO2 Patient Position - Orthostatic VS   06/23/25 0955 06/23/25 0955 06/23/25 0955 06/23/25 0955 06/23/25 0955 06/23/25 0955   (!) 97.4 °F (36.3 °C) 92 (!) 86/45 17 100 % Sitting      Temp Source Heart Rate Source BP Location FiO2 (%) Pain Score    06/23/25 0955 06/23/25 0955 06/23/25 0955 -- 06/23/25 1100    Oral Monitor Left arm  No Pain      Vitals      Date and Time Temp Pulse SpO2 Resp BP Pain Score FACES Pain Rating User   06/23/25 1600 -- 87 99 % 18 102/58 -- -- LB   06/23/25 1503 97.7 °F (36.5 °C) -- -- -- -- -- -- TL   06/23/25 1341 97.6 °F (36.4 °C) -- -- -- -- -- -- TL   06/23/25 1335 -- 96 95 % 16 102/50 No Pain -- LB   06/23/25 1300 -- 92 97 % 16 108/55 -- -- TANYA   06/23/25 1200 -- 94 99 % 16 104/53 -- -- TANYA   06/23/25 1101 -- 93 -- -- 105/51 -- --    06/23/25 1100 -- 92 100 % 20 105/51 No Pain -- JS   06/23/25 0955 97.4 °F (36.3 °C) 92 100 % 17 86/45 -- -- AA            Physical  Exam  Vitals and nursing note reviewed.   Constitutional:       Appearance: She is well-developed.   HENT:      Head: Normocephalic and atraumatic.      Right Ear: External ear normal.      Left Ear: External ear normal.     Eyes:      Conjunctiva/sclera: Conjunctivae normal.       Cardiovascular:      Rate and Rhythm: Normal rate and regular rhythm.      Heart sounds: Normal heart sounds.   Pulmonary:      Effort: Pulmonary effort is normal.      Breath sounds: Normal breath sounds.   Abdominal:      General: Bowel sounds are normal.      Palpations: Abdomen is soft.      Tenderness: There is no abdominal tenderness.     Musculoskeletal:         General: Normal range of motion.      Cervical back: Neck supple.   Lymphadenopathy:      Cervical: No cervical adenopathy.     Skin:     General: Skin is warm.      Findings: No rash.     Neurological:      Mental Status: She is alert and oriented to person, place, and time.      Comments: Resting comfortably able to rouse and sit up and answer questions appropriately.   Psychiatric:         Behavior: Behavior normal.         Results Reviewed       Procedure Component Value Units Date/Time    Rapid drug screen, urine [[id number]]  (Abnormal) Collected: 06/23/25 1621    Lab Status: Final result Specimen: Urine, Clean Catch Updated: 06/23/25 1650     Amph/Meth UR Positive     Barbiturate Ur Negative     Benzodiazepine Urine Negative     Cocaine Urine Negative     Methadone Urine Negative     Opiate Urine Negative     PCP Ur Negative     THC Urine Positive     Oxycodone Urine Negative     Fentanyl Urine Negative     HYDROCODONE URINE Negative    Narrative:      Presumptive report. If requested, specimen will be sent to reference lab for confirmation.  FOR MEDICAL PURPOSES ONLY.   IF CONFIRMATION NEEDED PLEASE CONTACT THE LAB WITHIN 5 DAYS.    Drug Screen Cutoff Levels:  AMPHETAMINE/METHAMPHETAMINES  1000 ng/mL  BARBITURATES     200 ng/mL  BENZODIAZEPINES     200  ng/mL  COCAINE      300 ng/mL  METHADONE      300 ng/mL  OPIATES      300 ng/mL  PHENCYCLIDINE     25 ng/mL  THC       50 ng/mL  OXYCODONE      100 ng/mL  FENTANYL      5 ng/mL  HYDROCODONE     300 ng/mL    Salicylate level [[id number]]  (Abnormal) Collected: 06/23/25 1129    Lab Status: Final result Specimen: Blood from Arm, Right Updated: 06/23/25 1234     Salicylate Lvl <5 mg/dL     Acetaminophen level-If concentration is detectable, please discuss with medical  on call. [[id number]]  (Abnormal) Collected: 06/23/25 1129    Lab Status: Final result Specimen: Blood from Arm, Right Updated: 06/23/25 1234     Acetaminophen Level <2 ug/mL     Fingerstick Glucose (POCT) [[id number]]  (Abnormal) Collected: 06/23/25 1231    Lab Status: Final result Specimen: Blood Updated: 06/23/25 1232     POC Glucose 444 mg/dl     Ethanol [[id number]]  (Normal) Collected: 06/23/25 1129    Lab Status: Final result Specimen: Blood from Arm, Right Updated: 06/23/25 1214     Ethanol Lvl <10 mg/dL     Fingerstick Glucose (POCT) [[id number]]  (Abnormal) Collected: 06/23/25 1128    Lab Status: Final result Specimen: Blood Updated: 06/23/25 1128     POC Glucose 546 mg/dl     Basic metabolic panel [[id number]]  (Abnormal) Collected: 06/23/25 1018    Lab Status: Final result Specimen: Blood from Arm, Right Updated: 06/23/25 1055     Sodium 128 mmol/L      Potassium 4.5 mmol/L      Chloride 95 mmol/L      CO2 18 mmol/L      ANION GAP 15 mmol/L      BUN 16 mg/dL      Creatinine 1.02 mg/dL      Glucose 602 mg/dL      Calcium 9.1 mg/dL      eGFR 67 ml/min/1.73sq m     Narrative:      [employer] guidelines for Chronic Kidney Disease (CKD):     Stage 1 with normal or high GFR (GFR > 90 mL/min/1.73 square meters)    Stage 2 Mild CKD (GFR = 60-89 mL/min/1.73 square meters)    Stage 3A Moderate CKD (GFR = 45-59 mL/min/1.73 square meters)    Stage 3B Moderate CKD (GFR = 30-44 mL/min/1.73 square meters)    Stage 4 Severe CKD  (GFR = 15-29 mL/min/1.73 square meters)    Stage 5 End Stage CKD (GFR <15 mL/min/1.73 square meters)  Note: GFR calculation is accurate only with a steady state creatinine    Hepatic function panel [[id number]]  (Abnormal) Collected: 06/23/25 1018    Lab Status: Final result Specimen: Blood from Arm, Right Updated: 06/23/25 1053     Total Bilirubin 1.07 mg/dL      Bilirubin, Direct 0.22 mg/dL      Alkaline Phosphatase 94 U/L      AST 50 U/L      ALT 40 U/L      Total Protein 6.6 g/dL      Albumin 3.9 g/dL     Magnesium [[id number]]  (Abnormal) Collected: 06/23/25 1018    Lab Status: Final result Specimen: Blood from Arm, Right Updated: 06/23/25 1053     Magnesium 1.7 mg/dL     Beta Hydroxybutyrate [[id number]]  (Abnormal) Collected: 06/23/25 1018    Lab Status: Final result Specimen: Blood from Arm, Right Updated: 06/23/25 1053     Beta- Hydroxybutyrate 4.74 mmol/L     Protime-INR [[id number]]  (Normal) Collected: 06/23/25 1018    Lab Status: Final result Specimen: Blood from Arm, Right Updated: 06/23/25 1039     Protime 13.0 seconds      INR 0.96    Narrative:      INR Therapeutic Range    Indication                                             INR Range      Atrial Fibrillation                                               2.0-3.0  Hypercoagulable State                                    2.0.2.3  Left Ventricular Asist Device                            2.0-3.0  Mechanical Heart Valve                                  -    Aortic(with afib, MI, embolism, HF, LA enlargement,    and/or coagulopathy)                                     2.0-3.0 (2.5-3.5)     Mitral                                                             2.5-3.5  Prosthetic/Bioprosthetic Heart Valve               2.0-3.0  Venous thromboembolism (VTE: VT, PE        2.0-3.0    APTT [[id number]]  (Normal) Collected: 06/23/25 1018    Lab Status: Final result Specimen: Blood from Arm, Right Updated: 06/23/25 1039     PTT 28 seconds     CBC and differential  [[id number]]  (Abnormal) Collected: 06/23/25 1018    Lab Status: Final result Specimen: Blood from Arm, Right Updated: 06/23/25 1030     WBC 6.54 Thousand/uL      RBC 4.45 Million/uL      Hemoglobin 12.2 g/dL      Hematocrit 39.4 %      MCV 89 fL      MCH 27.4 pg      MCHC 31.0 g/dL      RDW 14.4 %      MPV 10.5 fL      Platelets 170 Thousands/uL      nRBC 0 /100 WBCs      Segmented % 92 %      Immature Grans % 0 %      Lymphocytes % 6 %      Monocytes % 2 %      Eosinophils Relative 0 %      Basophils Relative 0 %      Absolute Neutrophils 5.98 Thousands/µL      Absolute Immature Grans 0.01 Thousand/uL      Absolute Lymphocytes 0.38 Thousands/µL      Absolute Monocytes 0.14 Thousand/µL      Eosinophils Absolute 0.01 Thousand/µL      Basophils Absolute 0.02 Thousands/µL     Narrative:      This is an appended report.  These results have been appended to a previously verified report.    Blood gas, venous [[id number]]  (Abnormal) Collected: 06/23/25 1018    Lab Status: Final result Specimen: Blood from Arm, Right Updated: 06/23/25 1025     pH, Tate 7.203     pCO2, Tate 41.7 mm Hg      pO2, Tate 40.7 mm Hg      HCO3, Tate 16.0 mmol/L      Base Excess, Tate -11.4 mmol/L      O2 Content, Tate 12.1 ml/dL      O2 HGB, VENOUS 67.3 %     POCT pregnancy, urine [[id number]]     Lab Status: No result     Fingerstick Glucose (POCT) [[id number]]  (Abnormal) Collected: 06/23/25 1002    Lab Status: Final result Specimen: Blood Updated: 06/23/25 1003     POC Glucose 600 mg/dl             No orders to display       ECG 12 Lead Documentation Only    Date/Time: 6/23/2025 10:28 AM    Performed by: Rosa Herrera PA-C  Authorized by: Rosa Herrera PA-C    Indications / Diagnosis:  Weakness  ECG reviewed by me, the ED Provider: yes    Patient location:  ED  Previous ECG:     Previous ECG:  Compared to current    Comparison ECG info:  April 28, 25    Similarity:  No change  Rate:     ECG rate:  82    ECG rate assessment: normal    Rhythm:      "Rhythm: sinus rhythm    Ectopy:     Ectopy: none    QRS:     QRS axis:  Normal  Conduction:     Conduction: normal    ST segments:     ST segments:  Normal  T waves:     T waves: normal        ED Medication and Procedure Management   Prior to Admission Medications   Prescriptions Last Dose Informant Patient Reported? Taking?   Accu-Chek FastClix Lancets MISC Unknown Self No No   Sig: USE 1 LANCET TO TEST BLOOD SUGAR UP TO 6 TIMES DAILY   Continuous Blood Gluc  (FreeStyle Samantha 14 Day [place]) KELVIN Unknown Self No No   Sig: Use 1 Units in the morning   Continuous Glucose Sensor (FreeStyle Samantha 2 Sensor) MISC Unknown Self No No   Sig: USE 1 UNIT EVERY 14 DAYS   Insulin Aspart (NovoLOG) 100 units/mL injection Unknown  No No   Sig: For use in insulin pump. Max daily dose 50 units.   Insulin Pen Needle (BD Pen Needle Deanna U/F) 32G X 4 MM MISC Unknown Self No No   Sig: Use 4/day   Lantus 100 UNIT/ML subcutaneous injection Not Taking Self No No   Sig: INJECT 15 UNITS UNDER THE SKIN DAILY. DISCARD AFTER 28 DAYS.   Patient not taking: No sig reported   Naltrexone (Vivitrol) 380 MG SUSR Past Month Self No Yes   Sig: Inject 4 mL (380 mg total) into muscle every 4 weeks   UltiCare Insulin Syringe 28G X 1/2\" 1 ML MISC Unknown Self No No   Sig: INJECT UNDER THE SKIN 4 (FOUR) TIMES A DAY   Vivitrol 380 MG SUSR Past Month Self Yes Yes   Sig: Inject 380 mg into a muscle every 30 (thirty) days   acetaminophen (TYLENOL) 500 mg tablet Not Taking Self Yes No   Patient not taking: Reported on 6/23/2025   acetone, urine, test strip Unknown Self No No   Sig: Use 1 strip to test for ketones.   ascorbic acid (VITAMIN C) 500 MG tablet 6/22/2025 Morning Self No Yes   Sig: Take 1 tablet (500 mg total) by mouth daily   cariprazine (VRAYLAR) 3 MG capsule 6/22/2025 Evening Self No Yes   Sig: Take 1 capsule (3 mg total) by mouth daily at bedtime   cyanocobalamin (VITAMIN B-12) 1000 MCG tablet 6/22/2025 Morning Self No Yes   Sig: Take 1 " tablet (1,000 mcg total) by mouth daily   ergocalciferol (VITAMIN D2) 50,000 units 6/22/2025 Morning Self No Yes   Sig: Take 1 capsule (50,000 Units total) by mouth once a week   escitalopram (LEXAPRO) 20 mg tablet 6/22/2025 Morning Self No Yes   Sig: Take 1 tablet (20 mg total) by mouth daily   ferrous sulfate 325 (65 Fe) mg tablet 6/22/2025 Morning Self No Yes   Sig: Take 1 tablet (325 mg total) by mouth every other day   folic acid (FOLVITE) 1 mg tablet 6/22/2025 Morning Self No Yes   Sig: Take 1 tablet (1 mg total) by mouth daily   gabapentin (NEURONTIN) 400 mg capsule 6/22/2025 Evening Self No Yes   Sig: Take 1 capsule (400 mg total) by mouth 3 (three) times a day   glucose blood (Accu-Chek Guide) test strip Unknown Self No No   Sig: Use to test blood sugar up to 6 times daily.   levothyroxine 125 mcg tablet 6/22/2025 Morning Self No Yes   Sig: Take 2 tablets (250 mcg total) by mouth daily in the early morning   lisdexamfetamine (VYVANSE) 50 MG capsule 6/23/2025 Morning Self Yes Yes   Sig: Take 50 mg by mouth every morning   thiamine 100 MG tablet 6/22/2025 Morning Self No Yes   Sig: Take 1 tablet (100 mg total) by mouth daily   traZODone (DESYREL) 150 mg tablet 6/22/2025 Evening Self No Yes   Sig: Take 1 tablet (150 mg total) by mouth daily at bedtime   valACYclovir (VALTREX) 500 mg tablet   No No   Sig: Take 1 tablet (500 mg total) by mouth daily      Facility-Administered Medications: None     Current Discharge Medication List        CONTINUE these medications which have NOT CHANGED    Details   ascorbic acid (VITAMIN C) 500 MG tablet Take 1 tablet (500 mg total) by mouth daily  Qty: 30 tablet, Refills: 1    Associated Diagnoses: Vitamin C deficiency      cariprazine (VRAYLAR) 3 MG capsule Take 1 capsule (3 mg total) by mouth daily at bedtime  Qty: 30 capsule, Refills: 1    Associated Diagnoses: Severe depression (HCC)      cyanocobalamin (VITAMIN B-12) 1000 MCG tablet Take 1 tablet (1,000 mcg total) by  mouth daily  Qty: 30 tablet, Refills: 1    Associated Diagnoses: B12 deficiency      ergocalciferol (VITAMIN D2) 50,000 units Take 1 capsule (50,000 Units total) by mouth once a week  Qty: 4 capsule, Refills: 0    Associated Diagnoses: Vitamin D deficiency      escitalopram (LEXAPRO) 20 mg tablet Take 1 tablet (20 mg total) by mouth daily  Qty: 30 tablet, Refills: 1    Associated Diagnoses: Anxiety      ferrous sulfate 325 (65 Fe) mg tablet Take 1 tablet (325 mg total) by mouth every other day  Qty: 15 tablet, Refills: 1    Associated Diagnoses: Iron deficiency anemia, unspecified iron deficiency anemia type      folic acid (FOLVITE) 1 mg tablet Take 1 tablet (1 mg total) by mouth daily  Qty: 30 tablet, Refills: 1    Associated Diagnoses: Folic acid deficiency      gabapentin (NEURONTIN) 400 mg capsule Take 1 capsule (400 mg total) by mouth 3 (three) times a day  Qty: 90 capsule, Refills: 1    Associated Diagnoses: Anxiety      levothyroxine 125 mcg tablet Take 2 tablets (250 mcg total) by mouth daily in the early morning  Qty: 60 tablet, Refills: 1    Associated Diagnoses: Hypothyroidism      lisdexamfetamine (VYVANSE) 50 MG capsule Take 50 mg by mouth every morning      !! Naltrexone (Vivitrol) 380 MG SUSR Inject 4 mL (380 mg total) into muscle every 4 weeks  Qty: 1 each, Refills: 10    Associated Diagnoses: Alcohol use disorder, severe, in early remission (HCC)      thiamine 100 MG tablet Take 1 tablet (100 mg total) by mouth daily  Qty: 30 tablet, Refills: 1    Associated Diagnoses: Thiamine deficiency      traZODone (DESYREL) 150 mg tablet Take 1 tablet (150 mg total) by mouth daily at bedtime  Qty: 30 tablet, Refills: 1    Associated Diagnoses: Anxiety      !! Vivitrol 380 MG SUSR Inject 380 mg into a muscle every 30 (thirty) days      Accu-Chek FastClix Lancets MISC USE 1 LANCET TO TEST BLOOD SUGAR UP TO 6 TIMES DAILY  Qty: 102 each, Refills: 0    Associated Diagnoses: Type 1 diabetes mellitus with  "hyperglycemia (HCC)      acetaminophen (TYLENOL) 500 mg tablet       acetone, urine, test strip Use 1 strip to test for ketones.  Qty: 25 each, Refills: 3    Associated Diagnoses: Type 1 diabetes mellitus with hyperglycemia (HCC)      Continuous Blood Gluc  (FreeStyle Samantha 14 Day [place]) KELVIN Use 1 Units in the morning    Associated Diagnoses: Type 1 diabetes mellitus with hyperglycemia (HCC)      Continuous Glucose Sensor (FreeStyle Samantha 2 Sensor) MISC USE 1 UNIT EVERY 14 DAYS  Qty: 6 each, Refills: 1    Associated Diagnoses: Type 1 diabetes mellitus with hyperglycemia (HCC)      glucose blood (Accu-Chek Guide) test strip Use to test blood sugar up to 6 times daily.  Qty: 200 each, Refills: 2    Associated Diagnoses: Type 1 diabetes mellitus with hyperglycemia (HCC)      Insulin Aspart (NovoLOG) 100 units/mL injection For use in insulin pump. Max daily dose 50 units.  Qty: 50 mL, Refills: 3    Associated Diagnoses: Type 1 diabetes mellitus with hyperglycemia (HCC)      Insulin Pen Needle (BD Pen Needle Deanna U/F) 32G X 4 MM MISC Use 4/day  Qty: 100 each, Refills: 11    Associated Diagnoses: Type 1 diabetes mellitus with hyperglycemia (HCC)      Lantus 100 UNIT/ML subcutaneous injection INJECT 15 UNITS UNDER THE SKIN DAILY. DISCARD AFTER 28 DAYS.  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 - Potential duplicate medications found. Please discuss with provider.        No discharge procedures on file.  ED SEPSIS DOCUMENTATION   Time reflects when diagnosis was documented in " both MDM as applicable and the Disposition within this note       Time User Action Codes Description Comment    2025 11:08 AM Goran Mendez [E11.10] DKA (diabetic ketoacidosis) (HCC)     2025 11:20 AM Rosa Herrera [E83.42] Hypomagnesemia     2025 11:20 AM Rosa Herrera [E87.1] Hyponatremia                      [1]   Past Medical History:  Diagnosis Date    Anemia     Anxiety     B12 deficiency     Cervical disc disorder     On gabapentin     Depression     Diabetes mellitus (HCC)     Disease of thyroid gland     hypothyroid    Iron deficiency anemia     Panic attack     PTSD (post-traumatic stress disorder)     Sleep difficulties     Substance abuse (HCC)     Type 1 diabetes (HCC)     Vitamin D deficiency    [2]   Past Surgical History:  Procedure Laterality Date    ABDOMINAL SURGERY      gastric bypass     SECTION      x2    CHOLECYSTECTOMY  2018    ELECTROCONVULSIVE THERAPY (ECT)  2025    ELECTROCONVULSIVE THERAPY (ECT)  2025    ELECTROCONVULSIVE THERAPY (ECT)  2025    GASTRIC BYPASS  2007    INTRAUTERINE DEVICE INSERTION  2015    IR BIOPSY BONE MARROW  2020    OTHER SURGICAL HISTORY      surgery for imperforate hymen/endometriosis/hydrometrocolpos    ROOT CANAL  2024    TUBAL LIGATION      WISDOM TOOTH EXTRACTION     [3]   Family History  Problem Relation Name Age of Onset    Thyroid disease Mother      URIEL disease Mother      Hyperlipidemia Mother      Hypertension Mother      Osteoarthritis Mother      Thyroid disease unspecified Mother      Diabetes Father      Alcohol abuse Father      Diabetes type I Father      Thyroid disease Sister      Depression Sister      Thyroid disease unspecified Sister      Anxiety disorder Sister      Heart disease Maternal Grandmother      Hypertension Maternal Grandmother      Arthritis Maternal Grandmother      Diabetes type I Maternal Uncle      Diabetes type I Maternal Grandfather     [4]    Social History  Tobacco Use    Smoking status: Never    Smokeless tobacco: Never   Vaping Use    Vaping status: Never Used   Substance Use Topics    Alcohol use: Not Currently     Alcohol/week: 3.0 standard drinks of alcohol     Types: 3 Cans of beer per week    Drug use: No        Rosa Herrera PA-C  06/23/25 2235

## 2025-06-24 LAB
ANION GAP SERPL CALCULATED.3IONS-SCNC: 4 MMOL/L (ref 4–13)
ANION GAP SERPL CALCULATED.3IONS-SCNC: 4 MMOL/L (ref 4–13)
BUN SERPL-MCNC: 10 MG/DL (ref 5–25)
BUN SERPL-MCNC: 9 MG/DL (ref 5–25)
CALCIUM SERPL-MCNC: 8.2 MG/DL (ref 8.4–10.2)
CALCIUM SERPL-MCNC: 8.4 MG/DL (ref 8.4–10.2)
CHLORIDE SERPL-SCNC: 107 MMOL/L (ref 96–108)
CHLORIDE SERPL-SCNC: 109 MMOL/L (ref 96–108)
CO2 SERPL-SCNC: 24 MMOL/L (ref 21–32)
CO2 SERPL-SCNC: 24 MMOL/L (ref 21–32)
CREAT SERPL-MCNC: 0.68 MG/DL (ref 0.6–1.3)
CREAT SERPL-MCNC: 0.75 MG/DL (ref 0.6–1.3)
EST. AVERAGE GLUCOSE BLD GHB EST-MCNC: 183 MG/DL
GFR SERPL CREATININE-BSD FRML MDRD: 107 ML/MIN/1.73SQ M
GFR SERPL CREATININE-BSD FRML MDRD: 97 ML/MIN/1.73SQ M
GLUCOSE SERPL-MCNC: 124 MG/DL (ref 65–140)
GLUCOSE SERPL-MCNC: 125 MG/DL (ref 65–140)
GLUCOSE SERPL-MCNC: 150 MG/DL (ref 65–140)
GLUCOSE SERPL-MCNC: 158 MG/DL (ref 65–140)
GLUCOSE SERPL-MCNC: 159 MG/DL (ref 65–140)
GLUCOSE SERPL-MCNC: 160 MG/DL (ref 65–140)
GLUCOSE SERPL-MCNC: 171 MG/DL (ref 65–140)
GLUCOSE SERPL-MCNC: 178 MG/DL (ref 65–140)
GLUCOSE SERPL-MCNC: 179 MG/DL (ref 65–140)
GLUCOSE SERPL-MCNC: 184 MG/DL (ref 65–140)
GLUCOSE SERPL-MCNC: 187 MG/DL (ref 65–140)
GLUCOSE SERPL-MCNC: 194 MG/DL (ref 65–140)
GLUCOSE SERPL-MCNC: 207 MG/DL (ref 65–140)
GLUCOSE SERPL-MCNC: 233 MG/DL (ref 65–140)
GLUCOSE SERPL-MCNC: 235 MG/DL (ref 65–140)
GLUCOSE SERPL-MCNC: 245 MG/DL (ref 65–140)
GLUCOSE SERPL-MCNC: 41 MG/DL (ref 65–140)
HBA1C MFR BLD: 8 %
MAGNESIUM SERPL-MCNC: 1.8 MG/DL (ref 1.9–2.7)
MAGNESIUM SERPL-MCNC: 1.8 MG/DL (ref 1.9–2.7)
PHOSPHATE SERPL-MCNC: 2.7 MG/DL (ref 2.7–4.5)
PHOSPHATE SERPL-MCNC: 3.4 MG/DL (ref 2.7–4.5)
POTASSIUM SERPL-SCNC: 4 MMOL/L (ref 3.5–5.3)
POTASSIUM SERPL-SCNC: 4.3 MMOL/L (ref 3.5–5.3)
SODIUM SERPL-SCNC: 135 MMOL/L (ref 135–147)
SODIUM SERPL-SCNC: 137 MMOL/L (ref 135–147)

## 2025-06-24 PROCEDURE — 80048 BASIC METABOLIC PNL TOTAL CA: CPT

## 2025-06-24 PROCEDURE — 83036 HEMOGLOBIN GLYCOSYLATED A1C: CPT | Performed by: INTERNAL MEDICINE

## 2025-06-24 PROCEDURE — 83735 ASSAY OF MAGNESIUM: CPT

## 2025-06-24 PROCEDURE — NC001 PR NO CHARGE: Performed by: INTERNAL MEDICINE

## 2025-06-24 PROCEDURE — 82948 REAGENT STRIP/BLOOD GLUCOSE: CPT

## 2025-06-24 PROCEDURE — 84100 ASSAY OF PHOSPHORUS: CPT

## 2025-06-24 PROCEDURE — 99254 IP/OBS CNSLTJ NEW/EST MOD 60: CPT | Performed by: INTERNAL MEDICINE

## 2025-06-24 PROCEDURE — 99232 SBSQ HOSP IP/OBS MODERATE 35: CPT | Performed by: INTERNAL MEDICINE

## 2025-06-24 RX ORDER — ALBUMIN HUMAN 50 G/1000ML
12.5 SOLUTION INTRAVENOUS ONCE
Status: COMPLETED | OUTPATIENT
Start: 2025-06-24 | End: 2025-06-24

## 2025-06-24 RX ORDER — ALBUMIN HUMAN 50 G/1000ML
25 SOLUTION INTRAVENOUS ONCE
Status: COMPLETED | OUTPATIENT
Start: 2025-06-24 | End: 2025-06-24

## 2025-06-24 RX ORDER — LANOLIN ALCOHOL/MO/W.PET/CERES
CREAM (GRAM) TOPICAL
Status: DISPENSED
Start: 2025-06-24 | End: 2025-06-24

## 2025-06-24 RX ORDER — LANOLIN ALCOHOL/MO/W.PET/CERES
800 CREAM (GRAM) TOPICAL ONCE
Status: COMPLETED | OUTPATIENT
Start: 2025-06-24 | End: 2025-06-24

## 2025-06-24 RX ADMIN — Medication 800 MG: at 02:48

## 2025-06-24 RX ADMIN — ALBUMIN (HUMAN) 12.5 G: 12.5 INJECTION, SOLUTION INTRAVENOUS at 01:28

## 2025-06-24 RX ADMIN — NOREPINEPHRINE BITARTRATE 2 MCG/MIN: 1 INJECTION INTRAVENOUS at 04:37

## 2025-06-24 RX ADMIN — GABAPENTIN 400 MG: 400 CAPSULE ORAL at 08:24

## 2025-06-24 RX ADMIN — FOLIC ACID 1 MG: 1 TABLET ORAL at 08:24

## 2025-06-24 RX ADMIN — LEVOTHYROXINE SODIUM 250 MCG: 0.12 TABLET ORAL at 06:25

## 2025-06-24 RX ADMIN — FERROUS SULFATE TAB 325 MG (65 MG ELEMENTAL FE) 325 MG: 325 (65 FE) TAB at 08:24

## 2025-06-24 RX ADMIN — OXYCODONE HYDROCHLORIDE AND ACETAMINOPHEN 500 MG: 500 TABLET ORAL at 08:24

## 2025-06-24 RX ADMIN — TRAZODONE HYDROCHLORIDE 150 MG: 100 TABLET ORAL at 22:02

## 2025-06-24 RX ADMIN — Medication 100 MG: at 08:24

## 2025-06-24 RX ADMIN — ALBUMIN (HUMAN) 25 G: 12.5 INJECTION, SOLUTION INTRAVENOUS at 03:23

## 2025-06-24 RX ADMIN — Medication 800 MG: at 08:24

## 2025-06-24 RX ADMIN — GABAPENTIN 400 MG: 400 CAPSULE ORAL at 22:02

## 2025-06-24 RX ADMIN — ESCITALOPRAM OXALATE 20 MG: 20 TABLET, FILM COATED ORAL at 08:24

## 2025-06-24 RX ADMIN — DEXTROAMPHETAMINE SACCHARATE, AMPHETAMINE ASPARTATE, DEXTROAMPHETAMINE SULFATE AND AMPHETAMINE SULFATE 20 MG: 2.5; 2.5; 2.5; 2.5 TABLET ORAL at 08:31

## 2025-06-24 RX ADMIN — Medication 1000 MCG: at 08:24

## 2025-06-24 RX ADMIN — INSULIN ASPART 300 UNITS: 100 INJECTION, SOLUTION INTRAVENOUS; SUBCUTANEOUS at 17:31

## 2025-06-24 RX ADMIN — GABAPENTIN 400 MG: 400 CAPSULE ORAL at 17:33

## 2025-06-24 NOTE — PROGRESS NOTES
Progress Note - Critical Care/ICU   Name: Clarissa Webb 45 y.o. female I MRN: [id number]  Unit/Bed#: ICU 13 I Date of Admission: 6/23/2025   Date of Service: 6/24/2025 I Hospital Day: 1       Critical Care Interval Transfer Note:    Brief Hospital Summary:    Clarissa Webb is a 45 y.o. with PMH of DM type 1, hypothyroidism and mood disorders who presents with extreme fatigue for past 2 days. She states it  associated with nausea, vomittig, mild generalized abdominal pain and diarrhea. She was unsure for how long it had not been working for but notes over past 2 days her insulin pump wasn't displaying any readings but was blank. She reports she has used it before for over 10 years and that this particular insulin pump was about a month old. Usual glucose readings at home ranged between 100-110 on average. She is no longer on the insulin drip. Overnight she was given levophed due to low blood pressures, however thereafter she has remained stabled. Currenty, she is alert, tolerated PO well today and anion gap has been closed.     She will be transferred to [facility].      Barriers to discharge:   Pending endocrine consult on assistance with dexcom insulin pump regulation prior to discharge.     Consults: IP CONSULT TO ENDOCRINOLOGY    Recommended to review admission imaging for incidental findings and document in discharge navigator: Chart reviewed, no known incidental findings noted at this time.      Discharge Plan: defer to primary team    PT Recommendations: No rehabilitation needsOT Recommendations: No rehabilitation needs   Patient seen and evaluated by Critical Care today and deemed to be appropriate for transfer to [facility]. Spoke to Dr Shilo Rodriguez from [facility] to accept transfer. Critical care can be contacted via [email or web address] with any questions or concerns. Please use the Critical Care AP Role in Secure Chat for any provider inquires until the patient is transferred out of the ICU or until tomorrow at  0600.

## 2025-06-24 NOTE — UTILIZATION REVIEW
Initial Clinical Review    Admission: Date/Time/Statement:   Admission Orders (From admission, onward)       Ordered        06/23/25 1121  INPATIENT ADMISSION  Once                          Orders Placed This Encounter   Procedures    INPATIENT ADMISSION     Standing Status:   Standing     Number of Occurrences:   1     Level of Care:   Level 1 Stepdown [13]     Estimated length of stay:   More than 2 Midnights     Certification:   I certify that inpatient services are medically necessary for this patient for a duration of greater than two midnights. See H&P and MD Progress Notes for additional information about the patient's course of treatment.     ED Arrival Information       Expected   -    Arrival   6/23/2025 09:48    Acuity   Emergent              Means of arrival   Wheelchair    Escorted by   Self    Service   Hospitalist    Admission type   Emergency              Arrival complaint   vomiting nausea             Chief Complaint   Patient presents with    Weakness - Generalized     Pt reports n/v/d x2 days. Reports feeling weak. Denies fevers.        Initial Presentation: 45 y.o. female  to ER from home:    PMH of DM T2, hypothyroidism,  chronic anemia (receives OP venofer infusions)    and mood disorders who presents with extreme fatigue for past 2 days assocated w/ N/V/D and mild abd pain.  Pt reports insulin pump not displaying readings for past 2 days  (reports historically , blood glucose levels at home 100-110 on average)     IN ER  severe metabolic acidosis, hyponatremia, and nausea and vomiting,  ongoing abd discomfort.  Blood glucose 600.   Abd     6/23  ADMIT  INPATIENT STATUS,  Level 1 stepdown  for treatment of  High anion gap met acidosis 2/2 DKA    in setting of malfunctioning insulin pump.  Hyponatremia.  Continuous cardiopulmonary monitoring:  DKA protocol initiated.  Continuous insulin gtt wq2H accucks .  NPO.  Reassess hydration status pending repeat BMP.    Optimize electrolytes.   Q4H BMP,  Mg, phos/ replete prn.    Endocrinology consult    1 X  and  PRN  MEDS   IV Ca gluconate 2 g IV  x1 on 6/23   IV  Na phosphate x2  ,   Mag Oxide  mg (x2)  and po kdur 40 meq  on  6/23  IV albumin  12.5 g  x1 and IV albumin 25 g x1  on  6/24    IVF  D5 1/2 NS  initiated   6/23 @ 1520 -  dc'ed 6/24 @ 1100  IV Insulin GTT  initiated  6/23  @ 1130  -  dc'ed  6/24  @  1830   Norepinephrine gtt  initiated 6/24 @ 0440 and dc'ed  6/24  @  1200    Date: 6/24     Day 2:   Feeling better today. Did require some additional fluid and had borderline blood pressures overnight. Hemodynamics now normal.   alert, tolerated PO well today and anion gap has been closed.      Plan:   DKA component resolved.   Cont insulin gtt.    Awaiting endocrinology evaluation.  Transfer to [facility].  no longer on the insulin drip. Overnight she was given levophed due to low blood pressures, however thereafter she has remained stabled. Currenty, she is alert, tolerated PO well today and anion gap has been closed. transfer to [facility] - cont weaning off insulin gtt.       S/p PO Mg oxide 800 mg  x 4 doses and IV 2g Mg x 1 dose.    S/p KCL 40 mEq x 1 dose       6/24  ENDOCRINE:   removed insulin pump sensor (blood around the filament ? Causing communication failure to pump)   cleaned off sensor and resumed monitoring    6/24  @  1830  weaned off insulin gtt -  insulin pump     Date: 6/25    Day 3: Has surpassed a 2nd midnight with active treatments and services.   IV Mg sulfate 2gm IV  this am.    Pt reports feeling much better,  GCS 5.   continue to monitor insulin pump for proper functioning w/accucks qid .     ================================================================================================      ED Treatment-Medication Administration from 06/23/2025 0948 to 06/23/2025 1326         Date/Time Order Dose Route Action     06/23/2025 1018 sodium chloride 0.9 % bolus 1,000 mL 1,000 mL Intravenous New Bag     06/23/2025 1035 lactated  ringers bolus 2,000 mL 2,000 mL Intravenous New Bag     06/23/2025 1130 insulin regular (HumuLIN R,NovoLIN R) 1 Units/mL in sodium chloride 0.9 % 100 mL infusion 15 Units/hr Intravenous New Bag     06/23/2025 1130 magnesium sulfate 2 g/50 mL IVPB (premix) 2 g 2 g Intravenous New Bag       6/24   Scheduled Medications:  amphetamine-dextroamphetamine, 20 mg, Oral, Daily  ascorbic acid, 500 mg, Oral, Daily  cyanocobalamin, 1,000 mcg, Oral, Daily  [START ON 6/29/2025] ergocalciferol, 50,000 Units, Oral, Weekly  escitalopram, 20 mg, Oral, Daily  ferrous sulfate, 325 mg, Oral, Every Other Day  folic acid, 1 mg, Oral, Daily  gabapentin, 400 mg, Oral, TID  levothyroxine, 250 mcg, Oral, Early Morning  thiamine, 100 mg, Oral, Daily  traZODone, 150 mg, Oral, HS      6/24  Continuous IV Infusions:  insulin regular (HumuLIN R,NovoLIN R) 1 Units/mL in sodium chloride 0.9 % 100 mL infusion, 0.3-21 Units/hr, Intravenous, Titrated      PRN Meds:  ondansetron, 4 mg, Intravenous, Q6H PRN      ED Triage Vitals   Temperature Pulse Respirations Blood Pressure SpO2 Pain Score   06/23/25 0955 06/23/25 0955 06/23/25 0955 06/23/25 0955 06/23/25 0955 06/23/25 1100   (!) 97.4 °F (36.3 °C) 92 17 (!) 86/45 100 % No Pain     Weight (last 2 days)       Date/Time Weight    06/23/25 1335 76 (167.55)    06/23/25 0951 74.3 (163.8)            Vital Signs (last 3 days)       Date/Time Temp Pulse Resp BP MAP (mmHg) SpO2 Pascual Coma Scale Score Pain    06/24/25 1100 -- 90 22 108/53 77 97 % -- --    06/24/25 1000 -- 88 16 119/56 81 96 % -- --    06/24/25 0900 -- 97 19 114/49 71 98 % -- --    06/24/25 0830 -- -- -- -- -- -- 13 No Pain    06/24/25 0800 -- 83 13 98/54 71 98 % -- --    06/24/25 0715 -- 78 14 111/59 78 97 % -- --    06/24/25 0700 -- 80 14 99/56 73 97 % -- --    06/24/25 0645 -- 80 17 97/55 73 97 % -- --    06/24/25 0515 -- 76 14 115/60 82 98 % -- --    06/24/25 0500 -- 76 14 107/59 80 96 % -- --    06/24/25 0445 -- 73 16 88/54 67 98 % -- --     06/24/25 0437 -- 73 -- 89/52 64 -- -- --    06/24/25 0400 -- 70 13 84/47 61 98 % -- --    06/24/25 0350 -- -- -- -- -- -- 13 No Pain    06/24/25 0330 -- 69 13 77/43 55 98 % -- --    06/24/25 0307 97.6 °F (36.4 °C) 74 24 80/50 60 99 % -- --    06/24/25 0200 -- 75 13 100/54 73 98 % -- --    06/24/25 0100 -- 68 27 88/51 64 98 % -- --    06/24/25 0030 -- 64 16 91/50 66 98 % -- --    06/24/25 0000 -- 67 15 82/47 60 98 % -- --    06/23/25 2340 -- -- -- -- -- -- 13 No Pain    06/23/25 2330 -- 71 16 83/49 59 97 % -- --    06/23/25 2320 -- 77 18 82/49 60 97 % -- --    06/23/25 2319 -- 80 37 78/44 53 96 % -- --    06/23/25 2259 98.2 °F (36.8 °C) -- -- -- -- -- -- --    06/23/25 2200 -- 83 13 93/50 65 97 % -- --    06/23/25 2100 -- 82 14 100/57 75 97 % -- --    06/23/25 2000 -- 84 15 97/56 73 97 % -- --    06/23/25 1945 -- -- -- -- -- -- 14 No Pain    06/23/25 1900 98.3 °F (36.8 °C) 87 15 94/55 70 97 % -- --    06/23/25 1800 -- 85 16 107/55 78 98 % -- --    06/23/25 1600 -- 87 18 102/58 75 99 % 14 No Pain    06/23/25 1503 97.7 °F (36.5 °C) -- -- -- -- -- -- --    06/23/25 1400 -- 85 17 94/50 67 99 % -- --    06/23/25 1341 97.6 °F (36.4 °C) -- -- -- -- -- -- --    06/23/25 1335 -- 96 16 102/50 72 95 % 14 No Pain    06/23/25 1300 -- 92 16 108/55 77 97 % -- --    06/23/25 1200 -- 94 16 104/53 77 99 % -- --    06/23/25 1101 -- 93 -- 105/51 -- -- -- --    06/23/25 1100 -- 92 20 105/51 74 100 % -- No Pain    06/23/25 0955 97.4 °F (36.3 °C) 92 17 86/45 58 100 % -- --              Pertinent Labs/Diagnostic Test Results:   Radiology:  No orders to display     Cardiology:  ECG 12 lead   Final Result by Owen Patterson MD (06/23 8265)   Normal sinus rhythm   Rightward axis   Borderline ECG   When compared with ECG of 28-Apr-2025 14:27,   T wave inversion now evident in Inferior leads   Confirmed by Owen Patterson ([id number]) on 6/23/2025 11:57:41 AM        GI:  No orders to display           Results from last 7 days   Lab Units 06/23/25  1018    WBC Thousand/uL 6.54   HEMOGLOBIN g/dL 12.2   HEMATOCRIT % 39.4   PLATELETS Thousands/uL 170   TOTAL NEUT ABS Thousands/µL 5.98         Results from last 7 days   Lab Units 06/24/25  0622 06/24/25  0211 06/23/25 2159 06/23/25 1938 06/23/25  1515   SODIUM mmol/L 137 135 135 136 133*   POTASSIUM mmol/L 4.0 4.3 4.1 4.1 3.5   CHLORIDE mmol/L 109* 107 107 108 104   CO2 mmol/L 24 24 24 24 17*   ANION GAP mmol/L 4 4 4 4 12   BUN mg/dL 9 10 12 13 15   CREATININE mg/dL 0.68 0.75 0.72 0.72 0.87   EGFR ml/min/1.73sq m 107 97 102 102 81   CALCIUM mg/dL 8.2* 8.4 8.1* 8.2* 8.7   MAGNESIUM mg/dL 1.8* 1.8* 1.8* 1.9 1.9   PHOSPHORUS mg/dL 2.7 3.4 4.3 4.8*  4.7* 2.4*     Results from last 7 days   Lab Units 06/23/25  1018   AST U/L 50*   ALT U/L 40   ALK PHOS U/L 94   TOTAL PROTEIN g/dL 6.6   ALBUMIN g/dL 3.9   TOTAL BILIRUBIN mg/dL 1.07*   BILIRUBIN DIRECT mg/dL 0.22*     Results from last 7 days   Lab Units 06/24/25  1157 06/24/25  1010 06/24/25  0813 06/24/25  0621 06/24/25  0412 06/24/25  0210 06/24/25  0152 06/24/25  0054 06/23/25  2355 06/23/25  2249 06/23/25  2158 06/23/25  2058   POC GLUCOSE mg/dl 159* 245* 124 150* 158* 187* 179* 171* 144* 135 159* 151*     Results from last 7 days   Lab Units 06/24/25  0622 06/24/25  0211 06/23/25  2159 06/23/25  1938 06/23/25  1515 06/23/25  1018   GLUCOSE RANDOM mg/dL 160* 194* 172* 132 173* 602*             Beta- Hydroxybutyrate   Date Value Ref Range Status   06/23/2025 4.74 (H) 0.20 - 0.60 mmol/L Final   04/29/2025 <0.05 0.02 - 0.27 mmol/L Final   04/28/2025 0.34 (H) 0.02 - 0.27 mmol/L Final          Results from last 7 days   Lab Units 06/23/25  1018   PH LANNY  7.203*   PCO2 LANNY mm Hg 41.7*   PO2 LANNY mm Hg 40.7   HCO3 LANNY mmol/L 16.0*   BASE EXC LANNY mmol/L -11.4   O2 CONTENT LANNY ml/dL 12.1   O2 HGB, VENOUS % 67.3       Results from last 7 days   Lab Units 06/23/25  1018   PROTIME seconds 13.0   INR  0.96   PTT seconds 28       Results from last 7 days   Lab Units 06/23/25  1621    AMPH/METH  Positive*   BARBITURATE UR  Negative   BENZODIAZEPINE UR  Negative   COCAINE UR  Negative   METHADONE URINE  Negative   OPIATE UR  Negative   PCP UR  Negative   THC UR  Positive*     Results from last 7 days   Lab Units 06/23/25  1129   ETHANOL LVL mg/dL <10   ACETAMINOPHEN LVL ug/mL <2*   SALICYLATE LVL mg/dL <5*       Past Medical History[1]  Present on Admission:   Diabetic ketoacidosis without coma associated with type 1 diabetes mellitus (HCC)   Anxiety and depression   Hypothyroidism   Type 1 diabetes mellitus with hyperglycemia (HCC)   Vitamin D deficiency   B12 deficiency   Chronic anemia      Admitting Diagnosis: Hypomagnesemia [E83.42]  Hyponatremia [E87.1]  Nausea [R11.0]  Vomiting [R11.10]  DKA (diabetic ketoacidosis) (HCC) [E11.10]  Age/Sex: 45 y.o. female    Network Utilization Review Department  ATTENTION: Please call with any questions or concerns to [phone] and carefully listen to the prompts so that you are directed to the right person. All voicemails are confidential.   For Discharge needs, contact Care Management DC Support Team at [phone] opt. [phone]  Send all requests for admission clinical reviews, approved or denied determinations and any other requests to dedicated fax number below belonging to the campus where the patient is receiving treatment. List of dedicated fax numbers for the Facilities:  FACILITY NAME UR FAX NUMBER   ADMISSION DENIALS (Administrative/Medical Necessity) [phone]   DISCHARGE SUPPORT TEAM (NETWORK) [phone]   PARENT CHILD HEALTH (Maternity/NICU/Pediatrics) [phone]   General acute hospital [phone]   [facility] [phone]   Critical access hospital [phone]   [facility] [phone]   [facility] [phone]   [facility] [phone]   [facility] [phone]   [facility]  [facility] [phone]   [facility] [phone]   Count includes the [facility] [phone]   [facility] [phone]   [facility] [phone]              [1]   Past Medical History:  Diagnosis Date    Anemia     Anxiety     B12 deficiency     Cervical disc disorder     On gabapentin     Depression     Diabetes mellitus (HCC)     Disease of thyroid gland     hypothyroid    Iron deficiency anemia     Panic attack     PTSD (post-traumatic stress disorder)     Sleep difficulties     Substance abuse (HCC)     Type 1 diabetes (HCC)     Vitamin D deficiency

## 2025-06-24 NOTE — PLAN OF CARE
Problem: PAIN - ADULT  Goal: Verbalizes/displays adequate comfort level or baseline comfort level  Description: Interventions:  - Encourage patient to monitor pain and request assistance  - Assess pain using appropriate pain scale  - Administer analgesics as ordered based on type and severity of pain and evaluate response  - Implement non-pharmacological measures as appropriate and evaluate response  - Consider cultural and social influences on pain and pain management  - Notify physician/advanced practitioner if interventions unsuccessful or patient reports new pain  - Educate patient/family on pain management process including their role and importance of  reporting pain   - Provide non-pharmacologic/complimentary pain relief interventions  Outcome: Progressing     Problem: INFECTION - ADULT  Goal: Absence or prevention of progression during hospitalization  Description: INTERVENTIONS:  - Assess and monitor for signs and symptoms of infection  - Monitor lab/diagnostic results  - Monitor all insertion sites, i.e. indwelling lines, tubes, and drains  - Monitor endotracheal if appropriate and nasal secretions for changes in amount and color  - [place] appropriate cooling/warming therapies per order  - Administer medications as ordered  - Instruct and encourage patient and family to use good hand hygiene technique  - Identify and instruct in appropriate isolation precautions for identified infection/condition  Outcome: Progressing  Goal: Absence of fever/infection during neutropenic period  Description: INTERVENTIONS:  - Monitor WBC  - Perform strict hand hygiene  - Limit to healthy visitors only  - No plants, dried, fresh or silk flowers with eldridge in patient room  Outcome: Progressing     Problem: SAFETY ADULT  Goal: Patient will remain free of falls  Description: INTERVENTIONS:  - Educate patient/family on patient safety including physical limitations  - Instruct patient to call for assistance with activity   -  Consider consulting OT/PT to assist with strengthening/mobility based on AM PAC & JH-HLM score  - Consult OT/PT to assist with strengthening/mobility   - Keep Call bell within reach  - Keep bed low and locked with side rails adjusted as appropriate  - Keep care items and personal belongings within reach  - Initiate and maintain comfort rounds  - Make Fall Risk Sign visible to staff  - Offer Toileting every 2 Hours, in advance of need  - Initiate/Maintain bed alarm  - Apply yellow socks and bracelet for high fall risk patients  - Consider moving patient to room near nurses station  Outcome: Progressing  Goal: Maintain or return to baseline ADL function  Description: INTERVENTIONS:  -  Assess patient's ability to carry out ADLs; assess patient's baseline for ADL function and identify physical deficits which impact ability to perform ADLs (bathing, care of mouth/teeth, toileting, grooming, dressing, etc.)  - Assess/evaluate cause of self-care deficits   - Assess range of motion  - Assess patient's mobility; develop plan if impaired  - Assess patient's need for assistive devices and provide as appropriate  - Encourage maximum independence but intervene and supervise when necessary  - Involve family in performance of ADLs  - Assess for home care needs following discharge   - Consider OT consult to assist with ADL evaluation and planning for discharge  - Provide patient education as appropriate  - Monitor functional capacity and physical performance, use of AM PAC & JH-HLM   - Monitor gait, balance and fatigue with ambulation    Outcome: Progressing  Goal: Maintains/Returns to pre admission functional level  Description: INTERVENTIONS:  - Perform AM-PAC 6 Click Basic Mobility/ Daily Activity assessment daily.  - Set and communicate daily mobility goal to care team and patient/family/caregiver.   - Collaborate with rehabilitation services on mobility goals if consulted  - Perform Range of Motion 3 times a day.  -  Reposition patient every 2 hours.  - Dangle patient 3 times a day  - Stand patient 3 times a day  - Ambulate patient 3 times a day  - Out of bed to chair 3 times a day   - Out of bed for meals 3 times a day  - Out of bed for toileting  - Record patient progress and toleration of activity level   Outcome: Progressing     Problem: DISCHARGE PLANNING  Goal: Discharge to home or other facility with appropriate resources  Description: INTERVENTIONS:  - Identify barriers to discharge w/patient and caregiver  - Arrange for needed discharge resources and transportation as appropriate  - Identify discharge learning needs (meds, wound care, etc.)  - Arrange for interpretive services to assist at discharge as needed  - Refer to Case Management Department for coordinating discharge planning if the patient needs post-hospital services based on physician/advanced practitioner order or complex needs related to functional status, cognitive ability, or social support system  Outcome: Progressing     Problem: Knowledge Deficit  Goal: Patient/family/caregiver demonstrates understanding of disease process, treatment plan, medications, and discharge instructions  Description: Complete learning assessment and assess knowledge base.  Interventions:  - Provide teaching at level of understanding  - Provide teaching via preferred learning methods  Outcome: Progressing     Problem: METABOLIC, FLUID AND ELECTROLYTES - ADULT  Goal: Electrolytes maintained within normal limits  Description: INTERVENTIONS:  - Monitor labs and assess patient for signs and symptoms of electrolyte imbalances  - Administer electrolyte replacement as ordered  - Monitor response to electrolyte replacements, including repeat lab results as appropriate  - Instruct patient on fluid and nutrition as appropriate  Outcome: Progressing  Goal: Fluid balance maintained  Description: INTERVENTIONS:  - Monitor labs   - Monitor I/O and WT  - Instruct patient on fluid and nutrition  as appropriate  - Assess for signs & symptoms of volume excess or deficit  Outcome: Progressing  Goal: Glucose maintained within target range  Description: INTERVENTIONS:  - Monitor Blood Glucose as ordered  - Assess for signs and symptoms of hyperglycemia and hypoglycemia  - Administer ordered medications to maintain glucose within target range  - Assess nutritional intake and initiate nutrition service referral as needed  Outcome: Progressing

## 2025-06-24 NOTE — ASSESSMENT & PLAN NOTE
She is currently on IV insulin.  We did remove her insertion site as well as her sensor site.  There was no kink in the insertion site.  There was some blood around the filament of the sensor which could be the reason why it was not pairing with the insulin pump.  Once her supplies are available, she may resume her insulin pump along with the sensor.  Discontinue IV insulin 1 hour after insulin pump has been started.  Insulin orders have been placed.    Lab Results   Component Value Date    HGBA1C 8.7 (A) 03/05/2025       Recent Labs     06/24/25  1010 06/24/25  1157 06/24/25  1403 06/24/25  1557   POCGLU 245* 159* 235* 207*       Blood Sugar Average: Last 72 hrs:  (P) [id number]

## 2025-06-24 NOTE — TREATMENT PLAN
Endocrinology bridging her insulin drip to pump. Pending pump supplies. Due to hypoglycemia, nursing instructed to hold insulin drip for now and do hypoglycemic protocol. Please inform SLIM once glucose levels improve so her insulin drip could be restarted (non-dka protocol).

## 2025-06-24 NOTE — PROGRESS NOTES
"Progress Note - Critical Care/ICU   Name: Clarissa Webb 45 y.o. female I MRN: [id number]  Unit/Bed#: ICU 13 I Date of Admission: 6/23/2025   Date of Service: 6/24/2025 I Hospital Day: 1      Assessment & Plan  Diabetic ketoacidosis without coma associated with type 1 diabetes mellitus (HCC)  Lab Results   Component Value Date    HGBA1C 8.7 (A) 03/05/2025       Recent Labs     06/24/25  0210 06/24/25  0412 06/24/25  0621 06/24/25  0813   POCGLU 187* 158* 150* 124     Blood Sugar Average: Last 72 hrs:  (P) 214.3  Likely due to malfunctioning insulin pump  Presented with extreme fatigue, nausea and vomiting x 2 days  Glucose levels on admission 600  High anion gap met acidosis 2/2 DKA   Multiple DKA hospital admissions in past,most recent one on 10/21/23  Alert, anion gap closed twice, pending trial of PO intake today for breakfast  Glucose within goal, last glucose 160  S/p PO Mg oxide 800 mg  x 4 doses and IV 2g Mg x 1 dose  S/p KCL 40 mEq x 1 dose    Plan  To discontinue DKA protocol  Consider transitioning from insulin drip today  Goal glucose 100-200  Will trial PO intake today   Reassess hydration status pending repeat BMP  Optimize electrolytes  Cont Mg oxide 800 mg x 1 dose  To discontinue D5NS 0.45%  Discontinue Q4H BMP, Mg, phos  DKA resolution if: glucose 100-200, tolerating clear liquid diet alert and 2/3 bicarb >18, pH >7.3, anion gap <12  Endocrinology consulted pending recommendations    Type 1 diabetes mellitus with hyperglycemia (HCC)  Lab Results   Component Value Date    HGBA1C 8.7 (A) 03/05/2025     Recent Labs     06/24/25  0210 06/24/25  0412 06/24/25  0621 06/24/25  0813   POCGLU 187* 158* 150* 124     Blood Sugar Average: Last 72 hrs:  (P) 214.3  Uncontrolled DM, likely due to inadherence to home med  Home med: insulin pump  Endocrinology following    Plan  Refer to plan for \"DKA\"  Hypothyroidism  Stable  Home med: lecothyroxine 250 mg qd    Plan  Cont home med  Anxiety and " depression  Stable  Home med: trazadone 150 mg HS, lisdexamfetamine 50 mg, lexapro 20 mg, cariprazine 30 mg    Plan  Cont home med  Vitamin D deficiency  Vitamin D insufficiency  Last vit D level : 25.7 (4/12/25)  Home med: vit D2 50,000 units/week    Plan  Cont home med  Recheck in 3 months (7/12/25)  B12 deficiency  Last Vit B12: 201(4/12/25)- within normal limits   Home med: vitB12 1000 mcg qd    Plan  Cont home med  Chronic anemia  Stable   Following with heme/onc. Getting venofer infusions.   Home med: ferrous sulphate 325 mg qd    Plan  Cont home med  Disposition: Med Surg    ICU Core Measures     A: Assess, Prevent, and Manage Pain Has pain been assessed? Yes  Need for changes to pain regimen? No   B: Both SAT/SAT  N/A   C: Choice of Sedation RASS Goal: 0 Alert and Calm  Need for changes to sedation or analgesia regimen? No   D: Delirium CAM-ICU: Negative   E: Early Mobility  Plan for early mobility? Yes   F: Family Engagement Plan for family engagement today? No       Antibiotic Review: N/A      Prophylaxis:  VTE Contraindicated secondary to: Low Risk   Stress Ulcer  not ordered         24 Hour Events : None  Subjective  Patient states she feels better today, however still feeling fatigue. She is looking forward to eating breakfast this morning. She denies any abdominal pain or nausea anymore.       Objective :                   Vitals I/O      Most Recent Min/Max in 24hrs   Temp 97.6 °F (36.4 °C) Temp  Min: 97.4 °F (36.3 °C)  Max: 98.3 °F (36.8 °C)   Pulse 78 Pulse  Min: 64  Max: 96   Resp 14 Resp  Min: 12  Max: 37   /59 BP  Min: 77/43  Max: 116/60   O2 Sat 97 % SpO2  Min: 95 %  Max: 100 %      Intake/Output Summary (Last 24 hours) at 6/24/2025 0841  Last data filed at 6/24/2025 0618  Gross per 24 hour   Intake 7228.42 ml   Output 1900 ml   Net 5328.42 ml       Diet Tarik/CHO Controlled; Consistent Carbohydrate Diet Level 2 (5 carb servings/75 grams CHO/meal)    Invasive Monitoring           Physical  Exam   Physical Exam  Vitals and nursing note reviewed.   Eyes:      General: Vision grossly intact.      Extraocular Movements: Extraocular movements intact.      Conjunctiva/sclera: Conjunctivae normal.   Skin:     General: Skin is warm.   HENT:      Mouth/Throat:      Mouth: Mucous membranes are dry.   Cardiovascular:      Rate and Rhythm: Normal rate and regular rhythm.      Pulses: Normal pulses.      Heart sounds: Normal heart sounds.   Musculoskeletal:         General: Normal range of motion.      Right lower leg: No edema.      Left lower leg: No edema.   Abdominal: General: Bowel sounds are normal.      Palpations: Abdomen is soft.      Tenderness: There is no abdominal tenderness.   Constitutional:       Appearance: She is well-developed. She is not ill-appearing.   Pulmonary:      Effort: Pulmonary effort is normal.      Breath sounds: Normal breath sounds.   Neurological:      General: No focal deficit present.      Mental Status: She is alert and oriented to person, place and time. Mental status is at baseline. She is calm.          Diagnostic Studies        Lab Results: I have reviewed the following results:     Medications:  Scheduled PRN   amphetamine-dextroamphetamine, 20 mg, Daily  ascorbic acid, 500 mg, Daily  cyanocobalamin, 1,000 mcg, Daily  [START ON 6/29/2025] ergocalciferol, 50,000 Units, Weekly  escitalopram, 20 mg, Daily  ferrous sulfate, 325 mg, Every Other Day  folic acid, 1 mg, Daily  gabapentin, 400 mg, TID  levothyroxine, 250 mcg, Early Morning  magnesium Oxide, ,   thiamine, 100 mg, Daily  traZODone, 150 mg, HS      magnesium Oxide, ,   ondansetron, 4 mg, Q6H PRN       Continuous    dextrose 5 % and sodium chloride 0.45 %, 250 mL/hr, Last Rate: Stopped (06/24/25 0357)  insulin regular (HumuLIN R,NovoLIN R) 1 Units/mL in sodium chloride 0.9 % 100 mL infusion, 0.3-21 Units/hr, Last Rate: 0.5 Units/hr (06/24/25 3716)  norepinephrine, 1-30 mcg/min, Last Rate: Stopped (06/24/25 0618)          Labs:   CBC    Recent Labs     06/23/25  1018   WBC 6.54   HGB 12.2   HCT 39.4        BMP    Recent Labs     06/24/25  0211 06/24/25  0622   SODIUM 135 137   K 4.3 4.0    109*   CO2 24 24   AGAP 4 4   BUN 10 9   CREATININE 0.75 0.68   CALCIUM 8.4 8.2*       Coags    Recent Labs     06/23/25  1018   INR 0.96   PTT 28        Additional Electrolytes  Recent Labs     06/24/25  0211 06/24/25  0622   MG 1.8* 1.8*   PHOS 3.4 2.7          Blood Gas    No recent results  Recent Labs     06/23/25  1018   PHVEN 7.203*   LYT8ZPS 41.7*   PO2VEN 40.7   YNV1ERX 16.0*   BEVEN -11.4   [id number] 67.3    LFTs  Recent Labs     06/23/25  1018   ALT 40   AST 50*   ALKPHOS 94   ALB 3.9   TBILI 1.07*       Infectious  No recent results  Glucose  Recent Labs     06/23/25  1938 06/23/25  2159 06/24/25  0211 06/24/25  0622   GLUC 132 172* 194* 160*

## 2025-06-24 NOTE — ASSESSMENT & PLAN NOTE
Lab Results   Component Value Date    HGBA1C 8.7 (A) 03/05/2025       Recent Labs     06/24/25  0210 06/24/25  0412 06/24/25  0621 06/24/25  0813   POCGLU 187* 158* 150* 124     Blood Sugar Average: Last 72 hrs:  (P) 214.3  Likely due to malfunctioning insulin pump  Presented with extreme fatigue, nausea and vomiting x 2 days  Glucose levels on admission 600  High anion gap met acidosis 2/2 DKA   Multiple DKA hospital admissions in past,most recent one on 10/21/23  Alert, anion gap closed twice, pending trial of PO intake today for breakfast  Glucose within goal, last glucose 160  S/p PO Mg oxide 800 mg  x 4 doses and IV 2g Mg x 1 dose  S/p KCL 40 mEq x 1 dose    Plan  To discontinue DKA protocol  Consider transitioning from insulin drip today  Goal glucose 100-200  Will trial PO intake today   Reassess hydration status pending repeat BMP  Optimize electrolytes  Cont Mg oxide 800 mg x 1 dose  To discontinue D5NS 0.45%  Discontinue Q4H BMP, Mg, phos  DKA resolution if: glucose 100-200, tolerating clear liquid diet alert and 2/3 bicarb >18, pH >7.3, anion gap <12  Endocrinology consulted pending recommendations

## 2025-06-24 NOTE — PLAN OF CARE
Problem: PAIN - ADULT  Goal: Verbalizes/displays adequate comfort level or baseline comfort level  Description: Interventions:  - Encourage patient to monitor pain and request assistance  - Assess pain using appropriate pain scale  - Administer analgesics as ordered based on type and severity of pain and evaluate response  - Implement non-pharmacological measures as appropriate and evaluate response  - Consider cultural and social influences on pain and pain management  - Notify physician/advanced practitioner if interventions unsuccessful or patient reports new pain  - Educate patient/family on pain management process including their role and importance of  reporting pain   - Provide non-pharmacologic/complimentary pain relief interventions  Outcome: Progressing     Problem: INFECTION - ADULT  Goal: Absence or prevention of progression during hospitalization  Description: INTERVENTIONS:  - Assess and monitor for signs and symptoms of infection  - Monitor lab/diagnostic results  - Monitor all insertion sites, i.e. indwelling lines, tubes, and drains  - Monitor endotracheal if appropriate and nasal secretions for changes in amount and color  - [place] appropriate cooling/warming therapies per order  - Administer medications as ordered  - Instruct and encourage patient and family to use good hand hygiene technique  - Identify and instruct in appropriate isolation precautions for identified infection/condition  Outcome: Progressing  Goal: Absence of fever/infection during neutropenic period  Description: INTERVENTIONS:  - Monitor WBC  - Perform strict hand hygiene  - Limit to healthy visitors only  - No plants, dried, fresh or silk flowers with eldridge in patient room  Outcome: Progressing     Problem: SAFETY ADULT  Goal: Patient will remain free of falls  Description: INTERVENTIONS:  - Educate patient/family on patient safety including physical limitations  - Instruct patient to call for assistance with activity   -  Consider consulting OT/PT to assist with strengthening/mobility based on AM PAC & JH-HLM score  - Consult OT/PT to assist with strengthening/mobility   - Keep Call bell within reach  - Keep bed low and locked with side rails adjusted as appropriate  - Keep care items and personal belongings within reach  - Initiate and maintain comfort rounds  - Apply yellow socks and bracelet for high fall risk patients  - Consider moving patient to room near nurses station  Outcome: Progressing     Problem: DISCHARGE PLANNING  Goal: Discharge to home or other facility with appropriate resources  Description: INTERVENTIONS:  - Identify barriers to discharge w/patient and caregiver  - Arrange for needed discharge resources and transportation as appropriate  - Identify discharge learning needs (meds, wound care, etc.)  - Arrange for interpretive services to assist at discharge as needed  - Refer to Case Management Department for coordinating discharge planning if the patient needs post-hospital services based on physician/advanced practitioner order or complex needs related to functional status, cognitive ability, or social support system  Outcome: Progressing     Problem: Knowledge Deficit  Goal: Patient/family/caregiver demonstrates understanding of disease process, treatment plan, medications, and discharge instructions  Description: Complete learning assessment and assess knowledge base.  Interventions:  - Provide teaching at level of understanding  - Provide teaching via preferred learning methods  Outcome: Progressing     Problem: METABOLIC, FLUID AND ELECTROLYTES - ADULT  Goal: Electrolytes maintained within normal limits  Description: INTERVENTIONS:  - Monitor labs and assess patient for signs and symptoms of electrolyte imbalances  - Administer electrolyte replacement as ordered  - Monitor response to electrolyte replacements, including repeat lab results as appropriate  - Instruct patient on fluid and nutrition as  appropriate  Outcome: Progressing  Goal: Fluid balance maintained  Description: INTERVENTIONS:  - Monitor labs   - Monitor I/O and WT  - Instruct patient on fluid and nutrition as appropriate  - Assess for signs & symptoms of volume excess or deficit  Outcome: Progressing  Goal: Glucose maintained within target range  Description: INTERVENTIONS:  - Monitor Blood Glucose as ordered  - Assess for signs and symptoms of hyperglycemia and hypoglycemia  - Administer ordered medications to maintain glucose within target range  - Assess nutritional intake and initiate nutrition service referral as needed  Outcome: Progressing

## 2025-06-24 NOTE — ASSESSMENT & PLAN NOTE
"Lab Results   Component Value Date    HGBA1C 8.7 (A) 03/05/2025     Recent Labs     06/24/25  0210 06/24/25  0412 06/24/25  0621 06/24/25  0813   POCGLU 187* 158* 150* 124     Blood Sugar Average: Last 72 hrs:  (P) 214.3  Uncontrolled DM, likely due to inadherence to home med  Home med: insulin pump  Endocrinology following    Plan  Refer to plan for \"DKA\"  "

## 2025-06-24 NOTE — ASSESSMENT & PLAN NOTE
DKA has resolved.    Lab Results   Component Value Date    HGBA1C 8.7 (A) 03/05/2025       Recent Labs     06/24/25  1010 06/24/25  1157 06/24/25  1403 06/24/25  1557   POCGLU 245* 159* 235* 207*       Blood Sugar Average: Last 72 hrs:  (P) 213.7658466062071919

## 2025-06-24 NOTE — PLAN OF CARE
Problem: PAIN - ADULT  Goal: Verbalizes/displays adequate comfort level or baseline comfort level  Description: Interventions:  - Encourage patient to monitor pain and request assistance  - Assess pain using appropriate pain scale  - Administer analgesics as ordered based on type and severity of pain and evaluate response  - Implement non-pharmacological measures as appropriate and evaluate response  - Consider cultural and social influences on pain and pain management  - Notify physician/advanced practitioner if interventions unsuccessful or patient reports new pain  - Educate patient/family on pain management process including their role and importance of  reporting pain   - Provide non-pharmacologic/complimentary pain relief interventions  6/24/2025 0542 by Kristopher Traylor RN  Outcome: Progressing  6/23/2025 2029 by Kristopher Traylor RN  Outcome: Progressing     Problem: INFECTION - ADULT  Goal: Absence or prevention of progression during hospitalization  Description: INTERVENTIONS:  - Assess and monitor for signs and symptoms of infection  - Monitor lab/diagnostic results  - Monitor all insertion sites, i.e. indwelling lines, tubes, and drains  - Monitor endotracheal if appropriate and nasal secretions for changes in amount and color  - [place] appropriate cooling/warming therapies per order  - Administer medications as ordered  - Instruct and encourage patient and family to use good hand hygiene technique  - Identify and instruct in appropriate isolation precautions for identified infection/condition  6/24/2025 0542 by Kristopher Traylor RN  Outcome: Progressing  6/23/2025 2029 by Kristopher Traylor RN  Outcome: Progressing  Goal: Absence of fever/infection during neutropenic period  Description: INTERVENTIONS:  - Monitor WBC  - Perform strict hand hygiene  - Limit to healthy visitors only  - No plants, dried, fresh or silk flowers with eldridge in patient room  6/24/2025 0542 by Kristopher Traylor RN  Outcome:  Progressing  6/23/2025 2029 by Kristopher Traylor RN  Outcome: Progressing     Problem: SAFETY ADULT  Goal: Patient will remain free of falls  Description: INTERVENTIONS:  - Educate patient/family on patient safety including physical limitations  - Instruct patient to call for assistance with activity   - Consider consulting OT/PT to assist with strengthening/mobility based on AM PAC & JH-HLM score  - Consult OT/PT to assist with strengthening/mobility   - Keep Call bell within reach  - Keep bed low and locked with side rails adjusted as appropriate  - Keep care items and personal belongings within reach  - Initiate and maintain comfort rounds  - Make Fall Risk Sign visible to staff  - Offer Toileting every 2 Hours, in advance of need  - Initiate/Maintain bed alarm  - Apply yellow socks and bracelet for high fall risk patients  - Consider moving patient to room near nurses station  6/24/2025 0542 by Kristopher Traylor RN  Outcome: Progressing  6/23/2025 2029 by Kristopher Traylor RN  Outcome: Progressing  Goal: Maintain or return to baseline ADL function  Description: INTERVENTIONS:  -  Assess patient's ability to carry out ADLs; assess patient's baseline for ADL function and identify physical deficits which impact ability to perform ADLs (bathing, care of mouth/teeth, toileting, grooming, dressing, etc.)  - Assess/evaluate cause of self-care deficits   - Assess range of motion  - Assess patient's mobility; develop plan if impaired  - Assess patient's need for assistive devices and provide as appropriate  - Encourage maximum independence but intervene and supervise when necessary  - Involve family in performance of ADLs  - Assess for home care needs following discharge   - Consider OT consult to assist with ADL evaluation and planning for discharge  - Provide patient education as appropriate  - Monitor functional capacity and physical performance, use of AM PAC & JH-HLM   - Monitor gait, balance and fatigue with  ambulation    6/24/2025 0542 by Kristopher Traylor RN  Outcome: Progressing  6/23/2025 2029 by Kristopher Traylor RN  Outcome: Progressing  Goal: Maintains/Returns to pre admission functional level  Description: INTERVENTIONS:  - Perform AM-PAC 6 Click Basic Mobility/ Daily Activity assessment daily.  - Set and communicate daily mobility goal to care team and patient/family/caregiver.   - Collaborate with rehabilitation services on mobility goals if consulted  - Perform Range of Motion 3 times a day.  - Reposition patient every 2 hours.  - Dangle patient 3 times a day  - Stand patient 3 times a day  - Ambulate patient 3 times a day  - Out of bed to chair 3 times a day   - Out of bed for meals 3 times a day  - Out of bed for toileting  - Record patient progress and toleration of activity level   6/24/2025 0542 by Kristopher Traylor RN  Outcome: Progressing  6/23/2025 2029 by Kristopher Traylor RN  Outcome: Progressing     Problem: DISCHARGE PLANNING  Goal: Discharge to home or other facility with appropriate resources  Description: INTERVENTIONS:  - Identify barriers to discharge w/patient and caregiver  - Arrange for needed discharge resources and transportation as appropriate  - Identify discharge learning needs (meds, wound care, etc.)  - Arrange for interpretive services to assist at discharge as needed  - Refer to Case Management Department for coordinating discharge planning if the patient needs post-hospital services based on physician/advanced practitioner order or complex needs related to functional status, cognitive ability, or social support system  6/24/2025 0542 by Kristopher Traylor RN  Outcome: Progressing  6/23/2025 2029 by Kristopher Traylor RN  Outcome: Progressing     Problem: Knowledge Deficit  Goal: Patient/family/caregiver demonstrates understanding of disease process, treatment plan, medications, and discharge instructions  Description: Complete learning assessment and assess knowledge base.  Interventions:  - Provide  teaching at level of understanding  - Provide teaching via preferred learning methods  6/24/2025 0542 by Krisotpher Traylor RN  Outcome: Progressing  6/23/2025 2029 by Kristopher Traylor RN  Outcome: Progressing     Problem: METABOLIC, FLUID AND ELECTROLYTES - ADULT  Goal: Electrolytes maintained within normal limits  Description: INTERVENTIONS:  - Monitor labs and assess patient for signs and symptoms of electrolyte imbalances  - Administer electrolyte replacement as ordered  - Monitor response to electrolyte replacements, including repeat lab results as appropriate  - Instruct patient on fluid and nutrition as appropriate  6/24/2025 0542 by Kristopher Traylor RN  Outcome: Progressing  6/23/2025 2029 by Kristopher Traylor RN  Outcome: Progressing  Goal: Fluid balance maintained  Description: INTERVENTIONS:  - Monitor labs   - Monitor I/O and WT  - Instruct patient on fluid and nutrition as appropriate  - Assess for signs & symptoms of volume excess or deficit  6/24/2025 0542 by Kristopher Traylor RN  Outcome: Progressing  6/23/2025 2029 by Kristopher Traylor RN  Outcome: Progressing  Goal: Glucose maintained within target range  Description: INTERVENTIONS:  - Monitor Blood Glucose as ordered  - Assess for signs and symptoms of hyperglycemia and hypoglycemia  - Administer ordered medications to maintain glucose within target range  - Assess nutritional intake and initiate nutrition service referral as needed  6/24/2025 0542 by Kristopher Traylor RN  Outcome: Progressing  6/23/2025 2029 by Kristopher Traylor RN  Outcome: Progressing

## 2025-06-24 NOTE — NURSING NOTE
Patient was able to connect and start her new continuous insulin pump at 1835. Insulin gtt disconnected and stopped.

## 2025-06-25 ENCOUNTER — TRANSITIONAL CARE MANAGEMENT (OUTPATIENT)
Dept: FAMILY MEDICINE CLINIC | Facility: CLINIC | Age: 45
End: 2025-06-25

## 2025-06-25 ENCOUNTER — HOSPITAL ENCOUNTER (OUTPATIENT)
Dept: INFUSION CENTER | Facility: HOSPITAL | Age: 45
Discharge: HOME | End: 2025-06-25
Attending: INTERNAL MEDICINE
Payer: COMMERCIAL

## 2025-06-25 ENCOUNTER — HOSPITAL ENCOUNTER (OUTPATIENT)
Dept: INFUSION CENTER | Facility: HOSPITAL | Age: 45
Discharge: HOME | End: 2025-06-25
Attending: INTERNAL MEDICINE

## 2025-06-25 VITALS
DIASTOLIC BLOOD PRESSURE: 92 MMHG | HEART RATE: 81 BPM | TEMPERATURE: 97.9 F | SYSTOLIC BLOOD PRESSURE: 152 MMHG | RESPIRATION RATE: 18 BRPM

## 2025-06-25 VITALS
WEIGHT: 167.55 LBS | RESPIRATION RATE: 22 BRPM | DIASTOLIC BLOOD PRESSURE: 83 MMHG | TEMPERATURE: 98 F | HEART RATE: 80 BPM | BODY MASS INDEX: 26.3 KG/M2 | HEIGHT: 67 IN | SYSTOLIC BLOOD PRESSURE: 123 MMHG | OXYGEN SATURATION: 97 %

## 2025-06-25 DIAGNOSIS — E53.8 B12 DEFICIENCY: ICD-10-CM

## 2025-06-25 DIAGNOSIS — D50.8 OTHER IRON DEFICIENCY ANEMIA: Primary | ICD-10-CM

## 2025-06-25 LAB
ANION GAP SERPL CALCULATED.3IONS-SCNC: 7 MMOL/L (ref 4–13)
BUN SERPL-MCNC: 8 MG/DL (ref 5–25)
CALCIUM SERPL-MCNC: 8.4 MG/DL (ref 8.4–10.2)
CHLORIDE SERPL-SCNC: 108 MMOL/L (ref 96–108)
CO2 SERPL-SCNC: 25 MMOL/L (ref 21–32)
CREAT SERPL-MCNC: 0.75 MG/DL (ref 0.6–1.3)
EXT GLUCOSE BLD: 136
EXT GLUCOSE BLD: 165
GFR SERPL CREATININE-BSD FRML MDRD: 97 ML/MIN/1.73SQ M
GLUCOSE SERPL-MCNC: 253 MG/DL (ref 65–140)
GLUCOSE SERPL-MCNC: 74 MG/DL (ref 65–140)
MAGNESIUM SERPL-MCNC: 1.6 MG/DL (ref 1.9–2.7)
POTASSIUM SERPL-SCNC: 4.3 MMOL/L (ref 3.5–5.3)
SODIUM SERPL-SCNC: 140 MMOL/L (ref 135–147)

## 2025-06-25 PROCEDURE — 80048 BASIC METABOLIC PNL TOTAL CA: CPT | Performed by: STUDENT IN AN ORGANIZED HEALTH CARE EDUCATION/TRAINING PROGRAM

## 2025-06-25 PROCEDURE — 83735 ASSAY OF MAGNESIUM: CPT

## 2025-06-25 PROCEDURE — 96365 THER/PROPH/DIAG IV INF INIT: CPT

## 2025-06-25 PROCEDURE — 99239 HOSP IP/OBS DSCHRG MGMT >30: CPT | Performed by: STUDENT IN AN ORGANIZED HEALTH CARE EDUCATION/TRAINING PROGRAM

## 2025-06-25 PROCEDURE — 96372 THER/PROPH/DIAG INJ SC/IM: CPT

## 2025-06-25 PROCEDURE — 82948 REAGENT STRIP/BLOOD GLUCOSE: CPT

## 2025-06-25 RX ORDER — SODIUM CHLORIDE 9 MG/ML
20 INJECTION, SOLUTION INTRAVENOUS ONCE
OUTPATIENT
Start: 2025-06-27

## 2025-06-25 RX ORDER — CYANOCOBALAMIN 1000 UG/ML
1000 INJECTION, SOLUTION INTRAMUSCULAR; SUBCUTANEOUS ONCE
OUTPATIENT
Start: 2025-06-27 | End: 2025-06-27

## 2025-06-25 RX ORDER — CYANOCOBALAMIN 1000 UG/ML
1000 INJECTION, SOLUTION INTRAMUSCULAR; SUBCUTANEOUS ONCE
Status: COMPLETED | OUTPATIENT
Start: 2025-06-25 | End: 2025-06-25

## 2025-06-25 RX ORDER — MAGNESIUM SULFATE HEPTAHYDRATE 40 MG/ML
2 INJECTION, SOLUTION INTRAVENOUS ONCE
Status: COMPLETED | OUTPATIENT
Start: 2025-06-25 | End: 2025-06-25

## 2025-06-25 RX ORDER — SODIUM CHLORIDE 9 MG/ML
20 INJECTION, SOLUTION INTRAVENOUS ONCE
Status: COMPLETED | OUTPATIENT
Start: 2025-06-25 | End: 2025-06-25

## 2025-06-25 RX ADMIN — GABAPENTIN 400 MG: 400 CAPSULE ORAL at 08:39

## 2025-06-25 RX ADMIN — ESCITALOPRAM OXALATE 20 MG: 20 TABLET, FILM COATED ORAL at 08:39

## 2025-06-25 RX ADMIN — FOLIC ACID 1 MG: 1 TABLET ORAL at 08:39

## 2025-06-25 RX ADMIN — CYANOCOBALAMIN 1000 MCG: 1000 INJECTION INTRAMUSCULAR; SUBCUTANEOUS at 15:00

## 2025-06-25 RX ADMIN — Medication 1000 MCG: at 08:39

## 2025-06-25 RX ADMIN — Medication 100 MG: at 08:39

## 2025-06-25 RX ADMIN — MAGNESIUM SULFATE HEPTAHYDRATE 2 G: 40 INJECTION, SOLUTION INTRAVENOUS at 09:30

## 2025-06-25 RX ADMIN — DEXTROAMPHETAMINE SACCHARATE, AMPHETAMINE ASPARTATE, DEXTROAMPHETAMINE SULFATE AND AMPHETAMINE SULFATE 20 MG: 2.5; 2.5; 2.5; 2.5 TABLET ORAL at 08:39

## 2025-06-25 RX ADMIN — SODIUM CHLORIDE 20 ML/HR: 9 INJECTION, SOLUTION INTRAVENOUS at 15:08

## 2025-06-25 RX ADMIN — IRON SUCROSE 200 MG: 20 INJECTION, SOLUTION INTRAVENOUS at 15:09

## 2025-06-25 RX ADMIN — OXYCODONE HYDROCHLORIDE AND ACETAMINOPHEN 500 MG: 500 TABLET ORAL at 08:39

## 2025-06-25 RX ADMIN — LEVOTHYROXINE SODIUM 250 MCG: 0.12 TABLET ORAL at 05:01

## 2025-06-25 NOTE — ASSESSMENT & PLAN NOTE
Lab Results   Component Value Date    HGBA1C 8.0 (H) 06/24/2025       Recent Labs     06/24/25 2000 06/24/25  2109 06/24/25 2214 06/25/25  0206   POCGLU 178* 233* 184* 74       Blood Sugar Average: Last 72 hrs:  (P) 198.9

## 2025-06-25 NOTE — PLAN OF CARE
Problem: PAIN - ADULT  Goal: Verbalizes/displays adequate comfort level or baseline comfort level  Description: Interventions:  - Encourage patient to monitor pain and request assistance  - Assess pain using appropriate pain scale  - Administer analgesics as ordered based on type and severity of pain and evaluate response  - Implement non-pharmacological measures as appropriate and evaluate response  - Consider cultural and social influences on pain and pain management  - Notify physician/advanced practitioner if interventions unsuccessful or patient reports new pain  - Educate patient/family on pain management process including their role and importance of  reporting pain   - Provide non-pharmacologic/complimentary pain relief interventions  Outcome: Progressing     Problem: INFECTION - ADULT  Goal: Absence or prevention of progression during hospitalization  Description: INTERVENTIONS:  - Assess and monitor for signs and symptoms of infection  - Monitor lab/diagnostic results  - Monitor all insertion sites, i.e. indwelling lines, tubes, and drains  - Monitor endotracheal if appropriate and nasal secretions for changes in amount and color  - [place] appropriate cooling/warming therapies per order  - Administer medications as ordered  - Instruct and encourage patient and family to use good hand hygiene technique  - Identify and instruct in appropriate isolation precautions for identified infection/condition  Outcome: Progressing  Goal: Absence of fever/infection during neutropenic period  Description: INTERVENTIONS:  - Monitor WBC  - Perform strict hand hygiene  - Limit to healthy visitors only  - No plants, dried, fresh or silk flowers with eldrigde in patient room  Outcome: Progressing     Problem: SAFETY ADULT  Goal: Patient will remain free of falls  Description: INTERVENTIONS:  - Educate patient/family on patient safety including physical limitations  - Instruct patient to call for assistance with activity   -  Consider consulting OT/PT to assist with strengthening/mobility based on AM PAC & JH-HLM score  - Consult OT/PT to assist with strengthening/mobility   - Keep Call bell within reach  - Keep bed low and locked with side rails adjusted as appropriate  - Keep care items and personal belongings within reach  - Initiate and maintain comfort rounds  - Make Fall Risk Sign visible to staff  - Offer Toileting every 2 Hours, in advance of need  - Initiate/Maintain bed alarm  - Obtain necessary fall risk management equipment  - Apply yellow socks and bracelet for high fall risk patients  - Consider moving patient to room near nurses station  Outcome: Progressing  Goal: Maintain or return to baseline ADL function  Description: INTERVENTIONS:  -  Assess patient's ability to carry out ADLs; assess patient's baseline for ADL function and identify physical deficits which impact ability to perform ADLs (bathing, care of mouth/teeth, toileting, grooming, dressing, etc.)  - Assess/evaluate cause of self-care deficits   - Assess range of motion  - Assess patient's mobility; develop plan if impaired  - Assess patient's need for assistive devices and provide as appropriate  - Encourage maximum independence but intervene and supervise when necessary  - Involve family in performance of ADLs  - Assess for home care needs following discharge   - Consider OT consult to assist with ADL evaluation and planning for discharge  - Provide patient education as appropriate  - Monitor functional capacity and physical performance, use of AM PAC & JH-HLM   - Monitor gait, balance and fatigue with ambulation    Outcome: Progressing  Goal: Maintains/Returns to pre admission functional level  Description: INTERVENTIONS:  - Perform AM-PAC 6 Click Basic Mobility/ Daily Activity assessment daily.  - Set and communicate daily mobility goal to care team and patient/family/caregiver.   - Collaborate with rehabilitation services on mobility goals if consulted  -  Perform Range of Motion 3 times a day.  - Reposition patient every 2 hours.  - Dangle patient 3 times a day  - Stand patient 3 times a day  - Ambulate patient 3 times a day  - Out of bed to chair 3 times a day   - Out of bed for meals 3 times a day  - Out of bed for toileting  - Record patient progress and toleration of activity level   Outcome: Progressing     Problem: DISCHARGE PLANNING  Goal: Discharge to home or other facility with appropriate resources  Description: INTERVENTIONS:  - Identify barriers to discharge w/patient and caregiver  - Arrange for needed discharge resources and transportation as appropriate  - Identify discharge learning needs (meds, wound care, etc.)  - Arrange for interpretive services to assist at discharge as needed  - Refer to Case Management Department for coordinating discharge planning if the patient needs post-hospital services based on physician/advanced practitioner order or complex needs related to functional status, cognitive ability, or social support system  Outcome: Progressing     Problem: Knowledge Deficit  Goal: Patient/family/caregiver demonstrates understanding of disease process, treatment plan, medications, and discharge instructions  Description: Complete learning assessment and assess knowledge base.  Interventions:  - Provide teaching at level of understanding  - Provide teaching via preferred learning methods  Outcome: Progressing     Problem: METABOLIC, FLUID AND ELECTROLYTES - ADULT  Goal: Electrolytes maintained within normal limits  Description: INTERVENTIONS:  - Monitor labs and assess patient for signs and symptoms of electrolyte imbalances  - Administer electrolyte replacement as ordered  - Monitor response to electrolyte replacements, including repeat lab results as appropriate  - Instruct patient on fluid and nutrition as appropriate  Outcome: Progressing  Goal: Fluid balance maintained  Description: INTERVENTIONS:  - Monitor labs   - Monitor I/O and  WT  - Instruct patient on fluid and nutrition as appropriate  - Assess for signs & symptoms of volume excess or deficit  Outcome: Progressing  Goal: Glucose maintained within target range  Description: INTERVENTIONS:  - Monitor Blood Glucose as ordered  - Assess for signs and symptoms of hyperglycemia and hypoglycemia  - Administer ordered medications to maintain glucose within target range  - Assess nutritional intake and initiate nutrition service referral as needed  Outcome: Progressing

## 2025-06-25 NOTE — NURSING NOTE
Blood sugar is trending up.  Insulin pump and Guardian sensor connected and working properly.  Pt does not want to bolus insulin via pump tonight due to history of low BG in the morning. SLIM aware.  Will continue to monitor.

## 2025-06-25 NOTE — RESTORATIVE TECHNICIAN NOTE
Restorative Technician Note      Patient Name: Clarissa RAMIREZ Galinamoise     Restorative Tech Visit Date: 06/25/25  Note Type: Mobility  Patient Position Upon Consult: Bedside chair  Activity Performed: Ambulated  Patient Position at End of Consult: All needs within reach; Bedside chair

## 2025-06-25 NOTE — NURSING NOTE
AVS reviewed with pt and understanding of instructions expressed. PT dc'd walking to hospital exit independently

## 2025-06-26 ENCOUNTER — PATIENT OUTREACH (OUTPATIENT)
Dept: CASE MANAGEMENT | Facility: HOSPITAL | Age: 45
End: 2025-06-26

## 2025-06-26 ENCOUNTER — HOSPITAL ENCOUNTER (OUTPATIENT)
Dept: INFUSION CENTER | Facility: HOSPITAL | Age: 45
Discharge: HOME | End: 2025-06-26
Attending: INTERNAL MEDICINE

## 2025-06-26 ENCOUNTER — PATIENT OUTREACH (OUTPATIENT)
Dept: CASE MANAGEMENT | Facility: OTHER | Age: 45
End: 2025-06-26

## 2025-06-26 NOTE — DISCHARGE SUMMARY
Discharge Summary - Hospitalist   Name: Clarissa Webb 45 y.o. female I MRN: [id number]  Unit/Bed#: [place] [address] -01 I Date of Admission: 6/23/2025   Date of Service: 6/25/2025 I Hospital Day: 2     Assessment & Plan  Diabetic ketoacidosis without coma associated with type 1 diabetes mellitus (HCC)  Lab Results   Component Value Date    HGBA1C 8.0 (H) 06/24/2025       Recent Labs     06/24/25 2000 06/24/25 2109 06/24/25 2214 06/25/25  0206   POCGLU 178* 233* 184* 74       Blood Sugar Average: Last 72 hrs:  (P) 198.9    Chronic anemia  Stable   Following with heme/onc. Getting venofer infusions.   Home med: ferrous sulphate 325 mg qd  Vitamin D deficiency  Vitamin D insufficiency  Last vit D level : 25.7 (4/12/25)  Home med: vit D2 50,000 units/week  B12 deficiency  Last Vit B12: 201(4/12/25)- within normal limits   Home med: vitB12 1000 mcg qd  Anxiety and depression  Stable  Home med: trazadone 150 mg HS, lisdexamfetamine 50 mg, lexapro 20 mg, cariprazine 30 mg     Hypothyroidism  Stable  Home med: lecothyroxine 250 mg qd  Type 1 diabetes mellitus with hyperglycemia (HCC)  Lab Results   Component Value Date    HGBA1C 8.0 (H) 06/24/2025       Recent Labs     06/24/25 2000 06/24/25 2109 06/24/25 2214 06/25/25  0206   POCGLU 178* 233* 184* 74       Blood Sugar Average: Last 72 hrs:  (P) 198.9       Medical Problems       Resolved Problems  Date Reviewed: 6/3/2025   None       Discharging Physician / Practitioner: Joycelyn Morales MD  PCP: Live Wallace MD  Admission Date:   Admission Orders (From admission, onward)       Ordered        06/23/25 1121  INPATIENT ADMISSION  Once                          Discharge Date: 06/25/25    Next Steps for Physician/AP Assuming Care:    Test Results Pending at Discharge (will require follow up):  none    Medication Changes for Discharge & Rationale:   See after visit summary for reconciled discharge medications provided to patient and/or family.      Consultations During Hospital Stay:  endocrinology      Hospital Course:   Clarissa Webb is a 45 y.o. female patient who originally presented to the hospital on 6/23/2025 due to  extreme fatigue for past 2 days. She states it  associated with nausea, vomittig, mild generalized abdominal pain and diarrhea. She denies fever, recent illness or sick contacts.       She is unsure for how long it had not been working for but notes over past 2 days her insulin pump wasn't displaying any readings but was blank. She reports she has used it before for over 10 years and that this particular insulin pump was about a month old. Usual glucose readings at home ranged between 100-110 on average. She was found to be in DKA and admitted under ICU care and started on insulin drip. Endocrinology was consulted. She was aggressively hydrated and her labs normalized. Her insulin pump and supplies were brought in and she was transitioned to her pump with stable blood sugars. She was discharged home in stable condition. Pt to follow up with pcp within 1-2 weeks.         Please see above list of diagnoses and related plan for additional information.     Discharge Day Visit / Exam:     Eyes:      General: Vision grossly intact.      Extraocular Movements: Extraocular movements intact.      Conjunctiva/sclera: Conjunctivae normal.   Skin:     General: Skin is warm.   HENT:      Mouth/Throat:      Mouth: Mucous membranes are moist   Cardiovascular:      Rate and Rhythm: Normal rate and regular rhythm.      Pulses: Normal pulses.      Heart sounds: Normal heart sounds.   Musculoskeletal:      Right lower leg: No edema.      Left lower leg: No edema.      Comments: In bed during examination   Abdominal: General: Bowel sounds are normal.      Palpations: Abdomen is soft.   Constitutional:       Appearance: She is well-developed and well-nourished.    Pulmonary:      Effort: Pulmonary effort is normal.      Breath sounds: Normal breath  sounds.   Neurological:      General: No focal deficit present.      Mental Status: She is alert and oriented to person, place and time. Mental status is at baseline.      Sensory: Sensation is intact.    Discharge instructions/Information to patient and family:   See after visit summary for information provided to patient and family.      Provisions for Follow-Up Care:  See after visit summary for information related to follow-up care and any pertinent home health orders.      Mobility at time of Discharge:   Basic Mobility Inpatient Raw Score: 24  JH-HLM Goal: 8: Walk 250 feet or more  JH-HLM Achieved: 8: Walk 250 feet ot more       Disposition:   Home    Planned Readmission: no    Administrative Statements   Discharge Statement:  I have spent a total time of 50 minutes in caring for this patient on the day of the visit/encounter. >30 minutes of time was spent on: Diagnostic results, Impressions, Documenting in the medical record, Reviewing / ordering tests, medicine, procedures  , and Communicating with other healthcare professionals .    **Please Note: This note may have been constructed using a voice recognition system**

## 2025-06-26 NOTE — ASSESSMENT & PLAN NOTE
Stable  Home med: trazadone 150 mg HS, lisdexamfetamine 50 mg, lexapro 20 mg, cariprazine 30 mg

## 2025-06-26 NOTE — PROGRESS NOTES
Care Management referral received via report for DKA. Pt admitted to [facility] [place] [place] 6/23-6/25/25 for Diabetic ketoacidosis without coma associated with type 1 diabetes mellitus.  Chart review performed.    PMH: DM1-8.0, A/Dep, THO, h/o gastric bypass, Vit D def, ADHD      Attempted outreach to pt, left voicemail with contact name and number to return call.            F/u Apts:  [facility] 6/26  PCP 7/8

## 2025-06-26 NOTE — ASSESSMENT & PLAN NOTE
Stable   Following with heme/onc. Getting venofer infusions.   Home med: ferrous sulphate 325 mg qd

## 2025-06-26 NOTE — UTILIZATION REVIEW
NOTIFICATION OF ADMISSION DISCHARGE   This is a Notification of Discharge from [facility]. Please be advised that this patient has been discharge from our facility. Below you will find the admission and discharge date and time including the patient’s disposition.   UTILIZATION REVIEW CONTACT:  Utilization Review Assistants  Network Utilization Review Department  Phone: [phone] x carefully listen to the prompts. All voicemails are confidential.  Email: [email or web address]     ADMISSION INFORMATION  PRESENTATION DATE: 6/23/2025  9:58 AM  OBERVATION ADMISSION DATE: N/A  INPATIENT ADMISSION DATE: 6/23/25 11:21 AM   DISCHARGE DATE: 6/25/2025  1:43 PM   DISPOSITION:Home/Self Care    Network Utilization Review Department  ATTENTION: Please call with any questions or concerns to [phone] and carefully listen to the prompts so that you are directed to the right person. All voicemails are confidential.   For Discharge needs, contact Care Management DC Support Team at [phone] opt. 2  Send all requests for admission clinical reviews, approved or denied determinations and any other requests to dedicated fax number below belonging to the campus where the patient is receiving treatment. List of dedicated fax numbers for the Facilities:  FACILITY NAME UR FAX NUMBER   ADMISSION DENIALS (Administrative/Medical Necessity) [phone]   DISCHARGE SUPPORT TEAM ([facility]) [phone]   PARENT CHILD HEALTH (Maternity/NICU/Pediatrics) [phone]   [facility] [phone]   [facility] [phone]   [facility] [phone]   [facility] [phone]   [facility] [phone]   [facility] [phone]   [facility] [phone]   [facility] [phone]   [facility]  [facility] [phone]   [facility] [phone]   [facility] [phone]   [facility] [phone]

## 2025-07-03 ENCOUNTER — PATIENT OUTREACH (OUTPATIENT)
Dept: CASE MANAGEMENT | Facility: OTHER | Age: 45
End: 2025-07-03

## 2025-07-03 NOTE — PROGRESS NOTES
Follow up call as covering RN CM.  Pt referred for DKA.Insulin pump had not been working.   Scheduled to see PCP 7/8/25.  Call to patient, left message on machine with my name, number and request for return call. Unable to reach letter sent.  UpToDate information sent via [employer].  Will close program and remove self from care team.  Will remain available to patient for assistance.

## 2025-07-03 NOTE — LETTER
Date: 07/03/25    Dear Clarissa Webb,   My name is Lo ; I am a registered nurse care manager working with [facility].   I have not been able to reach you and would like to set a time that I can talk with you over the phone.  My work is to help patients that have complex medical conditions get the care they need. This includes patients who may have been in the hospital or emergency room.    I have enclosed information for you. Please call me with any questions you may have. I look forward to speaking with you.  Sincerely,  Lo Mckeon RN  [phone]

## 2025-07-14 ENCOUNTER — TELEPHONE (OUTPATIENT)
Age: 45
End: 2025-07-14

## 2025-07-14 NOTE — TELEPHONE ENCOUNTER
Clarissa Webb and/or patient requested a call back to discuss patient was previously seen for ECT treatments. Patient stated she is starting to feel her anxiety and depression return. Patient wanted to know if she is able to get back into treatments? Or if she has to get another referral?    They can be reached at P# [phone].       Thank you.

## 2025-07-21 ENCOUNTER — TELEPHONE (OUTPATIENT)
Dept: PSYCHIATRY | Facility: CLINIC | Age: 45
End: 2025-07-21

## 2025-07-21 NOTE — TELEPHONE ENCOUNTER
Forms sent via [employer].    Patient is aware that forms should be completed via [employer] prior to appt.

## 2025-07-23 PROBLEM — E10.10 DIABETIC KETOACIDOSIS WITHOUT COMA ASSOCIATED WITH TYPE 1 DIABETES MELLITUS (HCC): Status: RESOLVED | Noted: 2018-03-20 | Resolved: 2025-07-23

## 2025-08-07 DIAGNOSIS — E10.65 TYPE 1 DIABETES MELLITUS WITH HYPERGLYCEMIA (HCC): ICD-10-CM

## 2025-08-07 RX ORDER — SYRINGE AND NEEDLE,INSULIN,1ML 28GX1/2"
SYRINGE, EMPTY DISPOSABLE MISCELLANEOUS
Qty: 100 EACH | Refills: 3 | Status: SHIPPED | OUTPATIENT
Start: 2025-08-07

## 2025-08-18 ENCOUNTER — CLINICAL SUPPORT (OUTPATIENT)
Dept: FAMILY MEDICINE CLINIC | Facility: CLINIC | Age: 45
End: 2025-08-18
Payer: COMMERCIAL

## 2025-08-18 DIAGNOSIS — Z23 ENCOUNTER FOR IMMUNIZATION: Primary | ICD-10-CM

## 2025-08-18 PROCEDURE — 90472 IMMUNIZATION ADMIN EACH ADD: CPT

## 2025-08-18 PROCEDURE — 90632 HEPA VACCINE ADULT IM: CPT

## 2025-08-18 PROCEDURE — 90471 IMMUNIZATION ADMIN: CPT

## 2025-08-18 PROCEDURE — 90746 HEPB VACCINE 3 DOSE ADULT IM: CPT
